# Patient Record
Sex: FEMALE | Race: WHITE | NOT HISPANIC OR LATINO | Employment: FULL TIME | URBAN - METROPOLITAN AREA
[De-identification: names, ages, dates, MRNs, and addresses within clinical notes are randomized per-mention and may not be internally consistent; named-entity substitution may affect disease eponyms.]

---

## 2018-01-18 NOTE — MISCELLANEOUS
Message  Return to work or school:   Tee Chaudhary is under my professional care  She was seen in my office on 08/04/2016   She is able to return to work on  08/06/2016            Signatures   Electronically signed by : Andrae Kumari; Aug  4 2016  3:09PM EST                       (Author)    Electronically signed by : Andrae Kumari;  Aug  4 2016 10:05PM EST                       (Author)

## 2020-05-28 DIAGNOSIS — J45.41 MODERATE PERSISTENT ASTHMA WITH ACUTE EXACERBATION: Primary | ICD-10-CM

## 2020-05-29 RX ORDER — ALBUTEROL SULFATE 90 UG/1
AEROSOL, METERED RESPIRATORY (INHALATION)
Qty: 18 G | Refills: 0 | Status: SHIPPED | OUTPATIENT
Start: 2020-05-29 | End: 2022-06-08 | Stop reason: SDUPTHER

## 2020-08-05 DIAGNOSIS — E11.65 UNCONTROLLED TYPE 2 DIABETES MELLITUS WITH HYPERGLYCEMIA (HCC): Primary | ICD-10-CM

## 2020-08-05 DIAGNOSIS — I10 ESSENTIAL HYPERTENSION: ICD-10-CM

## 2020-08-05 RX ORDER — METFORMIN HYDROCHLORIDE 750 MG/1
1500 TABLET, EXTENDED RELEASE ORAL
Qty: 60 TABLET | Refills: 0 | Status: SHIPPED | OUTPATIENT
Start: 2020-08-05 | End: 2022-04-26

## 2020-08-05 RX ORDER — LISINOPRIL AND HYDROCHLOROTHIAZIDE 20; 12.5 MG/1; MG/1
2 TABLET ORAL DAILY
COMMUNITY
Start: 2020-05-23 | End: 2020-08-05 | Stop reason: SDUPTHER

## 2020-08-05 RX ORDER — LISINOPRIL AND HYDROCHLOROTHIAZIDE 20; 12.5 MG/1; MG/1
2 TABLET ORAL DAILY
Qty: 180 TABLET | Refills: 0 | Status: SHIPPED | OUTPATIENT
Start: 2020-08-05 | End: 2022-03-16 | Stop reason: ALTCHOICE

## 2021-12-16 ENCOUNTER — NURSE TRIAGE (OUTPATIENT)
Dept: OTHER | Facility: OTHER | Age: 56
End: 2021-12-16

## 2021-12-16 DIAGNOSIS — Z20.828 SARS-ASSOCIATED CORONAVIRUS EXPOSURE: Primary | ICD-10-CM

## 2021-12-17 PROCEDURE — 87636 SARSCOV2 & INF A&B AMP PRB: CPT | Performed by: FAMILY MEDICINE

## 2022-03-16 ENCOUNTER — OFFICE VISIT (OUTPATIENT)
Dept: FAMILY MEDICINE CLINIC | Facility: CLINIC | Age: 57
End: 2022-03-16
Payer: COMMERCIAL

## 2022-03-16 VITALS
OXYGEN SATURATION: 96 % | TEMPERATURE: 98.8 F | DIASTOLIC BLOOD PRESSURE: 98 MMHG | BODY MASS INDEX: 27.29 KG/M2 | SYSTOLIC BLOOD PRESSURE: 159 MMHG | WEIGHT: 139 LBS | HEIGHT: 60 IN | RESPIRATION RATE: 16 BRPM | HEART RATE: 96 BPM

## 2022-03-16 DIAGNOSIS — Z00.01 ENCOUNTER FOR ROUTINE ADULT HEALTH EXAMINATION WITH ABNORMAL FINDINGS: Primary | ICD-10-CM

## 2022-03-16 DIAGNOSIS — M54.40 LOW BACK PAIN WITH SCIATICA, SCIATICA LATERALITY UNSPECIFIED, UNSPECIFIED BACK PAIN LATERALITY, UNSPECIFIED CHRONICITY: ICD-10-CM

## 2022-03-16 DIAGNOSIS — I10 PRIMARY HYPERTENSION: ICD-10-CM

## 2022-03-16 DIAGNOSIS — R73.9 HYPERGLYCEMIA: ICD-10-CM

## 2022-03-16 PROCEDURE — 3008F BODY MASS INDEX DOCD: CPT | Performed by: NURSE PRACTITIONER

## 2022-03-16 PROCEDURE — 4010F ACE/ARB THERAPY RXD/TAKEN: CPT | Performed by: NURSE PRACTITIONER

## 2022-03-16 PROCEDURE — 99386 PREV VISIT NEW AGE 40-64: CPT | Performed by: NURSE PRACTITIONER

## 2022-03-16 PROCEDURE — 3725F SCREEN DEPRESSION PERFORMED: CPT | Performed by: NURSE PRACTITIONER

## 2022-03-16 RX ORDER — GABAPENTIN 100 MG/1
200 CAPSULE ORAL 3 TIMES DAILY
Qty: 90 CAPSULE | Refills: 1 | Status: SHIPPED | OUTPATIENT
Start: 2022-03-16 | End: 2022-06-05 | Stop reason: SDUPTHER

## 2022-03-16 RX ORDER — GABAPENTIN 100 MG/1
200 CAPSULE ORAL 3 TIMES DAILY
COMMUNITY
Start: 2022-03-03 | End: 2022-03-16 | Stop reason: SDUPTHER

## 2022-03-16 RX ORDER — NAPROXEN 500 MG/1
500 TABLET ORAL 2 TIMES DAILY WITH MEALS
COMMUNITY
Start: 2022-01-11 | End: 2022-04-26

## 2022-03-16 RX ORDER — ALBUTEROL SULFATE 2.5 MG/3ML
SOLUTION RESPIRATORY (INHALATION)
COMMUNITY
Start: 2022-02-21

## 2022-03-16 RX ORDER — LISINOPRIL 20 MG/1
20 TABLET ORAL DAILY
COMMUNITY
Start: 2022-03-03 | End: 2022-03-16 | Stop reason: SDUPTHER

## 2022-03-16 RX ORDER — LISINOPRIL 40 MG/1
40 TABLET ORAL DAILY
Qty: 90 TABLET | Refills: 1 | Status: SHIPPED | OUTPATIENT
Start: 2022-03-16 | End: 2022-05-19 | Stop reason: DRUGHIGH

## 2022-03-17 ENCOUNTER — TELEPHONE (OUTPATIENT)
Dept: PHYSICAL THERAPY | Facility: OTHER | Age: 57
End: 2022-03-17

## 2022-03-17 NOTE — TELEPHONE ENCOUNTER
Message left for patient regarding referral entered for [facility]  Nurse requested patient CB if needed and leave Full Name &  on VM  One of the nurses will return the call to discuss the program further      Referral deferred for f/u

## 2022-03-18 ENCOUNTER — NURSE TRIAGE (OUTPATIENT)
Dept: PHYSICAL THERAPY | Facility: OTHER | Age: 57
End: 2022-03-18

## 2022-03-18 DIAGNOSIS — M54.50 ACUTE EXACERBATION OF CHRONIC LOW BACK PAIN: Primary | ICD-10-CM

## 2022-03-18 DIAGNOSIS — G89.29 ACUTE EXACERBATION OF CHRONIC LOW BACK PAIN: Primary | ICD-10-CM

## 2022-03-18 NOTE — TELEPHONE ENCOUNTER
Additional Information   Negative: Is this related to a work injury?  Negative: Is this related to an MVA?  Negative: Are you currently recieving homecare services?  Negative: Has the patient had unexplained weight loss?  Negative: Does the patient have a fever?  Negative: Is the patient experiencing blood in sputum?  Negative: Has the patient experienced major trauma? (fall from height, high speed collision, direct blow to spine) and is also experiencing nausea, light-headedness, or loss of consciousness?  Negative: Is the patient experiencing urine retention?  Negative: Is the patient experiencing acute drop foot or paralysis?  Affirmative: Is this a chronic condition? Background - Initial Assessment  Clinical complaint: bilateral and midline low back pain- Also c/o Left foot tingling  Denies any Leg involvement  Date of onset: Pain started 6 months ago- Denies injury  Pain has worsened over the past month  NKI  Frequency of pain: constant  Quality of pain: "sharp ache"    Protocols used: [facility]PREHENSIVE SPINE PROGRAM PROTOCOL    Patient seen at [facility] on 3/16/22-SEE NOTES  Patient also filled out On-line form for [facility]  Referral entered to program as well  Sciatica dx attached  Nurse returned call and reviewed the program with her  Triaged for above complaints and NO RF s/s present  Patient stated she has a hx of back pain, "but not like this"  Pain is not resolving and worsening noted about 1 mth ago  Foot numbness at times  Referral entered for the [place] site and contact info given to her as well  Nurse also offered a call from the [facility] counselor d/t SBT score  Patient declined  Patient was pleasant and appropriate during this encounter  Patient appreciative of  CB and triage  Nurse wished her well and referral closed

## 2022-03-23 LAB
ALBUMIN SERPL-MCNC: 3.8 G/DL (ref 3.6–5.1)
ALBUMIN/GLOB SERPL: 1.3 (CALC) (ref 1–2.5)
ALP SERPL-CCNC: 48 U/L (ref 37–153)
ALT SERPL-CCNC: 13 U/L (ref 6–29)
APPEARANCE UR: CLEAR
AST SERPL-CCNC: 21 U/L (ref 10–35)
BACTERIA UR QL AUTO: NORMAL /HPF
BASOPHILS # BLD AUTO: 137 CELLS/UL (ref 0–200)
BASOPHILS NFR BLD AUTO: 1.4 %
BILIRUB SERPL-MCNC: 0.3 MG/DL (ref 0.2–1.2)
BILIRUB UR QL STRIP: NEGATIVE
BUN SERPL-MCNC: 8 MG/DL (ref 7–25)
BUN/CREAT SERPL: ABNORMAL (CALC) (ref 6–22)
CALCIUM SERPL-MCNC: 9.5 MG/DL (ref 8.6–10.4)
CHLORIDE SERPL-SCNC: 101 MMOL/L (ref 98–110)
CHOLEST SERPL-MCNC: 188 MG/DL
CHOLEST/HDLC SERPL: 3.7 (CALC)
CO2 SERPL-SCNC: 26 MMOL/L (ref 20–32)
COLOR UR: YELLOW
CREAT SERPL-MCNC: 0.51 MG/DL (ref 0.5–1.05)
EOSINOPHIL # BLD AUTO: 1029 CELLS/UL (ref 15–500)
EOSINOPHIL NFR BLD AUTO: 10.5 %
ERYTHROCYTE [DISTWIDTH] IN BLOOD BY AUTOMATED COUNT: 13.4 % (ref 11–15)
GLOBULIN SER CALC-MCNC: 3 G/DL (CALC) (ref 1.9–3.7)
GLUCOSE SERPL-MCNC: 108 MG/DL (ref 65–99)
GLUCOSE UR QL STRIP: NEGATIVE
HBA1C MFR BLD: 6.3 % OF TOTAL HGB
HCT VFR BLD AUTO: 37.9 % (ref 35–45)
HDLC SERPL-MCNC: 51 MG/DL
HGB BLD-MCNC: 12.6 G/DL (ref 11.7–15.5)
HGB UR QL STRIP: NEGATIVE
HYALINE CASTS #/AREA URNS LPF: NORMAL /LPF
KETONES UR QL STRIP: NEGATIVE
LDLC SERPL CALC-MCNC: 109 MG/DL (CALC)
LEUKOCYTE ESTERASE UR QL STRIP: NEGATIVE
LYMPHOCYTES # BLD AUTO: 4753 CELLS/UL (ref 850–3900)
LYMPHOCYTES NFR BLD AUTO: 48.5 %
MCH RBC QN AUTO: 29.1 PG (ref 27–33)
MCHC RBC AUTO-ENTMCNC: 33.2 G/DL (ref 32–36)
MCV RBC AUTO: 87.5 FL (ref 80–100)
MONOCYTES # BLD AUTO: 706 CELLS/UL (ref 200–950)
MONOCYTES NFR BLD AUTO: 7.2 %
NEUTROPHILS # BLD AUTO: 3175 CELLS/UL (ref 1500–7800)
NEUTROPHILS NFR BLD AUTO: 32.4 %
NITRITE UR QL STRIP: NEGATIVE
NONHDLC SERPL-MCNC: 137 MG/DL (CALC)
PH UR STRIP: 7 [PH] (ref 5–8)
PLATELET # BLD AUTO: 427 THOUSAND/UL (ref 140–400)
PMV BLD REES-ECKER: 10 FL (ref 7.5–12.5)
POTASSIUM SERPL-SCNC: 4.6 MMOL/L (ref 3.5–5.3)
PROT SERPL-MCNC: 6.8 G/DL (ref 6.1–8.1)
PROT UR QL STRIP: NEGATIVE
RBC # BLD AUTO: 4.33 MILLION/UL (ref 3.8–5.1)
RBC #/AREA URNS HPF: NORMAL /HPF
SL AMB EGFR AFRICAN AMERICAN: 124 ML/MIN/1.73M2
SL AMB EGFR NON AFRICAN AMERICAN: 107 ML/MIN/1.73M2
SODIUM SERPL-SCNC: 139 MMOL/L (ref 135–146)
SP GR UR STRIP: 1.01 (ref 1–1.03)
SQUAMOUS #/AREA URNS HPF: NORMAL /HPF
TRIGL SERPL-MCNC: 160 MG/DL
TSH SERPL-ACNC: 1.83 MIU/L (ref 0.4–4.5)
WBC # BLD AUTO: 9.8 THOUSAND/UL (ref 3.8–10.8)
WBC #/AREA URNS HPF: NORMAL /HPF

## 2022-03-23 PROCEDURE — 3044F HG A1C LEVEL LT 7.0%: CPT | Performed by: NURSE PRACTITIONER

## 2022-04-13 ENCOUNTER — EVALUATION (OUTPATIENT)
Dept: PHYSICAL THERAPY | Facility: MEDICAL CENTER | Age: 57
End: 2022-04-13
Payer: COMMERCIAL

## 2022-04-13 VITALS — TEMPERATURE: 98.2 F | HEART RATE: 67 BPM | SYSTOLIC BLOOD PRESSURE: 113 MMHG | DIASTOLIC BLOOD PRESSURE: 99 MMHG

## 2022-04-13 DIAGNOSIS — M54.50 ACUTE EXACERBATION OF CHRONIC LOW BACK PAIN: ICD-10-CM

## 2022-04-13 DIAGNOSIS — G89.29 ACUTE EXACERBATION OF CHRONIC LOW BACK PAIN: ICD-10-CM

## 2022-04-13 PROCEDURE — 97110 THERAPEUTIC EXERCISES: CPT | Performed by: PHYSICAL THERAPIST

## 2022-04-13 PROCEDURE — 97161 PT EVAL LOW COMPLEX 20 MIN: CPT | Performed by: PHYSICAL THERAPIST

## 2022-04-13 NOTE — PROGRESS NOTES
PT Evaluation     Today's date: 2022  Patient name: Yessi Mcneil  : 1965  MRN: [id number]  Referring provider: Adriane James PT  Dx:   Encounter Diagnosis     ICD-10-CM    1  Acute exacerbation of chronic low back pain  M54 50 Ambulatory referral to PT spine    G89 29                   Assessment  Assessment details: Yessi Mcneil is a 62 y o  female who presents with Acute exacerbation of chronic low back pain  Patient presents alert and oriented with the above impairments  Rosalba Bear will benefit from PT to addres deficits in order to maximize and return to prior level of function  No further referral appears necessary at this time based upon examination results  Prognosis is good given HEP compliance  Please contact me if you have any questions or recommendations  Impairments: abnormal or restricted ROM, abnormal movement, activity intolerance, impaired physical strength, lacks appropriate home exercise program, pain with function and weight-bearing intolerance  Understanding of Dx/Px/POC: good   Prognosis: good    Goals  Short Term Goals:   1  Pain decreased 2 ratings in 4 weeks  2  ROM increased 10* in 4 weeks  3  Strength increased 1/2 grade in 4 weeks    Long Term Goals:   1  ADLS/IADLS in related activities improved to maximal level in 8 weeks  2  Work performance improved to maximal level in 8 weeks  3  Recreational activities are improved to maximal level in 8 weeks  4   [place] with HEP in 8 weeks      Plan  Plan details: 1-2x/week based on work scheduled and transportation  Patient would benefit from: skilled physical therapy  Planned modality interventions: cryotherapy  Planned therapy interventions: abdominal trunk stabilization, patient education, neuromuscular re-education, manual therapy, home exercise program, functional ROM exercises, flexibility, therapeutic exercise, stretching and strengthening  Frequency: 2x week  Duration in weeks: 8  Treatment plan discussed with: patient        Subjective Evaluation    History of Present Illness  Mechanism of injury: Pt reports she has had back pain since  which has progressively worsened  Last July pain significantly increased and she began to experience tingling in L foot in November  At her visit w/ PCP last month she discussed back issue and was referred into comprehensive spine program   She presents today w/ reports of constant pain across her low back w/ constant tingling in her left foot  She also has some intermittent pain in left lateral lower leg  Pain is exacerbated w/ prolonged standing  She is employed as  and job duties to require standing for 8 hours/day  She also c/o sleep disturbance  She is also very limited w/ household chores     Pain  At best pain ratin  At worst pain rating: 10    Patient Goals  Patient goals for therapy: decreased pain, increased motion, increased strength, independence with ADLs/IADLs and return to work          Objective     Active Range of Motion     Lumbar   Flexion: 65 degrees   Extension: 15 degrees     Strength/Myotome Testing     Left Hip   Planes of Motion   Flexion: 3+  Extension: 3+  Abduction: 3+    Right Hip   Planes of Motion   Flexion: 3+  Extension: 3+  Abduction: 4    Left Knee   Extension: 4-    Right Knee   Extension: 5    Left Ankle/Foot   Dorsiflexion: 3+    Right Ankle/Foot   Dorsiflexion: 5      Flowsheet Rows      Most Recent Value   PT/OT G-Codes    Current Score 47   Projected Score 60   FOTO information reviewed Yes   Assessment Type Evaluation   G code set Other PT/OT Primary   Other PT Primary Current Status () CK   Other PT Primary Goal Status () CJ             Precautions: none      Manuals                                                                 Neuro Re-Ed             sowmya nv            Ab brace  nv                                                                             Ther Ex             ppt 5"x20 Piriformis stretch nv                                                                                          Ther Activity                                       Gait Training                                       Modalities

## 2022-04-20 ENCOUNTER — OFFICE VISIT (OUTPATIENT)
Dept: PHYSICAL THERAPY | Facility: MEDICAL CENTER | Age: 57
End: 2022-04-20
Payer: COMMERCIAL

## 2022-04-20 DIAGNOSIS — M54.50 ACUTE EXACERBATION OF CHRONIC LOW BACK PAIN: Primary | ICD-10-CM

## 2022-04-20 DIAGNOSIS — G89.29 ACUTE EXACERBATION OF CHRONIC LOW BACK PAIN: Primary | ICD-10-CM

## 2022-04-20 PROCEDURE — 97140 MANUAL THERAPY 1/> REGIONS: CPT | Performed by: PHYSICAL THERAPIST

## 2022-04-20 PROCEDURE — 97112 NEUROMUSCULAR REEDUCATION: CPT | Performed by: PHYSICAL THERAPIST

## 2022-04-20 NOTE — PROGRESS NOTES
Daily Note     Today's date: 2022  Patient name: Nicolasa Meedllin  : 1965  MRN: [id number]  Referring provider: Shaheed Cortez, PT  Dx:   Encounter Diagnosis     ICD-10-CM    1  Acute exacerbation of chronic low back pain  M54 50     G89 29                   Subjective: Pt c/o left sided upper lumbar soreness today  Objective: See treatment diary below      Assessment: Tolerated treatment well  Patient demonstrated fatigue post treatment, exhibited good technique with therapeutic exercises and would benefit from continued PT  Reports relief w/ manuals  Plan: Continue per plan of care        Precautions: none      Manuals             STM/TPR L lumbar paraspinals  10 min                                                  Neuro Re-Ed            clamshells nv 5"x10           Ab brace / march nv x20                                                                            Ther Ex            ppt 5"x20 5"x20           Piriformis stretch nv 30"x3                                                                                         Ther Activity                                       Gait Training                                       Modalities

## 2022-04-22 ENCOUNTER — OFFICE VISIT (OUTPATIENT)
Dept: URGENT CARE | Facility: MEDICAL CENTER | Age: 57
End: 2022-04-22
Payer: COMMERCIAL

## 2022-04-22 VITALS
HEIGHT: 60 IN | WEIGHT: 150 LBS | BODY MASS INDEX: 29.45 KG/M2 | SYSTOLIC BLOOD PRESSURE: 160 MMHG | HEART RATE: 64 BPM | TEMPERATURE: 97.6 F | DIASTOLIC BLOOD PRESSURE: 90 MMHG | OXYGEN SATURATION: 96 % | RESPIRATION RATE: 18 BRPM

## 2022-04-22 DIAGNOSIS — R39.9 UTI SYMPTOMS: Primary | ICD-10-CM

## 2022-04-22 PROCEDURE — 87086 URINE CULTURE/COLONY COUNT: CPT | Performed by: PHYSICIAN ASSISTANT

## 2022-04-22 PROCEDURE — 99204 OFFICE O/P NEW MOD 45 MIN: CPT | Performed by: PHYSICIAN ASSISTANT

## 2022-04-22 RX ORDER — NITROFURANTOIN 25; 75 MG/1; MG/1
100 CAPSULE ORAL 2 TIMES DAILY
Qty: 14 CAPSULE | Refills: 0 | Status: SHIPPED | OUTPATIENT
Start: 2022-04-22 | End: 2022-04-30 | Stop reason: HOSPADM

## 2022-04-22 NOTE — PROGRESS NOTES
[facility]        NAME: John Gamez is a 62 y o  female  : 1965    MRN: [id number]  DATE: 2022  TIME: 1:13 PM    Assessment and Plan   UTI symptoms [R39 9]  1  UTI symptoms  Urine culture         Patient Instructions     Dysuria   Macrobid as directed  Follow up with PCP in 3-5 days  Proceed to  ER if symptoms worsen  Chief Complaint     Chief Complaint   Patient presents with    Possible UTI     Pt  with abdominal pain, frequency, and urgensy for the past couple days          History of Present Illness       59-year-old female who presents complaining of frequency, urgency, dysuria x4 days  Denies fevers, chills, abdominal pain, flank pain      Review of Systems   Review of Systems   Constitutional: Negative  HENT: Negative  Eyes: Negative  Respiratory: Negative  Negative for cough, chest tightness, shortness of breath, wheezing and stridor  Cardiovascular: Negative  Negative for chest pain, palpitations and leg swelling  Gastrointestinal: Negative  Genitourinary: Positive for dysuria, frequency and urgency  Negative for decreased urine volume, difficulty urinating, dyspareunia, enuresis, flank pain, genital sores, hematuria, menstrual problem, pelvic pain, vaginal bleeding, vaginal discharge and vaginal pain           Current Medications       Current Outpatient Medications:     albuterol (2 5 mg/3 mL) 0 083 % nebulizer solution, USE 1 VIAL IN NEBULIZER EVERY 2 HOURS AS NEEDED FOR WHEEZING, Disp: , Rfl:     albuterol (PROVENTIL HFA,VENTOLIN HFA) 90 mcg/act inhaler, INHALE 2 PUFFS BY MOUTH EVERY 4 TO 6 HOURS AS NEEDED FOR WHEEZING AND FOR SHORTNESS OF BREATH, Disp: 18 g, Rfl: 0    gabapentin (NEURONTIN) 100 mg capsule, Take 2 capsules (200 mg total) by mouth 3 (three) times a day, Disp: 90 capsule, Rfl: 1    lisinopril (ZESTRIL) 40 mg tablet, Take 1 tablet (40 mg total) by mouth daily, Disp: 90 tablet, Rfl: 1    metoprolol tartrate (LOPRESSOR) 25 mg tablet, Take 1 tablet (25 mg total) by mouth 2 (two) times a day, Disp: 60 tablet, Rfl: 2    metFORMIN (GLUCOPHAGE-XR) 750 mg 24 hr tablet, Take 2 tablets (1,500 mg total) by mouth daily with breakfast (Patient not taking: Reported on 3/16/2022 ), Disp: 60 tablet, Rfl: 0    naproxen (NAPROSYN) 500 mg tablet, Take 500 mg by mouth 2 (two) times a day with meals (Patient not taking: Reported on 4/22/2022 ), Disp: , Rfl:     Current Allergies     Allergies as of 04/22/2022    (No Known Allergies)            The following portions of the patient's history were reviewed and updated as appropriate: allergies, current medications, past family history, past medical history, past social history, past surgical history and problem list      Past Medical History:   Diagnosis Date    History of mammogram 2018    Hypertension        Past Surgical History:   Procedure Laterality Date    APPENDECTOMY      CARPAL TUNNEL RELEASE      COLONOSCOPY  2017    repeat in 2022   [address]      spine fusion        Family History   Problem Relation Age of Onset    Hypertension Mother     Heart disease Mother     Hypertension Father     Heart disease Father     Diabetes Maternal Grandmother     Breast cancer Paternal Aunt     Pancreatic cancer Paternal Uncle          Medications have been verified  Objective   /90   Pulse 64   Temp 97 6 °F (36 4 °C)   Resp 18   Ht 5' (1 524 m)   Wt 68 kg (150 lb)   SpO2 96%   BMI 29 29 kg/m²        Physical Exam     Physical Exam  Constitutional:       Appearance: She is well-developed  HENT:      Head: Normocephalic and atraumatic  Right Ear: External ear normal       Left Ear: External ear normal       Nose: Nose normal       Mouth/Throat:      Pharynx: No oropharyngeal exudate  Cardiovascular:      Rate and Rhythm: Normal rate and regular rhythm  Heart sounds: Normal heart sounds     Pulmonary:      Effort: Pulmonary effort is normal  No respiratory distress  Breath sounds: Normal breath sounds  No wheezing or rales  Chest:      Chest wall: No tenderness  Abdominal:      General: Bowel sounds are normal  There is no distension  Palpations: Abdomen is soft  There is no mass  Tenderness: There is no abdominal tenderness  There is no right CVA tenderness, left CVA tenderness, guarding or rebound  Musculoskeletal:      Cervical back: Normal range of motion and neck supple  Lymphadenopathy:      Cervical: No cervical adenopathy

## 2022-04-24 LAB
BACTERIA UR CULT: ABNORMAL
BACTERIA UR CULT: ABNORMAL

## 2022-04-26 ENCOUNTER — APPOINTMENT (EMERGENCY)
Dept: CT IMAGING | Facility: HOSPITAL | Age: 57
End: 2022-04-26
Payer: COMMERCIAL

## 2022-04-26 ENCOUNTER — HOSPITAL ENCOUNTER (EMERGENCY)
Facility: HOSPITAL | Age: 57
Discharge: HOME | End: 2022-04-26
Attending: EMERGENCY MEDICINE | Admitting: EMERGENCY MEDICINE
Payer: COMMERCIAL

## 2022-04-26 VITALS
RESPIRATION RATE: 18 BRPM | HEART RATE: 64 BPM | DIASTOLIC BLOOD PRESSURE: 106 MMHG | TEMPERATURE: 98.2 F | SYSTOLIC BLOOD PRESSURE: 208 MMHG | OXYGEN SATURATION: 96 %

## 2022-04-26 DIAGNOSIS — K42.9 UMBILICAL HERNIA: Primary | ICD-10-CM

## 2022-04-26 DIAGNOSIS — R10.9 ABDOMINAL PAIN: ICD-10-CM

## 2022-04-26 DIAGNOSIS — I10 CHRONIC HYPERTENSION: ICD-10-CM

## 2022-04-26 PROBLEM — Z87.891 HISTORY OF TOBACCO ABUSE: Status: ACTIVE | Noted: 2022-04-26

## 2022-04-26 PROBLEM — M79.7 FIBROMYALGIA: Status: ACTIVE | Noted: 2022-04-26

## 2022-04-26 LAB
ALBUMIN SERPL BCP-MCNC: 3.5 G/DL (ref 3.5–5)
ALP SERPL-CCNC: 68 U/L (ref 46–116)
ALT SERPL W P-5'-P-CCNC: 23 U/L (ref 12–78)
ANION GAP SERPL CALCULATED.3IONS-SCNC: 7 MMOL/L (ref 4–13)
AST SERPL W P-5'-P-CCNC: 24 U/L (ref 5–45)
BASOPHILS # BLD AUTO: 0.13 THOUSANDS/ΜL (ref 0–0.1)
BASOPHILS NFR BLD AUTO: 1 % (ref 0–1)
BILIRUB SERPL-MCNC: 0.31 MG/DL (ref 0.2–1)
BILIRUB UR QL STRIP: NEGATIVE
BUN SERPL-MCNC: 7 MG/DL (ref 5–25)
CALCIUM SERPL-MCNC: 9.2 MG/DL (ref 8.3–10.1)
CHLORIDE SERPL-SCNC: 101 MMOL/L (ref 100–108)
CLARITY UR: CLEAR
CO2 SERPL-SCNC: 31 MMOL/L (ref 21–32)
COLOR UR: YELLOW
CREAT SERPL-MCNC: 0.63 MG/DL (ref 0.6–1.3)
EOSINOPHIL # BLD AUTO: 1.9 THOUSAND/ΜL (ref 0–0.61)
EOSINOPHIL NFR BLD AUTO: 21 % (ref 0–6)
ERYTHROCYTE [DISTWIDTH] IN BLOOD BY AUTOMATED COUNT: 14.6 % (ref 11.6–15.1)
GFR SERPL CREATININE-BSD FRML MDRD: 99 ML/MIN/1.73SQ M
GLUCOSE SERPL-MCNC: 135 MG/DL (ref 65–140)
GLUCOSE UR STRIP-MCNC: NEGATIVE MG/DL
HCT VFR BLD AUTO: 40.6 % (ref 34.8–46.1)
HGB BLD-MCNC: 13 G/DL (ref 11.5–15.4)
HGB UR QL STRIP.AUTO: NEGATIVE
IMM GRANULOCYTES # BLD AUTO: 0.03 THOUSAND/UL (ref 0–0.2)
IMM GRANULOCYTES NFR BLD AUTO: 0 % (ref 0–2)
KETONES UR STRIP-MCNC: NEGATIVE MG/DL
LEUKOCYTE ESTERASE UR QL STRIP: NEGATIVE
LIPASE SERPL-CCNC: 95 U/L (ref 73–393)
LYMPHOCYTES # BLD AUTO: 3.7 THOUSANDS/ΜL (ref 0.6–4.47)
LYMPHOCYTES NFR BLD AUTO: 41 % (ref 14–44)
MCH RBC QN AUTO: 28.8 PG (ref 26.8–34.3)
MCHC RBC AUTO-ENTMCNC: 32 G/DL (ref 31.4–37.4)
MCV RBC AUTO: 90 FL (ref 82–98)
MONOCYTES # BLD AUTO: 0.7 THOUSAND/ΜL (ref 0.17–1.22)
MONOCYTES NFR BLD AUTO: 8 % (ref 4–12)
NEUTROPHILS # BLD AUTO: 2.68 THOUSANDS/ΜL (ref 1.85–7.62)
NEUTS SEG NFR BLD AUTO: 29 % (ref 43–75)
NITRITE UR QL STRIP: NEGATIVE
NRBC BLD AUTO-RTO: 0 /100 WBCS
PH UR STRIP.AUTO: 7 [PH]
PLATELET # BLD AUTO: 396 THOUSANDS/UL (ref 149–390)
PMV BLD AUTO: 9.5 FL (ref 8.9–12.7)
POTASSIUM SERPL-SCNC: 3.4 MMOL/L (ref 3.5–5.3)
PROT SERPL-MCNC: 7.3 G/DL (ref 6.4–8.2)
PROT UR STRIP-MCNC: NEGATIVE MG/DL
RBC # BLD AUTO: 4.51 MILLION/UL (ref 3.81–5.12)
SODIUM SERPL-SCNC: 139 MMOL/L (ref 136–145)
SP GR UR STRIP.AUTO: 1.01 (ref 1–1.03)
UROBILINOGEN UR QL STRIP.AUTO: 0.2 E.U./DL
WBC # BLD AUTO: 9.14 THOUSAND/UL (ref 4.31–10.16)

## 2022-04-26 PROCEDURE — 96375 TX/PRO/DX INJ NEW DRUG ADDON: CPT

## 2022-04-26 PROCEDURE — 80053 COMPREHEN METABOLIC PANEL: CPT | Performed by: EMERGENCY MEDICINE

## 2022-04-26 PROCEDURE — 96361 HYDRATE IV INFUSION ADD-ON: CPT

## 2022-04-26 PROCEDURE — 96374 THER/PROPH/DIAG INJ IV PUSH: CPT

## 2022-04-26 PROCEDURE — 85025 COMPLETE CBC W/AUTO DIFF WBC: CPT | Performed by: EMERGENCY MEDICINE

## 2022-04-26 PROCEDURE — 36415 COLL VENOUS BLD VENIPUNCTURE: CPT | Performed by: EMERGENCY MEDICINE

## 2022-04-26 PROCEDURE — 99284 EMERGENCY DEPT VISIT MOD MDM: CPT

## 2022-04-26 PROCEDURE — 83690 ASSAY OF LIPASE: CPT | Performed by: EMERGENCY MEDICINE

## 2022-04-26 PROCEDURE — G1004 CDSM NDSC: HCPCS

## 2022-04-26 PROCEDURE — 99244 OFF/OP CNSLTJ NEW/EST MOD 40: CPT | Performed by: SURGERY

## 2022-04-26 PROCEDURE — 81003 URINALYSIS AUTO W/O SCOPE: CPT | Performed by: EMERGENCY MEDICINE

## 2022-04-26 PROCEDURE — 74177 CT ABD & PELVIS W/CONTRAST: CPT

## 2022-04-26 PROCEDURE — 99285 EMERGENCY DEPT VISIT HI MDM: CPT | Performed by: EMERGENCY MEDICINE

## 2022-04-26 RX ORDER — AMLODIPINE BESYLATE 5 MG/1
5 TABLET ORAL ONCE
Status: COMPLETED | OUTPATIENT
Start: 2022-04-26 | End: 2022-04-26

## 2022-04-26 RX ORDER — ONDANSETRON 2 MG/ML
4 INJECTION INTRAMUSCULAR; INTRAVENOUS ONCE
Status: COMPLETED | OUTPATIENT
Start: 2022-04-26 | End: 2022-04-26

## 2022-04-26 RX ORDER — HYDROCODONE BITARTRATE AND ACETAMINOPHEN 5; 325 MG/1; MG/1
1 TABLET ORAL EVERY 6 HOURS PRN
Qty: 15 TABLET | Refills: 0 | Status: SHIPPED | OUTPATIENT
Start: 2022-04-26 | End: 2022-05-06

## 2022-04-26 RX ORDER — AMLODIPINE BESYLATE 5 MG/1
5 TABLET ORAL DAILY
Qty: 30 TABLET | Refills: 0 | Status: SHIPPED | OUTPATIENT
Start: 2022-04-26 | End: 2022-05-04 | Stop reason: SDUPTHER

## 2022-04-26 RX ORDER — HYDROMORPHONE HCL/PF 1 MG/ML
0.5 SYRINGE (ML) INJECTION ONCE
Status: COMPLETED | OUTPATIENT
Start: 2022-04-26 | End: 2022-04-26

## 2022-04-26 RX ADMIN — HYDROMORPHONE HYDROCHLORIDE 0.5 MG: 1 INJECTION, SOLUTION INTRAMUSCULAR; INTRAVENOUS; SUBCUTANEOUS at 10:33

## 2022-04-26 RX ADMIN — ONDANSETRON 4 MG: 2 INJECTION INTRAMUSCULAR; INTRAVENOUS at 10:32

## 2022-04-26 RX ADMIN — SODIUM CHLORIDE 1000 ML: 0.9 INJECTION, SOLUTION INTRAVENOUS at 10:32

## 2022-04-26 RX ADMIN — AMLODIPINE BESYLATE 5 MG: 5 TABLET ORAL at 14:37

## 2022-04-26 RX ADMIN — IOHEXOL 100 ML: 350 INJECTION, SOLUTION INTRAVENOUS at 12:03

## 2022-04-26 NOTE — CONSULTS
Consultation - General Surgery   Jesi Whaley 62 y o  female MRN: [id number]  Unit/Bed#: ED 24 Encounter: [id number]    Assessment/Plan     Assessment:  61 yo F with PMH of HTN and appendectomy who presents with reducible umbilical hernia  /110s, otherwise vitals normal on room air    WBC 9 14  Lipase 95  UA clear    4/26 CT abdomen pelvis w IV contrast: Modest sized fat containing umbilical hernia  Plan:  Hernia reduced at bedside, plan to follow up as an outpatient to discuss elective umbilical hernia repair  Unlikely that patient's back pain and upper quadrant abdominal pain is related to her hernia, further work up per ED  Medical follow up for HTN    History of Present Illness     HPI:  Jesi Whaley is a 62 y o  female who presents with an umbilical hernia  Patient reports that the hernia has been present for over a year  Over the last 2 weeks she has started to notice more discomfort at the site of the hernia  She presented today due to the discomfort becoming unbearable  She denies any symptoms of obstruction including nausea, vomiting, constipation  Bowel function has been normal in her last bowel movement was earlier today  She denies incarceration or overlying skin changes  She currently reports abdominal pain in the bilateral upper quadrants as well as her back  Inpatient consult to Acute Care Surgery  Consult performed by: Marcello Cardona MD  Consult ordered by: Kee Lockhart DO         Review of Systems   Constitutional: Negative  HENT: Negative  Eyes: Negative  Respiratory: Negative  Cardiovascular: Negative  Gastrointestinal: Positive for abdominal pain (Bilateral upper quadrants)  Negative for constipation, diarrhea, nausea and vomiting  Endocrine: Negative  Genitourinary: Negative  Musculoskeletal: Negative  Skin: Negative  Neurological: Negative  Psychiatric/Behavioral: Negative             Historical Information   Past Medical History:   Diagnosis Date    History of mammogram 2018    Hypertension      Past Surgical History:   Procedure Laterality Date    APPENDECTOMY      CARPAL TUNNEL RELEASE      COLONOSCOPY  2017    repeat in 2022    FOOT SURGERY      NECK SURGERY      spine fusion      Social History   Social History     Substance and Sexual Activity   Alcohol Use Yes    Comment: occasional      Social History     Substance and Sexual Activity   Drug Use Not Currently     E-Cigarette/Vaping     E-Cigarette/Vaping Substances     Social History     Tobacco Use   Smoking Status Current Every Day Smoker    Packs/day: 0 50    Years: 25 00    Pack years: 12 50    Types: Cigarettes   Smokeless Tobacco Never Used   Tobacco Comment    per pt, 5 a day - As per [place]      Family History:   Family History   Problem Relation Age of Onset    Hypertension Mother     Heart disease Mother     Hypertension Father     Heart disease Father     Diabetes Maternal Grandmother     Breast cancer Paternal Aunt     Pancreatic cancer Paternal Uncle        Meds/Allergies   current meds:   No current facility-administered medications for this encounter  and PTA meds:   Prior to Admission Medications   Prescriptions Last Dose Informant Patient Reported? Taking?    albuterol (2 5 mg/3 mL) 0 083 % nebulizer solution   Yes Yes   Sig: USE 1 VIAL IN NEBULIZER EVERY 2 HOURS AS NEEDED FOR WHEEZING   albuterol (PROVENTIL HFA,VENTOLIN HFA) 90 mcg/act inhaler   No Yes   Sig: INHALE 2 PUFFS BY MOUTH EVERY 4 TO 6 HOURS AS NEEDED FOR WHEEZING AND FOR SHORTNESS OF BREATH   gabapentin (NEURONTIN) 100 mg capsule 4/25/2022 at Unknown time  No Yes   Sig: Take 2 capsules (200 mg total) by mouth 3 (three) times a day   lisinopril (ZESTRIL) 40 mg tablet 4/26/2022 at Unknown time  No Yes   Sig: Take 1 tablet (40 mg total) by mouth daily   metoprolol tartrate (LOPRESSOR) 25 mg tablet 4/26/2022 at Unknown time  No Yes   Sig: Take 1 tablet (25 mg total) by mouth 2 (two) times a day nitrofurantoin (MACROBID) 100 mg capsule 4/26/2022 at Unknown time  No Yes   Sig: Take 1 capsule (100 mg total) by mouth 2 (two) times a day for 7 days      Facility-Administered Medications: None     No Known Allergies    Objective   First Vitals:   Blood Pressure: (!) 207/100 (04/26/22 0942)  Pulse: 67 (04/26/22 0942)  Temperature: 98 2 °F (36 8 °C) (04/26/22 0942)  Temp Source: Oral (04/26/22 0942)  Respirations: 20 (04/26/22 0942)  SpO2: 98 % (04/26/22 0942)    Current Vitals:   Blood Pressure: (!) 219/108 (04/26/22 1437)  Pulse: 62 (04/26/22 1317)  Temperature: 98 2 °F (36 8 °C) (04/26/22 0942)  Temp Source: Oral (04/26/22 0942)  Respirations: 16 (04/26/22 1317)  SpO2: 95 % (04/26/22 1317)      Intake/Output Summary (Last 24 hours) at 4/26/2022 1509  Last data filed at 4/26/2022 1132  Gross per 24 hour   Intake 1000 ml   Output --   Net 1000 ml       Invasive Devices  Report    Peripheral Intravenous Line            Peripheral IV 04/26/22 Right Antecubital <1 day                Physical Exam  Constitutional:       Appearance: Normal appearance  HENT:      Head: Normocephalic and atraumatic  Right Ear: External ear normal       Left Ear: External ear normal       Nose: Nose normal       Mouth/Throat:      Mouth: Mucous membranes are moist       Pharynx: Oropharynx is clear  Eyes:      Extraocular Movements: Extraocular movements intact  Conjunctiva/sclera: Conjunctivae normal       Pupils: Pupils are equal, round, and reactive to light  Cardiovascular:      Rate and Rhythm: Normal rate and regular rhythm  Pulses: Normal pulses  Pulmonary:      Effort: Pulmonary effort is normal    Abdominal:      General: Abdomen is flat  Palpations: Abdomen is soft  Tenderness: There is abdominal tenderness (Minimally tender in bilateral upper quadrants)  There is right CVA tenderness and left CVA tenderness  There is no guarding or rebound        Hernia: A hernia (Umbilical hernia, defext approximately 1cm, reducible, no skin changes) is present  Musculoskeletal:         General: Normal range of motion  Cervical back: Normal range of motion  Skin:     General: Skin is warm and dry  Neurological:      General: No focal deficit present  Mental Status: She is alert and oriented to person, place, and time  Psychiatric:         Mood and Affect: Mood normal          Behavior: Behavior normal             Lab Results: I have personally reviewed pertinent lab results  Imaging: I have personally reviewed pertinent reports  EKG, Pathology, and Other Studies: I have personally reviewed pertinent reports

## 2022-04-26 NOTE — Clinical Note
Alicia Valdez was seen and treated in our emergency department on 4/26/2022  Diagnosis:     Trisha Cole  may return to work on return date  She may return on this date: 04/28/2022         If you have any questions or concerns, please don't hesitate to call        Baldemar Sanders,     ______________________________           _______________          _______________  Hospital Representative                              Date                                Time

## 2022-04-26 NOTE — PROGRESS NOTES
BMI Counseling: There is no height or weight on file to calculate BMI  The BMI is above normal  Nutrition recommendations include decreasing portion sizes, encouraging healthy choices of fruits and vegetables, decreasing fast food intake, consuming healthier snacks, limiting drinks that contain sugar, moderation in carbohydrate intake, increasing intake of lean protein, reducing intake of saturated and trans fat and reducing intake of cholesterol  Exercise recommendations include exercising 3-5 times per week  No pharmacotherapy was ordered  Patient referred to PCP  Rationale for BMI follow-up plan is due to patient being overweight or obese  Assessment/Plan:         Problem List Items Addressed This Visit        Cardiovascular and Mediastinum    Primary hypertension     Patient is stable with current anti-hypertensive medicine and continue to follow a low sodium diet and take current medication  All questions about this condition were answered today  Other    History of tobacco abuse     Patient encouraged to quit using tobacco for that increases their risk for COPD, lung cancer,stroke, oral cancer and heart disease  If patient does not want to quit they should let me know  when they are interested in quitting  There are numerous options to use to quit and we can discuss them  Fibromyalgia     Patient is stable  and will continue present plan of care and reassess at next routine visit  All questions about this problem from patient were answered today  Other Visit Diagnoses     Encounter for screening mammogram for breast cancer    -  Primary            Subjective:      Patient ID: Alicia Valdez is a 62 y o  female  This 63-year-old female here today for checkup for multiple medical problems  Patient with hypertension and also was recently seen in the ER with bili co hernia that was exposed    She had a lot of pain and had a reduced while she was in the emergency room and is scheduled to see Dr Leena Velez for repair of her umbilical hernia  Her blood pressure was high in the ER and which she was started on some Norvasc which she has not taken at this point her blood pressure still on the high side today she is going to really start that medication that we are going to see her back in the office to affect assess the efficacy of  The following portions of the patient's history were reviewed and updated as appropriate:   Past Medical History:  She has a past medical history of Fibromyalgia (4/26/2022), History of mammogram (2018), History of tobacco abuse (4/26/2022), and Hypertension  ,  _______________________________________________________________________  Medical Problems:  does not have any pertinent problems on file ,  _______________________________________________________________________  Past Surgical History:   has a past surgical history that includes Appendectomy; Carpal tunnel release; Foot surgery; Neck surgery; and Colonoscopy (2017)  ,  _______________________________________________________________________  Family History:  family history includes Breast cancer in her paternal aunt; Diabetes in her maternal grandmother; Heart disease in her father and mother; Hypertension in her father and mother; Pancreatic cancer in her paternal uncle ,  _______________________________________________________________________  Social History:   reports that she has been smoking cigarettes  She has a 12 50 pack-year smoking history  She has never used smokeless tobacco  She reports current alcohol use  She reports previous drug use ,  _______________________________________________________________________  Allergies:  has No Known Allergies     _______________________________________________________________________  Current Outpatient Medications   Medication Sig Dispense Refill    albuterol (2 5 mg/3 mL) 0 083 % nebulizer solution USE 1 VIAL IN NEBULIZER EVERY 2 HOURS AS NEEDED FOR WHEEZING      albuterol (PROVENTIL HFA,VENTOLIN HFA) 90 mcg/act inhaler INHALE 2 PUFFS BY MOUTH EVERY 4 TO 6 HOURS AS NEEDED FOR WHEEZING AND FOR SHORTNESS OF BREATH 18 g 0    amLODIPine (NORVASC) 5 mg tablet Take 1 tablet (5 mg total) by mouth daily 30 tablet 0    gabapentin (NEURONTIN) 100 mg capsule Take 2 capsules (200 mg total) by mouth 3 (three) times a day 90 capsule 1    HYDROcodone-acetaminophen (Norco) 5-325 mg per tablet Take 1 tablet by mouth every 6 (six) hours as needed for pain for up to 10 days Max Daily Amount: 4 tablets 15 tablet 0    lisinopril (ZESTRIL) 40 mg tablet Take 1 tablet (40 mg total) by mouth daily 90 tablet 1    metoprolol tartrate (LOPRESSOR) 25 mg tablet Take 1 tablet (25 mg total) by mouth 2 (two) times a day 60 tablet 2    nitrofurantoin (MACROBID) 100 mg capsule Take 1 capsule (100 mg total) by mouth 2 (two) times a day for 7 days 14 capsule 0     No current facility-administered medications for this visit      _______________________________________________________________________  Review of Systems   Constitutional: Negative for activity change, appetite change, fatigue and fever  HENT: Negative for congestion, ear pain, postnasal drip, rhinorrhea, sinus pressure, sinus pain, sneezing and sore throat  Eyes: Negative for pain and redness  Respiratory: Negative for apnea, cough, chest tightness, shortness of breath and wheezing  Cardiovascular: Negative for chest pain, palpitations and leg swelling  Gastrointestinal: Negative for abdominal pain, constipation, diarrhea, nausea and vomiting  Endocrine: Negative for cold intolerance and heat intolerance  Genitourinary: Negative for difficulty urinating, dysuria, frequency, hematuria and urgency  Musculoskeletal: Positive for gait problem  Negative for arthralgias, back pain and myalgias  Skin: Negative for rash  Neurological: Positive for weakness   Negative for dizziness, speech difficulty, numbness and headaches  Hematological: Does not bruise/bleed easily  Psychiatric/Behavioral: Negative for agitation, confusion and hallucinations  Objective:  Vitals:    04/27/22 1007 04/27/22 1040   BP: (!) 182/110 154/96   BP Location: Left arm    Patient Position: Sitting    Cuff Size: Standard    Pulse: 75    Temp: (!) 97 4 °F (36 3 °C)    TempSrc: Skin    SpO2: 98%    Weight: 67 1 kg (148 lb)    Height: 5' (1 524 m)      Body mass index is 28 9 kg/m²  Physical Exam  Vitals and nursing note reviewed  Constitutional:       Appearance: She is well-developed  HENT:      Head: Normocephalic and atraumatic  Nose: Nose normal       Mouth/Throat:      Mouth: Mucous membranes are moist    Eyes:      General: No scleral icterus  Conjunctiva/sclera: Conjunctivae normal       Pupils: Pupils are equal, round, and reactive to light  Neck:      Thyroid: No thyromegaly  Cardiovascular:      Rate and Rhythm: Normal rate and regular rhythm  Pulmonary:      Effort: Pulmonary effort is normal       Breath sounds: Normal breath sounds  No wheezing  Abdominal:      General: Bowel sounds are normal  There is no distension  Palpations: Abdomen is soft  Tenderness: There is no abdominal tenderness  There is no guarding or rebound  Hernia: A hernia is present  Musculoskeletal:         General: No tenderness or deformity  Normal range of motion  Cervical back: Normal range of motion and neck supple  Skin:     General: Skin is warm and dry  Findings: No erythema or rash  Neurological:      Mental Status: She is alert and oriented to person, place, and time  Sensory: No sensory deficit  Gait: Gait abnormal    Psychiatric:         Behavior: Behavior normal          Thought Content:  Thought content normal          Judgment: Judgment normal

## 2022-04-27 ENCOUNTER — OFFICE VISIT (OUTPATIENT)
Dept: PHYSICAL THERAPY | Facility: MEDICAL CENTER | Age: 57
End: 2022-04-27
Payer: COMMERCIAL

## 2022-04-27 ENCOUNTER — OFFICE VISIT (OUTPATIENT)
Dept: FAMILY MEDICINE CLINIC | Facility: CLINIC | Age: 57
End: 2022-04-27
Payer: COMMERCIAL

## 2022-04-27 VITALS
SYSTOLIC BLOOD PRESSURE: 154 MMHG | BODY MASS INDEX: 29.06 KG/M2 | DIASTOLIC BLOOD PRESSURE: 96 MMHG | OXYGEN SATURATION: 98 % | HEART RATE: 75 BPM | HEIGHT: 60 IN | TEMPERATURE: 97.4 F | WEIGHT: 148 LBS

## 2022-04-27 DIAGNOSIS — M79.7 FIBROMYALGIA: ICD-10-CM

## 2022-04-27 DIAGNOSIS — F11.20 CONTINUOUS OPIOID DEPENDENCE (HCC): ICD-10-CM

## 2022-04-27 DIAGNOSIS — J45.20 MILD INTERMITTENT ASTHMA WITHOUT COMPLICATION: ICD-10-CM

## 2022-04-27 DIAGNOSIS — Z87.891 HISTORY OF TOBACCO ABUSE: ICD-10-CM

## 2022-04-27 DIAGNOSIS — G89.29 ACUTE EXACERBATION OF CHRONIC LOW BACK PAIN: Primary | ICD-10-CM

## 2022-04-27 DIAGNOSIS — I10 PRIMARY HYPERTENSION: ICD-10-CM

## 2022-04-27 DIAGNOSIS — M54.50 ACUTE EXACERBATION OF CHRONIC LOW BACK PAIN: Primary | ICD-10-CM

## 2022-04-27 DIAGNOSIS — Z12.31 ENCOUNTER FOR SCREENING MAMMOGRAM FOR BREAST CANCER: Primary | ICD-10-CM

## 2022-04-27 PROCEDURE — 3008F BODY MASS INDEX DOCD: CPT | Performed by: FAMILY MEDICINE

## 2022-04-27 PROCEDURE — 97140 MANUAL THERAPY 1/> REGIONS: CPT | Performed by: PHYSICAL THERAPIST

## 2022-04-27 PROCEDURE — 99214 OFFICE O/P EST MOD 30 MIN: CPT | Performed by: FAMILY MEDICINE

## 2022-04-27 PROCEDURE — 97110 THERAPEUTIC EXERCISES: CPT | Performed by: PHYSICAL THERAPIST

## 2022-04-27 PROCEDURE — 97112 NEUROMUSCULAR REEDUCATION: CPT | Performed by: PHYSICAL THERAPIST

## 2022-04-27 NOTE — PROGRESS NOTES
Daily Note     Today's date: 2022  Patient name: Andres Childs  : 1965  MRN: [id number]  Referring provider: Mick Beltran PT  Dx:   Encounter Diagnosis     ICD-10-CM    1  Acute exacerbation of chronic low back pain  M54 50     G89 29                   Subjective:  Pt reports issues w/ hernia recently  No new complaints in regards to back pain  Objective: See treatment diary below      Assessment: Tolerated treatment well  Patient demonstrated fatigue post treatment, exhibited good technique with therapeutic exercises and would benefit from continued PT    Decreased tightness noted today; most restricted in piriformis  Plan: Continue per plan of care        Precautions: none      Manuals            STM/TPR L lumbar paraspinals  10 min 10 min                                                 Neuro Re-Ed           clamshells nv 5"x10 5"x15          Ab brace  nv x20 x20                                                                           Ther Ex           ppt 5"x20 5"x20 5"x20          Piriformis stretch nv 30"x3 30"x3                                                                                        Ther Activity                                       Gait Training                                       Modalities

## 2022-04-27 NOTE — ED PROVIDER NOTES
History  Chief Complaint   Patient presents with    Abdominal Pain     pt c/o a sharp, generalized abdominal pain for 1 week, pt has hernia that she states "popped out,' + nausea, denies vomiting and diarrhea     59-year-old female presents the emergency department for evaluation of 1 week history of generalized abdominal pain  Patient states the pain is mostly periumbilical and is localized to which she believes is a hernia  She states that she was supposed to have hernia repair last year but her insurance would not cover the procedure  Patient has had nausea with vomiting  No fevers or chills  She is having normal bowel movements  She denies dysuria or hematuria  Patient presents with significantly elevated blood pressure  She states that she is chronically hypertensive and has been working with her PCP to get her blood pressure under control  History provided by:  Patient and medical records   used: No    Abdominal Pain  Pain location:  Periumbilical  Pain quality: cramping and sharp    Pain radiates to:  Does not radiate  Pain severity:  Severe  Onset quality:  Gradual  Duration:  1 week  Timing:  Intermittent  Progression:  Waxing and waning  Chronicity:  New  Context: not previous surgeries    Relieved by:  Nothing  Worsened by: Movement and palpation (Sitting)  Ineffective treatments:  OTC medications  Associated symptoms: nausea    Associated symptoms: no anorexia, no belching, no chest pain, no constipation, no cough, no diarrhea, no fever, no hematuria and no vomiting    Risk factors: no recent hospitalization        Prior to Admission Medications   Prescriptions Last Dose Informant Patient Reported? Taking?    albuterol (2 5 mg/3 mL) 0 083 % nebulizer solution   Yes Yes   Sig: USE 1 VIAL IN NEBULIZER EVERY 2 HOURS AS NEEDED FOR WHEEZING   albuterol (PROVENTIL HFA,VENTOLIN HFA) 90 mcg/act inhaler   No Yes   Sig: INHALE 2 PUFFS BY MOUTH EVERY 4 TO 6 HOURS AS NEEDED FOR WHEEZING AND FOR SHORTNESS OF BREATH   gabapentin (NEURONTIN) 100 mg capsule 4/25/2022 at Unknown time  No Yes   Sig: Take 2 capsules (200 mg total) by mouth 3 (three) times a day   lisinopril (ZESTRIL) 40 mg tablet 4/26/2022 at Unknown time  No Yes   Sig: Take 1 tablet (40 mg total) by mouth daily   metoprolol tartrate (LOPRESSOR) 25 mg tablet 4/26/2022 at Unknown time  No Yes   Sig: Take 1 tablet (25 mg total) by mouth 2 (two) times a day   nitrofurantoin (MACROBID) 100 mg capsule 4/26/2022 at Unknown time  No Yes   Sig: Take 1 capsule (100 mg total) by mouth 2 (two) times a day for 7 days      Facility-Administered Medications: None       Past Medical History:   Diagnosis Date    Fibromyalgia 4/26/2022    History of mammogram 2018    History of tobacco abuse 4/26/2022    Hypertension        Past Surgical History:   Procedure Laterality Date    APPENDECTOMY      CARPAL TUNNEL RELEASE      COLONOSCOPY  2017    repeat in 2022   [facility]      spine fusion        Family History   Problem Relation Age of Onset    Hypertension Mother     Heart disease Mother     Hypertension Father     Heart disease Father     Diabetes Maternal Grandmother     Breast cancer Paternal Aunt     Pancreatic cancer Paternal Uncle      I have reviewed and agree with the history as documented      E-Cigarette/Vaping    E-Cigarette Use Never User      E-Cigarette/Vaping Substances    Nicotine No     THC No     CBD No     Flavoring No     Other No     Unknown No      Social History     Tobacco Use    Smoking status: Current Every Day Smoker     Packs/day: 0 50     Years: 25 00     Pack years: 12 50     Types: Cigarettes    Smokeless tobacco: Never Used    Tobacco comment: per pt, 5 a day - As per [employer] Vaping Use: Never used   Substance Use Topics    Alcohol use: Yes     Comment: occasional     Drug use: Not Currently       Review of Systems   Constitutional: Positive for appetite change  Negative for fever  Respiratory: Negative for cough  Cardiovascular: Negative for chest pain  Gastrointestinal: Positive for abdominal pain and nausea  Negative for anorexia, constipation, diarrhea and vomiting  Genitourinary: Negative for hematuria  Musculoskeletal: Negative for back pain  Skin: Negative for rash  All other systems reviewed and are negative  Physical Exam  Physical Exam  Vitals and nursing note reviewed  Constitutional:       General: She is in acute distress  Appearance: She is well-developed  She is not ill-appearing  HENT:      Head: Normocephalic  Nose: Nose normal       Mouth/Throat:      Pharynx: No oropharyngeal exudate  Eyes:      General: No scleral icterus  Conjunctiva/sclera: Conjunctivae normal       Pupils: Pupils are equal, round, and reactive to light  Cardiovascular:      Rate and Rhythm: Normal rate and regular rhythm  Heart sounds: Normal heart sounds  Pulmonary:      Effort: Pulmonary effort is normal  No respiratory distress  Breath sounds: Normal breath sounds  Abdominal:      General: Bowel sounds are normal  There is no distension  Palpations: Abdomen is soft  Tenderness: There is abdominal tenderness in the periumbilical area  There is no right CVA tenderness, left CVA tenderness, guarding or rebound  Hernia: A hernia is present  Hernia is present in the umbilical area  Musculoskeletal:         General: No tenderness or deformity  Normal range of motion  Cervical back: Normal range of motion and neck supple  Lymphadenopathy:      Cervical: No cervical adenopathy  Skin:     General: Skin is warm and dry  Findings: No rash  Neurological:      Mental Status: She is alert and oriented to person, place, and time  Cranial Nerves: No cranial nerve deficit  Sensory: No sensory deficit  Motor: No abnormal muscle tone        Coordination: Coordination normal  Gait: Gait normal       Deep Tendon Reflexes: Reflexes are normal and symmetric  Psychiatric:         Behavior: Behavior normal          Thought Content:  Thought content normal          Judgment: Judgment normal          Vital Signs  ED Triage Vitals   Temperature Pulse Respirations Blood Pressure SpO2   04/26/22 0942 04/26/22 0942 04/26/22 0942 04/26/22 0942 04/26/22 0942   98 2 °F (36 8 °C) 67 20 (!) 207/100 98 %      Temp Source Heart Rate Source Patient Position - Orthostatic VS BP Location FiO2 (%)   04/26/22 0942 04/26/22 0942 04/26/22 0942 04/26/22 0942 --   Oral Monitor Sitting Left arm       Pain Score       04/26/22 1033       7           Vitals:    04/26/22 1010 04/26/22 1317 04/26/22 1437 04/26/22 1515   BP: (!) 208/111 (!) 184/98 (!) 219/108 (!) 208/106   Pulse: 71 62  64   Patient Position - Orthostatic VS:             Visual Acuity      ED Medications  Medications   sodium chloride 0 9 % bolus 1,000 mL (0 mL Intravenous Stopped 4/26/22 1132)   ondansetron (ZOFRAN) injection 4 mg (4 mg Intravenous Given 4/26/22 1032)   HYDROmorphone (DILAUDID) injection 0 5 mg (0 5 mg Intravenous Given 4/26/22 1033)   iohexol (OMNIPAQUE) 350 MG/ML injection (SINGLE-DOSE) 100 mL (100 mL Intravenous Given 4/26/22 1203)   amLODIPine (NORVASC) tablet 5 mg (5 mg Oral Given 4/26/22 1437)       Diagnostic Studies  Results Reviewed     Procedure Component Value Units Date/Time    Comprehensive metabolic panel [[id number]]  (Abnormal) Collected: 04/26/22 1031    Lab Status: Final result Specimen: Blood from Arm, Right Updated: 04/26/22 1115     Sodium 139 mmol/L      Potassium 3 4 mmol/L      Chloride 101 mmol/L      CO2 31 mmol/L      ANION GAP 7 mmol/L      BUN 7 mg/dL      Creatinine 0 63 mg/dL      Glucose 135 mg/dL      Calcium 9 2 mg/dL      AST 24 U/L      ALT 23 U/L      Alkaline Phosphatase 68 U/L      Total Protein 7 3 g/dL      Albumin 3 5 g/dL      Total Bilirubin 0 31 mg/dL      eGFR 99 ml/min/1 73sq m Narrative:      National Kidney Disease Foundation guidelines for Chronic Kidney Disease (CKD):     Stage 1 with normal or high GFR (GFR > 90 mL/min/1 73 square meters)    Stage 2 Mild CKD (GFR = 60-89 mL/min/1 73 square meters)    Stage 3A Moderate CKD (GFR = 45-59 mL/min/1 73 square meters)    Stage 3B Moderate CKD (GFR = 30-44 mL/min/1 73 square meters)    Stage 4 Severe CKD (GFR = 15-29 mL/min/1 73 square meters)    Stage 5 End Stage CKD (GFR <15 mL/min/1 73 square meters)  Note: GFR calculation is accurate only with a steady state creatinine    Lipase [[id number]]  (Normal) Collected: 04/26/22 1031    Lab Status: Final result Specimen: Blood from Arm, Right Updated: 04/26/22 1115     Lipase 95 u/L     CBC and differential [[id number]]  (Abnormal) Collected: 04/26/22 1031    Lab Status: Final result Specimen: Blood from Arm, Right Updated: 04/26/22 1054     WBC 9 14 Thousand/uL      RBC 4 51 Million/uL      Hemoglobin 13 0 g/dL      Hematocrit 40 6 %      MCV 90 fL      MCH 28 8 pg      MCHC 32 0 g/dL      RDW 14 6 %      MPV 9 5 fL      Platelets 771 Thousands/uL      nRBC 0 /100 WBCs      Neutrophils Relative 29 %      Immat GRANS % 0 %      Lymphocytes Relative 41 %      Monocytes Relative 8 %      Eosinophils Relative 21 %      Basophils Relative 1 %      Neutrophils Absolute 2 68 Thousands/µL      Immature Grans Absolute 0 03 Thousand/uL      Lymphocytes Absolute 3 70 Thousands/µL      Monocytes Absolute 0 70 Thousand/µL      Eosinophils Absolute 1 90 Thousand/µL      Basophils Absolute 0 13 Thousands/µL     UA w Reflex to Microscopic w Reflex to Culture [[id number]] Collected: 04/26/22 1035    Lab Status: Final result Specimen: Urine, Clean Catch Updated: 04/26/22 1046     Color, UA Yellow     Clarity, UA Clear     Specific Gravity, UA 1 010     pH, UA 7 0     Leukocytes, UA Negative     Nitrite, UA Negative     Protein, UA Negative mg/dl      Glucose, UA Negative mg/dl      Ketones, UA Negative mg/dl Urobilinogen, UA 0 2 E U /dl      Bilirubin, UA Negative     Blood, UA Negative                 CT abdomen pelvis with contrast   Final Result by Malcolm Roblero DO (04/26 1315)      No acute intra-abdominal abnormality  Modest sized fat containing umbilical hernia  Small foci of air seen in the urinary bladder  Correlate for recent catheterization  Sigmoid diverticulosis  Workstation performed: [id number]                    Procedures  Procedures         ED Course  ED Course as of 04/27/22 1702   Tue Apr 26, 2022   1515 Patient with chronically elevated blood pressure  She states that her blood pressure medication was just increased to lisinopril 40 mg daily approximately 3 weeks ago  Patient does have a follow-up appointment tomorrow with her PCP  Will add 5 mg of amlodipine  Patient does continue to take metoprolol b i d  And will be due for an evening dose  Will also prescribe hydrocodone/Tylenol for pain to take as needed  Patient feels comfortable discharge plan and will follow up with General surgery as an outpatient  Patient recently treated for UTI  She does have antibiotics and will continue taking these through the 29th  She states that her UTI symptoms have largely resolved  SBIRT 20yo+      Most Recent Value   SBIRT (24 yo +)    In order to provide better care to our patients, we are screening all of our patients for alcohol and drug use  Would it be okay to ask you these screening questions?  No Filed at: 04/26/2022 0959                    MDM  Number of Diagnoses or Management Options  Abdominal pain: new and requires workup  Chronic hypertension: new and requires workup  Umbilical hernia: new and requires workup     Amount and/or Complexity of Data Reviewed  Clinical lab tests: ordered and reviewed  Tests in the radiology section of CPT®: ordered and reviewed  Decide to obtain previous medical records or to obtain history from someone other than the patient: yes  Discuss the patient with other providers: yes  Independent visualization of images, tracings, or specimens: yes    Risk of Complications, Morbidity, and/or Mortality  General comments: 70-year-old female presents with 1 week history of abdominal pain which she localizes to the an umbilical hernia  The hernia was reduced by surgery  Patient will follow-up with surgery as an outpatient for elective repair  Patient does not have CVA tenderness on my exam   Urinalysis unremarkable and she is currently taking antibiotics  She is nontoxic in appearance  She has had significant elevation of her blood pressure while in the department  Will add Norvasc and have patient follow-up with her PCP as scheduled tomorrow  Patient Progress  Patient progress: stable      Disposition  Final diagnoses:   Umbilical hernia   Abdominal pain   Chronic hypertension     Time reflects when diagnosis was documented in both MDM as applicable and the Disposition within this note     Time User Action Codes Description Comment    4/26/2022  3:07 PM Bolivar Acosta Add [I85 3] Umbilical hernia     2/13/1372  3:19 PM Rosalba Allan Add [R10 9] Abdominal pain     4/26/2022  3:20 PM Rosalba Allan Add [I10] Chronic hypertension       ED Disposition     ED Disposition Condition Date/Time Comment    Discharge Stable Tue Apr 26, 2022  3:19 PM Frances Aguilar discharge to home/self care              Follow-up Information     Follow up With Specialties Details Why Contact Prema Louise MD General Surgery Schedule an appointment as soon as possible for a visit  For recheck of current symptoms [address]  [address] [facility]      Chalino Garrett MD Family Medicine On 4/27/2022 For recheck of current symptoms, for recheck of blood pressure [address] [facility] [address]  [phone]            Discharge Medication List as of 4/26/2022  3:24 PM      START taking these medications    Details   amLODIPine (NORVASC) 5 mg tablet Take 1 tablet (5 mg total) by mouth daily, Starting Tue 4/26/2022, Normal      HYDROcodone-acetaminophen (Norco) 5-325 mg per tablet Take 1 tablet by mouth every 6 (six) hours as needed for pain for up to 10 days Max Daily Amount: 4 tablets, Starting Tue 4/26/2022, Until Fri 5/6/2022 at 2359, Normal         CONTINUE these medications which have NOT CHANGED    Details   albuterol (2 5 mg/3 mL) 0 083 % nebulizer solution USE 1 VIAL IN NEBULIZER EVERY 2 HOURS AS NEEDED FOR WHEEZING, Historical Med      albuterol (PROVENTIL HFA,VENTOLIN HFA) 90 mcg/act inhaler INHALE 2 PUFFS BY MOUTH EVERY 4 TO 6 HOURS AS NEEDED FOR WHEEZING AND FOR SHORTNESS OF BREATH, Normal      gabapentin (NEURONTIN) 100 mg capsule Take 2 capsules (200 mg total) by mouth 3 (three) times a day, Starting Wed 3/16/2022, Normal      lisinopril (ZESTRIL) 40 mg tablet Take 1 tablet (40 mg total) by mouth daily, Starting Wed 3/16/2022, Normal      metoprolol tartrate (LOPRESSOR) 25 mg tablet Take 1 tablet (25 mg total) by mouth 2 (two) times a day, Starting Wed 3/16/2022, Normal      nitrofurantoin (MACROBID) 100 mg capsule Take 1 capsule (100 mg total) by mouth 2 (two) times a day for 7 days, Starting Fri 4/22/2022, Until Fri 4/29/2022, Normal             No discharge procedures on file      PDMP Review     None          ED Provider  Electronically Signed by           Baldemar Sanders DO  04/27/22 3703

## 2022-04-29 ENCOUNTER — HOSPITAL ENCOUNTER (OUTPATIENT)
Facility: HOSPITAL | Age: 57
Setting detail: OBSERVATION
Discharge: HOME | End: 2022-04-30
Attending: EMERGENCY MEDICINE | Admitting: INTERNAL MEDICINE
Payer: COMMERCIAL

## 2022-04-29 ENCOUNTER — APPOINTMENT (EMERGENCY)
Dept: CT IMAGING | Facility: HOSPITAL | Age: 57
End: 2022-04-29
Payer: COMMERCIAL

## 2022-04-29 DIAGNOSIS — R11.0 NAUSEA: Primary | ICD-10-CM

## 2022-04-29 DIAGNOSIS — K42.9 UMBILICAL HERNIA: ICD-10-CM

## 2022-04-29 DIAGNOSIS — R10.9 ABDOMINAL PAIN: ICD-10-CM

## 2022-04-29 PROBLEM — E11.9 DIABETES MELLITUS TYPE 2, CONTROLLED (HCC): Status: ACTIVE | Noted: 2022-04-29

## 2022-04-29 LAB
ALBUMIN SERPL BCP-MCNC: 3.6 G/DL (ref 3.5–5)
ALP SERPL-CCNC: 74 U/L (ref 46–116)
ALT SERPL W P-5'-P-CCNC: 18 U/L (ref 12–78)
ANION GAP SERPL CALCULATED.3IONS-SCNC: 6 MMOL/L (ref 4–13)
AST SERPL W P-5'-P-CCNC: 15 U/L (ref 5–45)
BASOPHILS # BLD AUTO: 0.12 THOUSANDS/ΜL (ref 0–0.1)
BASOPHILS NFR BLD AUTO: 1 % (ref 0–1)
BILIRUB SERPL-MCNC: 0.4 MG/DL (ref 0.2–1)
BUN SERPL-MCNC: 6 MG/DL (ref 5–25)
CALCIUM SERPL-MCNC: 9.5 MG/DL (ref 8.3–10.1)
CARDIAC TROPONIN I PNL SERPL HS: 4 NG/L
CHLORIDE SERPL-SCNC: 101 MMOL/L (ref 100–108)
CO2 SERPL-SCNC: 31 MMOL/L (ref 21–32)
CREAT SERPL-MCNC: 0.64 MG/DL (ref 0.6–1.3)
EOSINOPHIL # BLD AUTO: 1.42 THOUSAND/ΜL (ref 0–0.61)
EOSINOPHIL NFR BLD AUTO: 16 % (ref 0–6)
ERYTHROCYTE [DISTWIDTH] IN BLOOD BY AUTOMATED COUNT: 14.4 % (ref 11.6–15.1)
GFR SERPL CREATININE-BSD FRML MDRD: 99 ML/MIN/1.73SQ M
GLUCOSE SERPL-MCNC: 147 MG/DL (ref 65–140)
HCT VFR BLD AUTO: 42.2 % (ref 34.8–46.1)
HGB BLD-MCNC: 13.6 G/DL (ref 11.5–15.4)
IMM GRANULOCYTES # BLD AUTO: 0.02 THOUSAND/UL (ref 0–0.2)
IMM GRANULOCYTES NFR BLD AUTO: 0 % (ref 0–2)
LACTATE SERPL-SCNC: 0.8 MMOL/L (ref 0.5–2)
LIPASE SERPL-CCNC: 87 U/L (ref 73–393)
LYMPHOCYTES # BLD AUTO: 3.38 THOUSANDS/ΜL (ref 0.6–4.47)
LYMPHOCYTES NFR BLD AUTO: 39 % (ref 14–44)
MCH RBC QN AUTO: 29.1 PG (ref 26.8–34.3)
MCHC RBC AUTO-ENTMCNC: 32.2 G/DL (ref 31.4–37.4)
MCV RBC AUTO: 90 FL (ref 82–98)
MONOCYTES # BLD AUTO: 0.7 THOUSAND/ΜL (ref 0.17–1.22)
MONOCYTES NFR BLD AUTO: 8 % (ref 4–12)
NEUTROPHILS # BLD AUTO: 3.17 THOUSANDS/ΜL (ref 1.85–7.62)
NEUTS SEG NFR BLD AUTO: 36 % (ref 43–75)
NRBC BLD AUTO-RTO: 0 /100 WBCS
PLATELET # BLD AUTO: 382 THOUSANDS/UL (ref 149–390)
PMV BLD AUTO: 9.2 FL (ref 8.9–12.7)
POTASSIUM SERPL-SCNC: 3.6 MMOL/L (ref 3.5–5.3)
PROT SERPL-MCNC: 8.2 G/DL (ref 6.4–8.2)
RBC # BLD AUTO: 4.68 MILLION/UL (ref 3.81–5.12)
SODIUM SERPL-SCNC: 138 MMOL/L (ref 136–145)
WBC # BLD AUTO: 8.81 THOUSAND/UL (ref 4.31–10.16)

## 2022-04-29 PROCEDURE — 80053 COMPREHEN METABOLIC PANEL: CPT

## 2022-04-29 PROCEDURE — 99285 EMERGENCY DEPT VISIT HI MDM: CPT | Performed by: EMERGENCY MEDICINE

## 2022-04-29 PROCEDURE — 83690 ASSAY OF LIPASE: CPT

## 2022-04-29 PROCEDURE — 96376 TX/PRO/DX INJ SAME DRUG ADON: CPT

## 2022-04-29 PROCEDURE — 36415 COLL VENOUS BLD VENIPUNCTURE: CPT

## 2022-04-29 PROCEDURE — 85025 COMPLETE CBC W/AUTO DIFF WBC: CPT

## 2022-04-29 PROCEDURE — 99285 EMERGENCY DEPT VISIT HI MDM: CPT

## 2022-04-29 PROCEDURE — 83605 ASSAY OF LACTIC ACID: CPT

## 2022-04-29 PROCEDURE — 84484 ASSAY OF TROPONIN QUANT: CPT

## 2022-04-29 PROCEDURE — 96361 HYDRATE IV INFUSION ADD-ON: CPT

## 2022-04-29 PROCEDURE — 96375 TX/PRO/DX INJ NEW DRUG ADDON: CPT

## 2022-04-29 PROCEDURE — 96374 THER/PROPH/DIAG INJ IV PUSH: CPT

## 2022-04-29 PROCEDURE — 99220 PR INITIAL OBSERVATION CARE/DAY 70 MINUTES: CPT | Performed by: INTERNAL MEDICINE

## 2022-04-29 PROCEDURE — 74177 CT ABD & PELVIS W/CONTRAST: CPT

## 2022-04-29 PROCEDURE — 93005 ELECTROCARDIOGRAM TRACING: CPT

## 2022-04-29 PROCEDURE — G1004 CDSM NDSC: HCPCS

## 2022-04-29 RX ORDER — METOCLOPRAMIDE HYDROCHLORIDE 5 MG/ML
10 INJECTION INTRAMUSCULAR; INTRAVENOUS EVERY 6 HOURS PRN
Status: DISCONTINUED | OUTPATIENT
Start: 2022-04-29 | End: 2022-04-30 | Stop reason: HOSPADM

## 2022-04-29 RX ORDER — HYDROMORPHONE HCL/PF 1 MG/ML
0.5 SYRINGE (ML) INJECTION ONCE
Status: COMPLETED | OUTPATIENT
Start: 2022-04-29 | End: 2022-04-29

## 2022-04-29 RX ORDER — OXYCODONE HYDROCHLORIDE 5 MG/1
5 TABLET ORAL EVERY 6 HOURS PRN
Status: DISCONTINUED | OUTPATIENT
Start: 2022-04-29 | End: 2022-04-30 | Stop reason: HOSPADM

## 2022-04-29 RX ORDER — ACETAMINOPHEN 325 MG/1
650 TABLET ORAL EVERY 6 HOURS PRN
Status: DISCONTINUED | OUTPATIENT
Start: 2022-04-29 | End: 2022-04-30 | Stop reason: HOSPADM

## 2022-04-29 RX ORDER — DICYCLOMINE HCL 20 MG
20 TABLET ORAL
Status: DISCONTINUED | OUTPATIENT
Start: 2022-04-29 | End: 2022-04-30 | Stop reason: HOSPADM

## 2022-04-29 RX ORDER — ALBUTEROL SULFATE 90 UG/1
2 AEROSOL, METERED RESPIRATORY (INHALATION) EVERY 4 HOURS PRN
Status: DISCONTINUED | OUTPATIENT
Start: 2022-04-29 | End: 2022-04-30 | Stop reason: HOSPADM

## 2022-04-29 RX ORDER — LISINOPRIL 20 MG/1
40 TABLET ORAL DAILY
Status: DISCONTINUED | OUTPATIENT
Start: 2022-04-30 | End: 2022-04-30 | Stop reason: HOSPADM

## 2022-04-29 RX ORDER — METOCLOPRAMIDE HYDROCHLORIDE 5 MG/ML
10 INJECTION INTRAMUSCULAR; INTRAVENOUS ONCE
Status: COMPLETED | OUTPATIENT
Start: 2022-04-29 | End: 2022-04-29

## 2022-04-29 RX ORDER — ONDANSETRON 2 MG/ML
4 INJECTION INTRAMUSCULAR; INTRAVENOUS ONCE
Status: COMPLETED | OUTPATIENT
Start: 2022-04-29 | End: 2022-04-29

## 2022-04-29 RX ORDER — NITROFURANTOIN 25; 75 MG/1; MG/1
100 CAPSULE ORAL 2 TIMES DAILY
Status: COMPLETED | OUTPATIENT
Start: 2022-04-29 | End: 2022-04-29

## 2022-04-29 RX ORDER — GABAPENTIN 100 MG/1
200 CAPSULE ORAL 3 TIMES DAILY
Status: DISCONTINUED | OUTPATIENT
Start: 2022-04-29 | End: 2022-04-30 | Stop reason: HOSPADM

## 2022-04-29 RX ORDER — OXYCODONE HYDROCHLORIDE 5 MG/1
2.5 TABLET ORAL EVERY 6 HOURS PRN
Status: DISCONTINUED | OUTPATIENT
Start: 2022-04-29 | End: 2022-04-30 | Stop reason: HOSPADM

## 2022-04-29 RX ORDER — HYDRALAZINE HYDROCHLORIDE 20 MG/ML
10 INJECTION INTRAMUSCULAR; INTRAVENOUS EVERY 6 HOURS PRN
Status: DISCONTINUED | OUTPATIENT
Start: 2022-04-29 | End: 2022-04-30 | Stop reason: HOSPADM

## 2022-04-29 RX ORDER — AMLODIPINE BESYLATE 5 MG/1
5 TABLET ORAL DAILY
Status: DISCONTINUED | OUTPATIENT
Start: 2022-04-30 | End: 2022-04-30 | Stop reason: HOSPADM

## 2022-04-29 RX ADMIN — IOHEXOL 100 ML: 350 INJECTION, SOLUTION INTRAVENOUS at 09:20

## 2022-04-29 RX ADMIN — METOCLOPRAMIDE HYDROCHLORIDE 10 MG: 5 INJECTION INTRAMUSCULAR; INTRAVENOUS at 14:14

## 2022-04-29 RX ADMIN — NITROFURANTOIN (MONOHYDRATE/MACROCRYSTALS) 100 MG: 75; 25 CAPSULE ORAL at 18:38

## 2022-04-29 RX ADMIN — METOPROLOL TARTRATE 25 MG: 25 TABLET, FILM COATED ORAL at 18:38

## 2022-04-29 RX ADMIN — DICYCLOMINE HYDROCHLORIDE 20 MG: 20 TABLET ORAL at 21:30

## 2022-04-29 RX ADMIN — HYDROMORPHONE HYDROCHLORIDE 0.5 MG: 1 INJECTION, SOLUTION INTRAMUSCULAR; INTRAVENOUS; SUBCUTANEOUS at 09:06

## 2022-04-29 RX ADMIN — DICYCLOMINE HYDROCHLORIDE 20 MG: 20 TABLET ORAL at 18:38

## 2022-04-29 RX ADMIN — ONDANSETRON 4 MG: 2 INJECTION INTRAMUSCULAR; INTRAVENOUS at 09:04

## 2022-04-29 RX ADMIN — HYDROMORPHONE HYDROCHLORIDE 0.5 MG: 1 INJECTION, SOLUTION INTRAMUSCULAR; INTRAVENOUS; SUBCUTANEOUS at 11:18

## 2022-04-29 RX ADMIN — SODIUM CHLORIDE 1000 ML: 0.9 INJECTION, SOLUTION INTRAVENOUS at 08:57

## 2022-04-29 RX ADMIN — GABAPENTIN 200 MG: 100 CAPSULE ORAL at 21:30

## 2022-04-29 RX ADMIN — ONDANSETRON 4 MG: 2 INJECTION INTRAMUSCULAR; INTRAVENOUS at 12:29

## 2022-04-29 NOTE — H&P
Zoe  H&P- Abdoul Argue 1965, 62 y o  female MRN: [id number]  Unit/Bed#: W -57 Encounter: [id number]  Primary Care Provider: Fer Etienne MD   Date and time admitted to hospital: 4/29/2022  5:80 AM    * Umbilical hernia  Assessment & Plan  POA:  Reducible umbilical hernia associated with periumbilical abdominal pain and nausea  Labs unremarkable,  Patient was seen by General surgery in the ED, hernia was reduced and abdominal binder applied  Patient now reports significant improvement in abdominal pain and nausea  Has follow-up appointment with General surgery on 05/09 to discuss further surgical management    Plan:  Admit patient for observation overnight  P r n  Pain regimen ordered  P r n  Reglan ordered for nausea  Ordered Bentyl 20mg q6h for abdominal pain  Patient to continue applying abdominal binder  Follow-up with General surgery on an outpatient basis after discharge    Nausea  Assessment & Plan  POA: Intractable nausea in the setting of umbilical hernia  Patient initially given 2 doses of Zofran with minimal improvement  Symptoms improved significantly after a dose of Reglan  Suspect there may also be a component of gastroparesis given history of DM and improvement with reglan    Plan: Will order PRN Reglan for patient  Primary hypertension  Assessment & Plan  Blood Pressure: 161/91    Patient with blood pressure of 213/103 POA  Likely secondary to pain  Home regimen includes lisinopril, amlodipine and Lopressor    Plan: Will continue home regimen  P r n  Hydralazine added for SBP> 180  Continue monitoring vitals    Mild intermittent asthma without complication  Assessment & Plan  Patient on albuterol inhaler p r n , also has albuterol nebulizer as needed for shortness of breath and wheezing  Was noted to have left-sided wheezing on auscultation today, however, not in respiratory distress at the time of my evaluation    Plan:  Albuterol p r n  Ordered  Will monitor vitals      Diabetes mellitus type 2, controlled ([facility] [address] )  Assessment & Plan  Lab Results   Component Value Date    HGBA1C 6 3 (H) 03/23/2022       No results for input(s): POCGLU in the last 72 hours  Blood Sugar Average: Last 72 hrs:  Patient with poorly controlled DM in the past, states that she was on multiple medications  Lost 100lbs after her mother's death 2 years ago and has been off medication since    Plan:  Offered patient sliding scale insulin while here under observation, patient declined  Diabetic diet ordered    History of tobacco abuse  Assessment & Plan  Current everyday smoker  Discussed with patient importance of smoking cessation given history of DM and HTN  Offered patient nicotine patch, patient refused  Will continue encouraging smoking cessation    VTE Pharmacologic Prophylaxis: VTE Score: 2 Low Risk (Score 0-2) - Encourage Ambulation  Code Status: Level 1 - Full Code   Discussion with family: Updated  () at bedside  Anticipated Length of Stay: Patient will be admitted on an observation basis with an anticipated length of stay of less than 2 midnights secondary to Intractable nausea  Chief Complaint:     History of Present Illness:  Navin Ulloa is a 62 y o  female with a PMH of umbilical hernia, hypertension, asthma, Type 2 DM who presents with a 1 day history of diffuse abdominal pain that progressively worsened to 10/10 this AM prompting the patient to come to the ED  Patient reports a 1 year history of umbilical hernia, which she reports worsened after she lifted boxes at work 2 days prior to presentation  Pain is associated with nausea, however patient denies episodes of vomiting  She states that she had 1 bowel movement yesterday which was associated with abdominal pain, however she denies hematochezia or melena  She was seen at the ED with similar presentation, however at the time, she also endorses episodes of vomiting   Patient was seen by general surgery at the time and an appointment was scheduled for May 9th for further discussion of surgical repair  She was also seen at urgent care 1 week ago due to abdominal pain, increased frequency in urination, urgency and dysuria  At the time was diagnosed with UTI and sent home to complete 1 week of Macrobid  She denies urinary symptoms at this time  She denies fevers, chills, abdominal distension, blood in stool, dysuria or hematuria  Endorses increased frequency in urination especially at night  At the time of my evaluation, patient states that pain is now 5/5 and she is no longer experiencing nausea  She states that she had crackers and ginger ale in the ED which helped improve her nausea  Labs performed in the ED have been unremarkable except for elevated glucose, imaging shows a stable fat-containing umbilical hernia and colonic diverticulosis  Patient will be admitted to the [facility] service for observation and further management  Review of Systems:  Review of Systems   Constitutional: Negative for appetite change, fatigue, fever and unexpected weight change  HENT: Negative for congestion, postnasal drip, rhinorrhea, sinus pressure, sore throat and trouble swallowing  Eyes: Negative for visual disturbance  Respiratory: Negative for cough, chest tightness, shortness of breath and wheezing  Cardiovascular: Negative for chest pain, palpitations and leg swelling  Gastrointestinal: Positive for abdominal pain, constipation and nausea  Negative for abdominal distention, blood in stool, diarrhea and vomiting  Endocrine: Positive for polyuria (mostly at night)  Negative for polydipsia  Genitourinary: Positive for frequency  Negative for difficulty urinating, dysuria and hematuria  Musculoskeletal: Positive for back pain (lumbar region)  Negative for arthralgias and myalgias  Skin: Negative for color change, pallor and rash     Neurological: Negative for dizziness, facial asymmetry, weakness, light-headedness, numbness and headaches  Psychiatric/Behavioral: Negative for agitation, behavioral problems, confusion and dysphoric mood  All other systems reviewed and are negative  Past Medical and Surgical History:   Past Medical History:   Diagnosis Date    Fibromyalgia 4/26/2022    History of mammogram 2018    History of tobacco abuse 4/26/2022    Hypertension        Past Surgical History:   Procedure Laterality Date    APPENDECTOMY      CARPAL TUNNEL RELEASE      COLONOSCOPY  2017    repeat in 2022    FOOT SURGERY      NECK SURGERY      spine fusion        Meds/Allergies:  Prior to Admission medications    Medication Sig Start Date End Date Taking?  Authorizing Provider   albuterol (2 5 mg/3 mL) 0 083 % nebulizer solution USE 1 VIAL IN NEBULIZER EVERY 2 HOURS AS NEEDED FOR WHEEZING 2/21/22   Historical Provider, MD   albuterol (PROVENTIL HFA,VENTOLIN HFA) 90 mcg/act inhaler INHALE 2 PUFFS BY MOUTH EVERY 4 TO 6 HOURS AS NEEDED FOR WHEEZING AND FOR SHORTNESS OF BREATH 5/29/20   Yfn Niño MD   amLODIPine (NORVASC) 5 mg tablet Take 1 tablet (5 mg total) by mouth daily 4/26/22   Rosalba Allan DO   gabapentin (NEURONTIN) 100 mg capsule Take 2 capsules (200 mg total) by mouth 3 (three) times a day 3/16/22   ARSENIO Palma   HYDROcodone-acetaminophen (Norco) 5-325 mg per tablet Take 1 tablet by mouth every 6 (six) hours as needed for pain for up to 10 days Max Daily Amount: 4 tablets 4/26/22 5/6/22  Rosalba Allan DO   lisinopril (ZESTRIL) 40 mg tablet Take 1 tablet (40 mg total) by mouth daily 3/16/22   ARESNIO Palma   metoprolol tartrate (LOPRESSOR) 25 mg tablet Take 1 tablet (25 mg total) by mouth 2 (two) times a day 3/16/22   ARSENIO Palma   nitrofurantoin (MACROBID) 100 mg capsule Take 1 capsule (100 mg total) by mouth 2 (two) times a day for 7 days 4/22/22 4/29/22  Farhan Polanco PA-C     I have reviewed home medications with patient personally  Allergies: No Known Allergies    Social History:  Marital Status: /Civil Union   Occupation: [employer]   Patient Pre-hospital Living Situation: Home  Patient Pre-hospital Level of Mobility: walks  Patient Pre-hospital Diet Restrictions: None  Substance Use History:   Social History     Substance and Sexual Activity   Alcohol Use Yes    Comment: occasional      Social History     Tobacco Use   Smoking Status Current Every Day Smoker    Packs/day: 0 50    Years: 25 00    Pack years: 12 50    Types: Cigarettes   Smokeless Tobacco Never Used   Tobacco Comment    per pt, 5 a day - As per [employer]      Social History     Substance and Sexual Activity   Drug Use Not Currently       Family History:  Family History   Problem Relation Age of Onset    Hypertension Mother     Heart disease Mother     Hypertension Father     Heart disease Father     Diabetes Maternal Grandmother     Breast cancer Paternal Aunt     Pancreatic cancer Paternal Uncle        Physical Exam:     Vitals:   Blood Pressure: 161/91 (04/29/22 1651)  Pulse: 67 (04/29/22 1651)  Temperature: 98 1 °F (36 7 °C) (04/29/22 1651)  Temp Source: Oral (04/29/22 0826)  Respirations: 18 (04/29/22 1651)  Height: 5' (152 4 cm) (04/29/22 1648)  Weight - Scale: 68 kg (150 lb) (04/29/22 1648)  SpO2: 94 % (04/29/22 1651)    Physical Exam  Vitals and nursing note reviewed  Constitutional:       General: She is not in acute distress  Appearance: Normal appearance  She is not ill-appearing, toxic-appearing or diaphoretic  HENT:      Head: Normocephalic and atraumatic  Nose: Nose normal       Mouth/Throat:      Mouth: Mucous membranes are moist       Pharynx: Oropharynx is clear  Eyes:      General: No scleral icterus  Right eye: No discharge  Left eye: No discharge  Extraocular Movements: Extraocular movements intact        Conjunctiva/sclera: Conjunctivae normal    Cardiovascular:      Rate and Rhythm: Normal rate and regular rhythm  Pulses: Normal pulses  Heart sounds: Normal heart sounds  No murmur heard  Pulmonary:      Effort: Pulmonary effort is normal  No respiratory distress  Breath sounds: Wheezing (left sided) present  No rhonchi or rales  Abdominal:      General: Abdomen is flat  Bowel sounds are normal  There is no distension  Palpations: Abdomen is soft  Tenderness: There is abdominal tenderness (periumbilical)  There is no guarding or rebound  Hernia: A hernia (reducible) is present  Musculoskeletal:      Cervical back: Normal range of motion and neck supple  Right lower leg: No edema  Left lower leg: No edema  Skin:     General: Skin is warm and dry  Coloration: Skin is not jaundiced or pale  Neurological:      Mental Status: She is alert and oriented to person, place, and time  Mental status is at baseline  Psychiatric:         Mood and Affect: Mood normal          Behavior: Behavior normal          Thought Content:  Thought content normal          Judgment: Judgment normal         Additional Data:     Lab Results:  Results from last 7 days   Lab Units 04/29/22  0856   WBC Thousand/uL 8 81   HEMOGLOBIN g/dL 13 6   HEMATOCRIT % 42 2   PLATELETS Thousands/uL 382   NEUTROS PCT % 36*   LYMPHS PCT % 39   MONOS PCT % 8   EOS PCT % 16*     Results from last 7 days   Lab Units 04/29/22  0856   SODIUM mmol/L 138   POTASSIUM mmol/L 3 6   CHLORIDE mmol/L 101   CO2 mmol/L 31   BUN mg/dL 6   CREATININE mg/dL 0 64   ANION GAP mmol/L 6   CALCIUM mg/dL 9 5   ALBUMIN g/dL 3 6   TOTAL BILIRUBIN mg/dL 0 40   ALK PHOS U/L 74   ALT U/L 18   AST U/L 15   GLUCOSE RANDOM mg/dL 147*                 Results from last 7 days   Lab Units 04/29/22  0856   LACTIC ACID mmol/L 0 8       Imaging: Personally reviewed the following imaging: abdominal/pelvic CT and point of care ultrasound  CT abdomen pelvis with contrast   Final Result by iDane Dickinson MD (04/29 0930)      No acute intra-abdominal abnormality  Colonic diverticulosis  Stable fat-containing umbilical hernia noted  Workstation performed: [id number]             EKG and Other Studies Reviewed on Admission:   · EKG: NSR  HR 61     ** Please Note: This note has been constructed using a voice recognition system   **

## 2022-04-29 NOTE — ASSESSMENT & PLAN NOTE
POA: Intractable nausea in the setting of umbilical hernia  Patient initially given 2 doses of Zofran with minimal improvement  Symptoms improved significantly after a dose of Reglan  Suspect there may also be a component of gastroparesis given history of DM and improvement with reglan    Plan: Will order PRN Reglan for patient

## 2022-04-29 NOTE — ASSESSMENT & PLAN NOTE
Blood Pressure: 161/91    Patient with blood pressure of 213/103 POA  Likely secondary to pain  Home regimen includes lisinopril, amlodipine and Lopressor    Plan: Will continue home regimen  P r n   Hydralazine added for SBP> 180  Continue monitoring vitals

## 2022-04-29 NOTE — ASSESSMENT & PLAN NOTE
Patient on albuterol inhaler p r n , also has albuterol nebulizer as needed for shortness of breath and wheezing  Was noted to have left-sided wheezing on auscultation today, however, not in respiratory distress at the time of my evaluation    Plan:  Albuterol p r n   Ordered  Will monitor vitals

## 2022-04-29 NOTE — ED PROVIDER NOTES
History  Chief Complaint   Patient presents with    Hernia     pt was here on Tuesday because her hernia popped out and ER popped it back in and discharged  she believes her hernia popped out again while lifting something at work, having same symptoms (abdominal pain and nausea), she states it feels exactly how it felt Tuesday     Camila Ayoub is a 77-year-old female with PMH of colitis and recent visit to the emergency department for umbilical hernia, that presents to the emergency department with severe abdominal pain, nausea, vomiting that started yesterday afternoon and worsened overnight  Patient reports that while she was in the emergency department 3 days ago umbilical hernia was reduced by the surgical team and she made an appointment outpatient with them for me 9th  She says that yesterday, while working as a , 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 Taking?    HYDROcodone-acetaminophen (Norco) 5-325 mg per tablet   No No   Sig: Take 1 tablet by mouth every 6 (six) hours as needed for pain for up to 10 days Max Daily Amount: 4 tablets   albuterol (2 5 mg/3 mL) 0 083 % nebulizer solution   Yes No   Sig: USE 1 VIAL IN NEBULIZER EVERY 2 HOURS AS NEEDED FOR WHEEZING   albuterol (PROVENTIL HFA,VENTOLIN HFA) 90 mcg/act inhaler   No No   Sig: INHALE 2 PUFFS BY MOUTH EVERY 4 TO 6 HOURS AS NEEDED FOR WHEEZING AND FOR SHORTNESS OF BREATH   amLODIPine (NORVASC) 5 mg tablet   No No   Sig: Take 1 tablet (5 mg total) by mouth daily   gabapentin (NEURONTIN) 100 mg capsule   No No   Sig: Take 2 capsules (200 mg total) by mouth 3 (three) times a day   lisinopril (ZESTRIL) 40 mg tablet   No No   Sig: Take 1 tablet (40 mg total) by mouth daily   metoprolol tartrate (LOPRESSOR) 25 mg tablet   No No   Sig: Take 1 tablet (25 mg total) by mouth 2 (two) times a day   nitrofurantoin (MACROBID) 100 mg capsule   No No   Sig: Take 1 capsule (100 mg total) by mouth 2 (two) times a day for 7 days      Facility-Administered Medications: None       Past Medical History:   Diagnosis Date    Fibromyalgia 4/26/2022    History of mammogram 2018    History of tobacco abuse 4/26/2022    Hypertension        Past Surgical History:   Procedure Laterality Date    APPENDECTOMY      CARPAL TUNNEL RELEASE      COLONOSCOPY  2017    repeat in 2022   [facility]      spine fusion        Family History   Problem Relation Age of Onset    Hypertension Mother     Heart disease Mother     Hypertension Father     Heart disease Father     Diabetes Maternal Grandmother     Breast cancer Paternal Aunt     Pancreatic cancer Paternal Uncle      I have reviewed and agree with the history as documented  E-Cigarette/Vaping    E-Cigarette Use Never User      E-Cigarette/Vaping Substances    Nicotine No     THC No     CBD No     Flavoring No     Other No     Unknown No      Social History     Tobacco Use    Smoking status: Current Every Day Smoker     Packs/day: 0 50     Years: 25 00     Pack years: 12 50     Types: Cigarettes    Smokeless tobacco: Never Used    Tobacco comment: per pt, 5 a day - As per [employer] Vaping Use: Never used   Substance Use Topics    Alcohol use: Yes     Comment: occasional     Drug use: Not Currently        Review of Systems   Constitutional: Negative for chills and fever  HENT: Negative for congestion, ear pain and sore throat  Eyes: Negative for pain and visual disturbance  Respiratory: Negative for cough and shortness of breath  Cardiovascular: Negative for chest pain and palpitations  Gastrointestinal: Positive for abdominal pain and nausea  Negative for blood in stool, constipation, diarrhea and vomiting  Genitourinary: Negative for dysuria and hematuria  Musculoskeletal: Negative for arthralgias and back pain  Skin: Negative for color change and rash  Neurological: Negative for dizziness, seizures, syncope, light-headedness and headaches  All other systems reviewed and are negative  Physical Exam  ED Triage Vitals   Temperature Pulse Respirations Blood Pressure SpO2   04/29/22 0826 04/29/22 0826 04/29/22 0826 04/29/22 0826 04/29/22 0826   97 9 °F (36 6 °C) 74 20 (!) 213/103 97 %      Temp Source Heart Rate Source Patient Position - Orthostatic VS BP Location FiO2 (%)   04/29/22 0826 04/29/22 0826 04/29/22 0826 04/29/22 0826 --   Oral Monitor Sitting Left arm       Pain Score       04/29/22 0906       10 - Worst Possible Pain             Orthostatic Vital Signs  Vitals:    04/29/22 0826 04/29/22 0926 04/29/22 1130 04/29/22 1415   BP: (!) 213/103 (!) 190/106 (!) 177/86 (!) 189/92   Pulse: 74 55 58 60   Patient Position - Orthostatic VS: Sitting          Physical Exam  Vitals and nursing note reviewed  Constitutional:       General: She is in acute distress (Moderate due to pain)  Appearance: Normal appearance  She is well-developed  She is not ill-appearing or toxic-appearing  HENT:      Head: Normocephalic and atraumatic  Right Ear: External ear normal       Left Ear: External ear normal       Mouth/Throat:      Pharynx: Oropharynx is clear  No oropharyngeal exudate or posterior oropharyngeal erythema  Eyes:      General:         Right eye: No discharge  Left eye: No discharge  Extraocular Movements: Extraocular movements intact        Conjunctiva/sclera: Conjunctivae normal  Cardiovascular:      Rate and Rhythm: Normal rate and regular rhythm  Pulses: Normal pulses  Heart sounds: Normal heart sounds  No murmur heard  Pulmonary:      Effort: Pulmonary effort is normal  No respiratory distress  Breath sounds: Normal breath sounds  No wheezing, rhonchi or rales  Abdominal:      General: Abdomen is flat  Palpations: Abdomen is soft  Tenderness: There is abdominal tenderness ( generalized)  There is no guarding or rebound  Hernia: A hernia ( umbilical) is present  Musculoskeletal:         General: No swelling or deformity  Normal range of motion  Cervical back: Neck supple  No tenderness  Skin:     General: Skin is warm and dry  Capillary Refill: Capillary refill takes less than 2 seconds  Neurological:      Mental Status: She is alert and oriented to person, place, and time           ED Medications  Medications   sodium chloride 0 9 % bolus 1,000 mL (1,000 mL Intravenous New Bag 4/29/22 0857)   ondansetron (ZOFRAN) injection 4 mg (4 mg Intravenous Given 4/29/22 0904)   HYDROmorphone (DILAUDID) injection 0 5 mg (0 5 mg Intravenous Given 4/29/22 0906)   iohexol (OMNIPAQUE) 350 MG/ML injection (SINGLE-DOSE) 100 mL (100 mL Intravenous Given 4/29/22 0920)   HYDROmorphone (DILAUDID) injection 0 5 mg (0 5 mg Intravenous Given 4/29/22 1118)   ondansetron (ZOFRAN) injection 4 mg (4 mg Intravenous Given 4/29/22 1229)   metoclopramide (REGLAN) injection 10 mg (10 mg Intravenous Given 4/29/22 1414)       Diagnostic Studies  Results Reviewed     Procedure Component Value Units Date/Time    HS Troponin 0hr (reflex protocol) [[id number]]  (Normal) Collected: 04/29/22 1230    Lab Status: Final result Specimen: Blood from Arm, Right Updated: 04/29/22 1320     hs TnI 0hr 4 ng/L     Comprehensive metabolic panel [[id number]]  (Abnormal) Collected: 04/29/22 0856    Lab Status: Final result Specimen: Blood from Arm, Right Updated: 04/29/22 1040     Sodium 138 mmol/L      Potassium 3 6 mmol/L      Chloride 101 mmol/L      CO2 31 mmol/L      ANION GAP 6 mmol/L      BUN 6 mg/dL      Creatinine 0 64 mg/dL      Glucose 147 mg/dL      Calcium 9 5 mg/dL      AST 15 U/L      ALT 18 U/L      Alkaline Phosphatase 74 U/L      Total Protein 8 2 g/dL      Albumin 3 6 g/dL      Total Bilirubin 0 40 mg/dL      eGFR 99 ml/min/1 73sq m     Narrative:      [place] guidelines for Chronic Kidney Disease (CKD):     Stage 1 with normal or high GFR (GFR > 90 mL/min/1 73 square meters)    Stage 2 Mild CKD (GFR = 60-89 mL/min/1 73 square meters)    Stage 3A Moderate CKD (GFR = 45-59 mL/min/1 73 square meters)    Stage 3B Moderate CKD (GFR = 30-44 mL/min/1 73 square meters)    Stage 4 Severe CKD (GFR = 15-29 mL/min/1 73 square meters)    Stage 5 End Stage CKD (GFR <15 mL/min/1 73 square meters)  Note: GFR calculation is accurate only with a steady state creatinine    Lipase [[id number]]  (Normal) Collected: 04/29/22 0856    Lab Status: Final result Specimen: Blood from Arm, Right Updated: 04/29/22 1002     Lipase 87 u/L     Lactic acid [[id number]]  (Normal) Collected: 04/29/22 0856    Lab Status: Final result Specimen: Blood from Arm, Right Updated: 04/29/22 0952     LACTIC ACID 0 8 mmol/L     Narrative:      Result may be elevated if tourniquet was used during collection      CBC and differential [[id number]]  (Abnormal) Collected: 04/29/22 0856    Lab Status: Final result Specimen: Blood from Arm, Right Updated: 04/29/22 0903     WBC 8 81 Thousand/uL      RBC 4 68 Million/uL      Hemoglobin 13 6 g/dL      Hematocrit 42 2 %      MCV 90 fL      MCH 29 1 pg      MCHC 32 2 g/dL      RDW 14 4 %      MPV 9 2 fL      Platelets 603 Thousands/uL      nRBC 0 /100 WBCs      Neutrophils Relative 36 %      Immat GRANS % 0 %      Lymphocytes Relative 39 %      Monocytes Relative 8 %      Eosinophils Relative 16 %      Basophils Relative 1 %      Neutrophils Absolute 3 17 Thousands/µL      Immature Grans Absolute 0 02 Thousand/uL      Lymphocytes Absolute 3 38 Thousands/µL      Monocytes Absolute 0 70 Thousand/µL      Eosinophils Absolute 1 42 Thousand/µL      Basophils Absolute 0 12 Thousands/µL                  CT abdomen pelvis with contrast   Final Result by Shayla Antunez MD (04/29 0930)      No acute intra-abdominal abnormality  Colonic diverticulosis  Stable fat-containing umbilical hernia noted  Workstation performed: [id number]               Procedures  ECG 12 Lead Documentation Only    Date/Time: 4/29/2022 12:37 PM  Performed by: Lucinda Giron DO  Authorized by: Lucinda Giron DO     Indications / Diagnosis:  Abdominal pain  ECG reviewed by me, the ED Provider: yes    Patient location:  ED  Previous ECG:     Previous ECG:  Compared to current    Similarity:  No change    Comparison to cardiac monitor: Yes    Interpretation:     Interpretation: normal    Rate:     ECG rate:  61    ECG rate assessment: normal    Rhythm:     Rhythm: sinus rhythm    Ectopy:     Ectopy: none    QRS:     QRS axis:  Normal    QRS intervals:  Normal  Conduction:     Conduction: normal    ST segments:     ST segments:  Normal  T waves:     T waves: normal    Comments:      Normal sinus rhythm  No evidence of ischemia or dysrhythmia            ED Course                                       MDM  Number of Diagnoses or Management Options  Abdominal pain  Nausea  Umbilical hernia  Diagnosis management comments: 57F presents with abdominal pain, nausea after reduction umbilical hernia 3 days ago  She thinks her hernia has popped through again  On physical exam the patient has generalized abdominal tenderness and a soft abdomen  Workup in the emergency department includes CBC, CMP, lipase, lactate all of which are within normal limits  ECG shows normal sinus rhythm and no evidence of ischemia    Due to the nature of the patient's pain her a repeat CT scan of the abdomen is performed which reveals a stable fat containing umbilical hernia  We are unable to reduce the hernia in the emergency department and surgery is consulted who came and reduced the hernia in the ED  After reduction surgery applied an abdominal binder to help prevent future herniation point was to faint structure to wear the binder as often as possible  Upon re-evaluation the patient still has significant nausea but marked improvement of pain  The patient's nausea is not improved at all with 2 doses of Zofran and several hours in the emergency department  She is administered 1 dose of Reglan and admitted for observation under the care [facility] for intractable nausea  Disposition  Final diagnoses:   Umbilical hernia   Nausea   Abdominal pain     Time reflects when diagnosis was documented in both MDM as applicable and the Disposition within this note     Time User Action Codes Description Comment    4/29/2022 11:00 AM Jodeane Lass Add [Y80 6] Umbilical hernia     2/20/8794  2:32 PM Jodeane Lass Add [R11 0] Nausea     4/29/2022  2:33 PM Jodeane Lass Modify [G68 4] Umbilical hernia     1/21/7330  2:33 PM Jodeane Lass Modify [R11 0] Nausea     4/29/2022  2:33 PM Jodeane Lass Add [R10 9] Abdominal pain       ED Disposition     ED Disposition Condition Date/Time Comment    Admit Stable Fri Apr 29, 2022  2:32 PM Case was discussed with Dr Emeli Echevarria and the patient's admission status was agreed to be Admission Status: observation status to the service of Dr Laisha Bustos   Follow-up Information    None         Patient's Medications   Discharge Prescriptions    No medications on file     No discharge procedures on file  PDMP Review     None           ED Provider  Attending physically available and evaluated Kaela Ballewis MARTINEZ managed the patient along with the ED Attending      Electronically Signed by         Lucinda Giron DO  04/29/22 5128

## 2022-04-29 NOTE — ASSESSMENT & PLAN NOTE
POA:  Reducible umbilical hernia associated with periumbilical abdominal pain and nausea  Labs unremarkable,  Patient was seen by General surgery in the ED, hernia was reduced and abdominal binder applied  Patient now reports significant improvement in abdominal pain and nausea  Has follow-up appointment with General surgery on 05/09 to discuss further surgical management    Plan:  Admit patient for observation overnight  P r n  Pain regimen ordered  P r n   Reglan ordered for nausea  Patient to continue applying abdominal binder  Follow-up with General surgery on an outpatient basis after discharge

## 2022-04-29 NOTE — PLAN OF CARE
Problem: GASTROINTESTINAL - ADULT  Goal: Minimal or absence of nausea and/or vomiting  Description: INTERVENTIONS:  - Administer IV fluids if ordered to ensure adequate hydration  - Maintain NPO status until nausea and vomiting are resolved  - Nasogastric tube if ordered  - Administer ordered antiemetic medications as needed  - Provide nonpharmacologic comfort measures as appropriate  - Advance diet as tolerated, if ordered  - Consider nutrition services referral to assist patient with adequate nutrition and appropriate food choices  Outcome: Progressing  Goal: Maintains adequate nutritional intake  Description: INTERVENTIONS:  - Monitor percentage of each meal consumed  - Identify factors contributing to decreased intake, treat as appropriate  - Assist with meals as needed  - Monitor I&O, weight, and lab values if indicated  - Obtain nutrition services referral as needed  Outcome: Progressing     Problem: PAIN - ADULT  Goal: Verbalizes/displays adequate comfort level or baseline comfort level  Description: Interventions:  - Encourage patient to monitor pain and request assistance  - Assess pain using appropriate pain scale  - Administer analgesics based on type and severity of pain and evaluate response  - Implement non-pharmacological measures as appropriate and evaluate response  - Consider cultural and social influences on pain and pain management  - Notify physician/advanced practitioner if interventions unsuccessful or patient reports new pain  Outcome: Progressing     Problem: SAFETY ADULT  Goal: Patient will remain free of falls  Description: INTERVENTIONS:  - Keep Call bell within reach  - Keep bed low and locked with side rails adjusted as appropriate  - Keep care items and personal belongings within reach  - Initiate and maintain comfort rounds        Outcome: Progressing     Problem: DISCHARGE PLANNING  Goal: Discharge to home or other facility with appropriate resources  Description: INTERVENTIONS:  - Identify barriers to discharge w/patient and caregiver  - Arrange for needed discharge resources and transportation as appropriate  - Identify discharge learning needs (meds, wound care, etc )  - Arrange for interpretive services to assist at discharge as needed  - Refer to Case Management Department for coordinating discharge planning if the patient needs post-hospital services based on physician/advanced practitioner order or complex needs related to functional status, cognitive ability, or social support system  Outcome: Progressing     Problem: Knowledge Deficit  Goal: Patient/family/caregiver demonstrates understanding of disease process, treatment plan, medications, and discharge instructions  Description: Complete learning assessment and assess knowledge base    Interventions:  - Provide teaching at level of understanding  - Provide teaching via preferred learning methods  Outcome: Progressing     Problem: Potential for Falls  Goal: Patient will remain free of falls  Description: INTERVENTIONS:  - Keep Call bell within reach  - Keep bed low and locked with side rails adjusted as appropriate  - Keep care items and personal belongings within reach  - Initiate and maintain comfort rounds        Outcome: Progressing

## 2022-04-29 NOTE — ASSESSMENT & PLAN NOTE
Lab Results   Component Value Date    HGBA1C 6 3 (H) 03/23/2022       No results for input(s): POCGLU in the last 72 hours      Blood Sugar Average: Last 72 hrs:  Patient with poorly controlled DM in the past, states that she was on multiple medications  Lost 100lbs after her mother's death 2 years ago and has been off medication since    Plan:  Offered patient sliding scale insulin while here under observation, patient declined  Diabetic diet ordered

## 2022-04-29 NOTE — ED NOTES
Patient transported to [facility], [address] [place] [address]  04/29/22 7722

## 2022-04-29 NOTE — LETTER
[address] [address]  Dept: [phone]    April 30, 2022     Patient: Patrizia Hdz   YOB: 1965   Date of Visit: 4/29/2022       To Whom it May Concern:    Patrizia Hdz is under my professional care  She was seen in the hospital from 4/29/2022   to 04/30/22  She may return to work on 5/2/22 with the following limitations : No lifting of objects greater than 5lbs until seen by general surgery on 5/9/22       If you have any questions or concerns, please don't hesitate to call           Sincerely,          Caio Mondragon MD

## 2022-04-29 NOTE — ED ATTENDING ATTESTATION
4/29/2022  ITee MD, saw and evaluated the patient  I have discussed the patient with the resident/non-physician practitioner and agree with the resident's/non-physician practitioner's findings, Plan of Care, and MDM as documented in the resident's/non-physician practitioner's note, except where noted  All available labs and Radiology studies were reviewed  I was present for key portions of any procedure(s) performed by the resident/non-physician practitioner and I was immediately available to provide assistance  At this point I agree with the current assessment done in the Emergency Department  I have conducted an independent evaluation of this patient a history and physical is as follows:    25-year-old presenting with abdominal pain  Nausea  History of umbilical hernia  No fevers or chills  No chest pain or shortness of breath  Abdomen soft  Tender throughout      Agree with workup, abdominal labs, EKG trop, CT abdomen pelvis      ED Course         Critical Care Time  Procedures

## 2022-04-29 NOTE — ASSESSMENT & PLAN NOTE
POA:  Reducible umbilical hernia associated with periumbilical abdominal pain and nausea  Labs unremarkable,  Patient was seen by General surgery in the ED, hernia was reduced and abdominal binder applied  Patient now reports significant improvement in abdominal pain and nausea  Has follow-up appointment with General surgery on 05/09 to discuss further surgical management    Plan:  Admit patient for observation overnight  P r n  Pain regimen ordered  P r n   Reglan ordered for nausea  Ordered Bentyl 20mg q6h for abdominal pain  Patient to continue applying abdominal binder  Follow-up with General surgery on an outpatient basis after discharge

## 2022-04-29 NOTE — ASSESSMENT & PLAN NOTE
Current everyday smoker  Discussed with patient importance of smoking cessation given history of DM and HTN    Offered patient nicotine patch, patient refused  Will continue encouraging smoking cessation

## 2022-04-29 NOTE — PLAN OF CARE
Problem: GASTROINTESTINAL - ADULT  Goal: Minimal or absence of nausea and/or vomiting  Description: INTERVENTIONS:  - Administer IV fluids if ordered to ensure adequate hydration  - Maintain NPO status until nausea and vomiting are resolved  - Nasogastric tube if ordered  - Administer ordered antiemetic medications as needed  - Provide nonpharmacologic comfort measures as appropriate  - Advance diet as tolerated, if ordered  - Consider nutrition services referral to assist patient with adequate nutrition and appropriate food choices  Outcome: Progressing  Goal: Maintains adequate nutritional intake  Description: INTERVENTIONS:  - Monitor percentage of each meal consumed  - Identify factors contributing to decreased intake, treat as appropriate  - Assist with meals as needed  - Monitor I&O, weight, and lab values if indicated  - Obtain nutrition services referral as needed  Outcome: Progressing     Problem: PAIN - ADULT  Goal: Verbalizes/displays adequate comfort level or baseline comfort level  Description: Interventions:  - Encourage patient to monitor pain and request assistance  - Assess pain using appropriate pain scale  - Administer analgesics based on type and severity of pain and evaluate response  - Implement non-pharmacological measures as appropriate and evaluate response  - Consider cultural and social influences on pain and pain management  - Notify physician/advanced practitioner if interventions unsuccessful or patient reports new pain  Outcome: Progressing     Problem: SAFETY ADULT  Goal: Patient will remain free of falls  Description: INTERVENTIONS:  - Keep Call bell within reach  - Keep bed low and locked with side rails adjusted as appropriate  - Keep care items and personal belongings within reach  - Initiate and maintain comfort rounds        Outcome: Progressing     Problem: DISCHARGE PLANNING  Goal: Discharge to home or other facility with appropriate resources  Description: INTERVENTIONS:  - Identify barriers to discharge w/patient and caregiver  - Arrange for needed discharge resources and transportation as appropriate  - Identify discharge learning needs (meds, wound care, etc )  - Arrange for interpretive services to assist at discharge as needed  - Refer to Case Management Department for coordinating discharge planning if the patient needs post-hospital services based on physician/advanced practitioner order or complex needs related to functional status, cognitive ability, or social support system  Outcome: Progressing     Problem: Knowledge Deficit  Goal: Patient/family/caregiver demonstrates understanding of disease process, treatment plan, medications, and discharge instructions  Description: Complete learning assessment and assess knowledge base    Interventions:  - Provide teaching at level of understanding  - Provide teaching via preferred learning methods  Outcome: Progressing

## 2022-04-30 VITALS
BODY MASS INDEX: 29.45 KG/M2 | DIASTOLIC BLOOD PRESSURE: 94 MMHG | SYSTOLIC BLOOD PRESSURE: 144 MMHG | RESPIRATION RATE: 17 BRPM | WEIGHT: 150 LBS | TEMPERATURE: 97.3 F | HEIGHT: 60 IN | HEART RATE: 75 BPM | OXYGEN SATURATION: 92 %

## 2022-04-30 PROBLEM — R11.0 NAUSEA: Status: RESOLVED | Noted: 2022-04-29 | Resolved: 2022-04-30

## 2022-04-30 LAB
ANION GAP SERPL CALCULATED.3IONS-SCNC: 9 MMOL/L (ref 4–13)
ATRIAL RATE: 61 BPM
BASOPHILS # BLD AUTO: 0.1 THOUSANDS/ΜL (ref 0–0.1)
BASOPHILS NFR BLD AUTO: 1 % (ref 0–1)
BUN SERPL-MCNC: 6 MG/DL (ref 5–25)
CALCIUM SERPL-MCNC: 9.3 MG/DL (ref 8.3–10.1)
CHLORIDE SERPL-SCNC: 102 MMOL/L (ref 100–108)
CO2 SERPL-SCNC: 25 MMOL/L (ref 21–32)
CREAT SERPL-MCNC: 0.55 MG/DL (ref 0.6–1.3)
EOSINOPHIL # BLD AUTO: 0.88 THOUSAND/ΜL (ref 0–0.61)
EOSINOPHIL NFR BLD AUTO: 11 % (ref 0–6)
ERYTHROCYTE [DISTWIDTH] IN BLOOD BY AUTOMATED COUNT: 14.3 % (ref 11.6–15.1)
GFR SERPL CREATININE-BSD FRML MDRD: 104 ML/MIN/1.73SQ M
GLUCOSE SERPL-MCNC: 132 MG/DL (ref 65–140)
HCT VFR BLD AUTO: 40.2 % (ref 34.8–46.1)
HGB BLD-MCNC: 12.9 G/DL (ref 11.5–15.4)
IMM GRANULOCYTES # BLD AUTO: 0.01 THOUSAND/UL (ref 0–0.2)
IMM GRANULOCYTES NFR BLD AUTO: 0 % (ref 0–2)
LYMPHOCYTES # BLD AUTO: 3.56 THOUSANDS/ΜL (ref 0.6–4.47)
LYMPHOCYTES NFR BLD AUTO: 43 % (ref 14–44)
MCH RBC QN AUTO: 28.9 PG (ref 26.8–34.3)
MCHC RBC AUTO-ENTMCNC: 32.1 G/DL (ref 31.4–37.4)
MCV RBC AUTO: 90 FL (ref 82–98)
MONOCYTES # BLD AUTO: 0.7 THOUSAND/ΜL (ref 0.17–1.22)
MONOCYTES NFR BLD AUTO: 9 % (ref 4–12)
NEUTROPHILS # BLD AUTO: 2.97 THOUSANDS/ΜL (ref 1.85–7.62)
NEUTS SEG NFR BLD AUTO: 36 % (ref 43–75)
NRBC BLD AUTO-RTO: 0 /100 WBCS
P AXIS: 54 DEGREES
PLATELET # BLD AUTO: 331 THOUSANDS/UL (ref 149–390)
PMV BLD AUTO: 9.2 FL (ref 8.9–12.7)
POTASSIUM SERPL-SCNC: 3.5 MMOL/L (ref 3.5–5.3)
PR INTERVAL: 150 MS
QRS AXIS: 50 DEGREES
QRSD INTERVAL: 80 MS
QT INTERVAL: 430 MS
QTC INTERVAL: 432 MS
RBC # BLD AUTO: 4.47 MILLION/UL (ref 3.81–5.12)
SODIUM SERPL-SCNC: 136 MMOL/L (ref 136–145)
T WAVE AXIS: 62 DEGREES
VENTRICULAR RATE: 61 BPM
WBC # BLD AUTO: 8.22 THOUSAND/UL (ref 4.31–10.16)

## 2022-04-30 PROCEDURE — 85025 COMPLETE CBC W/AUTO DIFF WBC: CPT

## 2022-04-30 PROCEDURE — 99217 PR OBSERVATION CARE DISCHARGE MANAGEMENT: CPT | Performed by: INTERNAL MEDICINE

## 2022-04-30 PROCEDURE — 93010 ELECTROCARDIOGRAM REPORT: CPT | Performed by: INTERNAL MEDICINE

## 2022-04-30 PROCEDURE — 80048 BASIC METABOLIC PNL TOTAL CA: CPT

## 2022-04-30 RX ORDER — DICYCLOMINE HCL 20 MG
20 TABLET ORAL
Qty: 12 TABLET | Refills: 0 | Status: SHIPPED | OUTPATIENT
Start: 2022-04-30 | End: 2022-05-19 | Stop reason: CLARIF

## 2022-04-30 RX ADMIN — DICYCLOMINE HYDROCHLORIDE 20 MG: 20 TABLET ORAL at 11:47

## 2022-04-30 RX ADMIN — AMLODIPINE BESYLATE 5 MG: 5 TABLET ORAL at 08:08

## 2022-04-30 RX ADMIN — GABAPENTIN 200 MG: 100 CAPSULE ORAL at 08:08

## 2022-04-30 RX ADMIN — DICYCLOMINE HYDROCHLORIDE 20 MG: 20 TABLET ORAL at 05:36

## 2022-04-30 RX ADMIN — METOPROLOL TARTRATE 25 MG: 25 TABLET, FILM COATED ORAL at 08:08

## 2022-04-30 RX ADMIN — ACETAMINOPHEN 650 MG: 325 TABLET ORAL at 04:28

## 2022-04-30 RX ADMIN — LISINOPRIL 40 MG: 20 TABLET ORAL at 08:08

## 2022-04-30 NOTE — ASSESSMENT & PLAN NOTE
Blood Pressure: 144/94    Patient with blood pressure of 213/103 POA  Likely secondary to pain  Home regimen includes lisinopril, amlodipine and Lopressor    Plan:   Will continue home regimen  Follow-up with your primary care provider within a week of discharge for further management

## 2022-04-30 NOTE — ASSESSMENT & PLAN NOTE
POA:  Reducible umbilical hernia associated with periumbilical abdominal pain and nausea  Labs unremarkable,  Patient was seen by General surgery in the ED, hernia was reduced and abdominal binder applied  Patient now reports significant improvement in abdominal pain and nausea  Has follow-up appointment with General surgery on 05/09 to discuss further surgical management    Plan:  Ordered Bentyl 20mg q6h for abdominal pain at discharge  Patient to continue applying abdominal binder  Follow-up with General surgery on an outpatient basis after discharge

## 2022-04-30 NOTE — ASSESSMENT & PLAN NOTE
Patient on albuterol inhaler p r n , also has albuterol nebulizer as needed for shortness of breath and wheezing  Was noted to have left-sided wheezing on auscultation today, however, not in respiratory distress at the time of my evaluation    Plan:  Continue home inhaler and nebs as needed

## 2022-04-30 NOTE — DISCHARGE INSTRUCTIONS
Umbilical Hernia   WHAT YOU NEED TO KNOW:   An umbilical hernia is a bulge through the abdominal wall near your umbilicus (belly button)  The hernia may contain tissue from the abdomen, part of an organ (such as the intestine), or fluid  DISCHARGE INSTRUCTIONS:   Return to the emergency department if:   · Your hernia gets bigger, feels firm, or turns blue or purple  · You have severe abdominal pain with nausea or vomiting  · You stop having bowel movements and passing gas  · You have blood in your bowel movement  Contact your healthcare provider if:   · You have a fever  · You have nausea or are vomiting  · You are constipated  · You have questions or concerns about your condition or care  Self-care:   · Drink more liquids  Liquids may prevent constipation and straining during a bowel movement  This can prevent your hernia from getting bigger  Ask how much liquid you should drink each day and which liquids are best for you  · Eat high-fiber foods  Fiber may prevent constipation and straining during a bowel movement  This can prevent your hernia from getting bigger  Foods that contain fiber include fruits, vegetables, legumes, and whole grains  · Avoid heavy lifting  Heavy lifting can put pressure on your hernia and make it bigger  Ask your healthcare provider how much is safe to lift  · Do not place anything over your umbilical hernia  Do not place tape or a coin over the hernia  This treatment does not help treat a hernia  Follow up with your healthcare provider as directed: You may need to see a surgeon to plan for hernia repair  Write down your questions so you remember to ask them during your visits  © [employer] 2022 Information is for End User's use only and may not be sold, redistributed or otherwise used for commercial purposes   All illustrations and images included in CareNotes® are the copyrighted property of A D [employer] M [employer]  or [employer]  The above information is an  only  It is not intended as medical advice for individual conditions or treatments  Talk to your doctor, nurse or pharmacist before following any medical regimen to see if it is safe and effective for you

## 2022-04-30 NOTE — DISCHARGE INSTR - AVS FIRST PAGE
Dear Pink Fabry,     It was our pleasure to care for you here at [facility], [address]  It is our hope that we were always able to exceed the expected standards for your care during your stay  You were hospitalized due to umbilical hernia  You were cared for on the [place] 4th floor by Albina Garcia MD under the service of Britton Beltre MD with the [facility] who covers for your primary care physician (PCP), Castro Kinney MD, while you were hospitalized  If you have any questions or concerns related to this hospitalization, you may contact us at [phone]  For follow up as well as any medication refills, we recommend that you follow up with your primary care physician  A registered nurse will reach out to you by phone within a few days after your discharge to answer any additional questions that you may have after going home  However, at this time we provide for you here, the most important instructions / recommendations at discharge:     Notable Medication Adjustments -   Please take Dicyclomine (Bentyl) 20mg, 4 times daily before meals and at bedtime for 3 days  Please continue taking the rest of your medications as prescribed  Testing Required after Discharge -   None  Important follow up information -   Please follow up with your PCP within a week of discharge  Please follow up with General surgery on an outpatient basis to discuss hernia repair options  Other Instructions -   Please contact your primary care provider, call 911 or go to the nearest emergency department for worsening symptoms  Please wear the abdominal binder provided to you at discharge as often as possible    Practice Social distancing  Please practie adequate hand washing techinque  Please review this entire after visit summary as additional general instructions including medication list, appointments, activity, diet, any pertinent wound care, and other additional recommendations from your care team that may be provided for you        Sincerely,     Jg Pollock MD

## 2022-04-30 NOTE — ASSESSMENT & PLAN NOTE
POA: Intractable nausea in the setting of umbilical hernia  Patient initially given 2 doses of Zofran with minimal improvement     Symptoms improved significantly after a dose of Reglan  Suspect there may also be a component of gastroparesis given history of DM and improvement with reglan  Now resolved

## 2022-04-30 NOTE — ASSESSMENT & PLAN NOTE
Lab Results   Component Value Date    HGBA1C 6 3 (H) 03/23/2022       No results for input(s): POCGLU in the last 72 hours      Blood Sugar Average: Last 72 hrs:  Patient with poorly controlled DM in the past, states that she was on multiple medications  Lost 100lbs after her mother's death 2 years ago and has been off medication since    Plan:  Follow-up with primary care provider within a week of discharge

## 2022-04-30 NOTE — DISCHARGE SUMMARY
[facility]  Discharge- Navin Ulloa 1965, 62 y o  female MRN: [id number]  Unit/Bed#: W -01 Encounter: [id number]  Primary Care Provider: Navin Zhang MD   Date and time admitted to hospital: 4/29/2022  5:10 AM    * Umbilical hernia  Assessment & Plan  POA:  Reducible umbilical hernia associated with periumbilical abdominal pain and nausea  Labs unremarkable,  Patient was seen by General surgery in the ED, hernia was reduced and abdominal binder applied  Patient now reports significant improvement in abdominal pain and nausea  Has follow-up appointment with General surgery on 05/09 to discuss further surgical management    Plan:  Ordered Bentyl 20mg q6h for abdominal pain at discharge  Patient to continue applying abdominal binder  Follow-up with General surgery on an outpatient basis after discharge    Nausea-resolved as of 4/30/2022  Assessment & Plan  POA: Intractable nausea in the setting of umbilical hernia  Patient initially given 2 doses of Zofran with minimal improvement  Symptoms improved significantly after a dose of Reglan  Suspect there may also be a component of gastroparesis given history of DM and improvement with reglan  Now resolved       Primary hypertension  Assessment & Plan  Blood Pressure: 144/94    Patient with blood pressure of 213/103 POA  Likely secondary to pain  Home regimen includes lisinopril, amlodipine and Lopressor    Plan:   Will continue home regimen  Follow-up with your primary care provider within a week of discharge for further management    Mild intermittent asthma without complication  Assessment & Plan  Patient on albuterol inhaler p r n , also has albuterol nebulizer as needed for shortness of breath and wheezing  Was noted to have left-sided wheezing on auscultation today, however, not in respiratory distress at the time of my evaluation    Plan:  Continue home inhaler and nebs as needed      Diabetes mellitus type 2, controlled [facility])  Assessment & Plan  Lab Results   Component Value Date    HGBA1C 6 3 (H) 03/23/2022       No results for input(s): POCGLU in the last 72 hours  Blood Sugar Average: Last 72 hrs:  Patient with poorly controlled DM in the past, states that she was on multiple medications  Lost 100lbs after her mother's death 2 years ago and has been off medication since    Plan:  Follow-up with primary care provider within a week of discharge    History of tobacco abuse  Assessment & Plan  Current everyday smoker  Discussed with patient importance of smoking cessation given history of DM and HTN  Offered patient nicotine patch, patient refused  Will continue encouraging smoking cessation      Medical Problems             Resolved Problems  Date Reviewed: 4/27/2022          Resolved    Nausea 4/30/2022     Resolved by  Ralph Castillo MD              Discharging Resident: Ralph Castillo MD  Discharging Attending: Loc Katz MD  PCP: Suman Holland MD  Admission Date:   Admission Orders (From admission, onward)     Ordered        04/29/22 1433  Place in Observation  Once                      Discharge Date: 04/30/22    Consultations During Hospital Stay:  · None    Procedures Performed:   · None    Significant Findings / Test Results:   CT abdomen pelvis with contrast    Result Date: 4/29/2022  Impression: No acute intra-abdominal abnormality  Colonic diverticulosis  Stable fat-containing umbilical hernia noted   Workstation performed: [id number]     Results from last 7 days   Lab Units 04/30/22  0536 04/29/22  0856 04/26/22  1031   SODIUM mmol/L 136 138 139   POTASSIUM mmol/L 3 5 3 6 3 4*   CHLORIDE mmol/L 102 101 101   CO2 mmol/L 25 31 31   ANION GAP mmol/L 9 6 7   BUN mg/dL 6 6 7   CREATININE mg/dL 0 55* 0 64 0 63   CALCIUM mg/dL 9 3 9 5 9 2   ALK PHOS U/L  --  74 68   ALT U/L  --  18 23   AST U/L  --  15 24     Recent Labs     04/29/22  0856 04/30/22  0536   WBC 8 81 8 22   HGB 13 6 12 9   HCT 42 2 40 2   MCV 90 90   [facility] 29 1 28 9   [facility] 32 2 32 1    331   RBC 4 68 4 47     ·     Incidental Findings:   · None    Test Results Pending at Discharge (will require follow up): · None     Outpatient Tests Requested:  · None    Complications:  None    Reason for Admission: umbilical hernia    Hospital Course:   Janel Pearson is a 62 y o  female patient who originally presented to the hospital on 4/29/2022 due to abdominal pain associated with nausea secondary to umbilical hernia protrusion  Patient was seen by General surgery in the ED with reduction of hernia and application of abdominal binder  Pain regiment and Bentyl was ordered for patient's abdominal pain, Reglan was ordered for nausea as she noticed no improvement on Zofran  Given continued abdominal pain and nausea, patient was admitted to the [facility] service for further management  Given significant improvement in abdominal pain and resolution of nausea, patient was discharged to home on 04/30/2022  She was advised to follow-up with General surgery, has a follow-up appointment scheduled for 5/09/22 to discuss further hernia repair  She was also advised to follow-up with her primary care provider within a week of discharge  Please see above list of diagnoses and related plan for additional information  Condition at Discharge: good    Discharge Day Visit / Exam:   Subjective:  Patient found sitting in chair, not in acute distress on my evaluation  States that abdominal pain has significantly improved and is currently a 1-2/10  Denies nausea  States that she had breakfast this morning  She had no other complaints at this time        Vitals: Blood Pressure: 144/94 (04/30/22 0743)  Pulse: 75 (04/30/22 0743)  Temperature: (!) 97 3 °F (36 3 °C) (04/30/22 0743)  Temp Source: Oral (04/29/22 0826)  Respirations: 17 (04/30/22 0743)  Height: 5' (152 4 cm) (04/29/22 1648)  Weight - Scale: 68 kg (150 lb) (04/29/22 1648)  SpO2: 92 % (04/30/22 0743)  Exam:   Physical Exam  Vitals and nursing note reviewed  Constitutional:       General: She is not in acute distress  Appearance: Normal appearance  She is not ill-appearing, toxic-appearing or diaphoretic  HENT:      Head: Normocephalic and atraumatic  Nose: Nose normal       Mouth/Throat:      Mouth: Mucous membranes are moist       Pharynx: Oropharynx is clear  Eyes:      General: No scleral icterus  Right eye: No discharge  Left eye: No discharge  Extraocular Movements: Extraocular movements intact  Conjunctiva/sclera: Conjunctivae normal    Cardiovascular:      Rate and Rhythm: Normal rate and regular rhythm  Pulses: Normal pulses  Heart sounds: Normal heart sounds  No murmur heard  Pulmonary:      Effort: Pulmonary effort is normal  No respiratory distress  Breath sounds: Normal breath sounds  No wheezing, rhonchi or rales  Abdominal:      General: Abdomen is flat  Bowel sounds are normal  There is no distension  Palpations: Abdomen is soft  Tenderness: There is abdominal tenderness (Mild)  There is no guarding or rebound  Hernia: A hernia (Umbilical, reducible ) is present  Musculoskeletal:      Cervical back: Normal range of motion and neck supple  Right lower leg: No edema  Left lower leg: No edema  Skin:     General: Skin is warm and dry  Coloration: Skin is not jaundiced or pale  Neurological:      Mental Status: She is alert and oriented to person, place, and time  Mental status is at baseline  Psychiatric:         Mood and Affect: Mood normal          Behavior: Behavior normal          Thought Content: Thought content normal          Judgment: Judgment normal           Discussion with Family: Updated  () at bedside  Discharge instructions/Information to patient and family:   See after visit summary for information provided to patient and family        Provisions for Follow-Up Care:  See after visit summary for information related to follow-up care and any pertinent home health orders  Disposition:   Home    Planned Readmission:  None    Discharge Medications:  See after visit summary for reconciled discharge medications provided to patient and/or family        **Please Note: This note may have been constructed using a voice recognition system**

## 2022-05-02 ENCOUNTER — TRANSITIONAL CARE MANAGEMENT (OUTPATIENT)
Dept: FAMILY MEDICINE CLINIC | Facility: CLINIC | Age: 57
End: 2022-05-02

## 2022-05-04 ENCOUNTER — OFFICE VISIT (OUTPATIENT)
Dept: FAMILY MEDICINE CLINIC | Facility: CLINIC | Age: 57
End: 2022-05-04
Payer: COMMERCIAL

## 2022-05-04 ENCOUNTER — OFFICE VISIT (OUTPATIENT)
Dept: PHYSICAL THERAPY | Facility: MEDICAL CENTER | Age: 57
End: 2022-05-04
Payer: COMMERCIAL

## 2022-05-04 VITALS
OXYGEN SATURATION: 98 % | HEIGHT: 60 IN | BODY MASS INDEX: 28.07 KG/M2 | HEART RATE: 69 BPM | SYSTOLIC BLOOD PRESSURE: 150 MMHG | DIASTOLIC BLOOD PRESSURE: 90 MMHG | WEIGHT: 143 LBS | TEMPERATURE: 97.5 F | RESPIRATION RATE: 16 BRPM

## 2022-05-04 DIAGNOSIS — I10 PRIMARY HYPERTENSION: Primary | ICD-10-CM

## 2022-05-04 DIAGNOSIS — M54.50 ACUTE EXACERBATION OF CHRONIC LOW BACK PAIN: Primary | ICD-10-CM

## 2022-05-04 DIAGNOSIS — G89.29 ACUTE EXACERBATION OF CHRONIC LOW BACK PAIN: Primary | ICD-10-CM

## 2022-05-04 DIAGNOSIS — I10 CHRONIC HYPERTENSION: ICD-10-CM

## 2022-05-04 PROCEDURE — 97140 MANUAL THERAPY 1/> REGIONS: CPT | Performed by: PHYSICAL THERAPIST

## 2022-05-04 PROCEDURE — 1111F DSCHRG MED/CURRENT MED MERGE: CPT | Performed by: NURSE PRACTITIONER

## 2022-05-04 PROCEDURE — 99213 OFFICE O/P EST LOW 20 MIN: CPT | Performed by: NURSE PRACTITIONER

## 2022-05-04 PROCEDURE — 97112 NEUROMUSCULAR REEDUCATION: CPT | Performed by: PHYSICAL THERAPIST

## 2022-05-04 PROCEDURE — 97110 THERAPEUTIC EXERCISES: CPT | Performed by: PHYSICAL THERAPIST

## 2022-05-04 RX ORDER — AMLODIPINE BESYLATE 5 MG/1
5 TABLET ORAL 2 TIMES DAILY
Qty: 60 TABLET | Refills: 5 | Status: SHIPPED | OUTPATIENT
Start: 2022-05-04 | End: 2022-05-19 | Stop reason: SDUPTHER

## 2022-05-04 NOTE — PROGRESS NOTES
Daily Note and Discharge    Today's date: 2022  Patient name: Abdoul Gallegos  : 1965  MRN: [id number]  Referring provider: Rogelio Tariq PT  Dx:   Encounter Diagnosis     ICD-10-CM    1  Acute exacerbation of chronic low back pain  M54 50     G89 29                   Subjective:  Pt c/o soreness today across low back  Objective: See treatment diary below      Assessment: Tolerated treatment well  Patient demonstrated fatigue post treatment, exhibited good technique with therapeutic exercises and would benefit from continued PT    Added SLR; challenged on L side  Intermittent fatigue reported t/o treatment  Plan: Continue per plan of care      PT D/C FROM PT; RECENTLY UNDERWENT HERNIA SURGERY     Precautions: none      Manuals   5/         STM/TPR L lumbar paraspinals  10 min 10 min 10 min B                                                Neuro Re-Ed  5/4         clamshells nv 5"x10 5"x15 5"x15         Ab brace w/ march nv x20 x20 x20         Ab brace w/ SLR    x10                                                             Ther Ex 4         ppt 5"x20 5"x20 5"x20 5"x20         Piriformis stretch nv 30"x3 30"x3 30"x3                                                                                       Ther Activity                                       Gait Training                                       Modalities

## 2022-05-04 NOTE — PROGRESS NOTES
Assessment/Plan:  Hypertension  Patient being seen today for elevated blood pressure above normal   Patient is on 3 different blood pressure medications on the amlodipine was recently started during hospital visit it has not brought her to within normal limits did advise will increase the amlodipine to 5:00 a m  5:00 p m  Patient is advised to watch for any lower extremity swelling I would like to see her back in the office in 2 weeks for follow-up blood pressure check  She is also to contact me with any other adverse side effects that she may be experience for discussion  Otherwise I will see her back in the office in 2 weeks  Dosing all possible side effects of the prescribed medications or medications that had been prescribed in the past were reviewed and all questions were answered  Patient verbalized agreement and understanding of the plan of care as outlined during the office visit today return to office as indicated or sooner if a problem arises  Problem List Items Addressed This Visit        Cardiovascular and Mediastinum    Primary hypertension - Primary    Relevant Medications    amLODIPine (NORVASC) 5 mg tablet      Other Visit Diagnoses     Chronic hypertension        Relevant Medications    amLODIPine (NORVASC) 5 mg tablet            Subjective:      Patient ID: Regla Busby is a 62 y o  female  Patient being seen today for follow-up blood pressure  Was seen in the emergency department blood pressure was very elevated  A she feels it remains elevated even with the starting of a new blood pressure medication amlodipine 5 mg  She denies any nausea vomiting diarrhea chest pains or shortness of breath        The following portions of the patient's history were reviewed and updated as appropriate: allergies, current medications, past family history, past medical history, past social history, past surgical history and problem list     Review of Systems   Constitutional: Negative for appetite change and fever  HENT: Negative for sinus pressure and sore throat  Eyes: Negative for pain  Respiratory: Negative for shortness of breath  Cardiovascular: Negative for chest pain  Gastrointestinal: Positive for abdominal pain  Genitourinary: Negative for dysuria  Musculoskeletal: Negative for arthralgias and myalgias  Skin: Negative for color change  Neurological: Negative for light-headedness  Psychiatric/Behavioral: Negative for behavioral problems  Objective:      /90 (BP Location: Left arm, Patient Position: Sitting, Cuff Size: Standard)   Pulse 69   Temp 97 5 °F (36 4 °C) (Temporal)   Resp 16   Ht 5' (1 524 m)   Wt 64 9 kg (143 lb)   SpO2 98%   BMI 27 93 kg/m²          Physical Exam  Cardiovascular:      Rate and Rhythm: Normal rate and regular rhythm  Heart sounds: Normal heart sounds  Pulmonary:      Breath sounds: Normal breath sounds  Abdominal:      Tenderness: There is abdominal tenderness (Known hernia of the umbilicus under the care of a general surgeon  )  Neurological:      Mental Status: She is alert

## 2022-05-09 ENCOUNTER — OFFICE VISIT (OUTPATIENT)
Dept: SURGERY | Facility: CLINIC | Age: 57
End: 2022-05-09
Payer: COMMERCIAL

## 2022-05-09 ENCOUNTER — PREP FOR PROCEDURE (OUTPATIENT)
Dept: SURGERY | Facility: CLINIC | Age: 57
End: 2022-05-09

## 2022-05-09 DIAGNOSIS — K42.0 INCARCERATED UMBILICAL HERNIA: Primary | ICD-10-CM

## 2022-05-09 PROCEDURE — 1111F DSCHRG MED/CURRENT MED MERGE: CPT | Performed by: SURGERY

## 2022-05-09 PROCEDURE — 99213 OFFICE O/P EST LOW 20 MIN: CPT | Performed by: SURGERY

## 2022-05-09 NOTE — PROGRESS NOTES
Assessment/Plan:  Patient presents with an incarcerated umbilical hernia  This was noted on imaging  She is unable the work secondary to discomfort  Operative repair is recommended  Risks and benefits described in detail  She meets and nausea without vomiting  There are no diagnoses linked to this encounter  Subjective:      Patient ID: Adelaide Page is a 62 y o  female  Presents for evaluation of umbilical hernia  Was in ED 4/26/22 and 4/29/22 for abdominal pain  CT scan 4/29/2022  Sharp constant pain  Sitting causes pain  Nauseated today  The following portions of the patient's history were reviewed and updated as appropriate:     She  has a past medical history of Fibromyalgia (4/26/2022), History of mammogram (2018), History of tobacco abuse (4/26/2022), Hypertension, and Nausea (4/29/2022)  She  has a past surgical history that includes Appendectomy; Carpal tunnel release; Foot surgery; Neck surgery; and Colonoscopy (2017)  Her family history includes Breast cancer in her paternal aunt; Diabetes in her maternal grandmother; Heart disease in her father and mother; Hypertension in her father and mother; Pancreatic cancer in her paternal uncle  She  reports that she has been smoking cigarettes  She has a 12 50 pack-year smoking history  She has never used smokeless tobacco  She reports current alcohol use  She reports previous drug use    Current Outpatient Medications   Medication Sig Dispense Refill    albuterol (2 5 mg/3 mL) 0 083 % nebulizer solution USE 1 VIAL IN NEBULIZER EVERY 2 HOURS AS NEEDED FOR WHEEZING      albuterol (PROVENTIL HFA,VENTOLIN HFA) 90 mcg/act inhaler INHALE 2 PUFFS BY MOUTH EVERY 4 TO 6 HOURS AS NEEDED FOR WHEEZING AND FOR SHORTNESS OF BREATH 18 g 0    amLODIPine (NORVASC) 5 mg tablet Take 1 tablet (5 mg total) by mouth 2 (two) times a day 60 tablet 5    dicyclomine (BENTYL) 20 mg tablet Take 1 tablet (20 mg total) by mouth 4 (four) times a day (before meals and at bedtime) for 3 days 12 tablet 0    gabapentin (NEURONTIN) 100 mg capsule Take 2 capsules (200 mg total) by mouth 3 (three) times a day 90 capsule 1    lisinopril (ZESTRIL) 40 mg tablet Take 1 tablet (40 mg total) by mouth daily 90 tablet 1     No current facility-administered medications for this visit  She has No Known Allergies       Review of Systems   Constitutional: Negative  Negative for activity change  HENT: Negative  Eyes: Negative  Respiratory: Negative  Cardiovascular: Negative  Gastrointestinal: Positive for abdominal pain and nausea  Endocrine: Negative  Genitourinary: Negative  Musculoskeletal: Negative  Skin: Negative  Allergic/Immunologic: Negative  Neurological: Negative  Psychiatric/Behavioral: Negative for agitation, behavioral problems and confusion  The patient is not nervous/anxious  Objective: There were no vitals taken for this visit  Physical Exam  Constitutional:       Appearance: Normal appearance  She is well-developed  HENT:      Head: Normocephalic and atraumatic  Nose: Nose normal    Eyes:      Extraocular Movements: Extraocular movements intact  Conjunctiva/sclera: Conjunctivae normal    Cardiovascular:      Rate and Rhythm: Normal rate and regular rhythm  Heart sounds: Normal heart sounds  Pulmonary:      Effort: Pulmonary effort is normal       Breath sounds: Normal breath sounds  Abdominal:      General: Abdomen is flat  Hernia: A hernia (Car serrated supraumbilical hernia present  This is not reducible  Tender to palpation ) is present  Musculoskeletal:         General: Normal range of motion  Cervical back: Normal range of motion  Skin:     General: Skin is warm and dry  Neurological:      Mental Status: She is alert and oriented to person, place, and time     Psychiatric:         Mood and Affect: Mood normal

## 2022-05-09 NOTE — H&P (VIEW-ONLY)
Assessment/Plan:  Patient presents with an incarcerated umbilical hernia  This was noted on imaging  She is unable the work secondary to discomfort  Operative repair is recommended  Risks and benefits described in detail  She meets and nausea without vomiting  There are no diagnoses linked to this encounter  Subjective:      Patient ID: Aureliano Brito is a 62 y o  female  Presents for evaluation of umbilical hernia  Was in ED 4/26/22 and 4/29/22 for abdominal pain  CT scan 4/29/2022  Sharp constant pain  Sitting causes pain  Nauseated today  The following portions of the patient's history were reviewed and updated as appropriate:     She  has a past medical history of Fibromyalgia (4/26/2022), History of mammogram (2018), History of tobacco abuse (4/26/2022), Hypertension, and Nausea (4/29/2022)  She  has a past surgical history that includes Appendectomy; Carpal tunnel release; Foot surgery; Neck surgery; and Colonoscopy (2017)  Her family history includes Breast cancer in her paternal aunt; Diabetes in her maternal grandmother; Heart disease in her father and mother; Hypertension in her father and mother; Pancreatic cancer in her paternal uncle  She  reports that she has been smoking cigarettes  She has a 12 50 pack-year smoking history  She has never used smokeless tobacco  She reports current alcohol use  She reports previous drug use    Current Outpatient Medications   Medication Sig Dispense Refill    albuterol (2 5 mg/3 mL) 0 083 % nebulizer solution USE 1 VIAL IN NEBULIZER EVERY 2 HOURS AS NEEDED FOR WHEEZING      albuterol (PROVENTIL HFA,VENTOLIN HFA) 90 mcg/act inhaler INHALE 2 PUFFS BY MOUTH EVERY 4 TO 6 HOURS AS NEEDED FOR WHEEZING AND FOR SHORTNESS OF BREATH 18 g 0    amLODIPine (NORVASC) 5 mg tablet Take 1 tablet (5 mg total) by mouth 2 (two) times a day 60 tablet 5    dicyclomine (BENTYL) 20 mg tablet Take 1 tablet (20 mg total) by mouth 4 (four) times a day (before meals and at bedtime) for 3 days 12 tablet 0    gabapentin (NEURONTIN) 100 mg capsule Take 2 capsules (200 mg total) by mouth 3 (three) times a day 90 capsule 1    lisinopril (ZESTRIL) 40 mg tablet Take 1 tablet (40 mg total) by mouth daily 90 tablet 1     No current facility-administered medications for this visit  She has No Known Allergies       Review of Systems   Constitutional: Negative  Negative for activity change  HENT: Negative  Eyes: Negative  Respiratory: Negative  Cardiovascular: Negative  Gastrointestinal: Positive for abdominal pain and nausea  Endocrine: Negative  Genitourinary: Negative  Musculoskeletal: Negative  Skin: Negative  Allergic/Immunologic: Negative  Neurological: Negative  Psychiatric/Behavioral: Negative for agitation, behavioral problems and confusion  The patient is not nervous/anxious  Objective: There were no vitals taken for this visit  Physical Exam  Constitutional:       Appearance: Normal appearance  She is well-developed  HENT:      Head: Normocephalic and atraumatic  Nose: Nose normal    Eyes:      Extraocular Movements: Extraocular movements intact  Conjunctiva/sclera: Conjunctivae normal    Cardiovascular:      Rate and Rhythm: Normal rate and regular rhythm  Heart sounds: Normal heart sounds  Pulmonary:      Effort: Pulmonary effort is normal       Breath sounds: Normal breath sounds  Abdominal:      General: Abdomen is flat  Hernia: A hernia (Car serrated supraumbilical hernia present  This is not reducible  Tender to palpation ) is present  Musculoskeletal:         General: Normal range of motion  Cervical back: Normal range of motion  Skin:     General: Skin is warm and dry  Neurological:      Mental Status: She is alert and oriented to person, place, and time     Psychiatric:         Mood and Affect: Mood normal

## 2022-05-11 ENCOUNTER — HOSPITAL ENCOUNTER (OUTPATIENT)
Facility: HOSPITAL | Age: 57
Setting detail: OUTPATIENT SURGERY
Discharge: HOME | End: 2022-05-11
Attending: SURGERY | Admitting: SURGERY
Payer: COMMERCIAL

## 2022-05-11 ENCOUNTER — ANESTHESIA (OUTPATIENT)
Dept: PERIOP | Facility: HOSPITAL | Age: 57
End: 2022-05-11
Payer: COMMERCIAL

## 2022-05-11 ENCOUNTER — ANESTHESIA EVENT (OUTPATIENT)
Dept: PERIOP | Facility: HOSPITAL | Age: 57
End: 2022-05-11
Payer: COMMERCIAL

## 2022-05-11 ENCOUNTER — APPOINTMENT (OUTPATIENT)
Dept: PHYSICAL THERAPY | Facility: MEDICAL CENTER | Age: 57
End: 2022-05-11
Payer: COMMERCIAL

## 2022-05-11 VITALS
OXYGEN SATURATION: 94 % | DIASTOLIC BLOOD PRESSURE: 64 MMHG | RESPIRATION RATE: 16 BRPM | WEIGHT: 155 LBS | HEART RATE: 70 BPM | SYSTOLIC BLOOD PRESSURE: 140 MMHG | TEMPERATURE: 97.7 F | HEIGHT: 60 IN | BODY MASS INDEX: 30.43 KG/M2

## 2022-05-11 DIAGNOSIS — K42.0 INCARCERATED UMBILICAL HERNIA: Primary | ICD-10-CM

## 2022-05-11 LAB — GLUCOSE SERPL-MCNC: 145 MG/DL (ref 65–140)

## 2022-05-11 PROCEDURE — 49587 PR REPAIR UMBILICAL HERN,5+Y/O,STRANG: CPT | Performed by: SURGERY

## 2022-05-11 PROCEDURE — C1781 MESH (IMPLANTABLE): HCPCS | Performed by: SURGERY

## 2022-05-11 PROCEDURE — 82948 REAGENT STRIP/BLOOD GLUCOSE: CPT

## 2022-05-11 DEVICE — VENTRALEX ST HERNIA PATCH
Type: IMPLANTABLE DEVICE | Site: ABDOMEN | Status: FUNCTIONAL
Brand: VENTRALEX ST HERNIA PATCH

## 2022-05-11 RX ORDER — BUPIVACAINE HYDROCHLORIDE AND EPINEPHRINE 2.5; 5 MG/ML; UG/ML
INJECTION, SOLUTION EPIDURAL; INFILTRATION; INTRACAUDAL; PERINEURAL AS NEEDED
Status: DISCONTINUED | OUTPATIENT
Start: 2022-05-11 | End: 2022-05-11 | Stop reason: HOSPADM

## 2022-05-11 RX ORDER — FENTANYL CITRATE 50 UG/ML
INJECTION, SOLUTION INTRAMUSCULAR; INTRAVENOUS AS NEEDED
Status: DISCONTINUED | OUTPATIENT
Start: 2022-05-11 | End: 2022-05-11

## 2022-05-11 RX ORDER — LIDOCAINE HYDROCHLORIDE 10 MG/ML
INJECTION, SOLUTION EPIDURAL; INFILTRATION; INTRACAUDAL; PERINEURAL AS NEEDED
Status: DISCONTINUED | OUTPATIENT
Start: 2022-05-11 | End: 2022-05-11

## 2022-05-11 RX ORDER — MIDAZOLAM HYDROCHLORIDE 2 MG/2ML
INJECTION, SOLUTION INTRAMUSCULAR; INTRAVENOUS AS NEEDED
Status: DISCONTINUED | OUTPATIENT
Start: 2022-05-11 | End: 2022-05-11

## 2022-05-11 RX ORDER — DEXAMETHASONE SODIUM PHOSPHATE 10 MG/ML
INJECTION, SOLUTION INTRAMUSCULAR; INTRAVENOUS AS NEEDED
Status: DISCONTINUED | OUTPATIENT
Start: 2022-05-11 | End: 2022-05-11

## 2022-05-11 RX ORDER — ONDANSETRON 2 MG/ML
INJECTION INTRAMUSCULAR; INTRAVENOUS AS NEEDED
Status: DISCONTINUED | OUTPATIENT
Start: 2022-05-11 | End: 2022-05-11

## 2022-05-11 RX ORDER — MAGNESIUM HYDROXIDE 1200 MG/15ML
LIQUID ORAL AS NEEDED
Status: DISCONTINUED | OUTPATIENT
Start: 2022-05-11 | End: 2022-05-11 | Stop reason: HOSPADM

## 2022-05-11 RX ORDER — HYDROMORPHONE HCL/PF 1 MG/ML
0.5 SYRINGE (ML) INJECTION
Status: DISCONTINUED | OUTPATIENT
Start: 2022-05-11 | End: 2022-05-11 | Stop reason: HOSPADM

## 2022-05-11 RX ORDER — FENTANYL CITRATE/PF 50 MCG/ML
25 SYRINGE (ML) INJECTION
Status: DISCONTINUED | OUTPATIENT
Start: 2022-05-11 | End: 2022-05-11 | Stop reason: HOSPADM

## 2022-05-11 RX ORDER — HYDROCODONE BITARTRATE AND ACETAMINOPHEN 5; 325 MG/1; MG/1
1 TABLET ORAL EVERY 6 HOURS PRN
Status: DISCONTINUED | OUTPATIENT
Start: 2022-05-11 | End: 2022-05-11 | Stop reason: HOSPADM

## 2022-05-11 RX ORDER — ONDANSETRON 2 MG/ML
4 INJECTION INTRAMUSCULAR; INTRAVENOUS EVERY 4 HOURS PRN
Status: DISCONTINUED | OUTPATIENT
Start: 2022-05-11 | End: 2022-05-11 | Stop reason: HOSPADM

## 2022-05-11 RX ORDER — HYDROCODONE BITARTRATE AND ACETAMINOPHEN 5; 325 MG/1; MG/1
1 TABLET ORAL EVERY 6 HOURS PRN
Qty: 6 TABLET | Refills: 0 | Status: SHIPPED | OUTPATIENT
Start: 2022-05-11 | End: 2022-05-15

## 2022-05-11 RX ORDER — KETOROLAC TROMETHAMINE 30 MG/ML
INJECTION, SOLUTION INTRAMUSCULAR; INTRAVENOUS AS NEEDED
Status: DISCONTINUED | OUTPATIENT
Start: 2022-05-11 | End: 2022-05-11

## 2022-05-11 RX ORDER — PROMETHAZINE HYDROCHLORIDE 25 MG/ML
12.5 INJECTION, SOLUTION INTRAMUSCULAR; INTRAVENOUS ONCE AS NEEDED
Status: DISCONTINUED | OUTPATIENT
Start: 2022-05-11 | End: 2022-05-11 | Stop reason: HOSPADM

## 2022-05-11 RX ORDER — CEFAZOLIN SODIUM 1 G/3ML
INJECTION, POWDER, FOR SOLUTION INTRAMUSCULAR; INTRAVENOUS AS NEEDED
Status: DISCONTINUED | OUTPATIENT
Start: 2022-05-11 | End: 2022-05-11

## 2022-05-11 RX ORDER — SODIUM CHLORIDE, SODIUM LACTATE, POTASSIUM CHLORIDE, CALCIUM CHLORIDE 600; 310; 30; 20 MG/100ML; MG/100ML; MG/100ML; MG/100ML
125 INJECTION, SOLUTION INTRAVENOUS CONTINUOUS
Status: DISCONTINUED | OUTPATIENT
Start: 2022-05-11 | End: 2022-05-11 | Stop reason: HOSPADM

## 2022-05-11 RX ORDER — ACETAMINOPHEN 325 MG/1
650 TABLET ORAL EVERY 4 HOURS PRN
Status: DISCONTINUED | OUTPATIENT
Start: 2022-05-11 | End: 2022-05-11 | Stop reason: HOSPADM

## 2022-05-11 RX ORDER — SODIUM CHLORIDE, SODIUM LACTATE, POTASSIUM CHLORIDE, CALCIUM CHLORIDE 600; 310; 30; 20 MG/100ML; MG/100ML; MG/100ML; MG/100ML
INJECTION, SOLUTION INTRAVENOUS CONTINUOUS PRN
Status: DISCONTINUED | OUTPATIENT
Start: 2022-05-11 | End: 2022-05-11

## 2022-05-11 RX ORDER — PROPOFOL 10 MG/ML
INJECTION, EMULSION INTRAVENOUS AS NEEDED
Status: DISCONTINUED | OUTPATIENT
Start: 2022-05-11 | End: 2022-05-11

## 2022-05-11 RX ORDER — ONDANSETRON 2 MG/ML
4 INJECTION INTRAMUSCULAR; INTRAVENOUS ONCE AS NEEDED
Status: DISCONTINUED | OUTPATIENT
Start: 2022-05-11 | End: 2022-05-11 | Stop reason: HOSPADM

## 2022-05-11 RX ADMIN — FENTANYL CITRATE 25 MCG: 50 INJECTION, SOLUTION INTRAMUSCULAR; INTRAVENOUS at 11:28

## 2022-05-11 RX ADMIN — MIDAZOLAM 2 MG: 1 INJECTION INTRAMUSCULAR; INTRAVENOUS at 11:11

## 2022-05-11 RX ADMIN — PROPOFOL 150 MG: 10 INJECTION, EMULSION INTRAVENOUS at 11:19

## 2022-05-11 RX ADMIN — ONDANSETRON 4 MG: 2 INJECTION INTRAMUSCULAR; INTRAVENOUS at 11:43

## 2022-05-11 RX ADMIN — SODIUM CHLORIDE, SODIUM LACTATE, POTASSIUM CHLORIDE, AND CALCIUM CHLORIDE 125 ML/HR: .6; .31; .03; .02 INJECTION, SOLUTION INTRAVENOUS at 09:25

## 2022-05-11 RX ADMIN — KETOROLAC TROMETHAMINE 15 MG: 30 INJECTION, SOLUTION INTRAMUSCULAR at 11:30

## 2022-05-11 RX ADMIN — DEXAMETHASONE SODIUM PHOSPHATE 4 MG: 10 INJECTION, SOLUTION INTRAMUSCULAR; INTRAVENOUS at 11:19

## 2022-05-11 RX ADMIN — FENTANYL CITRATE 25 MCG: 50 INJECTION, SOLUTION INTRAMUSCULAR; INTRAVENOUS at 11:40

## 2022-05-11 RX ADMIN — CEFAZOLIN SODIUM 2000 MG: 1 INJECTION, POWDER, FOR SOLUTION INTRAMUSCULAR; INTRAVENOUS at 11:16

## 2022-05-11 RX ADMIN — LIDOCAINE HYDROCHLORIDE 50 MG: 10 INJECTION, SOLUTION EPIDURAL; INFILTRATION; INTRACAUDAL at 11:19

## 2022-05-11 RX ADMIN — SODIUM CHLORIDE, SODIUM LACTATE, POTASSIUM CHLORIDE, AND CALCIUM CHLORIDE: .6; .31; .03; .02 INJECTION, SOLUTION INTRAVENOUS at 11:11

## 2022-05-11 NOTE — ANESTHESIA POSTPROCEDURE EVALUATION
Post-Op Assessment Note    CV Status:  Stable  Pain Score: 0    Pain management: adequate     Mental Status:  Alert and awake   Hydration Status:  Euvolemic   PONV Controlled:  Controlled   Airway Patency:  Patent      Post Op Vitals Reviewed: Yes      Staff: CRNA         No complications documented      BP   122/73   Temp   98 3   Pulse  60   Resp   14   SpO2   99% ra

## 2022-05-11 NOTE — ANESTHESIA PREPROCEDURE EVALUATION
Procedure:  REPAIR HERNIA UMBILICAL (N/A Abdomen)    Relevant Problems   CARDIO   (+) Primary hypertension      ENDO   (+) Diabetes mellitus type 2, controlled (HCC)      MUSCULOSKELETAL   (+) Fibromyalgia      NEURO/PSYCH   (+) Anxiety   (+) Continuous opioid dependence (HCC)   (+) Fibromyalgia      PULMONARY   (+) Mild intermittent asthma without complication        EKG 4 52 88  Normal sinus rhythm  Septal infarct , age undetermined  Abnormal ECG  When compared with ECG of 23-APR-2011 09:15,  QRS axis Shifted right  HR 61      Physical Exam    Airway    Mallampati score: III  TM Distance: >3 FB  Neck ROM: full     Dental       Cardiovascular  Cardiovascular exam normal    Pulmonary  Pulmonary exam normal     Other Findings        Anesthesia Plan  ASA Score- 3     Anesthesia Type- general with ASA Monitors  Additional Monitors:   Airway Plan:           Plan Factors-Exercise tolerance (METS): >4 METS  Chart reviewed  EKG reviewed  Existing labs reviewed  Patient summary reviewed  Patient is a current smoker  Patient instructed to abstain from smoking on day of procedure  Patient did not smoke on day of surgery  Induction- intravenous  Postoperative Plan- Plan for postoperative opioid use  Planned trial extubation    Informed Consent- Anesthetic plan and risks discussed with patient and spouse  I personally reviewed this patient with the CRNA  Discussed and agreed on the Anesthesia Plan with the CRNA  Tyrone Artemio Guevara is a 62 y o  with pertinent history of smoking (currently 1/2 ppd), mild asthma worst with allergies  presenting today for repair umbilical hernia  NPO sinceappropriate  Took albuterol last night for allergies with sinus congestion  (as noted in chart)  Discussed risk and benefits of general which include and are not limited to: MI, stroke, sore throat, PONV, post- op pain    No new symptoms of  CP, SOB, F, chills, N/V, dizziness, numbness or tingling, cough, and other symptoms except as noted  AQA  Consented  History of anesthesia: = no personal issues noted

## 2022-05-11 NOTE — INTERVAL H&P NOTE
H&P reviewed  After examining the patient I find no changes in the patients condition since the H&P had been written      Vitals:    05/11/22 0913   BP: 159/93   Pulse: 64   Resp: 18   Temp: 97 8 °F (36 6 °C)   SpO2: 99%

## 2022-05-11 NOTE — DISCHARGE INSTR - AVS FIRST PAGE
Please call the office when you leave to schedule an appointment for 2 weeks  Please call [phone]                      [address], [address], [facility] [place] [address]  [place] [address] between [facility] and [facility]  Activity:    May lift 10 lb as many times as desired the 1st week,       20 lb in 2 weeks,       30 lb in 3 weeks  Walking is encouraged  Normal daily activities including climbing steps are okay  Do not engage in strenuous activity ( sit-ups or crutches) or contact sports for 4-6 weeks post-operatively    Return to Work:   Okay to return to work when you feel well if you desire  Diet:   You may return to your normal healthy diet  Wound Care: Your wound is closed with dissolvable stitches and glue  It is okay to shower  Wash incision gently with soap and water and pat dry  Do not soak incisions in bath water or swim for two weeks  Do not apply any creams or ointments  Pain Medication:   Please take as directed if needed  May use Advil or Motrin in addition  Recall, the pain medicine and anesthesia is associated with constipation  No driving while taking narcotic pain medications  Other: It is normal to developed a healing ridge / firm incision after surgery  This is your body making scar tissue  It is a good sign  Constipation is very common after general anesthesia  Please use milk of magnesia as needed in order to help prevent constipation  It is normal to get bruising after surgery  If you have questions after discharge please call the office      If you have increased pain, fever >101 5, increased drainage, redness or a bad smell at your surgery site, please call us immediately or come directly to the Emergency Room

## 2022-05-11 NOTE — OP NOTE
OPERATIVE REPORT  PATIENT NAME: Tonita Severs    :  1965  MRN: [id number]  Pt Location: [facility] OR ROOM 07    SURGERY DATE: 2022    Surgeon(s) and Role:     Antoine Carpenter MD - Primary    Preop Diagnosis:  Incarcerated umbilical hernia [W00 4]    Post-Op Diagnosis Codes:     * Incarcerated umbilical hernia [G12 0]    Procedure(s) (LRB):  REPAIR HERNIA UMBILICAL (N/A)  with mesh    Specimen(s):  * No specimens in log *    Estimated Blood Loss:   Minimal    Drains:  * No LDAs found *    Anesthesia Type:   General    Operative Indications:  Incarcerated umbilical hernia [M16 8]    Independent, nonsmoker, ASA 2, wound class 1, BMI 31, weight 155, height 60    (+) Primary hypertension       ENDO   (+) Diabetes mellitus type 2, controlled (HCC)       MUSCULOSKELETAL   (+) Fibromyalgia       NEURO/PSYCH   (+) Anxiety   (+) Continuous opioid dependence (HCC)   (+) Fibromyalgia       PULMONARY   (+) Mild intermittent asthma without complication                     Complications:   None    Procedure and Technique:  Patient was identified visually and via armband  Placed in supine position  After anesthesia the abdomen was prepped and draped in a sterile fashion  0 25% Marcaine with epinephrine was utilized the procedure  Incision was made and carried down through skin subcutaneous tissue  The bili go hernia defect was identified  Hernia sac was dissected free and retracted  Incarcerated omentum was retracted  Polypropylene mesh was then inserted  Fascia was closed with interrupted figure-of-eight 0 Vicryl suture followed by 4 Monocryl suture and Dermabond  Patient tolerated this procedure well  Sponge instrument count correct x2       I was present for the entire procedure    Patient Disposition:  PACU       SIGNATURE: Deepak Prince MD  DATE: May 11, 2022  TIME: 10:46 AM

## 2022-05-19 ENCOUNTER — OFFICE VISIT (OUTPATIENT)
Dept: FAMILY MEDICINE CLINIC | Facility: CLINIC | Age: 57
End: 2022-05-19
Payer: COMMERCIAL

## 2022-05-19 VITALS
BODY MASS INDEX: 27.48 KG/M2 | SYSTOLIC BLOOD PRESSURE: 156 MMHG | RESPIRATION RATE: 16 BRPM | TEMPERATURE: 97.6 F | WEIGHT: 140 LBS | HEIGHT: 60 IN | OXYGEN SATURATION: 99 % | DIASTOLIC BLOOD PRESSURE: 84 MMHG | HEART RATE: 74 BPM

## 2022-05-19 DIAGNOSIS — I10 CHRONIC HYPERTENSION: Primary | ICD-10-CM

## 2022-05-19 PROCEDURE — 3008F BODY MASS INDEX DOCD: CPT | Performed by: NURSE PRACTITIONER

## 2022-05-19 PROCEDURE — 1111F DSCHRG MED/CURRENT MED MERGE: CPT | Performed by: NURSE PRACTITIONER

## 2022-05-19 PROCEDURE — 99213 OFFICE O/P EST LOW 20 MIN: CPT | Performed by: NURSE PRACTITIONER

## 2022-05-19 PROCEDURE — 4010F ACE/ARB THERAPY RXD/TAKEN: CPT | Performed by: NURSE PRACTITIONER

## 2022-05-19 RX ORDER — AMLODIPINE BESYLATE 5 MG/1
5 TABLET ORAL DAILY
Qty: 60 TABLET | Refills: 5 | Status: SHIPPED | OUTPATIENT
Start: 2022-05-19 | End: 2022-07-13 | Stop reason: CLARIF

## 2022-05-19 RX ORDER — TELMISARTAN 80 MG/1
80 TABLET ORAL DAILY
Qty: 30 TABLET | Refills: 0 | Status: SHIPPED | OUTPATIENT
Start: 2022-05-19 | End: 2022-06-20 | Stop reason: SDUPTHER

## 2022-05-19 NOTE — PROGRESS NOTES
Assessment/Plan:    Essential hypertension  Patient's blood pressure remains in the [facility] to [facility] region  Did advise will stop the lisinopril  40 mg and start telmisartan 80 mg to be taken daily in the a m  Kareen Serum She is also advised to only take a single dose of amlodipine 5 mg at night  She is also advised to check her blood pressure at home any readings that fall below 100/70 she is to contact me in the office otherwise would like to see her back in the office in 2 weeks for follow-up blood pressure check  Dosing all possible side effects of the prescribed medications or medications that had been prescribed in the past were reviewed and all questions were answered  Patient verbalized agreement and understanding of the plan of care as outlined during the office visit today return to office as indicated or sooner if a problem arises  Problem List Items Addressed This Visit    None     Visit Diagnoses     Chronic hypertension    -  Primary    Relevant Medications    telmisartan (MICARDIS) 80 MG tablet    amLODIPine (NORVASC) 5 mg tablet            Subjective:      Patient ID: Alicia Valdez is a 62 y o  female  Patient is here for follow-up blood pressure check  Patient recently had some umbilical surgery and is in pain related to this  The patient does state that she is checking her blood pressure at home and it is extremely elevated there as well  Patient had been modified to increase her amlodipine and states that she is having some foot discomfort as related to that  She denies any other symptoms related to her hypertension  The following portions of the patient's history were reviewed and updated as appropriate: allergies, current medications, past family history, past medical history, past social history, past surgical history and problem list     Review of Systems   Constitutional: Negative for appetite change and fever  HENT: Negative for sinus pressure and sore throat      Eyes: Negative for pain  Respiratory: Negative for shortness of breath  Cardiovascular: Negative for chest pain  Gastrointestinal: Negative for abdominal pain  Genitourinary: Negative for dysuria  Musculoskeletal: Negative for arthralgias and myalgias (Bilateral feet know neuropathy)  Skin: Negative for color change  Neurological: Negative for light-headedness  Psychiatric/Behavioral: Negative for behavioral problems  Objective:      /84 (BP Location: Left arm, Patient Position: Sitting, Cuff Size: Standard)   Pulse 74   Temp 97 6 °F (36 4 °C) (Temporal)   Resp 16   Ht 5' (1 524 m)   Wt 63 5 kg (140 lb)   SpO2 99%   BMI 27 34 kg/m²          Physical Exam  Cardiovascular:      Rate and Rhythm: Normal rate and regular rhythm  Pulses: Normal pulses  Heart sounds: Normal heart sounds  Pulmonary:      Breath sounds: Normal breath sounds  Neurological:      Mental Status: She is alert  Mental status is at baseline     Psychiatric:         Mood and Affect: Mood normal          Behavior: Behavior normal

## 2022-05-24 ENCOUNTER — OFFICE VISIT (OUTPATIENT)
Dept: SURGERY | Facility: CLINIC | Age: 57
End: 2022-05-24
Payer: COMMERCIAL

## 2022-05-24 DIAGNOSIS — Z48.89 POSTOPERATIVE VISIT: Primary | ICD-10-CM

## 2022-05-24 PROCEDURE — 99910: CPT | Performed by: SURGERY

## 2022-05-24 PROCEDURE — 99024 POSTOP FOLLOW-UP VISIT: CPT | Performed by: SURGERY

## 2022-05-24 NOTE — PROGRESS NOTES
Assessment/Plan:  Patient is status post umbilical hernia repair  She feels well  She has no complaints  Incision is healing nicely  All questions answered  There are no diagnoses linked to this encounter  Pathology: None sent      Postoperative restrictions reviewed  All questions answered  ______________________________________________________  HPI: Patient presents post operatively  Umbilical hernia repair 8/58/2593  ROS:  General ROS: negative for - chills, fatigue, fever or night sweats, weight loss  Respiratory ROS: no cough, shortness of breath, or wheezing  Cardiovascular ROS: no chest pain or dyspnea on exertion  Genito-Urinary ROS: no dysuria, trouble voiding, or hematuria  Musculoskeletal ROS: negative for - gait disturbance, joint pain or muscle pain  Neurological ROS: no TIA or stroke symptoms  GI ROS: see HPI  Skin ROS: no new rashes or lesions   Lymphatic ROS: no new adenopathy noted by pt  GYN ROS: see HPI, no new GYN history or bleeding noted  Psy ROS: no new mental or behavioral disturbances         Patient Active Problem List   Diagnosis    History of tobacco abuse    Primary hypertension    Fibromyalgia    [facility]    Mild intermittent asthma without complication    Diabetes mellitus type 2, controlled ([facility]    Anxiety    [facility]    Incarcerated umbilical hernia       Allergies:  Patient has no known allergies        Current Outpatient Medications:     albuterol (2 5 mg/3 mL) 0 083 % nebulizer solution, USE 1 VIAL IN NEBULIZER EVERY 2 HOURS AS NEEDED FOR WHEEZING, Disp: , Rfl:     albuterol (PROVENTIL HFA,VENTOLIN HFA) 90 mcg/act inhaler, INHALE 2 PUFFS BY MOUTH EVERY 4 TO 6 HOURS AS NEEDED FOR WHEEZING AND FOR SHORTNESS OF BREATH, Disp: 18 g, Rfl: 0    amLODIPine (NORVASC) 5 mg tablet, Take 1 tablet (5 mg total) by mouth in the morning , Disp: 60 tablet, Rfl: 5    gabapentin (NEURONTIN) 100 mg capsule, Take 2 capsules (200 mg total) by mouth 3 (three) times a day, Disp: 90 capsule, Rfl: 1    telmisartan (MICARDIS) 80 MG tablet, Take 1 tablet (80 mg total) by mouth in the morning , Disp: 30 tablet, Rfl: 0    Past Medical History:   Diagnosis Date    Fibromyalgia 4/26/2022    History of mammogram 2018    History of tobacco abuse 4/26/2022    Hypertension     Nausea 4/29/2022       Past Surgical History:   Procedure Laterality Date    APPENDECTOMY      CARPAL TUNNEL RELEASE      COLONOSCOPY  2017    repeat in 2022   1 [facility]      spine fusion     IL REPAIR UMBILICAL [id number],8+S/B,[id number] N/A 5/11/2022    Procedure: REPAIR HERNIA UMBILICAL;  Surgeon: Nils Maurer MD;  Location: [facility];  Service: General       Family History   Problem Relation Age of Onset    Hypertension Mother     Heart disease Mother     Hypertension Father     Heart disease Father     Diabetes Maternal Grandmother     Breast cancer Paternal Aunt     Pancreatic cancer Paternal Uncle         reports that she has been smoking cigarettes  She has a 12 50 pack-year smoking history  She has never used smokeless tobacco  She reports current alcohol use  She reports previous drug use  PHYSICAL EXAM    There were no vitals taken for this visit      General: normal, cooperative, no distress  Abdominal: soft, nondistended or nontender  Incision: clean, dry, and intact and healing well      Nils Maurer MD    Date: 5/24/2022 Time: 10:21 AM

## 2022-05-25 ENCOUNTER — APPOINTMENT (OUTPATIENT)
Dept: PHYSICAL THERAPY | Facility: MEDICAL CENTER | Age: 57
End: 2022-05-25
Payer: COMMERCIAL

## 2022-06-05 DIAGNOSIS — R73.9 HYPERGLYCEMIA: ICD-10-CM

## 2022-06-05 DIAGNOSIS — M54.40 LOW BACK PAIN WITH SCIATICA, SCIATICA LATERALITY UNSPECIFIED, UNSPECIFIED BACK PAIN LATERALITY, UNSPECIFIED CHRONICITY: ICD-10-CM

## 2022-06-06 RX ORDER — GABAPENTIN 100 MG/1
200 CAPSULE ORAL 3 TIMES DAILY
Qty: 90 CAPSULE | Refills: 0 | Status: SHIPPED | OUTPATIENT
Start: 2022-06-06 | End: 2022-06-20 | Stop reason: SDUPTHER

## 2022-06-08 ENCOUNTER — OFFICE VISIT (OUTPATIENT)
Dept: FAMILY MEDICINE CLINIC | Facility: CLINIC | Age: 57
End: 2022-06-08
Payer: COMMERCIAL

## 2022-06-08 ENCOUNTER — APPOINTMENT (OUTPATIENT)
Dept: RADIOLOGY | Facility: MEDICAL CENTER | Age: 57
End: 2022-06-08
Payer: COMMERCIAL

## 2022-06-08 VITALS
RESPIRATION RATE: 18 BRPM | DIASTOLIC BLOOD PRESSURE: 100 MMHG | HEIGHT: 60 IN | WEIGHT: 140 LBS | SYSTOLIC BLOOD PRESSURE: 140 MMHG | HEART RATE: 79 BPM | BODY MASS INDEX: 27.48 KG/M2 | OXYGEN SATURATION: 98 % | TEMPERATURE: 98.2 F

## 2022-06-08 DIAGNOSIS — G89.29 CHRONIC PAIN OF LEFT KNEE: ICD-10-CM

## 2022-06-08 DIAGNOSIS — J45.41 MODERATE PERSISTENT ASTHMA WITH ACUTE EXACERBATION: ICD-10-CM

## 2022-06-08 DIAGNOSIS — Z72.0 TOBACCO ABUSE: ICD-10-CM

## 2022-06-08 DIAGNOSIS — I10 PRIMARY HYPERTENSION: Primary | ICD-10-CM

## 2022-06-08 DIAGNOSIS — M25.562 CHRONIC PAIN OF LEFT KNEE: ICD-10-CM

## 2022-06-08 PROCEDURE — 99214 OFFICE O/P EST MOD 30 MIN: CPT | Performed by: NURSE PRACTITIONER

## 2022-06-08 PROCEDURE — 73562 X-RAY EXAM OF KNEE 3: CPT

## 2022-06-08 RX ORDER — ALBUTEROL SULFATE 90 UG/1
2 AEROSOL, METERED RESPIRATORY (INHALATION) EVERY 6 HOURS PRN
Qty: 18 G | Refills: 3 | Status: SHIPPED | OUTPATIENT
Start: 2022-06-08

## 2022-06-08 RX ORDER — ATENOLOL 50 MG/1
50 TABLET ORAL 2 TIMES DAILY
Qty: 60 TABLET | Refills: 5 | Status: SHIPPED | OUTPATIENT
Start: 2022-06-08

## 2022-06-08 NOTE — PROGRESS NOTES
Assessment/Plan:  Hypertension  Patient's blood pressure remains out of control 140/100 today patient states that she is not going to take the amlodipine I did advised that we will need to have her on at least to agents patient states that she is taking metoprolol did advise will not be able to increase that level will change her to atenolol 50 mg 2 times daily and continue the telmisartan 80 mg daily would like patient back in the office in 2 weeks for follow-up blood pressure check or contact the office with blood pressure readings and have a one-month follow-up patient states she will come back in 1 month  Did advise her to hold the atenolol for any heart rates less than 55  Asthma  Patient's asthma well controlled even though she continues to be a smoker the risks of this have been discussed with her thoroughly is also been discussed on concerning her blood pressure did refill the albuterol to be used only as needed  Tobacco abuse  Patient continues to be used tobacco did at again  her concerning this that it will make her blood pressure more difficult to control increases her risk for cardiovascular disease for lung disease for renal disease as she states that she is not ready to stop smoking it  Did advise her it could definitely shorten her life with all her comorbidities she states she takes responsibility for the the risk and she understands it     Chronic left knee pain  Patient has a long history of chronic left knee pain not accident related most likely degenerative last x-ray was the 2016 did advise will order another x-ray possibly refer her to Ortho  Will contact her with the time the date for the orthopedic referral   Dosing all possible side effects of the prescribed medications or medications that had been prescribed in the past were reviewed and all questions were answered    Patient verbalized agreement and understanding of the plan of care as outlined during the office visit today return to office as indicated or sooner if a problem arises  Problem List Items Addressed This Visit        Cardiovascular and Mediastinum    Primary hypertension - Primary    Relevant Medications    atenolol (TENORMIN) 50 mg tablet      Other Visit Diagnoses     Moderate persistent asthma with acute exacerbation        Relevant Medications    albuterol (PROVENTIL HFA,VENTOLIN HFA) 90 mcg/act inhaler    Chronic pain of left knee        Relevant Orders    XR knee 3 vw left non injury    Tobacco abuse                Subjective:      Patient ID: Patrizia Hdz is a 62 y o  female  Patient is here for routine follow-up and to check her blood pressure     Recent blood pressure medication changed patient states she is not going to take the amlodipine  She states she feels it makes her legs hurt  Patient also states that she take med put Toprol which is not on her medication list she has any patient to this practice will discuss  She states she did start telmisartan 80 mg and is tolerating it quite well  To review most recent labs  To also discuss current state of health and any new problems that they may be experiencing  The following portions of the patient's history were reviewed and updated as appropriate: allergies, current medications, past family history, past medical history, past social history, past surgical history and problem list     Review of Systems   Constitutional: Negative for appetite change and fever  HENT: Negative for sinus pressure and sore throat  Eyes: Negative for pain  Respiratory: Negative for shortness of breath  Cardiovascular: Negative for chest pain  Gastrointestinal: Negative for abdominal pain  Genitourinary: Negative for dysuria  Musculoskeletal: Positive for arthralgias, gait problem and myalgias  Skin: Negative for color change  Neurological: Negative for light-headedness  Psychiatric/Behavioral: Negative for behavioral problems  Objective:      /100 (BP Location: Left arm, Patient Position: Sitting, Cuff Size: Standard)   Pulse 79   Temp 98 2 °F (36 8 °C) (Temporal)   Resp 18   Ht 5' (1 524 m)   Wt 63 5 kg (140 lb)   SpO2 98%   BMI 27 34 kg/m²          Physical Exam  Vitals and nursing note reviewed  Constitutional:       General: She is not in acute distress  Appearance: She is well-developed  She is not diaphoretic  HENT:      Head: Normocephalic and atraumatic  Mouth/Throat:      Pharynx: Oropharynx is clear  Eyes:      Pupils: Pupils are equal, round, and reactive to light  Cardiovascular:      Rate and Rhythm: Normal rate and regular rhythm  Heart sounds: Normal heart sounds  Pulmonary:      Effort: Pulmonary effort is normal       Breath sounds: Normal breath sounds  Abdominal:      Palpations: Abdomen is soft  Musculoskeletal:         General: Tenderness (Left knee chronic worse going up and down stairs) present  Normal range of motion  Cervical back: Normal range of motion  Skin:     General: Skin is dry  Capillary Refill: Capillary refill takes less than 2 seconds  Neurological:      General: No focal deficit present  Mental Status: She is alert and oriented to person, place, and time  Psychiatric:         Behavior: Behavior normal          Thought Content:  Thought content normal

## 2022-06-16 DIAGNOSIS — M25.562 CHRONIC PAIN OF LEFT KNEE: Primary | ICD-10-CM

## 2022-06-16 DIAGNOSIS — G89.29 CHRONIC PAIN OF LEFT KNEE: Primary | ICD-10-CM

## 2022-06-16 DIAGNOSIS — M17.12 PRIMARY OSTEOARTHRITIS OF LEFT KNEE: ICD-10-CM

## 2022-06-20 DIAGNOSIS — I10 CHRONIC HYPERTENSION: ICD-10-CM

## 2022-06-20 DIAGNOSIS — R73.9 HYPERGLYCEMIA: ICD-10-CM

## 2022-06-20 DIAGNOSIS — M54.40 LOW BACK PAIN WITH SCIATICA, SCIATICA LATERALITY UNSPECIFIED, UNSPECIFIED BACK PAIN LATERALITY, UNSPECIFIED CHRONICITY: ICD-10-CM

## 2022-06-20 PROCEDURE — 4010F ACE/ARB THERAPY RXD/TAKEN: CPT | Performed by: EMERGENCY MEDICINE

## 2022-06-20 RX ORDER — TELMISARTAN 80 MG/1
80 TABLET ORAL DAILY
Qty: 30 TABLET | Refills: 0 | Status: SHIPPED | OUTPATIENT
Start: 2022-06-20 | End: 2022-07-13 | Stop reason: SDUPTHER

## 2022-06-20 RX ORDER — GABAPENTIN 100 MG/1
200 CAPSULE ORAL 3 TIMES DAILY
Qty: 90 CAPSULE | Refills: 0 | Status: SHIPPED | OUTPATIENT
Start: 2022-06-20 | End: 2022-07-07 | Stop reason: SDUPTHER

## 2022-06-25 ENCOUNTER — OFFICE VISIT (OUTPATIENT)
Dept: OBGYN CLINIC | Facility: MEDICAL CENTER | Age: 57
End: 2022-06-25
Payer: COMMERCIAL

## 2022-06-25 VITALS
HEIGHT: 60 IN | DIASTOLIC BLOOD PRESSURE: 91 MMHG | WEIGHT: 144 LBS | BODY MASS INDEX: 28.27 KG/M2 | SYSTOLIC BLOOD PRESSURE: 176 MMHG | HEART RATE: 61 BPM

## 2022-06-25 DIAGNOSIS — G89.29 CHRONIC PAIN OF LEFT KNEE: ICD-10-CM

## 2022-06-25 DIAGNOSIS — M25.562 CHRONIC PAIN OF LEFT KNEE: ICD-10-CM

## 2022-06-25 DIAGNOSIS — M17.12 PRIMARY OSTEOARTHRITIS OF LEFT KNEE: ICD-10-CM

## 2022-06-25 PROCEDURE — 3008F BODY MASS INDEX DOCD: CPT | Performed by: EMERGENCY MEDICINE

## 2022-06-25 PROCEDURE — 20610 DRAIN/INJ JOINT/BURSA W/O US: CPT | Performed by: EMERGENCY MEDICINE

## 2022-06-25 PROCEDURE — 99204 OFFICE O/P NEW MOD 45 MIN: CPT | Performed by: EMERGENCY MEDICINE

## 2022-06-25 RX ORDER — LIDOCAINE HYDROCHLORIDE 10 MG/ML
4 INJECTION, SOLUTION INFILTRATION; PERINEURAL
Status: COMPLETED | OUTPATIENT
Start: 2022-06-25 | End: 2022-06-25

## 2022-06-25 RX ORDER — TRIAMCINOLONE ACETONIDE 40 MG/ML
40 INJECTION, SUSPENSION INTRA-ARTICULAR; INTRAMUSCULAR
Status: COMPLETED | OUTPATIENT
Start: 2022-06-25 | End: 2022-06-25

## 2022-06-25 RX ADMIN — LIDOCAINE HYDROCHLORIDE 4 ML: 10 INJECTION, SOLUTION INFILTRATION; PERINEURAL at 10:12

## 2022-06-25 RX ADMIN — TRIAMCINOLONE ACETONIDE 40 MG: 40 INJECTION, SUSPENSION INTRA-ARTICULAR; INTRAMUSCULAR at 10:12

## 2022-06-25 NOTE — PROGRESS NOTES
Assessment/Plan:    Diagnoses and all orders for this visit:    Chronic pain of left knee  -     Ambulatory Referral to Orthopedic Surgery  -     Large joint arthrocentesis: L knee    Primary osteoarthritis of left knee  -     Ambulatory Referral to Orthopedic Surgery  -     Large joint arthrocentesis: L knee    Worsening chronic condition  Reviewed Xrays  Steroid injection left knee provided today    Patient with chronic neuropathy of the feet with a history of back issues I have recommended she follow-up with her PCP and or specialist she may benefit from further imaging of the lumbar spine as well as EMG    Return if symptoms worsen or fail to improve  Chief Complaint:     Chief Complaint   Patient presents with    Left Knee - Pain    Right Knee - Pain       Subjective:   Patient ID: Tere Guevara is a 62 y o  female  Patient presents for chronic bilateral knee pain and OA history of steroid injections in the past a usually help for proximally 2 years patient requesting a steroid injection today for the left knee  She was previously ordered viscosupplementation years ago however due to insurance reasons she was not able to obtain these      Review of Systems    The following portions of the patient's chart were reviewed and updated as appropriate:    Allergy:  No Known Allergies      Past Medical History:   Diagnosis Date    Fibromyalgia 4/26/2022    History of mammogram 2018    History of tobacco abuse 4/26/2022    Hypertension     Nausea 4/29/2022       Past Surgical History:   Procedure Laterality Date    APPENDECTOMY      CARPAL TUNNEL RELEASE      COLONOSCOPY  2017    repeat in 2022    FOOT SURGERY      NECK SURGERY      spine fusion     LA REPAIR UMBILICAL SQCG,0+Z/Q,BPQYD N/A 5/11/2022    Procedure: REPAIR HERNIA UMBILICAL;  Surgeon: Artem Begum MD;  Location: [facility];  Service: General       Social History     Socioeconomic History    Marital status: /Civil Union Spouse name: Not on file    Number of children: Not on file    Years of education: Not on file    Highest education level: Not on file   Occupational History    Occupation:     Tobacco Use    Smoking status: Current Every Day Smoker     Packs/day: 0 50     Years: 25 00     Pack years: 12 50     Types: Cigarettes    Smokeless tobacco: Never Used    Tobacco comment: per pt, 5 a day - As per [employer] Vaping Use: Never used   Substance and Sexual Activity    Alcohol use: Yes     Comment: occasional     Drug use: Not Currently    Sexual activity: Not Currently   Other Topics Concern    Not on file   Social History Narrative    · Most recent tobacco use screenin2019      · Caffeine intake:   Occasional      · Seat belts used routinely:   Yes      · Smoke alarm in home: Yes      · Has the Patient had a mammogram to screen for breast cancer within 24 months:   Yes      · Please enter the date of the Patient's previous mammogram :  march of last year       As per [facility]      Social Determinants of Health     Financial Resource Strain: Not on file   Food Insecurity: Not on file   Transportation Needs: Not on file   Physical Activity: Not on file   Stress: Not on file   Social Connections: Not on file   Intimate Partner Violence: Not on file   Housing Stability: Not on file       Family History   Problem Relation Age of Onset    Hypertension Mother     Heart disease Mother     Hypertension Father     Heart disease Father     Diabetes Maternal Grandmother     Breast cancer Paternal Aunt     Pancreatic cancer Paternal Uncle        Medications:    Current Outpatient Medications:     albuterol (2 5 mg/3 mL) 0 083 % nebulizer solution, USE 1 VIAL IN NEBULIZER EVERY 2 HOURS AS NEEDED FOR WHEEZING, Disp: , Rfl:     albuterol (PROVENTIL HFA,VENTOLIN HFA) 90 mcg/act inhaler, Inhale 2 puffs every 6 (six) hours as needed for wheezing, Disp: 18 g, Rfl: 3    atenolol (TENORMIN) 50 mg tablet, Take 1 tablet (50 mg total) by mouth 2 (two) times a day, Disp: 60 tablet, Rfl: 5    gabapentin (NEURONTIN) 100 mg capsule, Take 2 capsules (200 mg total) by mouth 3 (three) times a day, Disp: 90 capsule, Rfl: 0    telmisartan (MICARDIS) 80 MG tablet, Take 1 tablet (80 mg total) by mouth daily, Disp: 30 tablet, Rfl: 0    amLODIPine (NORVASC) 5 mg tablet, Take 1 tablet (5 mg total) by mouth in the morning  (Patient not taking: No sig reported), Disp: 60 tablet, Rfl: 5    Patient Active Problem List   Diagnosis    History of tobacco abuse    Primary hypertension    Fibromyalgia    [facility]    Mild intermittent asthma without complication    Diabetes mellitus type 2, controlled ([facility] )    Anxiety    Sjogren's syndrome ([facility] )    Incarcerated umbilical hernia    Postoperative visit       Objective:  BP (!) 176/91   Pulse 61   Ht 5' (1 524 m)   Wt 65 3 kg (144 lb)   BMI 28 12 kg/m²     Left Knee Exam     Other   Erythema: absent            Physical Exam      Neurologic Exam    Large joint arthrocentesis: L knee  Universal Protocol:  Consent: Verbal consent obtained  Risks and benefits: risks, benefits and alternatives were discussed  Consent given by: patient  Time out: Immediately prior to procedure a "time out" was called to verify the correct patient, procedure, equipment, support staff and site/side marked as required    Timeout called at: 6/25/2022 10:09 AM   Patient understanding: patient states understanding of the procedure being performed  Test results: test results available and properly labeled  Site marked: the operative site was marked  Patient identity confirmed: verbally with patient    Supporting Documentation  Indications: pain   Procedure Details  Location: knee - L knee  Preparation: Patient was prepped and draped in the usual sterile fashion  Needle size: 22 G  Ultrasound guidance: no  Approach: anterolateral  Medications administered: 4 mL lidocaine 1 %; 40 mg triamcinolone acetonide 40 mg/mL    Patient tolerance: patient tolerated the procedure well with no immediate complications  Dressing:  Sterile dressing applied          I have personally reviewed pertinent films in PACS and my interpretation is Xrays knee reveal diffuse tricompartmental degenerative changes, no acute findings such as fracture or dislocation or effusion

## 2022-06-25 NOTE — PATIENT INSTRUCTIONS
Steroid Joint Injection   AMBULATORY CARE:   What you need to know about steroid joint injection:  A steroid joint injection is a procedure to inject steroid medicine into a joint  Steroid medicine decreases pain and inflammation  The injection may also contain an anesthetic (numbing medicine) to decrease pain  It may be done to treat conditions such as arthritis, gout, or carpal tunnel syndrome  The injections may be given in your knee, ankle, shoulder, elbow, or wrist  Injections may also be given in your hip, toe, thumb, or finger  How to prepare for steroid joint injection:  Your healthcare provider will talk to you about how to prepare for this procedure  He will tell you what medicines to take or not take on the day of your procedure  You may need to stop taking blood thinners several days before your procedure  What will happen during steroid joint injection:  You may be given local anesthesia to numb the area where the injection will be given  With local anesthesia, you may still feel pressure during the procedure, but you should not feel any pain  Your healthcare provider may use ultrasound or fluoroscopy (a type of x-ray) to guide the needle to the right area  He will then inject the steroid into your joint  A bandage will be placed on the injection site  What will happen after steroid joint injection:  You may have redness, warmth, or sweating in your face and chest right after the steroid injection  Steroids can affect blood sugar levels  If you have diabetes, you should check your blood sugars closely in the first 24 hours after your procedure  Risks of steroid joint injection:  You may get an infection in your joint  The injection may also cause more pain during the first 24 to 36 hours  You may need more than one injection to feel pain relief  The skin near the injection site may be damaged and become discolored or indented  This can happen if the steroid is placed too close to your skin   A tendon near the injection site may rupture or a nerve can be damaged  Contact your healthcare provider if:   You have fever or chills  You have redness or swelling in the injection site  You have more pain than usual in your joint for more than 72 hours  You have questions or concerns about your condition or care  Medicines:   Pain medicine  may be given  Ask how to take this medicine safely  Take your medicine as directed  Contact your healthcare provider if you think your medicine is not helping or if you have side effects  Tell him or her if you are allergic to any medicine  Keep a list of the medicines, vitamins, and herbs you take  Include the amounts, and when and why you take them  Bring the list or the pill bottles to follow-up visits  Carry your medicine list with you in case of an emergency  Self-care:   Leave the bandage on for 8 to 12 hours  Care for your wound as directed  Rest the area  as directed  You may need to decrease weight on certain joints, such as the knee, for a period of time  Ask when you can return to your daily activities  Elevate  your limb where the steroid injection was given  Elevate the limb above the level of your heart as often as you can  This will help decrease swelling and pain  Prop your limb on pillows or blankets to keep it elevated comfortably  Apply ice  on your joint for 15 to 20 minutes every hour or as directed  Use an ice pack, or put crushed ice in a plastic bag  Cover it with a towel  Ice helps prevent tissue damage and decreases swelling and pain  Follow up with your doctor as directed:  Write down your questions so you remember to ask them during your visits  © Copyright [employer] 2022 Information is for End User's use only and may not be sold, redistributed or otherwise used for commercial purposes   All illustrations and images included in CareNotes® are the copyrighted property of A D A M [employer] Inc  or [employer]  The above information is an  only  It is not intended as medical advice for individual conditions or treatments  Talk to your doctor, nurse or pharmacist before following any medical regimen to see if it is safe and effective for you

## 2022-07-07 DIAGNOSIS — M54.40 LOW BACK PAIN WITH SCIATICA, SCIATICA LATERALITY UNSPECIFIED, UNSPECIFIED BACK PAIN LATERALITY, UNSPECIFIED CHRONICITY: ICD-10-CM

## 2022-07-07 DIAGNOSIS — R73.9 HYPERGLYCEMIA: ICD-10-CM

## 2022-07-07 RX ORDER — GABAPENTIN 100 MG/1
200 CAPSULE ORAL 3 TIMES DAILY
Qty: 90 CAPSULE | Refills: 0 | Status: SHIPPED | OUTPATIENT
Start: 2022-07-07 | End: 2022-07-13 | Stop reason: SDUPTHER

## 2022-07-13 ENCOUNTER — OFFICE VISIT (OUTPATIENT)
Dept: FAMILY MEDICINE CLINIC | Facility: CLINIC | Age: 57
End: 2022-07-13
Payer: COMMERCIAL

## 2022-07-13 VITALS
DIASTOLIC BLOOD PRESSURE: 88 MMHG | TEMPERATURE: 98.5 F | BODY MASS INDEX: 28.47 KG/M2 | HEART RATE: 60 BPM | SYSTOLIC BLOOD PRESSURE: 128 MMHG | WEIGHT: 145 LBS | HEIGHT: 60 IN | OXYGEN SATURATION: 99 % | RESPIRATION RATE: 16 BRPM

## 2022-07-13 DIAGNOSIS — M54.40 LOW BACK PAIN WITH SCIATICA, SCIATICA LATERALITY UNSPECIFIED, UNSPECIFIED BACK PAIN LATERALITY, UNSPECIFIED CHRONICITY: ICD-10-CM

## 2022-07-13 DIAGNOSIS — Z72.0 TOBACCO ABUSE: ICD-10-CM

## 2022-07-13 DIAGNOSIS — I10 CHRONIC HYPERTENSION: Primary | ICD-10-CM

## 2022-07-13 DIAGNOSIS — G62.9 NEUROPATHY: ICD-10-CM

## 2022-07-13 DIAGNOSIS — Z13.220 SCREENING FOR HYPERLIPIDEMIA: ICD-10-CM

## 2022-07-13 DIAGNOSIS — E11.9 TYPE II DIABETES MELLITUS, WELL CONTROLLED (HCC): ICD-10-CM

## 2022-07-13 PROCEDURE — 3079F DIAST BP 80-89 MM HG: CPT | Performed by: NURSE PRACTITIONER

## 2022-07-13 PROCEDURE — 4010F ACE/ARB THERAPY RXD/TAKEN: CPT | Performed by: NURSE PRACTITIONER

## 2022-07-13 PROCEDURE — 3074F SYST BP LT 130 MM HG: CPT | Performed by: NURSE PRACTITIONER

## 2022-07-13 PROCEDURE — 99214 OFFICE O/P EST MOD 30 MIN: CPT | Performed by: NURSE PRACTITIONER

## 2022-07-13 RX ORDER — TELMISARTAN 80 MG/1
80 TABLET ORAL DAILY
Qty: 90 TABLET | Refills: 1 | Status: SHIPPED | OUTPATIENT
Start: 2022-07-13

## 2022-07-13 RX ORDER — GABAPENTIN 300 MG/1
300 CAPSULE ORAL 3 TIMES DAILY PRN
Qty: 180 CAPSULE | Refills: 1 | Status: SHIPPED | OUTPATIENT
Start: 2022-07-13 | End: 2022-10-27 | Stop reason: SDUPTHER

## 2022-07-13 NOTE — PROGRESS NOTES
Assessment/Plan:  Hypertension  Stable  Patient does take the myocardia S 80 mg daily well tolerated is controlling her blood pressure very well will refill for 6 months  Neuropathy  Patient's neuro neuropathy is getting worse did advise I would increase the Neurontin to 300 mg t i d  As needed  Would also like to get her appointment with podiatry to follow up as she does have diabetes  Diabetes  A1c appears to be stable has significantly decreased on primarily with diet management with still a good appointment with Podiatry as patient is a chronic smoker and neuropathy is a complication compounded by smoking  Tobacco abuse  On tobacco counseling was given patient states she does not want stop smoking at this time all the risks versus benefits were discussed with risk ultimately being early death and complications such as diabetes to be worse and hypertension to be worse patient verbalized that she except all risks  Routine labs were placed she is advised complete those at her earliest convenience prior to her next 6 month follow-up on medication will be filled on as needed basis  All questions answered she is very happy be part of this practice  Dosing all possible   side effects of the prescribed medications or medications that had been prescribed in the past were reviewed and all questions were answered  Patient verbalized agreement and understanding of the plan of care as outlined during the office visit today return to office as indicated or sooner if a problem arises           Problem List Items Addressed This Visit    None     Visit Diagnoses     Chronic hypertension    -  Primary    Relevant Medications    telmisartan (MICARDIS) 80 MG tablet    Other Relevant Orders    CBC and differential    Comprehensive metabolic panel    Lipid panel    TSH, 3rd generation    UA w Reflex to Microscopic w Reflex to Culture    Low back pain with sciatica, sciatica laterality unspecified, unspecified back pain laterality, unspecified chronicity        Relevant Medications    gabapentin (NEURONTIN) 300 mg capsule    Type II diabetes mellitus, well controlled (HCC)        Relevant Medications    gabapentin (NEURONTIN) 300 mg capsule    Other Relevant Orders    Ambulatory Referral to Podiatry    CBC and differential    Comprehensive metabolic panel    UA w Reflex to Microscopic w Reflex to Culture    Hemoglobin A1C    Neuropathy        Relevant Medications    gabapentin (NEURONTIN) 300 mg capsule    Other Relevant Orders    Hemoglobin A1C    Screening for hyperlipidemia        Relevant Orders    Lipid panel    Tobacco abuse                Subjective:      Patient ID: Silvano Mckeon is a 62 y o  female  Patient is here for routine follow-up for blood pressure check  To review most recent labs  To also discuss current state of health and any new problems that they may be experiencing  Patient states that medications taken as prescribed and very well tolerated no new complaints at this time  The following portions of the patient's history were reviewed and updated as appropriate: allergies, current medications, past family history, past medical history, past social history, past surgical history and problem list     Review of Systems   Constitutional: Negative for appetite change and fever  HENT: Negative for sinus pressure and sore throat  Eyes: Negative for pain  Respiratory: Negative for shortness of breath  Cardiovascular: Negative for chest pain  Gastrointestinal: Negative for abdominal pain  Genitourinary: Negative for dysuria  Musculoskeletal: Positive for arthralgias, gait problem and myalgias  Skin: Negative for color change  Neurological: Negative for light-headedness  Psychiatric/Behavioral: Negative for behavioral problems           Objective:      /88 (BP Location: Left arm, Patient Position: Sitting, Cuff Size: Standard)   Pulse 60   Temp 98 5 °F (36 9 °C) (Temporal)   Resp 16   Ht 5' (1 524 m)   Wt 65 8 kg (145 lb)   SpO2 99%   BMI 28 32 kg/m²          Physical Exam  Vitals and nursing note reviewed  Constitutional:       General: She is not in acute distress  Appearance: She is well-developed  She is not diaphoretic  HENT:      Head: Normocephalic and atraumatic  Right Ear: External ear normal       Left Ear: External ear normal    Eyes:      Pupils: Pupils are equal, round, and reactive to light  Cardiovascular:      Rate and Rhythm: Normal rate and regular rhythm  Heart sounds: Normal heart sounds  Pulmonary:      Effort: Pulmonary effort is normal       Breath sounds: Normal breath sounds  Abdominal:      Palpations: Abdomen is soft  Musculoskeletal:         General: Normal range of motion  Cervical back: Normal range of motion and neck supple  Skin:     General: Skin is dry  Neurological:      Mental Status: She is alert and oriented to person, place, and time  Psychiatric:         Behavior: Behavior normal          Thought Content:  Thought content normal

## 2022-08-11 ENCOUNTER — TELEPHONE (OUTPATIENT)
Dept: OBGYN CLINIC | Facility: HOSPITAL | Age: 57
End: 2022-08-11

## 2022-08-11 NOTE — TELEPHONE ENCOUNTER
I received a Care Request from patient to schedule for:  Where Does it Hurt? Back feet  Are you considering joint replacement? No   Are you seeking a second opinion? No  If yes, who is your doctor? I lvm to cb to schedule

## 2022-08-31 ENCOUNTER — OFFICE VISIT (OUTPATIENT)
Dept: OBGYN CLINIC | Facility: MEDICAL CENTER | Age: 57
End: 2022-08-31
Payer: COMMERCIAL

## 2022-08-31 ENCOUNTER — APPOINTMENT (OUTPATIENT)
Dept: RADIOLOGY | Facility: MEDICAL CENTER | Age: 57
End: 2022-08-31
Payer: COMMERCIAL

## 2022-08-31 VITALS
HEART RATE: 70 BPM | DIASTOLIC BLOOD PRESSURE: 99 MMHG | BODY MASS INDEX: 30.04 KG/M2 | SYSTOLIC BLOOD PRESSURE: 187 MMHG | HEIGHT: 60 IN | WEIGHT: 153 LBS

## 2022-08-31 DIAGNOSIS — G89.29 CHRONIC BILATERAL LOW BACK PAIN WITH BILATERAL SCIATICA: ICD-10-CM

## 2022-08-31 DIAGNOSIS — M54.42 CHRONIC BILATERAL LOW BACK PAIN WITH BILATERAL SCIATICA: ICD-10-CM

## 2022-08-31 DIAGNOSIS — R20.2 PARESTHESIA OF BOTH FEET: ICD-10-CM

## 2022-08-31 DIAGNOSIS — G89.29 CHRONIC BILATERAL LOW BACK PAIN WITHOUT SCIATICA: Primary | ICD-10-CM

## 2022-08-31 DIAGNOSIS — M54.41 CHRONIC BILATERAL LOW BACK PAIN WITH BILATERAL SCIATICA: ICD-10-CM

## 2022-08-31 DIAGNOSIS — M54.50 CHRONIC BILATERAL LOW BACK PAIN WITHOUT SCIATICA: Primary | ICD-10-CM

## 2022-08-31 PROCEDURE — 99214 OFFICE O/P EST MOD 30 MIN: CPT | Performed by: PHYSICAL MEDICINE & REHABILITATION

## 2022-08-31 PROCEDURE — 3077F SYST BP >= 140 MM HG: CPT | Performed by: PHYSICAL MEDICINE & REHABILITATION

## 2022-08-31 PROCEDURE — 72100 X-RAY EXAM L-S SPINE 2/3 VWS: CPT

## 2022-08-31 PROCEDURE — 3080F DIAST BP >= 90 MM HG: CPT | Performed by: PHYSICAL MEDICINE & REHABILITATION

## 2022-08-31 NOTE — PATIENT INSTRUCTIONS
You will be starting physical therapy  It is important to do home exercises as given by your physical therapist as you go through PT to speed up your recovery  Physical therapy addresses the problems that are causing your pain, instead of covering them up, as some other treatment options do  Room temperature/warm soaks with epsom salt can help with muscle tightness/cramping  Epsom salt releases magnesium which can be helpful  Over-the-counter salonpas patches can be applied to the area of discomfort to help with pain  There are two types of patches; one contains lidocaine 4% and the other contains menthol (and sometimes camphor and methyl salicylate)  You can try one or the other and see which one works best for you  You can obtain diclofenac cream/gel/ointment over the counter  Many brands make this medication and they include Voltaren, Aspercreme and Aleve  You can use this up to 3 times per day  It is best to wash the area with water and then pat dry  The cream absorbs better after this  Be careful not to let any pets or children get in contact with the medication as it can be harmful to them      If you develop a skin reaction, stop using the cream

## 2022-08-31 NOTE — PROGRESS NOTES
1  Chronic bilateral low back pain without sciatica  XR spine lumbar 2 or 3 views injury    Ambulatory referral to Physical Therapy   2  Paresthesia of both feet       Orders Placed This Encounter   Procedures    XR spine lumbar 2 or 3 views injury    Ambulatory referral to Physical Therapy        Impression:  Lumbar pain that is multifactorial and predominantly due to low-grade spondylolisthesis and sacroiliac joint dysfunction  There are no concerning neurological deficits  Patient also reports paresthesias in a stocking distribution in both of her feet  It is possible that this is due to a peripheral neuropathy and less likely due to a lumbosacral radiculopathy  Her diabetes is well controlled  She was seen by an outside podiatrist     We discussed different treatment options and decided to proceed with OTC topical diclofenac and OTC menthol or lidocaine patches  We will have the patient restart physical therapy  Can try more modalities as the patient has done well with this in the past     I will see her back in four weeks to reassess  If she continued to be symptomatic, could consider nerve conduction study/EMG versus lumbar spine MRI  Return in about 4 weeks (around 9/28/2022)  Patient is in agreement with the above plan  Imaging Studies (I personally reviewed images in PACS and report if it was available):  Lumbar spine x-rays that are most recent to this encounter were reviewed  These images show grade 1 anterolisthesis of L4 on L5  Disc spaces are preserved  No acute osseous abnormalities  HPI:  Allison Chavez is a 62 y o  female  who presents for evaluation of   Chief Complaint   Patient presents with    Back Pain     Had a accident in 2006  Also has pain in both her feet  Mostly lower back and middle back area  Onset/Mechanism:  Chronic pain for many years that worsened over the past six months  Location:  Low back and midback  Radiation:  Both of her feet are burning  Toes are numb  Quality: Stabbing and burning  Provocative: As above  Severity:  10 out of 10 at its worst and currently an 8/10  Associated Symptoms: As above  Treatment so far: No recent treatment  No red flag symptoms including fever/chills, unintentional weight change, bowel/bladder incontinence, scissoring gait, personal/family history of cancer, pain worse at night, intravenous drug abuse or trauma  Following history reviewed and updated:  Past Medical History:   Diagnosis Date    Fibromyalgia 4/26/2022    History of mammogram 2018    History of tobacco abuse 4/26/2022    Hypertension     Nausea 4/29/2022     Past Surgical History:   Procedure Laterality Date    APPENDECTOMY      CARPAL TUNNEL RELEASE      COLONOSCOPY  2017    repeat in 2022   [address] NECK SURGERY      spine fusion     CA REPAIR UMBILICAL COWX,6+Z/U,UQUVF N/A 5/11/2022    Procedure: REPAIR HERNIA UMBILICAL;  Surgeon: Aidee Damico MD;  Location: [facility];  Service: General     Social History   Social History     Substance and Sexual Activity   Alcohol Use Yes    Comment: occasional      Social History     Substance and Sexual Activity   Drug Use Not Currently     Social History     Tobacco Use   Smoking Status Current Every Day Smoker    Packs/day: 0 50    Years: 25 00    Pack years: 12 50    Types: Cigarettes   Smokeless Tobacco Never Used   Tobacco Comment    per pt, 5 a day - As per Ramiro Mcgowan      Family History   Problem Relation Age of Onset    Hypertension Mother     Heart disease Mother     Hypertension Father     Heart disease Father     Diabetes Maternal Grandmother     Breast cancer Paternal Aunt     Pancreatic cancer Paternal Uncle      No Known Allergies     Constitutional:  BP (!) 187/99   Pulse 70   Ht 5' (1 524 m)   Wt 69 4 kg (153 lb)   BMI 29 88 kg/m²    General: NAD  Eyes: Anicteric sclerae  Neck: Supple  Lungs: Unlabored breathing    Cardiovascular: No lower extremity edema   Skin: Intact without erythema  Neurologic: Sensation intact to light touch  Psychiatric: Mood and affect are appropriate  Orthopedic Examination   Inspection: No obvious deformities, lesions or rashes   ROM: Within normal limits   Palpation:  Tender to palpation along the bilateral SI joints and piriformis musculature  There are no step offs   Neuro: Bilateral extremity strength is normal and symmetric  No muscle atrophy or abnormal tone  Bilateral extremity muscle stretch reflexes are physiologic and symmetric   No myelopathic signs  Sensation to light touch is intact throughout    Gait is normal     Procedures

## 2022-09-14 ENCOUNTER — EVALUATION (OUTPATIENT)
Dept: PHYSICAL THERAPY | Facility: MEDICAL CENTER | Age: 57
End: 2022-09-14
Payer: COMMERCIAL

## 2022-09-14 DIAGNOSIS — G89.29 CHRONIC BILATERAL LOW BACK PAIN WITHOUT SCIATICA: ICD-10-CM

## 2022-09-14 DIAGNOSIS — M54.50 CHRONIC BILATERAL LOW BACK PAIN WITHOUT SCIATICA: ICD-10-CM

## 2022-09-14 PROCEDURE — 97162 PT EVAL MOD COMPLEX 30 MIN: CPT | Performed by: PHYSICAL THERAPIST

## 2022-09-14 PROCEDURE — 97112 NEUROMUSCULAR REEDUCATION: CPT | Performed by: PHYSICAL THERAPIST

## 2022-09-14 NOTE — PROGRESS NOTES
PT Evaluation     Today's date: 2022  Patient name: Mauricio Carter  : 1965  MRN: [id number]  Referring provider: Yovanny Mejía DO  Dx:   Encounter Diagnosis     ICD-10-CM    1  Chronic bilateral low back pain without sciatica  M54 50 Ambulatory referral to Physical Therapy    G89 29                   Assessment  Assessment details: Mauricio Carter is a 62 y o  female who presents with Chronic bilateral low back pain without sciatica  Patient presents alert and oriented with the above impairments  Sp Blind will benefit from PT to addres deficits in order to maximize and return to prior level of function  No further referral appears necessary at this time based upon examination results  Prognosis is good given HEP compliance  Please contact me if you have any questions or recommendations  Pt only able to attend PT 1x/week due to work schedule  She Plans on following up w/ ortho after only 1 PT visit stating she wants to discuss medication options  Impairments: abnormal or restricted ROM, abnormal movement, activity intolerance, impaired physical strength, lacks appropriate home exercise program, pain with function and weight-bearing intolerance  Understanding of Dx/Px/POC: fair   Prognosis: fair    Goals  Short Term Goals:   1  Pain decreased 2 ratings in 4 weeks  2  ROM increased 10* in 4 weeks  3  Strength increased 1/2 grade in 4 weeks    Long Term Goals:   1  ADLS/IADLS in related activities improved to maximal level in 8 weeks  2  Work performance improved to maximal level in 8 weeks  3  Walking is improved to maximal level in 8 weeks  4   [place] with HEP in 8 weeks      Plan  Patient would benefit from: skilled physical therapy  Planned modality interventions: cryotherapy  Planned therapy interventions: therapeutic exercise, stretching, strengthening, patient education, neuromuscular re-education, manual therapy, functional ROM exercises, abdominal trunk stabilization, flexibility and home exercise program  Frequency: 1x week  Duration in weeks: 8  Treatment plan discussed with: patient        Subjective Evaluation    History of Present Illness  Mechanism of injury: Pt reports chronic back pain that has been present for over 10 years  Pain has progressively worsened; initially she reported radicular symptoms in LLE, however, she now reports bilateral symptoms  She recently saw ortho and was referred to PT  She was advised to use OTC topical diclofenac and OTC lidocaine patches which she states do not provide any relief  She presents today w/ reports of constant pain across low back  She also reports constant pain In bilateral LEs w/ stocking parasthesia that is also constant  Back pain and foot pain is most severe w/ standing  She is employed as  and does stand for several hours on some days  She is also significantly limited w/ household chores such as deeper cleaning  She also c/o sleep disturbance  She does ambulate w/ SPC when outside the house, but does not use AD inside the home    Pain  At best pain ratin  At worst pain rating: 10    Patient Goals  Patient goals for therapy: decreased pain, increased motion, increased strength, independence with ADLs/IADLs and return to work          Objective     Palpation     Additional Palpation Details  Tenderness over QL and glute med bilaterally    Active Range of Motion     Lumbar   Flexion: 55 degrees     Strength/Myotome Testing     Left Hip   Planes of Motion   Flexion: 4-  Extension: 4  Abduction: 4+    Right Hip   Planes of Motion   Flexion: 4-  Extension: 4  Abduction: 4+    Left Knee   Extension: 5    Right Knee   Extension: 5      Flowsheet Rows    Flowsheet Row Most Recent Value   PT/OT G-Codes    Current Score 42   Projected Score 54   FOTO information reviewed Yes   Assessment Type Evaluation   G code set Other PT/OT Primary   Other PT Primary Current Status () CK   Other PT Primary Goal Status () CJ Precautions: DM, fibromyalgia      Manuals 9/14            STM B QL, glute med nv                                                   Neuro Re-Ed 9/14            ppt 5"x10            clamshells 5"x20            Ab brace w/ march nv            Ab brace w/ SLR nv                                                   Ther Ex 9/14            Piriformis stretch nv            Hamstring stretch nv                                                                                          Ther Activity                                       Gait Training                                       Modalities

## 2022-09-21 ENCOUNTER — OFFICE VISIT (OUTPATIENT)
Dept: PHYSICAL THERAPY | Facility: MEDICAL CENTER | Age: 57
End: 2022-09-21
Payer: COMMERCIAL

## 2022-09-21 DIAGNOSIS — G89.29 CHRONIC BILATERAL LOW BACK PAIN WITHOUT SCIATICA: Primary | ICD-10-CM

## 2022-09-21 DIAGNOSIS — M54.50 CHRONIC BILATERAL LOW BACK PAIN WITHOUT SCIATICA: Primary | ICD-10-CM

## 2022-09-21 PROCEDURE — 97110 THERAPEUTIC EXERCISES: CPT

## 2022-09-21 PROCEDURE — 97140 MANUAL THERAPY 1/> REGIONS: CPT

## 2022-09-21 PROCEDURE — 97112 NEUROMUSCULAR REEDUCATION: CPT

## 2022-09-21 NOTE — PROGRESS NOTES
Daily Note     Today's date: 2022  Patient name: Hernando Culver  : 1965  MRN: [id number]  Referring provider: Kolton Pollard DO  Dx:   Encounter Diagnosis     ICD-10-CM    1  Chronic bilateral low back pain without sciatica  M54 50     G89 29        Start Time: 1154  Stop Time: 1232  Total time in clinic (min): 38 minutes    Subjective: Pt rates pain as a 9/10 today  She states pain does improve with HEP  Objective: See treatment diary below      Assessment: Tolerated treatment well  Tenderness noted over left QL this session  Pt notes improvement in symptoms post manual therapy this session  Patient demonstrated fatigue post treatment and would benefit from continued PT      Plan: Continue per plan of care  Progress treatment as tolerated         Precautions: DM, fibromyalgia      Manuals 2022             STM B QL, glute med nv CC                                                  Neuro Re-Ed             ppt 5"x10 5" 10x           clamshells 5"x20 5" 20x           Ab brace w/ march nv 2" 10x BL           Ab brace w/ SLR nv 2" 10x BL                                                  Ther Ex             Piriformis stretch nv 30" 3x BL           Hamstring stretch nv 30" 3x BL                                                                                         Ther Activity                                       Gait Training                                       Modalities

## 2022-09-28 ENCOUNTER — OFFICE VISIT (OUTPATIENT)
Dept: PHYSICAL THERAPY | Facility: MEDICAL CENTER | Age: 57
End: 2022-09-28
Payer: COMMERCIAL

## 2022-09-28 DIAGNOSIS — G89.29 CHRONIC BILATERAL LOW BACK PAIN WITHOUT SCIATICA: Primary | ICD-10-CM

## 2022-09-28 DIAGNOSIS — M54.50 CHRONIC BILATERAL LOW BACK PAIN WITHOUT SCIATICA: Primary | ICD-10-CM

## 2022-09-28 PROCEDURE — 97110 THERAPEUTIC EXERCISES: CPT | Performed by: PHYSICAL THERAPIST

## 2022-09-28 PROCEDURE — 97112 NEUROMUSCULAR REEDUCATION: CPT | Performed by: PHYSICAL THERAPIST

## 2022-09-28 PROCEDURE — 97140 MANUAL THERAPY 1/> REGIONS: CPT | Performed by: PHYSICAL THERAPIST

## 2022-09-28 NOTE — PROGRESS NOTES
Daily Note     Today's date: 2022  Patient name: Azalia Peter  : 1965  MRN: [id number]  Referring provider: Kp Holbrook DO  Dx:   Encounter Diagnosis     ICD-10-CM    1  Chronic bilateral low back pain without sciatica  M54 50     G89 29                   Subjective:  Pt reports improvement since initiating PT  HEP does help to decrease pain  Has not used SPC for 1 5 weeks  Objective: See treatment diary below      Assessment: Tolerated treatment well    Patient demonstrated fatigue post treatment and would benefit from continued PT   Palpable tightness present over B glute med and R QL today  Increased reps as charted w/ good tolerance  Plan: Continue per plan of care  Progress treatment as tolerated         Precautions: DM, fibromyalgia      Manuals 2022          STM B QL, glute med nv CC 15 min                                                 Neuro Re-Ed           ppt 5"x10 5" 10x 5"x20          clamshells 5"x20 5" 20x 5"X20          Ab brace w/ march nv 2" 10x BL 2x10          Ab brace w/ SLR nv 2" 10x BL 2x10                                                 Ther Ex           Piriformis stretch nv 30" 3x BL 30"X3          Hamstring stretch nv 30" 3x BL 30"X3                                                                                        Ther Activity                                       Gait Training                                       Modalities

## 2022-09-29 DIAGNOSIS — J45.41 MODERATE PERSISTENT ASTHMA WITH ACUTE EXACERBATION: ICD-10-CM

## 2022-09-29 RX ORDER — ALBUTEROL SULFATE 90 UG/1
2 AEROSOL, METERED RESPIRATORY (INHALATION) EVERY 6 HOURS PRN
Qty: 18 G | Refills: 0 | Status: SHIPPED | OUTPATIENT
Start: 2022-09-29 | End: 2022-10-16 | Stop reason: SDUPTHER

## 2022-09-29 RX ORDER — ALBUTEROL SULFATE 90 UG/1
2 AEROSOL, METERED RESPIRATORY (INHALATION) EVERY 6 HOURS PRN
Qty: 18 G | Refills: 0 | Status: SHIPPED | OUTPATIENT
Start: 2022-09-29 | End: 2022-09-29 | Stop reason: SDUPTHER

## 2022-09-29 NOTE — TELEPHONE ENCOUNTER
From: Ayse Kapoor  To: Office of Beth Perez MD  Sent: 9/29/2022 8:36 AM EDT  Subject: Medication Renewal Request    Refills have been requested for the following medications:    Other - Breo    Preferred pharmacy: [facility] [address] [place] [address]      Medication renewals requested in this message routed separately:     albuterol (PROVENTIL HFA,VENTOLIN HFA) 90 mcg/act inhaler [Analia Goff]   Patient Comment: Using my inhaler more

## 2022-10-05 ENCOUNTER — OFFICE VISIT (OUTPATIENT)
Dept: PHYSICAL THERAPY | Facility: MEDICAL CENTER | Age: 57
End: 2022-10-05
Payer: COMMERCIAL

## 2022-10-05 DIAGNOSIS — M54.50 CHRONIC BILATERAL LOW BACK PAIN WITHOUT SCIATICA: Primary | ICD-10-CM

## 2022-10-05 DIAGNOSIS — G89.29 CHRONIC BILATERAL LOW BACK PAIN WITHOUT SCIATICA: Primary | ICD-10-CM

## 2022-10-05 PROCEDURE — 97112 NEUROMUSCULAR REEDUCATION: CPT | Performed by: PHYSICAL THERAPIST

## 2022-10-05 PROCEDURE — 97140 MANUAL THERAPY 1/> REGIONS: CPT | Performed by: PHYSICAL THERAPIST

## 2022-10-05 PROCEDURE — 97110 THERAPEUTIC EXERCISES: CPT | Performed by: PHYSICAL THERAPIST

## 2022-10-05 NOTE — PROGRESS NOTES
Daily Note     Today's date: 10/5/2022  Patient name: Vick Madison  : 1965  MRN: [id number]  Referring provider: Lore Adan DO  Dx:   Encounter Diagnosis     ICD-10-CM    1  Chronic bilateral low back pain without sciatica  M54 50     G89 29                   Subjective:  Pt reports continued improvement  Continues to be able to ambulate w/ AD  Some R post thigh soreness yesterday and today      Objective: See treatment diary below      Assessment: Tolerated treatment well    Patient demonstrated fatigue post treatment and would benefit from continued PT   No issues reported w/ ex program   Mild restrictions noted over R glute med and piriformis  Plan: Continue per plan of care  Progress treatment as tolerated         Precautions: DM, fibromyalgia      Manuals 9/14 10/5/2022   9/28 10/5         STM B QL, glute med nv CC 15 min 15 min                                                Neuro Re-Ed 9/14  9/28 10/5         ppt 5"x10 5" 10x 5"x20 5"x20         clamshells 5"x20 5" 20x 5"X20 5"x20         Ab brace w/ march nv 2" 10x BL 2x10 2x10         Ab brace w/ SLR nv 2" 10x BL 2x10 x20                                                Ther Ex 9/14  9/28 10/5         Piriformis stretch nv 30" 3x BL 30"X3 30"X3         Hamstring stretch nv 30" 3x BL 30"X3 30"x3                                                                                       Ther Activity                                       Gait Training                                       Modalities

## 2022-10-12 ENCOUNTER — OFFICE VISIT (OUTPATIENT)
Dept: PHYSICAL THERAPY | Facility: MEDICAL CENTER | Age: 57
End: 2022-10-12
Payer: COMMERCIAL

## 2022-10-12 ENCOUNTER — OFFICE VISIT (OUTPATIENT)
Dept: OBGYN CLINIC | Facility: MEDICAL CENTER | Age: 57
End: 2022-10-12
Payer: COMMERCIAL

## 2022-10-12 VITALS
HEART RATE: 66 BPM | DIASTOLIC BLOOD PRESSURE: 94 MMHG | WEIGHT: 158 LBS | HEIGHT: 60 IN | SYSTOLIC BLOOD PRESSURE: 186 MMHG | BODY MASS INDEX: 31.02 KG/M2

## 2022-10-12 DIAGNOSIS — M54.50 CHRONIC BILATERAL LOW BACK PAIN WITHOUT SCIATICA: Primary | ICD-10-CM

## 2022-10-12 DIAGNOSIS — G89.29 CHRONIC BILATERAL LOW BACK PAIN WITHOUT SCIATICA: Primary | ICD-10-CM

## 2022-10-12 DIAGNOSIS — G89.29 CHRONIC BILATERAL LOW BACK PAIN WITH RIGHT-SIDED SCIATICA: ICD-10-CM

## 2022-10-12 DIAGNOSIS — R20.2 PARESTHESIA OF BOTH FEET: ICD-10-CM

## 2022-10-12 DIAGNOSIS — M54.41 CHRONIC BILATERAL LOW BACK PAIN WITH RIGHT-SIDED SCIATICA: ICD-10-CM

## 2022-10-12 DIAGNOSIS — M54.17 RIGHT LUMBOSACRAL RADICULOPATHY: Primary | ICD-10-CM

## 2022-10-12 PROCEDURE — 97140 MANUAL THERAPY 1/> REGIONS: CPT | Performed by: PHYSICAL THERAPIST

## 2022-10-12 PROCEDURE — 99214 OFFICE O/P EST MOD 30 MIN: CPT | Performed by: PHYSICAL MEDICINE & REHABILITATION

## 2022-10-12 PROCEDURE — 97110 THERAPEUTIC EXERCISES: CPT | Performed by: PHYSICAL THERAPIST

## 2022-10-12 PROCEDURE — 97112 NEUROMUSCULAR REEDUCATION: CPT | Performed by: PHYSICAL THERAPIST

## 2022-10-12 RX ORDER — METHOCARBAMOL 750 MG/1
750 TABLET, FILM COATED ORAL
Qty: 20 TABLET | Refills: 0 | Status: SHIPPED | OUTPATIENT
Start: 2022-10-12

## 2022-10-12 RX ORDER — LORAZEPAM 1 MG/1
1 TABLET ORAL
Qty: 1 TABLET | Refills: 0 | Status: SHIPPED | OUTPATIENT
Start: 2022-10-12

## 2022-10-12 NOTE — PROGRESS NOTES
Daily Note     Today's date: 10/12/2022  Patient name: Veronique Valenzuela  : 1965  MRN: [id number]  Referring provider: Conchis Orona DO  Dx:   Encounter Diagnosis     ICD-10-CM    1  Chronic bilateral low back pain without sciatica  M54 50     G89 29                   Subjective:  Pt reports increased pain this week in R hip radiating into her foot  Pain is most severe w/ standing and sitting/driving  She has been working at different register at work  She had been progressing well w/ decreased reports of pain and improved tolerance to standing and job duties; until this week  Objective: See treatment diary below      Assessment: Tolerated treatment well    Patient demonstrated fatigue post treatment and would benefit from continued PT   Able to tolerate treatment as charted  Mild tightness over glute med  Long sit test is negative      Plan: Continue per plan of care  Progress treatment as tolerated         Precautions: DM, fibromyalgia      Manuals 9/14 10/12/2022   9/28 10/5 10/12        STM B QL, glute med nv CC 15 min 15 min 15 min R side                                               Neuro Re-Ed 9/14  9/28 10/5 10/12        ppt 5"x10 5" 10x 5"x20 5"x20 5"X20        clamshells 5"x20 5" 20x 5"X20 5"x20 5"x20        Ab brace w/ march nv 2" 10x BL 2x10 2x10 2x10        Ab brace w/ R nv 2" 10x BL 2x10 x20 x20                                               Ther Ex 9/14  9/28 10/5 10/12        Piriformis stretch nv 30" 3x BL 30"X3 30"X3 30"x3        Hamstring stretch nv 30" 3x BL 30"X3 30"x3 30"x3                                                                                      Ther Activity                                       Gait Training                                       Modalities

## 2022-10-12 NOTE — PROGRESS NOTES
1  Right lumbosacral radiculopathy     2  Chronic bilateral low back pain with right-sided sciatica  MRI lumbar spine wo contrast    methocarbamol (ROBAXIN) 750 mg tablet    LORazepam (ATIVAN) 1 mg tablet   3  Paresthesia of both feet       Orders Placed This Encounter   Procedures   • MRI lumbar spine wo contrast        Impression:  Lumbar pain that is multifactorial and predominantly due to grade 1 spondylolisthesis, sacroiliac joint dysfunction and possibly right lumbosacral radiculopathy  Her diabetes is well controlled  She is following with an outside podiatrist      Her treatment has included physical therapy, home exercise program, over-the-counter pain medications  The patient continues to be symptomatic  Her this juncture, we will obtain an MRI of her lumbar spine  I prescribed her lorazepam for claustrophobia  I have also prescribed her methocarbamol that she will use at night if needed  I will see her back for MRI review  Patient is in agreement with the above plan  Imaging Studies (I personally reviewed images in PACS and report if it was available):  Lumbar spine x-rays that are most recent to this encounter were reviewed  These images show grade 1 anterolisthesis of L4 on L5  Disc spaces are preserved  No acute osseous abnormalities  No follow-ups on file  Patient is in agreement with the above plan  HPI:  Joel Gtz is a 62 y o  female  who presents in follow up  Here for   Chief Complaint   Patient presents with   • Back Pain     Follow up- not good  this week  Since last visit: See above      Following history reviewed and updated:  Past Medical History:   Diagnosis Date   • Fibromyalgia 4/26/2022   • History of mammogram 2018   • History of tobacco abuse 4/26/2022   • Hypertension    • Nausea 4/29/2022     Past Surgical History:   Procedure Laterality Date   • APPENDECTOMY     • CARPAL TUNNEL RELEASE     • COLONOSCOPY  2017    repeat in 2022   • FOOT SURGERY     • NECK SURGERY      spine fusion    • [place] REPAIR UMBILICAL IPRG,5+E/J,XYHSI N/A 5/11/2022    Procedure: REPAIR HERNIA UMBILICAL;  Surgeon: Xavier Gonzalez MD;  Location: [facility];  Service: General     Social History   Social History     Substance and Sexual Activity   Alcohol Use Yes    Comment: occasional      Social History     Substance and Sexual Activity   Drug Use Not Currently     Social History     Tobacco Use   Smoking Status Current Every Day Smoker   • Packs/day: 0 50   • Years: 25 00   • Pack years: 12 50   • Types: Cigarettes   Smokeless Tobacco Never Used   Tobacco Comment    per pt, 5 a day - As per [place]      Family History   Problem Relation Age of Onset   • Hypertension Mother    • Heart disease Mother    • Hypertension Father    • Heart disease Father    • Diabetes Maternal Grandmother    • Breast cancer Paternal Aunt    • Pancreatic cancer Paternal Uncle      No Known Allergies     Constitutional:  BP (!) 186/94   Pulse 66   Ht 5' (1 524 m)   Wt 71 7 kg (158 lb)   BMI 30 86 kg/m²    General: NAD  Eyes: Clear sclerae  ENT: No inflammation, lesion, or mass of lips  No tracheal deviation  Musculoskeletal: As mentioned below  Integumentary: No visible rashes or skin lesions  Pulmonary/Chest: Effort normal  No respiratory distress  Neuro: CN's grossly intact, DE LA ROSA  Psych: Normal affect and judgement  Vascular: WWP  Back Exam     Tenderness   The patient is experiencing tenderness in the lumbar and sacroiliac  Muscle Strength   The patient has normal back strength      Tests   Straight leg raise right: positive  Straight leg raise left: negative    Reflexes   Patellar: normal  Achilles: normal    Other   Sensation: normal  Gait: normal   Erythema: no back redness  Scars: absent             Procedures

## 2022-10-16 DIAGNOSIS — J45.41 MODERATE PERSISTENT ASTHMA WITH ACUTE EXACERBATION: ICD-10-CM

## 2022-10-17 RX ORDER — ALBUTEROL SULFATE 90 UG/1
2 AEROSOL, METERED RESPIRATORY (INHALATION) EVERY 6 HOURS PRN
Qty: 18 G | Refills: 0 | Status: SHIPPED | OUTPATIENT
Start: 2022-10-17 | End: 2022-10-27 | Stop reason: SDUPTHER

## 2022-10-19 ENCOUNTER — EVALUATION (OUTPATIENT)
Dept: PHYSICAL THERAPY | Facility: MEDICAL CENTER | Age: 57
End: 2022-10-19
Payer: COMMERCIAL

## 2022-10-19 DIAGNOSIS — G89.29 CHRONIC BILATERAL LOW BACK PAIN WITHOUT SCIATICA: Primary | ICD-10-CM

## 2022-10-19 DIAGNOSIS — M54.50 CHRONIC BILATERAL LOW BACK PAIN WITHOUT SCIATICA: Primary | ICD-10-CM

## 2022-10-19 PROCEDURE — 97140 MANUAL THERAPY 1/> REGIONS: CPT | Performed by: PHYSICAL THERAPIST

## 2022-10-19 PROCEDURE — 97110 THERAPEUTIC EXERCISES: CPT | Performed by: PHYSICAL THERAPIST

## 2022-10-19 PROCEDURE — 97112 NEUROMUSCULAR REEDUCATION: CPT | Performed by: PHYSICAL THERAPIST

## 2022-10-19 NOTE — PROGRESS NOTES
PT Re-Evaluation     Today's date: 10/19/2022  Patient name: Carlos Whaley  : 1965  MRN: [id number]  Referring provider: Raj Garcia DO  Dx:   Encounter Diagnosis     ICD-10-CM    1  Chronic bilateral low back pain without sciatica  M54 50     G89 29                   Assessment  Assessment details: Carlos Whaley has attended 6 visits since initiating PT and has demonstrated overall improvement  Mobility and strength has improved with decreased reports of pain  Carlos Whaley has demonstrated an improvement in function as well  Currently, she has made steady progress towards her goals, but continue to remain limited with standing and driving  She had been improving until exacerbation last week for unknown reason  She is scheduled for MRI Nov   She does feel like PT is beneficial and has provided some relief of symptoms  At this time, continued physical therapy is recommended in order to address their remaining impairments in effort to further improve function  Impairments: abnormal or restricted ROM, abnormal movement, activity intolerance, impaired physical strength, lacks appropriate home exercise program, pain with function and weight-bearing intolerance  Understanding of Dx/Px/POC: fair   Prognosis: fair    Goals  Short Term Goals:   1  Pain decreased 2 ratings in 4 weeks PARTIALLY MET  2   ROM increased 10* in 4 weeks PARTIALLY MET  3  Strength increased 1/2 grade in 4 weeks PARTIALLY MET    Long Term Goals:   1  ADLS/IADLS in related activities improved to maximal level in 8 weeks PARTIALLY MET  2  Work performance improved to maximal level in 8 weeks PARTIALLY MET  3  Walking is improved to maximal level in 8 weeks  PARTIALLY MET  4  [place] with HEP in 8 weeks   PARTIALLY MET    Plan  Patient would benefit from: skilled physical therapy  Planned modality interventions: cryotherapy  Planned therapy interventions: therapeutic exercise, stretching, strengthening, patient education, neuromuscular re-education, manual therapy, functional ROM exercises, abdominal trunk stabilization, flexibility and home exercise program  Frequency: 1x week  Duration in weeks: 8  Treatment plan discussed with: patient        Subjective Evaluation    History of Present Illness  Mechanism of injury: Pt reports exacerbation of pain that has persisted since last week  Pain is constant in R lumbar region and does radiate into RLE  Bilateral stocking parasthesia also persistent  Pain does remain most severe w/ standing and driving  Completes household chores as able; about 30 minutes at a time before requires rest   She c/o sleep disturbance  She continues job duties as   She is no longer requiring AD for ambulation  She did see ortho last week and MRI was ordered which is scheduled for   She was also prescribed Methocarbomol which she feels has provided some relief      Pain  At best pain ratin  At worst pain rating: 10    Patient Goals  Patient goals for therapy: decreased pain, increased motion, increased strength, independence with ADLs/IADLs and return to work          Objective     Palpation     Additional Palpation Details  Tenderness over QL and glute med bilaterally, R moreso than L    Active Range of Motion     Lumbar   Flexion: 60 degrees   Extension: 18 degrees     Strength/Myotome Testing     Left Hip   Planes of Motion   Flexion: 4+  Extension: 5  Abduction: 5    Right Hip   Planes of Motion   Flexion: 4-  Extension: 4  Abduction: 5    Left Knee   Extension: 5    Right Knee   Extension: 5             Precautions: DM, fibromyalgia      Manuals 10/19            STM B QL, glute med nv                                                   Neuro Re-Ed 10/19            ppt 5"x20            clamshells 5"x20            Ab brace / 0            Ab brace /  x20                                                   Ther Ex 10/19            Piriformis stretch 30"x3 Hamstring stretch 30"x3                                                                                          Ther Activity                                       Gait Training                                       Modalities

## 2022-10-26 ENCOUNTER — OFFICE VISIT (OUTPATIENT)
Dept: PHYSICAL THERAPY | Facility: MEDICAL CENTER | Age: 57
End: 2022-10-26
Payer: COMMERCIAL

## 2022-10-26 DIAGNOSIS — M54.50 CHRONIC BILATERAL LOW BACK PAIN WITHOUT SCIATICA: Primary | ICD-10-CM

## 2022-10-26 DIAGNOSIS — G89.29 CHRONIC BILATERAL LOW BACK PAIN WITHOUT SCIATICA: Primary | ICD-10-CM

## 2022-10-26 PROCEDURE — 97112 NEUROMUSCULAR REEDUCATION: CPT | Performed by: PHYSICAL THERAPIST

## 2022-10-26 PROCEDURE — 97110 THERAPEUTIC EXERCISES: CPT | Performed by: PHYSICAL THERAPIST

## 2022-10-26 PROCEDURE — 97140 MANUAL THERAPY 1/> REGIONS: CPT | Performed by: PHYSICAL THERAPIST

## 2022-10-26 NOTE — PROGRESS NOTES
Daily Note     Today's date: 10/26/2022  Patient name: Ellen Beckman  : 1965  MRN: [id number]  Referring provider: Davin Hale DO  Dx:   Encounter Diagnosis     ICD-10-CM    1  Chronic bilateral low back pain without sciatica  M54 50     G89 29                   Subjective: Pt reports increased soreness and pain from working on self check out all week  Feet more painful than back from not being able to stand on padding  Objective: See treatment diary below      Assessment: Tolerated treatment well  Patient demonstrated fatigue post treatment, exhibited good technique with therapeutic exercises and would benefit from continued PT      Plan: Continue per plan of care        Precautions: DM, fibromyalgia      Manuals 10/19 10/26           STM B QL, glute med 10 min 15 min                                                  Neuro Re-Ed 10/19 10/26           ppt 5"x20 5"x20           clamshells 5"x20 5"x20           Ab brace w/ march x20 x20           Ab brace w/  x20 x20                                                  Ther Ex 10/19 10/26           Piriformis stretch 30"x3 30"x3           Hamstring stretch 30"x3 30"x3                                                                                         Ther Activity                                       Gait Training                                       Modalities

## 2022-10-27 DIAGNOSIS — J45.41 MODERATE PERSISTENT ASTHMA WITH ACUTE EXACERBATION: ICD-10-CM

## 2022-10-27 DIAGNOSIS — E11.9 TYPE II DIABETES MELLITUS, WELL CONTROLLED (HCC): ICD-10-CM

## 2022-10-27 DIAGNOSIS — G62.9 NEUROPATHY: ICD-10-CM

## 2022-10-27 DIAGNOSIS — M54.40 LOW BACK PAIN WITH SCIATICA, SCIATICA LATERALITY UNSPECIFIED, UNSPECIFIED BACK PAIN LATERALITY, UNSPECIFIED CHRONICITY: ICD-10-CM

## 2022-10-28 RX ORDER — ALBUTEROL SULFATE 90 UG/1
2 AEROSOL, METERED RESPIRATORY (INHALATION) EVERY 6 HOURS PRN
Qty: 18 G | Refills: 0 | Status: SHIPPED | OUTPATIENT
Start: 2022-10-28

## 2022-10-28 RX ORDER — GABAPENTIN 300 MG/1
300 CAPSULE ORAL 3 TIMES DAILY PRN
Qty: 180 CAPSULE | Refills: 0 | Status: SHIPPED | OUTPATIENT
Start: 2022-10-28

## 2022-11-09 ENCOUNTER — HOSPITAL ENCOUNTER (OUTPATIENT)
Dept: MRI IMAGING | Facility: HOSPITAL | Age: 57
Discharge: HOME | End: 2022-11-09
Attending: PHYSICAL MEDICINE & REHABILITATION

## 2022-11-09 DIAGNOSIS — M54.41 CHRONIC BILATERAL LOW BACK PAIN WITH RIGHT-SIDED SCIATICA: ICD-10-CM

## 2022-11-09 DIAGNOSIS — G89.29 CHRONIC BILATERAL LOW BACK PAIN WITH RIGHT-SIDED SCIATICA: ICD-10-CM

## 2022-11-16 ENCOUNTER — OFFICE VISIT (OUTPATIENT)
Dept: OBGYN CLINIC | Facility: MEDICAL CENTER | Age: 57
End: 2022-11-16

## 2022-11-16 ENCOUNTER — APPOINTMENT (OUTPATIENT)
Dept: RADIOLOGY | Facility: MEDICAL CENTER | Age: 57
End: 2022-11-16

## 2022-11-16 ENCOUNTER — OFFICE VISIT (OUTPATIENT)
Dept: FAMILY MEDICINE CLINIC | Facility: CLINIC | Age: 57
End: 2022-11-16

## 2022-11-16 VITALS
WEIGHT: 158 LBS | SYSTOLIC BLOOD PRESSURE: 184 MMHG | DIASTOLIC BLOOD PRESSURE: 96 MMHG | BODY MASS INDEX: 31.02 KG/M2 | HEIGHT: 60 IN | HEART RATE: 73 BPM

## 2022-11-16 VITALS
TEMPERATURE: 98.4 F | DIASTOLIC BLOOD PRESSURE: 90 MMHG | HEART RATE: 80 BPM | OXYGEN SATURATION: 96 % | RESPIRATION RATE: 18 BRPM | SYSTOLIC BLOOD PRESSURE: 154 MMHG | HEIGHT: 60 IN | BODY MASS INDEX: 31.61 KG/M2 | WEIGHT: 161 LBS

## 2022-11-16 DIAGNOSIS — R06.02 SOB (SHORTNESS OF BREATH): ICD-10-CM

## 2022-11-16 DIAGNOSIS — F40.240 CLAUSTROPHOBIA: ICD-10-CM

## 2022-11-16 DIAGNOSIS — M54.41 CHRONIC BILATERAL LOW BACK PAIN WITH BILATERAL SCIATICA: ICD-10-CM

## 2022-11-16 DIAGNOSIS — J40 BRONCHITIS: ICD-10-CM

## 2022-11-16 DIAGNOSIS — J06.9 URI WITH COUGH AND CONGESTION: ICD-10-CM

## 2022-11-16 DIAGNOSIS — M54.17 RIGHT LUMBOSACRAL RADICULOPATHY: Primary | ICD-10-CM

## 2022-11-16 DIAGNOSIS — R06.02 SOB (SHORTNESS OF BREATH): Primary | ICD-10-CM

## 2022-11-16 DIAGNOSIS — G89.29 CHRONIC BILATERAL LOW BACK PAIN WITH BILATERAL SCIATICA: ICD-10-CM

## 2022-11-16 DIAGNOSIS — M54.42 CHRONIC BILATERAL LOW BACK PAIN WITH BILATERAL SCIATICA: ICD-10-CM

## 2022-11-16 RX ORDER — METHYLPREDNISOLONE 4 MG/1
TABLET ORAL
Qty: 21 EACH | Refills: 0 | Status: SHIPPED | OUTPATIENT
Start: 2022-11-16

## 2022-11-16 RX ORDER — BENZONATATE 100 MG/1
100 CAPSULE ORAL 3 TIMES DAILY PRN
Qty: 60 CAPSULE | Refills: 0 | Status: SHIPPED | OUTPATIENT
Start: 2022-11-16

## 2022-11-16 RX ORDER — LORAZEPAM 1 MG/1
1 TABLET ORAL
Qty: 1 TABLET | Refills: 0 | Status: SHIPPED | OUTPATIENT
Start: 2022-11-16

## 2022-11-16 RX ORDER — PREGABALIN 75 MG/1
75 CAPSULE ORAL 2 TIMES DAILY
Qty: 90 CAPSULE | Refills: 0 | Status: SHIPPED | OUTPATIENT
Start: 2022-11-16

## 2022-11-16 RX ORDER — AZITHROMYCIN 250 MG/1
TABLET, FILM COATED ORAL
Qty: 6 TABLET | Refills: 0 | Status: SHIPPED | OUTPATIENT
Start: 2022-11-16 | End: 2022-11-21

## 2022-11-16 NOTE — PROGRESS NOTES
1  Right lumbosacral radiculopathy  MRI lumbar spine wo contrast    pregabalin (LYRICA) 75 mg capsule      2  Chronic bilateral low back pain with bilateral sciatica  MRI lumbar spine wo contrast    pregabalin (LYRICA) 75 mg capsule      3  Claustrophobia  LORazepam (ATIVAN) 1 mg tablet        Orders Placed This Encounter   Procedures   • MRI lumbar spine wo contrast        Impression:  Patient is here in follow up of lumbar pain that is multifactorial and predominantly due to grade 1 spondylolisthesis, sacroiliac joint dysfunction and possibly right lumbosacral radiculopathy  Her diabetes is well controlled   She is following with an outside podiatrist      Her treatment has included physical therapy, home exercise program, over-the-counter pain medications  The patient continues to be symptomatic  At this juncture, we will obtain an MRI of her lumbar spine  I prescribed her lorazepam for claustrophobia  She will taper off of the gabapentin and discontinue it  I prescribed her pregabalin 75 mg BID  Return to clinic after MRI       Patient is in agreement with the above plan      Imaging Studies (I personally reviewed images in PACS and report if it was available):  Lumbar spine x-rays that are most recent to this encounter were reviewed   These images show grade 1 anterolisthesis of L4 on L5   Disc spaces are preserved   No acute osseous abnormalities  Lumbar spine MRI:      No follow-ups on file  Patient is in agreement with the above plan  HPI:  Pamela Gómez is a 62 y o  female  who presents in follow up  Here for   Chief Complaint   Patient presents with   • Lower Back - Pain     Right sided       Since last visit: See above      Following history reviewed and updated:  Past Medical History:   Diagnosis Date   • Fibromyalgia 4/26/2022   • History of mammogram 2018   • History of tobacco abuse 4/26/2022   • Hypertension    • Nausea 4/29/2022     Past Surgical History:   Procedure Laterality Date   • APPENDECTOMY     • CARPAL TUNNEL RELEASE     • COLONOSCOPY  2017    repeat in 2022   • FOOT SURGERY     • NECK SURGERY      spine fusion    • [place] REPAIR UMBILICAL AZXL,1+N/M,AXODC N/A 5/11/2022    Procedure: REPAIR HERNIA UMBILICAL;  Surgeon: Pam Moya MD;  Location: [facility];  Service: General     Social History   Social History     Substance and Sexual Activity   Alcohol Use Yes    Comment: occasional      Social History     Substance and Sexual Activity   Drug Use Not Currently     Social History     Tobacco Use   Smoking Status Every Day   • Packs/day: 0 50   • Years: 25 00   • Pack years: 12 50   • Types: Cigarettes   Smokeless Tobacco Never   Tobacco Comments    per pt, 5 a day - As per [place]      Family History   Problem Relation Age of Onset   • Hypertension Mother    • Heart disease Mother    • Hypertension Father    • Heart disease Father    • Diabetes Maternal Grandmother    • Breast cancer Paternal Aunt    • Pancreatic cancer Paternal Uncle      No Known Allergies     Constitutional:  BP (!) 184/96   Pulse 73   Ht 5' (1 524 m)   Wt 71 7 kg (158 lb)   BMI 30 86 kg/m²    General: NAD  Eyes: Clear sclerae  ENT: No inflammation, lesion, or mass of lips  No tracheal deviation  Musculoskeletal: As mentioned below  Integumentary: No visible rashes or skin lesions  Pulmonary/Chest: Effort normal  No respiratory distress  Neuro: CN's grossly intact, [place] ROSA  Psych: Normal affect and judgement  Vascular: WWP      Ortho Exam     Procedures

## 2022-11-16 NOTE — PROGRESS NOTES
Name: Lorena Figueroa      : 1965      MRN: [id number]  Encounter Provider: ARSENIO Torres  Encounter Date: 2022   Encounter department: [address] [place] [address]    Assessment & Plan     1  SOB (shortness of breath)  -     XR chest pa & lateral; Future; Expected date: 2022    2  Bronchitis  -     XR chest pa & lateral; Future; Expected date: 2022    3  URI with cough and congestion  -     Covid/Flu- Office Collect         Preliminary did chest x-ray demonstrates that there is no acute process of will confirm when the stat order is red  Otherwise I will send antibiotic steroid and something for the cough to the pharmacy a note for work was given if fails to improve significantly worsen she is to contact me back in the office any acute shortness of breath she is to seek emergency care  Patient verbalized understanding of this  Dosing all possible side effects of the prescribed medications or medications that had been prescribed in the past were reviewed and all questions were answered  Patient verbalized agreement and understanding of the plan of care as outlined during the office visit today return to office as indicated or sooner if a problem arises  Subjective        Patient is here with a complaint of upper respiratory tract discomfort has had a cough runny nose and some nasal congestion  Denies any need nausea vomiting diarrhea chest pains or shortness of breath  All over-the-counter medication attempted arm were unsuccessful  Review of Systems   Constitutional: Negative for appetite change and fever  HENT: Negative for sinus pressure and sore throat  Eyes: Negative for pain  Respiratory: Negative for shortness of breath  Cardiovascular: Negative for chest pain  Gastrointestinal: Negative for abdominal pain  Genitourinary: Negative for dysuria  Musculoskeletal: Negative for arthralgias and myalgias  Skin: Negative for color change  Neurological: Negative for light-headedness  Psychiatric/Behavioral: Negative for behavioral problems  Current Outpatient Medications on File Prior to Visit   Medication Sig   • albuterol (2 5 mg/3 mL) 0 083 % nebulizer solution USE 1 VIAL IN NEBULIZER EVERY 2 HOURS AS NEEDED FOR WHEEZING   • albuterol (PROVENTIL HFA,VENTOLIN HFA) 90 mcg/act inhaler Inhale 2 puffs every 6 (six) hours as needed for wheezing   • atenolol (TENORMIN) 50 mg tablet Take 1 tablet (50 mg total) by mouth 2 (two) times a day   • gabapentin (NEURONTIN) 300 mg capsule Take 1 capsule (300 mg total) by mouth 3 (three) times a day as needed (leg pain)   • telmisartan (MICARDIS) 80 MG tablet Take 1 tablet (80 mg total) by mouth daily   • LORazepam (ATIVAN) 1 mg tablet Take 1 tablet (1 mg total) by mouth once in imaging for anxiety for up to 1 dose (Patient not taking: Reported on 11/16/2022)   • methocarbamol (ROBAXIN) 750 mg tablet Take 1 tablet (750 mg total) by mouth daily at bedtime as needed for muscle spasms (Patient not taking: Reported on 11/16/2022)   • pregabalin (LYRICA) 75 mg capsule Take 1 capsule (75 mg total) by mouth 2 (two) times a day (Patient not taking: Reported on 11/16/2022)   • [DISCONTINUED] LORazepam (ATIVAN) 1 mg tablet Take 1 tablet (1 mg total) by mouth once in imaging for anxiety for up to 1 dose       Objective     /90 (BP Location: Left arm, Patient Position: Sitting, Cuff Size: Standard)   Pulse 80   Temp 98 4 °F (36 9 °C) (Temporal)   Resp 18   Ht 5' (1 524 m)   Wt 73 kg (161 lb)   SpO2 96%   BMI 31 44 kg/m²     Physical Exam  Vitals and nursing note reviewed  Constitutional:       General: She is not in acute distress  Appearance: She is well-developed and well-nourished  She is not diaphoretic  HENT:      Head: Normocephalic and atraumatic        Right Ear: External ear normal       Left Ear: External ear normal       Mouth/Throat:      Mouth: Oropharynx is clear and moist    Eyes: Extraocular Movements: EOM normal       Pupils: Pupils are equal, round, and reactive to light  Cardiovascular:      Rate and Rhythm: Normal rate and regular rhythm  Heart sounds: Normal heart sounds  Pulmonary:      Effort: Pulmonary effort is normal       Breath sounds: Wheezing present  Abdominal:      Palpations: Abdomen is soft  Musculoskeletal:         General: Normal range of motion  Cervical back: Normal range of motion and neck supple  Skin:     General: Skin is dry  Neurological:      Mental Status: She is alert and oriented to person, place, and time  Psychiatric:         Mood and Affect: Mood and affect normal          Behavior: Behavior normal          Thought Content:  Thought content normal        ARSENIO Evans

## 2022-11-16 NOTE — LETTER
November 16, 2022     Patient: Errol Collado  YOB: 1965  Date of Visit: 11/16/2022      To Whom it May Concern:    Errol Collado is under my professional care  Rebecca Khalil was seen in my office on 11/16/2022  Rebecca Khalil can return to work on 11/19/22  If you have any questions or concerns, please don't hesitate to call           Sincerely,          ARSENIO Ayala        CC:   No Recipients

## 2022-11-17 ENCOUNTER — TELEPHONE (OUTPATIENT)
Dept: OBGYN CLINIC | Facility: HOSPITAL | Age: 57
End: 2022-11-17

## 2022-11-17 LAB
FLUAV RNA RESP QL NAA+PROBE: NEGATIVE
FLUBV RNA RESP QL NAA+PROBE: NEGATIVE
SARS-COV-2 RNA RESP QL NAA+PROBE: NEGATIVE

## 2022-11-17 NOTE — TELEPHONE ENCOUNTER
Caller: [address]     Doctor: Gloria Hilario    Reason for call: Calling about Lyrica, She wants to verify as patient is getting a smiler medication   Which is Gabapentin 300 mg  from another         Call back#: [phone]- They open at 5

## 2022-11-18 NOTE — TELEPHONE ENCOUNTER
Spoke with Eliane and they will dispense the lyrica today  Left msg for patient to call back  Patient will be instructed above information for clarification

## 2022-11-25 DIAGNOSIS — J45.41 MODERATE PERSISTENT ASTHMA WITH ACUTE EXACERBATION: ICD-10-CM

## 2022-11-25 RX ORDER — ALBUTEROL SULFATE 90 UG/1
2 AEROSOL, METERED RESPIRATORY (INHALATION) EVERY 6 HOURS PRN
Qty: 18 G | Refills: 0 | Status: SHIPPED | OUTPATIENT
Start: 2022-11-25

## 2022-12-05 DIAGNOSIS — I10 PRIMARY HYPERTENSION: ICD-10-CM

## 2022-12-05 RX ORDER — ATENOLOL 50 MG/1
50 TABLET ORAL 2 TIMES DAILY
Qty: 60 TABLET | Refills: 0 | Status: SHIPPED | OUTPATIENT
Start: 2022-12-05

## 2022-12-07 ENCOUNTER — OFFICE VISIT (OUTPATIENT)
Dept: FAMILY MEDICINE CLINIC | Facility: CLINIC | Age: 57
End: 2022-12-07

## 2022-12-07 VITALS
RESPIRATION RATE: 20 BRPM | HEART RATE: 70 BPM | OXYGEN SATURATION: 96 % | DIASTOLIC BLOOD PRESSURE: 84 MMHG | WEIGHT: 164 LBS | BODY MASS INDEX: 32.2 KG/M2 | TEMPERATURE: 97.7 F | SYSTOLIC BLOOD PRESSURE: 140 MMHG | HEIGHT: 60 IN

## 2022-12-07 DIAGNOSIS — J06.9 URI WITH COUGH AND CONGESTION: ICD-10-CM

## 2022-12-07 DIAGNOSIS — J40 BRONCHITIS: Primary | ICD-10-CM

## 2022-12-07 RX ORDER — AZITHROMYCIN 250 MG/1
TABLET, FILM COATED ORAL
Qty: 6 TABLET | Refills: 0 | Status: SHIPPED | OUTPATIENT
Start: 2022-12-07 | End: 2022-12-12

## 2022-12-07 RX ORDER — PREDNISONE 10 MG/1
10 TABLET ORAL 2 TIMES DAILY WITH MEALS
Qty: 10 TABLET | Refills: 0 | Status: SHIPPED | OUTPATIENT
Start: 2022-12-07 | End: 2022-12-12

## 2022-12-07 RX ORDER — BENZONATATE 100 MG/1
100 CAPSULE ORAL 3 TIMES DAILY PRN
Qty: 60 CAPSULE | Refills: 0 | Status: SHIPPED | OUTPATIENT
Start: 2022-12-07

## 2022-12-07 RX ORDER — GUAIFENESIN AND CODEINE PHOSPHATE 100; 10 MG/5ML; MG/5ML
5 SOLUTION ORAL
Qty: 118 ML | Refills: 0 | Status: SHIPPED | OUTPATIENT
Start: 2022-12-07

## 2022-12-14 ENCOUNTER — HOSPITAL ENCOUNTER (OUTPATIENT)
Dept: RADIOLOGY | Facility: HOSPITAL | Age: 57
Discharge: HOME | End: 2022-12-14
Attending: PHYSICAL MEDICINE & REHABILITATION

## 2022-12-14 DIAGNOSIS — M54.17 RIGHT LUMBOSACRAL RADICULOPATHY: ICD-10-CM

## 2022-12-14 DIAGNOSIS — G89.29 CHRONIC BILATERAL LOW BACK PAIN WITH BILATERAL SCIATICA: ICD-10-CM

## 2022-12-14 DIAGNOSIS — M54.41 CHRONIC BILATERAL LOW BACK PAIN WITH BILATERAL SCIATICA: ICD-10-CM

## 2022-12-14 DIAGNOSIS — M54.42 CHRONIC BILATERAL LOW BACK PAIN WITH BILATERAL SCIATICA: ICD-10-CM

## 2022-12-16 DIAGNOSIS — M54.40 LOW BACK PAIN WITH SCIATICA, SCIATICA LATERALITY UNSPECIFIED, UNSPECIFIED BACK PAIN LATERALITY, UNSPECIFIED CHRONICITY: ICD-10-CM

## 2022-12-16 DIAGNOSIS — G62.9 NEUROPATHY: ICD-10-CM

## 2022-12-16 DIAGNOSIS — J45.41 MODERATE PERSISTENT ASTHMA WITH ACUTE EXACERBATION: ICD-10-CM

## 2022-12-16 DIAGNOSIS — E11.9 TYPE II DIABETES MELLITUS, WELL CONTROLLED (HCC): ICD-10-CM

## 2022-12-16 RX ORDER — ALBUTEROL SULFATE 90 UG/1
2 AEROSOL, METERED RESPIRATORY (INHALATION) EVERY 6 HOURS PRN
Qty: 18 G | Refills: 0 | Status: SHIPPED | OUTPATIENT
Start: 2022-12-16

## 2022-12-16 RX ORDER — GABAPENTIN 300 MG/1
300 CAPSULE ORAL 3 TIMES DAILY PRN
Qty: 180 CAPSULE | Refills: 0 | Status: SHIPPED | OUTPATIENT
Start: 2022-12-16

## 2022-12-30 DIAGNOSIS — I10 PRIMARY HYPERTENSION: ICD-10-CM

## 2022-12-30 RX ORDER — ATENOLOL 50 MG/1
50 TABLET ORAL 2 TIMES DAILY
Qty: 60 TABLET | Refills: 0 | Status: SHIPPED | OUTPATIENT
Start: 2022-12-30

## 2023-01-02 ENCOUNTER — APPOINTMENT (EMERGENCY)
Dept: RADIOLOGY | Facility: HOSPITAL | Age: 58
End: 2023-01-02

## 2023-01-02 ENCOUNTER — HOSPITAL ENCOUNTER (EMERGENCY)
Facility: HOSPITAL | Age: 58
Discharge: HOME | End: 2023-01-03
Attending: EMERGENCY MEDICINE

## 2023-01-02 DIAGNOSIS — J45.21 MILD INTERMITTENT ASTHMA WITH EXACERBATION: Primary | ICD-10-CM

## 2023-01-02 LAB
ALBUMIN SERPL BCP-MCNC: 4 G/DL (ref 3.5–5)
ALP SERPL-CCNC: 68 U/L (ref 34–104)
ALT SERPL W P-5'-P-CCNC: 11 U/L (ref 7–52)
ANION GAP SERPL CALCULATED.3IONS-SCNC: 7 MMOL/L (ref 4–13)
AST SERPL W P-5'-P-CCNC: 14 U/L (ref 13–39)
BASOPHILS # BLD AUTO: 0.12 THOUSANDS/ÂΜL (ref 0–0.1)
BASOPHILS NFR BLD AUTO: 1 % (ref 0–1)
BILIRUB SERPL-MCNC: 0.36 MG/DL (ref 0.2–1)
BUN SERPL-MCNC: 17 MG/DL (ref 5–25)
CALCIUM SERPL-MCNC: 9.3 MG/DL (ref 8.4–10.2)
CARDIAC TROPONIN I PNL SERPL HS: 2 NG/L
CHLORIDE SERPL-SCNC: 102 MMOL/L (ref 96–108)
CO2 SERPL-SCNC: 26 MMOL/L (ref 21–32)
CREAT SERPL-MCNC: 0.78 MG/DL (ref 0.6–1.3)
EOSINOPHIL # BLD AUTO: 1.06 THOUSAND/ÂΜL (ref 0–0.61)
EOSINOPHIL NFR BLD AUTO: 11 % (ref 0–6)
ERYTHROCYTE [DISTWIDTH] IN BLOOD BY AUTOMATED COUNT: 15.1 % (ref 11.6–15.1)
GFR SERPL CREATININE-BSD FRML MDRD: 84 ML/MIN/1.73SQ M
GLUCOSE SERPL-MCNC: 182 MG/DL (ref 65–140)
HCT VFR BLD AUTO: 40 % (ref 34.8–46.1)
HGB BLD-MCNC: 12.9 G/DL (ref 11.5–15.4)
IMM GRANULOCYTES # BLD AUTO: 0.03 THOUSAND/UL (ref 0–0.2)
IMM GRANULOCYTES NFR BLD AUTO: 0 % (ref 0–2)
LYMPHOCYTES # BLD AUTO: 3.78 THOUSANDS/ÂΜL (ref 0.6–4.47)
LYMPHOCYTES NFR BLD AUTO: 41 % (ref 14–44)
MCH RBC QN AUTO: 29.1 PG (ref 26.8–34.3)
MCHC RBC AUTO-ENTMCNC: 32.3 G/DL (ref 31.4–37.4)
MCV RBC AUTO: 90 FL (ref 82–98)
MONOCYTES # BLD AUTO: 0.79 THOUSAND/ÂΜL (ref 0.17–1.22)
MONOCYTES NFR BLD AUTO: 9 % (ref 4–12)
NEUTROPHILS # BLD AUTO: 3.54 THOUSANDS/ÂΜL (ref 1.85–7.62)
NEUTS SEG NFR BLD AUTO: 38 % (ref 43–75)
NRBC BLD AUTO-RTO: 0 /100 WBCS
PLATELET # BLD AUTO: 340 THOUSANDS/UL (ref 149–390)
PMV BLD AUTO: 9.1 FL (ref 8.9–12.7)
POTASSIUM SERPL-SCNC: 3.6 MMOL/L (ref 3.5–5.3)
PROT SERPL-MCNC: 7.3 G/DL (ref 6.4–8.4)
RBC # BLD AUTO: 4.44 MILLION/UL (ref 3.81–5.12)
SODIUM SERPL-SCNC: 135 MMOL/L (ref 135–147)
WBC # BLD AUTO: 9.32 THOUSAND/UL (ref 4.31–10.16)

## 2023-01-02 RX ORDER — IPRATROPIUM BROMIDE AND ALBUTEROL SULFATE 2.5; .5 MG/3ML; MG/3ML
3 SOLUTION RESPIRATORY (INHALATION) ONCE
Status: COMPLETED | OUTPATIENT
Start: 2023-01-02 | End: 2023-01-02

## 2023-01-02 RX ORDER — PREDNISONE 20 MG/1
40 TABLET ORAL ONCE
Status: COMPLETED | OUTPATIENT
Start: 2023-01-02 | End: 2023-01-02

## 2023-01-02 RX ADMIN — PREDNISONE 40 MG: 20 TABLET ORAL at 23:26

## 2023-01-02 RX ADMIN — IPRATROPIUM BROMIDE AND ALBUTEROL SULFATE 3 ML: 2.5; .5 SOLUTION RESPIRATORY (INHALATION) at 23:25

## 2023-01-03 VITALS
TEMPERATURE: 98.3 F | RESPIRATION RATE: 20 BRPM | HEART RATE: 66 BPM | OXYGEN SATURATION: 93 % | DIASTOLIC BLOOD PRESSURE: 71 MMHG | SYSTOLIC BLOOD PRESSURE: 160 MMHG

## 2023-01-03 LAB
ATRIAL RATE: 69 BPM
P AXIS: 52 DEGREES
PR INTERVAL: 186 MS
QRS AXIS: 18 DEGREES
QRSD INTERVAL: 82 MS
QT INTERVAL: 438 MS
QTC INTERVAL: 469 MS
T WAVE AXIS: 36 DEGREES
VENTRICULAR RATE: 69 BPM

## 2023-01-03 RX ORDER — BUDESONIDE 90 UG/1
1 AEROSOL, POWDER RESPIRATORY (INHALATION) 2 TIMES DAILY
Qty: 1 EACH | Refills: 0 | Status: SHIPPED | OUTPATIENT
Start: 2023-01-03

## 2023-01-03 RX ORDER — PREDNISONE 20 MG/1
40 TABLET ORAL DAILY
Qty: 8 TABLET | Refills: 0 | Status: SHIPPED | OUTPATIENT
Start: 2023-01-03 | End: 2023-01-07

## 2023-01-03 NOTE — ED PROVIDER NOTES
History  Chief Complaint   Patient presents with   • Asthma     Pt reports worsening wheezing/SOB over the past 3 weeks with no relief with albuterol and neb treatments  Reports new b/l swelling in feet  • Wheezing     HPI   Patient is a 26-year-old female with history of mild intermittent asthma who presents with worsening wheezing and shortness of breath that is not responding to home albuterol treatments  She reports that she has been having issues with shortness of breath since approximately mid December, when she was diagnosed with viral bronchitis at urgent care  She has been using her albuterol inhaler multiple times per day, approximately every 2 hours with minimal response  Patient notes that she was previously on inhaled steroid inhaler but was unable to get it covered due to lapse in insurance and thus has not had that medication at home recently  Reports no new fevers, chills, nausea, vomiting, diarrhea, chest pain  Prior to Admission Medications   Prescriptions Last Dose Informant Patient Reported? Taking?    LORazepam (ATIVAN) 1 mg tablet   No No   Sig: Take 1 tablet (1 mg total) by mouth once in imaging for anxiety for up to 1 dose   Patient not taking: Reported on 11/16/2022   albuterol (2 5 mg/3 mL) 0 083 % nebulizer solution   Yes No   Sig: USE 1 VIAL IN NEBULIZER EVERY 2 HOURS AS NEEDED FOR WHEEZING   albuterol (PROVENTIL HFA,VENTOLIN HFA) 90 mcg/act inhaler   No No   Sig: Inhale 2 puffs every 6 (six) hours as needed for wheezing   atenolol (TENORMIN) 50 mg tablet   No No   Sig: Take 1 tablet (50 mg total) by mouth 2 (two) times a day   benzonatate (TESSALON PERLES) 100 mg capsule   No No   Sig: Take 1 capsule (100 mg total) by mouth 3 (three) times a day as needed for cough   gabapentin (NEURONTIN) 300 mg capsule   No No   Sig: Take 1 capsule (300 mg total) by mouth 3 (three) times a day as needed (leg pain)   guaifenesin-codeine (GUAIFENESIN AC) 100-10 MG/5ML liquid   No No   Sig: Take 5 mL by mouth daily at bedtime as needed for cough   methocarbamol (ROBAXIN) 750 mg tablet   No No   Sig: Take 1 tablet (750 mg total) by mouth daily at bedtime as needed for muscle spasms   Patient not taking: Reported on 11/16/2022   pregabalin (LYRICA) 75 mg capsule   No No   Sig: Take 1 capsule (75 mg total) by mouth 2 (two) times a day   Patient not taking: Reported on 11/16/2022   telmisartan (MICARDIS) 80 MG tablet   No No   Sig: Take 1 tablet (80 mg total) by mouth daily      Facility-Administered Medications: None       Past Medical History:   Diagnosis Date   • Fibromyalgia 4/26/2022   • History of mammogram 2018   • History of tobacco abuse 4/26/2022   • Hypertension    • Nausea 4/29/2022       Past Surgical History:   Procedure Laterality Date   • APPENDECTOMY     • CARPAL TUNNEL RELEASE     • COLONOSCOPY  2017    repeat in 2022   • FOOT SURGERY     • NECK SURGERY      spine fusion    • MN RPR UMBILICAL HRNA 5 YRS/> REDUCIBLE N/A 5/11/2022    Procedure: REPAIR HERNIA UMBILICAL;  Surgeon: Viraj Ordonez MD;  Location: [facility];  Service: General       Family History   Problem Relation Age of Onset   • Hypertension Mother    • Heart disease Mother    • Hypertension Father    • Heart disease Father    • Diabetes Maternal Grandmother    • Breast cancer Paternal Aunt    • Pancreatic cancer Paternal Uncle      I have reviewed and agree with the history as documented      E-Cigarette/Vaping   • E-Cigarette Use Never User      E-Cigarette/Vaping Substances   • Nicotine No    • THC No    • CBD No    • Flavoring No    • Other No    • Unknown No      Social History     Tobacco Use   • Smoking status: Every Day     Packs/day: 0 50     Years: 25 00     Pack years: 12 50     Types: Cigarettes   • Smokeless tobacco: Never   • Tobacco comments:     per pt, 5 a day - As per [employer] Use   • Vaping Use: Never used   Substance Use Topics   • Alcohol use: Yes     Comment: occasional    • Drug use: Not Currently        Review of Systems   Constitutional: Negative for chills and fever  HENT: Negative for ear pain and sore throat  Eyes: Negative for pain and visual disturbance  Respiratory: Positive for cough, shortness of breath and wheezing  Cardiovascular: Negative for chest pain and palpitations  Gastrointestinal: Negative for abdominal pain and vomiting  Genitourinary: Negative for dysuria and hematuria  Musculoskeletal: Negative for arthralgias and back pain  Skin: Negative for color change and rash  Neurological: Negative for seizures and syncope  All other systems reviewed and are negative  Physical Exam  ED Triage Vitals   Temperature Pulse Respirations Blood Pressure SpO2   01/02/23 2129 01/02/23 2129 01/02/23 2129 01/02/23 2129 01/02/23 2129   98 3 °F (36 8 °C) 67 18 166/80 92 %      Temp Source Heart Rate Source Patient Position - Orthostatic VS BP Location FiO2 (%)   01/02/23 2129 01/02/23 2129 -- 01/02/23 2330 --   Oral Monitor  Right arm       Pain Score       --                    Orthostatic Vital Signs  Vitals:    01/02/23 2239 01/02/23 2330 01/03/23 0000 01/03/23 0030   BP:  148/70 165/70 160/71   Pulse: 84 67 64 66       Physical Exam  Vitals and nursing note reviewed  Constitutional:       General: She is not in acute distress  Appearance: She is well-developed  HENT:      Head: Normocephalic and atraumatic  Eyes:      Conjunctiva/sclera: Conjunctivae normal    Cardiovascular:      Rate and Rhythm: Normal rate and regular rhythm  Heart sounds: No murmur heard  Pulmonary:      Effort: Pulmonary effort is normal  No respiratory distress  Breath sounds: Wheezing present  Comments: Diffuse wheezing in all lung fields  Abdominal:      Palpations: Abdomen is soft  Tenderness: There is no abdominal tenderness  Musculoskeletal:         General: No swelling  Cervical back: Neck supple  Skin:     General: Skin is warm and dry  Capillary Refill: Capillary refill takes less than 2 seconds  Neurological:      Mental Status: She is alert  Psychiatric:         Mood and Affect: Mood normal          ED Medications  Medications   ipratropium-albuterol (DUO-NEB) 0 5-2 5 mg/3 mL inhalation solution 3 mL (3 mL Nebulization Given 1/2/23 2325)   predniSONE tablet 40 mg (40 mg Oral Given 1/2/23 2326)       Diagnostic Studies  Results Reviewed     Procedure Component Value Units Date/Time    Trauma tubes on hold [[id number]] Collected: 01/02/23 2136    Lab Status: Final result Specimen: Blood from Arm, Right Updated: 01/02/23 2301    Narrative: The following orders were created for panel order Trauma tubes on hold  Procedure                               Abnormality         Status                     ---------                               -----------         ------                     Waynette Kayser top on WWQU[[id number]]                                 Final result                 Please view results for these tests on the individual orders      HS Troponin 0hr (reflex protocol) [[id number]]  (Normal) Collected: 01/02/23 2136    Lab Status: Final result Specimen: Blood from Arm, Right Updated: 01/02/23 2225     hs TnI 0hr 2 ng/L     Comprehensive metabolic panel [[id number]]  (Abnormal) Collected: 01/02/23 2136    Lab Status: Final result Specimen: Blood from Arm, Right Updated: 01/02/23 2219     Sodium 135 mmol/L      Potassium 3 6 mmol/L      Chloride 102 mmol/L      CO2 26 mmol/L      ANION GAP 7 mmol/L      BUN 17 mg/dL      Creatinine 0 78 mg/dL      Glucose 182 mg/dL      Calcium 9 3 mg/dL      AST 14 U/L      ALT 11 U/L      Alkaline Phosphatase 68 U/L      Total Protein 7 3 g/dL      Albumin 4 0 g/dL      Total Bilirubin 0 36 mg/dL      eGFR 84 ml/min/1 73sq m     Narrative:      [place] guidelines for Chronic Kidney Disease (CKD):   •  Stage 1 with normal or high GFR (GFR > 90 mL/min/1 73 square meters)  •  Stage 2 Mild CKD (GFR = 60-89 mL/min/1 73 square meters)  •  Stage 3A Moderate CKD (GFR = 45-59 mL/min/1 73 square meters)  •  Stage 3B Moderate CKD (GFR = 30-44 mL/min/1 73 square meters)  •  Stage 4 Severe CKD (GFR = 15-29 mL/min/1 73 square meters)  •  Stage 5 End Stage CKD (GFR <15 mL/min/1 73 square meters)  Note: GFR calculation is accurate only with a steady state creatinine    CBC and differential [[id number]]  (Abnormal) Collected: 01/02/23 2136    Lab Status: Final result Specimen: Blood from Arm, Right Updated: 01/02/23 2156     WBC 9 32 Thousand/uL      RBC 4 44 Million/uL      Hemoglobin 12 9 g/dL      Hematocrit 40 0 %      MCV 90 fL      MCH 29 1 pg      MCHC 32 3 g/dL      RDW 15 1 %      MPV 9 1 fL      Platelets 849 Thousands/uL      nRBC 0 /100 WBCs      Neutrophils Relative 38 %      Immat GRANS % 0 %      Lymphocytes Relative 41 %      Monocytes Relative 9 %      Eosinophils Relative 11 %      Basophils Relative 1 %      Neutrophils Absolute 3 54 Thousands/µL      Immature Grans Absolute 0 03 Thousand/uL      Lymphocytes Absolute 3 78 Thousands/µL      Monocytes Absolute 0 79 Thousand/µL      Eosinophils Absolute 1 06 Thousand/µL      Basophils Absolute 0 12 Thousands/µL                  XR chest 1 view portable   ED Interpretation by Cash Salinas MD (01/02 2310)   Normal             Procedures  Procedures      ED Course                                       Medical Decision Making  59-year-old female patient with history of mild intermittent asthma presenting in acute exacerbation  On initial evaluation, patient was not in acute respiratory distress but did have diffuse wheezing noted on physical exam in all lung fields  Based on patient history, it appears her asthma is being inadequately treated and she is using her albuterol inhaler more frequently than every 4 hours at home  This is likely secondary to her recent bout of viral bronchitis exacerbating her asthma    Patient was treated with a DuoNeb in the emergency department along with a dose of oral prednisone with significant improvement in symptoms  At that time, patient was stable for discharge home with follow-up scheduled with her PCP in 2 days  She was discharged with an additional 4 days of p o  prednisone and a steroid inhaler to carry her through to her next visit outpatient  She was given return precautions including worsening shortness of breath, wheezing that is not responsive to home medications  Mild intermittent asthma with exacerbation: acute illness or injury     Details: Acute exacerbation  Amount and/or Complexity of Data Reviewed  External Data Reviewed: notes  Radiology: ordered and independent interpretation performed  Details: Chest x-ray unremarkable      Risk  Prescription drug management  Disposition  Final diagnoses:   Mild intermittent asthma with exacerbation     Time reflects when diagnosis was documented in both MDM as applicable and the Disposition within this note     Time User Action Codes Description Comment    1/3/2023 12:38 AM Anna Carson [J45 [id number]] Mild intermittent asthma with exacerbation       ED Disposition     ED Disposition   Discharge    Condition   Stable    Date/Time   Tue Kash 3, 2023 12:38 AM    Comment   Veronique Valenzuela discharge to home/self care                 Follow-up Information     Follow up With Specialties Details Why Contact Info    Olaf Joaquin MD [facility]   [id number] [place] [address] Male [address] [facility] [address]  [phone]            Discharge Medication List as of 1/3/2023 12:40 AM      START taking these medications    Details   budesonide (Pulmicort Flexhaler) 90 MCG/ACT inhaler Inhale 1 puff 2 (two) times a day Rinse mouth after use , Starting Tue 1/3/2023, Normal      predniSONE 20 mg tablet Take 2 tablets (40 mg total) by mouth daily for 4 days, Starting Tue 1/3/2023, Until Sat 1/7/2023, Normal         CONTINUE these medications which have NOT CHANGED    Details   albuterol (2 5 mg/3 mL) 0 083 % nebulizer solution USE 1 VIAL IN NEBULIZER EVERY 2 HOURS AS NEEDED FOR WHEEZING, Historical Med      albuterol (PROVENTIL HFA,VENTOLIN HFA) 90 mcg/act inhaler Inhale 2 puffs every 6 (six) hours as needed for wheezing, Starting Fri 12/16/2022, Normal      atenolol (TENORMIN) 50 mg tablet Take 1 tablet (50 mg total) by mouth 2 (two) times a day, Starting Fri 12/30/2022, Normal      benzonatate (TESSALON PERLES) 100 mg capsule Take 1 capsule (100 mg total) by mouth 3 (three) times a day as needed for cough, Starting Wed 12/7/2022, Normal      gabapentin (NEURONTIN) 300 mg capsule Take 1 capsule (300 mg total) by mouth 3 (three) times a day as needed (leg pain), Starting Fri 12/16/2022, Normal      guaifenesin-codeine (GUAIFENESIN AC) 100-10 MG/5ML liquid Take 5 mL by mouth daily at bedtime as needed for cough, Starting Wed 12/7/2022, Normal      LORazepam (ATIVAN) 1 mg tablet Take 1 tablet (1 mg total) by mouth once in imaging for anxiety for up to 1 dose, Starting Wed 11/16/2022, Normal      methocarbamol (ROBAXIN) 750 mg tablet Take 1 tablet (750 mg total) by mouth daily at bedtime as needed for muscle spasms, Starting Wed 10/12/2022, Normal      pregabalin (LYRICA) 75 mg capsule Take 1 capsule (75 mg total) by mouth 2 (two) times a day, Starting Wed 11/16/2022, Normal      telmisartan (MICARDIS) 80 MG tablet Take 1 tablet (80 mg total) by mouth daily, Starting Wed 7/13/2022, Normal           No discharge procedures on file  PDMP Review     None           ED Provider  Attending physically available and evaluated Christopher Jones I managed the patient along with the ED Attending      Electronically Signed by  DO Adrianne Ashraf DO  01/03/23 0138

## 2023-01-03 NOTE — ED ATTENDING ATTESTATION
1/2/2023  IMina MD, saw and evaluated the patient  I have discussed the patient with the resident/non-physician practitioner and agree with the resident's/non-physician practitioner's findings, Plan of Care, and MDM as documented in the resident's/non-physician practitioner's note, except where noted  All available labs and Radiology studies were reviewed  I was present for key portions of any procedure(s) performed by the resident/non-physician practitioner and I was immediately available to provide assistance  At this point I agree with the current assessment done in the Emergency Department  I have conducted an independent evaluation of this patient a history and physical is as follows:    63-year-old female with a history of mild intermittent asthma presented for evaluation of worsening symptoms over the last few weeks  She had wheezing and some dyspnea  Previously diagnosed with bronchitis  Has been using rescue inhaler at home without much improvement  She has been previously controlled on a daily steroid inhaler but has not been able to refill it secondary to insurance issues  Inspiratory and expiratory wheezing on arrival     She had symptomatic improvement after neb treatment  Started on oral steroid  Given thyroid inhaler as well to continue use to help prevent exacerbations      ED Course         Critical Care Time  Procedures

## 2023-01-04 ENCOUNTER — OFFICE VISIT (OUTPATIENT)
Dept: FAMILY MEDICINE CLINIC | Facility: CLINIC | Age: 58
End: 2023-01-04

## 2023-01-04 ENCOUNTER — OFFICE VISIT (OUTPATIENT)
Dept: OBGYN CLINIC | Facility: MEDICAL CENTER | Age: 58
End: 2023-01-04

## 2023-01-04 VITALS
SYSTOLIC BLOOD PRESSURE: 154 MMHG | DIASTOLIC BLOOD PRESSURE: 86 MMHG | RESPIRATION RATE: 20 BRPM | HEIGHT: 60 IN | HEART RATE: 60 BPM | TEMPERATURE: 98.2 F | BODY MASS INDEX: 33.57 KG/M2 | WEIGHT: 171 LBS | OXYGEN SATURATION: 97 %

## 2023-01-04 VITALS
DIASTOLIC BLOOD PRESSURE: 90 MMHG | HEIGHT: 60 IN | WEIGHT: 172 LBS | HEART RATE: 63 BPM | SYSTOLIC BLOOD PRESSURE: 172 MMHG | BODY MASS INDEX: 33.77 KG/M2

## 2023-01-04 DIAGNOSIS — G89.29 CHRONIC BILATERAL LOW BACK PAIN WITH BILATERAL SCIATICA: ICD-10-CM

## 2023-01-04 DIAGNOSIS — J82.83 EOSINOPHILIC ASTHMA: Primary | ICD-10-CM

## 2023-01-04 DIAGNOSIS — E11.9 TYPE II DIABETES MELLITUS, WELL CONTROLLED (HCC): ICD-10-CM

## 2023-01-04 DIAGNOSIS — J45.21 MILD INTERMITTENT ASTHMA WITH ACUTE EXACERBATION: ICD-10-CM

## 2023-01-04 DIAGNOSIS — M54.16 LEFT LUMBAR RADICULOPATHY: Primary | ICD-10-CM

## 2023-01-04 DIAGNOSIS — J45.41 MODERATE PERSISTENT ASTHMA WITH ACUTE EXACERBATION: ICD-10-CM

## 2023-01-04 DIAGNOSIS — M54.42 CHRONIC BILATERAL LOW BACK PAIN WITH BILATERAL SCIATICA: ICD-10-CM

## 2023-01-04 DIAGNOSIS — M54.41 CHRONIC BILATERAL LOW BACK PAIN WITH BILATERAL SCIATICA: ICD-10-CM

## 2023-01-04 RX ORDER — ALBUTEROL SULFATE 90 UG/1
2 AEROSOL, METERED RESPIRATORY (INHALATION) EVERY 6 HOURS PRN
Qty: 18 G | Refills: 0 | Status: SHIPPED | OUTPATIENT
Start: 2023-01-04

## 2023-01-04 RX ORDER — AZITHROMYCIN 250 MG/1
TABLET, FILM COATED ORAL
Qty: 6 TABLET | Refills: 0 | Status: SHIPPED | OUTPATIENT
Start: 2023-01-04 | End: 2023-01-09

## 2023-01-04 RX ORDER — PREGABALIN 75 MG/1
75 CAPSULE ORAL 2 TIMES DAILY
Qty: 90 CAPSULE | Refills: 0 | Status: SHIPPED | OUTPATIENT
Start: 2023-01-04

## 2023-01-04 NOTE — PROGRESS NOTES
1  Left lumbar radiculopathy  Ambulatory referral to Pain Management    pregabalin (LYRICA) 75 mg capsule      2  Chronic bilateral low back pain with bilateral sciatica  Ambulatory referral to Pain Management    pregabalin (LYRICA) 75 mg capsule        Orders Placed This Encounter   Procedures   • Ambulatory referral to Pain Management     Impression:  Patient is here in follow up of lumbar pain that is multifactorial and predominantly due to left lumbar radiculopathy and facet arthropathy  Her treatment has included physical therapy, home exercise program, over-the-counter pain medications   The patient continues to be symptomatic   We reviewed the MRI of her lumbar spine  She is currently using pregabalin 75 mg BID  We will her see pain management in consultation  Patient is in agreement with the above plan  Imaging Studies (I personally reviewed images in PACS and report if it was available):  Lumbar spine x-rays that are most recent to this encounter were reviewed   These images show grade 1 anterolisthesis of L4 on L5   Disc spaces are preserved   No acute osseous abnormalities      Lumbar spine MRI:  VERTEBRAL BODIES:  There are 5 lumbar type vertebral bodies  Grade 1 anterior spondylolisthesis of L4 upon L5  No spondylolysis  No compression fracture  No scoliosis  Normal marrow signal is identified within the visualized bony structures  No   discrete marrow lesion      SACRUM:  Normal signal within the sacrum  No evidence of insufficiency or stress fracture      DISTAL CORD AND CONUS:  Normal size and signal within the distal cord and conus      PARASPINAL SOFT TISSUES:  Paraspinal soft tissues are unremarkable      LOWER THORACIC DISC SPACES:  Normal disc height and signal   No disc herniation, canal stenosis or foraminal narrowing      LUMBAR DISC SPACES:     L1-L2:  Normal      L2-L3:  Normal      L3-L4:  Disc desiccation without loss of disc height    Minor annular bulging with a tiny central disc protrusion  No canal stenosis or foraminal narrowing  Mild facet degenerative change      L4-L5:  Moderate facet hypertrophic degenerative change with grade 1 anterior spondylolisthesis of L4 upon L5  Disc desiccation and loss of disc height with moderate diffuse annular bulging and a small left paramedian disc protrusion  Mild canal   stenosis with slight distortion of the left anterior lateral aspect of the thecal sac  Mild right greater than left foraminal narrowing      L5-S1:  Normal disc height and signal   No disc herniation, canal stenosis or foraminal narrowing      OTHER FINDINGS: None     IMPRESSION:    Lumbar degenerative disc disease at L3-4, L4-5 and L5-S1  Facet hypertrophic change and ligamentous laxity at L4-5 results in grade 1 anterior spondylolisthesis  Mild canal stenosis with slight distortion of the left anterior lateral aspect of the   thecal sac at this level  Mild right greater than left foraminal narrowing  No follow-ups on file  Patient is in agreement with the above plan  HPI:  Jasvir Valdes is a 62 y o  female  who presents in follow up  Here for   Chief Complaint   Patient presents with   • Back Pain     Mri review-       Since last visit: See above      Following history reviewed and updated:  Past Medical History:   Diagnosis Date   • Arthritis    • Asthma    • Diabetes mellitus ([facility] [address] )    • Diverticulitis of colon    • Fibromyalgia 04/26/2022   • Headache(784 0)    • History of mammogram 2018   • History of tobacco abuse 04/26/2022   • Hypertension    • Nausea 04/29/2022   • Obesity    • Urinary tract infection      Past Surgical History:   Procedure Laterality Date   • APPENDECTOMY     • CARPAL TUNNEL RELEASE     • COLONOSCOPY  2017    repeat in 2022   • FOOT SURGERY     • NECK SURGERY      spine fusion    • ND RPR UMBILICAL HRNA 5 YRS/> REDUCIBLE N/A 5/11/2022    Procedure: REPAIR HERNIA UMBILICAL;  Surgeon: Kuldeep Quick MD;  Location: [facility]; Service: General     Social History   Social History     Substance and Sexual Activity   Alcohol Use Yes    Comment: occasional      Social History     Substance and Sexual Activity   Drug Use Not Currently     Social History     Tobacco Use   Smoking Status Every Day   • Packs/day: 0 50   • Years: 25 00   • Pack years: 12 50   • Types: Cigarettes   Smokeless Tobacco Never   Tobacco Comments    per pt, 5 a day - As per [place]      Family History   Problem Relation Age of Onset   • Hypertension Mother    • Heart disease Mother    • Hypertension Father    • Heart disease Father    • Diabetes Maternal Grandmother    • Breast cancer Paternal Aunt    • Pancreatic cancer Paternal Uncle      No Known Allergies     Constitutional:  BP (!) 172/90   Pulse 63   Ht 5' (1 524 m)   Wt 78 kg (172 lb)   BMI 33 59 kg/m²    General: NAD  Eyes: Clear sclerae  ENT: No inflammation, lesion, or mass of lips  No tracheal deviation  Musculoskeletal: As mentioned below  Integumentary: No visible rashes or skin lesions  Pulmonary/Chest: Effort normal  No respiratory distress  Neuro: CN's grossly intact, [place] ROSA  Psych: Normal affect and judgement  Vascular: WWP  Back Exam     Tenderness   The patient is experiencing tenderness in the lumbar and sacroiliac      Tests   Straight leg raise right: negative  Straight leg raise left: negative    Reflexes   Patellar: normal    Other   Erythema: no back redness  Scars: absent             Procedures

## 2023-01-04 NOTE — PROGRESS NOTES
Name: Hernando Culver      : 1965      MRN: [id number]  Encounter Provider: ARSENIO Beltran  Encounter Date: 2023   Encounter department: [address] [place] [address]    Assessment & Plan     1  Eosinophilic asthma  -     Ambulatory Referral to Pulmonology; Future    2  Mild intermittent asthma with acute exacerbation  -     azithromycin (Zithromax) 250 mg tablet; Take 2 tablets (500 mg total) by mouth daily for 1 day, THEN 1 tablet (250 mg total) daily for 4 days  3  Moderate persistent asthma with acute exacerbation  -     albuterol (PROVENTIL HFA,VENTOLIN HFA) 90 mcg/act inhaler; Inhale 2 puffs every 6 (six) hours as needed for wheezing    Physical assessment unremarkable VS are WNL  Pt is doing much better since the ED  I advised she would benefit from a short course of azithromycin which was sent to the pharmacy  Also advised that her eosinophils are very elevated which could be making her asthma worse and will send her to Pulmonology  Will contact her with the time for the appt  All other questions were answered  Pt is to complete her labs and will f/u back in the office in 2 weeks          Subjective     HPI  Review of Systems    Past Medical History:   Diagnosis Date   • Arthritis    • Asthma    • Diabetes mellitus ([address] )    • Diverticulitis of colon    • Fibromyalgia 2022   • Headache(784 0)    • History of mammogram    • History of tobacco abuse 2022   • Hypertension    • Nausea 2022   • Obesity    • Urinary tract infection      Past Surgical History:   Procedure Laterality Date   • APPENDECTOMY     • CARPAL TUNNEL RELEASE     • COLONOSCOPY  2017    repeat in    • FOOT SURGERY     • NECK SURGERY      spine fusion    • [place] RPR UMBILICAL HRNA 5 YRS/> REDUCIBLE N/A 2022    Procedure: REPAIR HERNIA UMBILICAL;  Surgeon: Mendez Macedo MD;  Location[facility];  Service: General     Family History   Problem Relation Age of Onset   • Hypertension Mother • Heart disease Mother    • Hypertension Father    • Heart disease Father    • Diabetes Maternal Grandmother    • Breast cancer Paternal Aunt    • Pancreatic cancer Paternal Uncle      Social History     Socioeconomic History   • Marital status: /Civil Union     Spouse name: None   • Number of children: None   • Years of education: None   • Highest education level: None   Occupational History   • Occupation:     Tobacco Use   • Smoking status: Every Day     Packs/day: 0 50     Years: 25 00     Pack years: 12 50     Types: Cigarettes   • Smokeless tobacco: Never   • Tobacco comments:     per pt, 5 a day - As per [place] [employer] Use   • Vaping Use: Never used   Substance and Sexual Activity   • Alcohol use: Yes     Comment: occasional    • Drug use: Not Currently   • Sexual activity: Not Currently     Partners: Male     Birth control/protection: None   Other Topics Concern   • None   Social History Narrative    · Most recent tobacco use screenin2019      · Caffeine intake:   Occasional      · Seat belts used routinely:   Yes      · Smoke alarm in home: Yes      · Has the Patient had a mammogram to screen for breast cancer within 24 months:   Yes      · Please enter the date of the Patient's previous mammogram :  march of last year       As per [place]      Social Determinants of Health     Financial Resource Strain: Not on file   Food Insecurity: Not on file   Transportation Needs: Not on file   Physical Activity: Not on file   Stress: Not on file   Social Connections: Not on file   Intimate Partner Violence: Not on file   Housing Stability: Not on file     Current Outpatient Medications on File Prior to Visit   Medication Sig   • albuterol (2 5 mg/3 mL) 0 083 % nebulizer solution USE 1 VIAL IN NEBULIZER EVERY 2 HOURS AS NEEDED FOR WHEEZING   • atenolol (TENORMIN) 50 mg tablet Take 1 tablet (50 mg total) by mouth 2 (two) times a day   • benzonatate (TESSALON PERLES) 100 mg capsule Take 1 capsule (100 mg total) by mouth 3 (three) times a day as needed for cough   • budesonide (Pulmicort Flexhaler) 90 MCG/ACT inhaler Inhale 1 puff 2 (two) times a day Rinse mouth after use     • gabapentin (NEURONTIN) 300 mg capsule Take 1 capsule (300 mg total) by mouth 3 (three) times a day as needed (leg pain)   • predniSONE 20 mg tablet Take 2 tablets (40 mg total) by mouth daily for 4 days   • telmisartan (MICARDIS) 80 MG tablet Take 1 tablet (80 mg total) by mouth daily   • [DISCONTINUED] albuterol (PROVENTIL HFA,VENTOLIN HFA) 90 mcg/act inhaler Inhale 2 puffs every 6 (six) hours as needed for wheezing   • guaifenesin-codeine (GUAIFENESIN AC) 100-10 MG/5ML liquid Take 5 mL by mouth daily at bedtime as needed for cough (Patient not taking: Reported on 1/4/2023)   • LORazepam (ATIVAN) 1 mg tablet Take 1 tablet (1 mg total) by mouth once in imaging for anxiety for up to 1 dose (Patient not taking: Reported on 11/16/2022)   • methocarbamol (ROBAXIN) 750 mg tablet Take 1 tablet (750 mg total) by mouth daily at bedtime as needed for muscle spasms (Patient not taking: Reported on 11/16/2022)   • pregabalin (LYRICA) 75 mg capsule Take 1 capsule (75 mg total) by mouth 2 (two) times a day (Patient not taking: Reported on 11/16/2022)     No Known Allergies  Immunization History   Administered Date(s) Administered   • COVID-19 MODERNA VACC 0 5 ML IM 02/05/2021, 02/06/2021, 03/05/2021   • INFLUENZA 09/08/2016, 01/25/2020, 09/15/2020, 11/10/2020   • Influenza, injectable, quadrivalent, preservative free 0 5 mL 01/25/2020   • Pneumococcal Conjugate 13-Valent 05/06/2019   • Pneumococcal Polysaccharide PPV23 01/01/2017, 01/06/2017, 01/25/2020   • Tdap 04/30/2014, 01/01/2017, 01/23/2020, 02/23/2020   • Zoster Vaccine Recombinant 01/16/2020, 01/25/2020       Objective     /86 (BP Location: Left arm, Patient Position: Sitting, Cuff Size: Standard)   Pulse 60   Temp 98 2 °F (36 8 °C) (Temporal)   Resp 20   Ht 5' (1 524 m)   Wt 77 6 kg (171 lb)   SpO2 97%   BMI 33 40 kg/m²     Physical Exam  Claudia Counter, CRNP

## 2023-01-09 NOTE — PROGRESS NOTES
Portions of the record may have been created with voice recognition software  Occasional wrong word or "sound a like" substitutions may have occurred due to the inherent limitations of voice recognition software  Read the chart carefully and recognize, using context, where substitutions have occurred  Assessment  1  Chronic bilateral low back pain with bilateral sciatica    2  Lumbar radiculitis    3  Anterolisthesis of lumbar spine    4  Lumbar facet arthropathy        Plan  Orders Placed This Encounter   Procedures   • XR spine lumbar complete w bending minimum 6 views     Standing Status:   Future     Number of Occurrences:   1     Standing Expiration Date:   1/12/2027     Scheduling Instructions:      Bring along any outside films relating to this procedure  Order Specific Question:   Is the patient pregnant? Answer:   Unknown     No orders of the defined types were placed in this encounter  54-year-old female presenting with more than a year history of bilateral low back pain as well as bilateral feet pain and pins and needle sensation with associated radiating pain in the lateral aspect of the left thigh and lower leg  Physical exam findings unremarkable with no motor or sensory deficits but positive facet loading bilaterally  Lumbar MRI showing grade 1 anterolisthesis L4-L5 as well as mild bilateral neuroforaminal narrowing and discogenic disease  Degenerative facet arthrosis noted throughout the lumbar spine  Etiology of patient's presentation could be secondary to lumbar radiculitis as well as elements of lumbar facet arthropathy      -At this time given she has not improved with conservative management including physical therapy, neuropathic agents such as gabapentin and Lyrica, we will proceed with lumbar epidural steroid injection, L5-S1  I explained the risk and benefits and indications of the procedure and patient wishes to proceed      - I also explained that her pain is multifactorial and we can address the lumbar arthritis with lumbar medial branch blocks in the future if needed  My impressions and treatment recommendations were discussed in detail with the patient who verbalized understanding and had no further questions  Discharge instructions were provided  I personally saw and examined the patient and I agree with the above discussed plan of care  History of Present Illness    Micky Cobb is a 62 y o  female  referred to [facility] by Kaylynn Robles DO      Patient presents with more than a year history of bilateral low back and feet pain and pins and needle sensation  She also admits to radiating pain in the left lateral thigh and lower leg but no radiating pain or symptoms in the right lower extremity besides the right foot  She states over the past month it has been severe, 10 out of 10 and constant and describes it as burning, numbness, pins-and-needles  She has subjective weakness of lower extremity  The pain is mostly localized to bilateral lumbar sacral paraspinal region and circumferentially in the bilateral feet  She states its not changed with any movement including standing, bending, sitting, walking  In terms of management, she has physical therapy in October which has not helped  She has seen Dr Buffy Roach, sports medicine and has been managed with gabapentin and recently Lyrica which has not provided adequate relief  She has not had any prior spine injections or surgeries  In terms of medications, she reports Lidoderm and Voltaren gel provide some relief Tylenol provides some relief  Terms of imaging, patient has a lumbar MRI from 12- which shows degenerative disc disease at L3-S1 as well as moderate facet arthropathy at L4-L5 with grade 1 anterior spondylolisthesis with mild bilateral neuroforaminal narrowing at L4-L5  No severe central or neuroforaminal stenosis in the lumbar spine    Lumbar x-ray from 8- showing mild degenerative changes without any acute abnormality  Patient denies any bowel bladder incontinence or saddle anesthesia  Denies any recent fevers chills or weight changes  Lab Results   Component Value Date    CREATININE 0 62 01/11/2023     Lab Results   Component Value Date    ALT 11 01/11/2023         Imaging:       MRI LUMBAR SPINE WITHOUT CONTRAST   12-     INDICATION: M54 17: Radiculopathy, lumbosacral region  M54 42: Lumbago with sciatica, left side  M54 41: Lumbago with sciatica, right side  G89 29: Other chronic pain      COMPARISON:  None      TECHNIQUE:  Multiplanar, multisequence imaging of the lumbar spine was performed             IMAGE QUALITY:  Diagnostic     FINDINGS:     VERTEBRAL BODIES:  There are 5 lumbar type vertebral bodies  Grade 1 anterior spondylolisthesis of L4 upon L5  No spondylolysis  No compression fracture  No scoliosis  Normal marrow signal is identified within the visualized bony structures  No   discrete marrow lesion      SACRUM:  Normal signal within the sacrum  No evidence of insufficiency or stress fracture      DISTAL CORD AND CONUS:  Normal size and signal within the distal cord and conus      PARASPINAL SOFT TISSUES:  Paraspinal soft tissues are unremarkable      LOWER THORACIC DISC SPACES:  Normal disc height and signal   No disc herniation, canal stenosis or foraminal narrowing      LUMBAR DISC SPACES:     L1-L2:  Normal      L2-L3:  Normal      L3-L4:  Disc desiccation without loss of disc height  Minor annular bulging with a tiny central disc protrusion  No canal stenosis or foraminal narrowing  Mild facet degenerative change      L4-L5:  Moderate facet hypertrophic degenerative change with grade 1 anterior spondylolisthesis of L4 upon L5  Disc desiccation and loss of disc height with moderate diffuse annular bulging and a small left paramedian disc protrusion    Mild canal   stenosis with slight distortion of the left anterior lateral aspect of the thecal sac  Mild right greater than left foraminal narrowing      L5-S1:  Normal disc height and signal   No disc herniation, canal stenosis or foraminal narrowing      OTHER FINDINGS: None     IMPRESSION:     Lumbar degenerative disc disease at L3-4, L4-5 and L5-S1  Facet hypertrophic change and ligamentous laxity at L4-5 results in grade 1 anterior spondylolisthesis  Mild canal stenosis with slight distortion of the left anterior lateral aspect of the   thecal sac at this level  Mild right greater than left foraminal narrowing    LUMBAR SPINE   8-     INDICATION:   M54 42: Lumbago with sciatica, left side  M54 41: Lumbago with sciatica, right side  G89 29: Other chronic pain      COMPARISON:  11/13/2012     VIEWS:  XR SPINE LUMBAR 2 OR 3 VIEWS INJURY        FINDINGS:     There are 5 non rib bearing lumbar vertebral bodies       Grade 1 anterior listhesis L4 and L5 has progressed slightly since the prior study due to degenerative facet arthrosis  Very slight scoliotic deformity convex left centered at the L3-4 level  Alignment otherwise near-anatomic      No acute fracture or subluxation noted       Mild discogenic disease present at L4-5 and L5-S1 with disc space narrowing and mild spondylitic ridging  Degenerative facet arthrosis present at the lower 3 lumbar levels bilaterally      The pedicles appear intact      Surgical clips in the right pelvis noted      IMPRESSION:     Mild degenerative change without acute abnormality        No MRI cervical spine results found in the past 2 years   MRI lumbar spine wo contrast    Result Date: 12/16/2022  Impression     Lumbar degenerative disc disease at L3-4, L4-5 and L5-S1  Facet hypertrophic change and ligamentous laxity at L4-5 results in grade 1 anterior spondylolisthesis  Mild canal stenosis with slight distortion of the left anterior lateral aspect of the thecal sac at this level    Mild right greater than left foraminal narrowing  Workstation performed: [id number]          No MRI thoracic spine results found in the past 2 years   No X-ray cervical spine results found in the past 2 years   XR spine lumbar 2 or 3 views injury    Result Date: 9/3/2022  Impression     Mild degenerative change without acute abnormality  Workstation performed: [id number]          No X-ray hip/pelvis results found in the past 2 years   XR knee 3 vw left non injury    Result Date: 6/11/2022  Impression     Tricompartmental degenerative change, new from 2012  Workstation performed: [id number]                I have personally reviewed and/or updated the patient's past medical history, past surgical history, family history, social history, current medications, allergies, and vital signs today  Review of Systems   Constitutional: Positive for appetite change  Respiratory: Positive for cough, shortness of breath and wheezing  Musculoskeletal: Positive for back pain, gait problem and joint swelling  Left leg, both feet   Neurological: Positive for numbness and headaches         Patient Active Problem List   Diagnosis   • History of tobacco abuse   • Primary hypertension   • Fibromyalgia   • Continuous opioid dependence ([address] )   • Mild intermittent asthma without complication   • Diabetes mellitus type 2, controlled ([address] )   • Anxiety   • Sjogren's syndrome (HCC)   • Incarcerated umbilical hernia   • Postoperative visit   • Chronic bilateral low back pain with bilateral sciatica   • Paresthesia of both feet   • Right lumbosacral radiculopathy       Past Medical History:   Diagnosis Date   • Arthritis    • Asthma    • Diabetes mellitus ([address] )    • Diverticulitis of colon    • Fibromyalgia 04/26/2022   • Headache(784 0)    • History of mammogram 2018   • History of tobacco abuse 04/26/2022   • Hypertension    • Nausea 04/29/2022   • Obesity    • Urinary tract infection        Past Surgical History:   Procedure Laterality Date   • APPENDECTOMY     • CARPAL TUNNEL RELEASE     • COLONOSCOPY  2017    repeat in 2022   • FOOT SURGERY     • NECK SURGERY      spine fusion    • [place] RPR UMBILICAL HRNA 5 YRS/> REDUCIBLE N/A 5/11/2022    Procedure: REPAIR HERNIA UMBILICAL;  Surgeon: Mattie Duane, MD;  Location: [facility];  Service: General       Family History   Problem Relation Age of Onset   • Hypertension Mother    • Heart disease Mother    • Hypertension Father    • Heart disease Father    • Diabetes Maternal Grandmother    • Breast cancer Paternal Aunt    • Pancreatic cancer Paternal Uncle        Social History     Occupational History   • Occupation:     Tobacco Use   • Smoking status: Every Day     Packs/day: 0 50     Years: 25 00     Pack years: 12 50     Types: Cigarettes   • Smokeless tobacco: Never   • Tobacco comments:     per pt, 5 a day - As per [employer] Use   • Vaping Use: Never used   Substance and Sexual Activity   • Alcohol use: Yes     Comment: occasional    • Drug use: Never   • Sexual activity: Not Currently     Partners: Male     Birth control/protection: None       Current Outpatient Medications on File Prior to Visit   Medication Sig   • albuterol (2 5 mg/3 mL) 0 083 % nebulizer solution USE 1 VIAL IN NEBULIZER EVERY 2 HOURS AS NEEDED FOR WHEEZING   • albuterol (PROVENTIL HFA,VENTOLIN HFA) 90 mcg/act inhaler Inhale 2 puffs every 6 (six) hours as needed for wheezing   • atenolol (TENORMIN) 50 mg tablet Take 1 tablet (50 mg total) by mouth 2 (two) times a day   • benzonatate (TESSALON PERLES) 100 mg capsule Take 1 capsule (100 mg total) by mouth 3 (three) times a day as needed for cough   • budesonide (Pulmicort Flexhaler) 90 MCG/ACT inhaler Inhale 1 puff 2 (two) times a day Rinse mouth after use     • pregabalin (LYRICA) 75 mg capsule Take 1 capsule (75 mg total) by mouth 2 (two) times a day   • telmisartan (MICARDIS) 80 MG tablet Take 1 tablet (80 mg total) by mouth daily   • gabapentin (NEURONTIN) 300 mg capsule Take 1 capsule (300 mg total) by mouth 3 (three) times a day as needed (leg pain)   • guaifenesin-codeine (GUAIFENESIN AC) 100-10 MG/5ML liquid Take 5 mL by mouth daily at bedtime as needed for cough   • LORazepam (ATIVAN) 1 mg tablet Take 1 tablet (1 mg total) by mouth once in imaging for anxiety for up to 1 dose   • methocarbamol (ROBAXIN) 750 mg tablet Take 1 tablet (750 mg total) by mouth daily at bedtime as needed for muscle spasms     No current facility-administered medications on file prior to visit  No Known Allergies    Physical Exam    /89   Pulse 67   Ht 5' (1 524 m) Comment: verbal  Wt 79 8 kg (176 lb)   BMI 34 37 kg/m²     Constitutional: normal, well developed, well nourished, alert, in no distress and non-toxic and no overt pain behavior  Eyes: anicteric  HEENT: grossly intact  Neck: supple, symmetric, trachea midline and no masses   Pulmonary:even and unlabored  Cardiovascular:No edema or pitting edema present  Skin:Normal without rashes or lesions and well hydrated  Psychiatric:Mood and affect appropriate  Neurologic:Cranial Nerves II-XII grossly intact  Musculoskeletal:normal      Palpation:    Moderate tenderness over the left paravertebral musculature  Moderate tenderness over the right paravertebral musculature    No tenderness with palpation of the left SI joint  No tenderness with palpation of the right SI joint    No tenderness with palpation of the left greater trochanter  No tenderness with palpation of the right greater trochanter    Sensory:    Intact to light touch grossly in the left lower extremity   Intact to light touch grossly in the right lower extremity    Muscle Strength      Hip flexion Knee flexion Knee extension  L4 Foot plantarflexion  S1 Foot   Dorsiflexion  L5 EHL-  Dorsiflexion  L5  EHL- Plantar Flexion-S1 Heel walking- L5 Toe walking   S1   L 5/5 5/5 5/5 5/5 5/5 5/5 5/5     R 5/5 5/5 5/5 5/5 5/5 5/5 5/5       DTRs: 2+ patellar, 2+ Achilles and symmetric       Special Tests:     Facet loading Straight leg raise HUMBERTO FAIR   L  positive  negative  negative    R  positive  negative  negative

## 2023-01-11 LAB
ALBUMIN SERPL-MCNC: 3.7 G/DL (ref 3.6–5.1)
ALBUMIN/GLOB SERPL: 1.3 (CALC) (ref 1–2.5)
ALP SERPL-CCNC: 64 U/L (ref 37–153)
ALT SERPL-CCNC: 11 U/L (ref 6–29)
APPEARANCE UR: CLEAR
AST SERPL-CCNC: 13 U/L (ref 10–35)
BACTERIA UR QL AUTO: NORMAL /HPF
BASOPHILS # BLD AUTO: 141 CELLS/UL (ref 0–200)
BASOPHILS NFR BLD AUTO: 1.6 %
BILIRUB SERPL-MCNC: 0.4 MG/DL (ref 0.2–1.2)
BILIRUB UR QL STRIP: NEGATIVE
BUN SERPL-MCNC: 16 MG/DL (ref 7–25)
BUN/CREAT SERPL: ABNORMAL (CALC) (ref 6–22)
CALCIUM SERPL-MCNC: 9.3 MG/DL (ref 8.6–10.4)
CHLORIDE SERPL-SCNC: 100 MMOL/L (ref 98–110)
CHOLEST SERPL-MCNC: 203 MG/DL
CHOLEST/HDLC SERPL: 3 (CALC)
CO2 SERPL-SCNC: 34 MMOL/L (ref 20–32)
COLOR UR: YELLOW
CREAT SERPL-MCNC: 0.62 MG/DL (ref 0.5–1.03)
EOSINOPHIL # BLD AUTO: 1144 CELLS/UL (ref 15–500)
EOSINOPHIL NFR BLD AUTO: 13 %
ERYTHROCYTE [DISTWIDTH] IN BLOOD BY AUTOMATED COUNT: 13.4 % (ref 11–15)
GFR/BSA.PRED SERPLBLD CYS-BASED-ARV: 104 ML/MIN/1.73M2
GLOBULIN SER CALC-MCNC: 2.8 G/DL (CALC) (ref 1.9–3.7)
GLUCOSE SERPL-MCNC: 104 MG/DL (ref 65–99)
GLUCOSE UR QL STRIP: NEGATIVE
HBA1C MFR BLD: 6.9 % OF TOTAL HGB
HCT VFR BLD AUTO: 38.8 % (ref 35–45)
HDLC SERPL-MCNC: 67 MG/DL
HGB BLD-MCNC: 13.2 G/DL (ref 11.7–15.5)
HGB UR QL STRIP: NEGATIVE
HYALINE CASTS #/AREA URNS LPF: NORMAL /LPF
KETONES UR QL STRIP: NEGATIVE
LDLC SERPL CALC-MCNC: 113 MG/DL (CALC)
LEUKOCYTE ESTERASE UR QL STRIP: NEGATIVE
LYMPHOCYTES # BLD AUTO: 3203 CELLS/UL (ref 850–3900)
LYMPHOCYTES NFR BLD AUTO: 36.4 %
MCH RBC QN AUTO: 28.7 PG (ref 27–33)
MCHC RBC AUTO-ENTMCNC: 34 G/DL (ref 32–36)
MCV RBC AUTO: 84.3 FL (ref 80–100)
MONOCYTES # BLD AUTO: 854 CELLS/UL (ref 200–950)
MONOCYTES NFR BLD AUTO: 9.7 %
NEUTROPHILS # BLD AUTO: 3458 CELLS/UL (ref 1500–7800)
NEUTROPHILS NFR BLD AUTO: 39.3 %
NITRITE UR QL STRIP: NEGATIVE
NONHDLC SERPL-MCNC: 136 MG/DL (CALC)
PH UR STRIP: 8 [PH] (ref 5–8)
PLATELET # BLD AUTO: 378 THOUSAND/UL (ref 140–400)
PMV BLD REES-ECKER: 9.7 FL (ref 7.5–12.5)
POTASSIUM SERPL-SCNC: 4.5 MMOL/L (ref 3.5–5.3)
PROT SERPL-MCNC: 6.5 G/DL (ref 6.1–8.1)
PROT UR QL STRIP: NEGATIVE
RBC # BLD AUTO: 4.6 MILLION/UL (ref 3.8–5.1)
RBC #/AREA URNS HPF: NORMAL /HPF
SODIUM SERPL-SCNC: 139 MMOL/L (ref 135–146)
SP GR UR STRIP: 1.01 (ref 1–1.03)
SQUAMOUS #/AREA URNS HPF: NORMAL /HPF
TRIGL SERPL-MCNC: 115 MG/DL
TSH SERPL-ACNC: 1.62 MIU/L (ref 0.4–4.5)
WBC # BLD AUTO: 8.8 THOUSAND/UL (ref 3.8–10.8)
WBC #/AREA URNS HPF: NORMAL /HPF

## 2023-01-12 ENCOUNTER — APPOINTMENT (OUTPATIENT)
Dept: RADIOLOGY | Facility: MEDICAL CENTER | Age: 58
End: 2023-01-12

## 2023-01-12 ENCOUNTER — CONSULT (OUTPATIENT)
Dept: PAIN MEDICINE | Facility: CLINIC | Age: 58
End: 2023-01-12

## 2023-01-12 ENCOUNTER — TELEPHONE (OUTPATIENT)
Dept: OBGYN CLINIC | Facility: MEDICAL CENTER | Age: 58
End: 2023-01-12

## 2023-01-12 VITALS
HEART RATE: 67 BPM | HEIGHT: 60 IN | BODY MASS INDEX: 34.55 KG/M2 | WEIGHT: 176 LBS | DIASTOLIC BLOOD PRESSURE: 89 MMHG | SYSTOLIC BLOOD PRESSURE: 167 MMHG

## 2023-01-12 DIAGNOSIS — G89.29 CHRONIC BILATERAL LOW BACK PAIN WITH BILATERAL SCIATICA: Primary | ICD-10-CM

## 2023-01-12 DIAGNOSIS — M43.16 ANTEROLISTHESIS OF LUMBAR SPINE: ICD-10-CM

## 2023-01-12 DIAGNOSIS — M47.816 LUMBAR FACET ARTHROPATHY: ICD-10-CM

## 2023-01-12 DIAGNOSIS — M54.16 LUMBAR RADICULITIS: ICD-10-CM

## 2023-01-12 DIAGNOSIS — M54.42 CHRONIC BILATERAL LOW BACK PAIN WITH BILATERAL SCIATICA: Primary | ICD-10-CM

## 2023-01-12 DIAGNOSIS — M54.41 CHRONIC BILATERAL LOW BACK PAIN WITH BILATERAL SCIATICA: Primary | ICD-10-CM

## 2023-01-12 NOTE — TELEPHONE ENCOUNTER
Scheduled patient for [facility] and f/u appt  Patient denies prescription blood thinners  Patient aware of the following:  No vaccines 14 days before or after procedure  Nothing to eat or drink one hour before  Wear loose/comfortable clothing  Please reschedule if sick or taking antibiotics  Patient to arrange transportation

## 2023-01-16 ENCOUNTER — TELEPHONE (OUTPATIENT)
Dept: RADIOLOGY | Facility: CLINIC | Age: 58
End: 2023-01-16

## 2023-01-18 ENCOUNTER — OFFICE VISIT (OUTPATIENT)
Dept: FAMILY MEDICINE CLINIC | Facility: CLINIC | Age: 58
End: 2023-01-18

## 2023-01-18 ENCOUNTER — HOSPITAL ENCOUNTER (OUTPATIENT)
Dept: RADIOLOGY | Facility: MEDICAL CENTER | Age: 58
Discharge: HOME | End: 2023-01-18

## 2023-01-18 VITALS — WEIGHT: 176 LBS | BODY MASS INDEX: 34.55 KG/M2 | HEIGHT: 60 IN

## 2023-01-18 VITALS
RESPIRATION RATE: 18 BRPM | BODY MASS INDEX: 35.14 KG/M2 | DIASTOLIC BLOOD PRESSURE: 72 MMHG | OXYGEN SATURATION: 98 % | HEART RATE: 58 BPM | SYSTOLIC BLOOD PRESSURE: 120 MMHG | TEMPERATURE: 98.1 F | WEIGHT: 179 LBS | HEIGHT: 60 IN

## 2023-01-18 DIAGNOSIS — J45.41 MODERATE PERSISTENT ASTHMA WITH ACUTE EXACERBATION: ICD-10-CM

## 2023-01-18 DIAGNOSIS — I10 PRIMARY HYPERTENSION: ICD-10-CM

## 2023-01-18 DIAGNOSIS — Z12.4 SCREENING FOR CERVICAL CANCER: ICD-10-CM

## 2023-01-18 DIAGNOSIS — Z12.31 ENCOUNTER FOR SCREENING MAMMOGRAM FOR BREAST CANCER: ICD-10-CM

## 2023-01-18 DIAGNOSIS — Z12.31 ENCOUNTER FOR SCREENING MAMMOGRAM FOR MALIGNANT NEOPLASM OF BREAST: ICD-10-CM

## 2023-01-18 DIAGNOSIS — E11.9 TYPE II DIABETES MELLITUS, WELL CONTROLLED (HCC): ICD-10-CM

## 2023-01-18 DIAGNOSIS — E78.2 MIXED HYPERLIPIDEMIA: Primary | ICD-10-CM

## 2023-01-18 RX ORDER — ATENOLOL 50 MG/1
50 TABLET ORAL DAILY
Qty: 90 TABLET | Refills: 1 | Status: SHIPPED | OUTPATIENT
Start: 2023-01-18

## 2023-01-18 RX ORDER — ALBUTEROL SULFATE 90 UG/1
2 AEROSOL, METERED RESPIRATORY (INHALATION) EVERY 6 HOURS PRN
Qty: 18 G | Refills: 5 | Status: SHIPPED | OUTPATIENT
Start: 2023-01-18

## 2023-01-18 NOTE — PROGRESS NOTES
Name: Peter Diaz      : 1965      MRN: [id number]  Encounter Provider: ARSENIO Emmanuel  Encounter Date: 2023   Encounter department: [address] [place] [address] Place     1  Mixed hyperlipidemia  -     CBC and differential; Future  -     Comprehensive metabolic panel; Future  -     Lipid panel; Future  -     TSH, 3rd generation; Future  -     UA w Reflex to Microscopic w Reflex to Culture    2  Primary hypertension  -     atenolol (TENORMIN) 50 mg tablet; Take 1 tablet (50 mg total) by mouth daily  -     UA w Reflex to Microscopic w Reflex to Culture  -     CBC and differential; Future  -     Comprehensive metabolic panel; Future  -     Lipid panel; Future  -     TSH, 3rd generation; Future  -     UA w Reflex to Microscopic w Reflex to Culture    3  Moderate persistent asthma with acute exacerbation  -     albuterol (PROVENTIL HFA,VENTOLIN HFA) 90 mcg/act inhaler; Inhale 2 puffs every 6 (six) hours as needed for wheezing  -     CBC and differential; Future  -     Comprehensive metabolic panel; Future  -     Lipid panel; Future  -     TSH, 3rd generation; Future  -     UA w Reflex to Microscopic w Reflex to Culture    4  Screening for cervical cancer  -     Ambulatory Referral to Obstetrics / Gynecology; Future    5  Type II diabetes mellitus, well controlled ([facility] )  -     UA w Reflex to Microscopic w Reflex to Culture  -     Hemoglobin A1C; Future; Expected date: 2023  -     Microalbumin / creatinine urine ratio           Patient for routine health physical   Physical assessment was unremarkable  Labs reviewed all found to be within normal limits  Patient was informed that she does have high eosinophils she does have a follow-up appoint with pulmonology I highly recommend that she talk to the pulmonologist concerning this  Are medications available for eosinophilic asthma  Was vital signs reviewed found to be within normal limits    Patient is doing quite well on all of her medications all were refilled at current doses with no changes  Patient was given routine lab she is to complete those 1-2 weeks prior to her next office visit in 6 months  All questions answered she is very happy with the outcome of today's visit  Dosing all possible side effects of the prescribed medications or medications that had been prescribed in the past were reviewed and all questions were answered  Patient verbalized agreement and understanding of the plan of care as outlined during the office visit today return to office as indicated or sooner if a problem arises  Subjective       Patient is here for routine follow-up  To review most recent labs  To also discuss current state of health and any new problems that they may be experiencing  Patient states that medications taken as prescribed and very well tolerated no new complaints at this time  Review of Systems   Constitutional: Negative for appetite change and fever  HENT: Negative for sinus pressure and sore throat  Eyes: Negative for pain  Respiratory: Negative for shortness of breath  Cardiovascular: Negative for chest pain  Gastrointestinal: Negative for abdominal pain  Genitourinary: Negative for dysuria  Musculoskeletal: Negative for arthralgias and myalgias  Skin: Negative for color change  Neurological: Negative for light-headedness  Psychiatric/Behavioral: Negative for behavioral problems         Past Medical History:   Diagnosis Date   • Arthritis    • Asthma    • Diabetes mellitus ([address] )    • Diverticulitis of colon    • Fibromyalgia 04/26/2022   • Headache(784 0)    • History of mammogram 2018   • History of tobacco abuse 04/26/2022   • Hypertension    • Nausea 04/29/2022   • Obesity    • Urinary tract infection      Past Surgical History:   Procedure Laterality Date   • APPENDECTOMY     • BREAST BIOPSY Left     unsure of year   • CARPAL TUNNEL RELEASE     • COLONOSCOPY  2017    repeat in 2022   • FOOT SURGERY     • NECK SURGERY      spine fusion    • [place] RPR UMBILICAL HRNA 5 YRS/> REDUCIBLE N/A 2022    Procedure: REPAIR HERNIA UMBILICAL;  Surgeon: Franky Cisneros MD;  Location:  MAIN OR;  Service: General     Family History   Problem Relation Age of Onset   • Hypertension Mother    • Heart disease Mother    • Hypertension Father    • Heart disease Father    • No Known Problems Sister    • No Known Problems Sister    • No Known Problems Sister    • Diabetes Maternal Grandmother    • No Known Problems Maternal Grandfather    • No Known Problems Paternal Grandmother    • No Known Problems Paternal Grandfather    • Breast cancer Paternal Aunt    • Pancreatic cancer Paternal Uncle      Social History     Socioeconomic History   • Marital status: /Civil Union     Spouse name: None   • Number of children: None   • Years of education: None   • Highest education level: None   Occupational History   • Occupation:     Tobacco Use   • Smoking status: Every Day     Packs/day: 0 50     Years: 25 00     Pack years: 12 50     Types: Cigarettes   • Smokeless tobacco: Never   • Tobacco comments:     per pt, 5 a day - As per [employer] Use   • Vaping Use: Never used   Substance and Sexual Activity   • Alcohol use: Yes     Comment: occasional    • Drug use: Never   • Sexual activity: Not Currently     Partners: Male     Birth control/protection: None   Other Topics Concern   • None   Social History Narrative    · Most recent tobacco use screenin2019      · Caffeine intake:   Occasional      · Seat belts used routinely:   Yes      · Smoke alarm in home: Yes      · Has the Patient had a mammogram to screen for breast cancer within 24 months:   Yes      · Please enter the date of the Patient's previous mammogram :  march of last year       As per [place]      Social Determinants of Health     Financial Resource Strain: Not on file   Food Insecurity: Not on file   Transportation Needs: Not on file   Physical Activity: Not on file   Stress: Not on file   Social Connections: Not on file   Intimate Partner Violence: Not on file   Housing Stability: Not on file     Current Outpatient Medications on File Prior to Visit   Medication Sig   • albuterol (2 5 mg/3 mL) 0 083 % nebulizer solution USE 1 VIAL IN NEBULIZER EVERY 2 HOURS AS NEEDED FOR WHEEZING   • benzonatate (TESSALON PERLES) 100 mg capsule Take 1 capsule (100 mg total) by mouth 3 (three) times a day as needed for cough   • budesonide (Pulmicort Flexhaler) 90 MCG/ACT inhaler Inhale 1 puff 2 (two) times a day Rinse mouth after use     • pregabalin (LYRICA) 75 mg capsule Take 1 capsule (75 mg total) by mouth 2 (two) times a day   • telmisartan (MICARDIS) 80 MG tablet Take 1 tablet (80 mg total) by mouth daily   • [DISCONTINUED] albuterol (PROVENTIL HFA,VENTOLIN HFA) 90 mcg/act inhaler Inhale 2 puffs every 6 (six) hours as needed for wheezing   • [DISCONTINUED] atenolol (TENORMIN) 50 mg tablet Take 1 tablet (50 mg total) by mouth 2 (two) times a day   • [DISCONTINUED] gabapentin (NEURONTIN) 300 mg capsule Take 1 capsule (300 mg total) by mouth 3 (three) times a day as needed (leg pain)   • [DISCONTINUED] guaifenesin-codeine (GUAIFENESIN AC) 100-10 MG/5ML liquid Take 5 mL by mouth daily at bedtime as needed for cough   • [DISCONTINUED] LORazepam (ATIVAN) 1 mg tablet Take 1 tablet (1 mg total) by mouth once in imaging for anxiety for up to 1 dose   • [DISCONTINUED] methocarbamol (ROBAXIN) 750 mg tablet Take 1 tablet (750 mg total) by mouth daily at bedtime as needed for muscle spasms     No Known Allergies  Immunization History   Administered Date(s) Administered   • COVID-19 MODERNA VACC 0 5 ML IM 02/05/2021, 02/06/2021, 03/05/2021   • INFLUENZA 09/08/2016, 01/25/2020, 09/15/2020, 11/10/2020   • Influenza, injectable, quadrivalent, preservative free 0 5 mL 01/25/2020   • Pneumococcal Conjugate 13-Valent 05/06/2019   • Pneumococcal Polysaccharide PPV23 01/01/2017, 01/06/2017, 01/25/2020   • Tdap 04/30/2014, 01/01/2017, 01/23/2020, 02/23/2020   • Zoster Vaccine Recombinant 01/16/2020, 01/25/2020       Objective     /72 (BP Location: Left arm, Patient Position: Sitting, Cuff Size: Large)   Pulse 58   Temp 98 1 °F (36 7 °C) (Temporal)   Resp 18   Ht 5' (1 524 m)   Wt 81 2 kg (179 lb)   SpO2 98%   BMI 34 96 kg/m²     Physical Exam  Vitals and nursing note reviewed  Constitutional:       General: She is not in acute distress  Appearance: She is well-developed  She is not diaphoretic  HENT:      Head: Normocephalic and atraumatic  Right Ear: External ear normal       Left Ear: External ear normal    Eyes:      Pupils: Pupils are equal, round, and reactive to light  Cardiovascular:      Rate and Rhythm: Normal rate and regular rhythm  Heart sounds: Normal heart sounds  Pulmonary:      Effort: Pulmonary effort is normal       Breath sounds: Normal breath sounds  Abdominal:      Palpations: Abdomen is soft  Musculoskeletal:         General: Normal range of motion  Cervical back: Normal range of motion and neck supple  Skin:     General: Skin is dry  Neurological:      Mental Status: She is alert and oriented to person, place, and time  Psychiatric:         Behavior: Behavior normal          Thought Content:  Thought content normal        ARSENIO Batres

## 2023-02-02 ENCOUNTER — OFFICE (OUTPATIENT)
Dept: URBAN - NONMETROPOLITAN AREA CLINIC 1 | Facility: CLINIC | Age: 58
End: 2023-02-02

## 2023-02-02 VITALS
SYSTOLIC BLOOD PRESSURE: 137 MMHG | HEART RATE: 65 BPM | HEIGHT: 65 IN | WEIGHT: 162 LBS | DIASTOLIC BLOOD PRESSURE: 86 MMHG

## 2023-02-02 DIAGNOSIS — Z86.010 PERSONAL HISTORY OF COLONIC POLYPS: ICD-10-CM

## 2023-02-08 ENCOUNTER — APPOINTMENT (OUTPATIENT)
Dept: LAB | Facility: CLINIC | Age: 58
End: 2023-02-08

## 2023-02-08 ENCOUNTER — CONSULT (OUTPATIENT)
Dept: PULMONOLOGY | Facility: CLINIC | Age: 58
End: 2023-02-08

## 2023-02-08 VITALS
DIASTOLIC BLOOD PRESSURE: 90 MMHG | HEIGHT: 60 IN | BODY MASS INDEX: 34.36 KG/M2 | SYSTOLIC BLOOD PRESSURE: 140 MMHG | OXYGEN SATURATION: 99 % | HEART RATE: 54 BPM | RESPIRATION RATE: 18 BRPM | TEMPERATURE: 98.2 F | WEIGHT: 175 LBS

## 2023-02-08 DIAGNOSIS — Z71.6 TOBACCO ABUSE COUNSELING: Primary | ICD-10-CM

## 2023-02-08 DIAGNOSIS — Z72.0 TOBACCO ABUSE: ICD-10-CM

## 2023-02-08 DIAGNOSIS — J82.83 EOSINOPHILIC ASTHMA: ICD-10-CM

## 2023-02-08 PROBLEM — J45.40 MODERATE PERSISTENT ASTHMA WITHOUT COMPLICATION: Status: ACTIVE | Noted: 2022-04-27

## 2023-02-08 LAB
BASOPHILS # BLD AUTO: 0.12 THOUSANDS/ÂΜL (ref 0–0.1)
BASOPHILS NFR BLD AUTO: 2 % (ref 0–1)
BILIRUB UR QL STRIP: NEGATIVE
CLARITY UR: CLEAR
COLOR UR: NORMAL
CREAT UR-MCNC: 51.1 MG/DL
EOSINOPHIL # BLD AUTO: 0.86 THOUSAND/ÂΜL (ref 0–0.61)
EOSINOPHIL NFR BLD AUTO: 12 % (ref 0–6)
ERYTHROCYTE [DISTWIDTH] IN BLOOD BY AUTOMATED COUNT: 14.9 % (ref 11.6–15.1)
GLUCOSE UR STRIP-MCNC: NEGATIVE MG/DL
HCT VFR BLD AUTO: 41.3 % (ref 34.8–46.1)
HGB BLD-MCNC: 13.3 G/DL (ref 11.5–15.4)
HGB UR QL STRIP.AUTO: NEGATIVE
IMM GRANULOCYTES # BLD AUTO: 0.03 THOUSAND/UL (ref 0–0.2)
IMM GRANULOCYTES NFR BLD AUTO: 0 % (ref 0–2)
KETONES UR STRIP-MCNC: NEGATIVE MG/DL
LEUKOCYTE ESTERASE UR QL STRIP: NEGATIVE
LYMPHOCYTES # BLD AUTO: 2.89 THOUSANDS/ÂΜL (ref 0.6–4.47)
LYMPHOCYTES NFR BLD AUTO: 39 % (ref 14–44)
MCH RBC QN AUTO: 29.2 PG (ref 26.8–34.3)
MCHC RBC AUTO-ENTMCNC: 32.2 G/DL (ref 31.4–37.4)
MCV RBC AUTO: 91 FL (ref 82–98)
MICROALBUMIN UR-MCNC: 7.5 MG/L (ref 0–20)
MICROALBUMIN/CREAT 24H UR: 15 MG/G CREATININE (ref 0–30)
MONOCYTES # BLD AUTO: 0.72 THOUSAND/ÂΜL (ref 0.17–1.22)
MONOCYTES NFR BLD AUTO: 10 % (ref 4–12)
NEUTROPHILS # BLD AUTO: 2.75 THOUSANDS/ÂΜL (ref 1.85–7.62)
NEUTS SEG NFR BLD AUTO: 37 % (ref 43–75)
NITRITE UR QL STRIP: NEGATIVE
NRBC BLD AUTO-RTO: 0 /100 WBCS
PH UR STRIP.AUTO: 7.5 [PH]
PLATELET # BLD AUTO: 318 THOUSANDS/UL (ref 149–390)
PMV BLD AUTO: 9.8 FL (ref 8.9–12.7)
PROT UR STRIP-MCNC: NEGATIVE MG/DL
RBC # BLD AUTO: 4.55 MILLION/UL (ref 3.81–5.12)
SP GR UR STRIP.AUTO: 1.01 (ref 1–1.03)
UROBILINOGEN UR STRIP-ACNC: <2 MG/DL
WBC # BLD AUTO: 7.37 THOUSAND/UL (ref 4.31–10.16)

## 2023-02-08 RX ORDER — FLUTICASONE FUROATE AND VILANTEROL 200; 25 UG/1; UG/1
1 POWDER RESPIRATORY (INHALATION) DAILY
Qty: 180 BLISTER | Refills: 0 | Status: SHIPPED | OUTPATIENT
Start: 2023-02-08 | End: 2023-05-09

## 2023-02-08 RX ORDER — VARENICLINE TARTRATE 25 MG
KIT ORAL
Qty: 53 EACH | Refills: 0 | Status: SHIPPED | OUTPATIENT
Start: 2023-02-08 | End: 2023-03-01

## 2023-02-08 NOTE — PROGRESS NOTES
Pulmonary Consultation   Malena Chandra 62 y o  female MRN: [id number]  2/8/2023    Referring Physician or Provider:  ARSENIO Bruno  [address],  [address]       Chief Complaint:  Poorly controlled asthma    HPI:    12-year-old female with past medical history of hypertension, type 2 diabetes, asthma, tobacco abuse & fibromyalgia presents for evaluation of poorly controlled asthma over the last several months  Patient states that since December she has had a significant requirement for her rescue inhaler  She was evaluated in the emergency department and was prescribed Pulmicort (90 mcg) twice a day that she used for 1 month  Has since discontinued this  She thinks that all symptoms started approximately 5 years ago where she started to require a rescue inhaler on most days  Prior to December, she has never been on a controller medication for asthma  She has had conflicting information from separate physicians on whether or not she has Sjogren syndrome  Exercise Tolerance: Good    No gross exercise intolerance      Past Medical Hx  Past Medical History:   Diagnosis Date   • Arthritis    • Asthma    • Diabetes mellitus ([facility] [address] )    • Diverticulitis of colon    • Fibromyalgia 04/26/2022   • Headache(784 0)    • History of mammogram 2018   • History of tobacco abuse 04/26/2022   • Hypertension    • Nausea 04/29/2022   • Obesity    • Urinary tract infection            Past Surgical Hx  Past Surgical History:   Procedure Laterality Date   • APPENDECTOMY     • BREAST BIOPSY Left     unsure of year   • CARPAL TUNNEL RELEASE     • COLONOSCOPY  2017    repeat in 2022   • FOOT SURGERY     • HERNIA REPAIR  05/2022   • NECK SURGERY      spine fusion    • DC RPR UMBILICAL HRNA 5 YRS/> REDUCIBLE N/A 05/11/2022    Procedure: REPAIR HERNIA UMBILICAL;  Surgeon: Kwabena Martinez MD;  Location: [facility];  Service: General     Family Hx  Family History   Problem Relation Age of Onset   • Hypertension Mother    • Heart disease Mother    • Hypertension Father    • Heart disease Father    • Asthma Sister    • No Known Problems Sister    • No Known Problems Sister    • Diabetes Maternal Grandmother    • No Known Problems Maternal Grandfather    • No Known Problems Paternal Grandmother    • No Known Problems Paternal Grandfather    • Breast cancer Paternal Aunt    • Pancreatic cancer Paternal Uncle          Occupational History:   Pt works @ [employer]  No known previous inhalation injuries    Social History:   Patient currently smokes half a pack per day  He has smoked for approximately 40 years    Pertinent Meds  Albuterol as needed, more days than not      ROS:  Constitutional: - Fatigue, - chills, - fever, - weight change  HEENT: - rhinorrhea, - sneezing, - sore throat  Respiratory: - cough, - sputum production, + Sequent shortness of breath, - wheezing  Cardiovascular: - chest pain,  -palpitations, - leg swelling  Gastrointestinal: - abdominal pain, - constipation, - diarrhea, - nausea, - vomiting  Endocrine: - cold intolerance, - heat intolerance  Genitourinary: - dysuria  Musculoskeletal: - arthralgias  Skin:- rash, - wound  Allergic/Immunologic: - allergies  Neurological: - dizziness, - numbness      Vitals: Blood pressure 140/90, pulse (!) 54, temperature 98 2 °F (36 8 °C), temperature source Probe, resp  rate 18, height 5' (1 524 m), weight 79 4 kg (175 lb), SpO2 99 %  , Body mass index is 34 18 kg/m²      Physical Exam  GEN  NAD  HEENT  ncat, non icteric, MM moist  NECK  supple, no JVD, no LAD  CV  +s1s2, no mrg, RRR  PULM + mild diffuse end expiratory wheeze, no rhonchi, no rales  ABD  soft, ntnd, + BS  EXT  no edema, no cyanosis, no clubbing  NEURO  Aox3, no focal weakness    Imaging and other studies     I have personally viewed and interpreted the following studies:  Chest x-ray 1/2/2023 shows mildly increased left hemidiaphragm otherwise no gross intraparenchymal or pleural abnormalities    Pulmonary function testing:   None    Assessment:  1  Moderate persistent asthma with mild exacerbation  2  Hypereosinophilia  3  Tobacco abuse  4  Tobacco abuse counseling    Plan:  • Patient has discontinued Pulmicort independently  • Start Breo 200 daily  • Continue albuterol as needed  • Check CBC with differential  • Check [place] allergy panel  • Check full pulmonary function test  • Check CT chest for hypereosinophilia frequent exacerbations of asthma  • Patient counseled for 11 minutes on the importance of tobacco cessation  Patient will start taking Chantix starting today  • Pt will call with worsening sx's    Return visit in 6 weeks  On next visit we will evaluate compliance with Breo, albuterol requirement, CBC results, [facility] Allergy Panel, PFT results, CT Chest results, success with tobacco cessation      [facility]

## 2023-02-10 LAB

## 2023-02-14 ENCOUNTER — APPOINTMENT (OUTPATIENT)
Dept: RADIOLOGY | Facility: HOSPITAL | Age: 58
End: 2023-02-14

## 2023-02-14 ENCOUNTER — HOSPITAL ENCOUNTER (OUTPATIENT)
Facility: HOSPITAL | Age: 58
Setting detail: OUTPATIENT SURGERY
Discharge: HOME | End: 2023-02-14
Attending: STUDENT IN AN ORGANIZED HEALTH CARE EDUCATION/TRAINING PROGRAM | Admitting: STUDENT IN AN ORGANIZED HEALTH CARE EDUCATION/TRAINING PROGRAM

## 2023-02-14 VITALS
TEMPERATURE: 98.1 F | DIASTOLIC BLOOD PRESSURE: 77 MMHG | HEART RATE: 59 BPM | SYSTOLIC BLOOD PRESSURE: 169 MMHG | RESPIRATION RATE: 18 BRPM | OXYGEN SATURATION: 96 %

## 2023-02-14 RX ORDER — METHYLPREDNISOLONE ACETATE 80 MG/ML
INJECTION, SUSPENSION INTRA-ARTICULAR; INTRALESIONAL; INTRAMUSCULAR; SOFT TISSUE AS NEEDED
Status: DISCONTINUED | OUTPATIENT
Start: 2023-02-14 | End: 2023-02-14 | Stop reason: HOSPADM

## 2023-02-14 RX ORDER — SODIUM CHLORIDE 9 MG/ML
INJECTION INTRAVENOUS AS NEEDED
Status: DISCONTINUED | OUTPATIENT
Start: 2023-02-14 | End: 2023-02-14 | Stop reason: HOSPADM

## 2023-02-14 RX ORDER — LIDOCAINE HYDROCHLORIDE 10 MG/ML
INJECTION, SOLUTION EPIDURAL; INFILTRATION; INTRACAUDAL; PERINEURAL AS NEEDED
Status: DISCONTINUED | OUTPATIENT
Start: 2023-02-14 | End: 2023-02-14 | Stop reason: HOSPADM

## 2023-02-14 NOTE — DISCHARGE INSTRUCTIONS
Epidural Steroid Injection   WHAT YOU NEED TO KNOW:   An epidural steroid injection (NIRMAL) is a procedure to inject steroid medicine into the epidural space  The epidural space is between your spinal cord and vertebrae  Steroids reduce inflammation and fluid buildup in your spine that may be causing pain  You may be given pain medicine along with the steroids  ACTIVITY  Do not drive or operate machinery today  No strenuous activity today - bending, lifting, etc   You may resume normal activites starting tomorrow - start slowly and as tolerated  You may shower today, but no tub baths or hot tubs  You may have numbness for several hours from the local anesthetic  Please use caution and common sense, especially with weight-bearing activities  CARE OF THE INJECTION SITE  If you have soreness or pain, apply ice to the area today (20 minutes on/20 minutes off)  Starting tomorrow, you may use warm, moist heat or ice if needed  You may have an increase or change in your discomfort for 36-48 hours after your treatment  Apply ice and continue with any pain medication you have been prescribed  Notify the [facility] if you have any of the following: redness, drainage, swelling, headache, stiff neck or fever above 100°F     SPECIAL INSTRUCTIONS  Our office will contact you in approximately 7 days for a progress report  The operating room will also call tomorrow with a follow up as to your experience at the [facility]  MEDICATIONS  Continue to take all routine medications  Our office may have instructed you to hold some medications  As no general anesthesia was used in today's procedure, you should not experience any side effects related to anesthesia  If you are diabetic, the steroids used in today's injection may temporarily increase your blood sugar levels after the first few days after your injection   Please keep a close eye on your sugars and alert the doctor who manages your diabetes if your sugars are significantly high from your baseline or you are symptomatic  If you have a problem specifically related to your procedure, please call our office at [phone]  Problems not related to your procedure should be directed to your primary care physician

## 2023-02-15 ENCOUNTER — HOSPITAL ENCOUNTER (OUTPATIENT)
Dept: RADIOLOGY | Facility: MEDICAL CENTER | Age: 58
Discharge: HOME | End: 2023-02-15

## 2023-02-15 DIAGNOSIS — J82.83 EOSINOPHILIC ASTHMA: ICD-10-CM

## 2023-02-15 NOTE — H&P
History of Present Illness: The patient is a 62 y o  female who presents with complaints of bilateral low back and feet pain presenting for LESI  Past Medical History:   Diagnosis Date   • Arthritis    • Asthma    • Diabetes mellitus ([address] )    • Diverticulitis of colon    • Fibromyalgia 04/26/2022   • Headache(784 0)    • History of mammogram 2018   • History of tobacco abuse 04/26/2022   • Hypertension    • Nausea 04/29/2022   • Obesity    • Urinary tract infection        Past Surgical History:   Procedure Laterality Date   • APPENDECTOMY     • BREAST BIOPSY Left     unsure of year   • CARPAL TUNNEL RELEASE     • COLONOSCOPY  2017    repeat in 2022   • EPIDURAL BLOCK INJECTION N/A 2/14/2023    Procedure: L5-S1 EPIDURALSTEROID INJECTION (79020); Surgeon: Familia Garvin DO;  Location:  MAIN OR;  Service: Pain Management    • FOOT SURGERY     • HERNIA REPAIR  05/2022   • NECK SURGERY      spine fusion    • [place] RPR UMBILICAL HRNA 5 YRS/> REDUCIBLE N/A 05/11/2022    Procedure: REPAIR HERNIA UMBILICAL;  Surgeon: Rachelle Bautista MD;  Location: [facility];  Service: General       No current facility-administered medications for this encounter      Current Outpatient Medications:   •  albuterol (2 5 mg/3 mL) 0 083 % nebulizer solution, USE 1 VIAL IN NEBULIZER EVERY 2 HOURS AS NEEDED FOR WHEEZING, Disp: , Rfl:   •  albuterol (PROVENTIL HFA,VENTOLIN HFA) 90 mcg/act inhaler, Inhale 2 puffs every 6 (six) hours as needed for wheezing, Disp: 18 g, Rfl: 5  •  atenolol (TENORMIN) 50 mg tablet, Take 1 tablet (50 mg total) by mouth daily, Disp: 90 tablet, Rfl: 1  •  benzonatate (TESSALON PERLES) 100 mg capsule, Take 1 capsule (100 mg total) by mouth 3 (three) times a day as needed for cough, Disp: 60 capsule, Rfl: 0  •  fluticasone-vilanterol (Breo Ellipta) 200-25 mcg/actuation inhaler, Inhale 1 puff daily Rinse mouth after use , Disp: 180 blister, Rfl: 0  •  pregabalin (LYRICA) 75 mg capsule, Take 1 capsule (75 mg total) by mouth 2 (two) times a day, Disp: 90 capsule, Rfl: 0  •  telmisartan (MICARDIS) 80 MG tablet, Take 1 tablet (80 mg total) by mouth daily, Disp: 90 tablet, Rfl: 0  •  varenicline 0 5 MG X 11 & 1 MG X 42 tablet therapy pack, Take 0 5 mg by mouth daily for 3 days, THEN 0 5 mg 2 (two) times a day for 4 days, THEN 1 mg 2 (two) times a day for 14 days  , Disp: 53 each, Rfl: 0    No Known Allergies    Physical Exam:   Vitals:    02/14/23 1306   BP: 169/77   Pulse: 59   Resp: 18   Temp:    SpO2: 96%     General: Awake, Alert, Oriented x 3, Mood and affect appropriate  Respiratory: Respirations even and unlabored  Cardiovascular: Peripheral pulses intact; no edema  Musculoskeletal Exam: bilateral low back paraspinal tenderness and feet pain  ASA Score: 3    Patient/Chart Verification  Patient ID Verified: Verbal, Armband  ID Band Applied: Yes  Consents Confirmed: Procedural  H&P( within 30 days) Verified: Yes  Interval H&P(within 24 hr) Complete (required for Outpatients and Surgery Admit only): Yes  Beta Blocker given : N/A    Assessment: No diagnosis found      Plan:   GILL

## 2023-02-15 NOTE — OP NOTE
OPERATIVE REPORT  PATIENT NAME: Kemi Delatorre    :  1965  MRN: [id number]  Pt Location: [facility] OR ROOM 01    SURGERY DATE: 2023    Surgeon(s) and Role:     * Melo Webber, DO - Primary    Preop Diagnosis:  Lumbar radiculopathy [M54 16]    Post-Op Diagnosis Codes:     * Lumbar radiculopathy [M54 16]    Procedure(s):  L5-S1 EPIDURALSTEROID INJECTION (29426)    Specimen(s):  * No specimens in log *    Estimated Blood Loss:   Minimal    Drains:  * No LDAs found *    Anesthesia Type:   Local    Operative Indications:  Lumbar radiculopathy [M54 16]      Operative Findings:      Complications:   None    Procedure and Technique:  DATE: 2023  PROCEDURE: Lumbar Epidural Steroid Injection under Fluoroscopy at L5-S1  PHYSICIAN: Dimas Song DO  PRE-OPERATIVE DIAGNOSIS: Lumbar radiculopathy, radiculitis,   POST-OPERATIVE DIAGNOSIS: Same  ANESTHESIA: local  COMPLICATIONS: none    Indications  Kemi Delatorre is a 62 y o  female with a history of low back and leg pain who has failed conservative therapy and presents today seeking a more definitive interventional treatment  Informed Consent  The risks, benefits and alternatives of the procedure were discussed with the patient  The patient was given opportunity to ask questions regarding the procedure, its indications and the associated risks  The risks of the procedure discussed include but are not limited to infection, bleeding, headache, worsened pain, allergic reactions, nerve injuries, paralysis, susceptibility to COVID from steroids, and side effects  The patient showed understanding and wished to proceed  Informed consent was signed  Preparation  After obtaining informed consent, the patient's chart was reviewed, and the patient's identification was confirmed  The patient was brought to the Operating Room and placed in the prone position on the operating room table  Routine monitors were applied  A surgical timeout was performed per hospital policy   No skin lesions or signs of cellulitis were noted  Strict sterile technique was used  Skin was prepped with chloraprep solution and draped in sterile manner  Using an entry point in the space identified via fluoroscopy, the overlying skin was anesthetized with 1% lidocaine using a 25 gauge needle  Using a paramedian approach, a 20  gauge touhy needle was inserted through the anesthetized skin, and was advanced under fluoroscopic guidance  The needle was redirected until the ligamentum flavum was engaged at the L5-S1 interspace and the epidural space was entered by loss of resistance to air and saline  There were no noted parasthesias, and aspiration was negative for heme and CSF  Position of the needle in the epidural space was confirmed by fluoroscopy using AP, contralateral  views  2 cc  Omnipaque 300 contrast dye was then injected, and an appropriate epidurogram was observed with no vascular uptake  3 cc preservative free normal saline and 80 mg depo, was injected intermittently after negative heme and csf aspirations  There was no pain on injection  A Band-Aid dressing was applied  The patient tolerated the procedure well  Vital signs remained stable throughout  The post-operative exam did not reveal any new neurological deficits  Patient was sent to the recovery room in a stable condition  The patient was informed when to follow up post procedure  The patient was instructed to call with fever, chills, increased pain, redness or swelling at the injection site, or numbness or weakness in the leg       I was present for the entire procedure    Patient Disposition:  PACU         SIGNATURE: Jaren Alberto DO  DATE: February 14, 2023  TIME: 8:52 PM

## 2023-02-20 DIAGNOSIS — M54.42 CHRONIC BILATERAL LOW BACK PAIN WITH BILATERAL SCIATICA: ICD-10-CM

## 2023-02-20 DIAGNOSIS — M54.41 CHRONIC BILATERAL LOW BACK PAIN WITH BILATERAL SCIATICA: ICD-10-CM

## 2023-02-20 DIAGNOSIS — G89.29 CHRONIC BILATERAL LOW BACK PAIN WITH BILATERAL SCIATICA: ICD-10-CM

## 2023-02-20 DIAGNOSIS — M54.16 LEFT LUMBAR RADICULOPATHY: ICD-10-CM

## 2023-02-21 ENCOUNTER — TELEPHONE (OUTPATIENT)
Dept: RADIOLOGY | Facility: MEDICAL CENTER | Age: 58
End: 2023-02-21

## 2023-02-21 RX ORDER — PREGABALIN 75 MG/1
75 CAPSULE ORAL 2 TIMES DAILY
Qty: 90 CAPSULE | Refills: 0 | Status: SHIPPED | OUTPATIENT
Start: 2023-02-21

## 2023-02-21 NOTE — TELEPHONE ENCOUNTER
1st attempt  Unable to Lm to cb with % improvement and pain level  Call cannot be completed at this time

## 2023-02-23 NOTE — TELEPHONE ENCOUNTER
Caller: Steven Miller (PT)    Doctor: Dr Vanessa Jasso     Reason for call: Pt returned the f/u call with  0 % improvement and pain level 10 /10        Call back#: [phone]

## 2023-02-24 NOTE — TELEPHONE ENCOUNTER
Attempted to reach pt, LMOM with CB# and OH  Lauren Essex, DO  [facility] 4 minutes ago (3:32 PM)     Pt can move up follow up appt if not improving by next week

## 2023-02-27 DIAGNOSIS — M54.42 CHRONIC BILATERAL LOW BACK PAIN WITH LEFT-SIDED SCIATICA: Primary | ICD-10-CM

## 2023-02-27 DIAGNOSIS — G89.29 CHRONIC BILATERAL LOW BACK PAIN WITH LEFT-SIDED SCIATICA: Primary | ICD-10-CM

## 2023-02-27 RX ORDER — NAPROXEN 500 MG/1
500 TABLET ORAL 2 TIMES DAILY WITH MEALS
Qty: 60 TABLET | Refills: 0 | Status: SHIPPED | OUTPATIENT
Start: 2023-02-27

## 2023-02-27 NOTE — TELEPHONE ENCOUNTER
RN attempted to reach pt  Detailed message [employer] as per ROYER left advising pt of same and to call [phone] if she would like to schedule an ov on Thursday

## 2023-02-27 NOTE — TELEPHONE ENCOUNTER
Caller: Jeremias Powell   Doctor/office: Dr Bubba Magaña  #: [phone]    % of improvement: 0%  Pain Scale (1-10): 10/10

## 2023-03-23 DIAGNOSIS — J06.9 URI WITH COUGH AND CONGESTION: ICD-10-CM

## 2023-03-23 DIAGNOSIS — J40 BRONCHITIS: ICD-10-CM

## 2023-03-23 RX ORDER — BENZONATATE 100 MG/1
100 CAPSULE ORAL 3 TIMES DAILY PRN
Qty: 60 CAPSULE | Refills: 0 | Status: SHIPPED | OUTPATIENT
Start: 2023-03-23

## 2023-04-03 DIAGNOSIS — M54.16 LEFT LUMBAR RADICULOPATHY: ICD-10-CM

## 2023-04-03 DIAGNOSIS — G89.29 CHRONIC BILATERAL LOW BACK PAIN WITH BILATERAL SCIATICA: ICD-10-CM

## 2023-04-03 DIAGNOSIS — M54.41 CHRONIC BILATERAL LOW BACK PAIN WITH BILATERAL SCIATICA: ICD-10-CM

## 2023-04-03 DIAGNOSIS — M54.42 CHRONIC BILATERAL LOW BACK PAIN WITH BILATERAL SCIATICA: ICD-10-CM

## 2023-04-03 RX ORDER — PREGABALIN 75 MG/1
75 CAPSULE ORAL 2 TIMES DAILY
Qty: 90 CAPSULE | Refills: 0 | Status: ON HOLD | OUTPATIENT
Start: 2023-04-03

## 2023-04-05 ENCOUNTER — OFFICE VISIT (OUTPATIENT)
Dept: OBGYN CLINIC | Facility: MEDICAL CENTER | Age: 58
End: 2023-04-05

## 2023-04-05 VITALS
HEIGHT: 60 IN | SYSTOLIC BLOOD PRESSURE: 142 MMHG | WEIGHT: 193 LBS | BODY MASS INDEX: 37.89 KG/M2 | DIASTOLIC BLOOD PRESSURE: 90 MMHG

## 2023-04-05 DIAGNOSIS — Z12.31 ENCOUNTER FOR SCREENING MAMMOGRAM FOR BREAST CANCER: ICD-10-CM

## 2023-04-05 DIAGNOSIS — Z12.4 SCREENING FOR CERVICAL CANCER: ICD-10-CM

## 2023-04-05 DIAGNOSIS — Z12.11 SCREEN FOR COLON CANCER: ICD-10-CM

## 2023-04-05 DIAGNOSIS — Z01.419 ENCOUNTER FOR ANNUAL ROUTINE GYNECOLOGICAL EXAMINATION: Primary | ICD-10-CM

## 2023-04-05 NOTE — PROGRESS NOTES
Patient presents for a routine annual visit  Menarche- Y/O  Last Pap Smear- 04/15/2021 Negative/HPV negative     Mammogram-2023 BI-RADS 2 Benign  Colonoscopy- Per pt had about 8-10 years ago years ago  Does not want another colonoscopy, Would like to discuss cologard  Referral placed       Non smoker  Social drinker  Currently sexually active  No family history of uterine, ovarian, cervical or breast cancer    No concerns/questions for today's visit

## 2023-04-06 DIAGNOSIS — I10 PRIMARY HYPERTENSION: ICD-10-CM

## 2023-04-06 RX ORDER — ATENOLOL 50 MG/1
50 TABLET ORAL DAILY
Qty: 90 TABLET | Refills: 0 | Status: ON HOLD | OUTPATIENT
Start: 2023-04-06

## 2023-04-07 ENCOUNTER — TELEPHONE (OUTPATIENT)
Dept: FAMILY MEDICINE CLINIC | Facility: CLINIC | Age: 58
End: 2023-04-07

## 2023-04-07 DIAGNOSIS — J82.83 EOSINOPHILIC ASTHMA: Primary | ICD-10-CM

## 2023-04-07 PROBLEM — R65.10 SIRS (SYSTEMIC INFLAMMATORY RESPONSE SYNDROME) (HCC): Status: ACTIVE | Noted: 2023-04-07

## 2023-04-07 PROBLEM — I25.10 CAD (CORONARY ARTERY DISEASE): Status: ACTIVE | Noted: 2023-04-07

## 2023-04-07 PROBLEM — J45.41 MODERATE PERSISTENT ASTHMA WITH ACUTE EXACERBATION: Status: ACTIVE | Noted: 2022-04-27

## 2023-04-07 PROBLEM — J96.01 ACUTE RESPIRATORY FAILURE WITH HYPOXIA (HCC): Status: ACTIVE | Noted: 2023-04-07

## 2023-04-08 PROBLEM — R65.10 SIRS (SYSTEMIC INFLAMMATORY RESPONSE SYNDROME) (HCC): Status: RESOLVED | Noted: 2023-04-07 | Resolved: 2023-04-08

## 2023-04-08 RX ORDER — ALBUTEROL SULFATE 2.5 MG/3ML
2.5 SOLUTION RESPIRATORY (INHALATION) EVERY 4 HOURS PRN
Qty: 300 ML | Refills: 1 | Status: SHIPPED | OUTPATIENT
Start: 2023-04-08

## 2023-05-08 DIAGNOSIS — E11.9 CONTROLLED TYPE 2 DIABETES MELLITUS WITHOUT COMPLICATION, WITHOUT LONG-TERM CURRENT USE OF INSULIN (HCC): ICD-10-CM

## 2023-05-08 DIAGNOSIS — I25.10 CORONARY ARTERY DISEASE INVOLVING NATIVE HEART WITHOUT ANGINA PECTORIS, UNSPECIFIED VESSEL OR LESION TYPE: ICD-10-CM

## 2023-05-08 DIAGNOSIS — F41.9 ANXIETY: ICD-10-CM

## 2023-05-08 DIAGNOSIS — Z72.0 TOBACCO ABUSE: ICD-10-CM

## 2023-05-08 RX ORDER — BUPROPION HYDROCHLORIDE 150 MG/1
150 TABLET, EXTENDED RELEASE ORAL 2 TIMES DAILY
Qty: 60 TABLET | Refills: 0 | Status: SHIPPED | OUTPATIENT
Start: 2023-05-08 | End: 2023-06-07

## 2023-05-08 RX ORDER — ASPIRIN 81 MG/1
81 TABLET ORAL DAILY
Qty: 30 TABLET | Refills: 0 | Status: SHIPPED | OUTPATIENT
Start: 2023-05-08 | End: 2023-06-07

## 2023-05-08 RX ORDER — ATORVASTATIN CALCIUM 40 MG/1
40 TABLET, FILM COATED ORAL
Qty: 30 TABLET | Refills: 0 | Status: SHIPPED | OUTPATIENT
Start: 2023-05-08 | End: 2023-06-07

## 2023-05-20 LAB
LEFT EYE DIABETIC RETINOPATHY: NORMAL
RIGHT EYE DIABETIC RETINOPATHY: NORMAL

## 2023-05-25 DIAGNOSIS — M54.42 CHRONIC BILATERAL LOW BACK PAIN WITH BILATERAL SCIATICA: ICD-10-CM

## 2023-05-25 DIAGNOSIS — M54.16 LEFT LUMBAR RADICULOPATHY: ICD-10-CM

## 2023-05-25 DIAGNOSIS — I10 PRIMARY HYPERTENSION: ICD-10-CM

## 2023-05-25 DIAGNOSIS — G89.29 CHRONIC BILATERAL LOW BACK PAIN WITH BILATERAL SCIATICA: ICD-10-CM

## 2023-05-25 DIAGNOSIS — M54.41 CHRONIC BILATERAL LOW BACK PAIN WITH BILATERAL SCIATICA: ICD-10-CM

## 2023-05-25 RX ORDER — PREGABALIN 75 MG/1
75 CAPSULE ORAL 2 TIMES DAILY
Qty: 60 CAPSULE | Refills: 0 | Status: SHIPPED | OUTPATIENT
Start: 2023-05-25

## 2023-05-25 RX ORDER — ATENOLOL 50 MG/1
50 TABLET ORAL DAILY
Qty: 90 TABLET | Refills: 0 | Status: SHIPPED | OUTPATIENT
Start: 2023-05-25

## 2023-06-07 DIAGNOSIS — F41.9 ANXIETY: ICD-10-CM

## 2023-06-07 DIAGNOSIS — Z72.0 TOBACCO ABUSE: ICD-10-CM

## 2023-06-07 DIAGNOSIS — I25.10 CORONARY ARTERY DISEASE INVOLVING NATIVE HEART WITHOUT ANGINA PECTORIS, UNSPECIFIED VESSEL OR LESION TYPE: ICD-10-CM

## 2023-06-07 RX ORDER — ATORVASTATIN CALCIUM 40 MG/1
40 TABLET, FILM COATED ORAL
Qty: 30 TABLET | Refills: 0 | Status: SHIPPED | OUTPATIENT
Start: 2023-06-07 | End: 2023-07-07

## 2023-06-07 RX ORDER — BUPROPION HYDROCHLORIDE 150 MG/1
150 TABLET, EXTENDED RELEASE ORAL 2 TIMES DAILY
Qty: 60 TABLET | Refills: 0 | Status: SHIPPED | OUTPATIENT
Start: 2023-06-07 | End: 2023-07-07

## 2023-06-12 ENCOUNTER — TELEPHONE (OUTPATIENT)
Dept: PULMONOLOGY | Facility: CLINIC | Age: 58
End: 2023-06-12

## 2023-06-14 ENCOUNTER — CLINICAL SUPPORT (OUTPATIENT)
Dept: PULMONOLOGY | Facility: CLINIC | Age: 58
End: 2023-06-14
Payer: COMMERCIAL

## 2023-06-14 DIAGNOSIS — J45.901 EXACERBATION OF ASTHMA, UNSPECIFIED ASTHMA SEVERITY, UNSPECIFIED WHETHER PERSISTENT: Primary | ICD-10-CM

## 2023-06-14 PROCEDURE — G0239 OTH RESP PROC, GROUP: HCPCS

## 2023-06-14 NOTE — PROGRESS NOTES
[facility] Cardiopulmonary Rehab  [place]    Emotional well-being and depression is addressed in the pulmonary rehab evaluation  To assess the severity of depression and anxiety, patients are given the PHQ-9 Depression Questionnaire and the BRUCE-7 Anxiety Questionnaire  This is to inform you that your patient Dino Up scored a 15 which is interpreted as 15-19 = Moderately Severe Depression and a 13 which is interpreted as 10-14 = Moderate anxiety  The patient was provided with contact information for [facility] and has been encouraged to enroll in [employer]  She states how she has great support at home! A repeat PHQ -9 and BRUCE-7 will be administered in 30 days to assess improvement

## 2023-06-14 NOTE — PROGRESS NOTES
"PULMONARY REHAB ASSESSMENT    Today's date: 2023  Patient name: Stoney Sousa     : 1965       MRN: [id number]  PCP: Ayo Gamboa MD  Referring Physician:  Robert Burton DO  Pulmonologist: Dr Mena Davis     Dx: Exacerbation of asthma, unspecified asthma severity J45 901      Date of onset: 2023  Cultural needs: none    Weight:    Wt Readings from Last 1 Encounters:   23 86 2 kg (190 lb)      Height:   Ht Readings from Last 1 Encounters:   23 4' 11\" (1 499 m)     Medical History:   Past Medical History:   Diagnosis Date   • Arthritis    • Asthma    • Diabetes mellitus ([address] )    • Diverticulitis of colon    • Fibromyalgia 2022   • Headache(784 0)    • History of mammogram    • History of tobacco abuse 2022   • Hypertension    • Nausea 2022   • Obesity    • Urinary tract infection          Physical Limitations: feet pain    Oxygen needs: none    Rating of Perceived Dyspnea at rest:  0/10    History of Toxic Exposure:  None    Risk Factors   Cholesterol: Yes  Smoking: Recent user, quit in last 6 months, doing well abstaining  HTN: Yes  DM: Type 2   average -145  Obesity: Yes   Inactivity: Yes  Stress:  perceived  stress: 6/10   Stressors: work, customer service, ADLs   Goals for Stress Management:  Taking time to relax, practice breathing techniques, arts and crafts, read     Family History:  Family History   Problem Relation Age of Onset   • Hypertension Mother    • Heart disease Mother    • Hypertension Father    • Heart disease Father    • Asthma Sister    • No Known Problems Sister    • No Known Problems Sister    • Diabetes Maternal Grandmother    • No Known Problems Maternal Grandfather    • No Known Problems Paternal Grandmother    • No Known Problems Paternal Grandfather    • Breast cancer Paternal Aunt    • Pancreatic cancer Paternal Uncle    • Colon cancer Neg Hx    • Ovarian cancer Neg Hx    • Uterine cancer Neg Hx    • Cervical cancer Neg Hx  " Allergies: Patient has no known allergies  ETOH:   Social History     Substance and Sexual Activity   Alcohol Use Not Currently    Comment: occasional          Current Medications:   Current Outpatient Medications   Medication Sig Dispense Refill   • albuterol (2 5 mg/3 mL) 0 083 % nebulizer solution Take 3 mL (2 5 mg total) by nebulization every 4 (four) hours as needed for wheezing or shortness of breath 300 mL 1   • aspirin (ECOTRIN LOW STRENGTH) 81 mg EC tablet Take 1 tablet (81 mg total) by mouth daily 30 tablet 0   • atenolol (TENORMIN) 50 mg tablet Take 1 tablet (50 mg total) by mouth daily 90 tablet 0   • atorvastatin (LIPITOR) 40 mg tablet Take 1 tablet (40 mg total) by mouth daily with dinner 30 tablet 0   • benzonatate (TESSALON PERLES) 100 mg capsule Take 1 capsule (100 mg total) by mouth 3 (three) times a day as needed for cough 60 capsule 0   • budesonide-formoterol (Symbicort) 160-4 5 mcg/act inhaler Inhale 2 puffs 2 (two) times a day Rinse mouth after use  (Patient not taking: Reported on 4/19/2023) 10 2 g 0   • buPROPion (Zyban) 150 MG 12 hr tablet Take 1 tablet (150 mg total) by mouth 2 (two) times a day 60 tablet 0   • fluticasone-vilanterol (Breo Ellipta) 200-25 mcg/actuation inhaler Inhale 1 puff daily Rinse mouth after use  (Patient not taking: Reported on 4/19/2023) 180 blister 0   • metFORMIN (GLUCOPHAGE) 1000 MG tablet Take 1 tablet (1,000 mg total) by mouth 2 (two) times a day with meals 60 tablet 0   • naproxen (NAPROSYN) 500 mg tablet Take 0 5 tablets (250 mg total) by mouth 2 (two) times a day with meals 60 tablet 0   • nicotine (NICODERM CQ) 21 mg/24 hr TD 24 hr patch Place 1 patch on the skin over 24 hours daily Do not start before April 10, 2023   (Patient not taking: Reported on 4/19/2023) 28 patch 0   • pregabalin (LYRICA) 75 mg capsule Take 1 capsule (75 mg total) by mouth 2 (two) times a day 60 capsule 0   • telmisartan (MICARDIS) 80 MG tablet Take 1 tablet (80 mg total) by mouth daily 90 tablet 0     No current facility-administered medications for this visit  Functional Status Prior to Diagnosis for Treatment   Occupation: full time job [employer]   Recreation: spent time with family   ADL’s: No limitations  [place]: No limitations  Exercise: none  Other: none    Current Functional Status  Occupation: full time job [employer]    Recreation: spend time with family   ADL’s:Capable of performing light ADLs only able to perform self-care  [place]: Capable of performing light ADLs only  Exercise: none  Other: none    Patient Specific Goals:  Being able to walk more, get a bike, weight loss 40-45 lbs, dietary modification, cooking more meals at home, go back to [employer] (weight loss company)    Short Term Program Goals: dietary modifications increased strength improved energy/stamina with ADLs exercise 120-150 mins/wk wt loss 1-2 ppw    Long Term Goals: increased maximal walking duration  Improved Quality of Life - [facility] score reduced  Abstain from smoking  weight loss goal of 40-45 lbs    Oxygen Goals: Maintain SpO2>90% titrating supplemental oxygen as needed     Ability to reach goals/rehabilitation potential:  Excellent    Projected return to function: 12 weeks  Objective tests: 6 MWT      Nutritional   Reviewed details of Rate your Plate  Discussed key elements of heart healthy eating  Reviewed patient goals for dietary modifications and their clinical implications  Reviewed most recent lipid profile       Goals for dietary modification: choose lean cuts of meat  poultry without the skin  low fat ground meat and poultry  eliminate processed meats  reduce portions of meat to 3 oz  increase fish intake  more meatless meals  low fat dairy   reduced fat cheese  increase whole grains  increase fruits and vegetables  eliminate butter  low sodium  improved snack choices  reduce sweets/frozen desserts  heathier choices while dining out      Emotional/Social  Patient reports feelings of anxiety  Reports sufficient emotional support    SOCIAL SUPPORT NETWORK    Marital status:       Domestic Violence Screening: No    Comments: Hx  T2D, HTN, CAD, acute respiratory failure with hypoxia, exacerbation of asthma with acute exacerbation 4/7/2023

## 2023-06-14 NOTE — PROGRESS NOTES
Pulmonary Rehabilitation Plan of Care   Initial Care Plan      Today's date: 2023   # of Exercise Sessions Completed: 1- initial eval   Patient name: Stoney Sousa      : 1965  Age: 62 y o  MRN: [id number]  Referring Physician: Robert Burton DO  Pulmonologist: Dr Mena Davis  Provider: [facility]  Clinician: Maricruz Mcdowell MS     Dx:   Encounter Diagnosis   Name Primary? • Exacerbation of asthma, unspecified asthma severity, unspecified whether persistent      Date of onset: 2023      SUMMARY OF PROGRESS:  Today is Stoney Sousa initial evaluation to begin Pulmonary Rehab for the diagnosis of Exacerbation of asthma, unspecified asthma severity J45 [id number]  Patient does not have a PFT on fileSince their diagnosis, the patient has been experiencing increased dyspnea, increased sitting time, decreased physical activity and increased fatigue  They report dyspnea and fatigue when completing ADLs   The patient currently does not follow a formal exercise program at home  Pt reports the following physical limitations: knee and feet pain occasionally  Depression screening using the PHQ-9 interprets the patient's score of 15 15-19 = Moderately Severe Depression  Anxiety screening using the BRUCE-7 interprets the patients score of 13  10-14 = Moderate anxiety  When addressed, the patient admits to having depression/anxiety  Patient reports excellent social/emotional support  Information to begin using [facility] was provided as well as contact information for counseling through [employer]  PHQ-9 score will be reassessed in 30 days  The patient is a recent user, quit 2023, and is doing well abstaining  Patient admits to 100% medication compliance  At rest, the patient rated dyspnea 0/10 with SpO2 93% on room air  They completed an initial 6MWT, walking 690 ft, 2 0 METs, at 1 3 mph on room air  The patient’s rating of perceived dyspnea during the 6MWT was 4/10 with SpO2 94%    Patient took 0 rest breaks  Telemetry revealed NSR  Resting  /82 with appropriate hemodynamic response to exercise reaching 134/82  Patient will exercise on room air  Education on smoking cessation, oxygen use, breathing techniques, pulmonary anatomy, exercise for the pulmonary patient, healthy eating, stress, and relaxation will be provided  Patient goals include: Being able to walk more, get a bike, weight loss 40-45 lbs, dietary modification, cooking more meals at home, go back to [employer] (weight loss company)  Star Valentin will attend 24 exercise sessions, 2x/wk for 12-18 weeks beginning  at [address] [facility]   Will progress patient as tolerated over the next 30 days        Medication compliance: Yes   Comments: Pt reports to be compliant with medications  Fall Risk: Low   Comments: Ambulates with a steady gait with no assist device    Smoking: Recent user, quit in last 6 months, doing well abstaining    RPD at Rest: 0/10  RPD with Exercise:  0/10    Assessment of progression of lung disease and functional status:  CAT: 2540  Shortness of breath questionnaire: 23/120      EXERCISE ASSESSMENT and PLAN    Current Exercise Program in Rehab:       Frequency: 2 days/week   Supplement with home exercise 2+ days/wk as tolerated        Minutes: 30-35         METS: 1 5-2 5              SpO2: >90%              RPD: 4-6                      HR: RHR +30   RPE: 4-5         Modalities: UBE, NuStep, Recumbent bike and Room walking      Exercise Progression 30 Day Goals :    Frequency: 2 days/week    Supplement with home exercise 2+ days/wk as tolerated       Minutes: 35-40         METS: 1 8-3 0              SpO2: >90%              RPD: 3-5                      HR: RHR +30   RPE: 4-5        Modalities: Treadmill, UBE, NuStep and Recumbent bike     Strength trainin-3 days / week  12-15 repetitions  1-2 sets per modality   Will be added following 2-3 weeks of monitored exercise sessions   Modalities: Chest Press, Lateral Raise, Arm Curl, Upright Rows and Front Raises    Oxygen Needs: on room air at rest and on room air with exercise  Oxygen Goal: Maintain SpO2>90% during exercise    Home Exercise: none  Education: pursed lipped breathing, diaphragmatic breathing, relaxation breathing, home exercise, benefits of exercise for pulmonary disease, RPE scale, RPD scale and O2 saturation monitoring    Goals: reduced score on  USCD Shortness of Breath Questionnaire, Improved 6MW distance by 10%, improved exercise tolerance (max METs tolerated in pulmonary rehab), SpO2 >90% during exercise, attend pulmonary rehab regularly, decrease sitting time at home and start a walking program  Progressing:  Reviewed Pt goals and determined plan of care, Patient will practice breathing techniques at home in the next 30 days, Will continue to educate and progress as tolerated  Plan: learn to conserve energy with ADLs , practice breathing techniques 3x/day and reduce time sitting at home    Readiness to change: Preparation:  (Getting ready to change)       NUTRITION ASSESSMENT AND PLAN    Weight control:    Starting weight: 201 2 lbs   Current weight:       Diabetes: TD2- measures 1-2x/wk, 140 avg    Goals:Improved Rate Your Plate score  >32 and Wt  loss 1-2 ppw,  goal of 40 lbs  Education: wt  loss   healthy choices while dining out  education class: Heart Healthy Eating  education class:  Label Reading  Progressing:Reviewed Pt goals and determined plan of care, Patient will think about starting [employer]- weight loss program  in the next 30 days, Will continue to educate and progress as tolerated    Plan: switch to low fat cheeses, replace butter with soft spreads made with olive oil, canola or yogurt, replace refined grain bread with whole grain bread, replace unhealthy snacks with fruits & vegs, reduce portion sizes, reduce red meat 1x/wk, switch to skim or 1% milk, eat fewer desserts and sweets, avoid processed foods, remove salt shaker from table, use salt substitute like Mrs  Dash, increase utilization of fresh or dried herbs, eat more home cooked meals and eat out less often, will try new grains like brown rice, quinoa, farro, will replace sugar sweetened cereals with whole grain or oatmeal, drink more water, learn how to read food labels and keep added daily sugar <25g/day  Readiness to change: Preparation:  (Getting ready to change)       PSYCHOSOCIAL ASSESSMENT AND PLAN    Emotional:  Depression assessment:  PHQ-9 = 15 15-19 = Moderately Severe Depression            Anxiety measure:  BRUCE-7 = 13 10-14 = Moderate anxiety  Self-reported stress level: 6   Social support: Excellent    Goals:  Reduce perceived stress to 1-3/10, PHQ-9 - reduced severity by one level, [facility] Score < 3, Pain in [place] Score < 3, Change in Health in [place] Score < 3 , increased energy and take time to relax  Education: signs/sxs of depression, benefits of a positive support system and stress management techniques    Progressing:Reviewed Pt goals and determined plan of care, Will continue to educate and progress as tolerated    Plan: Class: Stress and Your Health, PHQ-9 >5 will refer to MD, Enjoy a hobby, Read a Richie Eth family and Repeat PHQ-9 every 30 days if score >5  Readiness to change: Action:  (Changing behavior)      OTHER CORE COMPONENTS     Tobacco:   Social History     Tobacco Use   Smoking Status Every Day   • Packs/day: 0 50   • Types: Cigarettes   • Start date: 0   • Passive exposure: Past   Smokeless Tobacco Never   Tobacco Comments    per pt, 5 a day - As per [place]        Tobacco Use Intervention: Referral to tobacco expert:   N/A: Pt has a remote history of smoking    Blood pressure:    Restin/82   Exercise: 134/82    Goals: consistent BP < 130/80, reduced dietary sodium <2300mg and moderate intensity exercise >150 mins/wk  Education:  pathophysiology of pulmonary disease  Progressing:Reviewed Pt goals and determined plan of care, Patient will abstain from smoking in the next 30 days, Will continue to educate and progress as tolerated    Plan: Avoid Processed foods, engage in regular exercise, eliminate salt shaker at the table, use salt substitutes and check labels for sodium content  Readiness to change: Action:  (Changing behavior)

## 2023-06-19 ENCOUNTER — CLINICAL SUPPORT (OUTPATIENT)
Dept: PULMONOLOGY | Facility: CLINIC | Age: 58
End: 2023-06-19
Payer: COMMERCIAL

## 2023-06-19 DIAGNOSIS — J45.901 EXACERBATION OF ASTHMA, UNSPECIFIED ASTHMA SEVERITY, UNSPECIFIED WHETHER PERSISTENT: Primary | ICD-10-CM

## 2023-06-19 PROCEDURE — G0239 OTH RESP PROC, GROUP: HCPCS

## 2023-06-20 ENCOUNTER — TELEPHONE (OUTPATIENT)
Dept: OBGYN CLINIC | Facility: CLINIC | Age: 58
End: 2023-06-20

## 2023-06-21 ENCOUNTER — CLINICAL SUPPORT (OUTPATIENT)
Dept: PULMONOLOGY | Facility: CLINIC | Age: 58
End: 2023-06-21
Payer: COMMERCIAL

## 2023-06-21 DIAGNOSIS — J45.901 EXACERBATION OF ASTHMA, UNSPECIFIED ASTHMA SEVERITY, UNSPECIFIED WHETHER PERSISTENT: Primary | ICD-10-CM

## 2023-06-21 PROCEDURE — G0239 OTH RESP PROC, GROUP: HCPCS

## 2023-06-22 ENCOUNTER — TELEPHONE (OUTPATIENT)
Dept: OBGYN CLINIC | Facility: MEDICAL CENTER | Age: 58
End: 2023-06-22

## 2023-06-22 NOTE — TELEPHONE ENCOUNTER
Called and Left message  Cancelling upcoming appointment with Dr Rupali Jaimes [facility] taken over your care  Please call [phone] to schedule an appointment with him  Thank you

## 2023-06-26 ENCOUNTER — CLINICAL SUPPORT (OUTPATIENT)
Dept: PULMONOLOGY | Facility: CLINIC | Age: 58
End: 2023-06-26
Payer: COMMERCIAL

## 2023-06-26 DIAGNOSIS — J45.901 EXACERBATION OF ASTHMA, UNSPECIFIED ASTHMA SEVERITY, UNSPECIFIED WHETHER PERSISTENT: Primary | ICD-10-CM

## 2023-06-26 PROCEDURE — G0239 OTH RESP PROC, GROUP: HCPCS

## 2023-06-28 ENCOUNTER — CLINICAL SUPPORT (OUTPATIENT)
Dept: PULMONOLOGY | Facility: CLINIC | Age: 58
End: 2023-06-28
Payer: COMMERCIAL

## 2023-06-28 ENCOUNTER — OFFICE VISIT (OUTPATIENT)
Dept: PULMONOLOGY | Facility: CLINIC | Age: 58
End: 2023-06-28
Payer: COMMERCIAL

## 2023-06-28 VITALS
OXYGEN SATURATION: 98 % | SYSTOLIC BLOOD PRESSURE: 130 MMHG | HEART RATE: 62 BPM | DIASTOLIC BLOOD PRESSURE: 74 MMHG | WEIGHT: 201 LBS | HEIGHT: 60 IN | TEMPERATURE: 98.6 F | BODY MASS INDEX: 39.46 KG/M2

## 2023-06-28 DIAGNOSIS — J45.41 MODERATE PERSISTENT ASTHMA WITH ACUTE EXACERBATION: Primary | ICD-10-CM

## 2023-06-28 DIAGNOSIS — F17.211 CIGARETTE NICOTINE DEPENDENCE IN REMISSION: ICD-10-CM

## 2023-06-28 DIAGNOSIS — E66.9 CLASS 2 OBESITY WITH BODY MASS INDEX (BMI) OF 39.0 TO 39.9 IN ADULT, UNSPECIFIED OBESITY TYPE, UNSPECIFIED WHETHER SERIOUS COMORBIDITY PRESENT: ICD-10-CM

## 2023-06-28 DIAGNOSIS — J45.901 EXACERBATION OF ASTHMA, UNSPECIFIED ASTHMA SEVERITY, UNSPECIFIED WHETHER PERSISTENT: Primary | ICD-10-CM

## 2023-06-28 DIAGNOSIS — R93.89 ABNORMAL CT OF THE CHEST: ICD-10-CM

## 2023-06-28 PROBLEM — E66.812 CLASS 2 OBESITY WITH BODY MASS INDEX (BMI) OF 39.0 TO 39.9 IN ADULT: Status: ACTIVE | Noted: 2023-06-28

## 2023-06-28 PROBLEM — J96.01 ACUTE RESPIRATORY FAILURE WITH HYPOXIA (HCC): Status: RESOLVED | Noted: 2023-04-07 | Resolved: 2023-06-28

## 2023-06-28 PROBLEM — J82.83 EOSINOPHILIC ASTHMA: Status: RESOLVED | Noted: 2023-02-08 | Resolved: 2023-06-28

## 2023-06-28 PROCEDURE — 99406 BEHAV CHNG SMOKING 3-10 MIN: CPT | Performed by: NURSE PRACTITIONER

## 2023-06-28 PROCEDURE — G0239 OTH RESP PROC, GROUP: HCPCS

## 2023-06-28 PROCEDURE — 99214 OFFICE O/P EST MOD 30 MIN: CPT | Performed by: NURSE PRACTITIONER

## 2023-06-28 RX ORDER — ALBUTEROL SULFATE 90 UG/1
2 AEROSOL, METERED RESPIRATORY (INHALATION) EVERY 6 HOURS PRN
Qty: 18 G | Refills: 5
Start: 2023-06-28

## 2023-06-28 RX ORDER — BUPROPION HYDROCHLORIDE 150 MG/1
150 TABLET, EXTENDED RELEASE ORAL 2 TIMES DAILY
COMMUNITY
Start: 2023-06-08 | End: 2023-06-28

## 2023-06-28 RX ORDER — BUPROPION HYDROCHLORIDE 150 MG/1
150 TABLET, EXTENDED RELEASE ORAL 2 TIMES DAILY
Qty: 60 TABLET | Refills: 0
Start: 2023-06-28 | End: 2023-07-05 | Stop reason: SDUPTHER

## 2023-06-28 NOTE — ASSESSMENT & PLAN NOTE
Kash Valdez has recovered from her asthma exacerbation and is back to baseline  She will continue with Breo 200, 1 puff once daily  She is aware of proper use and technique and no evidence of thrush  She will continue with albuterol MDI/nebulized as needed  She will continue with pulmonary rehab  She is agreeable to completing PFTs as ordered and these were scheduled today

## 2023-06-28 NOTE — ASSESSMENT & PLAN NOTE
There was mild groundglass opacity in the right lower lung field on CT scan during her hospital stay  She had negative procalcitonin and sputum culture  She will have a repeat CT of the chest routinely to evaluate for resolution

## 2023-06-28 NOTE — ASSESSMENT & PLAN NOTE
Peña Avalos is doing well on Wellbutrin and will continue with complete cessation  She is aware of the risk of continued smoking  We discussed tobacco cessation for 3 minutes today

## 2023-06-28 NOTE — PROGRESS NOTES
Pulmonary Follow-Up Note   Pawel Briones 62 y o  female MRN: [id number]  6/28/2023      Assessment/Plan:    Problem List Items Addressed This Visit        Respiratory    Moderate persistent asthma with acute exacerbation - Primary     Sage Pitt has recovered from her asthma exacerbation and is back to baseline  She will continue with Breo 200, 1 puff once daily  She is aware of proper use and technique and no evidence of thrush  She will continue with albuterol MDI/nebulized as needed  She will continue with pulmonary rehab  She is agreeable to completing PFTs as ordered and these were scheduled today  Relevant Medications    albuterol (Ventolin HFA) 90 mcg/act inhaler       Other    Cigarette nicotine dependence in remission     Sage Pitt is doing well on Wellbutrin and will continue with complete cessation  She is aware of the risk of continued smoking  We discussed tobacco cessation for 3 minutes today  Relevant Medications    buPROPion (WELLBUTRIN SR) 150 mg 12 hr tablet    Abnormal CT of the chest     There was mild groundglass opacity in the right lower lung field on CT scan during her hospital stay  She had negative procalcitonin and sputum culture  She will have a repeat CT of the chest routinely to evaluate for resolution  Relevant Orders    CT chest wo contrast    Class 2 obesity with body mass index (BMI) of 39 0 to 39 9 in adult     Lifestyle modifications and weight loss as able  Education provided at this visit:   Need for Vaccination: Up-to-date on flu and pneumonia vaccines   Pulmonary Rehab: As above   Smoking Cessation: As above   Lung Cancer Screening: Imaging as above   Inhaler Use: Reiterated proper use and technique    Return in about 3 months (around 9/28/2023), or if symptoms worsen or fail to improve  All of Korin's questions were answered prior to leaving the office today  She will follow-up with Dr Thad Resendez in three months or sooner should the need arise   She is "aware to call our office with any further questions or concerns  History of Present Illness   Reason for Visit: Hospital follow-up  Chief Complaint: \"I feel better  \"  HPI: Jonel Goodson is a 62 y o  female who presents to the office today for follow-up of asthma  Clayton Malik first saw Dr Tulio Crawford in our office in the beginning of this year for her asthma  She was escalated to a Breo inhaler and PFTs were ordered  She was hospitalized in April due to asthma exacerbation  She was also off Breo at that time due to cost   She had not gotten the PFTs either  She was treated for asthma exacerbation and has improved and is now back to baseline  She is now on Breo, as she can afford it since her co-pay was met  She has occasional shortness of breath and in addition to the [place] once daily, she is using albuterol MDI 1-2 times daily and albuterol nebulized about once a week  She did a walk test in the hospital and did not require supplemental oxygen  Aside from the above, she has no other complaints and is feeling well  She started pulmonary rehab  She has been without tobacco for 2 months since she left the hospital and is on Wellbutrin for this  Review of Systems   Constitutional: Positive for appetite change  Negative for fever  HENT: Negative for ear pain, postnasal drip, rhinorrhea, sneezing, sore throat and trouble swallowing  Respiratory: Positive for cough and shortness of breath  Cardiovascular: Negative for chest pain  Musculoskeletal: Positive for myalgias  Neurological: Positive for headaches  All other systems reviewed and are negative  A full 12-point review of systems was completed and is negative except for those outlined in the HPI      Historical Information   Past Medical History:   Diagnosis Date   • Arthritis    • Asthma    • Diabetes mellitus ([facility] [address] )    • Diverticulitis of colon    • Fibromyalgia 04/26/2022   • Headache(784 0)    • History of mammogram 2018   • History of tobacco abuse " 2022   • Hypertension    • Nausea 2022   • Obesity    • Urinary tract infection      Past Surgical History:   Procedure Laterality Date   • APPENDECTOMY     • BREAST BIOPSY Left     unsure of year   • CARPAL TUNNEL RELEASE     • COLONOSCOPY  2017    repeat in    • EPIDURAL BLOCK INJECTION N/A 2023    Procedure: L5-S1 EPIDURALSTEROID INJECTION (35938);   Surgeon: Corona Tam DO;  Location:  MAIN OR;  Service: Pain Management    • FOOT SURGERY     • HERNIA REPAIR  2022   • NECK SURGERY      spine fusion    • TN RPR UMBILICAL HRNA 5 YRS/> REDUCIBLE N/A 2022    Procedure: REPAIR HERNIA UMBILICAL;  Surgeon: Betty Mckenzie MD;  Location:  MAIN OR;  Service: General     Family History   Problem Relation Age of Onset   • Hypertension Mother    • Heart disease Mother    • Hypertension Father    • Heart disease Father    • Asthma Sister    • No Known Problems Sister    • No Known Problems Sister    • Diabetes Maternal Grandmother    • No Known Problems Maternal Grandfather    • No Known Problems Paternal Grandmother    • No Known Problems Paternal Grandfather    • Breast cancer Paternal Aunt    • Pancreatic cancer Paternal Uncle    • Colon cancer Neg Hx    • Ovarian cancer Neg Hx    • Uterine cancer Neg Hx    • Cervical cancer Neg Hx      Social History   Social History     Substance and Sexual Activity   Alcohol Use Not Currently    Comment: occasional      Social History     Substance and Sexual Activity   Drug Use Never     Social History     Tobacco Use   Smoking Status Former   • Packs/day: 0 50   • Types: Cigarettes   • Start date: 0   • Quit date: 2023   • Years since quittin 2   • Passive exposure: Past   Smokeless Tobacco Never   Tobacco Comments    per pt, 5 a day - As per [place]      E-Cigarette/Vaping   • E-Cigarette Use Never User      E-Cigarette/Vaping Substances   • Nicotine No    • THC No    • CBD No    • Flavoring No    • Other No    • Unknown No Meds/Allergies     Current Outpatient Medications:   •  albuterol (2 5 mg/3 mL) 0 083 % nebulizer solution, Take 3 mL (2 5 mg total) by nebulization every 4 (four) hours as needed for wheezing or shortness of breath, Disp: 300 mL, Rfl: 1  •  albuterol (Ventolin HFA) 90 mcg/act inhaler, Inhale 2 puffs every 6 (six) hours as needed for wheezing, Disp: 18 g, Rfl: 5  •  aspirin (ECOTRIN LOW STRENGTH) 81 mg EC tablet, Take 1 tablet (81 mg total) by mouth daily, Disp: 30 tablet, Rfl: 0  •  atenolol (TENORMIN) 50 mg tablet, Take 1 tablet (50 mg total) by mouth daily, Disp: 90 tablet, Rfl: 0  •  atorvastatin (LIPITOR) 40 mg tablet, Take 1 tablet (40 mg total) by mouth daily with dinner, Disp: 30 tablet, Rfl: 0  •  buPROPion (WELLBUTRIN SR) 150 mg 12 hr tablet, Take 1 tablet (150 mg total) by mouth 2 (two) times a day, Disp: 60 tablet, Rfl: 0  •  fluticasone-vilanterol (Breo Ellipta) 200-25 mcg/actuation inhaler, Inhale 1 puff daily Rinse mouth after use , Disp: 180 blister, Rfl: 0  •  metFORMIN (GLUCOPHAGE) 1000 MG tablet, Take 1 tablet (1,000 mg total) by mouth 2 (two) times a day with meals, Disp: 60 tablet, Rfl: 0  •  naproxen (NAPROSYN) 500 mg tablet, Take 0 5 tablets (250 mg total) by mouth 2 (two) times a day with meals, Disp: 60 tablet, Rfl: 0  •  pregabalin (LYRICA) 75 mg capsule, Take 1 capsule (75 mg total) by mouth 2 (two) times a day, Disp: 60 capsule, Rfl: 0  •  telmisartan (MICARDIS) 80 MG tablet, Take 1 tablet (80 mg total) by mouth daily, Disp: 90 tablet, Rfl: 0  No Known Allergies    Vitals: Blood pressure 130/74, pulse 62, temperature 98 6 °F (37 °C), height 5' (1 524 m), weight 91 2 kg (201 lb), SpO2 98 %  Body mass index is 39 26 kg/m²  Oxygen Therapy  SpO2: 98 %  Oxygen Therapy: None (Room air)    Physical Exam:  Physical Exam  Vitals reviewed  Constitutional:       General: She is not in acute distress  Appearance: She is well-developed  She is morbidly obese   She is not toxic-appearing or "diaphoretic  HENT:      Head: Normocephalic and atraumatic  Eyes:      General: No scleral icterus  Neck:      Trachea: No tracheal deviation  Cardiovascular:      Rate and Rhythm: Normal rate and regular rhythm  Heart sounds: S1 normal and S2 normal  No murmur heard  No friction rub  No gallop  Pulmonary:      Effort: Pulmonary effort is normal  No tachypnea, accessory muscle usage or respiratory distress  Breath sounds: Normal breath sounds  No stridor  No decreased breath sounds, wheezing, rhonchi or rales  Chest:      Chest wall: No tenderness  Abdominal:      General: Bowel sounds are normal  There is no distension  Palpations: Abdomen is soft  Tenderness: There is no abdominal tenderness  Musculoskeletal:      Cervical back: Neck supple  Right lower leg: No edema  Left lower leg: No edema  Skin:     General: Skin is warm and dry  Findings: No rash  Neurological:      Mental Status: She is alert and oriented to person, place, and time  GCS: GCS eye subscore is 4  GCS verbal subscore is 5  GCS motor subscore is 6  Psychiatric:         Speech: Speech normal          Behavior: Behavior normal  Behavior is cooperative  Rast panel negative with IgE 104    Imaging and other studies: I have personally reviewed pertinent reports  Echocardiogram from April 10, 2023 shows EF 65% with normal diastolic function  Normal RV size and function  CTA chest done April 7, 2023 showed no PE  There was a small groundglass opacity in the right lower lobe with diffuse bronchial wall thickening  Pulmonary Results (PFTs, PSG): PFTs ordered, but not done  ARSENIO Guidry  [facility]        Portions of the record may have been created with voice recognition software  Occasional wrong word or \"sound a like\" substitutions may have occurred due to the inherent limitations of voice recognition software    Read " the chart carefully and recognize, using context, where substitutions have occurred or contact the dictating provider      Answers for HPI/ROS submitted by the patient on 6/21/2023  Do you have a hoarse voice?: Yes  Chronicity: recurrent  When did you first notice your symptoms?: more than 1 month ago  How often do your symptoms occur?: every several days  Since you first noticed this problem, how has it changed?: unchanged  Do you have shortness of breath that occurs with effort or exertion?: Yes  Do you have ear congestion?: No  Do you have heartburn?: No  Do you have fatigue?: Yes  Do you have nasal congestion?: No  Do you have shortness of breath when lying flat?: No  Do you have shortness of breath when you wake up?: No  Do you have sweats?: No  Have you experienced weight loss?: No  Which of the following makes your symptoms worse?: nothing, any activity, climbing stairs, strenuous activity  Which of the following makes your symptoms better?: rest

## 2023-06-30 NOTE — PROGRESS NOTES
Portions of the record may have been created with voice recognition software. Occasional wrong word or "sound a like" substitutions may have occurred due to the inherent limitations of voice recognition software. Read the chart carefully and recognize, using context, where substitutions have occurred. Assessment:  1. Chronic bilateral low back pain with bilateral sciatica    2. Myofascial pain syndrome    3. Chronic pain syndrome    4. Lumbar facet arthropathy    5. Lumbar spondylosis        Plan:  Orders Placed This Encounter   Procedures   • Ambulatory referral to Physical Therapy     Standing Status:   Future     Standing Expiration Date:   7/5/2024     Referral Priority:   Routine     Referral Type:   Physical Therapy     Referral Reason:   Specialty Services Required     Requested Specialty:   Physical Therapy     Number of Visits Requested:   1     Expiration Date:   7/5/2024     No orders of the defined types were placed in this encounter. 70-year-old female presenting with more than a year history of bilateral low back pain as well as bilateral feet pain and pins and needle sensation. underwent LESI without improvement. Lumbar MRI showing grade 1 anterolisthesis L4-L5 as well as mild bilateral neuroforaminal narrowing and discogenic disease. Degenerative facet arthrosis noted throughout the lumbar spine.    - At this time, we discussed treatment options including neuropathic agents and interventions. Given minimal improvement with previous epidural, we will hold off at this time. I would like her to start PT and consider aqua therapy for her pain presentation. If she does not notice gradual improvement, she will follow up in 3 months or earlier if needed. My impressions and treatment recommendations were discussed in detail with the patient who verbalized understanding and had no further questions. Discharge instructions were provided.  I personally saw and examined the patient and I agree with the above discussed plan of care. History of Present Illness:  Rajendra Lowe is a 62 y.o. female pmhx of asthma, obesity  who presents for a follow up office visit in regards to Follow-up, Back Pain, Leg Pain, Foot Pain, and Hip Pain (Lower lumbar pain that radiates down both hips, both legs down to both feet. Pain is constant with throbbing, cramping, shooting and numbness ). Patient presenting for follow-up visit in setting of chronic low back and bilateral leg pain that starts in the bilatera lumbar sacral paraspinal region and posterior gluteal and thigh region and radiates anteriorly to the anterior thigh and dorsum of bilateral feet. The pain is constant throbbing cramping with pins-and-needles and numbness. Patient previously underwent epidural injection which provided minimal relief. Recently was hospitalized for asthma exacerbation. Has stopped smoking for the past 3 months. She stopped taking Lyrica by herself and weaned off herself as it was not providing relief. Other Symptoms:  The patient does not have new numbness. The patient does not have new weakness. The patient does not have bladder dysfunction. The patient does not have bowel dysfunction. The patient does not have chills and fever, night sweats or unintentional weight loss. Current Pain Medications:      Pain Medications Trialed:   Lyrica, gabapentin  Interventional Techniques Employed:    Imaging:  No MRI cervical spine results found in the past 2 years   MRI lumbar spine wo contrast    Result Date: 12/16/2022  Impression     Lumbar degenerative disc disease at L3-4, L4-5 and L5-S1. Facet hypertrophic change and ligamentous laxity at L4-5 results in grade 1 anterior spondylolisthesis. Mild canal stenosis with slight distortion of the left anterior lateral aspect of the thecal sac at this level. Mild right greater than left foraminal narrowing.          Workstation performed: [id number]          No MRI thoracic spine results found in the past 2 years   No X-ray cervical spine results found in the past 2 years   XR spine lumbar 2 or 3 views injury    Result Date: 9/3/2022  Impression     Mild degenerative change without acute abnormality. Workstation performed: [id number]          No X-ray hip/pelvis results found in the past 2 years   XR knee 3 vw left non injury    Result Date: 6/11/2022  Impression     Tricompartmental degenerative change, new from 2012. Workstation performed: [id number]            Lab Results   Component Value Date    CREATININE 0.56 (L) 04/10/2023     Lab Results   Component Value Date    ALT 11 01/11/2023       I have personally reviewed and/or updated the patient's past medical history, past surgical history, family history, social history, current medications, allergies, and vital signs today. Review of Systems   Musculoskeletal: Positive for back pain, gait problem and joint swelling. Both legs down to both feet   Neurological: Positive for numbness.        Patient Active Problem List   Diagnosis   • Cigarette nicotine dependence in remission   • Primary hypertension   • Fibromyalgia   • Continuous opioid dependence ([address])   • Moderate persistent asthma with acute exacerbation   • Diabetes mellitus type 2, controlled ([address])   • Anxiety   • Sjogren's syndrome (HCC)   • Incarcerated umbilical hernia   • Postoperative visit   • Chronic bilateral low back pain with bilateral sciatica   • Paresthesia of both feet   • Right lumbosacral radiculopathy   • CAD (coronary artery disease)   • Abnormal CT of the chest   • Class 2 obesity with body mass index (BMI) of 39.0 to 39.9 in adult       Past Medical History:   Diagnosis Date   • Arthritis    • Asthma    • Diabetes mellitus ([address])    • Diverticulitis of colon    • Fibromyalgia 04/26/2022   • Headache(784.0)    • History of mammogram 2018   • History of tobacco abuse 04/26/2022   • Hypertension    • Nausea 04/29/2022   • Obesity    • Urinary tract infection        Past Surgical History:   Procedure Laterality Date   • APPENDECTOMY     • BREAST BIOPSY Left     unsure of year   • CARPAL TUNNEL RELEASE     • COLONOSCOPY  2017    repeat in    • EPIDURAL BLOCK INJECTION N/A 2023    Procedure: L5-S1 EPIDURALSTEROID INJECTION (16653);   Surgeon: Estrellita Brizuela DO;  Location:  MAIN OR;  Service: Pain Management    • FOOT SURGERY     • HERNIA REPAIR  2022   • NECK SURGERY      spine fusion    • [place] RPR UMBILICAL HRNA 5 YRS/> REDUCIBLE N/A 2022    Procedure: REPAIR HERNIA UMBILICAL;  Surgeon: Liz Rojas MD;  Location:  MAIN OR;  Service: General       Family History   Problem Relation Age of Onset   • Hypertension Mother    • Heart disease Mother    • Hypertension Father    • Heart disease Father    • Asthma Sister    • No Known Problems Sister    • No Known Problems Sister    • Diabetes Maternal Grandmother    • No Known Problems Maternal Grandfather    • No Known Problems Paternal Grandmother    • No Known Problems Paternal Grandfather    • Breast cancer Paternal Aunt    • Pancreatic cancer Paternal Uncle    • Colon cancer Neg Hx    • Ovarian cancer Neg Hx    • Uterine cancer Neg Hx    • Cervical cancer Neg Hx        Social History     Occupational History   • Occupation:     Tobacco Use   • Smoking status: Former     Packs/day: 0.50     Types: Cigarettes     Start date: 0     Quit date: 2023     Years since quittin.2     Passive exposure: Past   • Smokeless tobacco: Never   • Tobacco comments:     per pt, 5 a day - As per [employer] Use   • Vaping Use: Never used   Substance and Sexual Activity   • Alcohol use: Not Currently     Comment: occasional    • Drug use: Never   • Sexual activity: Not Currently     Partners: Male     Birth control/protection: None       Current Outpatient Medications on File Prior to Visit   Medication Sig   • albuterol (2.5 mg/3 mL) 0.083 % nebulizer solution Take 3 mL (2.5 mg total) by nebulization every 4 (four) hours as needed for wheezing or shortness of breath   • albuterol (Ventolin HFA) 90 mcg/act inhaler Inhale 2 puffs every 6 (six) hours as needed for wheezing   • atenolol (TENORMIN) 50 mg tablet Take 1 tablet (50 mg total) by mouth daily   • buPROPion (WELLBUTRIN SR) 150 mg 12 hr tablet Take 1 tablet (150 mg total) by mouth 2 (two) times a day   • naproxen (NAPROSYN) 500 mg tablet Take 0.5 tablets (250 mg total) by mouth 2 (two) times a day with meals   • telmisartan (MICARDIS) 80 MG tablet Take 1 tablet (80 mg total) by mouth daily   • [DISCONTINUED] aspirin (ECOTRIN LOW STRENGTH) 81 mg EC tablet Take 1 tablet (81 mg total) by mouth daily   • fluticasone-vilanterol (Breo Ellipta) 200-25 mcg/actuation inhaler Inhale 1 puff daily Rinse mouth after use. • pregabalin (LYRICA) 75 mg capsule Take 1 capsule (75 mg total) by mouth 2 (two) times a day (Patient not taking: Reported on 7/5/2023)   • [DISCONTINUED] atorvastatin (LIPITOR) 40 mg tablet Take 1 tablet (40 mg total) by mouth daily with dinner   • [DISCONTINUED] metFORMIN (GLUCOPHAGE) 1000 MG tablet Take 1 tablet (1,000 mg total) by mouth 2 (two) times a day with meals     No current facility-administered medications on file prior to visit. No Known Allergies    Physical Exam:    /89   Pulse 65   Ht 5' (1.524 m) Comment: verbal  Wt 93 kg (205 lb 1.6 oz)   BMI 40.06 kg/m²     Constitutional:normal, well developed, well nourished, alert, in no distress and non-toxic and no overt pain behavior.   Eyes:anicteric  HEENT:grossly intact  Neck:supple, symmetric, trachea midline and no masses   Pulmonary:even and unlabored  Cardiovascular:No edema or pitting edema present  Skin:Normal without rashes or lesions and well hydrated  Psychiatric:Mood and affect appropriate  Neurologic:Cranial Nerves II-XII grossly intact  Musculoskeletal:normal      Palpation:    Mild tenderness over the left paravertebral musculature    Mild tenderness over the right paravertebral musculature    No tenderness with palpation of the left SI joint    No tenderness with palpation of the right SI joint      No tenderness with palpation of the left greater trochanter    No tenderness with palpation of the right greater trochanter    Sensory:    Intact to light touch grossly in the left lower extremity    Intact to light touch grossly in the right lower extremity    Muscle Strength      Hip flexion Knee flexion Knee extension  L4 Foot plantarflexion  S1 Foot   Dorsiflexion  L5 EHL-  Dorsiflexion  L5  EHL- Plantar Flexion-S1 Heel walking- L5 Toe walking   S1   L 5/5 5/5 5/5 5/5 5/5 5/5 5/5     R 5/5 5/5 5/5 5/5 5/5 5/5 5/5       Special Tests:     Facet loading Straight leg raise HUMBERTO FAIR   L  positive  negative  negative    R  positive  negative  negative

## 2023-07-03 ENCOUNTER — CLINICAL SUPPORT (OUTPATIENT)
Dept: PULMONOLOGY | Facility: CLINIC | Age: 58
End: 2023-07-03
Payer: COMMERCIAL

## 2023-07-03 DIAGNOSIS — J45.901 EXACERBATION OF ASTHMA, UNSPECIFIED ASTHMA SEVERITY, UNSPECIFIED WHETHER PERSISTENT: Primary | ICD-10-CM

## 2023-07-03 PROCEDURE — G0239 OTH RESP PROC, GROUP: HCPCS

## 2023-07-05 ENCOUNTER — CLINICAL SUPPORT (OUTPATIENT)
Dept: PULMONOLOGY | Facility: CLINIC | Age: 58
End: 2023-07-05
Payer: COMMERCIAL

## 2023-07-05 ENCOUNTER — OFFICE VISIT (OUTPATIENT)
Dept: PAIN MEDICINE | Facility: CLINIC | Age: 58
End: 2023-07-05
Payer: COMMERCIAL

## 2023-07-05 VITALS
HEIGHT: 60 IN | BODY MASS INDEX: 40.27 KG/M2 | SYSTOLIC BLOOD PRESSURE: 155 MMHG | WEIGHT: 205.1 LBS | HEART RATE: 65 BPM | DIASTOLIC BLOOD PRESSURE: 89 MMHG

## 2023-07-05 DIAGNOSIS — G89.29 CHRONIC BILATERAL LOW BACK PAIN WITH BILATERAL SCIATICA: Primary | ICD-10-CM

## 2023-07-05 DIAGNOSIS — G89.4 CHRONIC PAIN SYNDROME: ICD-10-CM

## 2023-07-05 DIAGNOSIS — M47.816 LUMBAR SPONDYLOSIS: ICD-10-CM

## 2023-07-05 DIAGNOSIS — E11.9 CONTROLLED TYPE 2 DIABETES MELLITUS WITHOUT COMPLICATION, WITHOUT LONG-TERM CURRENT USE OF INSULIN (HCC): ICD-10-CM

## 2023-07-05 DIAGNOSIS — M54.41 CHRONIC BILATERAL LOW BACK PAIN WITH BILATERAL SCIATICA: Primary | ICD-10-CM

## 2023-07-05 DIAGNOSIS — F17.211 CIGARETTE NICOTINE DEPENDENCE IN REMISSION: ICD-10-CM

## 2023-07-05 DIAGNOSIS — J45.901 EXACERBATION OF ASTHMA, UNSPECIFIED ASTHMA SEVERITY, UNSPECIFIED WHETHER PERSISTENT: Primary | ICD-10-CM

## 2023-07-05 DIAGNOSIS — M79.18 MYOFASCIAL PAIN SYNDROME: ICD-10-CM

## 2023-07-05 DIAGNOSIS — M54.42 CHRONIC BILATERAL LOW BACK PAIN WITH BILATERAL SCIATICA: Primary | ICD-10-CM

## 2023-07-05 DIAGNOSIS — I25.10 CORONARY ARTERY DISEASE INVOLVING NATIVE HEART WITHOUT ANGINA PECTORIS, UNSPECIFIED VESSEL OR LESION TYPE: ICD-10-CM

## 2023-07-05 DIAGNOSIS — M47.816 LUMBAR FACET ARTHROPATHY: ICD-10-CM

## 2023-07-05 PROCEDURE — 3079F DIAST BP 80-89 MM HG: CPT | Performed by: STUDENT IN AN ORGANIZED HEALTH CARE EDUCATION/TRAINING PROGRAM

## 2023-07-05 PROCEDURE — G0239 OTH RESP PROC, GROUP: HCPCS

## 2023-07-05 PROCEDURE — 99213 OFFICE O/P EST LOW 20 MIN: CPT | Performed by: STUDENT IN AN ORGANIZED HEALTH CARE EDUCATION/TRAINING PROGRAM

## 2023-07-05 PROCEDURE — 3077F SYST BP >= 140 MM HG: CPT | Performed by: STUDENT IN AN ORGANIZED HEALTH CARE EDUCATION/TRAINING PROGRAM

## 2023-07-05 RX ORDER — BUPROPION HYDROCHLORIDE 150 MG/1
150 TABLET, EXTENDED RELEASE ORAL 2 TIMES DAILY
Qty: 60 TABLET | Refills: 2 | Status: SHIPPED | OUTPATIENT
Start: 2023-07-05

## 2023-07-05 RX ORDER — ATORVASTATIN CALCIUM 40 MG/1
40 TABLET, FILM COATED ORAL
Qty: 30 TABLET | Refills: 0 | Status: SHIPPED | OUTPATIENT
Start: 2023-07-05 | End: 2023-08-04

## 2023-07-05 NOTE — TELEPHONE ENCOUNTER
RX refill request for buPROPion [facility] SR) 150 mg 12 hr tablet has been submitted.

## 2023-07-09 DIAGNOSIS — I10 CHRONIC HYPERTENSION: ICD-10-CM

## 2023-07-09 RX ORDER — TELMISARTAN 80 MG/1
TABLET ORAL
Qty: 90 TABLET | Refills: 0 | Status: SHIPPED | OUTPATIENT
Start: 2023-07-09

## 2023-07-12 ENCOUNTER — APPOINTMENT (OUTPATIENT)
Dept: PULMONOLOGY | Facility: CLINIC | Age: 58
End: 2023-07-12
Payer: COMMERCIAL

## 2023-07-13 ENCOUNTER — HOSPITAL ENCOUNTER (OUTPATIENT)
Dept: PULMONOLOGY | Facility: HOSPITAL | Age: 58
End: 2023-07-13
Attending: INTERNAL MEDICINE
Payer: COMMERCIAL

## 2023-07-13 ENCOUNTER — APPOINTMENT (OUTPATIENT)
Dept: CT IMAGING | Facility: HOSPITAL | Age: 58
End: 2023-07-13
Payer: COMMERCIAL

## 2023-07-13 DIAGNOSIS — J82.83 EOSINOPHILIC ASTHMA: ICD-10-CM

## 2023-07-13 PROCEDURE — 94729 DIFFUSING CAPACITY: CPT

## 2023-07-13 PROCEDURE — 94060 EVALUATION OF WHEEZING: CPT | Performed by: INTERNAL MEDICINE

## 2023-07-13 PROCEDURE — 94726 PLETHYSMOGRAPHY LUNG VOLUMES: CPT

## 2023-07-13 PROCEDURE — 94060 EVALUATION OF WHEEZING: CPT

## 2023-07-13 PROCEDURE — 94727 GAS DIL/WSHOT DETER LNG VOL: CPT | Performed by: INTERNAL MEDICINE

## 2023-07-13 PROCEDURE — 94760 N-INVAS EAR/PLS OXIMETRY 1: CPT

## 2023-07-13 PROCEDURE — 94729 DIFFUSING CAPACITY: CPT | Performed by: INTERNAL MEDICINE

## 2023-07-13 PROCEDURE — 94618 PULMONARY STRESS TESTING: CPT | Performed by: INTERNAL MEDICINE

## 2023-07-13 PROCEDURE — 94761 N-INVAS EAR/PLS OXIMETRY MLT: CPT

## 2023-07-13 RX ORDER — ALBUTEROL SULFATE 2.5 MG/3ML
2.5 SOLUTION RESPIRATORY (INHALATION) ONCE
Status: COMPLETED | OUTPATIENT
Start: 2023-07-13 | End: 2023-07-13

## 2023-07-13 RX ADMIN — ALBUTEROL SULFATE 2.5 MG: 2.5 SOLUTION RESPIRATORY (INHALATION) at 10:09

## 2023-07-19 ENCOUNTER — APPOINTMENT (OUTPATIENT)
Dept: PULMONOLOGY | Facility: CLINIC | Age: 58
End: 2023-07-19
Payer: COMMERCIAL

## 2023-07-24 ENCOUNTER — APPOINTMENT (OUTPATIENT)
Dept: PULMONOLOGY | Facility: CLINIC | Age: 58
End: 2023-07-24
Payer: COMMERCIAL

## 2023-07-25 DIAGNOSIS — J82.83 EOSINOPHILIC ASTHMA: ICD-10-CM

## 2023-07-25 RX ORDER — FLUTICASONE FUROATE AND VILANTEROL 200; 25 UG/1; UG/1
1 POWDER RESPIRATORY (INHALATION) DAILY
Qty: 180 BLISTER | Refills: 0 | Status: SHIPPED | OUTPATIENT
Start: 2023-07-25 | End: 2023-07-26 | Stop reason: SDUPTHER

## 2023-07-26 ENCOUNTER — APPOINTMENT (OUTPATIENT)
Dept: PULMONOLOGY | Facility: CLINIC | Age: 58
End: 2023-07-26
Payer: COMMERCIAL

## 2023-07-26 ENCOUNTER — OFFICE VISIT (OUTPATIENT)
Dept: FAMILY MEDICINE CLINIC | Facility: CLINIC | Age: 58
End: 2023-07-26
Payer: COMMERCIAL

## 2023-07-26 ENCOUNTER — TELEPHONE (OUTPATIENT)
Dept: ADMINISTRATIVE | Facility: OTHER | Age: 58
End: 2023-07-26

## 2023-07-26 VITALS
TEMPERATURE: 98.1 F | DIASTOLIC BLOOD PRESSURE: 86 MMHG | OXYGEN SATURATION: 99 % | RESPIRATION RATE: 14 BRPM | HEIGHT: 60 IN | WEIGHT: 210 LBS | SYSTOLIC BLOOD PRESSURE: 134 MMHG | BODY MASS INDEX: 41.23 KG/M2 | HEART RATE: 60 BPM

## 2023-07-26 DIAGNOSIS — E78.2 MIXED HYPERLIPIDEMIA: ICD-10-CM

## 2023-07-26 DIAGNOSIS — I10 PRIMARY HYPERTENSION: Primary | ICD-10-CM

## 2023-07-26 DIAGNOSIS — J44.1 CHRONIC OBSTRUCTIVE PULMONARY DISEASE WITH ACUTE EXACERBATION (HCC): ICD-10-CM

## 2023-07-26 DIAGNOSIS — E11.9 CONTROLLED TYPE 2 DIABETES MELLITUS WITHOUT COMPLICATION, WITHOUT LONG-TERM CURRENT USE OF INSULIN (HCC): ICD-10-CM

## 2023-07-26 DIAGNOSIS — E78.5 HYPERLIPIDEMIA ASSOCIATED WITH TYPE 2 DIABETES MELLITUS (HCC): ICD-10-CM

## 2023-07-26 DIAGNOSIS — M35.00 SJOGREN'S SYNDROME, WITH UNSPECIFIED ORGAN INVOLVEMENT (HCC): ICD-10-CM

## 2023-07-26 DIAGNOSIS — E11.69 HYPERLIPIDEMIA ASSOCIATED WITH TYPE 2 DIABETES MELLITUS (HCC): ICD-10-CM

## 2023-07-26 DIAGNOSIS — I10 CHRONIC HYPERTENSION: ICD-10-CM

## 2023-07-26 DIAGNOSIS — J82.83 EOSINOPHILIC ASTHMA: ICD-10-CM

## 2023-07-26 DIAGNOSIS — M79.7 FIBROMYALGIA: ICD-10-CM

## 2023-07-26 PROCEDURE — 99214 OFFICE O/P EST MOD 30 MIN: CPT | Performed by: NURSE PRACTITIONER

## 2023-07-26 RX ORDER — TELMISARTAN 80 MG/1
80 TABLET ORAL DAILY
Qty: 90 TABLET | Refills: 3 | Status: SHIPPED | OUTPATIENT
Start: 2023-07-26

## 2023-07-26 RX ORDER — ATENOLOL 50 MG/1
50 TABLET ORAL DAILY
Qty: 90 TABLET | Refills: 3 | Status: SHIPPED | OUTPATIENT
Start: 2023-07-26 | End: 2023-08-04 | Stop reason: SDUPTHER

## 2023-07-26 RX ORDER — ROSUVASTATIN CALCIUM 10 MG/1
10 TABLET, COATED ORAL DAILY
Qty: 90 TABLET | Refills: 3 | Status: SHIPPED | OUTPATIENT
Start: 2023-07-26

## 2023-07-26 RX ORDER — FLUTICASONE FUROATE AND VILANTEROL 200; 25 UG/1; UG/1
1 POWDER RESPIRATORY (INHALATION) DAILY
Qty: 180 BLISTER | Refills: 2 | Status: SHIPPED | OUTPATIENT
Start: 2023-07-26 | End: 2023-08-04 | Stop reason: SDUPTHER

## 2023-07-26 RX ORDER — GABAPENTIN 300 MG/1
300 CAPSULE ORAL 3 TIMES DAILY
Qty: 190 CAPSULE | Refills: 5 | Status: SHIPPED | OUTPATIENT
Start: 2023-07-26

## 2023-07-26 RX ORDER — BUPROPION HYDROCHLORIDE 300 MG/1
300 TABLET ORAL EVERY MORNING
Qty: 90 TABLET | Refills: 3 | Status: SHIPPED | OUTPATIENT
Start: 2023-07-26 | End: 2024-07-20

## 2023-07-26 NOTE — TELEPHONE ENCOUNTER
----- Message from [address] sent at 7/26/2023 10:21 AM EDT -----  Regarding: DM eye exam  07/26/23 10:21 AM    Hello, our patient attached above has had Diabetic Eye Exam completed/performed. Please assist in updating the patient chart by making an External outreach to [address] [place] [address] facility located in [address]. The date of service is 2022.     Thank you,  Cristy Anderson [facility]

## 2023-07-26 NOTE — PROGRESS NOTES
Name: Jose Martell      : 1965      MRN: [id number]  Encounter Provider: ARSENIO Gaston  Encounter Date: 2023   Encounter department: [address] [place] [address]    Assessment & Plan     1. Primary hypertension  -     CBC and differential; Future  -     Comprehensive metabolic panel; Future  -     Lipid panel; Future  -     TSH, 3rd generation; Future  -     UA w Reflex to Microscopic w Reflex to Culture  -     atenolol (TENORMIN) 50 mg tablet; Take 1 tablet (50 mg total) by mouth daily    2. Mixed hyperlipidemia  -     CBC and differential; Future  -     Comprehensive metabolic panel; Future  -     Lipid panel; Future  -     TSH, 3rd generation; Future  -     UA w Reflex to Microscopic w Reflex to Culture  -     rosuvastatin (CRESTOR) 10 MG tablet; Take 1 tablet (10 mg total) by mouth daily    Patient does take the Crestor 10 mg daily well-tolerated no labs to review at this time I did order labs to be reviewed at next office visit in 4 weeks. 3. Controlled type 2 diabetes mellitus without complication, without long-term current use of insulin (HCC)  -     Hemoglobin A1C; Future; Expected date: 2023  -     Albumin / creatinine urine ratio  -     metFORMIN (GLUCOPHAGE) 1000 MG tablet; Take 1 tablet (1,000 mg total) by mouth 2 (two) times a day with meals    4. Chronic hypertension  -     CBC and differential; Future  -     Comprehensive metabolic panel; Future  -     Lipid panel; Future  -     TSH, 3rd generation; Future  -     UA w Reflex to Microscopic w Reflex to Culture  -     telmisartan (MICARDIS) 80 MG tablet; Take 1 tablet (80 mg total) by mouth daily  Well-controlled patient's vital signs were reviewed blood pressure 134/86 patient does take telmisartan daily as prescribed I well-tolerated refilled at current dose with no changes. Labs reviewed found to be with acceptable limits last visit current visit no labs for review.     5. Eosinophilic asthma  -     CBC and differential; Future  -     fluticasone-vilanterol (Breo Ellipta) 200-25 mcg/actuation inhaler; Inhale 1 puff daily Rinse mouth after use. Stable no asthma flares up Breo was definitely refilled today for her taken as directed well-tolerated. 6. Fibromyalgia  -     buPROPion (WELLBUTRIN XL) 300 mg 24 hr tablet; Take 1 tablet (300 mg total) by mouth every morning  -     gabapentin (Neurontin) 300 mg capsule; Take 1 capsule (300 mg total) by mouth 3 (three) times a day  Stable well-controlled on the Neurontin. Patient does take a 300 mg 3 times daily as needed refilled today at current dose. 7. Chronic obstructive pulmonary disease with acute exacerbation ([address])    8. Hyperlipidemia associated with type 2 diabetes mellitus ([address])    9. Sjogren's syndrome, with unspecified organ involvement ([address])  COPD hyperlipidemia and the Sjogren's are all stable reviewed no additional information. All other information and discussions primarily revolving around  smoking cessation patient has continued with smoking cessation very successfully. Did advise I would like for her to stay on the Wellbutrin 300 mg to be taken daily she is to come back in the office in 4 weeks for follow-up and we will review her labs at that time. Dosing all possible side effects of the prescribed medications or medications that had been prescribed in the past were reviewed and all questions were answered. Patient verbalized agreement and understanding of the plan of care as outlined during the office visit today return to office as indicated or sooner if a problem arises. Subjective       Patient is here for routine follow-up. To review most recent labs. To also discuss current state of health and any new problems that they may be experiencing. Patient states that medications taken as prescribed and very well tolerated no new complaints at this time. Review of Systems   Constitutional: Negative for appetite change and fever. HENT: Negative for sinus pressure and sore throat. Eyes: Negative for pain. Respiratory: Negative for shortness of breath. Cardiovascular: Negative for chest pain. Gastrointestinal: Negative for abdominal pain. Genitourinary: Negative for dysuria. Musculoskeletal: Negative for arthralgias and myalgias. Skin: Negative for color change. Neurological: Negative for light-headedness. Psychiatric/Behavioral: Negative for behavioral problems. Past Medical History:   Diagnosis Date   • Arthritis    • Asthma    • Continuous opioid dependence ([address]) 4/27/2022   • Diabetes mellitus ([address])    • Diverticulitis of colon    • Fibromyalgia 04/26/2022   • Headache(784.0)    • History of mammogram 2018   • History of tobacco abuse 04/26/2022   • Hypertension    • Nausea 04/29/2022   • Obesity    • Sjogren's syndrome ([address]) 10/19/2012   • Urinary tract infection      Past Surgical History:   Procedure Laterality Date   • APPENDECTOMY     • BREAST BIOPSY Left     unsure of year   • CARPAL TUNNEL RELEASE     • COLONOSCOPY  2017    repeat in 2022   • EPIDURAL BLOCK INJECTION N/A 2/14/2023    Procedure: L5-S1 EPIDURALSTEROID INJECTION (91937);   Surgeon: Ángela Martinez DO;  Location:  MAIN OR;  Service: Pain Management    • FOOT SURGERY     • HERNIA REPAIR  05/2022   • NECK SURGERY      spine fusion    • [place] RPR UMBILICAL HRNA 5 YRS/> REDUCIBLE N/A 05/11/2022    Procedure: REPAIR HERNIA UMBILICAL;  Surgeon: Dotty Eller MD;  Location:  MAIN OR;  Service: General     Family History   Problem Relation Age of Onset   • Hypertension Mother    • Heart disease Mother    • Hypertension Father    • Heart disease Father    • Asthma Sister    • No Known Problems Sister    • No Known Problems Sister    • Diabetes Maternal Grandmother    • No Known Problems Maternal Grandfather    • No Known Problems Paternal Grandmother    • No Known Problems Paternal Grandfather    • Breast cancer Paternal Aunt    • Pancreatic cancer Paternal Uncle    • Colon cancer Neg Hx    • Ovarian cancer Neg Hx    • Uterine cancer Neg Hx    • Cervical cancer Neg Hx      Social History     Socioeconomic History   • Marital status: /Civil Union     Spouse name: None   • Number of children: None   • Years of education: None   • Highest education level: None   Occupational History   • Occupation:     Tobacco Use   • Smoking status: Former     Packs/day: 0.50     Types: Cigarettes     Start date: 0     Quit date: 2023     Years since quittin.3     Passive exposure: Past   • Smokeless tobacco: Never   • Tobacco comments:     per pt, 5 a day - As per [employer] Use   • Vaping Use: Never used   Substance and Sexual Activity   • Alcohol use: Not Currently     Comment: occasional    • Drug use: Never   • Sexual activity: Not Currently     Partners: Male     Birth control/protection: None   Other Topics Concern   • None   Social History Narrative    · Most recent tobacco use screenin2019      · Caffeine intake:   Occasional      · Seat belts used routinely:   Yes      · Smoke alarm in home: Yes      · Has the Patient had a mammogram to screen for breast cancer within 24 months:   Yes      · Please enter the date of the Patient's previous mammogram.:  march of last year.      As per Marga Valladares      Social Determinants of Health     Financial Resource Strain: Not on file   Food Insecurity: Not on file   Transportation Needs: Not on file   Physical Activity: Not on file   Stress: Not on file   Social Connections: Not on file   Intimate Partner Violence: Not on file   Housing Stability: Not on file     Current Outpatient Medications on File Prior to Visit   Medication Sig   • albuterol (2.5 mg/3 mL) 0.083 % nebulizer solution Take 3 mL (2.5 mg total) by nebulization every 4 (four) hours as needed for wheezing or shortness of breath   • albuterol (Ventolin HFA) 90 mcg/act inhaler Inhale 2 puffs every 6 (six) hours as needed for wheezing   • aspirin (ECOTRIN LOW STRENGTH) 81 mg EC tablet Take 1 tablet (81 mg total) by mouth daily   • [DISCONTINUED] atenolol (TENORMIN) 50 mg tablet Take 1 tablet (50 mg total) by mouth daily   • [DISCONTINUED] buPROPion (WELLBUTRIN SR) 150 mg 12 hr tablet Take 1 tablet (150 mg total) by mouth 2 (two) times a day   • [DISCONTINUED] fluticasone-vilanterol (Breo Ellipta) 200-25 mcg/actuation inhaler Inhale 1 puff daily Rinse mouth after use.    • [DISCONTINUED] metFORMIN (GLUCOPHAGE) 1000 MG tablet Take 1 tablet (1,000 mg total) by mouth 2 (two) times a day with meals   • [DISCONTINUED] telmisartan (MICARDIS) 80 MG tablet Take 1 tablet by mouth once daily   • naproxen (NAPROSYN) 500 mg tablet Take 0.5 tablets (250 mg total) by mouth 2 (two) times a day with meals (Patient not taking: Reported on 7/26/2023)   • [DISCONTINUED] atorvastatin (LIPITOR) 40 mg tablet Take 1 tablet (40 mg total) by mouth daily with dinner (Patient not taking: Reported on 7/26/2023)   • [DISCONTINUED] pregabalin (LYRICA) 75 mg capsule Take 1 capsule (75 mg total) by mouth 2 (two) times a day (Patient not taking: Reported on 7/5/2023)     No Known Allergies  Immunization History   Administered Date(s) Administered   • COVID-19 MODERNA VACC 0.5 ML IM 02/05/2021, 02/06/2021, 03/05/2021   • INFLUENZA 09/08/2016, 01/25/2020, 09/15/2020, 11/10/2020, 02/27/2023   • Influenza, injectable, quadrivalent, preservative free 0.5 mL 01/25/2020   • Pneumococcal Conjugate 13-Valent 05/06/2019   • Pneumococcal Conjugate Vaccine 20-valent (Pcv20), Polysace 02/27/2023   • Pneumococcal Polysaccharide PPV23 01/01/2017, 01/06/2017, 01/25/2020   • Tdap 04/30/2014, 01/01/2017, 01/23/2020, 02/23/2020   • Zoster Vaccine Recombinant 01/16/2020, 01/25/2020       Objective     /86 (BP Location: Left arm, Patient Position: Sitting, Cuff Size: Large)   Pulse 60   Temp 98.1 °F (36.7 °C) (Temporal)   Resp 14   Ht 5' (1.524 m)   Wt 95.3 kg (210 lb) SpO2 99%   BMI 41.01 kg/m²     Physical Exam  Vitals and nursing note reviewed. Constitutional:       General: She is not in acute distress. Appearance: She is well-developed. She is not diaphoretic. HENT:      Head: Normocephalic and atraumatic. Right Ear: External ear normal.      Left Ear: External ear normal.      Mouth/Throat:      Pharynx: Oropharynx is clear. Eyes:      Pupils: Pupils are equal, round, and reactive to light. Cardiovascular:      Rate and Rhythm: Normal rate and regular rhythm. Pulses: no weak pulses     Heart sounds: Normal heart sounds. Pulmonary:      Effort: Pulmonary effort is normal.      Breath sounds: Normal breath sounds. Abdominal:      Palpations: Abdomen is soft. Musculoskeletal:         General: Normal range of motion. Cervical back: Normal range of motion and neck supple. Feet:    Feet:      Right foot:      Skin integrity: No ulcer, skin breakdown, erythema, warmth, callus or dry skin. Left foot:      Skin integrity: No ulcer, skin breakdown, erythema, warmth, callus or dry skin. Skin:     General: Skin is dry. Neurological:      Mental Status: She is alert and oriented to person, place, and time. Psychiatric:         Behavior: Behavior normal.         Thought Content: Thought content normal.     Patient's shoes and socks removed. Right Foot/Ankle   Right Foot Inspection  Skin Exam: skin normal. Skin not intact, no dry skin, no warmth, no callus, no erythema, no maceration, no abnormal color, no pre-ulcer, no ulcer and no callus. Sensory   Monofilament testing: intact    Left Foot/Ankle  Left Foot Inspection  Skin Exam: skin normal. Skin not intact, no dry skin, no warmth, no erythema, no maceration, normal color, no pre-ulcer, no ulcer and no callus.      Sensory   Monofilament testing: intact    Assign Risk Category  No deformity present  Loss of protective sensation  No weak pulses  Risk: 0 [facility] [place], CRNP

## 2023-07-26 NOTE — LETTER
Diabetic Eye Exam Form    Date Requested: 23  Patient: Christopher Metcalf  Patient : 1965   Referring Provider: Kash Peres MD      DIABETIC Eye Exam Date _______________________________      Type of Exam MUST be documented for Diabetic Eye Exams. Please CHECK ONE. Retinal Exam       Dilated Retinal Exam       OCT       Optomap-Iris Exam      Fundus Photography       Left Eye - Please check Retinopathy or No Retinopathy        Exam did show retinopathy    Exam did not show retinopathy       Right Eye - Please check Retinopathy or No Retinopathy       Exam did show retinopathy    Exam did not show retinopathy       Comments __________________________________________________________    Practice Providing Exam ______________________________________________    Exam Performed By (print name) _______________________________________      Provider Signature ___________________________________________________      These reports are needed for  compliance. Please fax this completed form and a copy of the Diabetic Eye Exam report to our office located at [address] [place] [address] as soon as possible via Fax [phone] [facility] city: Phone [phone]  We thank you for your assistance in treating our mutual patient.

## 2023-07-27 NOTE — TELEPHONE ENCOUNTER
Upon review of the In Basket request and the patient's chart, initial outreach has been made via fax to facility. Please see Contacts section for details.      Thank you  Ary Vazquez

## 2023-07-28 NOTE — TELEPHONE ENCOUNTER
Upon review of the In Basket request we were able to locate, review, and update the patient chart as requested for Diabetic Eye Exam.    Any additional questions or concerns should be emailed to the Practice Liaisons via the appropriate education email address, please do not reply via In Basket.     Thank you  Prabhjot Casas

## 2023-07-31 ENCOUNTER — APPOINTMENT (OUTPATIENT)
Dept: PULMONOLOGY | Facility: CLINIC | Age: 58
End: 2023-07-31
Payer: COMMERCIAL

## 2023-08-04 DIAGNOSIS — J82.83 EOSINOPHILIC ASTHMA: ICD-10-CM

## 2023-08-04 DIAGNOSIS — J45.41 MODERATE PERSISTENT ASTHMA WITH ACUTE EXACERBATION: ICD-10-CM

## 2023-08-04 DIAGNOSIS — I10 PRIMARY HYPERTENSION: ICD-10-CM

## 2023-08-04 DIAGNOSIS — I25.10 CORONARY ARTERY DISEASE INVOLVING NATIVE HEART WITHOUT ANGINA PECTORIS, UNSPECIFIED VESSEL OR LESION TYPE: ICD-10-CM

## 2023-08-04 RX ORDER — ALBUTEROL SULFATE 90 UG/1
2 AEROSOL, METERED RESPIRATORY (INHALATION) EVERY 6 HOURS PRN
Qty: 18 G | Refills: 0 | Status: ON HOLD | OUTPATIENT
Start: 2023-08-04

## 2023-08-04 RX ORDER — FLUTICASONE FUROATE AND VILANTEROL 200; 25 UG/1; UG/1
1 POWDER RESPIRATORY (INHALATION) DAILY
Qty: 180 BLISTER | Refills: 0 | Status: ON HOLD | OUTPATIENT
Start: 2023-08-04 | End: 2023-11-02

## 2023-08-04 RX ORDER — ATENOLOL 50 MG/1
50 TABLET ORAL DAILY
Qty: 90 TABLET | Refills: 0 | Status: ON HOLD | OUTPATIENT
Start: 2023-08-04

## 2023-08-04 RX ORDER — ASPIRIN 81 MG/1
81 TABLET, COATED ORAL DAILY
Qty: 30 TABLET | Refills: 0 | Status: ON HOLD | OUTPATIENT
Start: 2023-08-04

## 2023-08-08 ENCOUNTER — RA CDI HCC (OUTPATIENT)
Dept: OTHER | Facility: HOSPITAL | Age: 58
End: 2023-08-08

## 2023-08-14 ENCOUNTER — APPOINTMENT (EMERGENCY)
Dept: CT IMAGING | Facility: HOSPITAL | Age: 58
End: 2023-08-14
Payer: COMMERCIAL

## 2023-08-14 ENCOUNTER — HOSPITAL ENCOUNTER (EMERGENCY)
Facility: HOSPITAL | Age: 58
End: 2023-08-14
Attending: EMERGENCY MEDICINE | Admitting: EMERGENCY MEDICINE
Payer: COMMERCIAL

## 2023-08-14 ENCOUNTER — HOSPITAL ENCOUNTER (INPATIENT)
Facility: HOSPITAL | Age: 58
LOS: 11 days | DRG: 064 | End: 2023-08-25
Attending: INTERNAL MEDICINE | Admitting: PSYCHIATRY & NEUROLOGY
Payer: COMMERCIAL

## 2023-08-14 ENCOUNTER — APPOINTMENT (INPATIENT)
Dept: RADIOLOGY | Facility: HOSPITAL | Age: 58
DRG: 064 | End: 2023-08-14
Payer: COMMERCIAL

## 2023-08-14 VITALS
HEART RATE: 72 BPM | RESPIRATION RATE: 20 BRPM | SYSTOLIC BLOOD PRESSURE: 123 MMHG | DIASTOLIC BLOOD PRESSURE: 64 MMHG | OXYGEN SATURATION: 96 %

## 2023-08-14 DIAGNOSIS — R29.90 STROKE-LIKE SYMPTOMS: Primary | ICD-10-CM

## 2023-08-14 DIAGNOSIS — I61.9 ICH (INTRACEREBRAL HEMORRHAGE) (HCC): Primary | ICD-10-CM

## 2023-08-14 DIAGNOSIS — I61.9 HEMORRHAGIC STROKE (HCC): ICD-10-CM

## 2023-08-14 DIAGNOSIS — I61.9 INTRACEREBRAL HEMORRHAGE (HCC): ICD-10-CM

## 2023-08-14 LAB
ANION GAP SERPL CALCULATED.3IONS-SCNC: 8 MMOL/L
APTT PPP: 27 SECONDS (ref 23–37)
BASOPHILS # BLD AUTO: 0.13 THOUSANDS/ÂΜL (ref 0–0.1)
BASOPHILS NFR BLD AUTO: 1 % (ref 0–1)
BUN SERPL-MCNC: 17 MG/DL (ref 5–25)
CALCIUM SERPL-MCNC: 9.2 MG/DL (ref 8.4–10.2)
CARDIAC TROPONIN I PNL SERPL HS: 3 NG/L
CHLORIDE SERPL-SCNC: 100 MMOL/L (ref 96–108)
CO2 SERPL-SCNC: 28 MMOL/L (ref 21–32)
CREAT SERPL-MCNC: 0.71 MG/DL (ref 0.6–1.3)
EOSINOPHIL # BLD AUTO: 0.78 THOUSAND/ÂΜL (ref 0–0.61)
EOSINOPHIL NFR BLD AUTO: 7 % (ref 0–6)
ERYTHROCYTE [DISTWIDTH] IN BLOOD BY AUTOMATED COUNT: 15.3 % (ref 11.6–15.1)
ETHANOL SERPL-MCNC: <3 MG/DL (ref 0–3)
GFR SERPL CREATININE-BSD FRML MDRD: 94 ML/MIN/1.73SQ M
GLUCOSE SERPL-MCNC: 133 MG/DL (ref 65–140)
GLUCOSE SERPL-MCNC: 143 MG/DL (ref 65–140)
HCT VFR BLD AUTO: 34.5 % (ref 34.8–46.1)
HGB BLD-MCNC: 10.9 G/DL (ref 11.5–15.4)
IMM GRANULOCYTES # BLD AUTO: 0.04 THOUSAND/UL (ref 0–0.2)
IMM GRANULOCYTES NFR BLD AUTO: 0 % (ref 0–2)
INR PPP: 0.94 (ref 0.84–1.19)
LYMPHOCYTES # BLD AUTO: 3.84 THOUSANDS/ÂΜL (ref 0.6–4.47)
LYMPHOCYTES NFR BLD AUTO: 34 % (ref 14–44)
MAGNESIUM SERPL-MCNC: 1.5 MG/DL (ref 1.9–2.7)
MCH RBC QN AUTO: 27.5 PG (ref 26.8–34.3)
MCHC RBC AUTO-ENTMCNC: 31.6 G/DL (ref 31.4–37.4)
MCV RBC AUTO: 87 FL (ref 82–98)
MONOCYTES # BLD AUTO: 1 THOUSAND/ÂΜL (ref 0.17–1.22)
MONOCYTES NFR BLD AUTO: 9 % (ref 4–12)
NEUTROPHILS # BLD AUTO: 5.47 THOUSANDS/ÂΜL (ref 1.85–7.62)
NEUTS SEG NFR BLD AUTO: 49 % (ref 43–75)
NRBC BLD AUTO-RTO: 0 /100 WBCS
PHOSPHATE SERPL-MCNC: 4.3 MG/DL (ref 2.7–4.5)
PLATELET # BLD AUTO: 353 THOUSANDS/UL (ref 149–390)
PMV BLD AUTO: 9.6 FL (ref 8.9–12.7)
POTASSIUM SERPL-SCNC: 3.8 MMOL/L (ref 3.5–5.3)
PROTHROMBIN TIME: 13.2 SECONDS (ref 11.6–14.5)
RBC # BLD AUTO: 3.97 MILLION/UL (ref 3.81–5.12)
SODIUM SERPL-SCNC: 136 MMOL/L (ref 135–147)
WBC # BLD AUTO: 11.26 THOUSAND/UL (ref 4.31–10.16)

## 2023-08-14 PROCEDURE — 93005 ELECTROCARDIOGRAM TRACING: CPT

## 2023-08-14 PROCEDURE — 85025 COMPLETE CBC W/AUTO DIFF WBC: CPT

## 2023-08-14 PROCEDURE — 99285 EMERGENCY DEPT VISIT HI MDM: CPT

## 2023-08-14 PROCEDURE — 99291 CRITICAL CARE FIRST HOUR: CPT | Performed by: PSYCHIATRY & NEUROLOGY

## 2023-08-14 PROCEDURE — 84484 ASSAY OF TROPONIN QUANT: CPT

## 2023-08-14 PROCEDURE — 80076 HEPATIC FUNCTION PANEL: CPT

## 2023-08-14 PROCEDURE — G1004 CDSM NDSC: HCPCS

## 2023-08-14 PROCEDURE — 84439 ASSAY OF FREE THYROXINE: CPT | Performed by: PSYCHIATRY & NEUROLOGY

## 2023-08-14 PROCEDURE — 86225 DNA ANTIBODY NATIVE: CPT

## 2023-08-14 PROCEDURE — 84443 ASSAY THYROID STIM HORMONE: CPT | Performed by: PSYCHIATRY & NEUROLOGY

## 2023-08-14 PROCEDURE — 82948 REAGENT STRIP/BLOOD GLUCOSE: CPT

## 2023-08-14 PROCEDURE — 80048 BASIC METABOLIC PNL TOTAL CA: CPT

## 2023-08-14 PROCEDURE — 80307 DRUG TEST PRSMV CHEM ANLYZR: CPT | Performed by: PSYCHIATRY & NEUROLOGY

## 2023-08-14 PROCEDURE — 96366 THER/PROPH/DIAG IV INF ADDON: CPT

## 2023-08-14 PROCEDURE — 83735 ASSAY OF MAGNESIUM: CPT

## 2023-08-14 PROCEDURE — 85730 THROMBOPLASTIN TIME PARTIAL: CPT

## 2023-08-14 PROCEDURE — 70496 CT ANGIOGRAPHY HEAD: CPT

## 2023-08-14 PROCEDURE — 96376 TX/PRO/DX INJ SAME DRUG ADON: CPT

## 2023-08-14 PROCEDURE — 84100 ASSAY OF PHOSPHORUS: CPT

## 2023-08-14 PROCEDURE — 85610 PROTHROMBIN TIME: CPT

## 2023-08-14 PROCEDURE — 36415 COLL VENOUS BLD VENIPUNCTURE: CPT

## 2023-08-14 PROCEDURE — 96365 THER/PROPH/DIAG IV INF INIT: CPT

## 2023-08-14 PROCEDURE — 70498 CT ANGIOGRAPHY NECK: CPT

## 2023-08-14 PROCEDURE — 82077 ASSAY SPEC XCP UR&BREATH IA: CPT | Performed by: PSYCHIATRY & NEUROLOGY

## 2023-08-14 PROCEDURE — 70450 CT HEAD/BRAIN W/O DYE: CPT

## 2023-08-14 PROCEDURE — 86038 ANTINUCLEAR ANTIBODIES: CPT

## 2023-08-14 PROCEDURE — 96375 TX/PRO/DX INJ NEW DRUG ADDON: CPT

## 2023-08-14 RX ORDER — MIDAZOLAM HYDROCHLORIDE 2 MG/2ML
INJECTION, SOLUTION INTRAMUSCULAR; INTRAVENOUS
Status: COMPLETED
Start: 2023-08-14 | End: 2023-08-14

## 2023-08-14 RX ORDER — FENTANYL CITRATE 50 UG/ML
50 INJECTION, SOLUTION INTRAMUSCULAR; INTRAVENOUS ONCE
Status: COMPLETED | OUTPATIENT
Start: 2023-08-14 | End: 2023-08-14

## 2023-08-14 RX ORDER — MIDAZOLAM HYDROCHLORIDE 2 MG/2ML
2 INJECTION, SOLUTION INTRAMUSCULAR; INTRAVENOUS ONCE
Status: COMPLETED | OUTPATIENT
Start: 2023-08-14 | End: 2023-08-14

## 2023-08-14 RX ORDER — FLUTICASONE FUROATE AND VILANTEROL 100; 25 UG/1; UG/1
1 POWDER RESPIRATORY (INHALATION) DAILY
Status: DISCONTINUED | OUTPATIENT
Start: 2023-08-15 | End: 2023-08-14 | Stop reason: DRUGHIGH

## 2023-08-14 RX ORDER — CHLORHEXIDINE GLUCONATE ORAL RINSE 1.2 MG/ML
15 SOLUTION DENTAL EVERY 12 HOURS SCHEDULED
Status: DISCONTINUED | OUTPATIENT
Start: 2023-08-14 | End: 2023-08-25 | Stop reason: HOSPADM

## 2023-08-14 RX ORDER — HYDROMORPHONE HCL IN WATER/PF 6 MG/30 ML
0.2 PATIENT CONTROLLED ANALGESIA SYRINGE INTRAVENOUS ONCE
Status: COMPLETED | OUTPATIENT
Start: 2023-08-14 | End: 2023-08-14

## 2023-08-14 RX ORDER — HYDRALAZINE HYDROCHLORIDE 20 MG/ML
5 INJECTION INTRAMUSCULAR; INTRAVENOUS EVERY 6 HOURS PRN
Status: DISCONTINUED | OUTPATIENT
Start: 2023-08-14 | End: 2023-08-25 | Stop reason: HOSPADM

## 2023-08-14 RX ORDER — ATORVASTATIN CALCIUM 20 MG/1
20 TABLET, FILM COATED ORAL
Status: DISCONTINUED | OUTPATIENT
Start: 2023-08-15 | End: 2023-08-25 | Stop reason: HOSPADM

## 2023-08-14 RX ORDER — FENTANYL CITRATE 50 UG/ML
INJECTION, SOLUTION INTRAMUSCULAR; INTRAVENOUS
Status: COMPLETED
Start: 2023-08-14 | End: 2023-08-14

## 2023-08-14 RX ORDER — INSULIN LISPRO 100 [IU]/ML
1-6 INJECTION, SOLUTION INTRAVENOUS; SUBCUTANEOUS EVERY 6 HOURS SCHEDULED
Status: DISCONTINUED | OUTPATIENT
Start: 2023-08-14 | End: 2023-08-15

## 2023-08-14 RX ORDER — LABETALOL HYDROCHLORIDE 5 MG/ML
10 INJECTION, SOLUTION INTRAVENOUS EVERY 6 HOURS PRN
Status: DISCONTINUED | OUTPATIENT
Start: 2023-08-14 | End: 2023-08-15

## 2023-08-14 RX ORDER — MAGNESIUM SULFATE HEPTAHYDRATE 40 MG/ML
2 INJECTION, SOLUTION INTRAVENOUS ONCE
Status: COMPLETED | OUTPATIENT
Start: 2023-08-14 | End: 2023-08-14

## 2023-08-14 RX ORDER — ALBUTEROL SULFATE 2.5 MG/3ML
2.5 SOLUTION RESPIRATORY (INHALATION) EVERY 6 HOURS PRN
Status: DISCONTINUED | OUTPATIENT
Start: 2023-08-14 | End: 2023-08-16

## 2023-08-14 RX ORDER — LABETALOL HYDROCHLORIDE 5 MG/ML
20 INJECTION, SOLUTION INTRAVENOUS ONCE
Status: COMPLETED | OUTPATIENT
Start: 2023-08-14 | End: 2023-08-14

## 2023-08-14 RX ORDER — FLUTICASONE FUROATE AND VILANTEROL 200; 25 UG/1; UG/1
1 POWDER RESPIRATORY (INHALATION) DAILY
Status: DISCONTINUED | OUTPATIENT
Start: 2023-08-15 | End: 2023-08-25 | Stop reason: HOSPADM

## 2023-08-14 RX ADMIN — FENTANYL CITRATE 50 MCG: 50 INJECTION INTRAMUSCULAR; INTRAVENOUS at 16:50

## 2023-08-14 RX ADMIN — DESMOPRESSIN ACETATE 38 MCG: 4 INJECTION, SOLUTION INTRAVENOUS; SUBCUTANEOUS at 17:26

## 2023-08-14 RX ADMIN — NICARDIPINE HYDROCHLORIDE 2.5 MG/HR: 2.5 INJECTION, SOLUTION INTRAVENOUS at 20:39

## 2023-08-14 RX ADMIN — Medication 20 MG: at 17:15

## 2023-08-14 RX ADMIN — MIDAZOLAM 2 MG: 1 INJECTION INTRAMUSCULAR; INTRAVENOUS at 16:58

## 2023-08-14 RX ADMIN — FENTANYL CITRATE 50 MCG: 50 INJECTION INTRAMUSCULAR; INTRAVENOUS at 18:01

## 2023-08-14 RX ADMIN — CHLORHEXIDINE GLUCONATE 15 ML: 1.2 SOLUTION ORAL at 20:32

## 2023-08-14 RX ADMIN — MAGNESIUM SULFATE HEPTAHYDRATE 2 G: 40 INJECTION, SOLUTION INTRAVENOUS at 22:19

## 2023-08-14 RX ADMIN — HYDROMORPHONE HYDROCHLORIDE 0.2 MG: 0.2 INJECTION, SOLUTION INTRAMUSCULAR; INTRAVENOUS; SUBCUTANEOUS at 22:13

## 2023-08-14 RX ADMIN — NICARDIPINE HYDROCHLORIDE 5 MG/HR: 2.5 INJECTION, SOLUTION INTRAVENOUS at 17:38

## 2023-08-14 RX ADMIN — NICARDIPINE HYDROCHLORIDE 2.5 MG/HR: 2.5 INJECTION, SOLUTION INTRAVENOUS at 20:25

## 2023-08-14 RX ADMIN — IOHEXOL 85 ML: 350 INJECTION, SOLUTION INTRAVENOUS at 16:30

## 2023-08-14 RX ADMIN — FENTANYL CITRATE 50 MCG: 50 INJECTION, SOLUTION INTRAMUSCULAR; INTRAVENOUS at 18:01

## 2023-08-14 RX ADMIN — MIDAZOLAM HYDROCHLORIDE 2 MG: 2 INJECTION, SOLUTION INTRAMUSCULAR; INTRAVENOUS at 16:58

## 2023-08-14 NOTE — H&P
[address] [place] [address]  H&P  Name: Delores Mccloud 62 y.o. female I MRN: [id number]  Unit/Bed#: ICU 09 I Date of Admission: (Not on file)   Date of Service: 8/14/2023 I Hospital Day: 0      Neuro/Psych:  Diagnoses: Intracerebral hemorrhage   Plan:   • SBP >140  • Neurology and neurosurgery consult  • Neuro checks ***  • Sedation: ***  • Analgesia: Multimodal. ***    CV:    Hypertension  - Atenolol 50mg (home med)   - Telmisartan 80mg QD   - maintain SBP >140    Hyperlipidemia  - Rosuvastatin 10mg QD     Diagnoses: ***  MAP Trend: ***  Plan: Continuous cardiopulmonary monitoring. Maintain MAP >65.  • ***  • ***    Pulm:  Asthma    - Breo ellipta inhaler (fluticasone-vilanterol) 200-25 mcg/actuation inhaler 1 puff daily   - Albuterol inhaler as needed   Diagnoses: ***  SpO2 Trend: ***  Imaging: ***  Blood gases: ***  Supplemental O2: ***. Wean as tolerated. Vent: ***. Daily SAT/SBT. Wean FiO2 and PEEP as tolerated. Plan: Continuous pulse oximetry. Maintain O2 sat >90%. • ***  • ***    GI:  Diagnoses: ***  Last BM: ***  Plan: Bowel regimen: ***. GI prophylaxis: ***  • ***  • ***    :  Diagnoses: ***  Creatinine trend: ***  Baseline creatinine: ***  I/Os: ***  Plan: Monitor I/Os. • ***  • ***    F/E/N:  Diagnoses: ***  Plan:   • F: ***. Fluid resuscitation prn. • E: Monitor and replete electrolytes for Mg >2, Phos >3, K >4.  • N: ***    Heme/Onc:  Diagnoses: ***  Plan: VTE prophylaxis: ***  • ***  • ***    Endo:  Type 2 Diabetes Mellitus  - Metformin 1000mg 2x a day with meals    - pending A1c  Diagnoses: ***  Glucose trend: ***  Insulin: ***  Plan: Monitor blood glucose. • ***  • ***    ID:  Diagnoses: ***  Temperature: ***  Labs: ***  Culture data: ***  Abx: ***  Plan: Monitor fever curve and WBC. • ***  • ***    MSK/Skin:  Fibromyalgia   - on Gabapentin 300mg 3x/day   Diagnoses: ***  Plan: PT/OT when appropriate. Encourage OOB and ambulation when appropriate.  Local wound care prn.  • ***    Tox:  Nicotine dependence   - on Bupropion 300mg QD   Diagnoses: ***  Plan:  • ***    LDA:  • Lines - ***  • Drains - ***  • Airways - ***    ICU LOS: Patient does not have an ICU stay during this admission.

## 2023-08-14 NOTE — QUICK NOTE
Stroke alert note:    27-year-old patient brought in as a stroke alert, with initial imaging revealing left thalamocapsular 4.5cm bleed secondary to uncontrolled hypertension. CT/CTA of the head and neck revealed left thalamocapsular bleed with no significant shift or mass effect. No evidence of any LVO or flow restricting stenosis on CTA of the head and neck. Initial blood pressure 208/97, and blood sugar noted to be 133. NIHSS 14.    Patient given IV labetalol, started on Cardene drip, and DDAVP, discussed with ED physician will likely consider transfer to neurosurgical care to [facility].

## 2023-08-14 NOTE — H&P
[address] [facility]  H&P: Critical Care  Name: Star Hylton 62 y.o. female I MRN: [id number]  Unit/Bed#: ICU 09 I Date of Admission: (Not on file)   Date of Service: 8/14/2023 I Hospital Day: 0       History of Present Illness     HPI: Star Hylton is a 62 y.o. with a PMHx of HTN, HLD, asthma, DMII, and fibromyalgia who presents with acute parenchymal hematoma in left posterior striatocapsular region extending into left thalamus with mild vasogenic edema likely secondary to uncontrolled HTN as seen on stat CT/CTA H/N imaging. Patient presented as a stroke alert today to [facility] with acute onset of RUE and RLE weakness and sensory loss, right-sided facial deficit, and dysarthria. No evidence of LVO or flow restricting stenosis on CTA H/N. Initial BP per neurology note was 208/97 and initial NIHSS 14.     Upon arrival to [facility], ICH score 0. NIHSS score 10. Patient noted to have full ROM, sensation, and motor function of LUE and LLE. RUE and RLE deficits: no effort against gravity, withdrawal to painful stimuli, full loss of sensation. Right sided mouth droop and right tongue deviation noted. The patient complained of a mild, intermittent left-sided headache. Denies chest pain, SOB, abdominal pain, numbness or tingling in hands or feet, nausea, vomiting, diarrhea, changes in vision or hearing. History obtained from chart review and the patient.     Assessment/Plan    Problem List:   -  Left posterior striatocapsular region extending into left thalamus with mild vasogenic edema with right-sided deficits  - HTN  - DMII  - Fibromyalgia  - Asthma  - HLD    Neuro:   · Diagnosis: Left-sided intracerebral hemorrhage  · CTH wo: left posterior striatocapsular region extending into left thalamus with mild vasogenic edema  · CTA H/N: negative   · Patient on ASA 81mg PO daily at home; give one dose of DDAVP upon arrival to [facility]  · Plan:   · Nicardipine drip as needed to maintain SBP goal: 140-160mmHg  · Maintain a-line for continuous BP monitoring  · Q1 neuro checks  · Repeat 6hr CTH wo (@2230)  · Pain control  · Neurosurgery consulted - recs appreciated  · Neurology consulted - recs appreciated  · Holding home antihypertensive mediations  · Holding all AC/AP meds  · Holding pharm DVT ppx       CV:   · Diagnosis: HTN  · Home meds: atenolol, telmisartan  · Plan:   · Holding home medications in the setting of tight BP control 2/2 acute ICH  · Monitor BP via a-line  · Current SBP goal: 140-160    · Diagnosis: HLD  · Home meds: Rosuvastatin 10mg PO once daily  · Plan:  · Continue when PO intake appropriate    Pulm:  · Diagnosis: Asthma  · Home mds: albuterol, Breo Ellipta  · Most recent PFT testing 7/13/2023  · Plan:  · Continue pulse oximetry  · Avoid hypoxia  · Continue PRN albuterol for wheezing  · Continue Breo-Ellipta  · Maintain SpO2 >92%      GI:   No active issues   - Monitor for daily BMs  - Bowel reg: PRN dulcolax    :   No active issues   - Strict I&Os  - Purewick   - Daily BMP    F/E/N:    F: //  E: Monitor and replete to maintain K >4.0, Phos >3.0, Mag <2.0  N: NPO.  Pending Speech swallow eval.    Heme/Onc:   · Diagnosis: Anemia  · Likely in the setting of acute ICH  · Plan:  · Monitor vital signs  · Transfuse for Hgb <7.0 or with signs of active bleeding  · CBC in AM  · Follow up [address] imaging    DVT ppx: holding in the setting of acute ICH    Endo:   · Diagnosis: DMII  · Home meds: metformin  · Plan:  · SSI  · Monitor BG q6  · BG goal 140-180  · Monitor and treat hypoglycemia  · Hold home metformin      ID:   No active issues   - Monitor fever curve/WBC trend  - Monitor vital signs  - Monitor for s/s infection      MSK/Skin:   · Diagnosis: RUE and RLE weakness  · Plan:  · PT/OT when appropriate  · PMR for dispo planning  · Case management for dispo planning  · Turn q2 while in bed  · Monitor for skin breakdown    · Diagnosis: Fibromyalgia  · Home meds: gabapentin 300mg 3x/day  · Plan:  · Consider restarting once PO intake appropriate    LDAs: a-line, PIVs    Disposition: Critical care    Review of Systems   Constitutional: Negative. HENT: Negative. Eyes: Negative. Respiratory: Negative. Cardiovascular: Negative. Gastrointestinal: Negative. Endocrine: Negative. Genitourinary: Negative. Musculoskeletal: Negative. Skin: Negative. Neurological: Positive for weakness (RUE, RLE) and headaches (mild, intermittent left-sided). Psychiatric/Behavioral: Positive for agitation. Anxious      Historical Information   Past Medical History:  No date: Arthritis  No date: Asthma  4/27/2022: Continuous opioid dependence ([address])  No date: Diabetes mellitus ([address])  No date: Diverticulitis of colon  04/26/2022: Fibromyalgia  No date: Headache(784.0)  2018: History of mammogram  04/26/2022: History of tobacco abuse  No date: Hypertension  04/29/2022: Nausea  No date: Obesity  10/19/2012: Sjogren's syndrome ([address])  No date: Urinary tract infection Past Surgical History:  No date: APPENDECTOMY  No date: BREAST BIOPSY; Left      Comment:  unsure of year  No date: CARPAL TUNNEL RELEASE  2017: COLONOSCOPY      Comment:  repeat in 2022 2/14/2023: EPIDURAL BLOCK INJECTION; N/A      Comment:  Procedure: L5-S1 EPIDURALSTEROID INJECTION (19375);                  Surgeon: Reymundo Damon DO;  Location:  MAIN OR;                 Service: Pain Management   No date: FOOT SURGERY  05/2022: HERNIA REPAIR  No date: NECK SURGERY      Comment:  spine fusion   97/32/4554: MA RPR UMBILICAL HRNA 5 YRS/> REDUCIBLE; N/A      Comment:  Procedure: REPAIR HERNIA UMBILICAL;  Surgeon: Wagner Bright MD;  Location:  MAIN OR;  Service:                General   Current Outpatient Medications   Medication Instructions   • albuterol (Ventolin HFA) 90 mcg/act inhaler 2 puffs, Inhalation, Every 6 hours PRN   • albuterol 2.5 mg, Nebulization, Every 4 hours PRN   • Aspirin Low Dose 81 mg, Oral, Daily   • atenolol (TENORMIN) 50 mg, Oral, Daily   • buPROPion (WELLBUTRIN XL) 300 mg, Oral, Every morning   • fluticasone-vilanterol (Breo Ellipta) 200-25 mcg/actuation inhaler 1 puff, Inhalation, Daily, Rinse mouth after use.    • gabapentin (NEURONTIN) 300 mg, Oral, 3 times daily   • metFORMIN (GLUCOPHAGE) 1,000 mg, Oral, 2 times daily with meals   • naproxen (NAPROSYN) 250 mg, Oral, 2 times daily with meals   • rosuvastatin (CRESTOR) 10 mg, Oral, Daily   • telmisartan (MICARDIS) 80 mg, Oral, Daily    No Known Allergies   Social History     Tobacco Use   • Smoking status: Former     Packs/day: 0.50     Types: Cigarettes     Start date: 0     Quit date: 2023     Years since quittin.3     Passive exposure: Past   • Smokeless tobacco: Never   • Tobacco comments:     per pt, 5 a day - As per [employer] Use   • Vaping Use: Never used   Substance Use Topics   • Alcohol use: Not Currently     Comment: occasional    • Drug use: Never    Family History   Problem Relation Age of Onset   • Hypertension Mother    • Heart disease Mother    • Hypertension Father    • Heart disease Father    • Asthma Sister    • No Known Problems Sister    • No Known Problems Sister    • Diabetes Maternal Grandmother    • No Known Problems Maternal Grandfather    • No Known Problems Paternal Grandmother    • No Known Problems Paternal Grandfather    • Breast cancer Paternal Aunt    • Pancreatic cancer Paternal Uncle    • Colon cancer Neg Hx    • Ovarian cancer Neg Hx    • Uterine cancer Neg Hx    • Cervical cancer Neg Hx           Objective                            Vitals I/O      Most Recent Min/Max in 24hrs   Temp   No data recorded   Pulse   Pulse  Min: 63  Max: 71   Resp   Resp  Min: 20  Max: 20   BP   BP  Min: 146/75  Max: 224/104   O2 Sat   SpO2  Min: 94 %  Max: 97 %    No intake or output data in the 24 hours ending 23 4268      No diet orders on file     Invasive Monitoring Physical exam   Arterial Line  Nancy /64  Arterial Line BP  Min: 116/110  Max: 148/80   MAP 94 mmHg  Arterial Line MAP (mmHg)  Min: 94 mmHg  Max: 114 mmHg    Physical Exam  Eyes:      General: No scleral icterus. Extraocular Movements: Extraocular movements intact. Pupils: Pupils are equal, round, and reactive to light. Skin:     General: Skin is warm and dry. HENT:      Head: Normocephalic and atraumatic. Right Ear: Hearing normal.      Left Ear: Hearing normal.      Mouth/Throat:      Mouth: Mucous membranes are moist.   Neck:      Vascular: No JVD. No midline tenderness Cardiovascular:      Rate and Rhythm: Normal rate and regular rhythm. Pulses: Normal pulses. Heart sounds: No murmur heard. No friction rub. No gallop. Musculoskeletal:      Right lower leg: No edema. Left lower leg: No edema. Comments: RUE and RLE deficits (see neuro)   Abdominal:      General: Bowel sounds are normal. There is no distension. Palpations: Abdomen is soft. Tenderness: There is no abdominal tenderness. Constitutional:       General: She is not in acute distress. Comments: Over-weight appearing   Pulmonary:      Effort: Pulmonary effort is normal.      Breath sounds: Normal breath sounds. No wheezing, rhonchi or rales. Psychiatric:         Speech: Speech is not no receptive aphasia or no expressive aphasia. Neurological:      Mental Status: She is alert and oriented to person, place and time. She is agitated. She is CAM ICU negative. Cranial Nerves: Dysarthria and facial asymmetry (right-side facial droop) present. Sensory: Sensory deficit (RUE, RLE; patient denies full loss of sensation) present. Motor: Motor deficit (RUE, RLE). Corneal reflex present, cough reflex and gag reflex intact. Genitourinary/Anorectal:     Comments: [employer] device   Vitals reviewed.             Diagnostic Studies      [id number]   Per my assessment, patient appears to be in NSR without significant ST segment changes. QTc 457. HR 70. Imaging:  CT stroke alert brain  Per official report by Julieth George on 8/14/2023 1640:  "IMPRESSION:     2.1 x 1.6 x 3.2 cm (approximately 5.4 mL) acute parenchymal hematoma in left posterior striatocapsular region extending into left thalamus with mild surrounding vasogenic edema     No acute territorial infarct.     Mild-to-moderate chronic microangiopathy."       8/14/2023 CTA stroke alert (head/neck):  Per official report by Julieth George MD on 8/14/2023 at 1[de-identified]  "IMPRESSION:     Negative CTA head and neck for active contrast extravasation, large vessel occlusion, dissection, aneurysm, arteriovenous vascular malformation (AVM), or high-grade stenosis.     Additional chronic/incidental findings as detailed above."        I have personally reviewed pertinent reports. and I have personally reviewed pertinent films in PACS     Medications:  Scheduled PRN        Continuous    No current facility-administered medications for this encounter. Labs:    CBC    Recent Labs     08/14/23  1714   WBC 11.26*   HGB 10.9*   HCT 34.5*        BMP    Recent Labs     08/14/23  1714   SODIUM 136   K 3.8      CO2 28   AGAP 8   BUN 17   CREATININE 0.71   CALCIUM 9.2       Coags    Recent Labs     08/14/23  1714   INR 0.94   PTT 27        Additional Electrolytes  No recent results       Blood Gas    No recent results  No recent results LFTs  No recent results    Infectious  No recent results  Glucose  Recent Labs     08/14/23  1714   GLUC 143*             Critical Care Time Delivered: Upon my evaluation, this patient had a high probability of imminent or life-threatening deterioration due to IPH, ashtma, DMII, HTN, HLD, right-sided deficits, which required my direct attention, intervention, and personal management.   I have personally provided 30 minutes of critical care time, exclusive of procedures, teaching, family meetings, and any prior time recorded by providers other than myself.      ARSENIO Lima   Critical Care AP Michele

## 2023-08-14 NOTE — EMTALA/ACUTE CARE TRANSFER
[address] [address]  Dept: [phone]      EMTALA TRANSFER CONSENT    NAME Elizabeth Cintron                                         1965                              MRN [id number]    I have been informed of my rights regarding examination, treatment, and transfer   by Dr. Walter Franco DO    Benefits:      Risks:        Transfer Request   I acknowledge that my medical condition has been evaluated and explained to me by the emergency department physician or other qualified medical person and/or my attending physician who has recommended and offered to me further medical examination and treatment. I understand the Hospital's obligation with respect to the treatment and stabilization of my emergency medical condition. I nevertheless request to be transferred. I release the Hospital, the doctor, and any other persons caring for me from all responsibility or liability for any injury or ill effects that may result from my transfer and agree to accept all responsibility for the consequences of my choice to transfer, rather than receive stabilizing treatment at the Hospital. I understand that because the transfer is my request, my insurance may not provide reimbursement for the services. The Hospital will assist and direct me and my family in how to make arrangements for transfer, but the hospital is not liable for any fees charged by the transport service. In spite of this understanding, I refuse to consent to further medical examination and treatment which has been offered to me, and request transfer to  . I authorize the performance of emergency medical procedures and treatments upon me in both transit and upon arrival at the receiving facility. Additionally, I authorize the release of any and all medical records to the receiving facility and request they be transported with me, if possible.     I authorize the performance of emergency medical procedures and treatments upon me in both transit and upon arrival at the receiving facility. Additionally, I authorize the release of any and all medical records to the receiving facility and request they be transported with me, if possible. I understand that the safest mode of transportation during a medical emergency is an ambulance and that the Hospital advocates the use of this mode of transport. Risks of traveling to the receiving facility by car, including absence of medical control, life sustaining equipment, such as oxygen, and medical personnel has been explained to me and I fully understand them. (ZAK CORRECT BOX BELOW)  [  ]  I consent to the stated transfer and to be transported by ambulance/helicopter. [  ]  I consent to the stated transfer, but refuse transportation by ambulance and accept full responsibility for my transportation by car. I understand the risks of non-ambulance transfers and I exonerate the Hospital and its staff from any deterioration in my condition that results from this refusal.    X___________________________________________    DATE  23  TIME________  Signature of patient or legally responsible individual signing on patient behalf           RELATIONSHIP TO PATIENT_________________________          Provider Certification    NAME Suhail Marino                                         1965                              MRN [id number]    A medical screening exam was performed on the above named patient. Based on the examination:    Condition Necessitating Transfer The primary encounter diagnosis was Stroke-like symptoms. Diagnoses of Hemorrhagic stroke ([address]) and Intracerebral hemorrhage ([address]) were also pertinent to this visit.     Patient Condition:      Reason for Transfer:      Transfer Requirements: Facility     · Space available and qualified personnel available for treatment as acknowledged by    · Agreed to accept transfer and to provide appropriate medical treatment as acknowledged by          · Appropriate medical records of the examination and treatment of the patient are provided at the time of transfer   [address] _______  · Transfer will be performed by qualified personnel from    and appropriate transfer equipment as required, including the use of necessary and appropriate life support measures. Provider Certification: I have examined the patient and explained the following risks and benefits of being transferred/refusing transfer to the patient/family:         Based on these reasonable risks and benefits to the patient and/or the unborn child(morenita), and based upon the information available at the time of the patient’s examination, I certify that the medical benefits reasonably to be expected from the provision of appropriate medical treatments at another medical facility outweigh the increasing risks, if any, to the individual’s medical condition, and in the case of labor to the unborn child, from effecting the transfer.     X____________________________________________ DATE 08/14/23        TIME_______      ORIGINAL - SEND TO MEDICAL RECORDS   COPY - SEND WITH PATIENT DURING TRANSFER

## 2023-08-15 ENCOUNTER — APPOINTMENT (INPATIENT)
Dept: NON INVASIVE DIAGNOSTICS | Facility: HOSPITAL | Age: 58
DRG: 064 | End: 2023-08-15
Payer: COMMERCIAL

## 2023-08-15 ENCOUNTER — APPOINTMENT (OUTPATIENT)
Dept: RADIOLOGY | Facility: HOSPITAL | Age: 58
DRG: 064 | End: 2023-08-15
Payer: COMMERCIAL

## 2023-08-15 PROBLEM — I61.9 ICH (INTRACEREBRAL HEMORRHAGE) (HCC): Status: ACTIVE | Noted: 2023-08-15

## 2023-08-15 PROBLEM — M79.605 LEFT LEG PAIN: Status: ACTIVE | Noted: 2023-08-15

## 2023-08-15 LAB
2HR DELTA HS TROPONIN: 1 NG/L
4HR DELTA HS TROPONIN: -1 NG/L
ALBUMIN SERPL BCP-MCNC: 4 G/DL (ref 3.5–5)
ALP SERPL-CCNC: 70 U/L (ref 34–104)
ALT SERPL W P-5'-P-CCNC: 15 U/L (ref 7–52)
AMPHETAMINES SERPL QL SCN: NEGATIVE
ANA SER QL IA: NEGATIVE
ANION GAP SERPL CALCULATED.3IONS-SCNC: 7 MMOL/L
AORTIC ROOT: 3.9 CM
APICAL FOUR CHAMBER EJECTION FRACTION: 61 %
APTT PPP: 28 SECONDS (ref 23–37)
ASCENDING AORTA: 3.7 CM
AST SERPL W P-5'-P-CCNC: 17 U/L (ref 13–39)
ATRIAL RATE: 55 BPM
ATRIAL RATE: 70 BPM
BARBITURATES UR QL: NEGATIVE
BASOPHILS # BLD AUTO: 0.05 THOUSANDS/ÂΜL (ref 0–0.1)
BASOPHILS NFR BLD AUTO: 1 % (ref 0–1)
BENZODIAZ UR QL: POSITIVE
BILIRUB DIRECT SERPL-MCNC: 0.09 MG/DL (ref 0–0.2)
BILIRUB SERPL-MCNC: 0.32 MG/DL (ref 0.2–1)
BUN SERPL-MCNC: 13 MG/DL (ref 5–25)
CA-I BLD-SCNC: 1.09 MMOL/L (ref 1.12–1.32)
CALCIUM SERPL-MCNC: 8.5 MG/DL (ref 8.3–10.1)
CARDIAC TROPONIN I PNL SERPL HS: 5 NG/L
CARDIAC TROPONIN I PNL SERPL HS: 6 NG/L
CARDIAC TROPONIN I PNL SERPL HS: 7 NG/L
CHLORIDE SERPL-SCNC: 105 MMOL/L (ref 96–108)
CHOLEST SERPL-MCNC: 91 MG/DL
CO2 SERPL-SCNC: 27 MMOL/L (ref 21–32)
COCAINE UR QL: NEGATIVE
CREAT SERPL-MCNC: 0.58 MG/DL (ref 0.6–1.3)
E WAVE DECELERATION TIME: 250 MS
EOSINOPHIL # BLD AUTO: 0.15 THOUSAND/ÂΜL (ref 0–0.61)
EOSINOPHIL NFR BLD AUTO: 1 % (ref 0–6)
ERYTHROCYTE [DISTWIDTH] IN BLOOD BY AUTOMATED COUNT: 15.2 % (ref 11.6–15.1)
EST. AVERAGE GLUCOSE BLD GHB EST-MCNC: 146 MG/DL
FERRITIN SERPL-MCNC: 22 NG/ML (ref 11–307)
FOLATE SERPL-MCNC: 11.6 NG/ML
FRACTIONAL SHORTENING: 31 % (ref 28–44)
GFR SERPL CREATININE-BSD FRML MDRD: 101 ML/MIN/1.73SQ M
GLUCOSE SERPL-MCNC: 141 MG/DL (ref 65–140)
GLUCOSE SERPL-MCNC: 149 MG/DL (ref 65–140)
GLUCOSE SERPL-MCNC: 165 MG/DL (ref 65–140)
GLUCOSE SERPL-MCNC: 175 MG/DL (ref 65–140)
GLUCOSE SERPL-MCNC: 184 MG/DL (ref 65–140)
GLUCOSE SERPL-MCNC: 237 MG/DL (ref 65–140)
HBA1C MFR BLD: 6.7 %
HCT VFR BLD AUTO: 30.9 % (ref 34.8–46.1)
HDLC SERPL-MCNC: 63 MG/DL
HGB BLD-MCNC: 9.7 G/DL (ref 11.5–15.4)
IMM GRANULOCYTES # BLD AUTO: 0.02 THOUSAND/UL (ref 0–0.2)
IMM GRANULOCYTES NFR BLD AUTO: 0 % (ref 0–2)
INR PPP: 1.06 (ref 0.84–1.19)
INTERVENTRICULAR SEPTUM IN DIASTOLE (PARASTERNAL SHORT AXIS VIEW): 0.8 CM
INTERVENTRICULAR SEPTUM: 0.8 CM (ref 0.6–1.1)
IRON SATN MFR SERPL: 8 % (ref 15–50)
IRON SERPL-MCNC: 30 UG/DL (ref 50–170)
LAAS-AP2: 18.3 CM2
LAAS-AP4: 20.7 CM2
LDLC SERPL CALC-MCNC: 14 MG/DL (ref 0–100)
LEFT ATRIUM SIZE: 3.1 CM
LEFT ATRIUM VOLUME (MOD BIPLANE): 56 ML
LEFT INTERNAL DIMENSION IN SYSTOLE: 3.6 CM (ref 2.1–4)
LEFT VENTRICULAR INTERNAL DIMENSION IN DIASTOLE: 5.2 CM (ref 3.5–6)
LEFT VENTRICULAR POSTERIOR WALL IN END DIASTOLE: 0.8 CM
LEFT VENTRICULAR STROKE VOLUME: 77 ML
LVSV (TEICH): 77 ML
LYMPHOCYTES # BLD AUTO: 2.38 THOUSANDS/ÂΜL (ref 0.6–4.47)
LYMPHOCYTES NFR BLD AUTO: 22 % (ref 14–44)
MAGNESIUM SERPL-MCNC: 3 MG/DL (ref 1.6–2.6)
MCH RBC QN AUTO: 26.6 PG (ref 26.8–34.3)
MCHC RBC AUTO-ENTMCNC: 31.4 G/DL (ref 31.4–37.4)
MCV RBC AUTO: 85 FL (ref 82–98)
METHADONE UR QL: NEGATIVE
MONOCYTES # BLD AUTO: 0.83 THOUSAND/ÂΜL (ref 0.17–1.22)
MONOCYTES NFR BLD AUTO: 8 % (ref 4–12)
MV E'TISSUE VEL-SEP: 9 CM/S
MV PEAK A VEL: 0.84 M/S
MV PEAK E VEL: 106 CM/S
MV STENOSIS PRESSURE HALF TIME: 73 MS
MV VALVE AREA P 1/2 METHOD: 3.01 CM2
NEUTROPHILS # BLD AUTO: 7.55 THOUSANDS/ÂΜL (ref 1.85–7.62)
NEUTS SEG NFR BLD AUTO: 68 % (ref 43–75)
NRBC BLD AUTO-RTO: 0 /100 WBCS
OPIATES UR QL SCN: NEGATIVE
OXYCODONE+OXYMORPHONE UR QL SCN: NEGATIVE
P AXIS: 53 DEGREES
P AXIS: 58 DEGREES
PCP UR QL: NEGATIVE
PHOSPHATE SERPL-MCNC: 3 MG/DL (ref 2.7–4.5)
PLATELET # BLD AUTO: 302 THOUSANDS/UL (ref 149–390)
PMV BLD AUTO: 9.4 FL (ref 8.9–12.7)
POTASSIUM SERPL-SCNC: 3.4 MMOL/L (ref 3.5–5.3)
PR INTERVAL: 198 MS
PR INTERVAL: 213 MS
PROT SERPL-MCNC: 7.3 G/DL (ref 6.4–8.4)
PROTHROMBIN TIME: 14 SECONDS (ref 11.6–14.5)
QRS AXIS: 10 DEGREES
QRS AXIS: 8 DEGREES
QRSD INTERVAL: 100 MS
QRSD INTERVAL: 90 MS
QT INTERVAL: 424 MS
QT INTERVAL: 467 MS
QTC INTERVAL: 447 MS
QTC INTERVAL: 457 MS
RA PRESSURE ESTIMATED: 3 MMHG
RBC # BLD AUTO: 3.64 MILLION/UL (ref 3.81–5.12)
RIGHT ATRIUM AREA SYSTOLE A4C: 18.9 CM2
RIGHT VENTRICLE ID DIMENSION: 2.8 CM
SL CV LEFT ATRIUM LENGTH A2C: 5.4 CM
SL CV LV EF: 65
SL CV PED ECHO LEFT VENTRICLE DIASTOLIC VOLUME (MOD BIPLANE) 2D: 130 ML
SL CV PED ECHO LEFT VENTRICLE SYSTOLIC VOLUME (MOD BIPLANE) 2D: 53 ML
SODIUM SERPL-SCNC: 139 MMOL/L (ref 135–147)
T WAVE AXIS: 54 DEGREES
T WAVE AXIS: 61 DEGREES
T4 FREE SERPL-MCNC: 0.81 NG/DL (ref 0.61–1.12)
THC UR QL: NEGATIVE
TIBC SERPL-MCNC: 371 UG/DL (ref 250–450)
TRICUSPID ANNULAR PLANE SYSTOLIC EXCURSION: 2.3 CM
TRIGL SERPL-MCNC: 72 MG/DL
TSH SERPL DL<=0.05 MIU/L-ACNC: 2.04 UIU/ML (ref 0.45–4.5)
VENTRICULAR RATE: 55 BPM
VENTRICULAR RATE: 70 BPM
VIT B12 SERPL-MCNC: 221 PG/ML (ref 180–914)
WBC # BLD AUTO: 10.98 THOUSAND/UL (ref 4.31–10.16)

## 2023-08-15 PROCEDURE — 97163 PT EVAL HIGH COMPLEX 45 MIN: CPT

## 2023-08-15 PROCEDURE — 85025 COMPLETE CBC W/AUTO DIFF WBC: CPT

## 2023-08-15 PROCEDURE — 99291 CRITICAL CARE FIRST HOUR: CPT | Performed by: INTERNAL MEDICINE

## 2023-08-15 PROCEDURE — 93010 ELECTROCARDIOGRAM REPORT: CPT | Performed by: INTERNAL MEDICINE

## 2023-08-15 PROCEDURE — 83735 ASSAY OF MAGNESIUM: CPT

## 2023-08-15 PROCEDURE — 82948 REAGENT STRIP/BLOOD GLUCOSE: CPT

## 2023-08-15 PROCEDURE — 85610 PROTHROMBIN TIME: CPT

## 2023-08-15 PROCEDURE — 82330 ASSAY OF CALCIUM: CPT

## 2023-08-15 PROCEDURE — 80061 LIPID PANEL: CPT

## 2023-08-15 PROCEDURE — 93306 TTE W/DOPPLER COMPLETE: CPT | Performed by: INTERNAL MEDICINE

## 2023-08-15 PROCEDURE — 94664 DEMO&/EVAL PT USE INHALER: CPT

## 2023-08-15 PROCEDURE — 82728 ASSAY OF FERRITIN: CPT | Performed by: STUDENT IN AN ORGANIZED HEALTH CARE EDUCATION/TRAINING PROGRAM

## 2023-08-15 PROCEDURE — 83036 HEMOGLOBIN GLYCOSYLATED A1C: CPT

## 2023-08-15 PROCEDURE — 99255 IP/OBS CONSLTJ NEW/EST HI 80: CPT

## 2023-08-15 PROCEDURE — 82746 ASSAY OF FOLIC ACID SERUM: CPT | Performed by: STUDENT IN AN ORGANIZED HEALTH CARE EDUCATION/TRAINING PROGRAM

## 2023-08-15 PROCEDURE — 93005 ELECTROCARDIOGRAM TRACING: CPT

## 2023-08-15 PROCEDURE — 84484 ASSAY OF TROPONIN QUANT: CPT

## 2023-08-15 PROCEDURE — 99255 IP/OBS CONSLTJ NEW/EST HI 80: CPT | Performed by: PHYSICIAN ASSISTANT

## 2023-08-15 PROCEDURE — 83540 ASSAY OF IRON: CPT | Performed by: STUDENT IN AN ORGANIZED HEALTH CARE EDUCATION/TRAINING PROGRAM

## 2023-08-15 PROCEDURE — 82607 VITAMIN B-12: CPT | Performed by: STUDENT IN AN ORGANIZED HEALTH CARE EDUCATION/TRAINING PROGRAM

## 2023-08-15 PROCEDURE — 3044F HG A1C LEVEL LT 7.0%: CPT | Performed by: NURSE PRACTITIONER

## 2023-08-15 PROCEDURE — 97167 OT EVAL HIGH COMPLEX 60 MIN: CPT

## 2023-08-15 PROCEDURE — 83550 IRON BINDING TEST: CPT | Performed by: STUDENT IN AN ORGANIZED HEALTH CARE EDUCATION/TRAINING PROGRAM

## 2023-08-15 PROCEDURE — 93306 TTE W/DOPPLER COMPLETE: CPT

## 2023-08-15 PROCEDURE — C9113 INJ PANTOPRAZOLE SODIUM, VIA: HCPCS | Performed by: STUDENT IN AN ORGANIZED HEALTH CARE EDUCATION/TRAINING PROGRAM

## 2023-08-15 PROCEDURE — 80048 BASIC METABOLIC PNL TOTAL CA: CPT

## 2023-08-15 PROCEDURE — 84100 ASSAY OF PHOSPHORUS: CPT

## 2023-08-15 PROCEDURE — 92610 EVALUATE SWALLOWING FUNCTION: CPT

## 2023-08-15 PROCEDURE — 85730 THROMBOPLASTIN TIME PARTIAL: CPT

## 2023-08-15 PROCEDURE — 99255 IP/OBS CONSLTJ NEW/EST HI 80: CPT | Performed by: PSYCHIATRY & NEUROLOGY

## 2023-08-15 PROCEDURE — 94760 N-INVAS EAR/PLS OXIMETRY 1: CPT

## 2023-08-15 RX ORDER — BUPROPION HYDROCHLORIDE 150 MG/1
150 TABLET ORAL EVERY MORNING
Status: DISCONTINUED | OUTPATIENT
Start: 2023-08-15 | End: 2023-08-25 | Stop reason: HOSPADM

## 2023-08-15 RX ORDER — MAGNESIUM SULFATE HEPTAHYDRATE 40 MG/ML
2 INJECTION, SOLUTION INTRAVENOUS ONCE
Status: COMPLETED | OUTPATIENT
Start: 2023-08-15 | End: 2023-08-15

## 2023-08-15 RX ORDER — HYDROMORPHONE HCL/PF 1 MG/ML
0.5 SYRINGE (ML) INJECTION EVERY 4 HOURS PRN
Status: DISCONTINUED | OUTPATIENT
Start: 2023-08-15 | End: 2023-08-15

## 2023-08-15 RX ORDER — INSULIN LISPRO 100 [IU]/ML
1-6 INJECTION, SOLUTION INTRAVENOUS; SUBCUTANEOUS
Status: DISCONTINUED | OUTPATIENT
Start: 2023-08-15 | End: 2023-08-15

## 2023-08-15 RX ORDER — GABAPENTIN 100 MG/1
100 CAPSULE ORAL 3 TIMES DAILY
Status: DISCONTINUED | OUTPATIENT
Start: 2023-08-15 | End: 2023-08-16

## 2023-08-15 RX ORDER — AMLODIPINE BESYLATE 5 MG/1
5 TABLET ORAL DAILY
Status: DISCONTINUED | OUTPATIENT
Start: 2023-08-15 | End: 2023-08-16

## 2023-08-15 RX ORDER — ONDANSETRON 2 MG/ML
4 INJECTION INTRAMUSCULAR; INTRAVENOUS ONCE
Status: COMPLETED | OUTPATIENT
Start: 2023-08-15 | End: 2023-08-15

## 2023-08-15 RX ORDER — INSULIN LISPRO 100 [IU]/ML
1-6 INJECTION, SOLUTION INTRAVENOUS; SUBCUTANEOUS
Status: DISCONTINUED | OUTPATIENT
Start: 2023-08-15 | End: 2023-08-25 | Stop reason: HOSPADM

## 2023-08-15 RX ORDER — DEXMEDETOMIDINE HYDROCHLORIDE 4 UG/ML
.1-.7 INJECTION, SOLUTION INTRAVENOUS
Status: DISCONTINUED | OUTPATIENT
Start: 2023-08-15 | End: 2023-08-16

## 2023-08-15 RX ORDER — POTASSIUM CHLORIDE 14.9 MG/ML
20 INJECTION INTRAVENOUS ONCE
Status: COMPLETED | OUTPATIENT
Start: 2023-08-15 | End: 2023-08-15

## 2023-08-15 RX ORDER — GABAPENTIN 300 MG/1
300 CAPSULE ORAL 3 TIMES DAILY
Status: DISCONTINUED | OUTPATIENT
Start: 2023-08-15 | End: 2023-08-15

## 2023-08-15 RX ORDER — PANTOPRAZOLE SODIUM 40 MG/10ML
40 INJECTION, POWDER, LYOPHILIZED, FOR SOLUTION INTRAVENOUS
Status: DISCONTINUED | OUTPATIENT
Start: 2023-08-15 | End: 2023-08-21

## 2023-08-15 RX ORDER — DOCUSATE SODIUM 100 MG/1
100 CAPSULE, LIQUID FILLED ORAL 2 TIMES DAILY
Status: DISCONTINUED | OUTPATIENT
Start: 2023-08-15 | End: 2023-08-23

## 2023-08-15 RX ORDER — FENTANYL CITRATE 50 UG/ML
25 INJECTION, SOLUTION INTRAMUSCULAR; INTRAVENOUS ONCE
Status: COMPLETED | OUTPATIENT
Start: 2023-08-15 | End: 2023-08-15

## 2023-08-15 RX ORDER — ONDANSETRON 2 MG/ML
4 INJECTION INTRAMUSCULAR; INTRAVENOUS EVERY 4 HOURS PRN
Status: DISCONTINUED | OUTPATIENT
Start: 2023-08-15 | End: 2023-08-25 | Stop reason: HOSPADM

## 2023-08-15 RX ORDER — PROCHLORPERAZINE MALEATE 5 MG/1
5 TABLET ORAL EVERY 6 HOURS PRN
Status: DISCONTINUED | OUTPATIENT
Start: 2023-08-15 | End: 2023-08-16

## 2023-08-15 RX ORDER — LORAZEPAM 2 MG/ML
0.5 INJECTION INTRAMUSCULAR ONCE AS NEEDED
Status: COMPLETED | OUTPATIENT
Start: 2023-08-15 | End: 2023-08-15

## 2023-08-15 RX ORDER — HYDROMORPHONE HCL IN WATER/PF 6 MG/30 ML
0.2 PATIENT CONTROLLED ANALGESIA SYRINGE INTRAVENOUS EVERY 4 HOURS PRN
Status: DISCONTINUED | OUTPATIENT
Start: 2023-08-15 | End: 2023-08-18

## 2023-08-15 RX ORDER — FENTANYL CITRATE 50 UG/ML
INJECTION, SOLUTION INTRAMUSCULAR; INTRAVENOUS
Status: COMPLETED
Start: 2023-08-15 | End: 2023-08-15

## 2023-08-15 RX ORDER — LOSARTAN POTASSIUM 50 MG/1
100 TABLET ORAL DAILY
Status: DISCONTINUED | OUTPATIENT
Start: 2023-08-15 | End: 2023-08-25 | Stop reason: HOSPADM

## 2023-08-15 RX ORDER — BISACODYL 5 MG/1
5 TABLET, DELAYED RELEASE ORAL DAILY PRN
Status: DISCONTINUED | OUTPATIENT
Start: 2023-08-15 | End: 2023-08-25 | Stop reason: HOSPADM

## 2023-08-15 RX ORDER — CALCIUM GLUCONATE 20 MG/ML
1 INJECTION, SOLUTION INTRAVENOUS ONCE
Status: COMPLETED | OUTPATIENT
Start: 2023-08-15 | End: 2023-08-15

## 2023-08-15 RX ORDER — FLUTICASONE FUROATE AND VILANTEROL 200; 25 UG/1; UG/1
1 POWDER RESPIRATORY (INHALATION) DAILY
Status: DISCONTINUED | OUTPATIENT
Start: 2023-08-15 | End: 2023-08-15

## 2023-08-15 RX ADMIN — INSULIN LISPRO 1 UNITS: 100 INJECTION, SOLUTION INTRAVENOUS; SUBCUTANEOUS at 12:39

## 2023-08-15 RX ADMIN — FLUTICASONE FUROATE AND VILANTEROL TRIFENATATE 1 PUFF: 200; 25 POWDER RESPIRATORY (INHALATION) at 12:38

## 2023-08-15 RX ADMIN — CHLORHEXIDINE GLUCONATE 15 ML: 1.2 SOLUTION ORAL at 09:53

## 2023-08-15 RX ADMIN — ATORVASTATIN CALCIUM 20 MG: 20 TABLET, FILM COATED ORAL at 16:08

## 2023-08-15 RX ADMIN — INSULIN LISPRO 1 UNITS: 100 INJECTION, SOLUTION INTRAVENOUS; SUBCUTANEOUS at 18:05

## 2023-08-15 RX ADMIN — HYDRALAZINE HYDROCHLORIDE 5 MG: 20 INJECTION, SOLUTION INTRAMUSCULAR; INTRAVENOUS at 08:29

## 2023-08-15 RX ADMIN — FENTANYL CITRATE 25 MCG: 50 INJECTION INTRAMUSCULAR; INTRAVENOUS at 23:15

## 2023-08-15 RX ADMIN — POTASSIUM CHLORIDE 20 MEQ: 14.9 INJECTION, SOLUTION INTRAVENOUS at 12:21

## 2023-08-15 RX ADMIN — DOCUSATE SODIUM 100 MG: 100 CAPSULE ORAL at 10:22

## 2023-08-15 RX ADMIN — Medication 10 MG: at 03:09

## 2023-08-15 RX ADMIN — LORAZEPAM 0.5 MG: 2 INJECTION INTRAMUSCULAR; INTRAVENOUS at 21:33

## 2023-08-15 RX ADMIN — FENTANYL CITRATE 25 MCG: 50 INJECTION, SOLUTION INTRAMUSCULAR; INTRAVENOUS at 23:15

## 2023-08-15 RX ADMIN — DEXMEDETOMIDINE HYDROCHLORIDE 0.4 MCG/KG/HR: 400 INJECTION INTRAVENOUS at 22:54

## 2023-08-15 RX ADMIN — NICARDIPINE HYDROCHLORIDE 2.5 MG/HR: 2.5 INJECTION, SOLUTION INTRAVENOUS at 09:22

## 2023-08-15 RX ADMIN — GABAPENTIN 100 MG: 100 CAPSULE ORAL at 20:15

## 2023-08-15 RX ADMIN — HYDROMORPHONE HYDROCHLORIDE 0.5 MG: 1 INJECTION, SOLUTION INTRAMUSCULAR; INTRAVENOUS; SUBCUTANEOUS at 08:18

## 2023-08-15 RX ADMIN — AMLODIPINE BESYLATE 5 MG: 5 TABLET ORAL at 10:22

## 2023-08-15 RX ADMIN — ONDANSETRON 4 MG: 2 INJECTION INTRAMUSCULAR; INTRAVENOUS at 09:17

## 2023-08-15 RX ADMIN — HYDROMORPHONE HYDROCHLORIDE 0.2 MG: 0.2 INJECTION, SOLUTION INTRAMUSCULAR; INTRAVENOUS; SUBCUTANEOUS at 22:01

## 2023-08-15 RX ADMIN — ONDANSETRON 4 MG: 2 INJECTION INTRAMUSCULAR; INTRAVENOUS at 21:46

## 2023-08-15 RX ADMIN — CALCIUM GLUCONATE 1 G: 20 INJECTION, SOLUTION INTRAVENOUS at 10:21

## 2023-08-15 RX ADMIN — GABAPENTIN 100 MG: 100 CAPSULE ORAL at 16:08

## 2023-08-15 RX ADMIN — INSULIN LISPRO 3 UNITS: 100 INJECTION, SOLUTION INTRAVENOUS; SUBCUTANEOUS at 00:09

## 2023-08-15 RX ADMIN — PANTOPRAZOLE SODIUM 40 MG: 40 INJECTION, POWDER, FOR SOLUTION INTRAVENOUS at 12:38

## 2023-08-15 RX ADMIN — MAGNESIUM SULFATE HEPTAHYDRATE 2 G: 40 INJECTION, SOLUTION INTRAVENOUS at 02:22

## 2023-08-15 RX ADMIN — Medication 10 MG: at 08:12

## 2023-08-15 RX ADMIN — PROCHLORPERAZINE MALEATE 5 MG: 5 TABLET ORAL at 11:33

## 2023-08-15 RX ADMIN — LOSARTAN POTASSIUM 100 MG: 50 TABLET, FILM COATED ORAL at 10:22

## 2023-08-15 RX ADMIN — NICARDIPINE HYDROCHLORIDE 5 MG/HR: 2.5 INJECTION, SOLUTION INTRAVENOUS at 21:36

## 2023-08-15 RX ADMIN — POTASSIUM CHLORIDE 20 MEQ: 14.9 INJECTION, SOLUTION INTRAVENOUS at 09:52

## 2023-08-15 RX ADMIN — ONDANSETRON 4 MG: 2 INJECTION INTRAMUSCULAR; INTRAVENOUS at 17:23

## 2023-08-15 RX ADMIN — CHLORHEXIDINE GLUCONATE 15 ML: 1.2 SOLUTION ORAL at 20:15

## 2023-08-15 RX ADMIN — SODIUM CHLORIDE 1000 ML: 0.9 INJECTION, SOLUTION INTRAVENOUS at 03:00

## 2023-08-15 RX ADMIN — NICARDIPINE HYDROCHLORIDE 2.5 MG/HR: 2.5 INJECTION, SOLUTION INTRAVENOUS at 13:30

## 2023-08-15 NOTE — ASSESSMENT & PLAN NOTE
62 YOF with history of HTN, DM2, Sjogren's, fibromyalgia initially presented to [facility] as stroke alert on 8/14/23 with initial presenting symptoms acute onset of right sided weakness, facial asymmetry, and dysarthria while at work. Initial imaging with CTH/CTA HN revealed left thalamocapsular bleed with no significant shift or mass effect. . NIHSS 14. Not TNK candidate given acute ICH. Pt given IV labetalol, started on Cardene drip, administered DDAVP for ASA reversal and transferred to [facility] for NSg care. - Initial /97  - AC/AP prior to admission: ASA 81 QD, s/p reversal with DDAVP   - Neuro History: No prior stroke history  - Vascular risk factors: former smoker, elevated cholesterol, diabetes and weight     NIHSS: At time of stroke alert at [facility] NIHSS 14,  at arrival to [facility] NIHSS 10  Modified Valier Score: 0 No baseline symptoms/disability  ICH Score: 0   FUNC Score: 10     WORK UP:  - CTH: Acute parenchymal hematoma (aprox 5.4 mL) in L posterior striatocapsular region extending into L thalamus with mild surrounding vasogenic edema  - CTA H/N: No LVO, dissection, AVM, aneurysm, or high grade stenosis  - CTH 6HR: Stable hemorrhage and surrounding vasogenic edema in L corona radiata, posterior limb of internal capsule, and thalamus  - CTH 8/17:  Evolving acute L corona radiata intraparenchymal hemorrhage, stable in size. Minimally increased regional vasogenic edema. No shift.  - TTE: EF 65%, LA/RA normal size  - Labs: HbgA1c 6.7, LDL 14,B12 221 KIRSTY-     IMPRESSION: Left thalamocapsular ICH likely 2/2 HTN. Of note, patient fell and hit her head on 8/18 and rapid response was called.  STAT was stable and neurological exam is also stable.     PLAN:  - Frequent neuro checks per protocol  - BP Goals: SBP < 140  - Clear to start DVT PPx (8/17)  - Repeat CT today if sable can restart home ASA  - PT/OT/ST/PMR  - CM Consulted for disposition needs  - Stroke education and counseling  - NSg signed off given no need for surgical interventions   - Rest of Care per Primary

## 2023-08-15 NOTE — CONSULTS
PHYSICAL MEDICINE AND REHABILITATION CONSULT NOTE  Paola Roblero 62 y.o. female MRN: [id number]  Unit/Bed#: ICU 09 Encounter: [id number]    Requested by:   Marcia Higgins   Reason for Consultation:  Rehabilitation medicine evaluation and assistance with appropriate disposition:  [facility]  Primary insurance listed on facesheet:  August     Assessment and Recommendations:  60-year-old female with a history of hypertension, hyperlipidemia, diabetes, obesity, eosinophilic asthma, COPD, fibromyalgia, Sjogren's, lumbar spondylosis, grade 1 anterior spondylolisthesis, mild lumbar neuroforaminal narrowing, left epidural steroid injection in February 2023 who developed sudden right-sided weakness while at work who was brought to the hospital where CT showed acute left thalamic intracerebral hemorrhage with surrounding edema. CTA was negative for significant acute findings. Patient was placed on Cardene drip. Follow-up CT imaging stable. Patient complaining of severe left posterior lateral leg pain as well. Neurosurgery consulted and recommended nonsurgical intervention. For radiating leg pain they recommended possible management for sciatica with Tylenol, gabapentin, and consideration for steroids. Neuro consulted and recommended MRI brain with and without contrast and holding antithrombotics. Prior Level of Function and Social history:    Works as a  for [employer]. Lives with  who works in 1 story house with flight of stairs to enter.     Independent with ADLs  Independent with ambulation    Current Level of Function:    Pending assessment by PT/OT    Acute left thalamic intracerebral hemorrhage possibly secondary to hypertension with residual encephalopathy/impaired cognition, right hemiparesis, and significant in ADLs and mobility:  -Await MRI brain with and without contrast  -Neurochecks with low threshold to repeat CTH  -Blood pressure parameters per neurosurgery and critical care  - Await PT, OT initial evaluation to better assess functional status and deposition appropriateness   - Continue SLP for dysphagia - D3, thins currently but would recommend aspiration precautions and hold for lethargy - monitor for s/s aspiration   - Recommend SLP to evaluate and treat deficits in cognition  - Limit sedating meds when possible, frequent reorientation, reassurance, optimize sleep-wake cycle, monitor for infection or electrolyte abnormality, ensure adequate hydration, nutrition   - While patient at current functional level would also focus on prevention or improvement of complications (malnutrition, dehydration, PNA, atelectasis, VTE, constipation/obstruction, urinary retention, UTI, ulcerations, contractures).  - Turn patient Q2H, skin checks minimum Qshift  - ROM of major joints 2-3 times per day  - OOB to chair or reclined chair or adjust bed to seated position 2-3 times per day when cleared by CCM   - VTE prophylaxis per primary team/neurosx     Radiating left lower extremity pain -possible acute on chronic radiculitis  -Seen by neurosurgery recommending nonsurgical intervention  -Tylenol, Lyrica or gabapentin, consider low-dose as needed opiates or short steroid course if cleared by neurosurgery/neurology  -PT, OT, fall precautions  -OP follow-up spine and pain    Bladder dysfunction - currently on purewick  - Recommend bladder scan Q4H x3 and PVRs PRN to ensure no overflow incontinence; monitor for retention, infection, incontinence; proactive toileting program     Disposition recommendation:     [facility] - patient is expected to be good candidate for transfer to [facility] in coming days pending:    - Improvement in acute encephalopathy, MRI brain, medical clearance, and PT/OT evals which are expected to demonstrate adequate appropriateness for [facility]   - Facility acceptance, insurance authorization, and bed availability      Bowel function -   - Bowel regimen per primary team   - PRN bowel regimen     Encounter for skin care -   - Frequent turning, Q2H, EHOB cushion when OOB in chair  - Consider/provide hydragard BID for buttocks, sacrum, and heels  - Ensure optimal bowel/bladder hygiene   - Monitor closely       VTE prophylaxis   - As outlined by primary team      # Other medical issues:     Per primary service      ==================================================================    Chief Complaint:  R sided weakness     History of Present Illness:   75-year-old female with a history of hypertension, hyperlipidemia, diabetes, obesity, eosinophilic asthma, COPD, fibromyalgia, Sjogren's, lumbar spondylosis, grade 1 anterior spondylolisthesis, mild lumbar neuroforaminal narrowing, left epidural steroid injection in February 2023 who developed sudden right-sided weakness while at work who was brought to the hospital where CT showed acute left thalamic intracerebral hemorrhage with surrounding edema. CTA was negative for significant acute findings. Patient was placed on Cardene drip. Follow-up CT imaging stable. Patient complaining of severe left posterior lateral leg pain as well. Neurosurgery consulted and recommended nonsurgical intervention. For radiating leg pain they recommended possible management for sciatica with Tylenol, gabapentin, and consideration for steroids. Neuro consulted and recommended MRI brain with and without contrast and holding antithrombotics. On eval, patient somewhat lethargic oriented to self, place, 2003, not fully to situation. She reports some pain radiating down L leg. She reports impaired strength and sensation on R. She denies other pain, lightheadedness, shortness of breath, calf pain, abdominal pain, or other new complaints. Review of Systems:     Complete review of systems obtained. Please see HPI for details with other significant symptoms or history listed here: Otherwise, 14 point review of systems completed and was otherwise unremarkable.     No Known Allergies    Current Facility-Administered Medications:   •  albuterol inhalation solution 2.5 mg, 2.5 mg, Nebulization, Q6H PRN, ARSENIO Jensen  •  amLODIPine (NORVASC) tablet 5 mg, 5 mg, Oral, Daily, Xander Hutchins DO, 5 mg at 08/15/23 1022  •  atorvastatin (LIPITOR) tablet 20 mg, 20 mg, Oral, Daily With Dinner, ARSENIO West  •  bisacodyl (DULCOLAX) EC tablet 5 mg, 5 mg, Oral, Daily PRN, Xander Hutchins DO  •  buPROPion (WELLBUTRIN XL) 24 hr tablet 150 mg, 150 mg, Oral, QAM, Xander Hutchins DO  •  chlorhexidine (PERIDEX) 0.12 % oral rinse 15 mL, 15 mL, Mouth/Throat, Q12H [facility], ARSENIO Jensen, 15 mL at 08/15/23 3798  •  docusate sodium (COLACE) capsule 100 mg, 100 mg, Oral, BID, Xander Hutchins DO, 100 mg at 08/15/23 1022  •  fluticasone-vilanterol 200-25 mcg/actuation 1 puff, 1 puff, Inhalation, Daily, ARSENIO Jensen  •  gabapentin (NEURONTIN) capsule 100 mg, 100 mg, Oral, TID, Xander Hutchins DO  •  hydrALAZINE (APRESOLINE) injection 5 mg, 5 mg, Intravenous, Q6H PRN, ARSENIO Jensen, 5 mg at 08/15/23 6948  •  HYDROmorphone (DILAUDID) injection 0.5 mg, 0.5 mg, Intravenous, Q4H PRN, ARSENIO Jensen, 0.5 mg at 08/15/23 0818  •  insulin lispro (HumaLOG) 100 units/mL subcutaneous injection 1-6 Units, 1-6 Units, Subcutaneous, TID With Meals **AND** Fingerstick Glucose (POCT), , , TID AC, Xander Hutchins DO  •  LORazepam (ATIVAN) injection 0.5 mg, 0.5 mg, Intravenous, Once PRN, Xander Hutchins DO  •  losartan (COZAAR) tablet 100 mg, 100 mg, Oral, Daily, Xander Hutchins DO, 100 mg at 08/15/23 1022  •  niCARdipine (CARDENE) 25 mg (STANDARD CONCENTRATION) in sodium chloride 0.9% 250 mL, 1-15 mg/hr, Intravenous, Titrated, Xander Hutchins DO, Last Rate: 50 mL/hr at 08/15/23 0940, 5 mg/hr at 08/15/23 0940  •  [COMPLETED] potassium chloride 20 mEq IVPB (premix), 20 mEq, Intravenous, Once, Last Rate: 50 mL/hr at 08/15/23 0952, 20 mEq at 08/15/23 0952 **FOLLOWED BY** potassium chloride 20 mEq IVPB (premix), 20 mEq, Intravenous, Once, Misha Lovett MD  •  prochlorperazine (COMPAZINE) tablet 5 mg, 5 mg, Oral, Q6H PRN, Artis Traylor MD, 5 mg at 08/15/23 1133    Past Medical History:   Diagnosis Date   • Arthritis    • Asthma    • Continuous opioid dependence ([address]) 4/27/2022   • Diabetes mellitus ([address])    • Diverticulitis of colon    • Fibromyalgia 04/26/2022   • Headache(784.0)    • History of mammogram 2018   • History of tobacco abuse 04/26/2022   • Hypertension    • Nausea 04/29/2022   • Obesity    • Sjogren's syndrome ([address]) 10/19/2012   • Urinary tract infection        Past Surgical History:   Procedure Laterality Date   • APPENDECTOMY     • BREAST BIOPSY Left     unsure of year   • CARPAL TUNNEL RELEASE     • COLONOSCOPY  2017    repeat in 2022   • EPIDURAL BLOCK INJECTION N/A 2/14/2023    Procedure: L5-S1 EPIDURALSTEROID INJECTION (55783);   Surgeon: Rocco Bashir DO;  Location:  MAIN OR;  Service: Pain Management    • FOOT SURGERY     • HERNIA REPAIR  05/2022   • NECK SURGERY      spine fusion    • LA RPR UMBILICAL HRNA 5 YRS/> REDUCIBLE N/A 05/11/2022    Procedure: REPAIR HERNIA UMBILICAL;  Surgeon: Merrick Lara MD;  Location:  MAIN OR;  Service: General      Family History   Problem Relation Age of Onset   • Hypertension Mother    • Heart disease Mother    • Hypertension Father    • Heart disease Father    • Asthma Sister    • No Known Problems Sister    • No Known Problems Sister    • Diabetes Maternal Grandmother    • No Known Problems Maternal Grandfather    • No Known Problems Paternal Grandmother    • No Known Problems Paternal Grandfather    • Breast cancer Paternal Aunt    • Pancreatic cancer Paternal Uncle    • Colon cancer Neg Hx    • Ovarian cancer Neg Hx    • Uterine cancer Neg Hx    • Cervical cancer Neg Hx        Social History available currently in EMR:  (See additional SH as outlined above)   Social History     Socioeconomic History   • Marital status: /Civil Union     Spouse name: Not on file   • Number of children: Not on file   • Years of education: Not on file   • Highest education level: Not on file   Occupational History   • Occupation:     Tobacco Use   • Smoking status: Former     Packs/day: 0.50     Types: Cigarettes     Start date: 0     Quit date: 2023     Years since quittin.3     Passive exposure: Past   • Smokeless tobacco: Never   • Tobacco comments:     per pt, 5 a day - As per [employer] Use   • Vaping Use: Never used   Substance and Sexual Activity   • Alcohol use: Not Currently     Comment: occasional    • Drug use: Never   • Sexual activity: Not Currently     Partners: Male     Birth control/protection: None   Other Topics Concern   • Not on file   Social History Narrative    · Most recent tobacco use screenin2019      · Caffeine intake:   Occasional      · Seat belts used routinely:   Yes      · Smoke alarm in home: Yes      · Has the Patient had a mammogram to screen for breast cancer within 24 months:   Yes      · Please enter the date of the Patient's previous mammogram.:  march of last year.      As per Adi      Social Determinants of Health     Financial Resource Strain: Not on file   Food Insecurity: Not on file   Transportation Needs: Not on file   Physical Activity: Not on file   Stress: Not on file   Social Connections: Not on file   Intimate Partner Violence: Not on file   Housing Stability: Not on file       Physical Examination:   Temp:  [97.7 °F (36.5 °C)-97.8 °F (36.6 °C)] 97.7 °F (36.5 °C)  HR:  [54-76] 62  Resp:  [9-34] 18  BP: (110-224)/() 131/65  Arterial Line BP: (116-218)/() 146/72  General: Awake, alert in NAD  HENT: NCAT, MMM  Neck: Supple, no lymphadenopathy  Respiratory: Unlabored breathing, breath sounds equal, Lungs CTA, no wheezes, rales, or rhonchi  Cardiovascular: Regular rate and rhythm, no murmurs, rubs, or gallops  Gastrointestinal: Soft, non-tender, non-distended, normoactive bowel sounds  Genitourinary: No resendiz  SkiN/MSK/Extremities:  Distal extremities appropriately warm, normal cap refill distally, no cyanosis or calf edema, no calf tenderness to palpation  Neurologic/Psych:   MENTAL STATUS: sleepy/lethargic, oriented to self, 2003, not fully to situation; can follow some simple commands  Affect: Somewhat confused   CN II-XII: R facial weakness  Strength/MMT:  RUE 0/5 RLE 1 to trace/5; L side near full     Data:  Lab Results   Component Value Date    HGB 9.7 (L) 08/15/2023    HCT 30.9 (L) 08/15/2023    WBC 10.98 (H) 08/15/2023    K 3.4 (L) 08/15/2023    K 4.5 01/11/2023     08/15/2023     01/11/2023    CREATININE 0.58 (L) 08/15/2023    BUN 13 08/15/2023    BUN 16 01/11/2023       CT head wo contrast    Result Date: 8/15/2023  Impression: Stable hemorrhage and surrounding vasogenic edema in the left corona radiata, posterior limb of the internal capsule and thalamus. Workstation performed: [id number]     CTA stroke alert (head/neck)    Result Date: 8/14/2023  Impression: Negative CTA head and neck for active contrast extravasation, large vessel occlusion, dissection, aneurysm, arteriovenous vascular malformation (AVM), or high-grade stenosis. Additional chronic/incidental findings as detailed above. Findings were directly discussed with Tamiko Zhang 8/14/2023 at 4:37 PM. Workstation performed: [id number]     CT stroke alert brain    Result Date: 8/14/2023  Impression: 2.1 x 1.6 x 3.2 cm (approximately 5.4 mL) acute parenchymal hematoma in left posterior striatocapsular region extending into left thalamus with mild surrounding vasogenic edema No acute territorial infarct. Mild-to-moderate chronic microangiopathy. Findings were directly discussed with Tamiko Zhang 8/14/2023 at 4:37 PM. Workstation performed: [id number]       Pertinent labs and imaging reviewed. Patient seen on date of service listed.     80 minutes or greater spent for this encounter which included a combination of face-to-face time with patient and non-face-to-face time which in part specifically includes management of ICH. Face-to-face time included extended discussion with patient regarding current condition, medical history, mood, medical/rehabilitation management, and disposition. Non face-to-face time included coordination of care with patient's co-managing AP and/or physician(s) thru communication and/or review of their recent documentation as well as reviewing vitals, bowel/bladder function, recent labs, diagnostic imaging, and notes from therapy, CM, and nursing. Thank you for allowing the PM&R service to participate in the care of this patient. We will continue to follow Saadia Her progress with you. Please do not hesitate to call with questions or concerns.     Zakc Montague MD, [address] [facility]  Physical Medicine and Rehabilitation  Brain Injury Medicine

## 2023-08-15 NOTE — PLAN OF CARE
Problem: MOBILITY - ADULT  Goal: Maintain or return to baseline ADL function  Description: INTERVENTIONS:  -  Assess patient's ability to carry out ADLs; assess patient's baseline for ADL function and identify physical deficits which impact ability to perform ADLs (bathing, care of mouth/teeth, toileting, grooming, dressing, etc.)  - Assess/evaluate cause of self-care deficits   - Assess range of motion  - Assess patient's mobility; develop plan if impaired  - Assess patient's need for assistive devices and provide as appropriate  - Encourage maximum independence but intervene and supervise when necessary  - Involve family in performance of ADLs  - Assess for home care needs following discharge   - Consider OT consult to assist with ADL evaluation and planning for discharge  - Provide patient education as appropriate  8/14/2023 2334 by Pop Bacon RN  Outcome: Progressing  8/14/2023 2334 by Pop Bacon RN  Outcome: Progressing  Goal: Maintains/Returns to pre admission functional level  Description: INTERVENTIONS:  - Perform BMAT or MOVE assessment daily.   - Set and communicate daily mobility goal to care team and patient/family/caregiver. - Collaborate with rehabilitation services on mobility goals if consulted  - Perform Range of Motion  times a day. - Reposition patient every  hours.   - Dangle patient  times a day  - Stand patient  times a day  - Ambulate patient  times a day  - Out of bed to chair  times a day   - Out of bed for meals  times a day  - Out of bed for toileting  - Record patient progress and toleration of activity level   8/14/2023 2334 by Pop Bacon RN  Outcome: Progressing  8/14/2023 2334 by Pop Bacon RN  Outcome: Progressing     Problem: PAIN - ADULT  Goal: Verbalizes/displays adequate comfort level or baseline comfort level  Description: Interventions:  - Encourage patient to monitor pain and request assistance  - Assess pain using appropriate pain scale  - Administer analgesics based on type and severity of pain and evaluate response  - Implement non-pharmacological measures as appropriate and evaluate response  - Consider cultural and social influences on pain and pain management  - Notify physician/advanced practitioner if interventions unsuccessful or patient reports new pain  Outcome: Progressing     Problem: INFECTION - ADULT  Goal: Absence or prevention of progression during hospitalization  Description: INTERVENTIONS:  - Assess and monitor for signs and symptoms of infection  - Monitor lab/diagnostic results  - Monitor all insertion sites, i.e. indwelling lines, tubes, and drains  - Monitor endotracheal if appropriate and nasal secretions for changes in amount and color  - Dorsey appropriate cooling/warming therapies per order  - Administer medications as ordered  - Instruct and encourage patient and family to use good hand hygiene technique  - Identify and instruct in appropriate isolation precautions for identified infection/condition  Outcome: Progressing  Goal: Absence of fever/infection during neutropenic period  Description: INTERVENTIONS:  - Monitor WBC    Outcome: Progressing     Problem: SAFETY ADULT  Goal: Maintain or return to baseline ADL function  Description: INTERVENTIONS:  -  Assess patient's ability to carry out ADLs; assess patient's baseline for ADL function and identify physical deficits which impact ability to perform ADLs (bathing, care of mouth/teeth, toileting, grooming, dressing, etc.)  - Assess/evaluate cause of self-care deficits   - Assess range of motion  - Assess patient's mobility; develop plan if impaired  - Assess patient's need for assistive devices and provide as appropriate  - Encourage maximum independence but intervene and supervise when necessary  - Involve family in performance of ADLs  - Assess for home care needs following discharge   - Consider OT consult to assist with ADL evaluation and planning for discharge  - Provide patient education as appropriate  8/14/2023 2334 by Estevan Matamoros RN  Outcome: Progressing  8/14/2023 2334 by Estevan Matamoros RN  Outcome: Progressing  Goal: Maintains/Returns to pre admission functional level  Description: INTERVENTIONS:  - Perform BMAT or MOVE assessment daily.   - Set and communicate daily mobility goal to care team and patient/family/caregiver. - Collaborate with rehabilitation services on mobility goals if consulted  - Perform Range of Motion  times a day. - Reposition patient every  hours.   - Dangle patient  times a day  - Stand patient  times a day  - Ambulate patient  times a day  - Out of bed to chair  times a day   - Out of bed for meals  times a day  - Out of bed for toileting  - Record patient progress and toleration of activity level   8/14/2023 2334 by Estevan Matamoros RN  Outcome: Progressing  8/14/2023 2334 by Estevan Matamoros RN  Outcome: Progressing  Goal: Patient will remain free of falls  Description: INTERVENTIONS:  - Educate patient/family on patient safety including physical limitations  - Instruct patient to call for assistance with activity   - Consult OT/PT to assist with strengthening/mobility   - Keep Call bell within reach  - Keep bed low and locked with side rails adjusted as appropriate  - Keep care items and personal belongings within reach  - Initiate and maintain comfort rounds  - Make Fall Risk Sign visible to staff  - Offer Toileting every 2 Hours, in advance of need  - Initiate/Maintain bed alarm  - Obtain necessary fall risk management equipment: bed alarm  - Apply yellow socks and bracelet for high fall risk patients  - Consider moving patient to room near nurses station  Outcome: Progressing     Problem: DISCHARGE PLANNING  Goal: Discharge to home or other facility with appropriate resources  Description: INTERVENTIONS:  - Identify barriers to discharge w/patient and caregiver  - Arrange for needed discharge resources and transportation as appropriate  - Identify discharge learning needs (meds, wound care, etc.)  - Arrange for interpretive services to assist at discharge as needed  - Refer to Case Management Department for coordinating discharge planning if the patient needs post-hospital services based on physician/advanced practitioner order or complex needs related to functional status, cognitive ability, or social support system  Outcome: Progressing     Problem: Knowledge Deficit  Goal: Patient/family/caregiver demonstrates understanding of disease process, treatment plan, medications, and discharge instructions  Description: Complete learning assessment and assess knowledge base. Interventions:  - Provide teaching at level of understanding  - Provide teaching via preferred learning methods  Outcome: Progressing     Problem: Nutrition/Hydration-ADULT  Goal: Nutrient/Hydration intake appropriate for improving, restoring or maintaining nutritional needs  Description: Monitor and assess patient's nutrition/hydration status for malnutrition. Collaborate with interdisciplinary team and initiate plan and interventions as ordered. Monitor patient's weight and dietary intake as ordered or per policy. Utilize nutrition screening tool and intervene as necessary. Determine patient's food preferences and provide high-protein, high-caloric foods as appropriate.      INTERVENTIONS:  - Monitor oral intake, urinary output, labs, and treatment plans  - Assess nutrition and hydration status and recommend course of action  - Evaluate amount of meals eaten  - Assist patient with eating if necessary   - Allow adequate time for meals  - Recommend/ encourage appropriate diets, oral nutritional supplements, and vitamin/mineral supplements  - Order, calculate, and assess calorie counts as needed  - Recommend, monitor, and adjust tube feedings and TPN/PPN based on assessed needs  - Assess need for intravenous fluids  - Provide specific nutrition/hydration education as appropriate  - Include patient/family/caregiver in decisions related to nutrition  Outcome: Progressing

## 2023-08-15 NOTE — CONSULTS
NEUROLOGY RESIDENCY CONSULT NOTE     Name: Fariha Perkins   Age & Sex: 62 y.o. female   MRN: [id number]  Unit/Bed#: ICU 09   Encounter: [id number]  Length of Stay: 1    [id number] [address]     ICH (intracerebral hemorrhage) ([address])  Assessment & Plan  62 female presenting intitally to [address] [place] [address] as Stroke Alert for initial presenting neuro deficits consisting of acute onset weakness of the RUE/RLE with sensory loss, facial asymmetry, and dysarthria that occurred while at work. Found to have Left sided thalamic ICH.  - BP at time of presentation with   - Initial NIHSS: 10 on arrival to [address] [place] [address]  - ICH Score 0  - Home AC/AP: ASA - reversed with DDVAP    WORK UP  [facility] 8/14/23: Acute [place] hematoma in the left posterior external capsule region extending to the left thalamus and mild surrounding vasogenic edema. No evidence of acute territorial infarct. Mild to moderate chronic microangiopathy  CTA HN 8/14/23: Negative CTA head and neck for acute contrast extravasation. No LVO, dissection, aneurysm, AVM or high-grade stenosis. [facility] 8/15/23: Stable hemorrhage and surrounding vasogenic edema in the left corona radiata, posterior limb of the internal capsule and thalamus. IMPRESSION: 61 YO F presenting as stroke alert found to have spontaneous acute left intracerebral hemorrhage in the setting of hypertension with initial SBP <200 which likely the etiology of ICH however work up pending     PLAN:  Neurochecks per protocol  Neurosurgery consulted, input appreciated  BP goals: SBP < 140  AC/AP: Home ASA held in the setting of acute bleed  Imaging: CTH at 24 Hours & MRI w/wo Contrast  DVT prophylaxis: Pharmacological agents currently held pending 24 CTH, SCDs  PT/OT/ST  Rest of care per primary    Recommendations for outpatient neurological follow up have yet to be determined.    Pending for discharge: Stroke workup  SUBJECTIVE     Reason for Consult / Principal Problem: ICH  Hx and PE limited by: Aphasia    HPI: 43-year-old female with history of hypertension, hyperlipidemia, type 2 diabetes, asthma, fibromyalgia who initially presented to [address] [place] [address] as a stroke alert for the evaluation of acute onset right upper extremity and right lower extremity weakness, sensory loss, facial asymmetry, dysarthria. NIH stroke scale at time of presentation was noted to be 14. Initial imaging revealed acute parenchymal hematoma in the left posterior external capsule region extending into the left thalamus with mild vasogenic. Patient was transferred. To [facility] for further management. Consults    Historical Information   Past Medical History:   Diagnosis Date   • Arthritis    • Asthma    • Continuous opioid dependence ([address]) 4/27/2022   • Diabetes mellitus ([address])    • Diverticulitis of colon    • Fibromyalgia 04/26/2022   • Headache(784.0)    • History of mammogram 2018   • History of tobacco abuse 04/26/2022   • Hypertension    • Nausea 04/29/2022   • Obesity    • Sjogren's syndrome ([address]) 10/19/2012   • Urinary tract infection      Past Surgical History:   Procedure Laterality Date   • APPENDECTOMY     • BREAST BIOPSY Left     unsure of year   • CARPAL TUNNEL RELEASE     • COLONOSCOPY  2017    repeat in 2022   • EPIDURAL BLOCK INJECTION N/A 2/14/2023    Procedure: L5-S1 EPIDURALSTEROID INJECTION (88023);   Surgeon: Cl Deleon DO;  Location:  MAIN OR;  Service: Pain Management    • FOOT SURGERY     • HERNIA REPAIR  05/2022   • NECK SURGERY      spine fusion    • MI RPR UMBILICAL HRNA 5 YRS/> REDUCIBLE N/A 05/11/2022    Procedure: REPAIR HERNIA UMBILICAL;  Surgeon: Katheryn Haywood MD;  Location:  MAIN OR;  Service: General     Social History   Social History     Substance and Sexual Activity   Alcohol Use Not Currently    Comment: occasional      Social History     Substance and Sexual Activity   Drug Use Never     E-Cigarette/Vaping   • E-Cigarette Use Never User      E-Cigarette/Vaping Substances   • Nicotine No    • THC No    • CBD No    • Flavoring No    • Other No    • Unknown No      Social History     Tobacco Use   Smoking Status Former   • Packs/day: 0.50   • Types: Cigarettes   • Start date: 0   • Quit date: 2023   • Years since quittin.3   • Passive exposure: Past   Smokeless Tobacco Never   Tobacco Comments    per pt, 5 a day - As per Rita Ferris      Family History:   Family History   Problem Relation Age of Onset   • Hypertension Mother    • Heart disease Mother    • Hypertension Father    • Heart disease Father    • Asthma Sister    • No Known Problems Sister    • No Known Problems Sister    • Diabetes Maternal Grandmother    • No Known Problems Maternal Grandfather    • No Known Problems Paternal Grandmother    • No Known Problems Paternal Grandfather    • Breast cancer Paternal Aunt    • Pancreatic cancer Paternal Uncle    • Colon cancer Neg Hx    • Ovarian cancer Neg Hx    • Uterine cancer Neg Hx    • Cervical cancer Neg Hx      Meds/Allergies   current meds:   Current Facility-Administered Medications   Medication Dose Route Frequency   • albuterol inhalation solution 2.5 mg  2.5 mg Nebulization Q6H PRN   • amLODIPine (NORVASC) tablet 5 mg  5 mg Oral Daily   • atorvastatin (LIPITOR) tablet 20 mg  20 mg Oral Daily With Dinner   • bisacodyl (DULCOLAX) EC tablet 5 mg  5 mg Oral Daily PRN   • buPROPion (WELLBUTRIN XL) 24 hr tablet 150 mg  150 mg Oral QAM   • chlorhexidine (PERIDEX) 0.12 % oral rinse 15 mL  15 mL Mouth/Throat Q12H [place]   • docusate sodium (COLACE) capsule 100 mg  100 mg Oral BID   • fluticasone-vilanterol 200-25 mcg/actuation 1 puff  1 puff Inhalation Daily   • gabapentin (NEURONTIN) capsule 100 mg  100 mg Oral TID   • hydrALAZINE (APRESOLINE) injection 5 mg  5 mg Intravenous Q6H PRN   • HYDROmorphone (DILAUDID) injection 0.5 mg  0.5 mg Intravenous Q4H PRN   • insulin lispro (HumaLOG) 100 units/mL subcutaneous injection 1-6 Units  1-6 Units Subcutaneous TID With Meals   • LORazepam (ATIVAN) injection 0.5 mg  0.5 mg Intravenous Once PRN   • losartan (COZAAR) tablet 100 mg  100 mg Oral Daily   • niCARdipine (CARDENE) 25 mg (STANDARD CONCENTRATION) in sodium chloride 0.9% 250 mL  1-15 mg/hr Intravenous Titrated   • pantoprazole (PROTONIX) injection 40 mg  40 mg Intravenous Q24H [address]   • potassium chloride 20 mEq IVPB (premix)  20 mEq Intravenous Once   • prochlorperazine (COMPAZINE) tablet 5 mg  5 mg Oral Q6H PRN    and PTA meds:   Prior to Admission Medications   Prescriptions Last Dose Informant Patient Reported? Taking?    Aspirin Low Dose 81 MG EC tablet   No No   Sig: Take 1 tablet by mouth once daily   albuterol (2.5 mg/3 mL) 0.083 % nebulizer solution  Self No No   Sig: Take 3 mL (2.5 mg total) by nebulization every 4 (four) hours as needed for wheezing or shortness of breath   albuterol (Ventolin HFA) 90 mcg/act inhaler   No No   Sig: Inhale 2 puffs every 6 (six) hours as needed for wheezing   atenolol (TENORMIN) 50 mg tablet   No No   Sig: Take 1 tablet (50 mg total) by mouth daily   buPROPion (WELLBUTRIN XL) 300 mg 24 hr tablet   No No   Sig: Take 1 tablet (300 mg total) by mouth every morning   fluticasone-vilanterol (Breo Ellipta) 200-25 mcg/actuation inhaler   No No   Sig: Inhale 1 puff daily Rinse mouth after use.   gabapentin (Neurontin) 300 mg capsule   No No   Sig: Take 1 capsule (300 mg total) by mouth 3 (three) times a day   metFORMIN (GLUCOPHAGE) 1000 MG tablet   No No   Sig: Take 1 tablet (1,000 mg total) by mouth 2 (two) times a day with meals   naproxen (NAPROSYN) 500 mg tablet  Self No No   Sig: Take 0.5 tablets (250 mg total) by mouth 2 (two) times a day with meals   Patient not taking: Reported on 7/26/2023   rosuvastatin (CRESTOR) 10 MG tablet   No No   Sig: Take 1 tablet (10 mg total) by mouth daily   telmisartan (MICARDIS) 80 MG tablet   No No   Sig: Take 1 tablet (80 mg total) by mouth daily      Facility-Administered Medications: None     No Known Allergies  Review of Systems    OBJECTIVE     Patient ID: Lavonne Carranza is a 62 y.o. female. Vitals:   Vitals:    08/15/23 1000 08/15/23 1022 08/15/23 1103 08/15/23 1200   BP:  131/65     Pulse: 62   62   Resp: 18  18 18   Temp:    97.7 °F (36.5 °C)   TempSrc:    Oral   SpO2: 97%   98%   Weight:       Height:          Body mass index is 34.86 kg/m². Intake/Output Summary (Last 24 hours) at 8/15/2023 1331  Last data filed at 8/15/2023 1235  Gross per 24 hour   Intake 1367.5 ml   Output 1800 ml   Net -432.5 ml       Temperature:   Temp (24hrs), Av.7 °F (36.5 °C), Min:97.7 °F (36.5 °C), Max:97.8 °F (36.6 °C)    Temperature: 97.7 °F (36.5 °C)    Invasive Devices: Invasive Devices     Peripheral Intravenous Line  Duration           Peripheral IV Dorsal (posterior); Right Hand -- days    Peripheral IV 23 Left;Proximal;Ventral (anterior) Forearm <1 day    Peripheral IV 23 Right Antecubital <1 day          Arterial Line  Duration           Arterial Line 23 Left Femoral <1 day          Drain  Duration           External Urinary Catheter <1 day                Physical Exam  Constitutional:       Appearance: She is obese. She is ill-appearing. HENT:      Head: Normocephalic and atraumatic. Cardiovascular:      Rate and Rhythm: Normal rate. Pulmonary:      Effort: No respiratory distress. Musculoskeletal:      Right lower leg: No edema. Left lower leg: No edema. Neurological:      Mental Status: She is alert. Neurologic Exam   Mental status: Alert, oriented, thought content appropriate, alertness: alert, orientation: to person, situation, not place or time.  Language - aphasic, difficulty with repetition, speech nonfluent slurred, does follow three-step commands  Cranial nerves: PERRLA, Visual field intact, EOMI, L Ptosis, Right facial droop, Tongue deviation to R, Decreased sensation in V2/V3 right distrubution  Sensory: Decreased sensation to the right face, arm, and leg with light touch  Motor: Decreased tone noted in all 4 extremities, flaccid paralysis of the right upper extremity and right lower extremity with 0/5 strength, left side 5/5  Coordination: finger to nose normal bilaterally      LABORATORY DATA     Labs: I have personally reviewed pertinent reports. Results from last 7 days   Lab Units 08/15/23  0434 08/14/23  1714   WBC Thousand/uL 10.98* 11.26*   HEMOGLOBIN g/dL 9.7* 10.9*   HEMATOCRIT % 30.9* 34.5*   PLATELETS Thousands/uL 302 353   NEUTROS PCT % 68 49   MONOS PCT % 8 9   EOS PCT % 1 7*      Results from last 7 days   Lab Units 08/15/23  0434 08/14/23  1714   POTASSIUM mmol/L 3.4* 3.8   CHLORIDE mmol/L 105 100   CO2 mmol/L 27 28   BUN mg/dL 13 17   CREATININE mg/dL 0.58* 0.71   CALCIUM mg/dL 8.5 9.2   ALK PHOS U/L  --  70   ALT U/L  --  15   AST U/L  --  17     Results from last 7 days   Lab Units 08/15/23  0434 08/14/23  1714   MAGNESIUM mg/dL 3.0* 1.5*     Results from last 7 days   Lab Units 08/15/23  0434 08/14/23  1714   PHOSPHORUS mg/dL 3.0 4.3      Results from last 7 days   Lab Units 08/15/23  0434 08/14/23  1714   INR  1.06 0.94   PTT seconds 28 27               IMAGING & DIAGNOSTIC TESTING     Radiology Results: I have personally reviewed pertinent reports. CT head wo contrast   Final Result by Martín Schaefer MD (08/15 0360)      Stable hemorrhage and surrounding vasogenic edema in the left corona radiata, posterior limb of the internal capsule and thalamus. Workstation performed: [id number]         MRI brain w wo contrast    (Results Pending)       Other Diagnostic Testing: I have personally reviewed pertinent reports.       ACTIVE MEDICATIONS     Current Facility-Administered Medications   Medication Dose Route Frequency   • albuterol inhalation solution 2.5 mg  2.5 mg Nebulization Q6H PRN   • amLODIPine (NORVASC) tablet 5 mg  5 mg Oral Daily   • atorvastatin (LIPITOR) tablet 20 mg  20 mg Oral Daily With Dinner   • bisacodyl (DULCOLAX) EC tablet 5 mg 5 mg Oral Daily PRN   • buPROPion (WELLBUTRIN XL) 24 hr tablet 150 mg  150 mg Oral QAM   • chlorhexidine (PERIDEX) 0.12 % oral rinse 15 mL  15 mL Mouth/Throat Q12H [facility]   • docusate sodium (COLACE) capsule 100 mg  100 mg Oral BID   • fluticasone-vilanterol 200-25 mcg/actuation 1 puff  1 puff Inhalation Daily   • gabapentin (NEURONTIN) capsule 100 mg  100 mg Oral TID   • hydrALAZINE (APRESOLINE) injection 5 mg  5 mg Intravenous Q6H PRN   • HYDROmorphone (DILAUDID) injection 0.5 mg  0.5 mg Intravenous Q4H PRN   • insulin lispro (HumaLOG) 100 units/mL subcutaneous injection 1-6 Units  1-6 Units Subcutaneous TID With Meals   • LORazepam (ATIVAN) injection 0.5 mg  0.5 mg Intravenous Once PRN   • losartan (COZAAR) tablet 100 mg  100 mg Oral Daily   • niCARdipine (CARDENE) 25 mg (STANDARD CONCENTRATION) in sodium chloride 0.9% 250 mL  1-15 mg/hr Intravenous Titrated   • pantoprazole (PROTONIX) injection 40 mg  40 mg Intravenous Q24H [place]   • potassium chloride 20 mEq IVPB (premix)  20 mEq Intravenous Once   • prochlorperazine (COMPAZINE) tablet 5 mg  5 mg Oral Q6H PRN       Prior to Admission medications    Medication Sig Start Date End Date Taking?  Authorizing Provider   albuterol (2.5 mg/3 mL) 0.083 % nebulizer solution Take 3 mL (2.5 mg total) by nebulization every 4 (four) hours as needed for wheezing or shortness of breath 4/8/23   Misa [place] MD   albuterol (Ventolin HFA) 90 mcg/act inhaler Inhale 2 puffs every 6 (six) hours as needed for wheezing 8/4/23   ARSENIO Hale   Aspirin Low Dose 81 MG EC tablet Take 1 tablet by mouth once daily 8/4/23   Misa [place] MD   atenolol (TENORMIN) 50 mg tablet Take 1 tablet (50 mg total) by mouth daily 8/4/23   ARSENIO Lantigua   buPROPion (WELLBUTRIN XL) 300 mg 24 hr tablet Take 1 tablet (300 mg total) by mouth every morning 7/26/23 7/20/24  ARSENIO Lantigua   fluticasone-vilanterol (Breo Ellipta) 200-25 mcg/actuation inhaler Inhale 1 puff daily Rinse mouth after use.  8/4/23 11/2/23  ARSENIO Rush   gabapentin (Neurontin) 300 mg capsule Take 1 capsule (300 mg total) by mouth 3 (three) times a day 7/26/23   ARSENIO Rush   metFORMIN (GLUCOPHAGE) 1000 MG tablet Take 1 tablet (1,000 mg total) by mouth 2 (two) times a day with meals 7/26/23 8/25/23  ARSENIO Rush   naproxen (NAPROSYN) 500 mg tablet Take 0.5 tablets (250 mg total) by mouth 2 (two) times a day with meals  Patient not taking: Reported on 7/26/2023 4/3/23   Melo Powell DO   rosuvastatin (CRESTOR) 10 MG tablet Take 1 tablet (10 mg total) by mouth daily 7/26/23   ARSENIO Rush   telmisartan (MICARDIS) 80 MG tablet Take 1 tablet (80 mg total) by mouth daily 7/26/23   ARSENIO Rush         CODE STATUS & ADVANCED DIRECTIVES     Code Status: Level 1 - Full Code  Advance Directive and Living Will:      Power of :    POLST:        VTE Pharmacologic Prophylaxis: Pharmacologic VTE Prophylaxis contraindicated due to [address] [facility]  VTE Mechanical Prophylaxis: sequential compression device    ==  Fleet Vero, DO [facility], PGY-2

## 2023-08-15 NOTE — UTILIZATION REVIEW
Initial Clinical Review    Admission: Date/Time/Statement:   Admission Orders (From admission, onward)     Ordered        08/14/23 2003  Inpatient Admission  Once                      Orders Placed This Encounter   Procedures   • Inpatient Admission     Standing Status:   Standing     Number of Occurrences:   1     Order Specific Question:   Level of Care     Answer:   Critical Care [15]     Order Specific Question:   Estimated length of stay     Answer:   More than 2 Midnights     Order Specific Question:   Certification     Answer:   I certify that inpatient services are medically necessary for this patient for a duration of greater than two midnights. See H&P and MD Progress Notes for additional information about the patient's course of treatment. Initial Presentation: 62 y.o. female transferred from [facility] ED to [facility] with PMHx of Sjogren's syndome,HTN, HLD, asthma, DMII, and fibromyalgia who presents with acute parenchymal hematoma in left posterior striatocapsular region extending into left thalamus with mild vasogenic edema likely secondary to uncontrolled HTN as seen on stat CT/CTA H/N imaging. Patient presented as a stroke alert today to [facility] with acute onset of RUE and RLE weakness and sensory loss, right-sided facial deficit, and dysarthria. No evidence of LVO or flow restricting stenosis on CTA H/N. Initial BP per neurology note was 208/97 and initial NIHSS 14.    [facility] GCS E4V5M6, NIHSS 13, dysarthria, facial droop, tongue derivation to the right, extraocular motor intact, follows command by lifting left upper and lower ext above gravity no drift, no movement right upper, right lower 1/5, no sensation of being touched on the right side  S/P DDAVP in the ED for aspirin use  Neuro checks q1h repeat CT head 6 hours from initial CT head  -160  f/u UDS, alcohol , KIRSTY, dsDNA  MRI with and without contrast when able  Neurosurg consult  replace Mg  speech  PT/OT    Date: 8/15/23    Day 2: Acute L basal ganglia bleed ICH score 0  R hemiplegia, dysarthria, htn ,dm2 sjogren  Continue asa s/p DDAVP, nicardipine gtt as needed maintain sbp 140-160. Nancy for BP monitoring, neuro checks q1h  Had 1 dose IV Dilaudid for pain > try to avoid  Neurosurgery, neurology consulted  Hold home atenolol, telmisartabn  ESTEPHANIA   Neurolosurgery  Consult:  Continue frequent neurological checks  CT imaging results reviewed with patient. Ongoing medical management per primary team and neurology  -160mmHg  Stroke pathway: ECHO/MRI/Therapies  No surgical intervention indicated.     Neurology Consult  63 YO F presenting as stroke alert found to have spontaneous acute left intracerebral hemorrhage in the setting of hypertension with initial SBP <200 which likely the etiology of ICH however work up pending  Neurochecks per protocol  Neurosurgery consulted no surgical intervention  BP goals: SBP < 140  AC/AP: Home ASA held in the setting of acute bleed  Imaging: CTH at 24 Hours & MRI w/wo Contrast  DVT prophylaxis: Pharmacological agents currently held pending 24 CTH, SCDs  PT/OT/ST       ED Triage Vitals   Temperature Pulse Respirations Blood Pressure SpO2   08/14/23 1925 08/14/23 1925 08/14/23 1925 08/15/23 0829 08/14/23 1925   97.7 °F (36.5 °C) 66 20 (!) 173/79 98 %      Temp Source Heart Rate Source Patient Position - Orthostatic VS BP Location FiO2 (%)   08/14/23 1925 08/14/23 1925 -- -- --   Oral Monitor         Pain Score       08/14/23 1925       No Pain          Wt Readings from Last 1 Encounters:   08/15/23 98 kg (216 lb)     Additional Vital Signs:   08/15/23 0900 -- 58 14 -- 152/70 102 mmHg 99 % --   08/15/23 0838 -- 56 -- 173/79 Abnormal  -- -- 98 % None (Room air)   08/15/23 0835 -- 54 Abnormal  14 -- 154/70 102 mmHg 98 % --   08/15/23 0834 -- 56 16 -- 160/74 106 mmHg 97 % --   08/15/23 0829 -- 56 16 173/79 Abnormal  162/72 106 mmHg 97 % --   08/15/23 0826 -- 62 11 Abnormal  -- 176/78 118 mmHg 97 % Pertinent Labs/Diagnostic Test Results:   CT head wo contrast   Final Result by Lorena Le MD (08/15 6884)      Stable hemorrhage and surrounding vasogenic edema in the left corona radiata, posterior limb of the internal capsule and thalamus.                   Workstation performed: [id number]         MRI brain w wo contrast    (Results Pending)         Results from last 7 days   Lab Units 08/15/23  0434 08/14/23  1714   WBC Thousand/uL 10.98* 11.26*   HEMOGLOBIN g/dL 9.7* 10.9*   HEMATOCRIT % 30.9* 34.5*   PLATELETS Thousands/uL 302 353   NEUTROS ABS Thousands/µL 7.55 5.47         Results from last 7 days   Lab Units 08/15/23  0434 08/14/23  1714   SODIUM mmol/L 139 136   POTASSIUM mmol/L 3.4* 3.8   CHLORIDE mmol/L 105 100   CO2 mmol/L 27 28   ANION GAP mmol/L 7 8   BUN mg/dL 13 17   CREATININE mg/dL 0.58* 0.71   EGFR ml/min/1.73sq m 101 94   CALCIUM mg/dL 8.5 9.2   CALCIUM, IONIZED mmol/L 1.09*  --    MAGNESIUM mg/dL 3.0* 1.5*   PHOSPHORUS mg/dL 3.0 4.3     Results from last 7 days   Lab Units 08/14/23  1714   AST U/L 17   ALT U/L 15   ALK PHOS U/L 70   TOTAL PROTEIN g/dL 7.3   ALBUMIN g/dL 4.0   TOTAL BILIRUBIN mg/dL 0.32   BILIRUBIN DIRECT mg/dL 0.09     Results from last 7 days   Lab Units 08/15/23  0009 08/14/23  2037 08/14/23  1638   POC GLUCOSE mg/dl 237* 184* 133     Results from last 7 days   Lab Units 08/15/23  0434 08/14/23  1714   GLUCOSE RANDOM mg/dL 149* 143*         Results from last 7 days   Lab Units 08/15/23  0434   HEMOGLOBIN A1C % 6.7*   EAG mg/dl 146     Results from last 7 days   Lab Units 08/15/23  0305 08/15/23  0105 08/14/23  2315 08/14/23  1714   HS TNI 0HR ng/L  --   --  6 3   HS TNI 2HR ng/L  --  7  --   --    HSTNI D2 ng/L  --  1  --   --    HS TNI 4HR ng/L 5  --   --   --    HSTNI D4 ng/L -1  --   --   --      Results from last 7 days   Lab Units 08/15/23  0434 08/14/23  1714   PROTIME seconds 14.0 13.2   INR  1.06 0.94   PTT seconds 28 27     Results from last 7 days Lab Units 08/14/23 2057   TSH 3RD GENERATON uIU/mL 2.039     Results from last 7 days   Lab Units 08/14/23 2101   AMPH/METH  Negative   BARBITURATE UR  Negative   BENZODIAZEPINE UR  Positive*   COCAINE UR  Negative   METHADONE URINE  Negative   OPIATE UR  Negative   PCP UR  Negative   THC UR  Negative     Results from last 7 days   Lab Units 08/14/23 2057   ETHANOL LVL mg/dL <3       Past Medical History:   Diagnosis Date   • Arthritis    • Asthma    • Continuous opioid dependence ([address]) 4/27/2022   • Diabetes mellitus ([address])    • Diverticulitis of colon    • Fibromyalgia 04/26/2022   • Headache(784.0)    • History of mammogram 2018   • History of tobacco abuse 04/26/2022   • Hypertension    • Nausea 04/29/2022   • Obesity    • Sjogren's syndrome ([address]) 10/19/2012   • Urinary tract infection      Present on Admission:  **None**      Admitting Diagnosis: Stroke-like symptoms [R29.90]  Age/Sex: 62 y.o. female  Admission Orders:  Neurocritical Care  Neuro checks q1hr  MRI brain  Cbc mg  Phos bmp  Bri screen  dsDNA antibody by IFA   Speech/ swallow  Daily weight I/o  IS  Scheduled Medications:  amLODIPine, 5 mg, Oral, Daily  atorvastatin, 20 mg, Oral, Daily With Dinner  buPROPion, 150 mg, Oral, QAM  calcium gluconate, 1 g, Intravenous, Once  chlorhexidine, 15 mL, Mouth/Throat, Q12H [place]  docusate sodium, 100 mg, Oral, BID  fluticasone-vilanterol, 1 puff, Inhalation, Daily  gabapentin, 100 mg, Oral, TID  insulin lispro, 1-6 Units, Subcutaneous, TID With Meals  losartan, 100 mg, Oral, Daily  potassium chloride, 20 mEq, Intravenous, Once   Followed by  potassium chloride, 20 mEq, Intravenous, Once      Continuous IV Infusions:  niCARdipine, 1-15 mg/hr, Intravenous, Titrated      PRN Meds:  albuterol, 2.5 mg, Nebulization, Q6H PRN  bisacodyl, 5 mg, Oral, Daily PRN  hydrALAZINE, 5 mg, Intravenous, Q6H PRN  HYDROmorphone, 0.5 mg, Intravenous, Q4H PRN  LORazepam, 0.5 mg, Intravenous, Once PRN        IP CONSULT TO CASE MANAGEMENT  IP CONSULT TO NEUROLOGY  IP CONSULT TO PHYSICAL MEDICINE REHAB  IP CONSULT TO NEUROSURGERY    Network Utilization Review Department  ATTENTION: Please call with any questions or concerns to [phone] and carefully listen to the prompts so that you are directed to the right person. All voicemails are confidential.  Tai Vick all requests for admission clinical reviews, approved or denied determinations and any other requests to dedicated fax number below belonging to the campus where the patient is receiving treatment.  List of dedicated fax numbers for the Facilities:  [place] DENIALS (Administrative/Medical Necessity) [phone] [address] [facility] (Maternity/NICU/Pediatrics) [phone]   [address] [place] [address] [phone]   [facility] [address] [facility] [phone]   [address] [place] [address] [phone]   [address] [facility] [address] [facility] [address] [place] [address] [address] [phone]

## 2023-08-15 NOTE — OCCUPATIONAL THERAPY NOTE
Occupational Therapy Evaluation     Patient Name: Janis Vigil  [id number]'[id number] Date: 8/15/2023  Problem List  Active Problems:    ICH (intracerebral hemorrhage) ([address])    Left leg pain    Past Medical History  Past Medical History:   Diagnosis Date    Arthritis     Asthma     Continuous opioid dependence ([address]) 4/27/2022    Diabetes mellitus ([address])     Diverticulitis of colon     Fibromyalgia 04/26/2022    Headache(784.0)     History of mammogram 2018    History of tobacco abuse 04/26/2022    Hypertension     Nausea 04/29/2022    Obesity     Sjogren's syndrome ([address]) 10/19/2012    Urinary tract infection      Past Surgical History  Past Surgical History:   Procedure Laterality Date    APPENDECTOMY      BREAST BIOPSY Left     unsure of year    CARPAL TUNNEL RELEASE      COLONOSCOPY  2017    repeat in 2022    EPIDURAL BLOCK INJECTION N/A 2/14/2023    Procedure: L5-S1 EPIDURALSTEROID INJECTION (68124); Surgeon: Alondra Grewal DO;  Location:  MAIN OR;  Service: Pain Management     FOOT SURGERY      HERNIA REPAIR  05/2022    NECK SURGERY      spine fusion     SD RPR UMBILICAL HRNA 5 YRS/> REDUCIBLE N/A 05/11/2022    Procedure: REPAIR HERNIA UMBILICAL;  Surgeon: Stoney Sorto MD;  Location:  MAIN OR;  Service: General         08/15/23 1105   OT Last Visit   OT Visit Date 08/15/23   Note Type   Note type Evaluation   Pain Assessment   Pain Assessment Tool 0-10   Pain Score No Pain   Restrictions/Precautions   Weight Bearing Precautions Per Order No   Other Precautions Chair Alarm; Bed Alarm;Multiple lines; Fall Risk   Home Living   Type of [address] [place] [address]  One level  (modular home with basement)   Bathroom Shower/Tub Tub/shower unit   [employer] Standard   Prior Function   Level of [place] Independent with ADLs; Independent with functional mobility; Independent with IADLS   Lives With Spouse   Receives Help From Family   IADLs Independent with driving; Independent with meal prep; Independent with medication management   Falls in the last 6 months 0   Vocational Full time employment   Lifestyle   Autonomy I adls and mobiltiy-  i iadls - shares homemaking with spouse   Reciprocal Relationships supportive spouse, family and friends   Service to Others works FT as  at [employer] active pta   General   Family/Caregiver Present Yes   Additional General Comments spouse present   Subjective   Subjective c/o nausea   ADL   Eating Assistance Unable to assess   Grooming Assistance 4  Minimal Assistance   UB Bathing Assistance 2  Maximal Assistance   LB Bathing Assistance 2  Maximal Assistance   [address] 2  Maximal Assistance   LB [address] 2  Maximal [address]  2  Maximal Assistance   Bed Mobility   Supine to Sit 3  Moderate assistance   Additional items Assist x 2   Transfers   Sit to Stand 2  Maximal assistance   Additional items Assist x 2   Stand to Sit 2  Maximal assistance   Additional items Assist x 2   Stand pivot 2  Maximal assistance   Additional items Assist x 2   Functional Mobility   Additional Comments unable to assess   Balance   Static Sitting Fair   Dynamic Sitting Fair -   Static Standing Poor   Dynamic Standing Poor -   Ambulatory Zero   Activity Tolerance   Activity Tolerance Patient limited by fatigue;Treatment limited secondary to medical complications (Comment)   RUE Assessment   RUE Assessment X  (flaccid)   LUE Assessment   LUE Assessment WFL   Hand Function   Gross Motor Coordination Impaired  (RUE)   Fine Motor Coordination Impaired  (RUE)   Cognition   Arousal/Participation Arousable; Cooperative   Attention Attends with cues to redirect   Orientation Level Oriented to person;Oriented to place;Oriented to time;Oriented to situation   Memory Decreased recall of precautions;Decreased recall of recent events   Following Commands Follows one step commands with increased time or repetition   Assessment   Limitation Decreased ADL status; Decreased UE ROM; Decreased UE strength;Decreased Safe judgement during ADL;Decreased endurance;Decreased fine motor control;Decreased self-care trans;Decreased high-level ADLs; Non-func R UE   Prognosis Good   Assessment Pt is a 62 y.o. female who was admitted to [address] [place] [address] on 8/14/2023 with acute weakness R side while at work - CT showed acute left thalamic intracerebral hemorrhage with surrounding edema. CTA was negative for significant acute findings  . Patient  has a past medical history of Arthritis, Asthma, Continuous opioid dependence ([address]), Diabetes mellitus ([address]), Diverticulitis of colon, Fibromyalgia, Headache(784.0), History of mammogram, History of tobacco abuse, Hypertension, Nausea, Obesity, Sjogren's syndrome ([address]), and Urinary tract infection. At baseline pt was completing adls and mobility independently - I iadls - shares homemaking with spouse. Pt lives with spouse in modular home with 5 DAMIAN- has basement but does not need to go down. Currently pt requires max assist for overall ADLS and max a x 2 for functional mobility/transfers. Pt currently presents with impairments in the following categories -steps to enter environment, limited home support, difficulty performing ADLS, difficulty performing IADLS , flat affect, health management  and environment activity tolerance, endurance, standing balance/tolerance, sitting balance/tolerance, UE strength, UE ROM, FMC and communication.  These impairments, as well as pt's fatigue, abnormal tone, (R) hemiplegia, decreased caregiver support, risk for falls and home environment  limit pt's ability to safely engage in all baseline areas of occupation, includingeating, grooming, bathing, dressing, toileting, functional mobility/transfers, community mobility, laundry , driving, house maintenance, medication management, meal prep, cleaning, work/volunteer work , social participation  and leisure activities  From OT standpoint, recommend inpt rehab upon D/C. OT will continue to follow to address the below stated goals. Goals   Patient Goals none stated at this time   LTG Time Frame 10-14   Long Term Goal #1 refer to established goals below   Plan   Treatment Interventions ADL retraining;Functional transfer training;UE strengthening/ROM; Endurance training;Cognitive reorientation;Patient/family training;Equipment evaluation/education; Neuromuscular reeducation; Fine motor coordination activities; Compensatory technique education; Activityengagement   Goal Expiration Date 08/29/23   OT Frequency 3-5x/wk   Recommendation   OT Discharge Recommendation Post acute rehabilitation services   AM-PAC Daily Activity Inpatient   Lower Body Dressing 2   Bathing 2   Toileting 2   Upper Body Dressing 2   Grooming 3   Eating 3   Daily Activity Raw Score 14   Daily Activity Standardized Score (Calc for Raw Score >=11) 33.39   AM-PAC Applied Cognition Inpatient   Following a Speech/Presentation 3   Understanding Ordinary Conversation 4   Taking Medications 3   Remembering Where Things Are Placed or Put Away 3   Remembering List of 4-5 Errands 3   Taking Care of Complicated Tasks 3   Applied Cognition Raw Score 19   Applied Cognition Standardized Score 39.77   Barthel Index   Feeding 0   Bathing 0   Grooming Score 0   Dressing Score 0   Bladder Score 0   Bowels Score 10   Toilet Use Score 5   Transfers (Bed/Chair) Score 5   Mobility (Level Surface) Score 0   Stairs Score 0   Barthel Index Score 20   Modified Kamari Scale   Modified Blair Scale 4   End of Consult   Education Provided Yes;Family or social support of family present for education by provider   Patient Position at End of Consult Bedside chair; All needs within reach;Bed/Chair alarm activated   Nurse Communication Nurse aware of consult       The patient's raw score on the AM-PAC Daily Activity Inpatient Short Form is 14.  A raw score of less than 19 suggests the patient may benefit from discharge to post-acute rehabilitation services. Please refer to the recommendation of the Occupational Therapist for safe discharge planning.       OCCUPATIONAL THERAPY GOALS:    *Mod a adls after setup with use of AE PRN  *Mod a toileting and clothing management   *Mod a functional transfers to/from all surfaces with fair dynamic balance and safety for participation in dynamic adls and iadl tasks   *Prevent loss of ROM RUE and increase active movement via facilitory techniques for use as gross assist with adls  *Assess DME needs   *Increase activity tolerance to 35-40 minutes for participation in adls and enjoyable activities  *Pt to participate in ongoing functional cognitive and  assessment with fair+ attention/participation to assist with safe d/c recommendations        [facility]

## 2023-08-15 NOTE — PHYSICAL THERAPY NOTE
Physical Therapy Evaluation     Patient's Name: Zane Pedersen    Admitting Diagnosis  Stroke-like symptoms [R29.90]    Problem List  Patient Active Problem List   Diagnosis    Cigarette nicotine dependence in remission    Primary hypertension    Fibromyalgia    Continuous opioid dependence ([address])    Moderate persistent asthma with acute exacerbation    Diabetes mellitus type 2, controlled ([address])    Anxiety    Sjogren's syndrome ([address])    Incarcerated umbilical hernia    Postoperative visit    Chronic bilateral low back pain with bilateral sciatica    Paresthesia of both feet    Right lumbosacral radiculopathy    CAD (coronary artery disease)    Abnormal CT of the chest    Class 2 obesity with body mass index (BMI) of 39.0 to 39.9 in adult    Chronic obstructive pulmonary disease with acute exacerbation (HCC)    Hyperlipidemia associated with type 2 diabetes mellitus ([address])    ICH (intracerebral hemorrhage) ([address])    Left leg pain       Past Medical History  Past Medical History:   Diagnosis Date    Arthritis     Asthma     Continuous opioid dependence ([address]) 4/27/2022    Diabetes mellitus ([address])     Diverticulitis of colon     Fibromyalgia 04/26/2022    Headache(784.0)     History of mammogram 2018    History of tobacco abuse 04/26/2022    Hypertension     Nausea 04/29/2022    Obesity     Sjogren's syndrome ([address]) 10/19/2012    Urinary tract infection        Past Surgical History  Past Surgical History:   Procedure Laterality Date    APPENDECTOMY      BREAST BIOPSY Left     unsure of year    CARPAL TUNNEL RELEASE      COLONOSCOPY  2017    repeat in 2022    EPIDURAL BLOCK INJECTION N/A 2/14/2023    Procedure: L5-S1 EPIDURALSTEROID INJECTION (78658);   Surgeon: Stefania Toro DO;  Location:  MAIN OR;  Service: Pain Management     FOOT SURGERY      HERNIA REPAIR  05/2022    NECK SURGERY      spine fusion     MI RPR UMBILICAL HRNA 5 YRS/> REDUCIBLE N/A 05/11/2022    Procedure: REPAIR HERNIA UMBILICAL;  Surgeon: Shannan Toro MD;  Location: [facility];  Service: General          08/15/23 1106   PT Last Visit   PT Visit Date 08/15/23   Note Type   Note type Evaluation   Pain Assessment   Pain Assessment Tool 0-10   Pain Score No Pain   Hospital Pain Intervention(s) Repositioned; Ambulation/increased activity   Restrictions/Precautions   Weight Bearing Precautions Per Order No   Other Precautions Chair Alarm; Bed Alarm;Multiple lines; Fall Risk   Home Living   Type of [address] [place] [address] [address] One level;Stairs to enter with rails  ([address], modular home)   Bathroom Shower/Tub Tub/shower unit   Bathroom Toilet Standard   Prior Function   Level of [place] Independent with functional mobility   Lives With [address] [employer] in the last 6 months 0   Vocational Full time employment  ([employer] )   Comments Pt reports spouse works at night, she does reports having a local sister that could A   General   Family/Caregiver Present No   Cognition   Orientation Level Oriented X4   Subjective   Subjective Pt willing and agreeable to PT session   RLE Assessment   RLE Assessment   (grossly 0/5 with movement)   LLE Assessment   LLE Assessment   (grossly at least 3+/5 with movement)   Coordination   Movements are Fluid and Coordinated 0   Bed Mobility   Supine to Sit 3  Moderate assistance   Additional items Assist x 2   Sit to Supine Unable to assess   Additional Comments Pt left resting in chair, call bell in reach, chair alarm active   Transfers   Sit to Stand 2  Maximal assistance   Additional items Assist x 2   Stand to Sit 2  Maximal assistance   Additional items Assist x 2   Stand pivot 2  Maximal assistance   Additional items Assist x 2   Ambulation/Elevation   Gait pattern Not appropriate   Balance   Static Sitting Poor +   Static Standing Zero   Endurance Deficit   Endurance Deficit Yes   Activity Tolerance   Activity Tolerance Patient limited by fatigue;Patient limited by pain;Treatment limited secondary to medical complications (Comment)   Medical Staff Made Aware OT   Nurse Made Aware yes   Assessment   Prognosis Guarded   Problem List Decreased strength;Decreased range of motion; Impaired balance;Decreased endurance;Decreased mobility; Decreased coordination;Decreased cognition; Impaired judgement;Decreased safety awareness;Pain; Impaired tone; Impaired sensation   Assessment Pt is 62 y.o. female seen for PT evaluation s/p [facility] on 8/14/2023 w/ ICH. PT consulted to assess pt's functional mobility and d/c needs. Order placed for PT eval and tx, w/ up w/ A order. Comorbidities affecting pt's physical performance at time of assessment include:  has a past medical history of Arthritis, Asthma, Continuous opioid dependence ([address]), Diabetes mellitus ([address]), Diverticulitis of colon, Fibromyalgia, Headache(784.0), History of mammogram, History of tobacco abuse, Hypertension, Nausea, Obesity, Sjogren's syndrome ([address]), and Urinary tract infection. PTA, pt was ambulates community distances and elevations, has 5 [address] and works full time. Personal factors affecting pt at time of IE include: inability to ambulate household distances, inability to perform current job functions, unable to perform physical activity, inability to perform IADLs and inability to perform ADLs. Please find objective findings from PT assessment regarding body systems outlined above with impairments and limitations including weakness, decreased ROM, impaired balance, decreased endurance, impaired coordination, gait deviations, pain, decreased activity tolerance, decreased functional mobility tolerance, altered sensation, decreased safety awareness, impaired judgement, fall risk and decreased cognition. Pt required LE management and A to upright trunk during bed mobility. Poor sitting balance with frequent R lateral lean. Poor ability to achieve upright with decreased R strength.  The following objective measures performed on IE also reveal limitations: The patient's AM-PAC Basic Mobility Inpatient Short Form Raw Score is 6. A standardized score less than 42.9 suggests the patient may benefit from discharge to post-acute rehabilitation services. Please also refer to the recommendation of the Physical Therapist for safe discharge planning. Pt's clinical presentation is currently unstable/unpredictable seen in pt's presentation of critical care monitoring. Pt to benefit from continued PT tx to address deficits as defined above and maximize level of functional independent mobility and consistency. From PT/mobility standpoint, recommendation at time of d/c would be post acute rehabilitation services pending progress in order to facilitate return to PLOF. Barriers to Discharge Decreased caregiver support; Inaccessible home environment   Goals   Patient Goals None stated   STG Expiration Date 08/27/23   Short Term Goal #1 1. Complete bed mobility with Ramo x1 to decrease caregiver burden. 2. TOlerate sitting EOB x 25 min with fair balance to complete ADLs. 3. Improve R sided strength to 3/5 to improve transfers. 4. Complete transfers with MoDAx1 to decrease caregiver burden. 5. Complete 150' of W/C mobility to improve independence with household mobility   Plan   Treatment/Interventions OT; Spoke to case management;Spoke to nursing;Gait training;Bed mobility; Patient/family training; Endurance training;LE strengthening/ROM; Functional transfer training   PT Frequency 3-5x/wk   Recommendation   PT Discharge Recommendation Post acute rehabilitation services   Equipment Recommended   (TBD)   AM-PAC Basic Mobility Inpatient   Turning in Flat Bed Without Bedrails 1   Lying on Back to Sitting on Edge of Flat Bed Without Bedrails 1   Moving Bed to Chair 1   Standing Up From Chair Using Arms 1   Walk in Room 1   Climb 3-5 Stairs With Railing 1   Basic Mobility Inpatient Raw Score 6   Turning Head Towards Sound 3   Follow Simple Instructions 3   Low Function Basic Mobility Raw Score  12   Low Function Basic Mobility Standardized Score  18.33   Highest Level Of Mobility   -[facility] Goal 2: Bed activities/Dependent transfer   -HL Achieved 4: Move to chair/commode         Linda Shafer, PT

## 2023-08-15 NOTE — ASSESSMENT & PLAN NOTE
Patient complaining of severe posterior lateral left leg pain  • H/o Left NIRMAL 2/14/23    Imaging:   • MRI lumbar spine without 12/14/22: Lumbar degenerative disc disease L3/4, L4/5, and L5/S1. Facet hypertrophic change and ligamentous laxity at L5/S1 with a grade 1 anterior spondylolisthesis. Mild canal stenosis and slight distortion of left anterior lateral aspect of the thecal sac. Mild right greater than left foraminal stenosis. Plan:   • Continue monitor neurological exam.  • Full strength in sensation throughout left leg. • Pain management per primary team.  • Agree with multimodal pain regimen. Consider scheduled Tylenol 975 mg every 8 hours. Gabapentin 100 mg 3 times daily has been ordered. As needed opioids. Consideration for steroids. • Ongoing outpatient follow-up with pain management.

## 2023-08-15 NOTE — PROGRESS NOTES
[address] [facility]  Progress Note  Name: Rony Garsia  MRN: [id number]  Unit/Bed#: ICU 09 I Date of Admission: 8/14/2023   Date of Service: 8/15/2023 I Hospital Day: 1      Renetta Salas is a 62 y.o. with a PMHx of HTN, HLD, asthma, DMII, and fibromyalgia who presents with acute parenchymal hematoma in left posterior striatocapsular region extending into left thalamus with mild vasogenic edema likely secondary to uncontrolled HTN as seen on stat CT/CTA H/N imaging. Patient presented as a stroke alert yesterday to [facility] with acute onset of RUE and RLE weakness and sensory loss, right-sided facial deficit, and dysarthria. No evidence of LVO or flow restricting stenosis on CTA H/N. Initial BP per neurology note was 208/97 and initial NIHSS 14. In the [facility] ED, she was given IV labetalol, started on cardene drip and DDAVP. Transferred to [facility] ICU for neurosurgical care.      Upon arrival to [facility], ICH score 0. NIHSS score 10. Patient noted to have full ROM, sensation, and motor function of LUE and LLE. RUE and RLE deficits: no effort against gravity, withdrawal to painful stimuli, full loss of sensation. Right sided mouth droop and right tongue deviation noted. The patient complained of a mild, intermittent left-sided headache. Denies chest pain, SOB, abdominal pain, numbness or tingling in hands or feet, nausea, vomiting, diarrhea, changes in vision or hearing. Overnight pt had some some involuntary leg movement possibly due to anxiety. She was screaming in pain. Pt was given some dilaudid but we want to try to avoid it.    S  O  Vitals afebrile HR 60 RR 12-20s, BP 140s-150s/60s, MAP 90s-100s, O2  on RA    IsOs: Is 1.2 L in, 1.2 L out    Labs  CBC  - WBC trending down 10 today, Hbg trending down 9 today, plts stable at 302  - BMP stable     Drug screen negative except for benzos which she got in the hospital yday     Physical Exam  HENT:      Nose: Nose normal. Mouth/Throat:      Mouth: Mucous membranes are moist.   Eyes:      Pupils: Pupils are equal, round, and reactive to light. Cardiovascular:      Rate and Rhythm: Normal rate and regular rhythm. Pulses: Normal pulses. Heart sounds: No murmur heard. Pulmonary:      Effort: Pulmonary effort is normal.   Abdominal:      General: Abdomen is flat. Bowel sounds are normal.      Palpations: Abdomen is soft. Musculoskeletal:         General: No swelling. Cervical back: No rigidity. Skin:     General: Skin is warm. Neurological:      Mental Status: She is alert and oriented to person, place, and time. Cranial Nerves: Dysarthria and facial asymmetry present. Sensory: Sensory deficit (No sensation in R face, arm, leg ) present. Motor: Weakness (R side completely flaccid) present. Coordination: Coordination is intact. Deep Tendon Reflexes:      Reflex Scores:       Tricep reflexes are 3+ on the right side and 2+ on the left side. Bicep reflexes are 3+ on the right side and 2+ on the left side. Brachioradialis reflexes are 3+ on the right side and 2+ on the left side. Patellar reflexes are 3+ on the right side and 2+ on the left side. Achilles reflexes are 3+ on the right side and 2+ on the left side. Comments: No gaze preference       Imaging  CT brain - 8/14: 2.1 x 1.6 x 3.2 cm (approximately 5.4 mL) acute parenchymal hematoma in left posterior striatocapsular region extending into left thalamus with mild surrounding vasogenic edema   CTA head and neck:  CTA head and neck for negative for active contrast extravasation, stenosis   Repeat CT head after 6 hrs:Stable hemorrhage and surrounding vasogenic edema in the left corona radiata, posterior limb of the internal capsule and thalamus.       AP  Acute L basal ganglia bleed ICH Score 0  R hemiplegia  Dysarthria  HTN  DM2  Sjogren     Neuro/Psych:  L basal ganglia bleed   Pt on ASA 81mg daily, given one dose of DDAVP upon arrival at [facility] [place] -- (increases factor VIII to stop bleeding)  - nicardipine drop as needed to maintain -160  - Nancy for BP monitoring  - neurochecks q1 hr  - had 1 dose of dilaudid for pain - will try to avoid   - neurosurgery and neurology consulted   - holding home antihypertensives (Atenolol 50mg, Telmisartan 80mg QD)   - MRI w and wo contrast (non urgent)      CV:  HTN  -  Atenolol 50mg, Telmisartan 80mg QD at home  - holding home meds for tight BP control secondary to acute ICH  - monitor BP via nancy  - SBP goal 140-160  HLD   - home med rosuvastatin 10mg PO once daily  - continue when PO intake appropriate   - Lipid panel from today within normal limits       Pulm:  • Diagnosis: Asthma  • Home mds: albuterol, Breo Ellipta  • Most recent PFT testing 7/13/2023  ? Plan:  - Continue pulse oximetry  - Avoid hypoxia  - Continue PRN albuterol for wheezing  - Continue Breo-Ellipta  - Maintain SpO2 >92%     GI:  No active issues   - Monitor for daily BMs  - Bowel reg: PRN dulcolax       :  No active issues. No BMs  - Strict I&Os  - Purewick   - Daily BMP    F/E/N:  F: none  E: replete K and Ca. Monitor and replete to maintain K >4.0, Phos >3.0, Mag >2.0  N: NPO. Pending Speech swallow eval.    Heme/Onc:  • Diagnosis: Anemia  • Likely in the setting of acute ICH  ? Plan:  - Monitor vital signs  - Transfuse for Hgb <7.0 or with signs of active bleeding  -      DVT ppx: holding in the setting of acute ICH    Endo:  • Diagnosis: DMII  • Home meds: metformin  ? Plan:  - SSI - pt got 3 U in past 24 hrs  - Monitor BG  - BG goal 140-180  - Monitor and treat hypoglycemia  - Hold home metformin  ID:  No active issues   - Monitor fever curve/WBC trend  - Monitor vital signs  - Monitor for s/s infection    MSK/Skin:  • Diagnosis: RUE and RLE weakness  ?  Plan:   - PT/OT when appropriate  - PMR for dispo planning  - Case management for dispo planning  - Turn q2 while in bed  - Monitor for skin breakdown     • Diagnosis: Fibromyalgia  • Home meds: gabapentin 300mg 3x/day  ? Plan:  - Consider restarting once PO intake appropriate    LDAs: a-line, PIVs       ICU LOS: 11h    ICU Core Measures     A: Assess, Prevent, and Manage Pain · Has pain been assessed? Yes  · Need for changes to pain regimen? No   B: Both SAT/SAT  · N/A   C: Choice of Sedation · RASS Goal: -1 Drowsy or 0 Alert and Calm  · Need for changes to sedation or analgesia regimen? No   D: Delirium · CAM-ICU: Negative   E: Early Mobility  · Plan for early mobility? No   F: Family Engagement · Plan for family engagement today? Yes       Review of Invasive Devices:        Nancy Plan: Keep arterial line for hemodynamic monitoring          Subjective   HPI/24hr events: see above    Review of Systems   Constitutional: Negative for chills and fever. HENT: Negative for ear pain, facial swelling and sore throat. Eyes: Negative for pain and visual disturbance. Respiratory: Negative for cough and shortness of breath. Cardiovascular: Negative for chest pain and palpitations. Gastrointestinal: Negative for abdominal pain and vomiting. Genitourinary: Negative for dysuria and hematuria. Musculoskeletal: Negative for arthralgias and back pain. Skin: Negative for color change and rash. Neurological: Positive for facial asymmetry and weakness. Negative for seizures and syncope. All other systems reviewed and are negative.     Objective                            Vitals I/O      Most Recent Min/Max in 24hrs   Temp 97.7 °F (36.5 °C) Temp  Min: 97.7 °F (36.5 °C)  Max: 97.8 °F (36.6 °C)   Pulse 58 Pulse  Min: 58  Max: 76   Resp 12 Resp  Min: 12  Max: 23   BP   BP  Min: 110/57  Max: 224/104   O2 Sat 99 % SpO2  Min: 94 %  Max: 99 %      Intake/Output Summary (Last 24 hours) at 8/15/2023 0659  Last data filed at 8/15/2023 0600  Gross per 24 hour   Intake 1167.5 ml   Output 1200 ml   Net -32.5 ml         Diet NPO     Invasive Monitoring Physical exam Arterial Line  [place] /70  Arterial Line BP  Min: 116/110  Max: 216/100    mmHg  Arterial Line MAP (mmHg)  Min: 90 mmHg  Max: 148 mmHg    See above       Diagnostic Studies      EKG: NSR  Imaging:  I have personally reviewed pertinent reports. Medications:  Scheduled PRN   atorvastatin, 20 mg, Daily With Dinner  chlorhexidine, 15 mL, Q12H [address]  fluticasone-vilanterol, 1 puff, Daily  insulin lispro, 1-6 Units, Q6H [place]      albuterol, 2.5 mg, Q6H PRN  hydrALAZINE, 5 mg, Q6H PRN  HYDROmorphone, 0.5 mg, Q4H PRN  labetalol, 10 mg, Q6H PRN       Continuous    niCARdipine, 1-15 mg/hr, Last Rate: Stopped (08/15/23 0156)         Labs:    CBC    Recent Labs     08/14/23  1714 08/15/23  0434   WBC 11.26* 10.98*   HGB 10.9* 9.7*   HCT 34.5* 30.9*    302     BMP    Recent Labs     08/14/23  1714 08/15/23  0434   SODIUM 136 139   K 3.8 3.4*    105   CO2 28 27   AGAP 8 7   BUN 17 13   CREATININE 0.71 0.58*   CALCIUM 9.2 8.5       Coags    Recent Labs     08/14/23  1714 08/15/23  0434   INR 0.94 1.06   PTT 27 28        Additional Electrolytes  Recent Labs     08/14/23  1714 08/15/23  0434   MG 1.5* 3.0*   PHOS 4.3 3.0   CAIONIZED  --  1.09*          Blood Gas    No recent results  No recent results LFTs  Recent Labs     08/14/23 1714   ALT 15   AST 17   ALKPHOS 70   ALB 4.0   TBILI 0.32       Infectious  No recent results  Glucose  Recent Labs     08/14/23  1714 08/15/23  0434   GLUC 143* 149*               Critical Care Time Delivered: Upon my evaluation, this patient had a high probability of imminent or life-threatening deterioration due to [address], which required my direct attention, intervention, and personal management. I have personally provided 30 minutes of critical care time, exclusive of procedures, teaching, family meetings, and any prior time recorded by providers other than myself.      Elvin Sepulveda

## 2023-08-15 NOTE — RESPIRATORY THERAPY NOTE
RT Protocol Note  Gurdeep Wright 62 y.o. female MRN: [id number]  Unit/Bed#: ICU 09 Encounter: [id number]    Assessment    Active Problems: There are no active Hospital Problems. Home Pulmonary Medications:  Albuterol PRN       Past Medical History:   Diagnosis Date    Arthritis     Asthma     Continuous opioid dependence ([address] ) 2022    Diabetes mellitus ([address] [facility])     Diverticulitis of colon     Fibromyalgia 2022    Headache(784.0)     History of mammogram 2018    History of tobacco abuse 2022    Hypertension     Nausea 2022    Obesity     Sjogren's syndrome ([address] ) 10/19/2012    Urinary tract infection      Social History     Socioeconomic History    Marital status: /Civil Union     Spouse name: Not on file    Number of children: Not on file    Years of education: Not on file    Highest education level: Not on file   Occupational History    Occupation:     Tobacco Use    Smoking status: Former     Packs/day: 0.50     Types: Cigarettes     Start date:      Quit date: 2023     Years since quittin.3     Passive exposure: Past    Smokeless tobacco: Never    Tobacco comments:     per pt, 5 a day - As per [employer] Use    Vaping Use: Never used   Substance and Sexual Activity    Alcohol use: Not Currently     Comment: occasional     Drug use: Never    Sexual activity: Not Currently     Partners: Male     Birth control/protection: None   Other Topics Concern    Not on file   Social History Narrative    · Most recent tobacco use screenin2019      · Caffeine intake:   Occasional      · Seat belts used routinely:   Yes      · Smoke alarm in home: Yes      · Has the Patient had a mammogram to screen for breast cancer within 24 months:   Yes      · Please enter the date of the Patient's previous mammogram.:  march of last year.      As per Adi      Social Determinants of Health     Financial Resource Strain: Not on file   Food Insecurity: Not on file Transportation Needs: Not on file   Physical Activity: Not on file   Stress: Not on file   Social Connections: Not on file   Intimate Partner Violence: Not on file   Housing Stability: Not on file       Subjective         Objective    Physical Exam:   Assessment Type: (P) Assess only  Bilateral Breath Sounds: (P) Clear  Cough: (P) None    Vitals:  Pulse (P) 68, temperature 97.7 °F (36.5 °C), temperature source Oral, resp. rate 20, weight 98.3 kg (216 lb 11.4 oz), SpO2 (P) 94 %. Imaging and other studies: I have personally reviewed pertinent reports. Plan    Respiratory Plan: (P) Home Bronchodilator Patient pathway        Resp Comments: (P) Pt. evaluated for respiratory protocol. BS=clear and well aerated. Pt. in no distress on RMA. SpO2=94%. Pt. uses albuterol at home on a PRN basis. Will continue albuterol txs as ordered per protocol. No further respiratory intervention indicated at this time.

## 2023-08-15 NOTE — PLAN OF CARE
Problem: MOBILITY - ADULT  Goal: Maintain or return to baseline ADL function  Description: INTERVENTIONS:  -  Assess patient's ability to carry out ADLs; assess patient's baseline for ADL function and identify physical deficits which impact ability to perform ADLs (bathing, care of mouth/teeth, toileting, grooming, dressing, etc.)  - Assess/evaluate cause of self-care deficits   - Assess range of motion  - Assess patient's mobility; develop plan if impaired  - Assess patient's need for assistive devices and provide as appropriate  - Encourage maximum independence but intervene and supervise when necessary  - Involve family in performance of ADLs  - Assess for home care needs following discharge   - Consider OT consult to assist with ADL evaluation and planning for discharge  - Provide patient education as appropriate  8/15/2023 1657 by Jacquelin Valdez RN  Outcome: Progressing  8/15/2023 1656 by Jacquelin Valdez RN  Outcome: Progressing  Goal: Maintains/Returns to pre admission functional level  Description: INTERVENTIONS:  - Perform BMAT or MOVE assessment daily.   - Set and communicate daily mobility goal to care team and patient/family/caregiver. - Collaborate with rehabilitation services on mobility goals if consulted  - Perform Range of Motion 3 to 4 times a day. - Reposition patient every two hours.   - Out of bed to chair once times a day   - Out of bed for meals 2-3 times a day  - Out of bed for toileting  - Record patient progress and toleration of activity level   8/15/2023 1657 by Jacquelin Valdez RN  Outcome: Progressing  8/15/2023 1656 by Jacquelin Valdez RN  Outcome: Progressing     Problem: PAIN - ADULT  Goal: Verbalizes/displays adequate comfort level or baseline comfort level  Description: Interventions:  - Encourage patient to monitor pain and request assistance  - Assess pain using appropriate pain scale  - Administer analgesics based on type and severity of pain and evaluate response  - Implement non-pharmacological measures as appropriate and evaluate response  - Consider cultural and social influences on pain and pain management  - Notify physician/advanced practitioner if interventions unsuccessful or patient reports new pain  8/15/2023 1657 by Carla Maldonado RN  Outcome: Progressing  8/15/2023 1656 by Carla Maldonado RN  Outcome: Progressing     Problem: INFECTION - ADULT  Goal: Absence or prevention of progression during hospitalization  Description: INTERVENTIONS:  - Assess and monitor for signs and symptoms of infection  - Monitor lab/diagnostic results  - Monitor all insertion sites, i.e. indwelling lines, tubes, and drains  - Monitor endotracheal if appropriate and nasal secretions for changes in amount and color  - [place] appropriate cooling/warming therapies per order  - Administer medications as ordered  - Instruct and encourage patient and family to use good hand hygiene technique  - Identify and instruct in appropriate isolation precautions for identified infection/condition  8/15/2023 1657 by Carla Maldonado RN  Outcome: Progressing  8/15/2023 1656 by Carla Maldonado RN  Outcome: Progressing  Goal: Absence of fever/infection during neutropenic period  Description: INTERVENTIONS:  - Monitor WBC    8/15/2023 1657 by Carla Maldonado RN  Outcome: Progressing  8/15/2023 1656 by Carla Maldonado RN  Outcome: Progressing     Problem: SAFETY ADULT  Goal: Maintain or return to baseline ADL function  Description: INTERVENTIONS:  -  Assess patient's ability to carry out ADLs; assess patient's baseline for ADL function and identify physical deficits which impact ability to perform ADLs (bathing, care of mouth/teeth, toileting, grooming, dressing, etc.)  - Assess/evaluate cause of self-care deficits   - Assess range of motion  - Assess patient's mobility; develop plan if impaired  - Assess patient's need for assistive devices and provide as appropriate  - Encourage maximum independence but intervene and supervise when necessary  - Involve family in performance of ADLs  - Assess for home care needs following discharge   - Consider OT consult to assist with ADL evaluation and planning for discharge  - Provide patient education as appropriate  8/15/2023 1657 by Davidson Fernandez RN  Outcome: Progressing  8/15/2023 1656 by Davidson Fernandez RN  Outcome: Progressing  Goal: Maintains/Returns to pre admission functional level  Description: INTERVENTIONS:  - Perform BMAT or MOVE assessment daily.   - Set and communicate daily mobility goal to care team and patient/family/caregiver. - Collaborate with rehabilitation services on mobility goals if consulted  - Perform Range of Motion 3-4 times a day. - Reposition patient every two hours.   - Out of bed to chair one times a day   - Out of bed for meals 2-3 times a day  - Record patient progress and toleration of activity level   8/15/2023 1657 by Davidson Fernandez RN  Outcome: Progressing  8/15/2023 1656 by Davidson Fernandez RN  Outcome: Progressing  Goal: Patient will remain free of falls  Description: INTERVENTIONS:  - Educate patient/family on patient safety including physical limitations  - Instruct patient to call for assistance with activity   - Consult OT/PT to assist with strengthening/mobility   - Keep Call bell within reach  - Keep bed low and locked with side rails adjusted as appropriate  - Keep care items and personal belongings within reach  - Initiate and maintain comfort rounds  - Make Fall Risk Sign visible to staff  - Offer Toileting every two Hours, in advance of need  - Initiate/Maintain bed/chair alarm  - Obtain necessary fall risk management equipment: yellow socks/bracelet   - Apply yellow socks and bracelet for high fall risk patients  - Consider moving patient to room near nurses station  8/15/2023 1657 by Davidson Fernandez RN  Outcome: Progressing  8/15/2023 1656 by Davidson Fernandez RN  Outcome: Progressing     Problem: DISCHARGE PLANNING  Goal: Discharge to home or other facility with appropriate resources  Description: INTERVENTIONS:  - Identify barriers to discharge w/patient and caregiver  - Arrange for needed discharge resources and transportation as appropriate  - Identify discharge learning needs (meds, wound care, etc.)  - Arrange for interpretive services to assist at discharge as needed  - Refer to Case Management Department for coordinating discharge planning if the patient needs post-hospital services based on physician/advanced practitioner order or complex needs related to functional status, cognitive ability, or social support system  8/15/2023 1657 by Yodit Peres RN  Outcome: Progressing  8/15/2023 1656 by Yodit Peres RN  Outcome: Progressing     Problem: Knowledge Deficit  Goal: Patient/family/caregiver demonstrates understanding of disease process, treatment plan, medications, and discharge instructions  Description: Complete learning assessment and assess knowledge base. Interventions:  - Provide teaching at level of understanding  - Provide teaching via preferred learning methods  8/15/2023 1657 by Yodit Peres RN  Outcome: Progressing  8/15/2023 1656 by Yodit Peres RN  Outcome: Progressing     Problem: Nutrition/Hydration-ADULT  Goal: Nutrient/Hydration intake appropriate for improving, restoring or maintaining nutritional needs  Description: Monitor and assess patient's nutrition/hydration status for malnutrition. Collaborate with interdisciplinary team and initiate plan and interventions as ordered. Monitor patient's weight and dietary intake as ordered or per policy. Utilize nutrition screening tool and intervene as necessary. Determine patient's food preferences and provide high-protein, high-caloric foods as appropriate.      INTERVENTIONS:  - Monitor oral intake, urinary output, labs, and treatment plans  - Assess nutrition and hydration status and recommend course of action  - Evaluate amount of meals eaten  - Assist patient with eating if necessary   - Allow adequate time for meals  - Recommend/ encourage appropriate diets, oral nutritional supplements, and vitamin/mineral supplements  - Order, calculate, and assess calorie counts as needed  - Recommend, monitor, and adjust tube feedings and TPN/PPN based on assessed needs  - Assess need for intravenous fluids  - Provide specific nutrition/hydration education as appropriate  - Include patient/family/caregiver in decisions related to nutrition  8/15/2023 1657 by Keara Francis RN  Outcome: Progressing  8/15/2023 1656 by Keara Francis RN  Outcome: Progressing     Problem: Prexisting or High Potential for Compromised Skin Integrity  Goal: Skin integrity is maintained or improved  Description: INTERVENTIONS:  - Identify patients at risk for skin breakdown  - Assess and monitor skin integrity  - Assess and monitor nutrition and hydration status  - Monitor labs   - Assess for incontinence   - Turn and reposition patient  - Assist with mobility/ambulation  - Relieve pressure over bony prominences  - Avoid friction and shearing  - Provide appropriate hygiene as needed including keeping skin clean and dry  - Evaluate need for skin moisturizer/barrier cream  - Collaborate with interdisciplinary team   - Patient/family teaching  - Consider wound care consult   8/15/2023 1657 by Keara Francis RN  Outcome: Progressing  8/15/2023 1656 by Keara Francis RN  Outcome: Progressing

## 2023-08-15 NOTE — PLAN OF CARE
Problem: PHYSICAL THERAPY ADULT  Goal: Performs mobility at highest level of function for planned discharge setting. See evaluation for individualized goals. Description: Treatment/Interventions: OT, Spoke to case management, Spoke to nursing, Gait training, Bed mobility, Patient/family training, Endurance training, LE strengthening/ROM, Functional transfer training  Equipment Recommended:  (TBD)       See flowsheet documentation for full assessment, interventions and recommendations. Note: Prognosis: Guarded  Problem List: Decreased strength, Decreased range of motion, Impaired balance, Decreased endurance, Decreased mobility, Decreased coordination, Decreased cognition, Impaired judgement, Decreased safety awareness, Pain, Impaired tone, Impaired sensation  Assessment: Pt is 62 y.o. female seen for PT evaluation s/p admit to [address] [place] [address] on 8/14/2023 w/ ICH. PT consulted to assess pt's functional mobility and d/c needs. Order placed for PT eval and tx, w/ up w/ A order. Comorbidities affecting pt's physical performance at time of assessment include:  has a past medical history of Arthritis, Asthma, Continuous opioid dependence ([address]), Diabetes mellitus ([address]), Diverticulitis of colon, Fibromyalgia, Headache(784.0), History of mammogram, History of tobacco abuse, Hypertension, Nausea, Obesity, Sjogren's syndrome ([address]), and Urinary tract infection. PTA, pt was ambulates community distances and elevations, has 5 DAMIAN and works full time. Personal factors affecting pt at time of IE include: inability to ambulate household distances, inability to perform current job functions, unable to perform physical activity, inability to perform IADLs and inability to perform ADLs.  Please find objective findings from PT assessment regarding body systems outlined above with impairments and limitations including weakness, decreased ROM, impaired balance, decreased endurance, impaired coordination, gait deviations, pain, decreased activity tolerance, decreased functional mobility tolerance, altered sensation, decreased safety awareness, impaired judgement, fall risk and decreased cognition. Pt required LE management and A to upright trunk during bed mobility. Poor sitting balance with frequent R lateral lean. Poor ability to achieve upright with decreased R strength. The following objective measures performed on IE also reveal limitations: The patient's AM-PAC Basic Mobility Inpatient Short Form Raw Score is 6. A standardized score less than 42.9 suggests the patient may benefit from discharge to post-acute rehabilitation services. Please also refer to the recommendation of the Physical Therapist for safe discharge planning. Pt's clinical presentation is currently unstable/unpredictable seen in pt's presentation of critical care monitoring. Pt to benefit from continued PT tx to address deficits as defined above and maximize level of functional independent mobility and consistency. From PT/mobility standpoint, recommendation at time of d/c would be post acute rehabilitation services pending progress in order to facilitate return to PLOF. Barriers to Discharge: Decreased caregiver support, Inaccessible home environment     PT Discharge Recommendation: Post acute rehabilitation services    See flowsheet documentation for full assessment.

## 2023-08-15 NOTE — SPEECH THERAPY NOTE
Speech Language/Pathology  Speech-Language Pathology Bedside Swallow Evaluation      Patient Name: Janis Vigil    DPUAJ'Q Date: 8/15/2023     Problem List  Active Problems:    ICH (intracerebral hemorrhage) [facility])      Past Medical History  Past Medical History:   Diagnosis Date   • Arthritis    • Asthma    • Continuous opioid dependence ([address]) 4/27/2022   • Diabetes mellitus ([address])    • Diverticulitis of colon    • Fibromyalgia 04/26/2022   • Headache(784.0)    • History of mammogram 2018   • History of tobacco abuse 04/26/2022   • Hypertension    • Nausea 04/29/2022   • Obesity    • Sjogren's syndrome ([address]) 10/19/2012   • Urinary tract infection        Past Surgical History  Past Surgical History:   Procedure Laterality Date   • APPENDECTOMY     • BREAST BIOPSY Left     unsure of year   • CARPAL TUNNEL RELEASE     • COLONOSCOPY  2017    repeat in 2022   • EPIDURAL BLOCK INJECTION N/A 2/14/2023    Procedure: L5-S1 EPIDURALSTEROID INJECTION (45921); Surgeon: Alondra Grewal DO;  Location:  MAIN OR;  Service: Pain Management    • FOOT SURGERY     • HERNIA REPAIR  05/2022   • NECK SURGERY      spine fusion    • UT RPR UMBILICAL HRNA 5 YRS/> REDUCIBLE N/A 05/11/2022    Procedure: REPAIR HERNIA UMBILICAL;  Surgeon: Stoney Sorto MD;  Location:  MAIN OR;  Service: General       Summary   Pt presented with s/s suggestive of mild oral and suspected WFL pharyngeal swallow. Symptoms or concerns included decreased bolus propulsion, decreased mastication and oral residue with solids on the R. The pt is mildly lethargic this morning and needs some cues to attend and re direct.       Risk/s for Aspiration: CVA, JEANETTE    Recommended Diet: soft/level 3 diet and thin liquids   Recommended Form of Meds: as desires   Aspiration precautions and swallowing strategies: upright posture, only feed when fully alert, slow rate of feeding, small bites/sips and alternating bites and sips  Other Recommendations: Continue frequent oral care, will follow        Current Medical Status  Pt is a 62 y.o. female who presented to [address] [place] [address] with acute R sided facial droop, arm, leg weakness. Transferred to \[facility]\"" for neurosurgical consult. Current Precautions:  Fall  Aspiration    Allergies:  No known food allergies    Past medical history:  Please see H&P for details    Special Studies:  CT head 8/14/23-Stable hemorrhage and surrounding vasogenic edema in the left corona radiata, posterior limb of the internal capsule and thalamus.       Social/Education/Vocational Hx:  Pt lives with family    Swallow Information   Current Risks for Dysphagia & Aspiration: CVA  Current Symptoms/Concerns: fails screen  Current Diet: NPO   Baseline Diet: regular diet and thin liquids      Baseline Assessment   Behavior/Cognition: lethargic  Speech/Language Status: able to participate in basic conversation and able to follow commands  Patient Positioning: upright in bed  Pain Status/Interventions/Response to Interventions:   No report of or nonverbal indications of pain. Swallow Mechanism Exam  Facial: right facial droop  Labial: decreased ROM right side  Lingual: right sided tongue deviation  Velum: unable to visualize  Mandible: adequate ROM  Dentition: adequate  Vocal quality:weak   Volitional Cough: unable to initiate volitional cough   Respiratory Status: on NC    Consistencies Assessed and Performance   Consistencies Administered: ice chips, thin liquids, puree, soft solids and hard solids  Materials administered included toast, cookies, water, applesauce. Stopped following the ice as the pt was nauseous and was afraid to vomit. Zofran was offered. Oral Stage: mild, decreased bolus propulsion, decreased mastication and oral residue with toast, on the R side lingual surface and Buccal cavity   Mastication was reduced and prolonged with the materials administered today.   Bolus formation and transfer were mildly reduced w/ residue noted  Pharyngeal Stage: WFL, suspected, minimal and multiple swallows  Swallow Mechanics:  Swallowing initiation appeared prompt. Laryngeal rise was palpated and judged to be within functional limits. No coughing, throat clearing, change in vocal quality or respiratory status noted today. Esophageal Concerns: occasional heartburn at home    Strategies and Efficacy: , mult swallows, sips to clear    Summary and Recommendations (see above)    Results Reviewed with: patient, RN and MD     Treatment Recommended: yes     Frequency of treatment: as able     Patient Stated Goal:-    Dysphagia LTG  -Patient will demonstrate safe and effective oral intake (without overt s/s significant oral/pharyngeal dysphagia including s/s penetration or aspiration) for the highest appropriate diet level.      Short Term Goals:  -Pt will tolerate Dysphagia 3/advanced (dental soft) diet and  thin liquid with no significant s/s oral or pharyngeal dysphagia across 1-3 diagnostic session/s.    -Patient will tolerate trials of upgraded food and/or liquid texture with no significant s/s of oral or pharyngeal dysphagia including aspiration across 1-3 diagnostic sessions     -Patient will comply with a Video/Modified Barium Swallow study for more complete assessment of swallowing anatomy/physiology/aspiration risk and to assess efficacy of treatment techniques so as to best guide treatment plan    Speech Therapy Prognosis   Prognosis: adequate    Prognosis Considerations: age, medical status, prior medical history and cognitive status

## 2023-08-15 NOTE — ASSESSMENT & PLAN NOTE
Left thalamic ICH  • Patient presented with sudden onset right weakness and facial weakness. • Reported NIHSS 10 on arrival    Intracerebral Hemorrhage (ICH) Score:    Vivienne Coma Sore 13-15 equals +0   Age greater than or equal to 80 No   ICH Volume greater than or equal to 30 ml No   Intraventricular Hemorrhage No   Infratentorial Origin of Hemorrhage No   Total: 0     Imaging:   • CT stroke alert 8/14/23: 2.1 x 1.6 x 3.2 cm acute parenchymal hematoma in the left posterior striatocapsular region extending into the left thalamus with mild surrounding vasogenic edema. No acute territorial infarct. Mild to moderate chronic microangiopathy  • CTA stroke alert 8/14/23: Negative CTA head and neck for active contrast extravasation. No large vessel occlusion, dissection, aneurysm, arteriovenous malformation or high-grade stenosis. • CT head without 8/15/23: Stable hemorrhage and surrounding vasogenic edema in the left corona radiata posterior limb of the internal capsule and thalamus. Plan:   • Continue frequent neurological checks  • CT imaging results reviewed with patient. • Ongoing medical management per primary team and neurology  o -160mmHg  • Stroke pathway: ECHO/MRI/Therapies  • No surgical intervention indicated. Neurosurgery will sign off. No neurosurgical follow-up required. Anticipate patient to have ongoing stroke follow-up with neurology. Call with questions or concerns.

## 2023-08-15 NOTE — ED PROVIDER NOTES
History  Chief Complaint   Patient presents with   • STROKE Alert     HPI     60-year-old female patient presenting with acute right-sided facial droop and arm, leg weakness. She states that she was at work when approximately 30 minutes prior to arrival she began to have acute onset of right-sided weakness. She had never had symptoms like this in the past and immediately had difficulty with ambulation. She was given glucose tabs given history of type 2 diabetes and concern for hypoglycemia, but on EMS arrival was not hypoglycemic and symptoms did persist.  Prehospital stroke alert was called. Prior to Admission Medications   Prescriptions Last Dose Informant Patient Reported? Taking?    Aspirin Low Dose 81 MG EC tablet   No No   Sig: Take 1 tablet by mouth once daily   albuterol (2.5 mg/3 mL) 0.083 % nebulizer solution  Self No No   Sig: Take 3 mL (2.5 mg total) by nebulization every 4 (four) hours as needed for wheezing or shortness of breath   albuterol (Ventolin HFA) 90 mcg/act inhaler   No No   Sig: Inhale 2 puffs every 6 (six) hours as needed for wheezing   atenolol (TENORMIN) 50 mg tablet   No No   Sig: Take 1 tablet (50 mg total) by mouth daily   buPROPion (WELLBUTRIN XL) 300 mg 24 hr tablet   No No   Sig: Take 1 tablet (300 mg total) by mouth every morning   fluticasone-vilanterol (Breo Ellipta) 200-25 mcg/actuation inhaler   No No   Sig: Inhale 1 puff daily Rinse mouth after use.   gabapentin (Neurontin) 300 mg capsule   No No   Sig: Take 1 capsule (300 mg total) by mouth 3 (three) times a day   metFORMIN (GLUCOPHAGE) 1000 MG tablet   No No   Sig: Take 1 tablet (1,000 mg total) by mouth 2 (two) times a day with meals   naproxen (NAPROSYN) 500 mg tablet  Self No No   Sig: Take 0.5 tablets (250 mg total) by mouth 2 (two) times a day with meals   Patient not taking: Reported on 7/26/2023   rosuvastatin (CRESTOR) 10 MG tablet   No No   Sig: Take 1 tablet (10 mg total) by mouth daily   telmisartan (MICARDIS) 80 MG tablet   No No   Sig: Take 1 tablet (80 mg total) by mouth daily      Facility-Administered Medications: None       Past Medical History:   Diagnosis Date   • Arthritis    • Asthma    • Continuous opioid dependence ([address]) 4/27/2022   • Diabetes mellitus ([address])    • Diverticulitis of colon    • Fibromyalgia 04/26/2022   • Headache(784.0)    • History of mammogram 2018   • History of tobacco abuse 04/26/2022   • Hypertension    • Nausea 04/29/2022   • Obesity    • Sjogren's syndrome ([address]) 10/19/2012   • Urinary tract infection        Past Surgical History:   Procedure Laterality Date   • APPENDECTOMY     • BREAST BIOPSY Left     unsure of year   • CARPAL TUNNEL RELEASE     • COLONOSCOPY  2017    repeat in 2022   • EPIDURAL BLOCK INJECTION N/A 2/14/2023    Procedure: L5-S1 EPIDURALSTEROID INJECTION (99503); Surgeon: Mariah Sinclair DO;  Location:  MAIN OR;  Service: Pain Management    • FOOT SURGERY     • HERNIA REPAIR  05/2022   • NECK SURGERY      spine fusion    • SD RPR UMBILICAL HRNA 5 YRS/> REDUCIBLE N/A 05/11/2022    Procedure: REPAIR HERNIA UMBILICAL;  Surgeon: Sam Jones MD;  Location:  MAIN OR;  Service: General       Family History   Problem Relation Age of Onset   • Hypertension Mother    • Heart disease Mother    • Hypertension Father    • Heart disease Father    • Asthma Sister    • No Known Problems Sister    • No Known Problems Sister    • Diabetes Maternal Grandmother    • No Known Problems Maternal Grandfather    • No Known Problems Paternal Grandmother    • No Known Problems Paternal Grandfather    • Breast cancer Paternal Aunt    • Pancreatic cancer Paternal Uncle    • Colon cancer Neg Hx    • Ovarian cancer Neg Hx    • Uterine cancer Neg Hx    • Cervical cancer Neg Hx      I have reviewed and agree with the history as documented.     E-Cigarette/Vaping   • E-Cigarette Use Never User      E-Cigarette/Vaping Substances   • Nicotine No    • THC No    • CBD No    • Flavoring No    • Other No    • Unknown No      Social History     Tobacco Use   • Smoking status: Former     Packs/day: 0.50     Types: Cigarettes     Start date: 0     Quit date: 2023     Years since quittin.3     Passive exposure: Past   • Smokeless tobacco: Never   • Tobacco comments:     per pt, 5 a day - As per [place] [facility] Use   • Vaping Use: Never used   Substance Use Topics   • Alcohol use: Not Currently     Comment: occasional    • Drug use: Never        Review of Systems   Constitutional: Negative for chills and fever. HENT: Negative for ear pain and sore throat. Eyes: Negative for pain and visual disturbance. Respiratory: Negative for cough and shortness of breath. Cardiovascular: Negative for chest pain and palpitations. Gastrointestinal: Negative for abdominal pain and vomiting. Genitourinary: Negative for dysuria and hematuria. Musculoskeletal: Negative for arthralgias and back pain. Skin: Negative for color change and rash. Neurological: Positive for tremors, facial asymmetry, speech difficulty, weakness and headaches. Negative for seizures and syncope. All other systems reviewed and are negative. Physical Exam  ED Triage Vitals   Temp Pulse Respirations Blood Pressure SpO2   -- 23 1630 23 1645 23 1630 23 1630    70 20 (!) 208/97 97 %      Temp src Heart Rate Source Patient Position - Orthostatic VS BP Location FiO2 (%)   -- 23 1630 -- -- --    Monitor         Pain Score       --                    Orthostatic Vital Signs  Vitals:    23 1755 23 1801 23 1815 23 1830   BP: 128/62 116/58 110/57 123/64   Pulse: 69 71 67 72       Physical Exam  Vitals and nursing note reviewed. Constitutional:       General: She is in acute distress. Appearance: She is well-developed. HENT:      Head: Normocephalic and atraumatic.    Eyes:      Conjunctiva/sclera: Conjunctivae normal.   Cardiovascular:      Rate and Rhythm: Normal rate and regular rhythm. Heart sounds: No murmur heard. Pulmonary:      Effort: Pulmonary effort is normal. No respiratory distress. Breath sounds: Normal breath sounds. Abdominal:      Palpations: Abdomen is soft. Tenderness: There is no abdominal tenderness. Musculoskeletal:         General: No swelling. Cervical back: Neck supple. Skin:     General: Skin is warm and dry. Capillary Refill: Capillary refill takes less than 2 seconds. Neurological:      Mental Status: She is alert. GCS: GCS eye subscore is 4. GCS verbal subscore is 5. GCS motor subscore is 6. Cranial Nerves: Facial asymmetry present. Sensory: Sensation is intact. Motor: Weakness present. Comments: Right upper and lower extremity with flaccid weakness. Right-sided facial droop.    Psychiatric:         Mood and Affect: Mood normal.         ED Medications  Medications   iohexol (OMNIPAQUE) 350 MG/ML injection (SINGLE-DOSE) 85 mL (85 mL Intravenous Given 8/14/23 1630)   labetalol (NORMODYNE) injection 20 mg (20 mg Intravenous Given 8/14/23 1715)   desmopressin (DDAVP) 38 mcg in sodium chloride 0.9 % 50 mL IVPB (0 mcg Intravenous Stopped 8/14/23 1756)   fentanyl citrate (PF) 100 MCG/2ML 50 mcg (50 mcg Intravenous Given 8/14/23 1650)   midazolam (VERSED) injection 2 mg (2 mg Intravenous Given 8/14/23 1658)   fentanyl citrate (PF) 100 MCG/2ML 50 mcg (50 mcg Intravenous Given 8/14/23 1801)       Diagnostic Studies  Results Reviewed     Procedure Component Value Units Date/Time    HS Troponin 0hr (reflex protocol) [[id number]]  (Normal) Collected: 08/14/23 1714    Lab Status: Final result Specimen: Blood from Arm, Right Updated: 08/14/23 1749     hs TnI 0hr 3 ng/L     Basic metabolic panel [[id number]]  (Abnormal) Collected: 08/14/23 1714    Lab Status: Final result Specimen: Blood from Arm, Right Updated: 08/14/23 1741     Sodium 136 mmol/L      Potassium 3.8 mmol/L      Chloride 100 mmol/L CO2 28 mmol/L      ANION GAP 8 mmol/L      BUN 17 mg/dL      Creatinine 0.71 mg/dL      Glucose 143 mg/dL      Calcium 9.2 mg/dL      eGFR 94 ml/min/1.73sq m     Narrative:      [facility] guidelines for Chronic Kidney Disease (CKD):   •  Stage 1 with normal or high GFR (GFR > 90 mL/min/1.73 square meters)  •  Stage 2 Mild CKD (GFR = 60-89 mL/min/1.73 square meters)  •  Stage 3A Moderate CKD (GFR = 45-59 mL/min/1.73 square meters)  •  Stage 3B Moderate CKD (GFR = 30-44 mL/min/1.73 square meters)  •  Stage 4 Severe CKD (GFR = 15-29 mL/min/1.73 square meters)  •  Stage 5 End Stage CKD (GFR <15 mL/min/1.73 square meters)  Note: GFR calculation is accurate only with a steady state creatinine    Protime-INR [[id number]]  (Normal) Collected: 08/14/23 1714    Lab Status: Final result Specimen: Blood from Arm, Right Updated: 08/14/23 1740     Protime 13.2 seconds      INR 0.94    APTT [[id number]]  (Normal) Collected: 08/14/23 1714    Lab Status: Final result Specimen: Blood from Arm, Right Updated: 08/14/23 1740     PTT 27 seconds     CBC and differential [[id number]]  (Abnormal) Collected: 08/14/23 1714    Lab Status: Final result Specimen: Blood from Arm, Right Updated: 08/14/23 1725     WBC 11.26 Thousand/uL      RBC 3.97 Million/uL      Hemoglobin 10.9 g/dL      Hematocrit 34.5 %      MCV 87 fL      MCH 27.5 pg      MCHC 31.6 g/dL      RDW 15.3 %      MPV 9.6 fL      Platelets 724 Thousands/uL      nRBC 0 /100 WBCs      Neutrophils Relative 49 %      Immat GRANS % 0 %      Lymphocytes Relative 34 %      Monocytes Relative 9 %      Eosinophils Relative 7 %      Basophils Relative 1 %      Neutrophils Absolute 5.47 Thousands/µL      Immature Grans Absolute 0.04 Thousand/uL      Lymphocytes Absolute 3.84 Thousands/µL      Monocytes Absolute 1.00 Thousand/µL      Eosinophils Absolute 0.78 Thousand/µL      Basophils Absolute 0.13 Thousands/µL     Fingerstick Glucose (POCT) [[id number]]  (Normal) Collected: 08/14/23 1638    Lab Status: Final result Updated: 08/14/23 1639     POC Glucose 133 mg/dl                  CTA stroke alert (head/neck)   Final Result by Debbie Sr MD (08/14 1650)      Negative CTA head and neck for active contrast extravasation, large vessel occlusion, dissection, aneurysm, arteriovenous vascular malformation (AVM), or high-grade stenosis. Additional chronic/incidental findings as detailed above. Findings were directly discussed with Melony Amezquita 8/14/2023 at 4:37 PM.                        Workstation performed: [id number]         CT stroke alert brain   Final Result by Debbie Sr MD (08/14 1640)      2.1 x 1.6 x 3.2 cm (approximately 5.4 mL) acute parenchymal hematoma in left posterior striatocapsular region extending into left thalamus with mild surrounding vasogenic edema      No acute territorial infarct. Mild-to-moderate chronic microangiopathy. Findings were directly discussed with Melony Amezquita 8/14/2023 at 4:37 PM.      Workstation performed: [id number]               Procedures  Arterial Line    Date/Time: 8/14/2023 6:01 PM    Performed by: Scout Moore DO  Authorized by: Scout Moore DO    Patient location:  ED  Other Assisting Provider: Yes (comment) Cl Bryan DO)    Consent:     Consent obtained:  Emergent situation  Universal protocol:     Required blood products, implants, devices, and special equipment available: yes      Site/side marked: yes      Patient identity confirmed:  Arm band  Indications:     Indications: hemodynamic monitoring and continuous blood pressure monitoring    Pre-procedure details:     Skin preparation:  Chlorhexidine    Preparation: Patient was prepped and draped in sterile fashion    Sedation:     Sedation type: Anxiolysis  Anesthesia (see MAR for exact dosages):      Anesthesia method:  Local infiltration    Local anesthetic:  Lidocaine 1% w/o epi  Procedure details:     Location / Tip of Catheter:  Femoral    Laterality:  Left    Needle gauge:  20 G    Placement technique:  Ultrasound guided    Ultrasound image availability:  Not saved    Number of attempts:  2    Successful placement: yes      Transducer: waveform confirmed    Post-procedure details:     Post-procedure:  Sutured and sterile dressing applied    Patient tolerance of procedure: Tolerated well, no immediate complications  ECG 12 Lead Documentation Only    Date/Time: 8/14/2023 11:53 PM    Performed by: Demetris Borrego DO  Authorized by: Demetris Borrego DO    Indications / Diagnosis:  Stroke  ECG reviewed by me, the ED Provider: yes    Patient location:  ED  Previous ECG:     Previous ECG:  Compared to current    Similarity:  No change  Interpretation:     Interpretation: normal    Rate:     ECG rate:  70    ECG rate assessment: normal    Rhythm:     Rhythm: sinus rhythm    Ectopy:     Ectopy: none    QRS:     QRS axis:  Normal  Conduction:     Conduction: normal    ST segments:     ST segments:  Normal  T waves:     T waves: normal            ED Course                                       Medical Decision Making  27-year-old female patient presenting with acute strokelike symptoms. On initial evaluation, patient appeared anxious and was with flaccid right-sided weakness in the upper and lower extremity along with right-sided facial weakness. Primary concern is for acute ischemic versus hemorrhagic stroke but also possible TIA, hypoglycemia given history of diabetes. Blood glucose checked on arrival was within normal limits and patient was taken immediately to CT scan where an acute left-sided hemorrhagic stroke was discovered. Aggressive measures were taken with IV Cardene infusion and bolus of labetalol for blood pressure control as well as DDAVP given due to the patient taking daily aspirin.   Arterial line was inserted for more accurate blood pressure monitoring in the neuro critical care team at [facility] was contacted. They determined the patient should be transferred for further close monitoring in the neuro ICU. Ultimately, patient was accepted by Dr. Elysia Sanchez with [facility] and transferred in stable condition for further management of acute stroke. Hemorrhagic stroke [facility]): acute illness or injury  Intracerebral hemorrhage ([address]): acute illness or injury  Stroke-like symptoms: acute illness or injury  Amount and/or Complexity of Data Reviewed  Labs: ordered. Radiology: ordered. Risk  Prescription drug management. Disposition  Final diagnoses:   Stroke-like symptoms   Hemorrhagic stroke [facility])   Intracerebral hemorrhage ([address])     Time reflects when diagnosis was documented in both MDM as applicable and the Disposition within this note     Time User Action Codes Description Comment    8/14/2023  4:26 PM Kareen Jackson Add [R29.90] Stroke-like symptoms     8/14/2023  5:55 PM Kareen Jackson Add [I61.9] Hemorrhagic stroke ([address])     8/14/2023  6:49 PM Anil Grover Add [I61.9] Intracerebral hemorrhage [facility])       ED Disposition     ED Disposition   Transfer to Another Facility-In Network    Condition   --    Date/Time   Mon Aug 14, 2023  5:54 PM    Comment   Anaid Mcintosh should be transferred out to \[facility]\"".            Follow-up Information    None         Discharge Medication List as of 8/14/2023  7:23 PM      CONTINUE these medications which have NOT CHANGED    Details   albuterol (2.5 mg/3 mL) 0.083 % nebulizer solution Take 3 mL (2.5 mg total) by nebulization every 4 (four) hours as needed for wheezing or shortness of breath, Starting Sat 4/8/2023, Normal      albuterol (Ventolin HFA) 90 mcg/act inhaler Inhale 2 puffs every 6 (six) hours as needed for wheezing, Starting Fri 8/4/2023, Normal      Aspirin Low Dose 81 MG EC tablet Take 1 tablet by mouth once daily, Starting Fri 8/4/2023, Normal      atenolol (TENORMIN) 50 mg tablet Take 1 tablet (50 mg total) by mouth daily, Starting Fri 8/4/2023, Normal      buPROPion (WELLBUTRIN XL) 300 mg 24 hr tablet Take 1 tablet (300 mg total) by mouth every morning, Starting Wed 7/26/2023, Until Sat 7/20/2024, Normal      fluticasone-vilanterol (Breo Ellipta) 200-25 mcg/actuation inhaler Inhale 1 puff daily Rinse mouth after use., Starting Fri 8/4/2023, Until Thu 11/2/2023, Normal      gabapentin (Neurontin) 300 mg capsule Take 1 capsule (300 mg total) by mouth 3 (three) times a day, Starting Wed 7/26/2023, Normal      metFORMIN (GLUCOPHAGE) 1000 MG tablet Take 1 tablet (1,000 mg total) by mouth 2 (two) times a day with meals, Starting Wed 7/26/2023, Until Fri 8/25/2023, Normal      naproxen (NAPROSYN) 500 mg tablet Take 0.5 tablets (250 mg total) by mouth 2 (two) times a day with meals, Starting Mon 4/3/2023, Normal      rosuvastatin (CRESTOR) 10 MG tablet Take 1 tablet (10 mg total) by mouth daily, Starting Wed 7/26/2023, Normal      telmisartan (MICARDIS) 80 MG tablet Take 1 tablet (80 mg total) by mouth daily, Starting Wed 7/26/2023, Normal           No discharge procedures on file. PDMP Review       Value Time User    PDMP Reviewed  Yes 4/7/2023  8:39 PM Simone Phillips PA-C           ED Provider  Attending physically available and evaluated Kassy Benitez. I managed the patient along with the ED Attending.     Electronically Signed by         Dimitri Villeda, DO  08/14/23 200 [address], DO  08/15/23 0005

## 2023-08-15 NOTE — PLAN OF CARE
Problem: OCCUPATIONAL THERAPY ADULT  Goal: Performs self-care activities at highest level of function for planned discharge setting. See evaluation for individualized goals. Description: Treatment Interventions: ADL retraining, Functional transfer training, UE strengthening/ROM, Endurance training, Cognitive reorientation, Patient/family training, Equipment evaluation/education, Neuromuscular reeducation, Fine motor coordination activities, Compensatory technique education, Activityengagement          See flowsheet documentation for full assessment, interventions and recommendations. Note: Limitation: Decreased ADL status, Decreased UE ROM, Decreased UE strength, Decreased Safe judgement during ADL, Decreased endurance, Decreased fine motor control, Decreased self-care trans, Decreased high-level ADLs, Non-func R UE  Prognosis: Good  Assessment: Pt is a 62 y.o. female who was admitted to [address] [place] [address] on 8/14/2023 with acute weakness R side while at work - CT showed acute left thalamic intracerebral hemorrhage with surrounding edema. CTA was negative for significant acute findings  . Patient  has a past medical history of Arthritis, Asthma, Continuous opioid dependence ([address]), Diabetes mellitus ([address]), Diverticulitis of colon, Fibromyalgia, Headache(784.0), History of mammogram, History of tobacco abuse, Hypertension, Nausea, Obesity, Sjogren's syndrome ([address]), and Urinary tract infection. At baseline pt was completing adls and mobility independently - I iadls - shares homemaking with spouse. Pt lives with spouse in modular home with 5 [address]- has basement but does not need to go down. Currently pt requires max assist for overall ADLS and max a x 2 for functional mobility/transfers.  Pt currently presents with impairments in the following categories -steps to enter environment, limited home support, difficulty performing ADLS, difficulty performing IADLS , flat affect, health management  and environment activity tolerance, endurance, standing balance/tolerance, sitting balance/tolerance, UE strength, UE ROM, FMC and communication. These impairments, as well as pt's fatigue, abnormal tone, (R) hemiplegia, decreased caregiver support, risk for falls and home environment  limit pt's ability to safely engage in all baseline areas of occupation, includingeating, grooming, bathing, dressing, toileting, functional mobility/transfers, community mobility, laundry , driving, house maintenance, medication management, meal prep, cleaning, work/volunteer work , social participation  and leisure activities  From OT standpoint, recommend inpt rehab upon D/C. OT will continue to follow to address the below stated goals.      OT Discharge Recommendation: Post acute rehabilitation services

## 2023-08-16 ENCOUNTER — TELEPHONE (OUTPATIENT)
Dept: RADIOLOGY | Facility: HOSPITAL | Age: 58
End: 2023-08-16

## 2023-08-16 ENCOUNTER — APPOINTMENT (OUTPATIENT)
Dept: RADIOLOGY | Facility: HOSPITAL | Age: 58
DRG: 064 | End: 2023-08-16
Payer: COMMERCIAL

## 2023-08-16 ENCOUNTER — APPOINTMENT (INPATIENT)
Dept: NON INVASIVE DIAGNOSTICS | Facility: HOSPITAL | Age: 58
DRG: 064 | End: 2023-08-16
Payer: COMMERCIAL

## 2023-08-16 PROBLEM — D64.9 ANEMIA: Status: ACTIVE | Noted: 2023-08-16

## 2023-08-16 LAB
ANION GAP SERPL CALCULATED.3IONS-SCNC: 5 MMOL/L
ATRIAL RATE: 50 BPM
ATRIAL RATE: 63 BPM
BUN SERPL-MCNC: 9 MG/DL (ref 5–25)
CA-I BLD-SCNC: 1.09 MMOL/L (ref 1.12–1.32)
CA-I BLD-SCNC: 1.14 MMOL/L (ref 1.12–1.32)
CALCIUM SERPL-MCNC: 8.7 MG/DL (ref 8.3–10.1)
CHLORIDE SERPL-SCNC: 102 MMOL/L (ref 96–108)
CO2 SERPL-SCNC: 25 MMOL/L (ref 21–32)
CREAT SERPL-MCNC: 0.53 MG/DL (ref 0.6–1.3)
ERYTHROCYTE [DISTWIDTH] IN BLOOD BY AUTOMATED COUNT: 15.4 % (ref 11.6–15.1)
GFR SERPL CREATININE-BSD FRML MDRD: 104 ML/MIN/1.73SQ M
GLUCOSE SERPL-MCNC: 111 MG/DL (ref 65–140)
GLUCOSE SERPL-MCNC: 134 MG/DL (ref 65–140)
GLUCOSE SERPL-MCNC: 157 MG/DL (ref 65–140)
HCT VFR BLD AUTO: 32.5 % (ref 34.8–46.1)
HGB BLD-MCNC: 10.4 G/DL (ref 11.5–15.4)
MAGNESIUM SERPL-MCNC: 2.1 MG/DL (ref 1.6–2.6)
MCH RBC QN AUTO: 26.8 PG (ref 26.8–34.3)
MCHC RBC AUTO-ENTMCNC: 32 G/DL (ref 31.4–37.4)
MCV RBC AUTO: 84 FL (ref 82–98)
OSMOLALITY UR/SERPL-RTO: 278 MMOL/KG (ref 282–298)
OSMOLALITY UR: 656 MMOL/KG
P AXIS: 48 DEGREES
P AXIS: 58 DEGREES
PHOSPHATE SERPL-MCNC: 2.9 MG/DL (ref 2.7–4.5)
PLATELET # BLD AUTO: 326 THOUSANDS/UL (ref 149–390)
PMV BLD AUTO: 9.3 FL (ref 8.9–12.7)
POTASSIUM SERPL-SCNC: 3.5 MMOL/L (ref 3.5–5.3)
PR INTERVAL: 196 MS
PR INTERVAL: 204 MS
QRS AXIS: 38 DEGREES
QRS AXIS: 38 DEGREES
QRSD INTERVAL: 88 MS
QRSD INTERVAL: 96 MS
QT INTERVAL: 417 MS
QT INTERVAL: 492 MS
QTC INTERVAL: 427 MS
QTC INTERVAL: 449 MS
RBC # BLD AUTO: 3.88 MILLION/UL (ref 3.81–5.12)
SODIUM 24H UR-SCNC: 146 MOL/L
SODIUM SERPL-SCNC: 132 MMOL/L (ref 135–147)
T WAVE AXIS: 20 DEGREES
T WAVE AXIS: 47 DEGREES
URATE SERPL-MCNC: 3.3 MG/DL (ref 2–7.5)
VENTRICULAR RATE: 50 BPM
VENTRICULAR RATE: 63 BPM
WBC # BLD AUTO: 8.94 THOUSAND/UL (ref 4.31–10.16)

## 2023-08-16 PROCEDURE — 93970 EXTREMITY STUDY: CPT | Performed by: SURGERY

## 2023-08-16 PROCEDURE — 82948 REAGENT STRIP/BLOOD GLUCOSE: CPT

## 2023-08-16 PROCEDURE — 93970 EXTREMITY STUDY: CPT

## 2023-08-16 PROCEDURE — 85027 COMPLETE CBC AUTOMATED: CPT | Performed by: STUDENT IN AN ORGANIZED HEALTH CARE EDUCATION/TRAINING PROGRAM

## 2023-08-16 PROCEDURE — 93005 ELECTROCARDIOGRAM TRACING: CPT

## 2023-08-16 PROCEDURE — 94664 DEMO&/EVAL PT USE INHALER: CPT

## 2023-08-16 PROCEDURE — 99232 SBSQ HOSP IP/OBS MODERATE 35: CPT | Performed by: PSYCHIATRY & NEUROLOGY

## 2023-08-16 PROCEDURE — 94760 N-INVAS EAR/PLS OXIMETRY 1: CPT

## 2023-08-16 PROCEDURE — 83930 ASSAY OF BLOOD OSMOLALITY: CPT | Performed by: PSYCHIATRY & NEUROLOGY

## 2023-08-16 PROCEDURE — 84100 ASSAY OF PHOSPHORUS: CPT | Performed by: STUDENT IN AN ORGANIZED HEALTH CARE EDUCATION/TRAINING PROGRAM

## 2023-08-16 PROCEDURE — 84300 ASSAY OF URINE SODIUM: CPT | Performed by: PSYCHIATRY & NEUROLOGY

## 2023-08-16 PROCEDURE — 84550 ASSAY OF BLOOD/URIC ACID: CPT | Performed by: PSYCHIATRY & NEUROLOGY

## 2023-08-16 PROCEDURE — 83735 ASSAY OF MAGNESIUM: CPT | Performed by: STUDENT IN AN ORGANIZED HEALTH CARE EDUCATION/TRAINING PROGRAM

## 2023-08-16 PROCEDURE — 83935 ASSAY OF URINE OSMOLALITY: CPT | Performed by: PSYCHIATRY & NEUROLOGY

## 2023-08-16 PROCEDURE — 80048 BASIC METABOLIC PNL TOTAL CA: CPT | Performed by: STUDENT IN AN ORGANIZED HEALTH CARE EDUCATION/TRAINING PROGRAM

## 2023-08-16 PROCEDURE — NC001 PR NO CHARGE: Performed by: INTERNAL MEDICINE

## 2023-08-16 PROCEDURE — 93010 ELECTROCARDIOGRAM REPORT: CPT | Performed by: INTERNAL MEDICINE

## 2023-08-16 PROCEDURE — C9113 INJ PANTOPRAZOLE SODIUM, VIA: HCPCS | Performed by: STUDENT IN AN ORGANIZED HEALTH CARE EDUCATION/TRAINING PROGRAM

## 2023-08-16 PROCEDURE — 82330 ASSAY OF CALCIUM: CPT

## 2023-08-16 PROCEDURE — 99291 CRITICAL CARE FIRST HOUR: CPT | Performed by: INTERNAL MEDICINE

## 2023-08-16 PROCEDURE — 92526 ORAL FUNCTION THERAPY: CPT

## 2023-08-16 PROCEDURE — 82330 ASSAY OF CALCIUM: CPT | Performed by: STUDENT IN AN ORGANIZED HEALTH CARE EDUCATION/TRAINING PROGRAM

## 2023-08-16 RX ORDER — GLYCOPYRROLATE 0.2 MG/ML
0.1 INJECTION INTRAMUSCULAR; INTRAVENOUS EVERY 4 HOURS PRN
Status: CANCELLED | OUTPATIENT
Start: 2023-08-16

## 2023-08-16 RX ORDER — AMLODIPINE BESYLATE 10 MG/1
10 TABLET ORAL DAILY
Status: DISCONTINUED | OUTPATIENT
Start: 2023-08-17 | End: 2023-08-25 | Stop reason: HOSPADM

## 2023-08-16 RX ORDER — GABAPENTIN 300 MG/1
300 CAPSULE ORAL 3 TIMES DAILY
Status: DISCONTINUED | OUTPATIENT
Start: 2023-08-16 | End: 2023-08-25 | Stop reason: HOSPADM

## 2023-08-16 RX ORDER — POTASSIUM CHLORIDE 14.9 MG/ML
20 INJECTION INTRAVENOUS ONCE
Status: DISCONTINUED | OUTPATIENT
Start: 2023-08-16 | End: 2023-08-16

## 2023-08-16 RX ORDER — CALCIUM GLUCONATE 20 MG/ML
1 INJECTION, SOLUTION INTRAVENOUS ONCE
Status: COMPLETED | OUTPATIENT
Start: 2023-08-16 | End: 2023-08-16

## 2023-08-16 RX ORDER — PROCHLORPERAZINE MALEATE 5 MG/1
5 TABLET ORAL EVERY 6 HOURS PRN
Status: DISCONTINUED | OUTPATIENT
Start: 2023-08-16 | End: 2023-08-25 | Stop reason: HOSPADM

## 2023-08-16 RX ORDER — AMLODIPINE BESYLATE 5 MG/1
5 TABLET ORAL ONCE
Status: COMPLETED | OUTPATIENT
Start: 2023-08-16 | End: 2023-08-16

## 2023-08-16 RX ORDER — HYDRALAZINE HYDROCHLORIDE 10 MG/1
10 TABLET, FILM COATED ORAL EVERY 8 HOURS SCHEDULED
Status: DISCONTINUED | OUTPATIENT
Start: 2023-08-16 | End: 2023-08-16

## 2023-08-16 RX ORDER — HYDRALAZINE HYDROCHLORIDE 25 MG/1
25 TABLET, FILM COATED ORAL EVERY 8 HOURS SCHEDULED
Status: DISCONTINUED | OUTPATIENT
Start: 2023-08-16 | End: 2023-08-25 | Stop reason: HOSPADM

## 2023-08-16 RX ORDER — HYDROMORPHONE HCL/PF 1 MG/ML
0.3 SYRINGE (ML) INJECTION EVERY 2 HOUR PRN
Status: CANCELLED | OUTPATIENT
Start: 2023-08-16

## 2023-08-16 RX ORDER — METHOCARBAMOL 500 MG/1
500 TABLET, FILM COATED ORAL EVERY 6 HOURS PRN
Status: DISCONTINUED | OUTPATIENT
Start: 2023-08-16 | End: 2023-08-25 | Stop reason: HOSPADM

## 2023-08-16 RX ORDER — FERROUS SULFATE 325(65) MG
325 TABLET ORAL
Status: DISCONTINUED | OUTPATIENT
Start: 2023-08-17 | End: 2023-08-25 | Stop reason: HOSPADM

## 2023-08-16 RX ORDER — LORAZEPAM 2 MG/ML
1 INJECTION INTRAMUSCULAR
Status: CANCELLED | OUTPATIENT
Start: 2023-08-16

## 2023-08-16 RX ORDER — HALOPERIDOL 5 MG/ML
0.5 INJECTION INTRAMUSCULAR EVERY 2 HOUR PRN
Status: CANCELLED | OUTPATIENT
Start: 2023-08-16

## 2023-08-16 RX ORDER — POTASSIUM CHLORIDE 20MEQ/15ML
40 LIQUID (ML) ORAL ONCE
Status: COMPLETED | OUTPATIENT
Start: 2023-08-16 | End: 2023-08-16

## 2023-08-16 RX ADMIN — PROCHLORPERAZINE MALEATE 5 MG: 5 TABLET ORAL at 09:36

## 2023-08-16 RX ADMIN — HYDRALAZINE HYDROCHLORIDE 25 MG: 25 TABLET, FILM COATED ORAL at 21:41

## 2023-08-16 RX ADMIN — METHOCARBAMOL 500 MG: 500 TABLET ORAL at 22:00

## 2023-08-16 RX ADMIN — POTASSIUM CHLORIDE 40 MEQ: 1.5 SOLUTION ORAL at 07:01

## 2023-08-16 RX ADMIN — ONDANSETRON 4 MG: 2 INJECTION INTRAMUSCULAR; INTRAVENOUS at 08:12

## 2023-08-16 RX ADMIN — GABAPENTIN 300 MG: 300 CAPSULE ORAL at 16:22

## 2023-08-16 RX ADMIN — AMLODIPINE BESYLATE 5 MG: 5 TABLET ORAL at 09:38

## 2023-08-16 RX ADMIN — HYDRALAZINE HYDROCHLORIDE 5 MG: 20 INJECTION, SOLUTION INTRAMUSCULAR; INTRAVENOUS at 07:07

## 2023-08-16 RX ADMIN — CHLORHEXIDINE GLUCONATE 15 ML: 1.2 SOLUTION ORAL at 08:05

## 2023-08-16 RX ADMIN — CHLORHEXIDINE GLUCONATE 15 ML: 1.2 SOLUTION ORAL at 21:41

## 2023-08-16 RX ADMIN — BUPROPION HYDROCHLORIDE 150 MG: 150 TABLET, FILM COATED, EXTENDED RELEASE ORAL at 08:17

## 2023-08-16 RX ADMIN — DEXMEDETOMIDINE HYDROCHLORIDE 0.4 MCG/KG/HR: 400 INJECTION INTRAVENOUS at 03:52

## 2023-08-16 RX ADMIN — DOCUSATE SODIUM 100 MG: 100 CAPSULE ORAL at 17:22

## 2023-08-16 RX ADMIN — FLUTICASONE FUROATE AND VILANTEROL TRIFENATATE 1 PUFF: 200; 25 POWDER RESPIRATORY (INHALATION) at 10:04

## 2023-08-16 RX ADMIN — ONDANSETRON 4 MG: 2 INJECTION INTRAMUSCULAR; INTRAVENOUS at 12:18

## 2023-08-16 RX ADMIN — CYANOCOBALAMIN TAB 500 MCG 1000 MCG: 500 TAB at 12:19

## 2023-08-16 RX ADMIN — AMLODIPINE BESYLATE 5 MG: 5 TABLET ORAL at 08:04

## 2023-08-16 RX ADMIN — ATORVASTATIN CALCIUM 20 MG: 20 TABLET, FILM COATED ORAL at 16:22

## 2023-08-16 RX ADMIN — LOSARTAN POTASSIUM 100 MG: 50 TABLET, FILM COATED ORAL at 08:04

## 2023-08-16 RX ADMIN — HYDRALAZINE HYDROCHLORIDE 25 MG: 25 TABLET, FILM COATED ORAL at 13:49

## 2023-08-16 RX ADMIN — GABAPENTIN 300 MG: 300 CAPSULE ORAL at 21:41

## 2023-08-16 RX ADMIN — PANTOPRAZOLE SODIUM 40 MG: 40 INJECTION, POWDER, FOR SOLUTION INTRAVENOUS at 08:04

## 2023-08-16 RX ADMIN — HYDROMORPHONE HYDROCHLORIDE 0.2 MG: 0.2 INJECTION, SOLUTION INTRAMUSCULAR; INTRAVENOUS; SUBCUTANEOUS at 08:11

## 2023-08-16 RX ADMIN — GABAPENTIN 100 MG: 100 CAPSULE ORAL at 08:04

## 2023-08-16 RX ADMIN — CALCIUM GLUCONATE 1 G: 20 INJECTION, SOLUTION INTRAVENOUS at 09:38

## 2023-08-16 RX ADMIN — DOCUSATE SODIUM 100 MG: 100 CAPSULE ORAL at 08:04

## 2023-08-16 RX ADMIN — HYDROMORPHONE HYDROCHLORIDE 0.2 MG: 0.2 INJECTION, SOLUTION INTRAMUSCULAR; INTRAVENOUS; SUBCUTANEOUS at 14:49

## 2023-08-16 RX ADMIN — HYDROMORPHONE HYDROCHLORIDE 0.2 MG: 0.2 INJECTION, SOLUTION INTRAMUSCULAR; INTRAVENOUS; SUBCUTANEOUS at 04:00

## 2023-08-16 RX ADMIN — PROCHLORPERAZINE MALEATE 5 MG: 5 TABLET ORAL at 14:50

## 2023-08-16 RX ADMIN — INSULIN LISPRO 1 UNITS: 100 INJECTION, SOLUTION INTRAVENOUS; SUBCUTANEOUS at 08:20

## 2023-08-16 NOTE — PLAN OF CARE
Problem: MOBILITY - ADULT  Goal: Maintain or return to baseline ADL function  Description: INTERVENTIONS:  -  Assess patient's ability to carry out ADLs; assess patient's baseline for ADL function and identify physical deficits which impact ability to perform ADLs (bathing, care of mouth/teeth, toileting, grooming, dressing, etc.)  - Assess/evaluate cause of self-care deficits   - Assess range of motion  - Assess patient's mobility; develop plan if impaired  - Assess patient's need for assistive devices and provide as appropriate  - Encourage maximum independence but intervene and supervise when necessary  - Involve family in performance of ADLs  - Assess for home care needs following discharge   - Consider OT consult to assist with ADL evaluation and planning for discharge  - Provide patient education as appropriate  Outcome: Progressing  Goal: Maintains/Returns to pre admission functional level  Description: INTERVENTIONS:  - Perform BMAT or MOVE assessment daily.   - Set and communicate daily mobility goal to care team and patient/family/caregiver. - Collaborate with rehabilitation services on mobility goals if consulted  - Perform Range of Motion  times a day. - Reposition patient every  hours.   - Dangle patient  times a day  - Stand patient  times a day  - Ambulate patient  times a day  - Out of bed to chair  times a day   - Out of bed for meals  times a day  - Out of bed for toileting  - Record patient progress and toleration of activity level   Outcome: Progressing     Problem: PAIN - ADULT  Goal: Verbalizes/displays adequate comfort level or baseline comfort level  Description: Interventions:  - Encourage patient to monitor pain and request assistance  - Assess pain using appropriate pain scale  - Administer analgesics based on type and severity of pain and evaluate response  - Implement non-pharmacological measures as appropriate and evaluate response  - Consider cultural and social influences on pain and pain management  - Notify physician/advanced practitioner if interventions unsuccessful or patient reports new pain  Outcome: Progressing     Problem: INFECTION - ADULT  Goal: Absence or prevention of progression during hospitalization  Description: INTERVENTIONS:  - Assess and monitor for signs and symptoms of infection  - Monitor lab/diagnostic results  - Monitor all insertion sites, i.e. indwelling lines, tubes, and drains  - Monitor endotracheal if appropriate and nasal secretions for changes in amount and color  - [place] appropriate cooling/warming therapies per order  - Administer medications as ordered  - Instruct and encourage patient and family to use good hand hygiene technique  - Identify and instruct in appropriate isolation precautions for identified infection/condition  Outcome: Progressing  Goal: Absence of fever/infection during neutropenic period  Description: INTERVENTIONS:  - Monitor WBC    Outcome: Progressing     Problem: SAFETY ADULT  Goal: Maintain or return to baseline ADL function  Description: INTERVENTIONS:  -  Assess patient's ability to carry out ADLs; assess patient's baseline for ADL function and identify physical deficits which impact ability to perform ADLs (bathing, care of mouth/teeth, toileting, grooming, dressing, etc.)  - Assess/evaluate cause of self-care deficits   - Assess range of motion  - Assess patient's mobility; develop plan if impaired  - Assess patient's need for assistive devices and provide as appropriate  - Encourage maximum independence but intervene and supervise when necessary  - Involve family in performance of ADLs  - Assess for home care needs following discharge   - Consider OT consult to assist with ADL evaluation and planning for discharge  - Provide patient education as appropriate  Outcome: Progressing  Goal: Maintains/Returns to pre admission functional level  Description: INTERVENTIONS:  - Perform BMAT or MOVE assessment daily.   - Set and communicate daily mobility goal to care team and patient/family/caregiver. - Collaborate with rehabilitation services on mobility goals if consulted  - Perform Range of Motion  times a day. - Reposition patient every  hours.   - Dangle patient  times a day  - Stand patient  times a day  - Ambulate patient  times a day  - Out of bed to chair  times a day   - Out of bed for meals  times a day  - Out of bed for toileting  - Record patient progress and toleration of activity level   Outcome: Progressing  Goal: Patient will remain free of falls  Description: INTERVENTIONS:  - Educate patient/family on patient safety including physical limitations  - Instruct patient to call for assistance with activity   - Consult OT/PT to assist with strengthening/mobility   - Keep Call bell within reach  - Keep bed low and locked with side rails adjusted as appropriate  - Keep care items and personal belongings within reach  - Initiate and maintain comfort rounds  - Make Fall Risk Sign visible to staff  - Offer Toileting every 2 Hours, in advance of need  - Initiate/Maintain bed alarm  - Obtain necessary fall risk management equipment: bed alarm  - Apply yellow socks and bracelet for high fall risk patients  - Consider moving patient to room near nurses station  Outcome: Progressing     Problem: DISCHARGE PLANNING  Goal: Discharge to home or other facility with appropriate resources  Description: INTERVENTIONS:  - Identify barriers to discharge w/patient and caregiver  - Arrange for needed discharge resources and transportation as appropriate  - Identify discharge learning needs (meds, wound care, etc.)  - Arrange for interpretive services to assist at discharge as needed  - Refer to Case Management Department for coordinating discharge planning if the patient needs post-hospital services based on physician/advanced practitioner order or complex needs related to functional status, cognitive ability, or social support system  Outcome: Progressing     Problem: Knowledge Deficit  Goal: Patient/family/caregiver demonstrates understanding of disease process, treatment plan, medications, and discharge instructions  Description: Complete learning assessment and assess knowledge base. Interventions:  - Provide teaching at level of understanding  - Provide teaching via preferred learning methods  Outcome: Progressing     Problem: Nutrition/Hydration-ADULT  Goal: Nutrient/Hydration intake appropriate for improving, restoring or maintaining nutritional needs  Description: Monitor and assess patient's nutrition/hydration status for malnutrition. Collaborate with interdisciplinary team and initiate plan and interventions as ordered. Monitor patient's weight and dietary intake as ordered or per policy. Utilize nutrition screening tool and intervene as necessary. Determine patient's food preferences and provide high-protein, high-caloric foods as appropriate.      INTERVENTIONS:  - Monitor oral intake, urinary output, labs, and treatment plans  - Assess nutrition and hydration status and recommend course of action  - Evaluate amount of meals eaten  - Assist patient with eating if necessary   - Allow adequate time for meals  - Recommend/ encourage appropriate diets, oral nutritional supplements, and vitamin/mineral supplements  - Order, calculate, and assess calorie counts as needed  - Recommend, monitor, and adjust tube feedings and TPN/PPN based on assessed needs  - Assess need for intravenous fluids  - Provide specific nutrition/hydration education as appropriate  - Include patient/family/caregiver in decisions related to nutrition  Outcome: Progressing     Problem: Prexisting or High Potential for Compromised Skin Integrity  Goal: Skin integrity is maintained or improved  Description: INTERVENTIONS:  - Identify patients at risk for skin breakdown  - Assess and monitor skin integrity  - Assess and monitor nutrition and hydration status  - Monitor labs   - Assess for incontinence   - Turn and reposition patient  - Assist with mobility/ambulation  - Relieve pressure over bony prominences  - Avoid friction and shearing  - Provide appropriate hygiene as needed including keeping skin clean and dry  - Evaluate need for skin moisturizer/barrier cream  - Collaborate with interdisciplinary team   - Patient/family teaching  - Consider wound care consult   Outcome: Progressing

## 2023-08-16 NOTE — PROGRESS NOTES
Patient seen and evaluated by Critical Care today and deemed to be appropriate for transfer to Stepdown Level 2. Spoke to Dr. Ameena Chow from [facility] to accept patient transfer. Follow-ups needed-  -Will need neurology follow up and clearance  -Plan for [facility] tomorrow, as per neurology  -Hold ASA and DVT ppx for now  -Diet restarted  -SBP goal < 160 mmHg, preferably < 140 mmHg  -PT/OT: Post-acute rehab, CM following, PMR following    Critical care can be contacted via [email or web address] with any questions or concerns.     Mary Pascual MD

## 2023-08-16 NOTE — ASSESSMENT & PLAN NOTE
Lab Results   Component Value Date    HGBA1C 6.7 (H) 08/15/2023       Recent Labs     08/15/23  0545 08/15/23  1225 08/15/23  1752 08/16/23  1211   POCGLU 141* 175* 165* 134       Blood Sugar Average: Last 72 hrs:  (P) [id number]     -On SSI  -Holding home metformin  -On SSI

## 2023-08-16 NOTE — ASSESSMENT & PLAN NOTE
62 YOF with history of HTN, DM2, Sjogren's, fibromyalgia initially presented to [facility] as stroke alert on 8/14/23 with initial presenting symptoms acute onset of right sided weakness, facial asymmetry, and dysarthria while at work. Initial imaging with CTH/CTA HN revealed left thalamocapsular bleed with no significant shift or mass effect. . NIHSS 14. Not TNK candidate given acute ICH. Pt given IV labetalol, started on Cardene drip, administered DDAVP for ASA reversal and transferred to [facility] for NSg care.      -Was seen by NSGY, no surgical intervention at this time  -Neurology following  -Frequent neuro checks  -SBP goal < 160 mmHg, preferably < 140 mmHg  -Will need repeat imaging CTH possibly tomorrow  -PT/OT following, recommended post acute rehab with PMR following  -TTE performed  -Holding DVt prophylaxis and ASA for now  -Diet restarted  -Stat CTH for drop in GCS score of 2 points or more in > 1 hour

## 2023-08-16 NOTE — PROGRESS NOTES
NEUROLOGY RESIDENCY PROGRESS NOTE   Name: Karen Yuan   Age & Sex: 62 y.o. female   MRN: [id number]  Unit/Bed#: ICU 09   Encounter: [id number]  Recommendations for outpatient neurological follow up have yet to be determined. Pending for discharge: [address] [facility] [employer]  [address] [address]   ICH (intracerebral hemorrhage) ([address])  Assessment & Plan  62 YOF with history of HTN, DM2, Sjogren's, fibromyalgia initially presented to [facility] as stroke alert on 8/14/23 with initial presenting symptoms acute onset of right sided weakness, facial asymmetry, and dysarthria while at work. Initial imaging with CTH/CTA HN revealed left thalamocapsular bleed with no significant shift or mass effect. . NIHSS 14. Not TNK candidate given acute ICH. Pt given IV labetalol, started on Cardene drip, administered DDAVP for ASA reversal and transferred to [facility] for NSg care.    - Initial /97  - AC/AP prior to admission: ASA 81 QD, s/p reversal with DDAVP   - Neuro History: No prior stroke history  - Vascular risk factors: former smoker, elevated cholesterol, diabetes and weight    NIHSS: At time of stroke alert at [facility] NIHSS 14,  at arrival to [facility] NIHSS 10  Modified [place] Score: 0 No baseline symptoms/disability  ICH Score: 0   FUNC Score: 10    WORK UP:  - CTH: Acute parenchymal hematoma (aprox 5.4 mL) in L posterior striatocapsular region extending into left thalamus with mild surrounding vasogenic edema  - CTA H/N: No LVO, dissection, AVM, aneurysm, or high grade stenosis  - CTH 6HR: Stable hemorrhage and surrounding vasogenic edema in L corona radiata, posterior limb of internal capsule, and thalamus   - TTE: EF 65%, LA/RA normal size  - Labs: HbgA1c 6.7, LDL 14,B12 221 KIRSTY-    IMPRESSION: Left thalamocapsular ICH likely 2/2 HTN   PLAN:  - Frequent neuro checks per protocol  - BP Goals: SBP < 140  - AC/AP: Held 2/2 to acute ICH  - Patient cannot tolerate MRI, will obtain CTH in AM  - Repeat [address] tomorrow can consider started DVT PPx if [address] stable  - PT/OT/ST/PMR  - CM Consulted for disposition needs  - Stroke education and counseling  - DVT PPx: Pharmacological - held given acute intracerebral bleed, SCD's  - NSg signed off given no need for surgical interventions   - Rest of Care per Primary  SUBJECTIVE   Patient was seen and examined at bedside. Overnight 2 attempts MRI imaging were attempted. First attempt patient given 0.5 Ativan and Dilaudid did not tolerate second attempt MRI patient sedated with Precedex became agitated and MRI therefore aborted. Patient complaining of chest pain this morning EKG obtained without any evidence of acute ischemia. Patient reports same as a prior. Reports left frontal headache. Plan for MRI with anesthesia today. Review of Systems   Constitutional: Negative for fatigue and fever. HENT: Positive for ear discharge. Neurological: Positive for facial asymmetry, weakness and numbness. Negative for dizziness, tremors, seizures, syncope, speech difficulty, light-headedness and headaches. OBJECTIVE   Patient ID: Chhaya Perkins is a 62 y.o. female.   Vitals:    23 0800 23 0815 23 0830 23 0844   BP:       Pulse: 66 58 (!) 50 (!) 53   Resp: (!) 25 18 (!) 26 20   Temp: 97.7 °F (36.5 °C)      TempSrc: Oral      SpO2: 100% 100% 100% 100%   Weight:       Height:          Temperature:   Temp (24hrs), Av.8 °F (36.6 °C), Min:97.6 °F (36.4 °C), Max:98.3 °F (36.8 °C)    Temperature: 97.7 °F (36.5 °C)    Physical Exam:  General: NAD, Obese  Head: NC/AT  Cardiac: Regular rate, regular rhythm   Respiratory:  Neurologic Exam:  Mental status: alertness: alert, orientation: time, date, person, place, affect: normal  Cranial nerves: II: visual acuity normal II: visual field normal, II: pupils equal, round, reactive to light and accommodation, III,VII: ptosis absent, III,IV,VI: extraocular muscles extra-ocular motions intact, V: facial light touch sensation reduced on the right, VII: R Facial Droop, XI: sternocleidomastoid strength huseyin XII: tongue strength deviated right  Sensory: decreased sensation on R  Motor: reduced tone, flaccid right sided hemiparesis   Reflexes: decreased tone on the right, RUE/RLE hyperreflexia, babinski up going on R, equivocal on L    LABORATORY DATA   Labs: I have personally reviewed pertinent reports. Results from last 7 days   Lab Units 08/16/23  0523 08/15/23  0434 08/14/23  1714   WBC Thousand/uL 8.94 10.98* 11.26*   HEMOGLOBIN g/dL 10.4* 9.7* 10.9*   HEMATOCRIT % 32.5* 30.9* 34.5*   PLATELETS Thousands/uL 326 302 353   NEUTROS PCT %  --  68 49   MONOS PCT %  --  8 9   EOS PCT %  --  1 7*      Results from last 7 days   Lab Units 08/16/23  0523 08/15/23  0434 08/14/23  1714   SODIUM mmol/L 132* 139 136   POTASSIUM mmol/L 3.5 3.4* 3.8   CHLORIDE mmol/L 102 105 100   CO2 mmol/L 25 27 28   BUN mg/dL 9 13 17   CREATININE mg/dL 0.53* 0.58* 0.71   CALCIUM mg/dL 8.7 8.5 9.2   ALK PHOS U/L  --   --  70   ALT U/L  --   --  15   AST U/L  --   --  17     Results from last 7 days   Lab Units 08/16/23  0523 08/15/23  0434 08/14/23  1714   MAGNESIUM mg/dL 2.1 3.0* 1.5*     Results from last 7 days   Lab Units 08/16/23  0523 08/15/23  0434 08/14/23  1714   PHOSPHORUS mg/dL 2.9 3.0 4.3      Results from last 7 days   Lab Units 08/15/23  0434 08/14/23  1714   INR  1.06 0.94   PTT seconds 28 27     IMAGING & DIAGNOSTIC TESTING     Radiology Results: I have personally reviewed pertinent reports. CT head wo contrast   Final Result by Shay Garces MD (08/15 9096)      Stable hemorrhage and surrounding vasogenic edema in the left corona radiata, posterior limb of the internal capsule and thalamus.                   Workstation performed: [id number]         MRI follow up    (Results Pending)   MRI brain w wo contrast    (Results Pending)   VAS lower limb venous duplex study, unilateral/limited    (Results Pending)       Other Diagnostic Testing: I have personally reviewed pertinent reports. ACTIVE MEDICATIONS     Current Facility-Administered Medications   Medication Dose Route Frequency   • amLODIPine (NORVASC) tablet 5 mg  5 mg Oral Daily   • atorvastatin (LIPITOR) tablet 20 mg  20 mg Oral Daily With Dinner   • bisacodyl (DULCOLAX) EC tablet 5 mg  5 mg Oral Daily PRN   • buPROPion (WELLBUTRIN XL) 24 hr tablet 150 mg  150 mg Oral QAM   • chlorhexidine (PERIDEX) 0.12 % oral rinse 15 mL  15 mL Mouth/Throat Q12H [address]   • dexmedeTOMIDine (Precedex) 400 mcg in sodium chloride 0.9% 100 mL  0.1-0.7 mcg/kg/hr Intravenous Titrated   • docusate sodium (COLACE) capsule 100 mg  100 mg Oral BID   • fluticasone-vilanterol 200-25 mcg/actuation 1 puff  1 puff Inhalation Daily   • gabapentin (NEURONTIN) capsule 100 mg  100 mg Oral TID   • hydrALAZINE (APRESOLINE) injection 5 mg  5 mg Intravenous Q6H PRN   • HYDROmorphone HCl (DILAUDID) injection 0.2 mg  0.2 mg Intravenous Q4H PRN   • insulin lispro (HumaLOG) 100 units/mL subcutaneous injection 1-6 Units  1-6 Units Subcutaneous TID With Meals   • losartan (COZAAR) tablet 100 mg  100 mg Oral Daily   • niCARdipine (CARDENE) 25 mg (STANDARD CONCENTRATION) in sodium chloride 0.9% 250 mL  1-15 mg/hr Intravenous Titrated   • ondansetron (ZOFRAN) injection 4 mg  4 mg Intravenous Q4H PRN   • pantoprazole (PROTONIX) injection 40 mg  40 mg Intravenous Q24H [place]   • prochlorperazine (COMPAZINE) tablet 5 mg  5 mg Oral Q6H PRN     Prior to Admission medications    Medication Sig Start Date End Date Taking?  Authorizing Provider   albuterol (2.5 mg/3 mL) 0.083 % nebulizer solution Take 3 mL (2.5 mg total) by nebulization every 4 (four) hours as needed for wheezing or shortness of breath 4/8/23   Christy Tavarez MD   albuterol (Ventolin HFA) 90 mcg/act inhaler Inhale 2 puffs every 6 (six) hours as needed for wheezing 8/4/23   ARSENIO Pollock   Aspirin Low Dose 81 MG EC tablet Take 1 tablet by mouth once daily 8/4/23   Christy Tavarez MD   atenolol (TENORMIN) 50 mg tablet Take 1 tablet (50 mg total) by mouth daily 8/4/23   ARSENIO Clark   buPROPion (WELLBUTRIN XL) 300 mg 24 hr tablet Take 1 tablet (300 mg total) by mouth every morning 7/26/23 7/20/24  ARSENIO Clark   fluticasone-vilanterol (Breo Ellipta) 200-25 mcg/actuation inhaler Inhale 1 puff daily Rinse mouth after use.  8/4/23 11/2/23  ARSENIO Clark   gabapentin (Neurontin) 300 mg capsule Take 1 capsule (300 mg total) by mouth 3 (three) times a day 7/26/23   ARSENIO Clark   metFORMIN (GLUCOPHAGE) 1000 MG tablet Take 1 tablet (1,000 mg total) by mouth 2 (two) times a day with meals 7/26/23 8/25/23  ARSENIO Clark   naproxen (NAPROSYN) 500 mg tablet Take 0.5 tablets (250 mg total) by mouth 2 (two) times a day with meals  Patient not taking: Reported on 7/26/2023 4/3/23   Melo Freirelim,    rosuvastatin (CRESTOR) 10 MG tablet Take 1 tablet (10 mg total) by mouth daily 7/26/23   ARSENIO Clark   telmisartan (MICARDIS) 80 MG tablet Take 1 tablet (80 mg total) by mouth daily 7/26/23   ARSENIO Clark   VTE Pharmacologic Prophylaxis: Pharmacologic VTE Prophylaxis contraindicated due to ACUTE ICH  VTE Mechanical Prophylaxis: sequential compression device  ==  Sharyle Erichsen, DO [facility], PGY-2

## 2023-08-16 NOTE — QUICK NOTE
First attempt at MRI Pt given 0.5 ativan and dilaudid however did not tolerate   Second attempt at MRI pt sedated with precedex- lethargic at bedside, became agitated at MRI- given dilaudid on precedex gtt and continued agitation,. MRI aborted.

## 2023-08-16 NOTE — PROGRESS NOTES
[id number]  Transfer Progress Note  Name: Pat Rothman  MRN: [id number]  Unit/Bed#: ICU 09 I Date of Admission: 8/14/2023   Date of Service: 8/16/2023 I Hospital Day: 2    Assessment/Plan   * ICH (intracerebral hemorrhage) ([address])  Assessment & Plan  62 YOF with history of HTN, DM2, Sjogren's, fibromyalgia initially presented [facility] as stroke alert on 8/14/23 with initial presenting symptoms acute onset of right sided weakness, facial asymmetry, and dysarthria while at work. Initial imaging with CTH/CTA HN revealed left thalamocapsular bleed with no significant shift or mass effect. . NIHSS 14. Not TNK candidate given acute ICH. Pt given IV labetalol, started on Cardene drip, administered DDAVP for ASA reversal and transferred to [facility] for NSg care. -Was seen by NSGY, no surgical intervention at this time  -Neurology following  -Frequent neuro checks  -SBP goal < 160 mmHg, preferably < 140 mmHg  -Will need repeat imaging CTH possibly tomorrow  -PT/OT following, recommended post acute rehab with PMR following  -TTE performed  -Holding DVt prophylaxis and ASA for now  -Diet restarted  -Stat CTH for drop in GCS score of 2 points or more in > 1 hour      Primary hypertension  Assessment & Plan  History of hypertension.  -Was previously on telmisartan and atenolol but hypertension was suboptimally controlled  -Started on amlodipine and losartan  -Can consider use of oral hydralazine if needed if SBPs persistently > 160 mmHg    Anemia  Assessment & Plan  Anemia with baseline hemoglobin of 10-11. MCVs around 80s. Iron panel showing ferritin at 22 and TIBC of 370s which maybe indicative of iron deficiency anemia.  Also her B12 levels are low-normal.   -Ordered ferrous sulfate supplementation  -On vitamin B12 supplementation 1000 mg daily      CAD (coronary artery disease)  Assessment & Plan  History of CAD based on prior CTA imaging.  -On statin  -Holding ASA for now due to ICH  -Holding beta blockade due to episodes of bradycardia    Diabetes mellitus type 2, controlled [facility])  Assessment & Plan  Lab Results   Component Value Date    HGBA1C 6.7 (H) 08/15/2023       Recent Labs     08/15/23  0545 08/15/23  1225 08/15/23  1752 08/16/23  1211   POCGLU 141* 175* 165* 134       Blood Sugar Average: Last 72 hrs:  (P) 560.0615628289389143     -On SSI  -Holding home metformin  -On SSI    Sjogren's syndrome ([address])  Assessment & Plan  History of sjogren syndrome.  -May benefit from outpatient rheumatology follow up from this regard    Hyperlipidemia associated with type 2 diabetes mellitus ([address])  Assessment & Plan  On statin    Anxiety  Assessment & Plan  History of anxiety  -Was on wellbutrin 300 mg daily, was reduced to 150 mg due to concern for medicaitons potential for lowering seizure threshold in this patient with ICH           Review of Invasive Devices:        Nancy Plan: Discontinue arterial line    Prophylaxis:  VTE Contraindicated secondary to: On hold until cleared by neurology   Stress Ulcer  covered bypantoprazole (PROTONIX) injection 40 mg [[id number]]       Subjective   HPI/Clinical course: As per HPI by Zeyad Valdes, "Chanetta Councilman is a 62 y.o. with a PMHx of HTN, HLD, asthma, DMII, and fibromyalgia who presents with acute parenchymal hematoma in left posterior striatocapsular region extending into left thalamus with mild vasogenic edema likely secondary to uncontrolled HTN as seen on stat CT/CTA H/N imaging. Patient presented as a stroke alert today to [facility] with acute onset of RUE and RLE weakness and sensory loss, right-sided facial deficit, and dysarthria. No evidence of LVO or flow restricting stenosis on CTA H/N. Initial BP per neurology note was 208/97 and initial NIHSS 14.      Upon arrival to [facility], ICH score 0. NIHSS score 10. Patient noted to have full ROM, sensation, and motor function of LUE and LLE.  RUE and RLE deficits: no effort against gravity, withdrawal to painful stimuli, full loss of sensation. Right sided mouth droop and right tongue deviation noted. The patient complained of a mild, intermittent left-sided headache. Denies chest pain, SOB, abdominal pain, numbness or tingling in hands or feet, nausea, vomiting, diarrhea, changes in vision or hearing."    Patient was seen and evaluated by neurosurgery and recommended for neurosurgical intervention at this time. She was placed on systolic blood pressure monitoring goals of less than 160 mmHg. Anticoagulation and antiplatelet prior placed on hold. She was medically managed and plan for repeat imaging including CT head tomorrow morning. PT and OT were consulted and made recommendations for postacute rehab with PMR following. Patient was provided with stroke education and counseling. DVT prophylaxis and aspirin are currently on hold and will need neurology clearance before they are restarted. Due to suboptimally controlled hypertension at time of presentation, patient was started on new medications for blood pressure control. Arterial line was discontinued. Patient is stable to be transferred out of neuro ICU onto medicine floors. Objective                            Vitals I/O      Most Recent Min/Max in 24hrs   Temp 98.5 °F (36.9 °C) Temp  Min: 97.6 °F (36.4 °C)  Max: 98.5 °F (36.9 °C)   Pulse 56 Pulse  Min: 48  Max: 72   Resp 17 Resp  Min: 11  Max: 31   /76 BP  Min: 144/78  Max: 170/76   O2 Sat 100 % SpO2  Min: 96 %  Max: 100 %      Intake/Output Summary (Last 24 hours) at 8/16/2023 1240  Last data filed at 8/16/2023 0600  Gross per 24 hour   Intake 910.98 ml   Output 1100 ml   Net -189.02 ml         Diet Dysphagia/Modified Consistency; Dysphagia 3-Dental Soft;  Thin Liquid; Consistent Carbohydrate Diet Level 2 (5 carb servings/75 grams CHO/meal)     Invasive Monitoring    Neuro Invasive Monitoring   Most Recent  Min/Max in 24hrs    GCS 15  Vivienne Coma Scale Score  Min: 13  Max: 15    ICP    No data recorded    CPP (cuff)    No data recorded    CPP(deacon)    No data recorded   CVP   No data recorded         Diagnostic Studies      EKG: Reviewed  Imaging:  I have personally reviewed pertinent reports. Medications:  Scheduled PRN   [START ON 8/17/2023] amLODIPine, 10 mg, Daily  atorvastatin, 20 mg, Daily With Dinner  buPROPion, 150 mg, QAM  chlorhexidine, 15 mL, Q12H [facility]  vitamin B-12, 1,000 mcg, Daily  docusate sodium, 100 mg, BID  [START ON 8/17/2023] ferrous sulfate, 325 mg, Daily With Breakfast  fluticasone-vilanterol, 1 puff, Daily  gabapentin, 300 mg, TID  insulin lispro, 1-6 Units, TID With Meals  losartan, 100 mg, Daily  pantoprazole, 40 mg, Q24H [place]      bisacodyl, 5 mg, Daily PRN  hydrALAZINE, 5 mg, Q6H PRN  HYDROmorphone, 0.2 mg, Q4H PRN  ondansetron, 4 mg, Q4H PRN  prochlorperazine, 5 mg, Q6H PRN       Continuous          Labs:    CBC    Recent Labs     08/15/23  0434 08/16/23  0523   WBC 10.98* 8.94   HGB 9.7* 10.4*   HCT 30.9* 32.5*    326     BMP    Recent Labs     08/15/23  0434 08/16/23  0523   SODIUM 139 132*   K 3.4* 3.5    102   CO2 27 25   AGAP 7 5   BUN 13 9   CREATININE 0.58* 0.53*   CALCIUM 8.5 8.7       Coags    Recent Labs     08/14/23  1714 08/15/23  0434   INR 0.94 1.06   PTT 27 28        Additional Electrolytes  Recent Labs     08/15/23  0434 08/16/23  0523 08/16/23  0830   MG 3.0* 2.1  --    PHOS 3.0 2.9  --    CAIONIZED 1.09* 1.14 1.09*          Blood Gas    No recent results  No recent results LFTs  Recent Labs     08/14/23 1714   ALT 15   AST 17   ALKPHOS 70   ALB 4.0   TBILI 0.32       Infectious  No recent results  Glucose  Recent Labs     08/14/23  1714 08/15/23  0434 08/16/23  0523   GLUC 143* 149* 157*               Critical Care Time Delivered: Upon my evaluation, this patient had a high probability of imminent or life-threatening deterioration due to [address], which required my direct attention, intervention, and personal management.   I have personally provided 43 minutes of critical care time, exclusive of procedures, teaching, family meetings, and any prior time recorded by providers other than myself.      Eliceo Bellamy MD

## 2023-08-16 NOTE — SPEECH THERAPY NOTE
Speech Language/Pathology  Speech-Language Pathology Progress Note      Patient Name: Stefania CABRERA Date: 8/16/2023      Subjective:  Pt was alert and lethargic. She was sitting upright in bed. Her  is present. She continues to have nausea on and off  "I didn't eat, I could eat some toast"    Objective:  Pt was seen today for dysphagia therapy. Current diet is dysphagia 3 dental soft with thin liquids. Pt was on room air. Focus of today's session was determine potential for diet texture advancement and improve use of strategies to minimize risk of aspiration. Textures offered today included hard solid and thin liquid via cup. Swallow function:   Bolus retrieval, control and mastication was/were WFL and slow. Breakdown and AP transfer was/were slow and mild post lingual residue was noted. Vickie Arnold Pharyngeal swallow initiation was present and timely. Hyolaryngeal excursion was reduced. The pt needed a 2* swallow and lingual sweep to clear though residue was less than yesterday. SLP provided min cueing/feedback throughout session. Pt was responsive to cueing and benefited from frequent reminders throughout session. Assessment:  Swallow function is improving with current diet. Less overall oral residue on the R, continues w/ lethargy and at end of session was again nauseous. Diet texture and Liquid consistency remains appropriate at this time. Plan:  Continue dysphagia 3 dental soft and thin liquids. Continue ST follow up. Subsequent sessions to focus on determining potential for diet texture advancement and improving use of strategies to minimize risk of aspiration.

## 2023-08-16 NOTE — ASSESSMENT & PLAN NOTE
>>ASSESSMENT AND PLAN FOR DIABETES MELLITUS TYPE 2, CONTROLLED (HCC) WRITTEN ON 8/16/2023 12:31 PM BY ENRIQUETA HANKINS MD    Lab Results   Component Value Date    HGBA1C 6.7 (H) 08/15/2023       Recent Labs     08/15/23  0545 08/15/23  1225 08/15/23  1752 08/16/23  1211   POCGLU 141* 175* 165* 134       Blood Sugar Average: Last 72 hrs:  (P) [id number]     -On SSI  -Holding home metformin  -On SSI

## 2023-08-16 NOTE — ASSESSMENT & PLAN NOTE
History of CAD based on prior CTA imaging.  -On statin  -Holding ASA for now due to [address]  -Holding beta blockade due to episodes of bradycardia

## 2023-08-16 NOTE — ASSESSMENT & PLAN NOTE
History of hypertension.  -Was previously on telmisartan and atenolol but hypertension was suboptimally controlled  -Started on amlodipine and losartan  -Can consider use of oral hydralazine if needed if SBPs persistently > 160 mmHg

## 2023-08-16 NOTE — TELEPHONE ENCOUNTER
patient was brought down for a second time overnight with more medication and she was still unwilling to complete MRI, pt was crying and would not let us move over to MRI table, team to let us know if they wish to proceed with anesthesia.

## 2023-08-16 NOTE — RESPIRATORY THERAPY NOTE
Resp care   08/16/23 0831   Respiratory Assessment   Assessment Type Assess only   General Appearance Awake   Respiratory Pattern Normal   Chest Assessment Chest expansion symmetrical   Bilateral Breath Sounds Diminished   Cough None   Resp Comments pt found on ra spo2 is 98%, bs are diminished, spo2 is 100%, pt hasnt been taking any prn tx, will d/c resp protocol.   Oxygen Therapy/Pulse Ox   O2 Device None (Room air)   SpO2 100 %   SpO2 Activity At Rest   $ Pulse Oximetry Spot Check Charge Completed

## 2023-08-16 NOTE — ASSESSMENT & PLAN NOTE
History of anxiety  -Was on wellbutrin 300 mg daily, was reduced to 150 mg due to concern for medicaitons potential for lowering seizure threshold in this patient with ICH

## 2023-08-16 NOTE — PLAN OF CARE
Problem: MOBILITY - ADULT  Goal: Maintain or return to baseline ADL function  Description: INTERVENTIONS:  -  Assess patient's ability to carry out ADLs; assess patient's baseline for ADL function and identify physical deficits which impact ability to perform ADLs (bathing, care of mouth/teeth, toileting, grooming, dressing, etc.)  - Assess/evaluate cause of self-care deficits   - Assess range of motion  - Assess patient's mobility; develop plan if impaired  - Assess patient's need for assistive devices and provide as appropriate  - Encourage maximum independence but intervene and supervise when necessary  - Involve family in performance of ADLs  - Assess for home care needs following discharge   - Consider OT consult to assist with ADL evaluation and planning for discharge  - Provide patient education as appropriate  Outcome: Progressing  Goal: Maintains/Returns to pre admission functional level  Description: INTERVENTIONS:  - Perform BMAT or MOVE assessment daily.   - Set and communicate daily mobility goal to care team and patient/family/caregiver. - Collaborate with rehabilitation services on mobility goals if consulted  - Perform Range of [place] times a day. - Reposition patient every  hours.   - Dangle patient  times a day  - Stand patient times a day  - Ambulate patient  times a day  - Out of bed to chair  times a day   - Out of bed for meals  times a day  - Out of bed for toileting  - Record patient progress and toleration of activity level   Outcome: Progressing     Problem: PAIN - ADULT  Goal: Verbalizes/displays adequate comfort level or baseline comfort level  Description: Interventions:  - Encourage patient to monitor pain and request assistance  - Assess pain using appropriate pain scale  - Administer analgesics based on type and severity of pain and evaluate response  - Implement non-pharmacological measures as appropriate and evaluate response  - Consider cultural and social influences on pain and pain management  - Notify physician/advanced practitioner if interventions unsuccessful or patient reports new pain  Outcome: Progressing     Problem: INFECTION - ADULT  Goal: Absence or prevention of progression during hospitalization  Description: INTERVENTIONS:  - Assess and monitor for signs and symptoms of infection  - Monitor lab/diagnostic results  - Monitor all insertion sites, i.e. indwelling lines, tubes, and drains  - Monitor endotracheal if appropriate and nasal secretions for changes in amount and color  - [place] appropriate cooling/warming therapies per order  - Administer medications as ordered  - Instruct and encourage patient and family to use good hand hygiene technique  - Identify and instruct in appropriate isolation precautions for identified infection/condition  Outcome: Progressing  Goal: Absence of fever/infection during neutropenic period  Description: INTERVENTIONS:  - Monitor WBC    Outcome: Progressing     Problem: SAFETY ADULT  Goal: Maintain or return to baseline ADL function  Description: INTERVENTIONS:  -  Assess patient's ability to carry out ADLs; assess patient's baseline for ADL function and identify physical deficits which impact ability to perform ADLs (bathing, care of mouth/teeth, toileting, grooming, dressing, etc.)  - Assess/evaluate cause of self-care deficits   - Assess range of motion  - Assess patient's mobility; develop plan if impaired  - Assess patient's need for assistive devices and provide as appropriate  - Encourage maximum independence but intervene and supervise when necessary  - Involve family in performance of ADLs  - Assess for home care needs following discharge   - Consider OT consult to assist with ADL evaluation and planning for discharge  - Provide patient education as appropriate  Outcome: Progressing  Goal: Maintains/Returns to pre admission functional level  Description: INTERVENTIONS:  - Perform BMAT or MOVE assessment daily.   - Set and communicate daily mobility goal to care team and patient/family/caregiver.   - Collaborate with rehabilitation services on mobility goals if consulted  - Perform Range of Motion  times a day.  - Reposition patient every  hours.  - Dangle patient  times a day  - Stand patient  times a day  - Ambulate patient  times a day  - Out of bed to chair  times a day   - Out of bed for meals  times a day  - Out of bed for toileting  - Record patient progress and toleration of activity level   Outcome: Progressing  Goal: Patient will remain free of falls  Description: INTERVENTIONS:  - Educate patient/family on patient safety including physical limitations  - Instruct patient to call for assistance with activity   - Consult OT/PT to assist with strengthening/mobility   - Keep Call bell within reach  - Keep bed low and locked with side rails adjusted as appropriate  - Keep care items and personal belongings within reach  - Initiate and maintain comfort rounds  - Make Fall Risk Sign visible to staff  - Offer Toileting every  Hours, in advance of need  - Initiate/Maintain alarm  - Obtain necessary fall risk management equipment:   - Apply yellow socks and bracelet for high fall risk patients  - Consider moving patient to room near nurses station  Outcome: Progressing     Problem: DISCHARGE PLANNING  Goal: Discharge to home or other facility with appropriate resources  Description: INTERVENTIONS:  - Identify barriers to discharge w/patient and caregiver  - Arrange for needed discharge resources and transportation as appropriate  - Identify discharge learning needs (meds, wound care, etc.)  - Arrange for interpretive services to assist at discharge as needed  - Refer to Case Management Department for coordinating discharge planning if the patient needs post-hospital services based on physician/advanced practitioner order or complex needs related to functional status, cognitive ability, or social support system  Outcome: Progressing    Problem: Knowledge Deficit  Goal: Patient/family/caregiver demonstrates understanding of disease process, treatment plan, medications, and discharge instructions  Description: Complete learning assessment and assess knowledge base. Interventions:  - Provide teaching at level of understanding  - Provide teaching via preferred learning methods  Outcome: Progressing     Problem: Nutrition/Hydration-ADULT  Goal: Nutrient/Hydration intake appropriate for improving, restoring or maintaining nutritional needs  Description: Monitor and assess patient's nutrition/hydration status for malnutrition. Collaborate with interdisciplinary team and initiate plan and interventions as ordered. Monitor patient's weight and dietary intake as ordered or per policy. Utilize nutrition screening tool and intervene as necessary. Determine patient's food preferences and provide high-protein, high-caloric foods as appropriate.      INTERVENTIONS:  - Monitor oral intake, urinary output, labs, and treatment plans  - Assess nutrition and hydration status and recommend course of action  - Evaluate amount of meals eaten  - Assist patient with eating if necessary   - Allow adequate time for meals  - Recommend/ encourage appropriate diets, oral nutritional supplements, and vitamin/mineral supplements  - Order, calculate, and assess calorie counts as needed  - Recommend, monitor, and adjust tube feedings and TPN/PPN based on assessed needs  - Assess need for intravenous fluids  - Provide specific nutrition/hydration education as appropriate  - Include patient/family/caregiver in decisions related to nutrition  Outcome: Progressing     Problem: Prexisting or High Potential for Compromised Skin Integrity  Goal: Skin integrity is maintained or improved  Description: INTERVENTIONS:  - Identify patients at risk for skin breakdown  - Assess and monitor skin integrity  - Assess and monitor nutrition and hydration status  - Monitor labs   - Assess for incontinence   - Turn and reposition patient  - Assist with mobility/ambulation  - Relieve pressure over bony prominences  - Avoid friction and shearing  - Provide appropriate hygiene as needed including keeping skin clean and dry  - Evaluate need for skin moisturizer/barrier cream  - Collaborate with interdisciplinary team   - Patient/family teaching  - Consider wound care consult   Outcome: Progressing     Problem: Neurological Deficit  Goal: Neurological status is stable or improving  Description: Interventions:  - Monitor and assess patient's level of consciousness, motor function, sensory function, and level of assistance needed for ADLs. - Monitor and report changes from baseline. Collaborate with interdisciplinary team to initiate plan and implement interventions as ordered. - Provide and maintain a safe environment. - Consider seizure precautions. - Consider fall precautions. - Consider aspiration precautions. - Consider bleeding precautions. Outcome: Progressing     Problem: Activity Intolerance/Impaired Mobility  Goal: Mobility/activity is maintained at optimum level for patient  Description: Interventions:  - Assess and monitor patient  barriers to mobility and need for assistive/adaptive devices. - Assess patient's emotional response to limitations. - Collaborate with interdisciplinary team and initiate plans and interventions as ordered. - Encourage independent activity per ability.  - Maintain proper body alignment. - Perform active/passive rom as tolerated/ordered. - Plan activities to conserve energy.  - Turn patient as appropriate  Outcome: Progressing     Problem: Communication Impairment  Goal: Ability to express needs and understand communication  Description: Assess patient's communication skills and ability to understand information.   Patient will demonstrate use of effective communication techniques, alternative methods of communication and understanding even if not able to speak.     - Encourage communication and provide alternate methods of communication as needed. - Collaborate with case management/ for discharge needs. - Include patient/family/caregiver in decisions related to communication. Outcome: Progressing     Problem: Potential for Aspiration  Goal: Non-ventilated patient's risk of aspiration is minimized  Description: Assess and monitor vital signs, respiratory status, and labs (WBC). Monitor for signs of aspiration (tachypnea, cough, rales, wheezing, cyanosis, fever). - Assess and monitor patient's ability to swallow. - Place patient up in chair to eat if possible. - HOB up at 90 degrees to eat if unable to get patient up into chair.  - Supervise patient during oral intake. - Instruct patient/ family to take small bites. - Instruct patient/ family to take small single sips when taking liquids. - Follow patient-specific strategies generated by speech pathologist.  Outcome: Progressing  Goal: Ventilated patient's risk of aspiration is minimized  Description: Assess and monitor vital signs, respiratory status, airway cuff pressure, and labs (WBC). Monitor for signs of aspiration (tachypnea, cough, rales, wheezing, cyanosis, fever). - Elevate head of bed 30 degrees if patient has tube feeding.  - Monitor tube feeding. Outcome: Progressing     Problem: Nutrition  Goal: Nutrition/Hydration status is improving  Description: Monitor and assess patient's nutrition/hydration status for malnutrition (ex- brittle hair, bruises, dry skin, pale skin and conjunctiva, muscle wasting, smooth red tongue, and disorientation). Collaborate with interdisciplinary team and initiate plan and interventions as ordered. Monitor patient's weight and dietary intake as ordered or per policy. Utilize nutrition screening tool and intervene per policy. Determine patient's food preferences and provide high-protein, high-caloric foods as appropriate.      - Assist patient with eating.  - Allow adequate time for meals.  - Encourage patient to take dietary supplement as ordered. - Collaborate with clinical nutritionist.  - Include patient/family/caregiver in decisions related to nutrition.   Outcome: Progressing

## 2023-08-16 NOTE — ASSESSMENT & PLAN NOTE
Anemia with baseline hemoglobin of 10-11. MCVs around 80s. Iron panel showing ferritin at 22 and TIBC of 370s which maybe indicative of iron deficiency anemia.  Also her B12 levels are low-normal.   -Ordered ferrous sulfate supplementation  -On vitamin B12 supplementation 1000 mg daily

## 2023-08-16 NOTE — PLAN OF CARE
Problem: MOBILITY - ADULT  Goal: Maintain or return to baseline ADL function  Description: INTERVENTIONS:  -  Assess patient's ability to carry out ADLs; assess patient's baseline for ADL function and identify physical deficits which impact ability to perform ADLs (bathing, care of mouth/teeth, toileting, grooming, dressing, etc.)  - Assess/evaluate cause of self-care deficits   - Assess range of motion  - Assess patient's mobility; develop plan if impaired  - Assess patient's need for assistive devices and provide as appropriate  - Encourage maximum independence but intervene and supervise when necessary  - Involve family in performance of ADLs  - Assess for home care needs following discharge   - Consider OT consult to assist with ADL evaluation and planning for discharge  - Provide patient education as appropriate  Outcome: Progressing  Goal: Maintains/Returns to pre admission functional level  Description: INTERVENTIONS:  - Perform BMAT or MOVE assessment daily.   - Set and communicate daily mobility goal to care team and patient/family/caregiver. - Collaborate with rehabilitation services on mobility goals if consulted  - Perform Range of Motion 3-4 times a day. - Reposition patient every 2 hours.   - Out of bed to chair 1 times a day   - Out of bed for meals 1-3 times a day  - Record patient progress and toleration of activity level   Outcome: Progressing     Problem: PAIN - ADULT  Goal: Verbalizes/displays adequate comfort level or baseline comfort level  Description: Interventions:  - Encourage patient to monitor pain and request assistance  - Assess pain using appropriate pain scale  - Administer analgesics based on type and severity of pain and evaluate response  - Implement non-pharmacological measures as appropriate and evaluate response  - Consider cultural and social influences on pain and pain management  - Notify physician/advanced practitioner if interventions unsuccessful or patient reports new pain  Outcome: Progressing     Problem: INFECTION - ADULT  Goal: Absence or prevention of progression during hospitalization  Description: INTERVENTIONS:  - Assess and monitor for signs and symptoms of infection  - Monitor lab/diagnostic results  - Monitor all insertion sites, i.e. indwelling lines, tubes, and drains  - Monitor endotracheal if appropriate and nasal secretions for changes in amount and color  - [place] appropriate cooling/warming therapies per order  - Administer medications as ordered  - Instruct and encourage patient and family to use good hand hygiene technique  - Identify and instruct in appropriate isolation precautions for identified infection/condition  Outcome: Progressing  Goal: Absence of fever/infection during neutropenic period  Description: INTERVENTIONS:  - Monitor WBC    Outcome: Progressing     Problem: SAFETY ADULT  Goal: Maintain or return to baseline ADL function  Description: INTERVENTIONS:  -  Assess patient's ability to carry out ADLs; assess patient's baseline for ADL function and identify physical deficits which impact ability to perform ADLs (bathing, care of mouth/teeth, toileting, grooming, dressing, etc.)  - Assess/evaluate cause of self-care deficits   - Assess range of motion  - Assess patient's mobility; develop plan if impaired  - Assess patient's need for assistive devices and provide as appropriate  - Encourage maximum independence but intervene and supervise when necessary  - Involve family in performance of ADLs  - Assess for home care needs following discharge   - Consider OT consult to assist with ADL evaluation and planning for discharge  - Provide patient education as appropriate  Outcome: Progressing  Goal: Maintains/Returns to pre admission functional level  Description: INTERVENTIONS:  - Perform BMAT or MOVE assessment daily.   - Set and communicate daily mobility goal to care team and patient/family/caregiver.    - Collaborate with rehabilitation services on mobility goals if consulted  - Perform Range of Motion 2-3 times a day. - Reposition patient every 2 hours. - Out of bed to chair 1 times a day   - Out of bed for meals 1-3 times a day  - Record patient progress and toleration of activity level   Outcome: Progressing  Goal: Patient will remain free of falls  Description: INTERVENTIONS:  - Educate patient/family on patient safety including physical limitations  - Instruct patient to call for assistance with activity   - Consult OT/PT to assist with strengthening/mobility   - Keep Call bell within reach  - Keep bed low and locked with side rails adjusted as appropriate  - Keep care items and personal belongings within reach  - Initiate and maintain comfort rounds  - Make Fall Risk Sign visible to staff  - Offer Toileting every 2 Hours, in advance of need  - Initiate/Maintain bed/chair alarm  - Apply yellow socks and bracelet for high fall risk patients  - Consider moving patient to room near nurses station  Outcome: Progressing     Problem: DISCHARGE PLANNING  Goal: Discharge to home or other facility with appropriate resources  Description: INTERVENTIONS:  - Identify barriers to discharge w/patient and caregiver  - Arrange for needed discharge resources and transportation as appropriate  - Identify discharge learning needs (meds, wound care, etc.)  - Arrange for interpretive services to assist at discharge as needed  - Refer to Case Management Department for coordinating discharge planning if the patient needs post-hospital services based on physician/advanced practitioner order or complex needs related to functional status, cognitive ability, or social support system  Outcome: Progressing     Problem: Knowledge Deficit  Goal: Patient/family/caregiver demonstrates understanding of disease process, treatment plan, medications, and discharge instructions  Description: Complete learning assessment and assess knowledge base.   Interventions:  - Provide teaching at level of understanding  - Provide teaching via preferred learning methods  Outcome: Progressing     Problem: Nutrition/Hydration-ADULT  Goal: Nutrient/Hydration intake appropriate for improving, restoring or maintaining nutritional needs  Description: Monitor and assess patient's nutrition/hydration status for malnutrition. Collaborate with interdisciplinary team and initiate plan and interventions as ordered. Monitor patient's weight and dietary intake as ordered or per policy. Utilize nutrition screening tool and intervene as necessary. Determine patient's food preferences and provide high-protein, high-caloric foods as appropriate.      INTERVENTIONS:  - Monitor oral intake, urinary output, labs, and treatment plans  - Assess nutrition and hydration status and recommend course of action  - Evaluate amount of meals eaten  - Assist patient with eating if necessary   - Allow adequate time for meals  - Recommend/ encourage appropriate diets, oral nutritional supplements, and vitamin/mineral supplements  - Order, calculate, and assess calorie counts as needed  - Recommend, monitor, and adjust tube feedings and TPN/PPN based on assessed needs  - Assess need for intravenous fluids  - Provide specific nutrition/hydration education as appropriate  - Include patient/family/caregiver in decisions related to nutrition  Outcome: Progressing     Problem: Prexisting or High Potential for Compromised Skin Integrity  Goal: Skin integrity is maintained or improved  Description: INTERVENTIONS:  - Identify patients at risk for skin breakdown  - Assess and monitor skin integrity  - Assess and monitor nutrition and hydration status  - Monitor labs   - Assess for incontinence   - Turn and reposition patient  - Assist with mobility/ambulation  - Relieve pressure over bony prominences  - Avoid friction and shearing  - Provide appropriate hygiene as needed including keeping skin clean and dry  - Evaluate need for skin moisturizer/barrier cream  - Collaborate with interdisciplinary team   - Patient/family teaching  - Consider wound care consult   Outcome: Progressing

## 2023-08-16 NOTE — PROGRESS NOTES
[address] [facility]  Progress Note  Name: Kash Bauer  MRN: [id number]  Unit/Bed#: ICU 09 I Date of Admission: 8/14/2023   Date of Service: 8/16/2023 I Hospital Day: 2    This is our . .. 61yo with hx of HTN,Sjogren syndrome admitted to neuroICU 8/14/23 with acute left basal ganglia ICH    Overnight events - tried to get patient to MRI twice  1st attempt pt was given 0.5 ativan and dilaudid, pt  was on table for 10 mins so they were able to get some imaging. 2nd attempt pt sedated with precedex and was very lethargic but become very agitated at the MRI. Given dilaudid but still agitated. Cardene stopped overnight  This AM BP was up to 170s/80s so we were thinking of increasing amlodipine to 10 mg QD     Today  She was having chest tightness and R groin pain. EKG was ordered - it was not concerning, some mickey, regular rhythm, p waves present  Venous US ordered  Dilaudid given    Vitals  Afebrile  HR in the 40s bc of the precedex  RR 8-12  Nancy BP 150s/70s -   O2 99 on RA          PE  Physical Exam  HENT:      Nose: Nose normal.      Mouth/Throat:      Mouth: Mucous membranes are moist.   Eyes:      Pupils: Pupils are equal, round, and reactive to light. Cardiovascular:      Rate and Rhythm: Normal rate and regular rhythm. Pulses: Normal pulses. Heart sounds: No murmur heard. Pulmonary:      Effort: Pulmonary effort is normal.   Abdominal:      General: Abdomen is flat. Bowel sounds are normal.      Palpations: Abdomen is soft. Musculoskeletal:         General: No swelling. Cervical back: No rigidity. Skin:     General: Skin is warm. Neurological:      Mental Status: She is alert and oriented to person, place, and time. Cranial Nerves: Dysarthria and facial asymmetry present. Sensory: Sensory deficit (No sensation in R face, arm, leg ) present. Motor: Weakness (R side completely flaccid) present. Coordination: Coordination is intact. Deep Tendon Reflexes:      Reflex Scores:       Tricep reflexes are 3+ on the right side and 2+ on the left side. Bicep reflexes are 3+ on the right side and 2+ on the left side. Brachioradialis reflexes are 3+ on the right side and 2+ on the left side. Patellar reflexes are 2+ on the right side and 2+ on the left side. Comments: No gaze preference          Labs  BMP -- hyponatremia 132, K mild low 3.5   CBC WBC improved, (downtrending, 9 today), Hbg stable at 10, plts stables 326  Iron Sat low 8%, TIBC normal, ferritin normal    Imaging  MRI -- some results - pending read    AP  1. Left thalamic ICH- ICH score-0  2. Encephalopathy  3. Hypertensive emergency  4. DM  5.  Sjogren syndrome    Neuro  Diagnoses: L basal ganglia ICH  - oral BP meds cozaar 100mg daily and amlodipine 5mg daily --> change to 10mg daily  - SBP goal 120 - 160  - neuro checks q2 day q4 at night  - CT head tomorrow AM        CV  SBPs have been within goal. Meds cozaar and amlodipine  Statin atorvastatin 20mg QD  Hydralazine if BP does not improve      Pulm:  • Diagnosis: Asthma  • Albuterol inhalation q6hrs, breo elipta   - Maintain SpO2 >92%      GI:  Vomiting:  - 2 episodes of vomiting yesterday around 4/5pm.   - Zofran yday ,9am and 5pm, 9pm  - compazine 11am      BM none  On colace 100mg BID   Pantoprazole for prophylaxis   Restart diet today since no MRI     colonscopy 2017 - normal, mild diverticulosis, repeat colo in 10yrs    :  Cr stable 0.5  • eGFR 90-100s   IsOs Is 1.29 L O2 1.7L   Urinary retention protocol bc she was retaining yday - put out 700ccs when they straight cathed her    F/E/N:    Plan:   • F: fluids as needed  • E: Potassium, Monitor and replete electrolytes for Mg >2, Phos >3, K >4.  • N: Restart diet    Heme/Onc:  Iron deficiency anemia + some B12 deficiency  - Iron Sat low 8%, TIBC normal, ferritin low normal --> iron def  - B12 low normal  - Plan: Iron and B12 supplementation   CBC WBC improved, (downtrending, 9 today), Hbg stable at 10, plts stables 326  •  holding prophylaxis, if scans look stable can restart prophylaxis    Endo:  • Diagnosis: DMII  • Home meds: metformin  • Glucose 150s-170s, 2U of insulin  ? Plan:  -   - Monitor BG  - BG goal 140-180  - Monitor and treat hypoglycemia  - Hold home metformin  ID:  No active issues   - Monitor fever curve/WBC trend  - Monitor vital signs  - Monitor for s/s infection     MSK/Skin:  • Diagnosis: RUE and RLE weakness  ? Plan:   - PT/OT seen - did some repositioning and increased activity   - PMR -- limit sedating when possible, focus on prevention, ROM of major joints 2-3x per day, OOB to chair 2-3x per day, recommend dispo to ARC   - Case management -pending  - Turn q2 while in bed  - Monitor for skin breakdown     • Diagnosis: Fibromyalgia  • gabapentin 300mg 3x/day  -      LDAs: a-line, PIVs, ext cath    ICU LOS: 1d 10h  ICU Core Measures     A: Assess, Prevent, and Manage Pain · Has pain been assessed? Yes  · Need for changes to pain regimen? No   B: Both SAT/SAT  · N/A   C: Choice of Sedation · RASS Goal: 0 Alert and Calm or +2 Agitated  · Need for changes to sedation or analgesia regimen? No   D: Delirium · CAM-ICU: Negative   E: Early Mobility  · Plan for early mobility? Yes   F: Family Engagement · Plan for family engagement today?  Yes       Review of Invasive Devices:        Nancy Plan: Keep arterial line for hemodynamic monitoring    Prophylaxis:  VTE Contraindicated secondary to: ICH   Stress Ulcer  covered bypantoprazole (PROTONIX) injection 40 mg [[id number]]       Subjective         Review of Systems  Objective                            Vitals I/O      Most Recent Min/Max in 24hrs   Temp 97.6 °F (36.4 °C) Temp  Min: 97.6 °F (36.4 °C)  Max: 98.3 °F (36.8 °C)   Pulse (!) 48 Pulse  Min: 48  Max: 72   Resp (!) 8 Resp  Min: 8  Max: 34   /78 BP  Min: 131/65  Max: 173/79   O2 Sat 99 % SpO2  Min: 96 %  Max: 100 %      Intake/Output Summary (Last 24 hours) at 8/16/2023 0547  Last data filed at 8/16/2023 0400  Gross per 24 hour   Intake 1290.41 ml   Output 1700 ml   Net -409.59 ml         Diet Neto/CHO Controlled; Consistent Carbohydrate Diet Level 1 (4 carb servings/60 grams CHO/meal); Dysphagia 3-Dental Soft; Thin Liquid     Invasive Monitoring Physical exam   Arterial Line  Nancy /74  Arterial Line BP  Min: 124/62  Max: 218/108    mmHg  Arterial Line MAP (mmHg)  Min: 84 mmHg  Max: 152 mmHg    See above     Diagnostic Studies      EKG: mickey  Imaging:  I have personally reviewed pertinent reports.    and I have personally reviewed pertinent films in PACS     Medications:  Scheduled PRN   amLODIPine, 5 mg, Daily  atorvastatin, 20 mg, Daily With Dinner  buPROPion, 150 mg, QAM  chlorhexidine, 15 mL, Q12H [place]  docusate sodium, 100 mg, BID  fluticasone-vilanterol, 1 puff, Daily  gabapentin, 100 mg, TID  insulin lispro, 1-6 Units, TID With Meals  losartan, 100 mg, Daily  pantoprazole, 40 mg, Q24H [place]      albuterol, 2.5 mg, Q6H PRN  bisacodyl, 5 mg, Daily PRN  hydrALAZINE, 5 mg, Q6H PRN  HYDROmorphone, 0.2 mg, Q4H PRN  ondansetron, 4 mg, Q4H PRN  prochlorperazine, 5 mg, Q6H PRN       Continuous    dexmedetomidine, 0.1-0.7 mcg/kg/hr, Last Rate: Stopped (08/16/23 0429)  niCARdipine, 1-15 mg/hr, Last Rate: Stopped (08/16/23 0050)         Labs:    CBC    Recent Labs     08/14/23  1714 08/15/23  0434   WBC 11.26* 10.98*   HGB 10.9* 9.7*   HCT 34.5* 30.9*    302     BMP    Recent Labs     08/14/23  1714 08/15/23  0434   SODIUM 136 139   K 3.8 3.4*    105   CO2 28 27   AGAP 8 7   BUN 17 13   CREATININE 0.71 0.58*   CALCIUM 9.2 8.5       Coags    Recent Labs     08/14/23  1714 08/15/23  0434   INR 0.94 1.06   PTT 27 28        Additional Electrolytes  Recent Labs     08/14/23  1714 08/15/23  0434   MG 1.5* 3.0*   PHOS 4.3 3.0   CAIONIZED  --  1.09*          Blood Gas    No recent results  No recent results LFTs  Recent Labs     08/14/23 1714   ALT 15   AST 17   ALKPHOS 70   ALB 4.0   TBILI 0.32       Infectious  No recent results  Glucose  Recent Labs     08/14/23  1714 08/15/23  0434   GLUC 143* 149*               Critical Care Time Delivered: Upon my evaluation, this patient had a high probability of imminent or life-threatening deterioration due to [address], which required my direct attention, intervention, and personal management. I have personally provided 30 minutes of critical care time, exclusive of procedures, teaching, family meetings, and any prior time recorded by providers other than myself.      Komal Jain

## 2023-08-16 NOTE — CASE MANAGEMENT
Case Management Discharge Planning Note    Patient name Birgit Vallejo  Location ICU 09/ICU 09 MRN [id number]  : 1965 Date 2023       Current Admission Date: 2023  Current Admission Diagnosis:ICH (intracerebral hemorrhage) [facility])   Patient Active Problem List    Diagnosis Date Noted   • Anemia 2023   • ICH (intracerebral hemorrhage) ([address]) 08/15/2023   • Left leg pain 08/15/2023   • Chronic obstructive pulmonary disease with acute exacerbation ([address]) 2023   • Hyperlipidemia associated with type 2 diabetes mellitus ([address]) 2023   • Abnormal CT of the chest 2023   • Class 2 obesity with body mass index (BMI) of 39.0 to 39.9 in adult 2023   • CAD (coronary artery disease) 2023   • Right lumbosacral radiculopathy 10/12/2022   • Chronic bilateral low back pain with bilateral sciatica 2022   • Paresthesia of both feet 2022   • Postoperative visit 2022   • Incarcerated umbilical hernia    • Diabetes mellitus type 2, controlled ([address]) 2022   • Continuous opioid dependence ([address]) 2022   • Moderate persistent asthma with acute exacerbation 2022   • Cigarette nicotine dependence in remission 2022   • Primary hypertension 2022   • Fibromyalgia 2022   • Sjogren's syndrome ([address]) 10/19/2012   • Anxiety 2012      LOS (days): 2  Geometric Mean LOS (GMLOS) (days): 4.50  Days to GMLOS:2.6     OBJECTIVE:  Risk of Unplanned Readmission Score: 18.27         Current admission status: Inpatient   Preferred Pharmacy:   [facility] [place] [address] MERT HERNÁNDEZ [address] [place] [address] ROAD  [address] [address]  Phone: [phone] Fax: 079 [place], [place] - [address] [place] [address],[address] [place] [address]  Phone: [phone] Fax: [phone]    Primary Care Provider: Saeed Heredia MD    Primary Insurance: BLUE CROSS  Secondary Insurance:     DISCHARGE DETAILS:    Discharge planning discussed with[de-identified] Félix Sung of Choice: Yes  Comments - Freedom of Choice: Family states [facility] is first choice for STR  CM contacted family/caregiver?: Yes   Contacts  Patient Contacts: Manny Herman  Relationship to Patient[de-identified] Family  Contact Method: Phone  Reason/Outcome: Continuity of Care, Emergency Contact, Discharge Planning, Referral       Other Referral/Resources/Interventions Provided:  Interventions: Short Term Rehab  Referral Comments: CM completed blanket referral for STR, Per family, [facility] is first choice.     Treatment Team Recommendation: Short Term Rehab  Discharge Destination Plan[de-identified] Short Term Rehab

## 2023-08-17 ENCOUNTER — APPOINTMENT (INPATIENT)
Dept: RADIOLOGY | Facility: HOSPITAL | Age: 58
DRG: 064 | End: 2023-08-17
Payer: COMMERCIAL

## 2023-08-17 ENCOUNTER — APPOINTMENT (OUTPATIENT)
Dept: RADIOLOGY | Facility: HOSPITAL | Age: 58
DRG: 064 | End: 2023-08-17
Attending: INTERNAL MEDICINE
Payer: COMMERCIAL

## 2023-08-17 LAB
ATRIAL RATE: 68 BPM
ATRIAL RATE: 68 BPM
ATRIAL RATE: 69 BPM
CORTIS AM PEAK SERPL-MCNC: 18 UG/DL (ref 6.7–22.6)
GLUCOSE SERPL-MCNC: 133 MG/DL (ref 65–140)
GLUCOSE SERPL-MCNC: 141 MG/DL (ref 65–140)
GLUCOSE SERPL-MCNC: 150 MG/DL (ref 65–140)
GLUCOSE SERPL-MCNC: 160 MG/DL (ref 65–140)
P AXIS: 51 DEGREES
P AXIS: 58 DEGREES
P AXIS: 58 DEGREES
PR INTERVAL: 178 MS
PR INTERVAL: 178 MS
PR INTERVAL: 180 MS
QRS AXIS: 17 DEGREES
QRS AXIS: 17 DEGREES
QRS AXIS: 24 DEGREES
QRSD INTERVAL: 90 MS
QRSD INTERVAL: 94 MS
QRSD INTERVAL: 94 MS
QT INTERVAL: 416 MS
QT INTERVAL: 428 MS
QT INTERVAL: 428 MS
QTC INTERVAL: 445 MS
QTC INTERVAL: 455 MS
QTC INTERVAL: 455 MS
T WAVE AXIS: 41 DEGREES
T WAVE AXIS: 54 DEGREES
T WAVE AXIS: 54 DEGREES
VENTRICULAR RATE: 68 BPM
VENTRICULAR RATE: 68 BPM
VENTRICULAR RATE: 69 BPM

## 2023-08-17 PROCEDURE — 99232 SBSQ HOSP IP/OBS MODERATE 35: CPT | Performed by: NURSE PRACTITIONER

## 2023-08-17 PROCEDURE — 97530 THERAPEUTIC ACTIVITIES: CPT

## 2023-08-17 PROCEDURE — 97535 SELF CARE MNGMENT TRAINING: CPT

## 2023-08-17 PROCEDURE — 82948 REAGENT STRIP/BLOOD GLUCOSE: CPT

## 2023-08-17 PROCEDURE — C9113 INJ PANTOPRAZOLE SODIUM, VIA: HCPCS | Performed by: INTERNAL MEDICINE

## 2023-08-17 PROCEDURE — 82533 TOTAL CORTISOL: CPT | Performed by: INTERNAL MEDICINE

## 2023-08-17 PROCEDURE — 92526 ORAL FUNCTION THERAPY: CPT

## 2023-08-17 PROCEDURE — 73522 X-RAY EXAM HIPS BI 3-4 VIEWS: CPT

## 2023-08-17 PROCEDURE — 97112 NEUROMUSCULAR REEDUCATION: CPT

## 2023-08-17 PROCEDURE — 93005 ELECTROCARDIOGRAM TRACING: CPT

## 2023-08-17 PROCEDURE — 93010 ELECTROCARDIOGRAM REPORT: CPT | Performed by: INTERNAL MEDICINE

## 2023-08-17 PROCEDURE — G1004 CDSM NDSC: HCPCS

## 2023-08-17 PROCEDURE — 70450 CT HEAD/BRAIN W/O DYE: CPT

## 2023-08-17 PROCEDURE — 99232 SBSQ HOSP IP/OBS MODERATE 35: CPT | Performed by: PSYCHIATRY & NEUROLOGY

## 2023-08-17 RX ADMIN — ATORVASTATIN CALCIUM 20 MG: 20 TABLET, FILM COATED ORAL at 16:00

## 2023-08-17 RX ADMIN — GABAPENTIN 300 MG: 300 CAPSULE ORAL at 09:27

## 2023-08-17 RX ADMIN — PANTOPRAZOLE SODIUM 40 MG: 40 INJECTION, POWDER, FOR SOLUTION INTRAVENOUS at 09:27

## 2023-08-17 RX ADMIN — BUPROPION HYDROCHLORIDE 150 MG: 150 TABLET, FILM COATED, EXTENDED RELEASE ORAL at 09:27

## 2023-08-17 RX ADMIN — INSULIN LISPRO 1 UNITS: 100 INJECTION, SOLUTION INTRAVENOUS; SUBCUTANEOUS at 16:02

## 2023-08-17 RX ADMIN — HYDRALAZINE HYDROCHLORIDE 25 MG: 25 TABLET, FILM COATED ORAL at 21:01

## 2023-08-17 RX ADMIN — FERROUS SULFATE TAB 325 MG (65 MG ELEMENTAL FE) 325 MG: 325 (65 FE) TAB at 09:26

## 2023-08-17 RX ADMIN — GABAPENTIN 300 MG: 300 CAPSULE ORAL at 21:01

## 2023-08-17 RX ADMIN — LOSARTAN POTASSIUM 100 MG: 50 TABLET, FILM COATED ORAL at 09:26

## 2023-08-17 RX ADMIN — HYDROMORPHONE HYDROCHLORIDE 0.2 MG: 0.2 INJECTION, SOLUTION INTRAMUSCULAR; INTRAVENOUS; SUBCUTANEOUS at 17:21

## 2023-08-17 RX ADMIN — CYANOCOBALAMIN TAB 500 MCG 1000 MCG: 500 TAB at 09:27

## 2023-08-17 RX ADMIN — DOCUSATE SODIUM 100 MG: 100 CAPSULE ORAL at 16:00

## 2023-08-17 RX ADMIN — AMLODIPINE BESYLATE 10 MG: 10 TABLET ORAL at 09:27

## 2023-08-17 RX ADMIN — CHLORHEXIDINE GLUCONATE 15 ML: 1.2 SOLUTION ORAL at 09:27

## 2023-08-17 RX ADMIN — HYDRALAZINE HYDROCHLORIDE 25 MG: 25 TABLET, FILM COATED ORAL at 16:00

## 2023-08-17 RX ADMIN — CHLORHEXIDINE GLUCONATE 15 ML: 1.2 SOLUTION ORAL at 21:04

## 2023-08-17 RX ADMIN — HYDRALAZINE HYDROCHLORIDE 25 MG: 25 TABLET, FILM COATED ORAL at 05:23

## 2023-08-17 RX ADMIN — FLUTICASONE FUROATE AND VILANTEROL TRIFENATATE 1 PUFF: 200; 25 POWDER RESPIRATORY (INHALATION) at 09:30

## 2023-08-17 RX ADMIN — GABAPENTIN 300 MG: 300 CAPSULE ORAL at 16:00

## 2023-08-17 RX ADMIN — DOCUSATE SODIUM 100 MG: 100 CAPSULE ORAL at 09:27

## 2023-08-17 NOTE — PLAN OF CARE
Problem: PHYSICAL THERAPY ADULT  Goal: Performs mobility at highest level of function for planned discharge setting. See evaluation for individualized goals. Description: Treatment/Interventions: OT, Spoke to case management, Spoke to nursing, Gait training, Bed mobility, Patient/family training, Endurance training, LE strengthening/ROM, Functional transfer training  Equipment Recommended:  (TBD)       See flowsheet documentation for full assessment, interventions and recommendations. Outcome: Progressing  Note: Prognosis: Guarded  Problem List: Decreased strength, Decreased endurance, Decreased mobility, Impaired balance, Decreased cognition, Impaired judgement, Decreased safety awareness, Impaired sensation, Impaired tone, Obesity  Assessment: Pt agreeable to participate in PT session. Pt performed functional mobility and therex as outlined above. Pt with R lean in sitting requiring VC/TC for midline orientation with good carry over. R lean reoccurs with fatigue. Reaching with LUE L and anterior to promote WS away from R side and rotation L to strengthen abdominal muscles as pt demonstrates R rotation. RUE protected throughout and pt would benefit from sling for future transfers. Educated pt on mental imagery to promote motor return and promote neuroplasticity. Pt left seated in chair with chair alarm, call bell, phone, and all personal needs within reach. Pt will continue to benefit from skilled acute care PT to further address their functional mobility limitations. D/C recommendations remain rehab. Barriers to Discharge: Inaccessible home environment, Decreased caregiver support     PT Discharge Recommendation: Post acute rehabilitation services    See flowsheet documentation for full assessment.

## 2023-08-17 NOTE — ASSESSMENT & PLAN NOTE
>>ASSESSMENT AND PLAN FOR DIABETES MELLITUS TYPE 2, CONTROLLED (HCC) WRITTEN ON 8/17/2023  4:12 PM BY ARSENIO VERDIN    Lab Results   Component Value Date    HGBA1C 6.7 (H) 08/15/2023       Recent Labs     08/16/23  1719 08/17/23  0608 08/17/23  1117 08/17/23  1556   POCGLU 111 133 141* 160*       Blood Sugar Average: Last 72 hrs:  (P) 158.1     On SSI  Holding home metformin  Hypoglycemic protocol

## 2023-08-17 NOTE — ASSESSMENT & PLAN NOTE
Lab Results   Component Value Date    HGBA1C 6.7 (H) 08/15/2023       Recent Labs     08/16/23  1719 08/17/23  0608 08/17/23  1117 08/17/23  1556   POCGLU 111 133 141* 160*       Blood Sugar Average: Last 72 hrs:  (P) 158.1     · On SSI  · Holding home metformin  · Hypoglycemic protocol

## 2023-08-17 NOTE — ASSESSMENT & PLAN NOTE
History of anxiety  -Was on wellbutrin 300 mg daily, was reduced to 150 mg due to concern for medicaitons potential for lowering seizure threshold in this patient with ICH Patient called back to inquire about his coumadin and his procedure for 07/29. He spoke to the coumadin clinic and was transferred back to cardiology and told he needed to speak to Dr. Grissom's office. He can be reached at [phone].

## 2023-08-17 NOTE — SPEECH THERAPY NOTE
Speech Language/Pathology  Speech-Language Pathology Progress Note      Patient Name: Wade HILLMAN Date: 8/17/2023      Subjective:  Pt was awake and alert. She was sitting up in chair at bedside. Patient stated "It's hard to eat that stuff like that (level 3) ". Objective:  Pt was seen today for dysphagia therapy. Current diet is dysphagia 3 dental soft with thin liquids. Pt was on room air. Focus of today's session was determine potential for diet texture advancement. Textures offered today included hard solid. Swallow function:   Bolus retrieval, mastication and breakdown was/were slow and efficient. Noted R sided lingual and mild R sided buccal cavity residue. The pt was able to use a lingual sweep to clear. Mild anterior loss occurred intermittently with the solids as well. Amelia Inch Pharyngeal swallow initiation was present and timely. Hyolaryngeal excursion was adequate. No s/s aspiration occurred during session today. SLP provided min cueing/feedback throughout session. Pt was responsive to cueing. Assessment:  Swallow function is improving with current diet. Diet texture advancement is recommended at this time. Plan:  Change diet texture to regular and thin liquids. Continue ST follow up. Subsequent sessions to focus on maximizing PO intake safety and improving use of strategies to minimize risk of aspiration.   Pt will need set up, checking of R side for retention  Pt is able to use a lingual sweep to clear the material.

## 2023-08-17 NOTE — ASSESSMENT & PLAN NOTE
History of hypertension.  -Was previously on telmisartan and atenolol but hypertension was suboptimally controlled  -Started on amlodipine 10mg daily;   Losartan 100mg daily; hydralazine 25mg q8h

## 2023-08-17 NOTE — PLAN OF CARE
Problem: OCCUPATIONAL THERAPY ADULT  Goal: Performs self-care activities at highest level of function for planned discharge setting. See evaluation for individualized goals. Description: Treatment Interventions: ADL retraining, Functional transfer training, UE strengthening/ROM, Endurance training, Cognitive reorientation, Patient/family training, Equipment evaluation/education, Neuromuscular reeducation, Fine motor coordination activities, Compensatory technique education, Activityengagement          See flowsheet documentation for full assessment, interventions and recommendations. Outcome: Progressing  Note: Limitation: Decreased ADL status, Decreased UE ROM, Decreased UE strength, Decreased Safe judgement during ADL, Decreased endurance, Decreased fine motor control, Decreased self-care trans, Decreased high-level ADLs, Non-func R UE  Prognosis: Good  Assessment: Pt seen for Occupational Therapy session with focus on activity tolerance, bed mob, functional transfers/mob, sitting balance and tolerance and standing tolerance and balance for pt engagement in UB/LB self-care tasks and AAROM/PROM to pt R UE for decreased swelling and increased function. Pt cleared by RN/Maricel for pt participated in OT session. Pt presented supine/HOB raised pt awake/alert upon greeting pt. Pt agreeable to participate in therapy following pt identifiers confirmed. Pt did not report a therapy goal this session however was pleasant and cooperative with all therapy requests. Pt required assist for bed mob, functional transfers/stand pivot transfers to bedside chair  2* decreased overall strength, coordination, balance and pt R UE hemiparalysis. She was able to tolerate sitting at edge of pt for engagement in grooming tasks/combing her tangled hair. Pt will require post acute rehab service to continue to address these above noted pt deficit which currently impair pt ADL and functional mob.  Pt set up to bedside chair post session, chair alarm activated and all needs within pt reach     OT Discharge Recommendation: Post acute rehabilitation services

## 2023-08-17 NOTE — OCCUPATIONAL THERAPY NOTE
Occupational Therapy Treatment Note       08/17/23 1430   OT Last Visit   OT Visit Date 08/17/23   Note Type   Note Type Treatment   Pain Assessment   Pain Assessment Tool 0-10   Pain Score No Pain   Restrictions/Precautions   Weight Bearing Precautions Per Order No   Braces or Orthoses   (recommend sling for future transfers due to LUE flaccidity)   Lifestyle   Autonomy I adls and mobiltiy-  i iadls - shares homemaking with spouse   Reciprocal Relationships supportive spouse, family and friends   Service to Others works FT as  at [employer] active pta   ADL   Where Assessed Edge of bed   Grooming Assistance 3  Moderate Assistance   Grooming Deficit Brushing hair   UB Bathing Assistance 3  Moderate Assistance   LB Bathing Assistance 2  Maximal Assistance   UB Dressing Assistance 2  Maximal Assistance   LB Dressing Assistance 2  Maximal Assistance   Bed Mobility   Supine to Sit 3  Moderate assistance   Additional items Assist x 2   Transfers   Sit to Stand 2  Maximal assistance   Additional items Assist x 2   Stand to Sit 2  Maximal assistance   Additional items Assist x 2   Stand pivot 2  Maximal assistance   Additional items Assist x 2   Subjective   Subjective pt stated "I would like to" sit in bedside chair   Cognition   Overall Cognitive Status [facility]   Arousal/Participation Cooperative   Attention Attends with cues to redirect   Orientation Level Oriented to person;Oriented to time;Oriented to situation  (general place)   Memory Decreased recall of precautions;Decreased recall of recent events   Following Commands Follows one step commands with increased time or repetition   Activity Tolerance   Activity Tolerance Patient tolerated treatment well   Assessment   Assessment Pt seen for Occupational Therapy session with focus on activity tolerance, bed mob, functional transfers/mob, sitting balance and tolerance and standing tolerance and balance for pt engagement in UB/LB self-care tasks and AAROM/PROM to pt R UE for decreased swelling and increased function. Pt cleared by RN/Maricel for pt participated in OT session. Pt presented supine/HOB raised pt awake/alert upon greeting pt. Pt agreeable to participate in therapy following pt identifiers confirmed. Pt did not report a therapy goal this session however was pleasant and cooperative with all therapy requests. Pt required assist for bed mob, functional transfers/stand pivot transfers to bedside chair  2* decreased overall strength, coordination, balance and pt R UE hemiparalysis. She was able to tolerate sitting at edge of pt for engagement in grooming tasks/combing her tangled hair. Pt will require post acute rehab service to continue to address these above noted pt deficit which currently impair pt ADL and functional mob. Pt set up to bedside chair post session, chair alarm activated and all needs within pt reach   Plan   Treatment Interventions ADL retraining;Functional transfer training; Endurance training;Patient/family training; Activityengagement   Goal Expiration Date 08/29/23   OT Treatment Day 1   OT Frequency 3-5x/wk   Recommendation   OT Discharge Recommendation Post acute rehabilitation services   AM-PAC Daily Activity Inpatient   Lower Body Dressing 2   Bathing 2   Toileting 2   Upper Body Dressing 2   Grooming 3   Eating 3   Daily Activity Raw Score 14   Daily Activity Standardized Score (Calc for Raw Score >=11) 33.39   AM-PAC Applied Cognition Inpatient   Following a Speech/Presentation 3   Understanding Ordinary Conversation 4   Taking Medications 3   Remembering Where Things Are Placed or Put Away 3   Remembering List of 4-5 Errands 3   Taking Care of Complicated Tasks 3   Applied Cognition Raw Score 19   Applied Cognition Standardized Score 39.77   Barthel Index   Grooming Score 0   Dressing Score 0   Toilet Use Score 5   Transfers (Bed/Chair) Score 5   Mobility (Level Surface) Score 0       Shanel CUNHA/BEKAH

## 2023-08-17 NOTE — PHYSICAL THERAPY NOTE
PHYSICAL THERAPY NOTE          Patient Name: Rama Witt  IVDSH'O Date: 8/17/2023 08/17/23 1433   PT Last Visit   PT Visit Date 08/17/23   Note Type   Note Type Treatment   Pain Assessment   Pain Assessment Tool 0-10   Pain Score No Pain   Restrictions/Precautions   Weight Bearing Precautions Per Order No   Braces or Orthoses   (recommend sling for future transfers due to LUE flaccidity)   Other Precautions Chair Alarm; Bed Alarm; Restraints;Multiple lines;Telemetry; Fall Risk  (L hemiparesis, expressive aphasic/dysarthric)   General   Chart Reviewed Yes   Response to Previous Treatment Patient with no complaints from previous session. Family/Caregiver Present No   Cognition   Overall Cognitive Status WFL   Arousal/Participation Cooperative   Attention Attends with cues to redirect   Orientation Level Oriented to person;Oriented to time;Oriented to situation  (general place)   Following Commands Follows one step commands with increased time or repetition   Comments very pleasant to work with   Subjective   Subjective agreeable   Bed Mobility   Supine to Sit 3  Moderate assistance   Additional items Assist x 2; Increased time required;Verbal cues;LE management;HOB elevated   Sit to Supine Unable to assess   Additional Comments remained seated in chair with alarm upon conclusion   Transfers   Sit to Stand 2  Maximal assistance   Additional items Assist x 2; Increased time required;Verbal cues   Stand to Sit 2  Maximal assistance   Additional items Assist x 2; Increased time required;Verbal cues   Stand pivot 2  Maximal assistance   Additional items Assist x 2; Increased time required;Verbal cues  (recommend drop arm)   Additional Comments L HHA and 2nd person anterior with R UE supported and R knee block; x2 STS throughout   Ambulation/Elevation   Gait pattern Not appropriate   Balance   Static Sitting Poor +   Dynamic Sitting Poor Static Standing Poor -   Endurance Deficit   Endurance Deficit Yes   Endurance Deficit Description R hemiparesis, fatigue   Activity Tolerance   Activity Tolerance Patient limited by fatigue   Medical Staff Made Aware co-tx with Rickey Houston due to medical complexity and decreased activity tolerance   Nurse Made Aware yes-cleared to mobilize-made aware to utilize smooth  to return BTB   Exercises   Neuro re-ed static sitting EOB (minAx1) progressing to reaching with LUE towards L to grasp object and place on bed behind to L (L trunk rotation) x3 reps and reverse x3 reps with modAx1. Same reaching activity except more anterior x3 reps and reverse x3 reps. Assessment   Prognosis Guarded   Problem List Decreased strength;Decreased endurance;Decreased mobility; Impaired balance;Decreased cognition; Impaired judgement;Decreased safety awareness; Impaired sensation; Impaired tone;Obesity   Assessment Pt agreeable to participate in PT session. Pt performed functional mobility and therex as outlined above. Pt with R lean in sitting requiring VC/TC for midline orientation with good carry over. R lean reoccurs with fatigue. Reaching with LUE L and anterior to promote WS away from R side and rotation L to strengthen abdominal muscles as pt demonstrates R rotation. RUE protected throughout and pt would benefit from sling for future transfers. Educated pt on mental imagery to promote motor return and promote neuroplasticity. Pt left seated in chair with chair alarm, call bell, phone, and all personal needs within reach. Pt will continue to benefit from skilled acute care PT to further address their functional mobility limitations. D/C recommendations remain rehab. Barriers to Discharge Inaccessible home environment;Decreased caregiver support   Goals   Patient Goals to improve   STG Expiration Date 08/27/23   PT Treatment Day 1   Plan   Treatment/Interventions Functional transfer training;LE strengthening/ROM; Therapeutic exercise; Endurance training;Cognitive reorientation;Patient/family training;Equipment eval/education; Bed mobility;Spoke to nursing;Spoke to case management;OT   Progress Progressing toward goals   PT Frequency 3-5x/wk   Recommendation   PT Discharge Recommendation Post acute rehabilitation services   Equipment Recommended   (TBD)   AM-PAC Basic Mobility Inpatient   Turning in Flat Bed Without Bedrails 2   Lying on Back to Sitting on Edge of Flat Bed Without Bedrails 1   Moving Bed to Chair 1   Standing Up From Chair Using Arms 1   Walk in Room 1   Climb 3-5 Stairs With Railing 1   Basic Mobility Inpatient Raw Score 7   Turning Head Towards Sound 3   Follow Simple Instructions 3   Low Function Basic Mobility Raw Score  13   Low Function Basic Mobility Standardized Score  20.14   Highest Level Of Mobility   JH-HLM Goal 2: Bed activities/Dependent transfer   JH-HLM Achieved 4: Move to chair/commode   Amaury Carpio, PT, DPT

## 2023-08-17 NOTE — ASSESSMENT & PLAN NOTE
62 YOF with history of HTN, DM2, Sjogren's, fibromyalgia initially presented to [facility] as stroke alert on 8/14/23 with initial presenting symptoms acute onset of right sided weakness, facial asymmetry, and dysarthria while at work. Initial imaging with CTH/CTA HN revealed left thalamocapsular bleed with no significant shift or mass effect. . NIHSS 14. Not TNK candidate given acute ICH. Pt given IV labetalol, started on Cardene drip, administered DDAVP for ASA reversal and transferred to [facility] for NSg care.    -Was seen by NSGY, no surgical intervention at this time  -Neurology following  -Frequent neuro checks  -SBP goal < 160 mmHg, preferably < 140 mmHg  -CT head was done yesterday for change in exam and worsening and waxing slurred speech which was reviewed by neuro, the images which does show stable left sided hemorrhage without any midline shift.   -Recommend repeat CT head in 48hrs   -PT/OT following, recommended post acute rehab with PMR following  -TTE performed  -Holding DVt prophylaxis and ASA for now  -Diet restarted- cleared for regular with thin liquids   -Stat CTH for drop in GCS score of 2 points or more in > 1 hour

## 2023-08-17 NOTE — ASSESSMENT & PLAN NOTE
History of CAD based on prior CTA imaging.  -On statin  -Holding ASA for now due to [address]  -Holding beta blockade due to episodes of bradycardia

## 2023-08-17 NOTE — PROGRESS NOTES
NEUROLOGY RESIDENCY PROGRESS NOTE   Name: Agusto Rain   Age & Sex: 62 y.o. female   MRN: [id number]  Unit/Bed#: [facility] 713-01   Encounter: [id number]  Recommendations for outpatient neurological follow up have yet to be determined. Pending for discharge: [address] [facility]  [address]   ICH (intracerebral hemorrhage) ([address])  62 YOF with history of HTN, DM2, Sjogren's, fibromyalgia initially presented to [address] [place] [address] as stroke alert on 8/14/23 with initial presenting symptoms acute onset of right sided weakness, facial asymmetry, and dysarthria while at work. Initial imaging with CTH/CTA HN revealed left thalamocapsular bleed with no significant shift or mass effect. . NIHSS 14. Not TNK candidate given acute ICH. Pt given IV labetalol, started on Cardene drip, administered DDAVP for ASA reversal and transferred to [facility] for NSg care. - Initial /97  - AC/AP prior to admission: ASA 81 QD, s/p reversal with DDAVP   - Neuro History: No prior stroke history  - Vascular risk factors: former smoker, elevated cholesterol, diabetes and weight    NIHSS: At time of stroke alert at [address] [place] [address] NIHSS 14,  at arrival to [facility] NIHSS 10  Modified Kamari Score: 0 No baseline symptoms/disability  ICH Score: 0   FUNC Score: 10    WORK UP:  - CTH: Acute parenchymal hematoma (aprox 5.4 mL) in L posterior striatocapsular region extending into left thalamus with mild surrounding vasogenic edema  - CTA H/N: No LVO, dissection, AVM, aneurysm, or high grade stenosis  - CTH 6HR: Stable hemorrhage and surrounding vasogenic edema in L corona radiata, posterior limb of internal capsule, and thalamus  - CTH 8/17:  Evolving acute L corona radiata intraparenchymal hemorrhage, stable in size. Minimally increased regional vasogenic edema.  No shift.  - TTE: EF 65%, LA/RA normal size  - Labs: HbgA1c 6.7, LDL 14,B12 221 KIRSTY-    IMPRESSION: Left thalamocapsular ICH likely 2/2 HTN     PLAN:  - Frequent neuro checks per protocol  - BP Goals: SBP < 140  - CTH The amount of tablets that can be taken for acute type issues is 3 [facility] Script directions need to be changed to TID Norma Ogden - pharmacist at [facility]    overnight can hold off on scheduled CTH today given stability  - Clear to start DVT PPx today ()  - Recommend repeat CTH in 2 days and if stable can restart home ASA  - PT/OT/ST/PMR  - CM Consulted for disposition needs  - Stroke education and counseling  - NSg signed off given no need for surgical interventions   - Rest of Care per Primary    SUBJECTIVE   Patient transferred out of the ICU yesterday moved to the medical floor. Blood pressures in the last 24 hours suboptimal with recorded max . Aspirin & DVT prophylaxis still held. Overnight had episode of aphasia at approximately 2 AM reported by nursing staff. Neurology resident notified evaluated patient at bedside which mild aphasia was appreciated, patient was reporting that she had difficulties getting her words out. Stat CT head was obtained which revealed evolving acute left corona radiata intraparenchymal hemorrhage, stable in size. Did reveal minimally increased regional visual genic edema no evidence of midline shift. Patient seen and evaluated this morning at bedside. Patient reports overall feels okay. When questioned about the episode of difficulty with her speech overnight states that she has been having waxing and waning episodes of aphasia which she correlates with feeling tired occurring more frequently early in the morning and late at night. Denies any presence of headache, blurry vision, double vision, weakness in the arms or legs, or any new symptoms at this time. Does report groin pain. OBJECTIVE   Patient ID: Jericho Noguera is a 62 y.o. female.   Vitals:    23 0523 23 0600 23 0630 23 0703   BP: 168/77  143/65    BP Location:       Pulse:   60    Resp:    18   Temp:    98.2 °F (36.8 °C)   TempSrc:       SpO2:       Weight:  95.5 kg (210 lb 8.6 oz)     Height:          Temperature:   Temp (24hrs), Av.5 °F (36.9 °C), Min:97.7 °F (36.5 °C), Max:99.3 °F (37.4 °C)    Temperature: 98.2 °F (36.8 °C)    Physical Exam:  General: Does not appear to be in any acute distress, obese  Head: Normocephalic atraumatic  Cardiac: Regular rhythm, regular rate  Respiratory: Breathing unlabored, not in respiratory distress    Neurological exam:  Mental status: Alert and oriented to person place month year but not day. Follows simple three-step commands. Speech no aphasia, non dysarthric. Follows three-step commands. CN: II: visual acuity normal II: visual field normal, II: pupils equal, round, reactive to light and accommodation, III,VII: ptosis on the left, III,IV,VI: extraocular muscles extra-ocular motions intact, V: facial light touch sensation reduced on the right, VII: R Facial Droop, XI: sternocleidomastoid strength huseyin XII: tongue strength deviated right  Motor: Reduced tone in the right upper and right lower extremity. Normal tone of the left. Left Upper and Left Lower extremirty 5/5 strength. 0/5 strength in RUE/RLE  Sensory: Decreased sensation of the right face, right arm, and right leg. Reflexes: DTRs 2+ throughout, Babinski Up going on R, Equivocal on L  Coordination: No dysmetria with finger to nose and heel to shin on the left. Gait: Deferred     LABORATORY DATA   Labs: I have personally reviewed pertinent reports.     Results from last 7 days   Lab Units 08/16/23  0523 08/15/23  0434 08/14/23  1714   WBC Thousand/uL 8.94 10.98* 11.26*   HEMOGLOBIN g/dL 10.4* 9.7* 10.9*   HEMATOCRIT % 32.5* 30.9* 34.5*   PLATELETS Thousands/uL 326 302 353   NEUTROS PCT %  --  68 49   MONOS PCT %  --  8 9   EOS PCT %  --  1 7*      Results from last 7 days   Lab Units 08/16/23  0523 08/15/23  0434 08/14/23  1714   SODIUM mmol/L 132* 139 136   POTASSIUM mmol/L 3.5 3.4* 3.8   CHLORIDE mmol/L 102 105 100   CO2 mmol/L 25 27 28   BUN mg/dL 9 13 17   CREATININE mg/dL 0.53* 0.58* 0.71   CALCIUM mg/dL 8.7 8.5 9.2   ALK PHOS U/L  --   --  70   ALT U/L  --   --  15   AST U/L  --   --  17     Results from last 7 days   Lab Units 08/16/23  0523 08/15/23  0434 08/14/23  1714   MAGNESIUM mg/dL 2.1 3.0* 1.5*     Results from last 7 days   Lab Units 08/16/23  0523 08/15/23  0434 08/14/23  1714   PHOSPHORUS mg/dL 2.9 3.0 4.3      Results from last 7 days   Lab Units 08/15/23  0434 08/14/23  1714   INR  1.06 0.94   PTT seconds 28 27     IMAGING & DIAGNOSTIC TESTING     Radiology Results: I have personally reviewed pertinent reports. CT head wo contrast   Final Result by Saúl Starr DO (08/17 0208)      Evolving acute left corona radiata intraparenchymal hemorrhage, stable in size. Minimally increased regional vasogenic edema as detailed above. No midline shift. This study was marked for "Immediate" notification in EPIC. Workstation performed: [id number]         VAS lower limb venous duplex study, complete bilateral   Final Result by Bria Arnold MD (08/16 4699)      MRI follow up   Final Result by Suzie Stinson MD (08/16 1629)      1. Stable left thalamocapsular intraparenchymal hemorrhage with surrounding edema. 2. No acute infarction or mass effect. Workstation performed: [id number]         CT head wo contrast   Final Result by Jcarlos Blevins MD (08/15 7806)      Stable hemorrhage and surrounding vasogenic edema in the left corona radiata, posterior limb of the internal capsule and thalamus. Workstation performed: [id number]         CT head wo contrast    (Results Pending)   XR hips bilateral 3-4 vw w pelvis if performed    (Results Pending)       Other Diagnostic Testing: I have personally reviewed pertinent reports.     ACTIVE MEDICATIONS     Current Facility-Administered Medications   Medication Dose Route Frequency   • amLODIPine (NORVASC) tablet 10 mg  10 mg Oral Daily   • atorvastatin (LIPITOR) tablet 20 mg  20 mg Oral Daily With Dinner   • bisacodyl (DULCOLAX) EC tablet 5 mg  5 mg Oral Daily PRN   • buPROPion (WELLBUTRIN XL) 24 hr tablet 150 mg  150 mg Oral QAM   • chlorhexidine (PERIDEX) 0.12 % oral rinse 15 mL  15 mL Mouth/Throat Q12H [address]   • cyanocobalamin (VITAMIN B-12) tablet 1,000 mcg  1,000 mcg Oral Daily   • docusate sodium (COLACE) capsule 100 mg  100 mg Oral BID   • ferrous sulfate tablet 325 mg  325 mg Oral Daily With Breakfast   • fluticasone-vilanterol 200-25 mcg/actuation 1 puff  1 puff Inhalation Daily   • gabapentin (NEURONTIN) capsule 300 mg  300 mg Oral TID   • hydrALAZINE (APRESOLINE) injection 5 mg  5 mg Intravenous Q6H PRN   • hydrALAZINE (APRESOLINE) tablet 25 mg  25 mg Oral Q8H [address]   • HYDROmorphone HCl (DILAUDID) injection 0.2 mg  0.2 mg Intravenous Q4H PRN   • insulin lispro (HumaLOG) 100 units/mL subcutaneous injection 1-6 Units  1-6 Units Subcutaneous TID With Meals   • losartan (COZAAR) tablet 100 mg  100 mg Oral Daily   • methocarbamol (ROBAXIN) tablet 500 mg  500 mg Oral Q6H PRN   • ondansetron (ZOFRAN) injection 4 mg  4 mg Intravenous Q4H PRN   • pantoprazole (PROTONIX) injection 40 mg  40 mg Intravenous Q24H [place]   • prochlorperazine (COMPAZINE) tablet 5 mg  5 mg Oral Q6H PRN     Prior to Admission medications    Medication Sig Start Date End Date Taking? Authorizing Provider   albuterol (2.5 mg/3 mL) 0.083 % nebulizer solution Take 3 mL (2.5 mg total) by nebulization every 4 (four) hours as needed for wheezing or shortness of breath 4/8/23   Nargis Espinoza MD   albuterol (Ventolin HFA) 90 mcg/act inhaler Inhale 2 puffs every 6 (six) hours as needed for wheezing 8/4/23   ARSENIO Patricia   Aspirin Low Dose 81 MG EC tablet Take 1 tablet by mouth once daily 8/4/23   Nargis Espinoza MD   atenolol (TENORMIN) 50 mg tablet Take 1 tablet (50 mg total) by mouth daily 8/4/23   ARSENIO Jade   buPROPion (WELLBUTRIN XL) 300 mg 24 hr tablet Take 1 tablet (300 mg total) by mouth every morning 7/26/23 7/20/24  ARSENIO Jade   fluticasone-vilanterol (Breo Ellipta) 200-25 mcg/actuation inhaler Inhale 1 puff daily Rinse mouth after use.  8/4/23 11/2/23  ARSENIO Lantigua   gabapentin (Neurontin) 300 mg capsule Take 1 capsule (300 mg total) by mouth 3 (three) times a day 7/26/23   ARSENIO Lantigua   metFORMIN (GLUCOPHAGE) 1000 MG tablet Take 1 tablet (1,000 mg total) by mouth 2 (two) times a day with meals 7/26/23 8/25/23  ARSENIO Lantigua   naproxen (NAPROSYN) 500 mg tablet Take 0.5 tablets (250 mg total) by mouth 2 (two) times a day with meals  Patient not taking: Reported on 7/26/2023 4/3/23   Harsh R Chevak NunezDO   rosuvastatin (CRESTOR) 10 MG tablet Take 1 tablet (10 mg total) by mouth daily 7/26/23   ARSENIO Lantigua   telmisartan (MICARDIS) 80 MG tablet Take 1 tablet (80 mg total) by mouth daily 7/26/23   ARSENIO Lantigua   VTE Pharmacologic Prophylaxis: Pharmacologic VTE Prophylaxis contraindicated due to ACUTE ICH  VTE Mechanical Prophylaxis: sequential compression device  ==  DO Radha Tobar [facility] Res[facility], PGY-2

## 2023-08-17 NOTE — RESTORATIVE TECHNICIAN NOTE
Restorative Technician Note      Patient Name: Stefania Santillan     Note Type: Mobility  Patient Position Upon Consult: Supine  Activity Performed: Repositioned  Education Provided: Yes  Patient Position at End of Consult: Supine;  All needs within reach; Bed/Chair alarm activated    Cindi GEE, Restorative Technician, [employer]

## 2023-08-17 NOTE — PLAN OF CARE
Problem: MOBILITY - ADULT  Goal: Maintain or return to baseline ADL function  Description: INTERVENTIONS:  -  Assess patient's ability to carry out ADLs; assess patient's baseline for ADL function and identify physical deficits which impact ability to perform ADLs (bathing, care of mouth/teeth, toileting, grooming, dressing, etc.)  - Assess/evaluate cause of self-care deficits   - Assess range of motion  - Assess patient's mobility; develop plan if impaired  - Assess patient's need for assistive devices and provide as appropriate  - Encourage maximum independence but intervene and supervise when necessary  - Involve family in performance of ADLs  - Assess for home care needs following discharge   - Consider OT consult to assist with ADL evaluation and planning for discharge  - Provide patient education as appropriate  Outcome: Progressing  Goal: Maintains/Returns to pre admission functional level  Description: INTERVENTIONS:  - Perform BMAT or MOVE assessment daily.   - Set and communicate daily mobility goal to care team and patient/family/caregiver. - Collaborate with rehabilitation services on mobility goals if consulted  - Perform Range of Motion 3 times a day. - Reposition patient every 2 hours.     - Out of bed for toileting  - Record patient progress and toleration of activity level   Outcome: Progressing     Problem: PAIN - ADULT  Goal: Verbalizes/displays adequate comfort level or baseline comfort level  Description: Interventions:  - Encourage patient to monitor pain and request assistance  - Assess pain using appropriate pain scale  - Administer analgesics based on type and severity of pain and evaluate response  - Implement non-pharmacological measures as appropriate and evaluate response  - Consider cultural and social influences on pain and pain management  - Notify physician/advanced practitioner if interventions unsuccessful or patient reports new pain  Outcome: Progressing     Problem: INFECTION - ADULT  Goal: Absence or prevention of progression during hospitalization  Description: INTERVENTIONS:  - Assess and monitor for signs and symptoms of infection  - Monitor lab/diagnostic results  - Monitor all insertion sites, i.e. indwelling lines, tubes, and drains  - Monitor endotracheal if appropriate and nasal secretions for changes in amount and color  - [place] appropriate cooling/warming therapies per order  - Administer medications as ordered  - Instruct and encourage patient and family to use good hand hygiene technique  - Identify and instruct in appropriate isolation precautions for identified infection/condition  Outcome: Progressing  Goal: Absence of fever/infection during neutropenic period  Description: INTERVENTIONS:  - Monitor WBC    Outcome: Progressing     Problem: SAFETY ADULT  Goal: Maintain or return to baseline ADL function  Description: INTERVENTIONS:  -  Assess patient's ability to carry out ADLs; assess patient's baseline for ADL function and identify physical deficits which impact ability to perform ADLs (bathing, care of mouth/teeth, toileting, grooming, dressing, etc.)  - Assess/evaluate cause of self-care deficits   - Assess range of motion  - Assess patient's mobility; develop plan if impaired  - Assess patient's need for assistive devices and provide as appropriate  - Encourage maximum independence but intervene and supervise when necessary  - Involve family in performance of ADLs  - Assess for home care needs following discharge   - Consider OT consult to assist with ADL evaluation and planning for discharge  - Provide patient education as appropriate  Outcome: Progressing  Goal: Maintains/Returns to pre admission functional level  Description: INTERVENTIONS:  - Perform BMAT or MOVE assessment daily.   - Set and communicate daily mobility goal to care team and patient/family/caregiver.    - Collaborate with rehabilitation services on mobility goals if consulted  - Perform Range of Motion 3 times a day. - Reposition patient every 2 hours. - Record patient progress and toleration of activity level   Outcome: Progressing  Goal: Patient will remain free of falls  Description: INTERVENTIONS:  - Educate patient/family on patient safety including physical limitations  - Instruct patient to call for assistance with activity   - Consult OT/PT to assist with strengthening/mobility   - Keep Call bell within reach  - Keep bed low and locked with side rails adjusted as appropriate  - Keep care items and personal belongings within reach  - Initiate and maintain comfort rounds  - Make Fall Risk Sign visible to staff  - Offer Toileting every 2 Hours, in advance of need  - Initiate/Maintain bed alarm    - Apply yellow socks and bracelet for high fall risk patients  - Consider moving patient to room near nurses station  Outcome: Progressing     Problem: DISCHARGE PLANNING  Goal: Discharge to home or other facility with appropriate resources  Description: INTERVENTIONS:  - Identify barriers to discharge w/patient and caregiver  - Arrange for needed discharge resources and transportation as appropriate  - Identify discharge learning needs (meds, wound care, etc.)  - Arrange for interpretive services to assist at discharge as needed  - Refer to Case Management Department for coordinating discharge planning if the patient needs post-hospital services based on physician/advanced practitioner order or complex needs related to functional status, cognitive ability, or social support system  Outcome: Progressing     Problem: Knowledge Deficit  Goal: Patient/family/caregiver demonstrates understanding of disease process, treatment plan, medications, and discharge instructions  Description: Complete learning assessment and assess knowledge base.   Interventions:  - Provide teaching at level of understanding  - Provide teaching via preferred learning methods  Outcome: Progressing     Problem: Nutrition/Hydration-ADULT  Goal: Nutrient/Hydration intake appropriate for improving, restoring or maintaining nutritional needs  Description: Monitor and assess patient's nutrition/hydration status for malnutrition. Collaborate with interdisciplinary team and initiate plan and interventions as ordered. Monitor patient's weight and dietary intake as ordered or per policy. Utilize nutrition screening tool and intervene as necessary. Determine patient's food preferences and provide high-protein, high-caloric foods as appropriate.      INTERVENTIONS:  - Monitor oral intake, urinary output, labs, and treatment plans  - Assess nutrition and hydration status and recommend course of action  - Evaluate amount of meals eaten  - Assist patient with eating if necessary   - Allow adequate time for meals  - Recommend/ encourage appropriate diets, oral nutritional supplements, and vitamin/mineral supplements  - Order, calculate, and assess calorie counts as needed  - Recommend, monitor, and adjust tube feedings and TPN/PPN based on assessed needs  - Assess need for intravenous fluids  - Provide specific nutrition/hydration education as appropriate  - Include patient/family/caregiver in decisions related to nutrition  Outcome: Progressing     Problem: Prexisting or High Potential for Compromised Skin Integrity  Goal: Skin integrity is maintained or improved  Description: INTERVENTIONS:  - Identify patients at risk for skin breakdown  - Assess and monitor skin integrity  - Assess and monitor nutrition and hydration status  - Monitor labs   - Assess for incontinence   - Turn and reposition patient  - Assist with mobility/ambulation  - Relieve pressure over bony prominences  - Avoid friction and shearing  - Provide appropriate hygiene as needed including keeping skin clean and dry  - Evaluate need for skin moisturizer/barrier cream  - Collaborate with interdisciplinary team   - Patient/family teaching  - Consider wound care consult Outcome: Progressing     Problem: Neurological Deficit  Goal: Neurological status is stable or improving  Description: Interventions:  - Monitor and assess patient's level of consciousness, motor function, sensory function, and level of assistance needed for ADLs. - Monitor and report changes from baseline. Collaborate with interdisciplinary team to initiate plan and implement interventions as ordered. - Provide and maintain a safe environment. - Consider seizure precautions. - Consider fall precautions. - Consider aspiration precautions. - Consider bleeding precautions. Outcome: Progressing     Problem: Activity Intolerance/Impaired Mobility  Goal: Mobility/activity is maintained at optimum level for patient  Description: Interventions:  - Assess and monitor patient  barriers to mobility and need for assistive/adaptive devices. - Assess patient's emotional response to limitations. - Collaborate with interdisciplinary team and initiate plans and interventions as ordered. - Encourage independent activity per ability.  - Maintain proper body alignment. - Perform active/passive rom as tolerated/ordered. - Plan activities to conserve energy.  - Turn patient as appropriate  Outcome: Progressing     Problem: Communication Impairment  Goal: Ability to express needs and understand communication  Description: Assess patient's communication skills and ability to understand information. Patient will demonstrate use of effective communication techniques, alternative methods of communication and understanding even if not able to speak. - Encourage communication and provide alternate methods of communication as needed. - Collaborate with case management/ for discharge needs. - Include patient/family/caregiver in decisions related to communication.   Outcome: Progressing     Problem: Potential for Aspiration  Goal: Non-ventilated patient's risk of aspiration is minimized  Description: Assess and monitor vital signs, respiratory status, and labs (WBC). Monitor for signs of aspiration (tachypnea, cough, rales, wheezing, cyanosis, fever). - Assess and monitor patient's ability to swallow. - Place patient up in chair to eat if possible. - HOB up at 90 degrees to eat if unable to get patient up into chair.  - Supervise patient during oral intake. - Instruct patient/ family to take small bites. - Instruct patient/ family to take small single sips when taking liquids. - Follow patient-specific strategies generated by speech pathologist.  Outcome: Progressing  Goal: Ventilated patient's risk of aspiration is minimized  Description: Assess and monitor vital signs, respiratory status, airway cuff pressure, and labs (WBC). Monitor for signs of aspiration (tachypnea, cough, rales, wheezing, cyanosis, fever). - Elevate head of bed 30 degrees if patient has tube feeding.  - Monitor tube feeding. Outcome: Progressing     Problem: Nutrition  Goal: Nutrition/Hydration status is improving  Description: Monitor and assess patient's nutrition/hydration status for malnutrition (ex- brittle hair, bruises, dry skin, pale skin and conjunctiva, muscle wasting, smooth red tongue, and disorientation). Collaborate with interdisciplinary team and initiate plan and interventions as ordered. Monitor patient's weight and dietary intake as ordered or per policy. Utilize nutrition screening tool and intervene per policy. Determine patient's food preferences and provide high-protein, high-caloric foods as appropriate. - Assist patient with eating.  - Allow adequate time for meals.  - Encourage patient to take dietary supplement as ordered. - Collaborate with clinical nutritionist.  - Include patient/family/caregiver in decisions related to nutrition.   Outcome: Progressing

## 2023-08-18 ENCOUNTER — DOCUMENTATION (OUTPATIENT)
Dept: NEUROLOGY | Facility: CLINIC | Age: 58
End: 2023-08-18

## 2023-08-18 ENCOUNTER — APPOINTMENT (INPATIENT)
Dept: RADIOLOGY | Facility: HOSPITAL | Age: 58
DRG: 064 | End: 2023-08-18
Payer: COMMERCIAL

## 2023-08-18 ENCOUNTER — APPOINTMENT (OUTPATIENT)
Dept: RADIOLOGY | Facility: HOSPITAL | Age: 58
DRG: 064 | End: 2023-08-18
Payer: COMMERCIAL

## 2023-08-18 LAB
ALBUMIN SERPL BCP-MCNC: 3.2 G/DL (ref 3.5–5)
ALP SERPL-CCNC: 80 U/L (ref 46–116)
ALT SERPL W P-5'-P-CCNC: 20 U/L (ref 12–78)
ANION GAP SERPL CALCULATED.3IONS-SCNC: 7 MMOL/L
AST SERPL W P-5'-P-CCNC: 14 U/L (ref 5–45)
BILIRUB SERPL-MCNC: 0.36 MG/DL (ref 0.2–1)
BUN SERPL-MCNC: 16 MG/DL (ref 5–25)
CALCIUM ALBUM COR SERPL-MCNC: 10.2 MG/DL (ref 8.3–10.1)
CALCIUM SERPL-MCNC: 9.6 MG/DL (ref 8.3–10.1)
CHLORIDE SERPL-SCNC: 105 MMOL/L (ref 96–108)
CO2 SERPL-SCNC: 25 MMOL/L (ref 21–32)
CREAT SERPL-MCNC: 0.92 MG/DL (ref 0.6–1.3)
DSDNA AB SER QL CLIF: NEGATIVE
ERYTHROCYTE [DISTWIDTH] IN BLOOD BY AUTOMATED COUNT: 15.7 % (ref 11.6–15.1)
GFR SERPL CREATININE-BSD FRML MDRD: 68 ML/MIN/1.73SQ M
GLUCOSE SERPL-MCNC: 143 MG/DL (ref 65–140)
GLUCOSE SERPL-MCNC: 154 MG/DL (ref 65–140)
GLUCOSE SERPL-MCNC: 161 MG/DL (ref 65–140)
GLUCOSE SERPL-MCNC: 165 MG/DL (ref 65–140)
GLUCOSE SERPL-MCNC: 184 MG/DL (ref 65–140)
HCT VFR BLD AUTO: 37.2 % (ref 34.8–46.1)
HGB BLD-MCNC: 11.9 G/DL (ref 11.5–15.4)
MCH RBC QN AUTO: 26.5 PG (ref 26.8–34.3)
MCHC RBC AUTO-ENTMCNC: 32 G/DL (ref 31.4–37.4)
MCV RBC AUTO: 83 FL (ref 82–98)
PLATELET # BLD AUTO: 355 THOUSANDS/UL (ref 149–390)
PMV BLD AUTO: 9 FL (ref 8.9–12.7)
POTASSIUM SERPL-SCNC: 4.3 MMOL/L (ref 3.5–5.3)
PROT SERPL-MCNC: 7.6 G/DL (ref 6.4–8.4)
RBC # BLD AUTO: 4.49 MILLION/UL (ref 3.81–5.12)
SODIUM SERPL-SCNC: 137 MMOL/L (ref 135–147)
WBC # BLD AUTO: 12.51 THOUSAND/UL (ref 4.31–10.16)

## 2023-08-18 PROCEDURE — 97112 NEUROMUSCULAR REEDUCATION: CPT

## 2023-08-18 PROCEDURE — 93005 ELECTROCARDIOGRAM TRACING: CPT

## 2023-08-18 PROCEDURE — 73030 X-RAY EXAM OF SHOULDER: CPT

## 2023-08-18 PROCEDURE — 82948 REAGENT STRIP/BLOOD GLUCOSE: CPT

## 2023-08-18 PROCEDURE — C9113 INJ PANTOPRAZOLE SODIUM, VIA: HCPCS | Performed by: INTERNAL MEDICINE

## 2023-08-18 PROCEDURE — 70450 CT HEAD/BRAIN W/O DYE: CPT

## 2023-08-18 PROCEDURE — NC001 PR NO CHARGE: Performed by: PHYSICIAN ASSISTANT

## 2023-08-18 PROCEDURE — 85027 COMPLETE CBC AUTOMATED: CPT | Performed by: NURSE PRACTITIONER

## 2023-08-18 PROCEDURE — 99232 SBSQ HOSP IP/OBS MODERATE 35: CPT | Performed by: NURSE PRACTITIONER

## 2023-08-18 PROCEDURE — 97535 SELF CARE MNGMENT TRAINING: CPT

## 2023-08-18 PROCEDURE — 97110 THERAPEUTIC EXERCISES: CPT

## 2023-08-18 PROCEDURE — 80053 COMPREHEN METABOLIC PANEL: CPT | Performed by: NURSE PRACTITIONER

## 2023-08-18 PROCEDURE — NC001 PR NO CHARGE: Performed by: EMERGENCY MEDICINE

## 2023-08-18 PROCEDURE — 72125 CT NECK SPINE W/O DYE: CPT

## 2023-08-18 RX ORDER — OXYCODONE HYDROCHLORIDE 5 MG/1
5 TABLET ORAL EVERY 4 HOURS PRN
Status: DISCONTINUED | OUTPATIENT
Start: 2023-08-18 | End: 2023-08-23

## 2023-08-18 RX ORDER — HYDROMORPHONE HCL IN WATER/PF 6 MG/30 ML
0.2 PATIENT CONTROLLED ANALGESIA SYRINGE INTRAVENOUS EVERY 4 HOURS PRN
Status: DISCONTINUED | OUTPATIENT
Start: 2023-08-18 | End: 2023-08-22

## 2023-08-18 RX ORDER — OXYCODONE HYDROCHLORIDE 10 MG/1
10 TABLET ORAL EVERY 4 HOURS PRN
Status: DISCONTINUED | OUTPATIENT
Start: 2023-08-18 | End: 2023-08-23

## 2023-08-18 RX ORDER — HEPARIN SODIUM 5000 [USP'U]/ML
5000 INJECTION, SOLUTION INTRAVENOUS; SUBCUTANEOUS EVERY 8 HOURS SCHEDULED
Status: DISCONTINUED | OUTPATIENT
Start: 2023-08-18 | End: 2023-08-25 | Stop reason: HOSPADM

## 2023-08-18 RX ADMIN — CHLORHEXIDINE GLUCONATE 15 ML: 1.2 SOLUTION ORAL at 08:04

## 2023-08-18 RX ADMIN — BUPROPION HYDROCHLORIDE 150 MG: 150 TABLET, FILM COATED, EXTENDED RELEASE ORAL at 08:05

## 2023-08-18 RX ADMIN — GABAPENTIN 300 MG: 300 CAPSULE ORAL at 08:04

## 2023-08-18 RX ADMIN — INSULIN LISPRO 1 UNITS: 100 INJECTION, SOLUTION INTRAVENOUS; SUBCUTANEOUS at 08:06

## 2023-08-18 RX ADMIN — PANTOPRAZOLE SODIUM 40 MG: 40 INJECTION, POWDER, FOR SOLUTION INTRAVENOUS at 08:04

## 2023-08-18 RX ADMIN — HYDRALAZINE HYDROCHLORIDE 25 MG: 25 TABLET, FILM COATED ORAL at 05:49

## 2023-08-18 RX ADMIN — AMLODIPINE BESYLATE 10 MG: 10 TABLET ORAL at 08:05

## 2023-08-18 RX ADMIN — HYDROMORPHONE HYDROCHLORIDE 0.2 MG: 0.2 INJECTION, SOLUTION INTRAMUSCULAR; INTRAVENOUS; SUBCUTANEOUS at 00:06

## 2023-08-18 RX ADMIN — INSULIN LISPRO 1 UNITS: 100 INJECTION, SOLUTION INTRAVENOUS; SUBCUTANEOUS at 11:20

## 2023-08-18 RX ADMIN — HYDROMORPHONE HYDROCHLORIDE 0.2 MG: 0.2 INJECTION, SOLUTION INTRAMUSCULAR; INTRAVENOUS; SUBCUTANEOUS at 15:12

## 2023-08-18 RX ADMIN — GABAPENTIN 300 MG: 300 CAPSULE ORAL at 15:12

## 2023-08-18 RX ADMIN — HYDRALAZINE HYDROCHLORIDE 25 MG: 25 TABLET, FILM COATED ORAL at 22:34

## 2023-08-18 RX ADMIN — FLUTICASONE FUROATE AND VILANTEROL TRIFENATATE 1 PUFF: 200; 25 POWDER RESPIRATORY (INHALATION) at 08:06

## 2023-08-18 RX ADMIN — HYDROMORPHONE HYDROCHLORIDE 0.2 MG: 0.2 INJECTION, SOLUTION INTRAMUSCULAR; INTRAVENOUS; SUBCUTANEOUS at 08:57

## 2023-08-18 RX ADMIN — DOCUSATE SODIUM 100 MG: 100 CAPSULE ORAL at 16:45

## 2023-08-18 RX ADMIN — LOSARTAN POTASSIUM 100 MG: 50 TABLET, FILM COATED ORAL at 08:05

## 2023-08-18 RX ADMIN — ATORVASTATIN CALCIUM 20 MG: 20 TABLET, FILM COATED ORAL at 16:45

## 2023-08-18 RX ADMIN — CYANOCOBALAMIN TAB 500 MCG 1000 MCG: 500 TAB at 08:05

## 2023-08-18 RX ADMIN — GABAPENTIN 300 MG: 300 CAPSULE ORAL at 22:34

## 2023-08-18 RX ADMIN — HYDRALAZINE HYDROCHLORIDE 25 MG: 25 TABLET, FILM COATED ORAL at 14:55

## 2023-08-18 RX ADMIN — FERROUS SULFATE TAB 325 MG (65 MG ELEMENTAL FE) 325 MG: 325 (65 FE) TAB at 08:05

## 2023-08-18 RX ADMIN — OXYCODONE HYDROCHLORIDE 10 MG: 10 TABLET ORAL at 16:44

## 2023-08-18 RX ADMIN — HEPARIN SODIUM 5000 UNITS: 5000 INJECTION INTRAVENOUS; SUBCUTANEOUS at 22:34

## 2023-08-18 RX ADMIN — DOCUSATE SODIUM 100 MG: 100 CAPSULE ORAL at 08:05

## 2023-08-18 RX ADMIN — HEPARIN SODIUM 5000 UNITS: 5000 INJECTION INTRAVENOUS; SUBCUTANEOUS at 14:55

## 2023-08-18 NOTE — PROGRESS NOTES
[facility]  Progress Note  Name: Rodney Carias  MRN: [id number]  Unit/Bed#: [facility] 627-95 I Date of Admission: 8/14/2023   Date of Service: 8/18/2023 I Hospital Day: 4    Assessment/Plan   * ICH (intracerebral hemorrhage) [facility])  Assessment & Plan  62 YOF with history of HTN, DM2, Sjogren's, fibromyalgia initially [facility] as stroke alert on 8/14/23 with initial presenting symptoms acute onset of right sided weakness, facial asymmetry, and dysarthria while at work. Initial imaging with CTH/CTA HN revealed left thalamocapsular bleed with no significant shift or mass effect. . NIHSS 14. Not TNK candidate given acute ICH. Pt given IV labetalol, started on Cardene drip, administered DDAVP for ASA reversal and transferred to [facility] for NSg care. -Was seen by NSGY, no surgical intervention at this time  -Neurology following  -neuro checks q4h  -SBP goal < 160 mmHg, preferably < 140 mmHg  -CT head was done yesterday for change in exam and worsening and waxing slurred speech which was reviewed by neuro, the images which does show stable left sided hemorrhage without any midline shift. -PT/OT following, recommended post acute rehab with PMR following  -TTE performed  -Holding DVt prophylaxis and ASA for now  -Diet restarted- cleared for regular with thin liquids   -Stat CTH for drop in GCS score of 2 points or more in > 1 hour    Anemia  Assessment & Plan  Anemia with baseline hemoglobin of 10-11. MCVs around 80s. Iron panel showing ferritin at 22 and TIBC of 370s which maybe indicative of iron deficiency anemia.  Also her B12 levels are low-normal.   -Ordered ferrous sulfate supplementation  -On vitamin B12 supplementation 1000 mg daily    Hyperlipidemia associated with type 2 diabetes mellitus ([address])  Assessment & Plan  · C/w statin     CAD (coronary artery disease)  Assessment & Plan  History of CAD based on prior CTA imaging.  -On statin  -Holding ASA for now due to ICH  -Holding beta blockade due to episodes of bradycardia    Sjogren's syndrome (HCC)  Assessment & Plan  History of sjogren syndrome.  -May benefit from outpatient rheumatology follow up from this regard    Anxiety  Assessment & Plan  History of anxiety  -Was on wellbutrin 300 mg daily, was reduced to 150 mg due to concern for medicaitons potential for lowering seizure threshold in this patient with ICH    Diabetes mellitus type 2, controlled [facility])  Assessment & Plan  Lab Results   Component Value Date    HGBA1C 6.7 (H) 08/15/2023       Recent Labs     08/17/23  1556 08/17/23  2037 08/18/23  0624 08/18/23  1043   POCGLU 160* 150* 154* 165*       Blood Sugar Average: Last 72 hrs:  (P) 155.5     · On SSI  · Holding home metformin  · Hypoglycemic protocol     Primary hypertension  Assessment & Plan  History of hypertension.  -Was previously on telmisartan and atenolol but hypertension was suboptimally controlled  -Started on amlodipine 10mg daily; Losartan 100mg daily; hydralazine 25mg q8h              VTE Pharmacologic Prophylaxis:   Moderate Risk (Score 3-4) - Pharmacological DVT Prophylaxis Contraindicated. Sequential Compression Devices Ordered. Patient Centered Rounds: I performed bedside rounds with nursing staff today. Discussions with Specialists or Other Care Team Provider: d/w RN d/w neurology d/w CM     Education and Discussions with Family / Patient: Attempted to update  () via phone. Left voicemail. Total Time Spent on Date of Encounter in care of patient: 35 minutes This time was spent on one or more of the following: performing physical exam; counseling and coordination of care; obtaining or reviewing history; documenting in the medical record; reviewing/ordering tests, medications or procedures; communicating with other healthcare professionals and discussing with patient's family/caregivers.     Current Length of Stay: 4 day(s)  Current Patient Status: Inpatient Certification Statement: The patient will continue to require additional inpatient hospital stay due to monitor neuro exams. Discharge Plan: Anticipate discharge in 24-48 hrs to rehab facility. Code Status: Level 1 - Full Code    Subjective:   Pt denies any complaints, no overnight events per patient and RN     Objective:     Vitals:   Temp (24hrs), Av.4 °F (36.9 °C), Min:98.1 °F (36.7 °C), Max:98.7 °F (37.1 °C)    Temp:  [98.1 °F (36.7 °C)-98.7 °F (37.1 °C)] 98.5 °F (36.9 °C)  HR:  [73-83] 83  Resp:  [16-27] 20  BP: (122-153)/(60-82) 131/69  SpO2:  [96 %] 96 %  Body mass index is 33.98 kg/m². Input and Output Summary (last 24 hours): Intake/Output Summary (Last 24 hours) at 2023 1451  Last data filed at 2023 0818  Gross per 24 hour   Intake --   Output 1800 ml   Net -1800 ml       Physical Exam:   Physical Exam  Vitals and nursing note reviewed. Constitutional:       General: She is not in acute distress. Appearance: She is obese. She is not ill-appearing. HENT:      Head: Normocephalic and atraumatic. Eyes:      General: No scleral icterus. Conjunctiva/sclera: Conjunctivae normal.      Pupils: Pupils are equal, round, and reactive to light. Cardiovascular:      Rate and Rhythm: Normal rate and regular rhythm. Heart sounds: Normal heart sounds. No murmur heard. Pulmonary:      Effort: Pulmonary effort is normal. No respiratory distress. Breath sounds: Normal breath sounds. No wheezing or rales. Abdominal:      General: Bowel sounds are normal. There is no distension. Palpations: Abdomen is soft. Tenderness: There is no abdominal tenderness. Musculoskeletal:         General: No swelling or tenderness. Skin:     General: Skin is warm and dry. Neurological:      Mental Status: She is alert. Cranial Nerves: Cranial nerve deficit present. Motor: Weakness present.       Comments: Right sided facial droop, tongue deviates to the right, right arm strength 0/5 right leg strength 1/5   Psychiatric:         Mood and Affect: Mood normal.        Additional Data:     Labs:  Results from last 7 days   Lab Units 08/18/23  0549 08/16/23  0523 08/15/23  0434   WBC Thousand/uL 12.51*   < > 10.98*   HEMOGLOBIN g/dL 11.9   < > 9.7*   HEMATOCRIT % 37.2   < > 30.9*   PLATELETS Thousands/uL 355   < > 302   NEUTROS PCT %  --   --  68   LYMPHS PCT %  --   --  22   MONOS PCT %  --   --  8   EOS PCT %  --   --  1    < > = values in this interval not displayed.      Results from last 7 days   Lab Units 08/18/23  0549   SODIUM mmol/L 137   POTASSIUM mmol/L 4.3   CHLORIDE mmol/L 105   CO2 mmol/L 25   BUN mg/dL 16   CREATININE mg/dL 0.92   ANION GAP mmol/L 7   CALCIUM mg/dL 9.6   ALBUMIN g/dL 3.2*   TOTAL BILIRUBIN mg/dL 0.36   ALK PHOS U/L 80   ALT U/L 20   AST U/L 14   GLUCOSE RANDOM mg/dL 161*     Results from last 7 days   Lab Units 08/15/23  0434   INR  1.06     Results from last 7 days   Lab Units 08/18/23  1043 08/18/23  0624 08/17/23  2037 08/17/23  1556 08/17/23  1117 08/17/23  0608 08/16/23  1719 08/16/23  1211 08/15/23  1752 08/15/23  1225 08/15/23  0545 08/15/23  0009   POC GLUCOSE mg/dl 165* 154* 150* 160* 141* 133 111 134 165* 175* 141* 237*     Results from last 7 days   Lab Units 08/15/23  0434   HEMOGLOBIN A1C % 6.7*           Lines/Drains:  Invasive Devices     Peripheral Intravenous Line  Duration           Peripheral IV 08/14/23 Left;Proximal;Ventral (anterior) Forearm 3 days    Peripheral IV 08/14/23 Right Antecubital 3 days          Drain  Duration           External Urinary Catheter 3 days                      Imaging: Reviewed radiology reports from this admission including: CT head    Recent Cultures (last 7 days):         Last 24 Hours Medication List:   Current Facility-Administered Medications   Medication Dose Route Frequency Provider Last Rate   • amLODIPine  10 mg Oral Daily Ethelda Self Starsinic, DO     • atorvastatin  20 mg Oral Daily With Dinner [facility] Jeanmarie Jimenez, DO     • bisacodyl  5 mg Oral Daily PRN [facility] Jeanmarie Jimenez, DO     • buPROPion  150 mg Oral QAM [facility] Jeanmarie Jimenez, DO     • chlorhexidine  15 mL Mouth/Throat Q12H [address] [facility] Jeanmarie Celayainic, DO     • vitamin B-12  1,000 mcg Oral Daily [facility] Jeanmarie Jimenez, DO     • docusate sodium  100 mg Oral BID [facility] Jeanmarie Jimenez, DO     • ferrous sulfate  325 mg Oral Daily With Breakfast [facility] Jeanmarie Jimenez, DO     • fluticasone-vilanterol  1 puff Inhalation Daily [facility] Jeanmarie Jimenez, DO     • gabapentin  300 mg Oral TID [facility] Jeanmarie Jimenez, DO     • heparin (porcine)  5,000 Units Subcutaneous [facility] [place] Pod Dicdanica, DO     • hydrALAZINE  5 mg Intravenous Q6H PRN [facility] Jeanmarie Jimenez, DO     • hydrALAZINE  25 mg Oral [facility] Tony, DO     • HYDROmorphone  0.2 mg Intravenous Q4H PRN [facility] Jeanmarie Jimenez, DO     • insulin lispro  1-6 Units Subcutaneous TID With Meals Bo Pagoda, DO     • losartan  100 mg Oral Daily [facility] Jeanmarie Jimenez, DO     • methocarbamol  500 mg Oral Q6H PRN [facility] Jeanmarie Jimenez, DO     • ondansetron  4 mg Intravenous Q4H PRN [facility] Jeanmarie Jimenez, DO     • pantoprazole  40 mg Intravenous Q24H [address] [facility] Tony, DO     • prochlorperazine  5 mg Oral Q6H PRN [facility] Jeanmarie Jimenez, DO          Today, Patient Was Seen By: ARSENIO Hankins    **Please Note: This note may have been constructed using a voice recognition system. **

## 2023-08-18 NOTE — UTILIZATION REVIEW
Continued Stay Review    Date:     FOR  8/16                          Current Patient Class:   Inpatient  Current Level of Care:   Med surg    HPI:58 y.o. female initially admitted on   8/14  With   Stroke alert  Acute onset  RUE and  RLE weakness  Transfer from [address]    Assessment/Plan:   8/16     Critically ill. Did not stay for MRI. Given  Ativan and dilaudid,  Able to get some imaging  On 1st attempt,  2nd attempt  Became very agitated, given dilaudid,  No improvement. BP suboptimal.  Needs  PT/OT. Cardene  Drip d/c  Overnight. Heart  Rate   In the  40's  D/T  Precedex. Continue neuro checks   Q 2  Hrs  During the day,  Q 4 hrs  At night. Monitor  BP. Continue  Antiemetics,  2 episodes of vomiting. Hold on  Antiplatelets. ICH likely  Hypertensive in etiology. Need frequent neuro checks. NIHSS 14.      8/17     Continue  Frequent neuro checks. Need stroke teaching. Needs  PT/OT. Monitor  BP. Can start  DVT  pPx  Today. 8/18  Continue current meds/treatment plan. Needs  PT/OT. Continue neuro checks.       Vital Signs:   36.9 °C) 83 16 153/81 109 -- -- -- -- Lying    08/17/23 22:10:21 98.1 °F (36.7 °C) -- 16 122/60 86 -- -- -- -- --   08/17/23 2101 -- -- -- 151/65 -- -- -- -- -- --   08/17/23 18:55:02 98.7 °F (37.1 °C) 73 27 Abnormal  128/82 101 -- -- 96 % None (Room air) Sitting   08/17/23 0927 -- -- -- 150/73 -- -- -- -- -- --   08/17/23 07:03:13 98.2 °F (36.8 °C) -- 18 -- -- -- -- -- -- --   08/17/23 0630 -- 60 -- 143/65 93 -- -- -- -- --   08/17/23 0523 -- -- -- 168/77 -- -- -- -- -- --   08/17/23 0316 -- -- 24 Abnormal  -- -- -- -- -- -- --   08/17/23 0024 -- -- -- 159/70 100 -- -- -- -- --   08/16/23 21:42:29 98.3 °F (36.8 °C) 74 44 Abnormal  165/79 113 -- -- 98 % None (Room air) Lying   08/16/23 2141 -- -- -- 177/81 Abnormal  -- -- -- -- -- --   08/16/23 18:45:43 99.2 °F (37.3 °C) 70 13 160/79 101 -- -- 97 % None (Room air) Lying   08/16/23 1800 99.3 °F (37.4 °C) 66 17 148/70 103 -- -- 100 % None (Room air) --   08/16/23 1600 98.4 °F (36.9 °C) 56 13 143/75 104 -- -- 99 % -- Lying   08/16/23 1400 98.4 °F (36.9 °C) 70 18 163/81 116 -- -- 100 % None (Room air) Lying   08/16/23 1349 -- -- -- 155/86 -- -- -- -- -- --   08/16/23 1300 -- 64 19 167/80 107 -- -- 100 % -- Lying   08/16/23 1200 98.5 °F (36.9 °C) 56 17 170/76 105 -- -- 100 % -- Lying   08/16/23 1130 -- 62 22 152/71 106 -- -- 100 % -- Lying   08/16/23 1115 -- 52 Abnormal  31 Abnormal  -- -- 162/74 106 mmHg 100 % -- --   08/16/23 1100 -- 60 17 -- -- 180/82 118 mmHg 100 % -- --   08/16/23 1000 -- 62 20 -- -- 184/86 124 mmHg 100 % -- --   08/16/23 0945 -- 50 Abnormal  18 165/74 116 172/78 112 mmHg 100 % -- --   08/16/23 0900 -- 48 Abnormal  22 -- -- 168/76 110 mmHg 100 % None (Room air) --   08/16/23 0844 -- 53 Abnormal  20 -- -- -- -- 100 % None (Room air) --         Pertinent Labs/Diagnostic Results:   Ct  Head  ( 8/18)  No new acute intracranial abnormality    X ray hips/pelvis  ( 8/17)      No acute osseous abnormality. No significant degenerative changes of the right or left hip. Ct head  ( 8/17)     Evolving acute left corona radiata intraparenchymal hemorrhage, stable in size. Minimally increased regional vasogenic edema as detailed above. No midline shift.        Results from last 7 days   Lab Units 08/18/23  0549 08/16/23  0523 08/15/23  0434 08/14/23  1714   WBC Thousand/uL 12.51* 8.94 10.98* 11.26*   HEMOGLOBIN g/dL 11.9 10.4* 9.7* 10.9*   HEMATOCRIT % 37.2 32.5* 30.9* 34.5*   PLATELETS Thousands/uL 355 326 302 353   NEUTROS ABS Thousands/µL  --   --  7.55 5.47         Results from last 7 days   Lab Units 08/18/23  0549 08/16/23  0830 08/16/23  0523 08/15/23  0434 08/14/23  1714   SODIUM mmol/L 137  --  132* 139 136   POTASSIUM mmol/L 4.3  --  3.5 3.4* 3.8   CHLORIDE mmol/L 105  --  102 105 100   CO2 mmol/L 25  --  25 27 28   ANION GAP mmol/L 7  --  5 7 8   BUN mg/dL 16  --  9 13 17 CREATININE mg/dL 0.92  --  0.53* 0.58* 0.71   EGFR ml/min/1.73sq m 68  --  104 101 94   CALCIUM mg/dL 9.6  --  8.7 8.5 9.2   CALCIUM, IONIZED mmol/L  --  1.09* 1.14 1.09*  --    MAGNESIUM mg/dL  --   --  2.1 3.0* 1.5*   PHOSPHORUS mg/dL  --   --  2.9 3.0 4.3     Results from last 7 days   Lab Units 08/18/23  0549 08/14/23  1714   AST U/L 14 17   ALT U/L 20 15   ALK PHOS U/L 80 70   TOTAL PROTEIN g/dL 7.6 7.3   ALBUMIN g/dL 3.2* 4.0   TOTAL BILIRUBIN mg/dL 0.36 0.32   BILIRUBIN DIRECT mg/dL  --  0.09     Results from last 7 days   Lab Units 08/18/23  1043 08/18/23  0624 08/17/23  2037 08/17/23  1556 08/17/23  1117 08/17/23  0608 08/16/23  1719 08/16/23  1211 08/15/23  1752 08/15/23  1225 08/15/23  0545 08/15/23  0009   POC GLUCOSE mg/dl 165* 154* 150* 160* 141* 133 111 134 165* 175* 141* 237*     Results from last 7 days   Lab Units 08/18/23  0549 08/16/23  0523 08/15/23  0434 08/14/23  1714   GLUCOSE RANDOM mg/dL 161* 157* 149* 143*     Results from last 7 days   Lab Units 08/16/23  1121   OSMOLALITY, SERUM mmol/*           Results from last 7 days   Lab Units 08/15/23  0434   FERRITIN ng/mL 22   IRON SATURATION % 8*   IRON ug/dL 30*   TIBC ug/dL 371                             Results from last 7 days   Lab Units 08/16/23  1831 08/16/23  1121   OSMOLALITY, SERUM mmol/KG  --  278*   OSMO UR mmol/  --      Results from last 7 days   Lab Units 08/16/23  1831   SODIUM UR  146                       Medications:   Scheduled Medications:  amLODIPine, 10 mg, Oral, Daily  atorvastatin, 20 mg, Oral, Daily With Dinner  buPROPion, 150 mg, Oral, QAM  chlorhexidine, 15 mL, Mouth/Throat, Q12H [address]  vitamin B-12, 1,000 mcg, Oral, Daily  docusate sodium, 100 mg, Oral, BID  ferrous sulfate, 325 mg, Oral, Daily With Breakfast  fluticasone-vilanterol, 1 puff, Inhalation, Daily  gabapentin, 300 mg, Oral, TID  hydrALAZINE, 25 mg, Oral, Q8H [place]  insulin lispro, 1-6 Units, Subcutaneous, TID With Meals  losartan, 100 mg, Oral, Daily  pantoprazole, 40 mg, Intravenous, Q24H [address]      Continuous IV Infusions:  Precedex   - D/C  8/16  Cardene  Drip - d/c  8/16     PRN Meds:  bisacodyl, 5 mg, Oral, Daily PRN  hydrALAZINE, 5 mg, Intravenous, Q6H PRN   ( x3  8/16)     HYDROmorphone, 0.2 mg, Intravenous, Q4H PRN  ( x5  8/16)     methocarbamol, 500 mg, Oral, Q6H PRN  ondansetron, 4 mg, Intravenous, Q4H PRN  ( x4  8/16)    prochlorperazine, 5 mg, Oral, Q6H PRN  ( x4  8/16)           Discharge Plan:    D    Network Utilization Review Department  ATTENTION: Please call with any questions or concerns to [phone] and carefully listen to the prompts so that you are directed to the right person. All voicemails are confidential.  [facility] all requests for admission clinical reviews, approved or denied determinations and any other requests to dedicated fax number below belonging to the campus where the patient is receiving treatment.  List of dedicated fax numbers for the Facilities:  [place] DENIALS (Administrative/Medical Necessity) [phone] [address] (Maternity/NICU/Pediatrics) [phone]   [address] [place] [address] Drive [phone]   [facility] [address] [facility] [phone] [address] [facility] [address] [facility] [address] [address] [facility] [phone]   [address] [id number] [place] [address] [phone]

## 2023-08-18 NOTE — PLAN OF CARE
Problem: MOBILITY - ADULT  Goal: Maintain or return to baseline ADL function  Description: INTERVENTIONS:  -  Assess patient's ability to carry out ADLs; assess patient's baseline for ADL function and identify physical deficits which impact ability to perform ADLs (bathing, care of mouth/teeth, toileting, grooming, dressing, etc.)  - Assess/evaluate cause of self-care deficits   - Assess range of motion  - Assess patient's mobility; develop plan if impaired  - Assess patient's need for assistive devices and provide as appropriate  - Encourage maximum independence but intervene and supervise when necessary  - Involve family in performance of ADLs  - Assess for home care needs following discharge   - Consider OT consult to assist with ADL evaluation and planning for discharge  - Provide patient education as appropriate  8/18/2023 1258 by Tory Aguilera RN  Outcome: Progressing  8/18/2023 1258 by Tory Aguilera RN  Outcome: Progressing  Goal: Maintains/Returns to pre admission functional level  Description: INTERVENTIONS:  - Perform BMAT or MOVE assessment daily.   - Set and communicate daily mobility goal to care team and patient/family/caregiver.    - Collaborate with rehabilitation services on mobility goals if consulted  - Out of bed for toileting  - Record patient progress and toleration of activity level   8/18/2023 1258 by Tory Aguilera RN  Outcome: Progressing  8/18/2023 1258 by Tory Aguilera RN  Outcome: Progressing     Problem: MOBILITY - ADULT  Goal: Maintain or return to baseline ADL function  Description: INTERVENTIONS:  -  Assess patient's ability to carry out ADLs; assess patient's baseline for ADL function and identify physical deficits which impact ability to perform ADLs (bathing, care of mouth/teeth, toileting, grooming, dressing, etc.)  - Assess/evaluate cause of self-care deficits   - Assess range of motion  - Assess patient's mobility; develop plan if impaired  - Assess patient's need for assistive devices and provide as appropriate  - Encourage maximum independence but intervene and supervise when necessary  - Involve family in performance of ADLs  - Assess for home care needs following discharge   - Consider OT consult to assist with ADL evaluation and planning for discharge  - Provide patient education as appropriate  8/18/2023 1259 by Sara Acharya RN  Outcome: Progressing  8/18/2023 1258 by Sara Acharya RN  Outcome: Progressing  8/18/2023 1258 by Sara Acharya RN  Outcome: Progressing  Goal: Maintains/Returns to pre admission functional level  Description: INTERVENTIONS:  - Perform BMAT or MOVE assessment daily.   - Set and communicate daily mobility goal to care team and patient/family/caregiver. - Collaborate with rehabilitation services on mobility goals if consulted  - Perform Range of Motion 3 times a day. - Reposition patient every 2 hours.   - Dangle patient 3 times a day  - Record patient progress and toleration of activity level   8/18/2023 1259 by Sara Acharya RN  Outcome: Progressing  8/18/2023 1258 by Sara Acharya RN  Outcome: Progressing  8/18/2023 1258 by Sara Acharya RN  Outcome: Progressing     Problem: PAIN - ADULT  Goal: Verbalizes/displays adequate comfort level or baseline comfort level  Description: Interventions:  - Encourage patient to monitor pain and request assistance  - Assess pain using appropriate pain scale  - Administer analgesics based on type and severity of pain and evaluate response  - Implement non-pharmacological measures as appropriate and evaluate response  - Consider cultural and social influences on pain and pain management  - Notify physician/advanced practitioner if interventions unsuccessful or patient reports new pain  8/18/2023 1259 by Sara Acharya RN  Outcome: Progressing  8/18/2023 1258 by Sara Acharya RN  Outcome: Progressing  8/18/2023 1258 by Sara Acharya RN  Outcome: Progressing     Problem: INFECTION - ADULT  Goal: Absence or prevention of progression during hospitalization  Description: INTERVENTIONS:  - Assess and monitor for signs and symptoms of infection  - Monitor lab/diagnostic results  - Monitor all insertion sites, i.e. indwelling lines, tubes, and drains  - Monitor endotracheal if appropriate and nasal secretions for changes in amount and color  - [place] appropriate cooling/warming therapies per order  - Administer medications as ordered  - Instruct and encourage patient and family to use good hand hygiene technique  - Identify and instruct in appropriate isolation precautions for identified infection/condition  8/18/2023 1259 by Jonathan Peres RN  Outcome: Progressing  8/18/2023 1258 by Jonathan Peres RN  Outcome: Progressing  8/18/2023 1258 by Jonathan Peres RN  Outcome: Progressing  Goal: Absence of fever/infection during neutropenic period  Description: INTERVENTIONS:  - Monitor WBC    8/18/2023 1259 by Jonathan Peres RN  Outcome: Progressing  8/18/2023 1258 by Jonathan Peres RN  Outcome: Progressing  8/18/2023 1258 by Jonathan Peres RN  Outcome: Progressing     Problem: SAFETY ADULT  Goal: Maintain or return to baseline ADL function  Description: INTERVENTIONS:  -  Assess patient's ability to carry out ADLs; assess patient's baseline for ADL function and identify physical deficits which impact ability to perform ADLs (bathing, care of mouth/teeth, toileting, grooming, dressing, etc.)  - Assess/evaluate cause of self-care deficits   - Assess range of motion  - Assess patient's mobility; develop plan if impaired  - Assess patient's need for assistive devices and provide as appropriate  - Encourage maximum independence but intervene and supervise when necessary  - Involve family in performance of ADLs  - Assess for home care needs following discharge   - Consider OT consult to assist with ADL evaluation and planning for discharge  - Provide patient education as appropriate  8/18/2023 1259 by Danielle Redman RN  Outcome: Progressing  8/18/2023 1258 by Danielle Redman RN  Outcome: Progressing  8/18/2023 1258 by Danielle Redman RN  Outcome: Progressing  Goal: Maintains/Returns to pre admission functional level  Description: INTERVENTIONS:  - Perform BMAT or MOVE assessment daily.   - Set and communicate daily mobility goal to care team and patient/family/caregiver. - Collaborate with rehabilitation services on mobility goals if consulted  - Perform Range of Motion 3 times a day. - Reposition patient every 2 hours.   - Dangle patient 3 times a day  - Record patient progress and toleration of activity level   8/18/2023 1259 by Danielle Redman RN  Outcome: Progressing  8/18/2023 1258 by Danielle Redman RN  Outcome: Progressing  8/18/2023 1258 by Danielle Redman RN  Outcome: Progressing  Goal: Patient will remain free of falls  Description: INTERVENTIONS:  - Educate patient/family on patient safety including physical limitations  - Instruct patient to call for assistance with activity   - Consult OT/PT to assist with strengthening/mobility   - Keep Call bell within reach  - Keep bed low and locked with side rails adjusted as appropriate  - Keep care items and personal belongings within reach  - Initiate and maintain comfort rounds  - Make Fall Risk Sign visible to staff    Problem: DISCHARGE PLANNING  Goal: Discharge to home or other facility with appropriate resources  Description: INTERVENTIONS:  - Identify barriers to discharge w/patient and caregiver  - Arrange for needed discharge resources and transportation as appropriate  - Identify discharge learning needs (meds, wound care, etc.)  - Arrange for interpretive services to assist at discharge as needed  - Refer to Case Management Department for coordinating discharge planning if the patient needs post-hospital services based on physician/advanced practitioner order or complex needs related to functional status, cognitive ability, or social support system  8/18/2023 1259 by Zaida Simmons RN  Outcome: Progressing  8/18/2023 1258 by Zaida Simmons RN  Outcome: Progressing     Problem: Knowledge Deficit  Goal: Patient/family/caregiver demonstrates understanding of disease process, treatment plan, medications, and discharge instructions  Description: Complete learning assessment and assess knowledge base. Interventions:  - Provide teaching at level of understanding  - Provide teaching via preferred learning methods  8/18/2023 1259 by Zaida Simmons RN  Outcome: Progressing  8/18/2023 1258 by Zaida Simmons RN  Outcome: Progressing     Problem: Nutrition/Hydration-ADULT  Goal: Nutrient/Hydration intake appropriate for improving, restoring or maintaining nutritional needs  Description: Monitor and assess patient's nutrition/hydration status for malnutrition. Collaborate with interdisciplinary team and initiate plan and interventions as ordered. Monitor patient's weight and dietary intake as ordered or per policy. Utilize nutrition screening tool and intervene as necessary. Determine patient's food preferences and provide high-protein, high-caloric foods as appropriate.      INTERVENTIONS:  - Monitor oral intake, urinary output, labs, and treatment plans  - Assess nutrition and hydration status and recommend course of action  - Evaluate amount of meals eaten  - Assist patient with eating if necessary   - Allow adequate time for meals  - Recommend/ encourage appropriate diets, oral nutritional supplements, and vitamin/mineral supplements  - Order, calculate, and assess calorie counts as needed  - Recommend, monitor, and adjust tube feedings and TPN/PPN based on assessed needs  - Assess need for intravenous fluids  - Provide specific nutrition/hydration education as appropriate  - Include patient/family/caregiver in decisions related to nutrition  8/18/2023 1259 by Zaida Simmons RN  Outcome: Progressing  8/18/2023 1258 by Zaida Simmons RN  Outcome: Progressing     Problem: Prexisting or High Potential for Compromised Skin Integrity  Goal: Skin integrity is maintained or improved  Description: INTERVENTIONS:  - Identify patients at risk for skin breakdown  - Assess and monitor skin integrity  - Assess and monitor nutrition and hydration status  - Monitor labs   - Assess for incontinence   - Turn and reposition patient  - Assist with mobility/ambulation  - Relieve pressure over bony prominences  - Avoid friction and shearing  - Provide appropriate hygiene as needed including keeping skin clean and dry  - Evaluate need for skin moisturizer/barrier cream  - Collaborate with interdisciplinary team   - Patient/family teaching  - Consider wound care consult   8/18/2023 1259 by Hue Leach RN  Outcome: Progressing  8/18/2023 1258 by Hue Leach RN  Outcome: Progressing     Problem: Neurological Deficit  Goal: Neurological status is stable or improving  Description: Interventions:  - Monitor and assess patient's level of consciousness, motor function, sensory function, and level of assistance needed for ADLs. - Monitor and report changes from baseline. Collaborate with interdisciplinary team to initiate plan and implement interventions as ordered. - Provide and maintain a safe environment. - Consider seizure precautions. - Consider fall precautions. - Consider aspiration precautions. - Consider bleeding precautions. 8/18/2023 1259 by Hue Leach RN  Outcome: Progressing  8/18/2023 1258 by Hue Leach RN  Outcome: Progressing     Problem: Activity Intolerance/Impaired Mobility  Goal: Mobility/activity is maintained at optimum level for patient  Description: Interventions:  - Assess and monitor patient  barriers to mobility and need for assistive/adaptive devices. - Assess patient's emotional response to limitations. - Collaborate with interdisciplinary team and initiate plans and interventions as ordered.   - Encourage independent activity per ability.  - Maintain proper body alignment. - Perform active/passive rom as tolerated/ordered. - Plan activities to conserve energy.  - Turn patient as appropriate  8/18/2023 1259 by Dorie Thomson RN  Outcome: Progressing  8/18/2023 1258 by Dorie Thomson RN  Outcome: Progressing     Problem: Communication Impairment  Goal: Ability to express needs and understand communication  Description: Assess patient's communication skills and ability to understand information. Patient will demonstrate use of effective communication techniques, alternative methods of communication and understanding even if not able to speak. - Encourage communication and provide alternate methods of communication as needed. - Collaborate with case management/ for discharge needs. - Include patient/family/caregiver in decisions related to communication. 8/18/2023 1259 by Dorie Thomson RN  Outcome: Progressing  8/18/2023 1258 by Dorie Thomson RN  Outcome: Progressing     Problem: Potential for Aspiration  Goal: Non-ventilated patient's risk of aspiration is minimized  Description: Assess and monitor vital signs, respiratory status, and labs (WBC). Monitor for signs of aspiration (tachypnea, cough, rales, wheezing, cyanosis, fever). - Assess and monitor patient's ability to swallow. - Place patient up in chair to eat if possible. - HOB up at 90 degrees to eat if unable to get patient up into chair.  - Supervise patient during oral intake. - Instruct patient/ family to take small bites. - Instruct patient/ family to take small single sips when taking liquids.   - Follow patient-specific strategies generated by speech pathologist.  8/18/2023 1259 by Dorie Thomson RN  Outcome: Progressing  8/18/2023 1258 by Dorie Thomson RN  Outcome: Progressing     Problem: Nutrition  Goal: Nutrition/Hydration status is improving  Description: Monitor and assess patient's nutrition/hydration status for malnutrition (ex- brittle hair, bruises, dry skin, pale skin and conjunctiva, muscle wasting, smooth red tongue, and disorientation). Collaborate with interdisciplinary team and initiate plan and interventions as ordered. Monitor patient's weight and dietary intake as ordered or per policy. Utilize nutrition screening tool and intervene per policy. Determine patient's food preferences and provide high-protein, high-caloric foods as appropriate. - Assist patient with eating.  - Allow adequate time for meals.  - Encourage patient to take dietary supplement as ordered. - Collaborate with clinical nutritionist.  - Include patient/family/caregiver in decisions related to nutrition.   8/18/2023 1259 by Tory Aguilera RN  Outcome: Progressing  8/18/2023 1258 by Tory Aguilera RN  Outcome: Progressing   - Apply yellow socks and bracelet for high fall risk patients  - Consider moving patient to room near nurses station  8/18/2023 1259 by Tory Aguilera RN  Outcome: Progressing  8/18/2023 1258 by Tory Aguilera RN  Outcome: Progressing

## 2023-08-18 NOTE — RESTORATIVE TECHNICIAN NOTE
Restorative Technician Note      Patient Name: Stephen Braun     Note Type: Mobility  Patient Position Upon Consult: Supine  Activity Performed: Transferred; Dangled; Stood  Assistive Device: Other (Comment) (Assist x2 sit EOB/get OOB to the chair. Assisted PT Corbin Bingham.)  Education Provided: Yes  Patient Position at End of Consult: Bedside chair;  All needs within reach; Bed/Chair alarm activated    Zi GEE, Restorative Technician, [employer]

## 2023-08-18 NOTE — ASSESSMENT & PLAN NOTE
62 YOF with history of HTN, DM2, Sjogren's, fibromyalgia initially presented to [facility] as stroke alert on 8/14/23 with initial presenting symptoms acute onset of right sided weakness, facial asymmetry, and dysarthria while at work. Initial imaging with CTH/CTA HN revealed left thalamocapsular bleed with no significant shift or mass effect. . NIHSS 14. Not TNK candidate given acute ICH. Pt given IV labetalol, started on Cardene drip, administered DDAVP for ASA reversal and transferred to [facility] for NSg care. -Was seen by NSGY, no surgical intervention at this time  -Neurology following  -neuro checks q4h  -SBP goal < 160 mmHg, preferably < 140 mmHg  -CT head was done yesterday for change in exam and worsening and waxing slurred speech which was reviewed by neuro, the images which does show stable left sided hemorrhage without any midline shift.    -PT/OT following, recommended post acute rehab with PMR following  -TTE performed  -Holding DVt prophylaxis and ASA for now  -Diet restarted- cleared for regular with thin liquids   -Stat CTH for drop in GCS score of 2 points or more in > 1 hour

## 2023-08-18 NOTE — RAPID RESPONSE
Rapid Response Note  Fariha Perkins 62 y.o. female MRN: [id number]  Unit/Bed#: [facility] 711-01 Encounter: [id number]    Rapid Response Notification(s):   Response called date/time:  8/18/2023 7:17 PM  Response team arrival date/time:  8/18/2023 7:18 PM  Response end date/time:  8/18/2023 7:30 PM  [facility]  Rapid response location:  [facility] unit (Room 717)  Primary reason for rapid response call: Fall    Rapid Response Intervention(s):   Airway:  None  Breathing:  None  Fluids administered:  None  Medications administered:  None  Comments:  C-Collar; CT head, Cspine, Right shoulder xray       Assessment:   · Unwitnessed fall    Plan:   · Patient at baseline neuro exam (according to AM note and neurology resident in attendance)  · CT head, C spine, shoulder xray  · Remain on MS floor for now     Rapid Response Outcome:   Transfer:  Remain on floor  Primary service notified of transfer: Yes    Code Status: Level 1 (Full Code)      Family notified of transfer: no  Family member contacted: N/A      Background/Situation:   Fariha Perkins is a 62 y.o. female who had a mechanical fall out of chair trying to reach for her phone. She is admitted and has been admitted for 4 days with an ICH, she has right facial droop, right hemiparesis. She was found on her right side, awake, alert, oriented complaining of mild headache. No neuro changes. Review of Systems   Neurological: Positive for headaches. All other systems reviewed and are negative. Objective:   Vitals:    08/18/23 1125 08/18/23 1455 08/18/23 1558 08/18/23 1919   BP: 131/69 129/80  142/80   BP Location:  Left arm     Pulse: 83 83  85   Resp: 20 18     Temp:   98.2 °F (36.8 °C)    TempSrc:       SpO2:  96%     Weight:       Height:         Physical Exam  Vitals and nursing note reviewed. Constitutional:       General: She is not in acute distress. HENT:      Head: Normocephalic and atraumatic. Mouth/Throat:      Mouth: Mucous membranes are dry. Cardiovascular:      Rate and Rhythm: Normal rate and regular rhythm. Heart sounds: Normal heart sounds. Pulmonary:      Effort: No respiratory distress. Breath sounds: Normal breath sounds. No wheezing. Abdominal:      General: Abdomen is flat. Bowel sounds are normal.      Palpations: Abdomen is soft. Genitourinary:     Comments: Defered  Musculoskeletal:         General: Normal range of motion. Cervical back: Neck supple. Comments: No C/T/L spine tenderness   Skin:     General: Skin is warm and dry. Capillary Refill: Capillary refill takes less than 2 seconds. Neurological:      Mental Status: She is alert. Comments: Awake, alert and oriented to person/place/time; right facil droop, RUE 1/5; LLE 1/5. No new focal deficits          Portions of the record may have been created with voice recognition software. Occasional wrong word or "sound a like" substitutions may have occurred due to the inherent limitations of voice recognition software. Read the chart carefully and recognize, using context, where substitutions have occurred.     Debra Harding PA-C

## 2023-08-18 NOTE — ASSESSMENT & PLAN NOTE
Lab Results   Component Value Date    HGBA1C 6.7 (H) 08/15/2023       Recent Labs     08/17/23  1556 08/17/23 2037 08/18/23  0624 08/18/23  1043   POCGLU 160* 150* 154* 165*       Blood Sugar Average: Last 72 hrs:  (P) 155.5     · On SSI  · Holding home metformin  · Hypoglycemic protocol

## 2023-08-18 NOTE — CASE MANAGEMENT
Case Management Assessment & Discharge Planning Note    Patient name Fariha Perkins  Location [address] [place] [address] Road [address]/[facility] 595-63 MRN [id number]  : 1965 Date 2023       Current Admission Date: 2023  Current Admission Diagnosis:ICH (intracerebral hemorrhage) [facility])   Patient Active Problem List    Diagnosis Date Noted   • Anemia 2023   • ICH (intracerebral hemorrhage) ([address]) 08/15/2023   • Left leg pain 08/15/2023   • Chronic obstructive pulmonary disease with acute exacerbation ([address]) 2023   • Hyperlipidemia associated with type 2 diabetes mellitus ([address]) 2023   • Abnormal CT of the chest 2023   • Class 2 obesity with body mass index (BMI) of 39.0 to 39.9 in adult 2023   • CAD (coronary artery disease) 2023   • Right lumbosacral radiculopathy 10/12/2022   • Chronic bilateral low back pain with bilateral sciatica 2022   • Paresthesia of both feet 2022   • Postoperative visit 2022   • Incarcerated umbilical hernia    • Diabetes mellitus type 2, controlled ([address]) 2022   • Continuous opioid dependence ([address]) 2022   • Moderate persistent asthma with acute exacerbation 2022   • Cigarette nicotine dependence in remission 2022   • Primary hypertension 2022   • Fibromyalgia 2022   • Sjogren's syndrome ([address]) 10/19/2012   • Anxiety 2012      LOS (days): 4  Geometric Mean LOS (GMLOS) (days): 4.50  Days to GMLOS:0.6     OBJECTIVE:    Risk of Unplanned Readmission Score: 17.98         Current admission status: Inpatient       Preferred Pharmacy:   [address] [place] [address] ROAD  [address]  Phone: [phone] Fax: [address] [place], [place] - [address] [place] [address] [address]  Phone: [phone] Fax: [phone]    Primary Care Provider: Basil Carmona MD    Primary Insurance: Karen sandoval CROSS  Secondary Insurance:     ASSESSMENT:  Active Health Care Proxies    There are no active Health Care Proxies on file. Readmission Root Cause  30 Day Readmission: No    Patient Information  Admitted from[de-identified] Home  Mental Status: Alert  During Assessment patient was accompanied by: Spouse  Assessment information provided by[de-identified] Spouse  Primary Caregiver: Self  Support Systems: Spouse/significant other  [place] of Residence: [address] do you live [facility]?: [facility] entry access options.  Select all that apply.: Stairs  Number of steps to enter home.: 4  Type of Current Residence: [place]  In the last 12 months, was there a time when you were not able to pay the mortgage or rent on time?: No  In the last 12 months, how many places have you lived?: 1  In the last 12 months, was there a time when you did not have a steady place to sleep or slept in a shelter (including now)?: No  Homeless/housing insecurity resource given?: No  Living Arrangements: Lives w/ Spouse/significant other  Is patient a ?: No    Activities of Daily Living Prior to Admission  Functional Status: Independent  Completes ADLs independently?: Yes  Ambulates independently?: Yes  Does patient use assisted devices?: No  Does patient currently own DME?: Yes  What DME does the patient currently own?: Deanna Liao  Does patient have a history of Outpatient Therapy (PT/OT)?: Yes  Does the patient have a history of Short-Term Rehab?: No  Does patient have a history of HHC?: No  Does patient currently have [facility]?: No         Patient Information Continued  Income Source: Employed  Does patient have prescription coverage?: Yes  Within the past 12 months, you worried that your food would run out before you got the money to buy more.: Never true  Within the past 12 months, the food you bought just didn't last and you didn't have money to get more.: Never true  Food insecurity resource given?: No  Does patient receive dialysis treatments?: No  Does patient have a history of substance abuse?: No  Does patient have a history of Mental Health Diagnosis?: No         Means of Transportation  Means of Transport to Appts[de-identified] Drives Self  In the past 12 months, has lack of transportation kept you from medical appointments or from getting medications?: No  In the past 12 months, has lack of transportation kept you from meetings, work, or from getting things needed for daily living?: No  Was application for public transport provided?: N/A        DISCHARGE DETAILS:                           Contacts  Patient Contacts: Left VM for patient's  to discuss d/c planning                        Treatment Team Recommendation: Short Term Rehab  Discharge Destination Plan[de-identified] Short Term Rehab                                         Additional Comments: Narritive assessment note carried over into note from Sue RYAN's assessment from 8/15 at 1340

## 2023-08-18 NOTE — ARC ADMISSION
Referral received for patient consideration of ARC placement for rehab. Will review with ARC physician and will update CM when determination is known.

## 2023-08-18 NOTE — PLAN OF CARE
Problem: PHYSICAL THERAPY ADULT  Goal: Performs mobility at highest level of function for planned discharge setting. See evaluation for individualized goals. Description: Treatment/Interventions: OT, Spoke to case management, Spoke to nursing, Gait training, Bed mobility, Patient/family training, Endurance training, LE strengthening/ROM, Functional transfer training  Equipment Recommended:  (TBD)       See flowsheet documentation for full assessment, interventions and recommendations. Outcome: Progressing  Note: Prognosis: Guarded  Problem List: Decreased strength, Decreased endurance, Impaired balance, Decreased mobility, Decreased coordination, Decreased cognition, Impaired sensation, Impaired vision, Obesity  Assessment: Pt agreeable to participate in PT session. Pt performed functional mobility and therex as outlined above. Continues to demonstrate dense R hemiparesis with minimal muscle activation. R lean sitting EOB requiring VC And TC for midline orientation. Able to hold midline for brief periods with S and against mild anterior-posterior perturbations. Instructed on mental imagery to promote neuroplasticity and provided emotional support to pt regarding stroke recovery. Pt left seated in chair with chair alarm, call bell, phone, and all personal needs within reach. Pt will continue to benefit from skilled acute care PT to further address their functional mobility limitations. D/C recommendations remain rehab. Barriers to Discharge: Inaccessible home environment, Decreased caregiver support     PT Discharge Recommendation: Post acute rehabilitation services    See flowsheet documentation for full assessment.

## 2023-08-18 NOTE — PROGRESS NOTES
Met with patient at bedside in room 713. Patient's sister, Sindi Michaels, was also present. Provided stroke education booklet. Reviewed signs and symptoms of stroke. Follow up instructions are TBD per chart review. Expressed the importance of following up with PCP after discharge for diabetes management/hospital follow up. Patient is agreeable to outreach phone calls after discharge. Call bell placed within reach. Both were appreciative for the visit.

## 2023-08-18 NOTE — QUICK NOTE
Rapid response called for patient fall. On arrival, airway is intact and patient is not in any acute respiratory distress. Patient does not require any acute airway management at this time.

## 2023-08-18 NOTE — CODE DOCUMENTATION
Patient assisted back to bed with hard back board and 6 person lift, stretcher at bedside for transport to CT scan.  Patient awake and alert

## 2023-08-18 NOTE — PHYSICAL THERAPY NOTE
PHYSICAL THERAPY NOTE          Patient Name: Renetta GARCIA Date: 8/18/2023 08/18/23 1335   PT Last Visit   PT Visit Date 08/18/23   Note Type   Note Type Treatment   Pain Assessment   Pain Assessment Tool 0-10   Pain Score No Pain   Restrictions/Precautions   Weight Bearing Precautions Per Order No   Braces or Orthoses   (would benefit from sling for transfers due to RUE hemiparesis and multipodus boot)   Other Precautions Chair Alarm; Bed Alarm;Multiple lines;Telemetry; Fall Risk  (R hemiparesis, dysarthric, L gaze preference.)   General   Chart Reviewed Yes   Response to Previous Treatment Patient with no complaints from previous session. Family/Caregiver Present No   Cognition   Overall Cognitive Status WFL   Arousal/Participation Cooperative   Attention Attends with cues to redirect   Orientation Level Oriented X4   Following Commands Follows one step commands without difficulty   Comments very pleasant to work with   Subjective   Subjective "reporting slightly more sensation in RLE however no change in RUE (remains numb)"; "this sucks"-pt agreeable to pastoral care- notified   Bed Mobility   Supine to Sit Unable to assess   Sit to Supine Unable to assess   Additional Comments seated EOB with OT upon entry and left in chair with alarm upon conclusion   Transfers   Sit to Stand 2  Maximal assistance   Additional items Assist x 2; Increased time required;Verbal cues   Stand to Sit 2  Maximal assistance   Additional items Assist x 2; Increased time required;Verbal cues   Stand pivot 2  Maximal assistance   Additional items Assist x 2; Increased time required;Verbal cues   Additional Comments L HHA, RUE supported, R>L knee block   Balance   Static Sitting Poor +   Dynamic Sitting Poor   Static Standing Poor -   Dynamic Standing Poor -   Ambulatory Zero   Endurance Deficit   Endurance Deficit Yes   Endurance Deficit Description R hemiparesis, fatigue   Activity Tolerance   Activity Tolerance Patient limited by fatigue   Medical Staff Made Aware co-tx with Bryan Youssef due to medical complexity and decreased activity tolerance   Nurse Made Aware yes-cleared to mobilize   Exercises   Knee AROM Long Arc Quad Sitting;10 reps;PROM; Left  (c faciliation at distal quads-tapping)   Ankle Pumps Sitting;PROM; Right;AROM; Left;10 reps  (x2 sets with quick stretch to dorsiflexors)   Neuro re-ed anterior-posterior perturbations while sitting EOB x10 reps-hand placement shoulders   Balance training  static sitting EOB with LUE vs no UE support and minAx1 x13 min. Assessment   Prognosis Guarded   Problem List Decreased strength;Decreased endurance; Impaired balance;Decreased mobility; Decreased coordination;Decreased cognition; Impaired sensation; Impaired vision;Obesity   Assessment Pt agreeable to participate in PT session. Pt performed functional mobility and therex as outlined above. Continues to demonstrate dense R hemiparesis with minimal muscle activation. R lean sitting EOB requiring VC And TC for midline orientation. Able to hold midline for brief periods with S and against mild anterior-posterior perturbations. Instructed on mental imagery to promote neuroplasticity and provided emotional support to pt regarding stroke recovery. Pt left seated in chair with chair alarm, call bell, phone, and all personal needs within reach. Pt will continue to benefit from skilled acute care PT to further address their functional mobility limitations. D/C recommendations remain rehab. Barriers to Discharge Inaccessible home environment;Decreased caregiver support   Goals   Patient Goals to improve   STG Expiration Date 08/27/23   PT Treatment Day 2   Plan   Treatment/Interventions Functional transfer training;LE strengthening/ROM; Therapeutic exercise; Endurance training;Cognitive reorientation;Equipment eval/education;Patient/family training; Bed mobility;Spoke to nursing;Spoke to case management;OT   Progress Progressing toward goals   PT Frequency 3-5x/wk   Recommendation   PT Discharge Recommendation Post acute rehabilitation services   AM-PAC Basic Mobility Inpatient   Turning in Flat Bed Without Bedrails 2   Lying on Back to Sitting on Edge of Flat Bed Without Bedrails 1   Moving Bed to Chair 1   Standing Up From Chair Using Arms 1   Walk in Room 1   Climb 3-5 Stairs With Railing 1   Basic Mobility Inpatient Raw Score 7   Turning Head Towards Sound 3   Follow Simple Instructions 3   Low Function Basic Mobility Raw Score  13   Low Function Basic Mobility Standardized Score  20.14   Highest Level Of Mobility   JH-HLM Goal 2: Bed activities/Dependent transfer   JH-HLM Achieved 4: Move to chair/commode   Zev Clemente, PT, DPT

## 2023-08-18 NOTE — PLAN OF CARE
Problem: OCCUPATIONAL THERAPY ADULT  Goal: Performs self-care activities at highest level of function for planned discharge setting. See evaluation for individualized goals. Description: Treatment Interventions: ADL retraining, Functional transfer training, UE strengthening/ROM, Endurance training, Cognitive reorientation, Patient/family training, Equipment evaluation/education, Neuromuscular reeducation, Fine motor coordination activities, Compensatory technique education, Activityengagement          See flowsheet documentation for full assessment, interventions and recommendations. Note: Limitation: Decreased ADL status, Decreased UE ROM, Decreased UE strength, Decreased Safe judgement during ADL, Decreased endurance, Decreased fine motor control, Decreased self-care trans, Decreased high-level ADLs, Non-func R UE  Prognosis: Good  Assessment: Pt seen for Occupational Therapy session with focus on activity tolerance, bed mob, functional transfers/stand pivot pt engagement in UB/LB self-care tasks. Pt cleared by RN/Cindy for pt participated in OT session. Pt presented supine/HOB raised pt awake/alert and agreeable to participate in therapy following pt identifiers confirmed. Pt was unable to report her therapy goal 2* pt cognitive impairments and language barrier. She was + for mitt restraints. Pt required assist for bed mob, functional transfers/stand pivot transfers 2* decreased strength, coordination and balance. Pt was able to tolerate sitting out of bed well to bedside chair for feeding. Pt will require post acute rehab service to continue to address these above noted pt deficit which currently impair pt ADL and functional mob.  Pt set up to bedside chair post session, chair alarm activated and all needs within pt reach     OT Discharge Recommendation: Post acute rehabilitation services

## 2023-08-18 NOTE — PLAN OF CARE
Problem: MOBILITY - ADULT  Goal: Maintain or return to baseline ADL function  Description: INTERVENTIONS:  -  Assess patient's ability to carry out ADLs; assess patient's baseline for ADL function and identify physical deficits which impact ability to perform ADLs (bathing, care of mouth/teeth, toileting, grooming, dressing, etc.)  - Assess/evaluate cause of self-care deficits   - Assess range of motion  - Assess patient's mobility; develop plan if impaired  - Assess patient's need for assistive devices and provide as appropriate  - Encourage maximum independence but intervene and supervise when necessary  - Involve family in performance of ADLs  - Assess for home care needs following discharge   - Consider OT consult to assist with ADL evaluation and planning for discharge  - Provide patient education as appropriate  Outcome: Progressing  Goal: Maintains/Returns to pre admission functional level  Description: INTERVENTIONS:  - Perform BMAT or MOVE assessment daily.   - Set and communicate daily mobility goal to care team and patient/family/caregiver.    - Collaborate with rehabilitation services on mobility goals if consulted  - Out of bed for toileting  - Record patient progress and toleration of activity level   Outcome: Progressing     Problem: PAIN - ADULT  Goal: Verbalizes/displays adequate comfort level or baseline comfort level  Description: Interventions:  - Encourage patient to monitor pain and request assistance  - Assess pain using appropriate pain scale  - Administer analgesics based on type and severity of pain and evaluate response  - Implement non-pharmacological measures as appropriate and evaluate response  - Consider cultural and social influences on pain and pain management  - Notify physician/advanced practitioner if interventions unsuccessful or patient reports new pain  Outcome: Progressing     Problem: SAFETY ADULT  Goal: Maintain or return to baseline ADL function  Description: INTERVENTIONS:  -  Assess patient's ability to carry out ADLs; assess patient's baseline for ADL function and identify physical deficits which impact ability to perform ADLs (bathing, care of mouth/teeth, toileting, grooming, dressing, etc.)  - Assess/evaluate cause of self-care deficits   - Assess range of motion  - Assess patient's mobility; develop plan if impaired  - Assess patient's need for assistive devices and provide as appropriate  - Encourage maximum independence but intervene and supervise when necessary  - Involve family in performance of ADLs  - Assess for home care needs following discharge   - Consider OT consult to assist with ADL evaluation and planning for discharge  - Provide patient education as appropriate  Outcome: Progressing  Goal: Maintains/Returns to pre admission functional level  Description: INTERVENTIONS:  - Perform BMAT or MOVE assessment daily.   - Set and communicate daily mobility goal to care team and patient/family/caregiver.    - Collaborate with rehabilitation services on mobility goals if consulted  - Out of bed for toileting  - Record patient progress and toleration of activity level   Outcome: Progressing  Goal: Patient will remain free of falls  Description: INTERVENTIONS:  - Educate patient/family on patient safety including physical limitations  - Instruct patient to call for assistance with activity   - Consult OT/PT to assist with strengthening/mobility   - Keep Call bell within reach  - Keep bed low and locked with side rails adjusted as appropriate  - Keep care items and personal belongings within reach  - Initiate and maintain comfort rounds  - Make Fall Risk Sign visible to staff  - Apply yellow socks and bracelet for high fall risk patients  - Consider moving patient to room near nurses station  Outcome: Progressing     Problem: Neurological Deficit  Goal: Neurological status is stable or improving  Description: Interventions:  - Monitor and assess patient's level of consciousness, motor function, sensory function, and level of assistance needed for ADLs. - Monitor and report changes from baseline. Collaborate with interdisciplinary team to initiate plan and implement interventions as ordered. - Provide and maintain a safe environment. - Consider seizure precautions. - Consider fall precautions. - Consider aspiration precautions. - Consider bleeding precautions.   Outcome: Progressing

## 2023-08-18 NOTE — ASSESSMENT & PLAN NOTE
>>ASSESSMENT AND PLAN FOR DIABETES MELLITUS TYPE 2, CONTROLLED (HCC) WRITTEN ON 8/18/2023  2:49 PM BY ARSENIO VERDIN    Lab Results   Component Value Date    HGBA1C 6.7 (H) 08/15/2023       Recent Labs     08/17/23  1556 08/17/23 2037 08/18/23  0624 08/18/23  1043   POCGLU 160* 150* 154* 165*       Blood Sugar Average: Last 72 hrs:  (P) 155.5     On SSI  Holding home metformin  Hypoglycemic protocol

## 2023-08-18 NOTE — ASSESSMENT & PLAN NOTE
History of CAD based on prior CTA imaging.  -On statin  -Holding ASA for now due to [address]  -Holding beta blockade due to episodes of bradycardia

## 2023-08-18 NOTE — OCCUPATIONAL THERAPY NOTE
Occupational Therapy Treatment Note     08/18/23 1334   OT Last Visit   OT Visit Date 08/18/23   Note Type   Note Type Treatment   Pain Assessment   Pain Assessment Tool 0-10   Pain Location/Orientation Location: Hip   Restrictions/Precautions   Weight Bearing Precautions Per Order No   Braces or Orthoses   (recommend sling for future transfers due to LUE flaccidity)   Other Precautions Chair Alarm; Fall Risk;Telemetry;Multiple lines   Lifestyle   Autonomy I adls and mobiltiy-  i iadls - shares homemaking with spouse   Reciprocal Relationships supportive spouse, family and friends   Service to Others works FT as [employer] at [employer] active pta   ADL   Where Assessed Edge of bed   UB Dressing Assistance 2  [address]; Thread LUE;Pull around back   Bed Mobility   Supine to Sit 3  Moderate assistance   Additional items Assist x 2   Transfers   Sit to Stand 2  Maximal assistance   Additional items Assist x 2   Stand to Sit 2  Maximal assistance   Additional items Assist x 2   Stand pivot 2  Maximal assistance   Additional items Assist x 2   Therapeutic Exercise - ROM   UE-ROM Yes   ROM- Right Upper Extremities   R Shoulder AROM   R Elbow AROM   RUE ROM Comment pt instructed on using R UE to assist L UR   ROM - Left Upper Extremities    L Shoulder PROM   L Elbow PROM   L Weight/Reps/Sets 10 x 3   LUE ROM Comment self- ROM   Subjective   Subjective pt stated "yeah, I want to sit in the chair"   Cognition   Overall Cognitive Status [facility]   Arousal/Participation Cooperative   Attention Attends with cues to redirect   Orientation Level Oriented X4   Memory Decreased recall of precautions;Decreased recall of recent events   Following Commands Follows one step commands with increased time or repetition   Activity Tolerance   Activity Tolerance Patient tolerated treatment well   Assessment   Assessment Pt seen for Occupational Therapy session with focus on activity tolerance, bed mob, functional transfers/stand pivot pt engagement in UB/LB self-care tasks. Pt cleared by RN/iCndy for pt participated in OT session. Pt presented supine/HOB raised pt awake/alert and agreeable to participate in therapy following pt identifiers confirmed. Pt was unable to report her therapy goal 2* pt cognitive impairments and language barrier. She was + for mitt restraints. Pt required assist for bed mob, functional transfers/stand pivot transfers 2* decreased strength, coordination and balance. Pt was able to tolerate sitting out of bed well to bedside chair for feeding. Pt will require post acute rehab service to continue to address these above noted pt deficit which currently impair pt ADL and functional mob.  Pt set up to bedside chair post session, chair alarm activated and all needs within pt reach   Plan   Treatment Interventions ADL retraining;Patient/family training;Functional transfer training   Goal Expiration Date 08/29/23   OT Treatment Day 2   OT Frequency 3-5x/wk   Recommendation   OT Discharge Recommendation Post acute rehabilitation services   AM-PAC Daily Activity Inpatient   Lower Body Dressing 2   Bathing 2   Toileting 2   Upper Body Dressing 2   Grooming 3   Eating 3   Daily Activity Raw Score 14   Daily Activity Standardized Score (Calc for Raw Score >=11) 33.39   AM-PAC Applied Cognition Inpatient   Following a Speech/Presentation 3   Understanding Ordinary Conversation 4   Taking Medications 3   Remembering Where Things Are Placed or Put Away 3   Remembering List of 4-5 Errands 3   Taking Care of Complicated Tasks 3   Applied Cognition Raw Score 19   Applied Cognition Standardized Score 39.77   Barthel Index   Grooming Score 0   Dressing Score 0   Toilet Use Score 5   Transfers (Bed/Chair) Score 5   Mobility (Level Surface) Score 0       [place]  CUNHA/L

## 2023-08-19 LAB
ATRIAL RATE: 64 BPM
ATRIAL RATE: 68 BPM
ATRIAL RATE: 68 BPM
ATRIAL RATE: 69 BPM
ATRIAL RATE: 70 BPM
ATRIAL RATE: 71 BPM
ATRIAL RATE: 72 BPM
ATRIAL RATE: 73 BPM
GLUCOSE SERPL-MCNC: 166 MG/DL (ref 65–140)
GLUCOSE SERPL-MCNC: 168 MG/DL (ref 65–140)
GLUCOSE SERPL-MCNC: 180 MG/DL (ref 65–140)
GLUCOSE SERPL-MCNC: 201 MG/DL (ref 65–140)
P AXIS: 50 DEGREES
P AXIS: 51 DEGREES
P AXIS: 53 DEGREES
P AXIS: 53 DEGREES
P AXIS: 54 DEGREES
P AXIS: 55 DEGREES
PR INTERVAL: 180 MS
PR INTERVAL: 182 MS
PR INTERVAL: 182 MS
PR INTERVAL: 184 MS
PR INTERVAL: 184 MS
PR INTERVAL: 186 MS
QRS AXIS: 10 DEGREES
QRS AXIS: 13 DEGREES
QRS AXIS: 13 DEGREES
QRS AXIS: 14 DEGREES
QRS AXIS: 15 DEGREES
QRS AXIS: 17 DEGREES
QRSD INTERVAL: 80 MS
QRSD INTERVAL: 82 MS
QRSD INTERVAL: 82 MS
QRSD INTERVAL: 90 MS
QRSD INTERVAL: 94 MS
QRSD INTERVAL: 96 MS
QT INTERVAL: 404 MS
QT INTERVAL: 406 MS
QT INTERVAL: 408 MS
QT INTERVAL: 410 MS
QT INTERVAL: 412 MS
QT INTERVAL: 416 MS
QTC INTERVAL: 425 MS
QTC INTERVAL: 435 MS
QTC INTERVAL: 437 MS
QTC INTERVAL: 442 MS
QTC INTERVAL: 442 MS
QTC INTERVAL: 444 MS
QTC INTERVAL: 445 MS
QTC INTERVAL: 445 MS
T WAVE AXIS: 34 DEGREES
T WAVE AXIS: 36 DEGREES
T WAVE AXIS: 37 DEGREES
T WAVE AXIS: 38 DEGREES
T WAVE AXIS: 38 DEGREES
T WAVE AXIS: 44 DEGREES
T WAVE AXIS: 44 DEGREES
T WAVE AXIS: 46 DEGREES
VENTRICULAR RATE: 64 BPM
VENTRICULAR RATE: 68 BPM
VENTRICULAR RATE: 68 BPM
VENTRICULAR RATE: 69 BPM
VENTRICULAR RATE: 70 BPM
VENTRICULAR RATE: 71 BPM
VENTRICULAR RATE: 72 BPM
VENTRICULAR RATE: 73 BPM

## 2023-08-19 PROCEDURE — 82948 REAGENT STRIP/BLOOD GLUCOSE: CPT

## 2023-08-19 PROCEDURE — 93010 ELECTROCARDIOGRAM REPORT: CPT | Performed by: INTERNAL MEDICINE

## 2023-08-19 PROCEDURE — 99232 SBSQ HOSP IP/OBS MODERATE 35: CPT | Performed by: PSYCHIATRY & NEUROLOGY

## 2023-08-19 PROCEDURE — 93005 ELECTROCARDIOGRAM TRACING: CPT

## 2023-08-19 PROCEDURE — 99232 SBSQ HOSP IP/OBS MODERATE 35: CPT | Performed by: INTERNAL MEDICINE

## 2023-08-19 PROCEDURE — C9113 INJ PANTOPRAZOLE SODIUM, VIA: HCPCS | Performed by: INTERNAL MEDICINE

## 2023-08-19 RX ADMIN — DOCUSATE SODIUM 100 MG: 100 CAPSULE ORAL at 08:21

## 2023-08-19 RX ADMIN — HYDRALAZINE HYDROCHLORIDE 25 MG: 25 TABLET, FILM COATED ORAL at 05:20

## 2023-08-19 RX ADMIN — HEPARIN SODIUM 5000 UNITS: 5000 INJECTION INTRAVENOUS; SUBCUTANEOUS at 21:20

## 2023-08-19 RX ADMIN — CHLORHEXIDINE GLUCONATE 15 ML: 1.2 SOLUTION ORAL at 08:20

## 2023-08-19 RX ADMIN — LOSARTAN POTASSIUM 100 MG: 50 TABLET, FILM COATED ORAL at 08:20

## 2023-08-19 RX ADMIN — PANTOPRAZOLE SODIUM 40 MG: 40 INJECTION, POWDER, FOR SOLUTION INTRAVENOUS at 08:20

## 2023-08-19 RX ADMIN — FERROUS SULFATE TAB 325 MG (65 MG ELEMENTAL FE) 325 MG: 325 (65 FE) TAB at 08:21

## 2023-08-19 RX ADMIN — AMLODIPINE BESYLATE 10 MG: 10 TABLET ORAL at 08:21

## 2023-08-19 RX ADMIN — GABAPENTIN 300 MG: 300 CAPSULE ORAL at 08:21

## 2023-08-19 RX ADMIN — HEPARIN SODIUM 5000 UNITS: 5000 INJECTION INTRAVENOUS; SUBCUTANEOUS at 05:20

## 2023-08-19 RX ADMIN — OXYCODONE HYDROCHLORIDE 5 MG: 5 TABLET ORAL at 16:14

## 2023-08-19 RX ADMIN — BUPROPION HYDROCHLORIDE 150 MG: 150 TABLET, FILM COATED, EXTENDED RELEASE ORAL at 08:21

## 2023-08-19 RX ADMIN — OXYCODONE HYDROCHLORIDE 5 MG: 5 TABLET ORAL at 10:23

## 2023-08-19 RX ADMIN — CHLORHEXIDINE GLUCONATE 15 ML: 1.2 SOLUTION ORAL at 21:20

## 2023-08-19 RX ADMIN — OXYCODONE HYDROCHLORIDE 10 MG: 10 TABLET ORAL at 21:20

## 2023-08-19 RX ADMIN — ATORVASTATIN CALCIUM 20 MG: 20 TABLET, FILM COATED ORAL at 16:14

## 2023-08-19 RX ADMIN — FLUTICASONE FUROATE AND VILANTEROL TRIFENATATE 1 PUFF: 200; 25 POWDER RESPIRATORY (INHALATION) at 12:47

## 2023-08-19 RX ADMIN — GABAPENTIN 300 MG: 300 CAPSULE ORAL at 21:20

## 2023-08-19 RX ADMIN — HYDRALAZINE HYDROCHLORIDE 25 MG: 25 TABLET, FILM COATED ORAL at 21:27

## 2023-08-19 RX ADMIN — HEPARIN SODIUM 5000 UNITS: 5000 INJECTION INTRAVENOUS; SUBCUTANEOUS at 13:49

## 2023-08-19 RX ADMIN — GABAPENTIN 300 MG: 300 CAPSULE ORAL at 16:14

## 2023-08-19 RX ADMIN — CYANOCOBALAMIN TAB 500 MCG 1000 MCG: 500 TAB at 08:21

## 2023-08-19 RX ADMIN — DOCUSATE SODIUM 100 MG: 100 CAPSULE ORAL at 16:14

## 2023-08-19 RX ADMIN — HYDRALAZINE HYDROCHLORIDE 25 MG: 25 TABLET, FILM COATED ORAL at 13:48

## 2023-08-19 RX ADMIN — INSULIN LISPRO 1 UNITS: 100 INJECTION, SOLUTION INTRAVENOUS; SUBCUTANEOUS at 12:47

## 2023-08-19 RX ADMIN — INSULIN LISPRO 2 UNITS: 100 INJECTION, SOLUTION INTRAVENOUS; SUBCUTANEOUS at 17:12

## 2023-08-19 NOTE — PROGRESS NOTES
NEUROLOGY RESIDENCY PROGRESS NOTE     Name: Candis Flores   Age & Sex: 62 y.o. female   MRN: [id number]  Unit/Bed#: [facility] 711-01   Encounter: [id number]    Recommendations for outpatient neurological follow up have yet to be determined. Pending for discharge: Clinical Improvement    ASSESSMENT & PLAN     * ICH (intracerebral hemorrhage) ([address])  Assessment & Plan  62 YOF with history of HTN, DM2, Sjogren's, fibromyalgia initially presented to [facility] as stroke alert on 8/14/23 with initial presenting symptoms acute onset of right sided weakness, facial asymmetry, and dysarthria while at work. Initial imaging with CTH/CTA HN revealed left thalamocapsular bleed with no significant shift or mass effect. . NIHSS 14. Not TNK candidate given acute ICH. Pt given IV labetalol, started on Cardene drip, administered DDAVP for ASA reversal and transferred to [facility]g care. - Initial /97  - AC/AP prior to admission: ASA 81 QD, s/p reversal with DDAVP   - Neuro History: No prior stroke history  - Vascular risk factors: former smoker, elevated cholesterol, diabetes and weight     NIHSS: At time of stroke alert at [facility] NIHSS 14,  at arrival to B NIHSS 10  Modified Kamari Score: 0 No baseline symptoms/disability  ICH Score: 0   FUNC Score: 10     WORK UP:  - CTH: Acute parenchymal hematoma (aprox 5.4 mL) in L posterior striatocapsular region extending into L thalamus with mild surrounding vasogenic edema  - CTA H/N: No LVO, dissection, AVM, aneurysm, or high grade stenosis  - CTH 6HR: Stable hemorrhage and surrounding vasogenic edema in L corona radiata, posterior limb of internal capsule, and thalamus  - CTH 8/17:  Evolving acute L corona radiata intraparenchymal hemorrhage, stable in size. Minimally increased regional vasogenic edema. No shift.  - TTE: EF 65%, LA/RA normal size  - Labs: HbgA1c 6.7, LDL 14,B12 221 KIRSTY-     IMPRESSION: Left thalamocapsular ICH likely 2/2 HTN.  Of note, patient fell and hit her head on  and rapid response was called. STAT was stable and neurological exam is also stable.     PLAN:  - Frequent neuro checks per protocol  - BP Goals: SBP < 140  - Clear to start DVT PPx ()  - Recommend repeat CTH on  and if stable can restart home ASA  - PT/OT/ST/PMR  - CM Consulted for disposition needs  - Stroke education and counseling  - NSg signed off given no need for surgical interventions   - Rest of Care per Primary      SUBJECTIVE     Patient is a 19-year-old female who presented to the ED as a result of an intracerebral hemorrhage. Today, patient states that she feels well today. She denies any other weakness other than what she already had. She denies headaches or vision changes. Patient was seen and examined. N    In the evening of 23, patient did have a fall where she hit her head and a rapid response was initiated. CTH was stable after the fall in regards to her [address] [place] [address]. Review of Systems   Constitutional: Negative for fatigue and fever. Neurological: Positive for facial asymmetry, weakness and numbness. Negative for dizziness, tremors, seizures, syncope, speech difficulty, light-headedness and headaches. OBJECTIVE     Patient ID: Honorio Rodriguez is a 62 y.o. female. Vitals:    23 0514 23 0726 23 1348 23 1348   BP:  129/74 134/77 134/77   BP Location:       Pulse: 70 77  81   Resp:       Temp:  99.2 °F (37.3 °C)     TempSrc:       SpO2: 97% 97%  95%   Weight:       Height:          Temperature:   Temp (24hrs), Av.5 °F (36.9 °C), Min:98 °F (36.7 °C), Max:99.2 °F (37.3 °C)    Temperature: 99.2 °F (37.3 °C)      Physical Exam  Constitutional:       Appearance: She is obese. She is ill-appearing. HENT:      Head: Normocephalic and atraumatic. Eyes:      Extraocular Movements: EOM normal.      Pupils: Pupils are equal, round, and reactive to light. Cardiovascular:      Rate and Rhythm: Normal rate.    Pulmonary:      Effort: Pulmonary effort is normal. No respiratory distress. Musculoskeletal:      Right lower leg: No edema. Left lower leg: No edema. Skin:     General: Skin is warm. Neurological:      Mental Status: She is alert and oriented to person, place, and time. Coordination: Finger-Nose-Finger Test (Unable to test on R, intact on L) normal.      Deep Tendon Reflexes:      Reflex Scores:       Tricep reflexes are 2+ on the right side and 2+ on the left side. Bicep reflexes are 2+ on the right side and 2+ on the left side. Brachioradialis reflexes are 2+ on the right side and 2+ on the left side. Patellar reflexes are 2+ on the right side and 2+ on the left side. Achilles reflexes are 2+ on the right side and 2+ on the left side. Psychiatric:         Mood and Affect: Mood normal.         Speech: Speech normal.         Behavior: Behavior normal.          Neurologic Exam     Mental Status   Oriented to person, place, and time. Attention: normal. Concentration: normal.   Speech: speech is normal   Level of consciousness: alert  Knowledge: good. Cranial Nerves     CN II   Visual fields full to confrontation. CN III, IV, VI   Pupils are equal, round, and reactive to light. Extraocular motions are normal.   Nystagmus: none   Upgaze: normal  Downgaze: normal  Conjugate gaze: present    CN V   Facial sensation intact. CN VII   Right facial weakness: Mild R facial droop. CN VIII   CN VIII normal.     CN IX, X   CN IX normal.   CN X normal.     CN XI   Right sternocleidomastoid strength: weak  Right trapezius strength: weak    CN XII   CN XII normal.     Motor Exam   Muscle bulk: decreased  Overall muscle tone: normal  Right arm tone: normal  Left arm tone: normal  Right arm pronator drift: Unable to test.  Left arm pronator drift: absent  Right leg tone: normal  Left leg tone: normal  R-sided Extremities: 0/5  L-sided Extremities: 5/5     Sensory Exam   Right arm light touch: No sensation.   Left arm light touch: normal  Right leg light touch: No sensation. Left leg light touch: normal    Gait, Coordination, and Reflexes     Coordination   Finger to nose coordination: normal (Unable to test on R, intact on L)    Reflexes   Right brachioradialis: 2+  Left brachioradialis: 2+  Right biceps: 2+  Left biceps: 2+  Right triceps: 2+  Left triceps: 2+  Right patellar: 2+  Left patellar: 2+  Right achilles: 2+  Left achilles: 2+      LABORATORY DATA     Labs: I have personally reviewed pertinent reports. Results from last 7 days   Lab Units 08/18/23  0549 08/16/23  0523 08/15/23  0434 08/14/23  1714   WBC Thousand/uL 12.51* 8.94 10.98* 11.26*   HEMOGLOBIN g/dL 11.9 10.4* 9.7* 10.9*   HEMATOCRIT % 37.2 32.5* 30.9* 34.5*   PLATELETS Thousands/uL 355 326 302 353   NEUTROS PCT %  --   --  68 49   MONOS PCT %  --   --  8 9   EOS PCT %  --   --  1 7*      Results from last 7 days   Lab Units 08/18/23  0549 08/16/23  0523 08/15/23  0434 08/14/23  1714   SODIUM mmol/L 137 132* 139 136   POTASSIUM mmol/L 4.3 3.5 3.4* 3.8   CHLORIDE mmol/L 105 102 105 100   CO2 mmol/L 25 25 27 28   BUN mg/dL 16 9 13 17   CREATININE mg/dL 0.92 0.53* 0.58* 0.71   CALCIUM mg/dL 9.6 8.7 8.5 9.2   ALK PHOS U/L 80  --   --  70   ALT U/L 20  --   --  15   AST U/L 14  --   --  17     Results from last 7 days   Lab Units 08/16/23  0523 08/15/23  0434 08/14/23  1714   MAGNESIUM mg/dL 2.1 3.0* 1.5*     Results from last 7 days   Lab Units 08/16/23  0523 08/15/23  0434 08/14/23  1714   PHOSPHORUS mg/dL 2.9 3.0 4.3      Results from last 7 days   Lab Units 08/15/23  0434 08/14/23  1714   INR  1.06 0.94   PTT seconds 28 27               IMAGING & DIAGNOSTIC TESTING     Radiology Results: I have personally reviewed pertinent reports. and I have personally reviewed pertinent films in PACS    XR shoulder 2+ vw right   Final Result by Kristopher Nicholson MD (08/19 6936)      No acute osseous abnormality.       Workstation performed: [id number]         CT head wo contrast Final Result by Spring Campo MD (08/18 2107)      Stable appearance of acute intraparenchymal hematoma centered at the left thalamocapsular junction with superior extension into the left corona radiata and inferior extension into the left cerebral peduncle with surrounding vasogenic edema                  Workstation performed: [id number]         CT spine cervical wo contrast   Final Result by Spring Campo MD (08/18 2249)      No cervical spine fracture or traumatic malalignment. Workstation performed: [id number]         CT head wo contrast   Final Result by Zorita Seip, MD (69/75 2929)      Evolving acute intraparenchymal hematoma in left thalamocapsular junction with superior extension into left corona radiata and inferior extension towards left cerebral peduncle with moderate surrounding vasogenic edema, similar to prior exam. Similar    slight mass effect on third ventricle. No new acute intracranial abnormality. Workstation performed: [id number]         XR hips bilateral 3-4 vw w pelvis if performed   Final Result by Blake Boo MD (08/17 1314)      No acute osseous abnormality. No significant degenerative changes of the right or left hip. Workstation performed: [id number]         CT head wo contrast   Final Result by Laura Panchal DO (08/17 0208)      Evolving acute left corona radiata intraparenchymal hemorrhage, stable in size. Minimally increased regional vasogenic edema as detailed above. No midline shift. This study was marked for "Immediate" notification in EPIC. Workstation performed: [id number]         VAS lower limb venous duplex study, complete bilateral   Final Result by Kirk Davenport MD (08/16 2249)      MRI follow up   Final Result by Dejah Mckinnon MD (08/16 1629)      1. Stable left thalamocapsular intraparenchymal hemorrhage with surrounding edema. 2. No acute infarction or mass effect. Workstation performed: [id number]         CT head wo contrast   Final Result by Marissa Meléndez MD (08/15 2150)      Stable hemorrhage and surrounding vasogenic edema in the left corona radiata, posterior limb of the internal capsule and thalamus. Workstation performed: [id number]             Other Diagnostic Testing: I have personally reviewed pertinent reports.       ACTIVE MEDICATIONS     Current Facility-Administered Medications   Medication Dose Route Frequency   • amLODIPine (NORVASC) tablet 10 mg  10 mg Oral Daily   • atorvastatin (LIPITOR) tablet 20 mg  20 mg Oral Daily With Dinner   • bisacodyl (DULCOLAX) EC tablet 5 mg  5 mg Oral Daily PRN   • buPROPion (WELLBUTRIN XL) 24 hr tablet 150 mg  150 mg Oral QAM   • chlorhexidine (PERIDEX) 0.12 % oral rinse 15 mL  15 mL Mouth/Throat Q12H [place]   • cyanocobalamin (VITAMIN B-12) tablet 1,000 mcg  1,000 mcg Oral Daily   • docusate sodium (COLACE) capsule 100 mg  100 mg Oral BID   • ferrous sulfate tablet 325 mg  325 mg Oral Daily With Breakfast   • fluticasone-vilanterol 200-25 mcg/actuation 1 puff  1 puff Inhalation Daily   • gabapentin (NEURONTIN) capsule 300 mg  300 mg Oral TID   • heparin (porcine) subcutaneous injection 5,000 Units  5,000 Units Subcutaneous Q8H [address]   • hydrALAZINE (APRESOLINE) injection 5 mg  5 mg Intravenous Q6H PRN   • hydrALAZINE (APRESOLINE) tablet 25 mg  25 mg Oral Q8H [address]   • HYDROmorphone HCl (DILAUDID) injection 0.2 mg  0.2 mg Intravenous Q4H PRN   • insulin lispro (HumaLOG) 100 units/mL subcutaneous injection 1-6 Units  1-6 Units Subcutaneous TID With Meals   • losartan (COZAAR) tablet 100 mg  100 mg Oral Daily   • methocarbamol (ROBAXIN) tablet 500 mg  500 mg Oral Q6H PRN   • ondansetron (ZOFRAN) injection 4 mg  4 mg Intravenous Q4H PRN   • oxyCODONE (ROXICODONE) immediate release tablet 10 mg  10 mg Oral Q4H PRN   • oxyCODONE (ROXICODONE) IR tablet 5 mg  5 mg Oral Q4H PRN   • pantoprazole (PROTONIX) injection 40 mg 40 mg Intravenous Q24H [facility]   • prochlorperazine (COMPAZINE) tablet 5 mg  5 mg Oral Q6H PRN       Prior to Admission medications    Medication Sig Start Date End Date Taking? Authorizing Provider   albuterol (2.5 mg/3 mL) 0.083 % nebulizer solution Take 3 mL (2.5 mg total) by nebulization every 4 (four) hours as needed for wheezing or shortness of breath 4/8/23   Alda Erwin MD   albuterol (Ventolin HFA) 90 mcg/act inhaler Inhale 2 puffs every 6 (six) hours as needed for wheezing 8/4/23   ARSENIO Ybarra   Aspirin Low Dose 81 MG EC tablet Take 1 tablet by mouth once daily 8/4/23   Alda Erwin MD   atenolol (TENORMIN) 50 mg tablet Take 1 tablet (50 mg total) by mouth daily 8/4/23   ARSENIO Abraham   buPROPion (WELLBUTRIN XL) 300 mg 24 hr tablet Take 1 tablet (300 mg total) by mouth every morning 7/26/23 7/20/24  ARSENIO Abraham   fluticasone-vilanterol (Breo Ellipta) 200-25 mcg/actuation inhaler Inhale 1 puff daily Rinse mouth after use.  8/4/23 11/2/23  ARSENIO Abraham   gabapentin (Neurontin) 300 mg capsule Take 1 capsule (300 mg total) by mouth 3 (three) times a day 7/26/23   ARSENIO Abraham   metFORMIN (GLUCOPHAGE) 1000 MG tablet Take 1 tablet (1,000 mg total) by mouth 2 (two) times a day with meals 7/26/23 8/25/23  ARSENIO Abraham   naproxen (NAPROSYN) 500 mg tablet Take 0.5 tablets (250 mg total) by mouth 2 (two) times a day with meals  Patient not taking: Reported on 7/26/2023 4/3/23   Melo Iglesias DO   rosuvastatin (CRESTOR) 10 MG tablet Take 1 tablet (10 mg total) by mouth daily 7/26/23   ARSENIO Abraham   telmisartan (MICARDIS) 80 MG tablet Take 1 tablet (80 mg total) by mouth daily 7/26/23   ARSENIO Abraham         VTE Pharmacologic Prophylaxis: Heparin  VTE Mechanical Prophylaxis: sequential compression device and foot pump applied    ==  DO [facility] Res[facility], PGY-2

## 2023-08-19 NOTE — PLAN OF CARE
Problem: PAIN - ADULT  Goal: Verbalizes/displays adequate comfort level or baseline comfort level  Description: Interventions:  - Encourage patient to monitor pain and request assistance  - Assess pain using appropriate pain scale  - Administer analgesics based on type and severity of pain and evaluate response  - Implement non-pharmacological measures as appropriate and evaluate response  - Consider cultural and social influences on pain and pain management  - Notify physician/advanced practitioner if interventions unsuccessful or patient reports new pain  Outcome: Progressing     Problem: SAFETY ADULT  Goal: Maintain or return to baseline ADL function  Description: INTERVENTIONS:  -  Assess patient's ability to carry out ADLs; assess patient's baseline for ADL function and identify physical deficits which impact ability to perform ADLs (bathing, care of mouth/teeth, toileting, grooming, dressing, etc.)  - Assess/evaluate cause of self-care deficits   - Assess range of motion  - Assess patient's mobility; develop plan if impaired  - Assess patient's need for assistive devices and provide as appropriate  - Encourage maximum independence but intervene and supervise when necessary  - Involve family in performance of ADLs  - Assess for home care needs following discharge   - Consider OT consult to assist with ADL evaluation and planning for discharge  - Provide patient education as appropriate  Outcome: Progressing

## 2023-08-19 NOTE — ED ATTENDING ATTESTATION
8/14/2023  Reese Bryan, DO, saw and evaluated the patient. I have discussed the patient with the resident/non-physician practitioner and agree with the resident's/non-physician practitioner's findings, Plan of Care, and MDM as documented in the resident's/non-physician practitioner's note, except where noted. All available labs and Radiology studies were reviewed. I was present for key portions of any procedure(s) performed by the resident/non-physician practitioner and I was immediately available to provide assistance. At this point I agree with the current assessment done in the Emergency Department.   I have conducted an independent evaluation of this patient a history and physical is as follows:    ED Course         Critical Care Time  Procedures

## 2023-08-20 ENCOUNTER — APPOINTMENT (INPATIENT)
Dept: RADIOLOGY | Facility: HOSPITAL | Age: 58
DRG: 064 | End: 2023-08-20
Payer: COMMERCIAL

## 2023-08-20 LAB
ATRIAL RATE: 72 BPM
ATRIAL RATE: 74 BPM
ATRIAL RATE: 76 BPM
ATRIAL RATE: 77 BPM
GLUCOSE SERPL-MCNC: 153 MG/DL (ref 65–140)
GLUCOSE SERPL-MCNC: 162 MG/DL (ref 65–140)
GLUCOSE SERPL-MCNC: 259 MG/DL (ref 65–140)
P AXIS: 48 DEGREES
P AXIS: 49 DEGREES
P AXIS: 51 DEGREES
P AXIS: 52 DEGREES
PR INTERVAL: 176 MS
PR INTERVAL: 180 MS
PR INTERVAL: 180 MS
PR INTERVAL: 188 MS
QRS AXIS: 18 DEGREES
QRS AXIS: 25 DEGREES
QRS AXIS: 29 DEGREES
QRS AXIS: 30 DEGREES
QRSD INTERVAL: 84 MS
QRSD INTERVAL: 84 MS
QRSD INTERVAL: 86 MS
QRSD INTERVAL: 88 MS
QT INTERVAL: 392 MS
QT INTERVAL: 394 MS
QT INTERVAL: 396 MS
QT INTERVAL: 396 MS
QTC INTERVAL: 433 MS
QTC INTERVAL: 437 MS
QTC INTERVAL: 441 MS
QTC INTERVAL: 448 MS
T WAVE AXIS: 26 DEGREES
T WAVE AXIS: 29 DEGREES
T WAVE AXIS: 33 DEGREES
T WAVE AXIS: 39 DEGREES
VENTRICULAR RATE: 72 BPM
VENTRICULAR RATE: 74 BPM
VENTRICULAR RATE: 76 BPM
VENTRICULAR RATE: 77 BPM

## 2023-08-20 PROCEDURE — 99232 SBSQ HOSP IP/OBS MODERATE 35: CPT | Performed by: INTERNAL MEDICINE

## 2023-08-20 PROCEDURE — 93005 ELECTROCARDIOGRAM TRACING: CPT

## 2023-08-20 PROCEDURE — 82948 REAGENT STRIP/BLOOD GLUCOSE: CPT

## 2023-08-20 PROCEDURE — 93010 ELECTROCARDIOGRAM REPORT: CPT | Performed by: INTERNAL MEDICINE

## 2023-08-20 PROCEDURE — 99232 SBSQ HOSP IP/OBS MODERATE 35: CPT | Performed by: PSYCHIATRY & NEUROLOGY

## 2023-08-20 PROCEDURE — C9113 INJ PANTOPRAZOLE SODIUM, VIA: HCPCS | Performed by: INTERNAL MEDICINE

## 2023-08-20 PROCEDURE — G1004 CDSM NDSC: HCPCS

## 2023-08-20 PROCEDURE — 70450 CT HEAD/BRAIN W/O DYE: CPT

## 2023-08-20 RX ADMIN — AMLODIPINE BESYLATE 10 MG: 10 TABLET ORAL at 08:43

## 2023-08-20 RX ADMIN — OXYCODONE HYDROCHLORIDE 10 MG: 10 TABLET ORAL at 11:17

## 2023-08-20 RX ADMIN — HYDRALAZINE HYDROCHLORIDE 25 MG: 25 TABLET, FILM COATED ORAL at 14:00

## 2023-08-20 RX ADMIN — INSULIN LISPRO 3 UNITS: 100 INJECTION, SOLUTION INTRAVENOUS; SUBCUTANEOUS at 11:21

## 2023-08-20 RX ADMIN — OXYCODONE HYDROCHLORIDE 5 MG: 5 TABLET ORAL at 20:25

## 2023-08-20 RX ADMIN — BUPROPION HYDROCHLORIDE 150 MG: 150 TABLET, FILM COATED, EXTENDED RELEASE ORAL at 08:43

## 2023-08-20 RX ADMIN — LOSARTAN POTASSIUM 100 MG: 50 TABLET, FILM COATED ORAL at 08:43

## 2023-08-20 RX ADMIN — CYANOCOBALAMIN TAB 500 MCG 1000 MCG: 500 TAB at 08:42

## 2023-08-20 RX ADMIN — INSULIN LISPRO 1 UNITS: 100 INJECTION, SOLUTION INTRAVENOUS; SUBCUTANEOUS at 08:49

## 2023-08-20 RX ADMIN — HEPARIN SODIUM 5000 UNITS: 5000 INJECTION INTRAVENOUS; SUBCUTANEOUS at 06:58

## 2023-08-20 RX ADMIN — HYDRALAZINE HYDROCHLORIDE 25 MG: 25 TABLET, FILM COATED ORAL at 07:01

## 2023-08-20 RX ADMIN — CHLORHEXIDINE GLUCONATE 15 ML: 1.2 SOLUTION ORAL at 08:43

## 2023-08-20 RX ADMIN — HYDRALAZINE HYDROCHLORIDE 25 MG: 25 TABLET, FILM COATED ORAL at 22:29

## 2023-08-20 RX ADMIN — METHOCARBAMOL 500 MG: 500 TABLET ORAL at 17:48

## 2023-08-20 RX ADMIN — FLUTICASONE FUROATE AND VILANTEROL TRIFENATATE 1 PUFF: 200; 25 POWDER RESPIRATORY (INHALATION) at 08:48

## 2023-08-20 RX ADMIN — FERROUS SULFATE TAB 325 MG (65 MG ELEMENTAL FE) 325 MG: 325 (65 FE) TAB at 08:48

## 2023-08-20 RX ADMIN — DOCUSATE SODIUM 100 MG: 100 CAPSULE ORAL at 17:36

## 2023-08-20 RX ADMIN — GABAPENTIN 300 MG: 300 CAPSULE ORAL at 08:43

## 2023-08-20 RX ADMIN — PANTOPRAZOLE SODIUM 40 MG: 40 INJECTION, POWDER, FOR SOLUTION INTRAVENOUS at 08:43

## 2023-08-20 RX ADMIN — BISACODYL 5 MG: 5 TABLET, COATED ORAL at 17:48

## 2023-08-20 RX ADMIN — GABAPENTIN 300 MG: 300 CAPSULE ORAL at 17:36

## 2023-08-20 RX ADMIN — ATORVASTATIN CALCIUM 20 MG: 20 TABLET, FILM COATED ORAL at 17:36

## 2023-08-20 RX ADMIN — DOCUSATE SODIUM 100 MG: 100 CAPSULE ORAL at 08:43

## 2023-08-20 RX ADMIN — OXYCODONE HYDROCHLORIDE 5 MG: 5 TABLET ORAL at 07:00

## 2023-08-20 RX ADMIN — GABAPENTIN 300 MG: 300 CAPSULE ORAL at 20:25

## 2023-08-20 RX ADMIN — INSULIN LISPRO 1 UNITS: 100 INJECTION, SOLUTION INTRAVENOUS; SUBCUTANEOUS at 17:36

## 2023-08-20 RX ADMIN — OXYCODONE HYDROCHLORIDE 5 MG: 5 TABLET ORAL at 02:34

## 2023-08-20 RX ADMIN — HEPARIN SODIUM 5000 UNITS: 5000 INJECTION INTRAVENOUS; SUBCUTANEOUS at 22:29

## 2023-08-20 RX ADMIN — HEPARIN SODIUM 5000 UNITS: 5000 INJECTION INTRAVENOUS; SUBCUTANEOUS at 14:00

## 2023-08-20 NOTE — ASSESSMENT & PLAN NOTE
History of CAD based on prior CTA imaging.  -On statin  -Holding ASA for now due to [address] - neuro to r/v CT head done today to determine if ASA can be restarted  -Holding beta blockade due to episodes of bradycardia

## 2023-08-20 NOTE — ASSESSMENT & PLAN NOTE
62 YOF with history of HTN, DM2, Sjogren's, fibromyalgia initially presented to [facility] as stroke alert on 8/14/23 with initial presenting symptoms acute onset of right sided weakness, facial asymmetry, and dysarthria while at work. Initial imaging with CTH/CTA HN revealed left thalamocapsular bleed with no significant shift or mass effect. . NIHSS 14. Not TNK candidate given acute ICH. Pt given IV labetalol, started on Cardene drip, administered DDAVP for ASA reversal and transferred to [facility] for NSg care. -Was seen by NSGY, no surgical intervention at this time  -Neurology following  -neuro checks q4h  -SBP goal < 160 mmHg, preferably < 140 mmHg  -CT head was done yesterday for change in exam and worsening and waxing slurred speech which was reviewed by neuro, the images which does show stable left sided hemorrhage without any midline shift.    -PT/OT following, recommended post acute rehab with PMR following  -Holding ASA for now  -Diet restarted- cleared for regular with thin liquids   -Stat CTH for drop in GCS score of 2 points or more in > 1 hour

## 2023-08-20 NOTE — PROGRESS NOTES
NEUROLOGY RESIDENCY PROGRESS NOTE     Name: Paola Roblero   Age & Sex: 62 y.o. female   MRN: [id number]  Unit/Bed#: [facility] 711-01   Encounter: [id number]    Recommendations for outpatient neurological follow up have yet to be determined. Pending for discharge: Clinical Improvement    ASSESSMENT & PLAN     * ICH (intracerebral hemorrhage) ([address])  Assessment & Plan  62 YOF with history of HTN, DM2, Sjogren's, fibromyalgia initially [facility] as stroke alert on 8/14/23 with initial presenting symptoms acute onset of right sided weakness, facial asymmetry, and dysarthria while at work. Initial imaging with CTH/CTA HN revealed left thalamocapsular bleed with no significant shift or mass effect. . NIHSS 14. Not TNK candidate given acute ICH. Pt given IV labetalol, started on Cardene drip, administered DDAVP for ASA reversal and transferred to [facility] for NSg care. - Initial /97  - AC/AP prior to admission: ASA 81 QD, s/p reversal with DDAVP   - Neuro History: No prior stroke history  - Vascular risk factors: former smoker, elevated cholesterol, diabetes and weight     NIHSS: At time of stroke alert at [facility] NIHSS 14,  at arrival to B NIHSS 10  Modified Bristol Score: 0 No baseline symptoms/disability  ICH Score: 0   FUNC Score: 10     WORK UP:  - CTH: Acute parenchymal hematoma (aprox 5.4 mL) in L posterior striatocapsular region extending into L thalamus with mild surrounding vasogenic edema  - CTA H/N: No LVO, dissection, AVM, aneurysm, or high grade stenosis  - CTH 6HR: Stable hemorrhage and surrounding vasogenic edema in L corona radiata, posterior limb of internal capsule, and thalamus  - CTH 8/17:  Evolving acute L corona radiata intraparenchymal hemorrhage, stable in size. Minimally increased regional vasogenic edema. No shift.  - TTE: EF 65%, LA/RA normal size  - Labs: HbgA1c 6.7, LDL 14,B12 221 KIRSTY-     IMPRESSION: Left thalamocapsular ICH likely 2/2 HTN.  Of note, patient fell and hit her head on  and rapid response was called. STAT was stable and neurological exam is also stable.     PLAN:  - Frequent neuro checks per protocol  - BP Goals: SBP < 140  - Clear to start DVT PPx ()  - Repeat CT today if sable can restart home ASA  - PT/OT/ST/PMR  - CM Consulted for disposition needs  - Stroke education and counseling  - NSg signed off given no need for surgical interventions   - Rest of Care per Primary      SUBJECTIVE     Patient is a 40-year-old female who presented to the ED as a result of an intracerebral hemorrhage. Today, patient states that she feels well today. She denies any other weakness other than what she already had. She denies headaches or vision changes. Patient was seen and examined. In the evening of 23, patient did have a fall where she hit her head and a rapid response was initiated. CTH was stable after the fall in regards to her [address] [place] [address]. Review of Systems   Constitutional: Negative for fatigue and fever. Neurological: Positive for facial asymmetry, weakness and numbness. Negative for dizziness, tremors, seizures, syncope, speech difficulty, light-headedness and headaches. OBJECTIVE     Patient ID: Keara Thurston is a 62 y.o. female. Vitals:    23 0534 23 0700 23 0840 23 0841   BP:  130/79 130/81 130/81   BP Location:       Pulse:  75 73 72   Resp:    14   Temp:   99.2 °F (37.3 °C) 99.2 °F (37.3 °C)   TempSrc:       SpO2:  92% 96% 94%   Weight: 95.3 kg (210 lb 1.6 oz)      Height:          Temperature:   Temp (24hrs), Av.6 °F (37 °C), Min:98 °F (36.7 °C), Max:99.2 °F (37.3 °C)    Temperature: 99.2 °F (37.3 °C)      Physical Exam  Constitutional:       Appearance: She is obese. She is ill-appearing. HENT:      Head: Normocephalic and atraumatic. Eyes:      Extraocular Movements: EOM normal.      Pupils: Pupils are equal, round, and reactive to light. Cardiovascular:      Rate and Rhythm: Normal rate.    Pulmonary:      Effort: Pulmonary effort is normal. No respiratory distress. Musculoskeletal:      Right lower leg: No edema. Left lower leg: No edema. Skin:     General: Skin is warm. Neurological:      Mental Status: She is alert and oriented to person, place, and time. Coordination: Finger-Nose-Finger Test (Unable to test on R, intact on L) normal.      Deep Tendon Reflexes:      Reflex Scores:       Tricep reflexes are 2+ on the right side and 2+ on the left side. Bicep reflexes are 2+ on the right side and 2+ on the left side. Brachioradialis reflexes are 2+ on the right side and 2+ on the left side. Patellar reflexes are 2+ on the right side and 2+ on the left side. Achilles reflexes are 2+ on the right side and 2+ on the left side. Psychiatric:         Mood and Affect: Mood normal.         Speech: Speech normal.         Behavior: Behavior normal.          Neurologic Exam     Mental Status   Oriented to person, place, and time. Attention: normal. Concentration: normal.   Speech: speech is normal   Level of consciousness: alert  Knowledge: good. Cranial Nerves     CN II   Visual fields full to confrontation. CN III, IV, VI   Pupils are equal, round, and reactive to light. Extraocular motions are normal.   Nystagmus: none   Upgaze: normal  Downgaze: normal  Conjugate gaze: present    CN V   Facial sensation intact. CN VII   Right facial weakness: Mild R facial droop.     CN VIII   CN VIII normal.     CN IX, X   CN IX normal.   CN X normal.     CN XI   Right sternocleidomastoid strength: weak  Right trapezius strength: weak    CN XII   CN XII normal.     Motor Exam   Muscle bulk: decreased  Overall muscle tone: normal  Right arm tone: normal  Left arm tone: normal  Right arm pronator drift: Unable to test.  Left arm pronator drift: absent  Right leg tone: normal  Left leg tone: normal  R-sided Extremities: 0/5  L-sided Extremities: 5/5     Sensory Exam   Right arm light touch: No sensation. Left arm light touch: normal  Right leg light touch: No sensation. Left leg light touch: normal    Gait, Coordination, and Reflexes     Coordination   Finger to nose coordination: normal (Unable to test on R, intact on L)    Reflexes   Right brachioradialis: 2+  Left brachioradialis: 2+  Right biceps: 2+  Left biceps: 2+  Right triceps: 2+  Left triceps: 2+  Right patellar: 2+  Left patellar: 2+  Right achilles: 2+  Left achilles: 2+      LABORATORY DATA     Labs: I have personally reviewed pertinent reports. Results from last 7 days   Lab Units 08/18/23  0549 08/16/23  0523 08/15/23  0434 08/14/23  1714   WBC Thousand/uL 12.51* 8.94 10.98* 11.26*   HEMOGLOBIN g/dL 11.9 10.4* 9.7* 10.9*   HEMATOCRIT % 37.2 32.5* 30.9* 34.5*   PLATELETS Thousands/uL 355 326 302 353   NEUTROS PCT %  --   --  68 49   MONOS PCT %  --   --  8 9   EOS PCT %  --   --  1 7*      Results from last 7 days   Lab Units 08/18/23  0549 08/16/23  0523 08/15/23  0434 08/14/23  1714   SODIUM mmol/L 137 132* 139 136   POTASSIUM mmol/L 4.3 3.5 3.4* 3.8   CHLORIDE mmol/L 105 102 105 100   CO2 mmol/L 25 25 27 28   BUN mg/dL 16 9 13 17   CREATININE mg/dL 0.92 0.53* 0.58* 0.71   CALCIUM mg/dL 9.6 8.7 8.5 9.2   ALK PHOS U/L 80  --   --  70   ALT U/L 20  --   --  15   AST U/L 14  --   --  17     Results from last 7 days   Lab Units 08/16/23 0523 08/15/23  0434 08/14/23  1714   MAGNESIUM mg/dL 2.1 3.0* 1.5*     Results from last 7 days   Lab Units 08/16/23  0523 08/15/23  0434 08/14/23  1714   PHOSPHORUS mg/dL 2.9 3.0 4.3      Results from last 7 days   Lab Units 08/15/23  0434 08/14/23  1714   INR  1.06 0.94   PTT seconds 28 27               IMAGING & DIAGNOSTIC TESTING     Radiology Results: I have personally reviewed pertinent reports.    and I have personally reviewed pertinent films in PACS    CT head wo contrast   Final Result by Ulices Braswell MD (08/20 1317)      Similar acute parenchymal hematoma in left thalamocapsular junction with superior extension to left corona radiata and inferior extension to left cerebral peduncle with moderate surrounding vasogenic edema and slight mass effect on third ventricle. No    new acute intracranial abnormality. Small soft tissue contusion in right periorbital and right anterior cheek regions, similar to prior exam.                     Workstation performed: [id number]         XR shoulder 2+ vw right   Final Result by Boyd Zhang MD (08/19 1346)      No acute osseous abnormality. Workstation performed: [id number]         CT head wo contrast   Final Result by Keith Zimmer MD (08/18 2107)      Stable appearance of acute intraparenchymal hematoma centered at the left thalamocapsular junction with superior extension into the left corona radiata and inferior extension into the left cerebral peduncle with surrounding vasogenic edema                  Workstation performed: [id number]         CT spine cervical wo contrast   Final Result by Keith Zimmer MD (08/18 2249)      No cervical spine fracture or traumatic malalignment. Workstation performed: [id number]         CT head wo contrast   Final Result by Robyn Whyte MD (47/21 0264)      Evolving acute intraparenchymal hematoma in left thalamocapsular junction with superior extension into left corona radiata and inferior extension towards left cerebral peduncle with moderate surrounding vasogenic edema, similar to prior exam. Similar    slight mass effect on third ventricle. No new acute intracranial abnormality. Workstation performed: [id number]         XR hips bilateral 3-4 vw w pelvis if performed   Final Result by Bryson Concepcion MD (08/17 1314)      No acute osseous abnormality. No significant degenerative changes of the right or left hip.             Workstation performed: [id number]         CT head wo contrast   Final Result by Billie Purdy DO (08/17 0208)      Evolving acute left corona radiata intraparenchymal hemorrhage, stable in size. Minimally increased regional vasogenic edema as detailed above. No midline shift. This study was marked for "Immediate" notification in EPIC. Workstation performed: [id number]         VAS lower limb venous duplex study, complete bilateral   Final Result by Lj Oleary MD (08/16 2249)      MRI follow up   Final Result by Michael Pace MD (08/16 1629)      1. Stable left thalamocapsular intraparenchymal hemorrhage with surrounding edema. 2. No acute infarction or mass effect. Workstation performed: [id number]         CT head wo contrast   Final Result by Jez Wise MD (08/15 2883)      Stable hemorrhage and surrounding vasogenic edema in the left corona radiata, posterior limb of the internal capsule and thalamus. Workstation performed: [id number]             Other Diagnostic Testing: I have personally reviewed pertinent reports.       ACTIVE MEDICATIONS     Current Facility-Administered Medications   Medication Dose Route Frequency   • amLODIPine (NORVASC) tablet 10 mg  10 mg Oral Daily   • atorvastatin (LIPITOR) tablet 20 mg  20 mg Oral Daily With Dinner   • bisacodyl (DULCOLAX) EC tablet 5 mg  5 mg Oral Daily PRN   • buPROPion (WELLBUTRIN XL) 24 hr tablet 150 mg  150 mg Oral QAM   • chlorhexidine (PERIDEX) 0.12 % oral rinse 15 mL  15 mL Mouth/Throat Q12H [place]   • cyanocobalamin (VITAMIN B-12) tablet 1,000 mcg  1,000 mcg Oral Daily   • docusate sodium (COLACE) capsule 100 mg  100 mg Oral BID   • ferrous sulfate tablet 325 mg  325 mg Oral Daily With Breakfast   • fluticasone-vilanterol 200-25 mcg/actuation 1 puff  1 puff Inhalation Daily   • gabapentin (NEURONTIN) capsule 300 mg  300 mg Oral TID   • heparin (porcine) subcutaneous injection 5,000 Units  5,000 Units Subcutaneous Q8H [address]   • hydrALAZINE (APRESOLINE) injection 5 mg  5 mg Intravenous Q6H PRN   • hydrALAZINE (APRESOLINE) tablet 25 mg 25 mg Oral Q8H [address]   • HYDROmorphone HCl (DILAUDID) injection 0.2 mg  0.2 mg Intravenous Q4H PRN   • insulin lispro (HumaLOG) 100 units/mL subcutaneous injection 1-6 Units  1-6 Units Subcutaneous TID With Meals   • losartan (COZAAR) tablet 100 mg  100 mg Oral Daily   • methocarbamol (ROBAXIN) tablet 500 mg  500 mg Oral Q6H PRN   • ondansetron (ZOFRAN) injection 4 mg  4 mg Intravenous Q4H PRN   • oxyCODONE (ROXICODONE) immediate release tablet 10 mg  10 mg Oral Q4H PRN   • oxyCODONE (ROXICODONE) IR tablet 5 mg  5 mg Oral Q4H PRN   • pantoprazole (PROTONIX) injection 40 mg  40 mg Intravenous Q24H [place]   • prochlorperazine (COMPAZINE) tablet 5 mg  5 mg Oral Q6H PRN       Prior to Admission medications    Medication Sig Start Date End Date Taking? Authorizing Provider   albuterol (2.5 mg/3 mL) 0.083 % nebulizer solution Take 3 mL (2.5 mg total) by nebulization every 4 (four) hours as needed for wheezing or shortness of breath 4/8/23   Eliane Talley MD   albuterol (Ventolin HFA) 90 mcg/act inhaler Inhale 2 puffs every 6 (six) hours as needed for wheezing 8/4/23   ARSENIO Brar   Aspirin Low Dose 81 MG EC tablet Take 1 tablet by mouth once daily 8/4/23   Eliane Talley MD   atenolol (TENORMIN) 50 mg tablet Take 1 tablet (50 mg total) by mouth daily 8/4/23   ARSENIO Stephens   buPROPion (WELLBUTRIN XL) 300 mg 24 hr tablet Take 1 tablet (300 mg total) by mouth every morning 7/26/23 7/20/24  ARSENIO Stephens   fluticasone-vilanterol (Breo Ellipta) 200-25 mcg/actuation inhaler Inhale 1 puff daily Rinse mouth after use.  8/4/23 11/2/23  ARSENIO Stephens   gabapentin (Neurontin) 300 mg capsule Take 1 capsule (300 mg total) by mouth 3 (three) times a day 7/26/23   ARSENIO Stephens   metFORMIN (GLUCOPHAGE) 1000 MG tablet Take 1 tablet (1,000 mg total) by mouth 2 (two) times a day with meals 7/26/23 8/25/23  ARSENIO Stephens   naproxen (NAPROSYN) 500 mg tablet Take 0.5 tablets (250 mg total) by mouth 2 (two) times a day with meals  Patient not taking: Reported on 7/26/2023 4/3/23   Melo Tang DO   rosuvastatin (CRESTOR) 10 MG tablet Take 1 tablet (10 mg total) by mouth daily 7/26/23   ARSENIO Duarte   telmisartan (MICARDIS) 80 MG tablet Take 1 tablet (80 mg total) by mouth daily 7/26/23   ARSENIO Duarte         VTE Pharmacologic Prophylaxis: Heparin  VTE Mechanical Prophylaxis: sequential compression device and foot pump applied    ==  DO Julio River [facility], PGY-2

## 2023-08-20 NOTE — ASSESSMENT & PLAN NOTE
62 YOF with history of HTN, DM2, Sjogren's, fibromyalgia initially presented to [facility] as stroke alert on 8/14/23 with initial presenting symptoms acute onset of right sided weakness, facial asymmetry, and dysarthria while at work. Initial imaging with CTH/CTA HN revealed left thalamocapsular bleed with no significant shift or mass effect. . NIHSS 14. Not TNK candidate given acute ICH. Pt given IV labetalol, started on Cardene drip, administered DDAVP for ASA reversal and transferred to [facility] for NSg care. -Was seen by NSGY, no surgical intervention at this time  -Neurology following  -neuro checks q4h  -SBP goal < 160 mmHg, preferably < 140 mmHg  -CT head was done yesterday for change in exam and worsening and waxing slurred speech which was reviewed by neuro, the images which does show stable left sided hemorrhage without any midline shift.    -PT/OT following, recommended post acute rehab with PMR following  -Holding ASA for now  -Diet restarted- cleared for regular with thin liquids   -Stat CTH for drop in GCS score of 2 points or more in > 1 hour  CTH done today - stable

## 2023-08-20 NOTE — ASSESSMENT & PLAN NOTE
Lab Results   Component Value Date    HGBA1C 6.7 (H) 08/15/2023       Recent Labs     08/19/23  2054 08/20/23  0614 08/20/23  1114 08/20/23  1606   POCGLU 180* 153* 259* 162*       Blood Sugar Average: Last 72 hrs:  (P) 121.3384380734564732     · On SSI  · Holding home metformin  · Hypoglycemic protocol

## 2023-08-20 NOTE — PROGRESS NOTES
[facility]  Progress Note  Name: Aria Castaneda  MRN: [id number]  Unit/Bed#: [facility] 711-01 I Date of Admission: 8/14/2023   Date of Service: 8/19/2023 I Hospital Day: 5    Assessment/Plan   * ICH (intracerebral hemorrhage) ([address])  Assessment & Plan  62 YOF with history of HTN, DM2, Sjogren's, fibromyalgia initially presented to [facility] as stroke alert on 8/14/23 with initial presenting symptoms acute onset of right sided weakness, facial asymmetry, and dysarthria while at work. Initial imaging with CTH/CTA HN revealed left thalamocapsular bleed with no significant shift or mass effect. . NIHSS 14. Not TNK candidate given acute ICH. Pt given IV labetalol, started on Cardene drip, administered DDAVP for ASA reversal and transferred to hospitals for NSg care. -Was seen by NSGY, no surgical intervention at this time  -Neurology following  -neuro checks q4h  -SBP goal < 160 mmHg, preferably < 140 mmHg  -CT head was done yesterday for change in exam and worsening and waxing slurred speech which was reviewed by neuro, the images which does show stable left sided hemorrhage without any midline shift. -PT/OT following, recommended post acute rehab with PMR following  -Holding ASA for now  -Diet restarted- cleared for regular with thin liquids   -Stat CTH for drop in GCS score of 2 points or more in > 1 hour    Primary hypertension  Assessment & Plan  History of hypertension.  -Was previously on telmisartan and atenolol but hypertension was suboptimally controlled  -Started on amlodipine 10mg daily; Losartan 100mg daily; hydralazine 25mg q8h  BP acceptable     Anemia  Assessment & Plan  Anemia with baseline hemoglobin of 10-11. MCVs around 80s. Iron panel showing ferritin at 22 and TIBC of 370s which maybe indicative of iron deficiency anemia.  Also her B12 levels are low-normal.   -Ordered ferrous sulfate supplementation  -On vitamin B12 supplementation 1000 mg daily    Hyperlipidemia associated with type 2 diabetes mellitus ([address])  Assessment & Plan  · C/w statin     CAD (coronary artery disease)  Assessment & Plan  History of CAD based on prior CTA imaging.  -On statin  -Holding ASA for now due to [address]  -Holding beta blockade due to episodes of bradycardia    Sjogren's syndrome ([address])  Assessment & Plan  History of sjogren syndrome.  -May benefit from outpatient rheumatology follow up from this regard    [address]  History of anxiety  -Was on wellbutrin 300 mg daily, was reduced to 150 mg due to concern for medicaitons potential for lowering seizure threshold in this patient with ICH    Diabetes mellitus type 2, controlled [facility])  Assessment & Plan  Lab Results   Component Value Date    HGBA1C 6.7 (H) 08/15/2023       Recent Labs     08/19/23  0812 08/19/23  1108 08/19/23  1604 08/19/23  2054   POCGLU 168* 166* 201* 180*       Blood Sugar Average: Last 72 hrs:  (P) [id number]     · On SSI  · Holding home metformin  · Hypoglycemic protocol         VTE Pharmacologic Prophylaxis: VTE Score: 4 Moderate Risk (Score 3-4) - Pharmacological DVT Prophylaxis Ordered: heparin. Patient Centered Rounds: Discussed with RN     Education and Discussions with Family / Patient: Updated  (sister) via phone. Total Time Spent on Date of Encounter in care of patient: 35 minutes This time was spent on one or more of the following: performing physical exam; counseling and coordination of care; obtaining or reviewing history; documenting in the medical record; reviewing/ordering tests, medications or procedures; communicating with other healthcare professionals and discussing with patient's family/caregivers.     Current Length of Stay: 5 day(s)  Current Patient Status: Inpatient   Certification Statement: The patient will continue to require additional inpatient hospital stay due to As above    Code Status: Level 1 - Full Code    Subjective:   Mild right shoulder discomfort following fall overnight    Objective:     Vitals:   Temp (24hrs), Av.4 °F (36.9 °C), Min:98 °F (36.7 °C), Max:99.2 °F (37.3 °C)    Temp:  [98 °F (36.7 °C)-99.2 °F (37.3 °C)] 98 °F (36.7 °C)  HR:  [70-85] 82  Resp:  [15-16] 15  BP: (128-143)/(65-97) 128/80  SpO2:  [94 %-97 %] 96 %  Body mass index is 33.98 kg/m². Physical Exam:   Physical Exam  Vitals reviewed. HENT:      Nose: Nose normal.      Mouth/Throat:      Mouth: Mucous membranes are moist.   Eyes:      Extraocular Movements: Extraocular movements intact. Cardiovascular:      Rate and Rhythm: Normal rate. Pulmonary:      Effort: Pulmonary effort is normal. No respiratory distress. Breath sounds: Normal breath sounds. No wheezing. Abdominal:      General: Bowel sounds are normal. There is no distension. Tenderness: There is no abdominal tenderness. Musculoskeletal:         General: No swelling. Cervical back: Neck supple. Skin:     General: Skin is warm and dry. Neurological:      Mental Status: She is alert. Comments: Right hemiparesis          Additional Data:     Labs:  Results from last 7 days   Lab Units 23  0549 23  0523 08/15/23  0434   WBC Thousand/uL 12.51*   < > 10.98*   HEMOGLOBIN g/dL 11.9   < > 9.7*   HEMATOCRIT % 37.2   < > 30.9*   PLATELETS Thousands/uL 355   < > 302   NEUTROS PCT %  --   --  68   LYMPHS PCT %  --   --  22   MONOS PCT %  --   --  8   EOS PCT %  --   --  1    < > = values in this interval not displayed.      Results from last 7 days   Lab Units 23  0549   SODIUM mmol/L 137   POTASSIUM mmol/L 4.3   CHLORIDE mmol/L 105   CO2 mmol/L 25   BUN mg/dL 16   CREATININE mg/dL 0.92   ANION GAP mmol/L 7   CALCIUM mg/dL 9.6   ALBUMIN g/dL 3.2*   TOTAL BILIRUBIN mg/dL 0.36   ALK PHOS U/L 80   ALT U/L 20   AST U/L 14   GLUCOSE RANDOM mg/dL 161*     Results from last 7 days   Lab Units 08/15/23  0434   INR  1.06     Results from last 7 days   Lab Units 23  3274 23  3582 08/19/23  1108 08/19/23  5751 08/18/23  1917 08/18/23  1624 08/18/23  1043 08/18/23  0624 08/17/23  2037 08/17/23  1556 08/17/23  1117 08/17/23  0608   POC GLUCOSE mg/dl 180* 201* 166* 168* 184* 143* 165* 154* 150* 160* 141* 133     Results from last 7 days   Lab Units 08/15/23  0434   HEMOGLOBIN A1C % 6.7*           Lines/Drains:  Invasive Devices     Peripheral Intravenous Line  Duration           Peripheral IV 08/18/23 Right;Ventral (anterior) Wrist 1 day          Drain  Duration           External Urinary Catheter 5 days                Last 24 Hours Medication List:   Current Facility-Administered Medications   Medication Dose Route Frequency Provider Last Rate   • amLODIPine  10 mg Oral Daily Dionna Jimenez DO     • atorvastatin  20 mg Oral Daily With [address] [place] [address], DO     • bisacodyl  5 mg Oral Daily PRN Dionna Jimenez DO     • buPROPion  150 mg Oral QAM Dionna iJmenez DO     • chlorhexidine  15 mL Mouth/Throat Q12H [address] Dionna Jimenez DO     • vitamin B-12  1,000 mcg Oral Daily Dionna Jimenez DO     • docusate sodium  100 mg Oral BID Dionna Jimenez DO     • ferrous sulfate  325 mg Oral Daily With Breakfast Dionna Jimenez DO     • fluticasone-vilanterol  1 puff Inhalation Daily Dionna Jimenez DO     • gabapentin  300 mg Oral TID Dionna Jimenez DO     • heparin (porcine)  5,000 Units Subcutaneous [facility] Elvira Collins, DO     • hydrALAZINE  5 mg Intravenous Q6H PRN Dionna Jimenez DO     • hydrALAZINE  25 mg Oral [facility] Dionna Jimenez DO     • HYDROmorphone  0.2 mg Intravenous Q4H PRN ARSENIO Solorzano     • insulin lispro  1-6 Units Subcutaneous TID With Meals Dionna Jimenez DO     • losartan  100 mg Oral Daily Dionna Jimenez, DO     • methocarbamol  500 mg Oral Q6H PRN Dionna Jimenez DO     • ondansetron  4 mg Intravenous Q4H PRN Willye Harpin [facility],  • oxyCODONE  10 mg Oral Q4H PRN Charlotte Heading, CRNP     • oxyCODONE  5 mg Oral Q4H PRN Charlotte Juarez, MARCONP     • pantoprazole  40 mg Intravenous Q24H [address] [facility] Ramses Jimenez, DO     • prochlorperazine  5 mg Oral Q6H PRN [facility] Ramses Jimenez,           Today, Patient Was Seen By: Doreen Hurtado MD    **Please Note: This note may have been constructed using a voice recognition system. **

## 2023-08-20 NOTE — ASSESSMENT & PLAN NOTE
History of hypertension.  -Was previously on telmisartan and atenolol but hypertension was suboptimally controlled  -Started on amlodipine 10mg daily;   Losartan 100mg daily; hydralazine 25mg q8h  BP acceptable

## 2023-08-20 NOTE — ASSESSMENT & PLAN NOTE
Lab Results   Component Value Date    HGBA1C 6.7 (H) 08/15/2023       Recent Labs     08/19/23  0812 08/19/23  1108 08/19/23  1604 08/19/23 2054   POCGLU 168* 166* 201* 180*       Blood Sugar Average: Last 72 hrs:  (P) [id number]     · On SSI  · Holding home metformin  · Hypoglycemic protocol

## 2023-08-20 NOTE — PLAN OF CARE
Problem: MOBILITY - ADULT  Goal: Maintain or return to baseline ADL function  Description: INTERVENTIONS:  -  Assess patient's ability to carry out ADLs; assess patient's baseline for ADL function and identify physical deficits which impact ability to perform ADLs (bathing, care of mouth/teeth, toileting, grooming, dressing, etc.)  - Assess/evaluate cause of self-care deficits   - Assess range of motion  - Assess patient's mobility; develop plan if impaired  - Assess patient's need for assistive devices and provide as appropriate  - Encourage maximum independence but intervene and supervise when necessary  - Involve family in performance of ADLs  - Assess for home care needs following discharge   - Consider OT consult to assist with ADL evaluation and planning for discharge  - Provide patient education as appropriate  Outcome: Progressing  Goal: Maintains/Returns to pre admission functional level  Description: INTERVENTIONS:  - Perform BMAT or MOVE assessment daily.   - Set and communicate daily mobility goal to care team and patient/family/caregiver. - Collaborate with rehabilitation services on mobility goals if consulted  - Perform Range of Motion 3 times a day. - Reposition patient every 2 hours.   - Dangle patient 3 times a day  - Record patient progress and toleration of activity level   Outcome: Progressing     Problem: PAIN - ADULT  Goal: Verbalizes/displays adequate comfort level or baseline comfort level  Description: Interventions:  - Encourage patient to monitor pain and request assistance  - Assess pain using appropriate pain scale  - Administer analgesics based on type and severity of pain and evaluate response  - Implement non-pharmacological measures as appropriate and evaluate response  - Consider cultural and social influences on pain and pain management  - Notify physician/advanced practitioner if interventions unsuccessful or patient reports new pain  Outcome: Progressing     Problem: INFECTION - ADULT  Goal: Absence or prevention of progression during hospitalization  Description: INTERVENTIONS:  - Assess and monitor for signs and symptoms of infection  - Monitor lab/diagnostic results  - Monitor all insertion sites, i.e. indwelling lines, tubes, and drains  - Monitor endotracheal if appropriate and nasal secretions for changes in amount and color  - [place] appropriate cooling/warming therapies per order  - Administer medications as ordered  - Instruct and encourage patient and family to use good hand hygiene technique  - Identify and instruct in appropriate isolation precautions for identified infection/condition  Outcome: Progressing  Goal: Absence of fever/infection during neutropenic period  Description: INTERVENTIONS:  - Monitor WBC    Outcome: Progressing     Problem: SAFETY ADULT  Goal: Maintain or return to baseline ADL function  Description: INTERVENTIONS:  -  Assess patient's ability to carry out ADLs; assess patient's baseline for ADL function and identify physical deficits which impact ability to perform ADLs (bathing, care of mouth/teeth, toileting, grooming, dressing, etc.)  - Assess/evaluate cause of self-care deficits   - Assess range of motion  - Assess patient's mobility; develop plan if impaired  - Assess patient's need for assistive devices and provide as appropriate  - Encourage maximum independence but intervene and supervise when necessary  - Involve family in performance of ADLs  - Assess for home care needs following discharge   - Consider OT consult to assist with ADL evaluation and planning for discharge  - Provide patient education as appropriate  Outcome: Progressing  Goal: Maintains/Returns to pre admission functional level  Description: INTERVENTIONS:  - Perform BMAT or MOVE assessment daily.   - Set and communicate daily mobility goal to care team and patient/family/caregiver.    - Collaborate with rehabilitation services on mobility goals if consulted  - Perform Range of Motion 3 times a day. - Reposition patient every 2 hours. - Dangle patient 3 times a day  - Record patient progress and toleration of activity level   Outcome: Progressing  Goal: Patient will remain free of falls  Description: INTERVENTIONS:  - Educate patient/family on patient safety including physical limitations  - Instruct patient to call for assistance with activity   - Consult OT/PT to assist with strengthening/mobility   - Keep Call bell within reach  - Keep bed low and locked with side rails adjusted as appropriate  - Keep care items and personal belongings within reach  - Initiate and maintain comfort rounds  - Make Fall Risk Sign visible to staff  - Apply yellow socks and bracelet for high fall risk patients  - Consider moving patient to room near nurses station  Outcome: Progressing     Problem: DISCHARGE PLANNING  Goal: Discharge to home or other facility with appropriate resources  Description: INTERVENTIONS:  - Identify barriers to discharge w/patient and caregiver  - Arrange for needed discharge resources and transportation as appropriate  - Identify discharge learning needs (meds, wound care, etc.)  - Arrange for interpretive services to assist at discharge as needed  - Refer to Case Management Department for coordinating discharge planning if the patient needs post-hospital services based on physician/advanced practitioner order or complex needs related to functional status, cognitive ability, or social support system  Outcome: Progressing     Problem: Knowledge Deficit  Goal: Patient/family/caregiver demonstrates understanding of disease process, treatment plan, medications, and discharge instructions  Description: Complete learning assessment and assess knowledge base.   Interventions:  - Provide teaching at level of understanding  - Provide teaching via preferred learning methods  Outcome: Progressing     Problem: Nutrition/Hydration-ADULT  Goal: Nutrient/Hydration intake appropriate for improving, restoring or maintaining nutritional needs  Description: Monitor and assess patient's nutrition/hydration status for malnutrition. Collaborate with interdisciplinary team and initiate plan and interventions as ordered. Monitor patient's weight and dietary intake as ordered or per policy. Utilize nutrition screening tool and intervene as necessary. Determine patient's food preferences and provide high-protein, high-caloric foods as appropriate.      INTERVENTIONS:  - Monitor oral intake, urinary output, labs, and treatment plans  - Assess nutrition and hydration status and recommend course of action  - Evaluate amount of meals eaten  - Assist patient with eating if necessary   - Allow adequate time for meals  - Recommend/ encourage appropriate diets, oral nutritional supplements, and vitamin/mineral supplements  - Order, calculate, and assess calorie counts as needed  - Recommend, monitor, and adjust tube feedings and TPN/PPN based on assessed needs  - Assess need for intravenous fluids  - Provide specific nutrition/hydration education as appropriate  - Include patient/family/caregiver in decisions related to nutrition  Outcome: Progressing     Problem: Prexisting or High Potential for Compromised Skin Integrity  Goal: Skin integrity is maintained or improved  Description: INTERVENTIONS:  - Identify patients at risk for skin breakdown  - Assess and monitor skin integrity  - Assess and monitor nutrition and hydration status  - Monitor labs   - Assess for incontinence   - Turn and reposition patient  - Assist with mobility/ambulation  - Relieve pressure over bony prominences  - Avoid friction and shearing  - Provide appropriate hygiene as needed including keeping skin clean and dry  - Evaluate need for skin moisturizer/barrier cream  - Collaborate with interdisciplinary team   - Patient/family teaching  - Consider wound care consult   Outcome: Progressing     Problem: Neurological Deficit  Goal: Neurological status is stable or improving  Description: Interventions:  - Monitor and assess patient's level of consciousness, motor function, sensory function, and level of assistance needed for ADLs. - Monitor and report changes from baseline. Collaborate with interdisciplinary team to initiate plan and implement interventions as ordered. - Provide and maintain a safe environment. - Consider seizure precautions. - Consider fall precautions. - Consider aspiration precautions. - Consider bleeding precautions. Outcome: Progressing     Problem: Activity Intolerance/Impaired Mobility  Goal: Mobility/activity is maintained at optimum level for patient  Description: Interventions:  - Assess and monitor patient  barriers to mobility and need for assistive/adaptive devices. - Assess patient's emotional response to limitations. - Collaborate with interdisciplinary team and initiate plans and interventions as ordered. - Encourage independent activity per ability.  - Maintain proper body alignment. - Perform active/passive rom as tolerated/ordered. - Plan activities to conserve energy.  - Turn patient as appropriate  Outcome: Progressing     Problem: Communication Impairment  Goal: Ability to express needs and understand communication  Description: Assess patient's communication skills and ability to understand information. Patient will demonstrate use of effective communication techniques, alternative methods of communication and understanding even if not able to speak. - Encourage communication and provide alternate methods of communication as needed. - Collaborate with case management/ for discharge needs. - Include patient/family/caregiver in decisions related to communication.   Outcome: Progressing     Problem: Potential for Aspiration  Goal: Non-ventilated patient's risk of aspiration is minimized  Description: Assess and monitor vital signs, respiratory status, and labs (WBC). Monitor for signs of aspiration (tachypnea, cough, rales, wheezing, cyanosis, fever). - Assess and monitor patient's ability to swallow. - Place patient up in chair to eat if possible. - HOB up at 90 degrees to eat if unable to get patient up into chair.  - Supervise patient during oral intake. - Instruct patient/ family to take small bites. - Instruct patient/ family to take small single sips when taking liquids. - Follow patient-specific strategies generated by speech pathologist.  Outcome: Progressing     Problem: Nutrition  Goal: Nutrition/Hydration status is improving  Description: Monitor and assess patient's nutrition/hydration status for malnutrition (ex- brittle hair, bruises, dry skin, pale skin and conjunctiva, muscle wasting, smooth red tongue, and disorientation). Collaborate with interdisciplinary team and initiate plan and interventions as ordered. Monitor patient's weight and dietary intake as ordered or per policy. Utilize nutrition screening tool and intervene per policy. Determine patient's food preferences and provide high-protein, high-caloric foods as appropriate. - Assist patient with eating.  - Allow adequate time for meals.  - Encourage patient to take dietary supplement as ordered. - Collaborate with clinical nutritionist.  - Include patient/family/caregiver in decisions related to nutrition.   Outcome: Progressing

## 2023-08-20 NOTE — ASSESSMENT & PLAN NOTE
>>ASSESSMENT AND PLAN FOR DIABETES MELLITUS TYPE 2, CONTROLLED (HCC) WRITTEN ON 8/20/2023  6:37 PM BY QUETA MOREAU MD    Lab Results   Component Value Date    HGBA1C 6.7 (H) 08/15/2023       Recent Labs     08/19/23 2054 08/20/23  0614 08/20/23  1114 08/20/23  1606   POCGLU 180* 153* 259* 162*       Blood Sugar Average: Last 72 hrs:  (P) [id number]     On SSI  Holding home metformin  Hypoglycemic protocol

## 2023-08-20 NOTE — ASSESSMENT & PLAN NOTE
>>ASSESSMENT AND PLAN FOR DIABETES MELLITUS TYPE 2, CONTROLLED (HCC) WRITTEN ON 8/19/2023 11:39 PM BY QUETA MOREAU MD    Lab Results   Component Value Date    HGBA1C 6.7 (H) 08/15/2023       Recent Labs     08/19/23  0812 08/19/23  1108 08/19/23  1604 08/19/23 2054   POCGLU 168* 166* 201* 180*       Blood Sugar Average: Last 72 hrs:  (P) 156.8158157646849845     On SSI  Holding home metformin  Hypoglycemic protocol

## 2023-08-20 NOTE — PROGRESS NOTES
[facility]  Progress Note  Name: Paulette Rubio  MRN: [id number]  Unit/Bed#: [facility] 711-01 I Date of Admission: 8/14/2023   Date of Service: 8/20/2023 I Hospital Day: 6    Assessment/Plan   * ICH (intracerebral hemorrhage) [facility])  Assessment & Plan  62 YOF with history of HTN, DM2, Sjogren's, fibromyalgia initially presented [facility] as stroke alert on 8/14/23 with initial presenting symptoms acute onset of right sided weakness, facial asymmetry, and dysarthria while at work. Initial imaging with CTH/CTA HN revealed left thalamocapsular bleed with no significant shift or mass effect. . NIHSS 14. Not TNK candidate given acute ICH. Pt given IV labetalol, started on Cardene drip, administered DDAVP for ASA reversal and transferred to [facility] for NS[facility] care. -Was seen by NSGY, no surgical intervention at this time  -Neurology following  -neuro checks q4h  -SBP goal < 160 mmHg, preferably < 140 mmHg  -CT head was done yesterday for change in exam and worsening and waxing slurred speech which was reviewed by neuro, the images which does show stable left sided hemorrhage without any midline shift. -PT/OT following, recommended post acute rehab with PMR following  -Holding ASA for now  -Diet restarted- cleared for regular with thin liquids   -Stat CTH for drop in GCS score of 2 points or more in > 1 hour  CTH done today - stable    Primary hypertension  Assessment & Plan  History of hypertension.  -Was previously on telmisartan and atenolol but hypertension was suboptimally controlled  -Started on amlodipine 10mg daily; Losartan 100mg daily; hydralazine 25mg q8h  BP acceptable     Anemia  Assessment & Plan  Anemia with baseline hemoglobin of 10-11. MCVs around 80s. Iron panel showing ferritin at 22 and TIBC of 370s which maybe indicative of iron deficiency anemia.  Also her B12 levels are low-normal.   -Ordered ferrous sulfate supplementation  -On vitamin B12 supplementation 1000 mg daily    Hyperlipidemia associated with type 2 diabetes mellitus ([address])  Assessment & Plan  · C/w statin     CAD (coronary artery disease)  Assessment & Plan  History of CAD based on prior CTA imaging.  -On statin  -Holding ASA for now due to [address] - neuro to r/v CT head done today to determine if ASA can be restarted  -Holding beta blockade due to episodes of bradycardia    Sjogren's syndrome ([address])  Assessment & Plan  History of sjogren syndrome.  -May benefit from outpatient rheumatology follow up from this regard    Anxiety  Assessment & Plan  History of anxiety  -Was on wellbutrin 300 mg daily, was reduced to 150 mg due to concern for medicaitons potential for lowering seizure threshold in this patient with ICH    Diabetes mellitus type 2, controlled [facility])  Assessment & Plan  Lab Results   Component Value Date    HGBA1C 6.7 (H) 08/15/2023       Recent Labs     08/19/23  2054 08/20/23  0614 08/20/23  1114 08/20/23  1606   POCGLU 180* 153* 259* 162*       Blood Sugar Average: Last 72 hrs:  (P) [id number]     · On SSI  · Holding home metformin  · Hypoglycemic protocol     VTE Pharmacologic Prophylaxis: VTE Score: 4 Moderate Risk (Score 3-4) - Pharmacological DVT Prophylaxis Ordered: heparin. Patient Centered Rounds: Discussed with RN    Education and Discussions with Family / Patient: Updated  () at bedside. Total Time Spent on Date of Encounter in care of patient: 35 minutes This time was spent on one or more of the following: performing physical exam; counseling and coordination of care; obtaining or reviewing history; documenting in the medical record; reviewing/ordering tests, medications or procedures; communicating with other healthcare professionals and discussing with patient's family/caregivers.     Current Length of Stay: 6 day(s)  Current Patient Status: Inpatient   Certification Statement: The patient will continue to require additional inpatient hospital stay due to awaiting neurology clearance    Code Status: Level 1 - Full Code    Subjective:   No new events    Objective:     Vitals:   Temp (24hrs), Av.7 °F (37.1 °C), Min:98 °F (36.7 °C), Max:99.2 °F (37.3 °C)    Temp:  [98 °F (36.7 °C)-99.2 °F (37.3 °C)] 99 °F (37.2 °C)  HR:  [72-82] 80  Resp:  [14-15] 14  BP: (119-130)/(79-81) 119/79  SpO2:  [92 %-96 %] 95 %  Body mass index is 33.91 kg/m². Physical Exam:   Physical Exam  Vitals reviewed. HENT:      Head: Normocephalic. Nose: Nose normal.      Mouth/Throat:      Mouth: Mucous membranes are moist.   Eyes:      Extraocular Movements: Extraocular movements intact. Cardiovascular:      Rate and Rhythm: Normal rate and regular rhythm. Pulmonary:      Effort: Pulmonary effort is normal. No respiratory distress. Breath sounds: Normal breath sounds. No wheezing. Abdominal:      General: Bowel sounds are normal. There is no distension. Palpations: Abdomen is soft. Musculoskeletal:      Cervical back: Neck supple. Skin:     General: Skin is warm and dry. Neurological:      Mental Status: She is alert and oriented to person, place, and time. Comments: Right hemiparesis   Psychiatric:         Mood and Affect: Mood normal.         Behavior: Behavior normal.          Additional Data:     Labs:  Results from last 7 days   Lab Units 23  0549 23  0523 08/15/23  0434   WBC Thousand/uL 12.51*   < > 10.98*   HEMOGLOBIN g/dL 11.9   < > 9.7*   HEMATOCRIT % 37.2   < > 30.9*   PLATELETS Thousands/uL 355   < > 302   NEUTROS PCT %  --   --  68   LYMPHS PCT %  --   --  22   MONOS PCT %  --   --  8   EOS PCT %  --   --  1    < > = values in this interval not displayed.      Results from last 7 days   Lab Units 23  0549   SODIUM mmol/L 137   POTASSIUM mmol/L 4.3   CHLORIDE mmol/L 105   CO2 mmol/L 25   BUN mg/dL 16   CREATININE mg/dL 0.92   ANION GAP mmol/L 7   CALCIUM mg/dL 9.6   ALBUMIN g/dL 3.2*   TOTAL BILIRUBIN mg/dL 0.36   ALK PHOS U/L 80 ALT U/L 20   AST U/L 14   GLUCOSE RANDOM mg/dL 161*     Results from last 7 days   Lab Units 08/15/23  0434   INR  1.06     Results from last 7 days   Lab Units 08/20/23  1606 08/20/23  1114 08/20/23  0614 08/19/23  2054 08/19/23  1604 08/19/23  1108 08/19/23  0812 08/18/23  1917 08/18/23  1624 08/18/23  1043 08/18/23  0624 08/17/23  2037   POC GLUCOSE mg/dl 162* 259* 153* 180* 201* 166* 168* 184* 143* 165* 154* 150*     Results from last 7 days   Lab Units 08/15/23  0434   HEMOGLOBIN A1C % 6.7*           Lines/Drains:  Invasive Devices     Peripheral Intravenous Line  Duration           Peripheral IV 08/18/23 Right;Ventral (anterior) Wrist 1 day          Drain  Duration           External Urinary Catheter 5 days                Last 24 Hours Medication List:   Current Facility-Administered Medications   Medication Dose Route Frequency Provider Last Rate   • amLODIPine  10 mg Oral Daily [facility] Tony, DO     • atorvastatin  20 mg Oral Daily With [address] [place] [address], DO     • bisacodyl  5 mg Oral Daily PRN [facility] Tony, DO     • buPROPion  150 mg Oral QAM [facility] Tony, DO     • chlorhexidine  15 mL Mouth/Throat Q12H [facility] Tony, DO     • vitamin B-12  1,000 mcg Oral Daily [facility] Tony, DO     • docusate sodium  100 mg Oral BID [facility] Tony, DO     • ferrous sulfate  325 mg Oral Daily With Breakfast [facility] Tony, DO     • fluticasone-vilanterol  1 puff Inhalation Daily [facility] Tony, DO     • gabapentin  300 mg Oral TID [facility] Tony, DO     • heparin (porcine)  5,000 Units Subcutaneous [facility] Shaniqua Collins, DO     • hydrALAZINE  5 mg Intravenous Q6H PRN [facility] Tony, DO     • hydrALAZINE  25 mg Oral [facility] Tony, DO     • HYDROmorphone  0.2 mg Intravenous Q4H PRN ASRENIO Celaya     • insulin lispro  1-6 Units Subcutaneous TID With Meals Jennet Homans, DO     • losartan  100 mg Oral Daily [facility], DO     • methocarbamol  500 mg Oral Q6H PRN [facility], DO     • ondansetron  4 mg Intravenous Q4H PRN [facility], DO     • oxyCODONE  10 mg Oral Q4H PRN Tima Littlerock, CRNP     • oxyCODONE  5 mg Oral Q4H PRN Tima Littlerock, CRNP     • pantoprazole  40 mg Intravenous Q24H [facility] Larwence [facility], DO     • prochlorperazine  5 mg Oral Q6H PRN Larwence [facility], DO          Today, Patient Was Seen By: Josefina Portillo MD    **Please Note: This note may have been constructed using a voice recognition system. **

## 2023-08-20 NOTE — ASSESSMENT & PLAN NOTE
History of CAD based on prior CTA imaging.  -On statin  -Holding ASA for now due to [address]  -Holding beta blockade due to episodes of bradycardia

## 2023-08-21 LAB
ANION GAP SERPL CALCULATED.3IONS-SCNC: 4 MMOL/L
ATRIAL RATE: 73 BPM
BUN SERPL-MCNC: 22 MG/DL (ref 5–25)
CALCIUM SERPL-MCNC: 9.1 MG/DL (ref 8.3–10.1)
CHLORIDE SERPL-SCNC: 102 MMOL/L (ref 96–108)
CO2 SERPL-SCNC: 29 MMOL/L (ref 21–32)
CREAT SERPL-MCNC: 0.83 MG/DL (ref 0.6–1.3)
ERYTHROCYTE [DISTWIDTH] IN BLOOD BY AUTOMATED COUNT: 15.5 % (ref 11.6–15.1)
GFR SERPL CREATININE-BSD FRML MDRD: 77 ML/MIN/1.73SQ M
GLUCOSE SERPL-MCNC: 112 MG/DL (ref 65–140)
GLUCOSE SERPL-MCNC: 145 MG/DL (ref 65–140)
GLUCOSE SERPL-MCNC: 166 MG/DL (ref 65–140)
GLUCOSE SERPL-MCNC: 179 MG/DL (ref 65–140)
GLUCOSE SERPL-MCNC: 213 MG/DL (ref 65–140)
HCT VFR BLD AUTO: 38.6 % (ref 34.8–46.1)
HGB BLD-MCNC: 12.2 G/DL (ref 11.5–15.4)
MCH RBC QN AUTO: 26.9 PG (ref 26.8–34.3)
MCHC RBC AUTO-ENTMCNC: 31.6 G/DL (ref 31.4–37.4)
MCV RBC AUTO: 85 FL (ref 82–98)
P AXIS: 45 DEGREES
PLATELET # BLD AUTO: 382 THOUSANDS/UL (ref 149–390)
PMV BLD AUTO: 9.3 FL (ref 8.9–12.7)
POTASSIUM SERPL-SCNC: 4.2 MMOL/L (ref 3.5–5.3)
PR INTERVAL: 176 MS
QRS AXIS: 5 DEGREES
QRSD INTERVAL: 88 MS
QT INTERVAL: 390 MS
QTC INTERVAL: 429 MS
RBC # BLD AUTO: 4.54 MILLION/UL (ref 3.81–5.12)
SODIUM SERPL-SCNC: 135 MMOL/L (ref 135–147)
T WAVE AXIS: 36 DEGREES
VENTRICULAR RATE: 73 BPM
WBC # BLD AUTO: 11.15 THOUSAND/UL (ref 4.31–10.16)

## 2023-08-21 PROCEDURE — 82948 REAGENT STRIP/BLOOD GLUCOSE: CPT

## 2023-08-21 PROCEDURE — 92526 ORAL FUNCTION THERAPY: CPT

## 2023-08-21 PROCEDURE — 97535 SELF CARE MNGMENT TRAINING: CPT

## 2023-08-21 PROCEDURE — 85027 COMPLETE CBC AUTOMATED: CPT | Performed by: INTERNAL MEDICINE

## 2023-08-21 PROCEDURE — 97530 THERAPEUTIC ACTIVITIES: CPT

## 2023-08-21 PROCEDURE — C9113 INJ PANTOPRAZOLE SODIUM, VIA: HCPCS | Performed by: INTERNAL MEDICINE

## 2023-08-21 PROCEDURE — 99232 SBSQ HOSP IP/OBS MODERATE 35: CPT | Performed by: INTERNAL MEDICINE

## 2023-08-21 PROCEDURE — 93010 ELECTROCARDIOGRAM REPORT: CPT | Performed by: INTERNAL MEDICINE

## 2023-08-21 PROCEDURE — 93005 ELECTROCARDIOGRAM TRACING: CPT

## 2023-08-21 PROCEDURE — 80048 BASIC METABOLIC PNL TOTAL CA: CPT | Performed by: INTERNAL MEDICINE

## 2023-08-21 PROCEDURE — 97112 NEUROMUSCULAR REEDUCATION: CPT

## 2023-08-21 RX ORDER — ASPIRIN 81 MG/1
81 TABLET, CHEWABLE ORAL DAILY
Status: DISCONTINUED | OUTPATIENT
Start: 2023-08-21 | End: 2023-08-25 | Stop reason: HOSPADM

## 2023-08-21 RX ORDER — PANTOPRAZOLE SODIUM 40 MG/1
40 TABLET, DELAYED RELEASE ORAL
Status: DISCONTINUED | OUTPATIENT
Start: 2023-08-22 | End: 2023-08-25 | Stop reason: HOSPADM

## 2023-08-21 RX ADMIN — CHLORHEXIDINE GLUCONATE 15 ML: 1.2 SOLUTION ORAL at 21:50

## 2023-08-21 RX ADMIN — GABAPENTIN 300 MG: 300 CAPSULE ORAL at 21:50

## 2023-08-21 RX ADMIN — CHLORHEXIDINE GLUCONATE 15 ML: 1.2 SOLUTION ORAL at 08:40

## 2023-08-21 RX ADMIN — INSULIN LISPRO 1 UNITS: 100 INJECTION, SOLUTION INTRAVENOUS; SUBCUTANEOUS at 08:41

## 2023-08-21 RX ADMIN — HEPARIN SODIUM 5000 UNITS: 5000 INJECTION INTRAVENOUS; SUBCUTANEOUS at 06:46

## 2023-08-21 RX ADMIN — ASPIRIN 81 MG CHEWABLE TABLET 81 MG: 81 TABLET CHEWABLE at 17:03

## 2023-08-21 RX ADMIN — HYDROMORPHONE HYDROCHLORIDE 0.2 MG: 0.2 INJECTION, SOLUTION INTRAMUSCULAR; INTRAVENOUS; SUBCUTANEOUS at 21:51

## 2023-08-21 RX ADMIN — HEPARIN SODIUM 5000 UNITS: 5000 INJECTION INTRAVENOUS; SUBCUTANEOUS at 13:08

## 2023-08-21 RX ADMIN — LOSARTAN POTASSIUM 100 MG: 50 TABLET, FILM COATED ORAL at 08:40

## 2023-08-21 RX ADMIN — INSULIN LISPRO 1 UNITS: 100 INJECTION, SOLUTION INTRAVENOUS; SUBCUTANEOUS at 11:28

## 2023-08-21 RX ADMIN — BUPROPION HYDROCHLORIDE 150 MG: 150 TABLET, FILM COATED, EXTENDED RELEASE ORAL at 08:40

## 2023-08-21 RX ADMIN — FERROUS SULFATE TAB 325 MG (65 MG ELEMENTAL FE) 325 MG: 325 (65 FE) TAB at 08:40

## 2023-08-21 RX ADMIN — GABAPENTIN 300 MG: 300 CAPSULE ORAL at 16:07

## 2023-08-21 RX ADMIN — OXYCODONE HYDROCHLORIDE 10 MG: 10 TABLET ORAL at 08:41

## 2023-08-21 RX ADMIN — OXYCODONE HYDROCHLORIDE 5 MG: 5 TABLET ORAL at 17:03

## 2023-08-21 RX ADMIN — OXYCODONE HYDROCHLORIDE 5 MG: 5 TABLET ORAL at 13:08

## 2023-08-21 RX ADMIN — HYDRALAZINE HYDROCHLORIDE 25 MG: 25 TABLET, FILM COATED ORAL at 06:49

## 2023-08-21 RX ADMIN — HYDRALAZINE HYDROCHLORIDE 25 MG: 25 TABLET, FILM COATED ORAL at 21:50

## 2023-08-21 RX ADMIN — METHOCARBAMOL 500 MG: 500 TABLET ORAL at 16:07

## 2023-08-21 RX ADMIN — FLUTICASONE FUROATE AND VILANTEROL TRIFENATATE 1 PUFF: 200; 25 POWDER RESPIRATORY (INHALATION) at 08:41

## 2023-08-21 RX ADMIN — PANTOPRAZOLE SODIUM 40 MG: 40 INJECTION, POWDER, FOR SOLUTION INTRAVENOUS at 08:40

## 2023-08-21 RX ADMIN — METHOCARBAMOL 500 MG: 500 TABLET ORAL at 21:51

## 2023-08-21 RX ADMIN — AMLODIPINE BESYLATE 10 MG: 10 TABLET ORAL at 08:40

## 2023-08-21 RX ADMIN — ATORVASTATIN CALCIUM 20 MG: 20 TABLET, FILM COATED ORAL at 16:07

## 2023-08-21 RX ADMIN — DOCUSATE SODIUM 100 MG: 100 CAPSULE ORAL at 08:40

## 2023-08-21 RX ADMIN — HEPARIN SODIUM 5000 UNITS: 5000 INJECTION INTRAVENOUS; SUBCUTANEOUS at 21:50

## 2023-08-21 RX ADMIN — CYANOCOBALAMIN TAB 500 MCG 1000 MCG: 500 TAB at 08:40

## 2023-08-21 NOTE — SPEECH THERAPY NOTE
Speech Language/Pathology  Speech-Language Pathology Progress Note      Patient Name: Maria Esther Boyle    PRJSG'Q Date: 8/21/2023      Subjective:  Pt was awake and alert. She was sitting up in chair at bedside. Patient stated "No one is opening anything for me. I can't open my sodas and I can't cut things up ". Objective:  Pt was seen today for dysphagia therapy. Current diet is regular with thin liquids. Pt was on room air. Oral care had already been completed. Focus of today's session was to maximize PO intake safety. Textures offered today included hard solid and thin liquid via straw. Swallow function:   Bolus retrieval and control was/were slow. Mastication and AP transfer was/were [facility] and the pt is able to clear the bolus w/ an alternated sip of thin an mult swallows. Greg Velez Pharyngeal swallow initiation was timely. Hyolaryngeal excursion was adequate. No s/s aspiration occurred during session today. Assessment:  Swallow function is stable with current diet. Diet texture and Liquid consistency remains appropriate at this time. Plan:  Continue regular and thin liquids. Pt will need set up of all meals to appropriately take in po- needs material cut up, cans and sandwiches opened. No additional ST f/u needed at this time. Please re consult with any new changes or concerns.

## 2023-08-21 NOTE — PHYSICAL THERAPY NOTE
PHYSICAL THERAPY NOTE          Patient Name: Zane Pedersen  PLDXZ'W Date: 8/21/2023 08/21/23 1016   PT Last Visit   PT Visit Date 08/21/23   Note Type   Note Type Treatment   Pain Assessment   Pain Assessment Tool 0-10   Pain Score 5   Pain Location/Orientation Orientation: Bilateral;Location: Leg   Hospital Pain Intervention(s) Repositioned; Ambulation/increased activity   Restrictions/Precautions   Weight Bearing Precautions Per Order No   Other Precautions Chair Alarm; Bed Alarm; Fall Risk;Pain;Multiple lines   General   Chart Reviewed Yes   Response to Previous Treatment Patient with no complaints from previous session. Family/Caregiver Present No   Cognition   Overall Cognitive Status WFL   Arousal/Participation Cooperative   Attention Attends with cues to redirect   Orientation Level Oriented X4   Memory Decreased recall of precautions;Decreased recall of recent events   Following Commands Follows one step commands without difficulty   Subjective   Subjective pt pleasant and cooperative throughout therapy session. pt received supine in bed   Bed Mobility   Supine to Sit 2  Maximal assistance   Additional items Assist x 2; Increased time required;Verbal cues;LE management;HOB elevated   Sit to Supine Unable to assess   Transfers   Sit to Stand 2  Maximal assistance   Additional items Assist x 2; Increased time required;Verbal cues   Stand to Sit 2  Maximal assistance   Additional items Assist x 2; Increased time required;Verbal cues   Stand pivot 2  Maximal assistance   Additional items Assist x 2; Increased time required;Verbal cues   Additional items Assist x 2; Increased time required;Verbal cues  (scooting to R, max A x2)   Additional Comments L HHA.  RUE supported   Ambulation/Elevation   Gait pattern Not appropriate   Balance   Static Sitting Poor +   Dynamic Sitting Poor   Static Standing Poor -   Dynamic Standing Poor - Ambulatory Zero   Endurance Deficit   Endurance Deficit Yes   Endurance Deficit Description R hemiparesis, generalized weakness, decreased exercise tolerance   Activity Tolerance   Activity Tolerance Patient limited by fatigue   Medical Staff Made Aware eli Newell-moises due to medical complexity and multiple comorbidities   Nurse Made Aware RN cleared and updated   Exercises   Ankle Pumps Sitting;10 reps;AROM; Left  (+ 10 PROM RLE)   Balance training  static/dynamic sitting balance at EOB, weight shifting, reaching outside of MARRY   Assessment   Prognosis Fair   Problem List Decreased strength;Decreased endurance; Impaired balance;Decreased mobility; Decreased coordination;Decreased cognition; Impaired sensation; Impaired vision;Obesity   Assessment Patient was received supine in bed . Patient was agreeable to therapy services today. PT session focused on balance and functional mobility today in order to improve functional mobility and independence. Functional mobility was performed as described above. Pt continues to require max A for all functional mobility. Upon sitting at EOB, pt was able to maintain static sitting with min - max A. Pt requires increased assistance with dynamic sitting balance at EOB. Pt was then stand pivoted to bedside recliner and required increased assistance to fully scoot over as well. Pt reports discomfort in her legs throughout therapy session. Pt was educated about safety in standing and sitting in the hospital. Pt reports understanding. Patient will benefit from continued PT services while in hospital in order to address remaining limitations. The patients AM-PAC Basic Mobility Inpatient Short From Raw Score is 7 . A Raw score of  7  suggests that the patient may benefit from discharge to post acute rehab. PT recommendation at this time is for post acute rehab. Please also refer to the recommendation of the Physical Therapist for safe discharge planning.    Barriers to Discharge Inaccessible home environment;Decreased caregiver support   Goals   Patient Goals to get better   STG Expiration Date 08/27/23   PT Treatment Day 3   Plan   Treatment/Interventions ADL retraining;LE strengthening/ROM; Functional transfer training;Elevations; Endurance training; Therapeutic exercise;Gait training;Bed mobility   Progress Progressing toward goals   PT Frequency 3-5x/wk   Recommendation   PT Discharge Recommendation Post acute rehabilitation services   AM-PAC Basic Mobility Inpatient   Turning in Flat Bed Without Bedrails 2   Lying on Back to Sitting on Edge of Flat Bed Without Bedrails 1   Moving Bed to Chair 1   Standing Up From Chair Using Arms 1   Walk in Room 1   Climb 3-5 Stairs With Railing 1   Basic Mobility Inpatient Raw Score 7   Highest Level Of Mobility   JH-HLM Goal 2: Bed activities/Dependent transfer   JH-HLM Achieved 4: Move to chair/commode   Education   Education Provided Mobility training   Patient Reinforcement needed;Demonstrates acceptance/verbal understanding   End of Consult   Patient Position at End of Consult Bedside chair;Bed/Chair alarm activated; All needs within reach       Smitha Wang PT, DPT

## 2023-08-21 NOTE — ARC ADMISSION
Reviewed patient case with [facility] physician and patient has been approved for [facility] pending medical stability, insurance authorization and bed availability. CM has been updated in [facility].

## 2023-08-21 NOTE — UTILIZATION REVIEW
Continued Stay Review for 8/19/23     Date: 8/21/23                          Current Patient Class: IP  Current Level of Care: MS    HPI:58 y.o. female initially admitted on 8/14/23 - DX:  ICH (intracerebral hemorrhage)    Assessment/Plan:   8/19/23: Today, patient states that she feels well today. Mild right shoulder discomfort following fall overnight. patient fell and hit her head on 8/18 and rapid response was called. STAT was stable and neurological exam is also stable.   Plan: cont neuro checks; cont iv protonix; PT/OT following, recommended post acute rehab; monitor labs; pain control (see below)    Vital Signs:   08/19/23 21:25:41 98 °F (36.7 °C) 82 15 128/80 96 96 % None (Room air) Lying   08/19/23 21:25:18 98 °F (36.7 °C) 79 -- 128/80 96 95 % -- --   08/19/23 21:24:35 -- 80 -- 128/80 96 96 % -- --   08/19/23 2000 -- -- -- -- -- -- None (Room air) --   08/19/23 15:40:56 98.6 °F (37 °C) 85 15 132/79 97 94 % -- --   08/19/23 13:48:21 -- 81 -- 134/77 96 95 % -- --   08/19/23 1348 -- -- -- 134/77 -- -- -- --   08/19/23 07:26:47 99.2 °F (37.3 °C) 77 -- 129/74 92 97 % -- --   08/19/23 05:14:42 -- 70 -- -- -- 97 % -- --   08/19/23 05:14:41 -- -- -- 135/65 88 -- -- --   08/19/23 01:22:47 98 °F (36.7 °C) 79 16 143/97 112 96 % None (Room air) Lying         Pertinent Labs/Diagnostic Results:       Results from last 7 days   Lab Units 08/21/23  1404 08/18/23  0549 08/16/23  0523 08/15/23  0434 08/14/23  1714   WBC Thousand/uL 11.15* 12.51* 8.94 10.98* 11.26*   HEMOGLOBIN g/dL 12.2 11.9 10.4* 9.7* 10.9*   HEMATOCRIT % 38.6 37.2 32.5* 30.9* 34.5*   PLATELETS Thousands/uL 382 355 326 302 353   NEUTROS ABS Thousands/µL  --   --   --  7.55 5.47         Results from last 7 days   Lab Units 08/21/23  1404 08/18/23  0549 08/16/23  0830 08/16/23  0523 08/15/23  0434 08/14/23  1714   SODIUM mmol/L 135 137  --  132* 139 136   POTASSIUM mmol/L 4.2 4.3  --  3.5 3.4* 3.8   CHLORIDE mmol/L 102 105  --  102 105 100   CO2 mmol/L 29 25 --  25 27 28   ANION GAP mmol/L 4 7  --  5 7 8   BUN mg/dL 22 16  --  9 13 17   CREATININE mg/dL 0.83 0.92  --  0.53* 0.58* 0.71   EGFR ml/min/1.73sq m 77 68  --  104 101 94   CALCIUM mg/dL 9.1 9.6  --  8.7 8.5 9.2   CALCIUM, IONIZED mmol/L  --   --  1.09* 1.14 1.09*  --    MAGNESIUM mg/dL  --   --   --  2.1 3.0* 1.5*   PHOSPHORUS mg/dL  --   --   --  2.9 3.0 4.3     Results from last 7 days   Lab Units 08/18/23  0549 08/14/23  1714   AST U/L 14 17   ALT U/L 20 15   ALK PHOS U/L 80 70   TOTAL PROTEIN g/dL 7.6 7.3   ALBUMIN g/dL 3.2* 4.0   TOTAL BILIRUBIN mg/dL 0.36 0.32   BILIRUBIN DIRECT mg/dL  --  0.09     Results from last 7 days   Lab Units 08/21/23  1108 08/21/23  0713 08/20/23  1606 08/20/23  1114 08/20/23  0614 08/19/23  2054 08/19/23  1604 08/19/23  1108 08/19/23  0812 08/18/23  1917 08/18/23  1624 08/18/23  1043   POC GLUCOSE mg/dl 166* 179* 162* 259* 153* 180* 201* 166* 168* 184* 143* 165*     Results from last 7 days   Lab Units 08/21/23  1404 08/18/23  0549 08/16/23  0523 08/15/23  0434 08/14/23  1714   GLUCOSE RANDOM mg/dL 145* 161* 157* 149* 143*     Results from last 7 days   Lab Units 08/16/23  1121   OSMOLALITY, SERUM mmol/*     Results from last 7 days   Lab Units 08/15/23  0434   HEMOGLOBIN A1C % 6.7*   EAG mg/dl 146       Results from last 7 days   Lab Units 08/15/23  0305 08/15/23  0105 08/14/23  2315 08/14/23  1714   HS TNI 0HR ng/L  --   --  6 3   HS TNI 2HR ng/L  --  7  --   --    HSTNI D2 ng/L  --  1  --   --    HS TNI 4HR ng/L 5  --   --   --    HSTNI D4 ng/L -1  --   --   --        Results from last 7 days   Lab Units 08/15/23  0434 08/14/23  1714   PROTIME seconds 14.0 13.2   INR  1.06 0.94   PTT seconds 28 27     Results from last 7 days   Lab Units 08/14/23  2057   TSH 3RD GENERATON uIU/mL 2.039       Results from last 7 days   Lab Units 08/15/23  0434   FERRITIN ng/mL 22   IRON SATURATION % 8*   IRON ug/dL 30*   TIBC ug/dL 371       Results from last 7 days   Lab Units 08/16/23  1831 08/16/23  1121   OSMOLALITY, SERUM mmol/KG  --  278*   OSMO UR mmol/  --      Results from last 7 days   Lab Units 08/16/23  1831   SODIUM UR  146       Results from last 7 days   Lab Units 08/14/23  2101   AMPH/METH  Negative   BARBITURATE UR  Negative   BENZODIAZEPINE UR  Positive*   COCAINE UR  Negative   METHADONE URINE  Negative   OPIATE UR  Negative   PCP UR  Negative   THC UR  Negative     Results from last 7 days   Lab Units 08/14/23  2057   ETHANOL LVL mg/dL <3       Medications:   Scheduled Medications:  amLODIPine, 10 mg, Oral, Daily  atorvastatin, 20 mg, Oral, Daily With Dinner  buPROPion, 150 mg, Oral, QAM  chlorhexidine, 15 mL, Mouth/Throat, Q12H [place]  vitamin B-12, 1,000 mcg, Oral, Daily  docusate sodium, 100 mg, Oral, BID  ferrous sulfate, 325 mg, Oral, Daily With Breakfast  fluticasone-vilanterol, 1 puff, Inhalation, Daily  gabapentin, 300 mg, Oral, TID  heparin (porcine), 5,000 Units, Subcutaneous, Q8H [place]  hydrALAZINE, 25 mg, Oral, Q8H [place]  insulin lispro, 1-6 Units, Subcutaneous, TID With Meals  losartan, 100 mg, Oral, Daily  [START ON 8/22/2023] pantoprazole, 40 mg, Oral, Early Morning   pantoprazole (PROTONIX) injection 40 mg Freq: Every 24 hours scheduled Route: IV    Continuous IV Infusions: none     PRN Meds:  bisacodyl, 5 mg, Oral, Daily PRN  hydrALAZINE, 5 mg, Intravenous, Q6H PRN  HYDROmorphone, 0.2 mg, Intravenous, Q4H PRN  methocarbamol, 500 mg, Oral, Q6H PRN  ondansetron, 4 mg, Intravenous, Q4H PRN  oxyCODONE, 10 mg, Oral, Q4H PRN  (8/19 recd x1)  oxyCODONE, 5 mg, Oral, Q4H PRN  (8/19 recd x2)  prochlorperazine, 5 mg, Oral, Q6H PRN        Discharge Plan: D    Network Utilization Review Department  ATTENTION: Please call with any questions or concerns to [phone] and carefully listen to the prompts so that you are directed to the right person.  All voicemails are confidential.  Michael Beckman all requests for admission clinical reviews, approved or denied determinations and any other requests to dedicated fax number below belonging to the campus where the patient is receiving treatment.  List of dedicated fax numbers for the Facilities:  [place] DENIALS (Administrative/Medical Necessity) [phone] [address] [facility] (Maternity/NICU/Pediatrics) [phone]   [address] [place] [address] Drive [phone]   [facility] [address] [facility] [phone]   [facility] [address] [place] [address] [phone]   [id number] [place] [address] [address] [facility] [address] [phone]

## 2023-08-21 NOTE — QUICK NOTE
CTH from 8/20 Reviewed:Similar acute parenchymal hematoma in left thalamocapsular junction with superior extension to left corona radiata and inferior extension to left cerebral peduncle with moderate surrounding vasogenic edema and slight mass effect on third ventricle. No new acute intracranial abnormality. Small soft tissue contusion in right periorbital and right anterior cheek regions, similar to prior exam.    Stable. Clear from neurology standpoint to start ASA.

## 2023-08-21 NOTE — PLAN OF CARE
Problem: OCCUPATIONAL THERAPY ADULT  Goal: Performs self-care activities at highest level of function for planned discharge setting. See evaluation for individualized goals. Description: Treatment Interventions: ADL retraining, Functional transfer training, UE strengthening/ROM, Endurance training, Cognitive reorientation, Patient/family training, Equipment evaluation/education, Neuromuscular reeducation, Fine motor coordination activities, Compensatory technique education, Activityengagement          See flowsheet documentation for full assessment, interventions and recommendations. Note: Limitation: Decreased ADL status, Decreased UE ROM, Decreased UE strength, Decreased Safe judgement during ADL, Decreased endurance, Decreased fine motor control, Decreased self-care trans, Decreased high-level ADLs, Non-func R UE  Prognosis: Good  Assessment: Patient participated in Skilled OT session this date with interventions consisting of self care tasks, functional sit to stand transfers with standing tolerance . Patient agreeable to OT treatment session, upon arrival patient was found supine in bed. . Patient requiring verbal cues for safety, verbal cues for correct technique, frequent rest periods and ocassional safety reminders. Patient continues to be functioning below baseline level, occupational performance remains limited secondary to factors listed above and increased risk for falls and injury. The patient's raw score on the -PAC Daily Activity Inpatient Short Form is 8. A raw score of less than 19 suggests the patient may benefit from discharge to post-acute rehabilitation services. Please refer to the recommendation of the Occupational Therapist for safe discharge planning. From OT standpoint, recommendation at time of d/c would be Short Term Rehab.    Patient to benefit from continued Occupational Therapy treatment while in the hospital to address deficits as defined above and maximize level of functional independence with ADLs and functional mobility.      OT Discharge Recommendation: Post acute rehabilitation services

## 2023-08-21 NOTE — RESTORATIVE TECHNICIAN NOTE
Restorative Technician Note      Patient Name: Gurdeep Wright     Note Type: Mobility (Attempted to see pt for assistance back to bed, pt states she wants to stay in the chair.)  Patient Position at End of Consult: Bedside chair;  All needs within reach; Bed/Chair alarm activated    Jessica GEE, Restorative Technician, [employer]

## 2023-08-21 NOTE — OCCUPATIONAL THERAPY NOTE
Occupational Therapy Progress Note     Patient Name: Paola Roblero  OMLQK'R Date: 8/21/2023  Problem List  Principal Problem:    ICH (intracerebral hemorrhage) ([address])  Active Problems:    Primary hypertension    Diabetes mellitus type 2, controlled ([address])    Anxiety    Sjogren's syndrome ([address])    CAD (coronary artery disease)    Hyperlipidemia associated with type 2 diabetes mellitus ([address])    Anemia        08/21/23 0939   OT Last Visit   OT Visit Date 08/21/23   Note Type   Note Type Treatment   Pain Assessment   Pain Assessment Tool 0-10   Pain Score 5   Pain Location/Orientation Orientation: Bilateral;Location: Leg   Hospital Pain Intervention(s) Repositioned; Ambulation/increased activity; Elevated; Emotional support; Rest   Restrictions/Precautions   Weight Bearing Precautions Per Order No   Other Precautions Cognitive; Chair Alarm; Bed Alarm;Telemetry; Fall Risk;Pain   Lifestyle   Autonomy I adls and mobiltiy-  i iadls - shares homemaking with spouse   Reciprocal Relationships supportive spouse, family and friends   Service to Others works FT as  at [employer] active pta   ADL   Where Assessed Edge of bed   Grooming Assistance 3  Moderate Assistance   Grooming Deficit Wash/dry face; Wash/dry hands  (S to wash face post set-up, mod a to wash hands with hand over hand assist to encourage functional movement pattern   UB Bathing Assistance 2  Maximal Assistance   UB Bathing Deficit Right arm;Left arm; Abdomen   LB Bathing Assistance 2  Maximal Assistance   LB Bathing Deficit Left lower leg including foot;Right lower leg including foot; Left upper leg;Right upper leg;Buttocks; Perineal area   UB Dressing Assistance 2  Maximal Assistance   UB Dressing Deficit Thread RUE; Thread LUE;Pull around back  (hospital gown -cues to encourage left assisting RUE threading sleeve)   LB Dressing Assistance 1  Total Assistance   LB Dressing Deficit Don/doff R sock; Don/doff L sock   Bed Mobility   Supine to Sit 2 Maximal assistance   Additional items Assist x 2   Sit to Supine Unable to assess   Transfers   Sit to Stand 2  Maximal assistance   Additional items Assist x 2   Stand to Sit 2  Maximal assistance   Additional items Assist x 2   Sit pivot 2  Maximal assistance   Additional items Assist x 2  (bed >drop arm chair with 2 rest breaks)   Additional Comments B/l HHA right UE supported 2* To hemplegia, pt sit to stand approx~50% with buttock clearance. ROM- Right Upper Extremities   R Shoulder PROM; External rotation; Internal rotation   R Elbow PROM;Elbow flexion;Elbow extension   R Wrist PROM; Wrist flexion;Wrist extension   R Hand PROM; Thumb; Index finger; Long finger;Ring finger;Little finger   R Position Seated   R Weight/Reps/Sets 3x   RUE ROM Comment +Flaccid/hypotonic, +neglect +right hand dominant   Cognition   Arousal/Participation Alert; Responsive; Cooperative   Attention Attends with cues to redirect   Orientation Level Oriented X4   Memory Decreased recall of precautions;Decreased recall of recent events   Following Commands Follows one step commands without difficulty   Comments pt pleasant and cooperative, impaired attention, slow processing, impaired insight into deficits   Activity Tolerance   Activity Tolerance Patient limited by fatigue   Assessment   Assessment Patient participated in Skilled OT session this date with interventions consisting of self care tasks, functional sit to stand transfers with standing tolerance . Patient agreeable to OT treatment session, upon arrival patient was found supine in bed. . Patient requiring verbal cues for safety, verbal cues for correct technique, frequent rest periods and ocassional safety reminders. Patient continues to be functioning below baseline level, occupational performance remains limited secondary to factors listed above and increased risk for falls and injury. The patient's raw score on the AM-PAC Daily Activity Inpatient Short Form is 8.  A raw score of less than 19 suggests the patient may benefit from discharge to post-acute rehabilitation services. Please refer to the recommendation of the Occupational Therapist for safe discharge planning. From OT standpoint, recommendation at time of d/c would be Short Term Rehab. Patient to benefit from continued Occupational Therapy treatment while in the hospital to address deficits as defined above and maximize level of functional independence with ADLs and functional mobility. Plan   Treatment Interventions ADL retraining;Functional transfer training;UE strengthening/ROM; Endurance training;Cognitive reorientation;Patient/family training;Equipment evaluation/education; Compensatory technique education; Energy conservation; Activityengagement   Goal Expiration Date 08/29/23   OT Treatment Day 3   OT Frequency 3-5x/wk   Recommendation   OT Discharge Recommendation Post acute rehabilitation services   AM-PAC Daily Activity Inpatient   Lower Body Dressing 1   Bathing 2   Toileting 1   Upper Body Dressing 2   Grooming 3   Eating 3   Daily Activity Raw Score 12   Daily Activity Standardized Score (Calc for Raw Score >=11) 30.6   AM-PAC Applied Cognition Inpatient   Following a Speech/Presentation 2   Understanding Ordinary Conversation 4   Taking Medications 2   Remembering Where Things Are Placed or Put Away 3   Remembering List of 4-5 Errands 3   Taking Care of Complicated Tasks 2   Applied Cognition Raw Score 16   Applied Cognition Standardized Score 35.03   End of Consult   Education Provided Yes;Family or social support of family present for education by provider  (spouse present with treatment session -pt/spouse eduated on right UE protection strategies and safe positioning in chair/bed)   Patient Position at End of Consult Bedside chair;Bed/Chair alarm activated; All needs within reach, right UE positioned in chair with pillow for scapular protection, midline sitting, visual attention   Nurse Communication Nurse aware of consult   Jeison Knapp MOT, OTR/L

## 2023-08-21 NOTE — CASE MANAGEMENT
Case Management Discharge Planning Note    Patient name Agusto Rain  [facility] [id number]  : 1965 Date 2023       Current Admission Date: 2023  Current Admission Diagnosis:ICH (intracerebral hemorrhage) [facility])   Patient Active Problem List    Diagnosis Date Noted   • Anemia 2023   • ICH (intracerebral hemorrhage) ([address]) 08/15/2023   • Left leg pain 08/15/2023   • Chronic obstructive pulmonary disease with acute exacerbation ([address]) 2023   • Hyperlipidemia associated with type 2 diabetes mellitus ([address]) 2023   • Abnormal CT of the chest 2023   • Class 2 obesity with body mass index (BMI) of 39.0 to 39.9 in adult 2023   • CAD (coronary artery disease) 2023   • Right lumbosacral radiculopathy 10/12/2022   • Chronic bilateral low back pain with bilateral sciatica 2022   • Paresthesia of both feet 2022   • Postoperative visit 2022   • Incarcerated umbilical hernia    • Diabetes mellitus type 2, controlled ([address]) 2022   • Continuous opioid dependence ([address]) 2022   • Moderate persistent asthma with acute exacerbation 2022   • Cigarette nicotine dependence in remission 2022   • Primary hypertension 2022   • Fibromyalgia 2022   • Sjogren's syndrome ([address]) 10/19/2012   • Anxiety 2012      LOS (days): 7  Geometric Mean LOS (GMLOS) (days): 4.50  Days to GMLOS:-2.3     OBJECTIVE:  Risk of Unplanned Readmission Score: 17.31         Current admission status: Inpatient   Preferred Pharmacy:   [facility] RADHA HERNÁNDEZ [address] [place] [address] ROAD  [address] [address]  Phone: [phone] Fax: [address] [place] [address]  Phone: [phone] Fax: [phone]    Primary Care Provider: Beba Meneses MD    Primary Insurance: BLUE CROSS  Secondary Insurance: DISCHARGE DETAILS:      Contacts  Patient Contacts: Pebbles Julianne  Relationship to Patient[de-identified] Family (spouse)  Contact Method: Phone  Phone Number: [phone]    Requested [phone] Cabrera         Is the patient interested in [facility] at discharge?: No    DME Referral Provided  Referral made for DME?: No    Other Referral/Resources/Interventions Provided:  Interventions: Acute Rehab  Referral Comments: Pt accepted at [facility]/BE ending medial stability, insurance auth and availability    Would you like to participate in our [address] service program?  : No - Declined    Treatment Team Recommendation: Acute Rehab  Discharge Destination Plan[de-identified] Acute Rehab

## 2023-08-21 NOTE — PLAN OF CARE
Problem: PHYSICAL THERAPY ADULT  Goal: Performs mobility at highest level of function for planned discharge setting. See evaluation for individualized goals. Description: Treatment/Interventions: OT, Spoke to case management, Spoke to nursing, Gait training, Bed mobility, Patient/family training, Endurance training, LE strengthening/ROM, Functional transfer training  Equipment Recommended:  (TBD)       See flowsheet documentation for full assessment, interventions and recommendations. Outcome: Progressing  Note: Prognosis: Fair  Problem List: Decreased strength, Decreased endurance, Impaired balance, Decreased mobility, Decreased coordination, Decreased cognition, Impaired sensation, Impaired vision, Obesity  Assessment: Patient was received supine in bed . Patient was agreeable to therapy services today. PT session focused on balance and functional mobility today in order to improve functional mobility and independence. Functional mobility was performed as described above. Pt continues to require max A for all functional mobility. Upon sitting at EOB, pt was able to maintain static sitting with min - max A. Pt requires increased assistance with dynamic sitting balance at EOB. Pt was then stand pivoted to bedside recliner and required increased assistance to fully scoot over as well. Pt reports discomfort in her legs throughout therapy session. Pt was educated about safety in standing and sitting in the hospital. Pt reports understanding. Patient will benefit from continued PT services while in hospital in order to address remaining limitations. The patients AM-PAC Basic Mobility Inpatient Short From Raw Score is 7 . A Raw score of  7  suggests that the patient may benefit from discharge to post acute rehab. PT recommendation at this time is for post acute rehab. Please also refer to the recommendation of the Physical Therapist for safe discharge planning.   Barriers to Discharge: Inaccessible home environment, Decreased caregiver support     PT Discharge Recommendation: Post acute rehabilitation services    See flowsheet documentation for full assessment.

## 2023-08-22 LAB
ANION GAP SERPL CALCULATED.3IONS-SCNC: 8 MMOL/L
ATRIAL RATE: 0 BPM
ATRIAL RATE: 70 BPM
BUN SERPL-MCNC: 22 MG/DL (ref 5–25)
CALCIUM SERPL-MCNC: 9.5 MG/DL (ref 8.3–10.1)
CHLORIDE SERPL-SCNC: 105 MMOL/L (ref 96–108)
CO2 SERPL-SCNC: 25 MMOL/L (ref 21–32)
CREAT SERPL-MCNC: 0.7 MG/DL (ref 0.6–1.3)
GFR SERPL CREATININE-BSD FRML MDRD: 95 ML/MIN/1.73SQ M
GLUCOSE SERPL-MCNC: 142 MG/DL (ref 65–140)
GLUCOSE SERPL-MCNC: 161 MG/DL (ref 65–140)
GLUCOSE SERPL-MCNC: 171 MG/DL (ref 65–140)
GLUCOSE SERPL-MCNC: 176 MG/DL (ref 65–140)
P AXIS: 52 DEGREES
POTASSIUM SERPL-SCNC: 4.2 MMOL/L (ref 3.5–5.3)
PR INTERVAL: 186 MS
QRS AXIS: 0 DEGREES
QRS AXIS: 24 DEGREES
QRSD INTERVAL: 0 MS
QRSD INTERVAL: 88 MS
QT INTERVAL: 0 MS
QT INTERVAL: 420 MS
QTC INTERVAL: 0 MS
QTC INTERVAL: 453 MS
SODIUM SERPL-SCNC: 138 MMOL/L (ref 135–147)
T WAVE AXIS: 0 DEGREES
T WAVE AXIS: 32 DEGREES
VENTRICULAR RATE: 0 BPM
VENTRICULAR RATE: 70 BPM

## 2023-08-22 PROCEDURE — 99232 SBSQ HOSP IP/OBS MODERATE 35: CPT | Performed by: PHYSICIAN ASSISTANT

## 2023-08-22 PROCEDURE — 97110 THERAPEUTIC EXERCISES: CPT

## 2023-08-22 PROCEDURE — 93005 ELECTROCARDIOGRAM TRACING: CPT

## 2023-08-22 PROCEDURE — 93010 ELECTROCARDIOGRAM REPORT: CPT | Performed by: INTERNAL MEDICINE

## 2023-08-22 PROCEDURE — 82948 REAGENT STRIP/BLOOD GLUCOSE: CPT

## 2023-08-22 PROCEDURE — 80048 BASIC METABOLIC PNL TOTAL CA: CPT | Performed by: INTERNAL MEDICINE

## 2023-08-22 PROCEDURE — 97535 SELF CARE MNGMENT TRAINING: CPT

## 2023-08-22 PROCEDURE — 97112 NEUROMUSCULAR REEDUCATION: CPT

## 2023-08-22 RX ADMIN — HYDRALAZINE HYDROCHLORIDE 25 MG: 25 TABLET, FILM COATED ORAL at 05:36

## 2023-08-22 RX ADMIN — GABAPENTIN 300 MG: 300 CAPSULE ORAL at 08:36

## 2023-08-22 RX ADMIN — INSULIN LISPRO 1 UNITS: 100 INJECTION, SOLUTION INTRAVENOUS; SUBCUTANEOUS at 11:53

## 2023-08-22 RX ADMIN — ATORVASTATIN CALCIUM 20 MG: 20 TABLET, FILM COATED ORAL at 18:44

## 2023-08-22 RX ADMIN — HEPARIN SODIUM 5000 UNITS: 5000 INJECTION INTRAVENOUS; SUBCUTANEOUS at 14:49

## 2023-08-22 RX ADMIN — HYDRALAZINE HYDROCHLORIDE 25 MG: 25 TABLET, FILM COATED ORAL at 14:49

## 2023-08-22 RX ADMIN — FERROUS SULFATE TAB 325 MG (65 MG ELEMENTAL FE) 325 MG: 325 (65 FE) TAB at 08:32

## 2023-08-22 RX ADMIN — HEPARIN SODIUM 5000 UNITS: 5000 INJECTION INTRAVENOUS; SUBCUTANEOUS at 21:41

## 2023-08-22 RX ADMIN — METHOCARBAMOL 500 MG: 500 TABLET ORAL at 05:35

## 2023-08-22 RX ADMIN — INSULIN LISPRO 1 UNITS: 100 INJECTION, SOLUTION INTRAVENOUS; SUBCUTANEOUS at 08:36

## 2023-08-22 RX ADMIN — GABAPENTIN 300 MG: 300 CAPSULE ORAL at 21:41

## 2023-08-22 RX ADMIN — OXYCODONE HYDROCHLORIDE 5 MG: 5 TABLET ORAL at 05:35

## 2023-08-22 RX ADMIN — HYDRALAZINE HYDROCHLORIDE 25 MG: 25 TABLET, FILM COATED ORAL at 21:41

## 2023-08-22 RX ADMIN — BUPROPION HYDROCHLORIDE 150 MG: 150 TABLET, FILM COATED, EXTENDED RELEASE ORAL at 08:32

## 2023-08-22 RX ADMIN — CHLORHEXIDINE GLUCONATE 15 ML: 1.2 SOLUTION ORAL at 08:33

## 2023-08-22 RX ADMIN — PANTOPRAZOLE SODIUM 40 MG: 40 TABLET, DELAYED RELEASE ORAL at 05:35

## 2023-08-22 RX ADMIN — GABAPENTIN 300 MG: 300 CAPSULE ORAL at 18:45

## 2023-08-22 RX ADMIN — DOCUSATE SODIUM 100 MG: 100 CAPSULE ORAL at 18:45

## 2023-08-22 RX ADMIN — HEPARIN SODIUM 5000 UNITS: 5000 INJECTION INTRAVENOUS; SUBCUTANEOUS at 05:36

## 2023-08-22 RX ADMIN — LOSARTAN POTASSIUM 100 MG: 50 TABLET, FILM COATED ORAL at 08:32

## 2023-08-22 RX ADMIN — AMLODIPINE BESYLATE 10 MG: 10 TABLET ORAL at 08:32

## 2023-08-22 RX ADMIN — CYANOCOBALAMIN TAB 500 MCG 1000 MCG: 500 TAB at 08:32

## 2023-08-22 RX ADMIN — OXYCODONE HYDROCHLORIDE 5 MG: 5 TABLET ORAL at 18:47

## 2023-08-22 RX ADMIN — DOCUSATE SODIUM 100 MG: 100 CAPSULE ORAL at 08:32

## 2023-08-22 RX ADMIN — CHLORHEXIDINE GLUCONATE 15 ML: 1.2 SOLUTION ORAL at 21:41

## 2023-08-22 RX ADMIN — OXYCODONE HYDROCHLORIDE 10 MG: 10 TABLET ORAL at 09:57

## 2023-08-22 RX ADMIN — ASPIRIN 81 MG CHEWABLE TABLET 81 MG: 81 TABLET CHEWABLE at 08:32

## 2023-08-22 NOTE — ASSESSMENT & PLAN NOTE
· History of CAD based on prior CTA imaging.   · On statin  · ASA 81 mg daily restarted on 8/21 after clearance by neurology  · BB held due to episodes of bradycardia

## 2023-08-22 NOTE — ASSESSMENT & PLAN NOTE
· History of hypertension. · Was previously on telmisartan and atenolol but hypertension was suboptimally controlled  · Started on Amlodipine 10mg daily; Losartan 100mg daily;  Hydralazine 25mg q8h  · BP acceptable

## 2023-08-22 NOTE — PLAN OF CARE
Problem: MOBILITY - ADULT  Goal: Maintain or return to baseline ADL function  Description: INTERVENTIONS:  -  Assess patient's ability to carry out ADLs; assess patient's baseline for ADL function and identify physical deficits which impact ability to perform ADLs (bathing, care of mouth/teeth, toileting, grooming, dressing, etc.)  - Assess/evaluate cause of self-care deficits   - Assess range of motion  - Assess patient's mobility; develop plan if impaired  - Assess patient's need for assistive devices and provide as appropriate  - Encourage maximum independence but intervene and supervise when necessary  - Involve family in performance of ADLs  - Assess for home care needs following discharge   - Consider OT consult to assist with ADL evaluation and planning for discharge  - Provide patient education as appropriate  Outcome: Progressing     Problem: PAIN - ADULT  Goal: Verbalizes/displays adequate comfort level or baseline comfort level  Description: Interventions:  - Encourage patient to monitor pain and request assistance  - Assess pain using appropriate pain scale  - Administer analgesics based on type and severity of pain and evaluate response  - Implement non-pharmacological measures as appropriate and evaluate response  - Consider cultural and social influences on pain and pain management  - Notify physician/advanced practitioner if interventions unsuccessful or patient reports new pain  Outcome: Progressing     Problem: INFECTION - ADULT  Goal: Absence or prevention of progression during hospitalization  Description: INTERVENTIONS:  - Assess and monitor for signs and symptoms of infection  - Monitor lab/diagnostic results  - Monitor all insertion sites, i.e. indwelling lines, tubes, and drains  - Monitor endotracheal if appropriate and nasal secretions for changes in amount and color  - [place] appropriate cooling/warming therapies per order  - Administer medications as ordered  - Instruct and encourage patient and family to use good hand hygiene technique  - Identify and instruct in appropriate isolation precautions for identified infection/condition  Outcome: Progressing     Problem: SAFETY ADULT  Goal: Maintain or return to baseline ADL function  Description: INTERVENTIONS:  -  Assess patient's ability to carry out ADLs; assess patient's baseline for ADL function and identify physical deficits which impact ability to perform ADLs (bathing, care of mouth/teeth, toileting, grooming, dressing, etc.)  - Assess/evaluate cause of self-care deficits   - Assess range of motion  - Assess patient's mobility; develop plan if impaired  - Assess patient's need for assistive devices and provide as appropriate  - Encourage maximum independence but intervene and supervise when necessary  - Involve family in performance of ADLs  - Assess for home care needs following discharge   - Consider OT consult to assist with ADL evaluation and planning for discharge  - Provide patient education as appropriate  Outcome: Progressing

## 2023-08-22 NOTE — ASSESSMENT & PLAN NOTE
>>ASSESSMENT AND PLAN FOR DIABETES MELLITUS TYPE 2, CONTROLLED (HCC) WRITTEN ON 8/22/2023  3:18 PM BY RAN KEYS PA-C    Lab Results   Component Value Date    HGBA1C 6.7 (H) 08/15/2023       Recent Labs     08/21/23  1554 08/21/23  2115 08/22/23  0626 08/22/23  1106   POCGLU 112 213* 161* 171*       Blood Sugar Average: Last 72 hrs:  (P) 176.8750241471870085     A1c and glucose controlled   On SSI  Holding home metformin  Hypoglycemic protocol   Monitor blood sugars and adjust insulin accordingly

## 2023-08-22 NOTE — ASSESSMENT & PLAN NOTE
· History of hypertension. · Was previously on telmisartan and atenolol but hypertension was suboptimally controlled  · BP now acceptable   · Continue Amlodipine 10mg daily; Losartan 100mg daily;  Hydralazine 25mg q8h

## 2023-08-22 NOTE — ASSESSMENT & PLAN NOTE
Lab Results   Component Value Date    HGBA1C 6.7 (H) 08/15/2023       Recent Labs     08/21/23  1554 08/21/23  2115 08/22/23  0626 08/22/23  1106   POCGLU 112 213* 161* 171*       Blood Sugar Average: Last 72 hrs:  (P) [id number]     · A1c and glucose controlled   · On SSI  · Holding home metformin  · Hypoglycemic protocol   · Monitor blood sugars and adjust insulin accordingly

## 2023-08-22 NOTE — ASSESSMENT & PLAN NOTE
· Anemia with baseline hemoglobin of 10-11. MCVs around 80s. Iron panel showing ferritin at 22 and TIBC of 370s which maybe indicative of iron deficiency anemia.  Also her B12 levels are low-normal.   · Ordered ferrous sulfate supplementation  · On vitamin B12 supplementation 1000 mg daily

## 2023-08-22 NOTE — ASSESSMENT & PLAN NOTE
· 62 YOF with history of HTN, DM2, Sjogren's, fibromyalgia initially presented to [facility] on 8/14/23 as a stroke alert on 8/14/23 with initial presenting symptoms acute onset of right sided weakness, facial asymmetry, and dysarthria while at work. Initial imaging with CTH/CTA HN revealed left thalamocapsular bleed with no significant shift or mass effect. . NIHSS 14. Not TNK candidate given acute ICH. Pt given IV labetalol, started on Cardene drip, administered DDAVP for ASA reversal and transferred to hospitals on 8/14/23 for neurosurgery care. · Was seen by neurosurgery - no surgical intervention at this time  · Neurology following - likely due to hypertension  · Goal SBP < 140  · CT head was done on 8/17/23 for change in exam and worsening and waxing slurred speech which was reviewed by neuro, the images which does show stable left sided hemorrhage without any midline shift.    · Had a fall and hit her head on 8/18 - CT head stable  · Repeat CT head on 8/20 - stable - cleared by neurology to restart ASA 81 mg daily  · PT/OT  · Accepted by [facility] pending

## 2023-08-22 NOTE — QUICK NOTE
Pt here for ICH likely 2/2 to HTN. Pt restarted on home ASA 81 mg daily yesterday. Will not need further imaging while in patient. Can follow with neurology as outpatient in 6 weeks with stroke.

## 2023-08-22 NOTE — ASSESSMENT & PLAN NOTE
Lab Results   Component Value Date    HGBA1C 6.7 (H) 08/15/2023       Recent Labs     08/21/23  0713 08/21/23  1108 08/21/23  1554 08/21/23  2115   POCGLU 179* 166* 112 213*       Blood Sugar Average: Last 72 hrs:  (P) [id number]     · On SSI  · Holding home metformin  · Hypoglycemic protocol   · Monitor blood sugars and adjust insulin accordingly

## 2023-08-22 NOTE — PROGRESS NOTES
PHYSICAL MEDICINE AND REHABILITATION   PREADMISSION ASSESSMENT     Projected [facility] Diagnoses:  Impairment of mobility, safety and Activities of Daily Living (ADLs) due to Stroke:  01.2  Right Body Involvement (Left Brain)    Etiologic: Acute Left Thalamic Intracerebral Hemorrhage with edema    Date of Onset: 8/14/23   Date of surgery: N/A    PATIENT INFORMATION  Name: Keara Thurston Phone #: [phone] (home) [phone] (work)  Address: [address] [place] [address]  YOB: 1965 Age: 62 y.o. SS#   Marital Status: /Civil Union  Ethnicity:   Employment Status: currently employed  Extended Emergency Contact Information  Primary Emergency Contact: Lucius Garcia  Address: [address] [place] [address] of [address] [address] Phone: [phone]  Mobile Phone: [phone]  Relation: Spouse  Secondary Emergency Contact: [facility]  Mobile Phone: [phone]  Relation: Sister  Advance Directive: Level 1 - Full Code (No advanced directives on file)    INSURANCE/COVERAGE:     Primary Payor: BLUE CROSS / Plan: Leyda Gamez / Product Type: Blue Fee for Service /   Secondary Payer:  Self-pay   Payer Contact: [place] Payer Contact:   Contact Phone: [phone] Contact Phone:     Authorization #: [id number]  Coverage Dates: 8/25 - 8/31  LCD: 8/31 with next review to  [phone] [id number]    Medical Record #: [id number]    REFERRAL SOURCE:   Referring provider: José Bradley DO  Referring facility: [address] [place] [address]  Room: [place] [address]/[place] [address]  PCP: Shaun House MD PCP phone number: [phone]    MEDICAL INFORMATION  HPI: As per PM&R completed by Dr. Kristy Sacks - "51-year-old female with a history of hypertension, hyperlipidemia, diabetes, obesity, eosinophilic asthma, COPD, fibromyalgia, Sjogren's, lumbar spondylosis, grade 1 anterior spondylolisthesis, mild lumbar neuroforaminal narrowing, left epidural steroid injection in February 2023 who developed sudden right-sided weakness while at work who was brought to the hospital where CT showed acute left thalamic intracerebral hemorrhage with surrounding edema. CTA was negative for significant acute findings. Patient was placed on Cardene drip. Follow-up CT imaging stable. Patient complaining of severe left posterior lateral leg pain as well. Neurosurgery consulted and recommended nonsurgical intervention. For radiating leg pain they recommended possible management for sciatica with Tylenol, gabapentin, and consideration for steroids. Neuro consulted and recommended MRI brain with and without contrast and holding antithrombotics."  MRI completed with impression:  Stable left thalamocapsular intraparenchymal hemorrhage with surrounding edema. No acute infarction or mass effect. CT head was done on 8/17/23 for change in exam and worsening and waxing slurred speech which was reviewed by neuro, the images which does show stable left sided hemorrhage without any midline shift. Patient then had a rapid response due to a fall and hit her head on 8/18 - repeat CT head was completed and stable. Patient has a history of hypertension and was previously on telmisartan and atenolol with her hypertension suboptimally controlled. Patient now taking amlodipine, losartan and hydralazine and BP is now controlled with SBP < 140. She has been deemed hemodynamically stable at this time. PT/OT therapies were consulted and continue to follow patient at this time. PM&R was consulted and are recommending inpatient acute rehab when medically stable. All involved medical disciplines feel/agree patient is medically ready for discharge at this time. Inpatient acute rehabilitation physician was consulted.  Upon review of patient’s case and correspondence with PT/OT therapies and PM&R services, [facility] physician feels she will benefit and is a good candidate and appropriate for inpatient acute rehab at this time. She has demonstrated the willingness, desire and tolerance to participate in the required 3 hours or more of therapies per day. COVID VACCINATION:  MODERNA X 3  COVID TESTING:  Completed 8/25/23 - NEGATIVE    Past Medical History:   Past Surgical History: Allergies:     Past Medical History:   Diagnosis Date   • Arthritis    • Asthma    • Continuous opioid dependence ([address]) 4/27/2022   • Diabetes mellitus ([address])    • Diverticulitis of colon    • Fibromyalgia 04/26/2022   • Headache(784.0)    • History of mammogram 2018   • History of tobacco abuse 04/26/2022   • Hypertension    • Nausea 04/29/2022   • Obesity    • Sjogren's syndrome ([address]) 10/19/2012   • Urinary tract infection     Past Surgical History:   Procedure Laterality Date   • APPENDECTOMY     • BREAST BIOPSY Left     unsure of year   • CARPAL TUNNEL RELEASE     • COLONOSCOPY  2017    repeat in 2022   • EPIDURAL BLOCK INJECTION N/A 2/14/2023    Procedure: L5-S1 EPIDURALSTEROID INJECTION (71025); Surgeon: Sanjay Flores DO;  Location:  MAIN OR;  Service: Pain Management    • FOOT SURGERY     • HERNIA REPAIR  05/2022   • NECK SURGERY      spine fusion    • NV RPR UMBILICAL HRNA 5 YRS/> REDUCIBLE N/A 05/11/2022    Procedure: REPAIR HERNIA UMBILICAL;  Surgeon: Nicki Samaniego MD;  Location:  MAIN OR;  Service: General     No Known Allergies      Medical/functional conditions requiring inpatient rehabilitation: Impaired balance, ambulation and mobility and ADL self-care    Risk for medical/clinical complications: Risk for Falls, uncontrolled pain, DVT/PE, skin breakdown, infection, hypo/hypertension, hypo/hyperglycemia.     Comorbidities:  Arthritis, Asthma, Continuous opioid dependence, DM, Diverticulitis of colon, Fibromyalgia, Hx of tobacco abuse, Hypertension, Sjogren's syndrome    CURRENT VITAL SIGNS:   Temp:  [98.1 °F (36.7 °C)-98.5 °F (36.9 °C)] 98.5 °F (36.9 °C)  HR:  [69-81] 76  Resp:  [16-18] 16  BP: (127-137)/(78-82) 127/78   Intake/Output Summary (Last 24 hours) at 8/25/2023 1149  Last data filed at 8/25/2023 0900  Gross per 24 hour   Intake 740 ml   Output 1375 ml   Net -635 ml        LABORATORY RESULTS:      Lab Results   Component Value Date    HGB 12.0 08/24/2023    HCT 37.6 08/24/2023    WBC 10.09 08/24/2023     Lab Results   Component Value Date    BUN 19 08/24/2023    BUN 16 01/11/2023    K 3.9 08/24/2023    K 4.5 01/11/2023     08/24/2023     01/11/2023    CREATININE 0.56 (L) 08/24/2023     Lab Results   Component Value Date    PROTIME 14.0 08/15/2023    INR 1.06 08/15/2023        DIAGNOSTIC STUDIES:  XR shoulder 2+ vw right    Result Date: 8/19/2023  Impression: No acute osseous abnormality. Workstation performed: [id number]     CT spine cervical wo contrast    Result Date: 8/18/2023  Impression: No cervical spine fracture or traumatic malalignment. Workstation performed: [id number]     CT head wo contrast    Result Date: 8/18/2023  Impression: Stable appearance of acute intraparenchymal hematoma centered at the left thalamocapsular junction with superior extension into the left corona radiata and inferior extension into the left cerebral peduncle with surrounding vasogenic edema Workstation performed: [id number]     CT head wo contrast    Result Date: 8/18/2023  Impression: Evolving acute intraparenchymal hematoma in left thalamocapsular junction with superior extension into left corona radiata and inferior extension towards left cerebral peduncle with moderate surrounding vasogenic edema, similar to prior exam. Similar slight mass effect on third ventricle. No new acute intracranial abnormality. Workstation performed: [id number]     XR hips bilateral 3-4 vw w pelvis if performed    Result Date: 8/17/2023  Impression: No acute osseous abnormality. No significant degenerative changes of the right or left hip.  Workstation performed: [id number]     CT head wo contrast    Result Date: 8/17/2023  Impression: Evolving acute left corona radiata intraparenchymal hemorrhage, stable in size. Minimally increased regional vasogenic edema as detailed above. No midline shift. This study was marked for "Immediate" notification in EPIC. Workstation performed: [id number]     MRI follow up    Result Date: 2023  Impression: 1. Stable left thalamocapsular intraparenchymal hemorrhage with surrounding edema. 2. No acute infarction or mass effect. Workstation performed: [id number]     CT head wo contrast    Result Date: 8/15/2023  Impression: Stable hemorrhage and surrounding vasogenic edema in the left corona radiata, posterior limb of the internal capsule and thalamus. Workstation performed: [id number]     CTA stroke alert (head/neck)    Result Date: 2023  Impression: Negative CTA head and neck for active contrast extravasation, large vessel occlusion, dissection, aneurysm, arteriovenous vascular malformation (AVM), or high-grade stenosis. Additional chronic/incidental findings as detailed above. Findings were directly discussed with Elizabeth Molina 2023 at 4:37 PM. Workstation performed: [id number]     CT stroke alert brain    Result Date: 2023  Impression: 2.1 x 1.6 x 3.2 cm (approximately 5.4 mL) acute parenchymal hematoma in left posterior striatocapsular region extending into left thalamus with mild surrounding vasogenic edema No acute territorial infarct. Mild-to-moderate chronic microangiopathy.  Findings were directly discussed with Elizabeth Molina 2023 at 4:37 PM. Workstation performed: [id number]       PRECAUTIONS/SPECIAL NEEDS:  Tobacco:   Social History     Tobacco Use   Smoking Status Former   • Packs/day: 0.50   • Types: Cigarettes   • Start date: 0   • Quit date: 2023   • Years since quittin.3   • Passive exposure: Past   Smokeless Tobacco Never   Tobacco Comments    per pt, 5 a day - As per Martha Madison Alcohol:    Social History     Substance and Sexual Activity   Alcohol Use Not Currently Comment: occasional     Splints/Braces: RUE sling, Anticoagulation:  aspirin 81 mg orally every day and heparin, Blood Sugar Management: as per MD recommendations, Safety Concerns, Pain Management, Dietary Restrictions: CCD2 and Language Preference: English    MEDICATIONS:     Current Facility-Administered Medications:   •  amLODIPine (NORVASC) tablet 10 mg, 10 mg, Oral, Daily, Ethelda Self Starsinic, DO, 10 mg at 08/25/23 1648  •  aspirin chewable tablet 81 mg, 81 mg, Oral, Daily, Estrella Thrasher MD, 81 mg at 08/25/23 5175  •  atorvastatin (LIPITOR) tablet 20 mg, 20 mg, Oral, Daily With Dinner, Ethelda Self Starsinic, DO, 20 mg at 08/24/23 1657  •  bisacodyl (DULCOLAX) EC tablet 5 mg, 5 mg, Oral, Daily PRN, Ethelda Self Starsinic, DO, 5 mg at 08/20/23 1748  •  buPROPion (WELLBUTRIN XL) 24 hr tablet 150 mg, 150 mg, Oral, QAM, Ethelda Self Starsinic, DO, 150 mg at 08/25/23 0756  •  chlorhexidine (PERIDEX) 0.12 % oral rinse 15 mL, 15 mL, Mouth/Throat, Q12H [facility], [facility] Self Starsinic, DO, 15 mL at 08/25/23 0754  •  cyanocobalamin (VITAMIN B-12) tablet 1,000 mcg, 1,000 mcg, Oral, Daily, Ethelda Self Starsinic, DO, 1,000 mcg at 08/25/23 5110  •  ferrous sulfate tablet 325 mg, 325 mg, Oral, Daily With Breakfast, Ethelda Self Starsinic, DO, 325 mg at 08/25/23 5047  •  fluticasone-vilanterol 200-25 mcg/actuation 1 puff, 1 puff, Inhalation, Daily, Ethelda Self Starsinic, DO, 1 puff at 08/24/23 0810  •  gabapentin (NEURONTIN) capsule 300 mg, 300 mg, Oral, TID, Ethelda Self Starsinic, DO, 300 mg at 08/25/23 5140  •  heparin (porcine) subcutaneous injection 5,000 Units, 5,000 Units, Subcutaneous, Q8H [facility], [facility] Dennis, DO, 5,000 Units at 08/25/23 0547  •  hydrALAZINE (APRESOLINE) injection 5 mg, 5 mg, Intravenous, Q6H PRN, Ethelda Self Starsinic, DO, 5 mg at 08/16/23 6169  •  hydrALAZINE (APRESOLINE) tablet 25 mg, 25 mg, Oral, Q8H [facility], Lalitha Kim Starsinic, DO, 25 mg at 08/25/23 0571  •  insulin lispro (HumaLOG) 100 units/mL subcutaneous injection 1-6 Units, 1-6 Units, Subcutaneous, TID With Meals, 2 Units at 08/25/23 1132 **AND** Fingerstick Glucose (POCT), , , TID AC, Alda Delude Starsinic, DO  •  losartan (COZAAR) tablet 100 mg, 100 mg, Oral, Daily, Alda Delude Starsinic, DO, 100 mg at 08/25/23 5277  •  methocarbamol (ROBAXIN) tablet 500 mg, 500 mg, Oral, Q6H PRN, Alda Delude Starsinic, DO, 500 mg at 08/22/23 0535  •  [facility]ron [facility]) injection 4 mg, 4 mg, Intravenous, Q4H PRN, Alda Delude Starsinic, DO, 4 mg at 08/16/23 1218  •  oxyCODONE (ROXICODONE) IR tablet 5 mg, 5 mg, Oral, Q4H PRN, Harnishkumar Fernando, DO, 5 mg at 08/25/23 0547  •  oxyCODONE (ROXICODONE) split tablet 2.5 mg, 2.5 mg, Oral, Q4H PRN, Harnishkumar Fernando, DO  •  pantoprazole (PROTONIX) EC tablet 40 mg, 40 mg, Oral, Early Morning, Fani Goldman MD, 40 mg at 08/25/23 0547  •  polyethylene glycol (MIRALAX) packet 17 g, 17 g, Oral, Daily, Eliazarnishkaditi Fernando, DO, 17 g at 08/25/23 1133  •  prochlorperazine (COMPAZINE) tablet 5 mg, 5 mg, Oral, Q6H PRN, Alda Delude Starsinic, DO, 5 mg at 08/16/23 1450  •  senna-docusate sodium (SENOKOT S) 8.6-50 mg per tablet 1 tablet, 1 tablet, Oral, HS, Harnishkumar Fernando, DO, 1 tablet at 08/24/23 2121    SKIN INTEGRITY:   no rashes, no erythema, no peripheral edema    PRIOR LEVEL OF FUNCTION:  She lives in [facility] mobile home  Fariha Perkins is  and lives with their spouse. Self Care: Independent, Indoor Mobility: Independent, Stairs (in/outdoor): Independent and Cognition: Independent    FALLS IN THE LAST 6 MONTHS: 0    HOME ENVIRONMENT:  The living area: can live on one level  There are 5 steps to enter the home. The patient will have 24 hour supervision/physical assistance available upon discharge.     PREVIOUS DME:  Equipment in home (previous DME): None    FUNCTIONAL STATUS:  Physical Therapy Occupational Therapy Speech Therapy    As per PT 8/24/23    Subjective   Subjective The patient states that she is trying to manage everything, but she is eager to work to get better. Bed Mobility   Supine to Sit 2  Maximal assistance   Additional items Assist x 2; Increased time required;Verbal cues;LE management   Transfers   Sit to Stand 2  Maximal assistance   Additional items Assist x 2; Increased time required;Verbal cues; Other  (Left knee blocked and hips facilitated into extension.)   Stand to Sit 2  Maximal assistance   Additional items Assist x 2; Increased time required;Verbal cues   Stand pivot 2  Maximal assistance   Additional items Assist x 2; Increased time required;Verbal cues  (Left knee blocked and hips facilitated into extension.)   Balance   Static Sitting Poor +   Dynamic Sitting Poor   Static Standing Poor -   Dynamic Standing Poor -   Activity Tolerance   Activity Tolerance Patient tolerated treatment well;Patient limited by fatigue   Nurse Made Aware Yes. Exercises   TKR Sitting;Right;PROM;20 reps   Assessment   Prognosis Fair   Problem List Decreased strength;Decreased endurance; Impaired balance;Decreased mobility; Decreased coordination;Decreased cognition; Impaired judgement;Decreased safety awareness; Impaired tone;Obesity   Assessment The patient continues to require significant assistance for all mobility. Her seated balance did slightly improve, but this also is far below her baseline. The patient was unable to assist with passive exercise on her RLE, but educated her in visualizing and trying to move it even though there is none at this time. She has significant difficulty with stance, and she needs considerable assistance in order to do so. This was performed three separate times during the session. Educated her in the importance of attempting to utilize her right hemibody, and to continue visualizing doing so.  She had several questions which were either addressed to her satisfaction or deferred to the medical team. She will certainly benefit from continued therapy in order to maximize her functional mobility. As per OT 8/24/23    ADL   Where Assessed Chair   Eating Assistance 5  Supervision/Setup   Grooming Assistance 4  Minimal Assistance   Grooming Deficit Wash/dry hands; Wash/dry face  (S/set-up to wash face, mod a to wash hands with right.)   UB Bathing Assistance 3  Moderate Assistance   UB Bathing Deficit Right arm; Chest;Abdomen; Increased time to complete   LB Bathing Assistance 2  Maximal Assistance   LB Bathing Deficit Left lower leg including foot;Right lower leg including foot; Buttocks; Perineal area   UB Dressing Assistance 3  Moderate Assistance   UB Dressing Deficit Thread RUE;Pull around back  (able to assist right UE into sleeve using left UE.)   LB Dressing Assistance 1  Total Assistance   LB Dressing Deficit Don/doff R sock; Don/doff L sock   Toileting Assistance  1  Total Assistance   Toileting Deficit Perineal hygiene   Bed Mobility   Supine to Sit 2  Maximal assistance   Additional items Assist x 2;LE management;Verbal cues; Increased time required;HOB elevated   Sit to Supine Unable to assess   Additional Comments pt left in chair with all needs met and alarm engaged   Transfers   Sit to Stand 2  Maximal assistance   Additional items Assist x 2   Stand to Sit 2  Maximal assistance   Additional items Assist x 2   Stand pivot 2  Maximal assistance   Additional items Assist x 2; Increased time required;Verbal cues  (bed to drop arm chair)   Additional Comments +right UE supported in sling with anterior approach, left HHA with right UE donned in sling/sheet for protection strategy, B/L knees blocked   Therapeutic Exercise - ROM   UE-ROM Yes   ROM- Right Upper Extremities   R Elbow PROM;Elbow flexion;Elbow extension   R Wrist Wrist flexion;Wrist extension   R Weight/Reps/Sets 5x   RUE ROM Comment improved carryover of HEP for RUE continued to review for re-inforcement   Cognition   Overall Cognitive Status [facility]   Arousal/Participation Alert; Responsive; Cooperative   Attention Within functional limits   Orientation Level Oriented X4   Memory Within functional limits  (improved)   Following Commands Follows multistep commands with increased time or repetition   Comments pt able to recall 4/5 exerciese with RUE, increased attention this session. Activity Tolerance   Activity Tolerance Patient limited by fatigue   Medical Staff Made Aware PTA [facility]   Assessment   Assessment Patient participated in Skilled OT session this date with interventions consisting of self care tasks, functional transfers, right UE HEP  . Patient agreeable to OT treatment session, upon arrival patient was found supine in bed. In comparison to previous session, patient with improvements in cognition/attention . Patient requiring verbal cues for safety and frequent rest periods. Patient continues to be functioning below baseline level, occupational performance remains limited secondary to factors listed above and increased risk for falls and injury. The patient's raw score on the AM-PAC Daily Activity Inpatient Short Form is 12. A raw score of less than 19 suggests the patient may benefit from discharge to post-acute rehabilitation services. Please refer to the recommendation of the Occupational Therapist for safe discharge planning. From OT standpoint, recommendation at time of d/c would be Short Term Rehab. Patient to benefit from continued Occupational Therapy treatment while in the hospital to address deficits as defined above and maximize level of functional independence with ADLs and functional mobility. As per SLP 8/21/23: Today's Date: 8/21/2023        Subjective:  Pt was awake and alert. She was sitting up in chair at bedside. Patient stated "No one is opening anything for me. I can't open my sodas and I can't cut things up ".     Objective:  Pt was seen today for dysphagia therapy. Current diet is regular with thin liquids. Pt was on room air. Oral care had already been completed.  Focus of today's session was to maximize PO intake safety. Textures offered today included hard solid and thin liquid via straw. Swallow function:   Bolus retrieval and control was/were slow. Mastication and AP transfer was/were [facility] and the pt is able to clear the bolus w/ an alternated sip of thin an mult swallows. Sujey Forman Pharyngeal swallow initiation was timely. Hyolaryngeal excursion was adequate. No s/s aspiration occurred during session today.        Assessment:  Swallow function is stable with current diet. Diet texture and Liquid consistency remains appropriate at this time. Plan:  Continue regular and thin liquids. Pt will need set up of all meals to appropriately take in po- needs material cut up, cans and sandwiches opened. No additional ST f/u needed at this time. Please re consult with any new changes or concerns. CARE SCORES:  Self Care:  Eatin: Supervision or touching  assistance  Oral hygiene: 05: Setup or clean-up assistance  Toilet hygiene: 01: Dependent  Shower/bathing self: 02: Substantial/maximal assistance  Upper body dressin: Partial/moderate assistance  Lower body dressin: Dependent  Putting on/taking off footwear: 01: Dependent  Transfers:  Roll left and right: 02: Substantial/maximal assistance  X 2  Sit to lyin: Substantial/maximal assistance  X 2  Lying to sitting on side of bed: 02: Substantial/maximal assistance  X 2  Sit to stand: 02: Substantial/maximal assistance  X 2  Chair/bed to chair transfer: 02: Substantial/maximal assistance  X 2  Toilet transfer: 02:  Substantial/maximal assistance  X 2  Mobility:  Walk 10 ft: 88: Not attempted due to medical conditions or safety concerns  Walk 50 ft with two turns: 88: Not attempted due to medical conditions or safety concerns  Walk 150ft: 88: Not attempted due to medical conditions or safety concerns    CURRENT GAP IN FUNCTION  Prior to Admission: Functional Status: Patient was independent with mobility/ambulation, transfers, ADL's, IADL's. Expected functional outcomes: It is expected that with skilled acute rehabilitation services the patient will progress to Minimal Assistance for self care and Minimal Assistance for mobility     Estimated length of stay: 10 to 14 days    Anticipated Post-Discharge Disposition/Treatment  Disposition: Return to previous home/apartment. Outpatient Services: Physical Therapy (PT) and Occupational Therapy (OT)    BARRIERS TO DISCHARGE  Weakness, Balance Difficulty, Fatigue, Home Accessibility, Caregiver Accessibility, Financial Resources, Equipment Needs and Resource Availability    INTERVENTIONS FOR DISCHARGE  Adaptive equipment, Patient/Family/Caregiver Education, Support Group, Financial Assistance, Arrange DME needs, Medication Changes as per MD recommendations, Therapy exercises, Center of balance support  and Energy conservation education     REQUIRED THERAPY:  Patient will require PT and OT 90 minutes each per day, five days per week to achieve rehab goals.      REQUIRED FUNCTIONAL AND MEDICAL MANAGEMENT FOR INPATIENT REHABILITATION:  Pain Management: Overall pain is well controlled, Deep Vein Thrombosis (DVT) Prophylaxis:  heparin and SCD's while in bed, Diabetes Management: continue sliding scale insulin, patient to do finger sticks as ordered, SLIM to continue to manage diabetes, and Nursing education and bowel/bladder management, internal medicine to manage/monitor medical conditions, PM&R to maximize function and provide medical oversight, PT/OT intervention, patient/family education/training, and any consults as needed. ,      RECOMMENDED LEVEL OF CARE:  As per PM&R completed by Dr. Hyacinth Genao - "59-year-old female with a history of hypertension, hyperlipidemia, diabetes, obesity, eosinophilic asthma, COPD, fibromyalgia, Sjogren's, lumbar spondylosis, grade 1 anterior spondylolisthesis, mild lumbar neuroforaminal narrowing, left epidural steroid injection in February 2023 who developed sudden right-sided weakness while at work who was brought to the hospital where CT showed acute left thalamic intracerebral hemorrhage with surrounding edema. CTA was negative for significant acute findings. Patient was placed on Cardene drip. Follow-up CT imaging stable. Patient complaining of severe left posterior lateral leg pain as well. Neurosurgery consulted and recommended nonsurgical intervention. For radiating leg pain they recommended possible management for sciatica with Tylenol, gabapentin, and consideration for steroids. Neuro consulted and recommended MRI brain with and without contrast and holding antithrombotics."  MRI completed with impression:  Stable left thalamocapsular intraparenchymal hemorrhage with surrounding edema. No acute infarction or mass effect. CT head was done on 8/17/23 for change in exam and worsening and waxing slurred speech which was reviewed by neuro, the images which does show stable left sided hemorrhage without any midline shift. Patient then had a rapid response due to a fall and hit her head on 8/18 - repeat CT head was completed and stable. Patient has a history of hypertension and was previously on telmisartan and atenolol with her hypertension suboptimally controlled. Patient now taking amlodipine, losartan and hydralazine and BP is now controlled with SBP < 140. Prior to admission / hospital stay patient was independent with all ADLs, functional mobility and IADLs. Currently with PT therapies: Patient is requiring Max A x 2 for bed mobility and transfers. Patient is unable to ambulate at this time due to Right sided weakness. Currently with OT therapies: Patient is requiring S/Set up for eating, Min A grooming, Mod A UB ADL's, Max A LB bathing and Total A for LB dressing and Toileting.   Nursing is being recommended for medication distribution and management, bowel and bladder management and educational purposes, internal medicine to continue to monitor and manage medical conditions, PM&R to maximize  function and provide medical oversight, and inpatient rehab to maximize self-care, mobility, strength and endurance upon discharge to home.

## 2023-08-22 NOTE — PROGRESS NOTES
[facility] [facility]  Progress Note  Name: Pat Rothman  MRN: [id number]  [facility] Date of Admission: 8/14/2023   Date of Service: 8/21/2023 I Hospital Day: 7    Assessment/Plan   * ICH (intracerebral hemorrhage) ([address])  Assessment & Plan  · 62 YOF with history of HTN, DM2, Sjogren's, fibromyalgia initially presented to [facility] on 8/14/23 as a stroke alert on 8/14/23 with initial presenting symptoms acute onset of right sided weakness, facial asymmetry, and dysarthria while at work. Initial imaging with CTH/CTA HN revealed left thalamocapsular bleed with no significant shift or mass effect. . NIHSS 14. Not TNK candidate given acute ICH. Pt given IV labetalol, started on Cardene drip, administered DDAVP for ASA reversal and [facility] on 8/14[facility]23 for neurosurgery care. · Was seen by neurosurgery - no surgical intervention at this time  · Neurology following - likely due to hypertension  · Goal SBP < 140  · CT head was done on 8/17/23 for change in exam and worsening and waxing slurred speech which was reviewed by neuro, the images which does show stable left sided hemorrhage without any midline shift. · Had a fall and hit her head on 8/18 - CT head stable  · Repeat CT head on 8/20 - stable - cleared by neurology to restart ASA 81 mg daily  · PT/OT  · Accepted by ABBY Tiwari pending      Primary hypertension  Assessment & Plan  · History of hypertension. · Was previously on telmisartan and atenolol but hypertension was suboptimally controlled  · Started on Amlodipine 10mg daily; Losartan 100mg daily;  Hydralazine 25mg q8h  · BP acceptable     Diabetes mellitus type 2, controlled [facility])  Assessment & Plan  Lab Results   Component Value Date    HGBA1C 6.7 (H) 08/15/2023       Recent Labs     08/21/23  0713 08/21/23  1108 08/21/23  1554 08/21/23  2115   POCGLU 179* 166* 112 213*       Blood Sugar Average: Last 72 hrs:  (P) [id number]     · On SSI  · Holding home metformin  · Hypoglycemic protocol   · Monitor blood sugars and adjust insulin accordingly    Anxiety  Assessment & Plan  · History of anxiety  · Was on wellbutrin 300 mg daily, was reduced to 150 mg due to concern for medicaitons potential for lowering seizure threshold in this patient with ICH    CAD (coronary artery disease)  Assessment & Plan  · History of CAD based on prior CTA imaging. · On statin  · ASA 81 mg daily restarted on 8/21 after clearance by neurology  · BB held due to episodes of bradycardia      Sjogren's syndrome (HCC)  Assessment & Plan  · History of Sjogren syndrome. · Outpatient rheumatology follow-up      Hyperlipidemia associated with type 2 diabetes mellitus ([address])  Assessment & Plan  · Ct statin     Anemia  Assessment & Plan  Anemia with baseline hemoglobin of 10-11. MCVs around 80s. Iron panel showing ferritin at 22 and TIBC of 370s which maybe indicative of iron deficiency anemia. Also her B12 levels are low-normal.   -Ordered ferrous sulfate supplementation  -On vitamin B12 supplementation 1000 mg daily    VTE Pharmacologic Prophylaxis: VTE Score: 4 Moderate Risk (Score 3-4) - Pharmacological DVT Prophylaxis Ordered: heparin. Patient Centered Rounds: Discussed with RN  Discussions with Specialists or Other Care Team Provider: Discussed with neurology    Education and Discussions with Family / Patient: Updated  ( and sister) at bedside. Total Time Spent on Date of Encounter in care of patient: 35 minutes This time was spent on one or more of the following: performing physical exam; counseling and coordination of care; obtaining or reviewing history; documenting in the medical record; reviewing/ordering tests, medications or procedures; communicating with other healthcare professionals and discussing with patient's family/caregivers.     Current Length of Stay: 7 day(s)  Current Patient Status: Inpatient   Certification Statement: The patient will continue to require additional inpatient hospital stay due to awaiting rehab    Code Status: Level 1 - Full Code    Subjective:   No new events. Objective:     Vitals:   Temp (24hrs), Av °F (36.7 °C), Min:97.3 °F (36.3 °C), Max:98.6 °F (37 °C)    Temp:  [97.3 °F (36.3 °C)-98.6 °F (37 °C)] 98.6 °F (37 °C)  HR:  [71-77] 77  Resp:  [14-16] 14  BP: (100-127)/(66-75) 127/72  SpO2:  [93 %-97 %] 97 %  Body mass index is 33.91 kg/m². Physical Exam:   Physical Exam  Vitals reviewed. HENT:      Head: Normocephalic. Nose: Nose normal.      Mouth/Throat:      Mouth: Mucous membranes are moist.   Eyes:      Extraocular Movements: Extraocular movements intact. Cardiovascular:      Rate and Rhythm: Normal rate and regular rhythm. Pulmonary:      Effort: Pulmonary effort is normal.      Breath sounds: Normal breath sounds. Abdominal:      General: Bowel sounds are normal. There is no distension. Palpations: Abdomen is soft. Musculoskeletal:         General: No swelling. Cervical back: Neck supple. Skin:     Comments: Right cheek contusion   Neurological:      Mental Status: She is alert and oriented to person, place, and time. Comments: Right hemiparesis   Psychiatric:         Mood and Affect: Mood normal.         Behavior: Behavior normal.          Additional Data:     Labs:  Results from last 7 days   Lab Units 23  1404 23  0523 08/15/23  0434   WBC Thousand/uL 11.15*   < > 10.98*   HEMOGLOBIN g/dL 12.2   < > 9.7*   HEMATOCRIT % 38.6   < > 30.9*   PLATELETS Thousands/uL 382   < > 302   NEUTROS PCT %  --   --  68   LYMPHS PCT %  --   --  22   MONOS PCT %  --   --  8   EOS PCT %  --   --  1    < > = values in this interval not displayed.      Results from last 7 days   Lab Units 23  1404 23  0549   SODIUM mmol/L 135 137   POTASSIUM mmol/L 4.2 4.3   CHLORIDE mmol/L 102 105   CO2 mmol/L 29 25   BUN mg/dL 22 16   CREATININE mg/dL 0.83 0.92   ANION GAP mmol/L 4 7 CALCIUM mg/dL 9.1 9.6   ALBUMIN g/dL  --  3.2*   TOTAL BILIRUBIN mg/dL  --  0.36   ALK PHOS U/L  --  80   ALT U/L  --  20   AST U/L  --  14   GLUCOSE RANDOM mg/dL 145* 161*     Results from last 7 days   Lab Units 08/15/23  0434   INR  1.06     Results from last 7 days   Lab Units 08/21/23  2115 08/21/23  1554 08/21/23  1108 08/21/23  0713 08/20/23  1606 08/20/23  1114 08/20/23  0614 08/19/23  2054 08/19/23  1604 08/19/23  1108 08/19/23  0812 08/18/23  1917   POC GLUCOSE mg/dl 213* 112 166* 179* 162* 259* 153* 180* 201* 166* 168* 184*     Results from last 7 days   Lab Units 08/15/23  0434   HEMOGLOBIN A1C % 6.7*           Lines/Drains:  Invasive Devices     Peripheral Intravenous Line  Duration           Peripheral IV 08/18/23 Right;Ventral (anterior) Wrist 3 days          Drain  Duration           External Urinary Catheter 7 days                Last 24 Hours Medication List:   Current Facility-Administered Medications   Medication Dose Route Frequency Provider Last Rate   • amLODIPine  10 mg Oral Daily Wylene Roe Starsinic, DO     • aspirin  81 mg Oral Daily Nicky Hernandez MD     • atorvastatin  20 mg Oral Daily With [address] [place] [address], DO     • bisacodyl  5 mg Oral Daily PRN Wylene Roe Starsinic, DO     • buPROPion  150 mg Oral QAM Wylene Roe Starsinic, DO     • chlorhexidine  15 mL Mouth/Throat Q12H [facility] Wylene Roe Starsinic, DO     • vitamin B-12  1,000 mcg Oral Daily Wylene Roe Starsinic, DO     • docusate sodium  100 mg Oral BID Wylene Roe Starsinic, DO     • ferrous sulfate  325 mg Oral Daily With Breakfast Wylene Roe Starsinic, DO     • fluticasone-vilanterol  1 puff Inhalation Daily Wylene Roe Starsinic, DO     • gabapentin  300 mg Oral TID Wylene Roe Starsinic, DO     • heparin (porcine)  5,000 Units Subcutaneous [facility] Talat Collins, DO     • hydrALAZINE  5 mg Intravenous Q6H PRN Wylene Roe Starsinic, DO     • hydrALAZINE  25 mg Oral [facility] John Moser Starsinic, DO     • HYDROmorphone  0.2 mg Intravenous Q4H PRN MARCO MacarioNP     • insulin lispro  1-6 Units Subcutaneous TID With Meals Cole Begin Starsinic, DO     • losartan  100 mg Oral Daily Cole Begin Starsinic, DO     • methocarbamol  500 mg Oral Q6H PRN Cole Begin Starsinic, DO     • ondansetron  4 mg Intravenous Q4H PRN Cole Begin Starsinic, DO     • oxyCODONE  10 mg Oral Q4H PRN MARCO MacarioNP     • oxyCODONE  5 mg Oral Q4H PRN ARSENIO Macario     • [START ON 8/22/2023] pantoprazole  40 mg Oral Early Morning Steven Ashley MD     • prochlorperazine  5 mg Oral Q6H PRN Cole Begin Starsinic, DO          Today, Patient Was Seen By: Steven Ashley MD    **Please Note: This note may have been constructed using a voice recognition system. **

## 2023-08-22 NOTE — RESTORATIVE TECHNICIAN NOTE
Restorative Technician Note      Patient Name: Bri Fletcher     Note Type: Mobility  Patient Position Upon Consult: Bedside chair  Activity Performed: Dangled; Stood; Range of motion  Assistive Device: Other (Comment) (Assist x2 to stand at the handrail. Assisted PT Gary Marquis.)  Range of Motion: All extremities  Education Provided: Yes  Patient Position at End of Consult: Bedside chair;  All needs within reach; Bed/Chair alarm activated    Mary GEE, Restorative Technician, [employer]

## 2023-08-22 NOTE — CASE MANAGEMENT
Case Management Discharge Planning Note    Patient name Biju Llanos  [facility] [id number]  : 1965 Date 2023       Current Admission Date: 2023  Current Admission Diagnosis:ICH (intracerebral hemorrhage) [facility])   Patient Active Problem List    Diagnosis Date Noted   • Anemia 2023   • ICH (intracerebral hemorrhage) ([address]) 08/15/2023   • Left leg pain 08/15/2023   • Chronic obstructive pulmonary disease with acute exacerbation ([address]) 2023   • Hyperlipidemia associated with type 2 diabetes mellitus ([address]) 2023   • Abnormal CT of the chest 2023   • Class 2 obesity with body mass index (BMI) of 39.0 to 39.9 in adult 2023   • CAD (coronary artery disease) 2023   • Right lumbosacral radiculopathy 10/12/2022   • Chronic bilateral low back pain with bilateral sciatica 2022   • Paresthesia of both feet 2022   • Postoperative visit 2022   • Incarcerated umbilical hernia    • Diabetes mellitus type 2, controlled ([address]) 2022   • Continuous opioid dependence ([address]) 2022   • Moderate persistent asthma with acute exacerbation 2022   • Cigarette nicotine dependence in remission 2022   • Primary hypertension 2022   • Fibromyalgia 2022   • Sjogren's syndrome ([address]) 10/19/2012   • Anxiety 2012      LOS (days): 8  Geometric Mean LOS (GMLOS) (days): 4.50  Days to GMLOS:-3.2     OBJECTIVE:  Risk of Unplanned Readmission Score: 17.7         Current admission status: Inpatient   Preferred Pharmacy:   [facility] RADHA HERNÁNDEZ [address] [place] [address] ROAD  [address] [address]  Phone: [phone] Fax: [address] [place], [place] - [address] [place] [address]  Phone: [phone] Fax: [phone]    Primary Care Provider: Alda Erwin MD    Primary Insurance: BLUE CROSS  Secondary Insurance: DISCHARGE DETAILS:    This CM received message from [facility]. Pt needs to be off IV pain medication for auth. They had questions in regards to pt pain and other testing. Message sent to provider, pending updated answers. Auth will be submitted to  DC support team when off IV pain meds. Pt is cleared for DC. Auth request sent to  DC support team.  Pending.

## 2023-08-22 NOTE — PROGRESS NOTES
[facility]  Progress Note  Name: Rebecca Dela Cruz  MRN: [id number]  [facility] Date of Admission: 8/14/2023   Date of Service: 8/22/2023 I Hospital Day: 8    Assessment/Plan   * ICH (intracerebral hemorrhage) ([address]  Assessment & Plan  · 62 YOF with history of HTN, DM2, Sjogren's, fibromyalgia initially presented to [address] [place] [address] on 8/14/23 as a stroke alert on 8/14/23 with initial presenting symptoms acute onset of right sided weakness, facial asymmetry, and dysarthria while at work. Initial imaging with CTH/CTA HN revealed left thalamocapsular bleed with no significant shift or mass effect. . NIHSS 14. Not TNK candidate given acute ICH. · Pt given IV labetalol, started on Cardene drip, administered DDAVP for ASA reversal and transferred to [facility] on 8/14/23 for neurosurgery care. · CT head was done on 8/17/23 for change in exam and worsening and waxing slurred speech which was reviewed by neuro, the images which does show stable left sided hemorrhage without any midline shift. · Had a fall and hit her head on 8/18 - CT head stable  · Repeat CT head on 8/20 - stable - cleared by neurology to restart ASA 81 mg daily  · Was seen by neurosurgery - no surgical intervention at this time  · Neurology following - likely due to hypertension  · Goal SBP < 140  · PT/OT  · Accepted by [facility] - auth pending. Medically stable for discharge otherwise       Sjogren's syndrome ([facility])  Assessment & Plan  · History of Sjogren syndrome. · Outpatient rheumatology follow-up      Primary hypertension  Assessment & Plan  · History of hypertension. · Was previously on telmisartan and atenolol but hypertension was suboptimally controlled  · BP now acceptable   · Continue Amlodipine 10mg daily; Losartan 100mg daily;  Hydralazine 25mg q8h    Hyperlipidemia associated with type 2 diabetes mellitus (HCC)  Assessment & Plan  · Continue statin     Anemia  Assessment & Plan  · Anemia with baseline hemoglobin of 10-11. MCVs around 80s. Iron panel showing ferritin at 22 and TIBC of 370s which maybe indicative of iron deficiency anemia. Also her B12 levels are low-normal.   · Ordered ferrous sulfate supplementation  · On vitamin B12 supplementation 1000 mg daily    Diabetes mellitus type 2, controlled [facility])  Assessment & Plan  Lab Results   Component Value Date    HGBA1C 6.7 (H) 08/15/2023       Recent Labs     08/21/23  1554 08/21/23  2115 08/22/23  0626 08/22/23  1106   POCGLU 112 213* 161* 171*       Blood Sugar Average: Last 72 hrs:  (P) [id number]     · A1c and glucose controlled   · On SSI  · Holding home metformin  · Hypoglycemic protocol   · Monitor blood sugars and adjust insulin accordingly    CAD (coronary artery disease)  Assessment & Plan  · History of CAD based on prior CTA imaging. · On statin  · ASA 81 mg daily restarted on 8/21 after clearance by neurology  · BB held due to episodes of bradycardia      Anxiety  Assessment & Plan  · History of anxiety  · Was on wellbutrin 300 mg daily, was reduced to 150 mg due to concern for medicaitons potential for lowering seizure threshold in this patient with ICH             VTE Pharmacologic Prophylaxis: VTE Score: 4 Moderate Risk (Score 3-4) - Pharmacological DVT Prophylaxis Ordered: heparin. Patient Centered Rounds: I performed bedside rounds with nursing staff today. Discussions with Specialists or Other Care Team Provider: Discussed with RN, NADER    Education and Discussions with Family / Patient: Patient declined call to .      Total Time Spent on Date of Encounter in care of patient: 35 minutes This time was spent on one or more of the following: performing physical exam; counseling and coordination of care; obtaining or reviewing history; documenting in the medical record; reviewing/ordering tests, medications or procedures; communicating with other healthcare professionals and discussing with patient's family/caregivers. Current Length of Stay: 8 day(s)  Current Patient Status: Inpatient   Certification Statement: The patient will continue to require additional inpatient hospital stay due to pending [facility] placement   Discharge Plan: Anticipate discharge tomorrow to rehab facility. Code Status: Level 1 - Full Code    Subjective:   Patient reports no new symptoms. Awaiting auth for [facility]    Objective:     Vitals:   Temp (24hrs), Av.5 °F (36.9 °C), Min:98 °F (36.7 °C), Max:98.8 °F (37.1 °C)    Temp:  [98 °F (36.7 °C)-98.8 °F (37.1 °C)] 98 °F (36.7 °C)  HR:  [75-81] 79  Resp:  [14-20] 14  BP: (121-128)/(69-74) 121/69  SpO2:  [95 %-97 %] 97 %  Body mass index is 33.91 kg/m². Input and Output Summary (last 24 hours): Intake/Output Summary (Last 24 hours) at 2023 1519  Last data filed at 2023 1700  Gross per 24 hour   Intake 420 ml   Output --   Net 420 ml       Physical Exam:   Physical Exam  Constitutional:       General: She is not in acute distress. Appearance: Normal appearance. She is normal weight. She is not ill-appearing or diaphoretic. HENT:      Head: Normocephalic and atraumatic. Mouth/Throat:      Mouth: Mucous membranes are moist.   Eyes:      General: No scleral icterus. Pupils: Pupils are equal, round, and reactive to light. Cardiovascular:      Rate and Rhythm: Normal rate and regular rhythm. Pulses: Normal pulses. Heart sounds: Normal heart sounds, S1 normal and S2 normal. No murmur heard. No systolic murmur is present. No diastolic murmur is present. No gallop. No S3 or S4 sounds. Pulmonary:      Effort: Pulmonary effort is normal. No accessory muscle usage or respiratory distress. Breath sounds: Normal breath sounds. No stridor. No decreased breath sounds, wheezing, rhonchi or rales. Chest:      Chest wall: No tenderness. Abdominal:      General: Bowel sounds are normal. There is no distension.       Palpations: Abdomen is soft.      Tenderness: There is no abdominal tenderness. There is no guarding. Musculoskeletal:      Right lower leg: No edema. Left lower leg: No edema. Skin:     General: Skin is warm and dry. Coloration: Skin is not jaundiced. Neurological:      Mental Status: She is alert. Cranial Nerves: Facial asymmetry present. Motor: Weakness present. No tremor or seizure activity. Psychiatric:         Behavior: Behavior is cooperative. Additional Data:     Labs:  Results from last 7 days   Lab Units 08/21/23  1404   WBC Thousand/uL 11.15*   HEMOGLOBIN g/dL 12.2   HEMATOCRIT % 38.6   PLATELETS Thousands/uL 382     Results from last 7 days   Lab Units 08/22/23  0545 08/21/23  1404 08/18/23  0549   SODIUM mmol/L 138   < > 137   POTASSIUM mmol/L 4.2   < > 4.3   CHLORIDE mmol/L 105   < > 105   CO2 mmol/L 25   < > 25   BUN mg/dL 22   < > 16   CREATININE mg/dL 0.70   < > 0.92   ANION GAP mmol/L 8   < > 7   CALCIUM mg/dL 9.5   < > 9.6   ALBUMIN g/dL  --   --  3.2*   TOTAL BILIRUBIN mg/dL  --   --  0.36   ALK PHOS U/L  --   --  80   ALT U/L  --   --  20   AST U/L  --   --  14   GLUCOSE RANDOM mg/dL 176*   < > 161*    < > = values in this interval not displayed. Results from last 7 days   Lab Units 08/22/23  1106 08/22/23  0626 08/21/23  2115 08/21/23  1554 08/21/23  1108 08/21/23  0713 08/20/23  1606 08/20/23  1114 08/20/23  0614 08/19/23  2054 08/19/23  1604 08/19/23  1108   POC GLUCOSE mg/dl 171* 161* 213* 112 166* 179* 162* 259* 153* 180* 201* 166*               Lines/Drains:  Invasive Devices     Peripheral Intravenous Line  Duration           Peripheral IV 08/18/23 Right;Ventral (anterior) Wrist 3 days          Drain  Duration           External Urinary Catheter 7 days                      Imaging: No pertinent imaging reviewed.     Recent Cultures (last 7 days):         Last 24 Hours Medication List:   Current Facility-Administered Medications   Medication Dose Route Frequency Provider Last Rate   • amLODIPine  10 mg Oral Daily Geo Jimenez, DO     • aspirin  81 mg Oral Daily Minda Maldonado MD     • atorvastatin  20 mg Oral Daily With [address] [place] [address], DO     • bisacodyl  5 mg Oral Daily PRN Geo Jimenez, DO     • buPROPion  150 mg Oral QAM Geo Jimenez, DO     • chlorhexidine  15 mL Mouth/Throat Q12H [facility] Geo Jimenez, DO     • vitamin B-12  1,000 mcg Oral Daily Geo Jimenez, DO     • docusate sodium  100 mg Oral BID Geo Jimenez, DO     • ferrous sulfate  325 mg Oral Daily With Breakfast Geo Jimenez, DO     • fluticasone-vilanterol  1 puff Inhalation Daily Geo Jimenez, DO     • gabapentin  300 mg Oral TID Geo Jimenez, DO     • heparin (porcine)  5,000 Units Subcutaneous [facility] Karo Collins, DO     • hydrALAZINE  5 mg Intravenous Q6H PRN Geo Jimenez, DO     • hydrALAZINE  25 mg Oral Q8H [address], DO     • insulin lispro  1-6 Units Subcutaneous TID With Meals Geo Jimenez, DO     • losartan  100 mg Oral Daily Geo Jimenez, DO     • methocarbamol  500 mg Oral Q6H PRN Geo Jimenez, DO     • ondansetron  4 mg Intravenous Q4H PRN Geo Jimenez, DO     • oxyCODONE  10 mg Oral Q4H PRN ARSENIO Telles     • oxyCODONE  5 mg Oral Q4H PRN ARSENIO Telles     • pantoprazole  40 mg Oral Early Morning Minda Maldonado MD     • prochlorperazine  5 mg Oral Q6H PRN Geo Jimenez, DO          Today, Patient Was Seen By: Rhoda Arriaga PA-C    **Please Note: This note may have been constructed using a voice recognition system. **

## 2023-08-22 NOTE — PLAN OF CARE
Problem: OCCUPATIONAL THERAPY ADULT  Goal: Performs self-care activities at highest level of function for planned discharge setting. See evaluation for individualized goals. Description: Treatment Interventions: ADL retraining, Functional transfer training, UE strengthening/ROM, Endurance training, Cognitive reorientation, Patient/family training, Equipment evaluation/education, Neuromuscular reeducation, Fine motor coordination activities, Compensatory technique education, Activityengagement          See flowsheet documentation for full assessment, interventions and recommendations. Note: Limitation: Decreased ADL status, Decreased UE ROM, Decreased UE strength, Decreased Safe judgement during ADL, Decreased endurance, Decreased fine motor control, Decreased self-care trans, Decreased high-level ADLs, Non-func R UE  Prognosis: Good  Assessment: Patient participated in Skilled OT session this date with interventions consisting of self care tasks, HEP education/demonstration including performance of left UE SROM . Patient agreeable to OT treatment session, upon arrival patient was found seated at edge of bed. In comparison to previous session, patient with improvements in cognition . Patient requiring verbal cues for correct technique, frequent rest periods and ocassional safety reminders. Patient continues to be functioning below baseline level, occupational performance remains limited secondary to factors listed above and increased risk for falls and injury. From OT standpoint, recommendation at time of d/c would be Short Term Rehab. The patient's raw score on the AM-PAC Daily Activity Inpatient Short Form is 12. A raw score of less than 19 suggests the patient may benefit from discharge to post-acute rehabilitation services. Please refer to the recommendation of the Occupational Therapist for safe discharge planning.     Patient to benefit from continued Occupational Therapy treatment while in the hospital to address deficits as defined above and maximize level of functional independence with ADLs and functional mobility.      OT Discharge Recommendation: Post acute rehabilitation services

## 2023-08-22 NOTE — ASSESSMENT & PLAN NOTE
>>ASSESSMENT AND PLAN FOR DIABETES MELLITUS TYPE 2, CONTROLLED (HCC) WRITTEN ON 8/21/2023  9:37 PM BY QUETA MOREAU MD    Lab Results   Component Value Date    HGBA1C 6.7 (H) 08/15/2023       Recent Labs     08/21/23  0713 08/21/23  1108 08/21/23  1554 08/21/23  2115   POCGLU 179* 166* 112 213*       Blood Sugar Average: Last 72 hrs:  (P) [id number]     On SSI  Holding home metformin  Hypoglycemic protocol   Monitor blood sugars and adjust insulin accordingly

## 2023-08-22 NOTE — ASSESSMENT & PLAN NOTE
· History of anxiety  · Was on wellbutrin 300 mg daily, was reduced to 150 mg due to concern for medicaitons potential for lowering seizure threshold in this patient with ICH

## 2023-08-22 NOTE — PLAN OF CARE
Problem: PHYSICAL THERAPY ADULT  Goal: Performs mobility at highest level of function for planned discharge setting. See evaluation for individualized goals. Description: Treatment/Interventions: OT, Spoke to case management, Spoke to nursing, Gait training, Bed mobility, Patient/family training, Endurance training, LE strengthening/ROM, Functional transfer training  Equipment Recommended:  (TBD)       See flowsheet documentation for full assessment, interventions and recommendations. Outcome: Progressing  Note: Prognosis: Fair  Problem List: Decreased strength, Decreased endurance, Impaired balance, Decreased mobility, Decreased coordination, Decreased cognition, Impaired judgement, Decreased safety awareness, Impaired tone, Obesity  Assessment: Pt agreeable to participate in PT session. Pt performed functional mobility and therex as outlined above. Improved STS ability at anterior HR with pt pulling up with LUE. R knee block required and facilitation at glute for R hip extension. VC for L WS with STS as pt with R lean towards hemiparetic side. Poor activity tolerance and easily fatiguable. Pt left seated in chair with chair alarm, call bell, phone, and all personal needs within reach. Pt will continue to benefit from skilled acute care PT to further address their functional mobility limitations. D/C recommendations remain rehab. Barriers to Discharge: Decreased caregiver support, Inaccessible home environment     PT Discharge Recommendation: Post acute rehabilitation services    See flowsheet documentation for full assessment.

## 2023-08-22 NOTE — PHYSICAL THERAPY NOTE
PHYSICAL THERAPY NOTE          Patient Name: Fariha CARL Date: 8/22/2023 08/22/23 1027   PT Last Visit   PT Visit Date 08/22/23   Note Type   Note Type Treatment   Pain Assessment   Pain Assessment Tool 0-10   Pain Score No Pain   Restrictions/Precautions   Weight Bearing Precautions Per Order No   Braces or Orthoses Sling  (sheet utilized for transfers due to R GHJ subluxation)   Other Precautions Cognitive; Chair Alarm; Bed Alarm;Multiple lines;Telemetry; Fall Risk  (R hemiparesis)   General   Chart Reviewed Yes   Response to Previous Treatment Patient with no complaints from previous session. Family/Caregiver Present No   Cognition   Overall Cognitive Status Unable to assess   Arousal/Participation Cooperative   Attention Attends with cues to redirect   Orientation Level Oriented X4   Following Commands Follows one step commands without difficulty   Comments very pleasant   Subjective   Subjective expressing whole body fatigue during session   Bed Mobility   Supine to Sit Unable to assess   Transfers   Sit to Stand 3  Moderate assistance   Additional items Assist x 2; Increased time required;Verbal cues   Stand to Sit 3  Moderate assistance   Additional items Assist x 2; Increased time required;Verbal cues   Additional Comments LUE support on anterior HR and therapist on either side. +R knee block and hip extension. ; 4x20 sec trials   Balance   Static Sitting Poor +   Dynamic Sitting Poor   Static Standing Poor -   Dynamic Standing Poor -   Ambulatory Zero   Endurance Deficit   Endurance Deficit Yes   Endurance Deficit Description R hemiparesis, fatigue   Activity Tolerance   Activity Tolerance Patient limited by fatigue   Medical Staff Made Aware denis sullivan tech   Nurse Made Aware yes-cleared   Exercises   Knee AROM Long Arc Quad Sitting;10 reps;PROM; Right  (with distal quad tapping/rubbing)   Ankle Pumps Sitting;10 reps;Bilateral;PROM  (with quick stretch RLE)   Neuro re-ed anterior WS to reach for GB and readjust in chair x4 reps with modAx1   Assessment   Prognosis Fair   Problem List Decreased strength;Decreased endurance; Impaired balance;Decreased mobility; Decreased coordination;Decreased cognition; Impaired judgement;Decreased safety awareness; Impaired tone;Obesity   Assessment Pt agreeable to participate in PT session. Pt performed functional mobility and therex as outlined above. Improved STS ability at anterior HR with pt pulling up with LUE. R knee block required and facilitation at glute for R hip extension. VC for L WS with STS as pt with R lean towards hemiparetic side. Poor activity tolerance and easily fatiguable. Pt left seated in chair with chair alarm, call bell, phone, and all personal needs within reach. Pt will continue to benefit from skilled acute care PT to further address their functional mobility limitations. D/C recommendations remain rehab. Barriers to Discharge Decreased caregiver support; Inaccessible home environment   Goals   Patient Goals to improve   STG Expiration Date 08/27/23   PT Treatment Day 4   Plan   Treatment/Interventions Functional transfer training;LE strengthening/ROM; Therapeutic exercise; Endurance training;Cognitive reorientation;Patient/family training;Equipment eval/education; Bed mobility;Spoke to nursing;Spoke to case management;OT   Progress Progressing toward goals   PT Frequency 3-5x/wk   Recommendation   PT Discharge Recommendation Post acute rehabilitation services   AM-PAC Basic Mobility Inpatient   Turning in Flat Bed Without Bedrails 2   Lying on Back to Sitting on Edge of Flat Bed Without Bedrails 1   Moving Bed to Chair 1   Standing Up From Chair Using Arms 1   Walk in Room 1   Climb 3-5 Stairs With Railing 1   Basic Mobility Inpatient Raw Score 7   Turning Head Towards Sound 3   Follow Simple Instructions 3   Low Function Basic Mobility Raw Score  13   Low Function Basic Mobility Standardized Score  20.14   Highest Level Of Mobility   -M Goal 2: Bed activities/Dependent transfer   -HLM Achieved 5: Stand (1 or more minutes)     Ivonne Rawls, PT, DPT

## 2023-08-22 NOTE — ASSESSMENT & PLAN NOTE
· 62 YOF with history of HTN, DM2, Sjogren's, fibromyalgia initially presented to [address] [place] [address] on 8/14/23 as a stroke alert on 8/14/23 with initial presenting symptoms acute onset of right sided weakness, facial asymmetry, and dysarthria while at work. Initial imaging with CTH/CTA HN revealed left thalamocapsular bleed with no significant shift or mass effect. . NIHSS 14. Not TNK candidate given acute ICH. · Pt given IV labetalol, started on Cardene drip, administered DDAVP for ASA reversal and transferred to [facility] on 8/14/23 for neurosurgery care. · CT head was done on 8/17/23 for change in exam and worsening and waxing slurred speech which was reviewed by neuro, the images which does show stable left sided hemorrhage without any midline shift. · Had a fall and hit her head on 8/18 - CT head stable  · Repeat CT head on 8/20 - stable - cleared by neurology to restart ASA 81 mg daily  · Was seen by neurosurgery - no surgical intervention at this time  · Neurology following - likely due to hypertension  · Goal SBP < 140  · PT/OT  · Accepted by [facility] - auth pending.  Medically stable for discharge otherwise

## 2023-08-22 NOTE — RESTORATIVE TECHNICIAN NOTE
Restorative Technician Note      Patient Name: Fany Delatorre     Note Type: Mobility  Patient Position Upon Consult: Supine  Activity Performed: Transferred; Dangled; Stood  Assistive Device: Other (Comment) (Assist x2 OOB to the chair stand-pivot)  Education Provided: Yes  Patient Position at End of Consult: Bedside chair;  All needs within reach; Bed/Chair alarm activated    Rylee GEE, Restorative Technician, [employer]

## 2023-08-22 NOTE — CASE MANAGEMENT
[address] received request for authorization from Care Manager.   Authorization request for: [address] Name: Wayne Pearce NPI: [id number]  Facility MD: Freddie Kaye NPI: [id number]  Authorization initiated by contacting insurance: Christiane Hogan of [facility] Via: Phone ([phone])  Pending Reference #: [id number]  Clinicals submitted via: Fax ([phone])

## 2023-08-22 NOTE — OCCUPATIONAL THERAPY NOTE
Occupational Therapy Progress Note     Patient Name: Agusto Rain  DNDMK'V Date: 8/22/2023  Problem List  Principal Problem:    ICH (intracerebral hemorrhage) ([address])  Active Problems:    Primary hypertension    Diabetes mellitus type 2, controlled ([address])    Anxiety    Sjogren's syndrome ([address])    CAD (coronary artery disease)    Hyperlipidemia associated with type 2 diabetes mellitus ([address])    Anemia     08/22/23 1101   OT Last Visit   OT Visit Date 08/22/23   Note Type   Note Type Treatment   Pain Assessment   Pain Assessment Tool FLACC   Pain Score 6   Hospital Pain Intervention(s) Repositioned; Ambulation/increased activity; Emotional support; Rest   Pain Rating: FLACC (Rest) - Face 1   Pain Rating: FLACC (Rest) - Legs 1   Pain Rating: FLACC (Rest) - Activity 1   Pain Rating: FLACC (Rest) - Cry 0   Pain Rating: FLACC (Rest) - Consolability 1   Score: FLACC (Rest) 4   Pain Rating: FLACC (Activity) - Face 1   Pain Rating: FLACC (Activity) - Legs 1   Pain Rating: FLACC (Activity) - Activity 1   Pain Rating: FLACC (Activity) - Cry 0   Pain Rating: FLACC (Activity) - Consolability 1   Score: FLACC (Activity) 4   Restrictions/Precautions   Weight Bearing Precautions Per Order No   Other Precautions Cognitive; Chair Alarm; Bed Alarm; Fall Risk;Pain;Telemetry  (left Hemiplegia, left Glenohumeral subluxation, left UE neglect)   Lifestyle   Autonomy I adls and mobiltiy-  i iadls - shares homemaking with spouse   Reciprocal Relationships supportive spouse, family and friends   Service to Others works FT as  at [employer] active pta   ADL   Eating Assistance 5  Supervision/Setup   Eating Deficit Setup  (pt required set-up 2* to flaccid and dominant RUE, able to to grasp utensils, scoop and bring to mouth with left non dominant hand)   ROM- Right Upper Extremities   R Shoulder PROM  (scapular elevation/depression)   R Elbow PROM;Elbow flexion;Elbow extension   R Wrist PROM; Wrist flexion;Wrist extension   R Position Seated   R Weight/Reps/Sets 5x   RUE ROM Comment Educated pt on definition and protection stategies for left glenohumeral subluxation and demonstration on Self ROM HEP, along with positioning education with left UE in sitting/supine with issued hand outs and placed on white board for pt, family, and staff reference. ROM - Left Upper Extremities    L Shoulder AROM  (scapular elevation/depression)   L Elbow AROM;Elbow flexion;Elbow extension   L Wrist Wrist flexion;Wrist extension;AROM   L Hand Thumb; Index finger; Long finger;Ring finger;Little finger;AROM   L Position Seated   L Weight/Reps/Sets 5x   LUE ROM Comment self ROM left assisting right with ROM   Cognition   Overall Cognitive Status (Continue to assess for safe discharge planning.)   Arousal/Participation Cooperative   Attention Attends with cues to redirect   Orientation Level Oriented X4   Memory Decreased recall of precautions   Following Commands Follows multistep commands with increased time or repetition   Comments pt with demonstrates good carryover of HEP SROM education this session, Continue to assess functioanl cogntion for safe discharge planning. Activity Tolerance   Medical Staff Made Aware RN cleared pt for therapy   Assessment   Assessment Patient participated in Skilled OT session this date with interventions consisting of self care tasks, HEP education/demonstration including performance of left UE SROM . Patient agreeable to OT treatment session, upon arrival patient was found seated at edge of bed. In comparison to previous session, patient with improvements in cognition . Patient requiring verbal cues for correct technique, frequent rest periods and ocassional safety reminders. Patient continues to be functioning below baseline level, occupational performance remains limited secondary to factors listed above and increased risk for falls and injury.    From OT standpoint, recommendation at time of d/c would be Short Term Rehab. The patient's raw score on the AM-PAC Daily Activity Inpatient Short Form is 12. A raw score of less than 19 suggests the patient may benefit from discharge to post-acute rehabilitation services. Please refer to the recommendation of the Occupational Therapist for safe discharge planning. Patient to benefit from continued Occupational Therapy treatment while in the hospital to address deficits as defined above and maximize level of functional independence with ADLs and functional mobility. Plan   Treatment Interventions ADL retraining;Functional transfer training;UE strengthening/ROM; Endurance training;Cognitive reorientation;Patient/family training;Equipment evaluation/education; Compensatory technique education; Energy conservation; Activityengagement   Goal Expiration Date 08/29/23   OT Frequency 3-5x/wk   Recommendation   OT Discharge Recommendation Post acute rehabilitation services   [facility] Daily Activity Inpatient   Lower Body Dressing 1   Bathing 2   Toileting 1   Upper Body Dressing 2   Grooming 3   Eating 3   Daily Activity Raw Score 12   Daily Activity Standardized Score (Calc for Raw Score >=11) 30.6   AM-[facility] Applied Cognition Inpatient   Following a Speech/Presentation 3   Understanding Ordinary Conversation 4   Taking Medications 3   Remembering Where Things Are Placed or Put Away 4   Remembering List of 4-5 Errands 3   Taking Care of Complicated Tasks 3   Applied Cognition Raw Score 20   Applied Cognition Standardized Score 41.76   Barthel Index   Feeding 5   Bathing 0   Grooming Score 0   Dressing Score 5   Bladder Score 10   Bowels Score 10   Toilet Use Score 5   Transfers (Bed/Chair) Score 5   Mobility (Level Surface) Score 0   Stairs Score 0   Barthel Index Score 40   Modified [place] Scale   Modified [place] Scale 4   End of Consult   Education Provided (RUE self ROM HEP, glenohumeral subluxation definition and left shoulder protection strategies.)   Patient Position at End of Consult All needs within reach;Bed/Chair alarm activated; Bedside chair   Nurse Communication Nurse aware of consult   Jeison Knapp MOT, OTR/L

## 2023-08-23 LAB
ATRIAL RATE: 77 BPM
ATRIAL RATE: 77 BPM
GLUCOSE SERPL-MCNC: 153 MG/DL (ref 65–140)
GLUCOSE SERPL-MCNC: 155 MG/DL (ref 65–140)
GLUCOSE SERPL-MCNC: 168 MG/DL (ref 65–140)
GLUCOSE SERPL-MCNC: 181 MG/DL (ref 65–140)
P AXIS: 48 DEGREES
PR INTERVAL: 192 MS
PR INTERVAL: 196 MS
QRS AXIS: 169 DEGREES
QRS AXIS: 20 DEGREES
QRSD INTERVAL: 88 MS
QRSD INTERVAL: 90 MS
QT INTERVAL: 398 MS
QT INTERVAL: 422 MS
QTC INTERVAL: 450 MS
QTC INTERVAL: 477 MS
T WAVE AXIS: 158 DEGREES
T WAVE AXIS: 35 DEGREES
VENTRICULAR RATE: 77 BPM
VENTRICULAR RATE: 77 BPM

## 2023-08-23 PROCEDURE — 93005 ELECTROCARDIOGRAM TRACING: CPT

## 2023-08-23 PROCEDURE — 97110 THERAPEUTIC EXERCISES: CPT

## 2023-08-23 PROCEDURE — 93010 ELECTROCARDIOGRAM REPORT: CPT | Performed by: INTERNAL MEDICINE

## 2023-08-23 PROCEDURE — 99232 SBSQ HOSP IP/OBS MODERATE 35: CPT | Performed by: STUDENT IN AN ORGANIZED HEALTH CARE EDUCATION/TRAINING PROGRAM

## 2023-08-23 PROCEDURE — 82948 REAGENT STRIP/BLOOD GLUCOSE: CPT

## 2023-08-23 RX ORDER — AMOXICILLIN 250 MG
1 CAPSULE ORAL
Status: DISCONTINUED | OUTPATIENT
Start: 2023-08-23 | End: 2023-08-25 | Stop reason: HOSPADM

## 2023-08-23 RX ORDER — OXYCODONE HYDROCHLORIDE 5 MG/1
5 TABLET ORAL EVERY 4 HOURS PRN
Status: DISCONTINUED | OUTPATIENT
Start: 2023-08-23 | End: 2023-08-25 | Stop reason: HOSPADM

## 2023-08-23 RX ADMIN — CHLORHEXIDINE GLUCONATE 15 ML: 1.2 SOLUTION ORAL at 22:25

## 2023-08-23 RX ADMIN — OXYCODONE HYDROCHLORIDE 5 MG: 5 TABLET ORAL at 02:07

## 2023-08-23 RX ADMIN — CYANOCOBALAMIN TAB 500 MCG 1000 MCG: 500 TAB at 08:01

## 2023-08-23 RX ADMIN — ASPIRIN 81 MG CHEWABLE TABLET 81 MG: 81 TABLET CHEWABLE at 08:01

## 2023-08-23 RX ADMIN — HEPARIN SODIUM 5000 UNITS: 5000 INJECTION INTRAVENOUS; SUBCUTANEOUS at 15:00

## 2023-08-23 RX ADMIN — OXYCODONE HYDROCHLORIDE 5 MG: 5 TABLET ORAL at 09:30

## 2023-08-23 RX ADMIN — HEPARIN SODIUM 5000 UNITS: 5000 INJECTION INTRAVENOUS; SUBCUTANEOUS at 22:25

## 2023-08-23 RX ADMIN — GABAPENTIN 300 MG: 300 CAPSULE ORAL at 15:00

## 2023-08-23 RX ADMIN — PANTOPRAZOLE SODIUM 40 MG: 40 TABLET, DELAYED RELEASE ORAL at 05:36

## 2023-08-23 RX ADMIN — OXYCODONE HYDROCHLORIDE 5 MG: 5 TABLET ORAL at 20:01

## 2023-08-23 RX ADMIN — INSULIN LISPRO 1 UNITS: 100 INJECTION, SOLUTION INTRAVENOUS; SUBCUTANEOUS at 17:54

## 2023-08-23 RX ADMIN — GABAPENTIN 300 MG: 300 CAPSULE ORAL at 22:25

## 2023-08-23 RX ADMIN — INSULIN LISPRO 1 UNITS: 100 INJECTION, SOLUTION INTRAVENOUS; SUBCUTANEOUS at 12:14

## 2023-08-23 RX ADMIN — HYDRALAZINE HYDROCHLORIDE 25 MG: 25 TABLET, FILM COATED ORAL at 22:25

## 2023-08-23 RX ADMIN — OXYCODONE HYDROCHLORIDE 5 MG: 5 TABLET ORAL at 15:00

## 2023-08-23 RX ADMIN — CHLORHEXIDINE GLUCONATE 15 ML: 1.2 SOLUTION ORAL at 08:01

## 2023-08-23 RX ADMIN — INSULIN LISPRO 1 UNITS: 100 INJECTION, SOLUTION INTRAVENOUS; SUBCUTANEOUS at 07:38

## 2023-08-23 RX ADMIN — LOSARTAN POTASSIUM 100 MG: 50 TABLET, FILM COATED ORAL at 08:03

## 2023-08-23 RX ADMIN — AMLODIPINE BESYLATE 10 MG: 10 TABLET ORAL at 08:01

## 2023-08-23 RX ADMIN — SENNOSIDES AND DOCUSATE SODIUM 1 TABLET: 50; 8.6 TABLET ORAL at 22:25

## 2023-08-23 RX ADMIN — HEPARIN SODIUM 5000 UNITS: 5000 INJECTION INTRAVENOUS; SUBCUTANEOUS at 05:36

## 2023-08-23 RX ADMIN — FLUTICASONE FUROATE AND VILANTEROL TRIFENATATE 1 PUFF: 200; 25 POWDER RESPIRATORY (INHALATION) at 09:30

## 2023-08-23 RX ADMIN — GABAPENTIN 300 MG: 300 CAPSULE ORAL at 08:01

## 2023-08-23 RX ADMIN — HYDRALAZINE HYDROCHLORIDE 25 MG: 25 TABLET, FILM COATED ORAL at 05:36

## 2023-08-23 RX ADMIN — BUPROPION HYDROCHLORIDE 150 MG: 150 TABLET, FILM COATED, EXTENDED RELEASE ORAL at 08:01

## 2023-08-23 RX ADMIN — HYDRALAZINE HYDROCHLORIDE 25 MG: 25 TABLET, FILM COATED ORAL at 15:00

## 2023-08-23 RX ADMIN — ATORVASTATIN CALCIUM 20 MG: 20 TABLET, FILM COATED ORAL at 17:54

## 2023-08-23 RX ADMIN — DOCUSATE SODIUM 100 MG: 100 CAPSULE ORAL at 08:01

## 2023-08-23 RX ADMIN — FERROUS SULFATE TAB 325 MG (65 MG ELEMENTAL FE) 325 MG: 325 (65 FE) TAB at 07:38

## 2023-08-23 NOTE — RESTORATIVE TECHNICIAN NOTE
Restorative Technician Note      Patient Name: Bri Fletcher     Note Type: Mobility  Patient Position Upon Consult: Supine  Activity Performed: Dangled; Stood; Transferred  Assistive Device: Other (Comment) (Assist x2 OOB to the chair)  Education Provided: Yes  Patient Position at End of Consult: Bedside chair;  All needs within reach; Bed/Chair alarm activated    Mary GEE, Restorative Technician, [employer]

## 2023-08-23 NOTE — PLAN OF CARE
Problem: MOBILITY - ADULT  Goal: Maintain or return to baseline ADL function  Description: INTERVENTIONS:  -  Assess patient's ability to carry out ADLs; assess patient's baseline for ADL function and identify physical deficits which impact ability to perform ADLs (bathing, care of mouth/teeth, toileting, grooming, dressing, etc.)  - Assess/evaluate cause of self-care deficits   - Assess range of motion  - Assess patient's mobility; develop plan if impaired  - Assess patient's need for assistive devices and provide as appropriate  - Encourage maximum independence but intervene and supervise when necessary  - Involve family in performance of ADLs  - Assess for home care needs following discharge   - Consider OT consult to assist with ADL evaluation and planning for discharge  - Provide patient education as appropriate  Outcome: Progressing  Goal: Maintains/Returns to pre admission functional level  Description: INTERVENTIONS:  - Perform BMAT or MOVE assessment daily.   - Set and communicate daily mobility goal to care team and patient/family/caregiver. - Collaborate with rehabilitation services on mobility goals if consulted  - Perform Range of Motion 3 times a day. - Reposition patient every 2 hours.   - Dangle patient 3 times a day  - Record patient progress and toleration of activity level   Outcome: Progressing

## 2023-08-23 NOTE — PROGRESS NOTES
23 1500   Clinical Encounter Type   Visited With Family; Health care provider   Routine Visit Introduction   Referral From Patient   Lutheran Encounters   Lutheran Needs Prayer      Pastoral Care Progress Note    2023  Patient: Agusto Rain : 1965  Admission Date & Time: 2023  MRN: [id number] CSN: [id number]         spent some time, upon request, encouraging Yoko Kline through this challenging time. Helped her to understand that the therapies she is asked to do may seem difficult or impossible but are very necessary. We talked about small goals and steps, her support system, and coping skills. I also gave her a Bible and a devotional book. Chaplains remain available.

## 2023-08-23 NOTE — UTILIZATION REVIEW
Continued Stay Review    Date:  8-23-23                          Current Patient Class:  Inpatient   Current Level of Care:  [facility]    HPI:58 y.o. female initially admitted on  8-14-23     Intracranial hemorrhage    Assessment/Plan:     Patient with persistent  facial asymmetry and weakness. She reports sharp intermittent right pain which is alleviated with dilaudid. Continue PT/OT for functional disabilities. Medically stable for discharge. Accepted in rehab pending authorization.         Vital Signs:       Patient Vitals for the past 24 hrs:   BP Temp Pulse Resp SpO2   08/23/23 0739 129/74 98.2 °F (36.8 °C) 76 -- 97 %   08/23/23 0536 129/74 -- 72 -- 98 %   08/23/23 0536 129/74 -- -- -- --   08/22/23 2142 137/74 98.3 °F (36.8 °C) 73 -- 96 %   08/22/23 2141 137/74 -- -- -- --   08/22/23 1600 -- -- -- -- 96 %   08/22/23 1509 121/69 98 °F (36.7 °C) 79 14 97 %   08/22/23 1446 122/69 -- 80 -- 96 %       Pertinent Labs/Diagnostic Results:       Results from last 7 days   Lab Units 08/21/23  1404 08/18/23  0549   WBC Thousand/uL 11.15* 12.51*   HEMOGLOBIN g/dL 12.2 11.9   HEMATOCRIT % 38.6 37.2   PLATELETS Thousands/uL 382 355         Results from last 7 days   Lab Units 08/22/23  0545 08/21/23  1404 08/18/23  0549   SODIUM mmol/L 138 135 137   POTASSIUM mmol/L 4.2 4.2 4.3   CHLORIDE mmol/L 105 102 105   CO2 mmol/L 25 29 25   ANION GAP mmol/L 8 4 7   BUN mg/dL 22 22 16   CREATININE mg/dL 0.70 0.83 0.92   EGFR ml/min/1.73sq m 95 77 68   CALCIUM mg/dL 9.5 9.1 9.6     Results from last 7 days   Lab Units 08/18/23  0549   AST U/L 14   ALT U/L 20   ALK PHOS U/L 80   TOTAL PROTEIN g/dL 7.6   ALBUMIN g/dL 3.2*   TOTAL BILIRUBIN mg/dL 0.36     Results from last 7 days   Lab Units 08/23/23  1104 08/23/23  0629 08/22/23  1606 08/22/23  1106 08/22/23  0626 08/21/23  2115 08/21/23  1554 08/21/23  1108 08/21/23  0713 08/20/23  1606 08/20/23  1114 08/20/23  0614   POC GLUCOSE mg/dl 181* 155* 142* 171* 161* 213* 112 166* 179* 162* 259* 153*     Results from last 7 days   Lab Units 08/22/23  0545 08/21/23  1404 08/18/23  0549   GLUCOSE RANDOM mg/dL 176* 145* 161*       Results from last 7 days   Lab Units 08/16/23  1831   OSMO UR mmol/     Results from last 7 days   Lab Units 08/16/23  1831   SODIUM UR  146       Scheduled Medications:    amLODIPine, 10 mg, Oral, Daily  aspirin, 81 mg, Oral, Daily  atorvastatin, 20 mg, Oral, Daily With Dinner  buPROPion, 150 mg, Oral, QAM  chlorhexidine, 15 mL, Mouth/Throat, Q12H [address]  vitamin B-12, 1,000 mcg, Oral, Daily  ferrous sulfate, 325 mg, Oral, Daily With Breakfast  fluticasone-vilanterol, 1 puff, Inhalation, Daily  gabapentin, 300 mg, Oral, TID  heparin (porcine), 5,000 Units, Subcutaneous, Q8H [place]  hydrALAZINE, 25 mg, Oral, Q8H [place]  insulin lispro, 1-6 Units, Subcutaneous, TID With Meals  losartan, 100 mg, Oral, Daily  pantoprazole, 40 mg, Oral, Early Morning  senna-docusate sodium, 1 tablet, Oral, HS      Continuous IV Infusions:     PRN Meds:  bisacodyl, 5 mg, Oral, Daily PRN  hydrALAZINE, 5 mg, Intravenous, Q6H PRN  methocarbamol, 500 mg, Oral, Q6H PRN  ondansetron, 4 mg, Intravenous, Q4H PRN  oxyCODONE, 5 mg, Oral, Q4H PRN  oxyCODONE, 2.5 mg, Oral, Q4H PRN  prochlorperazine, 5 mg, Oral, Q6H PRN        Discharge Plan: to be determined     Network Utilization Review Department  ATTENTION: Please call with any questions or concerns to [phone] and carefully listen to the prompts so that you are directed to the right person. All voicemails are confidential.  Angelika Carcamo all requests for admission clinical reviews, approved or denied determinations and any other requests to dedicated fax number below belonging to the campus where the patient is receiving treatment.  List of dedicated fax numbers for the Facilities:  [place] DENIALS (Administrative/Medical Necessity) [phone]   [address] [place] [address] (Maternity/NICU/Pediatrics) [phone] [address] Bettina Encarnacion [phone]   [facility] [phone]   [address] [phone]   [address] [facility] [address] [phone]   [address] [address] [facility] [place] [phone]

## 2023-08-23 NOTE — PLAN OF CARE
Problem: MOBILITY - ADULT  Goal: Maintain or return to baseline ADL function  Description: INTERVENTIONS:  -  Assess patient's ability to carry out ADLs; assess patient's baseline for ADL function and identify physical deficits which impact ability to perform ADLs (bathing, care of mouth/teeth, toileting, grooming, dressing, etc.)  - Assess/evaluate cause of self-care deficits   - Assess range of motion  - Assess patient's mobility; develop plan if impaired  - Assess patient's need for assistive devices and provide as appropriate  - Encourage maximum independence but intervene and supervise when necessary  - Involve family in performance of ADLs  - Assess for home care needs following discharge   - Consider OT consult to assist with ADL evaluation and planning for discharge  - Provide patient education as appropriate  Outcome: Progressing  Goal: Maintains/Returns to pre admission functional level  Description: INTERVENTIONS:  - Perform BMAT or MOVE assessment daily.   - Set and communicate daily mobility goal to care team and patient/family/caregiver. - Collaborate with rehabilitation services on mobility goals if consulted  - Perform Range of Motion 3 times a day. - Reposition patient every 2 hours.   - Dangle patient 3 times a day  - Record patient progress and toleration of activity level   Outcome: Progressing     Problem: PAIN - ADULT  Goal: Verbalizes/displays adequate comfort level or baseline comfort level  Description: Interventions:  - Encourage patient to monitor pain and request assistance  - Assess pain using appropriate pain scale  - Administer analgesics based on type and severity of pain and evaluate response  - Implement non-pharmacological measures as appropriate and evaluate response  - Consider cultural and social influences on pain and pain management  - Notify physician/advanced practitioner if interventions unsuccessful or patient reports new pain  Outcome: Progressing     Problem: INFECTION - ADULT  Goal: Absence or prevention of progression during hospitalization  Description: INTERVENTIONS:  - Assess and monitor for signs and symptoms of infection  - Monitor lab/diagnostic results  - Monitor all insertion sites, i.e. indwelling lines, tubes, and drains  - Monitor endotracheal if appropriate and nasal secretions for changes in amount and color  - [place] appropriate cooling/warming therapies per order  - Administer medications as ordered  - Instruct and encourage patient and family to use good hand hygiene technique  - Identify and instruct in appropriate isolation precautions for identified infection/condition  Outcome: Progressing  Goal: Absence of fever/infection during neutropenic period  Description: INTERVENTIONS:  - Monitor WBC    Outcome: Progressing     Problem: SAFETY ADULT  Goal: Maintain or return to baseline ADL function  Description: INTERVENTIONS:  -  Assess patient's ability to carry out ADLs; assess patient's baseline for ADL function and identify physical deficits which impact ability to perform ADLs (bathing, care of mouth/teeth, toileting, grooming, dressing, etc.)  - Assess/evaluate cause of self-care deficits   - Assess range of motion  - Assess patient's mobility; develop plan if impaired  - Assess patient's need for assistive devices and provide as appropriate  - Encourage maximum independence but intervene and supervise when necessary  - Involve family in performance of ADLs  - Assess for home care needs following discharge   - Consider OT consult to assist with ADL evaluation and planning for discharge  - Provide patient education as appropriate  Outcome: Progressing  Goal: Maintains/Returns to pre admission functional level  Description: INTERVENTIONS:  - Perform BMAT or MOVE assessment daily.   - Set and communicate daily mobility goal to care team and patient/family/caregiver.    - Collaborate with rehabilitation services on mobility goals if consulted  - Perform Range of Motion 3 times a day. - Reposition patient every 2 hours. - Dangle patient 3 times a day  - Record patient progress and toleration of activity level   Outcome: Progressing  Goal: Patient will remain free of falls  Description: INTERVENTIONS:  - Educate patient/family on patient safety including physical limitations  - Instruct patient to call for assistance with activity   - Consult OT/PT to assist with strengthening/mobility   - Keep Call bell within reach  - Keep bed low and locked with side rails adjusted as appropriate  - Keep care items and personal belongings within reach  - Initiate and maintain comfort rounds  - Make Fall Risk Sign visible to staff  - Offer Toileting every 2 Hours, in advance of need  - Apply yellow socks and bracelet for high fall risk patients  - Consider moving patient to room near nurses station  Outcome: Progressing     Problem: DISCHARGE PLANNING  Goal: Discharge to home or other facility with appropriate resources  Description: INTERVENTIONS:  - Identify barriers to discharge w/patient and caregiver  - Arrange for needed discharge resources and transportation as appropriate  - Identify discharge learning needs (meds, wound care, etc.)  - Arrange for interpretive services to assist at discharge as needed  - Refer to Case Management Department for coordinating discharge planning if the patient needs post-hospital services based on physician/advanced practitioner order or complex needs related to functional status, cognitive ability, or social support system  Outcome: Progressing     Problem: Knowledge Deficit  Goal: Patient/family/caregiver demonstrates understanding of disease process, treatment plan, medications, and discharge instructions  Description: Complete learning assessment and assess knowledge base.   Interventions:  - Provide teaching at level of understanding  - Provide teaching via preferred learning methods  Outcome: Progressing     Problem: Nutrition/Hydration-ADULT  Goal: Nutrient/Hydration intake appropriate for improving, restoring or maintaining nutritional needs  Description: Monitor and assess patient's nutrition/hydration status for malnutrition. Collaborate with interdisciplinary team and initiate plan and interventions as ordered. Monitor patient's weight and dietary intake as ordered or per policy. Utilize nutrition screening tool and intervene as necessary. Determine patient's food preferences and provide high-protein, high-caloric foods as appropriate.      INTERVENTIONS:  - Monitor oral intake, urinary output, labs, and treatment plans  - Assess nutrition and hydration status and recommend course of action  - Evaluate amount of meals eaten  - Assist patient with eating if necessary   - Allow adequate time for meals  - Recommend/ encourage appropriate diets, oral nutritional supplements, and vitamin/mineral supplements  - Order, calculate, and assess calorie counts as needed  - Recommend, monitor, and adjust tube feedings and TPN/PPN based on assessed needs  - Assess need for intravenous fluids  - Provide specific nutrition/hydration education as appropriate  - Include patient/family/caregiver in decisions related to nutrition  Outcome: Progressing     Problem: Prexisting or High Potential for Compromised Skin Integrity  Goal: Skin integrity is maintained or improved  Description: INTERVENTIONS:  - Identify patients at risk for skin breakdown  - Assess and monitor skin integrity  - Assess and monitor nutrition and hydration status  - Monitor labs   - Assess for incontinence   - Turn and reposition patient  - Assist with mobility/ambulation  - Relieve pressure over bony prominences  - Avoid friction and shearing  - Provide appropriate hygiene as needed including keeping skin clean and dry  - Evaluate need for skin moisturizer/barrier cream  - Collaborate with interdisciplinary team   - Patient/family teaching  - Consider wound care consult Outcome: Progressing     Problem: Neurological Deficit  Goal: Neurological status is stable or improving  Description: Interventions:  - Monitor and assess patient's level of consciousness, motor function, sensory function, and level of assistance needed for ADLs. - Monitor and report changes from baseline. Collaborate with interdisciplinary team to initiate plan and implement interventions as ordered. - Provide and maintain a safe environment. - Consider seizure precautions. - Consider fall precautions. - Consider aspiration precautions. - Consider bleeding precautions. Outcome: Progressing     Problem: Activity Intolerance/Impaired Mobility  Goal: Mobility/activity is maintained at optimum level for patient  Description: Interventions:  - Assess and monitor patient  barriers to mobility and need for assistive/adaptive devices. - Assess patient's emotional response to limitations. - Collaborate with interdisciplinary team and initiate plans and interventions as ordered. - Encourage independent activity per ability.  - Maintain proper body alignment. - Perform active/passive rom as tolerated/ordered. - Plan activities to conserve energy.  - Turn patient as appropriate  Outcome: Progressing     Problem: Communication Impairment  Goal: Ability to express needs and understand communication  Description: Assess patient's communication skills and ability to understand information. Patient will demonstrate use of effective communication techniques, alternative methods of communication and understanding even if not able to speak. - Encourage communication and provide alternate methods of communication as needed. - Collaborate with case management/ for discharge needs. - Include patient/family/caregiver in decisions related to communication.   Outcome: Progressing     Problem: Potential for Aspiration  Goal: Non-ventilated patient's risk of aspiration is minimized  Description: Assess and monitor vital signs, respiratory status, and labs (WBC). Monitor for signs of aspiration (tachypnea, cough, rales, wheezing, cyanosis, fever). - Assess and monitor patient's ability to swallow. - Place patient up in chair to eat if possible. - HOB up at 90 degrees to eat if unable to get patient up into chair.  - Supervise patient during oral intake. - Instruct patient/ family to take small bites. - Instruct patient/ family to take small single sips when taking liquids.   - Follow patient-specific strategies generated by speech pathologist.  Outcome: Progressing

## 2023-08-23 NOTE — ASSESSMENT & PLAN NOTE
Lab Results   Component Value Date    HGBA1C 6.7 (H) 08/15/2023       Recent Labs     08/22/23  1106 08/22/23  1606 08/23/23  0629 08/23/23  1104   POCGLU 171* 142* 155* 181*       Blood Sugar Average: Last 72 hrs:  (P) [id number]     · A1c and glucose controlled   · On SSI  · Holding home metformin  · Hypoglycemic protocol   · Monitor blood sugars and adjust insulin accordingly

## 2023-08-23 NOTE — CASE MANAGEMENT
Received VM from [facility] requesting updated clinicals including hours of therapy needed per week, goal LOF, estimated LOS, frequency of IDT meetings and face to face MD rounds. Clinicals can be faxed to : [phone] once available. P: [phone] [id number]. CM notified.

## 2023-08-23 NOTE — ASSESSMENT & PLAN NOTE
· Anemia with baseline hemoglobin of 10-11. MCVs around 80s. Iron panel showing ferritin at 22 and TIBC of 370s which maybe indicative of iron deficiency anemia.  Also her B12 levels are low-normal.   · Ordered ferrous sulfate supplementation  · On vitamin B12 supplementation 1000 mg daily  · Hemoglobin 12.2, resolved

## 2023-08-23 NOTE — PROGRESS NOTES
[address] [facility]  Progress Note  Name: Mike Laurent  MRN: [id number]  Unit/Bed#: [facility]PHP 711-01 I Date of Admission: 8/14/2023   Date of Service: 8/23/2023 I Hospital Day: 9    Assessment/Plan   * ICH (intracerebral hemorrhage) ([address])  Assessment & Plan  · 62 YOF with history of HTN, DM2, Sjogren's, fibromyalgia initially presented to [facility] on 8/14/23 as a stroke alert on 8/14/23 with initial presenting symptoms acute onset of right sided weakness, facial asymmetry, and dysarthria while at work. Initial imaging with CTH/CTA HN revealed left thalamocapsular bleed with no significant shift or mass effect. . NIHSS 14. Not TNK candidate given acute ICH. · Pt given IV labetalol, started on Cardene drip, administered DDAVP for ASA reversal and transferred to [facility] on 8/14/23 for neurosurgery care. · CT head was done on 8/17/23 for change in exam and worsening and waxing slurred speech which was reviewed by neuro, the images which does show stable left sided hemorrhage without any midline shift. · Had a fall and hit her head on 8/18 - CT head stable  · Repeat CT head on 8/20 - stable - cleared by neurology to restart ASA 81 mg daily  · Was seen by neurosurgery - no surgical intervention at this time  · Neurology following - likely due to hypertension  · Goal SBP < 140  · PT/OT  · Accepted by [facility] - auth pending. Medically stable for discharge otherwise       Primary hypertension  Assessment & Plan  · History of hypertension. · Was previously on telmisartan and atenolol but hypertension was suboptimally controlled  · BP now acceptable   · Continue Amlodipine 10mg daily; Losartan 100mg daily; Hydralazine 25mg q8h    Anemia  Assessment & Plan  · Anemia with baseline hemoglobin of 10-11. MCVs around 80s. Iron panel showing ferritin at 22 and TIBC of 370s which maybe indicative of iron deficiency anemia.  Also her B12 levels are low-normal.   · Ordered ferrous sulfate supplementation  · On vitamin B12 supplementation 1000 mg daily  · Hemoglobin 12.2, resolved    Diabetes mellitus type 2, controlled [facility])  Assessment & Plan  Lab Results   Component Value Date    HGBA1C 6.7 (H) 08/15/2023       Recent Labs     08/22/23  1106 08/22/23  1606 08/23/23  0629 08/23/23  1104   POCGLU 171* 142* 155* 181*       Blood Sugar Average: Last 72 hrs:  (P) [id number]     · A1c and glucose controlled   · On SSI  · Holding home metformin  · Hypoglycemic protocol   · Monitor blood sugars and adjust insulin accordingly    Hyperlipidemia associated with type 2 diabetes mellitus (HCC)  Assessment & Plan  · Continue statin     CAD (coronary artery disease)  Assessment & Plan  · History of CAD based on prior CTA imaging. · On statin  · ASA 81 mg daily restarted on 8/21 after clearance by neurology  · BB held due to episodes of bradycardia      Sjogren's syndrome (HCC)  Assessment & Plan  · History of Sjogren syndrome. · Outpatient rheumatology follow-up      Anxiety  Assessment & Plan  · History of anxiety  · Was on wellbutrin 300 mg daily, was reduced to 150 mg due to concern for medicaitons potential for lowering seizure threshold in this patient with ICH           VTE Pharmacologic Prophylaxis: VTE Score: 4 Moderate Risk (Score 3-4) - Pharmacological DVT Prophylaxis Ordered: heparin. Patient Centered Rounds: I performed bedside rounds with nursing staff today. Discussions with Specialists or Other Care Team Provider: None    Education and Discussions with Family / Patient: Patient declined call to .      Total Time Spent on Date of Encounter in care of patient: 45 minutes This time was spent on one or more of the following: performing physical exam; counseling and coordination of care; obtaining or reviewing history; documenting in the medical record; reviewing/ordering tests, medications or procedures; communicating with other healthcare professionals and discussing with patient's family/caregivers. Current Length of Stay: 9 day(s)  Current Patient Status: Inpatient   Certification Statement: The patient will continue to require additional inpatient hospital stay due to pending [facility] authorization for placement  Discharge Plan: Anticipate discharge tomorrow to rehab facility. Code Status: Level 1 - Full Code    Subjective:   Patient assessed at bedside. Complains of right thigh intermittent sharp pain with no aggravating factor. Pain is alleviated with use of Dilaudid. Prior venous ultrasound duplex negative for DVT and hip x-ray unremarkable for any acute pathology. Tolerating diet well. No urinary symptoms. Reports occasional abdominal pain due to constipation. Objective:     Vitals:   Temp (24hrs), Av.2 °F (36.8 °C), Min:98 °F (36.7 °C), Max:98.3 °F (36.8 °C)    Temp:  [98 °F (36.7 °C)-98.3 °F (36.8 °C)] 98.2 °F (36.8 °C)  HR:  [72-80] 76  Resp:  [14] 14  BP: (121-137)/(69-74) 129/74  SpO2:  [96 %-98 %] 97 %  Body mass index is 33.91 kg/m². Input and Output Summary (last 24 hours): Intake/Output Summary (Last 24 hours) at 2023 1432  Last data filed at 2023 1100  Gross per 24 hour   Intake 658 ml   Output 550 ml   Net 108 ml       Physical Exam:   Physical Exam  Vitals reviewed. Constitutional:       General: She is not in acute distress. Appearance: Normal appearance. She is not ill-appearing. HENT:      Head: Normocephalic and atraumatic. Eyes:      General: No scleral icterus. Conjunctiva/sclera: Conjunctivae normal.   Cardiovascular:      Rate and Rhythm: Normal rate and regular rhythm. Heart sounds: Normal heart sounds. No murmur heard. Pulmonary:      Effort: Pulmonary effort is normal. No respiratory distress. Breath sounds: Normal breath sounds. Abdominal:      General: Bowel sounds are normal.      Palpations: Abdomen is soft. Tenderness: There is abdominal tenderness (mild diffuse).    Musculoskeletal: Right lower leg: No edema. Left lower leg: No edema. Skin:     General: Skin is warm and dry. Neurological:      Mental Status: She is alert and oriented to person, place, and time. Sensory: Sensory deficit (none on right) present. Motor: Weakness (right extremities flacid) present. Comments: Right facial droop, right extremities 0/5, left extremities 5/5. Psychiatric:         Mood and Affect: Mood normal.         Behavior: Behavior normal.          Additional Data:     Labs:  Results from last 7 days   Lab Units 08/21/23  1404   WBC Thousand/uL 11.15*   HEMOGLOBIN g/dL 12.2   HEMATOCRIT % 38.6   PLATELETS Thousands/uL 382     Results from last 7 days   Lab Units 08/22/23  0545 08/21/23  1404 08/18/23  0549   SODIUM mmol/L 138   < > 137   POTASSIUM mmol/L 4.2   < > 4.3   CHLORIDE mmol/L 105   < > 105   CO2 mmol/L 25   < > 25   BUN mg/dL 22   < > 16   CREATININE mg/dL 0.70   < > 0.92   ANION GAP mmol/L 8   < > 7   CALCIUM mg/dL 9.5   < > 9.6   ALBUMIN g/dL  --   --  3.2*   TOTAL BILIRUBIN mg/dL  --   --  0.36   ALK PHOS U/L  --   --  80   ALT U/L  --   --  20   AST U/L  --   --  14   GLUCOSE RANDOM mg/dL 176*   < > 161*    < > = values in this interval not displayed.          Results from last 7 days   Lab Units 08/23/23  1104 08/23/23  0629 08/22/23  1606 08/22/23  1106 08/22/23  0626 08/21/23  2115 08/21/23  1554 08/21/23  1108 08/21/23  0713 08/20/23  1606 08/20/23  1114 08/20/23  0614   POC GLUCOSE mg/dl 181* 155* 142* 171* 161* 213* 112 166* 179* 162* 259* 153*               Lines/Drains:  Invasive Devices     Peripheral Intravenous Line  Duration           Peripheral IV 08/18/23 Right;Ventral (anterior) Wrist 4 days          Drain  Duration           External Urinary Catheter 8 days                      Imaging: Reviewed radiology reports from this admission including: ultrasound(s) and hip xr    Recent Cultures (last 7 days):         Last 24 Hours Medication List:   Current Facility-Administered Medications   Medication Dose Route Frequency Provider Last Rate   • amLODIPine  10 mg Oral Daily Tyson Jimenez, DO     • aspirin  81 mg Oral Daily Hillary Portillo MD     • atorvastatin  20 mg Oral Daily With [address] [place] [address], DO     • bisacodyl  5 mg Oral Daily PRN Tyson Jimenez, DO     • buPROPion  150 mg Oral QAM Tyson Jimenez, DO     • chlorhexidine  15 mL Mouth/Throat Q12H [address], DO     • vitamin B-12  1,000 mcg Oral Daily Tyson Jimenez, DO     • ferrous sulfate  325 mg Oral Daily With Breakfast Tyson Jimenez, DO     • fluticasone-vilanterol  1 puff Inhalation Daily Tyson Jimenez, DO     • gabapentin  300 mg Oral TID Tyson Jimenez, DO     • heparin (porcine)  5,000 Units Subcutaneous [facility] Jassi Collins, DO     • hydrALAZINE  5 mg Intravenous Q6H PRN Tyson Jimenez, DO     • hydrALAZINE  25 mg Oral Q8H [address], DO     • insulin lispro  1-6 Units Subcutaneous TID With Meals Tyson Jimenez, DO     • losartan  100 mg Oral Daily Tyson Jimenez, DO     • methocarbamol  500 mg Oral Q6H PRN Tyson Jimenez DO     • ondansetron  4 mg Intravenous Q4H PRN Tyson Jimenez, DO     • oxyCODONE  5 mg Oral Q4H PRN Dinora Armendariz, DO     • oxyCODONE  2.5 mg Oral Q4H PRN Dinora Armendariz DO     • pantoprazole  40 mg Oral Early Morning Hillary Portillo MD     • prochlorperazine  5 mg Oral Q6H PRN Tyson Jimenez DO     • senna-docusate sodium  1 tablet Oral HS Dinora Armendariz DO          Today, Patient Was Seen By: Dinora Armendariz DO    **Please Note: This note may have been constructed using a voice recognition system. **

## 2023-08-23 NOTE — OCCUPATIONAL THERAPY NOTE
Occupational Therapy Progress Note     Patient Name: Javad Resendez  JPHPC'V Date: 8/23/2023  Problem List  Principal Problem:    ICH (intracerebral hemorrhage) ([address])  Active Problems:    Primary hypertension    Diabetes mellitus type 2, controlled ([address])    Anxiety    Sjogren's syndrome ([address])    CAD (coronary artery disease)    Hyperlipidemia associated with type 2 diabetes mellitus ([address])    Anemia        08/23/23 1310   OT Last Visit   OT Visit Date 08/23/23   Note Type   Note Type Treatment   Pain Assessment   Pain Assessment Tool 0-10   Pain Score 8   Pain Location/Orientation Location: Leg;Orientation: Left   Hospital Pain Intervention(s) Repositioned; Emotional support; Rest   Restrictions/Precautions   Weight Bearing Precautions Per Order No   Braces or Orthoses Sling  (with RIGHT UE for hemiplegia/protection strategies)   Other Precautions Fall Risk;Telemetry;Cognitive; Chair Alarm; Bed Alarm   Lifestyle   Autonomy I adls and mobiltiy-  i iadls - shares homemaking with spouse   Reciprocal Relationships supportive spouse, family and friends   Service to Others works FT as  at [employer] active pta   ROM- Right Upper Extremities   R Shoulder PROM  (scapular elevation /depression)   R Elbow PROM;Elbow flexion;Elbow extension   R Wrist Wrist flexion;Wrist extension;PROM   R Hand PROM; Little finger;Ring finger; Long finger; Index finger   R Position Seated   R Weight/Reps/Sets 5-10x   RUE ROM Comment Reviewed HEP - pt with fair carryover from last session, will continue to re-inforce with treatment sessions. right UE HEP written on white board for reference/reminders last session.    Cognition   Overall Cognitive Status Impaired   Arousal/Participation Cooperative   Attention Attends with cues to redirect   Orientation Level Oriented X4   Memory Decreased recall of precautions   Following Commands Follows multistep commands with increased time or repetition   Comments pt with present with flat affect, impaired recall of HEP for left UE. Will continue to assess, pastoral care arrived for supportive listending at end of session. Activity Tolerance   Activity Tolerance Patient limited by fatigue   Medical Staff Made Aware RN cleared pt for therapy   Assessment   Assessment Patient participated in Skilled OT session this date with interventions consisting of right UE HEP . Patient agreeable to OT treatment session, upon arrival patient was found seated OOB to Chair . Patient requiring verbal cues for safety, verbal cues for correct technique and frequent rest periods. Patient continues to be functioning below baseline level, occupational performance remains limited secondary to factors listed above and increased risk for falls and injury. From OT standpoint, recommendation at time of d/c would be Short Term Rehab. The patient's raw score on the AM-PAC Daily Activity Inpatient Short Form is 11. A raw score of less than 19 suggests the patient may benefit from discharge to post-acute rehabilitation services. Please refer to the recommendation of the Occupational Therapist for safe discharge planning. Patient to benefit from continued Occupational Therapy treatment while in the hospital to address deficits as defined above and maximize level of functional independence with ADLs and functional mobility. Plan   Treatment Interventions ADL retraining;Functional transfer training;UE strengthening/ROM; Endurance training;Cognitive reorientation;Patient/family training;Equipment evaluation/education; Compensatory technique education; Energy conservation; Activityengagement   Goal Expiration Date 08/29/23   OT Treatment Day 4   OT Frequency 3-5x/wk   Recommendation   OT Discharge Recommendation Post acute rehabilitation services   [facility] Daily Activity Inpatient   Lower Body Dressing 1   Bathing 2   Toileting 1   Upper Body Dressing 2   Grooming 2   Eating 3   Daily Activity Raw Score 11   Daily Activity Standardized Score (Calc for Raw Score >=11) 29.04   AM-PAC Applied Cognition Inpatient   Following a Speech/Presentation 3   Understanding Ordinary Conversation 4   Taking Medications 3   Remembering Where Things Are Placed or Put Away 4   Remembering List of 4-5 Errands 3   Taking Care of Complicated Tasks 3   Applied Cognition Raw Score 20   Applied Cognition Standardized Score 41.76   Barthel Index   Feeding 5   Bathing 0   Grooming Score 0   Dressing Score 5   Bladder Score 5   Bowels Score 10   Toilet Use Score 5   Transfers (Bed/Chair) Score 5   Mobility (Level Surface) Score 0   Stairs Score 0   Barthel Index Score 35   Modified Ouray Scale   Modified Ouray Scale 4   End of Consult   Patient Position at End of Consult Bedside chair; All needs within reach;Bed/Chair alarm activated   Nurse Communication Nurse aware of consult   Micaela Hills MOT, OTR/L

## 2023-08-23 NOTE — ASSESSMENT & PLAN NOTE
· 62 YOF with history of HTN, DM2, Sjogren's, fibromyalgia initially presented to [address] [place] [address] on 8/14/23 as a stroke alert on 8/14/23 with initial presenting symptoms acute onset of right sided weakness, facial asymmetry, and dysarthria while at work. Initial imaging with CTH/CTA HN revealed left thalamocapsular bleed with no significant shift or mass effect. . NIHSS 14. Not TNK candidate given acute ICH. · Pt given IV labetalol, started on Cardene drip, administered DDAVP for ASA reversal and transferred to [facility] on 8/14/23 for neurosurgery care. · CT head was done on 8/17/23 for change in exam and worsening and waxing slurred speech which was reviewed by neuro, the images which does show stable left sided hemorrhage without any midline shift. · Had a fall and hit her head on 8/18 - CT head stable  · Repeat CT head on 8/20 - stable - cleared by neurology to restart ASA 81 mg daily  · Was seen by neurosurgery - no surgical intervention at this time  · Neurology following - likely due to hypertension  · Goal SBP < 140  · PT/OT  · Accepted by [facility] - auth pending.  Medically stable for discharge otherwise

## 2023-08-23 NOTE — PHYSICAL THERAPY NOTE
PHYSICAL THERAPY CANCEL NOTE          Patient Name: Adair Vickers  UTNRB'B Date: 8/23/2023 08/23/23 1402   PT Last Visit   PT Visit Date 08/23/23   Note Type   Note Type Cancelled Session   Cancel Reasons Refusal     Pt refusing out of chair mobility at this time. Would like to perform UE therex-OT present to assist. Will continue to follow as able. Thank you.     Viola Juan, PT, DPT

## 2023-08-23 NOTE — ASSESSMENT & PLAN NOTE
>>ASSESSMENT AND PLAN FOR DIABETES MELLITUS TYPE 2, CONTROLLED (HCC) WRITTEN ON 8/23/2023  2:30 PM BY CAMILA ESPINOSA,     Lab Results   Component Value Date    HGBA1C 6.7 (H) 08/15/2023       Recent Labs     08/22/23  1106 08/22/23  1606 08/23/23  0629 08/23/23  1104   POCGLU 171* 142* 155* 181*       Blood Sugar Average: Last 72 hrs:  (P) 171.7150698234332357     A1c and glucose controlled   On SSI  Holding home metformin  Hypoglycemic protocol   Monitor blood sugars and adjust insulin accordingly

## 2023-08-23 NOTE — ASSESSMENT & PLAN NOTE
Please enter lab orders for the patient's upcoming physical appointment. Physical scheduled:    Your appointments     Date & Time Appointment Department [facility])    Apr 03, 2020  7:30 AM CDT Physical - Established with Carlene Kim MD [facility] · History of CAD based on prior CTA imaging.   · On statin  · ASA 81 mg daily restarted on 8/21 after clearance by neurology  · BB held due to episodes of bradycardia

## 2023-08-24 LAB
ANION GAP SERPL CALCULATED.3IONS-SCNC: 9 MMOL/L
ATRIAL RATE: 68 BPM
BASOPHILS # BLD AUTO: 0.07 THOUSANDS/ÂΜL (ref 0–0.1)
BASOPHILS NFR BLD AUTO: 1 % (ref 0–1)
BUN SERPL-MCNC: 19 MG/DL (ref 5–25)
CALCIUM SERPL-MCNC: 9.3 MG/DL (ref 8.4–10.2)
CHLORIDE SERPL-SCNC: 100 MMOL/L (ref 96–108)
CO2 SERPL-SCNC: 26 MMOL/L (ref 21–32)
CREAT SERPL-MCNC: 0.56 MG/DL (ref 0.6–1.3)
EOSINOPHIL # BLD AUTO: 0.59 THOUSAND/ÂΜL (ref 0–0.61)
EOSINOPHIL NFR BLD AUTO: 6 % (ref 0–6)
ERYTHROCYTE [DISTWIDTH] IN BLOOD BY AUTOMATED COUNT: 15.6 % (ref 11.6–15.1)
GFR SERPL CREATININE-BSD FRML MDRD: 103 ML/MIN/1.73SQ M
GLUCOSE SERPL-MCNC: 142 MG/DL (ref 65–140)
GLUCOSE SERPL-MCNC: 144 MG/DL (ref 65–140)
GLUCOSE SERPL-MCNC: 160 MG/DL (ref 65–140)
GLUCOSE SERPL-MCNC: 163 MG/DL (ref 65–140)
GLUCOSE SERPL-MCNC: 173 MG/DL (ref 65–140)
HCT VFR BLD AUTO: 37.6 % (ref 34.8–46.1)
HGB BLD-MCNC: 12 G/DL (ref 11.5–15.4)
IMM GRANULOCYTES # BLD AUTO: 0.02 THOUSAND/UL (ref 0–0.2)
IMM GRANULOCYTES NFR BLD AUTO: 0 % (ref 0–2)
LYMPHOCYTES # BLD AUTO: 2.41 THOUSANDS/ÂΜL (ref 0.6–4.47)
LYMPHOCYTES NFR BLD AUTO: 24 % (ref 14–44)
MCH RBC QN AUTO: 27.1 PG (ref 26.8–34.3)
MCHC RBC AUTO-ENTMCNC: 31.9 G/DL (ref 31.4–37.4)
MCV RBC AUTO: 85 FL (ref 82–98)
MONOCYTES # BLD AUTO: 0.93 THOUSAND/ÂΜL (ref 0.17–1.22)
MONOCYTES NFR BLD AUTO: 9 % (ref 4–12)
NEUTROPHILS # BLD AUTO: 6.07 THOUSANDS/ÂΜL (ref 1.85–7.62)
NEUTS SEG NFR BLD AUTO: 60 % (ref 43–75)
NRBC BLD AUTO-RTO: 0 /100 WBCS
P AXIS: 54 DEGREES
PLATELET # BLD AUTO: 360 THOUSANDS/UL (ref 149–390)
PMV BLD AUTO: 9.5 FL (ref 8.9–12.7)
POTASSIUM SERPL-SCNC: 3.9 MMOL/L (ref 3.5–5.3)
PR INTERVAL: 186 MS
QRS AXIS: 13 DEGREES
QRSD INTERVAL: 96 MS
QT INTERVAL: 416 MS
QTC INTERVAL: 442 MS
RBC # BLD AUTO: 4.43 MILLION/UL (ref 3.81–5.12)
SODIUM SERPL-SCNC: 135 MMOL/L (ref 135–147)
T WAVE AXIS: 44 DEGREES
VENTRICULAR RATE: 68 BPM
WBC # BLD AUTO: 10.09 THOUSAND/UL (ref 4.31–10.16)

## 2023-08-24 PROCEDURE — 80048 BASIC METABOLIC PNL TOTAL CA: CPT | Performed by: STUDENT IN AN ORGANIZED HEALTH CARE EDUCATION/TRAINING PROGRAM

## 2023-08-24 PROCEDURE — 82948 REAGENT STRIP/BLOOD GLUCOSE: CPT

## 2023-08-24 PROCEDURE — 93010 ELECTROCARDIOGRAM REPORT: CPT | Performed by: INTERNAL MEDICINE

## 2023-08-24 PROCEDURE — 97110 THERAPEUTIC EXERCISES: CPT

## 2023-08-24 PROCEDURE — 85025 COMPLETE CBC W/AUTO DIFF WBC: CPT | Performed by: STUDENT IN AN ORGANIZED HEALTH CARE EDUCATION/TRAINING PROGRAM

## 2023-08-24 PROCEDURE — 99232 SBSQ HOSP IP/OBS MODERATE 35: CPT | Performed by: STUDENT IN AN ORGANIZED HEALTH CARE EDUCATION/TRAINING PROGRAM

## 2023-08-24 PROCEDURE — 97535 SELF CARE MNGMENT TRAINING: CPT

## 2023-08-24 PROCEDURE — 97112 NEUROMUSCULAR REEDUCATION: CPT

## 2023-08-24 PROCEDURE — 97530 THERAPEUTIC ACTIVITIES: CPT

## 2023-08-24 RX ORDER — LACTULOSE 20 G/30ML
20 SOLUTION ORAL ONCE
Status: COMPLETED | OUTPATIENT
Start: 2023-08-24 | End: 2023-08-24

## 2023-08-24 RX ADMIN — LACTULOSE 20 G: 20 SOLUTION ORAL at 13:06

## 2023-08-24 RX ADMIN — OXYCODONE HYDROCHLORIDE 5 MG: 5 TABLET ORAL at 21:22

## 2023-08-24 RX ADMIN — ASPIRIN 81 MG CHEWABLE TABLET 81 MG: 81 TABLET CHEWABLE at 08:09

## 2023-08-24 RX ADMIN — HEPARIN SODIUM 5000 UNITS: 5000 INJECTION INTRAVENOUS; SUBCUTANEOUS at 06:12

## 2023-08-24 RX ADMIN — INSULIN LISPRO 1 UNITS: 100 INJECTION, SOLUTION INTRAVENOUS; SUBCUTANEOUS at 08:11

## 2023-08-24 RX ADMIN — OXYCODONE HYDROCHLORIDE 5 MG: 5 TABLET ORAL at 08:10

## 2023-08-24 RX ADMIN — CHLORHEXIDINE GLUCONATE 15 ML: 1.2 SOLUTION ORAL at 08:09

## 2023-08-24 RX ADMIN — PANTOPRAZOLE SODIUM 40 MG: 40 TABLET, DELAYED RELEASE ORAL at 06:12

## 2023-08-24 RX ADMIN — SENNOSIDES AND DOCUSATE SODIUM 1 TABLET: 50; 8.6 TABLET ORAL at 21:21

## 2023-08-24 RX ADMIN — OXYCODONE HYDROCHLORIDE 5 MG: 5 TABLET ORAL at 13:06

## 2023-08-24 RX ADMIN — ATORVASTATIN CALCIUM 20 MG: 20 TABLET, FILM COATED ORAL at 16:57

## 2023-08-24 RX ADMIN — AMLODIPINE BESYLATE 10 MG: 10 TABLET ORAL at 08:09

## 2023-08-24 RX ADMIN — INSULIN LISPRO 1 UNITS: 100 INJECTION, SOLUTION INTRAVENOUS; SUBCUTANEOUS at 16:58

## 2023-08-24 RX ADMIN — HEPARIN SODIUM 5000 UNITS: 5000 INJECTION INTRAVENOUS; SUBCUTANEOUS at 13:06

## 2023-08-24 RX ADMIN — BUPROPION HYDROCHLORIDE 150 MG: 150 TABLET, FILM COATED, EXTENDED RELEASE ORAL at 08:10

## 2023-08-24 RX ADMIN — OXYCODONE HYDROCHLORIDE 5 MG: 5 TABLET ORAL at 04:00

## 2023-08-24 RX ADMIN — HYDRALAZINE HYDROCHLORIDE 25 MG: 25 TABLET, FILM COATED ORAL at 21:21

## 2023-08-24 RX ADMIN — GABAPENTIN 300 MG: 300 CAPSULE ORAL at 16:57

## 2023-08-24 RX ADMIN — CYANOCOBALAMIN TAB 500 MCG 1000 MCG: 500 TAB at 08:09

## 2023-08-24 RX ADMIN — OXYCODONE HYDROCHLORIDE 5 MG: 5 TABLET ORAL at 16:58

## 2023-08-24 RX ADMIN — HEPARIN SODIUM 5000 UNITS: 5000 INJECTION INTRAVENOUS; SUBCUTANEOUS at 21:21

## 2023-08-24 RX ADMIN — FERROUS SULFATE TAB 325 MG (65 MG ELEMENTAL FE) 325 MG: 325 (65 FE) TAB at 08:09

## 2023-08-24 RX ADMIN — CHLORHEXIDINE GLUCONATE 15 ML: 1.2 SOLUTION ORAL at 21:21

## 2023-08-24 RX ADMIN — HYDRALAZINE HYDROCHLORIDE 25 MG: 25 TABLET, FILM COATED ORAL at 13:06

## 2023-08-24 RX ADMIN — FLUTICASONE FUROATE AND VILANTEROL TRIFENATATE 1 PUFF: 200; 25 POWDER RESPIRATORY (INHALATION) at 08:10

## 2023-08-24 RX ADMIN — GABAPENTIN 300 MG: 300 CAPSULE ORAL at 21:21

## 2023-08-24 RX ADMIN — HYDRALAZINE HYDROCHLORIDE 25 MG: 25 TABLET, FILM COATED ORAL at 06:12

## 2023-08-24 NOTE — PHYSICAL THERAPY NOTE
Physical Therapy Progress Note     08/24/23 0940   PT Last Visit   PT Visit Date 08/24/23   Note Type   Note Type Treatment for insurance authorization   Pain Assessment   Pain Assessment Tool 0-10   Pain Score 8   Pain Location/Orientation Location: Pelvis   Hospital Pain Intervention(s) Repositioned; Emotional support   Restrictions/Precautions   Other Precautions Pain; Fall Risk;Bed Alarm; Chair Alarm  (Chair alarm active post session.)   Subjective   Subjective The patient states that she is trying to manage everything, but she is eager to work to get better. Bed Mobility   Supine to Sit 2  Maximal assistance   Additional items Assist x 2; Increased time required;Verbal cues;LE management   Transfers   Sit to Stand 2  Maximal assistance   Additional items Assist x 2; Increased time required;Verbal cues; Other  (Left knee blocked and hips facilitated into extension.)   Stand to Sit 2  Maximal assistance   Additional items Assist x 2; Increased time required;Verbal cues   Stand pivot 2  Maximal assistance   Additional items Assist x 2; Increased time required;Verbal cues  (Left knee blocked and hips facilitated into extension.)   Balance   Static Sitting Poor +   Dynamic Sitting Poor   Static Standing Poor -   Dynamic Standing Poor -   Activity Tolerance   Activity Tolerance Patient tolerated treatment well;Patient limited by fatigue   Nurse Made Aware Yes. Exercises   TKR Sitting;Right;PROM;20 reps   Assessment   Prognosis Fair   Problem List Decreased strength;Decreased endurance; Impaired balance;Decreased mobility; Decreased coordination;Decreased cognition; Impaired judgement;Decreased safety awareness; Impaired tone;Obesity   Assessment The patient continues to require significant assistance for all mobility. Her seated balance did slightly improve, but this also is far below her baseline.  The patient was unable to assist with passive exercise on her RLE, but educated her in visualizing and trying to move it even though there is none at this time. She has significant difficulty with stance, and she needs considerable assistance in order to do so. This was performed three separate times during the session. Educated her in the importance of attempting to utilize her right hemibody, and to continue visualizing doing so. She had several questions which were either addressed to her satisfaction or deferred to the medical team. She will certainly benefit from continued therapy in order to maximize her functional mobility. Barriers to Discharge Inaccessible home environment;Decreased caregiver support   Goals   Patient Goals To get better. STG Expiration Date 08/27/23   PT Treatment Day 5   Plan   Treatment/Interventions Functional transfer training;LE strengthening/ROM; Therapeutic exercise; Endurance training;Patient/family training;Bed mobility   Progress Progressing toward goals   PT Frequency 3-5x/wk   Recommendation   PT Discharge Recommendation Post acute rehabilitation services   AM-PAC Basic Mobility Inpatient   Turning in Flat Bed Without Bedrails 2   Lying on Back to Sitting on Edge of Flat Bed Without Bedrails 1   Moving Bed to Chair 1   Standing Up From Chair Using Arms 1   Walk in Room 1   Climb 3-5 Stairs With Railing 1   Basic Mobility Inpatient Raw Score 7   Turning Head Towards Sound 4   Follow Simple Instructions 4   Low Function Basic Mobility Raw Score  15   Low Function Basic Mobility Standardized Score  23.9   Highest Level Of Mobility   JH-HLM Goal 2: Bed activities/Dependent transfer   JH-HLM Achieved 5: Stand (1 or more minutes)         An AM-PAC Basic Mobility raw score less than 16 suggests the patient may benefit from discharge to post-acute rehab services.     Naila Garcia, PTA

## 2023-08-24 NOTE — PROGRESS NOTES
[facility]  Progress Note  Name: Rebecca Dela Cruz  MRN: [id number]  [facility] I Date of Admission: 8/14/2023   Date of Service: 8/24/2023 I Hospital Day: 10    Assessment/Plan   * ICH (intracerebral hemorrhage) ([address])  Assessment & Plan  · 62 YOF with history of HTN, DM2, Sjogren's, fibromyalgia initially presented to [facility] on 8/14/23 as a stroke alert on 8/14/23 with initial presenting symptoms acute onset of right sided weakness, facial asymmetry, and dysarthria while at work. Initial imaging with CTH/CTA HN revealed left thalamocapsular bleed with no significant shift or mass effect. . NIHSS 14. Not TNK candidate given acute ICH. · Pt given IV labetalol, started on Cardene drip, administered DDAVP for ASA reversal and transferred to [facility] on 8/14/23 for neurosurgery care. · CT head was done on 8/17/23 for change in exam and worsening and waxing slurred speech which was reviewed by neuro, the images which does show stable left sided hemorrhage without any midline shift. · Had a fall and hit her head on 8/18 - CT head stable  · Repeat CT head on 8/20 - stable - cleared by neurology to restart ASA 81 mg daily  · Was seen by neurosurgery - no surgical intervention at this time  · Neurology following - likely due to hypertension  · Goal SBP < 140  · PT/OT  · Accepted by [facility] - auth pending. Medically stable for discharge otherwise       Primary hypertension  Assessment & Plan  · History of hypertension. · Was previously on telmisartan and atenolol but hypertension was suboptimally controlled  · BP now acceptable   · Continue Amlodipine 10mg daily; Losartan 100mg daily; Hydralazine 25mg q8h    Anemia  Assessment & Plan  · Anemia with baseline hemoglobin of 10-11. MCVs around 80s. Iron panel showing ferritin at 22 and TIBC of 370s which maybe indicative of iron deficiency anemia.  Also her B12 levels are low-normal.   · Ordered ferrous sulfate supplementation  · On vitamin B12 supplementation 1000 mg daily  · Hemoglobin 12.2, resolved    Diabetes mellitus type 2, controlled [facility])  Assessment & Plan  Lab Results   Component Value Date    HGBA1C 6.7 (H) 08/15/2023       Recent Labs     08/23/23  2058 08/24/23  0632 08/24/23  1112 08/24/23  1621   POCGLU 153* 163* 144* 173*       Blood Sugar Average: Last 72 hrs:  (P) [id number]     · A1c and glucose controlled   · On SSI  · Holding home metformin  · Hypoglycemic protocol   · Monitor blood sugars and adjust insulin accordingly    Hyperlipidemia associated with type 2 diabetes mellitus (HCC)  Assessment & Plan  · Continue statin     CAD (coronary artery disease)  Assessment & Plan  · History of CAD based on prior CTA imaging. · On statin  · ASA 81 mg daily restarted on 8/21 after clearance by neurology  · BB held due to episodes of bradycardia      Sjogren's syndrome (HCC)  Assessment & Plan  · History of Sjogren syndrome. · Outpatient rheumatology follow-up      Anxiety  Assessment & Plan  · History of anxiety  · Was on wellbutrin 300 mg daily, was reduced to 150 mg due to concern for medicaitons potential for lowering seizure threshold in this patient with ICH             VTE Pharmacologic Prophylaxis: VTE Score: 4 Moderate Risk (Score 3-4) - Pharmacological DVT Prophylaxis Ordered: heparin. Patient Centered Rounds: I performed bedside rounds with nursing staff today. Discussions with Specialists or Other Care Team Provider: None    Education and Discussions with Family / Patient: Patient declined call to .      Total Time Spent on Date of Encounter in care of patient: 45 minutes This time was spent on one or more of the following: performing physical exam; counseling and coordination of care; obtaining or reviewing history; documenting in the medical record; reviewing/ordering tests, medications or procedures; communicating with other healthcare professionals and discussing with patient's family/caregivers. Current Length of Stay: 10 day(s)  Current Patient Status: Inpatient   Certification Statement: The patient will continue to require additional inpatient hospital stay due to Pending rehab placement and authorization  Discharge Plan: Anticipate discharge tomorrow to rehab facility. Code Status: Level 1 - Full Code    Subjective:   Patient assessed at bedside. No acute complaints. Reports no bowel movement recently. Objective:     Vitals:   Temp (24hrs), Av.1 °F (36.7 °C), Min:97.9 °F (36.6 °C), Max:98.3 °F (36.8 °C)    Temp:  [97.9 °F (36.6 °C)-98.3 °F (36.8 °C)] 98.3 °F (36.8 °C)  HR:  [71-78] 77  Resp:  [18] 18  BP: (109-137)/(66-82) 132/79  SpO2:  [95 %-97 %] 95 %  Body mass index is 33.91 kg/m². Input and Output Summary (last 24 hours): Intake/Output Summary (Last 24 hours) at 2023 1659  Last data filed at 2023 1533  Gross per 24 hour   Intake 1040 ml   Output 1850 ml   Net -810 ml       Physical Exam:   Physical Exam  Vitals reviewed. Constitutional:       General: She is not in acute distress. Appearance: Normal appearance. She is not ill-appearing. HENT:      Head: Normocephalic and atraumatic. Eyes:      General: No scleral icterus. Conjunctiva/sclera: Conjunctivae normal.   Cardiovascular:      Rate and Rhythm: Normal rate and regular rhythm. Heart sounds: Normal heart sounds. No murmur heard. Pulmonary:      Effort: Pulmonary effort is normal. No respiratory distress. Abdominal:      General: Bowel sounds are normal.      Palpations: Abdomen is soft. Tenderness: There is no abdominal tenderness. Musculoskeletal:      Right lower leg: No edema. Left lower leg: No edema. Skin:     General: Skin is warm and dry. Neurological:      Mental Status: She is alert and oriented to person, place, and time. Mental status is at baseline.       Comments: Right extremities flaccid   Psychiatric:         Mood and Affect: Mood normal.         Behavior: Behavior normal.          Additional Data:     Labs:  Results from last 7 days   Lab Units 08/24/23  0515   WBC Thousand/uL 10.09   HEMOGLOBIN g/dL 12.0   HEMATOCRIT % 37.6   PLATELETS Thousands/uL 360   NEUTROS PCT % 60   LYMPHS PCT % 24   MONOS PCT % 9   EOS PCT % 6     Results from last 7 days   Lab Units 08/24/23  0515 08/21/23  1404 08/18/23  0549   SODIUM mmol/L 135   < > 137   POTASSIUM mmol/L 3.9   < > 4.3   CHLORIDE mmol/L 100   < > 105   CO2 mmol/L 26   < > 25   BUN mg/dL 19   < > 16   CREATININE mg/dL 0.56*   < > 0.92   ANION GAP mmol/L 9   < > 7   CALCIUM mg/dL 9.3   < > 9.6   ALBUMIN g/dL  --   --  3.2*   TOTAL BILIRUBIN mg/dL  --   --  0.36   ALK PHOS U/L  --   --  80   ALT U/L  --   --  20   AST U/L  --   --  14   GLUCOSE RANDOM mg/dL 160*   < > 161*    < > = values in this interval not displayed. Results from last 7 days   Lab Units 08/24/23  1621 08/24/23  1112 08/24/23  0334 08/23/23  2058 08/23/23  1543 08/23/23  1104 08/23/23  0629 08/22/23  1606 08/22/23  1106 08/22/23  0626 08/21/23  2115 08/21/23  1554   POC GLUCOSE mg/dl 173* 144* 163* 153* 168* 181* 155* 142* 171* 161* 213* 112               Lines/Drains:  Invasive Devices     Peripheral Intravenous Line  Duration           Peripheral IV 08/23/23 Distal;Left;Ventral (anterior) Forearm 1 day          Drain  Duration           External Urinary Catheter 9 days                      Imaging: No pertinent imaging reviewed.     Recent Cultures (last 7 days):         Last 24 Hours Medication List:   Current Facility-Administered Medications   Medication Dose Route Frequency Provider Last Rate   • amLODIPine  10 mg Oral Daily Angele Barthel Starsinic, DO     • aspirin  81 mg Oral Daily Que Thomas MD     • atorvastatin  20 mg Oral Daily With [address] [place] [address], DO     • bisacodyl  5 mg Oral Daily PRN Angele Barthel Starsinic, DO     • buPROPion  150 mg Oral QAM Angele Barthel Starsinic,  • chlorhexidine  15 mL Mouth/Throat Q12H [address] Lorrine Ramiro Starsinic, DO     • vitamin B-12  1,000 mcg Oral Daily Lorrine Ramiro Jimenez, DO     • ferrous sulfate  325 mg Oral Daily With Breakfast Lorrine Ramiro Celayainic, DO     • fluticasone-vilanterol  1 puff Inhalation Daily Lorrine Ramiro Celayainic, DO     • gabapentin  300 mg Oral TID Lorrine Ramiro Celayainic, DO     • heparin (porcine)  5,000 Units Subcutaneous [facility] Barb Colunga Dennis, DO     • hydrALAZINE  5 mg Intravenous Q6H PRN Lorrine Ramiro Celayainic, DO     • hydrALAZINE  25 mg Oral Q8H [address], DO     • insulin lispro  1-6 Units Subcutaneous TID With Meals Lorrine Ramiro Jimenez, DO     • losartan  100 mg Oral Daily Lorrine Ramiro Jimenez, DO     • methocarbamol  500 mg Oral Q6H PRN Lorrine Ramiro Jimenez, DO     • ondansetron  4 mg Intravenous Q4H PRN Lorkiki Jimenez, DO     • oxyCODONE  5 mg Oral Q4H PRN Angel Phalen, DO     • oxyCODONE  2.5 mg Oral Q4H PRN Angel Phalen, DO     • pantoprazole  40 mg Oral Early Morning Zi Demarco MD     • prochlorperazine  5 mg Oral Q6H PRN Lorrine Ramiro Celayainic, DO     • senna-docusate sodium  1 tablet Oral HS Angel Phalen, DO          Today, Patient Was Seen By: Angel Phalen, DO    **Please Note: This note may have been constructed using a voice recognition system. **

## 2023-08-24 NOTE — ASSESSMENT & PLAN NOTE
· 62 YOF with history of HTN, DM2, Sjogren's, fibromyalgia initially presented to [facility] on 8/14/23 as a stroke alert on 8/14/23 with initial presenting symptoms acute onset of right sided weakness, facial asymmetry, and dysarthria while at work. Initial imaging with CTH/CTA HN revealed left thalamocapsular bleed with no significant shift or mass effect. . NIHSS 14. Not TNK candidate given acute ICH. · Pt given IV labetalol, started on Cardene drip, administered DDAVP for ASA reversal and transferred to hospitals on 8/14/23 for neurosurgery care. · CT head was done on 8/17/23 for change in exam and worsening and waxing slurred speech which was reviewed by neuro, the images which does show stable left sided hemorrhage without any midline shift. · Had a fall and hit her head on 8/18 - CT head stable  · Repeat CT head on 8/20 - stable - cleared by neurology to restart ASA 81 mg daily  · Was seen by neurosurgery - no surgical intervention at this time  · Neurology following - likely due to hypertension  · Goal SBP < 140  · PT/OT  · Accepted by [facility] - auth pending.  Medically stable for discharge otherwise

## 2023-08-24 NOTE — CASE MANAGEMENT
Case Management Discharge Planning Note    Patient name Chhaya Perkins  Location [facility] 763-19 MRN [id number]  : 1965 Date 2023       Current Admission Date: 2023  Current Admission Diagnosis:ICH (intracerebral hemorrhage) [facility])   Patient Active Problem List    Diagnosis Date Noted   • Anemia 2023   • ICH (intracerebral hemorrhage) ([address]) 08/15/2023   • Left leg pain 08/15/2023   • Chronic obstructive pulmonary disease with acute exacerbation ([address]) 2023   • Hyperlipidemia associated with type 2 diabetes mellitus ([address]) 2023   • Abnormal CT of the chest 2023   • Class 2 obesity with body mass index (BMI) of 39.0 to 39.9 in adult 2023   • CAD (coronary artery disease) 2023   • Right lumbosacral radiculopathy 10/12/2022   • Chronic bilateral low back pain with bilateral sciatica 2022   • Paresthesia of both feet 2022   • Postoperative visit 2022   • Incarcerated umbilical hernia    • Diabetes mellitus type 2, controlled ([address]) 2022   • Continuous opioid dependence ([address]) 2022   • Moderate persistent asthma with acute exacerbation 2022   • Cigarette nicotine dependence in remission 2022   • Primary hypertension 2022   • Fibromyalgia 2022   • Sjogren's syndrome ([address]) 10/19/2012   • Anxiety 2012      LOS (days): 10  Geometric Mean LOS (GMLOS) (days): 4.50  Days to GMLOS:-5.1     OBJECTIVE:  Risk of Unplanned Readmission Score: 18.03         Current admission status: Inpatient   Preferred Pharmacy:   [facility] RADHA HERNÁNDEZ [address] [place] [address] ROAD  [address] [place] [address]  Phone: [phone] Fax: [address] [place] [address] - [address] [place] [address]  Phone: [phone] Fax: [phone]    Primary Care Provider: Nargis Espinoza MD    Primary Insurance: BLUE CROSS  Secondary Insurance: DISCHARGE DETAILS:  Updated MD notes, PT/OT notes requested for auth. Documentation is now uploaded to [facility].  DC support updated documents are available. Statement Selected

## 2023-08-24 NOTE — CASE MANAGEMENT
Updated clinicals faxed to [facility] of [address]. CM notified.

## 2023-08-24 NOTE — PLAN OF CARE
Problem: OCCUPATIONAL THERAPY ADULT  Goal: Performs self-care activities at highest level of function for planned discharge setting. See evaluation for individualized goals. Description: Treatment Interventions: ADL retraining, Functional transfer training, UE strengthening/ROM, Endurance training, Cognitive reorientation, Patient/family training, Equipment evaluation/education, Neuromuscular reeducation, Fine motor coordination activities, Compensatory technique education, Activityengagement          See flowsheet documentation for full assessment, interventions and recommendations. Outcome: Progressing  Note: Limitation: Decreased ADL status, Decreased UE ROM, Decreased UE strength, Decreased Safe judgement during ADL, Decreased endurance, Decreased fine motor control, Decreased self-care trans, Decreased high-level ADLs, Non-func R UE  Prognosis: Good  Assessment: Patient participated in Skilled OT session this date with interventions consisting of self care tasks, functional transfers, right UE HEP  . Patient agreeable to OT treatment session, upon arrival patient was found supine in bed. In comparison to previous session, patient with improvements in cognition/attention . Patient requiring verbal cues for safety and frequent rest periods. Patient continues to be functioning below baseline level, occupational performance remains limited secondary to factors listed above and increased risk for falls and injury. The patient's raw score on the AM-PAC Daily Activity Inpatient Short Form is 12. A raw score of less than 19 suggests the patient may benefit from discharge to post-acute rehabilitation services. Please refer to the recommendation of the Occupational Therapist for safe discharge planning. From OT standpoint, recommendation at time of d/c would be Short Term Rehab.    Patient to benefit from continued Occupational Therapy treatment while in the hospital to address deficits as defined above and maximize level of functional independence with ADLs and functional mobility.      OT Discharge Recommendation: Post acute rehabilitation services

## 2023-08-24 NOTE — ASSESSMENT & PLAN NOTE
Lab Results   Component Value Date    HGBA1C 6.7 (H) 08/15/2023       Recent Labs     08/23/23  2058 08/24/23  0632 08/24/23  1112 08/24/23  1621   POCGLU 153* 163* 144* 173*       Blood Sugar Average: Last 72 hrs:  (P) 162.5884560527379092     · A1c and glucose controlled   · On SSI  · Holding home metformin  · Hypoglycemic protocol   · Monitor blood sugars and adjust insulin accordingly

## 2023-08-24 NOTE — OCCUPATIONAL THERAPY NOTE
Occupational Therapy Progress Note     Patient Name: Birgit Vallejo  NPGGA'S Date: 8/24/2023  Problem List  Principal Problem:    ICH (intracerebral hemorrhage) ([address])  Active Problems:    Primary hypertension    Diabetes mellitus type 2, controlled ([address])    Anxiety    Sjogren's syndrome ([address])    CAD (coronary artery disease)    Hyperlipidemia associated with type 2 diabetes mellitus ([address])    Anemia        08/24/23 0852   OT Last Visit   OT Visit Date 08/24/23   Note Type   Note Type Treatment for insurance authorization   Pain Assessment   Pain Assessment Tool 0-10   Pain Score 5   Pain Location/Orientation Orientation: Right;Location: Hip  (pt reports sciatica pain)   Hospital Pain Intervention(s) Repositioned; Ambulation/increased activity; Elevated; Emotional support; Rest   Restrictions/Precautions   Weight Bearing Precautions Per Order No   Braces or Orthoses Sling  (rigth UE hemiplegia)   Other Precautions Cognitive; Chair Alarm; Bed Alarm;Telemetry; Fall Risk;Pain  (Right UE Hemiplegia/hypotonic, right UE neglect)   Lifestyle   Autonomy I adls and mobiltiy-  i iadls - shares homemaking with spouse   Reciprocal Relationships supportive spouse, family and friends   Service to Others works [employer] as [employer] at [employer] active pta   ADL   Where Assessed Chair   Eating Assistance 5  Supervision/Setup   Grooming Assistance 4  Minimal Assistance   Grooming Deficit Wash/dry hands; Wash/dry face  (S/set-up to wash face, mod a to wash hands with right.)   UB Bathing Assistance 3  Moderate Assistance   UB Bathing Deficit Right arm; Chest;Abdomen; Increased time to complete   LB Bathing Assistance 2  Maximal Assistance   LB Bathing Deficit Left lower leg including foot;Right lower leg including foot; Buttocks; Perineal area   UB Dressing Assistance 3  Moderate Assistance   UB Dressing Deficit Thread RUE;Pull around back  (able to assist right UE into sleeve using left UE.)   LB Dressing Assistance 1  Total Assistance   LB Dressing Deficit Don/doff R sock; Don/doff L sock   Toileting Assistance  1  Total Assistance   Toileting Deficit Perineal hygiene   Bed Mobility   Supine to Sit 2  Maximal assistance   Additional items Assist x 2;LE management;Verbal cues; Increased time required;HOB elevated   Sit to Supine Unable to assess   Additional Comments pt left in chair with all needs met and alarm engaged   Transfers   Sit to Stand 2  Maximal assistance   Additional items Assist x 2   Stand to Sit 2  Maximal assistance   Additional items Assist x 2   Stand pivot 2  Maximal assistance   Additional items Assist x 2; Increased time required;Verbal cues  (bed to drop arm chair)   Additional Comments +right UE supported in sling with anterior approach, left HHA with right UE donned in sling/sheet for protection strategy, B/L knees blocked   Therapeutic Exercise - ROM   UE-ROM Yes   ROM- Right Upper Extremities   R Elbow PROM;Elbow flexion;Elbow extension   R Wrist Wrist flexion;Wrist extension   R Weight/Reps/Sets 5x   RUE ROM Comment improved carryover of HEP for RUE continued to review for re-inforcement   Cognition   Overall Cognitive Status [facility]   Arousal/Participation Alert; Responsive; Cooperative   Attention Within functional limits   Orientation Level Oriented X4   Memory Within functional limits  (improved)   Following Commands Follows multistep commands with increased time or repetition   Comments pt able to recall 4/5 exerciese with RUE, increased attention this session. Activity Tolerance   Activity Tolerance Patient limited by fatigue   Medical Staff Made Aware PTA [place]   Assessment   Assessment Patient participated in Skilled OT session this date with interventions consisting of self care tasks, functional transfers, right UE HEP  . Patient agreeable to OT treatment session, upon arrival patient was found supine in bed. In comparison to previous session, patient with improvements in cognition/attention .  Patient requiring verbal cues for safety and frequent rest periods. Patient continues to be functioning below baseline level, occupational performance remains limited secondary to factors listed above and increased risk for falls and injury. The patient's raw score on the AM-PAC Daily Activity Inpatient Short Form is 12. A raw score of less than 19 suggests the patient may benefit from discharge to post-acute rehabilitation services. Please refer to the recommendation of the Occupational Therapist for safe discharge planning. From OT standpoint, recommendation at time of d/c would be Short Term Rehab. Patient to benefit from continued Occupational Therapy treatment while in the hospital to address deficits as defined above and maximize level of functional independence with ADLs and functional mobility. Plan   Treatment Interventions ADL retraining;Functional transfer training;UE strengthening/ROM; Endurance training;Cognitive reorientation;Patient/family training;Equipment evaluation/education; Compensatory technique education; Energy conservation; Activityengagement;Continued evaluation   Goal Expiration Date 08/29/23   OT Treatment Day 5   OT Frequency 3-5x/wk   Recommendation   OT Discharge Recommendation Post acute rehabilitation services   AM-[facility] Daily Activity Inpatient   Lower Body Dressing 1   Bathing 2   Toileting 1   Upper Body Dressing 2   Grooming 3   Eating 3   Daily Activity Raw Score 12   Daily Activity Standardized Score (Calc for Raw Score >=11) 30.6   AM-PAC Applied Cognition Inpatient   Following a Speech/Presentation 3   Understanding Ordinary Conversation 4   Taking Medications 4   Remembering Where Things Are Placed or Put Away 4   Remembering List of 4-5 Errands 4   Taking Care of Complicated Tasks 4   Applied Cognition Raw Score 23   Applied Cognition Standardized Score 53.08   Barthel Index   Feeding 5   Bathing 0   Grooming Score 0   Dressing Score 5   Bladder Score 5   Bowels Score 10   Toilet Use Score 5   Transfers (Bed/Chair) Score 10   Mobility (Level Surface) Score 0   Stairs Score 0   Barthel Index Score 40   Modified Lincoln Scale   Modified Lincoln Scale 4   End of Consult   Education Provided Yes  (Reviewd HEP for RUE)   Patient Position at End of Consult Bedside chair;Bed/Chair alarm activated; All needs within reach   Nurse Communication Nurse aware of consult   Jah SOTO, OTR/L

## 2023-08-24 NOTE — PLAN OF CARE
Problem: PHYSICAL THERAPY ADULT  Goal: Performs mobility at highest level of function for planned discharge setting. See evaluation for individualized goals. Description: Treatment/Interventions: OT, Spoke to case management, Spoke to nursing, Gait training, Bed mobility, Patient/family training, Endurance training, LE strengthening/ROM, Functional transfer training  Equipment Recommended:  (TBD)       See flowsheet documentation for full assessment, interventions and recommendations. Outcome: Progressing  Note: Prognosis: Fair  Problem List: Decreased strength, Decreased endurance, Impaired balance, Decreased mobility, Decreased coordination, Decreased cognition, Impaired judgement, Decreased safety awareness, Impaired tone, Obesity  Assessment: The patient continues to require significant assistance for all mobility. Her seated balance did slightly improve, but this also is far below her baseline. The patient was unable to assist with passive exercise on her RLE, but educated her in visualizing and trying to move it even though there is none at this time. She has significant difficulty with stance, and she needs considerable assistance in order to do so. This was performed three separate times during the session. Educated her in the importance of attempting to utilize her right hemibody, and to continue visualizing doing so. She had several questions which were either addressed to her satisfaction or deferred to the medical team. She will certainly benefit from continued therapy in order to maximize her functional mobility. Barriers to Discharge: Inaccessible home environment, Decreased caregiver support     PT Discharge Recommendation: Post acute rehabilitation services    See flowsheet documentation for full assessment.

## 2023-08-24 NOTE — ASSESSMENT & PLAN NOTE
>>ASSESSMENT AND PLAN FOR DIABETES MELLITUS TYPE 2, CONTROLLED (HCC) WRITTEN ON 8/24/2023  4:59 PM BY CAMILA ESPINOSA,     Lab Results   Component Value Date    HGBA1C 6.7 (H) 08/15/2023       Recent Labs     08/23/23 2058 08/24/23  0632 08/24/23  1112 08/24/23  1621   POCGLU 153* 163* 144* 173*       Blood Sugar Average: Last 72 hrs:  (P) 162.6377205346897575     A1c and glucose controlled   On SSI  Holding home metformin  Hypoglycemic protocol   Monitor blood sugars and adjust insulin accordingly

## 2023-08-25 ENCOUNTER — HOSPITAL ENCOUNTER (INPATIENT)
Facility: HOSPITAL | Age: 58
LOS: 26 days | Discharge: NURSING, REHAB OR LONG-TERM CARE | DRG: 057 | End: 2023-09-20
Attending: STUDENT IN AN ORGANIZED HEALTH CARE EDUCATION/TRAINING PROGRAM | Admitting: STUDENT IN AN ORGANIZED HEALTH CARE EDUCATION/TRAINING PROGRAM
Payer: COMMERCIAL

## 2023-08-25 ENCOUNTER — APPOINTMENT (OUTPATIENT)
Dept: RADIOLOGY | Facility: HOSPITAL | Age: 58
DRG: 057 | End: 2023-08-25
Payer: COMMERCIAL

## 2023-08-25 VITALS
SYSTOLIC BLOOD PRESSURE: 126 MMHG | TEMPERATURE: 98.5 F | RESPIRATION RATE: 16 BRPM | HEIGHT: 66 IN | WEIGHT: 212 LBS | BODY MASS INDEX: 34.07 KG/M2 | OXYGEN SATURATION: 96 % | HEART RATE: 78 BPM | DIASTOLIC BLOOD PRESSURE: 76 MMHG

## 2023-08-25 DIAGNOSIS — F11.20 CONTINUOUS OPIOID DEPENDENCE (HCC): ICD-10-CM

## 2023-08-25 DIAGNOSIS — R11.2 NAUSEA AND VOMITING, UNSPECIFIED VOMITING TYPE: ICD-10-CM

## 2023-08-25 DIAGNOSIS — F41.8 DEPRESSION WITH ANXIETY: ICD-10-CM

## 2023-08-25 DIAGNOSIS — G47.00 INSOMNIA, UNSPECIFIED TYPE: ICD-10-CM

## 2023-08-25 DIAGNOSIS — Z76.89 ENCOUNTER FOR SKIN CARE: ICD-10-CM

## 2023-08-25 DIAGNOSIS — G89.29 CHRONIC BILATERAL LOW BACK PAIN WITH BILATERAL SCIATICA: ICD-10-CM

## 2023-08-25 DIAGNOSIS — E78.5 HYPERLIPIDEMIA ASSOCIATED WITH TYPE 2 DIABETES MELLITUS: ICD-10-CM

## 2023-08-25 DIAGNOSIS — M79.7 FIBROMYALGIA: ICD-10-CM

## 2023-08-25 DIAGNOSIS — M79.605 LEFT LEG PAIN: ICD-10-CM

## 2023-08-25 DIAGNOSIS — G89.29 CHRONIC PAIN OF RIGHT KNEE: ICD-10-CM

## 2023-08-25 DIAGNOSIS — I61.2 NONTRAUMATIC HEMORRHAGE OF LEFT CEREBRAL HEMISPHERE (HCC): ICD-10-CM

## 2023-08-25 DIAGNOSIS — E11.9 CONTROLLED TYPE 2 DIABETES MELLITUS WITHOUT COMPLICATION, WITHOUT LONG-TERM CURRENT USE OF INSULIN (HCC): ICD-10-CM

## 2023-08-25 DIAGNOSIS — D64.9 ANEMIA, UNSPECIFIED TYPE: Primary | ICD-10-CM

## 2023-08-25 DIAGNOSIS — M25.561 CHRONIC PAIN OF RIGHT KNEE: ICD-10-CM

## 2023-08-25 DIAGNOSIS — E11.69 HYPERLIPIDEMIA ASSOCIATED WITH TYPE 2 DIABETES MELLITUS: ICD-10-CM

## 2023-08-25 DIAGNOSIS — M35.00 SJOGREN'S SYNDROME, WITH UNSPECIFIED ORGAN INVOLVEMENT (HCC): ICD-10-CM

## 2023-08-25 DIAGNOSIS — Z79.899 ON DEEP VEIN THROMBOSIS (DVT) PROPHYLAXIS: ICD-10-CM

## 2023-08-25 DIAGNOSIS — I10 PRIMARY HYPERTENSION: ICD-10-CM

## 2023-08-25 DIAGNOSIS — K59.03 DRUG-INDUCED CONSTIPATION: ICD-10-CM

## 2023-08-25 DIAGNOSIS — M62.838 MUSCLE SPASM: ICD-10-CM

## 2023-08-25 DIAGNOSIS — M54.41 CHRONIC BILATERAL LOW BACK PAIN WITH BILATERAL SCIATICA: ICD-10-CM

## 2023-08-25 DIAGNOSIS — F41.9 ANXIETY: ICD-10-CM

## 2023-08-25 DIAGNOSIS — M54.42 CHRONIC BILATERAL LOW BACK PAIN WITH BILATERAL SCIATICA: ICD-10-CM

## 2023-08-25 DIAGNOSIS — K21.9 GASTROESOPHAGEAL REFLUX DISEASE, UNSPECIFIED WHETHER ESOPHAGITIS PRESENT: ICD-10-CM

## 2023-08-25 PROBLEM — K59.00 CONSTIPATION: Status: ACTIVE | Noted: 2023-08-25

## 2023-08-25 LAB
GLUCOSE SERPL-MCNC: 105 MG/DL (ref 65–140)
GLUCOSE SERPL-MCNC: 177 MG/DL (ref 65–140)
GLUCOSE SERPL-MCNC: 177 MG/DL (ref 65–140)
GLUCOSE SERPL-MCNC: 196 MG/DL (ref 65–140)
SARS-COV-2 RNA RESP QL NAA+PROBE: NEGATIVE

## 2023-08-25 PROCEDURE — 82948 REAGENT STRIP/BLOOD GLUCOSE: CPT

## 2023-08-25 PROCEDURE — NC001 PR NO CHARGE

## 2023-08-25 PROCEDURE — 74018 RADEX ABDOMEN 1 VIEW: CPT

## 2023-08-25 PROCEDURE — 99222 1ST HOSP IP/OBS MODERATE 55: CPT | Performed by: INTERNAL MEDICINE

## 2023-08-25 PROCEDURE — 99223 1ST HOSP IP/OBS HIGH 75: CPT | Performed by: STUDENT IN AN ORGANIZED HEALTH CARE EDUCATION/TRAINING PROGRAM

## 2023-08-25 PROCEDURE — 87635 SARS-COV-2 COVID-19 AMP PRB: CPT | Performed by: STUDENT IN AN ORGANIZED HEALTH CARE EDUCATION/TRAINING PROGRAM

## 2023-08-25 PROCEDURE — 99239 HOSP IP/OBS DSCHRG MGMT >30: CPT | Performed by: STUDENT IN AN ORGANIZED HEALTH CARE EDUCATION/TRAINING PROGRAM

## 2023-08-25 RX ORDER — GABAPENTIN 300 MG/1
300 CAPSULE ORAL 3 TIMES DAILY
Status: DISCONTINUED | OUTPATIENT
Start: 2023-08-25 | End: 2023-09-20 | Stop reason: HOSPADM

## 2023-08-25 RX ORDER — PANTOPRAZOLE SODIUM 40 MG/1
40 TABLET, DELAYED RELEASE ORAL
Status: DISCONTINUED | OUTPATIENT
Start: 2023-08-26 | End: 2023-09-20 | Stop reason: HOSPADM

## 2023-08-25 RX ORDER — AMOXICILLIN 250 MG
1 CAPSULE ORAL
Status: DISCONTINUED | OUTPATIENT
Start: 2023-08-25 | End: 2023-08-25

## 2023-08-25 RX ORDER — ATORVASTATIN CALCIUM 20 MG/1
20 TABLET, FILM COATED ORAL
Status: CANCELLED | OUTPATIENT
Start: 2023-08-25

## 2023-08-25 RX ORDER — HYDRALAZINE HYDROCHLORIDE 20 MG/ML
5 INJECTION INTRAMUSCULAR; INTRAVENOUS EVERY 6 HOURS PRN
Status: DISCONTINUED | OUTPATIENT
Start: 2023-08-25 | End: 2023-08-25

## 2023-08-25 RX ORDER — HEPARIN SODIUM 5000 [USP'U]/ML
5000 INJECTION, SOLUTION INTRAVENOUS; SUBCUTANEOUS EVERY 8 HOURS SCHEDULED
Status: DISCONTINUED | OUTPATIENT
Start: 2023-08-25 | End: 2023-09-20 | Stop reason: HOSPADM

## 2023-08-25 RX ORDER — BUPROPION HYDROCHLORIDE 150 MG/1
150 TABLET ORAL EVERY MORNING
Status: CANCELLED | OUTPATIENT
Start: 2023-08-26

## 2023-08-25 RX ORDER — POLYETHYLENE GLYCOL 3350 17 G/17G
17 POWDER, FOR SOLUTION ORAL DAILY
Status: CANCELLED | OUTPATIENT
Start: 2023-08-26

## 2023-08-25 RX ORDER — PROCHLORPERAZINE MALEATE 5 MG/1
5 TABLET ORAL EVERY 6 HOURS PRN
Status: CANCELLED | OUTPATIENT
Start: 2023-08-25

## 2023-08-25 RX ORDER — ASPIRIN 81 MG/1
81 TABLET, CHEWABLE ORAL DAILY
Status: CANCELLED | OUTPATIENT
Start: 2023-08-26

## 2023-08-25 RX ORDER — FLUTICASONE FUROATE AND VILANTEROL 200; 25 UG/1; UG/1
1 POWDER RESPIRATORY (INHALATION) DAILY
Status: CANCELLED | OUTPATIENT
Start: 2023-08-26

## 2023-08-25 RX ORDER — AMOXICILLIN 250 MG
1 CAPSULE ORAL
Status: CANCELLED | OUTPATIENT
Start: 2023-08-25

## 2023-08-25 RX ORDER — ONDANSETRON 2 MG/ML
4 INJECTION INTRAMUSCULAR; INTRAVENOUS EVERY 4 HOURS PRN
Status: DISCONTINUED | OUTPATIENT
Start: 2023-08-25 | End: 2023-08-25

## 2023-08-25 RX ORDER — HYDRALAZINE HYDROCHLORIDE 25 MG/1
25 TABLET, FILM COATED ORAL EVERY 8 HOURS SCHEDULED
Status: CANCELLED | OUTPATIENT
Start: 2023-08-25

## 2023-08-25 RX ORDER — FLUTICASONE FUROATE AND VILANTEROL 200; 25 UG/1; UG/1
1 POWDER RESPIRATORY (INHALATION) DAILY
Status: DISCONTINUED | OUTPATIENT
Start: 2023-08-26 | End: 2023-09-20 | Stop reason: HOSPADM

## 2023-08-25 RX ORDER — METHOCARBAMOL 500 MG/1
500 TABLET, FILM COATED ORAL EVERY 6 HOURS PRN
Status: DISCONTINUED | OUTPATIENT
Start: 2023-08-25 | End: 2023-08-28

## 2023-08-25 RX ORDER — FERROUS SULFATE 325(65) MG
325 TABLET ORAL
Status: CANCELLED | OUTPATIENT
Start: 2023-08-26

## 2023-08-25 RX ORDER — BISACODYL 5 MG/1
5 TABLET, DELAYED RELEASE ORAL DAILY PRN
Status: CANCELLED | OUTPATIENT
Start: 2023-08-25

## 2023-08-25 RX ORDER — HYDRALAZINE HYDROCHLORIDE 20 MG/ML
5 INJECTION INTRAMUSCULAR; INTRAVENOUS EVERY 6 HOURS PRN
Status: CANCELLED | OUTPATIENT
Start: 2023-08-25

## 2023-08-25 RX ORDER — ONDANSETRON 2 MG/ML
4 INJECTION INTRAMUSCULAR; INTRAVENOUS EVERY 4 HOURS PRN
Status: CANCELLED | OUTPATIENT
Start: 2023-08-25

## 2023-08-25 RX ORDER — GABAPENTIN 300 MG/1
300 CAPSULE ORAL 3 TIMES DAILY
Status: CANCELLED | OUTPATIENT
Start: 2023-08-25

## 2023-08-25 RX ORDER — INSULIN LISPRO 100 [IU]/ML
1-6 INJECTION, SOLUTION INTRAVENOUS; SUBCUTANEOUS
Status: CANCELLED | OUTPATIENT
Start: 2023-08-25

## 2023-08-25 RX ORDER — INSULIN LISPRO 100 [IU]/ML
1-6 INJECTION, SOLUTION INTRAVENOUS; SUBCUTANEOUS
Status: DISCONTINUED | OUTPATIENT
Start: 2023-08-25 | End: 2023-09-20 | Stop reason: HOSPADM

## 2023-08-25 RX ORDER — PANTOPRAZOLE SODIUM 40 MG/1
40 TABLET, DELAYED RELEASE ORAL
Status: CANCELLED | OUTPATIENT
Start: 2023-08-26

## 2023-08-25 RX ORDER — ALBUTEROL SULFATE 90 UG/1
2 AEROSOL, METERED RESPIRATORY (INHALATION) EVERY 4 HOURS PRN
Status: DISCONTINUED | OUTPATIENT
Start: 2023-08-25 | End: 2023-09-20 | Stop reason: HOSPADM

## 2023-08-25 RX ORDER — POLYETHYLENE GLYCOL 3350 17 G/17G
17 POWDER, FOR SOLUTION ORAL DAILY
Status: DISCONTINUED | OUTPATIENT
Start: 2023-08-26 | End: 2023-09-11

## 2023-08-25 RX ORDER — LACTULOSE 20 G/30ML
20 SOLUTION ORAL DAILY PRN
Status: DISCONTINUED | OUTPATIENT
Start: 2023-08-25 | End: 2023-09-20 | Stop reason: HOSPADM

## 2023-08-25 RX ORDER — LOSARTAN POTASSIUM 50 MG/1
100 TABLET ORAL DAILY
Status: DISCONTINUED | OUTPATIENT
Start: 2023-08-26 | End: 2023-09-20 | Stop reason: HOSPADM

## 2023-08-25 RX ORDER — ATORVASTATIN CALCIUM 20 MG/1
20 TABLET, FILM COATED ORAL
Status: DISCONTINUED | OUTPATIENT
Start: 2023-08-25 | End: 2023-09-20 | Stop reason: HOSPADM

## 2023-08-25 RX ORDER — PROCHLORPERAZINE MALEATE 5 MG/1
5 TABLET ORAL EVERY 6 HOURS PRN
Status: DISCONTINUED | OUTPATIENT
Start: 2023-08-25 | End: 2023-09-20 | Stop reason: HOSPADM

## 2023-08-25 RX ORDER — OXYCODONE HYDROCHLORIDE 5 MG/1
5 TABLET ORAL EVERY 4 HOURS PRN
Status: CANCELLED | OUTPATIENT
Start: 2023-08-25

## 2023-08-25 RX ORDER — ONDANSETRON 4 MG/1
4 TABLET, ORALLY DISINTEGRATING ORAL EVERY 6 HOURS PRN
Status: DISCONTINUED | OUTPATIENT
Start: 2023-08-25 | End: 2023-09-20 | Stop reason: HOSPADM

## 2023-08-25 RX ORDER — HEPARIN SODIUM 5000 [USP'U]/ML
5000 INJECTION, SOLUTION INTRAVENOUS; SUBCUTANEOUS EVERY 8 HOURS SCHEDULED
Status: CANCELLED | OUTPATIENT
Start: 2023-08-25

## 2023-08-25 RX ORDER — OXYCODONE HYDROCHLORIDE 5 MG/1
5 TABLET ORAL EVERY 4 HOURS PRN
Status: DISCONTINUED | OUTPATIENT
Start: 2023-08-25 | End: 2023-09-20 | Stop reason: HOSPADM

## 2023-08-25 RX ORDER — AMOXICILLIN 250 MG
2 CAPSULE ORAL
Status: DISCONTINUED | OUTPATIENT
Start: 2023-08-25 | End: 2023-09-05

## 2023-08-25 RX ORDER — BISACODYL 5 MG/1
5 TABLET, DELAYED RELEASE ORAL DAILY PRN
Status: DISCONTINUED | OUTPATIENT
Start: 2023-08-25 | End: 2023-08-28

## 2023-08-25 RX ORDER — INSULIN LISPRO 100 [IU]/ML
1-6 INJECTION, SOLUTION INTRAVENOUS; SUBCUTANEOUS
Status: DISCONTINUED | OUTPATIENT
Start: 2023-08-25 | End: 2023-08-25

## 2023-08-25 RX ORDER — AMLODIPINE BESYLATE 10 MG/1
10 TABLET ORAL DAILY
Status: DISCONTINUED | OUTPATIENT
Start: 2023-08-26 | End: 2023-09-08

## 2023-08-25 RX ORDER — AMLODIPINE BESYLATE 10 MG/1
10 TABLET ORAL DAILY
Status: CANCELLED | OUTPATIENT
Start: 2023-08-26

## 2023-08-25 RX ORDER — BUPROPION HYDROCHLORIDE 150 MG/1
150 TABLET ORAL EVERY MORNING
Status: DISCONTINUED | OUTPATIENT
Start: 2023-08-26 | End: 2023-09-20 | Stop reason: HOSPADM

## 2023-08-25 RX ORDER — FERROUS SULFATE 325(65) MG
325 TABLET ORAL
Status: DISCONTINUED | OUTPATIENT
Start: 2023-08-26 | End: 2023-09-20 | Stop reason: HOSPADM

## 2023-08-25 RX ORDER — HYDRALAZINE HYDROCHLORIDE 25 MG/1
25 TABLET, FILM COATED ORAL EVERY 8 HOURS SCHEDULED
Status: DISCONTINUED | OUTPATIENT
Start: 2023-08-25 | End: 2023-08-28

## 2023-08-25 RX ORDER — ASPIRIN 81 MG/1
81 TABLET, CHEWABLE ORAL DAILY
Status: DISCONTINUED | OUTPATIENT
Start: 2023-08-26 | End: 2023-09-20 | Stop reason: HOSPADM

## 2023-08-25 RX ORDER — POLYETHYLENE GLYCOL 3350 17 G/17G
17 POWDER, FOR SOLUTION ORAL DAILY
Status: DISCONTINUED | OUTPATIENT
Start: 2023-08-25 | End: 2023-08-25 | Stop reason: HOSPADM

## 2023-08-25 RX ORDER — METHOCARBAMOL 500 MG/1
500 TABLET, FILM COATED ORAL EVERY 6 HOURS PRN
Status: CANCELLED | OUTPATIENT
Start: 2023-08-25

## 2023-08-25 RX ORDER — LOSARTAN POTASSIUM 50 MG/1
100 TABLET ORAL DAILY
Status: CANCELLED | OUTPATIENT
Start: 2023-08-26

## 2023-08-25 RX ADMIN — CHLORHEXIDINE GLUCONATE 15 ML: 1.2 SOLUTION ORAL at 07:54

## 2023-08-25 RX ADMIN — HEPARIN SODIUM 5000 UNITS: 5000 INJECTION INTRAVENOUS; SUBCUTANEOUS at 13:15

## 2023-08-25 RX ADMIN — HYDRALAZINE HYDROCHLORIDE 25 MG: 25 TABLET, FILM COATED ORAL at 13:15

## 2023-08-25 RX ADMIN — OXYCODONE HYDROCHLORIDE 5 MG: 5 TABLET ORAL at 13:16

## 2023-08-25 RX ADMIN — INSULIN LISPRO 1 UNITS: 100 INJECTION, SOLUTION INTRAVENOUS; SUBCUTANEOUS at 07:15

## 2023-08-25 RX ADMIN — AMLODIPINE BESYLATE 10 MG: 10 TABLET ORAL at 07:55

## 2023-08-25 RX ADMIN — FERROUS SULFATE TAB 325 MG (65 MG ELEMENTAL FE) 325 MG: 325 (65 FE) TAB at 07:55

## 2023-08-25 RX ADMIN — HEPARIN SODIUM 5000 UNITS: 5000 INJECTION INTRAVENOUS; SUBCUTANEOUS at 21:09

## 2023-08-25 RX ADMIN — OXYCODONE HYDROCHLORIDE 5 MG: 5 TABLET ORAL at 05:47

## 2023-08-25 RX ADMIN — POLYETHYLENE GLYCOL 3350 17 G: 17 POWDER, FOR SOLUTION ORAL at 11:33

## 2023-08-25 RX ADMIN — INSULIN LISPRO 2 UNITS: 100 INJECTION, SOLUTION INTRAVENOUS; SUBCUTANEOUS at 11:32

## 2023-08-25 RX ADMIN — HYDRALAZINE HYDROCHLORIDE 25 MG: 25 TABLET, FILM COATED ORAL at 05:47

## 2023-08-25 RX ADMIN — GABAPENTIN 300 MG: 300 CAPSULE ORAL at 07:55

## 2023-08-25 RX ADMIN — BUPROPION HYDROCHLORIDE 150 MG: 150 TABLET, FILM COATED, EXTENDED RELEASE ORAL at 07:56

## 2023-08-25 RX ADMIN — ASPIRIN 81 MG CHEWABLE TABLET 81 MG: 81 TABLET CHEWABLE at 07:55

## 2023-08-25 RX ADMIN — GABAPENTIN 300 MG: 300 CAPSULE ORAL at 16:09

## 2023-08-25 RX ADMIN — LOSARTAN POTASSIUM 100 MG: 50 TABLET, FILM COATED ORAL at 07:55

## 2023-08-25 RX ADMIN — ATORVASTATIN CALCIUM 20 MG: 20 TABLET, FILM COATED ORAL at 16:09

## 2023-08-25 RX ADMIN — PANTOPRAZOLE SODIUM 40 MG: 40 TABLET, DELAYED RELEASE ORAL at 05:47

## 2023-08-25 RX ADMIN — CYANOCOBALAMIN TAB 500 MCG 1000 MCG: 500 TAB at 07:55

## 2023-08-25 RX ADMIN — HYDRALAZINE HYDROCHLORIDE 25 MG: 25 TABLET ORAL at 21:09

## 2023-08-25 RX ADMIN — GABAPENTIN 300 MG: 300 CAPSULE ORAL at 21:09

## 2023-08-25 RX ADMIN — HEPARIN SODIUM 5000 UNITS: 5000 INJECTION INTRAVENOUS; SUBCUTANEOUS at 05:47

## 2023-08-25 RX ADMIN — OXYCODONE HYDROCHLORIDE 5 MG: 5 TABLET ORAL at 23:26

## 2023-08-25 RX ADMIN — SENNOSIDES AND DOCUSATE SODIUM 2 TABLET: 50; 8.6 TABLET ORAL at 21:09

## 2023-08-25 RX ADMIN — INSULIN LISPRO 1 UNITS: 100 INJECTION, SOLUTION INTRAVENOUS; SUBCUTANEOUS at 21:12

## 2023-08-25 RX ADMIN — OXYCODONE HYDROCHLORIDE 5 MG: 5 TABLET ORAL at 01:45

## 2023-08-25 NOTE — DISCHARGE SUMMARY
[facility]  Discharge- Janis Vigil 1965, 62 y.o. female MRN: [id number]  Unit/Bed#: [facility] 711-01 Encounter: [id number]  Primary Care Provider: Eliane Talley MD   Date and time admitted to hospital: 8/14/2023  7:24 PM    * ICH (intracerebral hemorrhage) [facility])  Assessment & Plan  62 YOF with history of HTN, DM2, Sjogren's, fibromyalgia initially presented to [address] [place] [address] on 8/14/23 as a stroke alert on 8/14/23 with initial presenting symptoms acute onset of right sided weakness, facial asymmetry, and dysarthria while at work. Initial imaging with CTH/CTA HN revealed left thalamocapsular bleed with no significant shift or mass effect. . NIHSS 14. Not TNK candidate given acute ICH. Pt given IV labetalol, started on Cardene drip, administered DDAVP for ASA reversal and transferred [facility] on 8/14/23 for neurosurgery care. CT head was done on 8/17/23 for change in exam and worsening and waxing slurred speech which was reviewed by neuro, the images which does show stable left sided hemorrhage without any midline shift. ICH suspected secondary to hypertension. Exam with right-sided flaccid paralysis. · Had a fall and hit her head on 8/18 - CT head stable  · Repeat CT head on 8/20 - stable  · Cleared by neurology to restart ASA 81 mg daily  · Continue atorvastatin 20 mg daily  · Was seen by neurosurgery - no surgical intervention at this time  · Goal SBP < 140  · Discharged to [facility] for rehab  · Outpatient follow-up with neurology in 6 weeks    Primary hypertension  Assessment & Plan  · History of hypertension. · Was previously on telmisartan and atenolol but hypertension was suboptimally controlled  · BP now acceptable   · Continue Amlodipine 10mg daily; Losartan 100mg daily; Hydralazine 25mg q8h    Anemia  Assessment & Plan  · Anemia with baseline hemoglobin of 10-11. MCVs around 80s.  Iron panel showing ferritin at 22 and TIBC of 370s which maybe indicative of iron deficiency anemia. Also her B12 levels are low-normal.   · Ordered ferrous sulfate supplementation  · On vitamin B12 supplementation 1000 mg daily  · Hemoglobin 12.2, resolved    Diabetes mellitus type 2, controlled [facility])  Assessment & Plan  Lab Results   Component Value Date    HGBA1C 6.7 (H) 08/15/2023       Recent Labs     08/24/23  1112 08/24/23  1621 08/24/23  2053 08/25/23  0624   POCGLU 144* 173* 142* 177*       Blood Sugar Average: Last 72 hrs:  (P) 105.5801379166350664     · A1c and glucose controlled   · On SSI  · Holding home metformin  · Hypoglycemic protocol   · Monitor blood sugars and adjust insulin accordingly    Hyperlipidemia associated with type 2 diabetes mellitus (HCC)  Assessment & Plan  · Continue statin     CAD (coronary artery disease)  Assessment & Plan  · History of CAD based on prior CTA imaging. · On statin  · ASA 81 mg daily restarted on 8/21 after clearance by neurology  · BB held due to episodes of bradycardia      Sjogren's syndrome (HCC)  Assessment & Plan  · History of Sjogren syndrome. · Outpatient rheumatology follow-up      Anxiety  Assessment & Plan  · History of anxiety  · Was on wellbutrin 300 mg daily, was reduced to 150 mg due to concern for medicaitons potential for lowering seizure threshold in this patient with ICH      Medical Problems     Resolved Problems  Date Reviewed: 8/25/2023   None       Discharging Physician / Practitioner: Song Silva DO  PCP: Nargis Espinoza MD  Admission Date:   Admission Orders (From admission, onward)     Ordered        08/14/23 2003  Inpatient Admission  Once                      Discharge Date: 08/25/23    Consultations During Hospital Stay:  · Neurosurgery  · Neurology  · PMR    Procedures Performed:   · None    Significant Findings / Test Results:   · 8/14 CT stroke: acute parenchymal hematoma in the left posterior region extending into the left thalamus with vasogenic edema measuring 2.1 x 1.6 x 3.2 cm.   · 8/14 CTA: Active contrast extravasation  · 8/14 CT head: Stable hematoma and surrounding vasogenic edema  · 8/15 MRI follow-up: Stable left thalamocapsular IPH with surrounding edema, no acute infarction or mass effect  · 8/15 TTE: EF 65%  · 8/16 lower extremity duplex: No signs of DVT  · 8/17 CT head: Evolving acute left corona radiata IPH, stable in size with minimally increased regional vasogenic edema with no midline shift. · 8/17 XR hips bilateral: No acute osseous abnormality  · 8/18 CT head: Evolving acute IPH similar to prior exam  · 8/18 CT head: Stable  · 8/18 CT spine cervical: No cervical spine fracture or traumatic alignment  · 8/18 right shoulder x-ray: No acute osseous abnormality  · 8/20 CT head: Similar acute IPH with moderate surrounding vasogenic edema and slight mass effect on third ventricle. Appears stable. · Incidental Findings:   · None    Test Results Pending at Discharge (will require follow up): · None     Outpatient Tests Requested:  · None    Complications: None    Reason for Admission: Intraparenchymal hemorrhage secondary to hypertension    Hospital Course:   Delores Mccloud is a 62 y.o. female patient who originally presented to the hospital on 8/14/2023 due to acute onset right upper and right lower extremity weakness and sensory loss, right-sided facial deficit and dysarthria. Patient was noted to have acute intraparenchymal hemorrhage. Initial NIHSS score of 14. Not a TNK candidate. Patient received DDAVP for aspirin reversal.  Repeat CT head and MRI showed stable appearing IPH. Patient was not a candidate for neurosurgical intervention. Patient's aspirin was resumed after clearance from neurology. Patient's antihypertensives were adjusted during hospital stay to maintain blood pressure goal less than 341 systolic. Response called on 8/18 for fall with CT head, cervical spine CT and shoulder x-ray, showing no signs of acute fractures. Recommend follow-up with PCP after discharge. Patient is medically stable for discharge to [address] [place] [address] for rehab. Please see above list of diagnoses and related plan for additional information. Condition at Discharge: stable    Discharge Day Visit / Exam:   Subjective: Patient assessed at bedside. No acute complaints. Continues to report ongoing pain in the right thigh that is stable and worse with activity. Vitals: Blood Pressure: 127/78 (08/25/23 0734)  Pulse: 76 (08/25/23 0734)  Temperature: 98.5 °F (36.9 °C) (08/25/23 0734)  Temp Source: Oral (08/23/23 1445)  Respirations: 16 (08/25/23 0734)  Height: 5' 6" (167.6 cm) (08/15/23 0007)  Weight - Scale: 96.2 kg (212 lb) (08/25/23 0900)  SpO2: 97 % (08/25/23 0734)  Exam:   Physical Exam  Vitals reviewed. Constitutional:       General: She is not in acute distress. Appearance: Normal appearance. She is not ill-appearing. HENT:      Head: Normocephalic and atraumatic. Eyes:      General: No scleral icterus. Conjunctiva/sclera: Conjunctivae normal.   Cardiovascular:      Rate and Rhythm: Normal rate and regular rhythm. Heart sounds: Normal heart sounds. No murmur heard. Pulmonary:      Effort: Pulmonary effort is normal. No respiratory distress. Breath sounds: Normal breath sounds. No wheezing. Abdominal:      General: Bowel sounds are normal.      Palpations: Abdomen is soft. Tenderness: There is no abdominal tenderness. Musculoskeletal:      Right lower leg: No edema. Left lower leg: No edema. Skin:     General: Skin is warm and dry. Neurological:      Mental Status: She is alert and oriented to person, place, and time. Mental status is at baseline. Comments: Right extremities flacid   Psychiatric:         Mood and Affect: Mood normal.         Behavior: Behavior normal.          Discussion with Family: Updated  () at bedside.     Discharge instructions/Information to patient and family:   See after visit summary for information provided to patient and family. Provisions for Follow-Up Care:  See after visit summary for information related to follow-up care and any pertinent home health orders. Disposition:   Acute Rehab at [facility]    Planned Readmission: None     Discharge Statement:  I spent 45 minutes discharging the patient. This time was spent on the day of discharge. I had direct contact with the patient on the day of discharge. Greater than 50% of the total time was spent examining patient, answering all patient questions, arranging and discussing plan of care with patient as well as directly providing post-discharge instructions. Additional time then spent on discharge activities. Discharge Medications:  See after visit summary for reconciled discharge medications provided to patient and/or family.       **Please Note: This note may have been constructed using a voice recognition system**

## 2023-08-25 NOTE — ASSESSMENT & PLAN NOTE
· Home regimen: Telmisartan and atenolol  · Current regimen: Cozaar 100 mg daily, Norvasc and hydralazine discontinued  · Monitor blood pressures especially in therapy and make adjustments as needed  · Follow-up with PCP as outpatient

## 2023-08-25 NOTE — ASSESSMENT & PLAN NOTE
51-year-old female presents with right hemiplegia, dysarthria and facial asymmetry found to have an acute intraparenchymal hemorrhage in the left thalamic region with surrounding edema felt to be secondary to uncontrolled hypertension  · Had repeat CT of the head on 8/18 after a fall with head strike resulting in significant bruising of the face  · Cleared by neurology to restart aspirin 81 mg daily and atorvastatin 20 mg daily  · Evaluated by neurosurgery with no surgical intervention at this time  · Had a repeat head CT on 8/20 which has been stable  · Goal systolic blood pressure of less than 140  · Follow-up with neurology as an outpatient in 6 weeks (10/26)  · Physical and Occupational Therapy with goals for community discharge.   Patient lives with her  in a mobile home with 5 steps to enter and he is able to assist 24/7  · Hemorrhagic stroke education, nutrition, neuropsychology  · Secondary stroke prophylaxis with hypertension control

## 2023-08-25 NOTE — ASSESSMENT & PLAN NOTE
· Neuropsychology consultation poststroke with history of anxiety  · Continue Wellbutrin  mg in the morning.   It has been 300 mg as an outpatient however it was decreased in acute care due to worry of decreasing seizure threshold in the setting of ICH

## 2023-08-25 NOTE — CASE MANAGEMENT
Case Management Discharge Planning Note    Patient name Ashli Mcnamara  [facility] [id number]  : 1965 Date 2023       Current Admission Date: 2023  Current Admission Diagnosis:ICH (intracerebral hemorrhage) [facility])   Patient Active Problem List    Diagnosis Date Noted   • Anemia 2023   • ICH (intracerebral hemorrhage) ([address]) 08/15/2023   • Left leg pain 08/15/2023   • Chronic obstructive pulmonary disease with acute exacerbation ([address]) 2023   • Hyperlipidemia associated with type 2 diabetes mellitus ([address]) 2023   • Abnormal CT of the chest 2023   • Class 2 obesity with body mass index (BMI) of 39.0 to 39.9 in adult 2023   • CAD (coronary artery disease) 2023   • Right lumbosacral radiculopathy 10/12/2022   • Chronic bilateral low back pain with bilateral sciatica 2022   • Paresthesia of both feet 2022   • Postoperative visit 2022   • Incarcerated umbilical hernia    • Diabetes mellitus type 2, controlled ([address]) 2022   • Continuous opioid dependence ([address]) 2022   • Moderate persistent asthma with acute exacerbation 2022   • Cigarette nicotine dependence in remission 2022   • Primary hypertension 2022   • Fibromyalgia 2022   • Sjogren's syndrome ([address]) 10/19/2012   • Anxiety 2012      LOS (days): 11  Geometric Mean LOS (GMLOS) (days): 4.50  Days to GMLOS:-6.1     OBJECTIVE:  Risk of Unplanned Readmission Score: 18.48         Current admission status: Inpatient   Preferred Pharmacy:   [facility]HA HERNÁNDEZ [address] [place] [address] ROAD  [address] [address]  Phone: [phone] Fax: [address] [place] [address] - [address] [place] [address]  Phone: [phone] Fax: [phone]    Primary Care Provider: Nadege Gar MD    Primary Insurance: BLUE CROSS  Secondary Insurance: DISCHARGE DETAILS:                                                                                                               [address] Number: [id number]

## 2023-08-25 NOTE — ASSESSMENT & PLAN NOTE
· On admission had not had a bowel movement in at least 11 days  · Obtaining x-ray to evaluate stool burden  · Adding to bowel regimen including increasing senna and Colace, lactulose as needed and will be more aggressive pending results of x-ray  · Continue relatively aggressive regimen  · Resolved with bowel regimen

## 2023-08-25 NOTE — ASSESSMENT & PLAN NOTE
· Patient has a history of chronic low back pain with radicular symptoms in addition to fibromyalgia and myofascial pain syndrome. · She follows with Dr. Alexandra Velasquez from pain management has tried several medications as well as epidural steroid injections with little relief  · Imaging reveals lumbar degenerative disc disease at the L3-4, L4-5, L5-S1 level. Additionally facet hypertrophic changes and ligamentous laxity at the L4-5 level resulting in grade 1 anterior spondylolisthesis and mild canal narrowing with slight distortion of the left anterior lateral aspect of the thecal sac at this level with mild right greater than left neuroforaminal narrowing  · Multi modal pain with current medication regimen including oxycodone to be weaned as well as the Robaxin and gabapentin.   We will also consult neuropsychology  · Added Tylenol as well as Robaxin and as needed Valium

## 2023-08-25 NOTE — ASSESSMENT & PLAN NOTE
Lab Results   Component Value Date    HGBA1C 6.7 (H) 08/15/2023       Recent Labs     09/18/23  1047 09/18/23  1552 09/18/23  2135 09/19/23  0710   POCGLU 161* 140 171* 147*     · Currently on carb controlled level 2 diet, metformin 500 mg twice daily  · Sliding scale insulin algorithm 3 with Accu-Cheks 4 times daily

## 2023-08-25 NOTE — H&P
PHYSICAL MEDICINE AND REHABILITATION H&P/ADMISSION NOTE  Honorio Rodriguez 62 y.o. female MRN: [id number]  Unit/Bed#: [facility] 141-40 Encounter: [id number]     Rehab Diagnosis: Impairment of mobility, safety and Activities of Daily Living (ADLs) due to Stroke:  01.2  Right Body Involvement (Left Brain)    History of Present Illness:   Honorio Rodriguez is a 49-year-old female with history of hypertension, hyperlipidemia, diabetes type 2, obesity, eosinophilic asthma, COPD, fibromyalgia, Sjogren syndrome, lumbar spondylosis with grade 1 anterior spondylolisthesis who presents to the hospital on 8/14/2023 due to acute onset right upper and right lower extremity weakness with sensory loss. Additionally with right-sided facial droop and dysarthria. The patient was noted to have an acute intraparenchymal hemorrhage on CT in the left thalamic region with surrounding edema. A CTA was negative. Patient was initially placed on a Cardene drip. Course complicated by significant left-sided leg pain and was recommended on treatment for radicular symptoms including Tylenol, gabapentin and potentially steroids. Neurology was consulted and recommended MRI of the brain with and without contrast while holding antithrombotics. The MRI showed a stable left thalamic capsular intraparenchymal hemorrhage with surrounding edema without any other mass effect. A repeat CT was completed on 8/17/2023 after worsening symptoms including worsening speech slurring with stable findings. Additionally there was a rapid response after a fall with head strike on 8/18 with a repeat CT of the head without acute findings. Additionally antihypertensive regimen was altered as she was previously on telmisartan and atenolol and is currently on amlodipine, losartan and hydralazine with better control.  The patient was evaluated by the Rehabilitation team and deemed an appropriate candidate for comprehensive inpatient rehabilitation and admitted to the [facility] on 8/25/2023 3:19 PM        Plan:     Rehabilitation  • Functional deficits: impaired mobility, self care  • Begin PT/OT/SLP. Rehabilitation goals are to achieve a min assist level with mobility and self care. Prognosis is fair to good. ELOS is 21 days. Estimated discharge is home. DVT prophylaxis  • Heparin    Pain  • Gabapentin 300 mg 3 times daily  • Oxycodone 2.5-5 mg every 4 hours as needed moderate-severe pain  • Rifaximin 500 mg every 6 hours as needed    Bladder plan  • Incontinent    Bowel plan  • Patient has not had a bowel movement in at least 11 days since admission on 8/14. There is no documentation of a BM. Will obtain x-ray and start aggressive regimen. Code Status  • Level 1 full code      * ICH (intracerebral hemorrhage) ([address])  Assessment & Plan  51-year-old female presents with right hemiplegia, dysarthria and facial asymmetry found to have an acute intraparenchymal hemorrhage in the left thalamic region with surrounding edema felt to be secondary to uncontrolled hypertension  · Had repeat CT of the head on 8/18 after a fall with head strike resulting in significant bruising of the face  · Cleared by neurology to restart aspirin 81 mg daily and atorvastatin 20 mg daily  · Evaluated by neurosurgery with no surgical intervention at this time  · Had a repeat head CT on 8/20 which has been stable  · Goal systolic blood pressure of less than 140  · Follow-up with neurology as an outpatient  · Physical and Occupational Therapy with goals for community discharge.   Patient lives with her  in a mobile home with 5 steps to enter and he is able to assist 24/7  · Hemorrhagic stroke education, nutrition, neuropsychology  · Secondary stroke prophylaxis with hypertension control    Constipation  Assessment & Plan  · No bowel movement since prior to admission which was 11 days prior on 8/14  · Obtaining x-ray to evaluate stool burden  · Adding to bowel regimen including increasing senna and Colace, lactulose as needed and will be more aggressive pending results of x-ray    GERD (gastroesophageal reflux disease)  Assessment & Plan  Continue Protonix    Hyperlipidemia associated with type 2 diabetes mellitus (HCC)  Assessment & Plan  Continue atorvastatin 20 mg with dinner    Class 2 obesity with body mass index (BMI) of 39.0 to 39.9 in adult  Assessment & Plan  · BMI of 41.40 on admission  · Continue promoting weight loss and increased activity, diet and exercise  · Continue a consistent carbohydrate diet  · Nutrition consultation    CAD (coronary artery disease)  Assessment & Plan  · History of CAD currently on statin and aspirin that was restarted on 8/21 after clearance by neurology  · Beta-blocker held due to bradycardia  · Patient has not formally seen a cardiologist however this was diagnosed based on a CT PE study that was conducted in the ER showing mild CAD    Chronic bilateral low back pain with bilateral sciatica  Assessment & Plan  · Patient has a history of chronic low back pain with radicular symptoms in addition to fibromyalgia and myofascial pain syndrome. · She follows with Dr. Joni Rubin from pain management has tried several medications as well as epidural steroid injections with little relief  · Imaging reveals lumbar degenerative disc disease at the L3-4, L4-5, L5-S1 level. Additionally facet hypertrophic changes and ligamentous laxity at the L4-5 level resulting in grade 1 anterior spondylolisthesis and mild canal narrowing with slight distortion of the left anterior lateral aspect of the thecal sac at this level with mild right greater than left neuroforaminal narrowing  · Multi modal pain with current medication regimen including oxycodone to be weaned as well as the Robaxin and gabapentin.   We will also consult neuropsychology    Sjogren's syndrome [facility])  Assessment & Plan  · Patient states that she is been worked up for Sjogren's syndrome in the past and has had conflicting diagnosis he is stating that some physicians have diagnosed her with it and others stated she did not have it. · She has not been on any medication treatment for this and does not actively follow-up with a rheumatologist    Depression with anxiety  Assessment & Plan  · Neuropsychology consultation poststroke with history of anxiety  · Continue Wellbutrin  mg in the morning. It has been 300 mg as an outpatient however it was decreased in acute care due to worry of decreasing seizure threshold in the setting of ICH    Diabetes mellitus type 2, controlled [facility])  Assessment & Plan  Lab Results   Component Value Date    HGBA1C 6.7 (H) 08/15/2023       Recent Labs     08/24/23  1621 08/24/23  2053 08/25/23  0624 08/25/23  1129   POCGLU 173* 142* 177* 196*     · Currently on carb controlled level 2 diet  · Sliding scale insulin algorithm 3 with Accu-Cheks 4 times daily  · Was previously on metformin and should be able to restart and will discuss with internal medicine blood sugars are not fully controlled at this stage      Moderate persistent asthma without complication  Assessment & Plan  · Follows with Dr. Dana Granger from pulmonology  · Home regimen includes Breo and as needed albuterol  · On equivalent here and added albuterol inhaler on admission to [facility]    Primary hypertension  Assessment & Plan  · Home regimen: Telmisartan and atenolol  · Current regimen: Norvasc 10 mg daily Cozaar 100 mg daily, hydralazine 25 mg every 8 hours  · Monitor blood pressures especially in therapy and make adjustments as needed            Subjective/Interval Events:       Review of Systems   Constitutional: Negative for chills and fever. HENT: Negative for hearing loss and trouble swallowing. Eyes: Negative for photophobia and visual disturbance. Respiratory: Negative for cough and shortness of breath. Cardiovascular: Negative for chest pain and leg swelling. Gastrointestinal: Positive for constipation.  Negative for diarrhea, nausea and vomiting. Endocrine: Negative for cold intolerance and heat intolerance. Genitourinary: Negative for dysuria and hematuria. Musculoskeletal: Positive for back pain and gait problem. Skin: Negative for rash and wound. Allergic/Immunologic: Negative for environmental allergies and food allergies. Neurological: Positive for weakness and numbness. Negative for dizziness, light-headedness and headaches. Hematological: Negative for adenopathy. Does not bruise/bleed easily. Psychiatric/Behavioral: Negative for dysphoric mood and sleep disturbance. Function:  PRIOR LEVEL OF FUNCTION:  She lives in [facility]  [place] is  and lives with their spouse. Self Care: Independent, Indoor Mobility: Independent, Stairs (in/outdoor): Independent and Cognition: Independent     HOME ENVIRONMENT:  The living area: can live on one level  There are 5 steps to enter the home. The patient will have 24 hour supervision/physical assistance available upon discharge. Current level of function:  Physical Therapy: Transfers max assist x2, no ambulation  Occupational Therapy: Mod assist for upper body ADLs, max assist for lower body ADLs and total assist for toileting  Speech Therapy: Regular and thin    Physical Exam:  /87 (BP Location: Left arm)   Pulse 75   Temp 98.3 °F (36.8 °C) (Oral)   Resp 16   Ht 5' (1.524 m)   Wt 96.2 kg (212 lb)   SpO2 96%   BMI 41.40 kg/m²        Intake/Output Summary (Last 24 hours) at 8/25/2023 1618  Last data filed at 8/25/2023 1609  Gross per 24 hour   Intake --   Output 75 ml   Net -75 ml       Body mass index is 41.4 kg/m². Physical Exam  Vitals reviewed. Constitutional:       General: She is not in acute distress. Appearance: She is obese. HENT:      Head: Normocephalic.       Comments: Bruising, ecchymosis all around the right orbital region     Right Ear: External ear normal.      Left Ear: External ear normal.      Nose: Nose normal. No rhinorrhea. Mouth/Throat:      Mouth: Mucous membranes are moist.      Pharynx: Oropharynx is clear. Eyes:      General: No scleral icterus. Cardiovascular:      Rate and Rhythm: Normal rate. Pulses: Normal pulses. Pulmonary:      Effort: Pulmonary effort is normal. No respiratory distress. Breath sounds: Normal breath sounds. Musculoskeletal:      Cervical back: Normal range of motion and neck supple. Right lower leg: No edema. Left lower leg: No edema. Skin:     General: Skin is warm and dry. Neurological:      Mental Status: She is alert and oriented to person, place, and time. Comments: Dense 0/5 hemiplegia in the right hemibody with impaired sensation   Psychiatric:         Mood and Affect: Mood normal.         Behavior: Behavior normal.            Labs, medications, and imaging personally reviewed.   I personally reviewed the most recent CT of the head from 8/20/2023    Laboratory:    Lab Results   Component Value Date    SODIUM 135 08/24/2023    K 3.9 08/24/2023     08/24/2023    CO2 26 08/24/2023    BUN 19 08/24/2023    CREATININE 0.56 (L) 08/24/2023    GLUC 160 (H) 08/24/2023    CALCIUM 9.3 08/24/2023     Lab Results   Component Value Date    WBC 10.09 08/24/2023    HGB 12.0 08/24/2023    HCT 37.6 08/24/2023    MCV 85 08/24/2023     08/24/2023     Lab Results   Component Value Date    INR 1.06 08/15/2023    INR 0.94 08/14/2023    PROTIME 14.0 08/15/2023    PROTIME 13.2 08/14/2023         Current Facility-Administered Medications:   •  albuterol (PROVENTIL HFA,VENTOLIN HFA) inhaler 2 puff, 2 puff, Inhalation, Q4H PRN, Hieu Edouard DO  •  [START ON 8/26/2023] amLODIPine (NORVASC) tablet 10 mg, 10 mg, Oral, Daily, Hieu Edouard DO  •  [START ON 8/26/2023] aspirin chewable tablet 81 mg, 81 mg, Oral, Daily, Hieu Edouard DO  •  atorvastatin (LIPITOR) tablet 20 mg, 20 mg, Oral, Daily With Dinner, Hieu Edouard DO, 20 mg at 08/25/23 1609  •  bisacodyl (DULCOLAX) EC tablet 5 mg, 5 mg, Oral, Daily PRN, Clifford Main, DO  •  [START ON 8/26/2023] buPROPion (WELLBUTRIN XL) 24 hr tablet 150 mg, 150 mg, Oral, QAM, Clifford Main, DO  •  [START ON 8/26/2023] cyanocobalamin (VITAMIN B-12) tablet 1,000 mcg, 1,000 mcg, Oral, Daily, Clifford Main, DO  •  [START ON 8/26/2023] ferrous sulfate tablet 325 mg, 325 mg, Oral, Daily With Breakfast, Clifford Main, DO  •  [START ON 8/26/2023] fluticasone-vilanterol 200-25 mcg/actuation 1 puff, 1 puff, Inhalation, Daily, Clifford Main, DO  •  gabapentin (NEURONTIN) capsule 300 mg, 300 mg, Oral, TID, [facility], DO, 300 mg at 08/25/23 1609  •  heparin (porcine) subcutaneous injection 5,000 Units, 5,000 Units, Subcutaneous, Q8H [facility], [facility], DO  •  hydrALAZINE (APRESOLINE) tablet 25 mg, 25 mg, Oral, Q8H [facility], [facility], DO  •  insulin lispro (HumaLOG) 100 units/mL subcutaneous injection 1-6 Units, 1-6 Units, Subcutaneous, TID AC **AND** Fingerstick Glucose (POCT), , , 4x Daily AC and at bedtime, Clifford Main, DO  •  insulin lispro (HumaLOG) 100 units/mL subcutaneous injection 1-6 Units, 1-6 Units, Subcutaneous, HS, Clifford Main, DO  •  lactulose oral solution 20 g, 20 g, Oral, Daily PRN, Clifford Main, DO  •  [START ON 8/26/2023] losartan (COZAAR) tablet 100 mg, 100 mg, Oral, Daily, Clifford Main, DO  •  methocarbamol (ROBAXIN) tablet 500 mg, 500 mg, Oral, Q6H PRN, Clifford Main, DO  •  ondansetron (ZOFRAN-ODT) dispersible tablet 4 mg, 4 mg, Oral, Q6H PRN, Clifford Main, DO  •  oxyCODONE (ROXICODONE) IR tablet 5 mg, 5 mg, Oral, Q4H PRN, Clifford Main, DO  •  oxyCODONE (ROXICODONE) split tablet 2.5 mg, 2.5 mg, Oral, Q4H PRN, Clifford Main, DO  •  [START ON 8/26/2023] pantoprazole (PROTONIX) EC tablet 40 mg, 40 mg, Oral, Early Morning, Clifford Main, DO  •  [START ON 8/26/2023] polyethylene glycol (MIRALAX) packet 17 g, 17 g, Oral, Daily, Roly Main, DO  •  prochlorperazine (COMPAZINE) tablet 5 mg, 5 mg, Oral, Q6H PRN, Sadie Adames JANUARY Brice DO  •  senna-docusate sodium (SENOKOT S) 8.6-50 mg per tablet 2 tablet, 2 tablet, Oral, HS, Dyke Heimlich, DO  No Known Allergies   Patient Active Problem List    Diagnosis Date Noted   • ICH (intracerebral hemorrhage) ([address]) 08/15/2023   • GERD (gastroesophageal reflux disease) 08/25/2023   • Constipation 08/25/2023   • Anemia 08/16/2023   • Left leg pain 08/15/2023   • Chronic obstructive pulmonary disease with acute exacerbation ([address]) 07/26/2023   • Hyperlipidemia associated with type 2 diabetes mellitus ([address]) 07/26/2023   • Abnormal CT of the chest 06/28/2023   • Class 2 obesity with body mass index (BMI) of 39.0 to 39.9 in adult 06/28/2023   • CAD (coronary artery disease) 04/07/2023   • Right lumbosacral radiculopathy 10/12/2022   • Chronic bilateral low back pain with bilateral sciatica 08/31/2022   • Paresthesia of both feet 08/31/2022   • Postoperative visit 05/24/2022   • Incarcerated umbilical hernia 18/06/9356   • Diabetes mellitus type 2, controlled ([address]) 04/29/2022   • Continuous opioid dependence ([address]) 04/27/2022   • Moderate persistent asthma without complication 58/37/1524   • Cigarette nicotine dependence in remission 04/26/2022   • Primary hypertension 04/26/2022   • Fibromyalgia 04/26/2022   • Sjogren's syndrome ([address]) 10/19/2012   • Depression with anxiety 09/18/2012     Past Medical History:   Diagnosis Date   • Arthritis    • Asthma    • Continuous opioid dependence ([address]) 4/27/2022   • Diabetes mellitus ([address])    • Diverticulitis of colon    • Fibromyalgia 04/26/2022   • Headache(784.0)    • History of mammogram 2018   • History of tobacco abuse 04/26/2022   • Hypertension    • Nausea 04/29/2022   • Obesity    • Sjogren's syndrome ([address]) 10/19/2012   • Urinary tract infection      Past Surgical History:   Procedure Laterality Date   • APPENDECTOMY     • BREAST BIOPSY Left     unsure of year   • CARPAL TUNNEL RELEASE     • COLONOSCOPY  2017    repeat in 2022   • EPIDURAL BLOCK INJECTION N/A 2023    Procedure: L5-S1 EPIDURALSTEROID INJECTION (11171); Surgeon: Adelfo Alvarado DO;  Location:  MAIN OR;  Service: Pain Management    • FOOT SURGERY     • HERNIA REPAIR  2022   • NECK SURGERY      spine fusion    • NH RPR UMBILICAL HRNA 5 YRS/> REDUCIBLE N/A 2022    Procedure: REPAIR HERNIA UMBILICAL;  Surgeon: Nancy Mustafa MD;  Location: [facility];  Service: General     Social History     Socioeconomic History   • Marital status: /Civil Union     Spouse name: Not on file   • Number of children: Not on file   • Years of education: Not on file   • Highest education level: Not on file   Occupational History   • Occupation:     Tobacco Use   • Smoking status: Former     Packs/day: 0.50     Types: Cigarettes     Start date: 0     Quit date: 2023     Years since quittin.3     Passive exposure: Past   • Smokeless tobacco: Never   • Tobacco comments:     per pt, 5 a day - As per [employer] Use   • Vaping Use: Never used   Substance and Sexual Activity   • Alcohol use: Not Currently     Comment: occasional    • Drug use: Never   • Sexual activity: Not Currently     Partners: Male     Birth control/protection: None   Other Topics Concern   • Not on file   Social History Narrative    · Most recent tobacco use screenin2019      · Caffeine intake:   Occasional      · Seat belts used routinely:   Yes      · Smoke alarm in home: Yes      · Has the Patient had a mammogram to screen for breast cancer within 24 months:   Yes      · Please enter the date of the Patient's previous mammogram.:  march of last year.      [facility]      Social Determinants of Health     Financial Resource Strain: Not on file   Food Insecurity: No Food Insecurity (2023)    Hunger Vital Sign    • Worried About Running Out of Food in the Last Year: Never true    • Ran Out of Food in the Last Year: Never true   Transportation Needs: No Transportation Needs (2023) PRAPARE - Transportation    • Lack of Transportation (Medical): No    • Lack of Transportation (Non-Medical): No   Physical Activity: Not on file   Stress: Not on file   Social Connections: Not on file   Intimate Partner Violence: Not on file   Housing Stability: Low Risk  (2023)    Housing Stability Vital Sign    • Unable to Pay for Housing in the Last Year: No    • Number of Places Lived in the Last Year: 1    • Unstable Housing in the Last Year: No     Social History     Tobacco Use   Smoking Status Former   • Packs/day: 0.50   • Types: Cigarettes   • Start date: 0   • Quit date: 2023   • Years since quittin.3   • Passive exposure: Past   Smokeless Tobacco Never   Tobacco Comments    per pt, 5 a day - As per Adi      Social History     Substance and Sexual Activity   Alcohol Use Not Currently    Comment: occasional      Family History   Problem Relation Age of Onset   • Hypertension Mother    • Heart disease Mother    • Hypertension Father    • Heart disease Father    • Asthma Sister    • No Known Problems Sister    • No Known Problems Sister    • Diabetes Maternal Grandmother    • No Known Problems Maternal Grandfather    • No Known Problems Paternal Grandmother    • No Known Problems Paternal Grandfather    • Breast cancer Paternal Aunt    • Pancreatic cancer Paternal Uncle    • Colon cancer Neg Hx    • Ovarian cancer Neg Hx    • Uterine cancer Neg Hx    • Cervical cancer Neg Hx          Medical Necessity Criteria for [facility] Admission: Hypertension, Bowel/Bladder Management and Diabetes requiring close blood glucose monitoring. In addition, the preadmission screen, post-admission physical evaluation, overall plan of care and admissions order demonstrate a reasonable expectation that the following criteria were met at the time of admission to the [facility].   1. The patient requires active and ongoing therapeutic intervention of multiple therapy disciplines (physical therapy, occupational therapy, speech-language pathology, or prosthetics/orthotics), one of which is physical or occupational therapy. 2. Patient requires an intensive rehabilitation therapy program, as defined in Chapter 1, section 110.2.2 of the CMS Medicare Policy Manual. This intensive rehabilitation therapy program will consist of at least 3 hours of therapy per day at least 5 days per week or at least 15 hours of intensive rehabilitation therapy within a 7 consecutive day period, beginning with the date of admission to the [facility]. 3. The patient is reasonably expected to actively participate in, and benefit significantly from, the intensive rehabilitation therapy program as defined in Chapter 1, section 110.2.2 of the CMS Medicare Policy Manual at this time of admission to the [facility]. She can reasonably be expected to make measurable improvement (that will be of practical value to improve the patient’s functional capacity or adaptation to impairments) as a result of the rehabilitation treatment, as defined in section 110.3, and such improvement can be expected to be made within the prescribed period of time. As noted in the CMS Medicare Policy Manual, the patient need not be expected to achieve complete independence in the domain of self-care nor be expected to return to his or her prior level of functioning in order to meet this standard. 4. The patient must require physician supervision by a rehabilitation physician. As such, a rehabilitation physician will conduct face-to-face visits with the patient at least 3 days per week throughout the patient’s stay in the [facility] to assess the patient both medically and functionally, as well as to modify the course of treatment as needed to maximize the patient’s capacity to benefit from the rehabilitation process.   5. The patient requires an intensive and coordinated interdisciplinary approach to providing rehabilitation, as defined in Chapter 1, section 110.2.5 of the CMS Medicare Policy Manual. This will be achieved through periodic team conferences, conducted at least once in a 7-day period, and comprising of an interdisciplinary team of medical professionals consisting of: a rehabilitation physician, registered nurse,  and/or , and a licensed/certified therapist from each therapy discipline involved in treating the patient. Changes Since Pre-admission Assessment: None -This patient's participation in rehab continues to be reasonable, necessary and appropriate. CMS Required Post-Admission Physician Evaluation Elements  History and Physical, including medical history, functional history and active comorbidities as in above text. Post-Admission Physician Evaluation:  The patient has the potential to make improvement and is in need of physical, occupational, and/or therapy services. The patient may also need nutritional services. Given the patient's complex medical condition and risk of further medical complications, rehabilitative services cannot be safely provided at a lower level of care, such as a skilled nursing facility. I have reviewed the patient's functional and medical status at the time of the preadmission screening and they are the same as on the day of this admission. I acknowledge that I have personally performed a full physical examination on this patient within 24 hours of admission. The patient and/or family demonstrated understanding the rehabilitation program and the discharge process after we discussed them.      Agree in entirety: yes  Minor adaptions: none    Major changes: none    Kasandra England,   Physical Medicine and [place]    I have spent a total time of 85 minutes on 08/25/23 in caring for this patient including Instructions for management, Impressions, Counseling / Coordination of care, Documenting in the medical record, Reviewing / ordering tests, medicine, procedures  , Obtaining or reviewing history   and Communicating with other healthcare professionals .     Fawad Purdy, DO  Physical Medicine and [place]

## 2023-08-25 NOTE — CASE MANAGEMENT
[address] [facility] ([phone] [id number]) to give updated information from Acute Rehab.  Spoke to [place] who received information and pushed auth through for approval.   [facility] has received approved authorization from insurance:  [id number] [address] in by Rep: Ferriday P#  [phone]  Authorization received for: [address] [place]: [address] #: [id number]  Start of Care: 8/25  Next Review Date: 8/31  Continued Stay Care Coordinator: none assigned  P#: [phone] [id number]  Submit next review to: [phone]   Care Manager notified: Kathy Miramontes

## 2023-08-25 NOTE — CASE MANAGEMENT
CM met with patient briefly, made introductions. Patient lives with spouse in ranch home with 4-5 DAMIAN, Patient Independent PTA , including working and driving. Patient has a walker and a cane. Patient has prior outpt therapy experience at [facility] location, has no prior STR or [address] [place] [address] experience. Nursing in to see patient. Cm confirmed address and medical insurance. CM will follow for discharge assessment.

## 2023-08-25 NOTE — ASSESSMENT & PLAN NOTE
· BMI of 39.74 on admission  · Continue promoting weight loss and increased activity, diet and exercise  · Continue a consistent carbohydrate diet  · Nutrition consultation

## 2023-08-25 NOTE — ASSESSMENT & PLAN NOTE
· Patient states that she is been worked up for Sjogren's syndrome in the past and has had conflicting diagnosis he is stating that some physicians have diagnosed her with it and others stated she did not have it.   · She has not been on any medication treatment for this and does not actively follow-up with a rheumatologist

## 2023-08-25 NOTE — RESTORATIVE TECHNICIAN NOTE
Restorative Technician Note      Patient Name: Biju Llanos     Note Type: Mobility (Pt on hold for OOB per RN Nelli Em 2* being discharged.)    Kirsten Jade BS, Restorative Technician, [employer]

## 2023-08-25 NOTE — ASSESSMENT & PLAN NOTE
>>ASSESSMENT AND PLAN FOR DIABETES MELLITUS TYPE 2, CONTROLLED (HCC) WRITTEN ON 8/25/2023 11:34 AM BY CAMILA ESPINOSA,     Lab Results   Component Value Date    HGBA1C 6.7 (H) 08/15/2023       Recent Labs     08/24/23  1112 08/24/23  1621 08/24/23 2053 08/25/23  0624   POCGLU 144* 173* 142* 177*       Blood Sugar Average: Last 72 hrs:  (P) [id number]     A1c and glucose controlled   On SSI  Holding home metformin  Hypoglycemic protocol   Monitor blood sugars and adjust insulin accordingly

## 2023-08-25 NOTE — ASSESSMENT & PLAN NOTE
· Follows with Dr. Ag Jameson from pulmonology  · Home regimen includes Breo and as needed albuterol  · On equivalent here and added albuterol inhaler on admission to [facility]

## 2023-08-25 NOTE — ASSESSMENT & PLAN NOTE
62 YOF with history of HTN, DM2, Sjogren's, fibromyalgia initially presented to [address] [place] [address] on 8/14/23 as a stroke alert on 8/14/23 with initial presenting symptoms acute onset of right sided weakness, facial asymmetry, and dysarthria while at work. Initial imaging with CTH/CTA HN revealed left thalamocapsular bleed with no significant shift or mass effect. . NIHSS 14. Not TNK candidate given acute ICH. Pt given IV labetalol, started on Cardene drip, administered DDAVP for ASA reversal and transferred to [facility] on 8/14/23 for neurosurgery care. CT head was done on 8/17/23 for change in exam and worsening and waxing slurred speech which was reviewed by neuro, the images which does show stable left sided hemorrhage without any midline shift. ICH suspected secondary to hypertension. Exam with right-sided flaccid paralysis.     · Had a fall and hit her head on 8/18 - CT head stable  · Repeat CT head on 8/20 - stable  · Cleared by neurology to restart ASA 81 mg daily  · Continue atorvastatin 20 mg daily  · Was seen by neurosurgery - no surgical intervention at this time  · Goal SBP < 140  · Discharged to [facility] for rehab  · Outpatient follow-up with neurology in 6 weeks

## 2023-08-25 NOTE — ASSESSMENT & PLAN NOTE
Lab Results   Component Value Date    HGBA1C 6.7 (H) 08/15/2023       Recent Labs     08/24/23  1112 08/24/23  1621 08/24/23 2053 08/25/23  0624   POCGLU 144* 173* 142* 177*       Blood Sugar Average: Last 72 hrs:  (P) [id number]     · A1c and glucose controlled   · On SSI  · Holding home metformin  · Hypoglycemic protocol   · Monitor blood sugars and adjust insulin accordingly

## 2023-08-25 NOTE — CASE MANAGEMENT
[facility] ([phone] [id number]) to check status of authorization. Spoke to [place] who stated information needed is from Acute Rehab. Information needed is: hours of therapy needed per week, goal LOF, estimated LOS, frequency of IDT meetings and face to face MD rounds. [place] suggested the Acute Rehab calls vs faxes info over for better service. Notified CM to acquire info from Acute Rehab and forward to DCS.

## 2023-08-25 NOTE — CASE MANAGEMENT
Case Management Discharge Planning Note    Patient name Jericho Noguera  Location [facility] 845-20 MRN [id number]  : 1965 Date 2023       Current Admission Date: 2023  Current Admission Diagnosis:ICH (intracerebral hemorrhage) [facility])   Patient Active Problem List    Diagnosis Date Noted   • Anemia 2023   • ICH (intracerebral hemorrhage) ([address]) 08/15/2023   • Left leg pain 08/15/2023   • Chronic obstructive pulmonary disease with acute exacerbation ([address]) 2023   • Hyperlipidemia associated with type 2 diabetes mellitus ([address]) 2023   • Abnormal CT of the chest 2023   • Class 2 obesity with body mass index (BMI) of 39.0 to 39.9 in adult 2023   • CAD (coronary artery disease) 2023   • Right lumbosacral radiculopathy 10/12/2022   • Chronic bilateral low back pain with bilateral sciatica 2022   • Paresthesia of both feet 2022   • Postoperative visit 2022   • Incarcerated umbilical hernia    • Diabetes mellitus type 2, controlled ([address]) 2022   • Continuous opioid dependence ([address]) 2022   • Moderate persistent asthma with acute exacerbation 2022   • Cigarette nicotine dependence in remission 2022   • Primary hypertension 2022   • Fibromyalgia 2022   • Sjogren's syndrome ([address]) 10/19/2012   • Anxiety 2012      LOS (days): 11  Geometric Mean LOS (GMLOS) (days): 4.50  Days to GMLOS:-6.1     OBJECTIVE:  Risk of Unplanned Readmission Score: 18.48         Current admission status: Inpatient   Preferred Pharmacy:   [address] [place] [address] [address],  [address] [facility] [facility] [address]  [address] [address]  Phone: [phone] Fax: [facility] Evette [place], [address] [place] [id number] [address]  Phone: [phone] Fax: [phone]    Primary Care Provider: Lisette Couch MD    Primary Insurance: BLUE CROSS  Secondary Insurance: DISCHARGE DETAILS:  Angel Navas was obtained for [facility]. Pt will be going to room 967 with transport for 1:30. Provider and RN Updated.

## 2023-08-25 NOTE — PLAN OF CARE
Problem: INFECTION - ADULT  Goal: Absence or prevention of progression during hospitalization  Description: INTERVENTIONS:  - Assess and monitor for signs and symptoms of infection  - Monitor lab/diagnostic results  - Monitor all insertion sites, i.e. indwelling lines, tubes, and drains  - Monitor endotracheal if appropriate and nasal secretions for changes in amount and color  - [place] appropriate cooling/warming therapies per order  - Administer medications as ordered  - Instruct and encourage patient and family to use good hand hygiene technique  - Identify and instruct in appropriate isolation precautions for identified infection/condition  Outcome: Progressing  Goal: Absence of fever/infection during neutropenic period  Description: INTERVENTIONS:  - Monitor WBC    Outcome: Progressing

## 2023-08-25 NOTE — CONSULTS
Internal Medicine Consultation Note    Patient: Adair Vickers  Age/sex: 62 y.o. female  Medical Record #: [id number]      ASSESSMENT PLAN    ICH  · Seen by NS and no surgical intervention indicated. · Cleared to resume ASA. · Goal SBP < 140. · Continue atorvastatin. · Therapy per primary service. · Outpt follow-up with Neurology. HTN  · Home: Telmisartan and atenolol  · Here: amlodipine 10mg daily, Losartan 100mg daily, Hydralazine 25mg every 8 hours. · Monitor BP and adjust medications as needed. DM type 2  · HA1C 6.7  · Home: Metformin 1000mg 2x daily. · Here: SSI  · Continue DM diet. · Will observe blood sugar trend and resume metformin when necessary. Anemia  · Baseline hgb 10 - 11. · Ferritin 22 and TIBC 370  · B12 low normal.  · Continue iron and B12 supplementation. · Monitor CBC    Anxiety  · Continue Wellbutrin. · Pt is taking 150mg instead of 300mg due to concerns for increased risk of seizure    Subjective/ HPI: Patient seen and examined. She is a 63yo female, with HTN, CAD, hyperlipidemia, asthma, DM type 2, fibromyalgia and Sjogren's syndrome, who presented to [facility] 8/14/23 with acute Rt sided weakness and dysarthria. She was found to have an acute parenchymal hematoma in the Lt posterior striatocapsular region extending into the Lt thalmus with mild surrounding vasogenic edema. She was taking ASA at home and was given DDAVP upon arrival. CTA was negative. Repeat head CT at 24 hours was stable. She was was transferred to [facility] and was seen by NS but did not require surgical intervention. Pt was unable to tolerate MRI even with sedation. She had a fall 8/18/23 and did not sustain any injury. Head CT was stable and ASA was restarted 8/20/23. She was determined to be stable for transfer to the [facility] 8/25/23. IM consulted for medical management. ROS:   A 10 point ROS was performed; negative except as noted above.      Social History:    Substance Use History:   Social History Substance and Sexual Activity   Alcohol Use Not Currently    Comment: occasional      Social History     Tobacco Use   Smoking Status Former   • Packs/day: 0.50   • Types: Cigarettes   • Start date: 0   • Quit date: 2023   • Years since quittin.3   • Passive exposure: Past   Smokeless Tobacco Never   Tobacco Comments    per pt, 5 a day - As per Adi      Social History     Substance and Sexual Activity   Drug Use Never       Family History:    Family History   Problem Relation Age of Onset   • Hypertension Mother    • Heart disease Mother    • Hypertension Father    • Heart disease Father    • Asthma Sister    • No Known Problems Sister    • No Known Problems Sister    • Diabetes Maternal Grandmother    • No Known Problems Maternal Grandfather    • No Known Problems Paternal Grandmother    • No Known Problems Paternal Grandfather    • Breast cancer Paternal Aunt    • Pancreatic cancer Paternal Uncle    • Colon cancer Neg Hx    • Ovarian cancer Neg Hx    • Uterine cancer Neg Hx    • Cervical cancer Neg Hx          Review of Scheduled Meds:  Current Facility-Administered Medications   Medication Dose Route Frequency Provider Last Rate   • albuterol  2 puff Inhalation Q4H PRN Elizabeth Aures, DO     • [START ON 2023] amLODIPine  10 mg Oral Daily Elizabeth Aures, DO     • [START ON 2023] aspirin  81 mg Oral Daily Elizabeth Aures, DO     • atorvastatin  20 mg Oral Daily With Dinner Elizabeth Aures, DO     • bisacodyl  5 mg Oral Daily PRN Elizabeth Aures, DO     • [START ON 2023] buPROPion  150 mg Oral QAM Elizabeth Aures, DO     • [START ON 2023] vitamin B-12  1,000 mcg Oral Daily Elizabeth Aures, DO     • [START ON 2023] ferrous sulfate  325 mg Oral Daily With Breakfast Elizabeth Aures, DO     • [START ON 2023] fluticasone-vilanterol  1 puff Inhalation Daily Elizabeth Aures, DO     • gabapentin  300 mg Oral TID Elizabeth Aures, DO     • heparin (porcine)  5,000 Units Subcutaneous [facility] Bryan Lim JANUARY Brice, DO     • hydrALAZINE  25 mg Oral Q8H [address] Melda Ready, DO     • insulin lispro  1-6 Units Subcutaneous TID AC Melda Ready, DO     • insulin lispro  1-6 Units Subcutaneous HS Melda Ready, DO     • lactulose  20 g Oral Daily PRN Melda Ready, DO     • [START ON 8/26/2023] losartan  100 mg Oral Daily Melda Ready, DO     • methocarbamol  500 mg Oral Q6H PRN Melda Ready, DO     • ondansetron  4 mg Oral Q6H PRN Melda Ready, DO     • oxyCODONE  5 mg Oral Q4H PRN Melda Ready, DO     • oxyCODONE  2.5 mg Oral Q4H PRN Melda Ready, DO     • [START ON 8/26/2023] pantoprazole  40 mg Oral Early Morning Melda Ready, DO     • [START ON 8/26/2023] polyethylene glycol  17 g Oral Daily Melda Ready, DO     • prochlorperazine  5 mg Oral Q6H PRN Melda Ready, DO     • senna-docusate sodium  2 tablet Oral HS Melda Ready, DO         Labs:     Results from last 7 days   Lab Units 08/24/23  0515 08/21/23  1404   WBC Thousand/uL 10.09 11.15*   HEMOGLOBIN g/dL 12.0 12.2   HEMATOCRIT % 37.6 38.6   PLATELETS Thousands/uL 360 382     Results from last 7 days   Lab Units 08/24/23  0515 08/22/23  0545   SODIUM mmol/L 135 138   POTASSIUM mmol/L 3.9 4.2   CHLORIDE mmol/L 100 105   CO2 mmol/L 26 25   BUN mg/dL 19 22   CREATININE mg/dL 0.56* 0.70   CALCIUM mg/dL 9.3 9.5                Results from last 7 days   Lab Units 08/25/23  1544 08/25/23  1129 08/25/23  0624   POC GLUCOSE mg/dl 105 196* 177*       Lab Results   Component Value Date    BLOODCX No Growth After 5 Days. 04/07/2023    BLOODCX No Growth After 5 Days.  04/07/2023    URINECX 50,000-59,000 cfu/ml Lactococcus species (A) 04/22/2022    URINECX 10,000-19,000 cfu/ml 04/22/2022    URINECX >100,000 cfu/ml Mixed Contaminants X5 07/19/2016    SPUTUMCULTUR 3+ Growth of 04/08/2023       Input and Output Summary (last 24 hours):     No intake or output data in the 24 hours ending 08/25/23 1607    Imaging:     XR abdomen 1 vw portable    (Results Pending)       *Labs /Radiology studiesLabs reviewed  *Medications reviewed and reconciled as needed  *Please refer to order section for additional ordered labs studies  *Case discussed with primary attending during morning huddle case rounds    Vitals:   Temp (24hrs), Av.3 °F (36.8 °C), Min:98.1 °F (36.7 °C), Max:98.5 °F (36.9 °C)    Temp:  [98.1 °F (36.7 °C)-98.5 °F (36.9 °C)] 98.3 °F (36.8 °C)  HR:  [69-81] 75  Resp:  [16] 16  BP: (126-134)/(76-87) 134/87  SpO2:  [95 %-97 %] 96 %  Body mass index is 41.4 kg/m². Physical Exam:   HEENT:  Head: Normocephalic, no lesions, without obvious abnormality. CARDIAC:  regular rate and rhythm, S1, S2 normal, no murmur, click, rub or gallop  LUNGS:  normal air entry, lungs clear to auscultation  ABDOMEN:  soft, non-tender. Bowel sounds normal. No masses, no organomegaly  EXTREMITIES:  extremities normal, warm and well-perfused; no cyanosis, clubbing, or edema  NEURO:   AAOx3. + mild dysarthria, dense Rt hemiplegia  PSYCH:  Alert and oriented, appropriate affect. Invasive Devices     Drain  Duration           External Urinary Catheter 10 days                 VTE Pharmacologic Prophylaxis: Heparin  Code Status: Level 1 - Full Code  Current Length of Stay: 0 day(s)    Total floor / unit time spent today 1 hour with more than 50% spent counseling/coordinating care. Counseling includes discussion with patient re: progress  and discussion with patient of his/her current medical state/information. Coordination of patient's care was performed in conjunction with primary service. Time invested included review of patient's labs, vitals, and management of their comorbidities with continued monitoring. In addition, this patient was discussed with medical team including physician and advanced extenders.  The care of the patient was extensively discussed and appropriate treatment plan was formulated unique for this patient by supervising physician unless stated otherwise in their attestation statement. ** Please Note: voice to text software may have been used in the creation of this document.  Audio transcription errors may occur**

## 2023-08-25 NOTE — TREATMENT PLAN
Individualized Plan of [address] [place] [address]  Maria Esther Boyle 62 y.o. female MRN: [id number]  Unit/Bed#: -01 Encounter: [id number]     PATIENT INFORMATION  ADMISSION DATE: 8/25/2023  3:19 PM STARLA CATEGORY:Stroke:  01.2  Right Body Involvement (Left Brain)   ADMISSION DIAGNOSIS: ICH (intracerebral hemorrhage) ([address]) [I61.9]  EXPECTED LOS: 2 - 3 weeks     MEDICAL/FUNCTIONAL PROGNOSIS  Based on my assessment of the patient's medical conditions and current functional status, the prognosis for attaining medical and functional goals or the IRF stay is:  Fair    Medical Goals: Patient will be medically stable for discharge to [facility] upon completion of rehab program and Patient will be able to manage medical conditions and comorbid conditions with medications and follow up upon completion of rehab program    Cardiopulmonary function: Ensure cardiopulmonary stability and optimize cardiopulmonary function not only at rest but with activity as patient's activity level significantly increases in acute rehab compared with prior to transfer in preparation for safe discharge from [facility]. Must closely and frequently monitor blood pressure and HR to ensure adequate cardiac output during ADLs and ambulation as patient is at increased risk for orthostatic hypotension/syncope and potential injury if not monitored for and managed adequately. Blood pressure management:    Frequent monitoring of blood pressure with appropriate adjustments in blood pressure medication management to optimize blood pressure control and prevent/limit renal complications. Monitoring impact of blood pressure and side-effects of blood pressure medications at rest and with activity.   Hypoxia prevention: Ensure appropriate level of oxygenation at rest and with activity to avoid symptomatic hypoxia, maximize functional performance, and decrease risk of atelectasis/pneumonia through close and frequent monitoring, providing appropriate respiratory treatments (such as incentive spirometry), and when necessary provide/adjust respiratory medications. DM: Patient will improve/maintain blood sugar control to ensure optimal healing and decrease risks of complications associated with DM through medication monitoring, adjustments as indicated, and optimal dietary intake and education. Pain management:  Pain will improve with frequent evaluation of pain, careful adjustments in medications, frequent re-evaluation of patient's pain and medical/neurologic status to ensure optimal pain control, avoidance of potential serious and even life-threatening side-effects and drug interactions, as well as weaning pain medications as soon as possible to decrease risk of short and long-term use. Neurologic Disorder: Chronic low back pain and pain disorder causing impaired mobility, ADLs, and gait:  intensive skilled therapies with physical therapy and occupational therapy with close oversight and management by rehab specialized physician in acute rehabilitation setting to most expeditiously and effectively improve functional mobility, transfers, upper and lower body strengthening, conditioning, balance, and gait training with appropriate assistive device. Patient will have optimal supervision and management of patient's underlying neurologic disorder with specialized rehabilitation physician during this period of recovery to ensure most expeditious and optimal recovery with decreased risks of fall/injury and other complications including acute worsening of neuro disorder, decrease risk of VTE, PNA, and skin ulceration.   Stroke with hemiparesis:  Intensive skilled therapies with physical therapy and occupational therapy with close oversight and management by rehab specialized physician in acute rehabilitation setting to most expeditiously and effectively improve functional mobility, transfers, upper and lower body strengthening, conditioning, balance, and gait training with appropriate assistive device. Patient will have optimal blood pressure management and blood sugar control. Prevent/decrease risk of VTE, atelectasis, pneumonia, skin ulceration, fall, other injury (and when applicable shoulder subluxation, chronic shoulder pain, plantarflexion contracture)  Inpatient rehabilitation education/teaching: To be provided to patient and typically family/caregiver (if able to be identified) by all skilled therapists, rehab nursing, case management, and rehab specialized physician to ensure optimal recovery and decrease risks of complications in both acute rehabilitation setting as well as after discharge. Anxiety (and/or Depression): Patient's mood and it's impact on therapy participation and functional recovery will improve during course with supportive counseling, relaxation/breathing techniques and if necessary medication management. Requires frequent re-assessment and close management to ensure anxiety/depression management during acute rehab course with planning for appropriate outpatient management to ensure optimal mental health and functional recovery. Bladder dysfunction:  Appropriate bladder management with appropriate toileting program from rehab nursing and staff with oversight management by rehabilitation physicians which include appropriate monitoring and possible adjustments in medications to with goals to optimize bladder function and decrease risk of bladder retention, incontinence, and urinary tract infection. Bowel dysfunction: Appropriate bowel management with appropriate toileting program from rehab nursing and staff with oversight management by rehabilitation physicians which include adjustments in medications to optimize appropriate bowel function and prevent/decrease risk of constipation and bowel obstruction. Obesity:  Close monitoring of nutrition status, nutrition specialist with adjustments in diet.    on appropriate short and long term nutrition and activity. Obesity increases complexity of patient's overall condition and causes unique challenges during this part of patient's recovery process. Supervise and if necessary make adjustments in rehab nursing care and skilled therapy care to ensure appropriate toileting, bed mobility, other ADLs, and ambulation to decrease risk of falls/injuries, VTE, skin breakdown/ulceration and optimize functional recovery. ANTICIPATED DISCHARGE DISPOSITION AND SERVICES  COMMUNITY SETTING: Home - Assistance    ANTICIPATED FOLLOW-UP SERVICE:   Home Health Services: PT, OT and Nursing    DISCIPLINE SPECIFIC PLANS:  Required Disciplines & Services: Rehabillitation Nursing, Case Management, Dietay/Nutrition and Psychology    REQUIRED THERAPY:  Therapy Hours per Day Days per Week Total Days   Physical Therapy 1.5 5 21   Occupational Therapy 1.5 5 21   NOTE: Additional therapy time(s) or changes to allocation of therapies as appropriate to meet patient needs and to achieve functional goals. Patient will participate in above therapy regimen consisting of PT and OT due to the following medical procedure/condition:Stroke:  01.2  Right Body Involvement (Left Brain)    ANTICIPATED FUNCTIONAL OUTCOMES:  ADL:  Modified independent to min assist   Bladder/Bowel:  Modified independent   Transfers:  Modified independent with slide board transfers, min assist for stand pivot/sit pivot   Locomotion:  Short distance ambulation min assist with assistive device   Cognitive:   Independent to supervision     DISCHARGE PLANNING NEEDS  Equipment needs: Discharge needs to be reviewed with team      REHAB ANTICIPATED PARTICIPATION RESTRICTIONS:  Amubulate Short Distances Only, Assist with Bathing Shower, Assist with Bathing in Tub, Assist with Mobility, Inability to Drive, [place] with ADLS, Requires Assit with Homemaking and Requires Assit with Steps    Unknown Anne, DO  Physical Medicine and [place]

## 2023-08-25 NOTE — PLAN OF CARE
Keep your scheduled appointment with your provider.    Call your Doctor if you have any of the following:  Temperature above 100 degrees  Nausea, vomiting and/or diarrhea  Severe headache, dizziness, or blurred vision  Notable increase in swelling of hands or feet  Notable swelling of face and lips  Difficulty, pain or burning with urination  Foul smelling vaginal discharge  Decreased fetal movement    Come to the hospital if you have any of the following symptoms:  Your water breaks  More than 4-6 contractions in 1 hour for 2 or more hours  Vaginal bleeding like a period    After 28 weeks, you should feel 10 distinct fetal movements within a 2 hour period.    It is recommended that you drink 1/2 a gallon of water each day.  Tea, Soda and Juice are  in addition to this.     Problem: Prexisting or High Potential for Compromised Skin Integrity  Goal: Skin integrity is maintained or improved  Description: INTERVENTIONS:  - Identify patients at risk for skin breakdown  - Assess and monitor skin integrity  - Assess and monitor nutrition and hydration status  - Monitor labs   - Assess for incontinence   - Turn and reposition patient  - Assist with mobility/ambulation  - Relieve pressure over bony prominences  - Avoid friction and shearing  - Provide appropriate hygiene as needed including keeping skin clean and dry  - Evaluate need for skin moisturizer/barrier cream  - Collaborate with interdisciplinary team   - Patient/family teaching  - Consider wound care consult   Outcome: Progressing     Problem: Nutrition/Hydration-ADULT  Goal: Nutrient/Hydration intake appropriate for improving, restoring or maintaining nutritional needs  Description: Monitor and assess patient's nutrition/hydration status for malnutrition. Collaborate with interdisciplinary team and initiate plan and interventions as ordered. Monitor patient's weight and dietary intake as ordered or per policy. Utilize nutrition screening tool and intervene as necessary. Determine patient's food preferences and provide high-protein, high-caloric foods as appropriate.      INTERVENTIONS:  - Monitor oral intake, urinary output, labs, and treatment plans  - Assess nutrition and hydration status and recommend course of action  - Evaluate amount of meals eaten  - Assist patient with eating if necessary   - Allow adequate time for meals  - Recommend/ encourage appropriate diets, oral nutritional supplements, and vitamin/mineral supplements  - Order, calculate, and assess calorie counts as needed  - Recommend, monitor, and adjust tube feedings and TPN/PPN based on assessed needs  - Assess need for intravenous fluids  - Provide specific nutrition/hydration education as appropriate  - Include patient/family/caregiver in decisions related to nutrition  Outcome: Progressing

## 2023-08-25 NOTE — ASSESSMENT & PLAN NOTE
· History of CAD currently on statin and aspirin that was restarted on 8/21 after clearance by neurology  · Beta-blocker held due to bradycardia  · Patient has not formally seen a cardiologist however this was diagnosed based on a CT PE study that was conducted in the ER showing mild CAD

## 2023-08-26 LAB
GLUCOSE SERPL-MCNC: 139 MG/DL (ref 65–140)
GLUCOSE SERPL-MCNC: 153 MG/DL (ref 65–140)
GLUCOSE SERPL-MCNC: 174 MG/DL (ref 65–140)
GLUCOSE SERPL-MCNC: 243 MG/DL (ref 65–140)

## 2023-08-26 PROCEDURE — 97530 THERAPEUTIC ACTIVITIES: CPT

## 2023-08-26 PROCEDURE — 82948 REAGENT STRIP/BLOOD GLUCOSE: CPT

## 2023-08-26 PROCEDURE — 97163 PT EVAL HIGH COMPLEX 45 MIN: CPT

## 2023-08-26 PROCEDURE — 97542 WHEELCHAIR MNGMENT TRAINING: CPT

## 2023-08-26 PROCEDURE — 97110 THERAPEUTIC EXERCISES: CPT

## 2023-08-26 PROCEDURE — 99232 SBSQ HOSP IP/OBS MODERATE 35: CPT | Performed by: INTERNAL MEDICINE

## 2023-08-26 PROCEDURE — 97167 OT EVAL HIGH COMPLEX 60 MIN: CPT

## 2023-08-26 PROCEDURE — 97535 SELF CARE MNGMENT TRAINING: CPT

## 2023-08-26 RX ORDER — CALCIUM CARBONATE 500 MG/1
1000 TABLET, CHEWABLE ORAL 3 TIMES DAILY PRN
Status: DISCONTINUED | OUTPATIENT
Start: 2023-08-26 | End: 2023-09-20 | Stop reason: HOSPADM

## 2023-08-26 RX ORDER — LACTULOSE 20 G/30ML
20 SOLUTION ORAL ONCE
Status: COMPLETED | OUTPATIENT
Start: 2023-08-26 | End: 2023-08-26

## 2023-08-26 RX ORDER — BISACODYL 10 MG
10 SUPPOSITORY, RECTAL RECTAL ONCE
Status: COMPLETED | OUTPATIENT
Start: 2023-08-26 | End: 2023-08-26

## 2023-08-26 RX ADMIN — HYDRALAZINE HYDROCHLORIDE 25 MG: 25 TABLET ORAL at 14:26

## 2023-08-26 RX ADMIN — Medication 2.5 MG: at 12:21

## 2023-08-26 RX ADMIN — SENNOSIDES AND DOCUSATE SODIUM 2 TABLET: 50; 8.6 TABLET ORAL at 21:16

## 2023-08-26 RX ADMIN — LACTULOSE 20 G: 20 SOLUTION ORAL at 12:20

## 2023-08-26 RX ADMIN — HEPARIN SODIUM 5000 UNITS: 5000 INJECTION INTRAVENOUS; SUBCUTANEOUS at 21:16

## 2023-08-26 RX ADMIN — CYANOCOBALAMIN TAB 500 MCG 1000 MCG: 500 TAB at 08:05

## 2023-08-26 RX ADMIN — BISACODYL 10 MG: 10 SUPPOSITORY RECTAL at 17:15

## 2023-08-26 RX ADMIN — ASPIRIN 81 MG CHEWABLE TABLET 81 MG: 81 TABLET CHEWABLE at 08:05

## 2023-08-26 RX ADMIN — INSULIN LISPRO 1 UNITS: 100 INJECTION, SOLUTION INTRAVENOUS; SUBCUTANEOUS at 08:03

## 2023-08-26 RX ADMIN — PANTOPRAZOLE SODIUM 40 MG: 40 TABLET, DELAYED RELEASE ORAL at 06:01

## 2023-08-26 RX ADMIN — FERROUS SULFATE TAB 325 MG (65 MG ELEMENTAL FE) 325 MG: 325 (65 FE) TAB at 08:05

## 2023-08-26 RX ADMIN — LOSARTAN POTASSIUM 100 MG: 50 TABLET, FILM COATED ORAL at 10:23

## 2023-08-26 RX ADMIN — OXYCODONE HYDROCHLORIDE 5 MG: 5 TABLET ORAL at 06:07

## 2023-08-26 RX ADMIN — GABAPENTIN 300 MG: 300 CAPSULE ORAL at 16:32

## 2023-08-26 RX ADMIN — CALCIUM CARBONATE (ANTACID) CHEW TAB 500 MG 1000 MG: 500 CHEW TAB at 12:41

## 2023-08-26 RX ADMIN — HEPARIN SODIUM 5000 UNITS: 5000 INJECTION INTRAVENOUS; SUBCUTANEOUS at 14:25

## 2023-08-26 RX ADMIN — HYDRALAZINE HYDROCHLORIDE 25 MG: 25 TABLET ORAL at 06:01

## 2023-08-26 RX ADMIN — GABAPENTIN 300 MG: 300 CAPSULE ORAL at 08:05

## 2023-08-26 RX ADMIN — BUPROPION HYDROCHLORIDE 150 MG: 150 TABLET, FILM COATED, EXTENDED RELEASE ORAL at 08:06

## 2023-08-26 RX ADMIN — POLYETHYLENE GLYCOL 3350 17 G: 17 POWDER, FOR SOLUTION ORAL at 08:07

## 2023-08-26 RX ADMIN — INSULIN LISPRO 1 UNITS: 100 INJECTION, SOLUTION INTRAVENOUS; SUBCUTANEOUS at 16:32

## 2023-08-26 RX ADMIN — INSULIN LISPRO 3 UNITS: 100 INJECTION, SOLUTION INTRAVENOUS; SUBCUTANEOUS at 12:14

## 2023-08-26 RX ADMIN — OXYCODONE HYDROCHLORIDE 5 MG: 5 TABLET ORAL at 19:58

## 2023-08-26 RX ADMIN — HEPARIN SODIUM 5000 UNITS: 5000 INJECTION INTRAVENOUS; SUBCUTANEOUS at 06:01

## 2023-08-26 RX ADMIN — AMLODIPINE BESYLATE 10 MG: 10 TABLET ORAL at 10:22

## 2023-08-26 RX ADMIN — GABAPENTIN 300 MG: 300 CAPSULE ORAL at 20:00

## 2023-08-26 RX ADMIN — FLUTICASONE FUROATE AND VILANTEROL TRIFENATATE 1 PUFF: 200; 25 POWDER RESPIRATORY (INHALATION) at 08:06

## 2023-08-26 RX ADMIN — ATORVASTATIN CALCIUM 20 MG: 20 TABLET, FILM COATED ORAL at 16:32

## 2023-08-26 NOTE — PROGRESS NOTES
Internal Medicine Progress Note  Patient: Stefania Santillan  Age/sex: 62 y.o. female  Medical Record #: [id number]      ASSESSMENT/PLAN: (Interval History)  Stefania Santillan is seen and examined and management for following issues:    ICH  • Seen by NS and no surgical intervention indicated. • Cleared to resume ASA. • Goal SBP < 140. • Continue atorvastatin. • Therapy per primary service. • Outpt follow-up with Neurology. HTN  • Home: Telmisartan and atenolol  • Here: amlodipine 10mg daily, Losartan 100mg daily, Hydralazine 25mg every 8 hours. • Monitor BP and adjust medications as needed. DM type 2  • HA1C 6.7  • Home: Metformin 1000mg 2x daily. • Here: SSI  • Continue DM diet. • Restart metformin 500mg 2x daily. Anemia  • Baseline hgb 10 - 11. • Ferritin 22 and TIBC 370  • B12 low normal.  • Continue iron and B12 supplementation. • Monitor CBC     Anxiety  • Continue Wellbutrin. • Pt is taking 150mg instead of 300mg due to concerns for increased risk of seizure    Constipation  · Last BM was prior to admission  · KUB with mild fecal retention  · Bowel meds per PMR    The above assessment and plan was reviewed and updated as determined by my evaluation of the patient on 8/26/2023.     Labs:   Results from last 7 days   Lab Units 08/24/23  0515 08/21/23  1404   WBC Thousand/uL 10.09 11.15*   HEMOGLOBIN g/dL 12.0 12.2   HEMATOCRIT % 37.6 38.6   PLATELETS Thousands/uL 360 382     Results from last 7 days   Lab Units 08/24/23  0515 08/22/23  0545   SODIUM mmol/L 135 138   POTASSIUM mmol/L 3.9 4.2   CHLORIDE mmol/L 100 105   CO2 mmol/L 26 25   BUN mg/dL 19 22   CREATININE mg/dL 0.56* 0.70   CALCIUM mg/dL 9.3 9.5             Results from last 7 days   Lab Units 08/26/23  0610 08/25/23  2042 08/25/23  1544   POC GLUCOSE mg/dl 153* 177* 105       Review of Scheduled Meds:  Current Facility-Administered Medications   Medication Dose Route Frequency Provider Last Rate   • albuterol  2 puff Inhalation Q4H MICHELLEN Sabrina Bobo Yuniel, DO     • amLODIPine  10 mg Oral Daily Loagn Shock, DO     • aspirin  81 mg Oral Daily Logan Shock, DO     • atorvastatin  20 mg Oral Daily With Kane Gibbsius, DO     • bisacodyl  5 mg Oral Daily PRN Logan Shock, DO     • bisacodyl  10 mg Rectal Once Jessica Beebe MD     • buPROPion  150 mg Oral QAM Logan Shock, DO     • vitamin B-12  1,000 mcg Oral Daily Logan Shock, DO     • ferrous sulfate  325 mg Oral Daily With Breakfast Logan Shock, DO     • fluticasone-vilanterol  1 puff Inhalation Daily Logan Shock, DO     • gabapentin  300 mg Oral TID Logan Shock, DO     • heparin (porcine)  5,000 Units Subcutaneous Q8H [address] Logan Shock, DO     • hydrALAZINE  25 mg Oral Q8H [address] Logan Shock, DO     • insulin lispro  1-6 Units Subcutaneous TID AC Logan Shock, DO     • insulin lispro  1-6 Units Subcutaneous HS Logan Shock, DO     • lactulose  20 g Oral Daily PRN Logan Shock, DO     • lactulose  20 g Oral Once Jessica Beebe MD     • losartan  100 mg Oral Daily Logan Shock, DO     • methocarbamol  500 mg Oral Q6H PRN Logan Shock, DO     • ondansetron  4 mg Oral Q6H PRN Logan Shock, DO     • oxyCODONE  5 mg Oral Q4H PRN Logan Shock, DO     • oxyCODONE  2.5 mg Oral Q4H PRN Logan Shock, DO     • pantoprazole  40 mg Oral Early Morning Logan Shock, DO     • polyethylene glycol  17 g Oral Daily Logan Shock, DO     • prochlorperazine  5 mg Oral Q6H PRN Logan Shock, DO     • senna-docusate sodium  2 tablet Oral HS Logan Shock, DO         Subjective/ HPI: Patient seen and examined. Patients overnight issues or events were reviewed with nursing or staff during rounds or morning huddle session. New or overnight issues include the following:     Pt seen in her room. She reports fatigue from therapy today. She denies any other complaints. ROS:   A 10 point ROS was performed; negative except as noted above.        Imaging:     XR abdomen 1 view kub   Final Result by Jasmina Colón MD (08/26 1133)   Some formed stool in the rectosigmoid colon, but overall fecal retention is mild. Workstation performed: [id number]             *Labs /Radiology studies Reviewed  *Medications  reviewed and reconciled as needed  *Please refer to order section for additional ordered labs studies  *Case discussed with primary attending during morning huddle case rounds    Physical Examination:  Vitals:   Vitals:    08/25/23 2032 08/26/23 0542 08/26/23 0557 08/26/23 0808   BP: 121/69 119/71  104/66   BP Location: Right arm Right arm  Left arm   Pulse: 82 78     Resp: 19 18     Temp: 98.6 °F (37 °C) 98.2 °F (36.8 °C)     TempSrc: Oral Oral     SpO2: 95% 96%     Weight:   96 kg (211 lb 10.3 oz)    Height:         General Appearance: no distress, conversive  HEENT: PERRLA, conjuctiva normal; oropharynx clear; mucous membranes moist;   Neck:  Supple, no lymphadenopathy or thyromegaly  Lungs: CTA, normal respiratory effort, no retractions, expiratory effort normal  CV: regular rate and rhythm , PMI normal   ABD: soft non tender, no masses , no hepatic or splenomegaly  EXT: DP pulses intact, no lymphadenopathy, no edema  Skin: normal turgor, normal texture, no rash  Psych: affect normal, mood normal  Neuro: AAOx3. Rt hemiplegia. The above physical exam was reviewed and updated as determined by my evaluation of the patient on 8/26/2023. Invasive Devices     Drain  Duration           External Urinary Catheter 11 days                   VTE Pharmacologic Prophylaxis: Heparin  Code Status: Level 1 - Full Code  Current Length of Stay: 1 day(s)      Total time spent:  30 minutes with more than 50% spent counseling/coordinating care. Counseling includes discussion with patient re: progress  and discussion with patient of his/her current medical state/information. Coordination of patient's care was performed in conjunction with primary service.  Time invested included review of patient's labs, vitals, and management of their comorbidities with continued monitoring. In addition, this patient was discussed with medical team including physician and advanced extenders. The care of the patient was extensively discussed and appropriate treatment plan was formulated unique for this patient. Medical decision making for the day was made by supervising physician unless otherwise noted in their attestation statement. ** Please Note:  voice to text software may have been used in the creation of this document.  Although proof errors in transcription or interpretation are a potential of such software**

## 2023-08-26 NOTE — PROGRESS NOTES
PT ICP     08/26/23 1330   Rehab Team Goals   Transfer Team Goal Patient will require supervision with transfers with least restrictive device upon completion of rehab program   Locomotion Team Goal Patient will require supervision with locomotion with least restrictive device upon completion of rehab program  (ambulatory vs w/c level)   Rehab Team Interventions   PT Interventions Gait Training; Therapeutic Exercise;Neuromuscualr Reeducation;Transfer Training;Bed Mobility; Wheelchair Mobility; Patient/Family Education   PT Transfer Goal   Roll left and right Goal 05. Setup or clean-up assistance - [place] SETS UP or CLEANS UP, patient completes activity. [place] assists only prior to or following the activity. Sit to lying Goal 04. Supervision or touching assistance- [place] provides VERBAL CUES or supervision throughout activity. Lying to sitting on side of bed Goal 04. Supervision or touching assistance- [place] provides VERBAL CUES or supervision throughout activity. Sit to stand Goal 04. Supervision or touching assistance- [place] provides VERBAL CUES or supervision throughout activity. Chair/bed-to-chair transfer Goal 04. Supervision or touching assistance- [place] provides VERBAL CUES or supervision throughout activity. Car Transfer Goal 04. TOUCHING/ STEADYING assistance as patient completes activity. Assistive Device   (LRAD)   Environment Level Surface; Well Lit   Status Ongoing; Other  (6 weeks)   Locomotion Goal   Primary discharge mode of locomotion Both   Target Walk Distance 50 ft   Assist Device   (LRAD)   Environment Level Surface;Tile Floor   Walk 10 feet Goal 04. Supervision or touching assistance- [place] provides VERBAL CUES or supervision throughout activity. Walk 50 feet with 2 turns Goal 04. Supervision or touching assistance- [place] provides VERBAL CUES or supervision throughout activity. Walk 150 feet Goal 88.  Not attempted due to medical condition or safety concerns   Walking 10 feet on uneven surface 04. TOUCHING/ STEADYING assistance as patient completes activity. Walking Goal Status Ongoing; Other  (6 weeks)   Type of Wheelchair Used 1. Manual   Target Wheel Distance- Level 200 ft   Wheel 50 feet with 2 turns Goal 06. Independent - Patient completes the activity by him/herself with no assistance from a helper. Wheel 150 feet Goal 06. Independent - Patient completes the activity by him/herself with no assistance from a helper. Decrease Assist With Locking Brakes;Obstacles   Environment Level Surface; Well Lit   Wheelchair Goal Status Ongoing; Other  (6 weeks)   Stairs Goal   1 step or curb goal 03. Partial/moderate assistance - [place] does less than half the effort. [place] lifts or holds trunk or limbs and provides more than half the effort. 4 steps Goal 03. Partial/moderate assistance - [place] does less than half the effort. [place] lifts or holds trunk or limbs and provides more than half the effort. 12 steps Goal 10. Not attempted due to environmental limitations (e.g., lack of equipment, weather constraints)   Assist Level Minimum Assist   Number of Stairs 6   Technique Non-reciprocal   Hand Rail Left   Status Ongoing; Other  (6 weeks)   Object Retrieval Goal   Picking up object Goal 04. Supervision or touching assistance- [place] provides VERBAL CUES or supervision throughout activity.    Assistive Device  Reacher   Small Object Picked Up marker

## 2023-08-26 NOTE — PROGRESS NOTES
PT Initial Evaluation      08/26/23 6510   Patient Data   Rehab Impairment Impairment of mobility, safety and Activities of Daily Living (ADLs) due to Stroke:  01.2  Right Body Involvement (Left Brain)   Etiologic Diagnosis Acute Left Thalamic Intracerebral Hemorrhage with edema   Date of Onset 08/14/23   Support System   Name Luca Price   Relationship spouse   Phone Number [phone]   Home Setup   Type of Home Single Level   Method of Entry Stairs;Hand Rail Bilateral   Number of Stairs 5  (to 6)   Number of Stairs in Home   (has a basement but does not have to manage)   First Floor Bathroom Full;Tub; Shower;Combo   First Floor Setup Available Yes   Home Modification Comment pending progress   Available Equipment Roller Walker;Single Point Cane; Wheelchair  (w/c and RW from her family, used [facility])   Baseline Information   Vocation Work Full Time  ( at [employer])   Transportation    Prior Device(s) Used   (no AD)   Prior Level of Function   Self-Care 3. Independent - Patient completed the activities by him/herself, with or without an assistive device, with no assistance from a helper. Indoor-Mobility (Ambulation) 3. Independent - Patient completed the activities by him/herself, with or without an assistive device, with no assistance from a helper. Stairs 3. Independent - Patient completed the activities by him/herself, with or without an assistive device, with no assistance from a helper. Functional Cognition 3. Independent - Patient completed the activities by him/herself, with or without an assistive device, with no assistance from a helper. Prior Device Used Z.  None of the above  (no AD)   Falls in the Last Year   Number of falls in the past 12 months 1  (in the hospital)   Type of Injury Associated with Fall   (R arielle)   Patient Preference   Nickname (Patient Preference) Kirsten Fuentes   Pain Assessment   Pain Score No Pain   Transfer Bed/Chair/Wheelchair   Positioning Concerns Hemiplegia  (R sided)   Limitations Noted In Balance; Endurance;Problem Solving;UE Strength;LE Strength   Adaptive Equipment Transfer Board  (to the L)   Type of Assistance Needed Physical assistance   Physical Assistance Level Total assistance   Comment A x 2-3 SB transfers to the L, L LE blocking , max A x 2 with 3rd person to hold equipmenet in place as needed . Verbal cues for atn wt. shift and to keep L LE on the ground. Utilized 4" block w/c back to bed due to bed ht. discrepancy. Chair/Bed-to-Chair Transfer CARE Score 1   Roll Left and Right   Type of Assistance Needed Physical assistance   Physical Assistance Level 76% or more   Comment max A to the L min A to the R on flat surface no rails   Roll Left and Right CARE Score 2   Sit to Lying   Type of Assistance Needed Physical assistance   Physical Assistance Level Total assistance   Comment max A X 1 at mat, A x 2 in bed   Sit to Lying CARE Score 1   Lying to Sitting on Side of Bed   Type of Assistance Needed Physical assistance   Physical Assistance Level 76% or more   Comment assist for R LE and max A for trunk   Lying to Sitting on Side of Bed CARE Score 2   Sit to Stand   Type of Assistance Needed Physical assistance   Physical Assistance Level Total assistance   Comment max A X 2 outside of // bars using L UE to pull herself up. Max A X 2 to maintain standing for a chair swap out.    Sit to Stand CARE Score 1   Picking Up Object   Reason if not Attempted Safety concerns   Picking Up Object CARE Score 88   Car Transfer   Reason if not Attempted Safety concerns   Car Transfer CARE Score 88   Ambulation   Findings unsafe for now   Walk 10 Feet   Reason if not Attempted Safety concerns   Walk 10 Feet CARE Score 88   Walk 50 Feet with Two Turns   Reason if not Attempted Safety concerns   Walk 50 Feet with Two Turns CARE Score 88   Walk 150 Feet   Reason if not Attempted Safety concerns   Walk 150 Feet CARE Score 88   Walking 10 Feet on Uneven Surfaces   Reason if not Attempted Safety concerns   Walking 10 Feet on Uneven Surfaces CARE Score 88   Wheelchair mobility   Type of Wheelchair Used 1. Manual   Method Left upper extremity; Left lower extremity   Assistance Provided For Obstacles;Curbs;Remove Leg Rest;Replace Leg Rest;Remove armrests;Replace armrests   Distance Level Surface (feet) 150 ft   Wheel 50 Feet with Two Turns   Type of Assistance Needed Physical assistance   Physical Assistance Level 26%-50%   Wheel 50 Feet with Two Turns CARE Score 3   Wheel 150 Feet   Type of Assistance Needed Physical assistance   Physical Assistance Level 51%-75%   Wheel 150 Feet CARE Score 2   Curb or Single Stair   Reason if not Attempted Safety concerns   1 Step (Curb) CARE Score 88   4 Steps   Reason if not Attempted Safety concerns   4 Steps CARE Score 88   12 Steps   Reason if not Attempted Safety concerns   12 Steps CARE Score 88   Comprehension   QI: Comprehension 3. Usually Understands: Understands most conversations, but misses some part/intent of message. Requires cues at times to understand. Expression   QI: Expression 3. Exhibits some difficulty with expressing needs and ideas (e.g., some words or finishing thoughts) or speech is not clear   RLE Assessment   RLE Assessment X   Strength RLE   RLE Overall Strength 0/5   LLE Assessment   LLE Assessment WFL   Sensation   Light Touch Severe deficits in the RLE   Sharp/Dull Severe deficits in the RLE   Propioception Severe deficits in the RLE   Cognition   Arousal/Participation Alert; Cooperative   Attention Attends with cues to redirect   Memory Decreased short term memory   Following Commands Follows one step commands with increased time or repetition   Objective Measure   PT Findings        Therapeutic exercise/ activity:  BP in sitting 98/67, HR 95 bpm, SpO2 97%. Static sitting in bed : poor+ at mat F on firmer surface, standing balance was Poor - with max A X 2 to maintain standing.   GLuteal squeezes, bridging with R LE supported Discharge Information   Barriers to Return to [employer] Access;Skill Set Limitation;Transportation Issues;Strength; Endurance   Patient's Discharge Plan returnh ome with family support   Patient's Rehab Expectations "to get better"   Barriers to Discharge Home Limited Family Support;Decreased Strength;Decreased Endurance; Safety Considerations   Impressions Pt. Is a 63 y/o female who was admitted to due to acute onset of R UE and LE weakness with sensory loss with R sided facial drop and dysarthria. CT revealed acute intraparenchymal hemorrhage  in the left thalamic region with surrounding edema and MRI showed a stable left thalamic capsular intraparenchymal hemorrhage with surrounding edema without any other mass effect. Pt. Medical hx that can affect progress include Htn,hyperlipidemia,DM 2, Obesity, COPD ,fibromyalgia Sjogren syndrome , lumbar spondylosis with grade 1 spondylolisthesis. (See HPI from physiatrist for more details. Pt. Now admitted to [facility] and PT PT eval completed with patient presenting with R sided weakness (absence of voluntary movement on R LE), severe R LE sensory loss including proprioception, dec core control, dec righting reactions both sitting and standing, dec balance and dec activity tolerance. Due to these deficits, pt. Currently dependent A X 2-3 for functional transfers using SB and with sit to stands using bars . Pt. Also is max A x 1-2 with bed mobility and mod A with w/c propulsion. Pt. Was fully indep PTA and worked full time at [employer] [place]. Pt. Lives with her  who works night shift at the [employer]. Pt's barrier for d/c aside from R sided weakness includes: DAMIAN, and dec caregiver support . Pt. Is a fair rehab candidate and will benefit from skilled PT to maximized functional independence with dec burden of care at d/C. Goals are  min A with mobility with ELOS of 4  weeks.    PT Therapy Minutes   PT Time In 1330   PT Time Out 1500   PT Total Time (minutes) 90   PT Mode of treatment - Individual (minutes) 90   PT Mode of treatment - Concurrent (minutes) 0   PT Mode of treatment - Group (minutes) 0   PT Mode of treatment - Co-treat (minutes) 0   PT Mode of Treatment - Total time(minutes) 90 minutes   PT Cumulative Minutes 90   Cumulative Minutes   Cumulative therapy minutes 90

## 2023-08-26 NOTE — PLAN OF CARE
Problem: INFECTION - ADULT  Goal: Absence or prevention of progression during hospitalization  Description: INTERVENTIONS:  - Assess and monitor for signs and symptoms of infection  - Monitor lab/diagnostic results  - Monitor all insertion sites, i.e. indwelling lines, tubes, and drains  - Monitor endotracheal if appropriate and nasal secretions for changes in amount and color  - [place] appropriate cooling/warming therapies per order  - Administer medications as ordered  - Instruct and encourage patient and family to use good hand hygiene technique  - Identify and instruct in appropriate isolation precautions for identified infection/condition  Outcome: Progressing

## 2023-08-26 NOTE — PROGRESS NOTES
08/26/23 0840   Patient Data   Rehab Impairment Impairment of mobility, safety and Activities of Daily Living (ADLs) due to Stroke:  01.2  Right Body Involvement (Left Brain)   Etiologic Diagnosis Acute Left Thalamic Intracerebral Hemorrhage with edema   Date of Onset 08/14/23   Support System   Name Danitza Castillo    Able to provide 24 hour supervision No  (works midnight to 8 AM for [employer])   Able to provide physical help? Yes   Home Setup   Type of Home Single Level  (steps to basement to access laundry)   Method of Entry Stairs;Hand Rail Bilateral   Number of Stairs 5   Number of Stairs in Home   (0)   First Floor Bathroom Full;Tub; Shower;Combo;Curtain   First Floor Bathroom Accessibility   (suction GB, no shower chair and fixed shower head)   First Floor Setup Available Yes   Available Equipment Roller Walker; Wheelchair;Single [employer]  (WC/RW used to belong to her father in law, pt has Hx of using SPC a few years ago)   [address] Work Full Time  ( at [employer])   Transportation    Prior Device(s) Used   (none)   Prior IADL Participation   Money Management Identify Money;Estimate Costs;Estimate Change;Combine Bills;Manage Checkbook   Meal Preparation Full Participation   Laundry Full Participation  (in basement)   Home Cleaning Full Participation   Prior Level of Function   Self-Care 3. Independent - Patient completed the activities by him/herself, with or without an assistive device, with no assistance from a helper. Indoor-Mobility (Ambulation) 3. Independent - Patient completed the activities by him/herself, with or without an assistive device, with no assistance from a helper. Stairs 3. Independent - Patient completed the activities by him/herself, with or without an assistive device, with no assistance from a helper. Functional Cognition 3.  Independent - Patient completed the activities by him/herself, with or without an assistive device, with no assistance from a helper. Prior Assistance Needed for   (none)   Prior Device Used Z. None of the above   Falls in the Last Year   Number of falls in the past 12 months 1  (fall OOB to right side while reaching for something while in hospital)   Type of Injury Associated with Fall   (R black eye)   Psychosocial   Psychosocial (WDL) WDL   Patient Behaviors/Mood Anxious   Ability to Express Feelings Able to express   Ability to Express Needs Able to express   Ability to Express Thoughts Able to express   Ability to Understand Others Understands   Restrictions/Precautions   Precautions Fall Risk;Pain   Braces or Orthoses Sling  (RUE hemiplegia for Tx only)   Pain Assessment   Pain Assessment Tool 0-10   Pain Score 5   Pain Location/Orientation Orientation: Right;Location: Groin   Pain Onset/Description Onset: Ongoing;Frequency: Constant/Continuous; Descriptor: Sore; Other (Comment)  ("heavy")   Effect of Pain on Daily Activities none   Patient's Stated Pain Goal No pain   Hospital Pain Intervention(s) Repositioned   Oral Hygiene   Type of Assistance Needed Physical assistance   Physical Assistance Level Total assistance   Comment pt would be maxAx2 in stance for oral hygiene 2* impaired standing balance and R hemiplegia, pt seated in recliner with set up for safety to complete oral hygiene   Oral Hygiene CARE Score 1   Grooming   Able To Comb/Brush Hair;Wash/Dry Face;Brush/Clean Teeth   Limitation Noted In Coordination; Safety;Strength;Timeliness   Findings seated in recliner, Willian to brush right side of head 2* limited reach and RUE hemiplegia   Tub/Shower Transfer   Reason Not Assessed Balance; Endurance; Safety;Sponge Bath   Shower/Bathe Self   Type of Assistance Needed Physical assistance   Physical Assistance Level Total assistance   Comment pt seated in recliner to bathe UB/cynthia and upper legs, pt requires A to bathe under RUE and to bathe LUE 2* RUE hemiplegia.  Pt dependent for bathing BL LE 2* impaired sitting balance/decreased trunk control. Pt true maxAx1 with R knee blocked, gait belt and RUE cradled for protection with 2nd person (rehab aide) present for bathing buttocks. Shower/Bathe Self CARE Score 1   Bathing   Assessed Bath Style Sponge Bath   Anticipated D/C Bath Style Sponge Bath;Tub  (pending pt progress)   Able to Gather/Transport No   Able to [employer] Temperature No   Able to Wash/Rinse/Dry (body part) Right Arm;L Upper Leg;R Upper Leg;Chest;Abdomen;Perineal Area   Limitations Noted in Balance; Coordination; Endurance;ROM;Safety;Strength;Timeliness; Other  (R hemiplegia)   Positioning Seated;Standing   Dressing/Undressing Clothing   Remove UB Clothes Other  (hospital gown)   Don UB Clothes Pullover Shirt;Bra   Type of Assistance Needed Physical assistance   Physical Assistance Level 76% or more   Comment pt initially threading bra over LUE first, VC to thread over affected RUE first with pt requiring A, pt dependent for threading RUE and for clipping bra in back. Pt initially threading shirt overhead and requires cues to thread over RUE first then up overhead. pt requires A to thread over RUE and overhead and to manage over trunk especially on right side   Upper Body Dressing CARE Score 2   Remove LB [address] Pants;Socks; Other  (tab brief)   Type of Assistance Needed Physical assistance   Physical Assistance Level Total assistance   Comment dependent to thread tab brief and pants over BL LE, maxAx1 in stance with 2nd person to complete CM up over hips   Lower Body Dressing CARE Score 1   Limitations Noted In Balance; Coordination; Endurance; Safety;Strength; Sequencing;ROM; Timeliness; Other  (R hemiplegia)   Positioning Supported Sit;Standing   Putting On/Taking Off Footwear   Type of Assistance Needed Physical assistance   Physical Assistance Level Total assistance   Comment dependent to don/doff socks   Putting On/Taking Off Footwear CARE Score 1   Toileting Hygiene   Type of Assistance Needed Physical assistance   Physical Assistance Level Total assistance   Comment seated on platform drop arm commode to attempt to have BM however pt unable to go with inc time, pt has not had BM in at least a week, pt with step stool under BL feet due to pt 5' and too short for [facility] with feet dangling   Toileting Hygiene CARE Score 1   Toilet Transfer   Surface Assessed Drop Arm Commode   Transfer Technique Stand Pivot;Slide Board   Limitations Noted In Balance; Endurance; Safety; Sensation; Sequencing;UE Strength;LE Strength   Positioning Concerns Safety; Other  (R hemiplegia)   Type of Assistance Needed Physical assistance   Physical Assistance Level Total assistance   Comment Ax3 with gait belt to stand pivot bed to drop arm platform BSC, pt maxAx2 for slide board from drop arm BSC to drop arm recliner, recommend therapy transfers only at this time and bed pan otherwise with board updated   Toilet Transfer CARE Score 1   Toileting   Able to [address] down no, up no. Limitations Noted In Balance; Coordination; Safety; Sequencing;UE Strength;LE Strength   Transfer Bed/Chair/Wheelchair   Positioning Concerns Hemiplegia  (R sided)   Limitations Noted In Balance; Coordination; Endurance;Sensation; Sequencing;UE Strength;LE Strength   Adaptive Equipment Transfer Board   Type of Assistance Needed Physical assistance   Physical Assistance Level Total assistance   Comment maxAx2 SB transfer, use of 4" block 2* pt height with pt unable to reach ground w/o block to place feet on, max verbal cues and assist for weight shifting and maintaining balance with limited trunk control and impaired sensation to RUE/RLE   Chair/Bed-to-Chair Transfer CARE Score 1   Lying to Sitting on Side of Bed   Type of Assistance Needed Physical assistance   Physical Assistance Level Total assistance   Comment maxAx2 supine to sit, A for mgmt of RLE and for trunk support   Lying to Sitting on Side of Bed CARE Score 1   Sit to Stand   Type of Assistance Needed Physical assistance   Physical Assistance Level Total assistance   Comment maxAx2 in stance with therapist, cues for L hand placement and not to place hand on therapist neck   Sit to Stand CARE Score 1   Comprehension   QI: Comprehension 3. Usually Understands: Understands most conversations, but misses some part/intent of message. Requires cues at times to understand. Expression   QI: Expression 3. Exhibits some difficulty with expressing needs and ideas (e.g., some words or finishing thoughts) or speech is not clear   RUE Assessment   RUE Assessment X  (0/5 flaccid)   LUE Assessment   LUE Assessment WFL   Coordination   Movements are Fluid and Coordinated 0   Sensation   Light Touch Severe deficits in the RUE;Severe deficits in the RLE   Sharp/Dull Severe deficits in the RUE   Propioception Severe deficits in the RUE;Severe deficits in the RLE   Cognition   Overall Cognitive Status [facility]   Arousal/Participation Alert; Cooperative   Attention Attends with cues to redirect   Orientation Level Oriented X4   Memory Decreased short term memory   Following Commands Follows one step commands with increased time or repetition   Vision   Vision Comments pt wears glasses, pt notes the only change in vision since stroke seems to be "everything is more dull and not as sharp", pt may benefit from additional evaluation of vision during future OT sessions   Perception   Inattention/Neglect Appears intact   Objective Measure   OT Findings (S)  BP seated /70 LUE manual with BRENDA Sheffield made aware. Pt with intermittent c.o. dizziness/lightheadedness with positional changes though it dissipates quickly   Discharge Information   Barriers to Return to [employer] Access;Skill Set Limitation;Transportation Issues;Strength; Endurance   Patient's Discharge Plan return home with family support   Patient's Rehab Expectations "to go home and be stronger".    Barriers to Discharge Home Limited Family Support;Decreased Strength;Decreased Endurance; Safety Considerations   Impressions Pt is a 62 y.o. female seen for OT evaluation at [facility]LON s/p acute left thalamic intracerebral hemorrhage with surrounding edema and right sided weakness on 8/14/2023, pt was at work at [employer] when she developed sudden onset of right sided weakness and taken to ER. Pt stay further complicated by fall out of hospital bed to right side when reaching for something on 8/18 with positive head strike and rapid response called. Pt PMH includes: hypertension, hyperlipidemia, diabetes, obesity, eosinophilic asthma, COPD, fibromyalgia, Sjogren's, lumbar spondylosis, grade 1 anterior spondylolisthesis, mild lumbar neuroforaminal narrowing, left epidural steroid injection in February 2023. Pt reports living independent in 1 story home with her  PTA, pt reports having supportive sister and friends nearby though pt does not have children. Pt reports PLOF as independent for all ADL/IADL, working full time as [employer] , and . Pt is primary homemaker completing cooking/cleaning/laundry at baseline though reports  is able to assist now. Upon evaluation, pt current level of fxn: total/maxAx2-3 people for all fxnl transfers, total A for LB dressing, maxAx1 for UB dressing, modA for UB bathing, total Ax2 for LB bathing, and total A for grooming, total Ax2 for toileting due to the following deficits impacting occupational performance: R hemiplegia, RUE flaccid, impaired trunk control, impaired sensation to RUE/RLE, impaired sitting balance, impaired standing balance, impaired standing tolerance, deconditioning/decreased activity tolerance, and decreased safety awareness. Based on OT evaluation, pt demonstrates good rehab potential and would benefit from skilled OT services to reach pt goals of supervision w/ ELOS 6 weeks to return to least restrictive environment pending pt progress.    OT Therapy Minutes   OT Time In 0840   OT Time Out 1030   OT Total Time (minutes) 110   OT Mode of treatment - Individual (minutes) 110   OT Mode of treatment - Concurrent (minutes) 0   OT Mode of treatment - Group (minutes) 0   OT Mode of treatment - Co-treat (minutes) 0   OT Mode of Treatment - Total time(minutes) 110 minutes   OT Cumulative Minutes 110   Cumulative Minutes   Cumulative therapy minutes 110

## 2023-08-26 NOTE — PROGRESS NOTES
08/26/23 0840   Rehab Team Goals   ADL Team Goal Patient will require supervision with ADLs with least restrictive device upon completion of rehab program   Transfer Team Goal Patient will require supervision with transfers with least restrictive device upon completion of rehab program   Cognitive Team Goal Patient will be independent for basic and complex tasks upon completion of rehab program   Rehab Team Interventions   OT Interventions Self Care;Home Management; Therapeutic Exercise;Community Reintegration;Cognitive Reintegration;Cognitive Retraining;Energy Conservation;Spl[employer] Fabrication/Modification;Patient/Family Education   Eating Goal   Eating Goal 05. Setup or clean-up assistance - [place] SETS UP or CLEANS UP, patient completes activity. [place] assists only prior to or following the activity. Assist Device   (least restrictive AE)   Meal Complete All meals   Diet Level Regular   Status Ongoing; Other  (6 weeks)   Grooming Goal   Oral Hygiene Goal 05. Setup or clean-up assistance - [place] SETS UP or CLEANS UP, patient completes activity. [place] assists only prior to or following the activity. Task Complete Groom   Environment Seated at [employer]   Status Ongoing; Other  (6 weeks)   Intervention Balance Work;Neuromuscular Education; Therapeutic Exercise; Tolerance Work   Tub/Shower Transfer Goal   Method Tub Shower  (Willian pending pt progress)   Assist Device Tub Bench;Transfer Board;Hand Held [employer]   Status Ongoing; Other  (6 weeks)   Interventions ADL Training;Assistive Device; Neuromuscular Education   Bathing Goal   Shower/bathe self Goal 04. Supervision or touching assistance- [place] provides VERBAL CUES or supervision throughout activity. Environment Seated;Standing;Tub; Shower   Adaptive Equipment Long Handle Sponge;Transfer bench;Seat with back;Grab Bar;Hand Held Shower   Status Ongoing; Other  (6 weeks)   Intervention ADL Training;Assistive Device; Neuromuscular Education; Therapeutic Exercise Upper Body Dressing Goal   Upper body dressing Goal 05. Setup or clean-up assistance - [place] SETS UP or CLEANS UP, patient completes activity. [place] assists only prior to or following the activity. Task Upper Body;Arms in/out; Over Head   Environment Seated   Status Ongoing; Other  (6 weeks)   Intervention Balance Work;Neuromuscular Education; Therapeutic Exercise; Tolerance Work   Lower Body Dressing Goal   Lower body dressing Goal 04. Supervision or touching assistance- [place] provides VERBAL CUES or supervision throughout activity. Putting on/taking off footwear Goal 04. Supervision or touching assistance- [place] provides VERBAL CUES or supervision throughout activity. Task Lower Body   Adaptive Equipment Reacher; [place]oe [place];Sock Aide;Dressing Stick; Elastic Laces   Environment Seated;Standing   Safety Precautions Other  (R hemiplegia)   Status Ongoing; Other  (6 weeks)   Intervention Assistive Device;Balance Work;Neuromuscular Education; Therapeutic Exercise; Tolerance Work   Toileting Transfer Goal   Toilet transfer Goal 04. Supervision or touching assistance- [place] provides VERBAL CUES or supervision throughout activity. Assistive Device   (LRAD)   Safety Other  (R hemiplegia)   Status Ongoing; Other  (6 weeks)   Intervention Balance Work;ADL Training;Assistive Device   Toileting Goal   Toileting hygiene Goal 04. Supervision or touching assistance- [place] provides VERBAL CUES or supervision throughout activity. Task Pants Up;Pants Down;Hygiene   Safety Balance   Status Ongoing; Other  (6 weeks)   Intervention ADL Training;Balance Work;Assistive Device   PT Transfer Goal   Roll left and right Goal 05. Setup or clean-up assistance - [place] SETS UP or CLEANS UP, patient completes activity. [place] assists only prior to or following the activity. Sit to lying Goal 04. Supervision or touching assistance- [place] provides VERBAL CUES or supervision throughout activity. Lying to sitting on side of bed Goal 04. Supervision or touching assistance- [place] provides VERBAL CUES or supervision throughout activity. Sit to stand Goal 04. Supervision or touching assistance- [place] provides VERBAL CUES or supervision throughout activity. Chair/bed-to-chair transfer Goal 04. Supervision or touching assistance- [place] provides VERBAL CUES or supervision throughout activity. Assistive Device   (LRAD)   Environment Level Surface; Well Lit   Status Ongoing; Other  (6 weeks)   Community Reintegration Goal   Goal Willian   Status Ongoing; Other  (6 weeks)   Interventions Community Outing; Activity Tolerance;Leisure Activity   Meal Prep and Kitchen Mobility   Assist Level Minimum Assist  (full meal prep seated in WC pending pt progress)   Status Ongoing; Other  (6 weeks)   Medication Management   Assist Level Independent  (mod I with mediplanner which pt uses at baseline)   Status Ongoing; Other  (6 weeks)   Laundry   Assist Level Moderate Assist  (folding and putting away only, likely WC level pending pt progress, laundry in basement and spouse will need to complete)   Status Ongoing; Other  (6 weeks)   Finance Management   Assist Level Independent   Status Ongoing; Other  (6 weeks)

## 2023-08-27 LAB
GLUCOSE SERPL-MCNC: 147 MG/DL (ref 65–140)
GLUCOSE SERPL-MCNC: 167 MG/DL (ref 65–140)
GLUCOSE SERPL-MCNC: 169 MG/DL (ref 65–140)
GLUCOSE SERPL-MCNC: 266 MG/DL (ref 65–140)

## 2023-08-27 PROCEDURE — 97530 THERAPEUTIC ACTIVITIES: CPT

## 2023-08-27 PROCEDURE — 97535 SELF CARE MNGMENT TRAINING: CPT

## 2023-08-27 PROCEDURE — 99232 SBSQ HOSP IP/OBS MODERATE 35: CPT | Performed by: PHYSICAL MEDICINE & REHABILITATION

## 2023-08-27 PROCEDURE — 97112 NEUROMUSCULAR REEDUCATION: CPT

## 2023-08-27 PROCEDURE — 82948 REAGENT STRIP/BLOOD GLUCOSE: CPT

## 2023-08-27 PROCEDURE — 99232 SBSQ HOSP IP/OBS MODERATE 35: CPT | Performed by: INTERNAL MEDICINE

## 2023-08-27 PROCEDURE — 97110 THERAPEUTIC EXERCISES: CPT

## 2023-08-27 RX ADMIN — POLYETHYLENE GLYCOL 3350 17 G: 17 POWDER, FOR SOLUTION ORAL at 09:09

## 2023-08-27 RX ADMIN — HEPARIN SODIUM 5000 UNITS: 5000 INJECTION INTRAVENOUS; SUBCUTANEOUS at 05:05

## 2023-08-27 RX ADMIN — HYDRALAZINE HYDROCHLORIDE 25 MG: 25 TABLET ORAL at 13:55

## 2023-08-27 RX ADMIN — BUPROPION HYDROCHLORIDE 150 MG: 150 TABLET, FILM COATED, EXTENDED RELEASE ORAL at 09:10

## 2023-08-27 RX ADMIN — CYANOCOBALAMIN TAB 500 MCG 1000 MCG: 500 TAB at 09:09

## 2023-08-27 RX ADMIN — FERROUS SULFATE TAB 325 MG (65 MG ELEMENTAL FE) 325 MG: 325 (65 FE) TAB at 09:09

## 2023-08-27 RX ADMIN — GABAPENTIN 300 MG: 300 CAPSULE ORAL at 16:45

## 2023-08-27 RX ADMIN — PANTOPRAZOLE SODIUM 40 MG: 40 TABLET, DELAYED RELEASE ORAL at 05:05

## 2023-08-27 RX ADMIN — SENNOSIDES AND DOCUSATE SODIUM 2 TABLET: 50; 8.6 TABLET ORAL at 21:32

## 2023-08-27 RX ADMIN — OXYCODONE HYDROCHLORIDE 5 MG: 5 TABLET ORAL at 00:13

## 2023-08-27 RX ADMIN — OXYCODONE HYDROCHLORIDE 5 MG: 5 TABLET ORAL at 13:55

## 2023-08-27 RX ADMIN — OXYCODONE HYDROCHLORIDE 5 MG: 5 TABLET ORAL at 18:01

## 2023-08-27 RX ADMIN — HEPARIN SODIUM 5000 UNITS: 5000 INJECTION INTRAVENOUS; SUBCUTANEOUS at 21:32

## 2023-08-27 RX ADMIN — METHOCARBAMOL 500 MG: 500 TABLET ORAL at 00:15

## 2023-08-27 RX ADMIN — GABAPENTIN 300 MG: 300 CAPSULE ORAL at 09:09

## 2023-08-27 RX ADMIN — INSULIN LISPRO 1 UNITS: 100 INJECTION, SOLUTION INTRAVENOUS; SUBCUTANEOUS at 16:45

## 2023-08-27 RX ADMIN — HEPARIN SODIUM 5000 UNITS: 5000 INJECTION INTRAVENOUS; SUBCUTANEOUS at 13:56

## 2023-08-27 RX ADMIN — HYDRALAZINE HYDROCHLORIDE 25 MG: 25 TABLET ORAL at 21:32

## 2023-08-27 RX ADMIN — FLUTICASONE FUROATE AND VILANTEROL TRIFENATATE 1 PUFF: 200; 25 POWDER RESPIRATORY (INHALATION) at 09:10

## 2023-08-27 RX ADMIN — OXYCODONE HYDROCHLORIDE 5 MG: 5 TABLET ORAL at 04:15

## 2023-08-27 RX ADMIN — ASPIRIN 81 MG CHEWABLE TABLET 81 MG: 81 TABLET CHEWABLE at 09:09

## 2023-08-27 RX ADMIN — INSULIN LISPRO 1 UNITS: 100 INJECTION, SOLUTION INTRAVENOUS; SUBCUTANEOUS at 21:33

## 2023-08-27 RX ADMIN — GABAPENTIN 300 MG: 300 CAPSULE ORAL at 21:32

## 2023-08-27 RX ADMIN — ATORVASTATIN CALCIUM 20 MG: 20 TABLET, FILM COATED ORAL at 16:45

## 2023-08-27 RX ADMIN — INSULIN LISPRO 3 UNITS: 100 INJECTION, SOLUTION INTRAVENOUS; SUBCUTANEOUS at 12:18

## 2023-08-27 NOTE — PROGRESS NOTES
Internal Medicine Progress Note  Patient: Candis Flores  Age/sex: 62 y.o. female  Medical Record #: [id number]      ASSESSMENT/PLAN: (Interval History)  Candis Flores is seen and examined and management for following issues:    ICH  • Seen by NS and no surgical intervention indicated. • Cleared to resume ASA. • Goal SBP < 140. • Continue atorvastatin. • Therapy per primary service. • Outpt follow-up with Neurology. HTN  • Home: Telmisartan and atenolol  • Here: amlodipine 10mg daily, Losartan 100mg daily, Hydralazine 25mg every 8 hours. • Monitor BP and adjust medications as needed. DM type 2  • HA1C 6.7  • Home: Metformin 1000mg 2x daily. • Here: metformin 500mg 2x daily  • Continue DM diet, accuchecks and SSI     Anemia  • Baseline hgb 10 - 11. • Ferritin 22 and TIBC 370  • B12 low normal.  • Continue iron and B12 supplementation. • Monitor CBC     Anxiety  • Continue Wellbutrin. • Pt is taking 150mg instead of 300mg due to concerns for increased risk of seizure    Constipation  · Large BM 8/26/23. · Bowel meds per PMR    The above assessment and plan was reviewed and updated as determined by my evaluation of the patient on 8/27/2023.     Labs:   Results from last 7 days   Lab Units 08/24/23  0515 08/21/23  1404   WBC Thousand/uL 10.09 11.15*   HEMOGLOBIN g/dL 12.0 12.2   HEMATOCRIT % 37.6 38.6   PLATELETS Thousands/uL 360 382     Results from last 7 days   Lab Units 08/24/23  0515 08/22/23  0545   SODIUM mmol/L 135 138   POTASSIUM mmol/L 3.9 4.2   CHLORIDE mmol/L 100 105   CO2 mmol/L 26 25   BUN mg/dL 19 22   CREATININE mg/dL 0.56* 0.70   CALCIUM mg/dL 9.3 9.5             Results from last 7 days   Lab Units 08/27/23  0559 08/26/23  2051 08/26/23  1559   POC GLUCOSE mg/dl 147* 139 174*       Review of Scheduled Meds:  Current Facility-Administered Medications   Medication Dose Route Frequency Provider Last Rate   • albuterol  2 puff Inhalation Q4H PRN Norman Urbina DO     • amLODIPine  10 mg Oral Daily Mar Prima, DO     • aspirin  81 mg Oral Daily Mar Prima, DO     • atorvastatin  20 mg Oral Daily With Brayan Rho, DO     • bisacodyl  5 mg Oral Daily PRN Mar Prima, DO     • buPROPion  150 mg Oral QAM Mar Prima, DO     • calcium carbonate  1,000 mg Oral TID PRN Laureen Tracy PA-C     • vitamin B-12  1,000 mcg Oral Daily Mar Prima, DO     • ferrous sulfate  325 mg Oral Daily With Breakfast Mar Prima, DO     • fluticasone-vilanterol  1 puff Inhalation Daily Mar Prima, DO     • gabapentin  300 mg Oral TID Mar Prima, DO     • heparin (porcine)  5,000 Units Subcutaneous Q8H [address] Mar Prima, DO     • hydrALAZINE  25 mg Oral Q8H [address] Mar Prima, DO     • insulin lispro  1-6 Units Subcutaneous TID AC Mar Prima, DO     • insulin lispro  1-6 Units Subcutaneous HS Mar Prima, DO     • lactulose  20 g Oral Daily PRN Mar Prima, DO     • losartan  100 mg Oral Daily Mar Prima, DO     • methocarbamol  500 mg Oral Q6H PRN Mar Prima, DO     • ondansetron  4 mg Oral Q6H PRN Mar Prima, DO     • oxyCODONE  5 mg Oral Q4H PRN Mar Prima, DO     • oxyCODONE  2.5 mg Oral Q4H PRN Mar Prima, DO     • pantoprazole  40 mg Oral Early Morning Mar Prima, DO     • polyethylene glycol  17 g Oral Daily Mar Prima, DO     • prochlorperazine  5 mg Oral Q6H PRN Mar Prima, DO     • senna-docusate sodium  2 tablet Oral HS Mar Prima, DO         Subjective/ HPI: Patient seen and examined. Patients overnight issues or events were reviewed with nursing or staff during rounds or morning huddle session. New or overnight issues include the following:     Pt seen in her room with family present. She reports that therapy is going well. She denies any current complaints. ROS:   A 10 point ROS was performed; negative except as noted above.        Imaging:     XR abdomen 1 view kub   Final Result by Jocelyne Ovalle MD (08/26 4751)   Some formed stool in the rectosigmoid colon, but overall fecal retention is mild. Workstation performed: [id number]             *Labs /Radiology studies Reviewed  *Medications  reviewed and reconciled as needed  *Please refer to order section for additional ordered labs studies  *Case discussed with primary attending during morning huddle case rounds    Physical Examination:  Vitals:   Vitals:    08/26/23 2029 08/27/23 0506 08/27/23 0538 08/27/23 0907   BP: 106/68 102/63  108/61   BP Location: Right arm Left arm     Pulse: 85 72     Resp: 16 16     Temp: 98.6 °F (37 °C) 97.8 °F (36.6 °C)     TempSrc: Oral Oral     SpO2: 97% 97%     Weight:   96.2 kg (212 lb)    Height:         General Appearance: no distress, conversive  HEENT: PERRLA, conjuctiva normal; oropharynx clear; mucous membranes moist;   Neck:  Supple, no lymphadenopathy or thyromegaly  Lungs: CTA, normal respiratory effort, no retractions, expiratory effort normal  CV: regular rate and rhythm , PMI normal   ABD: soft non tender, no masses , no hepatic or splenomegaly  EXT: DP pulses intact, no lymphadenopathy, no edema  Skin: normal turgor, normal texture, no rash  Psych: affect normal, mood normal  Neuro: AAOx3. Rt sided weakness unchanged    The above physical exam was reviewed and updated as determined by my evaluation of the patient on 8/27/2023. Invasive Devices     Drain  Duration           External Urinary Catheter 12 days                   VTE Pharmacologic Prophylaxis: Heparin  Code Status: Level 1 - Full Code  Current Length of Stay: 2 day(s)      Total time spent:  30 minutes with more than 50% spent counseling/coordinating care. Counseling includes discussion with patient re: progress  and discussion with patient of his/her current medical state/information. Coordination of patient's care was performed in conjunction with primary service. Time invested included review of patient's labs, vitals, and management of their comorbidities with continued monitoring.  In addition, this patient was discussed with medical team including physician and advanced extenders. The care of the patient was extensively discussed and appropriate treatment plan was formulated unique for this patient. Medical decision making for the day was made by supervising physician unless otherwise noted in their attestation statement. ** Please Note:  voice to text software may have been used in the creation of this document.  Although proof errors in transcription or interpretation are a potential of such software**

## 2023-08-27 NOTE — PROGRESS NOTES
PM&R Coverage Progress Note:    Rehabilitation Diagnosis: Left-sided ICH    ASSESSMENT: Stable      PLAN:    Rehabilitation  • Continue current rehabilitation plan of care to maximize function. • No funcitonal barriers identified    Medical issues  • No acute concerns  • Multi-Podus boot ordered for the right foot and ankle: Use as tolerated while supine. Okay to remove if increases pain or spasms. • Emerging spasticity: Spasms occurring in right leg. Having some groin pain additionally. Patient may benefit from consideration for baclofen or Valium. Will relay to primary attending to consider. • Continue current medical plan of care. Appreciate IM consultants co-management. SUBJECTIVE: Patient seen face to face. No acute issues. Feeling very tired with therapies. progressing as expected in rehabilitation program.         ROS:  A ten point review of systems was completed on 08/27/23 and pertinent positives are listed in subjective section. All other systems reviewed were negative. OBJECTIVE:   /70 (BP Location: Left arm)   Pulse 90   Temp (!) 97.4 °F (36.3 °C) (Oral)   Resp 16   Ht 5' (1.524 m)   Wt 96.2 kg (212 lb)   SpO2 97%   BMI 41.40 kg/m²     Physical Exam  Vitals and nursing note reviewed. Constitutional:       General: She is not in acute distress. Appearance: She is well-developed. HENT:      Head: Normocephalic. Nose: Nose normal.   Eyes:      Conjunctiva/sclera: Conjunctivae normal.   Cardiovascular:      Rate and Rhythm: Normal rate. Pulmonary:      Effort: Pulmonary effort is normal.      Breath sounds: No wheezing. Abdominal:      General: There is no distension. Palpations: Abdomen is soft. Musculoskeletal:      Cervical back: Neck supple. Comments: Developing spasticity   Skin:     General: Skin is warm. Neurological:      Mental Status: She is alert and oriented to person, place, and time.       Motor: Weakness (dense right hemiparesis) present.    Psychiatric:         Mood and Affect: Mood normal.            Personally reviewed on 08/27/23:   Lab Results   Component Value Date    WBC 10.09 08/24/2023    HGB 12.0 08/24/2023    HCT 37.6 08/24/2023    MCV 85 08/24/2023     08/24/2023     Lab Results   Component Value Date    SODIUM 135 08/24/2023    K 3.9 08/24/2023     08/24/2023    CO2 26 08/24/2023    BUN 19 08/24/2023    CREATININE 0.56 (L) 08/24/2023    GLUC 160 (H) 08/24/2023    CALCIUM 9.3 08/24/2023     Lab Results   Component Value Date    INR 1.06 08/15/2023    INR 0.94 08/14/2023    PROTIME 14.0 08/15/2023    PROTIME 13.2 08/14/2023           Current Facility-Administered Medications:   •  albuterol (PROVENTIL HFA,VENTOLIN HFA) inhaler 2 puff, 2 puff, Inhalation, Q4H PRN, Marylen Climes, DO  •  amLODIPine (NORVASC) tablet 10 mg, 10 mg, Oral, Daily, Marylen Climes, DO, 10 mg at 08/26/23 1022  •  aspirin chewable tablet 81 mg, 81 mg, Oral, Daily, Marylen Climes, DO, 81 mg at 08/27/23 0909  •  atorvastatin (LIPITOR) tablet 20 mg, 20 mg, Oral, Daily With Dinner, Marylen Climes, DO, 20 mg at 08/26/23 1632  •  bisacodyl (DULCOLAX) EC tablet 5 mg, 5 mg, Oral, Daily PRN, Marylen Climes, DO  •  buPROPion (WELLBUTRIN XL) 24 hr tablet 150 mg, 150 mg, Oral, QAM, Marydasha Climes, DO, 150 mg at 08/27/23 8416  •  calcium carbonate (TUMS) chewable tablet 1,000 mg, 1,000 mg, Oral, TID PRN, Laureen Tracy PA-C, 1,000 mg at 08/26/23 1241  •  cyanocobalamin (VITAMIN B-12) tablet 1,000 mcg, 1,000 mcg, Oral, Daily, Marylen Climes, DO, 1,000 mcg at 08/27/23 0909  •  ferrous sulfate tablet 325 mg, 325 mg, Oral, Daily With Breakfast, Marylen Climes, DO, 325 mg at 08/27/23 0909  •  fluticasone-vilanterol 200-25 mcg/actuation 1 puff, 1 puff, Inhalation, Daily, Marylen Climes, DO, 1 puff at 08/27/23 0910  •  gabapentin (NEURONTIN) capsule 300 mg, 300 mg, Oral, TID, Marylen Climes, DO, 300 mg at 08/27/23 9366  •  heparin (porcine) subcutaneous injection 5,000 Units, 5,000 Units, Subcutaneous, Q8H [facility], Mandie Adler, DO, 5,000 Units at 08/27/23 1356  •  hydrALAZINE (APRESOLINE) tablet 25 mg, 25 mg, Oral, Q8H [facility], Mandie Adler, DO, 25 mg at 08/27/23 1355  •  insulin lispro (HumaLOG) 100 units/mL subcutaneous injection 1-6 Units, 1-6 Units, Subcutaneous, TID AC, 3 Units at 08/27/23 1218 **AND** Fingerstick Glucose (POCT), , , 4x Daily AC and at bedtime, Mandie Adler DO  •  insulin lispro (HumaLOG) 100 units/mL subcutaneous injection 1-6 Units, 1-6 Units, Subcutaneous, HS, Mandie Adler, DO, 1 Units at 08/25/23 2112  •  lactulose oral solution 20 g, 20 g, Oral, Daily PRN, Mandie Adler DO  •  losartan (COZAAR) tablet 100 mg, 100 mg, Oral, Daily, Mandie Adler, DO, 100 mg at 08/26/23 1023  •  methocarbamol (ROBAXIN) tablet 500 mg, 500 mg, Oral, Q6H PRN, Mandie Adler DO, 500 mg at 08/27/23 0015  •  ondansetron (ZOFRAN-ODT) dispersible tablet 4 mg, 4 mg, Oral, Q6H PRN, Mandie Adler, DO  •  oxyCODONE (ROXICODONE) IR tablet 5 mg, 5 mg, Oral, Q4H PRN, Mandie Adler DO, 5 mg at 08/27/23 1355  •  oxyCODONE (ROXICODONE) split tablet 2.5 mg, 2.5 mg, Oral, Q4H PRN, Mandie Adler DO, 2.5 mg at 08/26/23 1221  •  pantoprazole (PROTONIX) EC tablet 40 mg, 40 mg, Oral, Early Morning, Mandie Adler DO, 40 mg at 08/27/23 0505  •  polyethylene glycol (MIRALAX) packet 17 g, 17 g, Oral, Daily, Mandie Adler DO, 17 g at 08/27/23 0909  •  prochlorperazine (COMPAZINE) tablet 5 mg, 5 mg, Oral, Q6H PRN, Mandie Adler DO  •  senna-docusate sodium (SENOKOT S) 8.6-50 mg per tablet 2 tablet, 2 tablet, Oral, HS, Mandie Adler DO, 2 tablet at 08/26/23 2116    Past Medical History:   Diagnosis Date   • Arthritis    • Asthma    • Continuous opioid dependence ([address]) 4/27/2022   • Diabetes mellitus ([address])    • Diverticulitis of colon    • Fibromyalgia 04/26/2022   • Headache(784.0)    • History of mammogram 2018   • History of tobacco abuse 04/26/2022   • Hypertension    • Nausea 04/29/2022   • Obesity    • Sjogren's syndrome ([address]) 10/19/2012   • Urinary tract infection        Patient Active Problem List    Diagnosis Date Noted   • GERD (gastroesophageal reflux disease) 08/25/2023   • Constipation 08/25/2023   • Anemia 08/16/2023   • ICH (intracerebral hemorrhage) ([address]) 08/15/2023   • Left leg pain 08/15/2023   • Chronic obstructive pulmonary disease with acute exacerbation ([address]) 07/26/2023   • Hyperlipidemia associated with type 2 diabetes mellitus ([address]) 07/26/2023   • Abnormal CT of the chest 06/28/2023   • Class 2 obesity with body mass index (BMI) of 39.0 to 39.9 in adult 06/28/2023   • CAD (coronary artery disease) 04/07/2023   • Right lumbosacral radiculopathy 10/12/2022   • Chronic bilateral low back pain with bilateral sciatica 08/31/2022   • Paresthesia of both feet 08/31/2022   • Postoperative visit 05/24/2022   • Incarcerated umbilical hernia 79/46/8098   • Diabetes mellitus type 2, controlled ([address]) 04/29/2022   • Continuous opioid dependence ([address]) 04/27/2022   • Moderate persistent asthma without complication 40/91/7508   • Cigarette nicotine dependence in remission 04/26/2022   • Primary hypertension 04/26/2022   • Fibromyalgia 04/26/2022   • Sjogren's syndrome ([address]) 10/19/2012   • Depression with anxiety 09/18/2012          Elva Gallegos MD  PM&R      I have spent a total time of 35 minutes on 08/27/23 in caring for this patient including Patient and family education, Impressions and Documenting in the medical record. ** Please Note:  voice to text software may have been used in the creation of this document.  Although proof errors in transcription or interpretation are a potential of such software**

## 2023-08-27 NOTE — PROGRESS NOTES
[facility] ICP (Updated)     08/26/23 1330   Rehab Team Goals   Transfer Team Goal Patient will require assist with transfers with least restrictive device upon completion of rehab program   Locomotion Team Goal Patient will require assist with locomotion with least restrictive device upon completion of rehab program  (ambulatory vs w/c level)   Rehab Team Interventions   PT Interventions Gait Training; Therapeutic Exercise;Neuromuscualr Reeducation;Transfer Training;Bed Mobility; Wheelchair Mobility; Patient/Family Education   PT Transfer Goal   Roll left and right Goal 05. Setup or clean-up assistance - [place] SETS UP or CLEANS UP, patient completes activity. [place] assists only prior to or following the activity. Sit to lying Goal 04. Supervision or touching assistance- [place] provides VERBAL CUES or supervision throughout activity. Lying to sitting on side of bed Goal 04. Supervision or touching assistance- [place] provides VERBAL CUES or supervision throughout activity. Sit to stand Goal 03. Partial/moderate assistance - [place] does less than half the effort. [place] lifts or holds trunk or limbs and provides more than half the effort. Chair/bed-to-chair transfer Goal 03. Partial/moderate assistance - [place] does less than half the effort. [place] lifts or holds trunk or limbs and provides more than half the effort. Car Transfer Goal 03. Partial/moderate assistance - [place] does less than half the effort. [place] lifts or holds trunk or limbs and provides more than half the effort. Assistive Device   (LRAD)   Environment Level Surface; Well Lit   Status Ongoing; Other;Target goal - four weeks   Locomotion Goal   Primary discharge mode of locomotion Both   Target Walk Distance 50 ft   Assist Device   (LRAD)   Environment Level Surface;Tile Floor   Walk 10 feet Goal 03. Partial/moderate assistance - [place] does less than half the effort. [place] lifts or holds trunk or limbs and provides more than half the effort. Walk 50 feet with 2 turns Goal 03. Partial/moderate assistance - [place] does less than half the effort. [place] lifts or holds trunk or limbs and provides more than half the effort. Walk 150 feet Goal 88. Not attempted due to medical condition or safety concerns   Walking 10 feet on uneven surface 03. Partial/moderate assistance - [place] does less than half the effort. [place] lifts or holds trunk or limbs and provides more than half the effort. Walking Goal Status Ongoing 4 weeks      Type of Wheelchair Used 1. Manual   Target Wheel Distance- Level 200 ft   Wheel 50 feet with 2 turns Goal 06. Independent - Patient completes the activity by him/herself with no assistance from a helper. Wheel 150 feet Goal 06. Independent - Patient completes the activity by him/herself with no assistance from a helper. Decrease Assist With Locking Brakes;Obstacles   Environment Level Surface; Well Lit   Wheelchair Goal Status Ongoing; Other;Target goal - four weeks   Stairs Goal   1 step or curb goal 03. Partial/moderate assistance - [place] does less than half the effort. [place] lifts or holds trunk or limbs and provides more than half the effort. 4 steps Goal 03. Partial/moderate assistance - [place] does less than half the effort. [place] lifts or holds trunk or limbs and provides more than half the effort. 12 steps Goal 10. Not attempted due to environmental limitations (e.g., lack of equipment, weather constraints)   Assist Level Minimum Assist   Number of Stairs 6   Technique Non-reciprocal   Hand Rail Left   Status Ongoing; Other;Target goal - four weeks   Object Retrieval Goal   Picking up object Goal 03. Partial/moderate assistance - [place] does less than half the effort. [place] lifts or holds trunk or limbs and provides more than half the effort.    Assistive Device  Reacher   Small Object Picked Up marker

## 2023-08-27 NOTE — PROGRESS NOTES
08/27/23 1230   Pain Assessment   Pain Assessment Tool 0-10   Pain Score No Pain   Restrictions/Precautions   Precautions Fall Risk;Bed/chair alarms  (R side inattention)   Braces or Orthoses Sling  (RUE hemiplegia for tx only)   Cognition   Overall Cognitive Status WFL   Arousal/Participation Alert; Cooperative   Attention Attends with cues to redirect   Orientation Level Oriented X4   Memory Decreased short term memory   Following Commands Follows one step commands with increased time or repetition   Subjective   Subjective Pt ready for PT   Roll Left and Right   Type of Assistance Needed Physical assistance   Physical Assistance Level Total assistance   Roll Left and Right CARE Score 1   Sit to Lying   Type of Assistance Needed Physical assistance   Physical Assistance Level Total assistance   Sit to Lying CARE Score 1   Lying to Sitting on Side of Bed   Type of Assistance Needed Physical assistance   Physical Assistance Level Total assistance   Comment assist for RLE, 1 therapist anterior 1 therapist posterior poor trunk control   Lying to Sitting on Side of Bed CARE Score 1   Sit to Stand   Type of Assistance Needed Physical assistance   Physical Assistance Level Total assistance   Comment max assist of 2, at bed rail, at parallel bars on 4" step   Sit to Stand CARE Score 1   Bed-Chair Transfer   Type of Assistance Needed Physical assistance   Physical Assistance Level Total assistance   Comment mod-max assist of 2 with slide board bed to commode, commode to bed max assist of 2   Chair/Bed-to-Chair Transfer CARE Score 1   Wheel 50 Feet with Two Turns   Type of Assistance Needed Physical assistance   Physical Assistance Level 26%-50%   Wheel 50 Feet with Two Turns CARE Score 3   Wheel 150 Feet   Type of Assistance Needed Physical assistance   Physical Assistance Level 51%-75%   Wheel 150 Feet CARE Score 2   Wheelchair mobility   Type of Wheelchair Used 1.  Manual   Method Left upper extremity   Assistance Provided For Obstacles;Curbs;Replace Leg Rest;Remove Leg Rest;Remove armrests;Replace armrests   Distance Level Surface (feet) 150 ft  (x2)   Toilet Transfer   Type of Assistance Needed Physical assistance   Physical Assistance Level Total assistance   Comment Ax2 slide board   Toilet Transfer CARE Score 1   Therapeutic Interventions   Strengthening all exercises 5 sets of 5: supine glut sets (therapist stabilization RLE), LLE SLR, ankle df/pf, hip abduction/adduction   Neuromuscular Re-Education PROM of RLE in all planes supine however with patient thinking of contraction to focus on improvement of neuromuscular recruitment   Other 4" step used because mattress height too high for patient to reach floor. w/c height also too high for patient to reach floor. Equipment Use   Parallel Bars x3 transfers pulling up with LUE, max assist of 1 and stand by of another for total assistance, 30 s, 45 s x2; max verbal cues for upright posture and glute recruitment   Assessment   Treatment Assessment Cristel Parent seen for PT session, initally co treat with OT then individual PT session. Pt continues to require max assist of 2 for transfers with slide board however demonstrated improved progress with repeated sit to stand transfers, using bed rail and parallel bars to pull up from with LUE. Blocking of BLEs needed. Pt able to statically stand with max assist at this time but limited by fatigue and decreased endurance. Pt would benefit from continued skilled PT to maximize independence and decrease risk of falls. Session also focused on LE strengthening to prepare for transfers and body weight exercises. Problem List Decreased strength;Decreased range of motion;Decreased endurance; Impaired balance;Decreased mobility; Decreased coordination;Decreased cognition; Impaired judgement   Barriers to Discharge Inaccessible home environment;Decreased caregiver support   PT Barriers   Functional Limitation Car transfers; Ramp negotiation;Stair negotiation;Standing;Transfers; Walking   Plan   Treatment/Interventions Therapeutic exercise; Functional transfer training;LE strengthening/ROM;ADL retraining;Equipment eval/education;Patient/family training;Bed mobility   Progress Slow progress, decreased activity tolerance   PT Therapy Minutes   PT Time In 1230   PT Time Out 1400   PT Total Time (minutes) 90   PT Mode of treatment - Individual (minutes) 30   PT Mode of treatment - Co-treat (minutes) 60   Therapy Time missed   Time missed?  No

## 2023-08-27 NOTE — PROGRESS NOTES
08/27/23 1000   Pain Assessment   Pain Assessment Tool 0-10   Pain Score No Pain   Restrictions/Precautions   Precautions Fall Risk;Bed/chair alarms  (R side inattention)   Cognition   Overall Cognitive Status WFL   Arousal/Participation Alert; Cooperative   Subjective   Subjective pt in bed finishing LB dressing with PCA Emir Díaz. Family asked to bring in sweat pants vs jeans to facilitate ease of LB dressing. also asked  samina to take photos of home set up although PT was not able to give handout. Roll Left and Right   Type of Assistance Needed Physical assistance;Verbal cues; Adaptive equipment   Physical Assistance Level Total assistance   Comment 2 person assist without bedrail, max of 1 with bedrail - repeated rolling to facilitate donning/doffing of underwear which was change to  diaper + knee length sweatpants. Roll Left and Right CARE Score 1   Therapeutic Interventions   Neuromuscular Re-Education PNF D1 pattern for R UE and R LE with reps as tolerated   Other Comments   Comments pt and family educated on  therapy role to motor and functional recovery and proper positioning of R UE/LE when in bed  s/p CVA. Pt educated on motor imagery exercise  with R thumb extension or close/hand opening and R foot DR when in bed to promote motor recovery. Assessment   Treatment Assessment skilled PT for 30 mins worked on repeated bed rolling to facilitate LB dressing and R sidelying positioning at end of tx using pillows. Also initiated NMR via motor imagery and R UE/LE PNF exercise with pt requiring redirection to attend with task. PT able to appreciate slight activation of R thumb and index finger flexion/ extension which made pt's sister emotional. also noted R hip flexor mm trace this session so emphasized pt education on performing motor imagery exercise when in bed. Pt will greatly benefit from skilled PT intervention with inc focus on NMR/NPP training to promote motor and measurable functional gains. Family/Caregiver Present yes   PT Family training done with:  Kris Crowley and Sister Jonathan Pond   PT Barriers   Functional Limitation Car transfers; Ramp negotiation;Stair negotiation;Standing;Transfers; Walking   Plan   Treatment/Interventions Functional transfer training; Therapeutic exercise; Endurance training;Bed mobility;Spoke to nursing; Patient/family training   Progress Slow progress, decreased activity tolerance   Recommendation   PT Discharge Recommendation   (TBD pending progress)   PT Therapy Minutes   PT Time In 1000   PT Time Out 1030   PT Total Time (minutes) 30   PT Mode of treatment - Individual (minutes) 30   PT Mode of treatment - Concurrent (minutes) 0   PT Mode of treatment - Group (minutes) 0   PT Mode of treatment - Co-treat (minutes) 0   PT Mode of Treatment - Total time(minutes) 30 minutes   PT Cumulative Minutes 120

## 2023-08-27 NOTE — PROGRESS NOTES
08/27/23 1230   Pain Assessment   Pain Assessment Tool 0-10   Pain Score No Pain   Restrictions/Precautions   Precautions Bed/chair alarms; Fall Risk;Supervision on toilet/commode  (R-inattention)   Weight Bearing Restrictions No   ROM Restrictions No   Braces or Orthoses Sling  (RUE sling for xfers)   Lifestyle   Autonomy "I can't believe how hard everything is.". Putting On/Taking Off Footwear   Type of Assistance Needed Physical assistance   Physical Assistance Level Total assistance   Comment TA to don/doff shoes. Putting On/Taking Off Footwear CARE Score 1   Sit to Lying   Type of Assistance Needed Physical assistance   Physical Assistance Level Total assistance   Comment Ax1 trunk, Ax1 BLE. Sit to Lying CARE Score 1   Sit to Stand   Type of Assistance Needed Physical assistance   Physical Assistance Level Total assistance   Comment Max Ax2 pull to stand at bed rail. Sit to Stand CARE Score 1   Bed-Chair Transfer   Type of Assistance Needed Physical assistance;Verbal cues; Set-up / clean-up   Physical Assistance Level Total assistance   Comment Max Ax2 slideboard xfer w/c>EOB, Mod verbal and tactile cues to maximize proper hand placement and body mechanics. Chair/Bed-to-Chair Transfer CARE Score 1   Toileting Hygiene   Type of Assistance Needed Physical assistance   Physical Assistance Level Total assistance   Comment Max Ax1 and Mod A of 2nd person for pull to stand at bed rail, A for hygiene and clothing management. Pt reports her  is bringing in looser fitting pants. Toileting Hygiene CARE Score 1   Toilet Transfer   Type of Assistance Needed Physical assistance;Set-up / clean-up; Adaptive equipment;Verbal cues   Physical Assistance Level Total assistance   Comment Max Ax2 slideboard xfer EOB>drop-arm platform BSC to pt's L. Mod vc's for proper hand placement and body mechanics. Obtained dycem for BSC.    Toilet Transfer CARE Score 1   Functional Standing Tolerance   Time 30", 35", 40" Activity static standing in // bars, OTR A w/ posture facilitation and correcting R lateral lean. Pt req Max Ax2, pt req encouragement to maximize stand tolerance. Limited by fatigue and fear of falling at times. ROM- Right Upper Extremities   RUE ROM Comment Noted w/ slight return, <25% flexion, to thumb and index finger w/ max encouragement. Pt reports new since yesterday. Cognition   Overall Cognitive Status WFL  (basic)   Comments noted w/ poor health literacy. will benefit from formalized cognitive assessment during course of OT. Additional Activities   Additional Activities Comments will benefit from andi height w/c as pt's feet do not touch ground and inc difficulty w/ propelling. Assessment   Treatment Assessment Pt seen for skilled OT/PT co-treat session focusing on fxnl slideboard xfers, toileting, fxnl standing tolerance at bed rail and in parallel bars. Time spent rapport building, pt highly motivated to improve. Pt tearful 2x t/o session 2* difficulty adjusting to current deficits, provided w/ encouragement and emotional support to maximize participation. Pt limited by fatigue and req frequent rest breaks and cues to maximize deep breathing. At times limited by fear of falling/feelings of anxiety, req reassurance. Pt noted w/ some R thumb and index finger return, however fatigues quickly. May benefit from RHS 2* flexor tone. Cont OT POC: endurance work, core stability, RUE NMR, fxnl xfer training, R visual scanning/R-attention, ADL retraining, ongoing stroke edu, and repetitive safety training. Pt was left resting in bed to finish PT. Prognosis Fair   Problem List Decreased strength;Decreased endurance; Impaired balance;Decreased mobility; Decreased coordination;Decreased cognition; Impaired judgement;Decreased safety awareness; Impaired tone;Obesity   Plan   Treatment/Interventions ADL retraining;Functional transfer training; Therapeutic exercise; Endurance training;Patient/family training Progress Progressing toward goals   Recommendation   OT Discharge Recommendation   (pending progress)   OT Therapy Minutes   OT Time In 1230   OT Time Out 1330   OT Total Time (minutes) 60   OT Mode of treatment - Individual (minutes) 0   OT Mode of treatment - Concurrent (minutes) 0   OT Mode of treatment - Group (minutes) 0   OT Mode of treatment - Co-treat (minutes) 60   OT Mode of Treatment - Total time(minutes) 60 minutes   OT Cumulative Minutes 170   Therapy Time missed   Time missed?  No

## 2023-08-28 LAB
ANION GAP SERPL CALCULATED.3IONS-SCNC: 9 MMOL/L
BASOPHILS # BLD AUTO: 0.11 THOUSANDS/ÂΜL (ref 0–0.1)
BASOPHILS NFR BLD AUTO: 1 % (ref 0–1)
BUN SERPL-MCNC: 14 MG/DL (ref 5–25)
CALCIUM SERPL-MCNC: 9.3 MG/DL (ref 8.4–10.2)
CHLORIDE SERPL-SCNC: 102 MMOL/L (ref 96–108)
CO2 SERPL-SCNC: 25 MMOL/L (ref 21–32)
CREAT SERPL-MCNC: 0.6 MG/DL (ref 0.6–1.3)
EOSINOPHIL # BLD AUTO: 0.8 THOUSAND/ÂΜL (ref 0–0.61)
EOSINOPHIL NFR BLD AUTO: 9 % (ref 0–6)
ERYTHROCYTE [DISTWIDTH] IN BLOOD BY AUTOMATED COUNT: 15.9 % (ref 11.6–15.1)
GFR SERPL CREATININE-BSD FRML MDRD: 100 ML/MIN/1.73SQ M
GLUCOSE P FAST SERPL-MCNC: 146 MG/DL (ref 65–99)
GLUCOSE SERPL-MCNC: 117 MG/DL (ref 65–140)
GLUCOSE SERPL-MCNC: 146 MG/DL (ref 65–140)
GLUCOSE SERPL-MCNC: 157 MG/DL (ref 65–140)
GLUCOSE SERPL-MCNC: 164 MG/DL (ref 65–140)
GLUCOSE SERPL-MCNC: 175 MG/DL (ref 65–140)
HCT VFR BLD AUTO: 37.3 % (ref 34.8–46.1)
HGB BLD-MCNC: 11.5 G/DL (ref 11.5–15.4)
IMM GRANULOCYTES # BLD AUTO: 0.04 THOUSAND/UL (ref 0–0.2)
IMM GRANULOCYTES NFR BLD AUTO: 0 % (ref 0–2)
LYMPHOCYTES # BLD AUTO: 2.54 THOUSANDS/ÂΜL (ref 0.6–4.47)
LYMPHOCYTES NFR BLD AUTO: 27 % (ref 14–44)
MCH RBC QN AUTO: 27 PG (ref 26.8–34.3)
MCHC RBC AUTO-ENTMCNC: 30.8 G/DL (ref 31.4–37.4)
MCV RBC AUTO: 88 FL (ref 82–98)
MONOCYTES # BLD AUTO: 0.85 THOUSAND/ÂΜL (ref 0.17–1.22)
MONOCYTES NFR BLD AUTO: 9 % (ref 4–12)
NEUTROPHILS # BLD AUTO: 4.92 THOUSANDS/ÂΜL (ref 1.85–7.62)
NEUTS SEG NFR BLD AUTO: 54 % (ref 43–75)
NRBC BLD AUTO-RTO: 0 /100 WBCS
PLATELET # BLD AUTO: 379 THOUSANDS/UL (ref 149–390)
PMV BLD AUTO: 9.7 FL (ref 8.9–12.7)
POTASSIUM SERPL-SCNC: 3.8 MMOL/L (ref 3.5–5.3)
RBC # BLD AUTO: 4.26 MILLION/UL (ref 3.81–5.12)
SODIUM SERPL-SCNC: 136 MMOL/L (ref 135–147)
WBC # BLD AUTO: 9.26 THOUSAND/UL (ref 4.31–10.16)

## 2023-08-28 PROCEDURE — 85025 COMPLETE CBC W/AUTO DIFF WBC: CPT | Performed by: PHYSICIAN ASSISTANT

## 2023-08-28 PROCEDURE — 80048 BASIC METABOLIC PNL TOTAL CA: CPT | Performed by: PHYSICIAN ASSISTANT

## 2023-08-28 PROCEDURE — 99232 SBSQ HOSP IP/OBS MODERATE 35: CPT | Performed by: INTERNAL MEDICINE

## 2023-08-28 PROCEDURE — 97130 THER IVNTJ EA ADDL 15 MIN: CPT

## 2023-08-28 PROCEDURE — 92523 SPEECH SOUND LANG COMPREHEN: CPT

## 2023-08-28 PROCEDURE — 99232 SBSQ HOSP IP/OBS MODERATE 35: CPT | Performed by: STUDENT IN AN ORGANIZED HEALTH CARE EDUCATION/TRAINING PROGRAM

## 2023-08-28 PROCEDURE — 97129 THER IVNTJ 1ST 15 MIN: CPT

## 2023-08-28 PROCEDURE — 97112 NEUROMUSCULAR REEDUCATION: CPT

## 2023-08-28 PROCEDURE — 82948 REAGENT STRIP/BLOOD GLUCOSE: CPT

## 2023-08-28 RX ORDER — METHOCARBAMOL 500 MG/1
500 TABLET, FILM COATED ORAL 3 TIMES DAILY
Status: DISCONTINUED | OUTPATIENT
Start: 2023-08-28 | End: 2023-09-07

## 2023-08-28 RX ORDER — BISACODYL 5 MG/1
5 TABLET, DELAYED RELEASE ORAL DAILY PRN
Status: DISCONTINUED | OUTPATIENT
Start: 2023-08-28 | End: 2023-09-06

## 2023-08-28 RX ORDER — DIAZEPAM 2 MG/1
2 TABLET ORAL 2 TIMES DAILY PRN
Status: DISCONTINUED | OUTPATIENT
Start: 2023-08-28 | End: 2023-09-20 | Stop reason: HOSPADM

## 2023-08-28 RX ADMIN — METHOCARBAMOL 500 MG: 500 TABLET ORAL at 16:09

## 2023-08-28 RX ADMIN — OXYCODONE HYDROCHLORIDE 5 MG: 5 TABLET ORAL at 16:09

## 2023-08-28 RX ADMIN — LOSARTAN POTASSIUM 100 MG: 50 TABLET, FILM COATED ORAL at 08:52

## 2023-08-28 RX ADMIN — HEPARIN SODIUM 5000 UNITS: 5000 INJECTION INTRAVENOUS; SUBCUTANEOUS at 21:01

## 2023-08-28 RX ADMIN — INSULIN LISPRO 1 UNITS: 100 INJECTION, SOLUTION INTRAVENOUS; SUBCUTANEOUS at 16:13

## 2023-08-28 RX ADMIN — HEPARIN SODIUM 5000 UNITS: 5000 INJECTION INTRAVENOUS; SUBCUTANEOUS at 15:01

## 2023-08-28 RX ADMIN — ASPIRIN 81 MG CHEWABLE TABLET 81 MG: 81 TABLET CHEWABLE at 08:52

## 2023-08-28 RX ADMIN — FERROUS SULFATE TAB 325 MG (65 MG ELEMENTAL FE) 325 MG: 325 (65 FE) TAB at 08:55

## 2023-08-28 RX ADMIN — GABAPENTIN 300 MG: 300 CAPSULE ORAL at 21:01

## 2023-08-28 RX ADMIN — GABAPENTIN 300 MG: 300 CAPSULE ORAL at 16:09

## 2023-08-28 RX ADMIN — OXYCODONE HYDROCHLORIDE 5 MG: 5 TABLET ORAL at 04:05

## 2023-08-28 RX ADMIN — POLYETHYLENE GLYCOL 3350 17 G: 17 POWDER, FOR SOLUTION ORAL at 08:52

## 2023-08-28 RX ADMIN — CYANOCOBALAMIN TAB 500 MCG 1000 MCG: 500 TAB at 08:52

## 2023-08-28 RX ADMIN — PANTOPRAZOLE SODIUM 40 MG: 40 TABLET, DELAYED RELEASE ORAL at 05:20

## 2023-08-28 RX ADMIN — OXYCODONE HYDROCHLORIDE 5 MG: 5 TABLET ORAL at 21:01

## 2023-08-28 RX ADMIN — GABAPENTIN 300 MG: 300 CAPSULE ORAL at 08:52

## 2023-08-28 RX ADMIN — OXYCODONE HYDROCHLORIDE 5 MG: 5 TABLET ORAL at 08:58

## 2023-08-28 RX ADMIN — SENNOSIDES AND DOCUSATE SODIUM 2 TABLET: 50; 8.6 TABLET ORAL at 21:02

## 2023-08-28 RX ADMIN — HEPARIN SODIUM 5000 UNITS: 5000 INJECTION INTRAVENOUS; SUBCUTANEOUS at 05:20

## 2023-08-28 RX ADMIN — INSULIN LISPRO 1 UNITS: 100 INJECTION, SOLUTION INTRAVENOUS; SUBCUTANEOUS at 11:16

## 2023-08-28 RX ADMIN — AMLODIPINE BESYLATE 10 MG: 10 TABLET ORAL at 08:52

## 2023-08-28 RX ADMIN — ATORVASTATIN CALCIUM 20 MG: 20 TABLET, FILM COATED ORAL at 16:08

## 2023-08-28 RX ADMIN — INSULIN LISPRO 1 UNITS: 100 INJECTION, SOLUTION INTRAVENOUS; SUBCUTANEOUS at 08:24

## 2023-08-28 RX ADMIN — METHOCARBAMOL 500 MG: 500 TABLET ORAL at 21:02

## 2023-08-28 RX ADMIN — METFORMIN HYDROCHLORIDE 500 MG: 500 TABLET ORAL at 16:08

## 2023-08-28 RX ADMIN — HYDRALAZINE HYDROCHLORIDE 25 MG: 25 TABLET ORAL at 05:20

## 2023-08-28 RX ADMIN — FLUTICASONE FUROATE AND VILANTEROL TRIFENATATE 1 PUFF: 200; 25 POWDER RESPIRATORY (INHALATION) at 08:58

## 2023-08-28 RX ADMIN — BUPROPION HYDROCHLORIDE 150 MG: 150 TABLET, FILM COATED, EXTENDED RELEASE ORAL at 08:53

## 2023-08-28 NOTE — PROGRESS NOTES
SLP Cognitive Assessment       08/28/23 4497   Pain Assessment   Pain Assessment Tool 0-10   Pain Score No Pain   Restrictions/Precautions   Precautions Bed/chair alarms;Cognitive; Fall Risk;Pain;Supervision on toilet/commode;Visual deficit   Cognitive Linguisitic Assessments   Cognitive Linquistic Quick Test (CLQT) Refer to below for full details. Comprehension   Assist Devices Glasses   Auditory Basic;Complex   Visual Basic;Complex   Findings Pt completing formalized cognitive assessment. Refer to SLP Rehab assessment note for details. QI: Comprehension 3. Usually Understands: Understands most conversations, but misses some part/intent of message. Requires cues at times to understand. Comprehension (FIM) 4 - Understands basic info/conversation 75-90% of time   Expression   Verbal Basic;Complex   Non-Verbal Basic;Complex   Intelligibility Sentence   Findings Pt completing formalized cognitive assessment. Refer to SLP Rehab assessment note for details. QI: Expression 3. Exhibits some difficulty with expressing needs and ideas (e.g., some words or finishing thoughts) or speech is not clear   Expression (FIM) 5 - Needs help/cues only RARELY (< 10% of the time)   Social Interaction   Cooperation with staff   Participation Individual   Behaviors observed Appropriate   Findings Pt completing formalized cognitive assessment. Refer to SLP Rehab assessment note for details. Social Interaction (FIM) 5 - Interacts appropriately with others 90% of time   Problem Solving   Complex Manages finances;Manages discharge planning;Manages medications   Routine Manages call bell   Findings Pt completing formalized cognitive assessment. Refer to SLP Rehab assessment note for details. Problem solving (FIM) 3 - Solves basic problmes 50-74% of time   Memory   Initiates Tasks Yes   Short-Term Impaired   Long Term Intact   Findings Pt completing formalized cognitive assessment. Refer to SLP Rehab assessment note for details. Memory (FIM) 3 - Recognizes, recalls/performs 50-74%   Speech/Language/Cognition Assessmetn   Treatment Assessment Pt completed the CLQT+ on initial evaluation with a Composite Severity Rating score of 2.6 out of 4.0, correlating to overall mildly impaired  cognitive linguistic impairments at time of evaluation and in comparison to age matched peers ranging from 19-69 y/o. Pt scored at or above criterion cut score for 6 out of 10 tasks completed. Pt's Cognitive Domain Scores are as follows:      Cognitive Domain: Score:  Severity Rating:   Attention   167 mildly impaired    Memory 86 severely impaired        Executive Functions 22 mildly impaired       Language 26 mildly impaired       Visuospatial Skills  78 mildly impaired       Clock Drawing Screen    12 WNL    Overall Composite Severity Rating Score 2.6 out of 4.0 mildly impaired       In addition to completing formalized cognitive assessment, SLP engaged pt in review of orientation and LTM biographical recall. It was noted that pt was oriented x4 and overall recall given biographical information was accurate when compared to chart review. Pt reports being  and not having children, but does have 2 cats. Pt also reporting supportive sister. Pt was working FT and she reports spouse does as well, to where she did complete all I ADL's, including driving, finances and medication management. Of note, pt was on the phone w/ bank due to being "locked out" of her account, to where she needed a new password. SLP also asking pt about word finding and/or dysarthria, to where pt does report it "comes and goes" but could be result of fatigue as well. However, will monitor this as sessions progress. Pt was educated on results of assessment with review of overall results and performance on each cognitive domain, as well as specific focus on weaker areas, which will primarily be targeted during therapy sessions.  Pt was receptive to all information provided, along with recommendations for skilled SLP services during acute rehab stay to maximize overall cognitive linguistic skills. SLP Therapy Minutes   SLP Time In 6893   SLP Time Out 1400   SLP Total Time (minutes) 45   SLP Mode of treatment - Individual (minutes) 45   SLP Mode of treatment - Concurrent (minutes) 0   SLP Mode of treatment - Group (minutes) 0   SLP Mode of treatment - Co-treat (minutes) 0   SLP Mode of Treatment - Total time(minutes) 45 minutes   SLP Cumulative Minutes 45   Therapy Time missed   Time missed?  No

## 2023-08-28 NOTE — RESTORATIVE TECHNICIAN NOTE
Restorative Technician Note      Patient Name: Becki Kuhn     Restorative Tech Visit Date: 08/28/23  Note Type: Bracing, Initial consult  Patient Position Upon Consult: Supine  Brace Applied: Multipodous Boot  Additional Brace Ordered: No  Patient Position When Brace Applied: Supine  Education Provided: Yes  Patient Position at End of Consult: Supine; All needs within reach  Nurse Communication: Nurse aware of consult, application of brace    Please contact Mobility Coordinator on Tiger text "SLB-PT-Restorative Tech" role in regards to bracing instruction and/or adjustment.     George Mccall, Restorative Technician

## 2023-08-28 NOTE — PROGRESS NOTES
PM&R PROGRESS NOTE:  Becki Kuhn 62 y.o. female MRN: [id number]  Unit/Bed#: -37 Encounter: [id number]        Rehabilitation Diagnosis: Impairment of mobility, safety, Activities of Daily Living (ADLs), and cognitive/communication skills due to Stroke:  01.2  Right Body Involvement (Left Brain)    HPI: Becki Kuhn is a 60-year-old female with history of hypertension, hyperlipidemia, diabetes type 2, obesity, eosinophilic asthma, COPD, fibromyalgia, Sjogren syndrome, lumbar spondylosis with grade 1 anterior spondylolisthesis who presents to the hospital on 8/14/2023 due to acute onset right upper and right lower extremity weakness with sensory loss. Additionally with right-sided facial droop and dysarthria. The patient was noted to have an acute intraparenchymal hemorrhage on CT in the left thalamic region with surrounding edema. A CTA was negative. Patient was initially placed on a Cardene drip. Course complicated by significant left-sided leg pain and was recommended on treatment for radicular symptoms including Tylenol, gabapentin and potentially steroids. Neurology was consulted and recommended MRI of the brain with and without contrast while holding antithrombotics. The MRI showed a stable left thalamic capsular intraparenchymal hemorrhage with surrounding edema without any other mass effect. A repeat CT was completed on 8/17/2023 after worsening symptoms including worsening speech slurring with stable findings. Additionally there was a rapid response after a fall with head strike on 8/18 with a repeat CT of the head without acute findings. Additionally antihypertensive regimen was altered as she was previously on telmisartan and atenolol and is currently on amlodipine, losartan and hydralazine with better control.  The patient was evaluated by the Rehabilitation team and deemed an appropriate candidate for comprehensive inpatient rehabilitation and admitted to the [address] [place] [address] on 8/25/2023  3:19 PM    SUBJECTIVE: Patient seen face to face. No acute issues. Progressing as expected in rehabilitation. She has some spasms in the medial right leg in the abductors. This happens usually in the late afternoon or early evening around 4 PM or so. We did change her overall Robaxin regimen from 500 mg every 6 hours as needed to a 500 mg 3 times daily standing order. Additionally ordered Valium 2 mg twice daily as needed for more severe spasms. On examination no overt signs of developing spasticity. Still with very little movement mostly in the fingers on the right otherwise relatively flaccid. Labs reviewed today and all within normal range except for glucose. Her mood is good and she is sleeping and participating in therapy. She denies any fever, chills, nausea, vomiting, cough, shortness of breath, diarrhea or constipation. She is able to have a bowel movement this weekend however I am sure there is still a significant stool burden and will still need to be aggressive at this time. ASSESSMENT: Stable, progressing      PLAN:    Rehabilitation  • Functional deficits:  Self care and mobility  • Continue current rehabilitation plan of care to maximize function. • Functional update:   o PT: Max assist for transfers and total assist for hygiene  o OT: Total assist for oral hygiene, grooming and bathing, max assist for dressing upper body and total assist for lower  o SLP: Pending eval  • Estimated Discharge:  To be determined in team conference    DVT prophylaxis  • Heparin     Pain  • Gabapentin 300 mg 3 times daily  • Oxycodone 2.5-5 mg every 4 hours as needed moderate-severe pain  • Robaxin 500 mg TID standing for spasms with Valium 2mg BID PRN for severe spasms     Bladder plan  • Incontinent     Bowel plan  • Last bowel movement 8/26 after no BM previously, xrays showed stool burden without obstruction     Code Status  • Level 1 full code      * ICH (intracerebral hemorrhage) ([address])  Assessment & Plan  59-year-old female presents with right hemiplegia, dysarthria and facial asymmetry found to have an acute intraparenchymal hemorrhage in the left thalamic region with surrounding edema felt to be secondary to uncontrolled hypertension  · Had repeat CT of the head on 8/18 after a fall with head strike resulting in significant bruising of the face  · Cleared by neurology to restart aspirin 81 mg daily and atorvastatin 20 mg daily  · Evaluated by neurosurgery with no surgical intervention at this time  · Had a repeat head CT on 8/20 which has been stable  · Goal systolic blood pressure of less than 140  · Follow-up with neurology as an outpatient  · Physical and Occupational Therapy with goals for community discharge.   Patient lives with her  in a mobile home with 5 steps to enter and he is able to assist 24/7  · Hemorrhagic stroke education, nutrition, neuropsychology  · Secondary stroke prophylaxis with hypertension control    Constipation  Assessment & Plan  · No bowel movement since prior to admission which was 11 days prior on 8/14  · Obtaining x-ray to evaluate stool burden  · Adding to bowel regimen including increasing senna and Colace, lactulose as needed and will be more aggressive pending results of x-ray    GERD (gastroesophageal reflux disease)  Assessment & Plan  Continue Protonix    Hyperlipidemia associated with type 2 diabetes mellitus (HCC)  Assessment & Plan  Continue atorvastatin 20 mg with dinner    Class 2 obesity with body mass index (BMI) of 39.0 to 39.9 in adult  Assessment & Plan  · BMI of 41.40 on admission  · Continue promoting weight loss and increased activity, diet and exercise  · Continue a consistent carbohydrate diet  · Nutrition consultation    CAD (coronary artery disease)  Assessment & Plan  · History of CAD currently on statin and aspirin that was restarted on 8/21 after clearance by neurology  · Beta-blocker held due to bradycardia  · Patient has not formally seen a cardiologist however this was diagnosed based on a CT PE study that was conducted in the ER showing mild CAD    Chronic bilateral low back pain with bilateral sciatica  Assessment & Plan  · Patient has a history of chronic low back pain with radicular symptoms in addition to fibromyalgia and myofascial pain syndrome. · She follows with Dr. Salima Salmon from pain management has tried several medications as well as epidural steroid injections with little relief  · Imaging reveals lumbar degenerative disc disease at the L3-4, L4-5, L5-S1 level. Additionally facet hypertrophic changes and ligamentous laxity at the L4-5 level resulting in grade 1 anterior spondylolisthesis and mild canal narrowing with slight distortion of the left anterior lateral aspect of the thecal sac at this level with mild right greater than left neuroforaminal narrowing  · Multi modal pain with current medication regimen including oxycodone to be weaned as well as the Robaxin and gabapentin. We will also consult neuropsychology    Sjogren's syndrome [facility])  Assessment & Plan  · Patient states that she is been worked up for Sjogren's syndrome in the past and has had conflicting diagnosis he is stating that some physicians have diagnosed her with it and others stated she did not have it. · She has not been on any medication treatment for this and does not actively follow-up with a rheumatologist    Depression with anxiety  Assessment & Plan  · Neuropsychology consultation poststroke with history of anxiety  · Continue Wellbutrin  mg in the morning.   It has been 300 mg as an outpatient however it was decreased in acute care due to worry of decreasing seizure threshold in the setting of [address]    Diabetes mellitus type 2, controlled [facility])  Assessment & Plan  Lab Results   Component Value Date    HGBA1C 6.7 (H) 08/15/2023       Recent Labs     08/24/23  1621 08/24/23  2053 08/25/23  0624 08/25/23  1129   POCGLU 173* 142* 177* 196*     · Currently on carb controlled level 2 diet  · Sliding scale insulin algorithm 3 with Accu-Cheks 4 times daily  · Was previously on metformin and should be able to restart and will discuss with internal medicine blood sugars are not fully controlled at this stage      Moderate persistent asthma without complication  Assessment & Plan  · Follows with Dr. Day Snow from pulmonology  · Home regimen includes Breo and as needed albuterol  · On equivalent here and added albuterol inhaler on admission to [facility]    Primary hypertension  Assessment & Plan  · Home regimen: Telmisartan and atenolol  · Current regimen: Norvasc 10 mg daily Cozaar 100 mg daily, hydralazine 25 mg every 8 hours  · Monitor blood pressures especially in therapy and make adjustments as needed          Appreciate IM consultants medical co-management. Labs, medications, and imaging personally reviewed. ROS:  A ten point review of systems was completed on 08/28/23 and pertinent positives are listed in subjective section. All other systems reviewed were negative. OBJECTIVE:   /58 (BP Location: Left arm)   Pulse 71   Temp 98 °F (36.7 °C) (Oral)   Resp 18   Ht 5' (1.524 m)   Wt 96.2 kg (212 lb)   SpO2 96%   BMI 41.40 kg/m²     Physical Exam  Vitals reviewed. Constitutional:       General: She is not in acute distress. Appearance: She is obese. HENT:      Head: Normocephalic. Comments: Ecchymosis around the right orbital region in stages of healing     Right Ear: External ear normal.      Left Ear: External ear normal.      Nose: Nose normal. No rhinorrhea. Mouth/Throat:      Mouth: Mucous membranes are moist.      Pharynx: Oropharynx is clear. Eyes:      General: No scleral icterus. Cardiovascular:      Rate and Rhythm: Normal rate. Pulses: Normal pulses. Pulmonary:      Effort: No respiratory distress. Breath sounds: Normal breath sounds. Abdominal:      General: There is no distension.       Palpations: Abdomen is soft.      Comments: Hypoactive BS but present   Musculoskeletal:      Cervical back: Normal range of motion and neck supple. Right lower leg: No edema. Left lower leg: No edema. Skin:     General: Skin is warm and dry. Neurological:      Mental Status: She is alert and oriented to person, place, and time.       Comments: Dense 0/5 hemiplegia on the right hemibody, no overt spasticity, sensation coming back slightly especially in the right lower limb   Psychiatric:         Mood and Affect: Mood normal.         Behavior: Behavior normal.          Lab Results   Component Value Date    WBC 9.26 08/28/2023    HGB 11.5 08/28/2023    HCT 37.3 08/28/2023    MCV 88 08/28/2023     08/28/2023     Lab Results   Component Value Date    SODIUM 136 08/28/2023    K 3.8 08/28/2023     08/28/2023    CO2 25 08/28/2023    BUN 14 08/28/2023    CREATININE 0.60 08/28/2023    GLUC 146 (H) 08/28/2023    CALCIUM 9.3 08/28/2023     Lab Results   Component Value Date    INR 1.06 08/15/2023    INR 0.94 08/14/2023    PROTIME 14.0 08/15/2023    PROTIME 13.2 08/14/2023           Current Facility-Administered Medications:   •  albuterol (PROVENTIL HFA,VENTOLIN HFA) inhaler 2 puff, 2 puff, Inhalation, Q4H PRN, Trice Mckeon DO  •  amLODIPine (NORVASC) tablet 10 mg, 10 mg, Oral, Daily, Trice Mckeon DO, 10 mg at 08/28/23 4442  •  aspirin chewable tablet 81 mg, 81 mg, Oral, Daily, Trice Mckeon DO, 81 mg at 08/28/23 8269  •  atorvastatin (LIPITOR) tablet 20 mg, 20 mg, Oral, Daily With Dinner, Trice Mckeon DO, 20 mg at 08/27/23 1645  •  bisacodyl (DULCOLAX) EC tablet 5 mg, 5 mg, Oral, Daily PRN, Dagoberto Fernando DO  •  buPROPion (WELLBUTRIN XL) 24 hr tablet 150 mg, 150 mg, Oral, QAM, Trice Mckeon DO, 150 mg at 08/28/23 5712  •  calcium carbonate (TUMS) chewable tablet 1,000 mg, 1,000 mg, Oral, TID PRN, Laureen Tracy PA-C, 1,000 mg at 08/26/23 1241  •  cyanocobalamin (VITAMIN B-12) tablet 1,000 mcg, 1,000 mcg, Oral, Daily, Luis Zari, DO, 1,000 mcg at 08/28/23 9910  •  ferrous sulfate tablet 325 mg, 325 mg, Oral, Daily With Breakfast, Erhard Zari, DO, 325 mg at 08/28/23 3111  •  fluticasone-vilanterol 200-25 mcg/actuation 1 puff, 1 puff, Inhalation, Daily, Erhard Zari, DO, 1 puff at 08/28/23 7069  •  gabapentin (NEURONTIN) capsule 300 mg, 300 mg, Oral, TID, Erhard Zari, DO, 300 mg at 08/28/23 3837  •  heparin (porcine) subcutaneous injection 5,000 Units, 5,000 Units, Subcutaneous, Q8H [address], Erhard Zari, DO, 5,000 Units at 08/28/23 3486  •  hydrALAZINE (APRESOLINE) tablet 25 mg, 25 mg, Oral, Q8H [address], Erhard Zari, DO, 25 mg at 08/28/23 8946  •  insulin lispro (HumaLOG) 100 units/mL subcutaneous injection 1-6 Units, 1-6 Units, Subcutaneous, TID AC, 1 Units at 08/28/23 0824 **AND** Fingerstick Glucose (POCT), , , 4x Daily AC and at bedtime, Luis Zari, DO  •  insulin lispro (HumaLOG) 100 units/mL subcutaneous injection 1-6 Units, 1-6 Units, Subcutaneous, HS, Erhard Zari, DO, 1 Units at 08/27/23 2133  •  lactulose oral solution 20 g, 20 g, Oral, Daily PRN, Erhard Zari, DO  •  losartan (COZAAR) tablet 100 mg, 100 mg, Oral, Daily, Luis Zari, DO, 100 mg at 08/28/23 7023  •  methocarbamol (ROBAXIN) tablet 500 mg, 500 mg, Oral, Q6H PRN, Erhard Zari, DO, 500 mg at 08/27/23 0015  •  ondansetron (ZOFRAN-ODT) dispersible tablet 4 mg, 4 mg, Oral, Q6H PRN, Erhard Zari, DO  •  oxyCODONE (ROXICODONE) IR tablet 5 mg, 5 mg, Oral, Q4H PRN, Erhard Zari, DO, 5 mg at 08/28/23 1861  •  oxyCODONE (ROXICODONE) split tablet 2.5 mg, 2.5 mg, Oral, Q4H PRN, Luis Zari, DO, 2.5 mg at 08/26/23 1221  •  pantoprazole (PROTONIX) EC tablet 40 mg, 40 mg, Oral, Early Morning, Erhard Zari, DO, 40 mg at 08/28/23 5586  •  polyethylene glycol (MIRALAX) packet 17 g, 17 g, Oral, Daily, Luis Zari, DO, 17 g at 08/28/23 9553  •  prochlorperazine (COMPAZINE) tablet 5 mg, 5 mg, Oral, Q6H PRN, Luis Zari, DO  •  senna-docusate sodium (SENOKOT S) 8.6-50 mg per tablet 2 tablet, 2 tablet, Oral, HS, Kaitlin Price DO, 2 tablet at 08/27/23 2132    Past Medical History:   Diagnosis Date   • Arthritis    • Asthma    • Continuous opioid dependence ([address]) 4/27/2022   • Diabetes mellitus ([address])    • Diverticulitis of colon    • Fibromyalgia 04/26/2022   • Headache(784.0)    • History of mammogram 2018   • History of tobacco abuse 04/26/2022   • Hypertension    • Nausea 04/29/2022   • Obesity    • Sjogren's syndrome ([address]) 10/19/2012   • Urinary tract infection        Patient Active Problem List    Diagnosis Date Noted   • ICH (intracerebral hemorrhage) ([address]) 08/15/2023   • GERD (gastroesophageal reflux disease) 08/25/2023   • Constipation 08/25/2023   • Anemia 08/16/2023   • Left leg pain 08/15/2023   • Chronic obstructive pulmonary disease with acute exacerbation ([address]) 07/26/2023   • Hyperlipidemia associated with type 2 diabetes mellitus ([address]) 07/26/2023   • Abnormal CT of the chest 06/28/2023   • Class 2 obesity with body mass index (BMI) of 39.0 to 39.9 in adult 06/28/2023   • CAD (coronary artery disease) 04/07/2023   • Right lumbosacral radiculopathy 10/12/2022   • Chronic bilateral low back pain with bilateral sciatica 08/31/2022   • Paresthesia of both feet 08/31/2022   • Postoperative visit 05/24/2022   • Incarcerated umbilical hernia 18/59/1632   • Diabetes mellitus type 2, controlled ([address]) 04/29/2022   • Continuous opioid dependence ([address]) 04/27/2022   • Moderate persistent asthma without complication 85/19/4512   • Cigarette nicotine dependence in remission 04/26/2022   • Primary hypertension 04/26/2022   • Fibromyalgia 04/26/2022   • Sjogren's syndrome ([address]) 10/19/2012   • Depression with anxiety 09/18/2012          Maddie Conner DO  Physical Medicine and Jessika MARTINEZ have spent a total time of 35 minutes on 08/28/23 in caring for this patient including Counseling / Coordination of care, Documenting in the medical record, Reviewing / ordering tests, medicine, procedures   and Communicating with other healthcare professionals .

## 2023-08-28 NOTE — UTILIZATION REVIEW
NOTIFICATION OF ADMISSION DISCHARGE   This is a Notification of Discharge from [facility]. Please be advised that this patient has been discharge from our facility. Below you will find the admission and discharge date and time including the patient’s disposition. UTILIZATION REVIEW CONTACT:  Steffen West  Utilization   Network Utilization Review Department  Phone: [phone] x carefully listen to the prompts. All voicemails are confidential.  Email: [email or web address]     ADMISSION INFORMATION  PRESENTATION DATE: 8/14/2023  7:24 PM  OBERVATION ADMISSION DATE:  INPATIENT ADMISSION DATE: 8/14/23  7:24 PM   DISCHARGE DATE: 8/25/2023  3:01 PM   DISPOSITION:[facility]    IMPORTANT INFORMATION:  Send all requests for admission clinical reviews, approved or denied determinations and any other requests to dedicated fax number below belonging to the campus where the patient is receiving treatment.  List of dedicated fax numbers:  [place] DENIALS (Administrative/Medical Necessity) [phone] [address] [facility] (Maternity/NICU/Pediatrics) [phone]   [facility] [phone]   [facility] [phone] [facility] [phone] [address] [facility] [phone]   [facility] [phone]   [address] [place] [address] [address] [place] [address] [phone] [address] [facility] [phone] [address] [facility] [phone] [address] [facility] [address] [facility] [phone]

## 2023-08-28 NOTE — PROGRESS NOTES
08/28/23 1400   Pain Assessment   Pain Assessment Tool FLACC   Pain Rating: FLACC (Rest) - Face 1   Pain Rating: FLACC (Rest) - Legs 0   Pain Rating: FLACC (Rest) - Activity 0   Pain Rating: FLACC (Rest) - Cry 1   Pain Rating: FLACC (Rest) - Consolability 0   Score: FLACC (Rest) 2   Pain Rating: FLACC (Activity) - Face 1   Pain Rating: FLACC (Activity) - Legs 1   Pain Rating: FLACC (Activity) - Activity 1   Pain Rating: FLACC (Activity) - Cry 1   Pain Rating: FLACC (Activity) - Consolability 1   Score: FLACC (Activity) 5   Restrictions/Precautions   Precautions Bed/chair alarms;Cognitive; Fall Risk;Supervision on toilet/commode   Braces or Orthoses Sling  (with transfers)   Subjective   Subjective pt reports being tired, but agreeable to come down   Roll Left and Right   Type of Assistance Needed Physical assistance   Physical Assistance Level Total assistance   Comment max/total x2   Roll Left and Right CARE Score 1   Sit to Lying   Type of Assistance Needed Physical assistance   Physical Assistance Level 76% or more   Comment maxA x2   Sit to Lying CARE Score 2   Lying to Sitting on Side of Bed   Type of Assistance Needed Physical assistance   Physical Assistance Level Total assistance   Comment maxA x2   Lying to Sitting on Side of Bed CARE Score 1   Sit to Stand   Type of Assistance Needed Physical assistance   Physical Assistance Level Total assistance   Comment maxA x2 standing at L handrail on hallway with mirror   Sit to Stand CARE Score 1   Bed-Chair Transfer   Type of Assistance Needed Physical assistance   Physical Assistance Level Total assistance   Comment maxA x2   Chair/Bed-to-Chair Transfer CARE Score 1   Car Transfer   Reason if not Attempted Safety concerns   Car Transfer CARE Score 88   Walk 10 Feet   Reason if not Attempted Safety concerns   Walk 10 Feet CARE Score 88   Walk 50 Feet with Two Turns   Reason if not Attempted Safety concerns   Walk 50 Feet with Two Turns CARE Score 88   Walk 150 Feet   Reason if not Attempted Safety concerns   Walk 150 Feet CARE Score 88   Walking 10 Feet on Uneven Surfaces   Reason if not Attempted Safety concerns   Walking 10 Feet on Uneven Surfaces CARE Score 88   Ambulation   Findings not functional or safe at this time   Wheelchair mobility   Findings did not assess this session   Curb or Single Stair   Reason if not Attempted Safety concerns   1 Step (Curb) CARE Score 88   4 Steps   Reason if not Attempted Safety concerns   4 Steps CARE Score 88   12 Steps   Reason if not Attempted Safety concerns   12 Steps CARE Score 88   Picking Up Object   Reason if not Attempted Safety concerns   Picking Up Object CARE Score 88   Therapeutic Interventions   Strengthening supine modified bridging with green bolster 2x10. AA SAQ RLE x10 and B/L for visual feedback. RLE hip abd in hooklying until fatigued,  Hip/knee flex/ext off side of mat on RLE AA/PNF. Neuromuscular Re-Education sitting unsupported on mat table, 4" block under feet with bed elevated. working on trunk rotation and core control. reaching for cones across midline and fwd reaching. A with wt bearing through RUE on mat while reaching across midline with LUE. Other STS at rail in hallway, using mirror for visual feedback. increase trunk flex upon standing, unable to correct; c/o pain, unable to stand for more than 5-10 each time. performed x5   Assessment   Treatment Assessment pt cont to work on neuro re-ed with mat program, trunk suppport and standing at rail. pt can be self limiting due to chronic pain in LEs. pt wtih trace muscle contractions performing supine ther exs in quads. hip abd weaker than adductors at this time. pt well below baseline and will benefit from skilled therapy to work on standing tolerance, improving unsupported sitting and postural control to avoid leaning and maintaining midline control with unsupported sitting to carryover to functional tasks.    Problem List Decreased strength;Decreased endurance; Impaired balance;Decreased mobility; Decreased coordination;Decreased cognition; Impaired judgement;Decreased safety awareness; Impaired tone;Obesity   Barriers to Discharge Inaccessible home environment;Decreased caregiver support   PT Barriers   Functional Limitation Car transfers; Ramp negotiation;Stair negotiation;Standing;Transfers; Walking   Plan   Progress Progressing toward goals   Recommendation   PT Discharge Recommendation   (TBD)   PT Therapy Minutes   PT Time In 1400   PT Time Out 1520   PT Total Time (minutes) 80   PT Mode of treatment - Individual (minutes) 80   PT Mode of treatment - Concurrent (minutes) 0   PT Mode of treatment - Group (minutes) 0   PT Mode of treatment - Co-treat (minutes) 0   PT Mode of Treatment - Total time(minutes) 80 minutes   PT Cumulative Minutes 290   Therapy Time missed   Time missed?  No

## 2023-08-28 NOTE — PROGRESS NOTES
08/28/23 1230   Pain Assessment   Pain Assessment Tool 0-10   Pain Score 10 - Worst Possible Pain   Therapy Time missed   Time missed? Yes   Amount of time missed 90   Reason for time missed Extreme fatigue  (pain. reports "I dont need anymore OT. ")   Time(s) multiple attempts made 1230 1300 1400
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
## 2023-08-28 NOTE — CASE MANAGEMENT
CM met with patient again, explained rehab routine and CM role. Patient OOB in chair, having lunch. CM explained about the team meeting process, and discharge planning process. Patient inquired of a fax number for her employer:[employer] to fax and form to be completed by [facility] physician. CM gave patient the fax number. Patient reported her  works overnight shift, and has different days off each week. This week he is off on Tuesday and Wednesday. Patient stated her sister, who lives nearby and her father who is 66 and in good health, and a neighbor may be able to provide some assistance at home. Patient reported she has prescription coverage and prefers [facility] [address] for her prescriptions. CM confirmed patient's address and insurance coverage. Patient stated she has a walker and cane at home., has no prior experience with STR or HHC. She previously went to [facility] for outpat therapy. CM will continue to follow for discharge needs. CM received a voice mail message from \[facility]\"" form [facility], stating she can assist with discharge planning. Her phone number is [phone]  Ext [phone].

## 2023-08-28 NOTE — PLAN OF CARE
Problem: Prexisting or High Potential for Compromised Skin Integrity  Goal: Skin integrity is maintained or improved  Description: INTERVENTIONS:  - Identify patients at risk for skin breakdown  - Assess and monitor skin integrity  - Assess and monitor nutrition and hydration status  - Monitor labs   - Assess for incontinence   - Turn and reposition patient  - Assist with mobility/ambulation  - Relieve pressure over bony prominences  - Avoid friction and shearing  - Provide appropriate hygiene as needed including keeping skin clean and dry  - Evaluate need for skin moisturizer/barrier cream  - Collaborate with interdisciplinary team   - Patient/family teaching  - Consider wound care consult   Outcome: Progressing     Problem: PAIN - ADULT  Goal: Verbalizes/displays adequate comfort level or baseline comfort level  Description: Interventions:  - Encourage patient to monitor pain and request assistance  - Assess pain using appropriate pain scale  - Administer analgesics based on type and severity of pain and evaluate response  - Implement non-pharmacological measures as appropriate and evaluate response  - Consider cultural and social influences on pain and pain management  - Notify physician/advanced practitioner if interventions unsuccessful or patient reports new pain  Outcome: Progressing     Problem: INFECTION - ADULT  Goal: Absence or prevention of progression during hospitalization  Description: INTERVENTIONS:  - Assess and monitor for signs and symptoms of infection  - Monitor lab/diagnostic results  - Monitor all insertion sites, i.e. indwelling lines, tubes, and drains  - Monitor endotracheal if appropriate and nasal secretions for changes in amount and color  - [place] appropriate cooling/warming therapies per order  - Administer medications as ordered  - Instruct and encourage patient and family to use good hand hygiene technique  - Identify and instruct in appropriate isolation precautions for identified infection/condition  Outcome: Progressing  Goal: Absence of fever/infection during neutropenic period  Description: INTERVENTIONS:  - Monitor WBC    Outcome: Progressing     Problem: SAFETY ADULT  Goal: Patient will remain free of falls  Description: INTERVENTIONS:  - Educate patient/family on patient safety including physical limitations  - Instruct patient to call for assistance with activity   - Consult OT/PT to assist with strengthening/mobility   - Keep Call bell within reach  - Keep bed low and locked with side rails adjusted as appropriate  - Keep care items and personal belongings within reach  - Initiate and maintain comfort rounds  - Make Fall Risk Sign visible to staff  - Offer Toileting every 2 Hours, in advance of need  - Initiate/Maintain bed alarm  - Obtain necessary fall risk management equipment: bed alarm  - Apply yellow socks and bracelet for high fall risk patients  - Consider moving patient to room near nurses station  Outcome: Progressing  Goal: Maintain or return to baseline ADL function  Description: INTERVENTIONS:  -  Assess patient's ability to carry out ADLs; assess patient's baseline for ADL function and identify physical deficits which impact ability to perform ADLs (bathing, care of mouth/teeth, toileting, grooming, dressing, etc.)  - Assess/evaluate cause of self-care deficits   - Assess range of motion  - Assess patient's mobility; develop plan if impaired  - Assess patient's need for assistive devices and provide as appropriate  - Encourage maximum independence but intervene and supervise when necessary  - Involve family in performance of ADLs  - Assess for home care needs following discharge   - Consider OT consult to assist with ADL evaluation and planning for discharge  - Provide patient education as appropriate  Outcome: Progressing  Goal: Maintains/Returns to pre admission functional level  Description: INTERVENTIONS:  - Perform BMAT or MOVE assessment daily.   - Set 2 and communicate daily mobility goal to care team and patient/family/caregiver. - Collaborate with rehabilitation services on mobility goals if consulted  - Perform Range of Motion 3 times a day. - Reposition patient every 2 hours. - Dangle patient 3 times a day  - Stand patient 3 times a day  - Ambulate patient 3 times a day  - Out of bed to chair 3 times a day   - Out of bed for meals 3 times a day  - Out of bed for toileting  - Record patient progress and toleration of activity level   Outcome: Progressing     Problem: DISCHARGE PLANNING  Goal: Discharge to home or other facility with appropriate resources  Description: INTERVENTIONS:  - Identify barriers to discharge w/patient and caregiver  - Arrange for needed discharge resources and transportation as appropriate  - Identify discharge learning needs (meds, wound care, etc.)  - Arrange for interpretive services to assist at discharge as needed  - Refer to Case Management Department for coordinating discharge planning if the patient needs post-hospital services based on physician/advanced practitioner order or complex needs related to functional status, cognitive ability, or social support system  Outcome: Progressing     Problem: Nutrition/Hydration-ADULT  Goal: Nutrient/Hydration intake appropriate for improving, restoring or maintaining nutritional needs  Description: Monitor and assess patient's nutrition/hydration status for malnutrition. Collaborate with interdisciplinary team and initiate plan and interventions as ordered. Monitor patient's weight and dietary intake as ordered or per policy. Utilize nutrition screening tool and intervene as necessary. Determine patient's food preferences and provide high-protein, high-caloric foods as appropriate.      INTERVENTIONS:  - Monitor oral intake, urinary output, labs, and treatment plans  - Assess nutrition and hydration status and recommend course of action  - Evaluate amount of meals eaten  - Assist patient with eating if necessary   - Allow adequate time for meals  - Recommend/ encourage appropriate diets, oral nutritional supplements, and vitamin/mineral supplements  - Order, calculate, and assess calorie counts as needed  - Recommend, monitor, and adjust tube feedings and TPN/PPN based on assessed needs  - Assess need for intravenous fluids  - Provide specific nutrition/hydration education as appropriate  - Include patient/family/caregiver in decisions related to nutrition  Outcome: Progressing

## 2023-08-28 NOTE — PROGRESS NOTES
[facility] Occupational Therapy Daily Note  Patient Active Problem List   Diagnosis    Cigarette nicotine dependence in remission    Primary hypertension    Fibromyalgia    Continuous opioid dependence (HCC)    Moderate persistent asthma without complication    Diabetes mellitus type 2, controlled ([address])    Depression with anxiety    Sjogren's syndrome ([address])    Incarcerated umbilical hernia    Postoperative visit    Chronic bilateral low back pain with bilateral sciatica    Paresthesia of both feet    Right lumbosacral radiculopathy    CAD (coronary artery disease)    Abnormal CT of the chest    Class 2 obesity with body mass index (BMI) of 39.0 to 39.9 in adult    Chronic obstructive pulmonary disease with acute exacerbation (HCC)    Hyperlipidemia associated with type 2 diabetes mellitus (HCC)    ICH (intracerebral hemorrhage) (HCC)    Left leg pain    Anemia    GERD (gastroesophageal reflux disease)    Constipation       Past Medical History:   Diagnosis Date    Arthritis     Asthma     Continuous opioid dependence ([address]) 4/27/2022    Diabetes mellitus ([address])     Diverticulitis of colon     Fibromyalgia 04/26/2022    Headache(784.0)     History of mammogram 2018    History of tobacco abuse 04/26/2022    Hypertension     Nausea 04/29/2022    Obesity     Sjogren's syndrome ([address]) 10/19/2012    Urinary tract infection      Etiologic Diagnosis: Acute Left Thalamic Intracerebral Hemorrhage with edema  Restrictions/Precautions  Precautions: Bed/chair alarms, Fall Risk, Supervision on toilet/commode (R-inattention)  Weight Bearing Restrictions: No  ROM Restrictions: No  Braces or Orthoses: Sling (RUE sling for xfers)  ADL Team Goal: Patient will require supervision with ADLs with least restrictive device upon completion of rehab program  Occupational Therapy LTG's  Eating Oral care Bathing LB dress UB dress   Eating Goal: 05. Setup or clean-up assistance - [place] SETS UP or CLEANS UP, patient completes activity.  [place] assists only prior to or following the activity. Oral Hygiene Goal: 05. Setup or clean-up assistance - [place] SETS UP or CLEANS UP, patient completes activity. [place] assists only prior to or following the activity. Shower/bathe self Goal: 04. Supervision or touching assistance- [place] provides VERBAL CUES or supervision throughout activity. Lower body dressing Goal: 04. Supervision or touching assistance- [place] provides VERBAL CUES or supervision throughout activity. Upper body dressing Goal: 05. Setup or clean-up assistance - [place] SETS UP or CLEANS UP, patient completes activity. [place] assists only prior to or following the activity. Toileting Toilet txf Func txf IADL Med    Toileting hygiene Goal: 04. Supervision or touching assistance- [place] provides VERBAL CUES or supervision throughout activity. Toilet transfer Goal: 04. Supervision or touching assistance- [place] provides VERBAL CUES or supervision throughout activity. Chair/bed-to-chair transfer Goal: 04. Supervision or touching assistance- [place] provides VERBAL CUES or supervision throughout activity. Assist Level: Minimum Assist (full meal prep seated in WC pending pt progress) Assist Level: Independent (mod I with mediplanner which pt uses at baseline)   OT interventions: Treatment/Interventions: ADL retraining, Functional transfer training, Therapeutic exercise, Endurance training, Patient/family training  Discharge Plan:  OT Discharge Recommendation:  (pending progress)   DME:  TBD     08/28/23 1030   Pain Assessment   Pain Assessment Tool 0-10   Pain Score 5   Pain Location/Orientation Orientation: Lower; Location: Back   Hospital Pain Intervention(s) Repositioned   Lifestyle   Autonomy "I cant wait to show my sister, she is going to cry."   Neuromuscular Education   Functional Movement Patterns Today focus on L UE movements with ample time for processing.  able to complete thumb ab-adduction with more notable smoothness and response time compared to earlier. With increased time able to complete slight D2 DIP flexion less than 1/4 range. NMR session focusing on education and establishment of NMES. Dr. Ramírez Bonilla verbal okay for NMES. Set upright in recliner chair with pillow support and lumbar support. 4 lead NMES set up on L upper trap, middle rhomboid, ant and post delt. Upper trap and rhomboid 25 HZ 10:10. reciprocal delt activation at 23hz. Caution used with pts sensory deficits. Pt reporting that she felt she could feel the contractions. Focused on visual feedback with the use of her video camera for shoulder elevation. Then set up 2 lead [place] go unit for forearm extensors. Focus on mental practice and visualization. Then progressed to grasp and release with small ball, assist for activation of finger flexion. Completed 20 min session on NMES. Also paired with 5 min hand flexors. Following NMES pt is noted with increased initiation speed for D2 finger flexion along with more active range to 1/4 range involving MIP. Pt set up with [facility] exercises on her personal phone for visualization and guided imagery. Focus on establishment of how to complete. Will benefit from cont training on guided imagery. Assessment   Treatment Assessment Pt participated in skilled OT treatment session with treatment focus on UE NMR. Pt tolerated session well, with introduction to NMES, pt w/ good response. Cont to use NMES for L UE during activities. Fugyl Echevarria UE assessment completed scoring 4/66. Pt's right UE function currently Non Functional without neglect. No neglect of extremity, but no involvement in task performance.  Continue to focus neurological treatment plan:Functional Electrical Stimulation, Sensorimotor Training, Functional Orthoses/bracing, Mental Practice/Guided Imagery, Mirror Therapy, Body awareness, Midline awareness, Joint protection training and REO GO   OT Therapy Minutes   OT Time In 1030   OT Time Out 1145   OT Total Time (minutes) 75   OT Mode of treatment - Individual (minutes) 75   OT Mode of treatment - Concurrent (minutes) 0   OT Mode of treatment - Group (minutes) 0   OT Mode of treatment - Co-treat (minutes) 0   OT Mode of Treatment - Total time(minutes) 75 minutes   OT Cumulative Minutes 305       Fugl-Echevarria UE Assessment  Right     A: UPPER EXTREMITY    I: reflex activity 4/4   II:volitional movement within synergies 0/18   III: volitional movement mixing synergies 0/6   IV: volitional movement with little or no synergy 0/6   V: Normal Reflex activity 0/2   B: WRIST 0/10   C: HAND 0/14   D: Coordination and Speed   R:0 L:0 seconds 0/6   H: Sensation 0/12   J: PROM 24/24   J: Joint Pain 24/24   Score from motor sections A-D:     4/66

## 2023-08-28 NOTE — PROGRESS NOTES
[facility] Occupational Therapy Daily Note  Patient Active Problem List   Diagnosis    Cigarette nicotine dependence in remission    Primary hypertension    Fibromyalgia    Continuous opioid dependence (HCC)    Moderate persistent asthma without complication    Diabetes mellitus type 2, controlled ([address])    Depression with anxiety    Sjogren's syndrome ([address])    Incarcerated umbilical hernia    Postoperative visit    Chronic bilateral low back pain with bilateral sciatica    Paresthesia of both feet    Right lumbosacral radiculopathy    CAD (coronary artery disease)    Abnormal CT of the chest    Class 2 obesity with body mass index (BMI) of 39.0 to 39.9 in adult    Chronic obstructive pulmonary disease with acute exacerbation (HCC)    Hyperlipidemia associated with type 2 diabetes mellitus (HCC)    ICH (intracerebral hemorrhage) (HCC)    Left leg pain    Anemia    GERD (gastroesophageal reflux disease)    Constipation       Past Medical History:   Diagnosis Date    Arthritis     Asthma     Continuous opioid dependence ([address]) 4/27/2022    Diabetes mellitus ([address])     Diverticulitis of colon     Fibromyalgia 04/26/2022    Headache(784.0)     History of mammogram 2018    History of tobacco abuse 04/26/2022    Hypertension     Nausea 04/29/2022    Obesity     Sjogren's syndrome ([address]) 10/19/2012    Urinary tract infection      Etiologic Diagnosis: Acute Left Thalamic Intracerebral Hemorrhage with edema  Restrictions/Precautions  Precautions: Bed/chair alarms, Fall Risk, Supervision on toilet/commode (R-inattention)  Weight Bearing Restrictions: No  ROM Restrictions: No  Braces or Orthoses: Sling (RUE sling for xfers)  ADL Team Goal: Patient will require supervision with ADLs with least restrictive device upon completion of rehab program  Occupational Therapy LTG's  Eating Oral care Bathing LB dress UB dress   Eating Goal: 05. Setup or clean-up assistance - [place] SETS UP or CLEANS UP, patient completes activity.  [place] assists only prior to or following the activity. Oral Hygiene Goal: 05. Setup or clean-up assistance - [place] SETS UP or CLEANS UP, patient completes activity. [place] assists only prior to or following the activity. Shower/bathe self Goal: 04. Supervision or touching assistance- [place] provides VERBAL CUES or supervision throughout activity. Lower body dressing Goal: 04. Supervision or touching assistance- [place] provides VERBAL CUES or supervision throughout activity. Upper body dressing Goal: 05. Setup or clean-up assistance - [place] SETS UP or CLEANS UP, patient completes activity. [place] assists only prior to or following the activity. Toileting Toilet txf Func txf IADL Med    Toileting hygiene Goal: 04. Supervision or touching assistance- [place] provides VERBAL CUES or supervision throughout activity. Toilet transfer Goal: 04. Supervision or touching assistance- [place] provides VERBAL CUES or supervision throughout activity. Chair/bed-to-chair transfer Goal: 04. Supervision or touching assistance- [place] provides VERBAL CUES or supervision throughout activity. Assist Level: Minimum Assist (full meal prep seated in WC pending pt progress) Assist Level: Independent (mod I with mediplanner which pt uses at baseline)   OT interventions: Treatment/Interventions: ADL retraining, Functional transfer training, Therapeutic exercise, Endurance training, Patient/family training  Discharge Plan:  OT Discharge Recommendation:  (pending progress)   DME:  ABBEY   08/28/23 0730   Pain Assessment   Pain Assessment Tool 0-10   Pain Score No Pain   Lifestyle   Autonomy "I wish I could Just move it more."   Oral Hygiene   Type of Assistance Needed Physical assistance   Physical Assistance Level Total assistance   Comment to complete with R UE. Oral Hygiene CARE Score 1   Shower/Bathe Self   Type of Assistance Needed Physical assistance   Physical Assistance Level Total assistance   Comment Bed level ADL today.  FOcus on activation of core trunk control in long sitting for bathing. R UE weight bearing in long sit for forward reach to R LE. ADL session focusing on sensory reintegration by application of pressure and rubbing rough washcloth. pt does report increased discomfort in R foot with "odd sensations" along with reactive reflex. pt encouraged to complete self washing to R LE to ankle. pt is able to feel stimulus on upper trap only. Shower/Bathe Self CARE Score 1   Upper Body Dressing   Type of Assistance Needed Physical assistance   Physical Assistance Level Total assistance   Comment Long sit in bed. heavy support on R side for trunk control. Req assistance for threading R UE and over trunk. Upper Body Dressing CARE Score 1   Lower Body Dressing   Type of Assistance Needed Physical assistance   Physical Assistance Level Total assistance   Comment FOcus on R LE activation of lift leg to thread, noted with slight movement initiation. focused on trial of bridging. req use of rolling to R+L   Lower Body Dressing CARE Score 1   Putting On/Taking Off Footwear   Type of Assistance Needed Physical assistance   Physical Assistance Level Total assistance   Putting On/Taking Off Footwear CARE Score 1   Lying to Sitting on Side of Bed   Type of Assistance Needed Physical assistance   Physical Assistance Level Total assistance   Comment AX2 to rise to L side of bed. Lying to Sitting on Side of Bed CARE Score 1   Sit to Stand   Reason if not Attempted Safety concerns   Sit to Stand CARE Score 88   Bed-Chair Transfer   Type of Assistance Needed Physical assistance   Physical Assistance Level Total assistance   Comment AX2 slidebaord to L side from bed to recliner. Pt requires extensive assist for L hip advancement to EOB and lateral hip advancement to L, assist for R LE foot advancement, and heavy support boost from behind. Difficulty maintaining anterior weightshifting 2* dec core strength.    Chair/Bed-to-Chair Transfer CARE Score 1   Neuromuscular Education   Functional Movement Patterns able to illicit thumb abd/adduction and slight extension when prompted. unable to complete D2 movement today. Assessment   Treatment Assessment Pt participated in skilled OT session focusing on functional ADL retraining with focus on sensory reintegration,R side attention, activity modification, and functional transfers. See above for details on ADL function. Pt continues to demonstrate the need for 2 people for bed mobility, and requires the use of hospital bed for safe ADLs at this time. Still demonstrates difficulty managing upright sitting balance often fatiguing quickly. Today still reporting dense sensory deficits in R UE, able to feel stimulus in R upper/lateral  Trap. Postural Assessment Scale for Stroke (PASS) scoring 4/36. Pt's right UE function currently Non Functional without neglect. No neglect of extremity, but no involvement in task performance. . Continue to focus neurological treatment plan:Functional Electrical Stimulation, Sensorimotor Training, Functional Orthoses/bracing, Mental Practice/Guided Imagery, Mirror Therapy, Body awareness, Midline awareness, Joint protection training and REO GO   OT Therapy Minutes   OT Time In 0730   OT Time Out 0830   OT Total Time (minutes) 60   OT Mode of treatment - Individual (minutes) 60   OT Mode of treatment - Concurrent (minutes) 0   OT Mode of treatment - Group (minutes) 0   OT Mode of treatment - Co-treat (minutes) 0   OT Mode of Treatment - Total time(minutes) 60 minutes   OT Cumulative Minutes 230       Sitting balance    Postural Assessment Scale for Stroke (PASS)    SCORE: 4 1:Sitting without support- Score: 1    0 = cannot sit  1 = can sit with slight support (e.g. by 1 hand)  2 = can sit for more than 10 seconds without support  3 = can sit for 5 minutes without support      2: Standing with support.  Score: 0    0 = cannot stand, even with support  1 = can stand with strong support of 2 people  2 = can stand with moderate support of 1 person  3 = can stand with support on only 1 hand      3: Standing without support. Score: 0    0 = cannot stand without support  1 = can stand without support for 10 seconds or leans heavily on 1 leg  2 = can stand without support for 1 minute or stands slightly asymmetrically  3 = can stand without support for more than 1 minute and at the same time perform arm movements above the shoulder level      4 Standing on the non-paretic. Score: 0  0 = cannot stand on the leg  1 = can stand on the leg for a few seconds  2 = can stand on the leg for more than 5 seconds  3 = can stand on the leg for more than 10 seconds     5: Standing on the nonparetic / paretic leg. Score: 0  0 = cannot stand on the leg  1 = can stand on the leg for a few seconds  2 = can stand on the leg for more than 5 seconds  3 = can stand on the leg for more than 10 seconds    Changing a Posture Section-  Items 6 to 11 are to be performed with a 50-cm-high examination table, like a [place] plinth. Items 10 to 12 are to be performed without any support. Scoring of items 6 to 12 is as follows:  0 = cannot perform the activity  1 = can perform the activity with much help  2 = can perform the activity with little help  3 = can perform the activity without help    The items:  6. Supine to affected side lateral. Score: 2    7. Supine to non-affected side lateral. Score: 1    8. Supine to sitting up on the edge of the table. Score: 0    9. Sitting on the edge of the table to supine. Score: 0    10. Sitting to standing up. Score: 0    11. Standing up to sitting down. Score: 0    12. Standing, picking up a pencil from the floor.  Score: 0

## 2023-08-28 NOTE — PROGRESS NOTES
Internal Medicine Progress Note  Patient: Mxaine Feliz  Age/sex: 62 y.o. female  Medical Record #: [id number]      ASSESSMENT/PLAN: (Interval History)  Maxine Feliz is seen and examined and management for following issues:    ICH  • Seen by NS and no surgical intervention indicated. • Etio = HTN  • Was unable to tolerate MRI even with sedation  • Was cleared to resume ASA. • Goal SBP < 140. • Continue atorvastatin. • Outpt follow-up with Neurology.     HTN  • Home: Micardis 80mg qd/Atenolol 50mg qd  • Here: Norvasc 10 mg qd/Losartan 100mg qd/Hydralazine 25mg q8hrs. • Micardis 80 mg qd = Losartan 100 mg qd  • Norvasc/Hydralazine/Losartan held 8/27 for SBP<110  • Will stop the Hydralazine today 8/28/23     DM type 2  • HA1C 6.7  • Home: Metformin 1000mg BID  • Here: coverage only  • Continue DM diet, accuchecks and SSI  • Will add Metformin 500 mg BID today 8/28/23     Anemia  • Baseline hgb 10 - 11. • Ferritin 22 and TIBC 370  • B12 low normal.  • Continue iron and B12 supplementation. • Monitor CBC     Anxiety  • Continue Wellbutrin XL. • Pt is taking 150mg instead of 300mg due to concerns for increased risk of seizure     Constipation  • Bowel meds per PMR    Discharge date:  Team      The above assessment and plan was reviewed and updated as determined by my evaluation of the patient on 8/28/2023.     Labs:   Results from last 7 days   Lab Units 08/28/23  0556 08/24/23  0515   WBC Thousand/uL 9.26 10.09   HEMOGLOBIN g/dL 11.5 12.0   HEMATOCRIT % 37.3 37.6   PLATELETS Thousands/uL 379 360     Results from last 7 days   Lab Units 08/28/23  0556 08/24/23  0515   SODIUM mmol/L 136 135   POTASSIUM mmol/L 3.8 3.9   CHLORIDE mmol/L 102 100   CO2 mmol/L 25 26   BUN mg/dL 14 19   CREATININE mg/dL 0.60 0.56*   CALCIUM mg/dL 9.3 9.3             Results from last 7 days   Lab Units 08/28/23  0609 08/27/23  2034 08/27/23  1543   POC GLUCOSE mg/dl 157* 169* 167*       Review of Scheduled Meds:  Current Facility-Administered Medications   Medication Dose Route Frequency Provider Last Rate   • albuterol  2 puff Inhalation Q4H PRN Mar Prima, DO     • amLODIPine  10 mg Oral Daily Mar Prima, DO     • aspirin  81 mg Oral Daily Mar Prima, DO     • atorvastatin  20 mg Oral Daily With Brayan Rho, DO     • bisacodyl  5 mg Oral Daily PRN Tereasa Cellar, DO     • buPROPion  150 mg Oral QAM Mar Prima, DO     • calcium carbonate  1,000 mg Oral TID PRN Laureen Tracy PA-C     • vitamin B-12  1,000 mcg Oral Daily Mar Prima, DO     • ferrous sulfate  325 mg Oral Daily With Breakfast Mar Prima, DO     • fluticasone-vilanterol  1 puff Inhalation Daily Mar Prima, DO     • gabapentin  300 mg Oral TID Mar Prima, DO     • heparin (porcine)  5,000 Units Subcutaneous Q8H [facility] Mar Prima, DO     • hydrALAZINE  25 mg Oral Q8H [facility] Mar Prima, DO     • insulin lispro  1-6 Units Subcutaneous TID AC Mar Prima, DO     • insulin lispro  1-6 Units Subcutaneous HS Mar Prima, DO     • lactulose  20 g Oral Daily PRN Mar Prima, DO     • losartan  100 mg Oral Daily Mar Prima, DO     • methocarbamol  500 mg Oral Q6H PRN Mar Prima, DO     • ondansetron  4 mg Oral Q6H PRN Mar Prima, DO     • oxyCODONE  5 mg Oral Q4H PRN Mar Prima, DO     • oxyCODONE  2.5 mg Oral Q4H PRN Mar Prima, DO     • pantoprazole  40 mg Oral Early Morning Mar Prima, DO     • polyethylene glycol  17 g Oral Daily Mar Prima, DO     • prochlorperazine  5 mg Oral Q6H PRN Mar Prima, DO     • senna-docusate sodium  2 tablet Oral HS Mar Prima, DO         Subjective/ HPI: Patient seen and examined. Patients overnight issues or events were reviewed with nursing or staff during rounds or morning huddle session. New or overnight issues include the following:     No new or overnight issues. Offers no complaints    ROS:   A 10 point ROS was performed; negative except as noted above.        Imaging:     XR abdomen 1 view kub   Final Result by Gustavo Bauer MD (08/26 5673)   Some formed stool in the rectosigmoid colon, but overall fecal retention is mild. Workstation performed: [id number]             *Labs /Radiology studies reviewed  *Medications reviewed and reconciled as needed  *Please refer to order section for additional ordered labs studies  *Case discussed with primary attending during morning huddle case rounds    Physical Examination:  Vitals:   Vitals:    08/27/23 2036 08/28/23 0515 08/28/23 0600 08/28/23 0855   BP: 122/66 120/60  124/58   BP Location: Left arm Left arm  Left arm   Pulse: 80 82  71   Resp: 17 18     Temp: 98.8 °F (37.1 °C) 98 °F (36.7 °C)     TempSrc: Oral Oral     SpO2: 95% 96%     Weight:   96.2 kg (212 lb)    Height:           General Appearance: no distress, conversive  HEENT: PERRLA, conjuctiva normal; oropharynx clear; mucous membranes moist   Neck:  Supple, normal ROM  Lungs: CTA, normal respiratory effort, no retractions, expiratory effort normal  CV: regular rate and rhythm; no rubs/murmurs/gallops, PMI normal   ABD: soft; ND/NT; +BS  EXT: no edema  Skin: normal turgor, normal texture, no rashes  Psych: affect normal, mood normal  Neuro: AAO; speech is mildly dysarthric; can move right thumb/forefinger slightly otherwise no movement RUE/RLE      The above physical exam was reviewed and updated as determined by my evaluation of the patient on 8/28/2023. Invasive Devices     Drain  Duration           External Urinary Catheter 13 days                   VTE Pharmacologic Prophylaxis: Heparin  Code Status: Level 1 - Full Code  Current Length of Stay: 3 day(s)      Total time spent:  30 minutes with more than 50% spent counseling/coordinating care. Counseling includes discussion with patient re: progress  and discussion with patient of his/her current medical state/information. Coordination of patient's care was performed in conjunction with primary service.  Time invested included review of patient's labs, vitals, and management of their comorbidities with continued monitoring. In addition, this patient was discussed with medical team including physician and advanced extenders. The care of the patient was extensively discussed and appropriate treatment plan was formulated unique for this patient. Medical decision making for the day was made by supervising physician unless otherwise noted in their attestation statement. ** Please Note:  voice to text software may have been used in the creation of this document.  Although proof errors in transcription or interpretation are a potential of such software**

## 2023-08-28 NOTE — PROGRESS NOTES
SLP TAA       08/28/23 1315   Patient Data   Rehab Impairment Impairment of mobility, safety and Activities of Daily Living (ADLs) due to Stroke:  01.2  Right Body Involvement (Left Brain)   Etiologic Diagnosis Acute Left Thalamic Intracerebral Hemorrhage with edema   Date of Onset 08/14/23   Support System   Name Pt reports living w/ spouse, Lizabeth Mathew   Prior Level of Function   Functional Cognition 3. Independent - Patient completed the activities by him/herself, with or without an assistive device, with no assistance from a helper. Restrictions/Precautions   Precautions Bed/chair alarms;Cognitive; Fall Risk;Pain;Supervision on toilet/commode;Visual deficit   Pain Assessment   Pain Assessment Tool 0-10   Pain Score No Pain   Comprehension   Assist Devices Glasses   Auditory Basic;Complex   Visual Basic;Complex   Findings Pt completing formalized cognitive assessment. Refer to SLP Rehab assessment note for details. QI: Comprehension 3. Usually Understands: Understands most conversations, but misses some part/intent of message. Requires cues at times to understand. Comprehension (FIM) 4 - Understands basic info/conversation 75-90% of time   Expression   Verbal Basic;Complex   Non-Verbal Basic;Complex   Intelligibility Sentence   Findings Pt completing formalized cognitive assessment. Refer to SLP Rehab assessment note for details. QI: Expression 3. Exhibits some difficulty with expressing needs and ideas (e.g., some words or finishing thoughts) or speech is not clear   Expression (FIM) 5 - Needs help/cues only RARELY (< 10% of the time)   Social Interaction   Cooperation with staff   Participation Individual   Behaviors observed Appropriate   Findings Pt completing formalized cognitive assessment. Refer to SLP Rehab assessment note for details.    Social Interaction (FIM) 5 - Interacts appropriately with others 90% of time   Problem Solving   Complex Manages finances;Manages discharge planning;Manages medications   Routine Manages call bell   Findings Pt completing formalized cognitive assessment. Refer to SLP Rehab assessment note for details. Problem solving (FIM) 3 - Solves basic problmes 50-74% of time   Memory   Initiates Tasks Yes   Short-Term Impaired   Long Term Intact   Findings Pt completing formalized cognitive assessment. Refer to SLP Rehab assessment note for details. Memory (FIM) 3 - Recognizes, recalls/performs 50-74%   Discharge Information   Vocational Plan Return to work   Barriers to Return to [employer] Access;Skill Set Limitation;Transportation Issues;Strength; Endurance;Communication   Patient's Discharge Plan home w/ family support/supervision   Patient's Rehab Expectations "hope to not be here too long"   Barriers to Discharge Home Limited Family Support;Decreased Cognitive Function;Decreased Strength;Decreased Endurance; Safety Considerations;Pain   Impressions Pt is a 59-year-old female with history of hypertension, hyperlipidemia, diabetes type 2, obesity, eosinophilic asthma, COPD, fibromyalgia, Sjogren syndrome, lumbar spondylosis with grade 1 anterior spondylolisthesis who presents to the hospital on 8/14/2023 due to acute onset right upper and right lower extremity weakness with sensory loss. Additionally with right-sided facial droop and dysarthria. The patient was noted to have an acute intraparenchymal hemorrhage on CT in the left thalamic region with surrounding edema. A CTA was negative. Patient was initially placed on a Cardene drip. Course complicated by significant left-sided leg pain and was recommended on treatment for radicular symptoms including Tylenol, gabapentin and potentially steroids. Neurology was consulted and recommended MRI of the brain with and without contrast while holding antithrombotics. The MRI showed a stable left thalamic capsular intraparenchymal hemorrhage with surrounding edema without any other mass effect.   A repeat CT was completed on 8/17/2023 after worsening symptoms including worsening speech slurring with stable findings. Additionally there was a rapid response after a fall with head strike on 8/18 with a repeat CT of the head without acute findings. Additionally antihypertensive regimen was altered as she was previously on telmisartan and atenolol and is currently on amlodipine, losartan and hydralazine with better control. The patient was evaluated by the Rehabilitation team and deemed an appropriate candidate for comprehensive inpatient rehabilitation and admitted to the [facility] on 8/25/2023. Pt is a good rehab candidate to achieve supervision to mod I level given cognitive linguistic skills while on the acute rehab center with anticipated discharge home w/ family support. Current barriers which present include, decreased attention, slower processing time, decreased ST/working memory, decreased executive function skills (problem solving, reasoning, sequencing, organization of thoughts), judgement, fatigue and safety which impacts functional mobility at this time. ELOS ~ 4 weeks. At this time, pt will benefit from skilled SLP services targeting functional independence of cognitive linguistic skills in hopes to decrease caregiver burden over time.    SLP Therapy Minutes   SLP Time In 8239   SLP Time Out 1400   SLP Total Time (minutes) 45   SLP Mode of treatment - Individual (minutes) 45   SLP Mode of treatment - Concurrent (minutes) 0   SLP Mode of treatment - Group (minutes) 0   SLP Mode of treatment - Co-treat (minutes) 0   SLP Mode of Treatment - Total time(minutes) 45 minutes   SLP Cumulative Minutes 45   Cumulative Minutes   Cumulative therapy minutes 560

## 2023-08-29 LAB
GLUCOSE SERPL-MCNC: 127 MG/DL (ref 65–140)
GLUCOSE SERPL-MCNC: 137 MG/DL (ref 65–140)
GLUCOSE SERPL-MCNC: 137 MG/DL (ref 65–140)
GLUCOSE SERPL-MCNC: 220 MG/DL (ref 65–140)

## 2023-08-29 PROCEDURE — 97112 NEUROMUSCULAR REEDUCATION: CPT

## 2023-08-29 PROCEDURE — 97530 THERAPEUTIC ACTIVITIES: CPT

## 2023-08-29 PROCEDURE — 97130 THER IVNTJ EA ADDL 15 MIN: CPT

## 2023-08-29 PROCEDURE — 97110 THERAPEUTIC EXERCISES: CPT

## 2023-08-29 PROCEDURE — 97129 THER IVNTJ 1ST 15 MIN: CPT

## 2023-08-29 PROCEDURE — 99232 SBSQ HOSP IP/OBS MODERATE 35: CPT | Performed by: STUDENT IN AN ORGANIZED HEALTH CARE EDUCATION/TRAINING PROGRAM

## 2023-08-29 PROCEDURE — 99232 SBSQ HOSP IP/OBS MODERATE 35: CPT | Performed by: INTERNAL MEDICINE

## 2023-08-29 PROCEDURE — 82948 REAGENT STRIP/BLOOD GLUCOSE: CPT

## 2023-08-29 RX ADMIN — ATORVASTATIN CALCIUM 20 MG: 20 TABLET, FILM COATED ORAL at 16:28

## 2023-08-29 RX ADMIN — GABAPENTIN 300 MG: 300 CAPSULE ORAL at 08:13

## 2023-08-29 RX ADMIN — HEPARIN SODIUM 5000 UNITS: 5000 INJECTION INTRAVENOUS; SUBCUTANEOUS at 05:56

## 2023-08-29 RX ADMIN — LOSARTAN POTASSIUM 100 MG: 50 TABLET, FILM COATED ORAL at 08:13

## 2023-08-29 RX ADMIN — OXYCODONE HYDROCHLORIDE 5 MG: 5 TABLET ORAL at 21:34

## 2023-08-29 RX ADMIN — FERROUS SULFATE TAB 325 MG (65 MG ELEMENTAL FE) 325 MG: 325 (65 FE) TAB at 08:14

## 2023-08-29 RX ADMIN — METHOCARBAMOL 500 MG: 500 TABLET ORAL at 08:13

## 2023-08-29 RX ADMIN — PANTOPRAZOLE SODIUM 40 MG: 40 TABLET, DELAYED RELEASE ORAL at 05:56

## 2023-08-29 RX ADMIN — GABAPENTIN 300 MG: 300 CAPSULE ORAL at 16:28

## 2023-08-29 RX ADMIN — CYANOCOBALAMIN TAB 500 MCG 1000 MCG: 500 TAB at 08:13

## 2023-08-29 RX ADMIN — METHOCARBAMOL 500 MG: 500 TABLET ORAL at 21:33

## 2023-08-29 RX ADMIN — OXYCODONE HYDROCHLORIDE 5 MG: 5 TABLET ORAL at 06:03

## 2023-08-29 RX ADMIN — Medication 2.5 MG: at 10:05

## 2023-08-29 RX ADMIN — ASPIRIN 81 MG CHEWABLE TABLET 81 MG: 81 TABLET CHEWABLE at 08:13

## 2023-08-29 RX ADMIN — HEPARIN SODIUM 5000 UNITS: 5000 INJECTION INTRAVENOUS; SUBCUTANEOUS at 14:40

## 2023-08-29 RX ADMIN — FLUTICASONE FUROATE AND VILANTEROL TRIFENATATE 1 PUFF: 200; 25 POWDER RESPIRATORY (INHALATION) at 08:19

## 2023-08-29 RX ADMIN — GABAPENTIN 300 MG: 300 CAPSULE ORAL at 21:33

## 2023-08-29 RX ADMIN — INSULIN LISPRO 2 UNITS: 100 INJECTION, SOLUTION INTRAVENOUS; SUBCUTANEOUS at 12:00

## 2023-08-29 RX ADMIN — METFORMIN HYDROCHLORIDE 500 MG: 500 TABLET ORAL at 08:13

## 2023-08-29 RX ADMIN — HEPARIN SODIUM 5000 UNITS: 5000 INJECTION INTRAVENOUS; SUBCUTANEOUS at 21:33

## 2023-08-29 RX ADMIN — METFORMIN HYDROCHLORIDE 500 MG: 500 TABLET ORAL at 16:28

## 2023-08-29 RX ADMIN — METHOCARBAMOL 500 MG: 500 TABLET ORAL at 16:28

## 2023-08-29 RX ADMIN — AMLODIPINE BESYLATE 10 MG: 10 TABLET ORAL at 08:14

## 2023-08-29 RX ADMIN — POLYETHYLENE GLYCOL 3350 17 G: 17 POWDER, FOR SOLUTION ORAL at 08:13

## 2023-08-29 RX ADMIN — BUPROPION HYDROCHLORIDE 150 MG: 150 TABLET, FILM COATED, EXTENDED RELEASE ORAL at 08:18

## 2023-08-29 NOTE — PCC PHYSICAL THERAPY
Patient making progress with skilled PT intervention but remains limited by R hemiparesis accompanied with R knee pain, poor tolerance to activity, and self limiting behaviors. She also present with fear of new or hard activities and has declined standing, BWSS and ambulation trials despite education and encouragement. She has been able to make good progress in her transfers now needing only min/mod A with slide board transfers, Willian with w/c mobility, and mod A to stand. Patient ambulated at railing this date but requested to sit prior to reaching her maximum capacity. Despite progress, her family is unable to physically assist her. Her plan is to continue with acute rehab this week and transition to SNF next week for continued progress on her functional mobility (I) and safety. Pt making neuromuscular gains with return in RUE and RLE improving technique and ability to perform txs with less A. Pt still refuses amb at times due to R knee pain, but pt received an injection yesterday to help with pain. Reocmmendation for stability brace when walking. Pt to d/c to SNF due to high caregiver burden at this time with functional tasks and mobility.

## 2023-08-29 NOTE — PCC CARE MANAGEMENT
Pt continues to participate with therapy and is making gains. Family meeting occurred earlier this week and everyone is in agreement for pt to continue with inpat therapies at a subacute level. Pt selected [facility]ung. Continued stay due this Friday and cm anticipates transition early next week as long as insurance agrees.

## 2023-08-29 NOTE — PCC OCCUPATIONAL THERAPY
Occupational Therapy Weekly Team Note    Pt continues to present with impairments in activity tolerance, endurance, standing balance/tolerance, sitting balance/tolerance, safety , attention , (R) attention, and coping skills . Additional functional barriers include fatigue, pain, (R) hemiparesis, decreased caregiver support, risk for falls, and home environment. Pt is functioning at overall Mod A for UB ADLs and Max-TA for LB ADLs. Family meeting w/ pt's  and sister, plan for [facility] prior to d/c home to allow for home modifications to be made and pt to maximize fxnl indep and dec caregiver burden. OT D/C recommendation is for [facility] as pt will benefit from ongoing OT services.

## 2023-08-29 NOTE — PROGRESS NOTES
08/29/23 0930   Pain Assessment   Pain Assessment Tool 0-10   Pain Score 5   Pain Location/Orientation Orientation: Right;Location: Leg   Hospital Pain Intervention(s) Repositioned  (SLP notifed nsg at end of session)   Restrictions/Precautions   Precautions Bed/chair alarms;Cognitive; Fall Risk;Pain;Supervision on toilet/commode;Visual deficit   Comprehension   Comprehension (FIM) 5 - Understands basic directions and conversation   Expression   Expression (FIM) 5 - Needs help/cues only RARELY (< 10% of the time)   Social Interaction   Social Interaction (FIM) 5 - Interacts appropriately with others 90% of time   Problem Solving   Problem solving (FIM) 4 - Solves basic problems 75-89% of time   Memory   Memory (FIM) 3 - Recognizes, recalls/performs 50-74%   Speech/Language/Cognition Assessmetn   Treatment Assessment Pt was in recliner upon arrival into room for session, playing a game on cell phone. SLP asking pt about overnight events, to where pt did report difficulty sleeping but this is not new for pt. Pt furthermore stating that she's tried melatonin in the past but still doesn't help. SLP providing supportive education about ensuring rest at night as well as possibly short naps throughout the day pending overall fatigue levels due to stroke. Pt was receptive to this education. Additionally, SLP provided pt w/ the Stroke Education binder: What You Need to Know About Stroke: A Patient Resource. SLP providing generalized overview about the information which is in the booklet, to where between ST sessions but also w/ other therapies (OT and PT) topics will be covered in depth and towards pt's specific diagnosis. Of note, SLP did lead into initial review given current medications which was completed verbally. SLP did ask pt about use of pill box at home, to where pt reported using a 2x/day box (AM/PM).  As SLP presented the name of the medications, pt was to provide the reason for taking the medications as well as attempting to recall the frequency of the medications. Currently there is a total of 16 medications which are prescribed (both oral and non-oral). It was noted that pt was able to recognize 7/8 medication which were home medication, to where pt did have difficulty recognizing the name of her 2nd BP medication. Pt remained able to recall the reason and frequency for taking the home medications w/ 7/7 accuracy. As for the newer 8 medications, pt was able to identify the reason for taking 4 out of the 8 new medications. Pt was receptive though to the reason for taking these newer medications. Additionally noted was pt's frequency given 2 oral medications (gabepentin and robaxin). Pt had been on gabepentin at home and verbalized how she placed 2 medications in the AM/PM slots and then took the bottle with her to work to take at [employer] time. She verbalized that the same would occur w/ robaxin, if this was needed at discharge. However, instead of work, she would keep the bottle in an area to where it was increase recall to take the middle dosage. SLP did state that since this is an initial review, medications can change and will revisit the list as needed and once closer to discharge, will practice tangible medication management to ensure safety in completing this moving forward. Pt was agreeable w/ plan. At this time, pt will continue to benefit from skilled SLP services targeting functional independence given cognitive linguistic skills in attempts to decrease caregiver burden over time. SLP Therapy Minutes   SLP Time In 0930   SLP Time Out 1000   SLP Total Time (minutes) 30   SLP Mode of treatment - Individual (minutes) 30   SLP Mode of treatment - Concurrent (minutes) 0   SLP Mode of treatment - Group (minutes) 0   SLP Mode of treatment - Co-treat (minutes) 0   SLP Mode of Treatment - Total time(minutes) 30 minutes   SLP Cumulative Minutes 75   Therapy Time missed   Time missed?  No

## 2023-08-29 NOTE — PROGRESS NOTES
08/29/23 0830   Pain Assessment   Pain Assessment Tool 0-10   Pain Score No Pain   Restrictions/Precautions   Precautions Bed/chair alarms;Cognitive; Fall Risk;Supervision on toilet/commode   Weight Bearing Restrictions No   ROM Restrictions No   Braces or Orthoses Sling  (R UE with transfers, OOB, and standing)   Cognition   Overall Cognitive Status [facility]   Arousal/Participation Alert; Cooperative   Following Commands Follows one step commands with increased time or repetition   Subjective   Subjective Beatrice Angelucci says she is doing well this morning and is agreeable to participation in her PT session. Her  is present and is eager to observe the session. Lying to Sitting on Side of Bed   Type of Assistance Needed Physical assistance; Adaptive equipment;Verbal cues   Physical Assistance Level Total assistance   Comment HOB elevated 30 deg, LE pushing on armrest, maxAx2 for truncal balance and management of BL LE's, sling R UE, long sit rotation to R EOB   Lying to Sitting on Side of Bed CARE Score 1   Sit to Stand   Type of Assistance Needed Physical assistance; Adaptive equipment;Verbal cues   Physical Assistance Level Total assistance   Comment From WC, R UE sling, cues for L UE to push from armrest, maxAx2; one clinician for block of R knee and fwd WS, second clinician for assist with upright propulsion   Sit to Stand CARE Score 1   Bed-Chair Transfer   Type of Assistance Needed Physical assistance;Verbal cues; Adaptive equipment   Physical Assistance Level Total assistance   Comment MaxAx2, modAx1 of third clinician; slide board transfer completed from bed to L to WC session start, WC to recliner to R session end, 4" step to bring floor to feet, cues for L UE placement and participation, BL LE participation, fwd weight shift   Chair/Bed-to-Chair Transfer CARE Score 1   Transfer Bed/Chair/Wheelchair   Limitations Noted In Balance;Confidence; Coordination; Endurance;Problem Solving; Sequencing;Sensation;UE Strength;LE Strength   Walk 10 Feet   Reason if not Attempted Safety concerns   Walk 10 Feet CARE Score 88   Ambulation   Primary Mode of Locomotion Prior to Admission Walk   Does the patient walk? 1. No, and walking goal is clinically indicated. Therapeutic Interventions   Neuromuscular Re-Education Standing Frame: First round: 11 minutes, second round: 7 minutes. While standing: lateral weight shift 5s hold x5 ea side with tactile feedback from PT, glut sets 2x10 5s hold, lateral trunk cone retrieval from R side and placement to L to therapist 2x5, retrieval from L to R same setup 1x5   Assessment   Treatment Assessment Elbert Oh participated in her skilled PT session focused on progressing standing tolerance, trunk control, and control of lateral weight shfift using the standing frame. Required maxAx2-3 clinicians throughout session to maintain safety due to R-sided weakeness and lack of sensation. Tolerated 11 and 7-minute standing frame bouts with dynamic reaching exercises throughout. Appreciated some R digital movement with assistance/facilitation - mostly with index finger and thumb. Recommend continued work on standing tolerance, trunk control with standing, and challenging R LE neuromuscular recruitment to assess capabilities of step amplitude on that side with progression to BWSS gait training. Family/Caregiver Present Yes;    Problem List Decreased strength;Decreased endurance; Impaired balance;Decreased mobility; Decreased coordination;Decreased cognition; Impaired sensation; Impaired tone   Barriers to Discharge Inaccessible home environment   PT Barriers   Physical Impairment Decreased strength;Decreased endurance; Impaired balance;Decreased mobility; Decreased coordination;Decreased cognition; Impaired sensation; Impaired tone   Functional Limitation Car transfers; Ramp negotiation;Stair negotiation;Standing;Transfers; Wheelchair management; Walking   Plan   Treatment/Interventions Functional transfer training;LE strengthening/ROM; Therapeutic exercise; Endurance training;Bed mobility; Compensatory technique education;Continued evaluation; Family   Progress Slow progress, decreased activity tolerance  (R hemiparesis)   PT Therapy Minutes   PT Time In 0830   PT Time Out 0930   PT Total Time (minutes) 60   PT Mode of treatment - Individual (minutes) 60   PT Mode of treatment - Concurrent (minutes) 0   PT Mode of treatment - Group (minutes) 0   PT Mode of treatment - Co-treat (minutes) 0   PT Mode of Treatment - Total time(minutes) 60 minutes   PT Cumulative Minutes 350   Therapy Time missed   Time missed?  No

## 2023-08-29 NOTE — PROGRESS NOTES
PM&R PROGRESS NOTE:  Jessica Jones 62 y.o. female MRN: [id number]  Unit/Bed#: -34 Encounter: [id number]        Rehabilitation Diagnosis: Impairment of mobility, safety, Activities of Daily Living (ADLs), and cognitive/communication skills due to Stroke:  01.2  Right Body Involvement (Left Brain)    HPI: Jessica Jones is a 80-year-old female with history of hypertension, hyperlipidemia, diabetes type 2, obesity, eosinophilic asthma, COPD, fibromyalgia, Sjogren syndrome, lumbar spondylosis with grade 1 anterior spondylolisthesis who presents to the hospital on 8/14/2023 due to acute onset right upper and right lower extremity weakness with sensory loss. Additionally with right-sided facial droop and dysarthria. The patient was noted to have an acute intraparenchymal hemorrhage on CT in the left thalamic region with surrounding edema. A CTA was negative. Patient was initially placed on a Cardene drip. Course complicated by significant left-sided leg pain and was recommended on treatment for radicular symptoms including Tylenol, gabapentin and potentially steroids. Neurology was consulted and recommended MRI of the brain with and without contrast while holding antithrombotics. The MRI showed a stable left thalamic capsular intraparenchymal hemorrhage with surrounding edema without any other mass effect. A repeat CT was completed on 8/17/2023 after worsening symptoms including worsening speech slurring with stable findings. Additionally there was a rapid response after a fall with head strike on 8/18 with a repeat CT of the head without acute findings. Additionally antihypertensive regimen was altered as she was previously on telmisartan and atenolol and is currently on amlodipine, losartan and hydralazine with better control.  The patient was evaluated by the Rehabilitation team and deemed an appropriate candidate for comprehensive inpatient rehabilitation and admitted to the [facility] on 8/25/2023  3:19 PM    SUBJECTIVE: Patient seen and evaluated at bedside. Spasms mildly improved but still present usually in the early evening or late afternoon. Tolerating the Robaxin 500 mg 3 times a day. Also taking the oxycodone for pain as well. Did let her know about the diazepam as needed for additional spasms. Still constipated with last bowel movement 8/26 and recommend use of the as needed lactulose at this time. Some trace movements in the fingers with therapy not noted at this time. Continues with e-stim. Ordered right resting hand splint. Maintains on metformin 500 mg twice daily and is currently off hydralazine for blood pressure. She denies any fever, chills, nausea, vomiting, cough pressures of breath, diarrhea. Endorses constipation and is still with the abductor proximal spasms. ASSESSMENT: Stable, progressing      PLAN:    Rehabilitation  • Functional deficits:  Self care and mobility  • Continue current rehabilitation plan of care to maximize function. • Functional update:   o PT: Max assist for transfers and total assist for hygiene  o OT: Total assist for oral hygiene, grooming and bathing, max assist for dressing upper body and total assist for lower  o SLP: Rarely needing cues and most of her deficits are with problem solving and recall/memory  • Estimated Discharge:  To be determined in team conference    DVT prophylaxis  • Heparin     Pain  • Gabapentin 300 mg 3 times daily  • Oxycodone 2.5-5 mg every 4 hours as needed moderate-severe pain  • Robaxin 500 mg TID standing for spasms with Valium 2mg BID PRN for severe spasms     Bladder plan  • Incontinent     Bowel plan  • Last bowel movement 8/26     Code Status  • Level 1 full code      * ICH (intracerebral hemorrhage) ([address] )  Assessment & Plan  51-year-old female presents with right hemiplegia, dysarthria and facial asymmetry found to have an acute intraparenchymal hemorrhage in the left thalamic region with surrounding edema felt to be secondary to uncontrolled hypertension  · Had repeat CT of the head on 8/18 after a fall with head strike resulting in significant bruising of the face  · Cleared by neurology to restart aspirin 81 mg daily and atorvastatin 20 mg daily  · Evaluated by neurosurgery with no surgical intervention at this time  · Had a repeat head CT on 8/20 which has been stable  · Goal systolic blood pressure of less than 140  · Follow-up with neurology as an outpatient  · Physical and Occupational Therapy with goals for community discharge.   Patient lives with her  in a mobile home with 5 steps to enter and he is able to assist 24/7  · Hemorrhagic stroke education, nutrition, neuropsychology  · Secondary stroke prophylaxis with hypertension control    Constipation  Assessment & Plan  · No bowel movement since prior to admission which was 11 days prior on 8/14  · Obtaining x-ray to evaluate stool burden  · Adding to bowel regimen including increasing senna and Colace, lactulose as needed and will be more aggressive pending results of x-ray    GERD (gastroesophageal reflux disease)  Assessment & Plan  Continue Protonix    Hyperlipidemia associated with type 2 diabetes mellitus (HCC)  Assessment & Plan  Continue atorvastatin 20 mg with dinner    Class 2 obesity with body mass index (BMI) of 39.0 to 39.9 in adult  Assessment & Plan  · BMI of 41.40 on admission  · Continue promoting weight loss and increased activity, diet and exercise  · Continue a consistent carbohydrate diet  · Nutrition consultation    CAD (coronary artery disease)  Assessment & Plan  · History of CAD currently on statin and aspirin that was restarted on 8/21 after clearance by neurology  · Beta-blocker held due to bradycardia  · Patient has not formally seen a cardiologist however this was diagnosed based on a CT PE study that was conducted in the ER showing mild CAD    Chronic bilateral low back pain with bilateral sciatica  Assessment & Plan  · Patient has a history of chronic low back pain with radicular symptoms in addition to fibromyalgia and myofascial pain syndrome. · She follows with Dr. Rj Decker from pain management has tried several medications as well as epidural steroid injections with little relief  · Imaging reveals lumbar degenerative disc disease at the L3-4, L4-5, L5-S1 level. Additionally facet hypertrophic changes and ligamentous laxity at the L4-5 level resulting in grade 1 anterior spondylolisthesis and mild canal narrowing with slight distortion of the left anterior lateral aspect of the thecal sac at this level with mild right greater than left neuroforaminal narrowing  · Multi modal pain with current medication regimen including oxycodone to be weaned as well as the Robaxin and gabapentin. We will also consult neuropsychology    Sjogren's syndrome [facility])  Assessment & Plan  · Patient states that she is been worked up for Sjogren's syndrome in the past and has had conflicting diagnosis he is stating that some physicians have diagnosed her with it and others stated she did not have it. · She has not been on any medication treatment for this and does not actively follow-up with a rheumatologist    Depression with anxiety  Assessment & Plan  · Neuropsychology consultation poststroke with history of anxiety  · Continue Wellbutrin  mg in the morning.   It has been 300 mg as an outpatient however it was decreased in acute care due to worry of decreasing seizure threshold in the setting of [id number]    Diabetes mellitus type 2, controlled [facility])  Assessment & Plan  Lab Results   Component Value Date    HGBA1C 6.7 (H) 08/15/2023       Recent Labs     08/28/23  1558 08/28/23  2056 08/29/23  0616 08/29/23  1036   POCGLU 164* 117 137 220*     · Currently on carb controlled level 2 diet, metformin 500 mg twice daily  · Sliding scale insulin algorithm 3 with Accu-Cheks 4 times daily      Moderate persistent asthma without complication  Assessment & Plan  · Follows with Dr. Garfield Oliva from pulmonology  · Home regimen includes Breo and as needed albuterol  · On equivalent here and added albuterol inhaler on admission to [facility]    Primary hypertension  Assessment & Plan  · Home regimen: Telmisartan and atenolol  · Current regimen: Norvasc 10 mg daily Cozaar 100 mg daily, hydralazine discontinued  · Monitor blood pressures especially in therapy and make adjustments as needed        Appreciate IM consultants medical co-management. Labs, medications, and imaging personally reviewed. ROS:  A ten point review of systems was completed on 08/29/23 and pertinent positives are listed in subjective section. All other systems reviewed were negative. OBJECTIVE:   /78 (BP Location: Left arm)   Pulse 83   Temp 98.1 °F (36.7 °C) (Oral)   Resp 20   Ht 5' (1.524 m)   Wt 96.3 kg (212 lb 4.8 oz)   SpO2 96%   BMI 41.46 kg/m²     Physical Exam  Vitals reviewed. Constitutional:       General: She is not in acute distress. Appearance: She is obese. HENT:      Head: Normocephalic. Comments: Ecchymosis around the right orbital region in stages of healing     Right Ear: External ear normal.      Left Ear: External ear normal.      Nose: Nose normal. No rhinorrhea. Mouth/Throat:      Mouth: Mucous membranes are moist.      Pharynx: Oropharynx is clear. Eyes:      General: No scleral icterus. Cardiovascular:      Rate and Rhythm: Normal rate. Pulses: Normal pulses. Pulmonary:      Effort: No respiratory distress. Breath sounds: Normal breath sounds. Abdominal:      General: There is no distension. Palpations: Abdomen is soft. Comments: Hypoactive BS but present   Musculoskeletal:      Cervical back: Normal range of motion and neck supple. Right lower leg: No edema. Left lower leg: No edema. Skin:     General: Skin is warm and dry. Neurological:      Mental Status: She is alert and oriented to person, place, and time. Comments: Dense 0/5 hemiplegia on the right hemibody, no overt spasticity, sensation coming back slightly especially in the right lower limb   Psychiatric:         Mood and Affect: Mood normal.         Behavior: Behavior normal.          Lab Results   Component Value Date    WBC 9.26 08/28/2023    HGB 11.5 08/28/2023    HCT 37.3 08/28/2023    MCV 88 08/28/2023     08/28/2023     Lab Results   Component Value Date    SODIUM 136 08/28/2023    K 3.8 08/28/2023     08/28/2023    CO2 25 08/28/2023    BUN 14 08/28/2023    CREATININE 0.60 08/28/2023    GLUC 146 (H) 08/28/2023    CALCIUM 9.3 08/28/2023     Lab Results   Component Value Date    INR 1.06 08/15/2023    INR 0.94 08/14/2023    PROTIME 14.0 08/15/2023    PROTIME 13.2 08/14/2023           Current Facility-Administered Medications:   •  albuterol (PROVENTIL HFA,VENTOLIN HFA) inhaler 2 puff, 2 puff, Inhalation, Q4H PRN, Nisreen Melendez DO  •  amLODIPine (NORVASC) tablet 10 mg, 10 mg, Oral, Daily, Nisreen Melendez DO, 10 mg at 08/29/23 4654  •  aspirin chewable tablet 81 mg, 81 mg, Oral, Daily, Nisreen Melendez DO, 81 mg at 08/29/23 5399  •  atorvastatin (LIPITOR) tablet 20 mg, 20 mg, Oral, Daily With Dinner, Nisreen Melendez DO, 20 mg at 08/28/23 1608  •  bisacodyl (DULCOLAX) EC tablet 5 mg, 5 mg, Oral, Daily PRN, Dagoberto Fernando DO  •  buPROPion (WELLBUTRIN XL) 24 hr tablet 150 mg, 150 mg, Oral, QAM, Nisreen Melendez DO, 150 mg at 08/29/23 8811  •  calcium carbonate (TUMS) chewable tablet 1,000 mg, 1,000 mg, Oral, TID PRN, Laureen Tracy PA-C, 1,000 mg at 08/26/23 1241  •  cyanocobalamin (VITAMIN B-12) tablet 1,000 mcg, 1,000 mcg, Oral, Daily, Nisreen Melendez DO, 1,000 mcg at 08/29/23 8978  •  diazepam (VALIUM) tablet 2 mg, 2 mg, Oral, BID PRN, Nisreen Melendez DO  •  ferrous sulfate tablet 325 mg, 325 mg, Oral, Daily With Breakfast, Nisreen Melendez DO, 325 mg at 08/29/23 0814  •  fluticasone-vilanterol 200-25 mcg/actuation 1 puff, 1 puff, Inhalation, Daily, Mar Prima, DO, 1 puff at 08/29/23 5438  •  gabapentin (NEURONTIN) capsule 300 mg, 300 mg, Oral, TID, Mar Prima, DO, 300 mg at 08/29/23 9416  •  heparin (porcine) subcutaneous injection 5,000 Units, 5,000 Units, Subcutaneous, Q8H [address], Mar Prima, DO, 5,000 Units at 08/29/23 1440  •  insulin lispro (HumaLOG) 100 units/mL subcutaneous injection 1-6 Units, 1-6 Units, Subcutaneous, TID AC, 2 Units at 08/29/23 1200 **AND** Fingerstick Glucose (POCT), , , 4x Daily AC and at bedtime, Mar Prima, DO  •  insulin lispro (HumaLOG) 100 units/mL subcutaneous injection 1-6 Units, 1-6 Units, Subcutaneous, HS, Mar Prima, DO, 1 Units at 08/27/23 2133  •  lactulose oral solution 20 g, 20 g, Oral, Daily PRN, Mar Prima, DO  •  losartan (COZAAR) tablet 100 mg, 100 mg, Oral, Daily, Mar Prima, DO, 100 mg at 08/29/23 7706  •  metFORMIN (GLUCOPHAGE) tablet 500 mg, 500 mg, Oral, BID With Meals, Rafael Vela, ARSENIO, 500 mg at 08/29/23 9169  •  methocarbamol (ROBAXIN) tablet 500 mg, 500 mg, Oral, TID, Mar Prima, DO, 500 mg at 08/29/23 0813  •  ondansetron (ZOFRAN-ODT) dispersible tablet 4 mg, 4 mg, Oral, Q6H PRN, Mar Prima, DO  •  oxyCODONE (ROXICODONE) IR tablet 5 mg, 5 mg, Oral, Q4H PRN, Mar Prima, DO, 5 mg at 08/29/23 0425  •  oxyCODONE (ROXICODONE) split tablet 2.5 mg, 2.5 mg, Oral, Q4H PRN, Mar Prima, DO, 2.5 mg at 08/29/23 1005  •  pantoprazole (PROTONIX) EC tablet 40 mg, 40 mg, Oral, Early Morning, Mar Prima, DO, 40 mg at 08/29/23 2122  •  polyethylene glycol (MIRALAX) packet 17 g, 17 g, Oral, Daily, Mar Prima, DO, 17 g at 08/29/23 0813  •  prochlorperazine (COMPAZINE) tablet 5 mg, 5 mg, Oral, Q6H PRN, Mar Prima, DO  •  senna-docusate sodium (SENOKOT S) 8.6-50 mg per tablet 2 tablet, 2 tablet, Oral, HS, Mar Prima, DO, 2 tablet at 08/28/23 2102    Past Medical History:   Diagnosis Date   • Arthritis    • Asthma    • Continuous opioid dependence ([address]) 4/27/2022   • Diabetes mellitus ([address])    • Diverticulitis of colon    • Fibromyalgia 04/26/2022   • Headache(784.0)    • History of mammogram 2018   • History of tobacco abuse 04/26/2022   • Hypertension    • Nausea 04/29/2022   • Obesity    • Sjogren's syndrome ([address]) 10/19/2012   • Urinary tract infection        Patient Active Problem List    Diagnosis Date Noted   • ICH (intracerebral hemorrhage) ([address]) 08/15/2023   • GERD (gastroesophageal reflux disease) 08/25/2023   • Constipation 08/25/2023   • Anemia 08/16/2023   • Left leg pain 08/15/2023   • Chronic obstructive pulmonary disease with acute exacerbation ([address]) 07/26/2023   • Hyperlipidemia associated with type 2 diabetes mellitus ([address]) 07/26/2023   • Abnormal CT of the chest 06/28/2023   • Class 2 obesity with body mass index (BMI) of 39.0 to 39.9 in adult 06/28/2023   • CAD (coronary artery disease) 04/07/2023   • Right lumbosacral radiculopathy 10/12/2022   • Chronic bilateral low back pain with bilateral sciatica 08/31/2022   • Paresthesia of both feet 08/31/2022   • Postoperative visit 05/24/2022   • Incarcerated umbilical hernia 81/28/4542   • Diabetes mellitus type 2, controlled ([address]) 04/29/2022   • Continuous opioid dependence ([address]) 04/27/2022   • Moderate persistent asthma without complication 62/24/9672   • Cigarette nicotine dependence in remission 04/26/2022   • Primary hypertension 04/26/2022   • Fibromyalgia 04/26/2022   • Sjogren's syndrome ([address]) 10/19/2012   • Depression with anxiety 09/18/2012          Pavel Covarrubias DO  Physical Medicine and Jessika MARTINEZ have spent a total time of 35 minutes on 08/29/23 in caring for this patient including Counseling / Coordination of care, Documenting in the medical record, Reviewing / ordering tests, medicine, procedures   and Communicating with other healthcare professionals .

## 2023-08-29 NOTE — PROGRESS NOTES
[facility] Occupational Therapy Daily Note  Patient Active Problem List   Diagnosis   • Cigarette nicotine dependence in remission   • Primary hypertension   • Fibromyalgia   • Continuous opioid dependence ([address])   • Moderate persistent asthma without complication   • Diabetes mellitus type 2, controlled ([address])   • Depression with anxiety   • Sjogren's syndrome ([address])   • Incarcerated umbilical hernia   • Postoperative visit   • Chronic bilateral low back pain with bilateral sciatica   • Paresthesia of both feet   • Right lumbosacral radiculopathy   • CAD (coronary artery disease)   • Abnormal CT of the chest   • Class 2 obesity with body mass index (BMI) of 39.0 to 39.9 in adult   • Chronic obstructive pulmonary disease with acute exacerbation (HCC)   • Hyperlipidemia associated with type 2 diabetes mellitus (HCC)   • ICH (intracerebral hemorrhage) (HCC)   • Left leg pain   • Anemia   • GERD (gastroesophageal reflux disease)   • Constipation       Past Medical History:   Diagnosis Date   • Arthritis    • Asthma    • Continuous opioid dependence ([address]) 4/27/2022   • Diabetes mellitus ([address])    • Diverticulitis of colon    • Fibromyalgia 04/26/2022   • Headache(784.0)    • History of mammogram 2018   • History of tobacco abuse 04/26/2022   • Hypertension    • Nausea 04/29/2022   • Obesity    • Sjogren's syndrome ([address]) 10/19/2012   • Urinary tract infection      Etiologic Diagnosis: Acute Left Thalamic Intracerebral Hemorrhage with edema  Restrictions/Precautions  Precautions: Bed/chair alarms, Fall Risk, Supervision on toilet/commode (R-inattention)  Weight Bearing Restrictions: No  ROM Restrictions: No  Braces or Orthoses: Sling (RUE sling for xfers)  ADL Team Goal: Patient will require supervision with ADLs with least restrictive device upon completion of rehab program  Occupational Therapy LTG's  Eating Oral care Bathing LB dress UB dress   Eating Goal: 05.  Setup or clean-up assistance - [place] SETS UP or CLEANS UP, patient completes activity. [place] assists only prior to or following the activity. Oral Hygiene Goal: 05. Setup or clean-up assistance - [place] SETS UP or CLEANS UP, patient completes activity. [place] assists only prior to or following the activity. Shower/bathe self Goal: 04. Supervision or touching assistance- [place] provides VERBAL CUES or supervision throughout activity. Lower body dressing Goal: 04. Supervision or touching assistance- [place] provides VERBAL CUES or supervision throughout activity. Upper body dressing Goal: 05. Setup or clean-up assistance - [place] SETS UP or CLEANS UP, patient completes activity. [place] assists only prior to or following the activity. Toileting Toilet txf Func txf IADL Med    Toileting hygiene Goal: 04. Supervision or touching assistance- [place] provides VERBAL CUES or supervision throughout activity. Toilet transfer Goal: 04. Supervision or touching assistance- [place] provides VERBAL CUES or supervision throughout activity. Chair/bed-to-chair transfer Goal: 04. Supervision or touching assistance- [place] provides VERBAL CUES or supervision throughout activity. Assist Level: Minimum Assist (full meal prep seated in WC pending pt progress) Assist Level: Independent (mod I with mediplanner which pt uses at baseline)   OT interventions: Treatment/Interventions: (P) ADL retraining, LE strengthening/ROM, Functional transfer training, Therapeutic exercise, Endurance training, Cognitive reorientation, Patient/family training, Equipment eval/education, Bed mobility, Compensatory technique education  Discharge Plan:  OT Discharge Recommendation:  (pending progress)   DME:  ,  ,       08/29/23 1201   Pain Assessment   Pain Assessment Tool 0-10   Pain Score 3   Lifestyle   Autonomy "I am really so tired."   Bed-Chair Transfer   Type of Assistance Needed Physical assistance   Physical Assistance Level Total assistance   Comment AX2 recliner to deflated bed. box in place for proper footing. Chair/Bed-to-Chair Transfer CARE Score 1   Neuromuscular Education   Comments set up mirror box training program. reviewed HEP and completed 20 min session. set up with NMES for forearm extensors during mirror therapy. Splinting   Splinting Comments Orders received (verbal from Dr. Sally Salcido) from physician for Resting Hand Splint. Pre fabricated  Soft Pro volar forearm-based wrist-hand splint obtained. Size medium for right upper extremity. Indications for use: To reduce pain and swelling by providing local rest to joints of the hand , To ensure proper positioning to joints of the hand and wrist during sleep. and To help maintain length of soft tissues and prevent contracture due to neurological changes in tone. Pt was instructed patient how to don/doff the splint. Discussed wearing schedule, It should be worn during rest periods and at night when the patient’s hand and wrist joints are painful or inflamed. Recommend supporting arm with a pillow at night to decrease strain on shoulder and elbow. Pt provided with a copy of the handout for reference. Pt was able to doff splint independently, but unable to don independently. Pt educated on skin integrity, and frequent checks of skin and to doff if redness/pain occurs. Signs posted in room for increased carryover for staff, family and patient. Additional Activities   Additional Activities Comments Pt provided with necessary items to promote proper positioning/pressure relief as indicated by increased discomfort in sitting in recliner for multiple hours. Pt placed on pressure offloading cushion ROHO in seated options. Pt educated on purpose of use and importance of pressure relief frequently. Educated that she may need to call for assistance to reposition. Pt will require assistance to complete offloading. Team notified at this time. Assessment   Treatment Assessment Pt participated in skilled OT treatment session with treatment focus on R attention and UE NMR.  Pt tolerated session fair today with increased fatigue following therapy sessions. Introduced mirror therapy program, Please implement daily. Pt unable to complete last 30 min of scheduled OT session 2* fatigue and falling asleep. Pt's right UE function currently Non Functional without neglect. No neglect of extremity, but no involvement in task performance. Continue to focus neurological treatment plan:Functional Electrical Stimulation, Sensorimotor Training, Functional Orthoses/bracing, Mental Practice/Guided Imagery, Mirror Therapy, Body awareness, Midline awareness, Joint protection training and REO GO(when safe for transfer)   Prognosis Fair   Plan   Treatment/Interventions ADL retraining;LE strengthening/ROM; Functional transfer training; Therapeutic exercise; Endurance training;Cognitive reorientation;Patient/family training;Equipment eval/education; Bed mobility; Compensatory technique education   Progress Slow progress, decreased activity tolerance   OT Therapy Minutes   OT Time In 1200   OT Time Out 1300   OT Total Time (minutes) 60   OT Mode of treatment - Individual (minutes) 60   OT Mode of treatment - Concurrent (minutes) 0   OT Mode of treatment - Group (minutes) 0   OT Mode of treatment - Co-treat (minutes) 0   OT Mode of Treatment - Total time(minutes) 60 minutes   OT Cumulative Minutes 365

## 2023-08-29 NOTE — PLAN OF CARE
Problem: Prexisting or High Potential for Compromised Skin Integrity  Goal: Skin integrity is maintained or improved  Description: INTERVENTIONS:  - Identify patients at risk for skin breakdown  - Assess and monitor skin integrity  - Assess and monitor nutrition and hydration status  - Monitor labs   - Assess for incontinence   - Turn and reposition patient  - Assist with mobility/ambulation  - Relieve pressure over bony prominences  - Avoid friction and shearing  - Provide appropriate hygiene as needed including keeping skin clean and dry  - Evaluate need for skin moisturizer/barrier cream  - Collaborate with interdisciplinary team   - Patient/family teaching  - Consider wound care consult   Outcome: Progressing     Problem: PAIN - ADULT  Goal: Verbalizes/displays adequate comfort level or baseline comfort level  Description: Interventions:  - Encourage patient to monitor pain and request assistance  - Assess pain using appropriate pain scale  - Administer analgesics based on type and severity of pain and evaluate response  - Implement non-pharmacological measures as appropriate and evaluate response  - Consider cultural and social influences on pain and pain management  - Notify physician/advanced practitioner if interventions unsuccessful or patient reports new pain  Outcome: Progressing     Problem: INFECTION - ADULT  Goal: Absence or prevention of progression during hospitalization  Description: INTERVENTIONS:  - Assess and monitor for signs and symptoms of infection  - Monitor lab/diagnostic results  - Monitor all insertion sites, i.e. indwelling lines, tubes, and drains  - Monitor endotracheal if appropriate and nasal secretions for changes in amount and color  - Keeler appropriate cooling/warming therapies per order  - Administer medications as ordered  - Instruct and encourage patient and family to use good hand hygiene technique  - Identify and instruct in appropriate isolation precautions for identified infection/condition  Outcome: Progressing  Goal: Absence of fever/infection during neutropenic period  Description: INTERVENTIONS:  - Monitor WBC    Outcome: Progressing     Problem: SAFETY ADULT  Goal: Patient will remain free of falls  Description: INTERVENTIONS:  - Educate patient/family on patient safety including physical limitations  - Instruct patient to call for assistance with activity   - Consult OT/PT to assist with strengthening/mobility   - Keep Call bell within reach  - Keep bed low and locked with side rails adjusted as appropriate  - Keep care items and personal belongings within reach  - Initiate and maintain comfort rounds  - Make Fall Risk Sign visible to staff  - Offer Toileting every 2 Hours, in advance of need  - Initiate/Maintain bed/chair alarm  - Obtain necessary fall risk management equipment: non skid footwear  - Apply yellow socks and bracelet for high fall risk patients  - Consider moving patient to room near nurses station  Outcome: Progressing  Goal: Maintain or return to baseline ADL function  Description: INTERVENTIONS:  -  Assess patient's ability to carry out ADLs; assess patient's baseline for ADL function and identify physical deficits which impact ability to perform ADLs (bathing, care of mouth/teeth, toileting, grooming, dressing, etc.)  - Assess/evaluate cause of self-care deficits   - Assess range of motion  - Assess patient's mobility; develop plan if impaired  - Assess patient's need for assistive devices and provide as appropriate  - Encourage maximum independence but intervene and supervise when necessary  - Involve family in performance of ADLs  - Assess for home care needs following discharge   - Consider OT consult to assist with ADL evaluation and planning for discharge  - Provide patient education as appropriate  Outcome: Progressing  Goal: Maintains/Returns to pre admission functional level  Description: INTERVENTIONS:  - Perform BMAT or MOVE assessment daily.   - Set and communicate daily mobility goal to care team and patient/family/caregiver. - Collaborate with rehabilitation services on mobility goals if consulted  - Perform Range of Motion 3 times a day. - Reposition patient every 2 hours. - Dangle patient 3 times a day  - Stand patient 3 times a day  - Ambulate patient 3 times a day  - Out of bed to chair 3 times a day   - Out of bed for meals 3 times a day  - Out of bed for toileting  - Record patient progress and toleration of activity level   Outcome: Progressing     Problem: DISCHARGE PLANNING  Goal: Discharge to home or other facility with appropriate resources  Description: INTERVENTIONS:  - Identify barriers to discharge w/patient and caregiver  - Arrange for needed discharge resources and transportation as appropriate  - Identify discharge learning needs (meds, wound care, etc.)  - Arrange for interpretive services to assist at discharge as needed  - Refer to Case Management Department for coordinating discharge planning if the patient needs post-hospital services based on physician/advanced practitioner order or complex needs related to functional status, cognitive ability, or social support system  Outcome: Progressing     Problem: Nutrition/Hydration-ADULT  Goal: Nutrient/Hydration intake appropriate for improving, restoring or maintaining nutritional needs  Description: Monitor and assess patient's nutrition/hydration status for malnutrition. Collaborate with interdisciplinary team and initiate plan and interventions as ordered. Monitor patient's weight and dietary intake as ordered or per policy. Utilize nutrition screening tool and intervene as necessary. Determine patient's food preferences and provide high-protein, high-caloric foods as appropriate.      INTERVENTIONS:  - Monitor oral intake, urinary output, labs, and treatment plans  - Assess nutrition and hydration status and recommend course of action  - Evaluate amount of meals eaten  - Assist patient with eating if necessary   - Allow adequate time for meals  - Recommend/ encourage appropriate diets, oral nutritional supplements, and vitamin/mineral supplements  - Order, calculate, and assess calorie counts as needed  - Recommend, monitor, and adjust tube feedings and TPN/PPN based on assessed needs  - Assess need for intravenous fluids  - Provide specific nutrition/hydration education as appropriate  - Include patient/family/caregiver in decisions related to nutrition  Outcome: Progressing

## 2023-08-29 NOTE — PCC SPEECH THERAPY
Pt completed the CLQT+ on initial evaluation with a Composite Severity Rating score of 2.6 out of 4.0, correlating to overall mildly impaired  cognitive linguistic impairments at time of evaluation and in comparison to age matched peers ranging from 19-71 y/o. Current barriers which present include, decreased attention, slower processing time, decreased ST/working memory, decreased executive function skills (problem solving, reasoning, sequencing, organization of thoughts), judgement, fatigue and safety which impacts functional mobility at this time. Pt does report higher level of independence prior to admission and will target both functional and higher level cognitive tasks throughout pt's stay on the acute rehab center. Pt is functioning at [address] for comprehension, supervision for expression and social interactions, min-mod A for executive functions and mod A for memory. At this time, pt will benefit from skilled SLP services targeting functional independence of cognitive linguistic skills in hopes to decrease caregiver burden over time. Update week of 9/5/2023: Pt continues to be followed for cognitive linguistic and language therapy where she is making progress towards current goals. Pt remains with deficits which present as barriers in the following areas: attention, slower processing time, decreased ST/working memory, decreased executive function skills (problem solving, reasoning, sequencing, organization of thoughts), judgement, fatigue and safety which impacts functional mobility at this time. Pt is also noted to present with mild word finding deficits. Both functional and higher level cognitive linguistic skills have been targeted with both types of tasks targeted as well. Plan to continue to focus on both areas. Currently, pt is functioning at supervision for comprehension and min assist for expression, problem solving and memory.  At this time, pt is recommended for further skilled SLP services with focus on cognitive linguistic and language skills in order to maximize level of independence and to decrease caregiver burden on discharge. Update week of 9/12/2023: Pt continues to be followed for cognitive linguistic and language therapy as she is making steady progress towards goals at this time. Pt is improving in overall cognitive linguistic functioning and verbal expression, but does remain limited by deficits which present as barriers in the following areas: processing, ST/working memory, executive functions, higher level comprehension, higher level verbal expression/word finding and attention. Recent sessions have been targeting higher level cognitive linguistic skills, including those required to complete IADL tasks. Currently, pt is functioning at supervision to mod I for comprehension, mod I for social interaction and supervision for expression, problem solving and memory. Pt's family (spouse and sister) have been provided with updates regarding pt's level of functioning and ongoing barriers. At this time, pt will continue to benefit from ongoing skilled SLP services to continue to maximize overall functional independence of cognitive linguistic skills to decrease caregiver burden over time. Update week of 9/19/2023: Pt continues to be followed for higher level cognitive linguistic and language skills where she is making steady progress towards goals. Pt remains with deficits in word finding most notably in conversation, higher level comprehension both auditory and reading, executive functions and short term memory recall but has also progressed in these areas. Pt is currently functioning at supervision level for comprehension, expression, problem solving and memory. Pt is planned for discharge to subacute setting on 9/20/2023 with recommendations for continued skilled SLP services focusing on the above areas as pt was highly independent prior to admission.

## 2023-08-29 NOTE — PROGRESS NOTES
Internal Medicine Progress Note  Patient: Paola Roblero  Age/sex: 62 y.o. female  Medical Record #: [id number]      ASSESSMENT/PLAN: (Interval History)  Paola Roblero is seen and examined and management for following issues:    ICH  • Seen by NS and no surgical intervention indicated. • Etio = HTN  • Was unable to tolerate MRI even with sedation  • Was cleared to resume ASA. • Continue atorvastatin. • Outpt follow-up with Neurology.     HTN  • Goal with ICH is SBP<140  • Home: Micardis 80mg qd/Atenolol 50mg qd  • Here: Norvasc 10 mg qd/Losartan 100mg qd  • Micardis 80 mg qd = Losartan 100 mg qd  • Norvasc/Hydralazine/Losartan held 8/27 for SBP<110  • On 8/28, stopped the Hydralazine     DM type 2  • HA1C 6.7  • Home: Metformin 1000mg BID  • Here: Metformin 500 mg BID  • Continue QID Accuchecks/SSI and DM diet  • Added Metformin 500 mg BID starting at dinner on 8/28/23  • Will watch today     Anemia  • Baseline Hgb 10 - 11. • Iron level 30/Ferritin 22/TIBC 371  • B12 was low normal at 221 on 8/15/23  • Continue iron and B12 supplementation. • stable     Anxiety  • Continue Wellbutrin XL. • Pt is taking 150mg instead of 300mg due to concerns for increased risk of seizure     Constipation  • Bowel meds per PMR       Discharge date:  Team       The above assessment and plan was reviewed and updated as determined by my evaluation of the patient on 8/29/2023.     Labs:   Results from last 7 days   Lab Units 08/28/23  0556 08/24/23  0515   WBC Thousand/uL 9.26 10.09   HEMOGLOBIN g/dL 11.5 12.0   HEMATOCRIT % 37.3 37.6   PLATELETS Thousands/uL 379 360     Results from last 7 days   Lab Units 08/28/23  0556 08/24/23  0515   SODIUM mmol/L 136 135   POTASSIUM mmol/L 3.8 3.9   CHLORIDE mmol/L 102 100   CO2 mmol/L 25 26   BUN mg/dL 14 19   CREATININE mg/dL 0.60 0.56*   CALCIUM mg/dL 9.3 9.3             Results from last 7 days   Lab Units 08/29/23  0616 08/28/23 2056 08/28/23  1558   POC GLUCOSE mg/dl 137 117 164*       Review of Scheduled Meds:  Current Facility-Administered Medications   Medication Dose Route Frequency Provider Last Rate   • albuterol  2 puff Inhalation Q4H PRN Ann Arboreda Boning, DO     • amLODIPine  10 mg Oral Daily Berneda Boning, DO     • aspirin  81 mg Oral Daily Berneda Boning, DO     • atorvastatin  20 mg Oral Daily With Laymon Chauncey, DO     • bisacodyl  5 mg Oral Daily PRN Leonie Fossa, DO     • buPROPion  150 mg Oral QAM Berndarya Felizing, DO     • calcium carbonate  1,000 mg Oral TID PRN Laureen Tracy PA-C     • vitamin B-12  1,000 mcg Oral Daily Berneda Boning, DO     • diazepam  2 mg Oral BID PRN Ann Arboreda Boning, DO     • ferrous sulfate  325 mg Oral Daily With Breakfast Berndarya Boning, DO     • fluticasone-vilanterol  1 puff Inhalation Daily Berneda Traying, DO     • gabapentin  300 mg Oral TID Berndarya Felizing, DO     • heparin (porcine)  5,000 Units Subcutaneous Q8H [address] [facility] Traying, DO     • insulin lispro  1-6 Units Subcutaneous TID AC Berneda Boning, DO     • insulin lispro  1-6 Units Subcutaneous HS Berndarya Boning, DO     • lactulose  20 g Oral Daily PRN Ann Arboreda Boning, DO     • losartan  100 mg Oral Daily Berneda Boning, DO     • metFORMIN  500 mg Oral BID With Meals ARSENIO Waldrop     • methocarbamol  500 mg Oral TID Berneda Boning, DO     • ondansetron  4 mg Oral Q6H PRN Remieda Boning, DO     • oxyCODONE  5 mg Oral Q4H PRN Remieda Traying, DO     • oxyCODONE  2.5 mg Oral Q4H PRN Ann Arboreda Boning, DO     • pantoprazole  40 mg Oral Early Morning Berneda Boning, DO     • polyethylene glycol  17 g Oral Daily Berneda Boning, DO     • prochlorperazine  5 mg Oral Q6H PRN Ann Arboreda Boning, DO     • senna-docusate sodium  2 tablet Oral HS Berneda Boning, DO         Subjective/ HPI: Patient seen and examined. Patients overnight issues or events were reviewed with nursing or staff during rounds or morning huddle session. New or overnight issues include the following:     No new or overnight issues.   Offers no complaints    ROS:   A 10 point ROS was performed; negative except as noted above. *Labs /Radiology studies reviewed  *Medications reviewed and reconciled as needed  *Please refer to order section for additional ordered labs studies  *Case discussed with primary attending during morning huddle case rounds    Physical Examination:  Vitals:   Vitals:    08/28/23 1513 08/28/23 2010 08/29/23 0457 08/29/23 0811   BP: 128/86 106/68 133/74 122/80   BP Location: Left arm Left arm Left arm Left arm   Pulse: 65 76 80 88   Resp: 16 18 18    Temp: 97.8 °F (36.6 °C) 98.7 °F (37.1 °C) 97.8 °F (36.6 °C)    TempSrc: Oral Oral Oral    SpO2: 95% 95% 97%    Weight:   96.3 kg (212 lb 4.8 oz)    Height:           General Appearance: no distress, conversive  HEENT:  External ear normal.  Nose normal w/o drainage. Mucous membranes are moist. Oropharynx is clear. Conjunctiva clear w/o icterus or redness. Neck:  Supple, normal ROM  Lungs: BBS without crackles/wheeze/rhonchi; respirations unlabored with normal inspiratory/expiratory effort. No retractions noted. On RA  CV: regular rate and rhythm; no rubs/murmurs/gallops, PMI normal   ABD: Abdomen is soft. Bowel sounds all quadrants. Nontender with no distention. EXT: no edema  Skin: normal turgor, normal texture, no rashes  Psych: affect normal, mood normal  Neuro: AAO     The above physical exam was reviewed and updated as determined by my evaluation of the patient on 8/29/2023. Invasive Devices     Drain  Duration           External Urinary Catheter <1 day                   VTE Pharmacologic Prophylaxis: Heparin  Code Status: Level 1 - Full Code  Current Length of Stay: 4 day(s)      Total time spent:  30 minutes with more than 50% spent counseling/coordinating care. Counseling includes discussion with patient re: progress  and discussion with patient of his/her current medical state/information.  Coordination of patient's care was performed in conjunction with primary service. Time invested included review of patient's labs, vitals, and management of their comorbidities with continued monitoring. In addition, this patient was discussed with medical team including physician and advanced extenders. The care of the patient was extensively discussed and appropriate treatment plan was formulated unique for this patient. Medical decision making for the day was made by supervising physician unless otherwise noted in their attestation statement. ** Please Note:  voice to text software may have been used in the creation of this document.  Although proof errors in transcription or interpretation are a potential of such software**

## 2023-08-29 NOTE — PROGRESS NOTES
08/29/23 1100   Pain Assessment   Pain Assessment Tool 0-10   Pain Score 4   Pain Location/Orientation Orientation: Right;Location: Leg   Restrictions/Precautions   Precautions Bed/chair alarms;Cognitive; Fall Risk;Pain;Supervision on toilet/commode;Visual deficit   Braces or Orthoses Sling  (R UE with transfers, OOB, and standing)   Subjective   Subjective pt agreeable to perform skilled PT   Sit to Stand   Type of Assistance Needed Physical assistance   Physical Assistance Level Total assistance   Sit to Stand CARE Score 1   Bed-Chair Transfer   Type of Assistance Needed Physical assistance   Physical Assistance Level Total assistance   Comment Ax2 SB xfers   Chair/Bed-to-Chair Transfer CARE Score 1   Walk 10 Feet   Reason if not Attempted Safety concerns   Walk 10 Feet CARE Score 88   Walk 50 Feet with Two Turns   Reason if not Attempted Safety concerns   Walk 50 Feet with Two Turns CARE Score 88   Walk 150 Feet   Reason if not Attempted Safety concerns   Walk 150 Feet CARE Score 88   Walking 10 Feet on Uneven Surfaces   Reason if not Attempted Safety concerns   Walking 10 Feet on Uneven Surfaces CARE Score 88   Ambulation   Does the patient walk? 1. No, and walking goal is clinically indicated. Curb or Single Stair   Reason if not Attempted Safety concerns   1 Step (Curb) CARE Score 88   4 Steps   Reason if not Attempted Safety concerns   4 Steps CARE Score 88   12 Steps   Reason if not Attempted Safety concerns   12 Steps CARE Score 88   Picking Up Object   Reason if not Attempted Safety concerns   Picking Up Object CARE Score 88   Therapeutic Interventions   Strengthening seated LLE LAQ AP gluts 10-20 reps   Flexibility HS and calfs stretch   Balance dynamic standing balanace   Other RLE AROM LAQ AP and ankle stretch   Assessment   Treatment Assessment pt cont to need skilled PT to meet DC goals and perform TE strengthneing and but overall STS x5 blocking Left knee 30 60 sec standing for dyanmic balance . Pt cont using SB xfers for primary mobility and needs VC for hand and foot placement and sequence . Cont POC pt in recliner with alarm on . Problem List Decreased strength;Decreased endurance; Impaired balance;Decreased mobility; Decreased coordination;Decreased cognition; Impaired sensation; Impaired tone   Barriers to Discharge Inaccessible home environment   Plan   Progress Slow progress, decreased activity tolerance   Recommendation   PT Discharge Recommendation   (TBD)   Equipment Recommended   (TBD)   PT Therapy Minutes   PT Time In 1100   PT Time Out 1130   PT Total Time (minutes) 30   PT Mode of treatment - Individual (minutes) 30   PT Mode of treatment - Concurrent (minutes) 0   PT Mode of treatment - Group (minutes) 0   PT Mode of treatment - Co-treat (minutes) 0   PT Mode of Treatment - Total time(minutes) 30 minutes   PT Cumulative Minutes 380   Therapy Time missed   Time missed?  No

## 2023-08-30 LAB
GLUCOSE SERPL-MCNC: 127 MG/DL (ref 65–140)
GLUCOSE SERPL-MCNC: 140 MG/DL (ref 65–140)
GLUCOSE SERPL-MCNC: 146 MG/DL (ref 65–140)
GLUCOSE SERPL-MCNC: 186 MG/DL (ref 65–140)

## 2023-08-30 PROCEDURE — 97112 NEUROMUSCULAR REEDUCATION: CPT

## 2023-08-30 PROCEDURE — 97129 THER IVNTJ 1ST 15 MIN: CPT

## 2023-08-30 PROCEDURE — 82948 REAGENT STRIP/BLOOD GLUCOSE: CPT

## 2023-08-30 PROCEDURE — 99232 SBSQ HOSP IP/OBS MODERATE 35: CPT | Performed by: INTERNAL MEDICINE

## 2023-08-30 PROCEDURE — 97530 THERAPEUTIC ACTIVITIES: CPT

## 2023-08-30 PROCEDURE — 97535 SELF CARE MNGMENT TRAINING: CPT

## 2023-08-30 PROCEDURE — 97130 THER IVNTJ EA ADDL 15 MIN: CPT

## 2023-08-30 PROCEDURE — 99232 SBSQ HOSP IP/OBS MODERATE 35: CPT | Performed by: STUDENT IN AN ORGANIZED HEALTH CARE EDUCATION/TRAINING PROGRAM

## 2023-08-30 RX ORDER — ACETAMINOPHEN 325 MG/1
650 TABLET ORAL EVERY 6 HOURS PRN
Status: DISCONTINUED | OUTPATIENT
Start: 2023-08-30 | End: 2023-09-20 | Stop reason: HOSPADM

## 2023-08-30 RX ADMIN — SENNOSIDES AND DOCUSATE SODIUM 2 TABLET: 50; 8.6 TABLET ORAL at 21:28

## 2023-08-30 RX ADMIN — OXYCODONE HYDROCHLORIDE 5 MG: 5 TABLET ORAL at 10:19

## 2023-08-30 RX ADMIN — GABAPENTIN 300 MG: 300 CAPSULE ORAL at 17:06

## 2023-08-30 RX ADMIN — FERROUS SULFATE TAB 325 MG (65 MG ELEMENTAL FE) 325 MG: 325 (65 FE) TAB at 08:15

## 2023-08-30 RX ADMIN — PANTOPRAZOLE SODIUM 40 MG: 40 TABLET, DELAYED RELEASE ORAL at 05:27

## 2023-08-30 RX ADMIN — CYANOCOBALAMIN TAB 500 MCG 1000 MCG: 500 TAB at 08:15

## 2023-08-30 RX ADMIN — MICONAZOLE NITRATE 1 APPLICATION: 20 POWDER TOPICAL at 17:07

## 2023-08-30 RX ADMIN — METHOCARBAMOL 500 MG: 500 TABLET ORAL at 17:06

## 2023-08-30 RX ADMIN — FLUTICASONE FUROATE AND VILANTEROL TRIFENATATE 1 PUFF: 200; 25 POWDER RESPIRATORY (INHALATION) at 08:16

## 2023-08-30 RX ADMIN — ASPIRIN 81 MG CHEWABLE TABLET 81 MG: 81 TABLET CHEWABLE at 08:15

## 2023-08-30 RX ADMIN — ATORVASTATIN CALCIUM 20 MG: 20 TABLET, FILM COATED ORAL at 17:06

## 2023-08-30 RX ADMIN — METHOCARBAMOL 500 MG: 500 TABLET ORAL at 08:15

## 2023-08-30 RX ADMIN — GABAPENTIN 300 MG: 300 CAPSULE ORAL at 21:29

## 2023-08-30 RX ADMIN — AMLODIPINE BESYLATE 10 MG: 10 TABLET ORAL at 08:15

## 2023-08-30 RX ADMIN — BUPROPION HYDROCHLORIDE 150 MG: 150 TABLET, FILM COATED, EXTENDED RELEASE ORAL at 08:16

## 2023-08-30 RX ADMIN — METFORMIN HYDROCHLORIDE 500 MG: 500 TABLET ORAL at 08:15

## 2023-08-30 RX ADMIN — GABAPENTIN 300 MG: 300 CAPSULE ORAL at 08:15

## 2023-08-30 RX ADMIN — HEPARIN SODIUM 5000 UNITS: 5000 INJECTION INTRAVENOUS; SUBCUTANEOUS at 21:29

## 2023-08-30 RX ADMIN — METFORMIN HYDROCHLORIDE 500 MG: 500 TABLET ORAL at 17:06

## 2023-08-30 RX ADMIN — HEPARIN SODIUM 5000 UNITS: 5000 INJECTION INTRAVENOUS; SUBCUTANEOUS at 14:07

## 2023-08-30 RX ADMIN — HEPARIN SODIUM 5000 UNITS: 5000 INJECTION INTRAVENOUS; SUBCUTANEOUS at 05:27

## 2023-08-30 RX ADMIN — OXYCODONE HYDROCHLORIDE 5 MG: 5 TABLET ORAL at 14:44

## 2023-08-30 RX ADMIN — INSULIN LISPRO 1 UNITS: 100 INJECTION, SOLUTION INTRAVENOUS; SUBCUTANEOUS at 12:31

## 2023-08-30 RX ADMIN — OXYCODONE HYDROCHLORIDE 5 MG: 5 TABLET ORAL at 05:26

## 2023-08-30 RX ADMIN — OXYCODONE HYDROCHLORIDE 5 MG: 5 TABLET ORAL at 22:00

## 2023-08-30 RX ADMIN — METHOCARBAMOL 500 MG: 500 TABLET ORAL at 21:29

## 2023-08-30 RX ADMIN — LOSARTAN POTASSIUM 100 MG: 50 TABLET, FILM COATED ORAL at 08:15

## 2023-08-30 NOTE — PCC NURSING
Hypertension-blood pressure adequately controlled on current treatment per review of BP logs. Continue same treatment plan. Diabetes-continue metformin 500 mg twice daily monitor with this adjustment  Recent intracranial hemorrhage-optimize blood pressure has been cleared to resume and is receiving aspirin continue statin therapy  Seen by NS and no surgical intervention indicated. Etio = HTN  Was unable to tolerate MRI even with sedation  Goal with ICH is SBP<140    Pt is therapy transfers only. Incontinent of bowel and bladder. Right side flaccid . C/O foot pain, Oxy 5 mg Po PRN given at 2134      This week we will encourage independence with ADLs. We will monitor labs and vital signs. We  will educate pt and family about repositioning to prevent skin breakdown. We will assist with repositioning and performing routine skin checks. We will monitor for adequate pain control. We will as well monitor for constipation and medicate pt as ordered. She  is incontinent of  bowel and bladder and we are maintain purewick at HS as per order. We are maintaining adequate fluid and oral intake. Pt sleeps very well. . Fall and safety precautions are maintained per protocol. We will increase safety awareness and keep pt free from falls. 9/20: This week we will encourage independence with ADLs. We will monitor labs and vital signs. We  will educate pt and family about repositioning to prevent skin breakdown. We will assist with repositioning and performing routine skin checks. We will monitor for adequate pain control. We will as well monitor for constipation and medicate pt as ordered. She  is incontinent of  bowel and bladder and we are maintain purewick at HS as per order. We are maintaining adequate fluid and oral intake. Pt sleeps very well. . Fall and safety precautions are maintained per protocol. We will increase safety awareness and keep pt free from falls.     9/18/23- right knee intra-articular steroid injection for chronic pain d/t arthritis, Estimated Discharge: anticipated 9/19 to subacute rehab at [place], pending insurance authorization.

## 2023-08-30 NOTE — PROGRESS NOTES
08/30/23 0830   Pain Assessment   Pain Assessment Tool 0-10   Pain Score 5   Pain Location/Orientation Orientation: Right;Location: Groin   Pain Onset/Description Onset: Gradual;Onset: Ongoing; Descriptor: Sore   Patient's Stated Pain Goal No pain   Restrictions/Precautions   Precautions Bed/chair alarms;Combative; Fall Risk;Pain;Supervision on toilet/commode;Visual deficit   Weight Bearing Restrictions No   ROM Restrictions No   Braces or Orthoses Sling  (RUE transfers/standing)   Cognition   Overall Cognitive Status WFL   Arousal/Participation Alert; Cooperative   Attention Attends with cues to redirect   Orientation Level Oriented X4   Memory Decreased short term memory   Following Commands Follows one step commands with increased time or repetition   Lying to Sitting on Side of Bed   Type of Assistance Needed Physical assistance;Verbal cues; Adaptive equipment   Physical Assistance Level 51%-75%   Comment MODA to R side, A at trunk   Lying to Sitting on Side of Bed CARE Score 2   Sit to Stand   Type of Assistance Needed Physical assistance;Verbal cues; Adaptive equipment   Physical Assistance Level Total assistance   Comment MOD/MAXA x2 at L rail   Sit to Stand CARE Score 1   Bed-Chair Transfer   Type of Assistance Needed Physical assistance;Verbal cues; Adaptive equipment   Physical Assistance Level Total assistance   Comment MAXA x2 with SB, improved ability noted to L side vs R.    Chair/Bed-to-Chair Transfer CARE Score 1   Transfer Bed/Chair/Wheelchair   Adaptive Equipment Transfer Board   Car Transfer   Reason if not Attempted Safety concerns   Car Transfer CARE Score 88   Walk 10 Feet   Reason if not Attempted Safety concerns   Walk 10 Feet CARE Score 88   Walk 50 Feet with Two Turns   Reason if not Attempted Safety concerns   Walk 50 Feet with Two Turns CARE Score 88   Walk 150 Feet   Reason if not Attempted Safety concerns   Walk 150 Feet CARE Score 88   Walking 10 Feet on Uneven Surfaces   Reason if not Attempted Safety concerns   Walking 10 Feet on Uneven Surfaces CARE Score 88   Ambulation   Primary Mode of Locomotion Prior to Admission Walk   Does the patient walk? 1. No, and walking goal is clinically indicated. Wheel 50 Feet with Two Turns   Type of Assistance Needed Physical assistance;Verbal cues   Physical Assistance Level 51%-75%   Wheel 50 Feet with Two Turns CARE Score 2   Wheel 150 Feet   Type of Assistance Needed Physical assistance;Verbal cues   Physical Assistance Level 51%-75%   Wheel 150 Feet CARE Score 2   Wheelchair mobility   Does the patient use a wheelchair? 1. Yes   Type of Wheelchair Used 1. Manual   Method Left upper extremity   Assistance Provided For Obstacles; Locking Brakes; Remove Leg Rest;Replace Leg Rest;Remove armrests;Replace armrests   Curb or Single Stair   Reason if not Attempted Safety concerns   1 Step (Curb) CARE Score 88   4 Steps   Reason if not Attempted Safety concerns   4 Steps CARE Score 88   12 Steps   Reason if not Attempted Safety concerns   12 Steps CARE Score 88   Picking Up Object   Reason if not Attempted Safety concerns   Picking Up Object CARE Score 88   Toilet Transfer   Type of Assistance Needed Physical assistance;Verbal cues; Adaptive equipment   Physical Assistance Level Total assistance   Comment MAXA x2 wtih SB to [facility]. Pt able to ant weight shift on to [facility] for hygiene and perform partial stand for clothing management. R knee blocked   Toilet Transfer CARE Score 1   Toilet Transfer   Surface Assessed Bedside Commode   Limitations Noted In Balance;Problem Solving; Safety   Positioning Concerns Safety   Findings ant weight shifting to [facility] for hygiene and brief placement. once brief on partial stand for clothing. 4" block needed under her feet for support. Therapeutic Interventions   Neuromuscular Re-Education repeated STS at at [facility] with A x2, R knee blocked, mirror used for visual feedback. Max VC's to erect fully and squeeze glutes.  Pt only able to maintain standing 30 sec to 1 min 2* to R groin pain and fatigue. Trialed used of R AFO and SKC for limb stabilization but no changes in R knee seen. With standing pt able to perform weight shifting with MOD VC's and R knee blocked. Assessment   Treatment Assessment Pt participated in skilled PT session with increased focus on standing, functional transfers and act tolerance. Pt anxious at times, especially when transferring to [facility]. Pt limited by R side weakness and R groin pain with standing. Noted poor weight shifting to R side and poor ext at back. Pt requires increased VC's for all transfers with increased time for processing. Pt will cont to benefit from increased functional transfers, increased act tolerance, increased LE strengthening, mat program for core and LE strengthening and improved standing tolerance prior to use of BWSS. Problem List Decreased strength;Decreased endurance; Impaired balance;Decreased mobility; Decreased coordination;Decreased cognition; Impaired sensation; Impaired tone   Barriers to Discharge Inaccessible home environment   PT Barriers   Functional Limitation Car transfers; Ramp negotiation;Stair negotiation;Standing;Transfers; Wheelchair management; Walking   Plan   Treatment/Interventions Functional transfer training;LE strengthening/ROM; Therapeutic exercise; Endurance training;Cognitive reorientation;Patient/family training;Bed mobility;Gait training   Progress Slow progress, decreased activity tolerance   Recommendation   PT Discharge Recommendation   (TBD)   Equipment Recommended   (TBD)   PT Therapy Minutes   PT Time In 0830   PT Time Out 0930   PT Total Time (minutes) 60   PT Mode of treatment - Individual (minutes) 60   PT Mode of treatment - Concurrent (minutes) 0   PT Mode of treatment - Group (minutes) 0   PT Mode of treatment - Co-treat (minutes) 0   PT Mode of Treatment - Total time(minutes) 60 minutes   PT Cumulative Minutes 440   Therapy Time missed   Time missed?  No

## 2023-08-30 NOTE — PROGRESS NOTES
Internal Medicine Progress Note  Patient: Keara Thurston  Age/sex: 62 y.o. female  Medical Record #: [id number]      ASSESSMENT/PLAN: (Interval History)  Keara Thurston is seen and examined and management for following issues:    ICH  • Seen by NS and no surgical intervention indicated. • Etio = HTN  • Was unable to tolerate MRI even with sedation  • Was cleared to resume ASA. • Continue atorvastatin. • Outpt follow-up with Neurology.     HTN  • Goal with ICH is SBP<140  • Home: Micardis 80mg qd/Atenolol 50mg qd  • Here: Norvasc 10 mg qd/Losartan 100mg qd  • Micardis 80 mg qd = Losartan 100 mg qd  • Norvasc/Hydralazine/Losartan held 8/27 for SBP<110  • On 8/28, stopped the Hydralazine  • BPs stable and normotensive      DM type 2  • HA1C 6.7  • Home: Metformin 1000mg BID  • Here: Metformin 500 mg BID  • Continue QID Accuchecks/SSI and DM diet  • Added Metformin 500 mg BID starting at dinner on 8/28/23  • No changes today     Anemia  • Baseline Hgb 10 - 11. • Iron level 30/Ferritin 22/TIBC 371  • B12 was low normal at 221 on 8/15/23  • Continue iron and B12 supplementation. • stable     Anxiety  • Continue Wellbutrin XL. • Pt is taking 150mg instead of 300mg due to concerns for increased risk of seizure        Discharge date:  Team    The above assessment and plan was reviewed and updated as determined by my evaluation of the patient on 8/30/2023.     Labs:   Results from last 7 days   Lab Units 08/28/23  0556 08/24/23  0515   WBC Thousand/uL 9.26 10.09   HEMOGLOBIN g/dL 11.5 12.0   HEMATOCRIT % 37.3 37.6   PLATELETS Thousands/uL 379 360     Results from last 7 days   Lab Units 08/28/23  0556 08/24/23  0515   SODIUM mmol/L 136 135   POTASSIUM mmol/L 3.8 3.9   CHLORIDE mmol/L 102 100   CO2 mmol/L 25 26   BUN mg/dL 14 19   CREATININE mg/dL 0.60 0.56*   CALCIUM mg/dL 9.3 9.3             Results from last 7 days   Lab Units 08/30/23  0640 08/29/23  2103 08/29/23  1542   POC GLUCOSE mg/dl 146* 137 127       Review of Scheduled Meds:  Current Facility-Administered Medications   Medication Dose Route Frequency Provider Last Rate   • albuterol  2 puff Inhalation Q4H PRN Gab Macleod, DO     • amLODIPine  10 mg Oral Daily Gab Macleod, DO     • aspirin  81 mg Oral Daily Gab Macleod, DO     • atorvastatin  20 mg Oral Daily With Latanya Rim, DO     • bisacodyl  5 mg Oral Daily PRN Meme Garcia, DO     • buPROPion  150 mg Oral QAM Gab Macleod, DO     • calcium carbonate  1,000 mg Oral TID PRN Laureen Tracy PA-C     • vitamin B-12  1,000 mcg Oral Daily Gab Macleod, DO     • diazepam  2 mg Oral BID PRN Gab Macleod, DO     • ferrous sulfate  325 mg Oral Daily With Breakfast Gab Macleod, DO     • fluticasone-vilanterol  1 puff Inhalation Daily Gab Macleod, DO     • gabapentin  300 mg Oral TID Gab Macleod, DO     • heparin (porcine)  5,000 Units Subcutaneous Q8H [address] Gab Macleod, DO     • insulin lispro  1-6 Units Subcutaneous TID AC Gab Macleod, DO     • insulin lispro  1-6 Units Subcutaneous HS Gab Macleod, DO     • lactulose  20 g Oral Daily PRN Gab Macleod, DO     • losartan  100 mg Oral Daily Gab Macleod, DO     • metFORMIN  500 mg Oral BID With Meals ARSENIO Cardozo     • methocarbamol  500 mg Oral TID Gab Macleod, DO     • ondansetron  4 mg Oral Q6H PRN Gab Macleod, DO     • oxyCODONE  5 mg Oral Q4H PRN Gab Macleod, DO     • oxyCODONE  2.5 mg Oral Q4H PRN Gab Macleod, DO     • pantoprazole  40 mg Oral Early Morning Gab Macleod, DO     • polyethylene glycol  17 g Oral Daily Gab Macleod, DO     • prochlorperazine  5 mg Oral Q6H PRN Gab Macleod, DO     • senna-docusate sodium  2 tablet Oral HS Gab Macleod, DO         Subjective/ HPI: Patient seen and examined. Patients overnight issues or events were reviewed with nursing or staff during rounds or morning huddle session. New or overnight issues include the following:     No new or overnight issues.   Offers no complaints    ROS:   A 10 point ROS was performed; negative except as noted above. *Labs /Radiology studies reviewed  *Medications reviewed and reconciled as needed  *Please refer to order section for additional ordered labs studies  *Case discussed with primary attending during morning huddle case rounds    Physical Examination:  Vitals:   Vitals:    08/29/23 1320 08/29/23 2057 08/30/23 0526 08/30/23 0815   BP: 136/78 111/70 114/65 127/64   BP Location: Left arm Left arm Left arm    Pulse: 83 91 85    Resp: 20 18 18    Temp: 98.1 °F (36.7 °C) 98.2 °F (36.8 °C) 98.1 °F (36.7 °C)    TempSrc: Oral Oral Oral    SpO2: 96% 93% 95%    Weight:       Height:           General Appearance: no distress, conversive  HEENT: PERRLA, conjuctiva normal; oropharynx clear; mucous membranes moist   Neck:  Supple, normal ROM  Lungs: CTA, normal respiratory effort, no retractions, expiratory effort normal  CV: regular rate and rhythm; no rubs/murmurs/gallops, PMI normal   ABD: soft; ND/NT; +BS  EXT: no edema  Skin: normal turgor, normal texture, no rashes  Psych: affect normal, mood normal  Neuro: AAO      The above physical exam was reviewed and updated as determined by my evaluation of the patient on 8/30/2023. Invasive Devices     Drain  Duration           External Urinary Catheter 1 day                   VTE Pharmacologic Prophylaxis: Heparin  Code Status: Level 1 - Full Code  Current Length of Stay: 5 day(s)      Total time spent:  30 minutes with more than 50% spent counseling/coordinating care. Counseling includes discussion with patient re: progress  and discussion with patient of his/her current medical state/information. Coordination of patient's care was performed in conjunction with primary service. Time invested included review of patient's labs, vitals, and management of their comorbidities with continued monitoring.  In addition, this patient was discussed with medical team including physician and advanced extenders. The care of the patient was extensively discussed and appropriate treatment plan was formulated unique for this patient. Medical decision making for the day was made by supervising physician unless otherwise noted in their attestation statement. ** Please Note:  voice to text software may have been used in the creation of this document.  Although proof errors in transcription or interpretation are a potential of such software**

## 2023-08-30 NOTE — PROGRESS NOTES
PM&R PROGRESS NOTE:  Honorio Rodriguez 62 y.o. female MRN: [id number]  Unit/Bed#: -12 Encounter: [id number]        Rehabilitation Diagnosis: Impairment of mobility, safety, Activities of Daily Living (ADLs), and cognitive/communication skills due to Stroke:  01.2  Right Body Involvement (Left Brain)    HPI: Honorio Rodriguez is a 40-year-old female with history of hypertension, hyperlipidemia, diabetes type 2, obesity, eosinophilic asthma, COPD, fibromyalgia, Sjogren syndrome, lumbar spondylosis with grade 1 anterior spondylolisthesis who presents to the hospital on 8/14/2023 due to acute onset right upper and right lower extremity weakness with sensory loss. Additionally with right-sided facial droop and dysarthria. The patient was noted to have an acute intraparenchymal hemorrhage on CT in the left thalamic region with surrounding edema. A CTA was negative. Patient was initially placed on a Cardene drip. Course complicated by significant left-sided leg pain and was recommended on treatment for radicular symptoms including Tylenol, gabapentin and potentially steroids. Neurology was consulted and recommended MRI of the brain with and without contrast while holding antithrombotics. The MRI showed a stable left thalamic capsular intraparenchymal hemorrhage with surrounding edema without any other mass effect. A repeat CT was completed on 8/17/2023 after worsening symptoms including worsening speech slurring with stable findings. Additionally there was a rapid response after a fall with head strike on 8/18 with a repeat CT of the head without acute findings. Additionally antihypertensive regimen was altered as she was previously on telmisartan and atenolol and is currently on amlodipine, losartan and hydralazine with better control.  The patient was evaluated by the Rehabilitation team and deemed an appropriate candidate for comprehensive inpatient rehabilitation and admitted to the [facility] on 8/25/2023  3:19 PM    SUBJECTIVE: Patient seen and evaluated bedside. Added a by D dose of nystatin powder on the breast for some moisture related dermatitis. Additionally added acetaminophen 650 mg every 6 hours as needed. She continues to participate in therapy but very tired and fatigued afterwards. Limiting escalation of other pain medications due to the sedation. Patient denies fever, chills, nausea, emesis, cough, shortness of breath, diarrhea, or constipation. Sleep was fine, mood stable. Pain is controlled. ASSESSMENT: Stable, progressing      PLAN:    Rehabilitation  • Functional deficits:  Self care and mobility  • Continue current rehabilitation plan of care to maximize function. • Functional update:   Physical Therapy Occupational Therapy Speech Therapy   Weight Bearing Status: Full Weight Bearing  Transfers: Assist of 2, Total Assistance (Slide board transfer)  Bed Mobility: Assist of 2, Total Assistance  Amulation Distance (ft): 0 feet (Unsafe to currently assess)  Ambulation:  (Unsafe to currently assess)  Wheelchair Mobility Distance: 150 ft  Wheelchair Mobility: Moderate Assistance, Maximum Assistance (L UE/LE)  Number of Stairs:  (Unsafe to currently assess)  Assistive Device for Stairs:  (Unsafe to currently assess)  Stair Assistance:  (Unsafe to currently assess)  Ramp:  (Unsafe to currently assess)  Assistive Device for Ramp:  (Unsafe to currently assess)  Discharge Recommendations: Home with:  [facility] with[de-identified] 24 Hour Supervision, 24 Hour Assisteance, Family Support, First Floor Setup, Home Physical Therapy, Outpatient Physical Therapy (Pending incremental improvements in mobility)   Eating: Maximum Assistance  Grooming: Maximum Assistance  Bathing: Total Assistance, Assist of 2  Bathing: Total Assistance, Assist of 2  Upper Body Dressing: Total Assistance, Assist of 2  Lower Body Dressing: Total Assistance, Assist of 2  Toileting: Total Assistance, Assist of 2  Toilet Transfer:  Total Assistance, Assist of 2  Cognition: Within Defined Limits   Mode of Communication: Verbal  Speech/Language:  (mild word finding deficits)  Cognition: Exceptions to WNL  Cognition: Decreased Memory, Decreased Executive Functions, Decreased Attention, Decreased Comprehension  Orientation: Person, Place, Time, Situation  Discharge Recommendations:  (pending pt progress)       • Estimated Discharge: To be determined in team conference    DVT prophylaxis  • Heparin     Pain  • Gabapentin 300 mg 3 times daily  • Oxycodone 2.5-5 mg every 4 hours as needed moderate-severe pain  • Robaxin 500 mg TID standing for spasms with Valium 2mg BID PRN for severe spasms     Bladder plan  • Incontinent     Bowel plan  • Last bowel movement 8/30     Code Status  • Level 1 full code      * ICH (intracerebral hemorrhage) ([address])  Assessment & Plan  59-year-old female presents with right hemiplegia, dysarthria and facial asymmetry found to have an acute intraparenchymal hemorrhage in the left thalamic region with surrounding edema felt to be secondary to uncontrolled hypertension  · Had repeat CT of the head on 8/18 after a fall with head strike resulting in significant bruising of the face  · Cleared by neurology to restart aspirin 81 mg daily and atorvastatin 20 mg daily  · Evaluated by neurosurgery with no surgical intervention at this time  · Had a repeat head CT on 8/20 which has been stable  · Goal systolic blood pressure of less than 140  · Follow-up with neurology as an outpatient  · Physical and Occupational Therapy with goals for community discharge.   Patient lives with her  in a mobile home with 5 steps to enter and he is able to assist 24/7  · Hemorrhagic stroke education, nutrition, neuropsychology  · Secondary stroke prophylaxis with hypertension control    Constipation  Assessment & Plan  · No bowel movement since prior to admission which was 11 days prior on 8/14  · Obtaining x-ray to evaluate stool burden  · Adding to bowel regimen including increasing senna and Colace, lactulose as needed and will be more aggressive pending results of x-ray    GERD (gastroesophageal reflux disease)  Assessment & Plan  Continue Protonix    Hyperlipidemia associated with type 2 diabetes mellitus (HCC)  Assessment & Plan  Continue atorvastatin 20 mg with dinner    Class 2 obesity with body mass index (BMI) of 39.0 to 39.9 in adult  Assessment & Plan  · BMI of 41.40 on admission  · Continue promoting weight loss and increased activity, diet and exercise  · Continue a consistent carbohydrate diet  · Nutrition consultation    CAD (coronary artery disease)  Assessment & Plan  · History of CAD currently on statin and aspirin that was restarted on 8/21 after clearance by neurology  · Beta-blocker held due to bradycardia  · Patient has not formally seen a cardiologist however this was diagnosed based on a CT PE study that was conducted in the ER showing mild CAD    Chronic bilateral low back pain with bilateral sciatica  Assessment & Plan  · Patient has a history of chronic low back pain with radicular symptoms in addition to fibromyalgia and myofascial pain syndrome. · She follows with Dr. Moni Guardado from pain management has tried several medications as well as epidural steroid injections with little relief  · Imaging reveals lumbar degenerative disc disease at the L3-4, L4-5, L5-S1 level. Additionally facet hypertrophic changes and ligamentous laxity at the L4-5 level resulting in grade 1 anterior spondylolisthesis and mild canal narrowing with slight distortion of the left anterior lateral aspect of the thecal sac at this level with mild right greater than left neuroforaminal narrowing  · Multi modal pain with current medication regimen including oxycodone to be weaned as well as the Robaxin and gabapentin.   We will also consult neuropsychology  · Added Tylenol as well as Robaxin and as needed Valium    Sjogren's syndrome ([address])  Assessment & Plan  · Patient states that she is been worked up for Sjogren's syndrome in the past and has had conflicting diagnosis he is stating that some physicians have diagnosed her with it and others stated she did not have it. · She has not been on any medication treatment for this and does not actively follow-up with a rheumatologist    Depression with anxiety  Assessment & Plan  · Neuropsychology consultation poststroke with history of anxiety  · Continue Wellbutrin  mg in the morning. It has been 300 mg as an outpatient however it was decreased in acute care due to worry of decreasing seizure threshold in the setting of ICH    Diabetes mellitus type 2, controlled [facility])  Assessment & Plan  Lab Results   Component Value Date    HGBA1C 6.7 (H) 08/15/2023       Recent Labs     08/29/23  1542 08/29/23  2103 08/30/23  0640 08/30/23  1036   POCGLU 127 137 146* 186*     · Currently on carb controlled level 2 diet, metformin 500 mg twice daily  · Sliding scale insulin algorithm 3 with Accu-Cheks 4 times daily      Moderate persistent asthma without complication  Assessment & Plan  · Follows with Dr. Sharan Hyde from pulmonology  · Home regimen includes Breo and as needed albuterol  · On equivalent here and added albuterol inhaler on admission to [facility]    Primary hypertension  Assessment & Plan  · Home regimen: Telmisartan and atenolol  · Current regimen: Norvasc 10 mg daily Cozaar 100 mg daily, hydralazine discontinued  · Monitor blood pressures especially in therapy and make adjustments as needed        Appreciate IM consultants medical co-management. Labs, medications, and imaging personally reviewed. ROS:  A ten point review of systems was completed on 08/30/23 and pertinent positives are listed in subjective section. All other systems reviewed were negative.        OBJECTIVE:   /60 (BP Location: Left arm)   Pulse 82   Temp 98.1 °F (36.7 °C) (Oral)   Resp 18   Ht 5' (1.524 m)   Wt 96.3 kg (212 lb 4.8 oz)   SpO2 96%   BMI 41.46 kg/m²     Physical Exam  Vitals reviewed. Constitutional:       General: She is not in acute distress. Appearance: She is obese. HENT:      Head: Normocephalic. Comments: Continued evolution of ecchymosis around the right orbital region     Right Ear: External ear normal.      Left Ear: External ear normal.      Nose: Nose normal. No rhinorrhea. Mouth/Throat:      Mouth: Mucous membranes are moist.      Pharynx: Oropharynx is clear. Eyes:      General: No scleral icterus. Cardiovascular:      Rate and Rhythm: Normal rate. Pulses: Normal pulses. Pulmonary:      Effort: No respiratory distress. Breath sounds: Normal breath sounds. Abdominal:      General: Bowel sounds are normal. There is no distension. Palpations: Abdomen is soft. Musculoskeletal:      Cervical back: Normal range of motion and neck supple. Right lower leg: No edema. Left lower leg: No edema. Skin:     General: Skin is warm and dry. Neurological:      Mental Status: She is alert and oriented to person, place, and time. Comments: Dense right hemiplegia without any overt spasticity.    Psychiatric:         Mood and Affect: Mood normal.         Behavior: Behavior normal.          Lab Results   Component Value Date    WBC 9.26 08/28/2023    HGB 11.5 08/28/2023    HCT 37.3 08/28/2023    MCV 88 08/28/2023     08/28/2023     Lab Results   Component Value Date    SODIUM 136 08/28/2023    K 3.8 08/28/2023     08/28/2023    CO2 25 08/28/2023    BUN 14 08/28/2023    CREATININE 0.60 08/28/2023    GLUC 146 (H) 08/28/2023    CALCIUM 9.3 08/28/2023     Lab Results   Component Value Date    INR 1.06 08/15/2023    INR 0.94 08/14/2023    PROTIME 14.0 08/15/2023    PROTIME 13.2 08/14/2023           Current Facility-Administered Medications:   •  acetaminophen (TYLENOL) tablet 650 mg, 650 mg, Oral, Q6H PRN, Mandie Adler DO  •  albuterol (PROVENTIL HFA,VENTOLIN HFA) inhaler 2 puff, 2 puff, Inhalation, Q4H PRN, Tesha Greeley, DO  •  amLODIPine (NORVASC) tablet 10 mg, 10 mg, Oral, Daily, Tesha Greeley, DO, 10 mg at 08/30/23 9688  •  aspirin chewable tablet 81 mg, 81 mg, Oral, Daily, Tesha Greeley, DO, 81 mg at 08/30/23 4670  •  atorvastatin (LIPITOR) tablet 20 mg, 20 mg, Oral, Daily With Linell Stair, DO, 20 mg at 08/29/23 1628  •  bisacodyl (DULCOLAX) EC tablet 5 mg, 5 mg, Oral, Daily PRN, Pasha [place], DO  •  buPROPion (WELLBUTRIN XL) 24 hr tablet 150 mg, 150 mg, Oral, QAM, Tesha Harvest, DO, 150 mg at 08/30/23 0822  •  calcium carbonate (TUMS) chewable tablet 1,000 mg, 1,000 mg, Oral, TID PRN, Laureen Tracy PA-C, 1,000 mg at 08/26/23 1241  •  cyanocobalamin (VITAMIN B-12) tablet 1,000 mcg, 1,000 mcg, Oral, Daily, Tesha Harvest, DO, 1,000 mcg at 08/30/23 6909  •  diazepam (VALIUM) tablet 2 mg, 2 mg, Oral, BID PRN, Tesha Harvest, DO  •  ferrous sulfate tablet 325 mg, 325 mg, Oral, Daily With Breakfast, Tesha Harvest, DO, 325 mg at 08/30/23 0815  •  fluticasone-vilanterol 200-25 mcg/actuation 1 puff, 1 puff, Inhalation, Daily, Tesha Greeley, DO, 1 puff at 08/30/23 8230  •  gabapentin (NEURONTIN) capsule 300 mg, 300 mg, Oral, TID, Tesha Greeley, DO, 300 mg at 08/30/23 2805  •  heparin (porcine) subcutaneous injection 5,000 Units, 5,000 Units, Subcutaneous, Q8H [address], Tesha Greeley, DO, 5,000 Units at 08/30/23 1407  •  insulin lispro (HumaLOG) 100 units/mL subcutaneous injection 1-6 Units, 1-6 Units, Subcutaneous, TID AC, 1 Units at 08/30/23 1231 **AND** Fingerstick Glucose (POCT), , , 4x Daily AC and at bedtime, Tesha Bettencourt DO  •  insulin lispro (HumaLOG) 100 units/mL subcutaneous injection 1-6 Units, 1-6 Units, Subcutaneous, HS, Tesha Bettencourt DO, 1 Units at 08/27/23 2133  •  lactulose oral solution 20 g, 20 g, Oral, Daily PRN, Tesha Bettencourt DO  •  losartan (COZAAR) tablet 100 mg, 100 mg, Oral, Daily, Tesha Bettencourt DO, 100 mg at 08/30/23 0815  •  metFORMIN (GLUCOPHAGE) tablet 500 mg, 500 mg, Oral, BID With Meals, Tina Queenpedro, ARSENIO, 500 mg at 08/30/23 7503  •  methocarbamol (ROBAXIN) tablet 500 mg, 500 mg, Oral, TID, [facility], DO, 500 mg at 08/30/23 0815  •  miconazole (MICOTIN) 2 % powder 1 Application, 1 Application, Topical, BID, [facility], DO  •  ondansetron (ZOFRAN-ODT) dispersible tablet 4 mg, 4 mg, Oral, Q6H PRN, [facility], DO  •  oxyCODONE (ROXICODONE) IR tablet 5 mg, 5 mg, Oral, Q4H PRN, [facility], DO, 5 mg at 08/30/23 1019  •  oxyCODONE (ROXICODONE) split tablet 2.5 mg, 2.5 mg, Oral, Q4H PRN, [facility], DO, 2.5 mg at 08/29/23 1005  •  pantoprazole (PROTONIX) EC tablet 40 mg, 40 mg, Oral, Early Morning, [facility], DO, 40 mg at 08/30/23 4006  •  polyethylene glycol (MIRALAX) packet 17 g, 17 g, Oral, Daily, [facility], DO, 17 g at 08/29/23 0813  •  prochlorperazine (COMPAZINE) tablet 5 mg, 5 mg, Oral, Q6H PRN, [facility], DO  •  senna-docusate sodium (SENOKOT S) 8.6-50 mg per tablet 2 tablet, 2 tablet, Oral, HS, [facility], DO, 2 tablet at 08/28/23 2102    Past Medical History:   Diagnosis Date   • Arthritis    • Asthma    • Continuous opioid dependence ([address]) 4/27/2022   • Diabetes mellitus ([address])    • Diverticulitis of colon    • Fibromyalgia 04/26/2022   • Headache(784.0)    • History of mammogram 2018   • History of tobacco abuse 04/26/2022   • Hypertension    • Nausea 04/29/2022   • Obesity    • Sjogren's syndrome ([address]) 10/19/2012   • Urinary tract infection        Patient Active Problem List    Diagnosis Date Noted   • ICH (intracerebral hemorrhage) ([address]) 08/15/2023   • GERD (gastroesophageal reflux disease) 08/25/2023   • Constipation 08/25/2023   • Anemia 08/16/2023   • Left leg pain 08/15/2023   • Chronic obstructive pulmonary disease with acute exacerbation ([address]) 07/26/2023   • Hyperlipidemia associated with type 2 diabetes mellitus ([address]) 07/26/2023   • Abnormal CT of the chest 06/28/2023   • Class 2 obesity with body mass index (BMI) of 39.0 to 39.9 in adult 06/28/2023   • CAD (coronary artery disease) 04/07/2023   • Right lumbosacral radiculopathy 10/12/2022   • Chronic bilateral low back pain with bilateral sciatica 08/31/2022   • Paresthesia of both feet 08/31/2022   • Postoperative visit 05/24/2022   • Incarcerated umbilical hernia 53/27/3436   • Diabetes mellitus type 2, controlled ([address]) 04/29/2022   • Continuous opioid dependence ([address]) 04/27/2022   • Moderate persistent asthma without complication 40/31/8676   • Cigarette nicotine dependence in remission 04/26/2022   • Primary hypertension 04/26/2022   • Fibromyalgia 04/26/2022   • Sjogren's syndrome ([address]) 10/19/2012   • Depression with anxiety 09/18/2012          Yelena Grant,   Physical Medicine and [place]    I have spent a total time of 35 minutes on 08/30/23 in caring for this patient including Counseling / Coordination of care, Documenting in the medical record and Communicating with other healthcare professionals . Visual time spent during weekly team conference discussing with case management, nursing and therapy staff functional medical plan of care, barriers and early dispo planning.

## 2023-08-30 NOTE — PROGRESS NOTES
08/30/23 1330   Pain Assessment   Pain Assessment Tool 0-10   Pain Score 7   Pain Location/Orientation Orientation: Left; Location: Foot   Hospital Pain Intervention(s) Repositioned  (distractions given participation in session)   Restrictions/Precautions   Precautions Bed/chair alarms;Cognitive; Fall Risk;Supervision on toilet/commode;Pain   Comprehension   Comprehension (FIM) 5 - Understands basic directions and conversation   Expression   Expression (FIM) 5 - Needs help/cues only RARELY (< 10% of the time)   Social Interaction   Social Interaction (FIM) 5 - Interacts appropriately with others 90% of time   Problem Solving   Problem solving (FIM) 4 - Solves basic problems 75-89% of time   Memory   Memory (FIM) 4 - Recognizes/recalls/performs 75-89%   Speech/Language/Cognition Assessmetn   Treatment Assessment Pt was asleep in recliner upon arrival to where she was able to be awakened by voice. It was noted that pt did have fair recall given prior therapies held this AM (both PT and OT) but also recalled lunchtime meal w/o further probing. Pt was describing the increased pain which occurred given L foot, knowing that when she asked for a pain medication around lunchtime, she was not "due" for one yet. SLP did state that once session was complete, will touch base w/ nursing to see if it would be time for her pain medication. Of note, SLP did see a larger diagram of the brain, including a clue to location of where pt's stroke was located. This was completed by OT in prior sessions this AM. SLP did review the basics given the location of stroke, which pt was able to recall that her stroke occurred on the L side of her brain which affected that R side of the body.  Also pt was able to recall the education in regard to brain bleed stroke vs clot stroke which was reviewed this AM. It was observed where left off in the book, in which the page fell on Stroke and Aphasia in the educational booklet "What You Need to Know About Stroke." This appropriately lead to ongoing stroke education for session. SLP providing further education to pt about that location in the L hemisphere of the brain and how this relates to the word finding or word substitution which pt is experiencing since it in the vicinity of the "speech centers" of the brain. SLP further providing that not only expressing self could be difficult, but also comprehension abilities, to where pt was able to relate it as "brain fog." Another section which was reviewed at length was Complications After Stroke, targeting all the things which are completed on the rehab to prevent post stroke (ie: pneumonia, limb contractures, UTI's, bed sores, etc). Of note, this did lead to more open ended discussion given depression. While pt does report mood being "good" overall, she did report that she cried last evening w/ her . SLP educating pt on how due to stroke, loss of control given her abilities, this can occur more noticeably. SLP did educate pt on Neuropsych services for compensatory strategies which can be offered by that service. Pt currently declining at this time, but is open to it if she is noting worsening symptoms. Last topic which was covered at length was Risk Factors for Stroke. Pt was only able to ID 2 risk factors which she CAN control: diabetes and stress. Increased education provided on pt's HBP, high cholesterol levels (which pt was able to recall completing carotid testing and arteries are "carbajal"), smoking, phyical inactivity, obesity and sleep apnea (again, pt aware of needing to be tested but has not completed). SLP further educating on the risk factors which pt CANNOT control for increasing risk of stroke, including increasing age, gender, heredity, race and prior strokes. This lead to review of the Stroke phrase BE FAST. Pt was not aware of this acronym to which SLP wrote it out on the back of her stroke booklet for quick reference.  Throughout the session, pt was a highly active learner and very appreciative given the education at this time. Pt will continue to benefit form ongoing skilled SLP services targeting functional independence given cognitive linguistic skills to decrease caregiver burden as well as ongoing stroke education and review for carryover and prevention of further strokes. SLP Therapy Minutes   SLP Time In 9196   SLP Time Out 5734   SLP Total Time (minutes) 60   SLP Mode of treatment - Individual (minutes) 60   SLP Mode of treatment - Concurrent (minutes) 0   SLP Mode of treatment - Group (minutes) 0   SLP Mode of treatment - Co-treat (minutes) 0   SLP Mode of Treatment - Total time(minutes) 60 minutes   SLP Cumulative Minutes 135   Therapy Time missed   Time missed? No     Stroke Education Series    Pt participated in skilled Stroke Education Series in an individualized setting to address the topic of Depression and Anxiety post stroke in both verbal and written formats. Education within this session included the signs and symptoms of depression and anxiety and the impact of psychological health after experiencing a stroke. The goal of this education session was to provide patient with the tools to identify the presence of these signs and symptoms within his/her self and to have the resources to improve overall coping skills; therefore increasing his/her overall quality of life. This process of identifying, acknowledging and addressing any depression and anxiety feelings will also facilitate improvement in functional mobility and in the patient's rehab experience. Following education, pt's response to education is: verbalizes understanding. Start Time: 9090     End Time: 36    Stroke Education Series    Pt participated in skilled Stroke Education Series in an individual setting to address the topic of Purpose of Rehab post stroke in both verbal and written formats.  Education within this session included a review of the individual roles of the rehab team, functions of the acute rehab center, and neuro rehabilitation treatment strategies. The goal of this education session was to provide the patient with an understanding of the overall importance of the therapy process. This section reviews neuroplasticity principles as well that includes how patiens can incorporate specificity, intensity, repetition and salience into their home exercise program. This allows the patient to connect neuro rehabilitation treatment strategies to his or her individual therapy process. The patients are engaged in conversation to incorporate activities they like to do into therapy sessions. Following education, the patient's response to education is: verbalizes understanding. Additional topics to individualize this section of stroke education include: adapting to stroke , cardiovascular components of rehab, upper and lower extremity orthotics and/or bracing , involvement of therapy reducing risk of another stroke and how family can help in the rehab process .     Start Time: 1320    End Time: 1405

## 2023-08-30 NOTE — TEAM CONFERENCE
Acute RehabilitationTe Conference Note  Date: 8/30/2023   Time: 11:04 AM       Patient Name:  Jessica Jones       Medical Record Number: [id number]   YOB: 1965  Sex: Female          Room/Bed:  /[facility] 799-95  Payor Info:  Payor: DARA URIARTE / Plan: Patty Huber / Product Type: Blue Fee for Service /      Admitting Diagnosis: ICH (intracerebral hemorrhage) ([address]) [I61.9]   Admit Date/Time:  8/25/2023  3:19 PM  Admission Comments: No comment available     Primary Diagnosis:  ICH (intracerebral hemorrhage) ([address])  Principal Problem: ICH (intracerebral hemorrhage) ([address])    Patient Active Problem List    Diagnosis Date Noted   • GERD (gastroesophageal reflux disease) 08/25/2023   • Constipation 08/25/2023   • Anemia 08/16/2023   • ICH (intracerebral hemorrhage) ([address]) 08/15/2023   • Left leg pain 08/15/2023   • Chronic obstructive pulmonary disease with acute exacerbation ([address]) 07/26/2023   • Hyperlipidemia associated with type 2 diabetes mellitus ([address]) 07/26/2023   • Abnormal CT of the chest 06/28/2023   • Class 2 obesity with body mass index (BMI) of 39.0 to 39.9 in adult 06/28/2023   • CAD (coronary artery disease) 04/07/2023   • Right lumbosacral radiculopathy 10/12/2022   • Chronic bilateral low back pain with bilateral sciatica 08/31/2022   • Paresthesia of both feet 08/31/2022   • Postoperative visit 05/24/2022   • Incarcerated umbilical hernia 82/93/4110   • Diabetes mellitus type 2, controlled ([address]) 04/29/2022   • Continuous opioid dependence ([address]) 04/27/2022   • Moderate persistent asthma without complication 75/25/3231   • Cigarette nicotine dependence in remission 04/26/2022   • Primary hypertension 04/26/2022   • Fibromyalgia 04/26/2022   • Sjogren's syndrome ([address]) 10/19/2012   • Depression with anxiety 09/18/2012       Physical Therapy:    Weight Bearing Status: Full Weight Bearing  Transfers: Assist of 2, Total Assistance (Slide board transfer)  Bed Mobility: Assist of 2, Total Assistance  Amulation Distance (ft): 0 feet (Unsafe to currently assess)  Ambulation:  (Unsafe to currently assess)  Wheelchair Mobility Distance: 150 ft  Wheelchair Mobility: Moderate Assistance, Maximum Assistance (L UE/LE)  Number of Stairs:  (Unsafe to currently assess)  Assistive Device for Stairs:  (Unsafe to currently assess)  Stair Assistance:  (Unsafe to currently assess)  Ramp:  (Unsafe to currently assess)  Assistive Device for Ramp:  (Unsafe to currently assess)  Discharge Recommendations: Home with:  [address] [place] [address] with[de-identified] 24 Hour Supervision, 24 Hour Assisteance, Family Support, First Floor Setup, Home Physical Therapy, Outpatient Physical Therapy (Pending incremental improvements in mobility)    8/29/23  Elbert Oh continues to demonstrate significant deficits in R-sided muscle recruitment throughout her trunk, UE, and LE. She is progressively participating more in slide board transfers incorporating better forward weight shifts with scooting and use of her BL LE's and L UE as much as possible when cued to slide herself but still requires maximal assistance of at least two clinicians to complete the task. Her STS transfers are improving and she is beginning to work on her standing tolerance. Recommendation going forward to trial and assess BWSS ambulation during a 90 minute session block to facilitate neuromuscular recruitment of her R side considering her ambulatory goals and ELOS of 4 weeks per PT IE. Current barriers to home include DAMIAN,  availability to help with ADL's due to working night shift, and Korin's overall deficits in her mobility. Occupational Therapy:  Eating: Maximum Assistance  Grooming: Maximum Assistance  Bathing: Total Assistance, Assist of 2  Bathing: Total Assistance, Assist of 2  Upper Body Dressing: Total Assistance, Assist of 2  Lower Body Dressing: Total Assistance, Assist of 2  Toileting: Total Assistance, Assist of 2  Toilet Transfer:  Total Assistance, Assist of 2  Cognition: Within Defined Limits  DC Home with[de-identified] 24 Hour Assistance, Family Support, First Floor Setup       Occupational Therapy Weekly Team Note    Pt continues to make good progress with skilled occupational therapy intervention and is progressing toward long term goals for ADL, IADL's, and functional transfers/mobility. Pts long term goals for ADLs are supervision with wheelchair. Pt continues to present with impairments in activity tolerance, endurance, standing balance/tolerance, sitting balance/tolerance, UE strength, UE ROM, FMC, and GMC. Occupational performance remains limited by (R) hemiplegia and risk for falls. Family training/education will be required prior to D/C. Pt will continue to benefit from skilled acute rehab OT services to address above mentioned barriers and maximize functional independence in baseline areas of occupation to meet established treatment goals with overall decreased burden of care. Plan of care to continue to focus on ADL Retraining , LB Dressing, UB dressing, López Dressing Techniques,  LHAE education/training, Functional Transfers, Standing tolerance, Standing balance , UE NMR right, midline awareness, Fine motor coordination, Gross motor coordination, Fine motor strengthening , Gross motor strengthening , R attention, DME training/education, Family training/education, Energy conservation training/education, healthy coping education, and Leisure and social pursuits. Goals for the upcoming week are: continue to monitor basic vitals during ADLs and functional transfers, basic ADL participation , Out of bed tolerance, and establish caregiver abilities  Anticipate Re-team at this time. Corinne Angela                   Speech Therapy:  Mode of Communication: Verbal  Speech/Language:  (mild word finding deficits)  Cognition: Exceptions to WNL  Cognition: Decreased Memory, Decreased Executive Functions, Decreased Attention, Decreased Comprehension  Orientation: Person, Place, Time, Situation  Discharge Recommendations:  (pending pt progress)  Pt completed the CLQT+ on initial evaluation with a Composite Severity Rating score of 2.6 out of 4.0, correlating to overall mildly impaired  cognitive linguistic impairments at time of evaluation and in comparison to age matched peers ranging from 19-69 y/o. Current barriers which present include, decreased attention, slower processing time, decreased ST/working memory, decreased executive function skills (problem solving, reasoning, sequencing, organization of thoughts), judgement, fatigue and safety which impacts functional mobility at this time. Pt does report higher level of independence prior to admission and will target both functional and higher level cognitive tasks throughout pt's stay on the acute rehab center. Pt is functioning at [address] for comprehension, supervision for expression and social interactions, min-mod A for executive functions and mod A for memory. At this time, pt will benefit from skilled SLP services targeting functional independence of cognitive linguistic skills in hopes to decrease caregiver burden over time. Nursing Notes:  Appetite: Good  Diet Type: Diabetic                                                                     Pain Location/Orientation: Location: Groin  Pain Score: 6                       Hospital Pain Intervention(s): Medication (See MAR)             • Hypertension-blood pressure adequately controlled on current treatment per review of BP logs. Continue same treatment plan. • Diabetes-continue metformin 500 mg twice daily monitor with this adjustment  • Recent intracranial hemorrhage-optimize blood pressure has been cleared to resume and is receiving aspirin continue statin therapy  • Seen by NS and no surgical intervention indicated. • Etio = HTN  • Was unable to tolerate MRI even with sedation  • Goal with ICH is SBP<140    Pt is therapy transfers only.  Incontinent of bowel and bladder. Right side flaccid . C/O foot pain, Oxy 5 mg Po PRN given at 2134      Case Management:     Discharge Planning  Living Arrangements: Lives w/ Spouse/significant other  Support Systems: Spouse/significant other  Assistance Needed: unknown  Type of Current Residence: Private residence  Current Home Care Services: No  Pt admitted s/p stroke and has deficits in mobility. Pt resides with spouse who works nights. Estimated los two weeks with return home. Following to assist w/appropriate dc recommendations. Continued stay review to be done on Thursday. Is the patient actively participating in therapies? yes  List any modifications to the treatment plan:     Barriers Interventions   Right side muscle activitation Therapy exercises   Sitting  Balance, standing balance with rail Therapy strengthening exercises   stairs Therapy stair training   Mild cog deficits, memory Speech therapy    Overall health literacy Nursing education     Is the patient making expected progress toward goals?  yes  List any update or changes to goals:     Medical Goals: Patient will be medically stable for discharge to [facility] upon completion of rehab program and Patient will be able to manage medical conditions and comorbid conditions with medications and follow up upon completion of rehab program    Weekly Team Goals:   Rehab Team Goals  ADL Team Goal: Patient will require supervision with ADLs with least restrictive device upon completion of rehab program  Transfer Team Goal: Patient will require assist with transfers with least restrictive device upon completion of rehab program  Locomotion Team Goal: Patient will require assist with locomotion with least restrictive device upon completion of rehab program (ambulatory vs w/c level)  Cognitive Team Goal: Patient will be independent for basic  tasks and require supervision for complex tasks upon completion of rehab program    Discussion: pt presents with the above barriers and is functioning at a total a of two for all mobility, use of a slide board, and w/c. Pt lives with spouse who works at night. Pt is min to mod a overall for cog with barriers in new learning. Pt is insightful with some items and recalls pain med schedule. Pt is incontinent and is on a time void. Dc plan is tbd based on functional gain. Anticipated Discharge Date:  reteam [facility] Team Members Present: The following team members are supervising care for this patient and were present during this Weekly Team Conference.     Physician: Dr. Main Avalos DO  : CONNOR Silva  Registered Nurse: Michoacano Madison RN  Physical Therapist: Kady Wray DPT  Occupational Therapist: Aliya Evans MS, OTR/L  Speech Therapist: Eddie Gray, [address] [facility]

## 2023-08-30 NOTE — PROGRESS NOTES
[facility] Occupational Therapy Daily Note  Patient Active Problem List   Diagnosis    Cigarette nicotine dependence in remission    Primary hypertension    Fibromyalgia    Continuous opioid dependence (HCC)    Moderate persistent asthma without complication    Diabetes mellitus type 2, controlled ([address])    Depression with anxiety    Sjogren's syndrome ([address])    Incarcerated umbilical hernia    Postoperative visit    Chronic bilateral low back pain with bilateral sciatica    Paresthesia of both feet    Right lumbosacral radiculopathy    CAD (coronary artery disease)    Abnormal CT of the chest    Class 2 obesity with body mass index (BMI) of 39.0 to 39.9 in adult    Chronic obstructive pulmonary disease with acute exacerbation (HCC)    Hyperlipidemia associated with type 2 diabetes mellitus (HCC)    ICH (intracerebral hemorrhage) (HCC)    Left leg pain    Anemia    GERD (gastroesophageal reflux disease)    Constipation       Past Medical History:   Diagnosis Date    Arthritis     Asthma     Continuous opioid dependence ([address]) 4/27/2022    Diabetes mellitus ([address])     Diverticulitis of colon     Fibromyalgia 04/26/2022    Headache(784.0)     History of mammogram 2018    History of tobacco abuse 04/26/2022    Hypertension     Nausea 04/29/2022    Obesity     Sjogren's syndrome ([address]) 10/19/2012    Urinary tract infection      Etiologic Diagnosis: Acute Left Thalamic Intracerebral Hemorrhage with edema  Restrictions/Precautions  Precautions: Bed/chair alarms, Fall Risk, Supervision on toilet/commode (R-inattention)  Weight Bearing Restrictions: No  ROM Restrictions: No  Braces or Orthoses: Sling (RUE sling for xfers)  ADL Team Goal: Patient will require supervision with ADLs with least restrictive device upon completion of rehab program  Occupational Therapy LTG's  Eating Oral care Bathing LB dress UB dress   Eating Goal: 05. Setup or clean-up assistance - [place] SETS UP or CLEANS UP, patient completes activity.  [place] assists only prior to or following the activity. Oral Hygiene Goal: 05. Setup or clean-up assistance - [place] SETS UP or CLEANS UP, patient completes activity. [place] assists only prior to or following the activity. Shower/bathe self Goal: 04. Supervision or touching assistance- [place] provides VERBAL CUES or supervision throughout activity. Lower body dressing Goal: 04. Supervision or touching assistance- [place] provides VERBAL CUES or supervision throughout activity. Upper body dressing Goal: 05. Setup or clean-up assistance - [place] SETS UP or CLEANS UP, patient completes activity. [place] assists only prior to or following the activity. Toileting Toilet txf Func txf IADL Med    Toileting hygiene Goal: 04. Supervision or touching assistance- [place] provides VERBAL CUES or supervision throughout activity. Toilet transfer Goal: 04. Supervision or touching assistance- [place] provides VERBAL CUES or supervision throughout activity. Chair/bed-to-chair transfer Goal: 04. Supervision or touching assistance- [place] provides VERBAL CUES or supervision throughout activity. Assist Level: Minimum Assist (full meal prep seated in WC pending pt progress) Assist Level: Independent (mod I with mediplanner which pt uses at baseline)   OT interventions: Treatment/Interventions: ADL retraining, LE strengthening/ROM, Functional transfer training, Therapeutic exercise, Endurance training, Cognitive reorientation, Patient/family training, Equipment eval/education, Bed mobility, Compensatory technique education  Discharge Plan:  OT Discharge Recommendation: (P)  (Home vs SNF pending progress)   DME: Equipment Recommended: (P)  (TBD),  ,      08/30/23 0930   Pain Assessment   Pain Assessment Tool 0-10   Pain Score 5   Pain Location/Orientation Orientation: Lower; Location: Back   Lifestyle   Autonomy "My back really hurts."   Neuromuscular Education   Comments set up mirror box training program. reviewed HEP and completed 20 min session.  1x20 reps for all extension based ther-ex. set up with NMES for dorsal forearm extensors during mirror therapy. Assessment   Treatment Assessment Pt participated in skilled OT treatment session with treatment focus on R UE NMR with mirror box and NMES training, along with extensive course on stroke education. See below about stroke education. Pt's right UE function currently Non Functional without neglect. No neglect of extremity, but no involvement in task performance. Continue to focus neurological treatment plan:Functional Electrical Stimulation, Sensorimotor Training, Functional Orthoses/bracing, Mental Practice/Guided Imagery, Mirror Therapy, Body awareness, Midline awareness, Joint protection training and REO GO(when safe for transfer)   Prognosis Fair   Plan   Progress Slow progress, decreased activity tolerance   Recommendation   OT Discharge Recommendation   (Home vs SNF pending progress)   Equipment Recommended   (TBD)   OT Therapy Minutes   OT Time In 0930   OT Time Out 1030   OT Total Time (minutes) 60   OT Mode of treatment - Individual (minutes) 60   OT Mode of treatment - Concurrent (minutes) 0   OT Mode of treatment - Group (minutes) 0   OT Mode of treatment - Co-treat (minutes) 0   OT Mode of Treatment - Total time(minutes) 60 minutes   OT Cumulative Minutes 425     Stroke Education Series    Pt participated in skilled Stroke Education Series in an individual setting to address the topic of Stroke 101: Understanding the Basics of Stroke in both verbal and written formats. Education within this session reviewed the basic structural and functional components of the brain and included information on the causes of stroke, related signs/symptoms, risk factors, and the process of stroke rehabilitation. The goal of this education was to provide the patient with general understanding of how the brain functions and how a stroke can impact his/her functional mobility and independence.  In addition, the intention of this education is to provide the patient with the information to reduce the risk of a second stroke. Following education, pt's response to education is: needs reinforcement and further education. To individualize the education, the following topics were included based upon the patients' past and current medical history: diabetes mellitus, hypertension and smoking. Reports taking medication PTA for HTN, and D. Reports quitting smoking in the last 5 months. Gross review of imaging completed with patient to increase understanding and competence of anatomy for stoke understanding. Pt engages in review of Imaging along with education of anatomy and implications for motor control and areas of the brain. Utilized teach back method for optimal learning. provided with visual handout of anatomy and location/type of stroke. Additional topics that were covered include weakness, decreased sensation and decreased coordination.    Provided [employer] support group paper and flyer with education       Start Time: 1000    End Time: 1030

## 2023-08-30 NOTE — PROGRESS NOTES
[facility] Occupational Therapy Daily Note  Patient Active Problem List   Diagnosis    Cigarette nicotine dependence in remission    Primary hypertension    Fibromyalgia    Continuous opioid dependence (HCC)    Moderate persistent asthma without complication    Diabetes mellitus type 2, controlled ([address])    Depression with anxiety    Sjogren's syndrome ([address])    Incarcerated umbilical hernia    Postoperative visit    Chronic bilateral low back pain with bilateral sciatica    Paresthesia of both feet    Right lumbosacral radiculopathy    CAD (coronary artery disease)    Abnormal CT of the chest    Class 2 obesity with body mass index (BMI) of 39.0 to 39.9 in adult    Chronic obstructive pulmonary disease with acute exacerbation (HCC)    Hyperlipidemia associated with type 2 diabetes mellitus (HCC)    ICH (intracerebral hemorrhage) (HCC)    Left leg pain    Anemia    GERD (gastroesophageal reflux disease)    Constipation       Past Medical History:   Diagnosis Date    Arthritis     Asthma     Continuous opioid dependence ([address]) 4/27/2022    Diabetes mellitus ([address])     Diverticulitis of colon     Fibromyalgia 04/26/2022    Headache(784.0)     History of mammogram 2018    History of tobacco abuse 04/26/2022    Hypertension     Nausea 04/29/2022    Obesity     Sjogren's syndrome ([address]) 10/19/2012    Urinary tract infection      Etiologic Diagnosis: Acute Left Thalamic Intracerebral Hemorrhage with edema  Restrictions/Precautions  Precautions: Bed/chair alarms, Fall Risk, Supervision on toilet/commode (R-inattention)  Weight Bearing Restrictions: No  ROM Restrictions: No  Braces or Orthoses: Sling (RUE sling for xfers)  ADL Team Goal: Patient will require supervision with ADLs with least restrictive device upon completion of rehab program  Occupational Therapy LTG's  Eating Oral care Bathing LB dress UB dress   Eating Goal: 05. Setup or clean-up assistance - [place] SETS UP or CLEANS UP, patient completes activity.  [place] assists only prior to or following the activity. Oral Hygiene Goal: 05. Setup or clean-up assistance - [place] SETS UP or CLEANS UP, patient completes activity. [place] assists only prior to or following the activity. Shower/bathe self Goal: 04. Supervision or touching assistance- [place] provides VERBAL CUES or supervision throughout activity. Lower body dressing Goal: 04. Supervision or touching assistance- [place] provides VERBAL CUES or supervision throughout activity. Upper body dressing Goal: 05. Setup or clean-up assistance - [place] SETS UP or CLEANS UP, patient completes activity. [place] assists only prior to or following the activity. Toileting Toilet txf Func txf IADL Med    Toileting hygiene Goal: 04. Supervision or touching assistance- [place] provides VERBAL CUES or supervision throughout activity. Toilet transfer Goal: 04. Supervision or touching assistance- [place] provides VERBAL CUES or supervision throughout activity. Chair/bed-to-chair transfer Goal: 04. Supervision or touching assistance- [place] provides VERBAL CUES or supervision throughout activity. Assist Level: Minimum Assist (full meal prep seated in WC pending pt progress) Assist Level:  Independent (mod I with mediplanner which pt uses at baseline)   OT interventions: Treatment/Interventions: ADL retraining, LE strengthening/ROM, Functional transfer training, Therapeutic exercise, Endurance training, Cognitive reorientation, Patient/family training, Equipment eval/education, Bed mobility, Compensatory technique education  Discharge Plan:  OT Discharge Recommendation:  (Home vs SNF pending progress)   DME: Equipment Recommended:  (TBD),  ,       08/30/23 1200   Pain Assessment   Pain Assessment Tool 0-10   Pain Score No Pain   Toileting Hygiene   Type of Assistance Needed Physical assistance   Physical Assistance Level Total assistance   Comment Ax2 various attempts to complete clothing management over hips in sitting with weight shifting and attempts for partial stance. extensive assist for breif management. Toileting Hygiene CARE Score 1   Toilet Transfer   Type of Assistance Needed Physical assistance   Physical Assistance Level Total assistance   Comment May benefit from Post falls board for transfer to R side. AX2 limited assistance when completing to R. fatigue set in and unable to provide assist.   Toilet Transfer CARE Score 1   Neuromuscular Education   Weight Bearing Technique Yes   RUE Weight Bearing Forearm seated   Response to Weight Bearing Technique while on BSC. facilitaiton for UE weight bearing. req positioning   Assessment   Treatment Assessment Skilled OT session focusiong on toilet transfer and toielting on BSC. extensive assistance required at this time. will benefit from trial of BEASY board for transfers for management of fatigue and toielting.    OT Therapy Minutes   OT Time In 1200   OT Time Out 1225   OT Total Time (minutes) 25   OT Mode of treatment - Individual (minutes) 25   OT Mode of treatment - Concurrent (minutes) 0   OT Mode of treatment - Group (minutes) 0   OT Mode of treatment - Co-treat (minutes) 0   OT Mode of Treatment - Total time(minutes) 25 minutes   OT Cumulative Minutes 450

## 2023-08-30 NOTE — CASE MANAGEMENT
Met w/pt and reviewed team mtg update. Reviewed potential los expected with plans to return home. Pt confirmed her spouse works nights as a , she has  Sister that lives nearby but she works. And pt states her neighbors are all elderly as she lives in what was her mothers  Home. Pt reported therapy is hard and she feels like the therapists get mad at her when she cannot stand very long. Cm counseled and reminded pt it is their job to encourage her to push herself. That she needs to work on waking up the muscles to help her function so she can go home. Cm assured pt the staff is not upset with her.

## 2023-08-31 PROBLEM — E87.1 HYPONATREMIA: Status: ACTIVE | Noted: 2023-08-31

## 2023-08-31 LAB
ANION GAP SERPL CALCULATED.3IONS-SCNC: 10 MMOL/L
BASOPHILS # BLD AUTO: 0.09 THOUSANDS/ÂΜL (ref 0–0.1)
BASOPHILS NFR BLD AUTO: 1 % (ref 0–1)
BUN SERPL-MCNC: 21 MG/DL (ref 5–25)
CALCIUM SERPL-MCNC: 9.4 MG/DL (ref 8.4–10.2)
CHLORIDE SERPL-SCNC: 98 MMOL/L (ref 96–108)
CO2 SERPL-SCNC: 25 MMOL/L (ref 21–32)
CREAT SERPL-MCNC: 0.7 MG/DL (ref 0.6–1.3)
EOSINOPHIL # BLD AUTO: 1.03 THOUSAND/ÂΜL (ref 0–0.61)
EOSINOPHIL NFR BLD AUTO: 10 % (ref 0–6)
ERYTHROCYTE [DISTWIDTH] IN BLOOD BY AUTOMATED COUNT: 15.9 % (ref 11.6–15.1)
GFR SERPL CREATININE-BSD FRML MDRD: 95 ML/MIN/1.73SQ M
GLUCOSE P FAST SERPL-MCNC: 151 MG/DL (ref 65–99)
GLUCOSE SERPL-MCNC: 110 MG/DL (ref 65–140)
GLUCOSE SERPL-MCNC: 143 MG/DL (ref 65–140)
GLUCOSE SERPL-MCNC: 146 MG/DL (ref 65–140)
GLUCOSE SERPL-MCNC: 151 MG/DL (ref 65–140)
GLUCOSE SERPL-MCNC: 151 MG/DL (ref 65–140)
HCT VFR BLD AUTO: 39.1 % (ref 34.8–46.1)
HGB BLD-MCNC: 12.1 G/DL (ref 11.5–15.4)
IMM GRANULOCYTES # BLD AUTO: 0.04 THOUSAND/UL (ref 0–0.2)
IMM GRANULOCYTES NFR BLD AUTO: 0 % (ref 0–2)
LYMPHOCYTES # BLD AUTO: 2.7 THOUSANDS/ÂΜL (ref 0.6–4.47)
LYMPHOCYTES NFR BLD AUTO: 26 % (ref 14–44)
MCH RBC QN AUTO: 27.2 PG (ref 26.8–34.3)
MCHC RBC AUTO-ENTMCNC: 30.9 G/DL (ref 31.4–37.4)
MCV RBC AUTO: 88 FL (ref 82–98)
MONOCYTES # BLD AUTO: 0.76 THOUSAND/ÂΜL (ref 0.17–1.22)
MONOCYTES NFR BLD AUTO: 7 % (ref 4–12)
NEUTROPHILS # BLD AUTO: 5.74 THOUSANDS/ÂΜL (ref 1.85–7.62)
NEUTS SEG NFR BLD AUTO: 56 % (ref 43–75)
NRBC BLD AUTO-RTO: 0 /100 WBCS
PLATELET # BLD AUTO: 374 THOUSANDS/UL (ref 149–390)
PMV BLD AUTO: 9.7 FL (ref 8.9–12.7)
POTASSIUM SERPL-SCNC: 4.2 MMOL/L (ref 3.5–5.3)
RBC # BLD AUTO: 4.45 MILLION/UL (ref 3.81–5.12)
SODIUM SERPL-SCNC: 133 MMOL/L (ref 135–147)
WBC # BLD AUTO: 10.36 THOUSAND/UL (ref 4.31–10.16)

## 2023-08-31 PROCEDURE — 82948 REAGENT STRIP/BLOOD GLUCOSE: CPT

## 2023-08-31 PROCEDURE — 80048 BASIC METABOLIC PNL TOTAL CA: CPT | Performed by: PHYSICIAN ASSISTANT

## 2023-08-31 PROCEDURE — 99232 SBSQ HOSP IP/OBS MODERATE 35: CPT | Performed by: STUDENT IN AN ORGANIZED HEALTH CARE EDUCATION/TRAINING PROGRAM

## 2023-08-31 PROCEDURE — 97130 THER IVNTJ EA ADDL 15 MIN: CPT

## 2023-08-31 PROCEDURE — 97129 THER IVNTJ 1ST 15 MIN: CPT

## 2023-08-31 PROCEDURE — 97530 THERAPEUTIC ACTIVITIES: CPT

## 2023-08-31 PROCEDURE — 99232 SBSQ HOSP IP/OBS MODERATE 35: CPT | Performed by: INTERNAL MEDICINE

## 2023-08-31 PROCEDURE — 97112 NEUROMUSCULAR REEDUCATION: CPT

## 2023-08-31 PROCEDURE — 85025 COMPLETE CBC W/AUTO DIFF WBC: CPT | Performed by: PHYSICIAN ASSISTANT

## 2023-08-31 PROCEDURE — 97110 THERAPEUTIC EXERCISES: CPT

## 2023-08-31 RX ADMIN — ASPIRIN 81 MG CHEWABLE TABLET 81 MG: 81 TABLET CHEWABLE at 08:18

## 2023-08-31 RX ADMIN — GABAPENTIN 300 MG: 300 CAPSULE ORAL at 08:19

## 2023-08-31 RX ADMIN — HEPARIN SODIUM 5000 UNITS: 5000 INJECTION INTRAVENOUS; SUBCUTANEOUS at 13:25

## 2023-08-31 RX ADMIN — GABAPENTIN 300 MG: 300 CAPSULE ORAL at 20:42

## 2023-08-31 RX ADMIN — LOSARTAN POTASSIUM 100 MG: 50 TABLET, FILM COATED ORAL at 08:18

## 2023-08-31 RX ADMIN — PANTOPRAZOLE SODIUM 40 MG: 40 TABLET, DELAYED RELEASE ORAL at 05:51

## 2023-08-31 RX ADMIN — MICONAZOLE NITRATE 43 G: 20 POWDER TOPICAL at 17:06

## 2023-08-31 RX ADMIN — BUPROPION HYDROCHLORIDE 150 MG: 150 TABLET, FILM COATED, EXTENDED RELEASE ORAL at 08:20

## 2023-08-31 RX ADMIN — AMLODIPINE BESYLATE 10 MG: 10 TABLET ORAL at 08:18

## 2023-08-31 RX ADMIN — METHOCARBAMOL 500 MG: 500 TABLET ORAL at 16:47

## 2023-08-31 RX ADMIN — METHOCARBAMOL 500 MG: 500 TABLET ORAL at 20:42

## 2023-08-31 RX ADMIN — HEPARIN SODIUM 5000 UNITS: 5000 INJECTION INTRAVENOUS; SUBCUTANEOUS at 05:54

## 2023-08-31 RX ADMIN — OXYCODONE HYDROCHLORIDE 5 MG: 5 TABLET ORAL at 22:58

## 2023-08-31 RX ADMIN — METHOCARBAMOL 500 MG: 500 TABLET ORAL at 08:18

## 2023-08-31 RX ADMIN — FLUTICASONE FUROATE AND VILANTEROL TRIFENATATE 1 PUFF: 200; 25 POWDER RESPIRATORY (INHALATION) at 08:21

## 2023-08-31 RX ADMIN — GABAPENTIN 300 MG: 300 CAPSULE ORAL at 16:46

## 2023-08-31 RX ADMIN — HEPARIN SODIUM 5000 UNITS: 5000 INJECTION INTRAVENOUS; SUBCUTANEOUS at 22:58

## 2023-08-31 RX ADMIN — FERROUS SULFATE TAB 325 MG (65 MG ELEMENTAL FE) 325 MG: 325 (65 FE) TAB at 08:17

## 2023-08-31 RX ADMIN — Medication 2.5 MG: at 10:38

## 2023-08-31 RX ADMIN — OXYCODONE HYDROCHLORIDE 5 MG: 5 TABLET ORAL at 17:04

## 2023-08-31 RX ADMIN — CYANOCOBALAMIN TAB 500 MCG 1000 MCG: 500 TAB at 08:18

## 2023-08-31 RX ADMIN — ATORVASTATIN CALCIUM 20 MG: 20 TABLET, FILM COATED ORAL at 16:46

## 2023-08-31 RX ADMIN — MICONAZOLE NITRATE 1 APPLICATION: 20 POWDER TOPICAL at 08:21

## 2023-08-31 RX ADMIN — INSULIN LISPRO 1 UNITS: 100 INJECTION, SOLUTION INTRAVENOUS; SUBCUTANEOUS at 08:10

## 2023-08-31 RX ADMIN — METFORMIN HYDROCHLORIDE 500 MG: 500 TABLET ORAL at 16:46

## 2023-08-31 RX ADMIN — Medication 2.5 MG: at 05:54

## 2023-08-31 RX ADMIN — METFORMIN HYDROCHLORIDE 500 MG: 500 TABLET ORAL at 08:17

## 2023-08-31 NOTE — PROGRESS NOTES
08/31/23 0991   Pain Assessment   Pain Assessment Tool 0-10   Pain Score 7   Pain Location/Orientation Location: Groin   Effect of Pain on Daily Activities Patient with c/o pain in groin limiting her standing tolerance. Limited assessment 2* being performed in the recliner however R groin tightness/tone noted. Some relief found with stretch and distraction however will assess further on mat next session   Pain Rating: FLACC (Rest) - Face 0   Pain Rating: FLACC (Rest) - Legs 0   Pain Rating: FLACC (Rest) - Activity 0   Pain Rating: FLACC (Rest) - Cry 0   Pain Rating: FLACC (Rest) - Consolability 0   Score: FLACC (Rest) 0   Pain Rating: FLACC (Activity) - Face 1   Pain Rating: FLACC (Activity) - Legs 1   Pain Rating: FLACC (Activity) - Activity 1   Pain Rating: FLACC (Activity) - Cry 1   Pain Rating: FLACC (Activity) - Consolability 1   Score: FLACC (Activity) 5   Restrictions/Precautions   Precautions Bed/chair alarms;Cognitive; Fall Risk;Supervision on toilet/commode;Pain  [facility] for pressure relief)   Weight Bearing Restrictions No   ROM Restrictions No   Braces or Orthoses Splint  (RUE; multipodus boot RLE in bed)   Cognition   Overall Cognitive Status Impaired   Arousal/Participation Alert; Cooperative   Attention Attends with cues to redirect   Comments highly anxious- worked on PLB techniques, states she has valium here but was not anxious PTA   Subjective   Subjective Patient ready to participate in PT session.  No new complaints   Lying to Sitting on Side of Bed   Type of Assistance Needed Physical assistance   Physical Assistance Level 51%-75%   Comment assist R side and trunk management; once EOB, patient needing mod A to maintain balance on softer surface; LOB to R observed needing PT intervention to correct   Lying to Sitting on Side of Bed CARE Score 2   Sit to Stand   Type of Assistance Needed Physical assistance   Physical Assistance Level Total assistance   Comment modAx2 pull to stand with R sided support, R sided foot repositioning and R knee blocking. Performed 4x during session. Noted to have forward flexion but was able to maintain appropriate amount of time for safe toileting and swap out   Sit to Stand CARE Score 1   Bed-Chair Transfer   Type of Assistance Needed Physical assistance   Physical Assistance Level Total assistance   Comment Worked this session on identifiying the appropiate transfer technique for staff to perform. PT performed repeated beasy board transfers with PT and rehab aide to right and left side on drop arm recliner. Della Casey LPN and Darwin Kebede RN present for portions of session to review. Chair/Bed-to-Chair Transfer CARE Score 1   Transfer Bed/Chair/Wheelchair   Findings (S)  UPDATED STICKY NOTE AND BOARD TO REFLECT CURRENT RECOMMENDED TRANSFER TEHCHNIQUE AND TOILETING STATUS. UPDATE AS NEEDED. REVIEWED WITH REHAB AIDE AND PCA. SEE BELOW FOR DETAILS   Car Transfer   Reason if not Attempted Safety concerns   Car Transfer CARE Score 88   Walk 10 Feet   Reason if not Attempted Safety concerns   Walk 10 Feet CARE Score 88   Walk 50 Feet with Two Turns   Reason if not Attempted Safety concerns   Walk 50 Feet with Two Turns CARE Score 88   Walk 150 Feet   Reason if not Attempted Safety concerns   Walk 150 Feet CARE Score 88   Walking 10 Feet on Uneven Surfaces   Reason if not Attempted Safety concerns   Walking 10 Feet on Uneven Surfaces CARE Score 88   Ambulation   Does the patient walk? 1. No, and walking goal is clinically indicated.    Curb or Single Stair   Reason if not Attempted Safety concerns   1 Step (Curb) CARE Score 88   4 Steps   Reason if not Attempted Safety concerns   4 Steps CARE Score 88   12 Steps   Reason if not Attempted Safety concerns   12 Steps CARE Score 88   Picking Up Object   Reason if not Attempted Safety concerns   Picking Up Object CARE Score 88   Toilet Transfer   Type of Assistance Needed Physical assistance   Physical Assistance Level Total assistance Comment Ax2 pull to stand with 3rd person performing swap out method. Toilet Transfer CARE Score 1   Therapeutic Interventions   Strengthening repeated STS and beasy board transfers   Flexibility BLE heel cord, HS and groin stretch TERT 2 minutes;gentle distraction applied to R hip with some relief noted   Assessment   Treatment Assessment Patient engaged in PT treatment session with focus on safety with in room transfers and toileting with all staff. Members. PLEASE SEE BELOW FOR RECOMMENDED STATUS. STICKY NOTE AND WHITE BOARD REFLECTIVE OF THIS INFORMATION. Patient with the safest and least caregiver burden using the beasy board for transfers. She was pleased with this method and is in agreement to perform with all staff. Standing limited this session by fear/anxiety and pain in groin however she was still able to maintain stance safely for the necessary duration to complete the task. Continued therapy sessions to focus on facilitating WB through BLE with use of standing frame, trial of NuStep bike, and mat program to facilitate LE and core activation. Use ESTIM as appropriate. PT Barriers   Physical Impairment Decreased strength;Decreased range of motion;Decreased endurance; Impaired balance;Decreased mobility; Decreased coordination;Decreased cognition; Impaired judgement;Decreased safety awareness; Impaired sensation;Obesity;Orthopedic restrictions;Pain   PT Therapy Minutes   PT Time In 0930   PT Time Out 1100   PT Total Time (minutes) 90   PT Mode of treatment - Individual (minutes) 90   PT Mode of treatment - Concurrent (minutes) 0   PT Mode of treatment - Group (minutes) 0   PT Mode of treatment - Co-treat (minutes) 0   PT Mode of Treatment - Total time(minutes) 90 minutes   PT Cumulative Minutes 530     CURRENT RECOMMENDATIONS FOR PATIENT'S MOBILITY:      DEMOND HAS RIGHT SIDED WEAKNESS AND CANNOT REPOSITION THE RIGHT SIDE OF HER BODY ON HER OWN.     SHE USES A SLING FOR HER RIGHT ARM DURING TRANSFERS AND SHOULD HAVE PILLOWS TO SUPPORT HER ARM WHEN NOT MOVING.    R FOOT MULTIPODUS BOOT WHEN IN BED      MOBILITY: Ax2 (ONE IN FRONT AND ONE IN BACK) TO USE THE BEASY BOARD TO THE DROP ARM RECLINER. PLACE BOX UNDER FEET FOR THE TRANSFER AS DEMOND’S FEET DO NOT TOUCH THE GROUND WITHOUT. DEMOND’S RIGHT LEG WILL NEED TO BE REPOSITIONED DURING TRANSFER AS SHE CANNOT REPOSITION ON HER OWN    IF NEEDED: DROP THE ARM OF THE RECLINER, LAY CHAIR BACK/RECLINE AND PEFROM A SMOOTH MOVE TRANSFER OOB/BACK TO BED      ADL: BED LEVEL, SPONGE BATHING, ASSIST WITH RIGHT ARM/LEG, ASSIST WITH ROLLING    TOILETING: Ax2 (ONE IN FRONT AND ONE IN BACK) TO USE THE BEASY BOARD TO THE DROP ARM RECLINER. PLACE BOX UNDER FEET FOR THE TRANSFER AS DEMOND’S FEET DO NOT TOUCH THE GROUND. DEMOND’S RIGHT LEG WILL NEED TO BE REPOSITIONED DURING TRANSFER AS SHE CANNOT REPOSITION ON HER OWN.    WILL NEED 3 PEOPLE FOR TOILETING SWAP OUT    ONCE IN THE RECLINER, MOVE CHAIR TO FACE THE SIDE OF THE BED    HAVE DEMOND SCOOT FORWARDS IN THE RECLINER SO THAT BOTH FEET ARE TOUCHING THE GROUND. REPOSITION HER RIGHT FOOT AS NEEDED. ALWAYS USE GAIT BELT  USE THE BED RAIL WITH DEMOND’S LEFT HAND TO DO A PULL TO STAND. ONE PERSON IS ON HER RIGHT SIDE BLOCKING HER RIGHT KNEE.  SECOND PERSON IS ON HER LEFT SIDE TO HELP BOOST AND MAINTAIN PROPER POSITIONING. THIRD PERSON IS RESPONSIBLE FOR SWAPPING OUT THE RECLINER FOR THE BEDSIDE COMMODE AND HELPING WITH HYGIENE AND CLOTHING MANAGEMENT.       Sara Mladonado PT, DPT

## 2023-08-31 NOTE — PROGRESS NOTES
PM&R PROGRESS NOTE:  Keara Thurston 62 y.o. female MRN: [id number]  Unit/Bed#: -49 Encounter: [id number]        Rehabilitation Diagnosis: Impairment of mobility, safety, Activities of Daily Living (ADLs), and cognitive/communication skills due to Stroke:  01.2  Right Body Involvement (Left Brain)    HPI: Keara Thurston is a 63-year-old female with history of hypertension, hyperlipidemia, diabetes type 2, obesity, eosinophilic asthma, COPD, fibromyalgia, Sjogren syndrome, lumbar spondylosis with grade 1 anterior spondylolisthesis who presents to the hospital on 8/14/2023 due to acute onset right upper and right lower extremity weakness with sensory loss. Additionally with right-sided facial droop and dysarthria. The patient was noted to have an acute intraparenchymal hemorrhage on CT in the left thalamic region with surrounding edema. A CTA was negative. Patient was initially placed on a Cardene drip. Course complicated by significant left-sided leg pain and was recommended on treatment for radicular symptoms including Tylenol, gabapentin and potentially steroids. Neurology was consulted and recommended MRI of the brain with and without contrast while holding antithrombotics. The MRI showed a stable left thalamic capsular intraparenchymal hemorrhage with surrounding edema without any other mass effect. A repeat CT was completed on 8/17/2023 after worsening symptoms including worsening speech slurring with stable findings. Additionally there was a rapid response after a fall with head strike on 8/18 with a repeat CT of the head without acute findings. Additionally antihypertensive regimen was altered as she was previously on telmisartan and atenolol and is currently on amlodipine, losartan and hydralazine with better control.  The patient was evaluated by the Rehabilitation team and deemed an appropriate candidate for comprehensive inpatient rehabilitation and admitted to the [facility] on 8/25/2023  3:19 PM    SUBJECTIVE: Patient seen and evaluated while sitting in chair. Appetite is good she is sleeping slightly better. Still with some leg pain at times but generally controlled with medications. She is trying to evaluate I did help her sleep overnight and reduce some of her overall spasms. She denies any fever, chills, nausea, vomiting, cough, shortness of breath, diarrhea or constipation with her last bowel movement being 8/30. She is fully participating in therapy but wants to go home when able to. Provided supportive counseling and stroke statistics education with regards to recovery, recovery time, recovery patterns    ASSESSMENT: Stable, progressing      PLAN:    Rehabilitation  • Functional deficits:  Self care and mobility  • Continue current rehabilitation plan of care to maximize function. • Functional update:   Physical Therapy Occupational Therapy Speech Therapy   Weight Bearing Status: Full Weight Bearing  Transfers: Assist of 2, Total Assistance (Slide board transfer)  Bed Mobility: Assist of 2, Total Assistance  Amulation Distance (ft): 0 feet (Unsafe to currently assess)  Ambulation:  (Unsafe to currently assess)  Wheelchair Mobility Distance: 150 ft  Wheelchair Mobility: Moderate Assistance, Maximum Assistance (L UE/LE)  Number of Stairs:  (Unsafe to currently assess)  Assistive Device for Stairs:  (Unsafe to currently assess)  Stair Assistance:  (Unsafe to currently assess)  Ramp:  (Unsafe to currently assess)  Assistive Device for Ramp:  (Unsafe to currently assess)  Discharge Recommendations: Home with:  [facility] with[de-identified] 24 Hour Supervision, 24 Hour Assisteance, Family Support, First Floor Setup, Home Physical Therapy, Outpatient Physical Therapy (Pending incremental improvements in mobility)   Eating: Maximum Assistance  Grooming: Maximum Assistance  Bathing: Total Assistance, Assist of 2  Bathing: Total Assistance, Assist of 2  Upper Body Dressing:  Total Assistance, Assist of 2  Lower Body Dressing: Total Assistance, Assist of 2  Toileting: Total Assistance, Assist of 2  Toilet Transfer: Total Assistance, Assist of 2  Cognition: Within Defined Limits   Mode of Communication: Verbal  Speech/Language:  (mild word finding deficits)  Cognition: Exceptions to WNL  Cognition: Decreased Memory, Decreased Executive Functions, Decreased Attention, Decreased Comprehension  Orientation: Person, Place, Time, Situation  Discharge Recommendations:  (pending pt progress)       • Estimated Discharge: To be determined in team conference    DVT prophylaxis  • Heparin     Pain  • Gabapentin 300 mg 3 times daily  • Oxycodone 2.5-5 mg every 4 hours as needed moderate-severe pain  • Robaxin 500 mg TID standing for spasms  • Valium 2mg BID PRN for severe spasms     Bladder plan  • Incontinent     Bowel plan  • Last bowel movement 8/30     Code Status  • Level 1 full code      * ICH (intracerebral hemorrhage) ([address])  Assessment & Plan  59-year-old female presents with right hemiplegia, dysarthria and facial asymmetry found to have an acute intraparenchymal hemorrhage in the left thalamic region with surrounding edema felt to be secondary to uncontrolled hypertension  · Had repeat CT of the head on 8/18 after a fall with head strike resulting in significant bruising of the face  · Cleared by neurology to restart aspirin 81 mg daily and atorvastatin 20 mg daily  · Evaluated by neurosurgery with no surgical intervention at this time  · Had a repeat head CT on 8/20 which has been stable  · Goal systolic blood pressure of less than 140  · Follow-up with neurology as an outpatient  · Physical and Occupational Therapy with goals for community discharge.   Patient lives with her  in a mobile home with 5 steps to enter and he is able to assist 24/7  · Hemorrhagic stroke education, nutrition, neuropsychology  · Secondary stroke prophylaxis with hypertension control    Hyponatremia  Assessment & Plan  · Sodium down to 133  · Continue following biweekly labs with no intervention at this point unless continues to trend down. Constipation  Assessment & Plan  · No bowel movement since prior to admission which was 11 days prior on 8/14  · Obtaining x-ray to evaluate stool burden  · Adding to bowel regimen including increasing senna and Colace, lactulose as needed and will be more aggressive pending results of x-ray    GERD (gastroesophageal reflux disease)  Assessment & Plan  Continue Protonix    Hyperlipidemia associated with type 2 diabetes mellitus (HCC)  Assessment & Plan  Continue atorvastatin 20 mg with dinner    Class 2 obesity with body mass index (BMI) of 39.0 to 39.9 in adult  Assessment & Plan  · BMI of 41.40 on admission  · Continue promoting weight loss and increased activity, diet and exercise  · Continue a consistent carbohydrate diet  · Nutrition consultation    CAD (coronary artery disease)  Assessment & Plan  · History of CAD currently on statin and aspirin that was restarted on 8/21 after clearance by neurology  · Beta-blocker held due to bradycardia  · Patient has not formally seen a cardiologist however this was diagnosed based on a CT PE study that was conducted in the ER showing mild CAD    Chronic bilateral low back pain with bilateral sciatica  Assessment & Plan  · Patient has a history of chronic low back pain with radicular symptoms in addition to fibromyalgia and myofascial pain syndrome. · She follows with Dr. Carlos Eduardo Pavon from pain management has tried several medications as well as epidural steroid injections with little relief  · Imaging reveals lumbar degenerative disc disease at the L3-4, L4-5, L5-S1 level.   Additionally facet hypertrophic changes and ligamentous laxity at the L4-5 level resulting in grade 1 anterior spondylolisthesis and mild canal narrowing with slight distortion of the left anterior lateral aspect of the thecal sac at this level with mild right greater than left neuroforaminal narrowing  · Multi modal pain with current medication regimen including oxycodone to be weaned as well as the Robaxin and gabapentin. We will also consult neuropsychology  · Added Tylenol as well as Robaxin and as needed Valium    Sjogren's syndrome ([address])  Assessment & Plan  · Patient states that she is been worked up for Sjogren's syndrome in the past and has had conflicting diagnosis he is stating that some physicians have diagnosed her with it and others stated she did not have it. · She has not been on any medication treatment for this and does not actively follow-up with a rheumatologist    Depression with anxiety  Assessment & Plan  · Neuropsychology consultation poststroke with history of anxiety  · Continue Wellbutrin  mg in the morning. It has been 300 mg as an outpatient however it was decreased in acute care due to worry of decreasing seizure threshold in the setting of ICH    Diabetes mellitus type 2, controlled [facility])  Assessment & Plan  Lab Results   Component Value Date    HGBA1C 6.7 (H) 08/15/2023       Recent Labs     08/30/23  1547 08/30/23  2115 08/31/23  0650 08/31/23  1036   POCGLU 140 127 151* 146*     · Currently on carb controlled level 2 diet, metformin 500 mg twice daily  · Sliding scale insulin algorithm 3 with Accu-Cheks 4 times daily      Moderate persistent asthma without complication  Assessment & Plan  · Follows with Dr. Herrera Going from pulmonology  · Home regimen includes Breo and as needed albuterol  · On equivalent here and added albuterol inhaler on admission to [facility]    Primary hypertension  Assessment & Plan  · Home regimen: Telmisartan and atenolol  · Current regimen: Norvasc 10 mg daily Cozaar 100 mg daily, hydralazine discontinued  · Monitor blood pressures especially in therapy and make adjustments as needed        Appreciate IM consultants medical co-management. Labs, medications, and imaging personally reviewed.       ROS:  A ten point review of systems was completed on 08/31/23 and pertinent positives are listed in subjective section. All other systems reviewed were negative. OBJECTIVE:   /72   Pulse 77   Temp 98.3 °F (36.8 °C) (Oral)   Resp 18   Ht 5' (1.524 m)   Wt 97.2 kg (214 lb 4.6 oz)   SpO2 99%   BMI 41.85 kg/m²     Physical Exam  Vitals and nursing note reviewed. Constitutional:       General: She is not in acute distress. Appearance: She is obese. HENT:      Head: Normocephalic. Comments: Ecchymosis around the right orbital region in stages of healing     Right Ear: External ear normal.      Left Ear: External ear normal.      Nose: Nose normal. No rhinorrhea. Mouth/Throat:      Mouth: Mucous membranes are moist.      Pharynx: Oropharynx is clear. Eyes:      General: No scleral icterus. Cardiovascular:      Rate and Rhythm: Normal rate. Pulses: Normal pulses. Pulmonary:      Effort: No respiratory distress. Breath sounds: Normal breath sounds. Abdominal:      General: There is no distension. Palpations: Abdomen is soft. Musculoskeletal:      Cervical back: Normal range of motion and neck supple. Right lower leg: No edema. Left lower leg: No edema. Skin:     General: Skin is warm and dry. Neurological:      Mental Status: She is alert and oriented to person, place, and time.       Comments: Dense right hemiplegia without spasticity   Psychiatric:         Mood and Affect: Mood normal.         Behavior: Behavior normal.          Lab Results   Component Value Date    WBC 10.36 (H) 08/31/2023    HGB 12.1 08/31/2023    HCT 39.1 08/31/2023    MCV 88 08/31/2023     08/31/2023     Lab Results   Component Value Date    SODIUM 133 (L) 08/31/2023    K 4.2 08/31/2023    CL 98 08/31/2023    CO2 25 08/31/2023    BUN 21 08/31/2023    CREATININE 0.70 08/31/2023    GLUC 151 (H) 08/31/2023    CALCIUM 9.4 08/31/2023     Lab Results   Component Value Date    INR 1.06 08/15/2023    INR 0.94 08/14/2023    PROTIME 14.0 08/15/2023    PROTIME 13.2 08/14/2023           Current Facility-Administered Medications:   •  acetaminophen (TYLENOL) tablet 650 mg, 650 mg, Oral, Q6H PRN, Dychon Yeungimlich, DO  •  albuterol (PROVENTIL HFA,VENTOLIN HFA) inhaler 2 puff, 2 puff, Inhalation, Q4H PRN, Maicol Yeungimlich, DO  •  amLODIPine (NORVASC) tablet 10 mg, 10 mg, Oral, Daily, Dychon Heimlich, DO, 10 mg at 08/31/23 9419  •  aspirin chewable tablet 81 mg, 81 mg, Oral, Daily, Dychon Yeungimlich, DO, 81 mg at 08/31/23 0439  •  atorvastatin (LIPITOR) tablet 20 mg, 20 mg, Oral, Daily With Orpha Rick, DO, 20 mg at 08/30/23 1706  •  bisacodyl (DULCOLAX) EC tablet 5 mg, 5 mg, Oral, Daily PRN, Chapincito Chatterjee DO  •  buPROPion (WELLBUTRIN XL) 24 hr tablet 150 mg, 150 mg, Oral, QAM, Maicol Urenalich, DO, 150 mg at 08/31/23 0820  •  calcium carbonate (TUMS) chewable tablet 1,000 mg, 1,000 mg, Oral, TID PRN, Laureen Tracy PA-C, 1,000 mg at 08/26/23 1241  •  cyanocobalamin (VITAMIN B-12) tablet 1,000 mcg, 1,000 mcg, Oral, Daily, Maicol Urenalich, DO, 1,000 mcg at 08/31/23 6330  •  diazepam (VALIUM) tablet 2 mg, 2 mg, Oral, BID PRN, Maicol Urenalich, DO  •  ferrous sulfate tablet 325 mg, 325 mg, Oral, Daily With Breakfast, Maicol Urenalich, DO, 325 mg at 08/31/23 2663  •  fluticasone-vilanterol 200-25 mcg/actuation 1 puff, 1 puff, Inhalation, Daily, Maicol Urenalich, DO, 1 puff at 08/31/23 1005  •  gabapentin (NEURONTIN) capsule 300 mg, 300 mg, Oral, TID, Maicol Yeungimlich, DO, 300 mg at 08/31/23 0577  •  heparin (porcine) subcutaneous injection 5,000 Units, 5,000 Units, Subcutaneous, Q8H [address], Dyke Heimlich, , 5,000 Units at 08/31/23 0554  •  insulin lispro (HumaLOG) 100 units/mL subcutaneous injection 1-6 Units, 1-6 Units, Subcutaneous, TID AC, 1 Units at 08/31/23 0810 **AND** Fingerstick Glucose (POCT), , , 4x Daily AC and at bedtime, Dyke Heimlich, DO  •  insulin lispro (HumaLOG) 100 units/mL subcutaneous injection 1-6 Units, 1-6 Units, Subcutaneous, HS, Stevphen Schilder Yuniel, , 1 Units at 08/27/23 2133  •  lactulose oral solution 20 g, 20 g, Oral, Daily PRN, Nisreen Melendez DO  •  losartan (COZAAR) tablet 100 mg, 100 mg, Oral, Daily, Nisreen Melendez DO, 100 mg at 08/31/23 0818  •  metFORMIN (GLUCOPHAGE) tablet 500 mg, 500 mg, Oral, BID With Meals, ARSENIO John, 500 mg at 08/31/23 1903  •  methocarbamol (ROBAXIN) tablet 500 mg, 500 mg, Oral, TID, Nisreen Melendez DO, 500 mg at 08/31/23 0818  •  miconazole (MICOTIN) 2 % powder 1 Application, 1 Application, Topical, BID, Nisreen Melendez DO, 1 Application at 97/53/73 2983  •  ondansetron (ZOFRAN-ODT) dispersible tablet 4 mg, 4 mg, Oral, Q6H PRN, Nisreen Melendez DO  •  oxyCODONE (ROXICODONE) IR tablet 5 mg, 5 mg, Oral, Q4H PRN, Nisreen Melendez DO, 5 mg at 08/30/23 2200  •  oxyCODONE (ROXICODONE) split tablet 2.5 mg, 2.5 mg, Oral, Q4H PRN, Nisreen Melendez DO, 2.5 mg at 08/31/23 1038  •  pantoprazole (PROTONIX) EC tablet 40 mg, 40 mg, Oral, Early Morning, Nisreen Melendez DO, 40 mg at 08/31/23 7123  •  polyethylene glycol (MIRALAX) packet 17 g, 17 g, Oral, Daily, Nisreen Melendez DO, 17 g at 08/29/23 0813  •  prochlorperazine (COMPAZINE) tablet 5 mg, 5 mg, Oral, Q6H PRN, Nisreen Melendez DO  •  senna-docusate sodium (SENOKOT S) 8.6-50 mg per tablet 2 tablet, 2 tablet, Oral, HS, Nisreen Melendez DO, 2 tablet at 08/30/23 2128    Past Medical History:   Diagnosis Date   • Arthritis    • Asthma    • Continuous opioid dependence ([address]) 4/27/2022   • Diabetes mellitus ([address])    • Diverticulitis of colon    • Fibromyalgia 04/26/2022   • Headache(784.0)    • History of mammogram 2018   • History of tobacco abuse 04/26/2022   • Hypertension    • Nausea 04/29/2022   • Obesity    • Sjogren's syndrome ([address]) 10/19/2012   • Urinary tract infection        Patient Active Problem List    Diagnosis Date Noted   • ICH (intracerebral hemorrhage) ([address]) 08/15/2023   • Hyponatremia 08/31/2023   • GERD (gastroesophageal reflux disease) 08/25/2023   • Constipation 08/25/2023   • Anemia 08/16/2023   • Left leg pain 08/15/2023   • Chronic obstructive pulmonary disease with acute exacerbation ([address]) 07/26/2023   • Hyperlipidemia associated with type 2 diabetes mellitus ([address]) 07/26/2023   • Abnormal CT of the chest 06/28/2023   • Class 2 obesity with body mass index (BMI) of 39.0 to 39.9 in adult 06/28/2023   • CAD (coronary artery disease) 04/07/2023   • Right lumbosacral radiculopathy 10/12/2022   • Chronic bilateral low back pain with bilateral sciatica 08/31/2022   • Paresthesia of both feet 08/31/2022   • Postoperative visit 05/24/2022   • Incarcerated umbilical hernia 07/01/2517   • Diabetes mellitus type 2, controlled ([address]) 04/29/2022   • Continuous opioid dependence ([address]) 04/27/2022   • Moderate persistent asthma without complication 04/72/8854   • Cigarette nicotine dependence in remission 04/26/2022   • Primary hypertension 04/26/2022   • Fibromyalgia 04/26/2022   • Sjogren's syndrome ([address]) 10/19/2012   • Depression with anxiety 09/18/2012          Eugene Mackenzie DO  Physical Medicine and Jessika MARTINEZ have spent a total time of 30 minutes on 08/31/23 in caring for this patient including Counseling / Coordination of care, Documenting in the medical record, Reviewing / ordering tests, medicine, procedures   and Communicating with other healthcare professionals .

## 2023-08-31 NOTE — PROGRESS NOTES
[facility] Occupational Therapy Daily Note  Patient Active Problem List   Diagnosis    Cigarette nicotine dependence in remission    Primary hypertension    Fibromyalgia    Continuous opioid dependence (HCC)    Moderate persistent asthma without complication    Diabetes mellitus type 2, controlled ([address])    Depression with anxiety    Sjogren's syndrome ([address])    Incarcerated umbilical hernia    Postoperative visit    Chronic bilateral low back pain with bilateral sciatica    Paresthesia of both feet    Right lumbosacral radiculopathy    CAD (coronary artery disease)    Abnormal CT of the chest    Class 2 obesity with body mass index (BMI) of 39.0 to 39.9 in adult    Chronic obstructive pulmonary disease with acute exacerbation (HCC)    Hyperlipidemia associated with type 2 diabetes mellitus (HCC)    ICH (intracerebral hemorrhage) (HCC)    Left leg pain    Anemia    GERD (gastroesophageal reflux disease)    Constipation    Hyponatremia       Past Medical History:   Diagnosis Date    Arthritis     Asthma     Continuous opioid dependence ([address]) 4/27/2022    Diabetes mellitus ([address])     Diverticulitis of colon     Fibromyalgia 04/26/2022    Headache(784.0)     History of mammogram 2018    History of tobacco abuse 04/26/2022    Hypertension     Nausea 04/29/2022    Obesity     Sjogren's syndrome ([address]) 10/19/2012    Urinary tract infection      Etiologic Diagnosis: Acute Left Thalamic Intracerebral Hemorrhage with edema  Restrictions/Precautions  Precautions: Bed/chair alarms, Fall Risk, Supervision on toilet/commode (R-inattention)  Weight Bearing Restrictions: No  ROM Restrictions: No  Braces or Orthoses: Sling (RUE sling for xfers)  ADL Team Goal: Patient will require supervision with ADLs with least restrictive device upon completion of rehab program  Occupational Therapy LTG's  Eating Oral care Bathing LB dress UB dress   Eating Goal: 05. Setup or clean-up assistance - [place] SETS UP or CLEANS UP, patient completes activity. [place] assists only prior to or following the activity. Oral Hygiene Goal: 05. Setup or clean-up assistance - [place] SETS UP or CLEANS UP, patient completes activity. [place] assists only prior to or following the activity. Shower/bathe self Goal: 04. Supervision or touching assistance- [place] provides VERBAL CUES or supervision throughout activity. Lower body dressing Goal: 04. Supervision or touching assistance- [place] provides VERBAL CUES or supervision throughout activity. Upper body dressing Goal: 05. Setup or clean-up assistance - [place] SETS UP or CLEANS UP, patient completes activity. [place] assists only prior to or following the activity. Toileting Toilet txf Func txf IADL Med    Toileting hygiene Goal: 04. Supervision or touching assistance- [place] provides VERBAL CUES or supervision throughout activity. Toilet transfer Goal: 04. Supervision or touching assistance- [place] provides VERBAL CUES or supervision throughout activity. Chair/bed-to-chair transfer Goal: 04. Supervision or touching assistance- [place] provides VERBAL CUES or supervision throughout activity. Assist Level: Minimum Assist (full meal prep seated in WC pending pt progress) Assist Level: Independent (mod I with mediplanner which pt uses at baseline)   OT interventions: Treatment/Interventions: ADL retraining, LE strengthening/ROM, Functional transfer training, Therapeutic exercise, Endurance training, Cognitive reorientation, Patient/family training, Equipment eval/education, Bed mobility, Compensatory technique education  Discharge Plan:  OT Discharge Recommendation:  (Home vs SNF pending progress)   DME: Equipment Recommended:  (TBD),  ,  \     08/31/23 1230   Pain Assessment   Pain Assessment Tool 0-10   Pain Score 7   Pain Location/Orientation Orientation: Right;Location: Leg;Location: Knee; Location: Groin; Location: Foot   Effect of Pain on Daily Activities pt with frequent spasms encompasing multiple joints.  inc spasms with stimulus to foot, or R andi body. Hospital Pain Intervention(s) TENS  (R adductors)   Lifestyle   Autonomy "I cant stand my leg."   Lying to Sitting on Side of Bed   Comment repositioned in supine in recliner chair with back down fully. aassist for R LE knee bending. Ax2 but pt really provided assistance for L body control. Neuromuscular Education   Functional Movement Patterns Today focus on L UE movements with ample time for processing. able to complete thumb ab-adduction, less ability to complete more than on rep of D2 finger flexion. completed upper trap and deltoid NMES with focus on comopleting shoudler flexion with assist for movements. Comments set up mirror box training program. reviewed HEP and completed 20 min session. 1x20 reps for all extension based ther-ex. set up with NMES for dorsal forearm extensors during mirror therapy. noted R LE bhargav/spasaming when Stim is running. Additional Activities   Additional Activities Comments TENS applied to R LE adductors for pain management. pt reports hx uis TENS use and felt success with it for her back. at this time 15 min session does not report any change in R LE pain. time spent with patient exploring adaptive equipment to use for self repositioning. trialed use of leg  and velcro leg/thigh straps. Pt unable to move her RLE 2* impaired strength on L to lift weight of her limb. pt cont to have difficulty getting into a comfortable position. trialing various positions for the R LE with preservation for the heel. Assessment   Treatment Assessment Pt participated in skilled OT treatment session with treatment focus on R UE NMR with mirror box and NMES training, along with positioning education. Pt's right UE function currently Non Functional without neglect. No neglect of extremity, but no involvement in task performance.  Continue to focus neurological treatment plan:Functional Electrical Stimulation, Sensorimotor Training, Functional Orthoses/bracing, Mental Practice/Guided Imagery, Mirror Therapy, Body awareness, Midline awareness, Joint protection training and REO GO(when safe for transfer)   OT Therapy Minutes   OT Time In 1230   OT Time Out 1400   OT Total Time (minutes) 90   OT Mode of treatment - Individual (minutes) 90   OT Mode of treatment - Concurrent (minutes) 0   OT Mode of treatment - Group (minutes) 0   OT Mode of treatment - Co-treat (minutes) 0   OT Mode of Treatment - Total time(minutes) 90 minutes   OT Cumulative Minutes 540

## 2023-08-31 NOTE — PROGRESS NOTES
08/31/23 1400   Pain Assessment   Pain Assessment Tool 0-10   Pain Score No Pain   Restrictions/Precautions   Precautions Bed/chair alarms;Cognitive; Fall Risk;Supervision on toilet/commode;Pain   Weight Bearing Restrictions No   ROM Restrictions No   Comprehension   Comprehension (FIM) 5 - Understands basic directions and conversation   Expression   Expression (FIM) 5 - Needs help/cues only RARELY (< 10% of the time)   Social Interaction   Social Interaction (FIM) 5 - Interacts appropriately with others 90% of time   Problem Solving   Problem solving (FIM) 4 - Solves basic problems 75-89% of time   Memory   Memory (FIM) 4 - Recognizes/recalls/performs 75-89%   Speech/Language/Cognition Assessmetn   Treatment Assessment Pt seen for skilled speech therapy session to target cognitive linguistic communication skills. However upon arrival, pt stated "I just rang to use the bathroom". SLP able to provide assistance for toileting task as pt requires Ax3 for task. Pt was able to follow commands for sequencing of transfer with min verbal cues. Pt was able to assist with boosting herself onto the commode and but needed verbal cues and physical touch reminders to maintain balance during task and prevent leaning. Pt then able to follow 1-2 step commands for sequence of transfer to bed using beasy board. Pt boosted and set with all items in reach. Reviewed goals with therapy as pt stated "I'm not doing well". Pt provided encouragement and emotional support regarding progress and that she will require more time to cont to improve before going home versus subacute. Discussed levels of rehab and potential next steps. Pt receptive- plan to cont to target stroke education and functional cognitive tasks to maximize independence at this time. Pt will cont to benefit from skilled SLP services targeting cognitive linguistic communication skills for increased independence and decreased burden of care.    SLP Therapy Minutes   SLP Time In 1400   SLP Time Out 1430   SLP Total Time (minutes) 30   SLP Mode of treatment - Individual (minutes) 30   SLP Mode of treatment - Concurrent (minutes) 0   SLP Mode of treatment - Group (minutes) 0   SLP Mode of treatment - Co-treat (minutes) 0   SLP Mode of Treatment - Total time(minutes) 30 minutes   SLP Cumulative Minutes 165   Therapy Time missed   Time missed?  No

## 2023-08-31 NOTE — PROGRESS NOTES
Internal Medicine Progress Note  Patient: Chanetta Councilman  Age/sex: 62 y.o. female  Medical Record #: [id number]      ASSESSMENT/PLAN: (Interval History)  Chanetta Councilman is seen and examined and management for following issues:    ICH  • Seen by NS and no surgical intervention indicated. • Etio = HTN  • Was unable to tolerate MRI even with sedation  • Was cleared to resume ASA. • Continue atorvastatin. • Outpt follow-up with Neurology.     HTN  • Goal with ICH is SBP<140  • Home: Micardis 80mg qd/Atenolol 50mg qd  • Here: Norvasc 10 mg qd/Losartan 100mg qd  • (Micardis 80 mg qd = Losartan 100 mg qd)  • On 8/28, stopped the Hydralazine   • BPs stable and normotensive      DM type 2  • HA1C 6.7  • Home: Metformin 1000mg BID  • Here: Metformin 500 mg BID  • Continue QID Accuchecks/SSI and DM diet  • Added Metformin 500 mg BID starting at dinner on 8/28/23  • No changes today     Anemia  • Baseline Hgb 10 - 11. • Iron level 30/Ferritin 22/TIBC 371  • B12 was low normal at 221 on 8/15/23  • Continue iron and B12 supplementation. • stable     Anxiety  • Continue Wellbutrin XL. • Pt is taking 150mg instead of 300mg due to concerns for increased risk of seizure        Discharge date:  [facility]       The above assessment and plan was reviewed and updated as determined by my evaluation of the patient on 8/31/2023.     Labs:   Results from last 7 days   Lab Units 08/31/23  0629 08/28/23  0556   WBC Thousand/uL 10.36* 9.26   HEMOGLOBIN g/dL 12.1 11.5   HEMATOCRIT % 39.1 37.3   PLATELETS Thousands/uL 374 379     Results from last 7 days   Lab Units 08/31/23  0629 08/28/23  0556   SODIUM mmol/L 133* 136   POTASSIUM mmol/L 4.2 3.8   CHLORIDE mmol/L 98 102   CO2 mmol/L 25 25   BUN mg/dL 21 14   CREATININE mg/dL 0.70 0.60   CALCIUM mg/dL 9.4 9.3             Results from last 7 days   Lab Units 08/31/23  1036 08/31/23  0650 08/30/23  2115   POC GLUCOSE mg/dl 146* 151* 127       Review of Scheduled Meds:  Current Facility-Administered Medications   Medication Dose Route Frequency Provider Last Rate   • acetaminophen  650 mg Oral Q6H PRN Dyke Heimlich, DO     • albuterol  2 puff Inhalation Q4H PRN Dyke Heimlich, DO     • amLODIPine  10 mg Oral Daily Dyke Heimlich, DO     • aspirin  81 mg Oral Daily Dyke Heimlich, DO     • atorvastatin  20 mg Oral Daily With Orpha Rick, DO     • bisacodyl  5 mg Oral Daily PRN [place], DO     • buPROPion  150 mg Oral QAM Dyke Heimlich, DO     • calcium carbonate  1,000 mg Oral TID PRN Laureen Tracy PA-C     • vitamin B-12  1,000 mcg Oral Daily Dyke Heimlich, DO     • diazepam  2 mg Oral BID PRN Dyke Heimlich, DO     • ferrous sulfate  325 mg Oral Daily With Breakfast Dyke Heimlich, DO     • fluticasone-vilanterol  1 puff Inhalation Daily Dyke Heimlich, DO     • gabapentin  300 mg Oral TID Dyke Heimlich, DO     • heparin (porcine)  5,000 Units Subcutaneous Q8H [address] Dyke Heimlich, DO     • insulin lispro  1-6 Units Subcutaneous TID AC Dyke Heimlich, DO     • insulin lispro  1-6 Units Subcutaneous HS Dyke Heimlich, DO     • lactulose  20 g Oral Daily PRN Dyke Heimlich, DO     • losartan  100 mg Oral Daily Dyke Heimlich, DO     • metFORMIN  500 mg Oral BID With Meals ARSENIO [place]     • methocarbamol  500 mg Oral TID Dyke Heimlich, DO     • miconazole  1 Application Topical BID Dyke Heimlich, DO     • ondansetron  4 mg Oral Q6H PRN Dyke Heimlich, DO     • oxyCODONE  5 mg Oral Q4H PRN Dyke Heimlich, DO     • oxyCODONE  2.5 mg Oral Q4H PRN Dyke Heimlich, DO     • pantoprazole  40 mg Oral Early Morning Dyke Heimlich, DO     • polyethylene glycol  17 g Oral Daily Dyke Heimlich, DO     • prochlorperazine  5 mg Oral Q6H PRN Dyke Heimlich, DO     • senna-docusate sodium  2 tablet Oral HS Dyke Heimlich, DO         Subjective/ HPI: Patient seen and examined. Patients overnight issues or events were reviewed with nursing or staff during rounds or morning huddle session.  New or overnight issues include the following:     No new or overnight issues. Offers no complaints    ROS:   A 10 point ROS was performed; negative except as noted above. *Labs /Radiology studies reviewed  *Medications reviewed and reconciled as needed  *Please refer to order section for additional ordered labs studies  *Case discussed with primary attending during morning huddle case rounds    Physical Examination:  Vitals:   Vitals:    08/30/23 1401 08/30/23 2013 08/31/23 0510 08/31/23 0818   BP: 118/60 106/68 129/58 115/72   BP Location: Left arm Left arm Left arm    Pulse: 82 85 77    Resp: 18 18 18    Temp: 98.1 °F (36.7 °C) 98.1 °F (36.7 °C) 98.3 °F (36.8 °C)    TempSrc: Oral Oral Oral    SpO2: 96% 98% 99%    Weight:   97.2 kg (214 lb 4.6 oz)    Height:           General Appearance: no distress, conversive  HEENT:  External ear normal.  Nose normal w/o drainage. Mucous membranes are moist. Oropharynx is clear. Conjunctiva clear w/o icterus or redness. Neck:  Supple, normal ROM  Lungs: BBS without crackles/wheeze/rhonchi; respirations unlabored with normal inspiratory/expiratory effort. No retractions noted. On RA  CV: regular rate and rhythm; no rubs/murmurs/gallops, PMI normal   ABD: Abdomen is soft. Bowel sounds all quadrants. Nontender with no distention. EXT: no edema  Skin: normal turgor, normal texture, no rashes  Psych: affect normal, mood normal  Neuro: AAO     The above physical exam was reviewed and updated as determined by my evaluation of the patient on 8/31/2023. Invasive Devices     Drain  Duration           External Urinary Catheter 2 days                   VTE Pharmacologic Prophylaxis: Heparin  Code Status: Level 1 - Full Code  Current Length of Stay: 6 day(s)      Total time spent:  30 minutes with more than 50% spent counseling/coordinating care. Counseling includes discussion with patient re: progress  and discussion with patient of his/her current medical state/information.  Coordination of patient's care was performed in conjunction with primary service. Time invested included review of patient's labs, vitals, and management of their comorbidities with continued monitoring. In addition, this patient was discussed with medical team including physician and advanced extenders. The care of the patient was extensively discussed and appropriate treatment plan was formulated unique for this patient. Medical decision making for the day was made by supervising physician unless otherwise noted in their attestation statement. ** Please Note:  voice to text software may have been used in the creation of this document.  Although proof errors in transcription or interpretation are a potential of such software**

## 2023-08-31 NOTE — ASSESSMENT & PLAN NOTE
· Sodium 139 (previously 137, 134, 133)  · Continue following biweekly labs with no intervention at this point unless continues to trend down.

## 2023-08-31 NOTE — CASE MANAGEMENT
Clinical update faxed to sally at [facility] via [facility] to [phone]. Awaiting determination.

## 2023-08-31 NOTE — PLAN OF CARE
Problem: INFECTION - ADULT  Goal: Absence or prevention of progression during hospitalization  Description: INTERVENTIONS:  - Assess and monitor for signs and symptoms of infection  - Monitor lab/diagnostic results  - Monitor all insertion sites, i.e. indwelling lines, tubes, and drains  - Monitor endotracheal if appropriate and nasal secretions for changes in amount and color  - [place] appropriate cooling/warming therapies per order  - Administer medications as ordered  - Instruct and encourage patient and family to use good hand hygiene technique  - Identify and instruct in appropriate isolation precautions for identified infection/condition  Outcome: Progressing

## 2023-09-01 LAB
GLUCOSE SERPL-MCNC: 137 MG/DL (ref 65–140)
GLUCOSE SERPL-MCNC: 146 MG/DL (ref 65–140)
GLUCOSE SERPL-MCNC: 149 MG/DL (ref 65–140)
GLUCOSE SERPL-MCNC: 158 MG/DL (ref 65–140)
GLUCOSE SERPL-MCNC: 185 MG/DL (ref 65–140)

## 2023-09-01 PROCEDURE — 99232 SBSQ HOSP IP/OBS MODERATE 35: CPT | Performed by: STUDENT IN AN ORGANIZED HEALTH CARE EDUCATION/TRAINING PROGRAM

## 2023-09-01 PROCEDURE — 97032 APPL MODALITY 1+ESTIM EA 15: CPT

## 2023-09-01 PROCEDURE — 97129 THER IVNTJ 1ST 15 MIN: CPT

## 2023-09-01 PROCEDURE — 97130 THER IVNTJ EA ADDL 15 MIN: CPT

## 2023-09-01 PROCEDURE — 97110 THERAPEUTIC EXERCISES: CPT

## 2023-09-01 PROCEDURE — 97530 THERAPEUTIC ACTIVITIES: CPT

## 2023-09-01 PROCEDURE — 99232 SBSQ HOSP IP/OBS MODERATE 35: CPT | Performed by: INTERNAL MEDICINE

## 2023-09-01 PROCEDURE — 82948 REAGENT STRIP/BLOOD GLUCOSE: CPT

## 2023-09-01 PROCEDURE — 92507 TX SP LANG VOICE COMM INDIV: CPT

## 2023-09-01 PROCEDURE — 97112 NEUROMUSCULAR REEDUCATION: CPT

## 2023-09-01 RX ORDER — LANOLIN ALCOHOL/MO/W.PET/CERES
3 CREAM (GRAM) TOPICAL
Status: DISCONTINUED | OUTPATIENT
Start: 2023-09-01 | End: 2023-09-03

## 2023-09-01 RX ADMIN — ASPIRIN 81 MG CHEWABLE TABLET 81 MG: 81 TABLET CHEWABLE at 08:27

## 2023-09-01 RX ADMIN — INSULIN LISPRO 1 UNITS: 100 INJECTION, SOLUTION INTRAVENOUS; SUBCUTANEOUS at 12:24

## 2023-09-01 RX ADMIN — GABAPENTIN 300 MG: 300 CAPSULE ORAL at 17:13

## 2023-09-01 RX ADMIN — Medication 3 MG: at 21:18

## 2023-09-01 RX ADMIN — HEPARIN SODIUM 5000 UNITS: 5000 INJECTION INTRAVENOUS; SUBCUTANEOUS at 14:25

## 2023-09-01 RX ADMIN — ATORVASTATIN CALCIUM 20 MG: 20 TABLET, FILM COATED ORAL at 17:14

## 2023-09-01 RX ADMIN — HEPARIN SODIUM 5000 UNITS: 5000 INJECTION INTRAVENOUS; SUBCUTANEOUS at 21:17

## 2023-09-01 RX ADMIN — PANTOPRAZOLE SODIUM 40 MG: 40 TABLET, DELAYED RELEASE ORAL at 06:37

## 2023-09-01 RX ADMIN — OXYCODONE HYDROCHLORIDE 5 MG: 5 TABLET ORAL at 09:52

## 2023-09-01 RX ADMIN — OXYCODONE HYDROCHLORIDE 5 MG: 5 TABLET ORAL at 14:34

## 2023-09-01 RX ADMIN — METFORMIN HYDROCHLORIDE 500 MG: 500 TABLET ORAL at 08:28

## 2023-09-01 RX ADMIN — METHOCARBAMOL 500 MG: 500 TABLET ORAL at 17:13

## 2023-09-01 RX ADMIN — GABAPENTIN 300 MG: 300 CAPSULE ORAL at 21:18

## 2023-09-01 RX ADMIN — FLUTICASONE FUROATE AND VILANTEROL TRIFENATATE 1 PUFF: 200; 25 POWDER RESPIRATORY (INHALATION) at 09:54

## 2023-09-01 RX ADMIN — OXYCODONE HYDROCHLORIDE 5 MG: 5 TABLET ORAL at 22:11

## 2023-09-01 RX ADMIN — MICONAZOLE NITRATE 1 APPLICATION: 20 POWDER TOPICAL at 17:14

## 2023-09-01 RX ADMIN — MICONAZOLE NITRATE 1 APPLICATION: 20 POWDER TOPICAL at 08:30

## 2023-09-01 RX ADMIN — GABAPENTIN 300 MG: 300 CAPSULE ORAL at 08:27

## 2023-09-01 RX ADMIN — METFORMIN HYDROCHLORIDE 500 MG: 500 TABLET ORAL at 17:13

## 2023-09-01 RX ADMIN — METHOCARBAMOL 500 MG: 500 TABLET ORAL at 21:18

## 2023-09-01 RX ADMIN — CYANOCOBALAMIN TAB 500 MCG 1000 MCG: 500 TAB at 08:28

## 2023-09-01 RX ADMIN — INSULIN LISPRO 1 UNITS: 100 INJECTION, SOLUTION INTRAVENOUS; SUBCUTANEOUS at 21:18

## 2023-09-01 RX ADMIN — DIAZEPAM 2 MG: 2 TABLET ORAL at 23:05

## 2023-09-01 RX ADMIN — METHOCARBAMOL 500 MG: 500 TABLET ORAL at 08:27

## 2023-09-01 RX ADMIN — FERROUS SULFATE TAB 325 MG (65 MG ELEMENTAL FE) 325 MG: 325 (65 FE) TAB at 08:27

## 2023-09-01 RX ADMIN — BUPROPION HYDROCHLORIDE 150 MG: 150 TABLET, FILM COATED, EXTENDED RELEASE ORAL at 09:54

## 2023-09-01 RX ADMIN — ACETAMINOPHEN 650 MG: 325 TABLET, FILM COATED ORAL at 06:37

## 2023-09-01 RX ADMIN — LOSARTAN POTASSIUM 100 MG: 50 TABLET, FILM COATED ORAL at 09:53

## 2023-09-01 RX ADMIN — HEPARIN SODIUM 5000 UNITS: 5000 INJECTION INTRAVENOUS; SUBCUTANEOUS at 06:38

## 2023-09-01 RX ADMIN — AMLODIPINE BESYLATE 10 MG: 10 TABLET ORAL at 09:53

## 2023-09-01 RX ADMIN — SENNOSIDES AND DOCUSATE SODIUM 2 TABLET: 50; 8.6 TABLET ORAL at 21:17

## 2023-09-01 NOTE — PROGRESS NOTES
[facility] Occupational Therapy Daily Note  Patient Active Problem List   Diagnosis   • Cigarette nicotine dependence in remission   • Primary hypertension   • Fibromyalgia   • Continuous opioid dependence ([address])   • Moderate persistent asthma without complication   • Diabetes mellitus type 2, controlled ([address])   • Depression with anxiety   • Sjogren's syndrome ([address])   • Incarcerated umbilical hernia   • Postoperative visit   • Chronic bilateral low back pain with bilateral sciatica   • Paresthesia of both feet   • Right lumbosacral radiculopathy   • CAD (coronary artery disease)   • Abnormal CT of the chest   • Class 2 obesity with body mass index (BMI) of 39.0 to 39.9 in adult   • Chronic obstructive pulmonary disease with acute exacerbation (HCC)   • Hyperlipidemia associated with type 2 diabetes mellitus (HCC)   • ICH (intracerebral hemorrhage) (HCC)   • Left leg pain   • Anemia   • GERD (gastroesophageal reflux disease)   • Constipation   • Hyponatremia       Past Medical History:   Diagnosis Date   • Arthritis    • Asthma    • Continuous opioid dependence ([address]) 4/27/2022   • Diabetes mellitus ([address])    • Diverticulitis of colon    • Fibromyalgia 04/26/2022   • Headache(784.0)    • History of mammogram 2018   • History of tobacco abuse 04/26/2022   • Hypertension    • Nausea 04/29/2022   • Obesity    • Sjogren's syndrome ([address]) 10/19/2012   • Urinary tract infection      Etiologic Diagnosis: Acute Left Thalamic Intracerebral Hemorrhage with edema  Restrictions/Precautions  Precautions: Bed/chair alarms, Fall Risk, Supervision on toilet/commode (R-inattention)  Weight Bearing Restrictions: No  ROM Restrictions: No  Braces or Orthoses: Sling (RUE sling for xfers)  ADL Team Goal: Patient will require supervision with ADLs with least restrictive device upon completion of rehab program  Occupational Therapy LTG's  Eating Oral care Bathing LB dress UB dress   Eating Goal: 05.  Setup or clean-up assistance - [place] SETS UP or CLEANS UP, patient completes activity. Raven assists only prior to or following the activity. Oral Hygiene Goal: 05. Setup or clean-up assistance - Raven SETS UP or CLEANS UP, patient completes activity. Raven assists only prior to or following the activity. Shower/bathe self Goal: 04. Supervision or touching assistance- Raven provides VERBAL CUES or supervision throughout activity. Lower body dressing Goal: 04. Supervision or touching assistance- Raven provides VERBAL CUES or supervision throughout activity. Upper body dressing Goal: 05. Setup or clean-up assistance - Raven SETS UP or CLEANS UP, patient completes activity. Raven assists only prior to or following the activity. Toileting Toilet txf Func txf IADL Med    Toileting hygiene Goal: 04. Supervision or touching assistance- Raven provides VERBAL CUES or supervision throughout activity. Toilet transfer Goal: 04. Supervision or touching assistance- Raven provides VERBAL CUES or supervision throughout activity. Chair/bed-to-chair transfer Goal: 04. Supervision or touching assistance- Raven provides VERBAL CUES or supervision throughout activity. Assist Level: Minimum Assist (full meal prep seated in WC pending pt progress) Assist Level:  Independent (mod I with mediplanner which pt uses at baseline)   OT interventions: Treatment/Interventions: ADL retraining, LE strengthening/ROM, Functional transfer training, Therapeutic exercise, Endurance training, Cognitive reorientation, Patient/family training, Equipment eval/education, Bed mobility, Compensatory technique education  Discharge Plan:  OT Discharge Recommendation:  (Home vs SNF pending progress)   DME: Equipment Recommended:  (TBD),  ,      09/01/23 1300   Pain Assessment   Effect of Pain on Daily Activities with R side leaning   Hospital Pain Intervention(s) Repositioned   Pain Rating: FLACC (Activity) - Face 1   Pain Rating: FLACC (Activity) - Legs 1   Pain Rating: FLACC (Activity) - Activity 0   Pain Rating: FLACC (Activity) - Cry 0   Pain Rating: FLACC (Activity) - Consolability 0   Score: FLACC (Activity) 2   Lifestyle   Autonomy "I made a deal with my sister. If i walk with the walker she will quit smoking."   Bed-Chair Transfer   Type of Assistance Needed Physical assistance   Physical Assistance Level Total assistance   Comment AX2 BEASY board. to [place] from recliner. improved posterior scooting once in chair. Chair/Bed-to-Chair Transfer CARE Score 1   Neuromuscular Education   Functional Movement Patterns Today no updates in AROM in shoudler/trap. today able to complete thumb flexion, D2,3,4 slight flexion. noted with trials of extension noted D2 tendon flickering. NMR :Pt engages in [address] training session with use of arm trough for right UE neuromuscular re-education for increased muscular facilitation and to increase overall motor control, shoulder stability to improve UE function for ADLs. Username "[email or web address] [phone]."  Pt provided with extensive EDU for purpose of muscular facilitation and incorporation of visualization. Pt completed forward thrust, 20 reps in guided motion at 120 speed. Pt completed forward reach with 5 point target, 00 reps in guided motion at 120 speed. Pt completed forward thrust, 10 reps in initiated motion at 120 speed at Low force. Able to initiate forward thrust, but unable to complete retraction. Pt is able to safely complete entirety of training session w/ no C/O pain and self perceived exertion within personal tolerance. Activity Tolerance   Activity Tolerance Patient limited by pain   Assessment   Treatment Assessment Pt participated in skilled OT treatment session with treatment focus on UE NMR. Pt tolerated session fair, today with increasing complaints of R LE spasm pain as UE NMR sessions progress. Same complaints as yesterdays session in the afternoon. Pt limited with REO GO training for only ~15 min as pain was too much to tolerate.  Pt does demo improved muscle activation in R UE with slight flexions of D1-4. Still continues to be limited with generalized R body pain, R body andi paresis, poor trunk control, dec activity tolerance. Pt continues to require skilled acute rehab OT services to increase overall functional independence and safety w/ I/ADLs and functional transfers. Continue with plan for discharge as pending progress. Continued plan of care for OT sessions to focus on the following areas: sitting balance, R UE NMR, NMES, func transfers, static stance, start to stand.    Prognosis Fair   Plan   Progress Slow progress, decreased activity tolerance   OT Therapy Minutes   OT Time In 1300   OT Time Out 1440   OT Total Time (minutes) 100   OT Mode of treatment - Individual (minutes) 100   OT Mode of treatment - Concurrent (minutes) 0   OT Mode of treatment - Group (minutes) 0   OT Mode of treatment - Co-treat (minutes) 0   OT Mode of Treatment - Total time(minutes) 100 minutes   OT Cumulative Minutes 640

## 2023-09-01 NOTE — PROGRESS NOTES
09/01/23 0830   Pain Assessment   Pain Assessment Tool 0-10   Pain Score 6   Pain Location/Orientation Orientation: Right;Location: [facility] Pain Intervention(s) Repositioned  (distractions by engaging in session)   Restrictions/Precautions   Precautions Bed/chair alarms;Cognitive; Fall Risk;Pain;Supervision on toilet/commode   Comprehension   Comprehension (FIM) 5 - Understands basic directions and conversation   Expression   Expression (FIM) 4 - Expresses basic info/needs 75-90% of time   Social Interaction   Social Interaction (FIM) 6 - Interacts appropriately with others BUT requires extra  time   Problem Solving   Problem solving (FIM) 4 - Solves basic problems 75-89% of time   Memory   Memory (FIM) 4 - Recognizes/recalls/performs 75-89%   Speech/Language/Cognition Assessmetn   Treatment Assessment Pt was in bed upon arrival to room, where SLP asking pt about still being in bed. Pt reported to SLP, "they have no one to help me get up." Pt remained agreeable for session as pt was in bed. Current SLP did review daily events w/ pt since last session was on Wednesday. It was noted that pt had fairly good recall given PT session, recalling PT's name and the use of the board which pt stated was "much easier to move." Pt was referencing beazy board vs slideboard. Minimal prompting cues were needed to recall tasks completed in OT session yesterday (use on NMES to LUE). Overall, pt did exhibit more word finding in conversational speech and recall of daily events. Additionally asked pt about visitors yesterday, where pt reported that her sister, [place], came to visit and discussion was held about other 2 sisters. Pt did verbalize that [place] and her other local sister, Genet Rivera (who is in [place]) will be the ones to ensure pt's house is "ready" for pt at discharge. Even given d/w sisters, pt continued to demonstrate latency in verbalizations and mild word finding deficits throughout.  Due to this SLP did initiate session targeting drawing conculsion task, which also is a word retrieval task to where pt was to identify activities or places from a description. It was noted that pt was 12/14 accurate w/ this task, increased to 14/14 upon repetition given full information again, suspecting either decreased attention vs recall of the information presented. The next task which SLP engaged pt in was a verbal problem solving task where a situation was provided and a total of 4 solutions also given. Pt was to determine the FIRST step provided solutions. Pt was 13/16 accurate in ability to provide the first solution. The ones which pt did present some difficulty w/ was due to decreased attention/recall and/or comprehension of the 4 solution provided. Pt did appropriately request clarification for the solutions verbally provided by SLP, to where pt continue to be able to elicit the first steps accordingly. Last task which SLP engaged pt in was a verbal STM recall task providing pt w/ a short paragraph and pt was to recall at least 3 inclusionary items. Out of 5 different paragraphs to recall 3 items, pt was 11/15 accurate in recalling, needing placement cues to recall an additional 4 items which she did have difficulty in recalling independently, increasing to 15/15 accuracy. Of note, when completing the last 2 paragraphs, pt's R groin pain increased to where overall attention and focus was decreased 2* pain. However, pt did wish to work through the pain and finish the task at hand. Pt did appropriately ring for nsg for medications. At this time, pt will continue to benefit from ongoing skilled SLP services at this time to continue to maximize overall functional independence of cognitive linguistic skills to decrease caregiver burden over time.     SLP Therapy Minutes   SLP Time In 0830   SLP Time Out 0930   SLP Total Time (minutes) 60   SLP Mode of treatment - Individual (minutes) 60   SLP Mode of treatment - Concurrent (minutes) 0 SLP Mode of treatment - Group (minutes) 0   SLP Mode of treatment - Co-treat (minutes) 0   SLP Mode of Treatment - Total time(minutes) 60 minutes   SLP Cumulative Minutes 225   Therapy Time missed   Time missed?  No

## 2023-09-01 NOTE — PLAN OF CARE
Problem: Prexisting or High Potential for Compromised Skin Integrity  Goal: Skin integrity is maintained or improved  Description: INTERVENTIONS:  - Identify patients at risk for skin breakdown  - Assess and monitor skin integrity  - Assess and monitor nutrition and hydration status  - Monitor labs   - Assess for incontinence   - Turn and reposition patient  - Assist with mobility/ambulation  - Relieve pressure over bony prominences  - Avoid friction and shearing  - Provide appropriate hygiene as needed including keeping skin clean and dry  - Evaluate need for skin moisturizer/barrier cream  - Collaborate with interdisciplinary team   - Patient/family teaching  - Consider wound care consult   Outcome: Progressing     Problem: PAIN - ADULT  Goal: Verbalizes/displays adequate comfort level or baseline comfort level  Description: Interventions:  - Encourage patient to monitor pain and request assistance  - Assess pain using appropriate pain scale  - Administer analgesics based on type and severity of pain and evaluate response  - Implement non-pharmacological measures as appropriate and evaluate response  - Consider cultural and social influences on pain and pain management  - Notify physician/advanced practitioner if interventions unsuccessful or patient reports new pain  Outcome: Progressing     Problem: INFECTION - ADULT  Goal: Absence or prevention of progression during hospitalization  Description: INTERVENTIONS:  - Assess and monitor for signs and symptoms of infection  - Monitor lab/diagnostic results  - Monitor all insertion sites, i.e. indwelling lines, tubes, and drains  - Monitor endotracheal if appropriate and nasal secretions for changes in amount and color  - [place] appropriate cooling/warming therapies per order  - Administer medications as ordered  - Instruct and encourage patient and family to use good hand hygiene technique  - Identify and instruct in appropriate isolation precautions for identified infection/condition  Outcome: Progressing  Goal: Absence of fever/infection during neutropenic period  Description: INTERVENTIONS:  - Monitor WBC    Outcome: Progressing     Problem: SAFETY ADULT  Goal: Patient will remain free of falls  Description: INTERVENTIONS:  - Educate patient/family on patient safety including physical limitations  - Instruct patient to call for assistance with activity   - Consult OT/PT to assist with strengthening/mobility   - Keep Call bell within reach  - Keep bed low and locked with side rails adjusted as appropriate  - Keep care items and personal belongings within reach  - Initiate and maintain comfort rounds  - Make Fall Risk Sign visible to staff  - Offer Toileting every 4 Hours, in advance of need  - Apply yellow socks and bracelet for high fall risk patients  - Consider moving patient to room near nurses station  Outcome: Progressing  Goal: Maintain or return to baseline ADL function  Description: INTERVENTIONS:  -  Assess patient's ability to carry out ADLs; assess patient's baseline for ADL function and identify physical deficits which impact ability to perform ADLs (bathing, care of mouth/teeth, toileting, grooming, dressing, etc.)  - Assess/evaluate cause of self-care deficits   - Assess range of motion  - Assess patient's mobility; develop plan if impaired  - Assess patient's need for assistive devices and provide as appropriate  - Encourage maximum independence but intervene and supervise when necessary  - Involve family in performance of ADLs  - Assess for home care needs following discharge   - Consider OT consult to assist with ADL evaluation and planning for discharge  - Provide patient education as appropriate  Outcome: Progressing  Goal: Maintains/Returns to pre admission functional level  Description: INTERVENTIONS:  - Set and communicate daily mobility goal to care team and patient/family/caregiver.    - Collaborate with rehabilitation services on mobility goals if consulted  - Out of bed for toileting  - Record patient progress and toleration of activity level   Outcome: Progressing     Problem: DISCHARGE PLANNING  Goal: Discharge to home or other facility with appropriate resources  Description: INTERVENTIONS:  - Identify barriers to discharge w/patient and caregiver  - Arrange for needed discharge resources and transportation as appropriate  - Identify discharge learning needs (meds, wound care, etc.)  - Arrange for interpretive services to assist at discharge as needed  - Refer to Case Management Department for coordinating discharge planning if the patient needs post-hospital services based on physician/advanced practitioner order or complex needs related to functional status, cognitive ability, or social support system  Outcome: Progressing     Problem: Nutrition/Hydration-ADULT  Goal: Nutrient/Hydration intake appropriate for improving, restoring or maintaining nutritional needs  Description: Monitor and assess patient's nutrition/hydration status for malnutrition. Collaborate with interdisciplinary team and initiate plan and interventions as ordered. Monitor patient's weight and dietary intake as ordered or per policy. Utilize nutrition screening tool and intervene as necessary. Determine patient's food preferences and provide high-protein, high-caloric foods as appropriate.      INTERVENTIONS:  - Monitor oral intake, urinary output, labs, and treatment plans  - Assess nutrition and hydration status and recommend course of action  - Evaluate amount of meals eaten  - Assist patient with eating if necessary   - Allow adequate time for meals  - Recommend/ encourage appropriate diets, oral nutritional supplements, and vitamin/mineral supplements  - Order, calculate, and assess calorie counts as needed  - Recommend, monitor, and adjust tube feedings and TPN/PPN based on assessed needs  - Assess need for intravenous fluids  - Provide specific nutrition/hydration education as appropriate  - Include patient/family/caregiver in decisions related to nutrition  Outcome: Progressing

## 2023-09-01 NOTE — PLAN OF CARE
Problem: INFECTION - ADULT  Goal: Absence or prevention of progression during hospitalization  Description: INTERVENTIONS:  - Assess and monitor for signs and symptoms of infection  - Monitor lab/diagnostic results  - Monitor all insertion sites, i.e. indwelling lines, tubes, and drains  - Monitor endotracheal if appropriate and nasal secretions for changes in amount and color  - [place] appropriate cooling/warming therapies per order  - Administer medications as ordered  - Instruct and encourage patient and family to use good hand hygiene technique  - Identify and instruct in appropriate isolation precautions for identified infection/condition  9/1/2023 1407 by Ray Jensen RN  Outcome: Progressing  9/1/2023 1228 by Ray Jensen RN  Outcome: Progressing

## 2023-09-01 NOTE — PROGRESS NOTES
PM&R PROGRESS NOTE:  Chhaya Perkins 62 y.o. female MRN: [id number]  Unit/Bed#: -53 Encounter: [id number]        Rehabilitation Diagnosis: Impairment of mobility, safety, Activities of Daily Living (ADLs), and cognitive/communication skills due to Stroke:  01.2  Right Body Involvement (Left Brain)    HPI: Chhaya Perkins is a 51-year-old female with history of hypertension, hyperlipidemia, diabetes type 2, obesity, eosinophilic asthma, COPD, fibromyalgia, Sjogren syndrome, lumbar spondylosis with grade 1 anterior spondylolisthesis who presents to the hospital on 8/14/2023 due to acute onset right upper and right lower extremity weakness with sensory loss. Additionally with right-sided facial droop and dysarthria. The patient was noted to have an acute intraparenchymal hemorrhage on CT in the left thalamic region with surrounding edema. A CTA was negative. Patient was initially placed on a Cardene drip. Course complicated by significant left-sided leg pain and was recommended on treatment for radicular symptoms including Tylenol, gabapentin and potentially steroids. Neurology was consulted and recommended MRI of the brain with and without contrast while holding antithrombotics. The MRI showed a stable left thalamic capsular intraparenchymal hemorrhage with surrounding edema without any other mass effect. A repeat CT was completed on 8/17/2023 after worsening symptoms including worsening speech slurring with stable findings. Additionally there was a rapid response after a fall with head strike on 8/18 with a repeat CT of the head without acute findings. Additionally antihypertensive regimen was altered as she was previously on telmisartan and atenolol and is currently on amlodipine, losartan and hydralazine with better control.  The patient was evaluated by the Rehabilitation team and deemed an appropriate candidate for comprehensive inpatient rehabilitation and admitted to the [facility] on 8/25/2023  3:19 PM    SUBJECTIVE: Patient seen during therapy as well as in room. Overall she is doing better from a mood perspective than she was yesterday but continues with very dense right hemiparesis with trace movements in the fingers at times. Spoke with therapy team apparently has some flexion synergy activation of the right lower extremity with use of the right upper extremity with some spasms in the abductor muscles at times. Symptoms are varied and not consistent. There is no overt spasticity or activity and holding off on addition of baclofen over methocarbamol. Still has as needed Valium to use. We talked about scheduling her medications prior to therapy in order for her to participate more. Despite previous constipation she is not going regularly. She is not having any fever, chills, nausea, vomiting, cough, shortness of breath, diarrhea. Mood is stable and improved from yesterday, sleep has been limiting but has been even chronically prior to hospitalization. Added melatonin    ASSESSMENT: Stable, progressing, right-sided weakness and flexor synergy activation during therapies      PLAN:    Rehabilitation  • Functional deficits:  Self care and mobility  • Continue current rehabilitation plan of care to maximize function.     • Functional update:   Physical Therapy Occupational Therapy Speech Therapy   Weight Bearing Status: Full Weight Bearing  Transfers: Assist of 2, Total Assistance (Slide board transfer)  Bed Mobility: Assist of 2, Total Assistance  Amulation Distance (ft): 0 feet (Unsafe to currently assess)  Ambulation:  (Unsafe to currently assess)  Wheelchair Mobility Distance: 150 ft  Wheelchair Mobility: Moderate Assistance, Maximum Assistance (L UE/LE)  Number of Stairs:  (Unsafe to currently assess)  Assistive Device for Stairs:  (Unsafe to currently assess)  Stair Assistance:  (Unsafe to currently assess)  Ramp:  (Unsafe to currently assess)  Assistive Device for Ramp:  (Unsafe to currently assess)  Discharge Recommendations: Home with:  [facility] with[de-identified] 24 Hour Supervision, 24 Hour Assisteance, Family Support, First Floor Setup, Home Physical Therapy, Outpatient Physical Therapy (Pending incremental improvements in mobility)   Eating: Maximum Assistance  Grooming: Maximum Assistance  Bathing: Total Assistance, Assist of 2  Bathing: Total Assistance, Assist of 2  Upper Body Dressing: Total Assistance, Assist of 2  Lower Body Dressing: Total Assistance, Assist of 2  Toileting: Total Assistance, Assist of 2  Toilet Transfer: Total Assistance, Assist of 2  Cognition: Within Defined Limits   Mode of Communication: Verbal  Speech/Language:  (mild word finding deficits)  Cognition: Exceptions to WNL  Cognition: Decreased Memory, Decreased Executive Functions, Decreased Attention, Decreased Comprehension  Orientation: Person, Place, Time, Situation  Discharge Recommendations:  (pending pt progress)       • Estimated Discharge:  To be determined in team conference    DVT prophylaxis  • Heparin     Pain  • Gabapentin 300 mg 3 times daily  • Oxycodone 2.5-5 mg every 4 hours as needed moderate-severe pain  • Robaxin 500 mg TID standing for spasms  • Valium 2mg BID PRN for severe spasms     Bladder plan  • Incontinent     Bowel plan  • Last bowel movement 8/31     Code Status  • Level 1 full code      * ICH (intracerebral hemorrhage) ([address])  Assessment & Plan  59-year-old female presents with right hemiplegia, dysarthria and facial asymmetry found to have an acute intraparenchymal hemorrhage in the left thalamic region with surrounding edema felt to be secondary to uncontrolled hypertension  · Had repeat CT of the head on 8/18 after a fall with head strike resulting in significant bruising of the face  · Cleared by neurology to restart aspirin 81 mg daily and atorvastatin 20 mg daily  · Evaluated by neurosurgery with no surgical intervention at this time  · Had a repeat head CT on 8/20 which has been stable  · Goal systolic blood pressure of less than 140  · Follow-up with neurology as an outpatient  · Physical and Occupational Therapy with goals for community discharge. Patient lives with her  in a mobile home with 5 steps to enter and he is able to assist 24/7  · Hemorrhagic stroke education, nutrition, neuropsychology  · Secondary stroke prophylaxis with hypertension control    Hyponatremia  Assessment & Plan  · Sodium down to 133  · Continue following biweekly labs with no intervention at this point unless continues to trend down. Constipation  Assessment & Plan  · No bowel movement since prior to admission which was 11 days prior on 8/14  · Obtaining x-ray to evaluate stool burden  · Adding to bowel regimen including increasing senna and Colace, lactulose as needed and will be more aggressive pending results of x-ray    GERD (gastroesophageal reflux disease)  Assessment & Plan  Continue Protonix    Hyperlipidemia associated with type 2 diabetes mellitus (HCC)  Assessment & Plan  Continue atorvastatin 20 mg with dinner    Class 2 obesity with body mass index (BMI) of 39.0 to 39.9 in adult  Assessment & Plan  · BMI of 41.40 on admission  · Continue promoting weight loss and increased activity, diet and exercise  · Continue a consistent carbohydrate diet  · Nutrition consultation    CAD (coronary artery disease)  Assessment & Plan  · History of CAD currently on statin and aspirin that was restarted on 8/21 after clearance by neurology  · Beta-blocker held due to bradycardia  · Patient has not formally seen a cardiologist however this was diagnosed based on a CT PE study that was conducted in the ER showing mild CAD    Chronic bilateral low back pain with bilateral sciatica  Assessment & Plan  · Patient has a history of chronic low back pain with radicular symptoms in addition to fibromyalgia and myofascial pain syndrome.   · She follows with Dr. Omkar Nelson from pain management has tried several medications as well as epidural steroid injections with little relief  · Imaging reveals lumbar degenerative disc disease at the L3-4, L4-5, L5-S1 level. Additionally facet hypertrophic changes and ligamentous laxity at the L4-5 level resulting in grade 1 anterior spondylolisthesis and mild canal narrowing with slight distortion of the left anterior lateral aspect of the thecal sac at this level with mild right greater than left neuroforaminal narrowing  · Multi modal pain with current medication regimen including oxycodone to be weaned as well as the Robaxin and gabapentin. We will also consult neuropsychology  · Added Tylenol as well as Robaxin and as needed Valium    Sjogren's syndrome ([address])  Assessment & Plan  · Patient states that she is been worked up for Sjogren's syndrome in the past and has had conflicting diagnosis he is stating that some physicians have diagnosed her with it and others stated she did not have it. · She has not been on any medication treatment for this and does not actively follow-up with a rheumatologist    Depression with anxiety  Assessment & Plan  · Neuropsychology consultation poststroke with history of anxiety  · Continue Wellbutrin  mg in the morning.   It has been 300 mg as an outpatient however it was decreased in acute care due to worry of decreasing seizure threshold in the setting of ICH    Diabetes mellitus type 2, controlled [facility])  Assessment & Plan  Lab Results   Component Value Date    HGBA1C 6.7 (H) 08/15/2023       Recent Labs     08/30/23  1547 08/30/23  2115 08/31/23  0650 08/31/23  1036   POCGLU 140 127 151* 146*     · Currently on carb controlled level 2 diet, metformin 500 mg twice daily  · Sliding scale insulin algorithm 3 with Accu-Cheks 4 times daily      Moderate persistent asthma without complication  Assessment & Plan  · Follows with Dr. Stephanie Aleman from pulmonology  · Home regimen includes Breo and as needed albuterol  · On equivalent here and added albuterol inhaler on admission to [facility]    Primary hypertension  Assessment & Plan  · Home regimen: Telmisartan and atenolol  · Current regimen: Norvasc 10 mg daily Cozaar 100 mg daily, hydralazine discontinued  · Monitor blood pressures especially in therapy and make adjustments as needed        Appreciate IM consultants medical co-management. Labs, medications, and imaging personally reviewed. ROS:  A ten point review of systems was completed on 09/01/23 and pertinent positives are listed in subjective section. All other systems reviewed were negative. OBJECTIVE:   /70 (BP Location: Left arm)   Pulse 73   Temp 98.2 °F (36.8 °C) (Oral)   Resp 16   Ht 5' (1.524 m)   Wt 97.2 kg (214 lb 4.6 oz)   SpO2 98%   BMI 41.85 kg/m²     Physical Exam  Vitals and nursing note reviewed. Constitutional:       General: She is not in acute distress. Appearance: She is obese. HENT:      Head: Normocephalic. Comments: Ecchymosis around the right orbital region in various stages of healing     Right Ear: External ear normal.      Left Ear: External ear normal.      Nose: Nose normal. No rhinorrhea. Mouth/Throat:      Mouth: Mucous membranes are moist.      Pharynx: Oropharynx is clear. Eyes:      General: No scleral icterus. Cardiovascular:      Rate and Rhythm: Normal rate. Pulses: Normal pulses. Pulmonary:      Effort: No respiratory distress. Breath sounds: Normal breath sounds. Abdominal:      General: There is no distension. Palpations: Abdomen is soft. Musculoskeletal:      Cervical back: Normal range of motion and neck supple. Right lower leg: No edema. Left lower leg: No edema. Skin:     General: Skin is warm and dry. Neurological:      Mental Status: She is alert and oriented to person, place, and time.       Comments: dense right hemiparesis, no spasticity   Psychiatric:         Mood and Affect: Mood normal.         Behavior: Behavior normal.          Lab Results   Component Value Date    WBC 10.36 (H) 08/31/2023    HGB 12.1 08/31/2023    HCT 39.1 08/31/2023    MCV 88 08/31/2023     08/31/2023     Lab Results   Component Value Date    SODIUM 133 (L) 08/31/2023    K 4.2 08/31/2023    CL 98 08/31/2023    CO2 25 08/31/2023    BUN 21 08/31/2023    CREATININE 0.70 08/31/2023    GLUC 151 (H) 08/31/2023    CALCIUM 9.4 08/31/2023     Lab Results   Component Value Date    INR 1.06 08/15/2023    INR 0.94 08/14/2023    PROTIME 14.0 08/15/2023    PROTIME 13.2 08/14/2023           Current Facility-Administered Medications:   •  acetaminophen (TYLENOL) tablet 650 mg, 650 mg, Oral, Q6H PRN, Kaiser Claire DO, 650 mg at 09/01/23 3706  •  albuterol (PROVENTIL HFA,VENTOLIN HFA) inhaler 2 puff, 2 puff, Inhalation, Q4H PRN, Kaiser Claire DO  •  amLODIPine (NORVASC) tablet 10 mg, 10 mg, Oral, Daily, Kaiser Claire DO, 10 mg at 09/01/23 9603  •  aspirin chewable tablet 81 mg, 81 mg, Oral, Daily, Kaiser Claire DO, 81 mg at 09/01/23 0827  •  atorvastatin (LIPITOR) tablet 20 mg, 20 mg, Oral, Daily With Dinner, Kaiser Claire DO, 20 mg at 08/31/23 1646  •  bisacodyl (DULCOLAX) EC tablet 5 mg, 5 mg, Oral, Daily PRN, Dagoberto Fernando DO  •  buPROPion (WELLBUTRIN XL) 24 hr tablet 150 mg, 150 mg, Oral, QAM, Kaiser Claire DO, 150 mg at 09/01/23 3035  •  calcium carbonate (TUMS) chewable tablet 1,000 mg, 1,000 mg, Oral, TID PRN, Laureen Tracy PA-C, 1,000 mg at 08/26/23 1241  •  cyanocobalamin (VITAMIN B-12) tablet 1,000 mcg, 1,000 mcg, Oral, Daily, Kaiser Claire DO, 1,000 mcg at 09/01/23 7931  •  diazepam (VALIUM) tablet 2 mg, 2 mg, Oral, BID PRN, Kaiser Claire DO  •  ferrous sulfate tablet 325 mg, 325 mg, Oral, Daily With Breakfast, Kaiser Claire DO, 325 mg at 09/01/23 0827  •  fluticasone-vilanterol 200-25 mcg/actuation 1 puff, 1 puff, Inhalation, Daily, Kaiser Claire DO, 1 puff at 09/01/23 0954  •  gabapentin (NEURONTIN) capsule 300 mg, 300 mg, Oral, TID, Kaiser Claire DO, 300 mg at 09/01/23 0827  •  heparin (porcine) subcutaneous injection 5,000 Units, 5,000 Units, Subcutaneous, Q8H [address], [place], DO, 5,000 Units at 09/01/23 1425  •  insulin lispro (HumaLOG) 100 units/mL subcutaneous injection 1-6 Units, 1-6 Units, Subcutaneous, TID AC, 1 Units at 09/01/23 1224 **AND** Fingerstick Glucose (POCT), , , 4x Daily AC and at bedtime, Roly Main, DO  •  insulin lispro (HumaLOG) 100 units/mL subcutaneous injection 1-6 Units, 1-6 Units, Subcutaneous, HS, [place], DO, 1 Units at 08/27/23 2133  •  lactulose oral solution 20 g, 20 g, Oral, Daily PRN, [place], DO  •  losartan (COZAAR) tablet 100 mg, 100 mg, Oral, Daily, [place], DO, 100 mg at 09/01/23 7310  •  metFORMIN (GLUCOPHAGE) tablet 500 mg, 500 mg, Oral, BID With Meals, ARSENIO Alcantar, 500 mg at 09/01/23 7093  •  methocarbamol (ROBAXIN) tablet 500 mg, 500 mg, Oral, TID, [place], DO, 500 mg at 09/01/23 0827  •  miconazole (MICOTIN) 2 % powder 1 Application, 1 Application, Topical, BID, [place], DO, 1 Application at 15/84/66 0830  •  ondansetron (ZOFRAN-ODT) dispersible tablet 4 mg, 4 mg, Oral, Q6H PRN, [place], DO  •  oxyCODONE (ROXICODONE) IR tablet 5 mg, 5 mg, Oral, Q4H PRN, [place], DO, 5 mg at 09/01/23 1434  •  oxyCODONE (ROXICODONE) split tablet 2.5 mg, 2.5 mg, Oral, Q4H PRN, [place], DO, 2.5 mg at 08/31/23 1038  •  pantoprazole (PROTONIX) EC tablet 40 mg, 40 mg, Oral, Early Morning, [place], DO, 40 mg at 09/01/23 0640  •  polyethylene glycol (MIRALAX) packet 17 g, 17 g, Oral, Daily, [place], DO, 17 g at 08/29/23 0813  •  prochlorperazine (COMPAZINE) tablet 5 mg, 5 mg, Oral, Q6H PRN, [place], DO  •  senna-docusate sodium (SENOKOT S) 8.6-50 mg per tablet 2 tablet, 2 tablet, Oral, HS, [place], DO, 2 tablet at 08/30/23 2128    Past Medical History:   Diagnosis Date   • Arthritis    • Asthma    • Continuous opioid dependence ([address]) 4/27/2022   • Diabetes mellitus ([address])    • Diverticulitis of colon    • Fibromyalgia 04/26/2022   • Headache(784.0)    • History of mammogram 2018   • History of tobacco abuse 04/26/2022   • Hypertension    • Nausea 04/29/2022   • Obesity    • Sjogren's syndrome ([address]) 10/19/2012   • Urinary tract infection        Patient Active Problem List    Diagnosis Date Noted   • ICH (intracerebral hemorrhage) ([address]) 08/15/2023   • Hyponatremia 08/31/2023   • GERD (gastroesophageal reflux disease) 08/25/2023   • Constipation 08/25/2023   • Anemia 08/16/2023   • Left leg pain 08/15/2023   • Chronic obstructive pulmonary disease with acute exacerbation ([address]) 07/26/2023   • Hyperlipidemia associated with type 2 diabetes mellitus ([address]) 07/26/2023   • Abnormal CT of the chest 06/28/2023   • Class 2 obesity with body mass index (BMI) of 39.0 to 39.9 in adult 06/28/2023   • CAD (coronary artery disease) 04/07/2023   • Right lumbosacral radiculopathy 10/12/2022   • Chronic bilateral low back pain with bilateral sciatica 08/31/2022   • Paresthesia of both feet 08/31/2022   • Postoperative visit 05/24/2022   • Incarcerated umbilical hernia 94/84/6277   • Diabetes mellitus type 2, controlled ([address]) 04/29/2022   • Continuous opioid dependence ([address]) 04/27/2022   • Moderate persistent asthma without complication 79/39/3860   • Cigarette nicotine dependence in remission 04/26/2022   • Primary hypertension 04/26/2022   • Fibromyalgia 04/26/2022   • Sjogren's syndrome ([address]) 10/19/2012   • Depression with anxiety 09/18/2012          Derik Stevens DO  Physical Medicine and Jessika MARTINEZ have spent a total time of 25 minutes on 09/01/23 in caring for this patient including Counseling / Coordination of care, Documenting in the medical record and Communicating with other healthcare professionals .

## 2023-09-01 NOTE — PLAN OF CARE
Problem: INFECTION - ADULT  Goal: Absence or prevention of progression during hospitalization  Description: INTERVENTIONS:  - Assess and monitor for signs and symptoms of infection  - Monitor lab/diagnostic results  - Monitor all insertion sites, i.e. indwelling lines, tubes, and drains  - Monitor endotracheal if appropriate and nasal secretions for changes in amount and color  - Superior appropriate cooling/warming therapies per order  - Administer medications as ordered  - Instruct and encourage patient and family to use good hand hygiene technique  - Identify and instruct in appropriate isolation precautions for identified infection/condition  Outcome: Progressing

## 2023-09-01 NOTE — PROGRESS NOTES
Internal Medicine Progress Note  Patient: Stefania Santillan  Age/sex: 62 y.o. female  Medical Record #: [id number]      ASSESSMENT/PLAN: (Interval History)  Stefania Santillan is seen and examined and management for following issues:    ICH  • Seen by NS and no surgical intervention indicated. • Etio = HTN  • Was unable to tolerate MRI even with sedation  • Was cleared to resume ASA. • Continue atorvastatin. • Outpt follow-up with Neurology.     HTN  • Goal with ICH is SBP<140  • Home: Micardis 80mg qd/Atenolol 50mg qd  • Here: Norvasc 10 mg qd/Losartan 100mg qd  • (Micardis 80 mg qd = Losartan 100 mg qd)  • On 8/28, stopped the Hydralazine   • BPs stable and normotensive      DM type 2  • HA1C 6.7  • Home: Metformin 1000mg BID  • Here: Metformin 500 mg BID  • Continue QID Accuchecks/SSI and DM diet  • No changes today     Anemia  • Baseline Hgb 10 - 11. • Iron level 30/Ferritin 22/TIBC 371  • B12 was low normal at 221 on 8/15/23  • Continue iron and B12 supplementation. • stable     Anxiety  • Continue Wellbutrin XL. • Pt is taking 150mg instead of 300mg due to concerns for increased risk of seizure        Discharge date:  [facility]       The above assessment and plan was reviewed and updated as determined by my evaluation of the patient on 9/1/2023.     Labs:   Results from last 7 days   Lab Units 08/31/23  0629 08/28/23  0556   WBC Thousand/uL 10.36* 9.26   HEMOGLOBIN g/dL 12.1 11.5   HEMATOCRIT % 39.1 37.3   PLATELETS Thousands/uL 374 379     Results from last 7 days   Lab Units 08/31/23  0629 08/28/23  0556   SODIUM mmol/L 133* 136   POTASSIUM mmol/L 4.2 3.8   CHLORIDE mmol/L 98 102   CO2 mmol/L 25 25   BUN mg/dL 21 14   CREATININE mg/dL 0.70 0.60   CALCIUM mg/dL 9.4 9.3             Results from last 7 days   Lab Units 09/01/23  1046 09/01/23  0644 09/01/23  0633   POC GLUCOSE mg/dl 185* 149* 146*       Review of Scheduled Meds:  Current Facility-Administered Medications   Medication Dose Route Frequency Provider Last Rate   • acetaminophen  650 mg Oral Q6H PRN Arlander Sandy, DO     • albuterol  2 puff Inhalation Q4H PRN Arlander Sandy, DO     • amLODIPine  10 mg Oral Daily Arlander Sandy, DO     • aspirin  81 mg Oral Daily Arlander Sandy, DO     • atorvastatin  20 mg Oral Daily With Erving Garvin, DO     • bisacodyl  5 mg Oral Daily PRN Aladir [place], DO     • buPROPion  150 mg Oral QAM Arlander Sandy, DO     • calcium carbonate  1,000 mg Oral TID PRN Laureen Tracy PA-C     • vitamin B-12  1,000 mcg Oral Daily Arlander Sandy, DO     • diazepam  2 mg Oral BID PRN Arlander Sandy, DO     • ferrous sulfate  325 mg Oral Daily With Breakfast Arlander Sandy, DO     • fluticasone-vilanterol  1 puff Inhalation Daily Arlander Sandy, DO     • gabapentin  300 mg Oral TID Arlander Sandy, DO     • heparin (porcine)  5,000 Units Subcutaneous Q8H [address] Arlander Sandy, DO     • insulin lispro  1-6 Units Subcutaneous TID AC Arlander Sandy, DO     • insulin lispro  1-6 Units Subcutaneous HS Arlander Sandy, DO     • lactulose  20 g Oral Daily PRN Arlander Sandy, DO     • losartan  100 mg Oral Daily Arlander Sandy, DO     • metFORMIN  500 mg Oral BID With Meals ARSENIO Padilla     • methocarbamol  500 mg Oral TID Arlander Sandy, DO     • miconazole  1 Application Topical BID Arlander Sandy, DO     • ondansetron  4 mg Oral Q6H PRN Arlander Sandy, DO     • oxyCODONE  5 mg Oral Q4H PRN Arlander Sandy, DO     • oxyCODONE  2.5 mg Oral Q4H PRN Arlander Sandy, DO     • pantoprazole  40 mg Oral Early Morning Arlander Sandy, DO     • polyethylene glycol  17 g Oral Daily Arlander Sandy, DO     • prochlorperazine  5 mg Oral Q6H PRN Arlander Sandy, DO     • senna-docusate sodium  2 tablet Oral HS Arlander Sandy, DO         Subjective/ HPI: Patient seen and examined. Patients overnight issues or events were reviewed with nursing or staff during rounds or morning huddle session. New or overnight issues include the following:     No new or overnight issues.   Offers no complaints    ROS:   A 10 point ROS was performed; negative except as noted above. *Labs /Radiology studies reviewed  *Medications reviewed and reconciled as needed  *Please refer to order section for additional ordered labs studies  *Case discussed with primary attending during morning huddle case rounds    Physical Examination:  Vitals:   Vitals:    08/31/23 1323 08/31/23 2057 09/01/23 0500 09/01/23 0953   BP: 110/74 109/58 101/68 120/66   BP Location: Left arm Left arm Left arm    Pulse: 76 89 82    Resp: 16 19 18    Temp: 98.2 °F (36.8 °C) 98.5 °F (36.9 °C) 98.2 °F (36.8 °C)    TempSrc: Oral Oral Oral    SpO2: 96% 97% 98%    Weight:       Height:           General Appearance: no distress, conversive  HEENT: PERRLA, conjuctiva normal; oropharynx clear; mucous membranes moist   Neck:  Supple, normal ROM  Lungs: CTA, normal respiratory effort, no retractions, expiratory effort normal  CV: regular rate and rhythm; no rubs/murmurs/gallops, PMI normal   ABD: soft; ND/NT; +BS  EXT: no edema  Skin: normal turgor, normal texture, no rashes  Psych: affect normal, mood normal  Neuro: AAO      The above physical exam was reviewed and updated as determined by my evaluation of the patient on 9/1/2023. Invasive Devices     Drain  Duration           External Urinary Catheter 3 days                   VTE Pharmacologic Prophylaxis: Heparin  Code Status: Level 1 - Full Code  Current Length of Stay: 7 day(s)      Total time spent:  30 minutes with more than 50% spent counseling/coordinating care. Counseling includes discussion with patient re: progress  and discussion with patient of his/her current medical state/information. Coordination of patient's care was performed in conjunction with primary service. Time invested included review of patient's labs, vitals, and management of their comorbidities with continued monitoring.  In addition, this patient was discussed with medical team including physician and advanced extenders. The care of the patient was extensively discussed and appropriate treatment plan was formulated unique for this patient. Medical decision making for the day was made by supervising physician unless otherwise noted in their attestation statement. ** Please Note:  voice to text software may have been used in the creation of this document.  Although proof errors in transcription or interpretation are a potential of such software**

## 2023-09-01 NOTE — PROGRESS NOTES
09/01/23 1015   Pain Assessment   Pain Assessment Tool FLACC   Effect of Pain on Daily Activities groin pain reported with stretching however not a limiting factor of today's session; patient reports RN did administer medication prior to session   Pain Rating: FLACC (Rest) - Face 0   Pain Rating: FLACC (Rest) - Legs 0   Pain Rating: FLACC (Rest) - Activity 0   Pain Rating: FLACC (Rest) - Cry 0   Pain Rating: FLACC (Rest) - Consolability 0   Score: FLACC (Rest) 0   Pain Rating: FLACC (Activity) - Face 1   Pain Rating: FLACC (Activity) - Legs 1   Pain Rating: FLACC (Activity) - Activity 1   Pain Rating: FLACC (Activity) - Cry 0   Pain Rating: FLACC (Activity) - Consolability 0   Score: FLACC (Activity) 3   Restrictions/Precautions   Precautions Bed/chair alarms;Cognitive; Fall Risk;Pain;Supervision on toilet/commode   Weight Bearing Restrictions No   ROM Restrictions No   Braces or Orthoses Sling  (sling RUE, LLE multipodus boot while in bed)   Cognition   Overall Cognitive Status Impaired   Subjective   Subjective Patient ready to participate in PT session   Sit to Lying   Type of Assistance Needed Physical assistance   Physical Assistance Level 76% or more   Comment assist trunk and RLE management   Sit to Lying CARE Score 2   Lying to Sitting on Side of Bed   Type of Assistance Needed Physical assistance   Physical Assistance Level 51%-75%   Comment assist trunk management; able to manage RLE without assistance   Lying to Sitting on Side of Bed CARE Score 2   Bed-Chair Transfer   Type of Assistance Needed Physical assistance   Physical Assistance Level Total assistance   Comment max Ax1 beasy board transfer with SBA of 2nd person to stabilize equipment   Chair/Bed-to-Chair Transfer CARE Score 1   Transfer Bed/Chair/Wheelchair   Findings Beasy board decreases caregiver burden and helps with safety during transfers; will recommend therapy continue to trial EOB/EOM activities with work to faciltiate anterior weight shifting to progress back to slide board transfers   Car Transfer   Reason if not Attempted Safety concerns   Car Transfer CARE Score 88   Walk 10 Feet   Reason if not Attempted Safety concerns   Walk 10 Feet CARE Score 88   Walk 50 Feet with Two Turns   Reason if not Attempted Safety concerns   Walk 50 Feet with Two Turns CARE Score 88   Walk 150 Feet   Reason if not Attempted Safety concerns   Walk 150 Feet CARE Score 88   Walking 10 Feet on Uneven Surfaces   Reason if not Attempted Safety concerns   Walking 10 Feet on Uneven Surfaces CARE Score 88   Wheelchair mobility   Findings not a focus of today's session   Therapeutic Interventions   Strengthening Supine with green bolster under BLE- performed RLE co-contract e-stim to quads and anterior tib to stimulate knee extension and DF during SAQ. Performed for 10 minutes. No negative reaction observed. Can continue and may want to trial incorporating with use of NuStep bike; 2x8 BLE bridging, 2x8 RLE single leg bridge   Flexibility R heel cord, HS and groin strtching TERT 4 minutes; pain limited groin stretch but no hard stop felt, tone observed. LLE with some restlessness during all activities   Balance EOM balance activity, focused on R side bend with use of LUE reaching across body to support trunk on mat, will help to dec LOB during beasy board transfer when placing disc under L buttocks   Assessment   Treatment Assessment Patient engaged in PT treatment session with continued focus on transfers, balance and strengthening. Impaired balance and strength remain a large barrier to patient's (I) with transfers and mobility as this limits upright maintenance EOB and during repositioning, activation of necessary musculature to offload buttocks and reposition RLE on her own, and any standing or dynamic activities. We briefly discussed discharge planning and what this may look like. Sister currently has the measurement/picture d/c form which PT will follow up on. Patient states if she were to go home her spouse would be able to physically assist her but that he does work. If a ramp and w/c did not become accessible in her home she could stay at her sister's on a 1 level but she cannot physically help her. At this time, patient is well below her baseline and requires too high of a caregiver burden to return home at this time. Please focus NuStep bike, e-stim, USE OF STANDING FRAME, and mat program this weekend. Will further discuss with family barriers to d/c etc as patient progresses. Problem List Decreased strength;Decreased range of motion;Decreased endurance; Impaired balance;Decreased mobility; Decreased coordination; Impaired sensation;Obesity;Orthopedic restrictions;Pain   PT Therapy Minutes   PT Time In 1015   PT Time Out 1140   PT Total Time (minutes) 85   PT Mode of treatment - Individual (minutes) 85   PT Mode of treatment - Concurrent (minutes) 0   PT Mode of treatment - Group (minutes) 0   PT Mode of treatment - Co-treat (minutes) 0   PT Mode of Treatment - Total time(minutes) 85 minutes   PT Cumulative Minutes 615

## 2023-09-02 LAB
GLUCOSE SERPL-MCNC: 105 MG/DL (ref 65–140)
GLUCOSE SERPL-MCNC: 142 MG/DL (ref 65–140)
GLUCOSE SERPL-MCNC: 203 MG/DL (ref 65–140)
GLUCOSE SERPL-MCNC: 98 MG/DL (ref 65–140)

## 2023-09-02 PROCEDURE — 82948 REAGENT STRIP/BLOOD GLUCOSE: CPT

## 2023-09-02 PROCEDURE — 97129 THER IVNTJ 1ST 15 MIN: CPT

## 2023-09-02 PROCEDURE — 92507 TX SP LANG VOICE COMM INDIV: CPT

## 2023-09-02 PROCEDURE — 99232 SBSQ HOSP IP/OBS MODERATE 35: CPT | Performed by: INTERNAL MEDICINE

## 2023-09-02 RX ADMIN — BUPROPION HYDROCHLORIDE 150 MG: 150 TABLET, FILM COATED, EXTENDED RELEASE ORAL at 08:45

## 2023-09-02 RX ADMIN — HEPARIN SODIUM 5000 UNITS: 5000 INJECTION INTRAVENOUS; SUBCUTANEOUS at 21:01

## 2023-09-02 RX ADMIN — METHOCARBAMOL 500 MG: 500 TABLET ORAL at 08:42

## 2023-09-02 RX ADMIN — SENNOSIDES AND DOCUSATE SODIUM 2 TABLET: 50; 8.6 TABLET ORAL at 21:01

## 2023-09-02 RX ADMIN — FLUTICASONE FUROATE AND VILANTEROL TRIFENATATE 1 PUFF: 200; 25 POWDER RESPIRATORY (INHALATION) at 08:45

## 2023-09-02 RX ADMIN — METHOCARBAMOL 500 MG: 500 TABLET ORAL at 21:01

## 2023-09-02 RX ADMIN — METHOCARBAMOL 500 MG: 500 TABLET ORAL at 17:02

## 2023-09-02 RX ADMIN — HEPARIN SODIUM 5000 UNITS: 5000 INJECTION INTRAVENOUS; SUBCUTANEOUS at 13:07

## 2023-09-02 RX ADMIN — METFORMIN HYDROCHLORIDE 500 MG: 500 TABLET ORAL at 08:48

## 2023-09-02 RX ADMIN — MICONAZOLE NITRATE 1 APPLICATION: 20 POWDER TOPICAL at 08:45

## 2023-09-02 RX ADMIN — CYANOCOBALAMIN TAB 500 MCG 1000 MCG: 500 TAB at 08:43

## 2023-09-02 RX ADMIN — ATORVASTATIN CALCIUM 20 MG: 20 TABLET, FILM COATED ORAL at 17:02

## 2023-09-02 RX ADMIN — AMLODIPINE BESYLATE 10 MG: 10 TABLET ORAL at 08:42

## 2023-09-02 RX ADMIN — LOSARTAN POTASSIUM 100 MG: 50 TABLET, FILM COATED ORAL at 08:43

## 2023-09-02 RX ADMIN — HEPARIN SODIUM 5000 UNITS: 5000 INJECTION INTRAVENOUS; SUBCUTANEOUS at 05:45

## 2023-09-02 RX ADMIN — Medication 2.5 MG: at 08:44

## 2023-09-02 RX ADMIN — OXYCODONE HYDROCHLORIDE 5 MG: 5 TABLET ORAL at 17:02

## 2023-09-02 RX ADMIN — INSULIN LISPRO 2 UNITS: 100 INJECTION, SOLUTION INTRAVENOUS; SUBCUTANEOUS at 12:45

## 2023-09-02 RX ADMIN — GABAPENTIN 300 MG: 300 CAPSULE ORAL at 08:43

## 2023-09-02 RX ADMIN — ASPIRIN 81 MG CHEWABLE TABLET 81 MG: 81 TABLET CHEWABLE at 08:44

## 2023-09-02 RX ADMIN — FERROUS SULFATE TAB 325 MG (65 MG ELEMENTAL FE) 325 MG: 325 (65 FE) TAB at 08:48

## 2023-09-02 RX ADMIN — GABAPENTIN 300 MG: 300 CAPSULE ORAL at 17:01

## 2023-09-02 RX ADMIN — METFORMIN HYDROCHLORIDE 500 MG: 500 TABLET ORAL at 17:02

## 2023-09-02 RX ADMIN — MICONAZOLE NITRATE 1 APPLICATION: 20 POWDER TOPICAL at 17:02

## 2023-09-02 RX ADMIN — OXYCODONE HYDROCHLORIDE 5 MG: 5 TABLET ORAL at 12:50

## 2023-09-02 RX ADMIN — PANTOPRAZOLE SODIUM 40 MG: 40 TABLET, DELAYED RELEASE ORAL at 05:45

## 2023-09-02 RX ADMIN — GABAPENTIN 300 MG: 300 CAPSULE ORAL at 21:01

## 2023-09-02 RX ADMIN — OXYCODONE HYDROCHLORIDE 5 MG: 5 TABLET ORAL at 21:02

## 2023-09-02 RX ADMIN — Medication 3 MG: at 21:01

## 2023-09-02 NOTE — PROGRESS NOTES
09/02/23 1515   Pain Assessment   Pain Assessment Tool 0-10   Pain Score No Pain   Restrictions/Precautions   Precautions Bed/chair alarms;Cognitive; Fall Risk;Pain;Supervision on toilet/commode   Weight Bearing Restrictions No   ROM Restrictions No   Braces or Orthoses Sling   Comprehension   Comprehension (FIM) 5 - Understands basic directions and conversation   Expression   Expression (FIM) 4 - Needs to repeat single words   Social Interaction   Social Interaction (FIM) 6 - Interacts appropriately with others BUT requires extra  time   Problem Solving   Problem solving (FIM) 4 - Solves basic problems 75-89% of time   Memory   Memory (FIM) 4 - Recognizes/recalls/performs 75-89%   Speech/Language/Cognition Assessmetn   Treatment Assessment Pt seen for skilled speech therapy session targeting cognitive linguistic communication skills. Pt alert and interactive- multiple attempts made to see pt earlier in day however pt had visitors. Pt was able to recall and name the family who visited as well as the co-worker who came and brought her a card. Pt then completed the following- category matrix task with naming items in categories given targeted initial letters. Pt was able to complete with 12/16 on first trial and 14/20acc on second trial. Pt benefitting from verbal, semantic, and phrase completion cues. Pt next completed working memory task with recalling 3-4 elements from paragraphs (inclusion). Pt was read a short paragraph and was able to recall details to answer questions with 11/16acc increasing with repetition, slower pacing and cues for strategies (finger placement). Reviewed schedule for tomorrow and plan for weekend. Plan to cont to target functional cog tasks as well as word retrieval for expressive skills. Pt overall is improving with skilled SLP services and will cont to benefit to maximize overall cognitive linguistic communication abilities at this time.    SLP Therapy Minutes   SLP Time In 6412   SLP Time Out 1545   SLP Total Time (minutes) 30   SLP Mode of treatment - Individual (minutes) 30   SLP Mode of treatment - Concurrent (minutes) 0   SLP Mode of treatment - Group (minutes) 0   SLP Mode of treatment - Co-treat (minutes) 0   SLP Mode of Treatment - Total time(minutes) 30 minutes   SLP Cumulative Minutes 255   Therapy Time missed   Time missed?  No

## 2023-09-02 NOTE — PROGRESS NOTES
Internal Medicine Progress Note  Patient: Derik Fountain  Age/sex: 62 y.o. female  Medical Record #: [id number]      ASSESSMENT/PLAN: (Interval History)  Derik Fountain is seen and examined and management for following issues:    ICH  • Seen by NS and no surgical intervention indicated. • Etio = HTN  • Was unable to tolerate MRI even with sedation  • Was cleared to resume ASA. • Continue atorvastatin. • Outpt follow-up with Neurology.     HTN  • Goal with ICH is SBP<140  • Home: Micardis 80mg qd/Atenolol 50mg qd  • Here: Norvasc 10 mg qd/Losartan 100mg qd  • (Micardis 80 mg qd = Losartan 100 mg qd)   • stable     DM type 2  • HA1C 6.7  • Home: Metformin 1000mg BID  • Here: Metformin 500 mg BID  • Continue QID Accuchecks/SSI and DM diet  • stable     Anemia  • Baseline Hgb 10 - 11. • Iron level 30/Ferritin 22/TIBC 371  • B12 was low normal at 221 on 8/15/23  • Continue iron and B12 supplementation. • stable     Anxiety  • Continue Wellbutrin XL. • Pt is taking 150mg instead of 300mg due to concerns for increased risk of seizure        Discharge date:  [facility]       The above assessment and plan was reviewed and updated as determined by my evaluation of the patient on 9/2/2023.     Labs:   Results from last 7 days   Lab Units 08/31/23  0629 08/28/23  0556   WBC Thousand/uL 10.36* 9.26   HEMOGLOBIN g/dL 12.1 11.5   HEMATOCRIT % 39.1 37.3   PLATELETS Thousands/uL 374 379     Results from last 7 days   Lab Units 08/31/23  0629 08/28/23  0556   SODIUM mmol/L 133* 136   POTASSIUM mmol/L 4.2 3.8   CHLORIDE mmol/L 98 102   CO2 mmol/L 25 25   BUN mg/dL 21 14   CREATININE mg/dL 0.70 0.60   CALCIUM mg/dL 9.4 9.3             Results from last 7 days   Lab Units 09/02/23  0616 09/01/23  2114 09/01/23  1540   POC GLUCOSE mg/dl 105 158* 137       Review of Scheduled Meds:  Current Facility-Administered Medications   Medication Dose Route Frequency Provider Last Rate   • acetaminophen  650 mg Oral Q6H PRN Roly Flower DO     • albuterol 2 puff Inhalation Q4H PRN Kaiser Almanzara, DO     • amLODIPine  10 mg Oral Daily Kaiserсергей Almanzara, DO     • aspirin  81 mg Oral Daily Kaiser Almanzara, DO     • atorvastatin  20 mg Oral Daily With [facility], DO     • bisacodyl  5 mg Oral Daily PRN Janita Brittle, DO     • buPROPion  150 mg Oral QAM Kaiserсергей Almanzara, DO     • calcium carbonate  1,000 mg Oral TID PRN Laureen Tracy PA-C     • vitamin B-12  1,000 mcg Oral Daily Kaiserсергей Almanzara, DO     • diazepam  2 mg Oral BID PRN Kaiser Almanzara, DO     • ferrous sulfate  325 mg Oral Daily With Breakfast Kaiser Claire, DO     • fluticasone-vilanterol  1 puff Inhalation Daily Kaiser Almanzara, DO     • gabapentin  300 mg Oral TID Kaiser Almanzara, DO     • heparin (porcine)  5,000 Units Subcutaneous Q8H [address] Kaiser Claire, DO     • insulin lispro  1-6 Units Subcutaneous TID AC Kaiser Claire, DO     • insulin lispro  1-6 Units Subcutaneous HS Kaiser Almanzara, DO     • lactulose  20 g Oral Daily PRN Kaiser Almanzara, DO     • losartan  100 mg Oral Daily Kaiser Almanzara, DO     • melatonin  3 mg Oral HS Kaiser Almanzara, DO     • metFORMIN  500 mg Oral BID With Meals ARSENIO Aviles     • methocarbamol  500 mg Oral TID Kaiser Almanzara, DO     • miconazole  1 Application Topical BID Kaiser Almanzara, DO     • ondansetron  4 mg Oral Q6H PRN Kaiser Almanzara, DO     • oxyCODONE  5 mg Oral Q4H PRN Kaiser [place], DO     • oxyCODONE  2.5 mg Oral Q4H PRN Kaiser [place]ot[place], DO     • pantoprazole  40 mg Oral Early Morning Kaiser Almanzara, DO     • polyethylene glycol  17 g Oral Daily Kaiser Almanzara, DO     • prochlorperazine  5 mg Oral Q6H PRN Kaiser Dakotah, DO     • senna-docusate sodium  2 tablet Oral HS Kaiser Almanzara, DO         Subjective/ HPI: Patient seen and examined. Patients overnight issues or events were reviewed with nursing or staff during rounds or morning huddle session. New or overnight issues include the following:     No new or overnight issues.   Offers no complaints    ROS: A 10 point ROS was performed; negative except as noted above. *Labs /Radiology studies reviewed  *Medications reviewed and reconciled as needed  *Please refer to order section for additional ordered labs studies  *Case discussed with primary attending during morning huddle case rounds    Physical Examination:  Vitals:   Vitals:    09/01/23 0953 09/01/23 1344 09/01/23 2005 09/02/23 0544   BP: 120/66 120/70 100/60 105/64   BP Location:  Left arm Left arm Left arm   Pulse:  73 88 77   Resp:  16 16 16   Temp:  98.2 °F (36.8 °C) 98.2 °F (36.8 °C) 98.2 °F (36.8 °C)   TempSrc:  Oral Oral Oral   SpO2:  98% 97% 98%   Weight:    97.1 kg (214 lb)   Height:           General Appearance: no distress, conversive  HEENT: PERRLA, conjuctiva normal; oropharynx clear; mucous membranes moist   Neck:  Supple, normal ROM  Lungs: Lungs are clear, normal respiratory effort, no retractions, expiratory effort normal  CV: regular rate and rhythm; no rubs/murmurs/gallops, PMI normal   ABD: soft; ND/NT; +BS  EXT: no edema  Skin: normal turgor, normal texture, no rashes  Psych: affect normal, mood normal  Neuro: AAO; right hemiparesis    The above physical exam was reviewed and updated as determined by my evaluation of the patient on 9/2/2023. Invasive Devices     Drain  Duration           External Urinary Catheter 4 days                   VTE Pharmacologic Prophylaxis: Heparin  Code Status: Level 1 - Full Code  Current Length of Stay: 8 day(s)      Total time spent:  30 minutes with more than 50% spent counseling/coordinating care. Counseling includes discussion with patient re: progress  and discussion with patient of his/her current medical state/information. Coordination of patient's care was performed in conjunction with primary service. Time invested included review of patient's labs, vitals, and management of their comorbidities with continued monitoring.  In addition, this patient was discussed with medical team including physician and advanced extenders. The care of the patient was extensively discussed and appropriate treatment plan was formulated unique for this patient. Medical decision making for the day was made by supervising physician unless otherwise noted in their attestation statement. ** Please Note:  voice to text software may have been used in the creation of this document.  Although proof errors in transcription or interpretation are a potential of such software**

## 2023-09-03 LAB
GLUCOSE SERPL-MCNC: 101 MG/DL (ref 65–140)
GLUCOSE SERPL-MCNC: 106 MG/DL (ref 65–140)
GLUCOSE SERPL-MCNC: 125 MG/DL (ref 65–140)
GLUCOSE SERPL-MCNC: 192 MG/DL (ref 65–140)

## 2023-09-03 PROCEDURE — 97535 SELF CARE MNGMENT TRAINING: CPT

## 2023-09-03 PROCEDURE — 97112 NEUROMUSCULAR REEDUCATION: CPT

## 2023-09-03 PROCEDURE — 97530 THERAPEUTIC ACTIVITIES: CPT

## 2023-09-03 PROCEDURE — 97110 THERAPEUTIC EXERCISES: CPT

## 2023-09-03 PROCEDURE — 99232 SBSQ HOSP IP/OBS MODERATE 35: CPT | Performed by: INTERNAL MEDICINE

## 2023-09-03 PROCEDURE — 82948 REAGENT STRIP/BLOOD GLUCOSE: CPT

## 2023-09-03 RX ORDER — BISACODYL 10 MG
10 SUPPOSITORY, RECTAL RECTAL DAILY PRN
Status: DISCONTINUED | OUTPATIENT
Start: 2023-09-03 | End: 2023-09-20 | Stop reason: HOSPADM

## 2023-09-03 RX ORDER — LANOLIN ALCOHOL/MO/W.PET/CERES
3 CREAM (GRAM) TOPICAL
Status: DISCONTINUED | OUTPATIENT
Start: 2023-09-03 | End: 2023-09-07

## 2023-09-03 RX ADMIN — BUPROPION HYDROCHLORIDE 150 MG: 150 TABLET, FILM COATED, EXTENDED RELEASE ORAL at 09:14

## 2023-09-03 RX ADMIN — PANTOPRAZOLE SODIUM 40 MG: 40 TABLET, DELAYED RELEASE ORAL at 05:22

## 2023-09-03 RX ADMIN — MICONAZOLE NITRATE: 20 POWDER TOPICAL at 16:06

## 2023-09-03 RX ADMIN — OXYCODONE HYDROCHLORIDE 5 MG: 5 TABLET ORAL at 20:26

## 2023-09-03 RX ADMIN — POLYETHYLENE GLYCOL 3350 17 G: 17 POWDER, FOR SOLUTION ORAL at 09:11

## 2023-09-03 RX ADMIN — OXYCODONE HYDROCHLORIDE 5 MG: 5 TABLET ORAL at 13:40

## 2023-09-03 RX ADMIN — Medication 3 MG: at 20:25

## 2023-09-03 RX ADMIN — FLUTICASONE FUROATE AND VILANTEROL TRIFENATATE 1 PUFF: 200; 25 POWDER RESPIRATORY (INHALATION) at 09:15

## 2023-09-03 RX ADMIN — GABAPENTIN 300 MG: 300 CAPSULE ORAL at 16:03

## 2023-09-03 RX ADMIN — METFORMIN HYDROCHLORIDE 500 MG: 500 TABLET ORAL at 07:31

## 2023-09-03 RX ADMIN — OXYCODONE HYDROCHLORIDE 5 MG: 5 TABLET ORAL at 03:09

## 2023-09-03 RX ADMIN — FERROUS SULFATE TAB 325 MG (65 MG ELEMENTAL FE) 325 MG: 325 (65 FE) TAB at 07:31

## 2023-09-03 RX ADMIN — CYANOCOBALAMIN TAB 500 MCG 1000 MCG: 500 TAB at 09:12

## 2023-09-03 RX ADMIN — LOSARTAN POTASSIUM 100 MG: 50 TABLET, FILM COATED ORAL at 09:13

## 2023-09-03 RX ADMIN — MICONAZOLE NITRATE: 20 POWDER TOPICAL at 09:16

## 2023-09-03 RX ADMIN — ATORVASTATIN CALCIUM 20 MG: 20 TABLET, FILM COATED ORAL at 16:03

## 2023-09-03 RX ADMIN — ASPIRIN 81 MG CHEWABLE TABLET 81 MG: 81 TABLET CHEWABLE at 09:12

## 2023-09-03 RX ADMIN — GABAPENTIN 300 MG: 300 CAPSULE ORAL at 09:12

## 2023-09-03 RX ADMIN — OXYCODONE HYDROCHLORIDE 5 MG: 5 TABLET ORAL at 07:31

## 2023-09-03 RX ADMIN — METFORMIN HYDROCHLORIDE 500 MG: 500 TABLET ORAL at 16:03

## 2023-09-03 RX ADMIN — METHOCARBAMOL 500 MG: 500 TABLET ORAL at 20:26

## 2023-09-03 RX ADMIN — METHOCARBAMOL 500 MG: 500 TABLET ORAL at 16:03

## 2023-09-03 RX ADMIN — METHOCARBAMOL 500 MG: 500 TABLET ORAL at 09:13

## 2023-09-03 RX ADMIN — BISACODYL 10 MG: 10 SUPPOSITORY RECTAL at 18:26

## 2023-09-03 RX ADMIN — AMLODIPINE BESYLATE 10 MG: 10 TABLET ORAL at 09:13

## 2023-09-03 RX ADMIN — HEPARIN SODIUM 5000 UNITS: 5000 INJECTION INTRAVENOUS; SUBCUTANEOUS at 05:21

## 2023-09-03 RX ADMIN — GABAPENTIN 300 MG: 300 CAPSULE ORAL at 20:26

## 2023-09-03 RX ADMIN — HEPARIN SODIUM 5000 UNITS: 5000 INJECTION INTRAVENOUS; SUBCUTANEOUS at 13:40

## 2023-09-03 RX ADMIN — INSULIN LISPRO 2 UNITS: 100 INJECTION, SOLUTION INTRAVENOUS; SUBCUTANEOUS at 11:13

## 2023-09-03 RX ADMIN — HEPARIN SODIUM 5000 UNITS: 5000 INJECTION INTRAVENOUS; SUBCUTANEOUS at 21:23

## 2023-09-03 NOTE — PLAN OF CARE
Problem: Prexisting or High Potential for Compromised Skin Integrity  Goal: Skin integrity is maintained or improved  Description: INTERVENTIONS:  - Identify patients at risk for skin breakdown  - Assess and monitor skin integrity  - Assess and monitor nutrition and hydration status  - Monitor labs   - Assess for incontinence   - Turn and reposition patient  - Assist with mobility/ambulation  - Relieve pressure over bony prominences  - Avoid friction and shearing  - Provide appropriate hygiene as needed including keeping skin clean and dry  - Evaluate need for skin moisturizer/barrier cream  - Collaborate with interdisciplinary team   - Patient/family teaching  - Consider wound care consult   Outcome: Progressing     Problem: PAIN - ADULT  Goal: Verbalizes/displays adequate comfort level or baseline comfort level  Description: Interventions:  - Encourage patient to monitor pain and request assistance  - Assess pain using appropriate pain scale  - Administer analgesics based on type and severity of pain and evaluate response  - Implement non-pharmacological measures as appropriate and evaluate response  - Consider cultural and social influences on pain and pain management  - Notify physician/advanced practitioner if interventions unsuccessful or patient reports new pain  Outcome: Progressing     Problem: INFECTION - ADULT  Goal: Absence or prevention of progression during hospitalization  Description: INTERVENTIONS:  - Assess and monitor for signs and symptoms of infection  - Monitor lab/diagnostic results  - Monitor all insertion sites, i.e. indwelling lines, tubes, and drains  - Monitor endotracheal if appropriate and nasal secretions for changes in amount and color  - [place] appropriate cooling/warming therapies per order  - Administer medications as ordered  - Instruct and encourage patient and family to use good hand hygiene technique  - Identify and instruct in appropriate isolation precautions for identified infection/condition  Outcome: Progressing  Goal: Absence of fever/infection during neutropenic period  Description: INTERVENTIONS:  - Monitor WBC    Outcome: Progressing     Problem: SAFETY ADULT  Goal: Patient will remain free of falls  Description: INTERVENTIONS:  - Educate patient/family on patient safety including physical limitations  - Instruct patient to call for assistance with activity   - Consult OT/PT to assist with strengthening/mobility   - Keep Call bell within reach  - Keep bed low and locked with side rails adjusted as appropriate  - Keep care items and personal belongings within reach  - Initiate and maintain comfort rounds  - Make Fall Risk Sign visible to staff  - Offer Toileting every  Hours, in advance of need  - Initiate/Maintain alarm  - Obtain necessary fall risk management equipment:   - Apply yellow socks and bracelet for high fall risk patients  - Consider moving patient to room near nurses station  Outcome: Progressing  Goal: Maintain or return to baseline ADL function  Description: INTERVENTIONS:  -  Assess patient's ability to carry out ADLs; assess patient's baseline for ADL function and identify physical deficits which impact ability to perform ADLs (bathing, care of mouth/teeth, toileting, grooming, dressing, etc.)  - Assess/evaluate cause of self-care deficits   - Assess range of motion  - Assess patient's mobility; develop plan if impaired  - Assess patient's need for assistive devices and provide as appropriate  - Encourage maximum independence but intervene and supervise when necessary  - Involve family in performance of ADLs  - Assess for home care needs following discharge   - Consider OT consult to assist with ADL evaluation and planning for discharge  - Provide patient education as appropriate  Outcome: Progressing  Goal: Maintains/Returns to pre admission functional level  Description: INTERVENTIONS:  - Perform BMAT or MOVE assessment daily.   - Set and communicate daily mobility goal to care team and patient/family/caregiver. - Collaborate with rehabilitation services on mobility goals if consulted  - Perform Range of Motion times a day. - Reposition patient every  hours. - Dangle patient times a day  - Stand patient times a day  - Ambulate patient times a day  - Out of bed to chair times a day   - Out of bed for meals  times a day  - Out of bed for toileting  - Record patient progress and toleration of activity level   Outcome: Progressing     Problem: DISCHARGE PLANNING  Goal: Discharge to home or other facility with appropriate resources  Description: INTERVENTIONS:  - Identify barriers to discharge w/patient and caregiver  - Arrange for needed discharge resources and transportation as appropriate  - Identify discharge learning needs (meds, wound care, etc.)  - Arrange for interpretive services to assist at discharge as needed  - Refer to Case Management Department for coordinating discharge planning if the patient needs post-hospital services based on physician/advanced practitioner order or complex needs related to functional status, cognitive ability, or social support system  Outcome: Progressing     Problem: Nutrition/Hydration-ADULT  Goal: Nutrient/Hydration intake appropriate for improving, restoring or maintaining nutritional needs  Description: Monitor and assess patient's nutrition/hydration status for malnutrition. Collaborate with interdisciplinary team and initiate plan and interventions as ordered. Monitor patient's weight and dietary intake as ordered or per policy. Utilize nutrition screening tool and intervene as necessary. Determine patient's food preferences and provide high-protein, high-caloric foods as appropriate.      INTERVENTIONS:  - Monitor oral intake, urinary output, labs, and treatment plans  - Assess nutrition and hydration status and recommend course of action  - Evaluate amount of meals eaten  - Assist patient with eating if necessary   - Allow adequate time for meals  - Recommend/ encourage appropriate diets, oral nutritional supplements, and vitamin/mineral supplements  - Order, calculate, and assess calorie counts as needed  - Recommend, monitor, and adjust tube feedings and TPN/PPN based on assessed needs  - Assess need for intravenous fluids  - Provide specific nutrition/hydration education as appropriate  - Include patient/family/caregiver in decisions related to nutrition  Outcome: Progressing

## 2023-09-03 NOTE — PROGRESS NOTES
09/03/23 0700   Pain Assessment   Pain Assessment Tool 0-10   Pain Score 6   Pain Location/Orientation Orientation: Right;Location: Groin; Location: Leg   Hospital Pain Intervention(s) Repositioned  (nursing provided medications)   Restrictions/Precautions   Precautions Bed/chair alarms;Cognitive; Fall Risk;Pain;Supervision on toilet/commode   Weight Bearing Restrictions No   ROM Restrictions No   Eating   Type of Assistance Needed Physical assistance   Physical Assistance Level 25% or less   Comment Pt requires A for all set up of containers at this time due to R body weakness. Eating CARE Score 3   Oral Hygiene   Type of Assistance Needed Physical assistance   Physical Assistance Level 51%-75%   Comment Pt requires A for managing all items and [place] with RUE use. Oral Hygiene CARE Score 2   Shower/Bathe Self   Type of Assistance Needed Physical assistance   Physical Assistance Level 26%-50%   Comment Pt demos ability to bathe BLEs while in long sit in bed, requires A for rear hygiene via rolling in bed. Pt able to bathe RUE with points of control at wrist and elbow then requires [facility] with RUE to bathe LUE. Shower/Bathe Self CARE Score 3   Tub/Shower Transfer   Reason Not Assessed Sponge Bath;Safety   Findings Anticipate pt could progress to pull to stand at [facility] for shower in upcoming sessions. Upper Body Dressing   Type of Assistance Needed Physical assistance   Physical Assistance Level 26%-50%   Comment Pt requires A to fully thread RUE and pull down over trunk. Upper Body Dressing CARE Score 3   Lower Body Dressing   Type of Assistance Needed Physical assistance   Physical Assistance Level 51%-75%   Comment Pt continues to require A for RLE mgmt and for CM up/down over hips. Is beginning to improved on CM via rolling/partial bridge for L side, requires A on R.    Lower Body Dressing CARE Score 2   Putting On/Taking Off Footwear   Type of Assistance Needed Physical assistance   Physical Assistance Level Total assistance   Putting On/Taking Off Footwear CARE Score 1   Roll Left and Right   Type of Assistance Needed Physical assistance   Physical Assistance Level 76% or more   Comment Inc A to L side. Roll Left and Right CARE Score 2   Lying to Sitting on Side of Bed   Type of Assistance Needed Physical assistance   Physical Assistance Level 76% or more   Comment A with trunk. Lying to Sitting on Side of Bed CARE Score 2   Bed-Chair Transfer   Type of Assistance Needed Physical assistance; Adaptive equipment;Verbal cues   Physical Assistance Level Total assistance   Comment A x 2 beasy board txfers. Chair/Bed-to-Chair Transfer CARE Score 1   Neuromuscular Education   Comments Sa[place]o stim GO applied to pt's RUE forearm extensor with focus on wrist/digit extension. Completed in order to promote NMR to inc safety and independence with ADL tasks. Pt tolerates well. Cognition   Overall Cognitive Status Impaired   Arousal/Participation Alert; Cooperative   Attention Attends with cues to redirect   Orientation Level Oriented X4   Memory Decreased short term memory   Following Commands Follows one step commands with increased time or repetition   Activity Tolerance   Activity Tolerance Patient limited by pain   Assessment   Treatment Assessment Pt participated in skilled OT services with focus on ADL retraining. Pt continues to be limited by R body weakness, dec act massimo, dec endurance, dec sitting balance, dec standing balance/tolerance which all limits her safety and independence with I/ADL tasks. Pt continues to require A x 2 at this time for safety with functional txfers. Pt motivated to participate and insightful into her deficits. Pt will continue to benefit from skilled OT services with focus on above mentioned deficits to inc safety and independence with I/ADL tasks. Prognosis Fair   Problem List Decreased strength;Decreased range of motion;Decreased endurance; Impaired balance;Decreased mobility; Decreased coordination; Impaired sensation;Obesity;Orthopedic restrictions;Pain   Plan   Treatment/Interventions ADL retraining;Functional transfer training; Therapeutic exercise; Endurance training;Patient/family training;Equipment eval/education; Bed mobility; Compensatory technique education   Progress Slow progress, decreased activity tolerance   OT Therapy Minutes   OT Time In 0700   OT Time Out 0830   OT Total Time (minutes) 90   OT Mode of treatment - Individual (minutes) 90   OT Mode of treatment - Concurrent (minutes) 0   OT Mode of treatment - Group (minutes) 0   OT Mode of treatment - Co-treat (minutes) 0   OT Mode of Treatment - Total time(minutes) 90 minutes   OT Cumulative Minutes 730   Therapy Time missed   Time missed?  No

## 2023-09-03 NOTE — PROGRESS NOTES
09/03/23 1000   Restrictions/Precautions   Precautions Bed/chair alarms;Cognitive; Fall Risk;Supervision on toilet/commode   Braces or Orthoses Sling  (for right arm)   Sit to Stand   Type of Assistance Needed Physical assistance   Physical Assistance Level Total assistance   Comment Mod A with R leg blocked at handrail - heavy mod for lift and once up needs support at glutes for trunk and hip extension,  heavy lean on L arm on rail- 2nd person was on safety stand by   Sit to Stand CARE Score 1   Bed-Chair Transfer   Type of Assistance Needed Physical assistance   Physical Assistance Level Total assistance   Comment Ax2  beasy board ,  Mod x2   needs full set up,  needs 4inch box under feet to support,  mid transfer she needs R foot re positioned,  watch for LOB at trunk specially lateral   Chair/Bed-to-Chair Transfer CARE Score 1   Walk 10 Feet   Reason if not Attempted Safety concerns   Walk 10 Feet CARE Score 88   Walk 50 Feet with Two Turns   Reason if not Attempted Safety concerns   Walk 50 Feet with Two Turns CARE Score 88   Walk 150 Feet   Reason if not Attempted Safety concerns   Walk 150 Feet CARE Score 88   Walking 10 Feet on Uneven Surfaces   Reason if not Attempted Safety concerns   Walking 10 Feet on Uneven Surfaces CARE Score 88   Curb or Single Stair   Reason if not Attempted Safety concerns   1 Step (Curb) CARE Score 88   4 Steps   Reason if not Attempted Safety concerns   4 Steps CARE Score 88   12 Steps   Reason if not Attempted Safety concerns   12 Steps CARE Score 88   Picking Up Object   Reason if not Attempted Safety concerns   Picking Up Object CARE Score 88   Therapeutic Interventions   Strengthening Supine bridges over bolster with approximation at knee,  then performed again without bolster 10x3 of each,   SAQ aarom (noteable slight trace- would benefit from gravity eliminated and skate,  Hip add isometric,   Other Repeated sit-stands at handrail in hallway   Pt std tolerance about 1- 2mins and needs to sit due to fatigue,   Equipment Use   NuStep Trialed for first time,  beasy board on ,  used leg guide and R hand assist,  5mins x2  SPM upper 40s,   02 94  HR 96   Assessment   Treatment Assessment 90 min skilled PT session focused on first trial of Nustep which worked well and pt would benefit from cont performance for conditioning and NPP. Performed multple beazy board transfers t/o the session (pt had moderate Lat LOB to R when getting into chair and trying to wt shift over to get her L hip back (needs CG at all times when moving. Pt still currently needs 2 person assist for mobility due to weakness and very high fall risk. Cont skilled PT toward LTGs   Barriers to Discharge Inaccessible home environment;Decreased caregiver support   Plan   Progress Slow progress, decreased activity tolerance   PT Therapy Minutes   PT Time In 1000   PT Time Out 1130   PT Total Time (minutes) 90   PT Mode of treatment - Individual (minutes) 90   PT Mode of treatment - Concurrent (minutes) 0   PT Mode of treatment - Group (minutes) 0   PT Mode of treatment - Co-treat (minutes) 0   PT Mode of Treatment - Total time(minutes) 90 minutes   PT Cumulative Minutes 705   Therapy Time missed   Time missed?  No

## 2023-09-03 NOTE — PROGRESS NOTES
Internal Medicine Progress Note  Patient: Rama Witt  Age/sex: 62 y.o. female  Medical Record #: [id number]      ASSESSMENT/PLAN: (Interval History)  Rama Witt is seen and examined and management for following issues:    ICH  • Seen by NS and no surgical intervention indicated. • Etio = HTN  • Was unable to tolerate MRI even with sedation  • Was cleared to resume ASA. • Continue atorvastatin. • Outpt follow-up with Neurology.     HTN  • Goal with ICH is SBP<140  • Home: Micardis 80mg qd/Atenolol 50mg qd  • Here: Norvasc 10 mg qd/Losartan 100mg qd  • (Micardis 80 mg qd = Losartan 100 mg qd)   • stable     DM type 2  • HA1C 6.7  • Home: Metformin 1000mg BID  • Here: Metformin 500 mg BID  • Continue QID Accuchecks/SSI and DM diet  • stable     Anemia  • Baseline Hgb 10 - 11. • Iron level 30/Ferritin 22/TIBC 371  • B12 was low normal at 221 on 8/15/23  • Continue iron and B12 supplementation. • stable     Anxiety  • Continue Wellbutrin XL. • Pt is taking 150mg instead of 300mg due to concerns for increased risk of seizure        Discharge date:  [facility]       The above assessment and plan was reviewed and updated as determined by my evaluation of the patient on 9/3/2023.     Labs:   Results from last 7 days   Lab Units 08/31/23  0629 08/28/23  0556   WBC Thousand/uL 10.36* 9.26   HEMOGLOBIN g/dL 12.1 11.5   HEMATOCRIT % 39.1 37.3   PLATELETS Thousands/uL 374 379     Results from last 7 days   Lab Units 08/31/23  0629 08/28/23  0556   SODIUM mmol/L 133* 136   POTASSIUM mmol/L 4.2 3.8   CHLORIDE mmol/L 98 102   CO2 mmol/L 25 25   BUN mg/dL 21 14   CREATININE mg/dL 0.70 0.60   CALCIUM mg/dL 9.4 9.3             Results from last 7 days   Lab Units 09/03/23  0651 09/02/23  2042 09/02/23  1549   POC GLUCOSE mg/dl 106 142* 98       Review of Scheduled Meds:  Current Facility-Administered Medications   Medication Dose Route Frequency Provider Last Rate   • acetaminophen  650 mg Oral Q6H PRN Ulis Zari, DO     • albuterol 2 puff Inhalation Q4H PRN Duane Cleaves, DO     • amLODIPine  10 mg Oral Daily Duane Cleaves, DO     • aspirin  81 mg Oral Daily Duane Cleaves, DO     • atorvastatin  20 mg Oral Daily With Sy Pagan, DO     • bisacodyl  5 mg Oral Daily PRN Jhonny Ced, DO     • buPROPion  150 mg Oral QAM Duane Cleaves, DO     • calcium carbonate  1,000 mg Oral TID PRN Laureen Tracy PA-C     • vitamin B-12  1,000 mcg Oral Daily Duane Cleaves, DO     • diazepam  2 mg Oral BID PRN Duane Cleaves, DO     • ferrous sulfate  325 mg Oral Daily With Breakfast Duane Cleaves, DO     • fluticasone-vilanterol  1 puff Inhalation Daily Duane Cleaves, DO     • gabapentin  300 mg Oral TID Duane Cleaves, DO     • heparin (porcine)  5,000 Units Subcutaneous Q8H [address] Duane Cleaves, DO     • insulin lispro  1-6 Units Subcutaneous TID AC Duane Cleaves, DO     • insulin lispro  1-6 Units Subcutaneous HS Duane Cleaves, DO     • lactulose  20 g Oral Daily PRN Duane Cleaves, DO     • losartan  100 mg Oral Daily Duane Cleaves, DO     • melatonin  3 mg Oral HS Reena Mata, CRNP     • metFORMIN  500 mg Oral BID With Meals ARSENIO Aguila     • methocarbamol  500 mg Oral TID Duane Cleaves, DO     • miconazole  1 Application Topical BID Duane Cleaves, DO     • ondansetron  4 mg Oral Q6H PRN Duane Cleaves, DO     • oxyCODONE  5 mg Oral Q4H PRN Duane Cleaves, DO     • oxyCODONE  2.5 mg Oral Q4H PRN Duane Cleaves, DO     • pantoprazole  40 mg Oral Early Morning Duane Cleaves, DO     • polyethylene glycol  17 g Oral Daily Duane Cleaves, DO     • prochlorperazine  5 mg Oral Q6H PRN Duane Cleaves, DO     • senna-docusate sodium  2 tablet Oral HS Duane Cleaves, DO         Subjective/ HPI: Patient seen and examined. Patients overnight issues or events were reviewed with nursing or staff during rounds or morning huddle session. New or overnight issues include the following:     No new or overnight issues.   Offers no complaints other than requesting melatonin be given at 8 pm instead d/t it not "kicking in" til after midnight Spasms were better overnight    ROS:   A 10 point ROS was performed; negative except as noted above. *Labs /Radiology studies reviewed  *Medications reviewed and reconciled as needed  *Please refer to order section for additional ordered labs studies  *Case discussed with primary attending during morning huddle case rounds    Physical Examination:  Vitals:   Vitals:    09/02/23 2029 09/03/23 0530 09/03/23 0600 09/03/23 0913   BP: 108/64 111/77  130/84   BP Location: Left arm Left arm     Pulse: 83 73     Resp: 18 16     Temp: 98.1 °F (36.7 °C) 97.8 °F (36.6 °C)     TempSrc: Oral Oral     SpO2: 99% 96%     Weight:   91 kg (200 lb 9.9 oz)    Height:           General Appearance: no distress, pleasant  HEENT: PERRLA, conjuctiva normal; oropharynx clear; mucous membranes moist   Neck:  Supple, normal ROM  Lungs: Lungs are clear, normal respiratory effort, no retractions, expiratory effort normal  CV: regular rate and rhythm; no rubs/murmurs/gallops, PMI normal   ABD: soft; ND/NT; +BS  EXT: no edema  Skin: normal turgor, normal texture, no rashes  Psych: affect normal, mood normal  Neuro: AAO; right hemiparesis    The above physical exam was reviewed and updated as determined by my evaluation of the patient on 9/3/2023. Invasive Devices     Drain  Duration           External Urinary Catheter 5 days                   VTE Pharmacologic Prophylaxis: Heparin  Code Status: Level 1 - Full Code  Current Length of Stay: 9 day(s)      Total time spent:  30 minutes with more than 50% spent counseling/coordinating care. Counseling includes discussion with patient re: progress  and discussion with patient of his/her current medical state/information. Coordination of patient's care was performed in conjunction with primary service.  Time invested included review of patient's labs, vitals, and management of their comorbidities with continued monitoring. In addition, this patient was discussed with medical team including physician and advanced extenders. The care of the patient was extensively discussed and appropriate treatment plan was formulated unique for this patient. Medical decision making for the day was made by supervising physician unless otherwise noted in their attestation statement. ** Please Note:  voice to text software may have been used in the creation of this document.  Although proof errors in transcription or interpretation are a potential of such software**

## 2023-09-04 LAB
ANION GAP SERPL CALCULATED.3IONS-SCNC: 6 MMOL/L
BASOPHILS # BLD AUTO: 0.09 THOUSANDS/ÂΜL (ref 0–0.1)
BASOPHILS NFR BLD AUTO: 1 % (ref 0–1)
BUN SERPL-MCNC: 16 MG/DL (ref 5–25)
CALCIUM SERPL-MCNC: 8.9 MG/DL (ref 8.4–10.2)
CHLORIDE SERPL-SCNC: 100 MMOL/L (ref 96–108)
CO2 SERPL-SCNC: 28 MMOL/L (ref 21–32)
CREAT SERPL-MCNC: 0.59 MG/DL (ref 0.6–1.3)
EOSINOPHIL # BLD AUTO: 0.76 THOUSAND/ÂΜL (ref 0–0.61)
EOSINOPHIL NFR BLD AUTO: 9 % (ref 0–6)
ERYTHROCYTE [DISTWIDTH] IN BLOOD BY AUTOMATED COUNT: 15.9 % (ref 11.6–15.1)
GFR SERPL CREATININE-BSD FRML MDRD: 101 ML/MIN/1.73SQ M
GLUCOSE P FAST SERPL-MCNC: 114 MG/DL (ref 65–99)
GLUCOSE SERPL-MCNC: 114 MG/DL (ref 65–140)
GLUCOSE SERPL-MCNC: 119 MG/DL (ref 65–140)
GLUCOSE SERPL-MCNC: 130 MG/DL (ref 65–140)
GLUCOSE SERPL-MCNC: 151 MG/DL (ref 65–140)
GLUCOSE SERPL-MCNC: 188 MG/DL (ref 65–140)
HCT VFR BLD AUTO: 37 % (ref 34.8–46.1)
HGB BLD-MCNC: 11.9 G/DL (ref 11.5–15.4)
IMM GRANULOCYTES # BLD AUTO: 0.03 THOUSAND/UL (ref 0–0.2)
IMM GRANULOCYTES NFR BLD AUTO: 0 % (ref 0–2)
LYMPHOCYTES # BLD AUTO: 2.73 THOUSANDS/ÂΜL (ref 0.6–4.47)
LYMPHOCYTES NFR BLD AUTO: 32 % (ref 14–44)
MCH RBC QN AUTO: 27.6 PG (ref 26.8–34.3)
MCHC RBC AUTO-ENTMCNC: 32.2 G/DL (ref 31.4–37.4)
MCV RBC AUTO: 86 FL (ref 82–98)
MONOCYTES # BLD AUTO: 0.63 THOUSAND/ÂΜL (ref 0.17–1.22)
MONOCYTES NFR BLD AUTO: 8 % (ref 4–12)
NEUTROPHILS # BLD AUTO: 4.18 THOUSANDS/ÂΜL (ref 1.85–7.62)
NEUTS SEG NFR BLD AUTO: 50 % (ref 43–75)
NRBC BLD AUTO-RTO: 0 /100 WBCS
PLATELET # BLD AUTO: 347 THOUSANDS/UL (ref 149–390)
PMV BLD AUTO: 9.5 FL (ref 8.9–12.7)
POTASSIUM SERPL-SCNC: 4.1 MMOL/L (ref 3.5–5.3)
RBC # BLD AUTO: 4.31 MILLION/UL (ref 3.81–5.12)
SODIUM SERPL-SCNC: 134 MMOL/L (ref 135–147)
WBC # BLD AUTO: 8.42 THOUSAND/UL (ref 4.31–10.16)

## 2023-09-04 PROCEDURE — 99232 SBSQ HOSP IP/OBS MODERATE 35: CPT | Performed by: INTERNAL MEDICINE

## 2023-09-04 PROCEDURE — 82948 REAGENT STRIP/BLOOD GLUCOSE: CPT

## 2023-09-04 PROCEDURE — 85025 COMPLETE CBC W/AUTO DIFF WBC: CPT | Performed by: PHYSICIAN ASSISTANT

## 2023-09-04 PROCEDURE — 80048 BASIC METABOLIC PNL TOTAL CA: CPT | Performed by: PHYSICIAN ASSISTANT

## 2023-09-04 PROCEDURE — 97530 THERAPEUTIC ACTIVITIES: CPT

## 2023-09-04 PROCEDURE — 97112 NEUROMUSCULAR REEDUCATION: CPT

## 2023-09-04 PROCEDURE — 97129 THER IVNTJ 1ST 15 MIN: CPT

## 2023-09-04 PROCEDURE — 97130 THER IVNTJ EA ADDL 15 MIN: CPT

## 2023-09-04 RX ADMIN — INSULIN LISPRO 1 UNITS: 100 INJECTION, SOLUTION INTRAVENOUS; SUBCUTANEOUS at 12:08

## 2023-09-04 RX ADMIN — POLYETHYLENE GLYCOL 3350 17 G: 17 POWDER, FOR SOLUTION ORAL at 09:14

## 2023-09-04 RX ADMIN — Medication 2.5 MG: at 13:28

## 2023-09-04 RX ADMIN — METFORMIN HYDROCHLORIDE 500 MG: 500 TABLET ORAL at 09:14

## 2023-09-04 RX ADMIN — BUPROPION HYDROCHLORIDE 150 MG: 150 TABLET, FILM COATED, EXTENDED RELEASE ORAL at 09:18

## 2023-09-04 RX ADMIN — SENNOSIDES AND DOCUSATE SODIUM 2 TABLET: 50; 8.6 TABLET ORAL at 21:03

## 2023-09-04 RX ADMIN — OXYCODONE HYDROCHLORIDE 5 MG: 5 TABLET ORAL at 05:09

## 2023-09-04 RX ADMIN — HEPARIN SODIUM 5000 UNITS: 5000 INJECTION INTRAVENOUS; SUBCUTANEOUS at 13:28

## 2023-09-04 RX ADMIN — FLUTICASONE FUROATE AND VILANTEROL TRIFENATATE 1 PUFF: 200; 25 POWDER RESPIRATORY (INHALATION) at 09:19

## 2023-09-04 RX ADMIN — ASPIRIN 81 MG CHEWABLE TABLET 81 MG: 81 TABLET CHEWABLE at 09:15

## 2023-09-04 RX ADMIN — ALBUTEROL SULFATE 2 PUFF: 90 AEROSOL, METERED RESPIRATORY (INHALATION) at 09:19

## 2023-09-04 RX ADMIN — ATORVASTATIN CALCIUM 20 MG: 20 TABLET, FILM COATED ORAL at 16:59

## 2023-09-04 RX ADMIN — OXYCODONE HYDROCHLORIDE 5 MG: 5 TABLET ORAL at 09:20

## 2023-09-04 RX ADMIN — METHOCARBAMOL 500 MG: 500 TABLET ORAL at 21:02

## 2023-09-04 RX ADMIN — METHOCARBAMOL 500 MG: 500 TABLET ORAL at 09:16

## 2023-09-04 RX ADMIN — HEPARIN SODIUM 5000 UNITS: 5000 INJECTION INTRAVENOUS; SUBCUTANEOUS at 21:03

## 2023-09-04 RX ADMIN — METHOCARBAMOL 500 MG: 500 TABLET ORAL at 16:59

## 2023-09-04 RX ADMIN — OXYCODONE HYDROCHLORIDE 5 MG: 5 TABLET ORAL at 21:14

## 2023-09-04 RX ADMIN — MICONAZOLE NITRATE 1 APPLICATION: 20 POWDER TOPICAL at 17:00

## 2023-09-04 RX ADMIN — AMLODIPINE BESYLATE 10 MG: 10 TABLET ORAL at 09:17

## 2023-09-04 RX ADMIN — HEPARIN SODIUM 5000 UNITS: 5000 INJECTION INTRAVENOUS; SUBCUTANEOUS at 05:10

## 2023-09-04 RX ADMIN — CYANOCOBALAMIN TAB 500 MCG 1000 MCG: 500 TAB at 09:15

## 2023-09-04 RX ADMIN — OXYCODONE HYDROCHLORIDE 5 MG: 5 TABLET ORAL at 00:15

## 2023-09-04 RX ADMIN — INSULIN LISPRO 1 UNITS: 100 INJECTION, SOLUTION INTRAVENOUS; SUBCUTANEOUS at 21:10

## 2023-09-04 RX ADMIN — METFORMIN HYDROCHLORIDE 500 MG: 500 TABLET ORAL at 16:59

## 2023-09-04 RX ADMIN — Medication 3 MG: at 21:03

## 2023-09-04 RX ADMIN — GABAPENTIN 300 MG: 300 CAPSULE ORAL at 16:59

## 2023-09-04 RX ADMIN — LOSARTAN POTASSIUM 100 MG: 50 TABLET, FILM COATED ORAL at 09:17

## 2023-09-04 RX ADMIN — MICONAZOLE NITRATE 1 APPLICATION: 20 POWDER TOPICAL at 09:18

## 2023-09-04 RX ADMIN — PANTOPRAZOLE SODIUM 40 MG: 40 TABLET, DELAYED RELEASE ORAL at 05:10

## 2023-09-04 RX ADMIN — FERROUS SULFATE TAB 325 MG (65 MG ELEMENTAL FE) 325 MG: 325 (65 FE) TAB at 09:15

## 2023-09-04 RX ADMIN — Medication 2.5 MG: at 17:52

## 2023-09-04 RX ADMIN — GABAPENTIN 300 MG: 300 CAPSULE ORAL at 21:02

## 2023-09-04 RX ADMIN — GABAPENTIN 300 MG: 300 CAPSULE ORAL at 09:16

## 2023-09-04 NOTE — PROGRESS NOTES
09/04/23 1000   Pain Assessment   Pain Assessment Tool 0-10   Pain Score 5   Pain Location/Orientation Orientation: Right;Location: Groin   Hospital Pain Intervention(s) Repositioned; Emotional support   Restrictions/Precautions   Precautions Bed/chair alarms; Fall Risk   Weight Bearing Restrictions No   ROM Restrictions No   Braces or Orthoses Sling   Lying to Sitting on Side of Bed   Type of Assistance Needed Physical assistance   Physical Assistance Level 26%-50%   Comment Pt with improved activation of core and L body, continues to require A with R body. Lying to Sitting on Side of Bed CARE Score 3   Sit to Stand   Type of Assistance Needed Physical assistance   Physical Assistance Level 76% or more   Comment Pt engaged in sit to stand at bed rail with heavy blocking of R body to maintain balance and safety in stance. Sit to Stand CARE Score 2   Bed-Chair Transfer   Type of Assistance Needed Physical assistance   Physical Assistance Level Total assistance   Comment A x 2 beasy board, continue to engage in swapouts in stance as well to promote inc WBing through R body. Chair/Bed-to-Chair Transfer CARE Score 1   Neuromuscular Education   Comments Pt engaged in horizontal ab/adduction while seated at tabletop with use of air cast to block elbow/wrist/digits and powder on tabletop to reduce friction. Pt requires A to achieve full range of abduction demonstrating ongoing trap and rhomboid weakness but is able to achieve aDduction demonstrating improved pec and deltoid activation. NMES Saebo go applied to pt's RUE forearm extensors while focusing on sustained grasp and release of small ball to promote functional movement patterns. Pt fatigues easily and requires rest breaks to manage but overall does tolerate. Cognition   Overall Cognitive Status Impaired   Arousal/Participation Alert; Cooperative   Attention Attends with cues to redirect   Orientation Level Oriented X4   Memory Decreased short term memory Following Commands Follows one step commands with increased time or repetition   Activity Tolerance   Activity Tolerance Patient limited by pain; Patient limited by fatigue   Assessment   Treatment Assessment Pt participated in skilled OT services with focus on functional txfers, RUE NMR, and bed mobility. Pt's sister present to observe session. Pt highly motivated to participate. Pt continues to present with RUE weakness, demonstrating increased thumb flexion. Also improving in proximal activation at pecs. Ongoing basic eduation on stroke prevention and education and basics of NPP during session to reinforce carryover of new learning. Pt will continue to benefit from skilled OT services with focus on REOGO use, ADL retraining, functional txfers, and RUE NMR. Prognosis Fair   Problem List Decreased strength;Decreased range of motion;Decreased endurance; Impaired balance;Decreased mobility; Decreased coordination; Impaired sensation;Obesity;Orthopedic restrictions;Pain   Plan   Treatment/Interventions ADL retraining;Functional transfer training; Therapeutic exercise; Endurance training;Patient/family training;Equipment eval/education; Bed mobility; Compensatory technique education   Progress Slow progress, decreased activity tolerance   Recommendation   OT Discharge Recommendation   (pending progress)   OT Therapy Minutes   OT Time In 1000   OT Time Out 1115   OT Total Time (minutes) 75   OT Mode of treatment - Individual (minutes) 75   OT Mode of treatment - Concurrent (minutes) 0   OT Mode of treatment - Group (minutes) 0   OT Mode of treatment - Co-treat (minutes) 0   OT Mode of Treatment - Total time(minutes) 75 minutes   OT Cumulative Minutes 805   Therapy Time missed   Time missed?  No

## 2023-09-04 NOTE — PROGRESS NOTES
09/04/23 1430   Pain Assessment   Pain Assessment Tool 0-10   Pain Score No Pain   Restrictions/Precautions   Precautions Aphasia;Bed/chair alarms;Cognitive; Fall Risk;Supervision on toilet/commode   Weight Bearing Restrictions No   ROM Restrictions No   Braces or Orthoses Sling   Comprehension   Comprehension (FIM) 5 - Understands basic directions and conversation   Expression   Expression (FIM) 4 - Needs to repeat single words   Social Interaction   Social Interaction (FIM) 6 - Interacts appropriately with others BUT requires extra  time   Problem Solving   Problem solving (FIM) 4 - Solves basic problems 75-89% of time   Memory   Memory (FIM) 4 - Recognizes/recalls/performs 75-89%   Speech/Language/Cognition Assessmetn   Treatment Assessment Pt seen for skilled speech therapy session targeting cognitive linguistic communication skills. Pt sleeping upon arrival, easily awoken and agreeable to session. Pt recalled events from today- OT only and worked with Siria Marcial. Recalled her sister present and visited during session. Picked up with education on memory strategies from previous session ending with memory task. Reviewed Memory Tips packet in regards to different types of memory (LT, ST, working/immediate). Reviewed compensatory strategies in detail and how to implement them at home. Pt was already using some strategies including alarms/phone reminders, writing things down and organization. Pt then inquired about an jada on her phone which [facility] OT helped her download and she liked it- stated it "helped" with visualizing and memory. However pt stated "I accidentally deleted it". Reviewed pt's phone with her including her previously downloaded apps and eventually was able to find it on a different page of her screen. Pt opened it and explained it to this SLP, demonstrating the different activities and prompts it included. At end of session, pt required assistance given urinary incontinence.  Pt instructed to ring the bell to ask for assistance. Pt expressed frustration regarding bladder control and her limitations physically with not being able to get up and get to the bathroom herself faster. Provided emotional support and discussed toileting program to be brought up to team if she is already not on one as she relies on purewick while in bed. Pt in agreement. Plan to cont to target word finding and functional cognitive tasks in upcoming sessions. Pt overall is improving with skilled SLP services and will cont to benefit to maximize overall cognitive linguistic communication abilities at this time. SLP Therapy Minutes   SLP Time In 1430   SLP Time Out 1500   SLP Total Time (minutes) 30   SLP Mode of treatment - Individual (minutes) 30   SLP Mode of treatment - Concurrent (minutes) 0   SLP Mode of treatment - Group (minutes) 0   SLP Mode of treatment - Co-treat (minutes) 0   SLP Mode of Treatment - Total time(minutes) 30 minutes   SLP Cumulative Minutes 285   Therapy Time missed   Time missed?  No

## 2023-09-04 NOTE — PLAN OF CARE
Problem: INFECTION - ADULT  Goal: Absence or prevention of progression during hospitalization  Description: INTERVENTIONS:  - Assess and monitor for signs and symptoms of infection  - Monitor lab/diagnostic results  - Monitor all insertion sites, i.e. indwelling lines, tubes, and drains  - Monitor endotracheal if appropriate and nasal secretions for changes in amount and color  - Independence appropriate cooling/warming therapies per order  - Administer medications as ordered  - Instruct and encourage patient and family to use good hand hygiene technique  - Identify and instruct in appropriate isolation precautions for identified infection/condition  Outcome: Progressing

## 2023-09-05 LAB
GLUCOSE SERPL-MCNC: 117 MG/DL (ref 65–140)
GLUCOSE SERPL-MCNC: 128 MG/DL (ref 65–140)
GLUCOSE SERPL-MCNC: 128 MG/DL (ref 65–140)
GLUCOSE SERPL-MCNC: 204 MG/DL (ref 65–140)

## 2023-09-05 PROCEDURE — 82948 REAGENT STRIP/BLOOD GLUCOSE: CPT

## 2023-09-05 PROCEDURE — 97110 THERAPEUTIC EXERCISES: CPT

## 2023-09-05 PROCEDURE — 99232 SBSQ HOSP IP/OBS MODERATE 35: CPT | Performed by: INTERNAL MEDICINE

## 2023-09-05 PROCEDURE — 97530 THERAPEUTIC ACTIVITIES: CPT

## 2023-09-05 PROCEDURE — 97112 NEUROMUSCULAR REEDUCATION: CPT

## 2023-09-05 PROCEDURE — 97535 SELF CARE MNGMENT TRAINING: CPT

## 2023-09-05 PROCEDURE — 99232 SBSQ HOSP IP/OBS MODERATE 35: CPT | Performed by: STUDENT IN AN ORGANIZED HEALTH CARE EDUCATION/TRAINING PROGRAM

## 2023-09-05 RX ORDER — DOCUSATE SODIUM 100 MG/1
100 CAPSULE, LIQUID FILLED ORAL 2 TIMES DAILY
Status: DISCONTINUED | OUTPATIENT
Start: 2023-09-05 | End: 2023-09-11

## 2023-09-05 RX ORDER — SENNOSIDES 8.6 MG
2 TABLET ORAL
Status: DISCONTINUED | OUTPATIENT
Start: 2023-09-05 | End: 2023-09-11

## 2023-09-05 RX ADMIN — ASPIRIN 81 MG CHEWABLE TABLET 81 MG: 81 TABLET CHEWABLE at 08:51

## 2023-09-05 RX ADMIN — GABAPENTIN 300 MG: 300 CAPSULE ORAL at 16:44

## 2023-09-05 RX ADMIN — GABAPENTIN 300 MG: 300 CAPSULE ORAL at 08:51

## 2023-09-05 RX ADMIN — METHOCARBAMOL 500 MG: 500 TABLET ORAL at 16:44

## 2023-09-05 RX ADMIN — FLUTICASONE FUROATE AND VILANTEROL TRIFENATATE 1 PUFF: 200; 25 POWDER RESPIRATORY (INHALATION) at 08:49

## 2023-09-05 RX ADMIN — HEPARIN SODIUM 5000 UNITS: 5000 INJECTION INTRAVENOUS; SUBCUTANEOUS at 05:53

## 2023-09-05 RX ADMIN — INSULIN LISPRO 2 UNITS: 100 INJECTION, SOLUTION INTRAVENOUS; SUBCUTANEOUS at 11:08

## 2023-09-05 RX ADMIN — OXYCODONE HYDROCHLORIDE 5 MG: 5 TABLET ORAL at 19:13

## 2023-09-05 RX ADMIN — CYANOCOBALAMIN TAB 500 MCG 1000 MCG: 500 TAB at 08:51

## 2023-09-05 RX ADMIN — FERROUS SULFATE TAB 325 MG (65 MG ELEMENTAL FE) 325 MG: 325 (65 FE) TAB at 09:08

## 2023-09-05 RX ADMIN — ATORVASTATIN CALCIUM 20 MG: 20 TABLET, FILM COATED ORAL at 16:44

## 2023-09-05 RX ADMIN — SENNOSIDES 17.2 MG: 8.6 TABLET, FILM COATED ORAL at 21:06

## 2023-09-05 RX ADMIN — DOCUSATE SODIUM 100 MG: 100 CAPSULE ORAL at 13:37

## 2023-09-05 RX ADMIN — OXYCODONE HYDROCHLORIDE 5 MG: 5 TABLET ORAL at 04:07

## 2023-09-05 RX ADMIN — MICONAZOLE NITRATE 1 APPLICATION: 20 POWDER TOPICAL at 08:57

## 2023-09-05 RX ADMIN — PANTOPRAZOLE SODIUM 40 MG: 40 TABLET, DELAYED RELEASE ORAL at 05:53

## 2023-09-05 RX ADMIN — Medication 3 MG: at 21:06

## 2023-09-05 RX ADMIN — METHOCARBAMOL 500 MG: 500 TABLET ORAL at 08:51

## 2023-09-05 RX ADMIN — OXYCODONE HYDROCHLORIDE 5 MG: 5 TABLET ORAL at 13:40

## 2023-09-05 RX ADMIN — OXYCODONE HYDROCHLORIDE 5 MG: 5 TABLET ORAL at 08:51

## 2023-09-05 RX ADMIN — AMLODIPINE BESYLATE 10 MG: 10 TABLET ORAL at 08:51

## 2023-09-05 RX ADMIN — HEPARIN SODIUM 5000 UNITS: 5000 INJECTION INTRAVENOUS; SUBCUTANEOUS at 21:06

## 2023-09-05 RX ADMIN — MICONAZOLE NITRATE 1 APPLICATION: 20 POWDER TOPICAL at 17:50

## 2023-09-05 RX ADMIN — POLYETHYLENE GLYCOL 3350 17 G: 17 POWDER, FOR SOLUTION ORAL at 08:51

## 2023-09-05 RX ADMIN — METFORMIN HYDROCHLORIDE 500 MG: 500 TABLET ORAL at 09:08

## 2023-09-05 RX ADMIN — LOSARTAN POTASSIUM 100 MG: 50 TABLET, FILM COATED ORAL at 08:51

## 2023-09-05 RX ADMIN — GABAPENTIN 300 MG: 300 CAPSULE ORAL at 21:06

## 2023-09-05 RX ADMIN — METFORMIN HYDROCHLORIDE 500 MG: 500 TABLET ORAL at 16:44

## 2023-09-05 RX ADMIN — HEPARIN SODIUM 5000 UNITS: 5000 INJECTION INTRAVENOUS; SUBCUTANEOUS at 13:37

## 2023-09-05 RX ADMIN — METHOCARBAMOL 500 MG: 500 TABLET ORAL at 21:06

## 2023-09-05 RX ADMIN — DOCUSATE SODIUM 100 MG: 100 CAPSULE ORAL at 19:11

## 2023-09-05 RX ADMIN — BUPROPION HYDROCHLORIDE 150 MG: 150 TABLET, FILM COATED, EXTENDED RELEASE ORAL at 08:51

## 2023-09-05 NOTE — PROGRESS NOTES
[facility] Occupational Therapy Daily Note  Patient Active Problem List   Diagnosis    Cigarette nicotine dependence in remission    Primary hypertension    Fibromyalgia    Continuous opioid dependence (HCC)    Moderate persistent asthma without complication    Diabetes mellitus type 2, controlled ([address])    Depression with anxiety    Sjogren's syndrome ([address])    Incarcerated umbilical hernia    Postoperative visit    Chronic bilateral low back pain with bilateral sciatica    Paresthesia of both feet    Right lumbosacral radiculopathy    CAD (coronary artery disease)    Abnormal CT of the chest    Class 2 obesity with body mass index (BMI) of 39.0 to 39.9 in adult    Chronic obstructive pulmonary disease with acute exacerbation (HCC)    Hyperlipidemia associated with type 2 diabetes mellitus (HCC)    ICH (intracerebral hemorrhage) (HCC)    Left leg pain    Anemia    GERD (gastroesophageal reflux disease)    Constipation    Hyponatremia       Past Medical History:   Diagnosis Date    Arthritis     Asthma     Continuous opioid dependence ([address]) 4/27/2022    Diabetes mellitus ([address])     Diverticulitis of colon     Fibromyalgia 04/26/2022    Headache(784.0)     History of mammogram 2018    History of tobacco abuse 04/26/2022    Hypertension     Nausea 04/29/2022    Obesity     Sjogren's syndrome ([address]) 10/19/2012    Urinary tract infection      Etiologic Diagnosis: Acute Left Thalamic Intracerebral Hemorrhage with edema  Restrictions/Precautions  Precautions: Bed/chair alarms, Fall Risk  Weight Bearing Restrictions: No  ROM Restrictions: No  Braces or Orthoses: Sling  ADL Team Goal: Patient will require supervision with ADLs with least restrictive device upon completion of rehab program  Occupational Therapy LTG's  Eating Oral care Bathing LB dress UB dress   Eating Goal: 05. Setup or clean-up assistance - [place] SETS UP or CLEANS UP, patient completes activity. [place] assists only prior to or following the activity.  Oral Hygiene Goal: 05. Setup or clean-up assistance - [place] SETS UP or CLEANS UP, patient completes activity. [place] assists only prior to or following the activity. Shower/bathe self Goal: 04. Supervision or touching assistance- [place] provides VERBAL CUES or supervision throughout activity. Lower body dressing Goal: 04. Supervision or touching assistance- [place] provides VERBAL CUES or supervision throughout activity. Upper body dressing Goal: 05. Setup or clean-up assistance - [place] SETS UP or CLEANS UP, patient completes activity. [place] assists only prior to or following the activity. Toileting Toilet txf Func txf IADL Med    Toileting hygiene Goal: 04. Supervision or touching assistance- [place] provides VERBAL CUES or supervision throughout activity. Toilet transfer Goal: 04. Supervision or touching assistance- [place] provides VERBAL CUES or supervision throughout activity. Chair/bed-to-chair transfer Goal: 04. Supervision or touching assistance- [place] provides VERBAL CUES or supervision throughout activity. Assist Level: Minimum Assist (full meal prep seated in WC pending pt progress) Assist Level: Independent (mod I with mediplanner which pt uses at baseline)   OT interventions: Treatment/Interventions: ADL retraining, Functional transfer training, Therapeutic exercise, Endurance training, Patient/family training, Equipment eval/education, Bed mobility, Compensatory technique education  Discharge Plan:  OT Discharge Recommendation:  (pending progress)   DME: Equipment Recommended:  (TBD),  ,      09/05/23 0705   Pain Assessment   Pain Assessment Tool 0-10   Pain Score No Pain   Lifestyle   Autonomy "that felt so good."   Oral Hygiene   Type of Assistance Needed Set-up / clean-up   Comment with L hand   Oral Hygiene CARE Score 5   Shower/Bathe Self   Type of Assistance Needed Physical assistance   Physical Assistance Level 51%-75%   Comment increased assist in shower today.  attempts for perineal hygiene in sitting and weight shifting were unsuccessful. completed in semi stance with LB dressing   Shower/Bathe Self CARE Score 2   Tub/Shower Transfer   Findings MAX A x2 slideboard to TUb bench   Upper Body Dressing   Type of Assistance Needed Physical assistance   Physical Assistance Level 51%-75%   Comment increases assist after shower today, dmpness and slightly tighter fitting shirt today   Upper Body Dressing CARE Score 2   Lower Body Dressing   Type of Assistance Needed Physical assistance   Physical Assistance Level 76% or more   Comment able to thread L LE into pants. REq assist for all other parts. compelted partial stance at bed rail. pt demo dec abilty to maintain upright posture, leans on bed rail. Lower Body Dressing CARE Score 2   Putting On/Taking Off Footwear   Type of Assistance Needed Physical assistance   Physical Assistance Level Total assistance   Putting On/Taking Off Footwear CARE Score 1   Lying to Sitting on Side of Bed   Type of Assistance Needed Physical assistance   Physical Assistance Level 26%-50%   Comment improved activation for R LE with slight abd noted. able to compelte rolling to R side of bed, assist for R UE push up for trunk   Lying to Sitting on Side of Bed CARE Score 3   Sit to Stand   Type of Assistance Needed Physical assistance   Physical Assistance Level 76% or more   Comment block R LE   Sit to Stand CARE Score 2   Bed-Chair Transfer   Type of Assistance Needed Physical assistance   Physical Assistance Level 76% or more   Comment beasy board   Chair/Bed-to-Chair Transfer CARE Score 2   Neuromuscular Education   Functional Movement Patterns Today no updates in AROM in shoudler/trap. today able to complete wrist flexion, and improved D2 extension   Assessment   Treatment Assessment Pt participated in skilled OT session focusing on functional ADL retraining with full shower, activity tolerance, activity modification, use of R UE into ADL tasks, and functional transfers.  See above for details on ADL function. Pt continues to demonstrate fatigue with activity limiting ability to complete transfers. Still difficulty moving to R side for activities requiring. Pt's right UE function currently Non Functional without neglect. No neglect of extremity, but no involvement in task performance. . Continue to focus neurological treatment plan:Repetitive Task training, Functional Electrical Stimulation, Mental Practice/Guided Imagery, Weight bearing, Mirror Therapy, Midline awareness and Joint protection training.    Prognosis Fair   Plan   Progress Slow progress, decreased activity tolerance   OT Therapy Minutes   OT Time In 0705   OT Time Out 0830   OT Total Time (minutes) 85   OT Mode of treatment - Individual (minutes) 85   OT Mode of treatment - Concurrent (minutes) 0   OT Mode of treatment - Group (minutes) 0   OT Mode of treatment - Co-treat (minutes) 0   OT Mode of Treatment - Total time(minutes) 85 minutes   OT Cumulative Minutes 890

## 2023-09-05 NOTE — PROGRESS NOTES
PM&R PROGRESS NOTE:  Karen Yuan 62 y.o. female MRN: [id number]  Unit/Bed#: -96 Encounter: [id number]        Rehabilitation Diagnosis: Impairment of mobility, safety, Activities of Daily Living (ADLs), and cognitive/communication skills due to Stroke:  01.2  Right Body Involvement (Left Brain)    HPI:  Karen Yuan is a 63-year-old female with history of hypertension, hyperlipidemia, diabetes type 2, obesity, eosinophilic asthma, COPD, fibromyalgia, Sjogren syndrome, lumbar spondylosis with grade 1 anterior spondylolisthesis who presents to the hospital on 8/14/2023 due to acute onset right upper and right lower extremity weakness with sensory loss. Additionally with right-sided facial droop and dysarthria. The patient was noted to have an acute intraparenchymal hemorrhage on CT in the left thalamic region with surrounding edema. A CTA was negative. Patient was initially placed on a Cardene drip. Course complicated by significant left-sided leg pain and was recommended on treatment for radicular symptoms including Tylenol, gabapentin and potentially steroids. Neurology was consulted and recommended MRI of the brain with and without contrast while holding antithrombotics. The MRI showed a stable left thalamic capsular intraparenchymal hemorrhage with surrounding edema without any other mass effect. A repeat CT was completed on 8/17/2023 after worsening symptoms including worsening speech slurring with stable findings. Additionally there was a rapid response after a fall with head strike on 8/18 with a repeat CT of the head without acute findings. Additionally antihypertensive regimen was altered as she was previously on telmisartan and atenolol and is currently on amlodipine, losartan and hydralazine with better control.  The patient was evaluated by the Rehabilitation team and deemed an appropriate candidate for comprehensive inpatient rehabilitation and admitted to the [facility] on 8/25/2023  3:19 PM    SUBJECTIVE: Patient seen face to face. Spouse at bedside. No acute issues overnight. Slept well. Denies pain. Discussed disability forms completion. Good appetite, voiding, LBM 9/3. Denies chest pain, shortness of breath, fever, chills, N/V, abdominal pain. ASSESSMENT: Stable, progressing      PLAN:  - disability forms completed  - constipation with colitis: adjusted docusate sodium BID and senokot hs, patient in agreement    Rehabilitation  • Functional deficits:  Self-care, right hemiparesis, mobility, cognitive impairment  • Continue current rehabilitation plan of care to maximize function.     • Functional update:   o PT: mobility- mod-max A, transfers- total A, ambulation- unable to assess d/t safety concerns, wheelchair mobility- mod-max A 150'  o OT: ADL: bathing- mod-max A, dressing- mod-max A, toileting- total A  o SLP: comprehension- understands basic conversations/directions, expression- repeat single words, social interaction- interacts appropriately with others BUT requires extra time, memory- recognizes/recall-min A, problem solving- min A  • Estimated Discharge: anticipated 4 week goals      Pain  • Gabapentin 300 mg TID  • Oxycodone 2.5-5 mg every 4 hours prn  • Rifaximin 500 mg every 6 hours prn    DVT prophylaxis  • Heparin sc    Bladder plan  • Continent    Bowel plan  • Continent  • Colace 100 mg BID  • Senokot 17.2 mg qhs  • Miralax daily  • Bisacodyl 5 mg tab daily prn  • Bisacodyl supp daily prn      * ICH (intracerebral hemorrhage) ([address])  Assessment & Plan  55-year-old female presents with right hemiplegia, dysarthria and facial asymmetry found to have an acute intraparenchymal hemorrhage in the left thalamic region with surrounding edema felt to be secondary to uncontrolled hypertension  · Had repeat CT of the head on 8/18 after a fall with head strike resulting in significant bruising of the face  · Cleared by neurology to restart aspirin 81 mg daily and atorvastatin 20 mg daily  · Evaluated by neurosurgery with no surgical intervention at this time  · Had a repeat head CT on 8/20 which has been stable  · Goal systolic blood pressure of less than 140  · Follow-up with neurology as an outpatient in 6 weeks (10/26)  · Physical and Occupational Therapy with goals for community discharge. Patient lives with her  in a mobile home with 5 steps to enter and he is able to assist 24/7  · Hemorrhagic stroke education, nutrition, neuropsychology  · Secondary stroke prophylaxis with hypertension control    Primary hypertension  Assessment & Plan  · Home regimen: Telmisartan and atenolol  · Current regimen: Norvasc 10 mg daily Cozaar 100 mg daily, hydralazine discontinued  · Monitor blood pressures especially in therapy and make adjustments as needed    Chronic bilateral low back pain with bilateral sciatica  Assessment & Plan  · Patient has a history of chronic low back pain with radicular symptoms in addition to fibromyalgia and myofascial pain syndrome. · She follows with Dr. Jolly Briggs from pain management has tried several medications as well as epidural steroid injections with little relief  · Imaging reveals lumbar degenerative disc disease at the L3-4, L4-5, L5-S1 level. Additionally facet hypertrophic changes and ligamentous laxity at the L4-5 level resulting in grade 1 anterior spondylolisthesis and mild canal narrowing with slight distortion of the left anterior lateral aspect of the thecal sac at this level with mild right greater than left neuroforaminal narrowing  · Multi modal pain with current medication regimen including oxycodone to be weaned as well as the Robaxin and gabapentin.   We will also consult neuropsychology  · Added Tylenol as well as Robaxin and as needed Valium    Diabetes mellitus type 2, controlled [facility])  Assessment & Plan  Lab Results   Component Value Date    HGBA1C 6.7 (H) 08/15/2023       Recent Labs     09/04/23  1539 09/04/23 2036 09/05/23  5595 09/05/23  1038   POCGLU 130 151* 128 204*     · Currently on carb controlled level 2 diet, metformin 500 mg twice daily  · Sliding scale insulin algorithm 3 with Accu-Cheks 4 times daily      Constipation  Assessment & Plan  · On admission had not had a bowel movement in at least 11 days  · Obtaining x-ray to evaluate stool burden  · Adding to bowel regimen including increasing senna and Colace, lactulose as needed and will be more aggressive pending results of x-ray  · Continue relatively aggressive regimen    Hyponatremia  Assessment & Plan  · Sodium 134 (previously 133)  · Continue following biweekly labs with no intervention at this point unless continues to trend down. CAD (coronary artery disease)  Assessment & Plan  · History of CAD currently on statin and aspirin that was restarted on 8/21 after clearance by neurology  · Beta-blocker held due to bradycardia  · Patient has not formally seen a cardiologist however this was diagnosed based on a CT PE study that was conducted in the ER showing mild CAD    GERD (gastroesophageal reflux disease)  Assessment & Plan  Continue Protonix    Hyperlipidemia associated with type 2 diabetes mellitus ([address])  Assessment & Plan  Continue atorvastatin 20 mg with dinner    Class 2 obesity with body mass index (BMI) of 39.0 to 39.9 in adult  Assessment & Plan  · BMI of 41.40 on admission  · Continue promoting weight loss and increased activity, diet and exercise  · Continue a consistent carbohydrate diet  · Nutrition consultation    Sjogren's syndrome ([address])  Assessment & Plan  · Patient states that she is been worked up for Sjogren's syndrome in the past and has had conflicting diagnosis he is stating that some physicians have diagnosed her with it and others stated she did not have it.   · She has not been on any medication treatment for this and does not actively follow-up with a rheumatologist    Depression with anxiety  Assessment & Plan  · Neuropsychology consultation poststroke with history of anxiety  · Continue Wellbutrin  mg in the morning. It has been 300 mg as an outpatient however it was decreased in acute care due to worry of decreasing seizure threshold in the setting of ICH    Moderate persistent asthma without complication  Assessment & Plan  · Follows with Dr. Sharan Hyde from pulmonology  · Home regimen includes Breo and as needed albuterol  · On equivalent here and added albuterol inhaler on admission to [facility] consultants medical co-management. Labs, medications, and imaging personally reviewed. ROS:  A ten point review of systems was completed on 09/05/23 and pertinent positives are listed in subjective section. All other systems reviewed were negative. Review of Systems     OBJECTIVE:   /72 (BP Location: Left arm)   Pulse 65   Temp 98.1 °F (36.7 °C) (Oral)   Resp 18   Ht 5' (1.524 m)   Wt 91 kg (200 lb 9.9 oz)   SpO2 96%   BMI 39.18 kg/m²     Physical Exam  Constitutional:       Appearance: Normal appearance. She is obese. HENT:      Head: Normocephalic and atraumatic. Mouth/Throat:      Mouth: Mucous membranes are moist.   Eyes:      Extraocular Movements: Extraocular movements intact. Cardiovascular:      Rate and Rhythm: Normal rate and regular rhythm. Pulses: Normal pulses. Heart sounds: Normal heart sounds. Pulmonary:      Effort: Pulmonary effort is normal.      Breath sounds: Normal breath sounds. Abdominal:      General: Bowel sounds are normal.      Palpations: Abdomen is soft. Musculoskeletal:         General: Normal range of motion. Cervical back: Normal range of motion. Skin:     General: Skin is warm and dry. Capillary Refill: Capillary refill takes less than 2 seconds. Neurological:      Mental Status: She is alert and oriented to person, place, and time. Sensory: Sensory deficit present. Motor: Weakness (see MMT below) present.       Coordination: Coordination abnormal. Psychiatric:         Mood and Affect: Mood normal.         Judgment: Judgment normal.       MMT:   Strength:   Right  Left  Site  Right  Left  Site    0 5  S Ab: Shoulder Abductors  0  5  HF: Hip Flexors    0 5  EF: Elbow Flexors  0  5 KF: Knee Flexors    0  5  EE: Elbow Extensors  0  5  KE: Knee Extensors    0  5  WE: Wrist Extensors   0 5  DR: Dorsi Flexors    1  5  FF: Finger Flexors  0 5  PF: Plantar Flexors    1  5  HI: Hand Intrinsics  0  5  EHL: Extensor Hallucis Longus       Lab Results   Component Value Date    WBC 8.42 09/04/2023    HGB 11.9 09/04/2023    HCT 37.0 09/04/2023    MCV 86 09/04/2023     09/04/2023     Lab Results   Component Value Date    SODIUM 134 (L) 09/04/2023    K 4.1 09/04/2023     09/04/2023    CO2 28 09/04/2023    BUN 16 09/04/2023    CREATININE 0.59 (L) 09/04/2023    GLUC 114 09/04/2023    CALCIUM 8.9 09/04/2023     Lab Results   Component Value Date    INR 1.06 08/15/2023    INR 0.94 08/14/2023    PROTIME 14.0 08/15/2023    PROTIME 13.2 08/14/2023         Current Facility-Administered Medications:   •  acetaminophen (TYLENOL) tablet 650 mg, 650 mg, Oral, Q6H PRN, Duane Cleaves, DO, 650 mg at 09/01/23 5953  •  albuterol (PROVENTIL HFA,VENTOLIN HFA) inhaler 2 puff, 2 puff, Inhalation, Q4H PRN, Duane Cleaves, DO, 2 puff at 09/04/23 0919  •  amLODIPine (NORVASC) tablet 10 mg, 10 mg, Oral, Daily, Duane Cleaves, DO, 10 mg at 09/05/23 1935  •  aspirin chewable tablet 81 mg, 81 mg, Oral, Daily, Duane Cleaves, DO, 81 mg at 09/05/23 0580  •  atorvastatin (LIPITOR) tablet 20 mg, 20 mg, Oral, Daily With Dinner, Duane Apodaca, DO, 20 mg at 09/04/23 1659  •  bisacodyl (DULCOLAX) EC tablet 5 mg, 5 mg, Oral, Daily PRN, Jhonny Coughlin, DO  •  bisacodyl (DULCOLAX) rectal suppository 10 mg, 10 mg, Rectal, Daily PRN, Peri Spangler MD, 10 mg at 09/03/23 1826  •  buPROPion (WELLBUTRIN XL) 24 hr tablet 150 mg, 150 mg, Oral, Duane ALEXANDRA, , 150 mg at 09/05/23 0851  •  calcium carbonate (TUMS) chewable tablet 1,000 mg, 1,000 mg, Oral, TID PRN, Laureen Tracy PA-C, 1,000 mg at 08/26/23 1241  •  cyanocobalamin (VITAMIN B-12) tablet 1,000 mcg, 1,000 mcg, Oral, Daily, [facility], DO, 1,000 mcg at 09/05/23 8409  •  diazepam (VALIUM) tablet 2 mg, 2 mg, Oral, BID PRN, [facility], DO, 2 mg at 09/01/23 2305  •  docusate sodium (COLACE) capsule 100 mg, 100 mg, Oral, BID, Tiffanie Barrios, ARSENIO, 100 mg at 09/05/23 1337  •  ferrous sulfate tablet 325 mg, 325 mg, Oral, Daily With Breakfast, [facility], DO, 325 mg at 09/05/23 0908  •  fluticasone-vilanterol 200-25 mcg/actuation 1 puff, 1 puff, Inhalation, Daily, [facility], DO, 1 puff at 09/05/23 1120  •  gabapentin (NEURONTIN) capsule 300 mg, 300 mg, Oral, TID, [facility], DO, 300 mg at 09/05/23 8852  •  heparin (porcine) subcutaneous injection 5,000 Units, 5,000 Units, Subcutaneous, Q8H [facility] & half-way, [facility], DO, 5,000 Units at 09/05/23 1337  •  insulin lispro (HumaLOG) 100 units/mL subcutaneous injection 1-6 Units, 1-6 Units, Subcutaneous, TID AC, 2 Units at 09/05/23 1108 **AND** Fingerstick Glucose (POCT), , , 4x Daily AC and at bedtime, [place], DO  •  insulin lispro (HumaLOG) 100 units/mL subcutaneous injection 1-6 Units, 1-6 Units, Subcutaneous, HS, [facility], DO, 1 Units at 09/04/23 2110  •  lactulose oral solution 20 g, 20 g, Oral, Daily PRN, [place], DO  •  losartan (COZAAR) tablet 100 mg, 100 mg, Oral, Daily, [place], DO, 100 mg at 09/05/23 0197  •  melatonin tablet 3 mg, 3 mg, Oral, HS, ARSENIO Kimball, 3 mg at 09/04/23 2103  •  metFORMIN (GLUCOPHAGE) tablet 500 mg, 500 mg, Oral, BID With Meals, ARSENIO Alcantar, 500 mg at 09/05/23 0908  •  methocarbamol (ROBAXIN) tablet 500 mg, 500 mg, Oral, TID, Roly Flower DO, 500 mg at 09/05/23 0851  •  miconazole (MICOTIN) 2 % powder 1 Application, 1 Application, Topical, BID, Roly Flower DO, 1 Application at 39/78/59 0857  •  ondansetron (ZOFRAN-ODT) dispersible tablet 4 mg, 4 mg, Oral, Q6H PRN, Maricruz Leahy, DO  •  oxyCODONE (ROXICODONE) IR tablet 5 mg, 5 mg, Oral, Q4H PRN, Remieda Traying, DO, 5 mg at 09/05/23 1340  •  oxyCODONE (ROXICODONE) split tablet 2.5 mg, 2.5 mg, Oral, Q4H PRN, Maricruz Leahy, DO, 2.5 mg at 09/04/23 1752  •  pantoprazole (PROTONIX) EC tablet 40 mg, 40 mg, Oral, Early Morning, Maricruz Leahy, DO, 40 mg at 09/05/23 9041  •  polyethylene glycol (MIRALAX) packet 17 g, 17 g, Oral, Daily, Maricruz Leahy, DO, 17 g at 09/05/23 2383  •  prochlorperazine (COMPAZINE) tablet 5 mg, 5 mg, Oral, Q6H PRN, Maricruz Leahy, DO  •  senna (SENOKOT) tablet 17.2 mg, 2 tablet, Oral, HS, ARSENIO Pinto    Past Medical History:   Diagnosis Date   • Arthritis    • Asthma    • Continuous opioid dependence ([address]) 4/27/2022   • Diabetes mellitus ([address])    • Diverticulitis of colon    • Fibromyalgia 04/26/2022   • Headache(784.0)    • History of mammogram 2018   • History of tobacco abuse 04/26/2022   • Hypertension    • Nausea 04/29/2022   • Obesity    • Sjogren's syndrome ([address]) 10/19/2012   • Urinary tract infection        Patient Active Problem List    Diagnosis Date Noted   • ICH (intracerebral hemorrhage) ([address]) 08/15/2023   • Primary hypertension 04/26/2022   • Chronic bilateral low back pain with bilateral sciatica 08/31/2022   • Diabetes mellitus type 2, controlled ([address]) 04/29/2022   • Constipation 08/25/2023   • Hyponatremia 08/31/2023   • CAD (coronary artery disease) 04/07/2023   • GERD (gastroesophageal reflux disease) 08/25/2023   • Anemia 08/16/2023   • Left leg pain 08/15/2023   • Chronic obstructive pulmonary disease with acute exacerbation ([address]) 07/26/2023   • Hyperlipidemia associated with type 2 diabetes mellitus ([address]) 07/26/2023   • Abnormal CT of the chest 06/28/2023   • Class 2 obesity with body mass index (BMI) of 39.0 to 39.9 in adult 06/28/2023   • Right lumbosacral radiculopathy 10/12/2022   • Paresthesia of both feet 08/31/2022 • Postoperative visit 05/24/2022   • Incarcerated umbilical hernia 03/16/7441   • Continuous opioid dependence ([facility]) 04/27/2022   • Moderate persistent asthma without complication 09/16/9392   • Cigarette nicotine dependence in remission 04/26/2022   • Fibromyalgia 04/26/2022   • Sjogren's syndrome ([facility]) 10/19/2012   • Depression with anxiety 09/18/2012        ARSENIO Molina  Physical Medicine and [place]

## 2023-09-05 NOTE — PLAN OF CARE
Problem: Prexisting or High Potential for Compromised Skin Integrity  Goal: Skin integrity is maintained or improved  Description: INTERVENTIONS:  - Identify patients at risk for skin breakdown  - Assess and monitor skin integrity  - Assess and monitor nutrition and hydration status  - Monitor labs   - Assess for incontinence   - Turn and reposition patient  - Assist with mobility/ambulation  - Relieve pressure over bony prominences  - Avoid friction and shearing  - Provide appropriate hygiene as needed including keeping skin clean and dry  - Evaluate need for skin moisturizer/barrier cream  - Collaborate with interdisciplinary team   - Patient/family teaching  - Consider wound care consult   Outcome: Progressing     Problem: PAIN - ADULT  Goal: Verbalizes/displays adequate comfort level or baseline comfort level  Description: Interventions:  - Encourage patient to monitor pain and request assistance  - Assess pain using appropriate pain scale  - Administer analgesics based on type and severity of pain and evaluate response  - Implement non-pharmacological measures as appropriate and evaluate response  - Consider cultural and social influences on pain and pain management  - Notify physician/advanced practitioner if interventions unsuccessful or patient reports new pain  Outcome: Progressing     Problem: INFECTION - ADULT  Goal: Absence or prevention of progression during hospitalization  Description: INTERVENTIONS:  - Assess and monitor for signs and symptoms of infection  - Monitor lab/diagnostic results  - Monitor all insertion sites, i.e. indwelling lines, tubes, and drains  - Monitor endotracheal if appropriate and nasal secretions for changes in amount and color  - [place] appropriate cooling/warming therapies per order  - Administer medications as ordered  - Instruct and encourage patient and family to use good hand hygiene technique  - Identify and instruct in appropriate isolation precautions for identified infection/condition  Outcome: Progressing  Goal: Absence of fever/infection during neutropenic period  Description: INTERVENTIONS:  - Monitor WBC    Outcome: Progressing     Problem: SAFETY ADULT  Goal: Patient will remain free of falls  Description: INTERVENTIONS:  - Educate patient/family on patient safety including physical limitations  - Instruct patient to call for assistance with activity   - Consult OT/PT to assist with strengthening/mobility   - Keep Call bell within reach  - Keep bed low and locked with side rails adjusted as appropriate  - Keep care items and personal belongings within reach  - Initiate and maintain comfort rounds  - Make Fall Risk Sign visible to staff  - Offer Toileting every 2 Hours, in advance of need  - Apply yellow socks and bracelet for high fall risk patients  - Consider moving patient to room near nurses station  Outcome: Progressing  Goal: Maintain or return to baseline ADL function  Description: INTERVENTIONS:  -  Assess patient's ability to carry out ADLs; assess patient's baseline for ADL function and identify physical deficits which impact ability to perform ADLs (bathing, care of mouth/teeth, toileting, grooming, dressing, etc.)  - Assess/evaluate cause of self-care deficits   - Assess range of motion  - Assess patient's mobility; develop plan if impaired  - Assess patient's need for assistive devices and provide as appropriate  - Encourage maximum independence but intervene and supervise when necessary  - Involve family in performance of ADLs  - Assess for home care needs following discharge   - Consider OT consult to assist with ADL evaluation and planning for discharge  - Provide patient education as appropriate  Outcome: Progressing  Goal: Maintains/Returns to pre admission functional level  Description: INTERVENTIONS:  - Perform BMAT or MOVE assessment daily.   - Set and communicate daily mobility goal to care team and patient/family/caregiver.    - Collaborate with rehabilitation services on mobility goals if consulted  - Perform Range of Motion 3 times a day. - Reposition patient every 2 hours. - Dangle patient 3 times a day  - Stand patient 3 times a day  - Ambulate patient 3 times a day  - Out of bed to chair 3 times a day   - Out of bed for meals 3 times a day  - Out of bed for toileting  - Record patient progress and toleration of activity level   Outcome: Progressing     Problem: DISCHARGE PLANNING  Goal: Discharge to home or other facility with appropriate resources  Description: INTERVENTIONS:  - Identify barriers to discharge w/patient and caregiver  - Arrange for needed discharge resources and transportation as appropriate  - Identify discharge learning needs (meds, wound care, etc.)  - Arrange for interpretive services to assist at discharge as needed  - Refer to Case Management Department for coordinating discharge planning if the patient needs post-hospital services based on physician/advanced practitioner order or complex needs related to functional status, cognitive ability, or social support system  Outcome: Progressing     Problem: Nutrition/Hydration-ADULT  Goal: Nutrient/Hydration intake appropriate for improving, restoring or maintaining nutritional needs  Description: Monitor and assess patient's nutrition/hydration status for malnutrition. Collaborate with interdisciplinary team and initiate plan and interventions as ordered. Monitor patient's weight and dietary intake as ordered or per policy. Utilize nutrition screening tool and intervene as necessary. Determine patient's food preferences and provide high-protein, high-caloric foods as appropriate.      INTERVENTIONS:  - Monitor oral intake, urinary output, labs, and treatment plans  - Assess nutrition and hydration status and recommend course of action  - Evaluate amount of meals eaten  - Assist patient with eating if necessary   - Allow adequate time for meals  - Recommend/ encourage appropriate diets, oral nutritional supplements, and vitamin/mineral supplements  - Order, calculate, and assess calorie counts as needed  - Recommend, monitor, and adjust tube feedings and TPN/PPN based on assessed needs  - Assess need for intravenous fluids  - Provide specific nutrition/hydration education as appropriate  - Include patient/family/caregiver in decisions related to nutrition  Outcome: Progressing

## 2023-09-05 NOTE — PROGRESS NOTES
09/05/23 1030   Pain Assessment   Pain Assessment Tool 0-10   Pain Score No Pain   Restrictions/Precautions   Precautions Bed/chair alarms;Cognitive; Fall Risk;Supervision on toilet/commode   Weight Bearing Restrictions No   ROM Restrictions No   Braces or Orthoses Sling  (R sling with WC mobilities and when OOB/out of recliner)   Cognition   Arousal/Participation Alert; Cooperative   Attention Attends with cues to redirect   Following Commands Follows one step commands with increased time or repetition   Subjective   Subjective Sean Montes says she is doing well this morning and is ready for PT. Her  is here today with her spending time together in her room. Sit to Stand   Type of Assistance Needed Physical assistance; Adaptive equipment;Verbal cues   Physical Assistance Level 51%-75%   Comment Mod-maxAx1, VC's for serquencing, R sling, L UE from armrest < > hemiwalker   Sit to Stand CARE Score 2   Bed-Chair Transfer   Type of Assistance Needed Physical assistance; Adaptive equipment   Physical Assistance Level Total assistance   Comment TAx2 (mod-maxA for WS and blocking L knee, standby of second clinician for safety) Beasy board recliner < > WC   Chair/Bed-to-Chair Transfer CARE Score 1   Transfer Bed/Chair/Wheelchair   Limitations Noted In Balance;Confidence;Problem Solving;UE Strength;LE Strength; Sequencing   Walk 10 Feet   Reason if not Attempted Safety concerns   Walk 10 Feet CARE Score 88   Walk 50 Feet with Two Turns   Reason if not Attempted Safety concerns   Walk 50 Feet with Two Turns CARE Score 88   Walk 150 Feet   Reason if not Attempted Safety concerns   Walk 150 Feet CARE Score 88   Walking 10 Feet on Uneven Surfaces   Reason if not Attempted Safety concerns   Walking 10 Feet on Uneven Surfaces CARE Score 88   Ambulation   Does the patient walk? 1. No, and walking goal is clinically indicated. Wheel 50 Feet with Two Turns   Type of Assistance Needed Verbal cues; Physical assistance   Physical Assistance Level 51%-75%   Comment Mod-maxAx1 for obstacle negotiation   Wheel 50 Feet with Two Turns CARE Score 2   Wheel 150 Feet   Type of Assistance Needed Physical assistance;Verbal cues   Physical Assistance Level 51%-75%   Comment Mod-maxAx1 for obstacle negotiation   Wheel 150 Feet CARE Score 2   Wheelchair mobility   Does the patient use a wheelchair? 1. Yes   Type of Wheelchair Used 1. Manual   Method Left upper extremity; Left lower extremity   Assistance Provided For Locking Brakes;Obstacles   Distance Level Surface (feet) 150 ft   Therapeutic Interventions   Neuromuscular Re-Education STS from [facility] with standby of second clinican for safety. Blocking R knee guarding with GB - > mod-maxA for upright propulsion, cues to externally push through floor and facilitate WS to L side x5 reps, sequencing from L arm rest to L hemiwalker x4 reps, x5 reps, seated in tall perch on WC with extra cushion underneath, blocking R knee, cone reaching activity in multiple planes challenging abd recruitment (6 cones) 2x2 rounds   Assessment   Treatment Assessment Beatrice Angelucci participated in her skilled PT session in the presence of her  who assisted with WC blocking today with STS transfers. Overall she appears to be moving with lesser assistance with Beasy board transfers but still demonstrates significant R side flacidity which not only affects her extremity utility but also her core control/recruitment for sitting balance. Worked on STS transfers with emphasis on L WS to maximize upright posture and subsequently core recruitment in tall perch to improve control of fwd WS to carryover to [employer] transfers. Per  he and pt's sister are continuing to work on filling out [facility] home setup sheets to be reviewed with main PT Sarah at upcoming sessions which will help to establish planning of DC location.  At end of session pt asked to remain in [facility] with leg rests elevated and R sling on as she said this felt more comfortable to her than being in her recliner. She was positioned in her WC with alarm on in the presence of her . Family/Caregiver Present Yes;    Problem List Decreased strength;Decreased range of motion;Decreased endurance; Impaired balance;Decreased coordination; Impaired sensation; Impaired tone;Decreased mobility   Barriers to Discharge Decreased caregiver support; Inaccessible home environment   PT Barriers   Physical Impairment Decreased strength;Decreased range of motion;Decreased endurance; Impaired balance;Decreased coordination; Impaired sensation; Impaired tone;Decreased mobility   Functional Limitation Car transfers; Ramp negotiation;Stair negotiation;Standing;Transfers; Walking; Wheelchair management   Plan   Treatment/Interventions LE strengthening/ROM; Therapeutic exercise; Endurance training;Patient/family training;Family   Progress Slow progress, decreased activity tolerance   Recommendation   PT Discharge Recommendation Home with home health rehabilitation   PT Therapy Minutes   PT Time In 1030   PT Time Out 1130   PT Total Time (minutes) 60   PT Mode of treatment - Individual (minutes) 60   PT Mode of treatment - Concurrent (minutes) 0   PT Mode of treatment - Group (minutes) 0   PT Mode of treatment - Co-treat (minutes) 0   PT Mode of Treatment - Total time(minutes) 60 minutes   PT Cumulative Minutes 765   Therapy Time missed   Time missed?  No

## 2023-09-05 NOTE — PROGRESS NOTES
Internal Medicine Progress Note  Patient: Stefania Santillan  Age/sex: 62 y.o. female  Medical Record #: [id number]      ASSESSMENT/PLAN: (Interval History)  Stefania Santillan is seen and examined and management for following issues:    ICH  • Seen by NS and no surgical intervention indicated. • Etio = HTN  • Was unable to tolerate MRI even with sedation  • Was cleared to resume ASA. • Continue atorvastatin. • Outpt follow-up with Neurology.     HTN  • Goal with ICH is SBP<140  • Home: Micardis 80mg qd/Atenolol 50mg qd  • Here: Norvasc 10 mg qd/Losartan 100mg qd  • (Micardis 80 mg qd = Losartan 100 mg qd)   • Stable; no changes today     DM type 2  • HA1C 6.7  • Home: Metformin 1000mg BID  • Here: Metformin 500 mg BID  • Continue QID Accuchecks/SSI and DM diet  • stable     Anemia  • Baseline Hgb 10 - 11. • Iron level 30/Ferritin 22/TIBC 371  • B12 was low normal at 221 on 8/15/23  • Continue iron and B12 supplementation. • stable     Anxiety  • Continue Wellbutrin XL. • Pt is taking 150mg instead of 300mg due to concerns for increased risk of seizure        Discharge date:  [facility]       The above assessment and plan was reviewed and updated as determined by my evaluation of the patient on 9/5/2023.     Labs:   Results from last 7 days   Lab Units 09/04/23  0714 08/31/23  0629   WBC Thousand/uL 8.42 10.36*   HEMOGLOBIN g/dL 11.9 12.1   HEMATOCRIT % 37.0 39.1   PLATELETS Thousands/uL 347 374     Results from last 7 days   Lab Units 09/04/23  0714 08/31/23  0629   SODIUM mmol/L 134* 133*   POTASSIUM mmol/L 4.1 4.2   CHLORIDE mmol/L 100 98   CO2 mmol/L 28 25   BUN mg/dL 16 21   CREATININE mg/dL 0.59* 0.70   CALCIUM mg/dL 8.9 9.4             Results from last 7 days   Lab Units 09/05/23  0655 09/04/23 2036 09/04/23  1539   POC GLUCOSE mg/dl 128 151* 130       Review of Scheduled Meds:  Current Facility-Administered Medications   Medication Dose Route Frequency Provider Last Rate   • acetaminophen  650 mg Oral Q6H PRN Valerie Hussein, DO     • albuterol  2 puff Inhalation Q4H PRN Elizabeth Aures, DO     • amLODIPine  10 mg Oral Daily Elizabeth Aures, DO     • aspirin  81 mg Oral Daily Elizabeth Aures, DO     • atorvastatin  20 mg Oral Daily With Dontae Reis, DO     • bisacodyl  5 mg Oral Daily PRN Angel Phalen, DO     • bisacodyl  10 mg Rectal Daily PRN Shirin Warner MD     • buPROPion  150 mg Oral QAM Elizabeth Aures, DO     • calcium carbonate  1,000 mg Oral TID PRN Laureen Tracy PA-C     • vitamin B-12  1,000 mcg Oral Daily Elizabeth Aures, DO     • diazepam  2 mg Oral BID PRN Elizabeth Aures, DO     • ferrous sulfate  325 mg Oral Daily With Breakfast Elizabeth Aures, DO     • fluticasone-vilanterol  1 puff Inhalation Daily Elizabeth Aures, DO     • gabapentin  300 mg Oral TID Elizabeth Aures, DO     • heparin (porcine)  5,000 Units Subcutaneous Q8H [facility] Elizabeth Aures, DO     • insulin lispro  1-6 Units Subcutaneous TID AC Elizabeth Aures, DO     • insulin lispro  1-6 Units Subcutaneous HS Elizabeth Aures, DO     • lactulose  20 g Oral Daily PRN Elizabeth Aures, DO     • losartan  100 mg Oral Daily Elizabeth Aures, DO     • melatonin  3 mg Oral HS Anisa Raphael, CRNP     • metFORMIN  500 mg Oral BID With Meals Candiss Squibb, CRNP     • methocarbamol  500 mg Oral TID Elizabeth Aures, DO     • miconazole  1 Application Topical BID Elizabeth Aures, DO     • ondansetron  4 mg Oral Q6H PRN Elizabeth Aures, DO     • oxyCODONE  5 mg Oral Q4H PRN Elizabeth Aures, DO     • oxyCODONE  2.5 mg Oral Q4H PRN Elizabeth Aures, DO     • pantoprazole  40 mg Oral Early Morning Elizabeth Aures, DO     • polyethylene glycol  17 g Oral Daily Elizabeth Aures, DO     • prochlorperazine  5 mg Oral Q6H PRN Elizabeth Aures, DO     • senna-docusate sodium  2 tablet Oral HS Elizabeth Aures, DO         Subjective/ HPI: Patient seen and examined. Patients overnight issues or events were reviewed with nursing or staff during rounds or morning huddle session.  New or overnight issues include the following:     No new or overnight issues. Offers no complaints    ROS:   A 10 point ROS was performed; negative except as noted above. *Labs /Radiology studies reviewed  *Medications reviewed and reconciled as needed  *Please refer to order section for additional ordered labs studies  *Case discussed with primary attending during morning huddle case rounds    Physical Examination:  Vitals:   Vitals:    09/04/23 2021 09/05/23 0553 09/05/23 0559 09/05/23 0851   BP: 110/70 108/78  122/76   BP Location: Left arm Left arm     Pulse: 91 80     Resp: 18 18     Temp: 98.6 °F (37 °C) 97.9 °F (36.6 °C)     TempSrc: Oral Oral     SpO2: 96% 97%     Weight:   91 kg (200 lb 9.9 oz)    Height:           General Appearance: no distress, conversive  HEENT: PERRLA, conjuctiva normal; oropharynx clear; mucous membranes moist   Neck:  Supple, normal ROM  Lungs: CTA, normal respiratory effort, no retractions, expiratory effort normal  CV: regular rate and rhythm; no rubs/murmurs/gallops, PMI normal   ABD: soft; ND/NT; +BS  EXT: no edema  Skin: normal turgor, normal texture, no rashes  Psych: affect normal, mood normal  Neuro: AAO      The above physical exam was reviewed and updated as determined by my evaluation of the patient on 9/5/2023. Invasive Devices     Drain  Duration           External Urinary Catheter 7 days                   VTE Pharmacologic Prophylaxis: Heparin  Code Status: Level 1 - Full Code  Current Length of Stay: 11 day(s)      Total time spent:  30 minutes with more than 50% spent counseling/coordinating care. Counseling includes discussion with patient re: progress  and discussion with patient of his/her current medical state/information. Coordination of patient's care was performed in conjunction with primary service. Time invested included review of patient's labs, vitals, and management of their comorbidities with continued monitoring.  In addition, this patient was discussed with medical team including physician and advanced extenders. The care of the patient was extensively discussed and appropriate treatment plan was formulated unique for this patient. Medical decision making for the day was made by supervising physician unless otherwise noted in their attestation statement. ** Please Note:  voice to text software may have been used in the creation of this document.  Although proof errors in transcription or interpretation are a potential of such software**

## 2023-09-05 NOTE — PLAN OF CARE
Problem: Prexisting or High Potential for Compromised Skin Integrity  Goal: Skin integrity is maintained or improved  Description: INTERVENTIONS:  - Identify patients at risk for skin breakdown  - Assess and monitor skin integrity  - Assess and monitor nutrition and hydration status  - Monitor labs   - Assess for incontinence   - Turn and reposition patient  - Assist with mobility/ambulation  - Relieve pressure over bony prominences  - Avoid friction and shearing  - Provide appropriate hygiene as needed including keeping skin clean and dry  - Evaluate need for skin moisturizer/barrier cream  - Collaborate with interdisciplinary team   - Patient/family teaching  - Consider wound care consult   Outcome: Progressing     Problem: PAIN - ADULT  Goal: Verbalizes/displays adequate comfort level or baseline comfort level  Description: Interventions:  - Encourage patient to monitor pain and request assistance  - Assess pain using appropriate pain scale  - Administer analgesics based on type and severity of pain and evaluate response  - Implement non-pharmacological measures as appropriate and evaluate response  - Consider cultural and social influences on pain and pain management  - Notify physician/advanced practitioner if interventions unsuccessful or patient reports new pain  Outcome: Progressing     Problem: INFECTION - ADULT  Goal: Absence or prevention of progression during hospitalization  Description: INTERVENTIONS:  - Assess and monitor for signs and symptoms of infection  - Monitor lab/diagnostic results  - Monitor all insertion sites, i.e. indwelling lines, tubes, and drains  - Monitor endotracheal if appropriate and nasal secretions for changes in amount and color  - [place] appropriate cooling/warming therapies per order  - Administer medications as ordered  - Instruct and encourage patient and family to use good hand hygiene technique  - Identify and instruct in appropriate isolation precautions for identified infection/condition  Outcome: Progressing  Goal: Absence of fever/infection during neutropenic period  Description: INTERVENTIONS:  - Monitor WBC    Outcome: Progressing     Problem: SAFETY ADULT  Goal: Patient will remain free of falls  Description: INTERVENTIONS:  - Educate patient/family on patient safety including physical limitations  - Instruct patient to call for assistance with activity   - Consult OT/PT to assist with strengthening/mobility   - Keep Call bell within reach  - Keep bed low and locked with side rails adjusted as appropriate  - Keep care items and personal belongings within reach  - Initiate and maintain comfort rounds  - Make Fall Risk Sign visible to staff  - Offer Toileting every 2 Hours, in advance of need  - Apply yellow socks and bracelet for high fall risk patients  - Consider moving patient to room near nurses station  Outcome: Progressing  Goal: Maintain or return to baseline ADL function  Description: INTERVENTIONS:  -  Assess patient's ability to carry out ADLs; assess patient's baseline for ADL function and identify physical deficits which impact ability to perform ADLs (bathing, care of mouth/teeth, toileting, grooming, dressing, etc.)  - Assess/evaluate cause of self-care deficits   - Assess range of motion  - Assess patient's mobility; develop plan if impaired  - Assess patient's need for assistive devices and provide as appropriate  - Encourage maximum independence but intervene and supervise when necessary  - Involve family in performance of ADLs  - Assess for home care needs following discharge   - Consider OT consult to assist with ADL evaluation and planning for discharge  - Provide patient education as appropriate  Outcome: Progressing  Goal: Maintains/Returns to pre admission functional level  Description: INTERVENTIONS:  - Perform BMAT or MOVE assessment daily.   - Set and communicate daily mobility goal to care team and patient/family/caregiver.    - Collaborate with rehabilitation services on mobility goals if consulted  - Perform Range of Motion 3 times a day. - Reposition patient every 2 hours. - Dangle patient 3 times a day  - Stand patient 3 times a day  - Ambulate patient 3 times a day  - Out of bed to chair 3 times a day   - Out of bed for meals 3 times a day  - Out of bed for toileting  - Record patient progress and toleration of activity level   Outcome: Progressing     Problem: DISCHARGE PLANNING  Goal: Discharge to home or other facility with appropriate resources  Description: INTERVENTIONS:  - Identify barriers to discharge w/patient and caregiver  - Arrange for needed discharge resources and transportation as appropriate  - Identify discharge learning needs (meds, wound care, etc.)  - Arrange for interpretive services to assist at discharge as needed  - Refer to Case Management Department for coordinating discharge planning if the patient needs post-hospital services based on physician/advanced practitioner order or complex needs related to functional status, cognitive ability, or social support system  Outcome: Progressing     Problem: Nutrition/Hydration-ADULT  Goal: Nutrient/Hydration intake appropriate for improving, restoring or maintaining nutritional needs  Description: Monitor and assess patient's nutrition/hydration status for malnutrition. Collaborate with interdisciplinary team and initiate plan and interventions as ordered. Monitor patient's weight and dietary intake as ordered or per policy. Utilize nutrition screening tool and intervene as necessary. Determine patient's food preferences and provide high-protein, high-caloric foods as appropriate.      INTERVENTIONS:  - Monitor oral intake, urinary output, labs, and treatment plans  - Assess nutrition and hydration status and recommend course of action  - Evaluate amount of meals eaten  - Assist patient with eating if necessary   - Allow adequate time for meals  - Recommend/ encourage appropriate diets, oral nutritional supplements, and vitamin/mineral supplements  - Order, calculate, and assess calorie counts as needed  - Recommend, monitor, and adjust tube feedings and TPN/PPN based on assessed needs  - Assess need for intravenous fluids  - Provide specific nutrition/hydration education as appropriate  - Include patient/family/caregiver in decisions related to nutrition  Outcome: Progressing

## 2023-09-05 NOTE — PROGRESS NOTES
[facility] Occupational Therapy Daily Note  Patient Active Problem List   Diagnosis   • Cigarette nicotine dependence in remission   • Primary hypertension   • Fibromyalgia   • Continuous opioid dependence ([address])   • Moderate persistent asthma without complication   • Diabetes mellitus type 2, controlled ([address])   • Depression with anxiety   • Sjogren's syndrome ([address])   • Incarcerated umbilical hernia   • Postoperative visit   • Chronic bilateral low back pain with bilateral sciatica   • Paresthesia of both feet   • Right lumbosacral radiculopathy   • CAD (coronary artery disease)   • Abnormal CT of the chest   • Class 2 obesity with body mass index (BMI) of 39.0 to 39.9 in adult   • Chronic obstructive pulmonary disease with acute exacerbation (HCC)   • Hyperlipidemia associated with type 2 diabetes mellitus (HCC)   • ICH (intracerebral hemorrhage) (HCC)   • Left leg pain   • Anemia   • GERD (gastroesophageal reflux disease)   • Constipation   • Hyponatremia       Past Medical History:   Diagnosis Date   • Arthritis    • Asthma    • Continuous opioid dependence ([address]) 4/27/2022   • Diabetes mellitus ([address])    • Diverticulitis of colon    • Fibromyalgia 04/26/2022   • Headache(784.0)    • History of mammogram 2018   • History of tobacco abuse 04/26/2022   • Hypertension    • Nausea 04/29/2022   • Obesity    • Sjogren's syndrome ([address]) 10/19/2012   • Urinary tract infection      Etiologic Diagnosis: Acute Left Thalamic Intracerebral Hemorrhage with edema  Restrictions/Precautions  Precautions: Bed/chair alarms, Fall Risk  Weight Bearing Restrictions: No  ROM Restrictions: No  Braces or Orthoses: Sling  ADL Team Goal: Patient will require supervision with ADLs with least restrictive device upon completion of rehab program  Occupational Therapy LTG's  Eating Oral care Bathing LB dress UB dress   Eating Goal: 05. Setup or clean-up assistance - [place] SETS UP or CLEANS UP, patient completes activity.  [place] assists only prior to or following the activity. Oral Hygiene Goal: 05. Setup or clean-up assistance - [place] SETS UP or CLEANS UP, patient completes activity. [place] assists only prior to or following the activity. Shower/bathe self Goal: 04. Supervision or touching assistance- [place] provides VERBAL CUES or supervision throughout activity. Lower body dressing Goal: 04. Supervision or touching assistance- [place] provides VERBAL CUES or supervision throughout activity. Upper body dressing Goal: 05. Setup or clean-up assistance - [place] SETS UP or CLEANS UP, patient completes activity. [place] assists only prior to or following the activity. Toileting Toilet txf Func txf IADL Med    Toileting hygiene Goal: 04. Supervision or touching assistance- [place] provides VERBAL CUES or supervision throughout activity. Toilet transfer Goal: 04. Supervision or touching assistance- [place] provides VERBAL CUES or supervision throughout activity. Chair/bed-to-chair transfer Goal: 04. Supervision or touching assistance- [place] provides VERBAL CUES or supervision throughout activity. Assist Level: Minimum Assist (full meal prep seated in WC pending pt progress) Assist Level: Independent (mod I with mediplanner which pt uses at baseline)   OT interventions: Treatment/Interventions: ADL retraining, Functional transfer training, Therapeutic exercise, Endurance training, Patient/family training, Equipment eval/education, Bed mobility, Compensatory technique education  Discharge Plan:  OT Discharge Recommendation:  (pending progress)   DME: Equipment Recommended:  (TBD),  ,      09/05/23 1230   Pain Assessment   Pain Assessment Tool 0-10   Pain Score 4   Pain Location/Orientation Location: [facility]   Hospital Pain Intervention(s) Emotional support   Lifestyle   Autonomy "did you see that?"   Neuromuscular Education   Functional Movement Patterns Today no updates in AROM in shoudler/trap. today able to complete wrist flexion to 1/4 range with grav eliminated. Able to complete slight D3 extension. Able to contract biceps in gravity eliminated. Pain in shoulder when placed in shoulder ABD to 90. Improved thumb AROM and fluidity with movement. Able to complete slight lateral pincer grasp. 4 lead NMES for shoulder delts, and latera/middle trap completed in 15 HZ 10:10. Completed shoulder shrugs with activation 15 min. Noted to increase flexor tone in hand with cycling of NMES. Removed shoulder NMES for distal work with [place] go on forearm extensors. Pt engages in grasp and release with objects. Pt engages in isolated D2 extension for playing her Ipad games. [place] required as pt was to control distal finger extension. Assessment   Treatment Assessment Pt participated in skilled OT treatment session with treatment focus on UE NMR and stroke education. Pt tolerated session well, happy to see her R UE improving. Cont to provide education and encouragement for long term process for healing and recovery. Pts supportive  is present for session and is encouraging also. Pt continues to make gains in R UE functional return. Still need to focus on proximal control and strength to make UE the most functional it can be. Prognosis Fair   Plan   Progress Slow progress, decreased activity tolerance   OT Therapy Minutes   OT Time In 1230   OT Time Out 1330   OT Total Time (minutes) 60   OT Mode of treatment - Individual (minutes) 60   OT Mode of treatment - Concurrent (minutes) 0   OT Mode of treatment - Group (minutes) 0   OT Mode of treatment - Co-treat (minutes) 0   OT Mode of Treatment - Total time(minutes) 60 minutes   OT Cumulative Minutes 950   Stroke Education Series    Pt participated in skilled Stroke Education Series in an individual setting to address the topic of Purpose of Rehab post stroke in both verbal and written formats.  Education within this session included a review of the individual roles of the rehab team, functions of the acute rehab center, and neuro rehabilitation treatment strategies. The goal of this education session was to provide the patient with an understanding of the overall importance of the therapy process. This section reviews neuroplasticity principles as well that includes how patiens can incorporate specificity, intensity, repetition and salience into their home exercise program. This allows the patient to connect neuro rehabilitation treatment strategies to his or her individual therapy process. The patients are engaged in conversation to incorporate activities they like to do into therapy sessions. Following education, the patient's response to education is: verbalizes understanding. Additional topics to individualize this section of stroke education include: adapting to stroke . Start Time: 1230    End Time: 1300    Stroke Education Series    Pt participated in skilled Stroke Education Series in an individualized setting to address the topic of What Comes Next post stroke in both verbal and written formats. Education within this session included information on recommendations and resources after leaving the [facility]. This education session links together what has been covered in previous stroke education classes to improve the patient's understanding of how managing risk factors for stroke, participating in therapy, working with family and friends in the rehab process, and reviewing their role in the rehab process will maximize outcomes and their functional gains. This education also incorporates what the patient wants to achieve and helps them set realistic goals. To individualize this education the following topics were covered: continued therapy (home versus outpatient). Following education, pt's response to education is: verbalizes understanding.       Start Time: 1300    End Time: 1330

## 2023-09-06 LAB
GLUCOSE SERPL-MCNC: 117 MG/DL (ref 65–140)
GLUCOSE SERPL-MCNC: 137 MG/DL (ref 65–140)
GLUCOSE SERPL-MCNC: 139 MG/DL (ref 65–140)
GLUCOSE SERPL-MCNC: 147 MG/DL (ref 65–140)

## 2023-09-06 PROCEDURE — 82948 REAGENT STRIP/BLOOD GLUCOSE: CPT

## 2023-09-06 PROCEDURE — 97110 THERAPEUTIC EXERCISES: CPT

## 2023-09-06 PROCEDURE — 97130 THER IVNTJ EA ADDL 15 MIN: CPT

## 2023-09-06 PROCEDURE — 99232 SBSQ HOSP IP/OBS MODERATE 35: CPT | Performed by: STUDENT IN AN ORGANIZED HEALTH CARE EDUCATION/TRAINING PROGRAM

## 2023-09-06 PROCEDURE — 97129 THER IVNTJ 1ST 15 MIN: CPT

## 2023-09-06 PROCEDURE — 97112 NEUROMUSCULAR REEDUCATION: CPT

## 2023-09-06 PROCEDURE — 99232 SBSQ HOSP IP/OBS MODERATE 35: CPT | Performed by: INTERNAL MEDICINE

## 2023-09-06 RX ORDER — BISACODYL 10 MG
10 SUPPOSITORY, RECTAL RECTAL ONCE
Status: DISCONTINUED | OUTPATIENT
Start: 2023-09-06 | End: 2023-09-20 | Stop reason: HOSPADM

## 2023-09-06 RX ORDER — BISACODYL 5 MG/1
5 TABLET, DELAYED RELEASE ORAL DAILY
Status: DISCONTINUED | OUTPATIENT
Start: 2023-09-06 | End: 2023-09-07

## 2023-09-06 RX ADMIN — OXYCODONE HYDROCHLORIDE 5 MG: 5 TABLET ORAL at 22:21

## 2023-09-06 RX ADMIN — GABAPENTIN 300 MG: 300 CAPSULE ORAL at 17:15

## 2023-09-06 RX ADMIN — MICONAZOLE NITRATE 1 APPLICATION: 20 POWDER TOPICAL at 17:17

## 2023-09-06 RX ADMIN — FLUTICASONE FUROATE AND VILANTEROL TRIFENATATE 1 PUFF: 200; 25 POWDER RESPIRATORY (INHALATION) at 08:40

## 2023-09-06 RX ADMIN — GABAPENTIN 300 MG: 300 CAPSULE ORAL at 22:16

## 2023-09-06 RX ADMIN — HEPARIN SODIUM 5000 UNITS: 5000 INJECTION INTRAVENOUS; SUBCUTANEOUS at 14:42

## 2023-09-06 RX ADMIN — SENNOSIDES 17.2 MG: 8.6 TABLET, FILM COATED ORAL at 22:17

## 2023-09-06 RX ADMIN — MICONAZOLE NITRATE 1 APPLICATION: 20 POWDER TOPICAL at 08:41

## 2023-09-06 RX ADMIN — Medication 3 MG: at 22:17

## 2023-09-06 RX ADMIN — GABAPENTIN 300 MG: 300 CAPSULE ORAL at 08:38

## 2023-09-06 RX ADMIN — OXYCODONE HYDROCHLORIDE 5 MG: 5 TABLET ORAL at 10:51

## 2023-09-06 RX ADMIN — METHOCARBAMOL 500 MG: 500 TABLET ORAL at 17:15

## 2023-09-06 RX ADMIN — METFORMIN HYDROCHLORIDE 500 MG: 500 TABLET ORAL at 08:41

## 2023-09-06 RX ADMIN — BISACODYL 5 MG: 5 TABLET, COATED ORAL at 10:51

## 2023-09-06 RX ADMIN — OXYCODONE HYDROCHLORIDE 5 MG: 5 TABLET ORAL at 05:54

## 2023-09-06 RX ADMIN — METHOCARBAMOL 500 MG: 500 TABLET ORAL at 22:17

## 2023-09-06 RX ADMIN — BUPROPION HYDROCHLORIDE 150 MG: 150 TABLET, FILM COATED, EXTENDED RELEASE ORAL at 08:41

## 2023-09-06 RX ADMIN — HEPARIN SODIUM 5000 UNITS: 5000 INJECTION INTRAVENOUS; SUBCUTANEOUS at 05:57

## 2023-09-06 RX ADMIN — CYANOCOBALAMIN TAB 500 MCG 1000 MCG: 500 TAB at 08:38

## 2023-09-06 RX ADMIN — DOCUSATE SODIUM 100 MG: 100 CAPSULE ORAL at 08:38

## 2023-09-06 RX ADMIN — ASPIRIN 81 MG CHEWABLE TABLET 81 MG: 81 TABLET CHEWABLE at 08:38

## 2023-09-06 RX ADMIN — PANTOPRAZOLE SODIUM 40 MG: 40 TABLET, DELAYED RELEASE ORAL at 05:57

## 2023-09-06 RX ADMIN — ATORVASTATIN CALCIUM 20 MG: 20 TABLET, FILM COATED ORAL at 17:15

## 2023-09-06 RX ADMIN — OXYCODONE HYDROCHLORIDE 5 MG: 5 TABLET ORAL at 18:42

## 2023-09-06 RX ADMIN — HEPARIN SODIUM 5000 UNITS: 5000 INJECTION INTRAVENOUS; SUBCUTANEOUS at 22:17

## 2023-09-06 RX ADMIN — FERROUS SULFATE TAB 325 MG (65 MG ELEMENTAL FE) 325 MG: 325 (65 FE) TAB at 08:41

## 2023-09-06 RX ADMIN — METFORMIN HYDROCHLORIDE 500 MG: 500 TABLET ORAL at 17:16

## 2023-09-06 RX ADMIN — DOCUSATE SODIUM 100 MG: 100 CAPSULE ORAL at 17:15

## 2023-09-06 RX ADMIN — METHOCARBAMOL 500 MG: 500 TABLET ORAL at 08:38

## 2023-09-06 NOTE — PROGRESS NOTES
09/06/23 1040   Pain Assessment   Pain Assessment Tool 0-10   Pain Score 5   Pain Location/Orientation Orientation: Right;Location: Groin; Location: Arm   Pain Onset/Description Onset: Ongoing; Onset: Gradual   Hospital Pain Intervention(s) Repositioned; Rest  (RN administered medication)   Restrictions/Precautions   Precautions Bed/chair alarms;Cognitive; Fall Risk;Supervision on toilet/commode;Pain   Comprehension   Comprehension (FIM) 5 - Needs help/cues, repetition only RARELY ( < 10% of the time)   Expression   Expression (FIM) 5 - Needs help/cues only RARELY (< 10% of the time)   Social Interaction   Social Interaction (FIM) 6 - Interacts appropriately with others BUT requires extra  time   Problem Solving   Problem solving (FIM) 4 - Solves basic problems 75-89% of time   Memory   Memory (FIM) 4 - Recognizes/recalls/performs 75-89%   Speech/Language/Cognition Assessment   Treatment Assessment Pt participated in skilled SLP session focusing on cognitive linguistic skills. As this SLP is new to pt's care, engaged in brief rapport building to begin session. Pt able to effectively express info about herself, family support, work history and reason for hospitalization. Targeting reading comprehension, attention and sequencing, pt accurately sequenced written 4-step activities for 8/8 trials. When increased to 5 steps, pt correctly sequenced all 5 steps of common daily activities for 7/8 trials, noting ability to promptly correct her single error when assisted by SLP in identifying her error. Engaged in a drawing conclusions task, pt was verbally presented with descriptions of activities which utilized vague verbiage where she determined the described activity in 13/15 trials, independently. Provided with min verbal cues and gestural cues in one trial, pt improved to 15/15 accuracy.  Lastly, pt unable to clearly recall if she has reviewed medications in previous session, stating "I might have."  However, as noted in previous SLP notes, it was confirmed that medications have been reviewed in the past ~ 1 week prior to this session. Therefore, SLP engaged pt in a review of her current medication list. When SLP provided pt with the name of each medication, pt appeared confident in her recall of which medications are newly prescribed since this admission vs those that she had been taking at home. Including pills, injections, inhalers, etc, pt accurately recalled the reason/use for 15/20 medications and was provided with education on remaining meds. Pt with only intermittent recall of the dosages but did present with improved recall of additional details surrounding her previously taken medications. Throughout session, pt presented with improved word retrieval skills as no overt word finding difficulties were observed in conversation today. At this time, pt will continue to benefit from ongoing skilled SLP services to continue to maximize overall functional independence of cognitive linguistic skills to decrease caregiver burden over time. SLP Therapy Minutes   SLP Time In 3606   SLP Time Out 1140   SLP Total Time (minutes) 60   SLP Mode of treatment - Individual (minutes) 60   SLP Mode of treatment - Concurrent (minutes) 0   SLP Mode of treatment - Group (minutes) 0   SLP Mode of treatment - Co-treat (minutes) 0   SLP Mode of Treatment - Total time(minutes) 60 minutes   SLP Cumulative Minutes 345   Therapy Time missed   Time missed?  No

## 2023-09-06 NOTE — PROGRESS NOTES
Pt engages in [address] training session with use of arm trough for right UE neuromuscular re-education for increased muscular facilitation and to increase overall motor control, shoulder stability to improve UE function for ADLs. Pt provided with extensive EDU for purpose of muscular facilitation and incorporation of visualization. Pt completed forward thrust, 80 reps in guided motion at 120 speed. Pt is able to safely complete entirety of training session w/ no C/O pain and self perceived exertion within personal tolerance. Pt engages in [address] training session with use of arm trough for right UE neuromuscular re-education for increased muscular facilitation and to increase overall motor control, shoulder stability to improve UE function for ADLs. Username "[email or web address] [phone]."  Pt provided with extensive EDU for purpose of muscular facilitation and incorporation of visualization. Pt completed forward thrust, 20 reps in guided motion at 120 speed. Pt completed forward reach with 5 point target, 00 reps in guided motion at 120 speed. Pt completed forward thrust, 10 reps in initiated motion at 120 speed at Low force. Able to initiate forward thrust, but unable to complete retraction. Pt is able to safely complete entirety of training session w/ no C/O pain and self perceived exertion within personal tolerance.

## 2023-09-06 NOTE — CASE MANAGEMENT
Tom received mss[place] from [place]is [place] [facility] 7 [place] with next review due 9/8.

## 2023-09-06 NOTE — PROGRESS NOTES
[address]  CLINICAL PROGRESS NOTE   Jericho Noguera 62 y.o. :1965 female MRN: [id number]  DOS:23  Unit/Bed#: -01 Encounter: [id number]      Requested by (Physician/Service): Rashard Galvan DO      HISTORY: Jericho Noguera is a 59-year-old female with history of hypertension, hyperlipidemia, diabetes type 2, obesity, eosinophilic asthma, COPD, fibromyalgia, Sjogren syndrome, lumbar spondylosis with grade 1 anterior spondylolisthesis who presents to the hospital on 2023 due to acute onset right upper and right lower extremity weakness with sensory loss.  Additionally with right-sided facial droop and dysarthria.  The patient was noted to have an acute intraparenchymal hemorrhage on CT in the left thalamic region with surrounding edema.  A CTA was negative.  Patient was initially placed on a Cardene drip.  Course complicated by significant left-sided leg pain and was recommended on treatment for radicular symptoms including Tylenol, gabapentin and potentially steroids.  Neurology was consulted and recommended MRI of the brain with and without contrast while holding antithrombotics.  The MRI showed a stable left thalamic capsular intraparenchymal hemorrhage with surrounding edema without any other mass effect.  A repeat CT was completed on 2023 after worsening symptoms including worsening speech slurring with stable findings.  Additionally there was a rapid response after a fall with head strike on  with a repeat CT of the head without acute findings.  Additionally antihypertensive regimen was altered as she was previously on telmisartan and atenolol and is currently on amlodipine, losartan and hydralazine with better control. The patient was evaluated by the Rehabilitation team and deemed an appropriate candidate for comprehensive inpatient rehabilitation and admitted to the [facility] on 2023  3:19 PM.  Functional deficits: impaired mobility, self care.   PT/OT/SLP initiated. Ilene Luz goals are to achieve a min assist level with mobility and self care.  Prognosis is fair to good.  ELOS is 21 days.  Estimated discharge is home. Past Medical History:   Diagnosis Date   • Arthritis    • Asthma    • Continuous opioid dependence ([address]) 4/27/2022   • Diabetes mellitus ([address])    • Diverticulitis of colon    • Fibromyalgia 04/26/2022   • Headache(784.0)    • History of mammogram 2018   • History of tobacco abuse 04/26/2022   • Hypertension    • Nausea 04/29/2022   • Obesity    • Sjogren's syndrome ([address]) 10/19/2012   • Urinary tract infection        Patient Active Problem List    Diagnosis Date Noted   • Hyponatremia 08/31/2023   • GERD (gastroesophageal reflux disease) 08/25/2023   • Constipation 08/25/2023   • Anemia 08/16/2023   • ICH (intracerebral hemorrhage) ([address]) 08/15/2023   • Left leg pain 08/15/2023   • Chronic obstructive pulmonary disease with acute exacerbation ([address]) 07/26/2023   • Hyperlipidemia associated with type 2 diabetes mellitus ([address]) 07/26/2023   • Abnormal CT of the chest 06/28/2023   • Class 2 obesity with body mass index (BMI) of 39.0 to 39.9 in adult 06/28/2023   • CAD (coronary artery disease) 04/07/2023   • Right lumbosacral radiculopathy 10/12/2022   • Chronic bilateral low back pain with bilateral sciatica 08/31/2022   • Paresthesia of both feet 08/31/2022   • Postoperative visit 05/24/2022   • Incarcerated umbilical hernia 52/64/1354   • Diabetes mellitus type 2, controlled ([address]) 04/29/2022   • Continuous opioid dependence ([address]) 04/27/2022   • Moderate persistent asthma without complication 78/19/9357   • Cigarette nicotine dependence in remission 04/26/2022   • Primary hypertension 04/26/2022   • Fibromyalgia 04/26/2022   • Sjogren's syndrome ([address]) 10/19/2012   • Depression with anxiety 09/18/2012       Body mass index is 39.18 kg/m².     Past Medical History:     Past Medical History:   Diagnosis Date   • Arthritis    • Asthma    • Continuous opioid dependence ([address]) 2022   • Diabetes mellitus ([address])    • Diverticulitis of colon    • Fibromyalgia 2022   • Headache(784.0)    • History of mammogram 2018   • History of tobacco abuse 2022   • Hypertension    • Nausea 2022   • Obesity    • Sjogren's syndrome ([address]) 10/19/2012   • Urinary tract infection         Past Surgical History:     Past Surgical History:   Procedure Laterality Date   • APPENDECTOMY     • BREAST BIOPSY Left     unsure of year   • CARPAL TUNNEL RELEASE     • COLONOSCOPY  2017    repeat in    • EPIDURAL BLOCK INJECTION N/A 2023    Procedure: L5-S1 EPIDURALSTEROID INJECTION (53168);   Surgeon: Alondra Grewal DO;  Location:  MAIN OR;  Service: Pain Management    • FOOT SURGERY     • HERNIA REPAIR  2022   • NECK SURGERY      spine fusion    • AK RPR UMBILICAL HRNA 5 YRS/> REDUCIBLE N/A 2022    Procedure: REPAIR HERNIA UMBILICAL;  Surgeon: Stoney Sorto MD;  Location:  MAIN OR;  Service: General         Allergies:     No Known Allergies      Family and Social Support:   No data recorded    Social History:    Social History     Socioeconomic History   • Marital status: /Civil Union     Spouse name: None   • Number of children: None   • Years of education: None   • Highest education level: None   Occupational History   • Occupation:     Tobacco Use   • Smoking status: Former     Packs/day: 0.50     Years: 35.00     Total pack years: 17.50     Types: Cigarettes     Start date: 0     Quit date: 2023     Years since quittin.4     Passive exposure: Past   • Smokeless tobacco: Never   • Tobacco comments:     per pt, 5 a day - As per [employer] [facility]   • Vaping Use: Never used   Substance and Sexual Activity   • Alcohol use: Not Currently     Comment: occasional    • Drug use: Never   • Sexual activity: Not Currently     Partners: Male     Birth control/protection: None   Other Topics Concern   • None   Social History Narrative    · Most recent tobacco use screenin2019      · Caffeine intake:   Occasional      · Seat belts used routinely:   Yes      · Smoke alarm in home: Yes      · Has the Patient had a mammogram to screen for breast cancer within 24 months:   Yes      · Please enter the date of the Patient's previous mammogram.:  march of last year. As per [employer]      Social Determinants of Health     Financial Resource Strain: Not on file   Food Insecurity: No Food Insecurity (2023)    Hunger Vital Sign    • Worried About Running Out of Food in the Last Year: Never true    • Ran Out of Food in the Last Year: Never true   Transportation Needs: No Transportation Needs (2023)    PRAPARE - Transportation    • Lack of Transportation (Medical): No    • Lack of Transportation (Non-Medical):  No   Physical Activity: Not on file   Stress: Not on file   Social Connections: Not on file   Intimate Partner Violence: Not on file   Housing Stability: Low Risk  (2023)    Housing Stability Vital Sign    • Unable to Pay for Housing in the Last Year: No    • Number of Places Lived in the Last Year: 1    • Unstable Housing in the Last Year: No        Family History:    Family History   Problem Relation Age of Onset   • Hypertension Mother    • Heart disease Mother    • Hypertension Father    • Heart disease Father    • Asthma Sister    • No Known Problems Sister    • No Known Problems Sister    • Diabetes Maternal Grandmother    • No Known Problems Maternal Grandfather    • No Known Problems Paternal Grandmother    • No Known Problems Paternal Grandfather    • Breast cancer Paternal Aunt    • Pancreatic cancer Paternal Uncle    • Colon cancer Neg Hx    • Ovarian cancer Neg Hx    • Uterine cancer Neg Hx    • Cervical cancer Neg Hx        Medications:     Current Facility-Administered Medications:   •  acetaminophen (TYLENOL) tablet 650 mg, 650 mg, Oral, Q6H PRN, Pat Craig DO, 650 mg at 23 4406  •  albuterol (Rosalene Pal HFA) inhaler 2 puff, 2 puff, Inhalation, Q4H PRN, Trice Linda, DO, 2 puff at 09/04/23 0919  •  amLODIPine (NORVASC) tablet 10 mg, 10 mg, Oral, Daily, Trice Linda, DO, 10 mg at 09/05/23 8380  •  aspirin chewable tablet 81 mg, 81 mg, Oral, Daily, Trice Linda, DO, 81 mg at 09/05/23 2493  •  atorvastatin (LIPITOR) tablet 20 mg, 20 mg, Oral, Daily With Dinner, Trice Linda, DO, 20 mg at 09/05/23 1644  •  bisacodyl (DULCOLAX) EC tablet 5 mg, 5 mg, Oral, Daily PRN, Nimco Chelan, DO  •  bisacodyl (DULCOLAX) rectal suppository 10 mg, 10 mg, Rectal, Daily PRN, Kashanastasia Bruce MD, 10 mg at 09/03/23 1826  •  buPROPion (WELLBUTRIN XL) 24 hr tablet 150 mg, 150 mg, Oral, QAM, Trice Linda, DO, 150 mg at 09/05/23 6008  •  calcium carbonate (TUMS) chewable tablet 1,000 mg, 1,000 mg, Oral, TID PRN, Laureen Tracy PA-C, 1,000 mg at 08/26/23 1241  •  cyanocobalamin (VITAMIN B-12) tablet 1,000 mcg, 1,000 mcg, Oral, Daily, Trice Linda, DO, 1,000 mcg at 09/05/23 8054  •  diazepam (VALIUM) tablet 2 mg, 2 mg, Oral, BID PRN, Trice Linda, DO, 2 mg at 09/01/23 2305  •  docusate sodium (COLACE) capsule 100 mg, 100 mg, Oral, BID, ARSENIO Pinto, 100 mg at 09/05/23 1911  •  ferrous sulfate tablet 325 mg, 325 mg, Oral, Daily With Breakfast, Trice Linda, DO, 325 mg at 09/05/23 0908  •  fluticasone-vilanterol 200-25 mcg/actuation 1 puff, 1 puff, Inhalation, Daily, Trice Linda, DO, 1 puff at 09/05/23 3506  •  gabapentin (NEURONTIN) capsule 300 mg, 300 mg, Oral, TID, Trice Mckeon DO, 300 mg at 09/05/23 2106  •  heparin (porcine) subcutaneous injection 5,000 Units, 5,000 Units, Subcutaneous, Q8H [address], Trice Mckeon DO, 5,000 Units at 09/05/23 2106  •  insulin lispro (HumaLOG) 100 units/mL subcutaneous injection 1-6 Units, 1-6 Units, Subcutaneous, TID AC, 2 Units at 09/05/23 1108 **AND** Fingerstick Glucose (POCT), , , 4x Daily AC and at bedtime, Trice Mckeon DO  •  insulin lispro (HumaLOG) 100 units/mL subcutaneous injection 1-6 Units, 1-6 Units, Subcutaneous, HS, Deerfield Main, DO, 1 Units at 09/04/23 2110  •  lactulose oral solution 20 g, 20 g, Oral, Daily PRN, [place]field Main, DO  •  losartan (COZAAR) tablet 100 mg, 100 mg, Oral, Daily, Roly Main, DO, 100 mg at 09/05/23 7139  •  melatonin tablet 3 mg, 3 mg, Oral, HS, Tyler Axon, CRNP, 3 mg at 09/05/23 2106  •  metFORMIN (GLUCOPHAGE) tablet 500 mg, 500 mg, Oral, BID With Meals, MARCO AlcantarNP, 500 mg at 09/05/23 1644  •  methocarbamol (ROBAXIN) tablet 500 mg, 500 mg, Oral, TID, Roly Main, DO, 500 mg at 09/05/23 2106  •  miconazole (MICOTIN) 2 % powder 1 Application, 1 Application, Topical, BID, Deerfield Main, DO, 1 Application at 17/46/50 1750  •  ondansetron (ZOFRAN-ODT) dispersible tablet 4 mg, 4 mg, Oral, Q6H PRN, Deerfield Main, DO  •  oxyCODONE (ROXICODONE) IR tablet 5 mg, 5 mg, Oral, Q4H PRN, Roly Main, DO, 5 mg at 09/05/23 1913  •  oxyCODONE (ROXICODONE) split tablet 2.5 mg, 2.5 mg, Oral, Q4H PRN, Roly Main, DO, 2.5 mg at 09/04/23 1752  •  pantoprazole (PROTONIX) EC tablet 40 mg, 40 mg, Oral, Early Morning, Deerfield Main, DO, 40 mg at 09/05/23 9397  •  polyethylene glycol (MIRALAX) packet 17 g, 17 g, Oral, Daily, Roly Main, DO, 17 g at 09/05/23 4551  •  prochlorperazine (COMPAZINE) tablet 5 mg, 5 mg, Oral, Q6H PRN, Deerfield Main, DO  •  senna (SENOKOT) tablet 17.2 mg, 2 tablet, Oral, HS, ARSENIO Pinto, 17.2 mg at 09/05/23 2106        OBSERVATIONS:     Appearance  __x__Neat ____Disheveled ____Overweight ____Underweight ____Frail    Speech  __x_Fluid, Articulate __x__Spontaneous ____Circumlocutions ____Word Finding difficulties ____Dysarthria ____Circumstantial ____Paucity ____Perseverative ____Pressured ____ Tangential     Thought Process/Content  __x__Coherent  ____Preoccupations____Ruminations____Obsessions____Hallucinations ____Delusions ____Flight of Ideas ____Distortions ____Distracted ____Suicidal Ideation ____Homicidal Ideation ____ Memory Issues ____ Perseveration ____ Pily Jennifer    Interaction  _x___Engaged__x__Cooperative____Superficial____Detached____Fearful____Guarded____Suspicious ____Poor Rolanda Goldmann ____Aggressive    Affect  ____Neutral__x__Positive____Anxiety____Worried____Irritable____Angry____Depressed/Dysphoric ____Euthymic _____ Smith Sidle ____ Fatigued     Behavior  _x__Spontaneous x____Purposeful ____Slowed Initiation ____Apathetic ____Impulsive ____Dyscontrolled ____Hypoactive ____Hyperactive ____Repetitive/Perseverative      Overall Progress:    ____Very Declined ____Declined ____Stable __x__Improved ____Very Improved    Motivation and Participation:    ____Very Poor ____Poor ____Fair __x__Good ____Very Good                    ASSESSMENT & RECOMMENDATIONS:   Pt reports feeling more positive as she notices the progress she is making in rehab. Sleep has improved in that she can fall asleep better with the melatonin. However, she still awakens in the middle of the night and is unable to fall back to sleep. Continues to focus on the prior house break in stating she is hypervigilant at night and believes that is why she can't fall back to sleep. Addressed some of the irrational beliefs and pt was able to see the dichotomy. Set up competing thoughts to counter the automatic thoughts to remain hypervigilant. Pt was responsive and agreed to rehearse statements about the benefits of sleep to her overall health and mental health ("Vigilance will not help me the best.  Sleep will help me become stronger and healthier. ") Pt is making progress in psychotherapy and  reports feeling  motivated to continue working towards rehab goals. Provided CBT and mindfulness based interventions, as well as supportive psychotherapy. Addressed coping, adjustment, and motivation. Total time with pt - 30 min.     I will continue to evaluate pt's emotional functioning and status and provide supportive and mindfulness interventions during pt's stay. Effective coping strategies, distress tolerance, breathing exercises will be key interventions. Continued Psychological intervention is medically necessary to:  __x__ Maintain current progress  __X_   Achieve Therapy Goals   __X__Prevent relapse       DIAGNOSIS:  Adjustment Disorder with Anxiety      Thank you for the opportunity to participate in Ms. Martina Flores' care. Jacki Benitez, Ph.D.  Licensed Psychologist

## 2023-09-06 NOTE — PROGRESS NOTES
09/06/23 0910   Pain Assessment   Pain Assessment Tool FLACC   Pain Rating: FLACC (Rest) - Face 0   Pain Rating: FLACC (Rest) - Legs 0   Pain Rating: FLACC (Rest) - Activity 0   Pain Rating: FLACC (Rest) - Cry 0   Pain Rating: FLACC (Rest) - Consolability 0   Score: FLACC (Rest) 0   Pain Rating: FLACC (Activity) - Face 1   Pain Rating: FLACC (Activity) - Legs 0   Pain Rating: FLACC (Activity) - Activity 1   Pain Rating: FLACC (Activity) - Cry 1   Pain Rating: FLACC (Activity) - Consolability 0   Score: FLACC (Activity) 3   Restrictions/Precautions   Precautions Bed/chair alarms;Cognitive; Fall Risk;Supervision on toilet/commode;Pain   Braces or Orthoses Sling  (RUE)   Subjective   Subjective Patient ready to participate in PT session   Lying to Sitting on Side of Bed   Type of Assistance Needed Physical assistance   Physical Assistance Level 51%-75%   Comment assist lightly provided to RLE to fully get off bed; assist for trunk management with encouragement and education on how to better utilize LUE for assist   Lying to Sitting on Side of Bed CARE Score 2   Sit to Stand   Type of Assistance Needed Physical assistance   Physical Assistance Level 76% or more   Comment multiple STS performed in session with therapist anterior blocking LLE   Sit to Stand CARE Score 2   Bed-Chair Transfer   Type of Assistance Needed Physical assistance   Physical Assistance Level 76% or more   Comment On occasion, patient able to perform a Willian beasy board transfer if disc is set up perfectly and surfaces are mostly level. Patient needing max A at times with beasy board if these factors are not in place. We also performed 2x sit pivot transfer to R and L side where patient needed max A, R knee block, and verbal cues for anterior weight shifting   Chair/Bed-to-Chair Transfer CARE Score 2   Transfer Bed/Chair/Wheelchair   Limitations Noted In Balance;Confidence; Coordination; Endurance; Sequencing;LE Strength;UE Strength   Findings On occasion, patient able to perform a Willian beasy board transfer if disc is set up perfectly and surfaces are mostly level. Patient needing max A at times with beasy board if these factors are not in place. We also performed 2x sit pivot transfer to R and L side where patient needed max A, R knee block, and verbal cues for anterior weight shifting   Car Transfer   Reason if not Attempted Safety concerns   Car Transfer CARE Score 88   Walk 10 Feet   Reason if not Attempted Safety concerns   Walk 10 Feet CARE Score 88   Walk 50 Feet with Two Turns   Reason if not Attempted Safety concerns   Walk 50 Feet with Two Turns CARE Score 88   Walk 150 Feet   Reason if not Attempted Safety concerns   Walk 150 Feet CARE Score 88   Walking 10 Feet on Uneven Surfaces   Reason if not Attempted Safety concerns   Walking 10 Feet on Uneven Surfaces CARE Score 88   Ambulation   Does the patient walk? 1. No, and walking goal is clinically indicated. Wheel 50 Feet with Two Turns   Type of Assistance Needed Physical assistance;Verbal cues   Physical Assistance Level 25% or less   Wheel 50 Feet with Two Turns CARE Score 3   Wheel 150 Feet   Type of Assistance Needed Physical assistance;Verbal cues   Physical Assistance Level 25% or less   Wheel 150 Feet CARE Score 3   Wheelchair mobility   Does the patient use a wheelchair? 1. Yes   Type of Wheelchair Used 1. Manual   Method Left upper extremity; Left lower extremity   Assistance Provided For Locking Brakes;Obstacles;Remove Leg Rest;Replace Leg Rest;Replace armrests; Remove armrests   Distance Level Surface (feet) 150 ft   Findings Removed cushion temporarily from chair to dec seat height and allow for LLE to touch the ground. With this, patient able to perform 150' with Willian, mod/max verbal cues, increased time, and occasional rest breaks. Added brake extender to R side which increased patient's (I) with task.  Reviewed additional parts management with patient but will need further review Curb or Single Stair   Reason if not Attempted Safety concerns   1 Step (Curb) CARE Score 88   4 Steps   Reason if not Attempted Safety concerns   4 Steps CARE Score 88   12 Steps   Reason if not Attempted Safety concerns   12 Steps CARE Score 88   Picking Up Object   Reason if not Attempted Safety concerns   Picking Up Object CARE Score 88   Therapeutic Interventions   Neuromuscular Re-Education Sitting on mat: R side elbow push ups, L sided elbow push ups, forwards reach (limited 2* fear)   Other Standing Frame 8.5 minutes- limited by pain from sling on L side, limited by pain (generalized), limited by anxiety, noteed heavy R lean. Attempted therex during activity (limited). Performed reach R side playing Tic Tac Toe for distraction (tearful once game ended and needed to sleep)   Equipment Use   NuStep L1x10 minutes with R sided attachments, SPM around 55 but goal to increase next attempt   Assessment   Treatment Assessment Patient engaged in PT treatment session focused on increasing standing tolerance, facilitating WB through BLE, improving cardiovascular endurance,  Improving dynamic sitting balance, and promoting reciprocal pattern of movement. Patient limited by reports of pain however seems to be more impacted by self limiting factors and anxiety. We discussed at the start of session bringing sister and SO in for famly meeting to further discuss discharge planning options. She states SO will be in later (encouraged OT to discuss with him vs PT to call later). Problem List Decreased strength; Impaired balance;Decreased endurance;Decreased mobility;Orthopedic restrictions;Pain;Obesity   PT Barriers   Functional Limitation Car transfers; Ramp negotiation;Stair negotiation;Standing;Walking;Transfers   Plan   Treatment/Interventions Functional transfer training;LE strengthening/ROM; Elevations; Therapeutic exercise; Endurance training;Cognitive reorientation;Patient/family training;Equipment eval/education; Bed mobility;Gait training; Compensatory technique education   Progress Slow progress, decreased activity tolerance   PT Therapy Minutes   PT Time In 0910   PT Time Out 1030   PT Total Time (minutes) 80   PT Mode of treatment - Individual (minutes) 80   PT Mode of treatment - Concurrent (minutes) 0   PT Mode of treatment - Group (minutes) 0   PT Mode of treatment - Co-treat (minutes) 0   PT Mode of Treatment - Total time(minutes) 80 minutes   PT Cumulative Minutes 845

## 2023-09-06 NOTE — PROGRESS NOTES
Internal Medicine Progress Note  Patient: Luis A Vega  Age/sex: 62 y.o. female  Medical Record #: [id number]      ASSESSMENT/PLAN: (Interval History)  Luis A Vega is seen and examined and management for following issues:    ICH  • Seen by NS and no surgical intervention indicated. • Etio = HTN  • Was unable to tolerate MRI even with sedation  • Was cleared to resume ASA. • Continue atorvastatin. • Outpt follow-up with Neurology.     HTN  • Goal with ICH is SBP<140  • Home: Micardis 80mg qd/Atenolol 50mg qd  • Here: Norvasc 10 mg qd/Losartan 100mg qd  • (Micardis 80 mg qd = Losartan 100 mg qd)   • Stable; no changes today     DM type 2  • HA1C 6.7  • Home: Metformin 1000mg BID  • Here: Metformin 500 mg BID  • Continue QID Accuchecks/SSI and DM diet  • stable     Anemia  • Baseline Hgb 10 - 11. • Iron level 30/Ferritin 22/TIBC 371  • B12 was low normal at 221 on 8/15/23  • Continue iron and B12 supplementation. • stable     Anxiety  • Continue Wellbutrin XL. • Pt is taking 150mg instead of 300mg due to concerns for increased risk of seizure        Discharge date:  [facility]       The above assessment and plan was reviewed and updated as determined by my evaluation of the patient on 9/6/2023.     Labs:   Results from last 7 days   Lab Units 09/04/23  0714 08/31/23  0629   WBC Thousand/uL 8.42 10.36*   HEMOGLOBIN g/dL 11.9 12.1   HEMATOCRIT % 37.0 39.1   PLATELETS Thousands/uL 347 374     Results from last 7 days   Lab Units 09/04/23  0714 08/31/23  0629   SODIUM mmol/L 134* 133*   POTASSIUM mmol/L 4.1 4.2   CHLORIDE mmol/L 100 98   CO2 mmol/L 28 25   BUN mg/dL 16 21   CREATININE mg/dL 0.59* 0.70   CALCIUM mg/dL 8.9 9.4             Results from last 7 days   Lab Units 09/06/23  0622 09/05/23  2041 09/05/23  1538   POC GLUCOSE mg/dl 137 128 117       Review of Scheduled Meds:  Current Facility-Administered Medications   Medication Dose Route Frequency Provider Last Rate   • acetaminophen  650 mg Oral Q6H PRN Gearold Reasons, DO     • albuterol  2 puff Inhalation Q4H PRN Yoni Rend, DO     • amLODIPine  10 mg Oral Daily Yoni Rend, DO     • aspirin  81 mg Oral Daily Yoni Rend, DO     • atorvastatin  20 mg Oral Daily With Shermon Age, DO     • bisacodyl  5 mg Oral Daily ARSENIO Pinto     • bisacodyl  10 mg Rectal Daily PRN Alex Wetzel MD     • buPROPion  150 mg Oral QAM Yoni Rend, DO     • calcium carbonate  1,000 mg Oral TID PRN Laureen Tracy PA-C     • vitamin B-12  1,000 mcg Oral Daily Yoni Rend, DO     • diazepam  2 mg Oral BID PRN Yoni Rend, DO     • docusate sodium  100 mg Oral BID ARSENIO Pinto     • ferrous sulfate  325 mg Oral Daily With Breakfast Yoni Rend, DO     • fluticasone-vilanterol  1 puff Inhalation Daily Yoni Rend, DO     • gabapentin  300 mg Oral TID Leonides Rend, DO     • heparin (porcine)  5,000 Units Subcutaneous Q8H [address] Yoni Rend, DO     • insulin lispro  1-6 Units Subcutaneous TID AC Yoni Rend, DO     • insulin lispro  1-6 Units Subcutaneous HS Yoni Rend, DO     • lactulose  20 g Oral Daily PRN Yoni Rend, DO     • losartan  100 mg Oral Daily Yoni Rend, DO     • melatonin  3 mg Oral HS ARSENIO Chávez     • metFORMIN  500 mg Oral BID With Meals ARSENIO Andre     • methocarbamol  500 mg Oral TID Leonides Rend, DO     • miconazole  1 Application Topical BID Yoni Rend, DO     • ondansetron  4 mg Oral Q6H PRN Yoni Rend, DO     • oxyCODONE  5 mg Oral Q4H PRN Yoni Rend, DO     • oxyCODONE  2.5 mg Oral Q4H PRN Yoni Rend, DO     • pantoprazole  40 mg Oral Early Morning Yoni Rend, DO     • polyethylene glycol  17 g Oral Daily Yoni Rend, DO     • prochlorperazine  5 mg Oral Q6H PRN Yoni Rend, DO     • senna  2 tablet Oral HS ARSENIO Pinto         Subjective/ HPI: Patient seen and examined.  Patients overnight issues or events were reviewed with nursing or staff during rounds or morning huddle session. New or overnight issues include the following:     No new or overnight issues. Offers no complaints    ROS:   A 10 point ROS was performed; negative except as noted above. *Labs /Radiology studies reviewed  *Medications reviewed and reconciled as needed  *Please refer to order section for additional ordered labs studies  *Case discussed with primary attending during morning huddle case rounds    Physical Examination:  Vitals:   Vitals:    09/05/23 1335 09/05/23 2052 09/06/23 0550 09/06/23 0836   BP: 128/72 98/57 98/64 110/66   BP Location: Left arm Left arm Left arm Left arm   Pulse: 65 97 86    Resp: 18 18 19    Temp: 98.1 °F (36.7 °C) 98.4 °F (36.9 °C) 98.2 °F (36.8 °C)    TempSrc: Oral Oral Oral    SpO2: 96% 97% 92%    Weight:   91.2 kg (201 lb)    Height:           General Appearance: no distress, conversive  HEENT:  External ear normal.  Nose normal w/o drainage. Mucous membranes are moist. Oropharynx is clear. Conjunctiva clear w/o icterus or redness. Neck:  Supple, normal ROM  Lungs: BBS without crackles/wheeze/rhonchi; respirations unlabored with normal inspiratory/expiratory effort. No retractions noted. On RA  CV: regular rate and rhythm; no rubs/murmurs/gallops, PMI normal   ABD: Abdomen is soft. Bowel sounds all quadrants. Nontender with no distention. EXT: no edema  Skin: normal turgor, normal texture, no rashes  Psych: affect normal, mood normal  Neuro: AAO. Able to move first 3 fingers on right hand    The above physical exam was reviewed and updated as determined by my evaluation of the patient on 9/6/2023. Invasive Devices     Drain  Duration           External Urinary Catheter 8 days                   VTE Pharmacologic Prophylaxis: Heparin  Code Status: Level 1 - Full Code  Current Length of Stay: 12 day(s)      Total time spent:  30 minutes with more than 50% spent counseling/coordinating care.  Counseling includes discussion with patient re: progress  and discussion with patient of his/her current medical state/information. Coordination of patient's care was performed in conjunction with primary service. Time invested included review of patient's labs, vitals, and management of their comorbidities with continued monitoring. In addition, this patient was discussed with medical team including physician and advanced extenders. The care of the patient was extensively discussed and appropriate treatment plan was formulated unique for this patient. Medical decision making for the day was made by supervising physician unless otherwise noted in their attestation statement. ** Please Note:  voice to text software may have been used in the creation of this document.  Although proof errors in transcription or interpretation are a potential of such software**

## 2023-09-06 NOTE — PROGRESS NOTES
[facility] Occupational Therapy Daily Note  Patient Active Problem List   Diagnosis    Cigarette nicotine dependence in remission    Primary hypertension    Fibromyalgia    Continuous opioid dependence (HCC)    Moderate persistent asthma without complication    Diabetes mellitus type 2, controlled ([address])    Depression with anxiety    Sjogren's syndrome ([address])    Incarcerated umbilical hernia    Postoperative visit    Chronic bilateral low back pain with bilateral sciatica    Paresthesia of both feet    Right lumbosacral radiculopathy    CAD (coronary artery disease)    Abnormal CT of the chest    Class 2 obesity with body mass index (BMI) of 39.0 to 39.9 in adult    Chronic obstructive pulmonary disease with acute exacerbation (HCC)    Hyperlipidemia associated with type 2 diabetes mellitus (HCC)    ICH (intracerebral hemorrhage) (HCC)    Left leg pain    Anemia    GERD (gastroesophageal reflux disease)    Constipation    Hyponatremia       Past Medical History:   Diagnosis Date    Arthritis     Asthma     Continuous opioid dependence ([address]) 4/27/2022    Diabetes mellitus ([address])     Diverticulitis of colon     Fibromyalgia 04/26/2022    Headache(784.0)     History of mammogram 2018    History of tobacco abuse 04/26/2022    Hypertension     Nausea 04/29/2022    Obesity     Sjogren's syndrome ([address]) 10/19/2012    Urinary tract infection      Etiologic Diagnosis: Acute Left Thalamic Intracerebral Hemorrhage with edema  Restrictions/Precautions  Precautions: Bed/chair alarms, Fall Risk  Weight Bearing Restrictions: No  ROM Restrictions: No  Braces or Orthoses: Sling  ADL Team Goal: Patient will require supervision with ADLs with least restrictive device upon completion of rehab program  Occupational Therapy LTG's  Eating Oral care Bathing LB dress UB dress   Eating Goal: 05. Setup or clean-up assistance - [place] SETS UP or CLEANS UP, patient completes activity. [place] assists only prior to or following the activity.  Oral Hygiene Goal: 05. Setup or clean-up assistance - [place] SETS UP or CLEANS UP, patient completes activity. [place] assists only prior to or following the activity. Shower/bathe self Goal: 04. Supervision or touching assistance- [place] provides VERBAL CUES or supervision throughout activity. Lower body dressing Goal: 04. Supervision or touching assistance- [place] provides VERBAL CUES or supervision throughout activity. Upper body dressing Goal: 05. Setup or clean-up assistance - [place] SETS UP or CLEANS UP, patient completes activity. [place] assists only prior to or following the activity. Toileting Toilet txf Func txf IADL Med    Toileting hygiene Goal: 04. Supervision or touching assistance- [place] provides VERBAL CUES or supervision throughout activity. Toilet transfer Goal: 04. Supervision or touching assistance- [place] provides VERBAL CUES or supervision throughout activity. Chair/bed-to-chair transfer Goal: 04. Supervision or touching assistance- [place] provides VERBAL CUES or supervision throughout activity. Assist Level: Minimum Assist (full meal prep seated in WC pending pt progress) Assist Level:  Independent (mod I with mediplanner which pt uses at baseline)   OT interventions: Treatment/Interventions: ADL retraining, Functional transfer training, Therapeutic exercise, Endurance training, Patient/family training, Equipment eval/education, Bed mobility, Compensatory technique education  Discharge Plan:  OT Discharge Recommendation:  (pending progress)   DME: Equipment Recommended:  (TBD),  ,      09/06/23 1230   Pain Assessment   Pain Assessment Tool 0-10   Pain Score 5   Pain Location/Orientation Orientation: Right;Location: Leg   Hospital Pain Intervention(s) Repositioned   Bed-Chair Transfer   Type of Assistance Needed Physical assistance   Physical Assistance Level 76% or more   Chair/Bed-to-Chair Transfer CARE Score 2   Toileting Hygiene   Type of Assistance Needed Physical assistance   Physical Assistance Level Total assistance   Toileting Hygiene CARE Score 1   Toilet Transfer   Type of Assistance Needed Physical assistance   Physical Assistance Level Total assistance   Toilet Transfer CARE Score 1   Neuromuscular Education   Taping K tape applied to distal R UE. Application is intended to facilitate, reduce pain, and provide proprioceptive feedback during functional tasks. K tape applied with 75% to achieve support for wrist and digits. 1 Y strip applied over D2-3 to dorsal forearm, and 1 Y strip for D4-5 just past dorsal wrist. Paired with NMES/FES for active wrist extension activities. Functional Movement Patterns today noted with shoudler elevation when prompted. only able to complete slight activation, little range. NMES/FES for wrist extensors paired with functional activities. Pt engages in [address] training session with use of arm trough for right UE neuromuscular re-education for increased muscular facilitation and to increase overall motor control, shoulder stability to improve UE function for ADLs. Pt provided with extensive EDU for purpose of muscular facilitation and incorporation of visualization. Pt completed forward thrust, 40 reps in guided motion at 120 speed. Pt completed forward thrust, 20 reps in initiated motion at 120 speed at Low force. Pt completed forward reach with 5 point target, 20 reps in initiated motion at 120 speed. In initiated mode pt is able to initiate thrust, but unable to complete retraction. Pt is able to safely complete entirety of training session w/ no C/O pain and self perceived exertion within personal tolerance. NMES also paired with 4 point lead at 15hz 10:10 for shoulder stabilizers. Assessment   Treatment Assessment Pt participated in skilled OT treatment session with treatment focus on UE NMR. Pt tolerated session well, with improved tolerance for REO-GO training today.  Still limited with "uncomfortable chair," may benefit from cushion on chair, but will be difficult transfer with slide board. Today pt cont to show consistent ability with distal AROM control for fingers, however still not functional. Now able to complete slight pincer, encouraged to attempt to incorporate in functional task and pt declines, cont to educate about learned non use. Supportive  present for session and set up family meeting Monday 9/11/2023 at 80 am for all team members to discuss progress, goals, d/c planning. Still cont to focus OT sessions for R UE NMR and proximal activation for shoulder in assisted/gravity eliminated planes, forced distal use for incorporation of UE into tasks, cont with mirror box training, and sensory motor re-integration.    Prognosis Fair   Plan   Progress Slow progress, decreased activity tolerance   OT Therapy Minutes   OT Time In 1230   OT Time Out 1430   OT Total Time (minutes) 120   OT Mode of treatment - Individual (minutes) 120   OT Mode of treatment - Concurrent (minutes) 0   OT Mode of treatment - Group (minutes) 0   OT Mode of treatment - Co-treat (minutes) 0   OT Mode of Treatment - Total time(minutes) 120 minutes   OT Cumulative Minutes 9240

## 2023-09-06 NOTE — PLAN OF CARE
Problem: Prexisting or High Potential for Compromised Skin Integrity  Goal: Skin integrity is maintained or improved  Description: INTERVENTIONS:  - Identify patients at risk for skin breakdown  - Assess and monitor skin integrity  - Assess and monitor nutrition and hydration status  - Monitor labs   - Assess for incontinence   - Turn and reposition patient  - Assist with mobility/ambulation  - Relieve pressure over bony prominences  - Avoid friction and shearing  - Provide appropriate hygiene as needed including keeping skin clean and dry  - Evaluate need for skin moisturizer/barrier cream  - Collaborate with interdisciplinary team   - Patient/family teaching  - Consider wound care consult   Outcome: Progressing     Problem: PAIN - ADULT  Goal: Verbalizes/displays adequate comfort level or baseline comfort level  Description: Interventions:  - Encourage patient to monitor pain and request assistance  - Assess pain using appropriate pain scale  - Administer analgesics based on type and severity of pain and evaluate response  - Implement non-pharmacological measures as appropriate and evaluate response  - Consider cultural and social influences on pain and pain management  - Notify physician/advanced practitioner if interventions unsuccessful or patient reports new pain  Outcome: Progressing     Problem: INFECTION - ADULT  Goal: Absence or prevention of progression during hospitalization  Description: INTERVENTIONS:  - Assess and monitor for signs and symptoms of infection  - Monitor lab/diagnostic results  - Monitor all insertion sites, i.e. indwelling lines, tubes, and drains  - Monitor endotracheal if appropriate and nasal secretions for changes in amount and color  - [place] appropriate cooling/warming therapies per order  - Administer medications as ordered  - Instruct and encourage patient and family to use good hand hygiene technique  - Identify and instruct in appropriate isolation precautions for identified infection/condition  Outcome: Progressing  Goal: Absence of fever/infection during neutropenic period  Description: INTERVENTIONS:  - Monitor WBC    Outcome: Progressing     Problem: SAFETY ADULT  Goal: Patient will remain free of falls  Description: INTERVENTIONS:  - Educate patient/family on patient safety including physical limitations  - Instruct patient to call for assistance with activity   - Consult OT/PT to assist with strengthening/mobility   - Keep Call bell within reach  - Keep bed low and locked with side rails adjusted as appropriate  - Keep care items and personal belongings within reach  - Initiate and maintain comfort rounds  - Make Fall Risk Sign visible to staff  - Offer Toileting every 2 Hours, in advance of need  - Initiate/Maintain bed alarm  - Obtain necessary fall risk management equipment:   - Apply yellow socks and bracelet for high fall risk patients  - Consider moving patient to room near nurses station  Outcome: Progressing  Goal: Maintain or return to baseline ADL function  Description: INTERVENTIONS:  -  Assess patient's ability to carry out ADLs; assess patient's baseline for ADL function and identify physical deficits which impact ability to perform ADLs (bathing, care of mouth/teeth, toileting, grooming, dressing, etc.)  - Assess/evaluate cause of self-care deficits   - Assess range of motion  - Assess patient's mobility; develop plan if impaired  - Assess patient's need for assistive devices and provide as appropriate  - Encourage maximum independence but intervene and supervise when necessary  - Involve family in performance of ADLs  - Assess for home care needs following discharge   - Consider OT consult to assist with ADL evaluation and planning for discharge  - Provide patient education as appropriate  Outcome: Progressing  Goal: Maintains/Returns to pre admission functional level  Description: INTERVENTIONS:  - Perform BMAT or MOVE assessment daily.   - Set and communicate daily mobility goal to care team and patient/family/caregiver. - Collaborate with rehabilitation services on mobility goals if consulted  - Perform Range of Motion 3 times a day. - Reposition patient every 2 hours. - Dangle patient 3 times a day  - Stand patient 3 times a day  - Ambulate patient 3 times a day  - Out of bed to chair 3 times a day   - Out of bed for meals 3 times a day  - Out of bed for toileting  - Record patient progress and toleration of activity level   Outcome: Progressing     Problem: DISCHARGE PLANNING  Goal: Discharge to home or other facility with appropriate resources  Description: INTERVENTIONS:  - Identify barriers to discharge w/patient and caregiver  - Arrange for needed discharge resources and transportation as appropriate  - Identify discharge learning needs (meds, wound care, etc.)  - Arrange for interpretive services to assist at discharge as needed  - Refer to Case Management Department for coordinating discharge planning if the patient needs post-hospital services based on physician/advanced practitioner order or complex needs related to functional status, cognitive ability, or social support system  Outcome: Progressing     Problem: Nutrition/Hydration-ADULT  Goal: Nutrient/Hydration intake appropriate for improving, restoring or maintaining nutritional needs  Description: Monitor and assess patient's nutrition/hydration status for malnutrition. Collaborate with interdisciplinary team and initiate plan and interventions as ordered. Monitor patient's weight and dietary intake as ordered or per policy. Utilize nutrition screening tool and intervene as necessary. Determine patient's food preferences and provide high-protein, high-caloric foods as appropriate.      INTERVENTIONS:  - Monitor oral intake, urinary output, labs, and treatment plans  - Assess nutrition and hydration status and recommend course of action  - Evaluate amount of meals eaten  - Assist patient with eating if necessary   - Allow adequate time for meals  - Recommend/ encourage appropriate diets, oral nutritional supplements, and vitamin/mineral supplements  - Order, calculate, and assess calorie counts as needed  - Recommend, monitor, and adjust tube feedings and TPN/PPN based on assessed needs  - Assess need for intravenous fluids  - Provide specific nutrition/hydration education as appropriate  - Include patient/family/caregiver in decisions related to nutrition  Outcome: Progressing     Problem: MOBILITY - ADULT  Goal: Maintain or return to baseline ADL function  Description: INTERVENTIONS:  -  Assess patient's ability to carry out ADLs; assess patient's baseline for ADL function and identify physical deficits which impact ability to perform ADLs (bathing, care of mouth/teeth, toileting, grooming, dressing, etc.)  - Assess/evaluate cause of self-care deficits   - Assess range of motion  - Assess patient's mobility; develop plan if impaired  - Assess patient's need for assistive devices and provide as appropriate  - Encourage maximum independence but intervene and supervise when necessary  - Involve family in performance of ADLs  - Assess for home care needs following discharge   - Consider OT consult to assist with ADL evaluation and planning for discharge  - Provide patient education as appropriate  Outcome: Progressing  Goal: Maintains/Returns to pre admission functional level  Description: INTERVENTIONS:  - Perform BMAT or MOVE assessment daily.   - Set and communicate daily mobility goal to care team and patient/family/caregiver. - Collaborate with rehabilitation services on mobility goals if consulted  - Perform Range of Motion 3 times a day. - Reposition patient every 2 hours.   - Dangle patient 3 times a day  - Stand patient 3 times a day  - Ambulate patient 3 times a day  - Out of bed to chair 3 times a day   - Out of bed for meals 3 times a day  - Out of bed for toileting  - Record patient progress and toleration of activity level   Outcome: Progressing

## 2023-09-06 NOTE — PLAN OF CARE
Problem: Prexisting or High Potential for Compromised Skin Integrity  Goal: Skin integrity is maintained or improved  Description: INTERVENTIONS:  - Identify patients at risk for skin breakdown  - Assess and monitor skin integrity  - Assess and monitor nutrition and hydration status  - Monitor labs   - Assess for incontinence   - Turn and reposition patient  - Assist with mobility/ambulation  - Relieve pressure over bony prominences  - Avoid friction and shearing  - Provide appropriate hygiene as needed including keeping skin clean and dry  - Evaluate need for skin moisturizer/barrier cream  - Collaborate with interdisciplinary team   - Patient/family teaching  - Consider wound care consult   Outcome: Progressing     Problem: PAIN - ADULT  Goal: Verbalizes/displays adequate comfort level or baseline comfort level  Description: Interventions:  - Encourage patient to monitor pain and request assistance  - Assess pain using appropriate pain scale  - Administer analgesics based on type and severity of pain and evaluate response  - Implement non-pharmacological measures as appropriate and evaluate response  - Consider cultural and social influences on pain and pain management  - Notify physician/advanced practitioner if interventions unsuccessful or patient reports new pain  Outcome: Progressing     Problem: INFECTION - ADULT  Goal: Absence or prevention of progression during hospitalization  Description: INTERVENTIONS:  - Assess and monitor for signs and symptoms of infection  - Monitor lab/diagnostic results  - Monitor all insertion sites, i.e. indwelling lines, tubes, and drains  - Monitor endotracheal if appropriate and nasal secretions for changes in amount and color  - [place] appropriate cooling/warming therapies per order  - Administer medications as ordered  - Instruct and encourage patient and family to use good hand hygiene technique  - Identify and instruct in appropriate isolation precautions for identified infection/condition  Outcome: Progressing  Goal: Absence of fever/infection during neutropenic period  Description: INTERVENTIONS:  - Monitor WBC    Outcome: Progressing     Problem: SAFETY ADULT  Goal: Patient will remain free of falls  Description: INTERVENTIONS:  - Educate patient/family on patient safety including physical limitations  - Instruct patient to call for assistance with activity   - Consult OT/PT to assist with strengthening/mobility   - Keep Call bell within reach  - Keep bed low and locked with side rails adjusted as appropriate  - Keep care items and personal belongings within reach  - Initiate and maintain comfort rounds  - Make Fall Risk Sign visible to staff  - Offer Toileting every 2 Hours, in advance of need  - Initiate/Maintain bed/chair alarm  - Obtain necessary fall risk management equipment: non-skid footwear, pt instructed to call for assistance, call bell in reach and pt rings appropriately, pt does not ambulate independently, bed/chair alarm maintained  - Apply yellow socks and bracelet for high fall risk patients  - Consider moving patient to room near nurses station  Outcome: Progressing  Goal: Maintain or return to baseline ADL function  Description: INTERVENTIONS:  -  Assess patient's ability to carry out ADLs; assess patient's baseline for ADL function and identify physical deficits which impact ability to perform ADLs (bathing, care of mouth/teeth, toileting, grooming, dressing, etc.)  - Assess/evaluate cause of self-care deficits   - Assess range of motion  - Assess patient's mobility; develop plan if impaired  - Assess patient's need for assistive devices and provide as appropriate  - Encourage maximum independence but intervene and supervise when necessary  - Involve family in performance of ADLs  - Assess for home care needs following discharge   - Consider OT consult to assist with ADL evaluation and planning for discharge  - Provide patient education as appropriate  Outcome: Progressing  Goal: Maintains/Returns to pre admission functional level  Description: INTERVENTIONS:  - Perform BMAT or MOVE assessment daily.   - Set and communicate daily mobility goal to care team and patient/family/caregiver. - Collaborate with rehabilitation services on mobility goals if consulted  - Perform Range of Motion 3 times a day. - Reposition patient every 2 hours. - Dangle patient 3 times a day  - Stand patient 3 times a day  - Ambulate patient 3 times a day  - Out of bed to chair 3 times a day   - Out of bed for meals 3 times a day  - Out of bed for toileting  - Record patient progress and toleration of activity level   Outcome: Progressing     Problem: DISCHARGE PLANNING  Goal: Discharge to home or other facility with appropriate resources  Description: INTERVENTIONS:  - Identify barriers to discharge w/patient and caregiver  - Arrange for needed discharge resources and transportation as appropriate  - Identify discharge learning needs (meds, wound care, etc.)  - Arrange for interpretive services to assist at discharge as needed  - Refer to Case Management Department for coordinating discharge planning if the patient needs post-hospital services based on physician/advanced practitioner order or complex needs related to functional status, cognitive ability, or social support system  Outcome: Progressing     Problem: Nutrition/Hydration-ADULT  Goal: Nutrient/Hydration intake appropriate for improving, restoring or maintaining nutritional needs  Description: Monitor and assess patient's nutrition/hydration status for malnutrition. Collaborate with interdisciplinary team and initiate plan and interventions as ordered. Monitor patient's weight and dietary intake as ordered or per policy. Utilize nutrition screening tool and intervene as necessary. Determine patient's food preferences and provide high-protein, high-caloric foods as appropriate.      INTERVENTIONS:  - Monitor oral intake, urinary output, labs, and treatment plans  - Assess nutrition and hydration status and recommend course of action  - Evaluate amount of meals eaten  - Assist patient with eating if necessary   - Allow adequate time for meals  - Recommend/ encourage appropriate diets, oral nutritional supplements, and vitamin/mineral supplements  - Order, calculate, and assess calorie counts as needed  - Recommend, monitor, and adjust tube feedings and TPN/PPN based on assessed needs  - Assess need for intravenous fluids  - Provide specific nutrition/hydration education as appropriate  - Include patient/family/caregiver in decisions related to nutrition  Outcome: Progressing     Problem: MOBILITY - ADULT  Goal: Maintain or return to baseline ADL function  Description: INTERVENTIONS:  -  Assess patient's ability to carry out ADLs; assess patient's baseline for ADL function and identify physical deficits which impact ability to perform ADLs (bathing, care of mouth/teeth, toileting, grooming, dressing, etc.)  - Assess/evaluate cause of self-care deficits   - Assess range of motion  - Assess patient's mobility; develop plan if impaired  - Assess patient's need for assistive devices and provide as appropriate  - Encourage maximum independence but intervene and supervise when necessary  - Involve family in performance of ADLs  - Assess for home care needs following discharge   - Consider OT consult to assist with ADL evaluation and planning for discharge  - Provide patient education as appropriate  Outcome: Progressing  Goal: Maintains/Returns to pre admission functional level  Description: INTERVENTIONS:  - Perform BMAT or MOVE assessment daily.   - Set and communicate daily mobility goal to care team and patient/family/caregiver. - Collaborate with rehabilitation services on mobility goals if consulted  - Perform Range of Motion 3 times a day. - Reposition patient every 2 hours.   - Dangle patient 3 times a day  - Stand patient 3 times a day  - Ambulate patient 3 times a day  - Out of bed to chair 3 times a day   - Out of bed for meals 3 times a day  - Out of bed for toileting  - Record patient progress and toleration of activity level   Outcome: Progressing

## 2023-09-06 NOTE — TEAM CONFERENCE
Acute RehabilitationTeam Conference Note  Date: 9/6/2023   Time: 11:05 AM       Patient Name:  Adair Vickers       Medical Record Number: [id number]   YOB: 1965  Sex: Female          Room/Bed:  [facility] 967/[facility] 194-82  Payor Info:  Payor: DARA URIARTE / Plan: Marleni Able / Product Type: Blue Fee for Service /      Admitting Diagnosis: ICH (intracerebral hemorrhage) ([address]) [I61.9]   Admit Date/Time:  8/25/2023  3:19 PM  Admission Comments: No comment available     Primary Diagnosis:  ICH (intracerebral hemorrhage) ([address])  Principal Problem: ICH (intracerebral hemorrhage) ([address])    Patient Active Problem List    Diagnosis Date Noted   • Hyponatremia 08/31/2023   • GERD (gastroesophageal reflux disease) 08/25/2023   • Constipation 08/25/2023   • Anemia 08/16/2023   • ICH (intracerebral hemorrhage) ([address]) 08/15/2023   • Left leg pain 08/15/2023   • Chronic obstructive pulmonary disease with acute exacerbation ([address]) 07/26/2023   • Hyperlipidemia associated with type 2 diabetes mellitus ([address]) 07/26/2023   • Abnormal CT of the chest 06/28/2023   • Class 2 obesity with body mass index (BMI) of 39.0 to 39.9 in adult 06/28/2023   • CAD (coronary artery disease) 04/07/2023   • Right lumbosacral radiculopathy 10/12/2022   • Chronic bilateral low back pain with bilateral sciatica 08/31/2022   • Paresthesia of both feet 08/31/2022   • Postoperative visit 05/24/2022   • Incarcerated umbilical hernia 35/01/2834   • Diabetes mellitus type 2, controlled ([address]) 04/29/2022   • Continuous opioid dependence ([address]) 04/27/2022   • Moderate persistent asthma without complication 83/36/1718   • Cigarette nicotine dependence in remission 04/26/2022   • Primary hypertension 04/26/2022   • Fibromyalgia 04/26/2022   • Sjogren's syndrome ([address]) 10/19/2012   • Depression with anxiety 09/18/2012       Physical Therapy:    Weight Bearing Status: Full Weight Bearing  Transfers: Assist of 2, Total Assistance  Bed Mobility: Moderate Assistance, Maximum Assistance  Amulation Distance (ft): 0 feet (Unable to safely and effectively assess.)  Ambulation:  (Unsafe to currently assess)  Wheelchair Mobility Distance: 150 ft (L UE/LE)  Wheelchair Mobility: Moderate Assistance, Maximum Assistance (For guidance around obstacles)  Number of Stairs:  (Unable to safely and effectively assess.)  Assistive Device for Stairs:  (Unable to safely and effectively assess.)  Stair Assistance:  (Unable to safely and effectively assess.)  Ramp:  (Unable to safely and effectively assess.)  Assistive Device for Ramp:  (Unable to safely and effectively assess.)  Discharge Recommendations: Home with: (Versus assisted living/SNF based on family support capabilties.)  DC Home with[de-identified] Family Support, 24 Hour Assisteance, First Floor Setup, Home Physical Therapy    9/5/23  Korin's R-sided muscle strength and sensation deficits are still present with minimal improvements in function noted. She does now require lesser assistance from a skilled clinician for bed mobilities (Tax2 -> mod-maxAx1) however is still requiring Tax2 to maintain safety of Beasy board and slide board transfers. Per  and pt's sister are currently completing [facility] home setup forms to determine DC placement either with her  who reports he can physically help her while her sister can provide a first-floor setup but cannot physically help her. Maintain recommendation going forward to trial and assess BWSS ambulation during a 90 minute session block to facilitate neuromuscular recruitment of her R side considering her ambulatory goals and ELOS of 4 weeks per PT IE. Current barriers to home include DAMIAN,  availability to help with ADL's due to working night shift, and Korin's overall deficits in her mobility. Anticipate patient will need continued therapy at SNF upon d/c but will be pending progress.                  Occupational Therapy:  Eating: Supervision  Grooming: Maximum Assistance  Bathing: Maximum Assistance  Bathing: Maximum Assistance  Upper Body Dressing: Maximum Assistance  Lower Body Dressing: Total Assistance  Toileting: Total Assistance, Assist of 2  Toilet Transfer: Assist of 2, Total Assistance  Cognition: Within Defined Limits  Discharge Recommendations: Home with:  [address] [facility] with[de-identified] Family Support       Occupational Therapy Weekly Team Note    Pt continues to make good progress with skilled occupational therapy intervention and is progressing toward long term goals for ADL, IADL's, and functional transfers/mobility. Pts long term goals for ADLs are supervision with wheelchair. Pt continues to present with impairments in activity tolerance, endurance, standing balance/tolerance, sitting balance/tolerance, UE strength, UE ROM, FMC, and GMC. Occupational performance remains limited by (R) hemiplegia and risk for falls. Family training/education will be required prior to D/C. Pt will continue to benefit from skilled acute rehab OT services to address above mentioned barriers and maximize functional independence in baseline areas of occupation to meet established treatment goals with overall decreased burden of care. Plan of care to continue to focus on ADL Retraining , LB Dressing, UB dressing, López Dressing Techniques,  LHAE education/training, Functional Transfers, Standing tolerance, Standing balance , UE NMR right, midline awareness, Fine motor coordination, Gross motor coordination, Fine motor strengthening , Gross motor strengthening , R attention, DME training/education, Family training/education, Energy conservation training/education, healthy coping education, and Leisure and social pursuits. Goals for the upcoming week are: continue to monitor basic vitals during ADLs and functional transfers, basic ADL participation , Out of bed tolerance, and establish caregiver abilities  Anticipate Re-team at this time.                 Speech Therapy:  Mode of Communication: Verbal  Speech/Language: (mild word finding)  Cognition: Exceptions to WNL  Cognition: Decreased Memory, Decreased Executive Functions, Decreased Attention, Decreased Comprehension  Orientation: Person, Place, Time, Situation  Discharge Recommendations: Home with:  [address] [facility] with[de-identified] 24 Hour Assisteance, Family Support, Home Speech Therapy, Outpatient Speech Therapy  Pt completed the CLQT+ on initial evaluation with a Composite Severity Rating score of 2.6 out of 4.0, correlating to overall mildly impaired  cognitive linguistic impairments at time of evaluation and in comparison to age matched peers ranging from 19-69 y/o. Current barriers which present include, decreased attention, slower processing time, decreased ST/working memory, decreased executive function skills (problem solving, reasoning, sequencing, organization of thoughts), judgement, fatigue and safety which impacts functional mobility at this time. Pt does report higher level of independence prior to admission and will target both functional and higher level cognitive tasks throughout pt's stay on the acute rehab center. Pt is functioning at [address] for comprehension, supervision for expression and social interactions, min-mod A for executive functions and mod A for memory. At this time, pt will benefit from skilled SLP services targeting functional independence of cognitive linguistic skills in hopes to decrease caregiver burden over time. Update week of 9/5/2023: Pt continues to be followed for cognitive linguistic and language therapy where she is making progress towards current goals. Pt remains with deficits which present as barriers in the following areas: attention, slower processing time, decreased ST/working memory, decreased executive function skills (problem solving, reasoning, sequencing, organization of thoughts), judgement, fatigue and safety which impacts functional mobility at this time. Pt is also noted to present with mild word finding deficits. Both functional and higher level cognitive linguistic skills have been targeted with both types of tasks targeted as well. Plan to continue to focus on both areas. Currently, pt is functioning at supervision for comprehension and min assist for expression, problem solving and memory. At this time, pt is recommended for further skilled SLP services with focus on cognitive linguistic and language skills in order to maximize level of independence and to decrease caregiver burden on discharge. Nursing Notes:  Appetite: Good  Diet Type: Diabetic                                                                     Pain Location/Orientation: Orientation: Right, Location: Groin, Location: Arm  Pain Score: 5                       Hospital Pain Intervention(s): Emotional support             • Hypertension-blood pressure adequately controlled on current treatment per review of BP logs. Continue same treatment plan. • Diabetes-continue metformin 500 mg twice daily monitor with this adjustment  • Recent intracranial hemorrhage-optimize blood pressure has been cleared to resume and is receiving aspirin continue statin therapy  • Seen by NS and no surgical intervention indicated. • Etio = HTN  • Was unable to tolerate MRI even with sedation  • Goal with ICH is SBP<140    Pt is therapy transfers only. Incontinent of bowel and bladder. Right side flaccid . C/O foot pain, Oxy 5 mg Po PRN given at 2134      Case Management:     Discharge Planning  Living Arrangements: Lives w/ Spouse/significant other  Support Systems: Spouse/significant other  Assistance Needed: unknown  Type of Current Residence: Private residence  Current [id number]: No  Pt is participating well with therapy but needs continued therapy intervention to work towards attaining goals of recovery. Pt has supportive family but it is unclear how much support/assist they will be able to provide in order for pt to return home.  Pt may need subacute setting on dc. Next review date with pts insurance is 9/8. Is the patient actively participating in therapies? yes  List any modifications to the treatment plan:     Barriers Interventions   Task initiation Encourage participation/task initiation to attain goals   Urinary incontinence, constipation Time voids, medicattions   Depression/anxiety neuropsych services   Cognition, understanding functional expectations Family education, speech therapy         Is the patient making expected progress toward goals? yes  List any update or changes to goals:     Medical Goals: Patient will be medically stable for discharge to [facility] upon completion of rehab program and Patient will be able to manage medical conditions and comorbid conditions with medications and follow up upon completion of rehab program    Weekly Team Goals:   Rehab Team Goals  ADL Team Goal: Patient will require supervision with ADLs with least restrictive device upon completion of rehab program  Transfer Team Goal: Patient will require assist with transfers with least restrictive device upon completion of rehab program  Locomotion Team Goal: Patient will require assist with locomotion with least restrictive device upon completion of rehab program (ambulatory vs w/c level)  Cognitive Team Goal: Patient will be independent for basic  tasks and require supervision for complex tasks upon completion of rehab program    Discussion: pt presents with the above barriers and still requires an assist of two for beasy board tx. Pt continues with trunk control and decreased balance. Pt requires increased time and cueing for w/c mobility. Pt has poor activity tolerance. Overall ads max to total a depending on postioning. Overall cog is min to supervision, with prolonged word finding deficits. Team to discuss with pts sister and spouse pts current abilities and needs for return home. Anticipated Discharge Date:  reteam [facility] Team Members Present: The following team members are supervising care for this patient and were present during this Weekly Team Conference.     Physician: Dr. Chapincito Chatterjee DO  : Justina Bose MSW  Registered Nurse: Jesenia Webster RN  Physical Therapist: Reynaldo Siddiqi DPT  Occupational Therapist: Sindy Long MS, OTR/L  Speech Therapist: Celine April, [address] [address]

## 2023-09-07 LAB
ANION GAP SERPL CALCULATED.3IONS-SCNC: 7 MMOL/L
BASOPHILS # BLD AUTO: 0.07 THOUSANDS/ÂΜL (ref 0–0.1)
BASOPHILS NFR BLD AUTO: 1 % (ref 0–1)
BUN SERPL-MCNC: 18 MG/DL (ref 5–25)
CALCIUM SERPL-MCNC: 9.3 MG/DL (ref 8.4–10.2)
CHLORIDE SERPL-SCNC: 100 MMOL/L (ref 96–108)
CO2 SERPL-SCNC: 30 MMOL/L (ref 21–32)
CREAT SERPL-MCNC: 0.66 MG/DL (ref 0.6–1.3)
EOSINOPHIL # BLD AUTO: 0.76 THOUSAND/ÂΜL (ref 0–0.61)
EOSINOPHIL NFR BLD AUTO: 9 % (ref 0–6)
ERYTHROCYTE [DISTWIDTH] IN BLOOD BY AUTOMATED COUNT: 16.1 % (ref 11.6–15.1)
GFR SERPL CREATININE-BSD FRML MDRD: 97 ML/MIN/1.73SQ M
GLUCOSE P FAST SERPL-MCNC: 146 MG/DL (ref 65–99)
GLUCOSE SERPL-MCNC: 117 MG/DL (ref 65–140)
GLUCOSE SERPL-MCNC: 126 MG/DL (ref 65–140)
GLUCOSE SERPL-MCNC: 134 MG/DL (ref 65–140)
GLUCOSE SERPL-MCNC: 141 MG/DL (ref 65–140)
GLUCOSE SERPL-MCNC: 146 MG/DL (ref 65–140)
HCT VFR BLD AUTO: 35.6 % (ref 34.8–46.1)
HGB BLD-MCNC: 11 G/DL (ref 11.5–15.4)
IMM GRANULOCYTES # BLD AUTO: 0.02 THOUSAND/UL (ref 0–0.2)
IMM GRANULOCYTES NFR BLD AUTO: 0 % (ref 0–2)
LYMPHOCYTES # BLD AUTO: 2.83 THOUSANDS/ÂΜL (ref 0.6–4.47)
LYMPHOCYTES NFR BLD AUTO: 33 % (ref 14–44)
MCH RBC QN AUTO: 26.9 PG (ref 26.8–34.3)
MCHC RBC AUTO-ENTMCNC: 30.9 G/DL (ref 31.4–37.4)
MCV RBC AUTO: 87 FL (ref 82–98)
MONOCYTES # BLD AUTO: 0.74 THOUSAND/ÂΜL (ref 0.17–1.22)
MONOCYTES NFR BLD AUTO: 9 % (ref 4–12)
NEUTROPHILS # BLD AUTO: 4.17 THOUSANDS/ÂΜL (ref 1.85–7.62)
NEUTS SEG NFR BLD AUTO: 48 % (ref 43–75)
NRBC BLD AUTO-RTO: 0 /100 WBCS
PLATELET # BLD AUTO: 319 THOUSANDS/UL (ref 149–390)
PMV BLD AUTO: 9.5 FL (ref 8.9–12.7)
POTASSIUM SERPL-SCNC: 4.1 MMOL/L (ref 3.5–5.3)
RBC # BLD AUTO: 4.09 MILLION/UL (ref 3.81–5.12)
SODIUM SERPL-SCNC: 137 MMOL/L (ref 135–147)
WBC # BLD AUTO: 8.59 THOUSAND/UL (ref 4.31–10.16)

## 2023-09-07 PROCEDURE — 85025 COMPLETE CBC W/AUTO DIFF WBC: CPT | Performed by: PHYSICIAN ASSISTANT

## 2023-09-07 PROCEDURE — 97110 THERAPEUTIC EXERCISES: CPT

## 2023-09-07 PROCEDURE — 97112 NEUROMUSCULAR REEDUCATION: CPT

## 2023-09-07 PROCEDURE — 97130 THER IVNTJ EA ADDL 15 MIN: CPT

## 2023-09-07 PROCEDURE — 80048 BASIC METABOLIC PNL TOTAL CA: CPT | Performed by: PHYSICIAN ASSISTANT

## 2023-09-07 PROCEDURE — 99232 SBSQ HOSP IP/OBS MODERATE 35: CPT | Performed by: STUDENT IN AN ORGANIZED HEALTH CARE EDUCATION/TRAINING PROGRAM

## 2023-09-07 PROCEDURE — 92507 TX SP LANG VOICE COMM INDIV: CPT

## 2023-09-07 PROCEDURE — 97530 THERAPEUTIC ACTIVITIES: CPT

## 2023-09-07 PROCEDURE — 99232 SBSQ HOSP IP/OBS MODERATE 35: CPT | Performed by: INTERNAL MEDICINE

## 2023-09-07 PROCEDURE — 82948 REAGENT STRIP/BLOOD GLUCOSE: CPT

## 2023-09-07 PROCEDURE — 97129 THER IVNTJ 1ST 15 MIN: CPT

## 2023-09-07 RX ORDER — METHOCARBAMOL 750 MG/1
750 TABLET, FILM COATED ORAL
Status: DISCONTINUED | OUTPATIENT
Start: 2023-09-07 | End: 2023-09-08

## 2023-09-07 RX ORDER — HYDROXYZINE HYDROCHLORIDE 25 MG/1
25 TABLET, FILM COATED ORAL 3 TIMES DAILY PRN
Status: DISCONTINUED | OUTPATIENT
Start: 2023-09-07 | End: 2023-09-20 | Stop reason: HOSPADM

## 2023-09-07 RX ORDER — LANOLIN ALCOHOL/MO/W.PET/CERES
6 CREAM (GRAM) TOPICAL
Status: DISCONTINUED | OUTPATIENT
Start: 2023-09-07 | End: 2023-09-20 | Stop reason: HOSPADM

## 2023-09-07 RX ORDER — METHOCARBAMOL 500 MG/1
500 TABLET, FILM COATED ORAL 2 TIMES DAILY
Status: DISCONTINUED | OUTPATIENT
Start: 2023-09-07 | End: 2023-09-08

## 2023-09-07 RX ADMIN — OXYCODONE HYDROCHLORIDE 5 MG: 5 TABLET ORAL at 03:21

## 2023-09-07 RX ADMIN — ASPIRIN 81 MG CHEWABLE TABLET 81 MG: 81 TABLET CHEWABLE at 08:24

## 2023-09-07 RX ADMIN — FLUTICASONE FUROATE AND VILANTEROL TRIFENATATE 1 PUFF: 200; 25 POWDER RESPIRATORY (INHALATION) at 08:33

## 2023-09-07 RX ADMIN — OXYCODONE HYDROCHLORIDE 5 MG: 5 TABLET ORAL at 06:52

## 2023-09-07 RX ADMIN — HEPARIN SODIUM 5000 UNITS: 5000 INJECTION INTRAVENOUS; SUBCUTANEOUS at 21:54

## 2023-09-07 RX ADMIN — DIAZEPAM 2 MG: 2 TABLET ORAL at 23:03

## 2023-09-07 RX ADMIN — OXYCODONE HYDROCHLORIDE 5 MG: 5 TABLET ORAL at 20:12

## 2023-09-07 RX ADMIN — METHOCARBAMOL 500 MG: 500 TABLET ORAL at 08:25

## 2023-09-07 RX ADMIN — METHOCARBAMOL 500 MG: 500 TABLET ORAL at 16:55

## 2023-09-07 RX ADMIN — ATORVASTATIN CALCIUM 20 MG: 20 TABLET, FILM COATED ORAL at 16:55

## 2023-09-07 RX ADMIN — CYANOCOBALAMIN TAB 500 MCG 1000 MCG: 500 TAB at 08:24

## 2023-09-07 RX ADMIN — OXYCODONE HYDROCHLORIDE 5 MG: 5 TABLET ORAL at 11:37

## 2023-09-07 RX ADMIN — MICONAZOLE NITRATE 1 APPLICATION: 20 POWDER TOPICAL at 17:07

## 2023-09-07 RX ADMIN — FERROUS SULFATE TAB 325 MG (65 MG ELEMENTAL FE) 325 MG: 325 (65 FE) TAB at 08:24

## 2023-09-07 RX ADMIN — METHOCARBAMOL 750 MG: 750 TABLET ORAL at 21:53

## 2023-09-07 RX ADMIN — SENNOSIDES 17.2 MG: 8.6 TABLET, FILM COATED ORAL at 21:53

## 2023-09-07 RX ADMIN — BUPROPION HYDROCHLORIDE 150 MG: 150 TABLET, FILM COATED, EXTENDED RELEASE ORAL at 08:24

## 2023-09-07 RX ADMIN — HEPARIN SODIUM 5000 UNITS: 5000 INJECTION INTRAVENOUS; SUBCUTANEOUS at 06:37

## 2023-09-07 RX ADMIN — METFORMIN HYDROCHLORIDE 500 MG: 500 TABLET ORAL at 16:55

## 2023-09-07 RX ADMIN — LOSARTAN POTASSIUM 100 MG: 50 TABLET, FILM COATED ORAL at 08:24

## 2023-09-07 RX ADMIN — AMLODIPINE BESYLATE 10 MG: 10 TABLET ORAL at 08:24

## 2023-09-07 RX ADMIN — GABAPENTIN 300 MG: 300 CAPSULE ORAL at 16:55

## 2023-09-07 RX ADMIN — GABAPENTIN 300 MG: 300 CAPSULE ORAL at 21:53

## 2023-09-07 RX ADMIN — MICONAZOLE NITRATE 1 APPLICATION: 20 POWDER TOPICAL at 08:32

## 2023-09-07 RX ADMIN — MELATONIN 6 MG: at 21:53

## 2023-09-07 RX ADMIN — HEPARIN SODIUM 5000 UNITS: 5000 INJECTION INTRAVENOUS; SUBCUTANEOUS at 14:34

## 2023-09-07 RX ADMIN — GABAPENTIN 300 MG: 300 CAPSULE ORAL at 08:24

## 2023-09-07 RX ADMIN — PANTOPRAZOLE SODIUM 40 MG: 40 TABLET, DELAYED RELEASE ORAL at 06:37

## 2023-09-07 RX ADMIN — METFORMIN HYDROCHLORIDE 500 MG: 500 TABLET ORAL at 08:24

## 2023-09-07 NOTE — PROGRESS NOTES
09/07/23 0930   Pain Assessment   Pain Assessment Tool 0-10   Pain Score No Pain   Restrictions/Precautions   Precautions Bed/chair alarms;Cognitive; Fall Risk;Supervision on toilet/commode;Pain   Weight Bearing Restrictions No   ROM Restrictions No   Braces or Orthoses Other (Comment)  (Sling RUE)   Cognition   Overall Cognitive Status Impaired   Arousal/Participation Alert; Cooperative   Attention Attends with cues to redirect   Orientation Level Oriented X4   Memory Decreased short term memory   Following Commands Follows one step commands with increased time or repetition   Subjective   Subjective No new complaints. Ready to participate in PT. Sit to Stand   Type of Assistance Needed Physical assistance;Verbal cues; Adaptive equipment   Physical Assistance Level 76% or more   Comment Multiple sit/stand transfers during session at parallel bar (pt pulling with LUE on bar, w/c positioned in front of bar). Overall mod-max A to stand with blocking R knee, + gait belt. Sit to Stand CARE Score 2   Bed-Chair Transfer   Type of Assistance Needed Physical assistance;Verbal cues; Adaptive equipment   Physical Assistance Level Total assistance   Comment Fluctuates. Today, requiring assist of second person when transfering on Beasy board to pt's L due to pants getting stuck on disc and w/c moving. Utilized second person to steady chair. Transfer from w/c to recliner chair to pt's R mod-max A x1 with inc time taken to ensure proper hand/foot placement and encourage ant weight shift. Chair/Bed-to-Chair Transfer CARE Score 1   Transfer Bed/Chair/Wheelchair   Limitations Noted In Balance;Confidence; Coordination; Endurance;Problem Solving;Sensation; Sequencing;UE Strength;LE Strength   Findings +beasy board and 4" wooden block step at pt's feet for improved positioning.    Car Transfer   Reason if not Attempted Safety concerns   Car Transfer CARE Score 88   Walk 10 Feet   Reason if not Attempted Safety concerns   Walk 10 Feet CARE Score 88   Walk 50 Feet with Two Turns   Reason if not Attempted Safety concerns   Walk 50 Feet with Two Turns CARE Score 88   Walk 150 Feet   Reason if not Attempted Safety concerns   Walk 150 Feet CARE Score 88   Walking 10 Feet on Uneven Surfaces   Reason if not Attempted Safety concerns   Walking 10 Feet on Uneven Surfaces CARE Score 88   Ambulation   Does the patient walk? 1. No, and walking goal is clinically indicated. Wheel 50 Feet with Two Turns   Type of Assistance Needed Physical assistance   Physical Assistance Level 25% or less   Wheel 50 Feet with Two Turns CARE Score 3   Wheel 150 Feet   Type of Assistance Needed Physical assistance   Physical Assistance Level 25% or less   Wheel 150 Feet CARE Score 3   Wheelchair mobility   Does the patient use a wheelchair? 1. Yes   Type of Wheelchair Used 1. Manual   Method Left upper extremity; Left lower extremity   Assistance Provided For Locking Brakes;Obstacles;Remove Leg Rest;Replace Leg Rest;Remove armrests;Replace armrests   Curb or Single Stair   Reason if not Attempted Safety concerns   1 Step (Curb) CARE Score 88   4 Steps   Reason if not Attempted Safety concerns   4 Steps CARE Score 88   12 Steps   Reason if not Attempted Safety concerns   12 Steps CARE Score 88   Picking Up Object   Reason if not Attempted Safety concerns   Picking Up Object CARE Score 88   Therapeutic Interventions   Neuromuscular Re-Education Standing from w/c with w/c positioned in front of parallel bar. Sit to stand with mod-max A, +blocking R knee, Pt able to stand x2 trials: x1 minute, x1'22". Cues to achieve and maintain upright, midline posture. Manual/tactile cues provided to glut/truck to inc postural alignment. Manual weight shifting to R to inc WB through RLE. Assessment   Treatment Assessment Pt participated in 30 minute PT session with focus on transfers with beasy board and sit to stand transfers/standing at parallel bars.  Pt conts to c/o occasional R hip pain with transfers. Pt stating pain occurs randomly (not always when transfer to R or L). Today, she reported discomfort when transfering on beasy board from recliner to w/c going to pt's L. Pain was anterior R iliac crest region and into abdomen. Will cont to monitor and adjust transfer technique as appropriate. Standing noteably improved today. Max stand x1'22" and reports being limited by fatigue. Stats stable, SpO2 94%, HR 82. Performance with transfers continues to fluctuate but pt appears to do best when given ample time to confirm setup and positioning of equipment and extremities and responds to best for cues to achieve and maintain ant weight shift. She will cont to benefit from skilled PT to further improve functional strength, balance, endurance, mobility and indep prior to d/c. Family/Caregiver Present no   Problem List Decreased strength;Decreased range of motion;Decreased endurance; Impaired balance;Decreased mobility; Decreased coordination;Decreased cognition; Impaired sensation;Obesity;Pain   Barriers to Discharge Decreased caregiver support; Inaccessible home environment   PT Barriers   Physical Impairment Decreased strength;Decreased range of motion;Decreased endurance; Impaired balance;Decreased mobility; Decreased coordination;Decreased safety awareness; Impaired sensation;Obesity;Pain   Functional Limitation Car transfers; Ramp negotiation;Stair negotiation;Standing;Transfers; Walking; Wheelchair management   Plan   Treatment/Interventions Functional transfer training;LE strengthening/ROM; Elevations; Therapeutic exercise; Endurance training;Cognitive reorientation;Patient/family training;Bed mobility;Gait training; Compensatory technique education   Progress Slow progress, decreased activity tolerance   Recommendation   PT Discharge Recommendation Home with home health rehabilitation   PT Therapy Minutes   PT Time In 0930   PT Time Out 1000   PT Total Time (minutes) 30   PT Mode of treatment - Individual (minutes) 30   PT Mode of treatment - Concurrent (minutes) 0   PT Mode of treatment - Group (minutes) 0   PT Mode of treatment - Co-treat (minutes) 0   PT Mode of Treatment - Total time(minutes) 30 minutes   PT Cumulative Minutes 875   Therapy Time missed   Time missed?  No

## 2023-09-07 NOTE — PROGRESS NOTES
Internal Medicine Progress Note  Patient: Bri Fletcher  Age/sex: 62 y.o. female  Medical Record #: [id number]      ASSESSMENT/PLAN: (Interval History)  Bri Fletcher is seen and examined and management for following issues:    ICH  • Seen by NS and no surgical intervention indicated. • Etio = HTN  • Was unable to tolerate MRI even with sedation  • Was cleared to resume ASA. • Continue atorvastatin. • Outpt follow-up with Neurology.     HTN  • Goal with ICH is SBP<140  • Home: Micardis 80mg qd/Atenolol 50mg qd  • Here: Norvasc 10 mg qd/Losartan 100mg qd  • (Micardis 80 mg qd = Losartan 100 mg qd)   • Stable; no changes today     DM type 2  • HA1C 6.7  • Home: Metformin 1000mg BID  • Here: Metformin 500 mg BID  • Continue QID Accuchecks/SSI and DM diet  • stable     Anemia  • Baseline Hgb 10 - 11. • Iron level 30/Ferritin 22/TIBC 371  • B12 was low normal at 221 on 8/15/23  • Continue iron and B12 supplementation. • stable     Anxiety  • Continue Wellbutrin XL. • Pt is taking 150mg instead of 300mg due to concerns for increased risk of seizure        Discharge date:  [facility]       The above assessment and plan was reviewed and updated as determined by my evaluation of the patient on 9/7/2023.     Labs:   Results from last 7 days   Lab Units 09/07/23  0638 09/04/23  0714   WBC Thousand/uL 8.59 8.42   HEMOGLOBIN g/dL 11.0* 11.9   HEMATOCRIT % 35.6 37.0   PLATELETS Thousands/uL 319 347     Results from last 7 days   Lab Units 09/07/23  0638 09/04/23  0714   SODIUM mmol/L 137 134*   POTASSIUM mmol/L 4.1 4.1   CHLORIDE mmol/L 100 100   CO2 mmol/L 30 28   BUN mg/dL 18 16   CREATININE mg/dL 0.66 0.59*   CALCIUM mg/dL 9.3 8.9             Results from last 7 days   Lab Units 09/07/23  0637 09/06/23  2130 09/06/23  1541   POC GLUCOSE mg/dl 141* 117 139       Review of Scheduled Meds:  Current Facility-Administered Medications   Medication Dose Route Frequency Provider Last Rate   • acetaminophen  650 mg Oral Q6H PRN Kaiser Claire DO     • albuterol  2 puff Inhalation Q4H PRN Mar Prima, DO     • amLODIPine  10 mg Oral Daily Mar Prima, DO     • aspirin  81 mg Oral Daily Mar Prima, DO     • atorvastatin  20 mg Oral Daily With Dinner Mar Prima, DO     • bisacodyl  10 mg Rectal Daily PRN Nidhi Peterson MD     • bisacodyl  10 mg Rectal Once Ty Oh, CRNP     • buPROPion  150 mg Oral QAM Mar Prima, DO     • calcium carbonate  1,000 mg Oral TID PRN Laureen Tracy PA-C     • vitamin B-12  1,000 mcg Oral Daily Mar Prima, DO     • diazepam  2 mg Oral BID PRN Mar Prima, DO     • docusate sodium  100 mg Oral BID ARSENIO Pinto     • ferrous sulfate  325 mg Oral Daily With Breakfast Mar Prima, DO     • fluticasone-vilanterol  1 puff Inhalation Daily Mar Prima, DO     • gabapentin  300 mg Oral TID Mar Prima, DO     • heparin (porcine)  5,000 Units Subcutaneous Q8H [address] Mar Prima, DO     • insulin lispro  1-6 Units Subcutaneous TID AC Mar Prima, DO     • insulin lispro  1-6 Units Subcutaneous HS Mar Prima, DO     • lactulose  20 g Oral Daily PRN Mar Prima, DO     • losartan  100 mg Oral Daily Mar Prima, DO     • melatonin  3 mg Oral HS ARSENIO Vega     • metFORMIN  500 mg Oral BID With Meals ARSENIO Sales     • methocarbamol  500 mg Oral TID Mar Prima, DO     • miconazole  1 Application Topical BID Mar Prima, DO     • ondansetron  4 mg Oral Q6H PRN Mar Prima, DO     • oxyCODONE  5 mg Oral Q4H PRN Mar Prima, DO     • oxyCODONE  2.5 mg Oral Q4H PRN Mar Prima, DO     • pantoprazole  40 mg Oral Early Morning Mar Prima, DO     • polyethylene glycol  17 g Oral Daily Mar Prima, DO     • prochlorperazine  5 mg Oral Q6H PRN Mar Prima, DO     • senna  2 tablet Oral HS ARSENIO Pinto         Subjective/ HPI: Patient seen and examined.  Patients overnight issues or events were reviewed with nursing or staff during rounds or morning huddle session. New or overnight issues include the following:     No new or overnight issues. Offers no complaints    ROS:   A 10 point ROS was performed; negative except as noted above. *Labs /Radiology studies reviewed  *Medications reviewed and reconciled as needed  *Please refer to order section for additional ordered labs studies  *Case discussed with primary attending during morning huddle case rounds    Physical Examination:  Vitals:   Vitals:    09/06/23 2038 09/07/23 0600 09/07/23 0824 09/07/23 0827   BP: 119/60 120/72 128/72 128/72   BP Location: Left arm Left arm  Left arm   Pulse: 86 97  80   Resp: 18 18     Temp: (!) 97.4 °F (36.3 °C) 97.5 °F (36.4 °C)     TempSrc: Oral Oral     SpO2: 97% 98%     Weight:  91 kg (200 lb 9.9 oz)     Height:           General Appearance: no distress, conversive  HEENT: PERRLA, conjuctiva normal; oropharynx clear; mucous membranes moist   Neck:  Supple, normal ROM  Lungs: CTA, normal respiratory effort, no retractions, expiratory effort normal  CV: regular rate and rhythm; no rubs/murmurs/gallops, PMI normal   ABD: soft; ND/NT; +BS  EXT: no edema  Skin: normal turgor, normal texture, no rashes  Psych: affect normal, mood normal  Neuro: AAO      The above physical exam was reviewed and updated as determined by my evaluation of the patient on 9/7/2023. Invasive Devices     Drain  Duration           External Urinary Catheter 9 days                   VTE Pharmacologic Prophylaxis: Heparin  Code Status: Level 1 - Full Code  Current Length of Stay: 13 day(s)      Total time spent:  30 minutes with more than 50% spent counseling/coordinating care. Counseling includes discussion with patient re: progress  and discussion with patient of his/her current medical state/information. Coordination of patient's care was performed in conjunction with primary service. Time invested included review of patient's labs, vitals, and management of their comorbidities with continued monitoring. In addition, this patient was discussed with medical team including physician and advanced extenders. The care of the patient was extensively discussed and appropriate treatment plan was formulated unique for this patient. Medical decision making for the day was made by supervising physician unless otherwise noted in their attestation statement. ** Please Note:  voice to text software may have been used in the creation of this document.  Although proof errors in transcription or interpretation are a potential of such software**

## 2023-09-07 NOTE — PROGRESS NOTES
09/07/23 0830   Pain Assessment   Pain Assessment Tool 0-10   Pain Score 4   Pain Location/Orientation Orientation: Right;Location: Groin; Location: Leg   Hospital Pain Intervention(s) Repositioned  (distractions by participating in sesion)   Restrictions/Precautions   Precautions Bed/chair alarms;Cognitive; Fall Risk;Pain;Supervision on toilet/commode   Comprehension   Comprehension (FIM) 5 - Understands basic directions and conversation   Expression   Expression (FIM) 5 - Needs help/cues only RARELY (< 10% of the time)   Social Interaction   Social Interaction (FIM) 5 - Interacts appropriately with others 90% of time   Problem Solving   Problem solving (FIM) 4 - Solves basic problems 75-89% of time   Memory   Memory (FIM) 4 - Recognizes/recalls/performs 75-89%   Speech/Language/Cognition Assessmetn   Treatment Assessment Pt was awake, but in bed for session initially. Pt recalling current SLP and asking about past days since last working w/ pt. As for rapport building w/ pt for her events, pt did verbalize having a "panic" moments yesterday due to one event where "too many people" were on the elevator (as pt was on 4th floor gym for OT session) and then the fire alarm went off later in the day and pt still feel "isolated" w/ door closed. SLP did discuss techniques to work through these moments, such as deep breathing, reaching out to staff for support as able, which pt was receptive. Otherwise, pt continues to report about the ongoing groin pain and fluctuating foot pain (sometimes in L foot-which was not affected by stroke vs R foot-which was affected by stroke). SLP looking through tasks which had been completed in past sessions to where it appeared that pt was doing fairly well given more basic cognitive tasks. Continuing to focus on memory in today's session where SLP verbally providing pt w/ 4 words. Pt was to recall at least 2 words which belonged to the category asked after providing the list of words.  Pt was 15/16 accurate in ability to recall target words, benefiting from initial letter cue to recall the one target word missed. SLP did switch this task to attempting to have pt recall by exclusion. SLP continued to provide 4 words per list but now pt was to recall target words by exclusion. Pt did exhibit slightly more difficulty in spontaneously naming the words, where she as 12/20 accurate in recalling w/o cues. When providing initial letter cues, pt was able to increase recall of target words to 17/20 accuracy, and increased to 18/20 w/ semantic cue. Full repetition was needed for one set of words. However, it was noted during this task that pt did recall where the words which were to be excluded landed in the list, but mild difficulty in recalling words. When SLP asking pt about word finding difficulty, pt continued to report that it still occurs, to where SLP targeted word retrieval strategies w/ pt. SLP provided pt w/ 4 clue words which described an item. Given just the clue words, pt was 12/15 accurate in ability to ID the item being described, but provided semantically associated words for the remaining 3 items to where minimal probing clues provided to hit target items. SLP then had pt complete word retrieval strategies for a stated word. Pt was able to given good descriptive words for these items, but needed minimal probing cues to elicit a function or action of the word being described in 3/6 items. Overall, pt will continue to benefit from ongoing skilled SLP services targeting functional independence given cognitive linguistic skills in hopes for decreasing overall caregiver burden over time.     SLP Therapy Minutes   SLP Time In 0830   SLP Time Out 0930   SLP Total Time (minutes) 60   SLP Mode of treatment - Individual (minutes) 60   SLP Mode of treatment - Concurrent (minutes) 0   SLP Mode of treatment - Group (minutes) 0   SLP Mode of treatment - Co-treat (minutes) 0   SLP Mode of Treatment - Total time(minutes) 60 minutes   SLP Cumulative Minutes 405   Therapy Time missed   Time missed?  No

## 2023-09-07 NOTE — CASE MANAGEMENT
Met w/pt and reviewed team mtg update and informed of insurance update due tomorrow. Cm inquired with pt her dc plans. Pt wishes to return home but doesn't know when that may be. Pt is aware of the family meeting scheduled on Monday. Cm explained the teams concerns about pt returning home from this setting as she will still need assistance. Cm explained the meeting is to discuss her current functional abilities and have everyone discuss how they feel about her returning home and to discuss the potential for another level of rehab. Pt inquired about [facility] as its close to home and her one sister works there. Cm stated that could be an option if its in network with her insurance. Pt stated understanding of the family meeting and the potential for subacute rehab.

## 2023-09-07 NOTE — PROGRESS NOTES
PM&R PROGRESS NOTE:  Stefania Santillan 62 y.o. female MRN: [id number]  Unit/Bed#: -55 Encounter: [id number]    Rehabilitation Diagnosis: Impairment of mobility, safety, Activities of Daily Living (ADLs), and cognitive/communication skills due to Stroke:  01.2  Right Body Involvement (Left Brain)    HPI:  Stefania Santillan is a 80-year-old female with history of hypertension, hyperlipidemia, diabetes type 2, obesity, eosinophilic asthma, COPD, fibromyalgia, Sjogren syndrome, lumbar spondylosis with grade 1 anterior spondylolisthesis who presents to the hospital on 8/14/2023 due to acute onset right upper and right lower extremity weakness with sensory loss. Additionally with right-sided facial droop and dysarthria. The patient was noted to have an acute intraparenchymal hemorrhage on CT in the left thalamic region with surrounding edema. A CTA was negative. Patient was initially placed on a Cardene drip. Course complicated by significant left-sided leg pain and was recommended on treatment for radicular symptoms including Tylenol, gabapentin and potentially steroids. Neurology was consulted and recommended MRI of the brain with and without contrast while holding antithrombotics. The MRI showed a stable left thalamic capsular intraparenchymal hemorrhage with surrounding edema without any other mass effect. A repeat CT was completed on 8/17/2023 after worsening symptoms including worsening speech slurring with stable findings. Additionally there was a rapid response after a fall with head strike on 8/18 with a repeat CT of the head without acute findings. Additionally antihypertensive regimen was altered as she was previously on telmisartan and atenolol and is currently on amlodipine, losartan and hydralazine with better control.  The patient was evaluated by the Rehabilitation team and deemed an appropriate candidate for comprehensive inpatient rehabilitation and admitted to the [facility] on 8/25/2023  3:19 PM    SUBJECTIVE: Patient seen face to face. No acute issues overnight. Reports pain in right groin and hip (acute) and right lower extremity (chronic) well controlled with oxycodone, no relief from robaxin during the day. The pain is worse after therapies and at the end of the day. Discussed medication adjustment trial, patient in agreement. Patient had increased anxiety with crowded elevator and a later fire drill with room door closure. She reports claustrophobia. Patient had interrupted sleep d/t the anxiety. Good appetite, incontinent voiding, LBM 9/6. Denies chest pain, shortness of breath, fever, chills, N/V, abdominal pain. ASSESSMENT: Stable, progressing    PLAN:  - constipation: LBM 9/7, resolved, continue to monitor  - anxiety: added Atarax 25 mg q8 hours prn  - urinary incontinence: continue timed void with PVR as ordered  - pain:- increased muscle spasms of right lower extremity- adjusted Robaxin 500 mg BID and 750 mg qhs; continue oxycodone 2.5-5 mg q4h prn    Rehabilitation  • Functional deficits:  Self-care, right hemiparesis, mobility, cognitive impairment  • Continue current rehabilitation plan of care to maximize function.     • Functional update:   o PT: mobility- mod A, transfers- mod-max A, ambulation- not attempted d/t safety  o OT: ADL: bathing- max A, dressing UB max A LB total A, toileting- total A   o ST: cognition- impaired, decreased executive function, decreased attention, decreased comprehension  • Estimated Discharge: anticipated 4 week goals, reteam      Pain  • Gabapentin 300 mg TID  • Oxycodone 2.5-5 mg every 4 hours prn  • Methocarbamol 500 mg BID and 750 mg qhs  • Rifaximin 500 mg every 6 hours prn    DVT prophylaxis  • Heparin sc    Bladder plan  • Continent    Bowel plan  • Continent  • Colace 100 mg BID  • Senokot 17.2 mg qhs  • Miralax daily  • Bisacodyl 5 mg tab daily prn  • Bisacodyl supp daily prn      * ICH (intracerebral hemorrhage) ([address])  Assessment & Plan  51-year-old female presents with right hemiplegia, dysarthria and facial asymmetry found to have an acute intraparenchymal hemorrhage in the left thalamic region with surrounding edema felt to be secondary to uncontrolled hypertension  · Had repeat CT of the head on 8/18 after a fall with head strike resulting in significant bruising of the face  · Cleared by neurology to restart aspirin 81 mg daily and atorvastatin 20 mg daily  · Evaluated by neurosurgery with no surgical intervention at this time  · Had a repeat head CT on 8/20 which has been stable  · Goal systolic blood pressure of less than 140  · Follow-up with neurology as an outpatient in 6 weeks (10/26)  · Physical and Occupational Therapy with goals for community discharge. Patient lives with her  in a mobile home with 5 steps to enter and he is able to assist 24/7  · Hemorrhagic stroke education, nutrition, neuropsychology  · Secondary stroke prophylaxis with hypertension control    Primary hypertension  Assessment & Plan  · Home regimen: Telmisartan and atenolol  · Current regimen: Norvasc 10 mg daily Cozaar 100 mg daily, hydralazine discontinued  · Monitor blood pressures especially in therapy and make adjustments as needed    Chronic bilateral low back pain with bilateral sciatica  Assessment & Plan  · Patient has a history of chronic low back pain with radicular symptoms in addition to fibromyalgia and myofascial pain syndrome. · She follows with Dr. Serenity Matson from pain management has tried several medications as well as epidural steroid injections with little relief  · Imaging reveals lumbar degenerative disc disease at the L3-4, L4-5, L5-S1 level.   Additionally facet hypertrophic changes and ligamentous laxity at the L4-5 level resulting in grade 1 anterior spondylolisthesis and mild canal narrowing with slight distortion of the left anterior lateral aspect of the thecal sac at this level with mild right greater than left neuroforaminal narrowing  · Multi modal pain with current medication regimen including oxycodone to be weaned as well as the Robaxin and gabapentin. We will also consult neuropsychology  · Added Tylenol as well as Robaxin and as needed Valium    Diabetes mellitus type 2, controlled [facility])  Assessment & Plan  Lab Results   Component Value Date    HGBA1C 6.7 (H) 08/15/2023       Recent Labs     09/06/23  1541 09/06/23  2130 09/07/23  0637 09/07/23  1123   POCGLU 139 117 141* 126     · Currently on carb controlled level 2 diet, metformin 500 mg twice daily  · Sliding scale insulin algorithm 3 with Accu-Cheks 4 times daily      Constipation  Assessment & Plan  · On admission had not had a bowel movement in at least 11 days  · Obtaining x-ray to evaluate stool burden  · Adding to bowel regimen including increasing senna and Colace, lactulose as needed and will be more aggressive pending results of x-ray  · Continue relatively aggressive regimen    Hyponatremia  Assessment & Plan  · Sodium 137 (previously 134, 133)  · Continue following biweekly labs with no intervention at this point unless continues to trend down.     CAD (coronary artery disease)  Assessment & Plan  · History of CAD currently on statin and aspirin that was restarted on 8/21 after clearance by neurology  · Beta-blocker held due to bradycardia  · Patient has not formally seen a cardiologist however this was diagnosed based on a CT PE study that was conducted in the ER showing mild CAD    GERD (gastroesophageal reflux disease)  Assessment & Plan  Continue Protonix    Hyperlipidemia associated with type 2 diabetes mellitus ([address])  Assessment & Plan  Continue atorvastatin 20 mg with dinner    Class 2 obesity with body mass index (BMI) of 39.0 to 39.9 in adult  Assessment & Plan  · BMI of 41.40 on admission  · Continue promoting weight loss and increased activity, diet and exercise  · Continue a consistent carbohydrate diet  · Nutrition consultation    Sjogren's syndrome ([address])  Assessment & Plan  · Patient states that she is been worked up for Sjogren's syndrome in the past and has had conflicting diagnosis he is stating that some physicians have diagnosed her with it and others stated she did not have it. · She has not been on any medication treatment for this and does not actively follow-up with a rheumatologist    Depression with anxiety  Assessment & Plan  · Neuropsychology consultation poststroke with history of anxiety  · Continue Wellbutrin  mg in the morning. It has been 300 mg as an outpatient however it was decreased in acute care due to worry of decreasing seizure threshold in the setting of ICH    Moderate persistent asthma without complication  Assessment & Plan  · Follows with Dr. Garfield Oliva from pulmonology  · Home regimen includes Breo and as needed albuterol  · On equivalent here and added albuterol inhaler on admission to [address] [place], [place] [address] [facility] medical co-management. Labs, medications, and imaging personally reviewed. ROS:  .Review of Systems   A 10 point review of systems was negative except for what is noted in the HPI. OBJECTIVE:   /72 (BP Location: Left arm)   Pulse 80   Temp 97.5 °F (36.4 °C) (Oral)   Resp 18   Ht 5' (1.524 m)   Wt 91 kg (200 lb 9.9 oz) Comment: p500 bed  SpO2 98%   BMI 39.18 kg/m²     Physical Exam  Constitutional:       Appearance: Normal appearance. HENT:      Head: Normocephalic and atraumatic. Nose: Nose normal.      Mouth/Throat:      Mouth: Mucous membranes are moist.   Cardiovascular:      Rate and Rhythm: Normal rate and regular rhythm. Pulses: Normal pulses. Heart sounds: Normal heart sounds. Pulmonary:      Effort: Pulmonary effort is normal.      Breath sounds: Normal breath sounds. Abdominal:      General: Bowel sounds are normal.      Palpations: Abdomen is soft. Musculoskeletal:         General: Normal range of motion. Cervical back: Normal range of motion.       Right lower leg: Edema present. Left lower leg: Edema present. Skin:     General: Skin is warm and dry. Capillary Refill: Capillary refill takes less than 2 seconds. Findings: Bruising (facial) present. Neurological:      Mental Status: She is alert and oriented to person, place, and time. Sensory: Sensory deficit present. Motor: Weakness present.       Coordination: Coordination abnormal.      Gait: Gait abnormal.      Comments: - right hemiparesis, dysarthria   Psychiatric:         Mood and Affect: Mood normal.         Lab Results   Component Value Date    WBC 8.59 09/07/2023    HGB 11.0 (L) 09/07/2023    HCT 35.6 09/07/2023    MCV 87 09/07/2023     09/07/2023     Lab Results   Component Value Date    SODIUM 137 09/07/2023    K 4.1 09/07/2023     09/07/2023    CO2 30 09/07/2023    BUN 18 09/07/2023    CREATININE 0.66 09/07/2023    GLUC 146 (H) 09/07/2023    CALCIUM 9.3 09/07/2023     Lab Results   Component Value Date    INR 1.06 08/15/2023    INR 0.94 08/14/2023    PROTIME 14.0 08/15/2023    PROTIME 13.2 08/14/2023         Current Facility-Administered Medications:   •  acetaminophen (TYLENOL) tablet 650 mg, 650 mg, Oral, Q6H PRN, Rashard Galvan DO, 650 mg at 09/01/23 8576  •  albuterol (PROVENTIL HFA,VENTOLIN HFA) inhaler 2 puff, 2 puff, Inhalation, Q4H PRN, Rashard Galvan DO, 2 puff at 09/04/23 0919  •  amLODIPine (NORVASC) tablet 10 mg, 10 mg, Oral, Daily, Rashard Galvan DO, 10 mg at 09/07/23 8125  •  aspirin chewable tablet 81 mg, 81 mg, Oral, Daily, Rashard Galvan DO, 81 mg at 09/07/23 1900  •  atorvastatin (LIPITOR) tablet 20 mg, 20 mg, Oral, Daily With Dinner, Rashard Galvan DO, 20 mg at 09/06/23 1715  •  bisacodyl (DULCOLAX) rectal suppository 10 mg, 10 mg, Rectal, Daily PRN, Marion Shah MD, 10 mg at 09/03/23 1826  •  bisacodyl (DULCOLAX) rectal suppository 10 mg, 10 mg, Rectal, Once, ARSENIO Pinto  •  buPROPion (WELLBUTRIN XL) 24 hr tablet 150 mg, 150 mg, Oral, IBRAHIMA, Emir Folds Yuniel, DO, 150 mg at 09/07/23 3820  •  calcium carbonate (TUMS) chewable tablet 1,000 mg, 1,000 mg, Oral, TID PRN, Laureen Tracy PA-C, 1,000 mg at 08/26/23 1241  •  cyanocobalamin (VITAMIN B-12) tablet 1,000 mcg, 1,000 mcg, Oral, Daily, Radhames Delgado DO, 1,000 mcg at 09/07/23 3719  •  diazepam (VALIUM) tablet 2 mg, 2 mg, Oral, BID PRN, Radhames Delgado DO, 2 mg at 09/01/23 2305  •  docusate sodium (COLACE) capsule 100 mg, 100 mg, Oral, BID, ARSENIO Pinto, 100 mg at 09/06/23 1715  •  ferrous sulfate tablet 325 mg, 325 mg, Oral, Daily With Breakfast, Radhames Delgado DO, 325 mg at 09/07/23 4720  •  fluticasone-vilanterol 200-25 mcg/actuation 1 puff, 1 puff, Inhalation, Daily, Radhames Delgado DO, 1 puff at 09/07/23 3633  •  gabapentin (NEURONTIN) capsule 300 mg, 300 mg, Oral, TID, Radhames Delgado DO, 300 mg at 09/07/23 2567  •  heparin (porcine) subcutaneous injection 5,000 Units, 5,000 Units, Subcutaneous, Q8H [facility], Radhames Delgado DO, 5,000 Units at 09/07/23 7781  •  hydrOXYzine HCL (ATARAX) tablet 25 mg, 25 mg, Oral, TID PRN, ARSENIO Pinto  •  insulin lispro (HumaLOG) 100 units/mL subcutaneous injection 1-6 Units, 1-6 Units, Subcutaneous, TID AC, 2 Units at 09/05/23 1108 **AND** Fingerstick Glucose (POCT), , , 4x Daily AC and at bedtime, Radhames Delgado DO  •  insulin lispro (HumaLOG) 100 units/mL subcutaneous injection 1-6 Units, 1-6 Units, Subcutaneous, HS, Radhames Delgado DO, 1 Units at 09/04/23 2110  •  lactulose oral solution 20 g, 20 g, Oral, Daily PRN, Radhames Delgado DO  •  losartan (COZAAR) tablet 100 mg, 100 mg, Oral, Daily, Radhames Delgado DO, 100 mg at 09/07/23 4074  •  melatonin tablet 6 mg, 6 mg, Oral, HS, ARSENIO Pinto  •  metFORMIN (GLUCOPHAGE) tablet 500 mg, 500 mg, Oral, BID With Meals, ARSENIO Mosher, 500 mg at 09/07/23 6319  •  methocarbamol (ROBAXIN) tablet 500 mg, 500 mg, Oral, BID, ARSENIO Pinto  •  methocarbamol (ROBAXIN) tablet 750 mg, 750 mg, Oral, HS, ARSENIO Pinto  •  miconazole (MICOTIN) 2 % powder 1 Application, 1 Application, Topical, BID, Kaitlin Price, DO, 1 Application at 37/42/99 2930  •  ondansetron (ZOFRAN-ODT) dispersible tablet 4 mg, 4 mg, Oral, Q6H PRN, Kaitlin Price, DO  •  oxyCODONE (ROXICODONE) IR tablet 5 mg, 5 mg, Oral, Q4H PRN, Kaitlin Price, DO, 5 mg at 09/07/23 1137  •  oxyCODONE (ROXICODONE) split tablet 2.5 mg, 2.5 mg, Oral, Q4H PRN, Kaitlin Price, DO, 2.5 mg at 09/04/23 1752  •  pantoprazole (PROTONIX) EC tablet 40 mg, 40 mg, Oral, Early Morning, Kaitlin Price, DO, 40 mg at 09/07/23 9366  •  polyethylene glycol (MIRALAX) packet 17 g, 17 g, Oral, Daily, Kaitlin Price, DO, 17 g at 09/05/23 2314  •  prochlorperazine (COMPAZINE) tablet 5 mg, 5 mg, Oral, Q6H PRN, Kaitlin Price, DO  •  senna (SENOKOT) tablet 17.2 mg, 2 tablet, Oral, HS, ARSENIO Pinto, 17.2 mg at 09/06/23 2217    Past Medical History:   Diagnosis Date   • Arthritis    • Asthma    • Continuous opioid dependence ([address]) 4/27/2022   • Diabetes mellitus ([address])    • Diverticulitis of colon    • Fibromyalgia 04/26/2022   • Headache(784.0)    • History of mammogram 2018   • History of tobacco abuse 04/26/2022   • Hypertension    • Nausea 04/29/2022   • Obesity    • Sjogren's syndrome ([address]) 10/19/2012   • Urinary tract infection        Patient Active Problem List    Diagnosis Date Noted   • ICH (intracerebral hemorrhage) ([address]) 08/15/2023   • Primary hypertension 04/26/2022   • Chronic bilateral low back pain with bilateral sciatica 08/31/2022   • Diabetes mellitus type 2, controlled ([address]) 04/29/2022   • Constipation 08/25/2023   • Hyponatremia 08/31/2023   • CAD (coronary artery disease) 04/07/2023   • GERD (gastroesophageal reflux disease) 08/25/2023   • Anemia 08/16/2023   • Left leg pain 08/15/2023   • Chronic obstructive pulmonary disease with acute exacerbation ([address]) 07/26/2023   • Hyperlipidemia associated with type 2 diabetes mellitus ([address]) 07/26/2023   • Abnormal CT of the chest 06/28/2023   • Class 2 obesity with body mass index (BMI) of 39.0 to 39.9 in adult 06/28/2023   • Right lumbosacral radiculopathy 10/12/2022   • Paresthesia of both feet 08/31/2022   • Postoperative visit 05/24/2022   • Incarcerated umbilical hernia 76/94/0955   • Continuous opioid dependence ([address]) 04/27/2022   • Moderate persistent asthma without complication 12/50/5272   • Cigarette nicotine dependence in remission 04/26/2022   • Fibromyalgia 04/26/2022   • Sjogren's syndrome ([address]) 10/19/2012   • Depression with anxiety 09/18/2012        ARSENIO Morales  Physical Medicine and [place]

## 2023-09-07 NOTE — PROGRESS NOTES
09/07/23 1030   Pain Assessment   Pain Assessment Tool 0-10   Pain Score No Pain   Restrictions/Precautions   Precautions Bed/chair alarms;Cognitive; Fall Risk;Supervision on toilet/commode;Pain   Weight Bearing Restrictions No   ROM Restrictions No   Bed-Chair Transfer   Type of Assistance Needed Physical assistance   Physical Assistance Level 76% or more   Comment Fluctuates based on fatigue, pain, and surface height. At lowest level can require maxA x 1 for beasy board txfer. Chair/Bed-to-Chair Transfer CARE Score 2   Neuromuscular Education   Comments Pt participated in [address] assisted movement therapy in order to promote proximal shoulder strength. Pt positioned on chair with trunk and shoulder straps in place to reduce compensation. Pt utilizes arm trough for RUE support. Pt tolerates 1 x 20 reps on guided mode for priming of movement for fwd thrust and then progresses to 3 x 20 reps on initiated. Pt overall tolerates session well with rest breaks to manage fatigue. Trialed use of pillow to promote more upright positioning and improved comfort with pt reporting effective. Pt continues to require encouragement, distraction and motivation to particiapte. Cognition   Overall Cognitive Status Impaired   Arousal/Participation Alert; Cooperative   Attention Attends with cues to redirect   Orientation Level Oriented X4   Memory Decreased short term memory   Following Commands Follows one step commands with increased time or repetition   Activity Tolerance   Activity Tolerance Patient limited by fatigue   Assessment   Treatment Assessment Pt participated in skilled OT services with focus on functional txfers and RUE NMR on REOGO. Pt reports very fatigued s/p yesterday as it was a "bad day" per pt. Pt provided with emotional support. Of note, states she feels claustrophobic and she dealt with this PTA and yesterday was triggered by fire alarm and elevator ride.  Pt remains anxious today and requires emotional support, distraction, validation which she does respond well to. Ongoing education on stress mgmt techniques and relaxation techniques. Pt remains limited by dec act massimo, dec endurance, dec strength, R body weakness. Pt will continue to benefit from skilled OT services with focus on RUE NMR, functional txfers, core strength and postural control and act massimo. Prognosis Fair   Problem List Decreased strength; Impaired balance;Decreased endurance;Decreased mobility;Orthopedic restrictions;Pain;Obesity; Decreased range of motion;Decreased cognition   Plan   Treatment/Interventions ADL retraining;Functional transfer training; Therapeutic exercise; Endurance training;Cognitive reorientation;Patient/family training;Equipment eval/education; Bed mobility; Compensatory technique education   Progress Slow progress, decreased activity tolerance   OT Therapy Minutes   OT Time In 1030   OT Time Out 1130   OT Total Time (minutes) 60   OT Mode of treatment - Individual (minutes) 60   OT Mode of treatment - Concurrent (minutes) 0   OT Mode of treatment - Group (minutes) 0   OT Mode of treatment - Co-treat (minutes) 0   OT Mode of Treatment - Total time(minutes) 60 minutes   OT Cumulative Minutes 1130   Therapy Time missed   Time missed?  No

## 2023-09-07 NOTE — PLAN OF CARE
Problem: Prexisting or High Potential for Compromised Skin Integrity  Goal: Skin integrity is maintained or improved  Description: INTERVENTIONS:  - Identify patients at risk for skin breakdown  - Assess and monitor skin integrity  - Assess and monitor nutrition and hydration status  - Monitor labs   - Assess for incontinence   - Turn and reposition patient  - Assist with mobility/ambulation  - Relieve pressure over bony prominences  - Avoid friction and shearing  - Provide appropriate hygiene as needed including keeping skin clean and dry  - Evaluate need for skin moisturizer/barrier cream  - Collaborate with interdisciplinary team   - Patient/family teaching  - Consider wound care consult   Outcome: Progressing     Problem: PAIN - ADULT  Goal: Verbalizes/displays adequate comfort level or baseline comfort level  Description: Interventions:  - Encourage patient to monitor pain and request assistance  - Assess pain using appropriate pain scale  - Administer analgesics based on type and severity of pain and evaluate response  - Implement non-pharmacological measures as appropriate and evaluate response  - Consider cultural and social influences on pain and pain management  - Notify physician/advanced practitioner if interventions unsuccessful or patient reports new pain  Outcome: Progressing     Problem: INFECTION - ADULT  Goal: Absence or prevention of progression during hospitalization  Description: INTERVENTIONS:  - Assess and monitor for signs and symptoms of infection  - Monitor lab/diagnostic results  - Monitor all insertion sites, i.e. indwelling lines, tubes, and drains  - Monitor endotracheal if appropriate and nasal secretions for changes in amount and color  - [place] appropriate cooling/warming therapies per order  - Administer medications as ordered  - Instruct and encourage patient and family to use good hand hygiene technique  - Identify and instruct in appropriate isolation precautions for identified infection/condition  Outcome: Progressing  Goal: Absence of fever/infection during neutropenic period  Description: INTERVENTIONS:  - Monitor WBC    Outcome: Progressing     Problem: SAFETY ADULT  Goal: Patient will remain free of falls  Description: INTERVENTIONS:  - Educate patient/family on patient safety including physical limitations  - Instruct patient to call for assistance with activity   - Consult OT/PT to assist with strengthening/mobility   - Keep Call bell within reach  - Keep bed low and locked with side rails adjusted as appropriate  - Keep care items and personal belongings within reach  - Initiate and maintain comfort rounds  - Make Fall Risk Sign visible to staff  - Offer Toileting every 2 Hours, in advance of need  - Initiate/Maintain bed/chair alarm  - Obtain necessary fall risk management equipment: alarms  - Apply yellow socks and bracelet for high fall risk patients  - Consider moving patient to room near nurses station  Outcome: Progressing  Goal: Maintain or return to baseline ADL function  Description: INTERVENTIONS:  -  Assess patient's ability to carry out ADLs; assess patient's baseline for ADL function and identify physical deficits which impact ability to perform ADLs (bathing, care of mouth/teeth, toileting, grooming, dressing, etc.)  - Assess/evaluate cause of self-care deficits   - Assess range of motion  - Assess patient's mobility; develop plan if impaired  - Assess patient's need for assistive devices and provide as appropriate  - Encourage maximum independence but intervene and supervise when necessary  - Involve family in performance of ADLs  - Assess for home care needs following discharge   - Consider OT consult to assist with ADL evaluation and planning for discharge  - Provide patient education as appropriate  Outcome: Progressing  Goal: Maintains/Returns to pre admission functional level  Description: INTERVENTIONS:  - Perform BMAT or MOVE assessment daily.    - Set and communicate daily mobility goal to care team and patient/family/caregiver. - Collaborate with rehabilitation services on mobility goals if consulted  - Perform Range of Motion 3 times a day. - Reposition patient every 2 hours. - Dangle patient 3 times a day  - Stand patient 3 times a day  - Ambulate patient 3 times a day  - Out of bed to chair 3 times a day   - Out of bed for meals 3 times a day  - Out of bed for toileting  - Record patient progress and toleration of activity level   Outcome: Progressing     Problem: DISCHARGE PLANNING  Goal: Discharge to home or other facility with appropriate resources  Description: INTERVENTIONS:  - Identify barriers to discharge w/patient and caregiver  - Arrange for needed discharge resources and transportation as appropriate  - Identify discharge learning needs (meds, wound care, etc.)  - Arrange for interpretive services to assist at discharge as needed  - Refer to Case Management Department for coordinating discharge planning if the patient needs post-hospital services based on physician/advanced practitioner order or complex needs related to functional status, cognitive ability, or social support system  Outcome: Progressing     Problem: Nutrition/Hydration-ADULT  Goal: Nutrient/Hydration intake appropriate for improving, restoring or maintaining nutritional needs  Description: Monitor and assess patient's nutrition/hydration status for malnutrition. Collaborate with interdisciplinary team and initiate plan and interventions as ordered. Monitor patient's weight and dietary intake as ordered or per policy. Utilize nutrition screening tool and intervene as necessary. Determine patient's food preferences and provide high-protein, high-caloric foods as appropriate.      INTERVENTIONS:  - Monitor oral intake, urinary output, labs, and treatment plans  - Assess nutrition and hydration status and recommend course of action  - Evaluate amount of meals eaten  - Assist patient with eating if necessary   - Allow adequate time for meals  - Recommend/ encourage appropriate diets, oral nutritional supplements, and vitamin/mineral supplements  - Order, calculate, and assess calorie counts as needed  - Recommend, monitor, and adjust tube feedings and TPN/PPN based on assessed needs  - Assess need for intravenous fluids  - Provide specific nutrition/hydration education as appropriate  - Include patient/family/caregiver in decisions related to nutrition  Outcome: Progressing     Problem: MOBILITY - ADULT  Goal: Maintain or return to baseline ADL function  Description: INTERVENTIONS:  -  Assess patient's ability to carry out ADLs; assess patient's baseline for ADL function and identify physical deficits which impact ability to perform ADLs (bathing, care of mouth/teeth, toileting, grooming, dressing, etc.)  - Assess/evaluate cause of self-care deficits   - Assess range of motion  - Assess patient's mobility; develop plan if impaired  - Assess patient's need for assistive devices and provide as appropriate  - Encourage maximum independence but intervene and supervise when necessary  - Involve family in performance of ADLs  - Assess for home care needs following discharge   - Consider OT consult to assist with ADL evaluation and planning for discharge  - Provide patient education as appropriate  Outcome: Progressing

## 2023-09-07 NOTE — PROGRESS NOTES
09/07/23 1220   Pain Assessment   Pain Assessment Tool 0-10   Pain Score No Pain   Restrictions/Precautions   Precautions Bed/chair alarms; Fall Risk;Cognitive;Supervision on toilet/commode;Pain   Weight Bearing Restrictions No   ROM Restrictions No   Braces or Orthoses   (Sling RUE)   Cognition   Overall Cognitive Status Impaired   Subjective   Subjective "I need to get sleep. I only got 4 hours last night"   Sit to Stand   Type of Assistance Needed Physical assistance   Physical Assistance Level 76% or more   Comment mod/max Ax1 with therapist blocking R knee to perform STS to [facility]. Patient unable/fearful to transition LUE from pushing off chair to walker. Unable to fully achieve stance. Sit to Stand CARE Score 2   Bed-Chair Transfer   Type of Assistance Needed Physical assistance   Physical Assistance Level 76% or more   Comment Performed 2x beasy board transfer this session: 1x patient needing max A as disc was not sliding with patient and 1x with minAx2 with a perfect/proper set up of board and disc to even surface. Otherwise PT performed mod/max Ax1 sit pivot transfers with patient blocking R knee. Chair/Bed-to-Chair Transfer CARE Score 2   Transfer Bed/Chair/Wheelchair   Limitations Noted In Balance;Confidence;LE Strength;UE Strength; Coordination; Endurance   Adaptive Equipment Transfer Board   Car Transfer   Comment (S)  trial next session if appropriate   Walk 10 Feet   Reason if not Attempted Safety concerns   Walk 10 Feet CARE Score 88   Walk 50 Feet with Two Turns   Reason if not Attempted Safety concerns   Walk 50 Feet with Two Turns CARE Score 88   Walk 150 Feet   Reason if not Attempted Safety concerns   Walk 150 Feet CARE Score 88   Walking 10 Feet on Uneven Surfaces   Reason if not Attempted Safety concerns   Walking 10 Feet on Uneven Surfaces CARE Score 88   Ambulation   Does the patient walk? 0. No, and walking goal is not clinically indicated.    Wheelchair mobility   Does the patient use a wheelchair? 1. Yes   Type of Wheelchair Used 1. Manual   Findings did not perform this session; when trialing, take cushion off for increased (I)/ability to self propel   Toilet Transfer   Type of Assistance Needed Physical assistance   Physical Assistance Level Total assistance   Comment toileting performed at the start of session via a pull to stand at bed railing. Ax2 to stand with 3rd person present for swap out, clothing management and hygiene   Toilet Transfer CARE Score 1   Therapeutic Interventions   Strengthening Repeated STS transfers with HW; 3x8 AA RLE heel slides, AA RLE SLR, BLE bridging, 5x RLE single leg bridging   Equipment Use   NuStep L2x10 minutes with R sided attachments, kept SMP > 55   Assessment   Treatment Assessment Patient engaged in PT treatment session focused on increasing LE strength via NuStep and repeated STS transfers. Session limited by need for toileting at the beginning however once completed, patient able to participate without pain reported. She remain fearful and can be self limiting at times; does respond mostly to verbal encouragement. PT POC to continue to focus standing for WB thorugh BLE, mat program for strengthening, possible trial of BWSS ([place] patient will "sit" in sling with extreme forwards posture vs c/o pain from straps as she c/o pain using the standing frame sling), and continued work on core stability and reaching outside MARRY. Family meeting to be held Monday AM however CM to initiate SNF conversation as patient remain a high caregiver burden and will benefit from continued skilled PT intervention to maximize her functional mobility (I) and safety. Problem List Decreased strength;Decreased endurance; Impaired balance;Decreased mobility; Decreased coordination;Decreased cognition; Impaired sensation;Obesity;Orthopedic restrictions;Pain   Barriers to Discharge Inaccessible home environment;Decreased caregiver support   PT Barriers   Functional Limitation Car transfers; Ramp negotiation; Walking; Wheelchair management;Transfers;Standing;Stair negotiation   Plan   Treatment/Interventions Functional transfer training;LE strengthening/ROM; Elevations; Therapeutic exercise; Endurance training;Cognitive reorientation;Patient/family training;Equipment eval/education; Bed mobility; Compensatory technique education;Gait training   Progress Slow progress, decreased activity tolerance   PT Therapy Minutes   PT Time In 1220   PT Time Out 1330   PT Total Time (minutes) 70   PT Mode of treatment - Individual (minutes) 70   PT Mode of treatment - Concurrent (minutes) 0   PT Mode of treatment - Group (minutes) 0   PT Mode of treatment - Co-treat (minutes) 0   PT Mode of Treatment - Total time(minutes) 70 minutes   PT Cumulative Minutes 945

## 2023-09-08 LAB
GLUCOSE SERPL-MCNC: 113 MG/DL (ref 65–140)
GLUCOSE SERPL-MCNC: 113 MG/DL (ref 65–140)
GLUCOSE SERPL-MCNC: 158 MG/DL (ref 65–140)
GLUCOSE SERPL-MCNC: 189 MG/DL (ref 65–140)

## 2023-09-08 PROCEDURE — 97129 THER IVNTJ 1ST 15 MIN: CPT

## 2023-09-08 PROCEDURE — 97130 THER IVNTJ EA ADDL 15 MIN: CPT

## 2023-09-08 PROCEDURE — 99232 SBSQ HOSP IP/OBS MODERATE 35: CPT | Performed by: STUDENT IN AN ORGANIZED HEALTH CARE EDUCATION/TRAINING PROGRAM

## 2023-09-08 PROCEDURE — 97530 THERAPEUTIC ACTIVITIES: CPT

## 2023-09-08 PROCEDURE — 97112 NEUROMUSCULAR REEDUCATION: CPT

## 2023-09-08 PROCEDURE — 82948 REAGENT STRIP/BLOOD GLUCOSE: CPT

## 2023-09-08 PROCEDURE — 97535 SELF CARE MNGMENT TRAINING: CPT

## 2023-09-08 PROCEDURE — 99232 SBSQ HOSP IP/OBS MODERATE 35: CPT | Performed by: INTERNAL MEDICINE

## 2023-09-08 RX ORDER — BACLOFEN 10 MG/1
5 TABLET ORAL 2 TIMES DAILY
Status: DISCONTINUED | OUTPATIENT
Start: 2023-09-08 | End: 2023-09-20 | Stop reason: HOSPADM

## 2023-09-08 RX ADMIN — METFORMIN HYDROCHLORIDE 500 MG: 500 TABLET ORAL at 17:18

## 2023-09-08 RX ADMIN — FERROUS SULFATE TAB 325 MG (65 MG ELEMENTAL FE) 325 MG: 325 (65 FE) TAB at 08:05

## 2023-09-08 RX ADMIN — CYANOCOBALAMIN TAB 500 MCG 1000 MCG: 500 TAB at 08:06

## 2023-09-08 RX ADMIN — OXYCODONE HYDROCHLORIDE 5 MG: 5 TABLET ORAL at 08:11

## 2023-09-08 RX ADMIN — MELATONIN 6 MG: at 21:16

## 2023-09-08 RX ADMIN — BACLOFEN 5 MG: 10 TABLET ORAL at 11:16

## 2023-09-08 RX ADMIN — HEPARIN SODIUM 5000 UNITS: 5000 INJECTION INTRAVENOUS; SUBCUTANEOUS at 21:17

## 2023-09-08 RX ADMIN — PANTOPRAZOLE SODIUM 40 MG: 40 TABLET, DELAYED RELEASE ORAL at 05:25

## 2023-09-08 RX ADMIN — ATORVASTATIN CALCIUM 20 MG: 20 TABLET, FILM COATED ORAL at 17:18

## 2023-09-08 RX ADMIN — FLUTICASONE FUROATE AND VILANTEROL TRIFENATATE 1 PUFF: 200; 25 POWDER RESPIRATORY (INHALATION) at 08:13

## 2023-09-08 RX ADMIN — HEPARIN SODIUM 5000 UNITS: 5000 INJECTION INTRAVENOUS; SUBCUTANEOUS at 13:56

## 2023-09-08 RX ADMIN — GABAPENTIN 300 MG: 300 CAPSULE ORAL at 17:18

## 2023-09-08 RX ADMIN — BUPROPION HYDROCHLORIDE 150 MG: 150 TABLET, FILM COATED, EXTENDED RELEASE ORAL at 08:12

## 2023-09-08 RX ADMIN — METFORMIN HYDROCHLORIDE 500 MG: 500 TABLET ORAL at 08:04

## 2023-09-08 RX ADMIN — GABAPENTIN 300 MG: 300 CAPSULE ORAL at 08:06

## 2023-09-08 RX ADMIN — OXYCODONE HYDROCHLORIDE 5 MG: 5 TABLET ORAL at 13:59

## 2023-09-08 RX ADMIN — INSULIN LISPRO 1 UNITS: 100 INJECTION, SOLUTION INTRAVENOUS; SUBCUTANEOUS at 17:16

## 2023-09-08 RX ADMIN — BACLOFEN 5 MG: 10 TABLET ORAL at 21:16

## 2023-09-08 RX ADMIN — HEPARIN SODIUM 5000 UNITS: 5000 INJECTION INTRAVENOUS; SUBCUTANEOUS at 05:25

## 2023-09-08 RX ADMIN — ASPIRIN 81 MG CHEWABLE TABLET 81 MG: 81 TABLET CHEWABLE at 08:06

## 2023-09-08 RX ADMIN — OXYCODONE HYDROCHLORIDE 5 MG: 5 TABLET ORAL at 02:13

## 2023-09-08 RX ADMIN — INSULIN LISPRO 1 UNITS: 100 INJECTION, SOLUTION INTRAVENOUS; SUBCUTANEOUS at 21:17

## 2023-09-08 RX ADMIN — OXYCODONE HYDROCHLORIDE 5 MG: 5 TABLET ORAL at 20:19

## 2023-09-08 RX ADMIN — GABAPENTIN 300 MG: 300 CAPSULE ORAL at 21:16

## 2023-09-08 RX ADMIN — METHOCARBAMOL 500 MG: 500 TABLET ORAL at 08:05

## 2023-09-08 NOTE — PROGRESS NOTES
09/08/23 0830   Pain Assessment   Pain Assessment Tool 0-10   Pain Score 3   Pain Location/Orientation Orientation: Right;Location: Leg;Location: Hip   Hospital Pain Intervention(s) Repositioned   Restrictions/Precautions   Precautions Bed/chair alarms; Fall Risk;Cognitive;Supervision on toilet/commode;Pain   Weight Bearing Restrictions No   ROM Restrictions No   Oral Hygiene   Type of Assistance Needed Physical assistance   Physical Assistance Level 26%-50%   Comment Session focused on utilizing RUE as gross grasp A to hold toothpaste and mouthwash containers with [place] and point of control at elbow. Pt using LUE to open and manipulate items. Oral Hygiene CARE Score 3   Lying to Sitting on Side of Bed   Type of Assistance Needed Physical assistance   Physical Assistance Level 51%-75%   Comment A for trunk   Lying to Sitting on Side of Bed CARE Score 2   Bed-Chair Transfer   Type of Assistance Needed Physical assistance   Physical Assistance Level 76% or more   Comment Beasy board txfers. Chair/Bed-to-Chair Transfer CARE Score 2   Neuromuscular Education   RUE Weight Bearing Extended arm seated   Response to Weight Bearing Technique Pt engaged in seated activity with focus on functional reaching outside MARRY with use of LUE to promote inc WBing through extended RUE with point of control at elbow and wrist to promote inc proprioceptive input to RUE. Comments Pt seated in front of mirror to promote shoulder elevation. AAROM with use of non weighted dowel for chest press while supine on mat. Pt continues to present with proximal RUE shoulder weakness and compensates with trunk and requires max cueing. Inc flexor tone in RUE, worse in digits, extinguishable with inc ROM and stretching. Cognition   Overall Cognitive Status Impaired   Arousal/Participation Alert; Cooperative   Attention Attends with cues to redirect   Orientation Level Oriented X4   Memory Decreased short term memory   Following Commands Follows one step commands with increased time or repetition   Activity Tolerance   Activity Tolerance Patient limited by fatigue   Assessment   Treatment Assessment Pt participated in skilled OT services with focus on RUE NMR, functional txfers, oral hygiene. Pt presenting with inc flexor tone synergies this session. Pt continues to be limited by RUE weakness, dec act massimo, dec endurance, dec sitting balance/core strength, dec standing balance/tolerance. Pt will continue to benefit from skilled OT services with focus on RUE NMR, functional txfers, sitting balance/core strength and ADL retraining to inc safety and independence and reduce burden of care. Prognosis Fair   Problem List Decreased strength;Decreased endurance; Impaired balance;Decreased mobility; Decreased coordination;Decreased cognition; Impaired sensation;Obesity;Orthopedic restrictions;Pain; Impaired tone   Plan   Treatment/Interventions ADL retraining;Functional transfer training; Therapeutic exercise; Endurance training;Cognitive reorientation;Patient/family training;Equipment eval/education; Bed mobility; Compensatory technique education   Progress Slow progress, decreased activity tolerance   OT Therapy Minutes   OT Time In 0830   OT Time Out 1000   OT Total Time (minutes) 90   OT Mode of treatment - Individual (minutes) 90   OT Mode of treatment - Concurrent (minutes) 0   OT Mode of treatment - Group (minutes) 0   OT Mode of treatment - Co-treat (minutes) 0   OT Mode of Treatment - Total time(minutes) 90 minutes   OT Cumulative Minutes 1220   Therapy Time missed   Time missed?  No

## 2023-09-08 NOTE — CASE MANAGEMENT
Clinical update faxed via [employer] to sally at [facility], [phone]. Requested addiitonal 7 days, determination pending.

## 2023-09-08 NOTE — PROGRESS NOTES
09/08/23 1230   Pain Assessment   Pain Assessment Tool 0-10   Pain Score 6   Restrictions/Precautions   Precautions Bed/chair alarms; Fall Risk;Supervision on toilet/commode   Subjective   Subjective pt frustrated and very emotional start of session   Sit to Stand   Type of Assistance Needed Physical assistance   Physical Assistance Level 51%-75%   Sit to Stand CARE Score 2   Bed-Chair Transfer   Type of Assistance Needed Physical assistance   Physical Assistance Level 76% or more   Comment SB   Chair/Bed-to-Chair Transfer CARE Score 2   Transfer Bed/Chair/Wheelchair   Findings practiced SB txs after working on STS with HW and wt shifting in hallway   Car Transfer   Comment (S)  will attempt next session due to improved txs with slideboard   Assessment   Treatment Assessment pt upset beginning of session and went outside for change of scenery and for emotional support. while outside discussed stroke education topic "What Comes Next" in relation to a family meeting that is taking place on Monday. pt reports being frustrated and not being able to do what she used to do and how this happened so fast.  therapist educated pt on setting short term goals vs long term and realistic and practical to all functional and emotional progression. once inside pt practiced standing with HW and weak side to rail and wt shifting. pt cont to have decrease to absent sensation on RLE in standing and is fearful of falling. pt has been using BZ board but due to improvements with STS from [facility] to [facility], started to work more with SB.  pt was able to demonstrate improved bottom clearance  with cues for ant wt shift and use of LEs as she would with STS and able to perform better with SBs.  allow pt to ant wt shift before positioning in front to all the feel of comfort for the patient. cont to work on SB txs and strenghtening to improve functional mobility and safety at w/c level.    Problem List Decreased strength;Decreased endurance; Impaired balance;Decreased mobility; Decreased coordination;Decreased cognition; Impaired sensation;Obesity;Orthopedic restrictions;Pain; Impaired tone   Barriers to Discharge Inaccessible home environment;Decreased caregiver support   PT Barriers   Functional Limitation Car transfers; Ramp negotiation; Walking; Wheelchair management;Transfers;Standing;Stair negotiation   Plan   Progress Progressing toward goals   Recommendation   PT Discharge Recommendation Post acute rehabilitation services   Equipment Recommended Wheelchair   PT Therapy Minutes   PT Time In 1230   PT Time Out 1415   PT Total Time (minutes) 105   PT Mode of treatment - Individual (minutes) 105   PT Mode of treatment - Concurrent (minutes) 0   PT Mode of treatment - Group (minutes) 0   PT Mode of treatment - Co-treat (minutes) 0   PT Mode of Treatment - Total time(minutes) 105 minutes   PT Cumulative Minutes 1050   Therapy Time missed   Time missed?  No

## 2023-09-08 NOTE — PROGRESS NOTES
09/08/23 1000   Pain Assessment   Pain Assessment Tool 0-10   Pain Score No Pain   Restrictions/Precautions   Precautions Bed/chair alarms;Cognitive; Fall Risk;Pain;Supervision on toilet/commode   Comprehension   Comprehension (FIM) 5 - Understands basic directions and conversation   Expression   Expression (FIM) 4 - Expresses basic info/needs 75-90% of time   Social Interaction   Social Interaction (FIM) 6 - Interacts appropriately with others BUT requires extra  time   Problem Solving   Problem solving (FIM) 4 - Solves basic problems 75-89% of time   Memory   Memory (FIM) 4 - Recognizes/recalls/performs 75-89%   Speech/Language/Cognition Assessmetn   Treatment Assessment Pt just finishing OT session to where pt was agreeable to complete session down in therapy gym. When asking pt about overnight events, pt did report difficulty sleeping due to ongoing pain in legs. Of note, PMR ARSENIO was present at very beginning of session to where discussion was being held about changing a cholesterol medication due to possible non-tolerance by pt. SLP will plan in upcoming sessions to target review of medication (as this will be ongoing until discharge time) w/ possible completion of tangible medication management task as pt reports using pill box at home. Otherwise, SLP engaged pt in completing written 6 step sequences, to where pt was 38/48 accurate in completing tasks on own. It was noted that pt had difficulty given 2 different activities, which required looking again at steps. For the initial item which was errored, pt was able to determine correct sequence w/o increased cues by SLP increasing to 42/48 accuracy. The other task which pt did have more difficulty with in sequencing, pt was able to talk through it given increased time and then awareness of verbal responses which then translated to written steps, which then pt increased to 48/48 accuracy.  The next task which SLP engaged pt in was a drawing conclusions task where pt was provided a description phrase and then 4 possible words which fit the description. Pt was 123/132 accurate in completing to where pt was able to increase to 132/132 providing minimal probing questions to determine more words which could fit each description. The remaining task completed in session were verbal tasks. The first thing targeted was a compare/contrast activity in which 2 items were provided and pt was to determine how the words were similar and then different. It was noted that pt was 9/10 accurate to determine similarities and then 7/10 accurate for difference given concrete items. Pt was demonstrating mild word finding deficits in description words for the errors elicited to where pt did benefit from minimal probing clues to hit more descriptions given 2 words for either similarities and difference. As for increasing the complexity of 2 words to determine similarities vs differences, pt remained similar in results, to where she was 9/10 accurate for similarities and 7/10 accurate for differences. Again, noting mild word finding in responses, but still benefits from min semantic cues to provide more target answers in descriptions. The last verbal task was a reasoning/problem solving activities where pt was to weigh alternatives to a situation. Pt was noted to have difficulty on the initial item, needing probing clues to elicit information, but as task went on, pt was able to provide at least 2-3 appropriate considerations in the situations provided when allowed increased time to provide answers. Overall, pt will continue to benefit from ongoing skilled SLP services to continue to maximize overall functional independence given cognitive linguistic skills in attempts to decrease caregiver burden over time.     SLP Therapy Minutes   SLP Time In 1000   SLP Time Out 1100   SLP Total Time (minutes) 60   SLP Mode of treatment - Individual (minutes) 60   SLP Mode of treatment - Concurrent (minutes) 0   SLP Mode of treatment - Group (minutes) 0   SLP Mode of treatment - Co-treat (minutes) 0   SLP Mode of Treatment - Total time(minutes) 60 minutes   SLP Cumulative Minutes 465   Therapy Time missed   Time missed?  No

## 2023-09-08 NOTE — PLAN OF CARE
Problem: PAIN - ADULT  Goal: Verbalizes/displays adequate comfort level or baseline comfort level  Description: Interventions:  - Encourage patient to monitor pain and request assistance  - Assess pain using appropriate pain scale  - Administer analgesics based on type and severity of pain and evaluate response  - Implement non-pharmacological measures as appropriate and evaluate response  - Consider cultural and social influences on pain and pain management  - Notify physician/advanced practitioner if interventions unsuccessful or patient reports new pain  9/7/2023 2022 by Xiomara Zhang RN  Outcome: Progressing  9/7/2023 2022 by Xiomara Zhang RN  Outcome: Progressing

## 2023-09-08 NOTE — PROGRESS NOTES
Internal Medicine Progress Note  Patient: Derik Fountain  Age/sex: 62 y.o. female  Medical Record #: [id number]      ASSESSMENT/PLAN: (Interval History)  Derik Fountain is seen and examined and management for following issues:    ICH  • Seen by NS and no surgical intervention indicated. • Etio = HTN  • Was unable to tolerate MRI even with sedation  • Was cleared to resume ASA. • Continue atorvastatin. • Outpt follow-up with Neurology.     HTN  • Goal with ICH is SBP<140  • Home: Micardis 80mg qd/Atenolol 50mg qd  • Here: Norvasc 10 mg qd/Losartan 100mg qd  • (Micardis 80 mg qd = Losartan 100 mg qd)   • Both held on 9/6 and again this AM for  = rechecked manually at 1123 and was 118/80  • When did not get either med on 9/6, her BP remained normotensive for the rest of the day  • Will stop the Norvasc for now, reassess over the next few days     DM type 2  • HA1C 6.7  • Home: Metformin 1000mg BID  • Here: Metformin 500 mg BID  • Continue QID Accuchecks/SSI and DM diet  • stable     Anemia  • Baseline Hgb 10 - 11. • Iron level 30/Ferritin 22/TIBC 371  • B12 was low normal at 221 on 8/15/23  • Continue iron and B12 supplementation. • stable     Anxiety  • Continue Wellbutrin XL. • Pt is taking 150mg instead of 300mg due to concerns for increased risk of seizure        Discharge date:  [facility]       The above assessment and plan was reviewed and updated as determined by my evaluation of the patient on 9/8/2023.     Labs:   Results from last 7 days   Lab Units 09/07/23  0638 09/04/23 0714   WBC Thousand/uL 8.59 8.42   HEMOGLOBIN g/dL 11.0* 11.9   HEMATOCRIT % 35.6 37.0   PLATELETS Thousands/uL 319 347     Results from last 7 days   Lab Units 09/07/23  0638 09/04/23 0714   SODIUM mmol/L 137 134*   POTASSIUM mmol/L 4.1 4.1   CHLORIDE mmol/L 100 100   CO2 mmol/L 30 28   BUN mg/dL 18 16   CREATININE mg/dL 0.66 0.59*   CALCIUM mg/dL 9.3 8.9             Results from last 7 days   Lab Units 09/08/23 0614 09/07/23 2040 09/07/23  1522   POC GLUCOSE mg/dl 113 117 134       Review of Scheduled Meds:  Current Facility-Administered Medications   Medication Dose Route Frequency Provider Last Rate   • acetaminophen  650 mg Oral Q6H PRN Maricruz Leahy, DO     • albuterol  2 puff Inhalation Q4H PRN Maricruz Leahy, DO     • amLODIPine  10 mg Oral Daily Maricruz Leahy, DO     • aspirin  81 mg Oral Daily Maricruz Felizing, DO     • atorvastatin  20 mg Oral Daily With Dinner Maricruz Leahy, DO     • bisacodyl  10 mg Rectal Daily PRN Jorge L Velazco MD     • bisacodyl  10 mg Rectal Once ARSENIO Santos     • buPROPion  150 mg Oral QAM Maricruz Leahy, DO     • calcium carbonate  1,000 mg Oral TID PRN Laureen Tracy PA-C     • vitamin B-12  1,000 mcg Oral Daily Maricruz Felizing, DO     • diazepam  2 mg Oral BID PRN Maricruz Leahy, DO     • docusate sodium  100 mg Oral BID ARSENIO Santos     • ferrous sulfate  325 mg Oral Daily With Breakfast Maricruz Leahy, DO     • fluticasone-vilanterol  1 puff Inhalation Daily Maricruz Leahy, DO     • gabapentin  300 mg Oral TID Maricruz Leahy, DO     • heparin (porcine)  5,000 Units Subcutaneous Q8H [facility] Boning, DO     • hydrOXYzine HCL  25 mg Oral TID PRN ARSENIO Santso     • insulin lispro  1-6 Units Subcutaneous TID  Maricruz Leahy, DO     • insulin lispro  1-6 Units Subcutaneous HS Maricruz Leahy, DO     • lactulose  20 g Oral Daily PRN Maricruz Leahy, DO     • losartan  100 mg Oral Daily Maricruz Felizing, DO     • melatonin  6 mg Oral HS ARSENIO Pinto     • metFORMIN  500 mg Oral BID With Meals ARSENIO Waldrop     • methocarbamol  500 mg Oral BID ARSENIO Santos     • methocarbamol  750 mg Oral HS ARSENIO Pinto     • miconazole  1 Application Topical BID Maricruz Leahy, DO     • ondansetron  4 mg Oral Q6H PRN Maricruz Leahy, DO     • oxyCODONE  5 mg Oral Q4H PRN Maricruz Leahy, DO     • oxyCODONE  2.5 mg Oral Q4H PRN Maricruz Leahy DO     • pantoprazole  40 mg Oral Early Morning Melda Ready, DO     • polyethylene glycol  17 g Oral Daily Melda Ready, DO     • prochlorperazine  5 mg Oral Q6H PRN Melda Ready, DO     • senna  2 tablet Oral HS ARSENIO Pinto         Subjective/ HPI: Patient seen and examined. Patients overnight issues or events were reviewed with nursing or staff during rounds or morning huddle session. New or overnight issues include the following:     C/o bilateral leg pain overnight. Having muscle spasm RLE and achy type pain LLE. Offers no complaints otherwise. ROS:   A 10 point ROS was performed; negative except as noted above. *Labs /Radiology studies reviewed  *Medications reviewed and reconciled as needed  *Please refer to order section for additional ordered labs studies  *Case discussed with primary attending during morning huddle case rounds    Physical Examination:  Vitals:   Vitals:    09/07/23 2054 09/08/23 0520 09/08/23 0548 09/08/23 0810   BP: 109/78 134/65  106/64   BP Location: Left arm Left arm     Pulse: 79 75     Resp: 18 18     Temp: 98.4 °F (36.9 °C) 97.9 °F (36.6 °C)     TempSrc: Oral Oral     SpO2: 97% 98%     Weight:  92.3 kg (203 lb 7.8 oz) 92.3 kg (203 lb 7.8 oz)    Height:           General Appearance: no distress, conversive  HEENT:  External ear normal.  Nose normal w/o drainage. Mucous membranes are moist. Oropharynx is clear. Conjunctiva clear w/o icterus or redness. Neck:  Supple, normal ROM  Lungs: BBS without crackles/wheeze/rhonchi; respirations unlabored with normal inspiratory/expiratory effort. No retractions noted. On RA  CV: regular rate and rhythm; no rubs/murmurs/gallops, PMI normal   ABD: Abdomen is soft. Bowel sounds all quadrants. Nontender with no distention. EXT: no edema  Skin: normal turgor, normal texture, no rashes  Psych: affect normal, mood normal  Neuro: AAO     The above physical exam was reviewed and updated as determined by my evaluation of the patient on 9/8/2023.     Invasive Devices Drain  Duration           External Urinary Catheter 10 days                   VTE Pharmacologic Prophylaxis: Heparin  Code Status: Level 1 - Full Code  Current Length of Stay: 14 day(s)      Total time spent:  30 minutes with more than 50% spent counseling/coordinating care. Counseling includes discussion with patient re: progress  and discussion with patient of his/her current medical state/information. Coordination of patient's care was performed in conjunction with primary service. Time invested included review of patient's labs, vitals, and management of their comorbidities with continued monitoring. In addition, this patient was discussed with medical team including physician and advanced extenders. The care of the patient was extensively discussed and appropriate treatment plan was formulated unique for this patient. Medical decision making for the day was made by supervising physician unless otherwise noted in their attestation statement. ** Please Note:  voice to text software may have been used in the creation of this document.  Although proof errors in transcription or interpretation are a potential of such software**

## 2023-09-08 NOTE — PROGRESS NOTES
PM&R PROGRESS NOTE:  Lavonne Carranza 62 y.o. female MRN: [id number]  Unit/Bed#: -19 Encounter: [id number]    Rehabilitation Diagnosis: Impairment of mobility, safety, Activities of Daily Living (ADLs), and cognitive/communication skills due to Stroke:  01.2  Right Body Involvement (Left Brain)    HPI:  Lavonne Carranza is a 59-year-old female with history of hypertension, hyperlipidemia, diabetes type 2, obesity, eosinophilic asthma, COPD, fibromyalgia, Sjogren syndrome, lumbar spondylosis with grade 1 anterior spondylolisthesis who presents to the hospital on 8/14/2023 due to acute onset right upper and right lower extremity weakness with sensory loss. Additionally with right-sided facial droop and dysarthria. The patient was noted to have an acute intraparenchymal hemorrhage on CT in the left thalamic region with surrounding edema. A CTA was negative. Patient was initially placed on a Cardene drip. Course complicated by significant left-sided leg pain and was recommended on treatment for radicular symptoms including Tylenol, gabapentin and potentially steroids. Neurology was consulted and recommended MRI of the brain with and without contrast while holding antithrombotics. The MRI showed a stable left thalamic capsular intraparenchymal hemorrhage with surrounding edema without any other mass effect. A repeat CT was completed on 8/17/2023 after worsening symptoms including worsening speech slurring with stable findings. Additionally there was a rapid response after a fall with head strike on 8/18 with a repeat CT of the head without acute findings. Additionally antihypertensive regimen was altered as she was previously on telmisartan and atenolol and is currently on amlodipine, losartan and hydralazine with better control.  The patient was evaluated by the Rehabilitation team and deemed an appropriate candidate for comprehensive inpatient rehabilitation and admitted to the [facility] on 8/25/2023  3:19 PM    SUBJECTIVE: Patient seen face to face. No acute issues overnight, had a good night sleep. Reports increased muscle spasms in right leg and discomfort in left leg overnight. Denies anxiety today. Good appetite, LBM 9/7. Denies chest pain, shortness of breath, fever, chills, N/V, abdominal pain. ASSESSMENT: Stable, progressing    PLAN:  - pain: overnight: increased right lower extremity spasms, methocarbamol ineffective. Left leg pain described as discomfort  - muscle spasms: discontinue methocarbamol, trial baclofen 5 mg BID  - urinary incontinence: new since stroke; continued timed void. PVRs low, can discontinue  - anxiety: stable, no episodes since 9/6    Rehabilitation  • Functional deficits:  Self-care, right hemiparesis, mobility, cognitive impairment  • Continue current rehabilitation plan of care to maximize function.     • Functional update:   o PT: mobility- mod A, transfers- mod-max A, ambulation- not attempted d/t safety  o OT: ADL: bathing- max A, dressing UB max A LB total A, toileting- total A   o ST: cognition- impaired, decreased executive function, decreased attention, decreased comprehension  • Estimated Discharge: anticipated 4 week goals, reteam    Pain  • Gabapentin 300 mg TID  • Oxycodone 2.5-5 mg every 4 hours prn  • Methocarbamol 500 mg BID and 750 mg qhs  • Rifaximin 500 mg every 6 hours prn    DVT prophylaxis  • Heparin sc    Bladder plan  • Continent    Bowel plan  • Continent  • Colace 100 mg BID  • Senokot 17.2 mg qhs  • Miralax daily  • Bisacodyl 5 mg tab daily prn  • Bisacodyl supp daily prn      * ICH (intracerebral hemorrhage) ([address] [facility])  Assessment & Plan  60-year-old female presents with right hemiplegia, dysarthria and facial asymmetry found to have an acute intraparenchymal hemorrhage in the left thalamic region with surrounding edema felt to be secondary to uncontrolled hypertension  · Had repeat CT of the head on 8/18 after a fall with head strike resulting in significant bruising of the face  · Cleared by neurology to restart aspirin 81 mg daily and atorvastatin 20 mg daily  · Evaluated by neurosurgery with no surgical intervention at this time  · Had a repeat head CT on 8/20 which has been stable  · Goal systolic blood pressure of less than 140  · Follow-up with neurology as an outpatient in 6 weeks (10/26)  · Physical and Occupational Therapy with goals for community discharge. Patient lives with her  in a mobile home with 5 steps to enter and he is able to assist 24/7  · Hemorrhagic stroke education, nutrition, neuropsychology  · Secondary stroke prophylaxis with hypertension control    Primary hypertension  Assessment & Plan  · Home regimen: Telmisartan and atenolol  · Current regimen: Norvasc 10 mg daily Cozaar 100 mg daily, hydralazine discontinued  · Monitor blood pressures especially in therapy and make adjustments as needed    Chronic bilateral low back pain with bilateral sciatica  Assessment & Plan  · Patient has a history of chronic low back pain with radicular symptoms in addition to fibromyalgia and myofascial pain syndrome. · She follows with Dr. David [place] from pain management has tried several medications as well as epidural steroid injections with little relief  · Imaging reveals lumbar degenerative disc disease at the L3-4, L4-5, L5-S1 level. Additionally facet hypertrophic changes and ligamentous laxity at the L4-5 level resulting in grade 1 anterior spondylolisthesis and mild canal narrowing with slight distortion of the left anterior lateral aspect of the thecal sac at this level with mild right greater than left neuroforaminal narrowing  · Multi modal pain with current medication regimen including oxycodone to be weaned as well as the Robaxin and gabapentin.   We will also consult neuropsychology  · Added Tylenol as well as Robaxin and as needed Valium    Diabetes mellitus type 2, controlled [facility])  Assessment & Plan  Lab Results   Component Value Date    HGBA1C 6.7 (H) 08/15/2023       Recent Labs     09/07/23  1522 09/07/23  2040 09/08/23  0614 09/08/23  1115   POCGLU 134 117 113 113     · Currently on carb controlled level 2 diet, metformin 500 mg twice daily  · Sliding scale insulin algorithm 3 with Accu-Cheks 4 times daily      Constipation  Assessment & Plan  · On admission had not had a bowel movement in at least 11 days  · Obtaining x-ray to evaluate stool burden  · Adding to bowel regimen including increasing senna and Colace, lactulose as needed and will be more aggressive pending results of x-ray  · Continue relatively aggressive regimen  · stable    Hyponatremia  Assessment & Plan  · Sodium 137 (previously 134, 133)  · Continue following biweekly labs with no intervention at this point unless continues to trend down. CAD (coronary artery disease)  Assessment & Plan  · History of CAD currently on statin and aspirin that was restarted on 8/21 after clearance by neurology  · Beta-blocker held due to bradycardia  · Patient has not formally seen a cardiologist however this was diagnosed based on a CT PE study that was conducted in the ER showing mild CAD    GERD (gastroesophageal reflux disease)  Assessment & Plan  Continue Protonix    Hyperlipidemia associated with type 2 diabetes mellitus ([address])  Assessment & Plan  Continue atorvastatin 20 mg with dinner    Class 2 obesity with body mass index (BMI) of 39.0 to 39.9 in adult  Assessment & Plan  · BMI of 39.74 on admission  · Continue promoting weight loss and increased activity, diet and exercise  · Continue a consistent carbohydrate diet  · Nutrition consultation    Sjogren's syndrome ([address])  Assessment & Plan  · Patient states that she is been worked up for Sjogren's syndrome in the past and has had conflicting diagnosis he is stating that some physicians have diagnosed her with it and others stated she did not have it.   · She has not been on any medication treatment for this and does not actively follow-up with a rheumatologist    Depression with anxiety  Assessment & Plan  · Neuropsychology consultation poststroke with history of anxiety  · Continue Wellbutrin  mg in the morning. It has been 300 mg as an outpatient however it was decreased in acute care due to worry of decreasing seizure threshold in the setting of ICH    Moderate persistent asthma without complication  Assessment & Plan  · Follows with Dr. Dana Granger from pulmonology  · Home regimen includes Breo and as needed albuterol  · On equivalent here and added albuterol inhaler on admission to [address] [place], [place] [address] consultants medical co-management. Personally reviewed labs, medications, and imaging. ROS:  .Review of Systems   A 10 point review of systems was negative except for what is noted in the HPI. OBJECTIVE:   /73 (BP Location: Left arm)   Pulse 92   Temp 98.3 °F (36.8 °C) (Oral)   Resp 16   Ht 5' (1.524 m)   Wt 92.3 kg (203 lb 7.8 oz)   SpO2 97%   BMI 39.74 kg/m²     Physical Exam  Constitutional:       Appearance: Normal appearance. She is obese. HENT:      Head: Normocephalic and atraumatic. Nose: Nose normal.      Mouth/Throat:      Mouth: Mucous membranes are moist.   Cardiovascular:      Rate and Rhythm: Normal rate and regular rhythm. Pulses: Normal pulses. Heart sounds: Normal heart sounds. Pulmonary:      Effort: Pulmonary effort is normal.      Breath sounds: Normal breath sounds. Abdominal:      General: Bowel sounds are normal.      Palpations: Abdomen is soft. Musculoskeletal:         General: Normal range of motion. Cervical back: Normal range of motion. Right lower leg: Edema present. Left lower leg: Edema present. Skin:     General: Skin is warm and dry. Capillary Refill: Capillary refill takes less than 2 seconds. Findings: Bruising (right eye) present. Neurological:      Mental Status: She is alert and oriented to person, place, and time. Motor: Weakness present. Coordination: Coordination abnormal.      Gait: Gait abnormal.      Comments: -right hemiparesis   Psychiatric:         Mood and Affect: Mood normal.         Judgment: Judgment normal.         Lab Results   Component Value Date    WBC 8.59 09/07/2023    HGB 11.0 (L) 09/07/2023    HCT 35.6 09/07/2023    MCV 87 09/07/2023     09/07/2023     Lab Results   Component Value Date    SODIUM 137 09/07/2023    K 4.1 09/07/2023     09/07/2023    CO2 30 09/07/2023    BUN 18 09/07/2023    CREATININE 0.66 09/07/2023    GLUC 146 (H) 09/07/2023    CALCIUM 9.3 09/07/2023     Lab Results   Component Value Date    INR 1.06 08/15/2023    INR 0.94 08/14/2023    PROTIME 14.0 08/15/2023    PROTIME 13.2 08/14/2023         Current Facility-Administered Medications:   •  acetaminophen (TYLENOL) tablet 650 mg, 650 mg, Oral, Q6H PRN, Norman Urbina DO, 650 mg at 09/01/23 9428  •  albuterol (PROVENTIL HFA,VENTOLIN HFA) inhaler 2 puff, 2 puff, Inhalation, Q4H PRN, Norman Urbina DO, 2 puff at 09/04/23 0919  •  aspirin chewable tablet 81 mg, 81 mg, Oral, Daily, Norman Urbina DO, 81 mg at 09/08/23 7623  •  atorvastatin (LIPITOR) tablet 20 mg, 20 mg, Oral, Daily With Dinner, Norman Urbina DO, 20 mg at 09/07/23 1655  •  baclofen tablet 5 mg, 5 mg, Oral, BID, ARSENIO Pinto, 5 mg at 09/08/23 1116  •  bisacodyl (DULCOLAX) rectal suppository 10 mg, 10 mg, Rectal, Daily PRN, Balta Sprague MD, 10 mg at 09/03/23 1826  •  bisacodyl (DULCOLAX) rectal suppository 10 mg, 10 mg, Rectal, Once, ARSENIO Pinto  •  buPROPion (WELLBUTRIN XL) 24 hr tablet 150 mg, 150 mg, Oral, QAM, Norman Urbina DO, 150 mg at 09/08/23 0149  •  calcium carbonate (TUMS) chewable tablet 1,000 mg, 1,000 mg, Oral, TID PRN, Laureen Tracy PA-C, 1,000 mg at 08/26/23 1241  •  cyanocobalamin (VITAMIN B-12) tablet 1,000 mcg, 1,000 mcg, Oral, Daily, Norman Urbina DO, 1,000 mcg at 09/08/23 7500  •  diazepam (VALIUM) tablet 2 mg, 2 mg, Oral, BID PRN, Kahs Contreras JANUARY Brice, DO, 2 mg at 09/07/23 2303  •  docusate sodium (COLACE) capsule 100 mg, 100 mg, Oral, BID, ARSENIO Pinto, 100 mg at 09/06/23 1715  •  ferrous sulfate tablet 325 mg, 325 mg, Oral, Daily With Breakfast, Melda Ready, DO, 325 mg at 09/08/23 0805  •  fluticasone-vilanterol 200-25 mcg/actuation 1 puff, 1 puff, Inhalation, Daily, Melda Ready, DO, 1 puff at 09/08/23 0813  •  gabapentin (NEURONTIN) capsule 300 mg, 300 mg, Oral, TID, Melda Ready, DO, 300 mg at 09/08/23 3406  •  heparin (porcine) subcutaneous injection 5,000 Units, 5,000 Units, Subcutaneous, Q8H [address], Melda Ready, DO, 5,000 Units at 09/08/23 1356  •  hydrOXYzine HCL (ATARAX) tablet 25 mg, 25 mg, Oral, TID PRN, ARSENIO Pinto  •  insulin lispro (HumaLOG) 100 units/mL subcutaneous injection 1-6 Units, 1-6 Units, Subcutaneous, TID AC, 2 Units at 09/05/23 1108 **AND** Fingerstick Glucose (POCT), , , 4x Daily AC and at bedtime, Melda Ready, DO  •  insulin lispro (HumaLOG) 100 units/mL subcutaneous injection 1-6 Units, 1-6 Units, Subcutaneous, HS, Melda Ready, DO, 1 Units at 09/04/23 2110  •  lactulose oral solution 20 g, 20 g, Oral, Daily PRN, Melda Ready, DO  •  losartan (COZAAR) tablet 100 mg, 100 mg, Oral, Daily, Melda Ready, DO, 100 mg at 09/07/23 4088  •  melatonin tablet 6 mg, 6 mg, Oral, HS, ARSENIO Pinto, 6 mg at 09/07/23 2153  •  metFORMIN (GLUCOPHAGE) tablet 500 mg, 500 mg, Oral, BID With Meals, ARSENIO Rodrigues, 500 mg at 09/08/23 0804  •  miconazole (MICOTIN) 2 % powder 1 Application, 1 Application, Topical, BID, Melda Ready, DO, 1 Application at 18/92/39 1707  •  ondansetron (ZOFRAN-ODT) dispersible tablet 4 mg, 4 mg, Oral, Q6H PRN, Melda Ready, DO  •  oxyCODONE (ROXICODONE) IR tablet 5 mg, 5 mg, Oral, Q4H PRN, Melda Ready, DO, 5 mg at 09/08/23 1359  •  oxyCODONE (ROXICODONE) split tablet 2.5 mg, 2.5 mg, Oral, Q4H PRN, Melda Ready, DO, 2.5 mg at 09/04/23 1579  •  pantoprazole (PROTONIX) EC tablet 40 mg, 40 mg, Oral, Early Morning, Zachariah Greg, DO, 40 mg at 09/08/23 6142  •  polyethylene glycol (MIRALAX) packet 17 g, 17 g, Oral, Daily, Zachariah Greg, DO, 17 g at 09/05/23 7115  •  prochlorperazine (COMPAZINE) tablet 5 mg, 5 mg, Oral, Q6H PRN, Zachariah Greg, DO  •  senna (SENOKOT) tablet 17.2 mg, 2 tablet, Oral, HS, ARSENIO Pinto, 17.2 mg at 09/07/23 2153    Past Medical History:   Diagnosis Date   • Arthritis    • Asthma    • Continuous opioid dependence ([address]) 4/27/2022   • Diabetes mellitus ([address])    • Diverticulitis of colon    • Fibromyalgia 04/26/2022   • Headache(784.0)    • History of mammogram 2018   • History of tobacco abuse 04/26/2022   • Hypertension    • Nausea 04/29/2022   • Obesity    • Sjogren's syndrome ([address]) 10/19/2012   • Urinary tract infection        Patient Active Problem List    Diagnosis Date Noted   • ICH (intracerebral hemorrhage) ([address]) 08/15/2023   • Primary hypertension 04/26/2022   • Chronic bilateral low back pain with bilateral sciatica 08/31/2022   • Diabetes mellitus type 2, controlled ([address]) 04/29/2022   • Constipation 08/25/2023   • Hyponatremia 08/31/2023   • CAD (coronary artery disease) 04/07/2023   • GERD (gastroesophageal reflux disease) 08/25/2023   • Anemia 08/16/2023   • Left leg pain 08/15/2023   • Chronic obstructive pulmonary disease with acute exacerbation ([address]) 07/26/2023   • Hyperlipidemia associated with type 2 diabetes mellitus ([address]) 07/26/2023   • Abnormal CT of the chest 06/28/2023   • Class 2 obesity with body mass index (BMI) of 39.0 to 39.9 in adult 06/28/2023   • Right lumbosacral radiculopathy 10/12/2022   • Paresthesia of both feet 08/31/2022   • Postoperative visit 05/24/2022   • Incarcerated umbilical hernia 89/77/0611   • Continuous opioid dependence ([address]) 04/27/2022   • Moderate persistent asthma without complication 23/35/1545   • Cigarette nicotine dependence in remission 04/26/2022   • Fibromyalgia 04/26/2022   • Sjogren's syndrome ([address] [facility]) 10/19/2012   • Depression with anxiety 09/18/2012        ARSENIO Washington  Physical Medicine and [place]

## 2023-09-09 LAB
GLUCOSE SERPL-MCNC: 105 MG/DL (ref 65–140)
GLUCOSE SERPL-MCNC: 122 MG/DL (ref 65–140)
GLUCOSE SERPL-MCNC: 160 MG/DL (ref 65–140)
GLUCOSE SERPL-MCNC: 161 MG/DL (ref 65–140)

## 2023-09-09 PROCEDURE — 97530 THERAPEUTIC ACTIVITIES: CPT

## 2023-09-09 PROCEDURE — 97112 NEUROMUSCULAR REEDUCATION: CPT

## 2023-09-09 PROCEDURE — 99232 SBSQ HOSP IP/OBS MODERATE 35: CPT | Performed by: INTERNAL MEDICINE

## 2023-09-09 PROCEDURE — 82948 REAGENT STRIP/BLOOD GLUCOSE: CPT

## 2023-09-09 PROCEDURE — 99232 SBSQ HOSP IP/OBS MODERATE 35: CPT | Performed by: PHYSICAL MEDICINE & REHABILITATION

## 2023-09-09 RX ADMIN — FERROUS SULFATE TAB 325 MG (65 MG ELEMENTAL FE) 325 MG: 325 (65 FE) TAB at 10:01

## 2023-09-09 RX ADMIN — METFORMIN HYDROCHLORIDE 500 MG: 500 TABLET ORAL at 15:31

## 2023-09-09 RX ADMIN — LOSARTAN POTASSIUM 100 MG: 50 TABLET, FILM COATED ORAL at 10:01

## 2023-09-09 RX ADMIN — MELATONIN 6 MG: at 21:36

## 2023-09-09 RX ADMIN — INSULIN LISPRO 1 UNITS: 100 INJECTION, SOLUTION INTRAVENOUS; SUBCUTANEOUS at 17:29

## 2023-09-09 RX ADMIN — INSULIN LISPRO 1 UNITS: 100 INJECTION, SOLUTION INTRAVENOUS; SUBCUTANEOUS at 11:15

## 2023-09-09 RX ADMIN — HEPARIN SODIUM 5000 UNITS: 5000 INJECTION INTRAVENOUS; SUBCUTANEOUS at 13:46

## 2023-09-09 RX ADMIN — METFORMIN HYDROCHLORIDE 500 MG: 500 TABLET ORAL at 10:01

## 2023-09-09 RX ADMIN — HEPARIN SODIUM 5000 UNITS: 5000 INJECTION INTRAVENOUS; SUBCUTANEOUS at 21:36

## 2023-09-09 RX ADMIN — ASPIRIN 81 MG CHEWABLE TABLET 81 MG: 81 TABLET CHEWABLE at 10:01

## 2023-09-09 RX ADMIN — PANTOPRAZOLE SODIUM 40 MG: 40 TABLET, DELAYED RELEASE ORAL at 05:28

## 2023-09-09 RX ADMIN — FLUTICASONE FUROATE AND VILANTEROL TRIFENATATE 1 PUFF: 200; 25 POWDER RESPIRATORY (INHALATION) at 10:05

## 2023-09-09 RX ADMIN — BUPROPION HYDROCHLORIDE 150 MG: 150 TABLET, FILM COATED, EXTENDED RELEASE ORAL at 10:04

## 2023-09-09 RX ADMIN — OXYCODONE HYDROCHLORIDE 5 MG: 5 TABLET ORAL at 10:01

## 2023-09-09 RX ADMIN — HEPARIN SODIUM 5000 UNITS: 5000 INJECTION INTRAVENOUS; SUBCUTANEOUS at 05:28

## 2023-09-09 RX ADMIN — BACLOFEN 5 MG: 10 TABLET ORAL at 17:27

## 2023-09-09 RX ADMIN — BACLOFEN 5 MG: 10 TABLET ORAL at 10:01

## 2023-09-09 RX ADMIN — ATORVASTATIN CALCIUM 20 MG: 20 TABLET, FILM COATED ORAL at 15:31

## 2023-09-09 RX ADMIN — GABAPENTIN 300 MG: 300 CAPSULE ORAL at 21:36

## 2023-09-09 RX ADMIN — GABAPENTIN 300 MG: 300 CAPSULE ORAL at 15:31

## 2023-09-09 RX ADMIN — OXYCODONE HYDROCHLORIDE 5 MG: 5 TABLET ORAL at 15:30

## 2023-09-09 RX ADMIN — CYANOCOBALAMIN TAB 500 MCG 1000 MCG: 500 TAB at 10:01

## 2023-09-09 RX ADMIN — MICONAZOLE NITRATE 1 APPLICATION: 20 POWDER TOPICAL at 10:05

## 2023-09-09 RX ADMIN — GABAPENTIN 300 MG: 300 CAPSULE ORAL at 10:01

## 2023-09-09 RX ADMIN — OXYCODONE HYDROCHLORIDE 5 MG: 5 TABLET ORAL at 21:46

## 2023-09-09 NOTE — PROGRESS NOTES
09/09/23 0830   Pain Assessment   Pain Assessment Tool 0-10   Pain Score 8   Pain Location/Orientation Orientation: Right;Location: Leg  (posterior thigh)   Hospital Pain Intervention(s) Heat applied  (stretching)   Restrictions/Precautions   Precautions Bed/chair alarms; Fall Risk;Pain;Supervision on toilet/commode   Cognition   Overall Cognitive Status WFL   Subjective   Subjective pt feeling better today; cont to have pain t/o session   Lying to Sitting on Side of Bed   Type of Assistance Needed Physical assistance   Physical Assistance Level 51%-75%   Lying to Sitting on Side of Bed CARE Score 2   Sit to Stand   Type of Assistance Needed Physical assistance   Physical Assistance Level 51%-75%   Sit to Stand CARE Score 2   Bed-Chair Transfer   Type of Assistance Needed Physical assistance   Physical Assistance Level 51%-75%   Comment slideboard txs   Chair/Bed-to-Chair Transfer CARE Score 2   Transfer Bed/Chair/Wheelchair   Findings worked on slideboard txs, needing A with board placement and verbal cues for ant wt shift, pt improving with bottom clearance; needs R knee blocked still to allow use of foot during txs   Car Transfer   Comment (S)  attempt next session due to improvements with SB   Walk 10 Feet   Reason if not Attempted Safety concerns   Walk 10 Feet CARE Score 88   Walk 50 Feet with Two Turns   Reason if not Attempted Safety concerns   Walk 50 Feet with Two Turns CARE Score 88   Walk 150 Feet   Reason if not Attempted Safety concerns   Walk 150 Feet CARE Score 88   Walking 10 Feet on Uneven Surfaces   Reason if not Attempted Safety concerns   Walking 10 Feet on Uneven Surfaces CARE Score 88   Ambulation   Findings trying to initiate in // bars, pt resistant and self limiting due to weakness in LEs   Therapeutic Interventions   Flexibility R heel cord and HS   Neuromuscular Re-Education working on unsupported sitting on edge of mat reaching out of MARRY and improving wt bearing through LEs. during breaks working on PNF of RUE  and digit open/closing. STS to [facility] and in // bars. trialed knee cage due to increase pain in R knee with standing activities. Modalities MHP [id number] to posterior knee   Assessment   Treatment Assessment pt still self limiting during session due to weakness in LEs and pain. pt is demonstrating improvements with SB txs and please attempt car tx next session using SB.  pt still fearful incorporating activities involving R side. pt would benefit from cont trunk and core strenghtening sitting nad standing if able as well as wt bearing activities and strengthening of LEs to maximize functional ind nad decrease burden of care. Problem List Decreased strength;Decreased endurance; Impaired balance;Decreased mobility; Decreased coordination;Decreased cognition; Impaired sensation;Obesity;Orthopedic restrictions;Pain; Impaired tone   Barriers to Discharge Inaccessible home environment;Decreased caregiver support   PT Barriers   Functional Limitation Car transfers; Ramp negotiation; Walking; Wheelchair management;Transfers;Standing;Stair negotiation   Plan   Progress Progressing toward goals   Recommendation   PT Discharge Recommendation Post acute rehabilitation services   Equipment Recommended Wheelchair   PT Therapy Minutes   PT Time In 0830   PT Time Out 0955   PT Total Time (minutes) 85   PT Mode of treatment - Individual (minutes) 85   PT Mode of treatment - Concurrent (minutes) 0   PT Mode of treatment - Group (minutes) 0   PT Mode of treatment - Co-treat (minutes) 0   PT Mode of Treatment - Total time(minutes) 85 minutes   PT Cumulative Minutes 1135   Therapy Time missed   Time missed?  No

## 2023-09-09 NOTE — PROGRESS NOTES
PM&R Coverage Progress Note:    Rehab Diagnosis: Impairment of mobility, safety and Activities of Daily Living (ADLs) due to Stroke:  01.2  Right Body Involvement (Left Brain)    ASSESSMENT: Stable      PLAN:    Rehabilitation  • Continue current rehabilitation plan of care to maximize function. • No funcitonal barriers identified    Medical issues  • Right leg spasms: Baclofen 5 mg twice daily 9/8/2023. Tolerating well. Still having increased spasms at night and will consider increase in Baclofen at night to 10 mg tomorrow night. • Continue current medical plan of care. Appreciate IM consultants co-management. SUBJECTIVE: Patient seen face to face. No acute issues. Overall doing well. Had less spasms last night than night before, but states the spasms are also keeping her awake at times. ROS:  A ten point review of systems was completed on 09/09/23 and pertinent positives are listed in subjective section. All other systems reviewed were negative. OBJECTIVE:   /58 (BP Location: Left arm)   Pulse 88   Temp 98.2 °F (36.8 °C) (Oral)   Resp 18   Ht 5' (1.524 m)   Wt 91.2 kg (201 lb)   SpO2 96%   BMI 39.26 kg/m²       Physical Exam  Vitals and nursing note reviewed. Constitutional:       General: She is not in acute distress. HENT:      Head: Normocephalic and atraumatic. Nose: Nose normal.      Mouth/Throat:      Mouth: Mucous membranes are moist.   Eyes:      Conjunctiva/sclera: Conjunctivae normal.   Cardiovascular:      Rate and Rhythm: Normal rate and regular rhythm. Pulses: Normal pulses. Pulmonary:      Effort: Pulmonary effort is normal.      Breath sounds: Normal breath sounds. No wheezing or rales. Abdominal:      General: Bowel sounds are normal. There is no distension. Palpations: Abdomen is soft. Tenderness: There is no abdominal tenderness. Musculoskeletal:         General: No swelling. Cervical back: Neck supple.    Skin: General: Skin is warm. Neurological:      Mental Status: She is alert and oriented to person, place, and time. Motor: Weakness (right dense hemiparesis) present.    Psychiatric:         Mood and Affect: Mood normal.            Personally reviewed on 09/09/23:   Lab Results   Component Value Date    WBC 8.59 09/07/2023    HGB 11.0 (L) 09/07/2023    HCT 35.6 09/07/2023    MCV 87 09/07/2023     09/07/2023     Lab Results   Component Value Date    SODIUM 137 09/07/2023    K 4.1 09/07/2023     09/07/2023    CO2 30 09/07/2023    BUN 18 09/07/2023    CREATININE 0.66 09/07/2023    GLUC 146 (H) 09/07/2023    CALCIUM 9.3 09/07/2023     Lab Results   Component Value Date    INR 1.06 08/15/2023    INR 0.94 08/14/2023    PROTIME 14.0 08/15/2023    PROTIME 13.2 08/14/2023           Current Facility-Administered Medications:   •  acetaminophen (TYLENOL) tablet 650 mg, 650 mg, Oral, Q6H PRN, Gearold Reasons, DO, 650 mg at 09/01/23 0408  •  albuterol (PROVENTIL HFA,VENTOLIN HFA) inhaler 2 puff, 2 puff, Inhalation, Q4H PRN, Gearold Reasons, DO, 2 puff at 09/04/23 0919  •  aspirin chewable tablet 81 mg, 81 mg, Oral, Daily, Gearold Reasons, DO, 81 mg at 09/09/23 1001  •  atorvastatin (LIPITOR) tablet 20 mg, 20 mg, Oral, Daily With Dinner, Gearold Reasons, DO, 20 mg at 09/08/23 1718  •  baclofen tablet 5 mg, 5 mg, Oral, BID, ARSENIO Pinto, 5 mg at 09/09/23 1001  •  bisacodyl (DULCOLAX) rectal suppository 10 mg, 10 mg, Rectal, Daily PRN, Ebony Mueller MD, 10 mg at 09/03/23 1826  •  bisacodyl (DULCOLAX) rectal suppository 10 mg, 10 mg, Rectal, Once, ARSENIO Pinto  •  buPROPion (WELLBUTRIN XL) 24 hr tablet 150 mg, 150 mg, Oral, QAM, Macey Martínez, DO, 150 mg at 09/09/23 1004  •  calcium carbonate (TUMS) chewable tablet 1,000 mg, 1,000 mg, Oral, TID PRN, Laureen Tracy PA-C, 1,000 mg at 08/26/23 1241  •  cyanocobalamin (VITAMIN B-12) tablet 1,000 mcg, 1,000 mcg, Oral, Daily, Macey Martínez, , 1,000 mcg at 09/09/23 1001  •  diazepam (VALIUM) tablet 2 mg, 2 mg, Oral, BID PRN, Danielle Long DO, 2 mg at 09/07/23 2303  •  docusate sodium (COLACE) capsule 100 mg, 100 mg, Oral, BID, ARSENIO Pinto, 100 mg at 09/06/23 1715  •  ferrous sulfate tablet 325 mg, 325 mg, Oral, Daily With Breakfast, Danielle Long DO, 325 mg at 09/09/23 1001  •  fluticasone-vilanterol 200-25 mcg/actuation 1 puff, 1 puff, Inhalation, Daily, Danielle Long DO, 1 puff at 09/09/23 1005  •  gabapentin (NEURONTIN) capsule 300 mg, 300 mg, Oral, TID, Danielle Long DO, 300 mg at 09/09/23 1001  •  heparin (porcine) subcutaneous injection 5,000 Units, 5,000 Units, Subcutaneous, Q8H [address], Danielle Long DO, 5,000 Units at 09/09/23 7369  •  hydrOXYzine HCL (ATARAX) tablet 25 mg, 25 mg, Oral, TID PRN, ARSENIO Pinto  •  insulin lispro (HumaLOG) 100 units/mL subcutaneous injection 1-6 Units, 1-6 Units, Subcutaneous, TID AC, 1 Units at 09/09/23 1115 **AND** Fingerstick Glucose (POCT), , , 4x Daily AC and at bedtime, Danielle Long DO  •  insulin lispro (HumaLOG) 100 units/mL subcutaneous injection 1-6 Units, 1-6 Units, Subcutaneous, HS, Danielle Long DO, 1 Units at 09/08/23 2117  •  lactulose oral solution 20 g, 20 g, Oral, Daily PRN, Danielle Long DO  •  losartan (COZAAR) tablet 100 mg, 100 mg, Oral, Daily, Danielle Long DO, 100 mg at 09/09/23 1001  •  melatonin tablet 6 mg, 6 mg, Oral, HS, ARSENIO Pinto, 6 mg at 09/08/23 2116  •  metFORMIN (GLUCOPHAGE) tablet 500 mg, 500 mg, Oral, BID With Meals, ARSENIO Cantrell, 500 mg at 09/09/23 1001  •  miconazole (MICOTIN) 2 % powder 1 Application, 1 Application, Topical, BID, Danielle Long DO, 1 Application at 48/00/46 1005  •  ondansetron (ZOFRAN-ODT) dispersible tablet 4 mg, 4 mg, Oral, Q6H PRN, Danielle Long DO  •  oxyCODONE (ROXICODONE) IR tablet 5 mg, 5 mg, Oral, Q4H PRN, Danielle Long DO, 5 mg at 09/09/23 1001  •  oxyCODONE (ROXICODONE) split tablet 2.5 mg, 2.5 mg, Oral, Q4H PRN, Zachariah Garza DO, 2.5 mg at 09/04/23 1752  •  pantoprazole (PROTONIX) EC tablet 40 mg, 40 mg, Oral, Early Morning, Zachariah Garza DO, 40 mg at 09/09/23 0907  •  polyethylene glycol (MIRALAX) packet 17 g, 17 g, Oral, Daily, Zachariah Garza DO, 17 g at 09/05/23 1060  •  prochlorperazine (COMPAZINE) tablet 5 mg, 5 mg, Oral, Q6H PRN, Zachariah Garza DO  •  senna (SENOKOT) tablet 17.2 mg, 2 tablet, Oral, HS, ARSENIO Pinto, 17.2 mg at 09/07/23 2153    Past Medical History:   Diagnosis Date   • Arthritis    • Asthma    • Continuous opioid dependence ([address]) 4/27/2022   • Diabetes mellitus ([address])    • Diverticulitis of colon    • Fibromyalgia 04/26/2022   • Headache(784.0)    • History of mammogram 2018   • History of tobacco abuse 04/26/2022   • Hypertension    • Nausea 04/29/2022   • Obesity    • Sjogren's syndrome ([address]) 10/19/2012   • Urinary tract infection        Patient Active Problem List    Diagnosis Date Noted   • Hyponatremia 08/31/2023   • GERD (gastroesophageal reflux disease) 08/25/2023   • Constipation 08/25/2023   • Anemia 08/16/2023   • ICH (intracerebral hemorrhage) ([address]) 08/15/2023   • Left leg pain 08/15/2023   • Chronic obstructive pulmonary disease with acute exacerbation ([address]) 07/26/2023   • Hyperlipidemia associated with type 2 diabetes mellitus ([address]) 07/26/2023   • Abnormal CT of the chest 06/28/2023   • Class 2 obesity with body mass index (BMI) of 39.0 to 39.9 in adult 06/28/2023   • CAD (coronary artery disease) 04/07/2023   • Right lumbosacral radiculopathy 10/12/2022   • Chronic bilateral low back pain with bilateral sciatica 08/31/2022   • Paresthesia of both feet 08/31/2022   • Postoperative visit 05/24/2022   • Incarcerated umbilical hernia 02/97/8161   • Diabetes mellitus type 2, controlled ([address]) 04/29/2022   • Continuous opioid dependence ([address]) 04/27/2022   • Moderate persistent asthma without complication 07/27/1395   • Cigarette nicotine dependence in remission 04/26/2022   • Primary hypertension 04/26/2022   • Fibromyalgia 04/26/2022   • Sjogren's syndrome ([address]) 10/19/2012   • Depression with anxiety 09/18/2012          Ricky Bautista MD  PM&R      I have spent a total time of 25 minutes on 09/09/23 in caring for this patient including Patient and family education, Impressions, Counseling / Coordination of care and Communicating with other healthcare professionals . ** Please Note:  voice to text software may have been used in the creation of this document.  Although proof errors in transcription or interpretation are a potential of such software**

## 2023-09-09 NOTE — PROGRESS NOTES
Internal Medicine Progress Note  Patient: Chhaya Perkins  Age/sex: 62 y.o. female  Medical Record #: [id number]      ASSESSMENT/PLAN: (Interval History)  Chhaya Perkins is seen and examined and management for following issues:    ICH  • Seen by NS and no surgical intervention indicated. • Etio = HTN  • Was unable to tolerate MRI even with sedation  • Was cleared to resume ASA. • Continue atorvastatin. • Outpt follow-up with Neurology.     HTN  • Goal with ICH is SBP<140  • Home: Micardis 80mg qd/Atenolol 50mg qd  • Here: Losartan 100mg qd  • (Micardis 80 mg qd = Losartan 100 mg qd)   • Both Losartan and Norvasc held on 9/6 and again 9/8 AM for  = rechecked manually at 1123 and was 118/80  • When did not get either med on 9/6, her BP remained normotensive for the rest of the day  • Stopped the Norvasc on 9/8  • Reassess over the next few days     DM type 2  • HA1C 6.7  • Home: Metformin 1000mg BID  • Here: Metformin 500 mg BID  • Continue QID Accuchecks/SSI and DM diet  • stable     Anemia  • Baseline Hgb 10 - 11. • Iron level 30/Ferritin 22/TIBC 371  • B12 was low normal at 221 on 8/15/23  • Continue iron and B12 supplementation. • stable     Anxiety  • Continue Wellbutrin XL. • Pt is taking 150mg instead of 300mg due to concerns for increased risk of seizure        Discharge date:  [facility]       The above assessment and plan was reviewed and updated as determined by my evaluation of the patient on 9/9/2023.     Labs:   Results from last 7 days   Lab Units 09/07/23  0638 09/04/23  0714   WBC Thousand/uL 8.59 8.42   HEMOGLOBIN g/dL 11.0* 11.9   HEMATOCRIT % 35.6 37.0   PLATELETS Thousands/uL 319 347     Results from last 7 days   Lab Units 09/07/23  0638 09/04/23  0714   SODIUM mmol/L 137 134*   POTASSIUM mmol/L 4.1 4.1   CHLORIDE mmol/L 100 100   CO2 mmol/L 30 28   BUN mg/dL 18 16   CREATININE mg/dL 0.66 0.59*   CALCIUM mg/dL 9.3 8.9             Results from last 7 days   Lab Units 09/09/23  1047 09/09/23  0622 09/08/23  2106   POC GLUCOSE mg/dl 160* 122 158*       Review of Scheduled Meds:  Current Facility-Administered Medications   Medication Dose Route Frequency Provider Last Rate   • acetaminophen  650 mg Oral Q6H PRN Dyke Heimlich, DO     • albuterol  2 puff Inhalation Q4H PRN Dyke Heimlich, DO     • aspirin  81 mg Oral Daily Dyke Heimlich, DO     • atorvastatin  20 mg Oral Daily With Orheather Cabrera, DO     • baclofen  5 mg Oral BID ARSENIO Brumfield     • bisacodyl  10 mg Rectal Daily PRN Minal Sanders MD     • bisacodyl  10 mg Rectal Once ARSENIO Brumfield     • buPROPion  150 mg Oral QAM Dyke Heimlich, DO     • calcium carbonate  1,000 mg Oral TID PRN Laureen Tracy PA-C     • vitamin B-12  1,000 mcg Oral Daily Dyke Heimlich, DO     • diazepam  2 mg Oral BID PRN Dyke Heimlich, DO     • docusate sodium  100 mg Oral BID ARSENIO Brumfield     • ferrous sulfate  325 mg Oral Daily With Breakfast Dyke Heimlich, DO     • fluticasone-vilanterol  1 puff Inhalation Daily Dyke Heimlich, DO     • gabapentin  300 mg Oral TID Dyke Heimlich, DO     • heparin (porcine)  5,000 Units Subcutaneous Q8H [address] Dyke Heimlich, DO     • hydrOXYzine HCL  25 mg Oral TID PRN ARESNIO Brumfield     • insulin lispro  1-6 Units Subcutaneous TID AC Dyke Heimlich, DO     • insulin lispro  1-6 Units Subcutaneous HS Dyke Heimlich, DO     • lactulose  20 g Oral Daily PRN Dyke Heimlich, DO     • losartan  100 mg Oral Daily Dyke Heimlich, DO     • melatonin  6 mg Oral HS ARSENIO Pinto     • metFORMIN  500 mg Oral BID With Meals ARSENIO Ogden     • miconazole  1 Application Topical BID Dyke Heimlich, DO     • ondansetron  4 mg Oral Q6H PRN Dyke Heimlich, DO     • oxyCODONE  5 mg Oral Q4H PRN Dyke Heimlich, DO     • oxyCODONE  2.5 mg Oral Q4H PRN Dyke Heimlich, DO     • pantoprazole  40 mg Oral Early Morning Dyke Heimlich, DO     • polyethylene glycol  17 g Oral Daily Dyke Heimlich, DO     • prochlorperazine  5 mg Oral Q6H PRN Fei Dailey DO     • senna  2 tablet Oral HS ARSENIO Pinto         Subjective/ HPI: Patient seen and examined. Patients overnight issues or events were reviewed with nursing or staff during rounds or morning huddle session. New or overnight issues include the following:     No new or overnight issues. Offers no complaints    ROS:   A 10 point ROS was performed; negative except as noted above. *Labs /Radiology studies reviewed  *Medications reviewed and reconciled as needed  *Please refer to order section for additional ordered labs studies  *Case discussed with primary attending during morning huddle case rounds    Physical Examination:  Vitals:   Vitals:    09/08/23 1123 09/08/23 1446 09/08/23 2033 09/09/23 0446   BP: 118/80 118/73 152/69 127/58   BP Location: Left leg Left arm Left arm Left arm   Pulse:  92 95 88   Resp:  16 20 18   Temp:  98.3 °F (36.8 °C) 98.8 °F (37.1 °C) 98.2 °F (36.8 °C)   TempSrc:  Oral Oral Oral   SpO2:  97% 94% 96%   Weight:    91.2 kg (201 lb)   Height:           General Appearance: no distress, conversive  HEENT: PERRLA, conjuctiva normal; oropharynx clear; mucous membranes moist   Neck:  Supple, normal ROM  Lungs: CTA, normal respiratory effort, no retractions, expiratory effort normal  CV: regular rate and rhythm; no rubs/murmurs/gallops, PMI normal   ABD: soft; ND/NT; +BS  EXT: no edema  Skin: normal turgor, normal texture, no rashes  Psych: affect normal, mood normal  Neuro: AAO      The above physical exam was reviewed and updated as determined by my evaluation of the patient on 9/9/2023. Invasive Devices     Drain  Duration           External Urinary Catheter 11 days                   VTE Pharmacologic Prophylaxis: Heparin  Code Status: Level 1 - Full Code  Current Length of Stay: 15 day(s)      Total time spent:  30 minutes with more than 50% spent counseling/coordinating care.  Counseling includes discussion with patient re: progress  and discussion with patient of his/her current medical state/information. Coordination of patient's care was performed in conjunction with primary service. Time invested included review of patient's labs, vitals, and management of their comorbidities with continued monitoring. In addition, this patient was discussed with medical team including physician and advanced extenders. The care of the patient was extensively discussed and appropriate treatment plan was formulated unique for this patient. Medical decision making for the day was made by supervising physician unless otherwise noted in their attestation statement. ** Please Note:  voice to text software may have been used in the creation of this document.  Although proof errors in transcription or interpretation are a potential of such software**

## 2023-09-09 NOTE — PLAN OF CARE
Problem: Prexisting or High Potential for Compromised Skin Integrity  Goal: Skin integrity is maintained or improved  Description: INTERVENTIONS:  - Identify patients at risk for skin breakdown  - Assess and monitor skin integrity  - Assess and monitor nutrition and hydration status  - Monitor labs   - Assess for incontinence   - Turn and reposition patient  - Assist with mobility/ambulation  - Relieve pressure over bony prominences  - Avoid friction and shearing  - Provide appropriate hygiene as needed including keeping skin clean and dry  - Evaluate need for skin moisturizer/barrier cream  - Collaborate with interdisciplinary team   - Patient/family teaching  - Consider wound care consult   Outcome: Progressing     Problem: PAIN - ADULT  Goal: Verbalizes/displays adequate comfort level or baseline comfort level  Description: Interventions:  - Encourage patient to monitor pain and request assistance  - Assess pain using appropriate pain scale  - Administer analgesics based on type and severity of pain and evaluate response  - Implement non-pharmacological measures as appropriate and evaluate response  - Consider cultural and social influences on pain and pain management  - Notify physician/advanced practitioner if interventions unsuccessful or patient reports new pain  Outcome: Progressing     Problem: INFECTION - ADULT  Goal: Absence or prevention of progression during hospitalization  Description: INTERVENTIONS:  - Assess and monitor for signs and symptoms of infection  - Monitor lab/diagnostic results  - Monitor all insertion sites, i.e. indwelling lines, tubes, and drains  - Monitor endotracheal if appropriate and nasal secretions for changes in amount and color  - [place] appropriate cooling/warming therapies per order  - Administer medications as ordered  - Instruct and encourage patient and family to use good hand hygiene technique  - Identify and instruct in appropriate isolation precautions for identified infection/condition  Outcome: Progressing  Goal: Absence of fever/infection during neutropenic period  Description: INTERVENTIONS:  - Monitor WBC    Outcome: Progressing     Problem: SAFETY ADULT  Goal: Patient will remain free of falls  Description: INTERVENTIONS:  - Educate patient/family on patient safety including physical limitations  - Instruct patient to call for assistance with activity   - Consult OT/PT to assist with strengthening/mobility   - Keep Call bell within reach  - Keep bed low and locked with side rails adjusted as appropriate  - Keep care items and personal belongings within reach  - Initiate and maintain comfort rounds  - Make Fall Risk Sign visible to staff  - Offer Toileting every 4 Hours, in advance of need  - Apply yellow socks and bracelet for high fall risk patients  - Consider moving patient to room near nurses station  Outcome: Progressing  Goal: Maintain or return to baseline ADL function  Description: INTERVENTIONS:  -  Assess patient's ability to carry out ADLs; assess patient's baseline for ADL function and identify physical deficits which impact ability to perform ADLs (bathing, care of mouth/teeth, toileting, grooming, dressing, etc.)  - Assess/evaluate cause of self-care deficits   - Assess range of motion  - Assess patient's mobility; develop plan if impaired  - Assess patient's need for assistive devices and provide as appropriate  - Encourage maximum independence but intervene and supervise when necessary  - Involve family in performance of ADLs  - Assess for home care needs following discharge   - Consider OT consult to assist with ADL evaluation and planning for discharge  - Provide patient education as appropriate  Outcome: Progressing  Goal: Maintains/Returns to pre admission functional level  Description: INTERVENTIONS:  - Set and communicate daily mobility goal to care team and patient/family/caregiver.    - Collaborate with rehabilitation services on mobility goals if consulted  - Out of bed for toileting  - Record patient progress and toleration of activity level   Outcome: Progressing     Problem: DISCHARGE PLANNING  Goal: Discharge to home or other facility with appropriate resources  Description: INTERVENTIONS:  - Identify barriers to discharge w/patient and caregiver  - Arrange for needed discharge resources and transportation as appropriate  - Identify discharge learning needs (meds, wound care, etc.)  - Arrange for interpretive services to assist at discharge as needed  - Refer to Case Management Department for coordinating discharge planning if the patient needs post-hospital services based on physician/advanced practitioner order or complex needs related to functional status, cognitive ability, or social support system  Outcome: Progressing     Problem: Nutrition/Hydration-ADULT  Goal: Nutrient/Hydration intake appropriate for improving, restoring or maintaining nutritional needs  Description: Monitor and assess patient's nutrition/hydration status for malnutrition. Collaborate with interdisciplinary team and initiate plan and interventions as ordered. Monitor patient's weight and dietary intake as ordered or per policy. Utilize nutrition screening tool and intervene as necessary. Determine patient's food preferences and provide high-protein, high-caloric foods as appropriate.      INTERVENTIONS:  - Monitor oral intake, urinary output, labs, and treatment plans  - Assess nutrition and hydration status and recommend course of action  - Evaluate amount of meals eaten  - Assist patient with eating if necessary   - Allow adequate time for meals  - Recommend/ encourage appropriate diets, oral nutritional supplements, and vitamin/mineral supplements  - Order, calculate, and assess calorie counts as needed  - Recommend, monitor, and adjust tube feedings and TPN/PPN based on assessed needs  - Assess need for intravenous fluids  - Provide specific nutrition/hydration education as appropriate  - Include patient/family/caregiver in decisions related to nutrition  Outcome: Progressing     Problem: MOBILITY - ADULT  Goal: Maintain or return to baseline ADL function  Description: INTERVENTIONS:  -  Assess patient's ability to carry out ADLs; assess patient's baseline for ADL function and identify physical deficits which impact ability to perform ADLs (bathing, care of mouth/teeth, toileting, grooming, dressing, etc.)  - Assess/evaluate cause of self-care deficits   - Assess range of motion  - Assess patient's mobility; develop plan if impaired  - Assess patient's need for assistive devices and provide as appropriate  - Encourage maximum independence but intervene and supervise when necessary  - Involve family in performance of ADLs  - Assess for home care needs following discharge   - Consider OT consult to assist with ADL evaluation and planning for discharge  - Provide patient education as appropriate  Outcome: Progressing  Goal: Maintains/Returns to pre admission functional level  Description: INTERVENTIONS:  - Set and communicate daily mobility goal to care team and patient/family/caregiver.    - Collaborate with rehabilitation services on mobility goals if consulted  - Out of bed for toileting  - Record patient progress and toleration of activity level   Outcome: Progressing

## 2023-09-10 LAB
GLUCOSE SERPL-MCNC: 110 MG/DL (ref 65–140)
GLUCOSE SERPL-MCNC: 125 MG/DL (ref 65–140)
GLUCOSE SERPL-MCNC: 174 MG/DL (ref 65–140)
GLUCOSE SERPL-MCNC: 177 MG/DL (ref 65–140)

## 2023-09-10 PROCEDURE — 99232 SBSQ HOSP IP/OBS MODERATE 35: CPT | Performed by: INTERNAL MEDICINE

## 2023-09-10 PROCEDURE — 97112 NEUROMUSCULAR REEDUCATION: CPT

## 2023-09-10 PROCEDURE — 97130 THER IVNTJ EA ADDL 15 MIN: CPT

## 2023-09-10 PROCEDURE — 97530 THERAPEUTIC ACTIVITIES: CPT

## 2023-09-10 PROCEDURE — 97129 THER IVNTJ 1ST 15 MIN: CPT

## 2023-09-10 PROCEDURE — 82948 REAGENT STRIP/BLOOD GLUCOSE: CPT

## 2023-09-10 RX ADMIN — BACLOFEN 5 MG: 10 TABLET ORAL at 09:00

## 2023-09-10 RX ADMIN — ASPIRIN 81 MG CHEWABLE TABLET 81 MG: 81 TABLET CHEWABLE at 09:00

## 2023-09-10 RX ADMIN — BUPROPION HYDROCHLORIDE 150 MG: 150 TABLET, FILM COATED, EXTENDED RELEASE ORAL at 09:01

## 2023-09-10 RX ADMIN — METFORMIN HYDROCHLORIDE 500 MG: 500 TABLET ORAL at 09:01

## 2023-09-10 RX ADMIN — ATORVASTATIN CALCIUM 20 MG: 20 TABLET, FILM COATED ORAL at 16:17

## 2023-09-10 RX ADMIN — INSULIN LISPRO 1 UNITS: 100 INJECTION, SOLUTION INTRAVENOUS; SUBCUTANEOUS at 11:30

## 2023-09-10 RX ADMIN — GABAPENTIN 300 MG: 300 CAPSULE ORAL at 09:01

## 2023-09-10 RX ADMIN — OXYCODONE HYDROCHLORIDE 5 MG: 5 TABLET ORAL at 04:06

## 2023-09-10 RX ADMIN — CYANOCOBALAMIN TAB 500 MCG 1000 MCG: 500 TAB at 09:01

## 2023-09-10 RX ADMIN — MICONAZOLE NITRATE 1 APPLICATION: 20 POWDER TOPICAL at 18:37

## 2023-09-10 RX ADMIN — GABAPENTIN 300 MG: 300 CAPSULE ORAL at 21:41

## 2023-09-10 RX ADMIN — INSULIN LISPRO 1 UNITS: 100 INJECTION, SOLUTION INTRAVENOUS; SUBCUTANEOUS at 16:13

## 2023-09-10 RX ADMIN — HEPARIN SODIUM 5000 UNITS: 5000 INJECTION INTRAVENOUS; SUBCUTANEOUS at 14:22

## 2023-09-10 RX ADMIN — MELATONIN 6 MG: at 21:44

## 2023-09-10 RX ADMIN — METFORMIN HYDROCHLORIDE 500 MG: 500 TABLET ORAL at 16:17

## 2023-09-10 RX ADMIN — LOSARTAN POTASSIUM 100 MG: 50 TABLET, FILM COATED ORAL at 09:00

## 2023-09-10 RX ADMIN — MICONAZOLE NITRATE 1 APPLICATION: 20 POWDER TOPICAL at 09:05

## 2023-09-10 RX ADMIN — OXYCODONE HYDROCHLORIDE 5 MG: 5 TABLET ORAL at 18:35

## 2023-09-10 RX ADMIN — HEPARIN SODIUM 5000 UNITS: 5000 INJECTION INTRAVENOUS; SUBCUTANEOUS at 21:41

## 2023-09-10 RX ADMIN — PANTOPRAZOLE SODIUM 40 MG: 40 TABLET, DELAYED RELEASE ORAL at 05:23

## 2023-09-10 RX ADMIN — FERROUS SULFATE TAB 325 MG (65 MG ELEMENTAL FE) 325 MG: 325 (65 FE) TAB at 09:00

## 2023-09-10 RX ADMIN — FLUTICASONE FUROATE AND VILANTEROL TRIFENATATE 1 PUFF: 200; 25 POWDER RESPIRATORY (INHALATION) at 09:01

## 2023-09-10 RX ADMIN — BACLOFEN 5 MG: 10 TABLET ORAL at 18:35

## 2023-09-10 RX ADMIN — Medication 2.5 MG: at 14:22

## 2023-09-10 RX ADMIN — OXYCODONE HYDROCHLORIDE 5 MG: 5 TABLET ORAL at 09:00

## 2023-09-10 RX ADMIN — GABAPENTIN 300 MG: 300 CAPSULE ORAL at 16:16

## 2023-09-10 RX ADMIN — HEPARIN SODIUM 5000 UNITS: 5000 INJECTION INTRAVENOUS; SUBCUTANEOUS at 05:23

## 2023-09-10 NOTE — PROGRESS NOTES
Internal Medicine Progress Note  Patient: Paola Roblero  Age/sex: 62 y.o. female  Medical Record #: [id number]      ASSESSMENT/PLAN: (Interval History)  Paola Roblero is seen and examined and management for following issues:    ICH  • Seen by NS and no surgical intervention indicated. • Etio = HTN  • Was unable to tolerate MRI even with sedation  • Was cleared to resume ASA. • Continue atorvastatin. • Outpt follow-up with Neurology.     HTN  • Goal with ICH is SBP<140  • Home: Micardis 80mg qd/Atenolol 50mg qd  • Here: Losartan 100mg qd  • (Micardis 80 mg qd = Losartan 100 mg qd)   • Stopped the Norvasc on 9/8 since BPs too low  • stable     DM type 2  • HA1C 6.7  • Home: Metformin 1000mg BID  • Here: Metformin 500 mg BID  • Continue QID Accuchecks/SSI and DM diet  • stable     Anemia  • Baseline Hgb 10 - 11. • Iron level 30/Ferritin 22/TIBC 371  • B12 was low normal at 221 on 8/15/23  • Continue iron and B12 supplementation. • stable     Anxiety  • Continue Wellbutrin XL. • Pt is taking 150mg instead of 300mg due to concerns for increased risk of seizure        Discharge date:  [facility]    The above assessment and plan was reviewed and updated as determined by my evaluation of the patient on 9/10/2023.     Labs:   Results from last 7 days   Lab Units 09/07/23  0638 09/04/23  0714   WBC Thousand/uL 8.59 8.42   HEMOGLOBIN g/dL 11.0* 11.9   HEMATOCRIT % 35.6 37.0   PLATELETS Thousands/uL 319 347     Results from last 7 days   Lab Units 09/07/23  0638 09/04/23  0714   SODIUM mmol/L 137 134*   POTASSIUM mmol/L 4.1 4.1   CHLORIDE mmol/L 100 100   CO2 mmol/L 30 28   BUN mg/dL 18 16   CREATININE mg/dL 0.66 0.59*   CALCIUM mg/dL 9.3 8.9             Results from last 7 days   Lab Units 09/10/23  1036 09/10/23  0639 09/09/23  2122   POC GLUCOSE mg/dl 174* 110 105       Review of Scheduled Meds:  Current Facility-Administered Medications   Medication Dose Route Frequency Provider Last Rate   • acetaminophen  650 mg Oral Q6H PRN Josiephine Kins JANUARY Brice, DO     • albuterol  2 puff Inhalation Q4H PRN Zachariah Greg, DO     • aspirin  81 mg Oral Daily Zachariah Greg, DO     • atorvastatin  20 mg Oral Daily With Pily Avelar DO     • baclofen  5 mg Oral BID ARSENIO Fagan     • bisacodyl  10 mg Rectal Daily PRN Nik Solo MD     • bisacodyl  10 mg Rectal Once ARSENIO Fagan     • buPROPion  150 mg Oral QAM Zachariah Greg, DO     • calcium carbonate  1,000 mg Oral TID PRN Laureen Tracy PA-C     • vitamin B-12  1,000 mcg Oral Daily Zachariah Greg, DO     • diazepam  2 mg Oral BID PRN Zachariah Greg, DO     • docusate sodium  100 mg Oral BID ARSENIO Fagan     • ferrous sulfate  325 mg Oral Daily With Breakfast Zachariah Greg, DO     • fluticasone-vilanterol  1 puff Inhalation Daily Zachariah Greg, DO     • gabapentin  300 mg Oral TID Zachariah Greg, DO     • heparin (porcine)  5,000 Units Subcutaneous Q8H [facility] Zachariah Greg, DO     • hydrOXYzine HCL  25 mg Oral TID PRN ARSENIO Fagan     • insulin lispro  1-6 Units Subcutaneous TID AC Zachariah Greg, DO     • insulin lispro  1-6 Units Subcutaneous HS Zachariah Greg, DO     • lactulose  20 g Oral Daily PRN Zachariah Greg, DO     • losartan  100 mg Oral Daily Zachariah Greg, DO     • melatonin  6 mg Oral HS ARSENIO Pinto     • metFORMIN  500 mg Oral BID With Meals ARSENIO Ag     • miconazole  1 Application Topical BID Zachariah Greg, DO     • ondansetron  4 mg Oral Q6H PRN Zachariah Greg, DO     • oxyCODONE  5 mg Oral Q4H PRN Zachariah Greg, DO     • oxyCODONE  2.5 mg Oral Q4H PRN Zachariah Greg, DO     • pantoprazole  40 mg Oral Early Morning Zachariah Greg, DO     • polyethylene glycol  17 g Oral Daily Zachariah Greg, DO     • prochlorperazine  5 mg Oral Q6H PRN Zachariah Greg, DO     • senna  2 tablet Oral HS ARSENIO Pinto         Subjective/ HPI: Patient seen and examined.  Patients overnight issues or events were reviewed with nursing or staff during rounds or morning huddle session. New or overnight issues include the following:     No new or overnight issues. Offers no complaints    ROS:   A 10 point ROS was performed; negative except as noted above. *Labs /Radiology studies reviewed  *Medications reviewed and reconciled as needed  *Please refer to order section for additional ordered labs studies  *Case discussed with primary attending during morning huddle case rounds    Physical Examination:  Vitals:   Vitals:    09/09/23 1337 09/09/23 2044 09/10/23 0434 09/10/23 0854   BP: 128/60 122/73 122/78 122/80   BP Location: Left arm Left arm Left arm Left arm   Pulse: 82 88 85 88   Resp: 16 18 18 18   Temp: 98.1 °F (36.7 °C) 98.3 °F (36.8 °C) 97.9 °F (36.6 °C)    TempSrc: Oral Oral Oral    SpO2: 98% 97% 94%    Weight:   90.7 kg (200 lb)    Height:           General Appearance: no distress, conversive  HEENT:  External ear normal.  Nose normal w/o drainage. Mucous membranes are moist. Oropharynx is clear. Conjunctiva clear w/o icterus or redness. Neck:  Supple, normal ROM  Lungs: BBS without crackles/wheeze/rhonchi; respirations unlabored with normal inspiratory/expiratory effort. No retractions noted. On RA  CV: regular rate and rhythm; no rubs/murmurs/gallops, PMI normal   ABD: Abdomen is soft. Bowel sounds all quadrants. Nontender with no distention. EXT: no edema  Skin: normal turgor, normal texture, no rashes  Psych: affect normal, mood normal  Neuro: AAO     The above physical exam was reviewed and updated as determined by my evaluation of the patient on 9/10/2023. Invasive Devices     Drain  Duration           External Urinary Catheter 12 days                   VTE Pharmacologic Prophylaxis: Heparin  Code Status: Level 1 - Full Code  Current Length of Stay: 16 day(s)      Total time spent:  30 minutes with more than 50% spent counseling/coordinating care.  Counseling includes discussion with patient re: progress  and discussion with patient of his/her current medical state/information. Coordination of patient's care was performed in conjunction with primary service. Time invested included review of patient's labs, vitals, and management of their comorbidities with continued monitoring. In addition, this patient was discussed with medical team including physician and advanced extenders. The care of the patient was extensively discussed and appropriate treatment plan was formulated unique for this patient. Medical decision making for the day was made by supervising physician unless otherwise noted in their attestation statement. ** Please Note:  voice to text software may have been used in the creation of this document.  Although proof errors in transcription or interpretation are a potential of such software**

## 2023-09-10 NOTE — PROGRESS NOTES
09/10/23 1010   Pain Assessment   Pain Assessment Tool 0-10   Pain Score No Pain   Restrictions/Precautions   Precautions Bed/chair alarms;Cognitive; Fall Risk;Pain;Supervision on toilet/commode   Comprehension   Comprehension (FIM) 5 - Understands basic directions and conversation   Expression   Expression (FIM) 5 - Needs help/cues only RARELY (< 10% of the time)   Social Interaction   Social Interaction (FIM) 6 - Interacts appropriately with others BUT requires extra  time   Problem Solving   Problem solving (FIM) 5 - Solves complex problems But requires cues from helper   Memory   Memory (FIM) 5 - Needs cueing reminders <10%   Speech/Language/Cognition Assessmetn   Treatment Assessment Pt was awake, alert and engaged for session to where current SLP reviewed past days events, to where pt was able to recall visitors, activities completed in therapy sessions and items consumed at last meal (breakfast). Pt also noted to recall family mtg which is planned for 9:30 tomorrow (9/11) w/ spouse, Maria Luisa Jackson, who will be present in addition to pt's sister and niece over the phone. Pt was able to verbalized that her niece was going to look into the program which would pay her niece as her primary caregiver. SLP did state that this could be asked during the family mtg tomorrow. Of note, SLP and pt ended up discussing and reviewing stroke basic 101, as pt was reporting that her sister's friend has a loved one in the hospital who sustained a stroke as well, but it was the clot type vs the type which pt sustained (bleed type). Otherwise, SLP engaged pt in another verbal review given medications to where pt was provided the name of medication and she was to recall the reason for taking, but pt also able to provide frequency in taking. Pt was 16/18 accurate in recalling the reason for current mediations, and still 16/18 for recalling frequency in taking medications.  It was noted that the 2 items which pt did not recall were new since this admission (protonix for reflux and senna for bowels). Even during this task, pt was able to recognize that her cholesterol medication was to be changed and while pt could not recall the name of the medication, pt was able to use [facility] My Chart accordingly to look up the name of the medication. Pt also stated that her  will be bringing in this medication tomorrow since pt was having side effects from Lipitor. Plan for tomorrow's session will be towards tangible medication management to where pt already uses a pill box at home (2x/day). At this time, pt will continue to benefit from ongoing skilled SLP services targeting functional independence in cognitive linguistic skills in attempts to decrease overall caregiver burden over time. SLP Therapy Minutes   SLP Time In 1010   SLP Time Out 7956   SLP Total Time (minutes) 30   SLP Mode of treatment - Individual (minutes) 30   SLP Mode of treatment - Concurrent (minutes) 0   SLP Mode of treatment - Group (minutes) 0   SLP Mode of treatment - Co-treat (minutes) 0   SLP Mode of Treatment - Total time(minutes) 30 minutes   SLP Cumulative Minutes 495   Therapy Time missed   Time missed?  No

## 2023-09-10 NOTE — PROGRESS NOTES
09/10/23 1240   Pain Assessment   Pain Assessment Tool 0-10   Pain Score 7   Pain Location/Orientation Orientation: Right;Location: Groin   Effect of Pain on Daily Activities Patient with some knee pain near pes anserine which she reported occurred last night/this AM. Patient with groin tightness 2* tone. Some relief found following strtching TERT 4 minutes in supine prior to OOB   Restrictions/Precautions   Precautions Bed/chair alarms; Fall Risk;Pain;Supervision on toilet/commode   Cognition   Overall Cognitive Status WFL   Subjective   Subjective "I was just trying to sign up for this meeting (ATLAS)"   Lying to Sitting on Side of Bed   Type of Assistance Needed Physical assistance   Physical Assistance Level 25% or less   Comment utilizing bed rail, patient ableto exit on the right side of the bed at [place]; notable more control in her core stability without usual LOB when pushing from sidelying into seated   Lying to Sitting on Side of Bed CARE Score 3   Bed-Chair Transfer   Type of Assistance Needed Physical assistance   Physical Assistance Level 51%-75%   Comment (S)  Patient performed mod Ax1 sit pivot transfer with therapist anterior. Remainder of session performed with slide board from w/c<->high/low mat. RECOMMEND TRIALING SLIDE BOARD TRANSFERS IN ROOM TOMORROW WITH HOPEFUL PLAN TO UPDATE PATIENT'S WHITE BOARD AND REMOVE USE OF BEASY BOARD GOING FORWARDS. Chair/Bed-to-Chair Transfer CARE Score 2   Transfer Bed/Chair/Wheelchair   Findings During sit pivots, patient needing assist to place and remove board. She needs reminder for anterior weight shift but with assist can perform well (when trying to complete without she shifts weight mostly onto her L side making it harder to clear surface)   Car Transfer   Comment (S)  trial next session if appropriate   Ambulation   Findings (S)  PLAN TO TRIAL BWSS MONDAY VS TUESDAY IF SCHEDULING ALLOWS.  PATIENT FEARFUL BUT WANTS TO ATTEMPT   Wheel 50 Feet with Two Turns   Type of Assistance Needed Physical assistance   Physical Assistance Level 25% or less   Wheel 50 Feet with Two Turns CARE Score 3   Wheel 150 Feet   Type of Assistance Needed Physical assistance   Physical Assistance Level 25% or less   Wheel 150 Feet CARE Score 3   Wheelchair mobility   Does the patient use a wheelchair? 1. Yes   Type of Wheelchair Used 1. Manual   Method Left upper extremity; Left lower extremity   Assistance Provided For Locking Brakes; Remove Leg Rest;Replace Leg Rest;Obstacles   Distance Level Surface (feet) 190 ft   Findings light assist needed when "stuck" otherwise able to rchange directions and manuever well with increased time; patient needed removal of cushion prior to practice with w/c as her heel does not touch ground with in   Curb or Single Stair   Reason if not Attempted Safety concerns   1 Step (Curb) CARE Score 88   4 Steps   Reason if not Attempted Safety concerns   4 Steps CARE Score 88   12 Steps   Reason if not Attempted Safety concerns   12 Steps CARE Score 88   Therapeutic Interventions   Neuromuscular Re-Education Sitting EOM, reaching for functional objects outside MARRY working core stability, practice grasp and release with R hand on occasion with hand over hand assistance. Other Stroke Education Series: Pt participated in skilled Stroke Education Series in an individualized setting to address the topic of What Comes Next post stroke in both verbal and written formats. Education within this session included information on recommendations and resources after leaving the [facility]. This education session links together what has been covered in previous stroke education classes to improve the patient's understanding of how managing risk factors for stroke, participating in therapy, working with family and friends in the rehab process, and reviewing their role in the rehab process will maximize outcomes and their functional gains.  This education also incorporates what the patient wants to achieve and helps them set realistic goals. To individualize this education the following topics were covered: support groups, including AT[facility]. Following education, pt's response to education is: verbalizes understanding and demonstrates understanding. PT went into depth regarding ATLAS and what this consists of such as true support meetings vs educational sessions. Patient signed up for September's session focused on Nutrition. Assessment   Treatment Assessment Patient continues to make progress however slow with skilled PT intervention. Plan for this week will be to progress patient from Ax2 beasy board transfers to slide board transfers with all staff 2* improvement noted in core stability and anterior weight shifting. May need to recommend Ax2 with staffing however patient can be Ax1 in therapy. Additionally, will recommend trial in [facility] as patient setting goals to "take some steps" in the upcoming days. Will plan for this Monday vs Tuesday as scheduling allows. Family meeting to be held with patient and family tomorrow morning to review discharge planning. Despite progress, patient needs physical assist with all activities which it is unclear if family can allow for this in upcoming weeks. Anticipate patient to benefit from continued therapy to maximize her functional mobility (I) and safety. Problem List Decreased strength;Decreased endurance; Impaired balance;Decreased mobility; Decreased coordination;Obesity;Orthopedic restrictions;Pain   Barriers to Discharge Inaccessible home environment;Decreased caregiver support   PT Barriers   Functional Limitation Car transfers; Ramp negotiation;Stair negotiation;Standing;Transfers; Walking   Plan   Treatment/Interventions Functional transfer training;LE strengthening/ROM; Therapeutic exercise; Endurance training;Patient/family training;Equipment eval/education; Bed mobility; Compensatory technique education   Progress Progressing toward goals   PT Therapy Minutes   PT Time In 1240   PT Time Out 1340   PT Total Time (minutes) 60   PT Mode of treatment - Individual (minutes) 60   PT Mode of treatment - Concurrent (minutes) 0   PT Mode of treatment - Group (minutes) 0   PT Mode of treatment - Co-treat (minutes) 0   PT Mode of Treatment - Total time(minutes) 60 minutes   PT Cumulative Minutes 1195   Therapy Time missed   Time missed?  No

## 2023-09-11 LAB
ANION GAP SERPL CALCULATED.3IONS-SCNC: 10 MMOL/L
BASOPHILS # BLD AUTO: 0.09 THOUSANDS/ÂΜL (ref 0–0.1)
BASOPHILS NFR BLD AUTO: 1 % (ref 0–1)
BUN SERPL-MCNC: 13 MG/DL (ref 5–25)
CALCIUM SERPL-MCNC: 9.4 MG/DL (ref 8.4–10.2)
CHLORIDE SERPL-SCNC: 101 MMOL/L (ref 96–108)
CO2 SERPL-SCNC: 26 MMOL/L (ref 21–32)
CREAT SERPL-MCNC: 0.56 MG/DL (ref 0.6–1.3)
EOSINOPHIL # BLD AUTO: 0.74 THOUSAND/ÂΜL (ref 0–0.61)
EOSINOPHIL NFR BLD AUTO: 8 % (ref 0–6)
ERYTHROCYTE [DISTWIDTH] IN BLOOD BY AUTOMATED COUNT: 16.4 % (ref 11.6–15.1)
GFR SERPL CREATININE-BSD FRML MDRD: 103 ML/MIN/1.73SQ M
GLUCOSE SERPL-MCNC: 126 MG/DL (ref 65–140)
GLUCOSE SERPL-MCNC: 128 MG/DL (ref 65–140)
GLUCOSE SERPL-MCNC: 135 MG/DL (ref 65–140)
GLUCOSE SERPL-MCNC: 159 MG/DL (ref 65–140)
GLUCOSE SERPL-MCNC: 160 MG/DL (ref 65–140)
HCT VFR BLD AUTO: 36.3 % (ref 34.8–46.1)
HGB BLD-MCNC: 11.1 G/DL (ref 11.5–15.4)
IMM GRANULOCYTES # BLD AUTO: 0.03 THOUSAND/UL (ref 0–0.2)
IMM GRANULOCYTES NFR BLD AUTO: 0 % (ref 0–2)
LYMPHOCYTES # BLD AUTO: 2.79 THOUSANDS/ÂΜL (ref 0.6–4.47)
LYMPHOCYTES NFR BLD AUTO: 30 % (ref 14–44)
MCH RBC QN AUTO: 27.1 PG (ref 26.8–34.3)
MCHC RBC AUTO-ENTMCNC: 30.6 G/DL (ref 31.4–37.4)
MCV RBC AUTO: 89 FL (ref 82–98)
MONOCYTES # BLD AUTO: 0.92 THOUSAND/ÂΜL (ref 0.17–1.22)
MONOCYTES NFR BLD AUTO: 10 % (ref 4–12)
NEUTROPHILS # BLD AUTO: 4.6 THOUSANDS/ÂΜL (ref 1.85–7.62)
NEUTS SEG NFR BLD AUTO: 51 % (ref 43–75)
NRBC BLD AUTO-RTO: 0 /100 WBCS
PLATELET # BLD AUTO: 308 THOUSANDS/UL (ref 149–390)
PMV BLD AUTO: 9.4 FL (ref 8.9–12.7)
POTASSIUM SERPL-SCNC: 3.9 MMOL/L (ref 3.5–5.3)
RBC # BLD AUTO: 4.1 MILLION/UL (ref 3.81–5.12)
SODIUM SERPL-SCNC: 137 MMOL/L (ref 135–147)
WBC # BLD AUTO: 9.17 THOUSAND/UL (ref 4.31–10.16)

## 2023-09-11 PROCEDURE — 82948 REAGENT STRIP/BLOOD GLUCOSE: CPT

## 2023-09-11 PROCEDURE — 97530 THERAPEUTIC ACTIVITIES: CPT

## 2023-09-11 PROCEDURE — 99232 SBSQ HOSP IP/OBS MODERATE 35: CPT | Performed by: STUDENT IN AN ORGANIZED HEALTH CARE EDUCATION/TRAINING PROGRAM

## 2023-09-11 PROCEDURE — 97112 NEUROMUSCULAR REEDUCATION: CPT

## 2023-09-11 PROCEDURE — 99232 SBSQ HOSP IP/OBS MODERATE 35: CPT | Performed by: INTERNAL MEDICINE

## 2023-09-11 PROCEDURE — 85025 COMPLETE CBC W/AUTO DIFF WBC: CPT | Performed by: PHYSICIAN ASSISTANT

## 2023-09-11 PROCEDURE — 97110 THERAPEUTIC EXERCISES: CPT

## 2023-09-11 PROCEDURE — 97129 THER IVNTJ 1ST 15 MIN: CPT

## 2023-09-11 PROCEDURE — 97130 THER IVNTJ EA ADDL 15 MIN: CPT

## 2023-09-11 PROCEDURE — 80048 BASIC METABOLIC PNL TOTAL CA: CPT | Performed by: PHYSICIAN ASSISTANT

## 2023-09-11 RX ORDER — POLYETHYLENE GLYCOL 3350 17 G/17G
17 POWDER, FOR SOLUTION ORAL DAILY PRN
Status: DISCONTINUED | OUTPATIENT
Start: 2023-09-11 | End: 2023-09-20 | Stop reason: HOSPADM

## 2023-09-11 RX ADMIN — CYANOCOBALAMIN TAB 500 MCG 1000 MCG: 500 TAB at 08:23

## 2023-09-11 RX ADMIN — MICONAZOLE NITRATE 1 APPLICATION: 20 POWDER TOPICAL at 17:43

## 2023-09-11 RX ADMIN — FLUTICASONE FUROATE AND VILANTEROL TRIFENATATE 1 PUFF: 200; 25 POWDER RESPIRATORY (INHALATION) at 08:24

## 2023-09-11 RX ADMIN — FERROUS SULFATE TAB 325 MG (65 MG ELEMENTAL FE) 325 MG: 325 (65 FE) TAB at 07:36

## 2023-09-11 RX ADMIN — METFORMIN HYDROCHLORIDE 500 MG: 500 TABLET ORAL at 17:30

## 2023-09-11 RX ADMIN — PANTOPRAZOLE SODIUM 40 MG: 40 TABLET, DELAYED RELEASE ORAL at 05:42

## 2023-09-11 RX ADMIN — MELATONIN 6 MG: at 22:18

## 2023-09-11 RX ADMIN — HEPARIN SODIUM 5000 UNITS: 5000 INJECTION INTRAVENOUS; SUBCUTANEOUS at 05:42

## 2023-09-11 RX ADMIN — ATORVASTATIN CALCIUM 20 MG: 20 TABLET, FILM COATED ORAL at 17:30

## 2023-09-11 RX ADMIN — OXYCODONE HYDROCHLORIDE 5 MG: 5 TABLET ORAL at 19:44

## 2023-09-11 RX ADMIN — OXYCODONE HYDROCHLORIDE 5 MG: 5 TABLET ORAL at 12:12

## 2023-09-11 RX ADMIN — OXYCODONE HYDROCHLORIDE 5 MG: 5 TABLET ORAL at 07:36

## 2023-09-11 RX ADMIN — LOSARTAN POTASSIUM 100 MG: 50 TABLET, FILM COATED ORAL at 08:23

## 2023-09-11 RX ADMIN — BACLOFEN 5 MG: 10 TABLET ORAL at 08:23

## 2023-09-11 RX ADMIN — HEPARIN SODIUM 5000 UNITS: 5000 INJECTION INTRAVENOUS; SUBCUTANEOUS at 14:06

## 2023-09-11 RX ADMIN — INSULIN LISPRO 1 UNITS: 100 INJECTION, SOLUTION INTRAVENOUS; SUBCUTANEOUS at 17:44

## 2023-09-11 RX ADMIN — BACLOFEN 5 MG: 10 TABLET ORAL at 17:40

## 2023-09-11 RX ADMIN — MICONAZOLE NITRATE 1 APPLICATION: 20 POWDER TOPICAL at 08:24

## 2023-09-11 RX ADMIN — METFORMIN HYDROCHLORIDE 500 MG: 500 TABLET ORAL at 07:36

## 2023-09-11 RX ADMIN — HEPARIN SODIUM 5000 UNITS: 5000 INJECTION INTRAVENOUS; SUBCUTANEOUS at 22:18

## 2023-09-11 RX ADMIN — GABAPENTIN 300 MG: 300 CAPSULE ORAL at 17:40

## 2023-09-11 RX ADMIN — BUPROPION HYDROCHLORIDE 150 MG: 150 TABLET, FILM COATED, EXTENDED RELEASE ORAL at 08:25

## 2023-09-11 RX ADMIN — INSULIN LISPRO 1 UNITS: 100 INJECTION, SOLUTION INTRAVENOUS; SUBCUTANEOUS at 12:13

## 2023-09-11 RX ADMIN — ASPIRIN 81 MG CHEWABLE TABLET 81 MG: 81 TABLET CHEWABLE at 08:23

## 2023-09-11 RX ADMIN — GABAPENTIN 300 MG: 300 CAPSULE ORAL at 22:18

## 2023-09-11 RX ADMIN — GABAPENTIN 300 MG: 300 CAPSULE ORAL at 08:23

## 2023-09-11 NOTE — PROGRESS NOTES
09/11/23 1230   Pain Assessment   Pain Assessment Tool 0-10   Pain Score 6   Pain Location/Orientation Orientation: Right;Location: Leg;Location: Hip   Pain Onset/Description Onset: Ongoing   Hospital Pain Intervention(s) Repositioned; Emotional support; Rest   Restrictions/Precautions   Precautions Bed/chair alarms;Cognitive; Fall Risk;Pain;Supervision on toilet/commode   Weight Bearing Restrictions No   ROM Restrictions No   Braces or Orthoses   (RUE sling)   Bed-Chair Transfer   Type of Assistance Needed Physical assistance;Verbal cues; Adaptive equipment   Physical Assistance Level 26%-50%   Comment Min-ModA slide board xfers, pt engaged in transfer training to/from various surfaces including mat table, recliner, and w/c, for increased indep. Pt cont to need help with setup of board and cues for appropriate leaning when placing board. Pt cont to improve with transfers but assist needed varies with fatigue and surface   Chair/Bed-to-Chair Transfer CARE Score 3   Therapeutic Exercise - ROM   UE-ROM Yes   ROM- Right Upper Extremities   R Shoulder PROM; Prolonged stretch; Flexion;ABduction; Extension;Horizontal ABduction; External rotation; Internal rotation   R Elbow PROM;Elbow flexion;Elbow extension   R Wrist PROM; Wrist flexion;Wrist extension;Radial deviation;Ulnar deviation   R Hand PROM; Prolonged stretch; Thumb; Index finger; Long finger;Ring finger;Little finger   R Position Seated   R Weight/Reps/Sets 10x each plane   RUE ROM Comment Provided pt with RUE PROM in all planes for RUE neuro re-ed, contracture prevention, and maintaining joint ROM for increased fxl utilization of RUE. Pt seated in w/c outside of hospital entrance (pt requested to go outside during OT session for improved mental health). Pt tolerated well, c/o min pain in R shoulder during shoulder flexion and horiz abduction but able to tolerate with rest breaks to manage.    Coordination   Fine Motor Seated at tabletop, pt engaged in stringing beads onto pipecleaner, using R hand gross grasp to stabilize pipecleaner and LUE to retrieve/thread beads, with focus on increasing RUE gross grasp for role of fxl stabilizer during ADL tasks, and for LUE [facility] (as pt reports difficulty with LUE coordination when using LUE to compensate for RUE andi). Pt tolerated but required frequent vc's for sustained attention to R hand as pt became distractable to environment and then did not realize the pipecleaner had slipped out of her grasp. With cueing, pt able to be redirected back to task. Pt demo G hand to target and [facility] with LUE, min droppage of beads. Cognition   Overall Cognitive Status Impaired   Arousal/Participation Alert; Cooperative   Attention Attends with cues to redirect   Orientation Level Oriented X4   Memory Decreased short term memory   Following Commands Follows one step commands with increased time or repetition   Activity Tolerance   Activity Tolerance Patient tolerated treatment well   Assessment   Treatment Assessment Pt seen for 90min skilled OT session focused on RUE neuro re-ed (PROM), FMC, B/L coordination, and repetitive fxl slide board transfer training, for increased independence w/ADLs/IADLs and decreased caregiver burden. See detailed descriptions of fxl performance above. Pt tolerated session well, but cont to be limited by RUE weakness, decreased act massimo, endurance, sitting balance, core strength, standing balance, and standing tolerance. Pt would benefit from continued skilled OT focused on RUE neuro re-ed, fxl transfer training, sitting balance/core strength, ADL retraining, endurance training. Prognosis Fair   Problem List Decreased strength;Decreased range of motion;Decreased endurance; Impaired balance;Decreased mobility; Decreased coordination;Decreased cognition;Obesity;Orthopedic restrictions;Pain   Barriers to Discharge Inaccessible home environment;Decreased caregiver support   Plan   Treatment/Interventions ADL retraining;Functional transfer training; Therapeutic exercise; Endurance training;Cognitive reorientation;Patient/family training;Equipment eval/education; Bed mobility; Compensatory technique education   Progress Progressing toward goals   Recommendation   OT Discharge Recommendation   (subacute (SNF))   OT Therapy Minutes   OT Time In 1230   OT Time Out 1400   OT Total Time (minutes) 90   OT Mode of treatment - Individual (minutes) 90   OT Mode of treatment - Concurrent (minutes) 0   OT Mode of treatment - Group (minutes) 0   OT Mode of treatment - Co-treat (minutes) 0   OT Mode of Treatment - Total time(minutes) 90 minutes   OT Cumulative Minutes 1310   Therapy Time missed   Time missed?  No

## 2023-09-11 NOTE — PROGRESS NOTES
09/11/23 0830   Restrictions/Precautions   Precautions Bed/chair alarms; Fall Risk;Supervision on toilet/commode   Braces or Orthoses   (RUE sling)   Bed-Chair Transfer   Type of Assistance Needed Physical assistance   Physical Assistance Level 26%-50%   Comment Min / Mod A with slide board - needs help with set up, needs cues not to lean too far over with with R wt shift ,  showing improvement with actual lateral transfer across board   Chair/Bed-to-Chair Transfer CARE Score 3   Walk 10 Feet   Comment Pt refused to stand   Walk 50 Feet with Two Turns   Reason if not Attempted Safety concerns   Walk 50 Feet with Two Turns CARE Score 88   Walk 150 Feet   Reason if not Attempted Safety concerns   Walk 150 Feet CARE Score 88   Wheel 50 Feet with Two Turns   Type of Assistance Needed Physical assistance   Physical Assistance Level 25% or less   Comment poor turns and propelling   Wheel 50 Feet with Two Turns CARE Score 3   Curb or Single Stair   Reason if not Attempted Safety concerns   1 Step (Curb) CARE Score 88   4 Steps   Reason if not Attempted Safety concerns   4 Steps CARE Score 88   Therapeutic Interventions   Strengthening sitting unsupported lean back with forward cruch for partial ab crunch,  lateral lean with sittting back upright for obliques  10x3   Other majority of session performed repeated slide board transfers   Assessment   Treatment Assessment Pt showing progress with slide board xfers vs beasy board,   present so educated him on process and safety of how to perform slide board incase pt goes home. Will upgrade to slide board in room if OT agrees. Family meeting today on D/C plan. Barriers to Discharge Inaccessible home environment;Decreased caregiver support   PT Barriers   Physical Impairment Decreased strength;Decreased range of motion;Decreased endurance; Impaired balance;Decreased mobility; Decreased coordination;Decreased safety awareness; Impaired sensation;Obesity;Pain   Functional Limitation Car transfers; Ramp negotiation;Stair negotiation;Standing;Transfers; Walking   Plan   Progress Progressing toward goals   PT Therapy Minutes   PT Time In 0830   PT Time Out 0930   PT Total Time (minutes) 60   PT Mode of treatment - Individual (minutes) 60   PT Mode of treatment - Concurrent (minutes) 0   PT Mode of treatment - Group (minutes) 0   PT Mode of treatment - Co-treat (minutes) 0   PT Mode of Treatment - Total time(minutes) 60 minutes   PT Cumulative Minutes 1255   Therapy Time missed   Time missed?  No

## 2023-09-11 NOTE — PROGRESS NOTES
09/11/23 0950   Pain Assessment   Pain Assessment Tool 0-10   Restrictions/Precautions   Precautions Bed/chair alarms; Fall Risk;Supervision on toilet/commode;Pain   Comprehension   Comprehension (FIM) 5 - Understands basic directions and conversation   Expression   Expression (FIM) 5 - Needs help/cues only RARELY (< 10% of the time)   Social Interaction   Social Interaction (FIM) 6 - Interacts appropriately with others BUT requires extra  time   Problem Solving   Problem solving (FIM) 5 - Solves basic problems 90% of time   Memory   Memory (FIM) 5 - Recalls/performs request 90% of time   Speech/Language/Cognition Assessmetn   Treatment Assessment Family meeting was held w/ pt, pt's spouse, Kane Lu and sister, Arya Simmons (over the phone) along w/ pt's primary team, including MD- Dr. Nathan Telles, Tavares BUSBY and SLP. During meeting, functional review and updates were provided by all team members, which focus of SLP information was in regard to current cognitive and language skills, strategies being targeted to maximize these skills at this time. As a team, it was presented about pt's ongoing barriers for discharge home at this time, to where it was agreed upon that transitioning to subacute level of care is that next step for pt at this time. All parties were agreeable w/ plan and ongoing recommendations at this time. SLP Therapy Minutes   SLP Time In 5883   SLP Time Out 1000   SLP Total Time (minutes) 10   SLP Mode of treatment - Individual (minutes) 10   SLP Mode of treatment - Concurrent (minutes) 0   SLP Mode of treatment - Group (minutes) 0   SLP Mode of treatment - Co-treat (minutes) 0   SLP Mode of Treatment - Total time(minutes) 10 minutes   SLP Cumulative Minutes 505   Therapy Time missed   Time missed?  No

## 2023-09-11 NOTE — PROGRESS NOTES
PM&R PROGRESS NOTE:  Chhaya Perkins 62 y.o. female MRN: [id number]  Unit/Bed#: -45 Encounter: [id number]    Rehabilitation Diagnosis: Impairment of mobility, safety, Activities of Daily Living (ADLs), and cognitive/communication skills due to Stroke:  01.2  Right Body Involvement (Left Brain)    HPI:  Chhaya Perkins is a 59-year-old female with history of hypertension, hyperlipidemia, diabetes type 2, obesity, eosinophilic asthma, COPD, fibromyalgia, Sjogren syndrome, lumbar spondylosis with grade 1 anterior spondylolisthesis who presents to the hospital on 8/14/2023 due to acute onset right upper and right lower extremity weakness with sensory loss. Additionally with right-sided facial droop and dysarthria. The patient was noted to have an acute intraparenchymal hemorrhage on CT in the left thalamic region with surrounding edema. A CTA was negative. Patient was initially placed on a Cardene drip. Course complicated by significant left-sided leg pain and was recommended on treatment for radicular symptoms including Tylenol, gabapentin and potentially steroids. Neurology was consulted and recommended MRI of the brain with and without contrast while holding antithrombotics. The MRI showed a stable left thalamic capsular intraparenchymal hemorrhage with surrounding edema without any other mass effect. A repeat CT was completed on 8/17/2023 after worsening symptoms including worsening speech slurring with stable findings. Additionally there was a rapid response after a fall with head strike on 8/18 with a repeat CT of the head without acute findings. Additionally antihypertensive regimen was altered as she was previously on telmisartan and atenolol and is currently on amlodipine, losartan and hydralazine with better control.  The patient was evaluated by the Rehabilitation team and deemed an appropriate candidate for comprehensive inpatient rehabilitation and admitted to the [facility] on 8/25/2023  3:19 PM    SUBJECTIVE: Patient seen face to face. No acute issues overnight. Slept well, right lower extremity spasms improving with trial of baclofen. Patient is in good spirits, family meeting with spouse and sister to discuss treatment plan and continued rehabilitation post acute. Good appetite, voiding, LBM 9/11. Denies chest pain, shortness of breath, fever, chills, N/V, abdominal pain. ASSESSMENT: Stable, progressing    PLAN:  - right lower extremity spasms improved with baclofen 5 mg BID, continue this regimen, monitor, escalate if warranted. - urinary incontinence: continue timed void every 4 hours during the day, every 6 hours at night.   - family meeting: discussed treatment plan for continued rehabilitation on acute rehab with transition to subacute rehab     Rehabilitation  • Functional deficits:  Self-care, right hemiparesis, mobility, cognitive impairment  • Continue current rehabilitation plan of care to maximize function.     • Functional update:   o PT: mobility- mod A, transfers- mod-max A, ambulation- not attempted d/t safety  o OT: ADL: bathing- max A, dressing UB max A LB total A, toileting- total A   o ST: cognition- impaired, decreased executive function, decreased attention, decreased comprehension  • Estimated Discharge: anticipated 4 week goals, plan for transition to subacute rehab    Pain  • Gabapentin 300 mg TID  • Oxycodone 2.5-5 mg every 4 hours prn  • Methocarbamol 500 mg BID and 750 mg qhs  • Rifaximin 500 mg every 6 hours prn    DVT prophylaxis  • Heparin sc    Bladder plan  • Continent    Bowel plan  • Continent  • Colace 100 mg BID  • Senokot 17.2 mg qhs  • Miralax daily  • Bisacodyl 5 mg tab daily prn  • Bisacodyl supp daily prn      * ICH (intracerebral hemorrhage) ([facility])  Assessment & Plan  59-year-old female presents with right hemiplegia, dysarthria and facial asymmetry found to have an acute intraparenchymal hemorrhage in the left thalamic region with surrounding edema felt to be secondary to uncontrolled hypertension  · Had repeat CT of the head on 8/18 after a fall with head strike resulting in significant bruising of the face  · Cleared by neurology to restart aspirin 81 mg daily and atorvastatin 20 mg daily  · Evaluated by neurosurgery with no surgical intervention at this time  · Had a repeat head CT on 8/20 which has been stable  · Goal systolic blood pressure of less than 140  · Follow-up with neurology as an outpatient in 6 weeks (10/26)  · Physical and Occupational Therapy with goals for community discharge. Patient lives with her  in a mobile home with 5 steps to enter and he is able to assist 24/7  · Hemorrhagic stroke education, nutrition, neuropsychology  · Secondary stroke prophylaxis with hypertension control    Primary hypertension  Assessment & Plan  · Home regimen: Telmisartan and atenolol  · Current regimen: Cozaar 100 mg daily, Norvasc and hydralazine discontinued  · Monitor blood pressures especially in therapy and make adjustments as needed    Chronic bilateral low back pain with bilateral sciatica  Assessment & Plan  · Patient has a history of chronic low back pain with radicular symptoms in addition to fibromyalgia and myofascial pain syndrome. · She follows with Dr. Rj Decker from pain management has tried several medications as well as epidural steroid injections with little relief  · Imaging reveals lumbar degenerative disc disease at the L3-4, L4-5, L5-S1 level. Additionally facet hypertrophic changes and ligamentous laxity at the L4-5 level resulting in grade 1 anterior spondylolisthesis and mild canal narrowing with slight distortion of the left anterior lateral aspect of the thecal sac at this level with mild right greater than left neuroforaminal narrowing  · Multi modal pain with current medication regimen including oxycodone to be weaned as well as the Robaxin and gabapentin.   We will also consult neuropsychology  · Added Tylenol as well as Robaxin and as needed Valium    Diabetes mellitus type 2, controlled [facility])  Assessment & Plan  Lab Results   Component Value Date    HGBA1C 6.7 (H) 08/15/2023       Recent Labs     09/10/23  1036 09/10/23  1553 09/10/23  2121 09/11/23  0641   POCGLU 174* 177* 125 135     · Currently on carb controlled level 2 diet, metformin 500 mg twice daily  · Sliding scale insulin algorithm 3 with Accu-Cheks 4 times daily      Constipation  Assessment & Plan  · On admission had not had a bowel movement in at least 11 days  · Obtaining x-ray to evaluate stool burden  · Adding to bowel regimen including increasing senna and Colace, lactulose as needed and will be more aggressive pending results of x-ray  · Continue relatively aggressive regimen  · Resolved with bowel regimen    Hyponatremia  Assessment & Plan  · Sodium 137 (previously 134, 133)  · Continue following biweekly labs with no intervention at this point unless continues to trend down.     CAD (coronary artery disease)  Assessment & Plan  · History of CAD currently on statin and aspirin that was restarted on 8/21 after clearance by neurology  · Beta-blocker held due to bradycardia  · Patient has not formally seen a cardiologist however this was diagnosed based on a CT PE study that was conducted in the ER showing mild CAD    GERD (gastroesophageal reflux disease)  Assessment & Plan  Continue Protonix    Hyperlipidemia associated with type 2 diabetes mellitus ([address])  Assessment & Plan  Continue atorvastatin 20 mg with dinner    Class 2 obesity with body mass index (BMI) of 39.0 to 39.9 in adult  Assessment & Plan  · BMI of 39.74 on admission  · Continue promoting weight loss and increased activity, diet and exercise  · Continue a consistent carbohydrate diet  · Nutrition consultation    Sjogren's syndrome ([address])  Assessment & Plan  · Patient states that she is been worked up for Sjogren's syndrome in the past and has had conflicting diagnosis he is stating that some physicians have diagnosed her with it and others stated she did not have it. · She has not been on any medication treatment for this and does not actively follow-up with a rheumatologist    Depression with anxiety  Assessment & Plan  · Neuropsychology consultation poststroke with history of anxiety  · Continue Wellbutrin  mg in the morning. It has been 300 mg as an outpatient however it was decreased in acute care due to worry of decreasing seizure threshold in the setting of ICH    Moderate persistent asthma without complication  Assessment & Plan  · Follows with Dr. Dana Granger from pulmonology  · Home regimen includes Breo and as needed albuterol  · On equivalent here and added albuterol inhaler on admission to [facility] consultants medical co-management. Labs, medications, and imaging personally reviewed. ROS:  .Review of Systems   A 10 point review of systems was negative except for what is noted in the HPI. OBJECTIVE:   /75 (BP Location: Left arm)   Pulse 79   Temp 97.9 °F (36.6 °C) (Oral)   Resp 18   Ht 5' (1.524 m)   Wt 91.2 kg (201 lb)   SpO2 97%   BMI 39.26 kg/m²     Physical Exam  Constitutional:       Appearance: Normal appearance. HENT:      Head: Normocephalic and atraumatic. Mouth/Throat:      Mouth: Mucous membranes are moist.   Cardiovascular:      Rate and Rhythm: Normal rate and regular rhythm. Pulses: Normal pulses. Heart sounds: Normal heart sounds. Pulmonary:      Effort: Pulmonary effort is normal.      Breath sounds: Normal breath sounds. Abdominal:      General: Bowel sounds are normal.      Palpations: Abdomen is soft. Musculoskeletal:         General: Normal range of motion. Cervical back: Normal range of motion. Right lower leg: Edema present. Left lower leg: Edema present. Skin:     General: Skin is warm and dry. Capillary Refill: Capillary refill takes less than 2 seconds. Findings: Bruising present.    Neurological: Mental Status: She is alert and oriented to person, place, and time. Sensory: Sensory deficit present. Motor: Weakness (HI 2/5, remainder RUE 0/5, RLE 0/5) present.       Coordination: Coordination abnormal.      Gait: Gait abnormal.   Psychiatric:         Mood and Affect: Mood normal.         Judgment: Judgment normal.         Lab Results   Component Value Date    WBC 9.17 09/11/2023    HGB 11.1 (L) 09/11/2023    HCT 36.3 09/11/2023    MCV 89 09/11/2023     09/11/2023     Lab Results   Component Value Date    SODIUM 137 09/11/2023    K 3.9 09/11/2023     09/11/2023    CO2 26 09/11/2023    BUN 13 09/11/2023    CREATININE 0.56 (L) 09/11/2023    GLUC 128 09/11/2023    CALCIUM 9.4 09/11/2023     Lab Results   Component Value Date    INR 1.06 08/15/2023    INR 0.94 08/14/2023    PROTIME 14.0 08/15/2023    PROTIME 13.2 08/14/2023         Current Facility-Administered Medications:   •  acetaminophen (TYLENOL) tablet 650 mg, 650 mg, Oral, Q6H PRN, BeLocal, DO, 650 mg at 09/01/23 7115  •  albuterol (PROVENTIL HFA,VENTOLIN HFA) inhaler 2 puff, 2 puff, Inhalation, Q4H PRN, BeLocal, DO, 2 puff at 09/04/23 0919  •  aspirin chewable tablet 81 mg, 81 mg, Oral, Daily, BeLocal, DO, 81 mg at 09/11/23 3495  •  atorvastatin (LIPITOR) tablet 20 mg, 20 mg, Oral, Daily With Dinner, Lenetta Market, DO, 20 mg at 09/10/23 1617  •  baclofen tablet 5 mg, 5 mg, Oral, BID, ARSENIO Pinto, 5 mg at 09/11/23 7746  •  bisacodyl (DULCOLAX) rectal suppository 10 mg, 10 mg, Rectal, Daily PRN, Bruce Romero MD, 10 mg at 09/03/23 1826  •  bisacodyl (DULCOLAX) rectal suppository 10 mg, 10 mg, Rectal, Once, ARSENIO Pinto  •  buPROPion (WELLBUTRIN XL) 24 hr tablet 150 mg, 150 mg, Oral, Lisbet ALEXANDRA, , 150 mg at 09/11/23 0825  •  calcium carbonate (TUMS) chewable tablet 1,000 mg, 1,000 mg, Oral, TID PRN, Laureen Tracy PA-C, 1,000 mg at 08/26/23 1241  •  cyanocobalamin (VITAMIN B-12) tablet 1,000 mcg, 1,000 mcg, Oral, Daily, Kaiser Almanzara, DO, 1,000 mcg at 09/11/23 4774  •  diazepam (VALIUM) tablet 2 mg, 2 mg, Oral, BID PRN, Kaiser Claire DO, 2 mg at 09/07/23 2303  •  ferrous sulfate tablet 325 mg, 325 mg, Oral, Daily With Breakfast, Kaiser Claire DO, 325 mg at 09/11/23 0736  •  fluticasone-vilanterol 200-25 mcg/actuation 1 puff, 1 puff, Inhalation, Daily, Kaiser Claire DO, 1 puff at 09/11/23 6876  •  gabapentin (NEURONTIN) capsule 300 mg, 300 mg, Oral, TID, Kaiser Claire, DO, 300 mg at 09/11/23 3920  •  heparin (porcine) subcutaneous injection 5,000 Units, 5,000 Units, Subcutaneous, Q8H [facility], Kaiser Claire DO, 5,000 Units at 09/11/23 0542  •  hydrOXYzine HCL (ATARAX) tablet 25 mg, 25 mg, Oral, TID PRN, ARSENIO Molina  •  insulin lispro (HumaLOG) 100 units/mL subcutaneous injection 1-6 Units, 1-6 Units, Subcutaneous, TID AC, 1 Units at 09/10/23 1613 **AND** Fingerstick Glucose (POCT), , , 4x Daily AC and at bedtime, Kaiser Claire DO  •  insulin lispro (HumaLOG) 100 units/mL subcutaneous injection 1-6 Units, 1-6 Units, Subcutaneous, HS, Kaiser Claire DO, 1 Units at 09/08/23 2117  •  lactulose oral solution 20 g, 20 g, Oral, Daily PRN, Kaiser Claire DO  •  losartan (COZAAR) tablet 100 mg, 100 mg, Oral, Daily, Kaiser Claire DO, 100 mg at 09/11/23 6019  •  melatonin tablet 6 mg, 6 mg, Oral, HS, ARSENIO Pinto, 6 mg at 09/10/23 2144  •  metFORMIN (GLUCOPHAGE) tablet 500 mg, 500 mg, Oral, BID With Meals, ARSENIO Cazares, 500 mg at 09/11/23 0736  •  miconazole (MICOTIN) 2 % powder 1 Application, 1 Application, Topical, BID, Kaiser Dakotah, DO, 1 Application at 86/44/67 3918  •  ondansetron (ZOFRAN-ODT) dispersible tablet 4 mg, 4 mg, Oral, Q6H PRN, Kaiser Dakotah, DO  •  oxyCODONE (ROXICODONE) IR tablet 5 mg, 5 mg, Oral, Q4H PRN, Kaiser Dakotah, DO, 5 mg at 09/11/23 1212  •  oxyCODONE (ROXICODONE) split tablet 2.5 mg, 2.5 mg, Oral, Q4H PRN, Kaiser Dakotah, DO, 2.5 mg at 09/10/23 1422  •  pantoprazole (PROTONIX) EC tablet 40 mg, 40 mg, Oral, Early Morning, Lenetta Market, DO, 40 mg at 09/11/23 0542  •  polyethylene glycol (MIRALAX) packet 17 g, 17 g, Oral, Daily PRN, ARSENIO Pinto  •  prochlorperazine (COMPAZINE) tablet 5 mg, 5 mg, Oral, Q6H PRN, Lenetta Market, DO    Past Medical History:   Diagnosis Date   • Arthritis    • Asthma    • Continuous opioid dependence ([address]) 4/27/2022   • Diabetes mellitus ([address])    • Diverticulitis of colon    • Fibromyalgia 04/26/2022   • Headache(784.0)    • History of mammogram 2018   • History of tobacco abuse 04/26/2022   • Hypertension    • Nausea 04/29/2022   • Obesity    • Sjogren's syndrome ([address]) 10/19/2012   • Urinary tract infection        Patient Active Problem List    Diagnosis Date Noted   • ICH (intracerebral hemorrhage) ([address]) 08/15/2023   • Primary hypertension 04/26/2022   • Chronic bilateral low back pain with bilateral sciatica 08/31/2022   • Diabetes mellitus type 2, controlled ([address]) 04/29/2022   • Constipation 08/25/2023   • Hyponatremia 08/31/2023   • CAD (coronary artery disease) 04/07/2023   • GERD (gastroesophageal reflux disease) 08/25/2023   • Anemia 08/16/2023   • Left leg pain 08/15/2023   • Chronic obstructive pulmonary disease with acute exacerbation ([address]) 07/26/2023   • Hyperlipidemia associated with type 2 diabetes mellitus ([address]) 07/26/2023   • Abnormal CT of the chest 06/28/2023   • Class 2 obesity with body mass index (BMI) of 39.0 to 39.9 in adult 06/28/2023   • Right lumbosacral radiculopathy 10/12/2022   • Paresthesia of both feet 08/31/2022   • Postoperative visit 05/24/2022   • Incarcerated umbilical hernia 91/67/6603   • Continuous opioid dependence ([address]) 04/27/2022   • Moderate persistent asthma without complication 67/14/7831   • Cigarette nicotine dependence in remission 04/26/2022   • Fibromyalgia 04/26/2022   • Sjogren's syndrome ([address]) 10/19/2012   • Depression with anxiety 09/18/2012        Antonino Pond ARSENIO  Physical Medicine and [place]

## 2023-09-11 NOTE — PROGRESS NOTES
09/11/23 0940   Assessment   Treatment Assessment Patient participated in family meeting. Present for meeting was patient's SO in person and her sister on the phone. Present from staff was PT, Joey Rivera SLP, Kirill Crump, Dr. Bianca Velasco, and [facility] CRNP. At this meeting, PT offered patient's current level of functioning, barriers to discharge, and discussion about discharge planning. Plan at this time is to continue working at the [facility] with the goal to transition to SNF next week.    PT Therapy Minutes   PT Time In 0940   PT Time Out 0950   PT Total Time (minutes) 10   PT Mode of treatment - Individual (minutes) 10   PT Mode of treatment - Concurrent (minutes) 0   PT Mode of treatment - Group (minutes) 0   PT Mode of treatment - Co-treat (minutes) 0   PT Mode of Treatment - Total time(minutes) 10 minutes   PT Cumulative Minutes 1265

## 2023-09-11 NOTE — PROGRESS NOTES
09/11/23 1030   Pain Assessment   Pain Assessment Tool 0-10   Pain Score No Pain   Restrictions/Precautions   Precautions Bed/chair alarms; Fall Risk;Supervision on toilet/commode   Comprehension   Comprehension (FIM) 5 - Understands basic directions and conversation   Expression   Expression (FIM) 5 - Needs help/cues only RARELY (< 10% of the time)   Social Interaction   Social Interaction (FIM) 6 - Interacts appropriately with others BUT requires extra  time   Problem Solving   Problem solving (FIM) 5 - Solves complex problems But requires cues from helper   Memory   Memory (FIM) 5 - Needs cueing reminders <10%   Speech/Language/Cognition Assessmetn   Treatment Assessment Focus of session today targeted tangible medication management task. SLP using a pill box which is similar to pt's at home (2x/day- AM/PM). A total of 11 medications were provided in which pt was to read each of the labels and place pills into the box as directed for the week. It was noted that pt did have difficulty given fine motor coordination in taking off the pill bottle caps, but no overt difficulty in placing pills into pill box using L hand. However, focus of SLP for this task is pt's comprehension and ability to execute placement of medications accordingly. It was noted that pt was able to complete all 11 medications w/ 100% accuracy. It was furthermore noted that SLP did provide pt w/ PRN medication, which pt did appropriately NOT place into pill box, recognizing that this is only as needed. Additionally, pt did place the only medication which is for 3x/day (gabapentin) which is a home medication, to where 2 pills were in the pill box for AM/PM, but then takes bottle w/ her to work. Pt also appropriately did not place the only medication directed to be taken "at bedtime" into pill box. Upon open ended discussion given this, pt determined that she would keep this medication at bedside table at home.  Overall, pt demonstrating good insight/awarenes given current medications at this time. At end of this task, SLP did provide pt w/ a written list of all current oral medications, providing reason and then the time of day to take medications to use as a reference. Lastly, SLP engaged pt in a category exclusion task, starting a more concrete words to group into categories and to determine the word which does not belong, but increased to more abstract categories to determine target word which does not belong. For the initial task, level 1, pt was 20/20 accurate in completing. For level 2 words/categories, pt was 18/20 accurate, but when talking through the logic for the 2 lists, pt was able to problem solve through and elicit appropriate target words which would note belong increasing to 20/20. For the last one, level 3, pt was 15/20 accurate in this task, but again, when providing verbal review and minimal semantic cues by SLP, pt was able to determine the target words which did not belong, increasing to 20/20 accurate. At this time, pt will continue to benefit from ongoing skilled SLP services targeting functional independence of cognitive linguistic skills in hopes for decreasing caregiver burden over time. SLP Therapy Minutes   SLP Time In 1030   SLP Time Out 1130   SLP Total Time (minutes) 60   SLP Mode of treatment - Individual (minutes) 60   SLP Mode of treatment - Concurrent (minutes) 0   SLP Mode of treatment - Group (minutes) 0   SLP Mode of treatment - Co-treat (minutes) 0   SLP Mode of Treatment - Total time(minutes) 60 minutes   SLP Cumulative Minutes 555   Therapy Time missed   Time missed?  No

## 2023-09-11 NOTE — CASE MANAGEMENT
Family mtg occurred with pt, spouse, her sister via phone, dr Vera mejia, Dariana Veliz, pt; wilder mcdonald, slp and cm attended. Pts current medical status and functional status explained. Family and pt in agreement with the plan to continue at subacute setting for therapy while family makes arrangements at home. Cm reviewed insurance process and the potential to transition to subacute setting next week. Pt selected [facility] as her other sister works there. Will follow up with a referral later this week .

## 2023-09-11 NOTE — CASE MANAGEMENT
Received mssg from [place] at [facility] approving additional 7 days with lcd 9/14 and update due 9/15. Fax for clinical is [phone].

## 2023-09-11 NOTE — PROGRESS NOTES
Internal Medicine Progress Note  Patient: Ilda Koyanagi  Age/sex: 62 y.o. female  Medical Record #: [id number]      ASSESSMENT/PLAN: (Interval History)  Ilda Koyanagi is seen and examined and management for following issues:    ICH  • Seen by NS and no surgical intervention indicated. • Etio = HTN  • Was unable to tolerate MRI even with sedation  • Was cleared to resume ASA. • Continue atorvastatin. • Outpt follow-up with Neurology.     HTN  • Goal with ICH is SBP<140  • Home: Micardis 80mg qd/Atenolol 50mg qd  • Here: Losartan 100mg qd  • (Micardis 80 mg qd = Losartan 100 mg qd)   • Stopped the Norvasc on 9/8 since BPs too low  • Stable with systolics in the 201K     DM type 2  • HA1C 6.7  • Home: Metformin 1000mg BID  • Here: Metformin 500 mg BID  • Continue QID Accuchecks/SSI and DM diet  • stable     Anemia  • Baseline Hgb 10 - 11. • Iron level 30/Ferritin 22/TIBC 371  • B12 was low normal at 221 on 8/15/23  • Continue iron and B12 supplementation. • stable     Anxiety  • Continue Wellbutrin XL. • Pt is taking 150mg instead of 300mg due to concerns for increased risk of seizure        Discharge date:  [facility]       The above assessment and plan was reviewed and updated as determined by my evaluation of the patient on 9/11/2023.     Labs:   Results from last 7 days   Lab Units 09/11/23  0614 09/07/23  0638   WBC Thousand/uL 9.17 8.59   HEMOGLOBIN g/dL 11.1* 11.0*   HEMATOCRIT % 36.3 35.6   PLATELETS Thousands/uL 308 319     Results from last 7 days   Lab Units 09/11/23  0614 09/07/23  0638   SODIUM mmol/L 137 137   POTASSIUM mmol/L 3.9 4.1   CHLORIDE mmol/L 101 100   CO2 mmol/L 26 30   BUN mg/dL 13 18   CREATININE mg/dL 0.56* 0.66   CALCIUM mg/dL 9.4 9.3             Results from last 7 days   Lab Units 09/11/23  0641 09/10/23  2121 09/10/23  1553   POC GLUCOSE mg/dl 135 125 177*       Review of Scheduled Meds:  Current Facility-Administered Medications   Medication Dose Route Frequency Provider Last Rate   • acetaminophen  650 mg Oral Q6H PRN [facility] [place], DO     • albuterol  2 puff Inhalation Q4H PRN [facility], DO     • aspirin  81 mg Oral Daily [facility], DO     • atorvastatin  20 mg Oral Daily With Dinner [facility], DO     • baclofen  5 mg Oral BID Benedict Liner, CRNP     • bisacodyl  10 mg Rectal Daily PRN Victor Hugo Tariq MD     • bisacodyl  10 mg Rectal Once Benedict Liner, CRNP     • buPROPion  150 mg Oral QAM [facility], DO     • calcium carbonate  1,000 mg Oral TID PRN Laureen Tracy PA-C     • vitamin B-12  1,000 mcg Oral Daily [facility], DO     • diazepam  2 mg Oral BID PRN [facility], DO     • ferrous sulfate  325 mg Oral Daily With Breakfast [facility], DO     • fluticasone-vilanterol  1 puff Inhalation Daily [facility], DO     • gabapentin  300 mg Oral TID [facility], DO     • heparin (porcine)  5,000 Units Subcutaneous Q8H [facility], DO     • hydrOXYzine HCL  25 mg Oral TID PRN Benedict Liner, CRNP     • insulin lispro  1-6 Units Subcutaneous TID AC [facility], DO     • insulin lispro  1-6 Units Subcutaneous HS [facility], DO     • lactulose  20 g Oral Daily PRN [facility], DO     • losartan  100 mg Oral Daily [facility], DO     • melatonin  6 mg Oral HS ARSENIO Pinto     • metFORMIN  500 mg Oral BID With Meals ARSENIO Oconnor     • miconazole  1 Application Topical BID [facility], DO     • ondansetron  4 mg Oral Q6H PRN [facility] [place], DO     • oxyCODONE  5 mg Oral Q4H PRN [facility] [place], DO     • oxyCODONE  2.5 mg Oral Q4H PRN [facility] [place], DO     • pantoprazole  40 mg Oral Early Morning [facility], DO     • polyethylene glycol  17 g Oral Daily PRN Benedict Liner, CRNP     • prochlorperazine  5 mg Oral Q6H PRN [facility] [place], DO         Subjective/ HPI: Patient seen and examined. Patients overnight issues or events were reviewed with nursing or staff during rounds or morning huddle session.  New or overnight issues include the following:     No new or overnight issues. Offers no complaints    ROS:   A 10 point ROS was performed; negative except as noted above. *Labs /Radiology studies reviewed  *Medications reviewed and reconciled as needed  *Please refer to order section for additional ordered labs studies  *Case discussed with primary attending during morning huddle case rounds    Physical Examination:  Vitals:   Vitals:    09/10/23 1327 09/10/23 2009 09/11/23 0500 09/11/23 0600   BP: 128/82 135/64 109/75    BP Location: Left arm Left arm Left arm    Pulse: 78 90 79    Resp: 16 18 18    Temp: 98.1 °F (36.7 °C) 98.2 °F (36.8 °C) 97.9 °F (36.6 °C)    TempSrc: Oral Oral Oral    SpO2: 94% 94% 97%    Weight:    91.2 kg (201 lb)   Height:           General Appearance: no distress, conversive  HEENT: PERRLA, conjuctiva normal; oropharynx clear; mucous membranes moist   Neck:  Supple, normal ROM  Lungs: CTA, normal respiratory effort, no retractions, expiratory effort normal  CV: regular rate and rhythm; no rubs/murmurs/gallops, PMI normal   ABD: soft; ND/NT; +BS  EXT: no edema  Skin: normal turgor, normal texture, no rashes  Psych: affect normal, mood normal  Neuro: AAO      The above physical exam was reviewed and updated as determined by my evaluation of the patient on 9/11/2023. Invasive Devices     Drain  Duration           External Urinary Catheter 13 days                   VTE Pharmacologic Prophylaxis: Heparin  Code Status: Level 1 - Full Code  Current Length of Stay: 17 day(s)      Total time spent:  30 minutes with more than 50% spent counseling/coordinating care. Counseling includes discussion with patient re: progress  and discussion with patient of his/her current medical state/information. Coordination of patient's care was performed in conjunction with primary service. Time invested included review of patient's labs, vitals, and management of their comorbidities with continued monitoring.  In addition, this patient was discussed with medical team including physician and advanced extenders. The care of the patient was extensively discussed and appropriate treatment plan was formulated unique for this patient. Medical decision making for the day was made by supervising physician unless otherwise noted in their attestation statement. ** Please Note:  voice to text software may have been used in the creation of this document.  Although proof errors in transcription or interpretation are a potential of such software**

## 2023-09-12 LAB
GLUCOSE SERPL-MCNC: 117 MG/DL (ref 65–140)
GLUCOSE SERPL-MCNC: 131 MG/DL (ref 65–140)
GLUCOSE SERPL-MCNC: 179 MG/DL (ref 65–140)
GLUCOSE SERPL-MCNC: 194 MG/DL (ref 65–140)

## 2023-09-12 PROCEDURE — 97129 THER IVNTJ 1ST 15 MIN: CPT

## 2023-09-12 PROCEDURE — 99232 SBSQ HOSP IP/OBS MODERATE 35: CPT | Performed by: STUDENT IN AN ORGANIZED HEALTH CARE EDUCATION/TRAINING PROGRAM

## 2023-09-12 PROCEDURE — 82948 REAGENT STRIP/BLOOD GLUCOSE: CPT

## 2023-09-12 PROCEDURE — 97530 THERAPEUTIC ACTIVITIES: CPT

## 2023-09-12 PROCEDURE — 97110 THERAPEUTIC EXERCISES: CPT

## 2023-09-12 PROCEDURE — 99232 SBSQ HOSP IP/OBS MODERATE 35: CPT | Performed by: INTERNAL MEDICINE

## 2023-09-12 PROCEDURE — 97535 SELF CARE MNGMENT TRAINING: CPT

## 2023-09-12 PROCEDURE — 97130 THER IVNTJ EA ADDL 15 MIN: CPT

## 2023-09-12 RX ADMIN — GABAPENTIN 300 MG: 300 CAPSULE ORAL at 09:06

## 2023-09-12 RX ADMIN — OXYCODONE HYDROCHLORIDE 5 MG: 5 TABLET ORAL at 05:39

## 2023-09-12 RX ADMIN — MICONAZOLE NITRATE 1 APPLICATION: 20 POWDER TOPICAL at 17:31

## 2023-09-12 RX ADMIN — POLYETHYLENE GLYCOL 3350 17 G: 17 POWDER, FOR SOLUTION ORAL at 09:05

## 2023-09-12 RX ADMIN — HEPARIN SODIUM 5000 UNITS: 5000 INJECTION INTRAVENOUS; SUBCUTANEOUS at 14:44

## 2023-09-12 RX ADMIN — OXYCODONE HYDROCHLORIDE 5 MG: 5 TABLET ORAL at 01:52

## 2023-09-12 RX ADMIN — GABAPENTIN 300 MG: 300 CAPSULE ORAL at 16:04

## 2023-09-12 RX ADMIN — MICONAZOLE NITRATE 1 APPLICATION: 20 POWDER TOPICAL at 09:25

## 2023-09-12 RX ADMIN — BACLOFEN 5 MG: 10 TABLET ORAL at 09:05

## 2023-09-12 RX ADMIN — ASPIRIN 81 MG CHEWABLE TABLET 81 MG: 81 TABLET CHEWABLE at 09:05

## 2023-09-12 RX ADMIN — FLUTICASONE FUROATE AND VILANTEROL TRIFENATATE 1 PUFF: 200; 25 POWDER RESPIRATORY (INHALATION) at 09:06

## 2023-09-12 RX ADMIN — HEPARIN SODIUM 5000 UNITS: 5000 INJECTION INTRAVENOUS; SUBCUTANEOUS at 22:26

## 2023-09-12 RX ADMIN — PANTOPRAZOLE SODIUM 40 MG: 40 TABLET, DELAYED RELEASE ORAL at 05:38

## 2023-09-12 RX ADMIN — METFORMIN HYDROCHLORIDE 500 MG: 500 TABLET ORAL at 16:04

## 2023-09-12 RX ADMIN — MELATONIN 6 MG: at 22:25

## 2023-09-12 RX ADMIN — BUPROPION HYDROCHLORIDE 150 MG: 150 TABLET, FILM COATED, EXTENDED RELEASE ORAL at 09:06

## 2023-09-12 RX ADMIN — ATORVASTATIN CALCIUM 20 MG: 20 TABLET, FILM COATED ORAL at 16:04

## 2023-09-12 RX ADMIN — INSULIN LISPRO 2 UNITS: 100 INJECTION, SOLUTION INTRAVENOUS; SUBCUTANEOUS at 11:24

## 2023-09-12 RX ADMIN — HEPARIN SODIUM 5000 UNITS: 5000 INJECTION INTRAVENOUS; SUBCUTANEOUS at 05:38

## 2023-09-12 RX ADMIN — CYANOCOBALAMIN TAB 500 MCG 1000 MCG: 500 TAB at 09:05

## 2023-09-12 RX ADMIN — BACLOFEN 5 MG: 10 TABLET ORAL at 17:31

## 2023-09-12 RX ADMIN — LOSARTAN POTASSIUM 100 MG: 50 TABLET, FILM COATED ORAL at 09:05

## 2023-09-12 RX ADMIN — INSULIN LISPRO 1 UNITS: 100 INJECTION, SOLUTION INTRAVENOUS; SUBCUTANEOUS at 17:31

## 2023-09-12 RX ADMIN — METFORMIN HYDROCHLORIDE 500 MG: 500 TABLET ORAL at 09:05

## 2023-09-12 RX ADMIN — OXYCODONE HYDROCHLORIDE 5 MG: 5 TABLET ORAL at 10:43

## 2023-09-12 RX ADMIN — GABAPENTIN 300 MG: 300 CAPSULE ORAL at 22:25

## 2023-09-12 RX ADMIN — Medication 2.5 MG: at 16:08

## 2023-09-12 RX ADMIN — OXYCODONE HYDROCHLORIDE 5 MG: 5 TABLET ORAL at 20:41

## 2023-09-12 RX ADMIN — FERROUS SULFATE TAB 325 MG (65 MG ELEMENTAL FE) 325 MG: 325 (65 FE) TAB at 09:05

## 2023-09-12 NOTE — PROGRESS NOTES
09/12/23 1943   Pain Assessment   Pain Assessment Tool 0-10   Pain Score 6   Pain Location/Orientation Orientation: Right;Location: Shoulder; Location: Hip   Pain Onset/Description Onset: Gradual   Effect of Pain on Daily Activities needed to reposition more frequently toward end of session   Hospital Pain Intervention(s) Rest;Repositioned  (SLP notified RN of pt's pain)   Restrictions/Precautions   Precautions Bed/chair alarms;Cognitive; Fall Risk;Pain;Supervision on toilet/commode   Comprehension   Comprehension (FIM) 6 - Has only MILD difficulty with complex/abstract info   Expression   Expression (FIM) 5 - Needs help/cues only RARELY (< 10% of the time)   Social Interaction   Social Interaction (FIM) 6 - Interacts appropriately with others BUT requires extra  time   Problem Solving   Problem solving (FIM) 5 - Solves complex problems But requires cues from helper   Memory   Memory (FIM) 5 - Needs cueing reminders <10%   Speech/Language/Cognition Assessment   Treatment Assessment Pt participated in skilled SLP session focusing on cognitive linguistic skills. At beginning of session, pt recalled current discharge plan and family meeting in which pt has an insurance review in a week but plan is for a subacute facility. When reviewing recent sessions, pt recalled relaying her desire to work on writing with her L hand (nondominant for writing) as she wants to improve her accuracy and legibility since her RUE remains impaired and with minimal use. Therefore, SLP attempted to incorporate writing into tasks. Will f/u with OT regarding pt's concerns. Prior to beginning structured tasks, SLP discussed pt's interest in the network stroke support group, [facility]. Pt confirmed interest and reporting registering for group. However, it was found that pt was not correctly registered.  SLP then provided verbal instructions and guidance of how to register to which pt able to follow instructions and demo use of higher level problem solving to determine the appropriate method of also setting up an account with only occasional cuing, which was appropriate as this is a new task that even SLP was not fully familiar with. Completed registration, download of application and location of meeting link as pt plans to attend virtually/online to stroke support group post discharge from unit or if still on [facility] by time of next meeting (held next Tuesday). Pt is now set up fully to attend meeting and is familiar with Teams jada. Targeting structured tasks, discussed pt's interest in goal of ultimately returning to work, if able. Pt works as a  at [employer]. Presented with two written amounts of coins, pt accurately calculated the totals of coins in 14/15 trials and corrected single error without assist but with SLP only drawing attention to error. When increased to [facility] written amounts of different coins, pt was 15/15 accurate in calculating totals. Lastly, presented with written numerical amounts up to $10, pt accurately broke down amounts into types/amounts of dollars/coins needed to create change for 15/15 trials. Overall, pt completed tasks at mod I to supervision level. Regarding pt's writing, pt reported needing to take rest breaks throughout due to fatigue as she would notice decreased legibility in her writing with CINDY. However, writing did still remain legible. Of note, during rest breaks, pt engaged in conversation regarding her job, previous experiences, etc to which she demonstrated overall effective expression of ideas, but was noted to have more times of hesitations, times of slower speech and very occasional word finding difficulties. Pt able to still convey general messages. At this time, pt will continue to benefit from ongoing skilled SLP services to continue to maximize overall functional independence of cognitive linguistic skills to decrease caregiver burden over time.    SLP Therapy Minutes   SLP Time In 0930   SLP Time Out 1030   SLP Total Time (minutes) 60   SLP Mode of treatment - Individual (minutes) 60   SLP Mode of treatment - Concurrent (minutes) 0   SLP Mode of treatment - Group (minutes) 0   SLP Mode of treatment - Co-treat (minutes) 0   SLP Mode of Treatment - Total time(minutes) 60 minutes   SLP Cumulative Minutes 625   Therapy Time missed   Time missed?  No

## 2023-09-12 NOTE — PROGRESS NOTES
09/12/23 1230   Pain Assessment   Pain Assessment Tool FLACC   Pain Location/Orientation Orientation: Right;Location: Knee   Effect of Pain on Daily Activities Plan for today was to utilize the BWSS however upon arrival patient in tears stating that she is in too much pain to trial today. PT spoke with patient to encourage her however declined. Anticipate patient was fearful of this activity as she was not longer tearful or presenting with pain for the remainder of session. Patient can be self limiting at times using pain and fatigue as her main complaints. Hospital Pain Intervention(s) Heat applied  (MHP groin and anterior knee 12 minutes during session)   Pain Rating: FLACC (Rest) - Face 1   Pain Rating: FLACC (Rest) - Legs 0   Pain Rating: FLACC (Rest) - Activity 0   Pain Rating: FLACC (Rest) - Cry 0   Pain Rating: FLACC (Rest) - Consolability 0   Score: FLACC (Rest) 1   Pain Rating: FLACC (Activity) - Face 1   Pain Rating: FLACC (Activity) - Legs 0   Pain Rating: FLACC (Activity) - Activity 0   Pain Rating: FLACC (Activity) - Cry 1   Pain Rating: FLACC (Activity) - Consolability 1   Score: FLACC (Activity) 3   Restrictions/Precautions   Precautions Bed/chair alarms; Fall Risk;Pain;Supervision on toilet/commode   Weight Bearing Restrictions No   ROM Restrictions No   Braces or Orthoses   (RUE sling for xfers)   Cognition   Overall Cognitive Status Impaired   Arousal/Participation Alert; Cooperative   Attention Attends with cues to redirect   Subjective   Subjective "My leg just hurst so much"   Sit to Stand   Type of Assistance Needed Physical assistance   Physical Assistance Level Total assistance   Comment mod Ax1 with SBA of 2nd person to stabilize equipment   Sit to Stand CARE Score 1   Bed-Chair Transfer   Type of Assistance Needed Physical assistance   Physical Assistance Level 26%-50%   Comment min/mod Ax1 slide board transfers; verbal cues for weight shift forwards   Chair/Bed-to-Chair Transfer CARE Score 3   Transfer Bed/Chair/Wheelchair   Findings (S)  Please practice next session slide board transfer from chair to bed as aptient states this transfer was more difficult with nursing the night before   Car Transfer   Comment (S)  PLEASE TRIAL NEXT SESSION   Walk 10 Feet   Reason if not Attempted Safety concerns   Walk 10 Feet CARE Score 88   Walk 50 Feet with Two Turns   Reason if not Attempted Safety concerns   Walk 50 Feet with Two Turns CARE Score 88   Walk 150 Feet   Reason if not Attempted Safety concerns   Walk 150 Feet CARE Score 88   Walking 10 Feet on Uneven Surfaces   Reason if not Attempted Safety concerns   Walking 10 Feet on Uneven Surfaces CARE Score 88   Ambulation   Primary Mode of Locomotion Prior to Admission Walk   Distance Walked (feet) 5 ft   Assist Device Other  (L handrail)   Gait Pattern Antalgic; Inconsistant Ana;Decreased foot clearance;L foot drag; Forward Flexion;R hemiparesis; Narrow MARRY;Scissoring;Decreased R stance; Improper weight shift   Limitations Noted In Balance; Endurance; Heel Strike; Sequencing;Speed;Swing;Strength;Sensation;Midline Orientation   Provided Assistance with: Balance;Direction;Weight Shift;Limb Advancement;Limb Stabilization   Findings Assist with RLE advancement and stabilization. Dec weight shift to right side. Poor tolerance. Could have increased distance however patient giving up despite encouragement   Does the patient walk? 2.  Yes   Wheelchair mobility   Findings did not complete this session   Therapeutic Interventions   Flexibility RLE groin, heel cord and HS stretch TERT 4 minutes   Modalities MHP to grouin and anterior knee 12 minutes- skin assessed prior and post application with no issues   Other Updated patient's board in room to reflect change from beasy board to slide board transfer   Equipment Use   NuStep L2x10 minutes BUE/LE with R sided attachments   Assessment   Treatment Assessment Patient making progress with skilled PT intervention but remains limited by R hemiparesis accompanied with R knee pain, poor tolerance to activity, and self limiting behaviors. She also present with fear of new or hard activities and has declined standing, BWSS and ambulation trials despite education and encouragement. She has been able to make good progress in her transfers now needing only min/mod A with slide board transfers, Willian with w/c mobility, and mod A to stand. Patient ambulated at railing this date but requested to sit prior to reaching her maximum capacity. Despite progress, her family is unable to physically assist her. Her plan is to continue with acute rehab this week and transition to SNF next week for continued progress on her functional mobility (I) and safety. PT Barriers   Physical Impairment Decreased strength;Decreased endurance; Impaired balance;Decreased mobility; Decreased coordination;Obesity;Orthopedic restrictions;Pain   Functional Limitation Ramp negotiation;Car transfers;Stair negotiation;Transfers; Walking;Standing   Plan   Treatment/Interventions Functional transfer training;LE strengthening/ROM; Elevations; Therapeutic exercise; Endurance training;Bed mobility; Equipment eval/education;Patient/family training;Gait training   Progress Slow progress, decreased activity tolerance   PT Therapy Minutes   PT Time In 1230   PT Time Out 1400   PT Total Time (minutes) 90   PT Mode of treatment - Individual (minutes) 90   PT Mode of treatment - Concurrent (minutes) 0   PT Mode of treatment - Group (minutes) 0   PT Mode of treatment - Co-treat (minutes) 0   PT Mode of Treatment - Total time(minutes) 90 minutes   PT Cumulative Minutes 5130

## 2023-09-12 NOTE — PROGRESS NOTES
NEUROPSYCHOLOGY  CLINICAL PROGRESS NOTE   Adair Vickers 62 y.o. :1965 female MRN: [id number]  DOS:23  Unit/Bed#: -01 Encounter: [id number]      Requested by (Physician/Service): Rome Quick DO      HISTORY: Adair Vickers is a 80-year-old female with history of hypertension, hyperlipidemia, diabetes type 2, obesity, eosinophilic asthma, COPD, fibromyalgia, Sjogren syndrome, lumbar spondylosis with grade 1 anterior spondylolisthesis who presents to the hospital on 2023 due to acute onset right upper and right lower extremity weakness with sensory loss.  Additionally with right-sided facial droop and dysarthria.  The patient was noted to have an acute intraparenchymal hemorrhage on CT in the left thalamic region with surrounding edema.  A CTA was negative.  Patient was initially placed on a Cardene drip.  Course complicated by significant left-sided leg pain and was recommended on treatment for radicular symptoms including Tylenol, gabapentin and potentially steroids.  Neurology was consulted and recommended MRI of the brain with and without contrast while holding antithrombotics.  The MRI showed a stable left thalamic capsular intraparenchymal hemorrhage with surrounding edema without any other mass effect.  A repeat CT was completed on 2023 after worsening symptoms including worsening speech slurring with stable findings.  Additionally there was a rapid response after a fall with head strike on  with a repeat CT of the head without acute findings.  Additionally antihypertensive regimen was altered as she was previously on telmisartan and atenolol and is currently on amlodipine, losartan and hydralazine with better control. The patient was evaluated by the Rehabilitation team and deemed an appropriate candidate for comprehensive inpatient rehabilitation and admitted to the [facility] on 2023  3:19 PM.  Functional deficits: impaired mobility, self care.   PT/OT/SLP initiated. Blainene Lover goals are to achieve a min assist level with mobility and self care.  Prognosis is fair to good.  ELOS is 21 days.  Estimated discharge is home. Past Medical History:   Diagnosis Date   • Arthritis    • Asthma    • Continuous opioid dependence ([address]) 4/27/2022   • Diabetes mellitus ([address])    • Diverticulitis of colon    • Fibromyalgia 04/26/2022   • Headache(784.0)    • History of mammogram 2018   • History of tobacco abuse 04/26/2022   • Hypertension    • Nausea 04/29/2022   • Obesity    • Sjogren's syndrome ([address]) 10/19/2012   • Urinary tract infection        Patient Active Problem List    Diagnosis Date Noted   • Hyponatremia 08/31/2023   • GERD (gastroesophageal reflux disease) 08/25/2023   • Constipation 08/25/2023   • Anemia 08/16/2023   • ICH (intracerebral hemorrhage) ([address]) 08/15/2023   • Left leg pain 08/15/2023   • Chronic obstructive pulmonary disease with acute exacerbation ([address]) 07/26/2023   • Hyperlipidemia associated with type 2 diabetes mellitus ([address]) 07/26/2023   • Abnormal CT of the chest 06/28/2023   • Class 2 obesity with body mass index (BMI) of 39.0 to 39.9 in adult 06/28/2023   • CAD (coronary artery disease) 04/07/2023   • Right lumbosacral radiculopathy 10/12/2022   • Chronic bilateral low back pain with bilateral sciatica 08/31/2022   • Paresthesia of both feet 08/31/2022   • Postoperative visit 05/24/2022   • Incarcerated umbilical hernia 97/43/7602   • Diabetes mellitus type 2, controlled ([address]) 04/29/2022   • Continuous opioid dependence ([address]) 04/27/2022   • Moderate persistent asthma without complication 78/88/4096   • Cigarette nicotine dependence in remission 04/26/2022   • Primary hypertension 04/26/2022   • Fibromyalgia 04/26/2022   • Sjogren's syndrome ([address]) 10/19/2012   • Depression with anxiety 09/18/2012       Body mass index is 39.26 kg/m².     Past Medical History:     Past Medical History:   Diagnosis Date   • Arthritis    • Asthma    • Continuous opioid dependence ([address]) 2022   • Diabetes mellitus ([address])    • Diverticulitis of colon    • Fibromyalgia 2022   • Headache(784.0)    • History of mammogram 2018   • History of tobacco abuse 2022   • Hypertension    • Nausea 2022   • Obesity    • Sjogren's syndrome ([address]) 10/19/2012   • Urinary tract infection         Past Surgical History:     Past Surgical History:   Procedure Laterality Date   • APPENDECTOMY     • BREAST BIOPSY Left     unsure of year   • CARPAL TUNNEL RELEASE     • COLONOSCOPY  2017    repeat in    • EPIDURAL BLOCK INJECTION N/A 2023    Procedure: L5-S1 EPIDURALSTEROID INJECTION (03841);   Surgeon: Reynold Mcwilliams DO;  Location:  MAIN OR;  Service: Pain Management    • FOOT SURGERY     • HERNIA REPAIR  2022   • NECK SURGERY      spine fusion    • IN RPR UMBILICAL HRNA 5 YRS/> REDUCIBLE N/A 2022    Procedure: REPAIR HERNIA UMBILICAL;  Surgeon: Waverly Dakins, MD;  Location:  MAIN OR;  Service: General         Allergies:     No Known Allergies      Family and Social Support:   No data recorded    Social History:    Social History     Socioeconomic History   • Marital status: /Civil Union     Spouse name: None   • Number of children: None   • Years of education: None   • Highest education level: None   Occupational History   • Occupation:     Tobacco Use   • Smoking status: Former     Packs/day: 0.50     Years: 35.00     Total pack years: 17.50     Types: Cigarettes     Start date: 0     Quit date: 2023     Years since quittin.4     Passive exposure: Past   • Smokeless tobacco: Never   • Tobacco comments:     per pt, 5 a day - As per [employer] Use   • Vaping Use: Never used   Substance and Sexual Activity   • Alcohol use: Not Currently     Comment: occasional    • Drug use: Never   • Sexual activity: Not Currently     Partners: Male     Birth control/protection: None   Other Topics Concern   • None   Social History Narrative    · Most recent tobacco use screenin2019      · Caffeine intake:   Occasional      · Seat belts used routinely:   Yes      · Smoke alarm in home: Yes      · Has the Patient had a mammogram to screen for breast cancer within 24 months:   Yes      · Please enter the date of the Patient's previous mammogram.:  march of last year. As per Adi      Social Determinants of Health     Financial Resource Strain: Not on file   Food Insecurity: No Food Insecurity (2023)    Hunger Vital Sign    • Worried About Running Out of Food in the Last Year: Never true    • Ran Out of Food in the Last Year: Never true   Transportation Needs: No Transportation Needs (2023)    PRAPARE - Transportation    • Lack of Transportation (Medical): No    • Lack of Transportation (Non-Medical):  No   Physical Activity: Not on file   Stress: Not on file   Social Connections: Not on file   Intimate Partner Violence: Not on file   Housing Stability: Low Risk  (2023)    Housing Stability Vital Sign    • Unable to Pay for Housing in the Last Year: No    • Number of Places Lived in the Last Year: 1    • Unstable Housing in the Last Year: No        Family History:    Family History   Problem Relation Age of Onset   • Hypertension Mother    • Heart disease Mother    • Hypertension Father    • Heart disease Father    • Asthma Sister    • No Known Problems Sister    • No Known Problems Sister    • Diabetes Maternal Grandmother    • No Known Problems Maternal Grandfather    • No Known Problems Paternal Grandmother    • No Known Problems Paternal Grandfather    • Breast cancer Paternal Aunt    • Pancreatic cancer Paternal Uncle    • Colon cancer Neg Hx    • Ovarian cancer Neg Hx    • Uterine cancer Neg Hx    • Cervical cancer Neg Hx        Medications:     Current Facility-Administered Medications:   •  acetaminophen (TYLENOL) tablet 650 mg, 650 mg, Oral, Q6H PRN, Pippa Arora DO, 650 mg at 23 0855  •  albuterol ([place] HFA) inhaler 2 puff, 2 puff, Inhalation, Q4H PRN, Mar Prima, DO, 2 puff at 09/04/23 0919  •  aspirin chewable tablet 81 mg, 81 mg, Oral, Daily, Mar Prima, DO, 81 mg at 09/11/23 1011  •  atorvastatin (LIPITOR) tablet 20 mg, 20 mg, Oral, Daily With Dinner, Mar Prima, DO, 20 mg at 09/11/23 1730  •  baclofen tablet 5 mg, 5 mg, Oral, BID, ARSENIO Pinto, 5 mg at 09/11/23 1740  •  bisacodyl (DULCOLAX) rectal suppository 10 mg, 10 mg, Rectal, Daily PRN, Nidhi Peterson MD, 10 mg at 09/03/23 1826  •  bisacodyl (DULCOLAX) rectal suppository 10 mg, 10 mg, Rectal, Once, ARSENIO Pinto  •  buPROPion (WELLBUTRIN XL) 24 hr tablet 150 mg, 150 mg, Oral, QAM, Mar Prima, DO, 150 mg at 09/11/23 0825  •  calcium carbonate (TUMS) chewable tablet 1,000 mg, 1,000 mg, Oral, TID PRN, Laureen Tracy PA-C, 1,000 mg at 08/26/23 1241  •  cyanocobalamin (VITAMIN B-12) tablet 1,000 mcg, 1,000 mcg, Oral, Daily, Mar Prima, DO, 1,000 mcg at 09/11/23 6602  •  diazepam (VALIUM) tablet 2 mg, 2 mg, Oral, BID PRN, Mar Prima, DO, 2 mg at 09/07/23 2303  •  ferrous sulfate tablet 325 mg, 325 mg, Oral, Daily With Breakfast, Mar Prima, DO, 325 mg at 09/11/23 0736  •  fluticasone-vilanterol 200-25 mcg/actuation 1 puff, 1 puff, Inhalation, Daily, Mar Prima, DO, 1 puff at 09/11/23 0301  •  gabapentin (NEURONTIN) capsule 300 mg, 300 mg, Oral, TID, Mar Prima, DO, 300 mg at 09/11/23 1740  •  heparin (porcine) subcutaneous injection 5,000 Units, 5,000 Units, Subcutaneous, Q8H [address], Mar Prima, DO, 5,000 Units at 09/11/23 1406  •  hydrOXYzine HCL (ATARAX) tablet 25 mg, 25 mg, Oral, TID PRN, ARSENIO Pinto  •  insulin lispro (HumaLOG) 100 units/mL subcutaneous injection 1-6 Units, 1-6 Units, Subcutaneous, TID AC, 1 Units at 09/11/23 1744 **AND** Fingerstick Glucose (POCT), , , 4x Daily AC and at bedtime, Mar Prima, DO  •  insulin lispro (HumaLOG) 100 units/mL subcutaneous injection 1-6 Units, 1-6 Units, Subcutaneous, HS, iPppa Arora DO, 1 Units at 09/08/23 2117  •  lactulose oral solution 20 g, 20 g, Oral, Daily PRN, Pippa Arora DO  •  losartan (COZAAR) tablet 100 mg, 100 mg, Oral, Daily, Pippa Arora DO, 100 mg at 09/11/23 1476  •  melatonin tablet 6 mg, 6 mg, Oral, HS, ARSENIO Pinto, 6 mg at 09/10/23 2144  •  metFORMIN (GLUCOPHAGE) tablet 500 mg, 500 mg, Oral, BID With Meals, ARSENIO Askew, 500 mg at 09/11/23 1730  •  miconazole (MICOTIN) 2 % powder 1 Application, 1 Application, Topical, BID, Pippa Arora DO, 1 Application at 62/60/38 1743  •  ondansetron (ZOFRAN-ODT) dispersible tablet 4 mg, 4 mg, Oral, Q6H PRN, Pippa Arora DO  •  oxyCODONE (ROXICODONE) IR tablet 5 mg, 5 mg, Oral, Q4H PRN, Pippa Arora DO, 5 mg at 09/11/23 1944  •  oxyCODONE (ROXICODONE) split tablet 2.5 mg, 2.5 mg, Oral, Q4H PRN, Pippa Arora DO, 2.5 mg at 09/10/23 1422  •  pantoprazole (PROTONIX) EC tablet 40 mg, 40 mg, Oral, Early Morning, Pippa Arora DO, 40 mg at 09/11/23 0542  •  polyethylene glycol (MIRALAX) packet 17 g, 17 g, Oral, Daily PRN, ARSENIO Pinto  •  prochlorperazine (COMPAZINE) tablet 5 mg, 5 mg, Oral, Q6H PRN, Pippa Arora DO        OBSERVATIONS:     Appearance  __x__Neat ____Disheveled ____Overweight ____Underweight ____Frail    Speech  __x_Fluid, Articulate __x__Spontaneous ____Circumlocutions ____Word Finding difficulties ____Dysarthria ____Circumstantial ____Paucity ____Perseverative ____Pressured ____ Tangential     Thought Process/Content  __x__Coherent  ____Preoccupations____Ruminations____Obsessions____Hallucinations ____Delusions ____Flight of Ideas ____Distortions ____Distracted ____Suicidal Ideation ____Homicidal Ideation ____ Memory Issues ____ Perseveration ____ Tangential    Interaction  _x___Engaged__x__Cooperative____Superficial____Detached____Fearful____Guarded____Suspicious ____Poor Doylene Bran ____Aggressive    Affect  ____Neutral__x__Positive____Anxiety____Worried____Irritable____Angry____Depressed/Dysphoric ____Euthymic _____ Francesco Coats ____ Fatigued     Behavior  _x__Spontaneous x____Purposeful ____Slowed Initiation ____Apathetic ____Impulsive ____Dyscontrolled ____Hypoactive ____Hyperactive ____Repetitive/Perseverative      Overall Progress:    ____Very Declined ____Declined ____Stable __x__Improved ____Very Improved    Motivation and Participation:    ____Very Poor ____Poor ____Fair __x__Good ____Very Good                    ASSESSMENT & RECOMMENDATIONS:   Pt feels more hopeful and positive because she sees her progress and has a more clear understanding for her next steps. She reports improved sleep and less worries. There is less focus on prior anxiety about break in and pt is more forward thinking - which is clear progress. Competing thoughts to counter the automatic thoughts have been effective. Continues to benefit by rehab and the support of her  and family. Pt is making progress in psychotherapy and  reports feeling  motivated to continue working towards rehab goals. Provided CBT and mindfulness based interventions, as well as supportive psychotherapy. Addressed coping, adjustment, and motivation. Total time with pt - 25 min. I will continue to evaluate pt's emotional functioning and status and provide supportive and mindfulness interventions during pt's stay. Effective coping strategies, distress tolerance, breathing exercises will be key interventions. Continued Psychological intervention is medically necessary to:  __x__ Maintain current progress  __X_   Achieve Therapy Goals   __X__Prevent relapse       DIAGNOSIS:  Adjustment Disorder with Anxiety      Thank you for the opportunity to participate in Ms. Josiah Lamar' care. Bere Lazo.  Elda Farley, Ph.D.  Licensed Psychologist

## 2023-09-12 NOTE — PROGRESS NOTES
Internal Medicine Progress Note  Patient: Jesse Garcia  Age/sex: 62 y.o. female  Medical Record #: [id number]      ASSESSMENT/PLAN: (Interval History)  eJsse Garcia is seen and examined and management for following issues:    ICH  • Seen by NS and no surgical intervention indicated. • Etio = HTN  • Was unable to tolerate MRI even with sedation  • Was cleared to resume ASA. • Continue atorvastatin. • Outpt follow-up with Neurology.     HTN  • Goal with ICH is SBP<140  • Home: Micardis 80mg qd/Atenolol 50mg qd  • Here: Losartan 100mg qd  • (Micardis 80 mg qd = Losartan 100 mg qd)   • Stopped the Norvasc on 9/8 since BPs too low  • Stable      DM type 2  • HA1C 6.7 on 8/15/23  • Home: Metformin 1000mg BID  • Here: Metformin 500 mg BID  • Continue QID Accuchecks/SSI and DM diet  • Will not make changes today       Anemia  • Baseline Hgb 10 - 11. • Iron level 30/Ferritin 22/TIBC 371  • B12 was low normal at 221 on 8/15/23  • Continue iron and B12 supplementation. • stable     Anxiety  • Continue Wellbutrin XL. • Pt is taking 150mg instead of 300mg due to concerns for increased risk of seizure        Discharge date:  [facility]       The above assessment and plan was reviewed and updated as determined by my evaluation of the patient on 9/12/2023.     Labs:   Results from last 7 days   Lab Units 09/11/23  0614 09/07/23  0638   WBC Thousand/uL 9.17 8.59   HEMOGLOBIN g/dL 11.1* 11.0*   HEMATOCRIT % 36.3 35.6   PLATELETS Thousands/uL 308 319     Results from last 7 days   Lab Units 09/11/23  0614 09/07/23  0638   SODIUM mmol/L 137 137   POTASSIUM mmol/L 3.9 4.1   CHLORIDE mmol/L 101 100   CO2 mmol/L 26 30   BUN mg/dL 13 18   CREATININE mg/dL 0.56* 0.66   CALCIUM mg/dL 9.4 9.3             Results from last 7 days   Lab Units 09/12/23  1035 09/12/23  0651 09/11/23  2158   POC GLUCOSE mg/dl 194* 131 126       Review of Scheduled Meds:  Current Facility-Administered Medications   Medication Dose Route Frequency Provider Last Rate   • acetaminophen  650 mg Oral Q6H PRN Yoni Rend, DO     • albuterol  2 puff Inhalation Q4H PRN Yoni Sheldond, DO     • aspirin  81 mg Oral Daily Yoni Rend, DO     • atorvastatin  20 mg Oral Daily With Dinner Yoni Rend, DO     • baclofen  5 mg Oral BID Consuello Innocent, CRNP     • bisacodyl  10 mg Rectal Daily PRN Alex Wetzel MD     • bisacodyl  10 mg Rectal Once Consuel Innocent, CRNP     • buPROPion  150 mg Oral QAM Yoni Rend, DO     • calcium carbonate  1,000 mg Oral TID PRN Laureen Tracy PA-C     • vitamin B-12  1,000 mcg Oral Daily Yoni Rend, DO     • diazepam  2 mg Oral BID PRN Yoni Rend, DO     • ferrous sulfate  325 mg Oral Daily With Breakfast Yoni Rend, DO     • fluticasone-vilanterol  1 puff Inhalation Daily Yoni Sheldond, DO     • gabapentin  300 mg Oral TID [facility] Lyn, DO     • heparin (porcine)  5,000 Units Subcutaneous Q8H [facility] Rend, DO     • hydrOXYzine HCL  25 mg Oral TID [facility], CRNP     • insulin lispro  1-6 Units Subcutaneous TID AC Yoni Sheldond, DO     • insulin lispro  1-6 Units Subcutaneous HS Yoni Rend, DO     • lactulose  20 g Oral Daily PRN Yoni Sheldond, DO     • losartan  100 mg Oral Daily Yoni Sheldond, DO     • melatonin  6 mg Oral HS ARSENIO Pinto     • metFORMIN  500 mg Oral BID With Meals ARSENIO Andre     • miconazole  1 Application Topical BID Leonides Rend, DO     • ondansetron  4 mg Oral Q6H PRN Yoni Rend, DO     • oxyCODONE  5 mg Oral Q4H PRN Yoni Sheldond, DO     • oxyCODONE  2.5 mg Oral Q4H PRN Yoni Sheldond, DO     • pantoprazole  40 mg Oral Early Morning Yoni Sheldond, DO     • polyethylene glycol  17 g Oral Daily PRN Consuello Innocent, CRNP     • prochlorperazine  5 mg Oral Q6H PRN Yoni Rend, DO         Subjective/ HPI: Patient seen and examined. Patients overnight issues or events were reviewed with nursing or staff during rounds or morning huddle session.  New or overnight issues include the following:     No new or overnight issues. Offers no complaints    ROS:   A 10 point ROS was performed; negative except as noted above. *Labs /Radiology studies reviewed  *Medications reviewed and reconciled as needed  *Please refer to order section for additional ordered labs studies  *Case discussed with primary attending during morning huddle case rounds    Physical Examination:  Vitals:   Vitals:    09/11/23 0600 09/11/23 1602 09/11/23 2055 09/12/23 0531   BP:  137/82 123/62 100/68   BP Location:  Left arm Left arm Left arm   Pulse:  93 92 80   Resp:  18 20 18   Temp:  97.9 °F (36.6 °C) 98.2 °F (36.8 °C) 98 °F (36.7 °C)   TempSrc:  Oral Oral Oral   SpO2:  96% 97% 96%   Weight: 91.2 kg (201 lb)   91 kg (200 lb 9.9 oz)   Height:           General Appearance: no distress, conversive  HEENT:  External ear normal.  Nose normal w/o drainage. Mucous membranes are moist. Oropharynx is clear. Conjunctiva clear w/o icterus or redness. Neck:  Supple, normal ROM  Lungs: BBS without crackles/wheeze/rhonchi; respirations unlabored with normal inspiratory/expiratory effort. No retractions noted. On RA  CV: regular rate and rhythm; no rubs/murmurs/gallops, PMI normal   ABD: Abdomen is soft. Bowel sounds all quadrants. Nontender with no distention. EXT: no edema  Skin: normal turgor, normal texture, no rashes  Psych: affect normal, mood normal  Neuro: AAO     The above physical exam was reviewed and updated as determined by my evaluation of the patient on 9/12/2023. Invasive Devices     Drain  Duration           External Urinary Catheter 14 days                   VTE Pharmacologic Prophylaxis: Heparin  Code Status: Level 1 - Full Code  Current Length of Stay: 18 day(s)      Total time spent:  30 minutes with more than 50% spent counseling/coordinating care. Counseling includes discussion with patient re: progress  and discussion with patient of his/her current medical state/information.  Coordination of patient's care was performed in conjunction with primary service. Time invested included review of patient's labs, vitals, and management of their comorbidities with continued monitoring. In addition, this patient was discussed with medical team including physician and advanced extenders. The care of the patient was extensively discussed and appropriate treatment plan was formulated unique for this patient. Medical decision making for the day was made by supervising physician unless otherwise noted in their attestation statement. ** Please Note:  voice to text software may have been used in the creation of this document.  Although proof errors in transcription or interpretation are a potential of such software**

## 2023-09-12 NOTE — PROGRESS NOTES
09/12/23 0700   Pain Assessment   Pain Assessment Tool 0-10   Pain Score 4   Pain Location/Orientation Orientation: Right;Location: Shoulder   Restrictions/Precautions   Precautions Bed/chair alarms;Cognitive; Fall Risk;Pain;Supervision on toilet/commode   Weight Bearing Restrictions No   ROM Restrictions No   Braces or Orthoses   (RUE sling for xfers)   Lifestyle   Autonomy "That shower felt great!"   Eating   Type of Assistance Needed Set-up / clean-up   Physical Assistance Level No physical assistance   Eating CARE Score 5   Oral Hygiene   Type of Assistance Needed Physical assistance   Physical Assistance Level 25% or less   Comment while seated in w/c at sink. Cont to req encouragement to utilize RUE as a stabilizer during task. Oral Hygiene CARE Score 3   Shower/Bathe Self   Type of Assistance Needed Physical assistance;Verbal cues; Dexter Ferro / Za Olivera; Adaptive equipment   Physical Assistance Level Total assistance   Comment A req to stabilize RUE while pt bathed. Pt able to bathe cynthia, alternating lateral weightshifting to bathe buttocks, 2nd person to A w/ LE management. Pt unable to fully laterally weightshift, therefore thoroughly bathed buttocks during LB dressing w/ Ax2 in stance at bed rail. A to thoroughly bathe lower legs. Shower/Bathe Self CARE Score 1   Tub/Shower Transfer   Findings Mod Ax2 pull to stand at grab bar w/ gait belt, once in stance Max Ax1 w/ R knee block during swap out w/c<>tub xfer bench. Upper Body Dressing   Type of Assistance Needed Physical assistance;Verbal cues; Set-up / clean-up   Physical Assistance Level 26%-50%   Comment completed while seated in w/c. req cues for andi dressing. A to stabilize RUE while pt threaded. Pt able to pull OH and thread LUE w/ inc time. A to fully pull down R side.    Upper Body Dressing CARE Score 3   Lower Body Dressing   Type of Assistance Needed Physical assistance   Physical Assistance Level Total assistance   Comment A to thread RLE, pt able to thread LLE w/ inc time. Mod Ax2 to stand at bed rail, once in stance req Mod-Max A to steady 2* fatigue and 2nd person A w/ clothing management. Lower Body Dressing CARE Score 1   Putting On/Taking Off Footwear   Type of Assistance Needed Physical assistance   Physical Assistance Level 51%-75%   Comment while seated, crossed leg technique. able to doff socks, able to don L sock/shoe. Able to don R sock/shoe w/ OTR providing stabilization of RLE. Putting On/Taking Off Footwear CARE Score 2   Lying to Sitting on Side of Bed   Type of Assistance Needed Physical assistance; Adaptive equipment;Verbal cues   Physical Assistance Level 25% or less   Comment heavily relies on bed rail   Lying to Sitting on Side of Bed CARE Score 3   Sit to Stand   Type of Assistance Needed Physical assistance   Physical Assistance Level Total assistance   Comment pull to stand Mod-Max A w/ R knee block. Sit to Stand CARE Score 1   Bed-Chair Transfer   Type of Assistance Needed Physical assistance;Verbal cues; Clemon Lines / Lani Messenger; Adaptive equipment   Physical Assistance Level 26%-50%   Comment Mod A slideboard xfer EOB>w/c. Chair/Bed-to-Chair Transfer CARE Score 3   Cognition   Overall Cognitive Status Impaired   Assessment   Treatment Assessment Pt seen for skilled OT session focusing on self-care management. Details on ADL noted above, cont to req Ax2 when in fxnl stance and for LB ADLs. Pt req vc's to maximize use of andi-body during ADL. Add'lly limited by fatigue, req frequent rest breaks to manage. Cont OT POC: endurance work, core stability, alternating lateral weightshifting, fxnl slideboard xfers, ADL retraining, RUE NMR, fxnl standing tolerance, and repetitive safety training. Pt requested to rest in w/c, meal tray set-up, all needs within reach, and alarm activated. Prognosis Fair   Problem List Decreased strength;Decreased range of motion;Decreased endurance; Impaired balance;Decreased mobility; Decreased coordination;Decreased cognition;Obesity;Orthopedic restrictions;Pain   Plan   Treatment/Interventions ADL retraining;Functional transfer training; Endurance training;Patient/family training   Progress Slow progress, decreased activity tolerance   Recommendation   OT Discharge Recommendation Post acute rehabilitation services  ([facility])   OT Therapy Minutes   OT Time In 0700   OT Time Out 0830   OT Total Time (minutes) 90   OT Mode of treatment - Individual (minutes) 90   OT Mode of treatment - Concurrent (minutes) 0   OT Mode of treatment - Group (minutes) 0   OT Mode of treatment - Co-treat (minutes) 0   OT Mode of Treatment - Total time(minutes) 90 minutes   OT Cumulative Minutes 1400   Therapy Time missed   Time missed?  No

## 2023-09-12 NOTE — PROGRESS NOTES
PM&R PROGRESS NOTE:  Zane Pedersen 62 y.o. female MRN: [id number]  Unit/Bed#: -40 Encounter: [id number]    Rehabilitation Diagnosis: Impairment of mobility, safety, Activities of Daily Living (ADLs), and cognitive/communication skills due to Stroke:  01.2  Right Body Involvement (Left Brain)    HPI:  Zane Pedersen is a 24-year-old female with history of hypertension, hyperlipidemia, diabetes type 2, obesity, eosinophilic asthma, COPD, fibromyalgia, Sjogren syndrome, lumbar spondylosis with grade 1 anterior spondylolisthesis who presents to the hospital on 8/14/2023 due to acute onset right upper and right lower extremity weakness with sensory loss. Additionally with right-sided facial droop and dysarthria. The patient was noted to have an acute intraparenchymal hemorrhage on CT in the left thalamic region with surrounding edema. A CTA was negative. Patient was initially placed on a Cardene drip. Course complicated by significant left-sided leg pain and was recommended on treatment for radicular symptoms including Tylenol, gabapentin and potentially steroids. Neurology was consulted and recommended MRI of the brain with and without contrast while holding antithrombotics. The MRI showed a stable left thalamic capsular intraparenchymal hemorrhage with surrounding edema without any other mass effect. A repeat CT was completed on 8/17/2023 after worsening symptoms including worsening speech slurring with stable findings. Additionally there was a rapid response after a fall with head strike on 8/18 with a repeat CT of the head without acute findings. Additionally antihypertensive regimen was altered as she was previously on telmisartan and atenolol and is currently on amlodipine, losartan and hydralazine with better control.  The patient was evaluated by the Rehabilitation team and deemed an appropriate candidate for comprehensive inpatient rehabilitation and admitted to the [facility] on 8/25/2023  3:19 PM    SUBJECTIVE: Patient seen face to face. No acute issues overnight. Interrupted sleep with no identified cause. Pain is well controlled with current regimen, right lower extremity muscle spasms are improved. Good appetite, voiding, LBM 9/11 (large). Denies chest pain, shortness of breath, fever, chills, N/V, abdominal pain. ASSESSMENT: Stable, progressing    PLAN:  - insomnia: continue Melatonin hs, promote good sleep hygiene, adjust environmental factors if warranted  - urinary incontinence: continue timed void, good perineal hygiene    Rehabilitation  • Functional deficits:  Self-care, right hemiparesis, mobility, cognitive impairment  • Continue current rehabilitation plan of care to maximize function.     • Functional update:   o PT: mobility- mod A, transfers- mod-max A, ambulation- not attempted d/t safety  o OT: ADL: bathing- max A, dressing UB max A LB total A, toileting- total A   o ST: cognition- impaired, decreased executive function, decreased attention, decreased comprehension  • Estimated Discharge: anticipated 4 week goals, plan for transition to subacute rehab at [facility]    Pain  • Gabapentin 300 mg TID  • Oxycodone 2.5-5 mg every 4 hours prn  • Rifaximin 500 mg every 6 hours prn    DVT prophylaxis  • Heparin sc    Bladder plan  • Continent    Bowel plan  • Continent  • Colace 100 mg BID  • Senokot 17.2 mg qhs  • Miralax daily  • Bisacodyl 5 mg tab daily prn  • Bisacodyl supp daily prn      * ICH (intracerebral hemorrhage) ([address])  Assessment & Plan  63-year-old female presents with right hemiplegia, dysarthria and facial asymmetry found to have an acute intraparenchymal hemorrhage in the left thalamic region with surrounding edema felt to be secondary to uncontrolled hypertension  · Had repeat CT of the head on 8/18 after a fall with head strike resulting in significant bruising of the face  · Cleared by neurology to restart aspirin 81 mg daily and atorvastatin 20 mg daily  · Evaluated by neurosurgery with no surgical intervention at this time  · Had a repeat head CT on 8/20 which has been stable  · Goal systolic blood pressure of less than 140  · Follow-up with neurology as an outpatient in 6 weeks (10/26)  · Physical and Occupational Therapy with goals for community discharge. Patient lives with her  in a mobile home with 5 steps to enter and he is able to assist 24/7  · Hemorrhagic stroke education, nutrition, neuropsychology  · Secondary stroke prophylaxis with hypertension control    Primary hypertension  Assessment & Plan  · Home regimen: Telmisartan and atenolol  · Current regimen: Cozaar 100 mg daily, Norvasc and hydralazine discontinued  · Monitor blood pressures especially in therapy and make adjustments as needed    Chronic bilateral low back pain with bilateral sciatica  Assessment & Plan  · Patient has a history of chronic low back pain with radicular symptoms in addition to fibromyalgia and myofascial pain syndrome. · She follows with Dr. Joni Rubin from pain management has tried several medications as well as epidural steroid injections with little relief  · Imaging reveals lumbar degenerative disc disease at the L3-4, L4-5, L5-S1 level. Additionally facet hypertrophic changes and ligamentous laxity at the L4-5 level resulting in grade 1 anterior spondylolisthesis and mild canal narrowing with slight distortion of the left anterior lateral aspect of the thecal sac at this level with mild right greater than left neuroforaminal narrowing  · Multi modal pain with current medication regimen including oxycodone to be weaned as well as the Robaxin and gabapentin.   We will also consult neuropsychology  · Added Tylenol as well as Robaxin and as needed Valium    Diabetes mellitus type 2, controlled [facility])  Assessment & Plan  Lab Results   Component Value Date    HGBA1C 6.7 (H) 08/15/2023       Recent Labs     09/11/23  1630 09/11/23  2158 09/12/23  0651 09/12/23  1035   POCGLU 159* 126 131 194* · Currently on carb controlled level 2 diet, metformin 500 mg twice daily  · Sliding scale insulin algorithm 3 with Accu-Cheks 4 times daily      Constipation  Assessment & Plan  · On admission had not had a bowel movement in at least 11 days  · Obtaining x-ray to evaluate stool burden  · Adding to bowel regimen including increasing senna and Colace, lactulose as needed and will be more aggressive pending results of x-ray  · Continue relatively aggressive regimen  · Resolved with bowel regimen    Hyponatremia  Assessment & Plan  · Sodium 137 (previously 134, 133)  · Continue following biweekly labs with no intervention at this point unless continues to trend down. CAD (coronary artery disease)  Assessment & Plan  · History of CAD currently on statin and aspirin that was restarted on 8/21 after clearance by neurology  · Beta-blocker held due to bradycardia  · Patient has not formally seen a cardiologist however this was diagnosed based on a CT PE study that was conducted in the ER showing mild CAD    GERD (gastroesophageal reflux disease)  Assessment & Plan  Continue Protonix    Hyperlipidemia associated with type 2 diabetes mellitus ([address])  Assessment & Plan  Continue atorvastatin 20 mg with dinner    Class 2 obesity with body mass index (BMI) of 39.0 to 39.9 in adult  Assessment & Plan  · BMI of 39.74 on admission  · Continue promoting weight loss and increased activity, diet and exercise  · Continue a consistent carbohydrate diet  · Nutrition consultation    Sjogren's syndrome ([address])  Assessment & Plan  · Patient states that she is been worked up for Sjogren's syndrome in the past and has had conflicting diagnosis he is stating that some physicians have diagnosed her with it and others stated she did not have it.   · She has not been on any medication treatment for this and does not actively follow-up with a rheumatologist    Depression with anxiety  Assessment & Plan  · Neuropsychology consultation poststroke with history of anxiety  · Continue Wellbutrin  mg in the morning. It has been 300 mg as an outpatient however it was decreased in acute care due to worry of decreasing seizure threshold in the setting of ICH    Moderate persistent asthma without complication  Assessment & Plan  · Follows with Dr. Derrick Nunez from pulmonology  · Home regimen includes Breo and as needed albuterol  · On equivalent here and added albuterol inhaler on admission to [facility] consultants medical co-management. Personally reviewed labs, medications, and imaging. ROS:  .Review of Systems   A 10 point review of systems was negative except for what is noted in the HPI. OBJECTIVE:   /68 (BP Location: Left arm)   Pulse 80   Temp 98 °F (36.7 °C) (Oral)   Resp 18   Ht 5' (1.524 m)   Wt 91 kg (200 lb 9.9 oz)   SpO2 96%   BMI 39.18 kg/m²     Physical Exam  Constitutional:       Appearance: Normal appearance. She is obese. HENT:      Head: Normocephalic and atraumatic. Nose: Nose normal.      Mouth/Throat:      Mouth: Mucous membranes are moist.   Cardiovascular:      Rate and Rhythm: Normal rate and regular rhythm. Pulses: Normal pulses. Heart sounds: Normal heart sounds. Pulmonary:      Effort: Pulmonary effort is normal.      Breath sounds: Normal breath sounds. Abdominal:      General: Bowel sounds are normal.      Palpations: Abdomen is soft. Musculoskeletal:         General: Normal range of motion. Cervical back: Normal range of motion. Skin:     General: Skin is warm and dry. Capillary Refill: Capillary refill takes less than 2 seconds. Findings: Bruising present. Neurological:      Mental Status: She is alert and oriented to person, place, and time. Sensory: Sensory deficit present. Motor: Weakness (RLE: HF 1/5, KF 1/5, KE 1/5, DF/PF/EHL 0/5) present.       Coordination: Coordination abnormal.      Gait: Gait abnormal.   Psychiatric:         Mood and Affect: Mood normal.         Judgment: Judgment normal.         Lab Results   Component Value Date    WBC 9.17 09/11/2023    HGB 11.1 (L) 09/11/2023    HCT 36.3 09/11/2023    MCV 89 09/11/2023     09/11/2023     Lab Results   Component Value Date    SODIUM 137 09/11/2023    K 3.9 09/11/2023     09/11/2023    CO2 26 09/11/2023    BUN 13 09/11/2023    CREATININE 0.56 (L) 09/11/2023    GLUC 128 09/11/2023    CALCIUM 9.4 09/11/2023     Lab Results   Component Value Date    INR 1.06 08/15/2023    INR 0.94 08/14/2023    PROTIME 14.0 08/15/2023    PROTIME 13.2 08/14/2023         Current Facility-Administered Medications:   •  acetaminophen (TYLENOL) tablet 650 mg, 650 mg, Oral, Q6H PRN, Logan Shock, DO, 650 mg at 09/01/23 7050  •  albuterol (PROVENTIL HFA,VENTOLIN HFA) inhaler 2 puff, 2 puff, Inhalation, Q4H PRN, Logan Shock, DO, 2 puff at 09/04/23 0919  •  aspirin chewable tablet 81 mg, 81 mg, Oral, Daily, Logan Shock, DO, 81 mg at 09/12/23 2701  •  atorvastatin (LIPITOR) tablet 20 mg, 20 mg, Oral, Daily With Dinner, Logan Shock, DO, 20 mg at 09/11/23 1730  •  baclofen tablet 5 mg, 5 mg, Oral, BID, ARSENIO Pinto, 5 mg at 09/12/23 5703  •  bisacodyl (DULCOLAX) rectal suppository 10 mg, 10 mg, Rectal, Daily PRN, Jacqueline Lott MD, 10 mg at 09/03/23 1826  •  bisacodyl (DULCOLAX) rectal suppository 10 mg, 10 mg, Rectal, Once, ARSENIO Pinto  •  buPROPion (WELLBUTRIN XL) 24 hr tablet 150 mg, 150 mg, Oral, QAM, Logan Shock, DO, 150 mg at 09/12/23 2566  •  calcium carbonate (TUMS) chewable tablet 1,000 mg, 1,000 mg, Oral, TID PRN, Laureen Tracy PA-C, 1,000 mg at 08/26/23 1241  •  cyanocobalamin (VITAMIN B-12) tablet 1,000 mcg, 1,000 mcg, Oral, Daily, Logan Shock, DO, 1,000 mcg at 09/12/23 7102  •  diazepam (VALIUM) tablet 2 mg, 2 mg, Oral, BID PRN, Logan Shock, DO, 2 mg at 09/07/23 2303  •  ferrous sulfate tablet 325 mg, 325 mg, Oral, Daily With Breakfast, Logan Shock, DO, 325 mg at 09/12/23 0905  •  fluticasone-vilanterol 200-25 mcg/actuation 1 puff, 1 puff, Inhalation, Daily, Yanni Madison DO, 1 puff at 09/12/23 8783  •  gabapentin (NEURONTIN) capsule 300 mg, 300 mg, Oral, TID, Yanni Madison DO, 300 mg at 09/12/23 5727  •  heparin (porcine) subcutaneous injection 5,000 Units, 5,000 Units, Subcutaneous, Q8H [address], Yanni Madison DO, 5,000 Units at 09/12/23 2809  •  hydrOXYzine HCL (ATARAX) tablet 25 mg, 25 mg, Oral, TID PRN, ARSENIO Gonzalez  •  insulin lispro (HumaLOG) 100 units/mL subcutaneous injection 1-6 Units, 1-6 Units, Subcutaneous, TID AC, 2 Units at 09/12/23 1124 **AND** Fingerstick Glucose (POCT), , , 4x Daily AC and at bedtime, Yanni Madison DO  •  insulin lispro (HumaLOG) 100 units/mL subcutaneous injection 1-6 Units, 1-6 Units, Subcutaneous, HS, Yanni Madison DO, 1 Units at 09/08/23 2117  •  lactulose oral solution 20 g, 20 g, Oral, Daily PRN, Yanni Madison DO  •  losartan (COZAAR) tablet 100 mg, 100 mg, Oral, Daily, aYnni Madison DO, 100 mg at 09/12/23 2342  •  melatonin tablet 6 mg, 6 mg, Oral, HS, ARSENIO Pinto, 6 mg at 09/11/23 2218  •  metFORMIN (GLUCOPHAGE) tablet 500 mg, 500 mg, Oral, BID With Meals, ARSENIO Johnson, 500 mg at 09/12/23 9790  •  miconazole (MICOTIN) 2 % powder 1 Application, 1 Application, Topical, BID, Yanni Madison DO, 1 Application at 62/35/73 4048  •  ondansetron (ZOFRAN-ODT) dispersible tablet 4 mg, 4 mg, Oral, Q6H PRN, Yanni Madison DO  •  oxyCODONE (ROXICODONE) IR tablet 5 mg, 5 mg, Oral, Q4H PRN, Randolph Sees, DO, 5 mg at 09/12/23 1043  •  oxyCODONE (ROXICODONE) split tablet 2.5 mg, 2.5 mg, Oral, Q4H PRN, Randolph Sees, DO, 2.5 mg at 09/10/23 1422  •  pantoprazole (PROTONIX) EC tablet 40 mg, 40 mg, Oral, Early Morning, Randolph Sees, DO, 40 mg at 09/12/23 0538  •  polyethylene glycol (MIRALAX) packet 17 g, 17 g, Oral, Daily PRN, ARSENIO Pinto, 17 g at 09/12/23 0905  •  prochlorperazine (COMPAZINE) tablet 5 mg, 5 mg, Oral, Q6H PRN, Angel Luis Milton DO    Past Medical History:   Diagnosis Date   • Arthritis    • Asthma    • Continuous opioid dependence ([address]) 4/27/2022   • Diabetes mellitus ([address])    • Diverticulitis of colon    • Fibromyalgia 04/26/2022   • Headache(784.0)    • History of mammogram 2018   • History of tobacco abuse 04/26/2022   • Hypertension    • Nausea 04/29/2022   • Obesity    • Sjogren's syndrome ([address]) 10/19/2012   • Urinary tract infection        Patient Active Problem List    Diagnosis Date Noted   • ICH (intracerebral hemorrhage) ([address]) 08/15/2023   • Primary hypertension 04/26/2022   • Chronic bilateral low back pain with bilateral sciatica 08/31/2022   • Diabetes mellitus type 2, controlled ([address]) 04/29/2022   • Constipation 08/25/2023   • Hyponatremia 08/31/2023   • CAD (coronary artery disease) 04/07/2023   • GERD (gastroesophageal reflux disease) 08/25/2023   • Anemia 08/16/2023   • Left leg pain 08/15/2023   • Chronic obstructive pulmonary disease with acute exacerbation ([address]) 07/26/2023   • Hyperlipidemia associated with type 2 diabetes mellitus ([address]) 07/26/2023   • Abnormal CT of the chest 06/28/2023   • Class 2 obesity with body mass index (BMI) of 39.0 to 39.9 in adult 06/28/2023   • Right lumbosacral radiculopathy 10/12/2022   • Paresthesia of both feet 08/31/2022   • Postoperative visit 05/24/2022   • Incarcerated umbilical hernia 76/41/3446   • Continuous opioid dependence ([address]) 04/27/2022   • Moderate persistent asthma without complication 37/15/9980   • Cigarette nicotine dependence in remission 04/26/2022   • Fibromyalgia 04/26/2022   • Sjogren's syndrome ([address]) 10/19/2012   • Depression with anxiety 09/18/2012        ARSENIO Pack  Physical Medicine and [place]

## 2023-09-12 NOTE — PLAN OF CARE
Problem: Prexisting or High Potential for Compromised Skin Integrity  Goal: Skin integrity is maintained or improved  Description: INTERVENTIONS:  - Identify patients at risk for skin breakdown  - Assess and monitor skin integrity  - Assess and monitor nutrition and hydration status  - Monitor labs   - Assess for incontinence   - Turn and reposition patient  - Assist with mobility/ambulation  - Relieve pressure over bony prominences  - Avoid friction and shearing  - Provide appropriate hygiene as needed including keeping skin clean and dry  - Evaluate need for skin moisturizer/barrier cream  - Collaborate with interdisciplinary team   - Patient/family teaching  - Consider wound care consult   Outcome: Progressing     Problem: PAIN - ADULT  Goal: Verbalizes/displays adequate comfort level or baseline comfort level  Description: Interventions:  - Encourage patient to monitor pain and request assistance  - Assess pain using appropriate pain scale  - Administer analgesics based on type and severity of pain and evaluate response  - Implement non-pharmacological measures as appropriate and evaluate response  - Consider cultural and social influences on pain and pain management  - Notify physician/advanced practitioner if interventions unsuccessful or patient reports new pain  Outcome: Progressing     Problem: INFECTION - ADULT  Goal: Absence or prevention of progression during hospitalization  Description: INTERVENTIONS:  - Assess and monitor for signs and symptoms of infection  - Monitor lab/diagnostic results  - Monitor all insertion sites, i.e. indwelling lines, tubes, and drains  - Monitor endotracheal if appropriate and nasal secretions for changes in amount and color  - Brickeys appropriate cooling/warming therapies per order  - Administer medications as ordered  - Instruct and encourage patient and family to use good hand hygiene technique  - Identify and instruct in appropriate isolation precautions for identified infection/condition  Outcome: Progressing  Goal: Absence of fever/infection during neutropenic period  Description: INTERVENTIONS:  - Monitor WBC    Outcome: Progressing     Problem: SAFETY ADULT  Goal: Patient will remain free of falls  Description: INTERVENTIONS:  - Educate patient/family on patient safety including physical limitations  - Instruct patient to call for assistance with activity   - Consult OT/PT to assist with strengthening/mobility   - Keep Call bell within reach  - Keep bed low and locked with side rails adjusted as appropriate  - Keep care items and personal belongings within reach  - Initiate and maintain comfort rounds  - Make Fall Risk Sign visible to staff  - Offer Toileting every 2 Hours, in advance of need  - Initiate/Maintain bed/chair alarm  - Obtain necessary fall risk management equipment: alarms  - Apply yellow socks and bracelet for high fall risk patients  - Consider moving patient to room near nurses station  Outcome: Progressing  Goal: Maintain or return to baseline ADL function  Description: INTERVENTIONS:  -  Assess patient's ability to carry out ADLs; assess patient's baseline for ADL function and identify physical deficits which impact ability to perform ADLs (bathing, care of mouth/teeth, toileting, grooming, dressing, etc.)  - Assess/evaluate cause of self-care deficits   - Assess range of motion  - Assess patient's mobility; develop plan if impaired  - Assess patient's need for assistive devices and provide as appropriate  - Encourage maximum independence but intervene and supervise when necessary  - Involve family in performance of ADLs  - Assess for home care needs following discharge   - Consider OT consult to assist with ADL evaluation and planning for discharge  - Provide patient education as appropriate  Outcome: Progressing  Goal: Maintains/Returns to pre admission functional level  Description: INTERVENTIONS:  - Perform BMAT or MOVE assessment daily.    - Set and communicate daily mobility goal to care team and patient/family/caregiver. - Collaborate with rehabilitation services on mobility goals if consulted  - Perform Range of Motion 3 times a day. - Reposition patient every 2 hours. - Dangle patient 3 times a day  - Stand patient 3 times a day  - Ambulate patient 3 times a day  - Out of bed to chair 3 times a day   - Out of bed for meals 3 times a day  - Out of bed for toileting  - Record patient progress and toleration of activity level   Outcome: Progressing     Problem: DISCHARGE PLANNING  Goal: Discharge to home or other facility with appropriate resources  Description: INTERVENTIONS:  - Identify barriers to discharge w/patient and caregiver  - Arrange for needed discharge resources and transportation as appropriate  - Identify discharge learning needs (meds, wound care, etc.)  - Arrange for interpretive services to assist at discharge as needed  - Refer to Case Management Department for coordinating discharge planning if the patient needs post-hospital services based on physician/advanced practitioner order or complex needs related to functional status, cognitive ability, or social support system  Outcome: Progressing     Problem: Nutrition/Hydration-ADULT  Goal: Nutrient/Hydration intake appropriate for improving, restoring or maintaining nutritional needs  Description: Monitor and assess patient's nutrition/hydration status for malnutrition. Collaborate with interdisciplinary team and initiate plan and interventions as ordered. Monitor patient's weight and dietary intake as ordered or per policy. Utilize nutrition screening tool and intervene as necessary. Determine patient's food preferences and provide high-protein, high-caloric foods as appropriate.      INTERVENTIONS:  - Monitor oral intake, urinary output, labs, and treatment plans  - Assess nutrition and hydration status and recommend course of action  - Evaluate amount of meals eaten  - Assist patient with eating if necessary   - Allow adequate time for meals  - Recommend/ encourage appropriate diets, oral nutritional supplements, and vitamin/mineral supplements  - Order, calculate, and assess calorie counts as needed  - Recommend, monitor, and adjust tube feedings and TPN/PPN based on assessed needs  - Assess need for intravenous fluids  - Provide specific nutrition/hydration education as appropriate  - Include patient/family/caregiver in decisions related to nutrition  Outcome: Progressing     Problem: MOBILITY - ADULT  Goal: Maintain or return to baseline ADL function  Description: INTERVENTIONS:  -  Assess patient's ability to carry out ADLs; assess patient's baseline for ADL function and identify physical deficits which impact ability to perform ADLs (bathing, care of mouth/teeth, toileting, grooming, dressing, etc.)  - Assess/evaluate cause of self-care deficits   - Assess range of motion  - Assess patient's mobility; develop plan if impaired  - Assess patient's need for assistive devices and provide as appropriate  - Encourage maximum independence but intervene and supervise when necessary  - Involve family in performance of ADLs  - Assess for home care needs following discharge   - Consider OT consult to assist with ADL evaluation and planning for discharge  - Provide patient education as appropriate  Outcome: Progressing

## 2023-09-13 LAB
GLUCOSE SERPL-MCNC: 120 MG/DL (ref 65–140)
GLUCOSE SERPL-MCNC: 134 MG/DL (ref 65–140)
GLUCOSE SERPL-MCNC: 138 MG/DL (ref 65–140)
GLUCOSE SERPL-MCNC: 174 MG/DL (ref 65–140)

## 2023-09-13 PROCEDURE — 97130 THER IVNTJ EA ADDL 15 MIN: CPT

## 2023-09-13 PROCEDURE — 92507 TX SP LANG VOICE COMM INDIV: CPT

## 2023-09-13 PROCEDURE — 97530 THERAPEUTIC ACTIVITIES: CPT

## 2023-09-13 PROCEDURE — 97129 THER IVNTJ 1ST 15 MIN: CPT

## 2023-09-13 PROCEDURE — 99232 SBSQ HOSP IP/OBS MODERATE 35: CPT | Performed by: STUDENT IN AN ORGANIZED HEALTH CARE EDUCATION/TRAINING PROGRAM

## 2023-09-13 PROCEDURE — 97535 SELF CARE MNGMENT TRAINING: CPT

## 2023-09-13 PROCEDURE — 99232 SBSQ HOSP IP/OBS MODERATE 35: CPT | Performed by: INTERNAL MEDICINE

## 2023-09-13 PROCEDURE — 97112 NEUROMUSCULAR REEDUCATION: CPT

## 2023-09-13 PROCEDURE — 82948 REAGENT STRIP/BLOOD GLUCOSE: CPT

## 2023-09-13 RX ADMIN — GABAPENTIN 300 MG: 300 CAPSULE ORAL at 08:27

## 2023-09-13 RX ADMIN — BUPROPION HYDROCHLORIDE 150 MG: 150 TABLET, FILM COATED, EXTENDED RELEASE ORAL at 08:27

## 2023-09-13 RX ADMIN — HEPARIN SODIUM 5000 UNITS: 5000 INJECTION INTRAVENOUS; SUBCUTANEOUS at 14:33

## 2023-09-13 RX ADMIN — GABAPENTIN 300 MG: 300 CAPSULE ORAL at 20:38

## 2023-09-13 RX ADMIN — CYANOCOBALAMIN TAB 500 MCG 1000 MCG: 500 TAB at 08:27

## 2023-09-13 RX ADMIN — GABAPENTIN 300 MG: 300 CAPSULE ORAL at 16:53

## 2023-09-13 RX ADMIN — FERROUS SULFATE TAB 325 MG (65 MG ELEMENTAL FE) 325 MG: 325 (65 FE) TAB at 08:27

## 2023-09-13 RX ADMIN — ASPIRIN 81 MG CHEWABLE TABLET 81 MG: 81 TABLET CHEWABLE at 08:27

## 2023-09-13 RX ADMIN — ATORVASTATIN CALCIUM 20 MG: 20 TABLET, FILM COATED ORAL at 16:53

## 2023-09-13 RX ADMIN — MELATONIN 6 MG: at 20:38

## 2023-09-13 RX ADMIN — INSULIN LISPRO 1 UNITS: 100 INJECTION, SOLUTION INTRAVENOUS; SUBCUTANEOUS at 12:37

## 2023-09-13 RX ADMIN — MICONAZOLE NITRATE 1 APPLICATION: 20 POWDER TOPICAL at 17:07

## 2023-09-13 RX ADMIN — METFORMIN HYDROCHLORIDE 500 MG: 500 TABLET ORAL at 08:27

## 2023-09-13 RX ADMIN — OXYCODONE HYDROCHLORIDE 5 MG: 5 TABLET ORAL at 22:21

## 2023-09-13 RX ADMIN — FLUTICASONE FUROATE AND VILANTEROL TRIFENATATE 1 PUFF: 200; 25 POWDER RESPIRATORY (INHALATION) at 08:26

## 2023-09-13 RX ADMIN — HEPARIN SODIUM 5000 UNITS: 5000 INJECTION INTRAVENOUS; SUBCUTANEOUS at 22:21

## 2023-09-13 RX ADMIN — OXYCODONE HYDROCHLORIDE 5 MG: 5 TABLET ORAL at 10:21

## 2023-09-13 RX ADMIN — HEPARIN SODIUM 5000 UNITS: 5000 INJECTION INTRAVENOUS; SUBCUTANEOUS at 05:33

## 2023-09-13 RX ADMIN — METFORMIN HYDROCHLORIDE 500 MG: 500 TABLET ORAL at 16:53

## 2023-09-13 RX ADMIN — LOSARTAN POTASSIUM 100 MG: 50 TABLET, FILM COATED ORAL at 08:27

## 2023-09-13 RX ADMIN — OXYCODONE HYDROCHLORIDE 5 MG: 5 TABLET ORAL at 18:13

## 2023-09-13 RX ADMIN — PANTOPRAZOLE SODIUM 40 MG: 40 TABLET, DELAYED RELEASE ORAL at 05:32

## 2023-09-13 RX ADMIN — MICONAZOLE NITRATE 1 APPLICATION: 20 POWDER TOPICAL at 08:28

## 2023-09-13 RX ADMIN — BACLOFEN 5 MG: 10 TABLET ORAL at 08:27

## 2023-09-13 RX ADMIN — OXYCODONE HYDROCHLORIDE 5 MG: 5 TABLET ORAL at 05:33

## 2023-09-13 RX ADMIN — BACLOFEN 5 MG: 10 TABLET ORAL at 17:07

## 2023-09-13 NOTE — PROGRESS NOTES
PM&R PROGRESS NOTE:  Renetta Salas 62 y.o. female MRN: [id number]  Unit/Bed#: -43 Encounter: [id number]    Rehabilitation Diagnosis: Impairment of mobility, safety, Activities of Daily Living (ADLs), and cognitive/communication skills due to Stroke:  01.2  Right Body Involvement (Left Brain)    HPI:  Renetta Salas is a 51-year-old female with history of hypertension, hyperlipidemia, diabetes type 2, obesity, eosinophilic asthma, COPD, fibromyalgia, Sjogren syndrome, lumbar spondylosis with grade 1 anterior spondylolisthesis who presents to the hospital on 8/14/2023 due to acute onset right upper and right lower extremity weakness with sensory loss. Additionally with right-sided facial droop and dysarthria. The patient was noted to have an acute intraparenchymal hemorrhage on CT in the left thalamic region with surrounding edema. A CTA was negative. Patient was initially placed on a Cardene drip. Course complicated by significant left-sided leg pain and was recommended on treatment for radicular symptoms including Tylenol, gabapentin and potentially steroids. Neurology was consulted and recommended MRI of the brain with and without contrast while holding antithrombotics. The MRI showed a stable left thalamic capsular intraparenchymal hemorrhage with surrounding edema without any other mass effect. A repeat CT was completed on 8/17/2023 after worsening symptoms including worsening speech slurring with stable findings. Additionally there was a rapid response after a fall with head strike on 8/18 with a repeat CT of the head without acute findings. Additionally antihypertensive regimen was altered as she was previously on telmisartan and atenolol and is currently on amlodipine, losartan and hydralazine with better control.  The patient was evaluated by the Rehabilitation team and deemed an appropriate candidate for comprehensive inpatient rehabilitation and admitted to the [facility] on 8/25/2023  3:19 PM    SUBJECTIVE: Patient seen and evaluated in chair in room. Overall doing well sleeping well overnight. She denies any significant discomfort with some spasms in the lower right extremity however improved overall with baclofen versus the Robaxin. She denies any fever, chills, nausea, vomiting, cough, shortness of breath, diarrhea or constipation. She did receive notification that she needs further paperwork regarding her job including office note with the reason why she cannot go back to work tomorrow. We did fill out full paperwork and send it to her company previously and she will call them to clarify any details about what she needs in addition to the packet of paper that was filled out already. ASSESSMENT: Stable, progressing    PLAN:    Rehabilitation  • Functional deficits:  Self-care, right hemiparesis, mobility, cognitive impairment  • Continue current rehabilitation plan of care to maximize function.     • Functional update:   o PT: mobility- mod A, transfers- mod-max A, ambulation- not attempted d/t safety  o OT: ADL: bathing- max A, dressing UB max A LB total A, toileting- total A   o ST: cognition- impaired, decreased executive function, decreased attention, decreased comprehension    • Estimated Discharge: anticipated 4 week goals, plan for transition to subacute rehab at [facility]    Pain  • Gabapentin 300 mg TID  • Oxycodone 2.5-5 mg every 4 hours prn  • Rifaximin 500 mg every 6 hours prn    DVT prophylaxis  • Heparin sc    Bladder plan  • Continent    Bowel plan  • Continent, last bowel movement 9/12  • Colace 100 mg BID  • Senokot 17.2 mg qhs  • Miralax daily  • Bisacodyl 5 mg tab daily prn  • Bisacodyl supp daily prn      * ICH (intracerebral hemorrhage) ([address] )  Assessment & Plan  60-year-old female presents with right hemiplegia, dysarthria and facial asymmetry found to have an acute intraparenchymal hemorrhage in the left thalamic region with surrounding edema felt to be secondary to uncontrolled hypertension  · Had repeat CT of the head on 8/18 after a fall with head strike resulting in significant bruising of the face  · Cleared by neurology to restart aspirin 81 mg daily and atorvastatin 20 mg daily  · Evaluated by neurosurgery with no surgical intervention at this time  · Had a repeat head CT on 8/20 which has been stable  · Goal systolic blood pressure of less than 140  · Follow-up with neurology as an outpatient in 6 weeks (10/26)  · Physical and Occupational Therapy with goals for community discharge. Patient lives with her  in a mobile home with 5 steps to enter and he is able to assist 24/7  · Hemorrhagic stroke education, nutrition, neuropsychology  · Secondary stroke prophylaxis with hypertension control    Hyponatremia  Assessment & Plan  · Sodium 137 (previously 134, 133)  · Continue following biweekly labs with no intervention at this point unless continues to trend down.     Constipation  Assessment & Plan  · On admission had not had a bowel movement in at least 11 days  · Obtaining x-ray to evaluate stool burden  · Adding to bowel regimen including increasing senna and Colace, lactulose as needed and will be more aggressive pending results of x-ray  · Continue relatively aggressive regimen  · Resolved with bowel regimen    GERD (gastroesophageal reflux disease)  Assessment & Plan  Continue Protonix    Hyperlipidemia associated with type 2 diabetes mellitus (HCC)  Assessment & Plan  Continue atorvastatin 20 mg with dinner    Class 2 obesity with body mass index (BMI) of 39.0 to 39.9 in adult  Assessment & Plan  · BMI of 39.74 on admission  · Continue promoting weight loss and increased activity, diet and exercise  · Continue a consistent carbohydrate diet  · Nutrition consultation    CAD (coronary artery disease)  Assessment & Plan  · History of CAD currently on statin and aspirin that was restarted on 8/21 after clearance by neurology  · Beta-blocker held due to bradycardia  · Patient has not formally seen a cardiologist however this was diagnosed based on a CT PE study that was conducted in the ER showing mild CAD    Chronic bilateral low back pain with bilateral sciatica  Assessment & Plan  · Patient has a history of chronic low back pain with radicular symptoms in addition to fibromyalgia and myofascial pain syndrome. · She follows with Dr. Moni Guardado from pain management has tried several medications as well as epidural steroid injections with little relief  · Imaging reveals lumbar degenerative disc disease at the L3-4, L4-5, L5-S1 level. Additionally facet hypertrophic changes and ligamentous laxity at the L4-5 level resulting in grade 1 anterior spondylolisthesis and mild canal narrowing with slight distortion of the left anterior lateral aspect of the thecal sac at this level with mild right greater than left neuroforaminal narrowing  · Multi modal pain with current medication regimen including oxycodone to be weaned as well as the Robaxin and gabapentin. We will also consult neuropsychology  · Added Tylenol as well as Robaxin and as needed Valium    Sjogren's syndrome ([address])  Assessment & Plan  · Patient states that she is been worked up for Sjogren's syndrome in the past and has had conflicting diagnosis he is stating that some physicians have diagnosed her with it and others stated she did not have it. · She has not been on any medication treatment for this and does not actively follow-up with a rheumatologist    Depression with anxiety  Assessment & Plan  · Neuropsychology consultation poststroke with history of anxiety  · Continue Wellbutrin  mg in the morning.   It has been 300 mg as an outpatient however it was decreased in acute care due to worry of decreasing seizure threshold in the setting of ICH    Diabetes mellitus type 2, controlled [facility])  Assessment & Plan  Lab Results   Component Value Date    HGBA1C 6.7 (H) 08/15/2023       Recent Labs     09/11/23  1630 09/11/23  2158 09/12/23  0651 09/12/23  1035   POCGLU 159* 126 131 194*     · Currently on carb controlled level 2 diet, metformin 500 mg twice daily  · Sliding scale insulin algorithm 3 with Accu-Cheks 4 times daily      Moderate persistent asthma without complication  Assessment & Plan  · Follows with Dr. Dorene Hopkins from pulmonology  · Home regimen includes Breo and as needed albuterol  · On equivalent here and added albuterol inhaler on admission to [facility]    Primary hypertension  Assessment & Plan  · Home regimen: Telmisartan and atenolol  · Current regimen: Cozaar 100 mg daily, Norvasc and hydralazine discontinued  · Monitor blood pressures especially in therapy and make adjustments as needed    Appreciate IM consultants medical co-management. Personally reviewed labs, medications, and imaging. ROS:  A 10 point review of systems was negative except for what is noted in the HPI. OBJECTIVE:   /82 (BP Location: Left arm)   Pulse 80   Temp 98.3 °F (36.8 °C) (Oral)   Resp 18   Ht 5' (1.524 m)   Wt 91 kg (200 lb 9.9 oz)   SpO2 95%   BMI 39.18 kg/m²     Physical Exam  Vitals reviewed. Constitutional:       Appearance: Normal appearance. She is obese. HENT:      Head: Normocephalic and atraumatic. Right Ear: External ear normal.      Left Ear: External ear normal.      Nose: Nose normal. No rhinorrhea. Mouth/Throat:      Mouth: Mucous membranes are moist.      Pharynx: Oropharynx is clear. Eyes:      General: No scleral icterus. Cardiovascular:      Rate and Rhythm: Normal rate and regular rhythm. Pulses: Normal pulses. Pulmonary:      Effort: Pulmonary effort is normal. No respiratory distress. Abdominal:      General: There is no distension. Palpations: Abdomen is soft. Musculoskeletal:         General: Normal range of motion. Cervical back: Normal range of motion. Right lower leg: No edema. Left lower leg: No edema. Skin:     General: Skin is warm and dry. Findings: Bruising present. Neurological:      Mental Status: She is alert and oriented to person, place, and time. Sensory: Sensory deficit present. Motor: Weakness (RLE: HF 1/5, KF 1/5, KE 1/5, DF/PF/EHL 0/5) present.    Psychiatric:         Mood and Affect: Mood normal.         Behavior: Behavior normal.         Judgment: Judgment normal.         Lab Results   Component Value Date    WBC 9.17 09/11/2023    HGB 11.1 (L) 09/11/2023    HCT 36.3 09/11/2023    MCV 89 09/11/2023     09/11/2023     Lab Results   Component Value Date    SODIUM 137 09/11/2023    K 3.9 09/11/2023     09/11/2023    CO2 26 09/11/2023    BUN 13 09/11/2023    CREATININE 0.56 (L) 09/11/2023    GLUC 128 09/11/2023    CALCIUM 9.4 09/11/2023     Lab Results   Component Value Date    INR 1.06 08/15/2023    INR 0.94 08/14/2023    PROTIME 14.0 08/15/2023    PROTIME 13.2 08/14/2023         Current Facility-Administered Medications:   •  acetaminophen (TYLENOL) tablet 650 mg, 650 mg, Oral, Q6H PRN, Fei Cullens, DO, 650 mg at 09/01/23 6527  •  albuterol (PROVENTIL HFA,VENTOLIN HFA) inhaler 2 puff, 2 puff, Inhalation, Q4H PRN, Fei Cullens, DO, 2 puff at 09/04/23 0919  •  aspirin chewable tablet 81 mg, 81 mg, Oral, Daily, Fei Cullens, DO, 81 mg at 09/13/23 0827  •  atorvastatin (LIPITOR) tablet 20 mg, 20 mg, Oral, Daily With Anand Burton DO, 20 mg at 09/12/23 1604  •  baclofen tablet 5 mg, 5 mg, Oral, BID, ARSENIO Pinto, 5 mg at 09/13/23 0827  •  bisacodyl (DULCOLAX) rectal suppository 10 mg, 10 mg, Rectal, Daily PRN, Marcella Marquez MD, 10 mg at 09/03/23 1826  •  bisacodyl (DULCOLAX) rectal suppository 10 mg, 10 mg, Rectal, Once, ARSENIO Pinto  •  buPROPion (WELLBUTRIN XL) 24 hr tablet 150 mg, 150 mg, Oral, QAM, Fei Dailey DO, 150 mg at 09/13/23 0827  •  calcium carbonate (TUMS) chewable tablet 1,000 mg, 1,000 mg, Oral, TID PRN, Laureen Tracy PA-C, 1,000 mg at 08/26/23 1241  • cyanocobalamin (VITAMIN B-12) tablet 1,000 mcg, 1,000 mcg, Oral, Daily, Norman Urbina DO, 1,000 mcg at 09/13/23 0827  •  diazepam (VALIUM) tablet 2 mg, 2 mg, Oral, BID PRN, Norman Urbina DO, 2 mg at 09/07/23 2303  •  ferrous sulfate tablet 325 mg, 325 mg, Oral, Daily With Breakfast, Norman Urbina DO, 325 mg at 09/13/23 0827  •  fluticasone-vilanterol 200-25 mcg/actuation 1 puff, 1 puff, Inhalation, Daily, Norman Urbina DO, 1 puff at 09/13/23 5048  •  gabapentin (NEURONTIN) capsule 300 mg, 300 mg, Oral, TID, Norman Urbina DO, 300 mg at 09/13/23 0827  •  heparin (porcine) subcutaneous injection 5,000 Units, 5,000 Units, Subcutaneous, Q8H [facility], Norman Urbina DO, 5,000 Units at 09/13/23 0533  •  hydrOXYzine HCL (ATARAX) tablet 25 mg, 25 mg, Oral, TID PRN, ARSENIO Juan  •  insulin lispro (HumaLOG) 100 units/mL subcutaneous injection 1-6 Units, 1-6 Units, Subcutaneous, TID AC, 1 Units at 09/12/23 1731 **AND** Fingerstick Glucose (POCT), , , 4x Daily AC and at bedtime, Norman Urbina DO  •  insulin lispro (HumaLOG) 100 units/mL subcutaneous injection 1-6 Units, 1-6 Units, Subcutaneous, HS, Norman Urbina DO, 1 Units at 09/08/23 2117  •  lactulose oral solution 20 g, 20 g, Oral, Daily PRN, Norman Urbina DO  •  losartan (COZAAR) tablet 100 mg, 100 mg, Oral, Daily, Nroman Urbina DO, 100 mg at 09/13/23 0827  •  melatonin tablet 6 mg, 6 mg, Oral, HS, ARSENIO Pinto, 6 mg at 09/12/23 2225  •  metFORMIN (GLUCOPHAGE) tablet 500 mg, 500 mg, Oral, BID With Meals, ARSENIO Smith, 500 mg at 09/13/23 0827  •  miconazole (MICOTIN) 2 % powder 1 Application, 1 Application, Topical, BID, Norman Urbina DO, 1 Application at 05/96/40 6147  •  ondansetron (ZOFRAN-ODT) dispersible tablet 4 mg, 4 mg, Oral, Q6H PRN, Norman Urbina DO  •  oxyCODONE (ROXICODONE) IR tablet 5 mg, 5 mg, Oral, Q4H PRN, Norman Urbina DO, 5 mg at 09/13/23 1021  •  oxyCODONE (ROXICODONE) split tablet 2.5 mg, 2.5 mg, Oral, Q4H PRN, Carlos Ryan DO Yuniel, 2.5 mg at 09/12/23 1608  •  pantoprazole (PROTONIX) EC tablet 40 mg, 40 mg, Oral, Early Morning, Zachariah Garza DO, 40 mg at 09/13/23 0532  •  polyethylene glycol (MIRALAX) packet 17 g, 17 g, Oral, Daily PRN, ARSENIO Pinto, 17 g at 09/12/23 8418  •  prochlorperazine (COMPAZINE) tablet 5 mg, 5 mg, Oral, Q6H PRN, Zachariah Garza DO    Past Medical History:   Diagnosis Date   • Arthritis    • Asthma    • Continuous opioid dependence ([address]) 4/27/2022   • Diabetes mellitus ([address])    • Diverticulitis of colon    • Fibromyalgia 04/26/2022   • Headache(784.0)    • History of mammogram 2018   • History of tobacco abuse 04/26/2022   • Hypertension    • Nausea 04/29/2022   • Obesity    • Sjogren's syndrome ([address]) 10/19/2012   • Urinary tract infection        Patient Active Problem List    Diagnosis Date Noted   • ICH (intracerebral hemorrhage) ([address]) 08/15/2023   • Hyponatremia 08/31/2023   • GERD (gastroesophageal reflux disease) 08/25/2023   • Constipation 08/25/2023   • Anemia 08/16/2023   • Left leg pain 08/15/2023   • Chronic obstructive pulmonary disease with acute exacerbation ([address]) 07/26/2023   • Hyperlipidemia associated with type 2 diabetes mellitus ([address]) 07/26/2023   • Abnormal CT of the chest 06/28/2023   • Class 2 obesity with body mass index (BMI) of 39.0 to 39.9 in adult 06/28/2023   • CAD (coronary artery disease) 04/07/2023   • Right lumbosacral radiculopathy 10/12/2022   • Chronic bilateral low back pain with bilateral sciatica 08/31/2022   • Paresthesia of both feet 08/31/2022   • Postoperative visit 05/24/2022   • Incarcerated umbilical hernia 56/45/8561   • Diabetes mellitus type 2, controlled ([address]) 04/29/2022   • Continuous opioid dependence ([address]) 04/27/2022   • Moderate persistent asthma without complication 85/57/6300   • Cigarette nicotine dependence in remission 04/26/2022   • Primary hypertension 04/26/2022   • Fibromyalgia 04/26/2022   • Sjogren's syndrome ([address]) 10/19/2012   • Depression with anxiety 09/18/2012      Casa Hutchins,   Physical Medicine and [place]    I have spent a total time of 30 minutes on 09/13/23 in caring for this patient including Counseling / Coordination of care, Documenting in the medical record, Communicating with other healthcare professionals  and Discussion he is in weekly team conference with case management, nursing and therapy team discussing functional and medical plan of care and discharge planning.

## 2023-09-13 NOTE — PLAN OF CARE
Problem: Prexisting or High Potential for Compromised Skin Integrity  Goal: Skin integrity is maintained or improved  Description: INTERVENTIONS:  - Identify patients at risk for skin breakdown  - Assess and monitor skin integrity  - Assess and monitor nutrition and hydration status  - Monitor labs   - Assess for incontinence   - Turn and reposition patient  - Assist with mobility/ambulation  - Relieve pressure over bony prominences  - Avoid friction and shearing  - Provide appropriate hygiene as needed including keeping skin clean and dry  - Evaluate need for skin moisturizer/barrier cream  - Collaborate with interdisciplinary team   - Patient/family teaching  - Consider wound care consult   Outcome: Progressing     Problem: PAIN - ADULT  Goal: Verbalizes/displays adequate comfort level or baseline comfort level  Description: Interventions:  - Encourage patient to monitor pain and request assistance  - Assess pain using appropriate pain scale  - Administer analgesics based on type and severity of pain and evaluate response  - Implement non-pharmacological measures as appropriate and evaluate response  - Consider cultural and social influences on pain and pain management  - Notify physician/advanced practitioner if interventions unsuccessful or patient reports new pain  Outcome: Progressing     Problem: INFECTION - ADULT  Goal: Absence or prevention of progression during hospitalization  Description: INTERVENTIONS:  - Assess and monitor for signs and symptoms of infection  - Monitor lab/diagnostic results  - Monitor all insertion sites, i.e. indwelling lines, tubes, and drains  - Monitor endotracheal if appropriate and nasal secretions for changes in amount and color  - [place] appropriate cooling/warming therapies per order  - Administer medications as ordered  - Instruct and encourage patient and family to use good hand hygiene technique  - Identify and instruct in appropriate isolation precautions for identified infection/condition  Outcome: Progressing  Goal: Absence of fever/infection during neutropenic period  Description: INTERVENTIONS:  - Monitor WBC    Outcome: Progressing     Problem: SAFETY ADULT  Goal: Patient will remain free of falls  Description: INTERVENTIONS:  - Educate patient/family on patient safety including physical limitations  - Instruct patient to call for assistance with activity   - Consult OT/PT to assist with strengthening/mobility   - Keep Call bell within reach  - Keep bed low and locked with side rails adjusted as appropriate  - Keep care items and personal belongings within reach  - Initiate and maintain comfort rounds  - Make Fall Risk Sign visible to staff  - Offer Toileting every Hours, in advance of need  - Initiate/Maintain alarm  - Obtain necessary fall risk management equipment:   - Apply yellow socks and bracelet for high fall risk patients  - Consider moving patient to room near nurses station  Outcome: Progressing  Goal: Maintain or return to baseline ADL function  Description: INTERVENTIONS:  -  Assess patient's ability to carry out ADLs; assess patient's baseline for ADL function and identify physical deficits which impact ability to perform ADLs (bathing, care of mouth/teeth, toileting, grooming, dressing, etc.)  - Assess/evaluate cause of self-care deficits   - Assess range of motion  - Assess patient's mobility; develop plan if impaired  - Assess patient's need for assistive devices and provide as appropriate  - Encourage maximum independence but intervene and supervise when necessary  - Involve family in performance of ADLs  - Assess for home care needs following discharge   - Consider OT consult to assist with ADL evaluation and planning for discharge  - Provide patient education as appropriate  Outcome: Progressing  Goal: Maintains/Returns to pre admission functional level  Description: INTERVENTIONS:  - Perform BMAT or MOVE assessment daily.   - Set and communicate daily mobility goal to care team and patient/family/caregiver. - Collaborate with rehabilitation services on mobility goals if consulted  - Perform Range of Motion  times a day. - Reposition patient every  hours. - Dangle patient  times a day  - Stand patient  times a day  - Ambulate patient times a day  - Out of bed to chair times a day   - Out of bed for meals  times a day  - Out of bed for toileting  - Record patient progress and toleration of activity level   Outcome: Progressing     Problem: DISCHARGE PLANNING  Goal: Discharge to home or other facility with appropriate resources  Description: INTERVENTIONS:  - Identify barriers to discharge w/patient and caregiver  - Arrange for needed discharge resources and transportation as appropriate  - Identify discharge learning needs (meds, wound care, etc.)  - Arrange for interpretive services to assist at discharge as needed  - Refer to Case Management Department for coordinating discharge planning if the patient needs post-hospital services based on physician/advanced practitioner order or complex needs related to functional status, cognitive ability, or social support system  Outcome: Progressing     Problem: Nutrition/Hydration-ADULT  Goal: Nutrient/Hydration intake appropriate for improving, restoring or maintaining nutritional needs  Description: Monitor and assess patient's nutrition/hydration status for malnutrition. Collaborate with interdisciplinary team and initiate plan and interventions as ordered. Monitor patient's weight and dietary intake as ordered or per policy. Utilize nutrition screening tool and intervene as necessary. Determine patient's food preferences and provide high-protein, high-caloric foods as appropriate.      INTERVENTIONS:  - Monitor oral intake, urinary output, labs, and treatment plans  - Assess nutrition and hydration status and recommend course of action  - Evaluate amount of meals eaten  - Assist patient with eating if necessary   - Allow adequate time for meals  - Recommend/ encourage appropriate diets, oral nutritional supplements, and vitamin/mineral supplements  - Order, calculate, and assess calorie counts as needed  - Recommend, monitor, and adjust tube feedings and TPN/PPN based on assessed needs  - Assess need for intravenous fluids  - Provide specific nutrition/hydration education as appropriate  - Include patient/family/caregiver in decisions related to nutrition  Outcome: Progressing     Problem: MOBILITY - ADULT  Goal: Maintain or return to baseline ADL function  Description: INTERVENTIONS:  -  Assess patient's ability to carry out ADLs; assess patient's baseline for ADL function and identify physical deficits which impact ability to perform ADLs (bathing, care of mouth/teeth, toileting, grooming, dressing, etc.)  - Assess/evaluate cause of self-care deficits   - Assess range of motion  - Assess patient's mobility; develop plan if impaired  - Assess patient's need for assistive devices and provide as appropriate  - Encourage maximum independence but intervene and supervise when necessary  - Involve family in performance of ADLs  - Assess for home care needs following discharge   - Consider OT consult to assist with ADL evaluation and planning for discharge  - Provide patient education as appropriate  Outcome: Progressing  Goal: Maintains/Returns to pre admission functional level  Description: INTERVENTIONS:  - Perform BMAT or MOVE assessment daily.   - Set and communicate daily mobility goal to care team and patient/family/caregiver. - Collaborate with rehabilitation services on mobility goals if consulted  - Perform Range of Motion  times a day. - Reposition patient every  hours.   - Dangle patient  times a day  - Stand patient times a day  - Ambulate patient  times a day  - Out of bed to chair times a day   - Out of bed for meals  times a day  - Out of bed for toileting  - Record patient progress and toleration of activity level   Outcome: Progressing

## 2023-09-13 NOTE — PROGRESS NOTES
09/13/23 1230   Pain Assessment   Pain Assessment Tool 0-10   Pain Score 3   Pain Location/Orientation Orientation: Right;Location: Shoulder; Location: Knee   Pain Onset/Description Onset: Ongoing;Frequency: Intermittent   Hospital Pain Intervention(s) Repositioned;Rest;Emotional support   Restrictions/Precautions   Precautions Bed/chair alarms;Cognitive; Fall Risk;Pain;Supervision on toilet/commode   Weight Bearing Restrictions No   ROM Restrictions No   Braces or Orthoses Sling  (RUE sling w/transfers)   Sit to Lying   Type of Assistance Needed Physical assistance;Verbal cues   Physical Assistance Level 51%-75%   Comment A for trunk and RLE mgmt on mat table   Sit to Lying CARE Score 2   Lying to Sitting on Side of Bed   Type of Assistance Needed Physical assistance;Verbal cues   Physical Assistance Level 26%-50%   Comment Willian supine to sit EOM with vc's for 1-step direction following/sequecing through transitional movement, with vc's for body mechanics   Lying to Sitting on Side of Bed CARE Score 3   Sit to Stand   Type of Assistance Needed Physical assistance;Verbal cues; Adaptive equipment   Physical Assistance Level Total assistance   Comment Ax2 for safety, standing from w/c at bedrail, modA x1 with Willian of 2nd, vc's for hand/foot positioning   Sit to Stand CARE Score 1   Bed-Chair Transfer   Type of Assistance Needed Physical assistance;Verbal cues; Adaptive equipment   Physical Assistance Level 26%-50%   Comment Min-ModA slide board xfers, with pt engaging in repetitive fxl transfer training to various surfaces for increased independence.  Pt cont to require vc's for weightshifting forward, hand placement and trunk control/positioning   Chair/Bed-to-Chair Transfer CARE Score 3   Toileting Hygiene   Type of Assistance Needed Physical assistance   Physical Assistance Level Total assistance   Comment pt cont to require Ax2 in stance with BUE support on bedrail (ModAx1 for balance with A of 2nd for hygiene/CM) with vc's for body mechanics/posture   Toileting Hygiene CARE Score 1   Toileting   Able to [address] down no, up no. Able to Manage Clothing Closures No   Limitations Noted In Balance; Coordination; Safety; Sequencing;UE Strength;LE Strength;Problem Solving   Adaptive Equipment   (bedrail)   Toilet Transfer   Type of Assistance Needed Physical assistance;Verbal cues; Adaptive equipment   Physical Assistance Level Total assistance   Comment toileting completed at start of session with OT and nsg, utilized swap out method (w/c<>BSC) with pull to stand at bedrail. ModAx1 for standing balance with 2nd person swapping equipment and CM/hygiene   Toilet Transfer CARE Score 1   Toilet Transfer   Surface Assessed Drop Arm Commode   Transfer Technique   (swap out)   Limitations Noted In Balance; Endurance; Safety; Sensation; Sequencing;UE Strength;LE Strength;Problem Solving   Adaptive Equipment   (bedrail)   Positioning Concerns Cognition; Safety;Grab Bars   Therapeutic Exercise - ROM   UE-ROM Yes   ROM- Right Upper Extremities   R Shoulder PROM;AAROM;Prolonged stretch; Flexion;ABduction; Extension;Horizontal ABduction; External rotation; Internal rotation   R Elbow PROM;AAROM;Elbow flexion;Elbow extension   R Wrist PROM; Prolonged stretch; Wrist flexion;Wrist extension;Radial deviation;Ulnar deviation   R Hand PROM; Prolonged stretch; Thumb; Index finger; Long finger;Ring finger;Little finger   R Position Supine   R Weight/Reps/Sets 10x each plane   RUE ROM Comment Pt engaged in supine neuro re-ed mat program, with RUE PROM [id number] each plane for contracture prevention and maintaining joint ROM, followed by 2x5 reps AAROM shoulder flexion, shoulder abduction, and horiz adduction. 1x2ulth tricep AG and ceiling punches with 2 points of control at elbow and wrist, and MaxA.  Pt required frequent rest breaks to manage fatigue and vc's for encouragement   Cognition   Overall Cognitive Status Impaired   Arousal/Participation Alert; Cooperative   Attention Attends with cues to redirect   Orientation Level Oriented X4   Memory Decreased short term memory   Following Commands Follows one step commands with increased time or repetition   Activity Tolerance   Activity Tolerance Patient tolerated treatment well   Assessment   Treatment Assessment Pt seen for 90min skilled OT session focused on RUE neuro re-ed (PROM, AAROM), repetitive fxl slide board transfer training, toileting, sit<>stands, and lateral weightshifting seated for improved transfers and LB hyg/CM, for increased independence w/ADLs/IADLs and decreased caregiver burden. See detailed descriptions of fxl performance above. Pt tolerated session well, but cont to be limited by RUE weakness, decreased act massimo, endurance, sitting balance, core strength, standing balance, and standing tolerance. Pt would benefit from continued skilled OT focused on RUE neuro re-ed, fxl transfer training, sitting balance/core strength, ADL retraining, endurance training, and safety awareness. Prognosis Fair   Problem List Decreased strength;Decreased range of motion;Decreased endurance; Impaired balance;Decreased mobility; Decreased coordination;Decreased cognition;Decreased safety awareness;Orthopedic restrictions;Pain;Obesity   Barriers to Discharge Inaccessible home environment;Decreased caregiver support   Plan   Treatment/Interventions ADL retraining;Functional transfer training; Therapeutic exercise; Endurance training;Cognitive reorientation;Patient/family training;Equipment eval/education; Bed mobility; Compensatory technique education;Spoke to nursing   Progress Slow progress, decreased activity tolerance   Recommendation   OT Discharge Recommendation Post acute rehabilitation services  (subacute rehab (SNF))   OT Therapy Minutes   OT Time In 1230   OT Time Out 1400   OT Total Time (minutes) 90   OT Mode of treatment - Individual (minutes) 90   OT Mode of treatment - Concurrent (minutes) 0   OT Mode of treatment - Group (minutes) 0   OT Mode of treatment - Co-treat (minutes) 0   OT Mode of Treatment - Total time(minutes) 90 minutes   OT Cumulative Minutes 1490   Therapy Time missed   Time missed?  No

## 2023-09-13 NOTE — PROGRESS NOTES
Internal Medicine Progress Note  Patient: Derik Fountain  Age/sex: 62 y.o. female  Medical Record #: [id number]      ASSESSMENT/PLAN: (Interval History)  Derik Fountain is seen and examined and management for following issues:    ICH  • Seen by NS and no surgical intervention indicated. • Etio = HTN  • Was unable to tolerate MRI even with sedation  • Was cleared to resume ASA. • Continue atorvastatin. • Outpt follow-up with Neurology.     HTN  • Goal with ICH is SBP<140  • Home: Micardis 80mg qd/Atenolol 50mg qd  • Here: Losartan 100mg qd  • (Micardis 80 mg qd = Losartan 100 mg qd)   • Stopped the Norvasc on 9/8 since BPs too low  • Stable      DM type 2  • HA1C 6.7 on 8/15/23  • Home: Metformin 1000mg BID  • Here: Metformin 500 mg BID  • Continue QID Accuchecks/SSI and DM diet  • Will not make changes again today       Anemia  • Baseline Hgb 10 - 11. • Iron level 30/Ferritin 22/TIBC 371  • B12 was low normal at 221 on 8/15/23  • Continue iron and B12 supplementation. • stable     Anxiety  • Continue Wellbutrin XL. • Pt is taking 150mg instead of 300mg due to concerns for increased risk of seizure        Discharge date:  Tbd       The above assessment and plan was reviewed and updated as determined by my evaluation of the patient on 9/13/2023.     Labs:   Results from last 7 days   Lab Units 09/11/23  0614 09/07/23  0638   WBC Thousand/uL 9.17 8.59   HEMOGLOBIN g/dL 11.1* 11.0*   HEMATOCRIT % 36.3 35.6   PLATELETS Thousands/uL 308 319     Results from last 7 days   Lab Units 09/11/23  0614 09/07/23  0638   SODIUM mmol/L 137 137   POTASSIUM mmol/L 3.9 4.1   CHLORIDE mmol/L 101 100   CO2 mmol/L 26 30   BUN mg/dL 13 18   CREATININE mg/dL 0.56* 0.66   CALCIUM mg/dL 9.4 9.3             Results from last 7 days   Lab Units 09/13/23  0636 09/12/23  2048 09/12/23  1601   POC GLUCOSE mg/dl 120 117 179*       Review of Scheduled Meds:  Current Facility-Administered Medications   Medication Dose Route Frequency Provider Last Rate • acetaminophen  650 mg Oral Q6H PRN [facility], DO     • albuterol  2 puff Inhalation Q4H PRN [facility], DO     • aspirin  81 mg Oral Daily [facility], DO     • atorvastatin  20 mg Oral Daily With Dinner [facility], DO     • baclofen  5 mg Oral BID Braulio Marte, CRNP     • bisacodyl  10 mg Rectal Daily PRN Iqra Hill MD     • bisacodyl  10 mg Rectal Once [facility] Rosanna, CRNP     • buPROPion  150 mg Oral QAM [facility], DO     • calcium carbonate  1,000 mg Oral TID PRN Laureen Tracy PA-C     • vitamin B-12  1,000 mcg Oral Daily [facility], DO     • diazepam  2 mg Oral BID PRN [facility], DO     • ferrous sulfate  325 mg Oral Daily With Breakfast [facility], DO     • fluticasone-vilanterol  1 puff Inhalation Daily [facility], DO     • gabapentin  300 mg Oral TID [facility], DO     • heparin (porcine)  5,000 Units Subcutaneous Q8H [address] [facility], DO     • hydrOXYzine HCL  25 mg Oral TID PRN [facility] CR[facility]     • insulin lispro  1-6 Units Subcutaneous TID AC [facility], DO     • insulin lispro  1-6 Units Subcutaneous HS [facility], DO     • lactulose  20 g Oral Daily PRN [facility], DO     • losartan  100 mg Oral Daily [facility], DO     • melatonin  6 mg Oral HS Tiffanie Barrios, MARCONP     • metFORMIN  500 mg Oral BID With Meals Nestora Crigler, CRNP     • miconazole  1 Application Topical BID [facility], DO     • ondansetron  4 mg Oral Q6H PRN [facility], DO     • oxyCODONE  5 mg Oral Q4H PRN [facility], DO     • oxyCODONE  2.5 mg Oral Q4H PRN [facility], DO     • pantoprazole  40 mg Oral Early Morning [facility], DO     • polyethylene glycol  17 g Oral Daily PRN [facility] Rosanna, CRNP     • prochlorperazine  5 mg Oral Q6H PRN [facility], DO         Subjective/ HPI: Patient seen and examined. Patients overnight issues or events were reviewed with nursing or staff during rounds or morning huddle session.  New or overnight issues include the following:     No new or overnight issues. Offers no complaints except some right knee pain if tries to walk    ROS:   A 10 point ROS was performed; negative except as noted above. *Labs /Radiology studies reviewed  *Medications reviewed and reconciled as needed  *Please refer to order section for additional ordered labs studies  *Case discussed with primary attending during morning huddle case rounds    Physical Examination:  Vitals:   Vitals:    09/12/23 1415 09/12/23 2037 09/13/23 0534 09/13/23 0700   BP: 106/68 118/62 110/70 126/82   BP Location: Left arm Left arm Left arm Left arm   Pulse: 91 100 82 80   Resp: 17 18 19 18   Temp: 98.6 °F (37 °C) 98.3 °F (36.8 °C) 98.3 °F (36.8 °C)    TempSrc: Oral Oral Oral    SpO2: 96% 94% 95%    Weight:   91 kg (200 lb 9.9 oz)    Height:           General Appearance: no distress, conversive  HEENT: PERRLA, conjuctiva normal; oropharynx clear; mucous membranes moist   Neck:  Supple, normal ROM  Lungs: CTA, normal respiratory effort, no retractions, expiratory effort normal  CV: regular rate and rhythm; no rubs/murmurs/gallops, PMI normal   ABD: soft; ND/NT; +BS  EXT: no edema  Skin: normal turgor, normal texture, no rashes  Psych: affect normal, mood normal  Neuro: AAO. Able to flex/extend arm at elbow which is an improvement from last week     The above physical exam was reviewed and updated as determined by my evaluation of the patient on 9/13/2023. Invasive Devices     Drain  Duration           External Urinary Catheter 15 days                   VTE Pharmacologic Prophylaxis: Heparin  Code Status: Level 1 - Full Code  Current Length of Stay: 19 day(s)      Total time spent:  30 minutes with more than 50% spent counseling/coordinating care. Counseling includes discussion with patient re: progress  and discussion with patient of his/her current medical state/information. Coordination of patient's care was performed in conjunction with primary service.  Time invested included review of patient's labs, vitals, and management of their comorbidities with continued monitoring. In addition, this patient was discussed with medical team including physician and advanced extenders. The care of the patient was extensively discussed and appropriate treatment plan was formulated unique for this patient. Medical decision making for the day was made by supervising physician unless otherwise noted in their attestation statement. ** Please Note:  voice to text software may have been used in the creation of this document.  Although proof errors in transcription or interpretation are a potential of such software**

## 2023-09-13 NOTE — PROGRESS NOTES
09/13/23 9924   Pain Assessment   Pain Assessment Tool 0-10   Pain Score 5   Pain Location/Orientation Orientation: Right;Location: Knee; Location: Shoulder   Hospital Pain Intervention(s) Repositioned  (distractions, but nsg did check in during session for planned pain medications)   Restrictions/Precautions   Precautions Bed/chair alarms; Fall Risk;Pain;Supervision on toilet/commode   Comprehension   Comprehension (FIM) 5 - Understands basic directions and conversation   Expression   Expression (FIM) 5 - Needs help/cues only RARELY (< 10% of the time)   Social Interaction   Social Interaction (FIM) 6 - Interacts appropriately with others BUT requires extra  time   Problem Solving   Problem solving (FIM) 5 - Solves basic problems 90% of time   Memory   Memory (FIM) 5 - Loses track of time   Speech/Language/Cognition Assessmetn   Treatment Assessment Pt just finishing PT session, where pt wishing to continue to target use of written expression given L hand. SLP providing pt w/ word deduction tasks, starting w/ 4 clues words which describe an item. Pt was 13/15 accurate in determining the target items provided 4 clues, increasing to 15/15 given minimal probing clues or rephrasing words for pt to determine target word. In regard to written legibility, pt overall did have clearer letters in shorter words (containing 3-6 letters in length) vs longer words. But overall clarity was legible to read. Increasing difficulty given this task now by providing pt w/ 3 clue words to determine a target item. Pt was 22/25 accurate needing increased minimal probing clues to determine target words for the 3 items more challenging. Of note, pt was able to provide additional choices for one option. To provide pt a break from written expression, SLP and pt engaging in conversation about ongoing written practice to where SLP did show pt some resources which were on file.  Pt was agreeable for the written practice in letters, words of increasing length, short phrases and then sentences to copy for ongoing practice. Next task which SLP engaged pt in was a categorization task where 3 similar words were provided and pt was to determine the overarching category which these words belonged. During this task, pt did verbalize more difficulty in this vs word deduction task. It was noted that pt was able to determine the category in 14/20 items, needing minimal cues for 4 items increasing to 18/20 accuracy. The remaining 2 items required moderate cues to determine the categories. Last task which pt chose to complete was the word deduction task by providing 3 words to determine target words, where pt was 24/25 accurate, increasing to 25/25 given minimal probing clues. Again, overall written expression was legible throughout all tasks, but again, only eliciting word level given tasks today. At this time, pt will continue to benefit from skilled SLP services at this time to continue to maximize overall functional independence of cognitive and language skills in attempts to  caregiver burden over time. SLP Therapy Minutes   SLP Time In 0930   SLP Time Out 1030   SLP Total Time (minutes) 60   SLP Mode of treatment - Individual (minutes) 60   SLP Mode of treatment - Concurrent (minutes) 0   SLP Mode of treatment - Group (minutes) 0   SLP Mode of treatment - Co-treat (minutes) 0   SLP Mode of Treatment - Total time(minutes) 60 minutes   SLP Cumulative Minutes 685   Therapy Time missed   Time missed?  No

## 2023-09-13 NOTE — TEAM CONFERENCE
Acute RehabilitationTeam Conference Note  Date: 9/13/2023   Time: 11:10 AM       Patient Name:  Zane Pedersen       Medical Record Number: [id number]   YOB: 1965  Sex: Female          Room/Bed:  [facility] 967/[facility] 445-82  Payor Info:  Payor: DARA URIARTE / Plan: Tresa Ellington / Product Type: Blue Fee for Service /      Admitting Diagnosis: ICH (intracerebral hemorrhage) ([address]) [I61.9]   Admit Date/Time:  8/25/2023  3:19 PM  Admission Comments: No comment available     Primary Diagnosis:  ICH (intracerebral hemorrhage) ([address])  Principal Problem: ICH (intracerebral hemorrhage) ([address])    Patient Active Problem List    Diagnosis Date Noted   • Hyponatremia 08/31/2023   • GERD (gastroesophageal reflux disease) 08/25/2023   • Constipation 08/25/2023   • Anemia 08/16/2023   • ICH (intracerebral hemorrhage) ([address]) 08/15/2023   • Left leg pain 08/15/2023   • Chronic obstructive pulmonary disease with acute exacerbation ([address]) 07/26/2023   • Hyperlipidemia associated with type 2 diabetes mellitus ([address]) 07/26/2023   • Abnormal CT of the chest 06/28/2023   • Class 2 obesity with body mass index (BMI) of 39.0 to 39.9 in adult 06/28/2023   • CAD (coronary artery disease) 04/07/2023   • Right lumbosacral radiculopathy 10/12/2022   • Chronic bilateral low back pain with bilateral sciatica 08/31/2022   • Paresthesia of both feet 08/31/2022   • Postoperative visit 05/24/2022   • Incarcerated umbilical hernia 34/11/3611   • Diabetes mellitus type 2, controlled ([address]) 04/29/2022   • Continuous opioid dependence ([address]) 04/27/2022   • Moderate persistent asthma without complication 46/53/9952   • Cigarette nicotine dependence in remission 04/26/2022   • Primary hypertension 04/26/2022   • Fibromyalgia 04/26/2022   • Sjogren's syndrome ([address]) 10/19/2012   • Depression with anxiety 09/18/2012       Physical Therapy:    Weight Bearing Status: Full Weight Bearing  Transfers:  Moderate Assistance  Bed Mobility: Minimal Assistance  Amulation Distance (ft): 5 feet  Ambulation: Total Assistance  Assistive Device for Ambulation:  (L rail)  Wheelchair Mobility Distance: 150 ft  Wheelchair Mobility: Minimal Assistance  Number of Stairs:  (Unable to safely and effectively assess.)  Assistive Device for Stairs:  (Unable to safely and effectively assess.)  Stair Assistance:  (Unable to safely and effectively assess.)  Ramp:  (Unable to safely and effectively assess.)  Assistive Device for Ramp:  (Unable to safely and effectively assess.)  [facility] Support, 24 Hour Assisteance, First Floor Setup, Home Physical Therapy    Patient making progress with skilled PT intervention but remains limited by R hemiparesis accompanied with R knee pain, poor tolerance to activity, and self limiting behaviors. She also present with fear of new or hard activities and has declined standing, BWSS and ambulation trials despite education and encouragement. She has been able to make good progress in her transfers now needing only min/mod A with slide board transfers, Willian with w/c mobility, and mod A to stand. Patient ambulated at [facility] this date but requested to sit prior to reaching her maximum capacity. Despite progress, her family is unable to physically assist her. Her plan is to continue with acute rehab this week and transition to SNF next week for continued progress on her functional mobility (I) and safety. Occupational Therapy:  Eating: Independent  Grooming: Minimal Assistance  Bathing: Total Assistance, Assist of 2  Bathing: Total Assistance, Assist of 2  Upper Body Dressing: Moderate Assistance  Lower Body Dressing: Total Assistance, Assist of 2  Toileting: Total Assistance, Assist of 2  Tub/Shower Transfer: Total Assistance, Assist of 2  Toilet Transfer:  Total Assistance, Assist of 2  Cognition: Exceptions to WNL  Cognition: Decreased Executive Functions, Decreased Safety, Decreased Attention  Orientation: Person, Place, Time, Situation  Discharge Recommendations: Other ([facility])  DC Home with[de-identified] Family Support       Occupational Therapy Weekly Team Note    Pt continues to present with impairments in activity tolerance, endurance, standing balance/tolerance, sitting balance/tolerance, safety , attention , (R) attention, and coping skills . Additional functional barriers include fatigue, pain, (R) hemiparesis, decreased caregiver support, risk for falls, and home environment. Pt is functioning at overall Mod A for UB ADLs and Max-TA for LB ADLs. Family meeting w/ pt's  and sister, plan for [facility] prior to d/c home to allow for home modifications to be made and pt to maximize fxnl indep and dec caregiver burden. OT D/C recommendation is for [facility] as pt will benefit from ongoing OT services. Speech Therapy:  Mode of Communication: Verbal  Speech/Language: Expressive Aphasia (mild word finding)  Cognition: Exceptions to WNL  Cognition: Decreased Memory, Decreased Executive Functions, Decreased Attention, Decreased Comprehension, Decreased Safety  Orientation: Person, Place, Time, Situation  Discharge Recommendations: Other (subacute rehab)  DC Home with[de-identified]  (continued skilled SLP services)  Pt completed the CLQT+ on initial evaluation with a Composite Severity Rating score of 2.6 out of 4.0, correlating to overall mildly impaired  cognitive linguistic impairments at time of evaluation and in comparison to age matched peers ranging from 19-69 y/o. Current barriers which present include, decreased attention, slower processing time, decreased ST/working memory, decreased executive function skills (problem solving, reasoning, sequencing, organization of thoughts), judgement, fatigue and safety which impacts functional mobility at this time.  Pt does report higher level of independence prior to admission and will target both functional and higher level cognitive tasks throughout pt's stay on the acute rehab center. Pt is functioning at [address] for comprehension, supervision for expression and social interactions, min-mod A for executive functions and mod A for memory. At this time, pt will benefit from skilled SLP services targeting functional independence of cognitive linguistic skills in hopes to decrease caregiver burden over time. Update week of 9/5/2023: Pt continues to be followed for cognitive linguistic and language therapy where she is making progress towards current goals. Pt remains with deficits which present as barriers in the following areas: attention, slower processing time, decreased ST/working memory, decreased executive function skills (problem solving, reasoning, sequencing, organization of thoughts), judgement, fatigue and safety which impacts functional mobility at this time. Pt is also noted to present with mild word finding deficits. Both functional and higher level cognitive linguistic skills have been targeted with both types of tasks targeted as well. Plan to continue to focus on both areas. Currently, pt is functioning at supervision for comprehension and min assist for expression, problem solving and memory. At this time, pt is recommended for further skilled SLP services with focus on cognitive linguistic and language skills in order to maximize level of independence and to decrease caregiver burden on discharge. Update week of 9/12/2023: Pt continues to be followed for cognitive linguistic and language therapy as she is making steady progress towards goals at this time. Pt is improving in overall cognitive linguistic functioning and verbal expression, but does remain limited by deficits which present as barriers in the following areas: processing, ST/working memory, executive functions, higher level comprehension, higher level verbal expression/word finding and attention.  Recent sessions have been targeting higher level cognitive linguistic skills, including those required to complete IADL tasks. Currently, pt is functioning at supervision to mod I for comprehension, mod I for social interaction and supervision for expression, problem solving and memory. Pt's family (spouse and sister) have been provided with updates regarding pt's level of functioning and ongoing barriers. At this time, pt will continue to benefit from ongoing skilled SLP services to continue to maximize overall functional independence of cognitive linguistic skills to decrease caregiver burden over time. Nursing Notes:  Appetite: Good  Diet Type: Diabetic                                                                     Pain Location/Orientation: Orientation: Right, Location: Knee, Location: Shoulder  Pain Score: 8                       Hospital Pain Intervention(s): Repositioned (distractions, but nsg did check in during session for planned pain medications)             • Hypertension-blood pressure adequately controlled on current treatment per review of BP logs. Continue same treatment plan. • Diabetes-continue metformin 500 mg twice daily monitor with this adjustment  • Recent intracranial hemorrhage-optimize blood pressure has been cleared to resume and is receiving aspirin continue statin therapy  • Seen by NS and no surgical intervention indicated. • Etio = HTN  • Was unable to tolerate MRI even with sedation  • Goal with ICH is SBP<140    Pt is therapy transfers only. Incontinent of bowel and bladder. Right side flaccid . C/O foot pain, Oxy 5 mg Po PRN given at 2134      This week we will encourage independence with ADLs. We will monitor labs and vital signs. We  will educate pt and family about repositioning to prevent skin breakdown. We will assist with repositioning and performing routine skin checks. We will monitor for adequate pain control. We will as well monitor for constipation and medicate pt as ordered.   She  is incontinent of  bowel and bladder and we are maintain purewick at HS as per order. We are maintaining adequate fluid and oral intake. Pt sleeps very well. . Fall and safety precautions are maintained per protocol. We will increase safety awareness and keep pt free from falls. Case Management:     Discharge Planning  Living Arrangements: Lives w/ Spouse/significant other  Support Systems: Spouse/significant other  Assistance Needed: unknown  Type of Current Residence: Private residence  Current Home Care Services: No  Pt continues to participate with therapy and is making gains. Family meeting occurred earlier this week and everyone is in agreement for pt to continue with inpat therapies at a subacute level. [facility] selected [facility]. Continued stay due this Friday and cm anticipates transition early next week as long as insurance agrees. Is the patient actively participating in therapies? yes  List any modifications to the treatment plan:     Barriers Interventions   Right hemiparesis, tone,  Resting hand splint, therapy exercises   Self limiting behaviors, motivation, fear, anxiety Coping mechanisms, encouragement   Coordination  Therapy exercises   processing Word retrieval strategies, speech exercises         Is the patient making expected progress toward goals?  yes  List any update or changes to goals:     Medical Goals: Patient will be medically stable for discharge to [facility] upon completion of rehab program and Patient will be able to manage medical conditions and comorbid conditions with medications and follow up upon completion of rehab program    Weekly Team Goals:   Rehab Team Goals  ADL Team Goal: Patient will require supervision with ADLs with least restrictive device upon completion of rehab program  Transfer Team Goal: Patient will require assist with transfers with least restrictive device upon completion of rehab program  Locomotion Team Goal: Patient will require assist with locomotion with least restrictive device upon completion of rehab program (ambulatory vs w/c level)  Cognitive Team Goal: Patient will be independent for basic  tasks and require supervision for complex tasks upon completion of rehab program    Discussion: pt presents with the above barriers and is min to mod a slide board tx, min a w/c mobility. Pt stood at the railing and took a few steps. pts motivation effects abiity to increase mobility. Overall adls max to total a. Family mtg occurred and everyone is in agreement for subacute rehab with expected dc early next week. Anticipated Discharge Date:  tbd  [facility] Team Members Present: The following team members are supervising care for this patient and were present during this Weekly Team Conference.     Physician: Dr. Sally Salcido DO  : CONNOR Howard  Registered Nurse: Tessa Israel RN  Physical Therapist: Jose Roach DPT  Occupational Therapist: Eric Marshall MS, OTR/L  Speech Therapist: Eddie Bernard, [address] [facility]

## 2023-09-13 NOTE — PROGRESS NOTES
09/13/23 0830   Pain Assessment   Pain Assessment Tool 0-10   Pain Score 4   Pain Location/Orientation Orientation: Right;Location: Knee; Location: Shoulder   Pain Onset/Description Onset: Gradual;Frequency: Intermittent   Patient's Stated Pain Goal No pain   Hospital Pain Intervention(s) Repositioned; Rest   Restrictions/Precautions   Precautions Bed/chair alarms; Fall Risk;Pain;Supervision on toilet/commode   Weight Bearing Restrictions No   ROM Restrictions No   Braces or Orthoses   (RUE sling for xfers)   Cognition   Overall Cognitive Status Impaired   Arousal/Participation Alert; Cooperative   Attention Attends with cues to redirect   Orientation Level Oriented X4   Memory Decreased short term memory   Following Commands Follows one step commands with increased time or repetition   Lying to Sitting on Side of Bed   Type of Assistance Needed Supervision; Adaptive equipment   Comment heavy reliance on bed rail   Lying to Sitting on Side of Bed CARE Score 4   Sit to Stand   Type of Assistance Needed Physical assistance;Verbal cues   Physical Assistance Level Total assistance   Comment MODA x1 with SBA of second person   Sit to Stand CARE Score 1   Bed-Chair Transfer   Type of Assistance Needed Physical assistance;Verbal cues   Physical Assistance Level 26%-50%   Comment min/mod Ax1 slide board transfers; verbal cues for weight shift forwards. Stool used under both feet.    Chair/Bed-to-Chair Transfer CARE Score 3   Transfer Bed/Chair/Wheelchair   Adaptive Equipment Transfer Board   Car Transfer   Comment (S)  PLEASE TRIAL NEXT SESSION   Walk 10 Feet   Reason if not Attempted Safety concerns   Walk 10 Feet CARE Score 88   Walk 50 Feet with Two Turns   Reason if not Attempted Safety concerns   Walk 50 Feet with Two Turns CARE Score 88   Walk 150 Feet   Reason if not Attempted Safety concerns   Walk 150 Feet CARE Score 88   Walking 10 Feet on Uneven Surfaces   Reason if not Attempted Safety concerns   Walking 10 Feet on Uneven Surfaces CARE Score 88   Ambulation   Primary Mode of Locomotion Prior to Admission Walk   Does the patient walk? 2. Yes   Wheel 50 Feet with Two Turns   Type of Assistance Needed Physical assistance;Verbal cues   Physical Assistance Level 26%-50%   Comment A on R side for turns, needs cushion on to use L foot. Wheel 50 Feet with Two Turns CARE Score 3   Wheel 150 Feet   Type of Assistance Needed Physical assistance;Verbal cues   Physical Assistance Level 26%-50%   Comment A on R side for turns, needs cushion on to use L foot. Wheel 150 Feet CARE Score 3   Wheelchair mobility   Does the patient use a wheelchair? 1. Yes   Type of Wheelchair Used 1. Manual   Method Left upper extremity; Left lower extremity   Assistance Provided For Locking Brakes; Remove Leg Rest;Replace Leg Rest;Remove armrests;Replace armrests   Distance Level Surface (feet) 150 ft   Curb or Single Stair   Reason if not Attempted Safety concerns   1 Step (Curb) CARE Score 88   4 Steps   Reason if not Attempted Safety concerns   4 Steps CARE Score 88   12 Steps   Reason if not Attempted Safety concerns   12 Steps CARE Score 88   Picking Up Object   Reason if not Attempted Safety concerns   Picking Up Object CARE Score 88   Therapeutic Interventions   Neuromuscular Re-Education Standing at rail focused on weight shifting and standing tolerance x3 reps. Attempted to take steps but pt c/o increased pain to R knee and sat prematurely. Other SB transfers WC<>bed. bed put on max inflate to offer increased support during transfers. MIN/MODA when going into WC more MODA going up hill into bed. Step used under feet for leverage. Equipment Use   NuStep L2 x10 min pt declined use of RUE as she has had increased pain at shoulder   Assessment   Treatment Assessment Pt participated in skilled PT session with increased focus on standing tolerance, functional transfers, increased act tolerance and WC mobility.  Pt cont to c/o knee pain which limits her ability to tolerate standing and gait. Pt tends to sit quickly once she has pain making her unsafe and requiring MOD VC's. Pt declined further SB transfers WC<>mat table once in PT gym. Pt will cont POC as tolerated with cont focus on functional transfers, increased standing tolerance increased LE strengthening and NPP gait as able. Problem List Decreased strength;Decreased range of motion;Decreased endurance; Impaired balance;Decreased mobility; Decreased coordination;Decreased cognition;Obesity;Orthopedic restrictions;Pain   Barriers to Discharge Inaccessible home environment;Decreased caregiver support   PT Barriers   Functional Limitation Ramp negotiation;Car transfers;Stair negotiation;Transfers; Walking;Standing   Plan   Treatment/Interventions Functional transfer training;LE strengthening/ROM; Therapeutic exercise; Endurance training;Patient/family training;Gait training   Progress Slow progress, decreased activity tolerance   Recommendation   PT Discharge Recommendation Post acute rehabilitation services   Equipment Recommended Wheelchair   PT Therapy Minutes   PT Time In 0830   PT Time Out 0930   PT Total Time (minutes) 60   PT Mode of treatment - Individual (minutes) 60   PT Mode of treatment - Concurrent (minutes) 0   PT Mode of treatment - Group (minutes) 0   PT Mode of treatment - Co-treat (minutes) 0   PT Mode of Treatment - Total time(minutes) 60 minutes   PT Cumulative Minutes 1415   Therapy Time missed   Time missed?  No

## 2023-09-14 LAB
ANION GAP SERPL CALCULATED.3IONS-SCNC: 9 MMOL/L
BASOPHILS # BLD AUTO: 0.09 THOUSANDS/ÂΜL (ref 0–0.1)
BASOPHILS NFR BLD AUTO: 1 % (ref 0–1)
BUN SERPL-MCNC: 14 MG/DL (ref 5–25)
CALCIUM SERPL-MCNC: 9.4 MG/DL (ref 8.4–10.2)
CHLORIDE SERPL-SCNC: 100 MMOL/L (ref 96–108)
CO2 SERPL-SCNC: 29 MMOL/L (ref 21–32)
CREAT SERPL-MCNC: 0.58 MG/DL (ref 0.6–1.3)
EOSINOPHIL # BLD AUTO: 0.62 THOUSAND/ÂΜL (ref 0–0.61)
EOSINOPHIL NFR BLD AUTO: 8 % (ref 0–6)
ERYTHROCYTE [DISTWIDTH] IN BLOOD BY AUTOMATED COUNT: 16.2 % (ref 11.6–15.1)
GFR SERPL CREATININE-BSD FRML MDRD: 101 ML/MIN/1.73SQ M
GLUCOSE P FAST SERPL-MCNC: 111 MG/DL (ref 65–99)
GLUCOSE SERPL-MCNC: 111 MG/DL (ref 65–140)
GLUCOSE SERPL-MCNC: 114 MG/DL (ref 65–140)
GLUCOSE SERPL-MCNC: 137 MG/DL (ref 65–140)
GLUCOSE SERPL-MCNC: 138 MG/DL (ref 65–140)
GLUCOSE SERPL-MCNC: 139 MG/DL (ref 65–140)
HCT VFR BLD AUTO: 36.6 % (ref 34.8–46.1)
HGB BLD-MCNC: 11.5 G/DL (ref 11.5–15.4)
IMM GRANULOCYTES # BLD AUTO: 0.01 THOUSAND/UL (ref 0–0.2)
IMM GRANULOCYTES NFR BLD AUTO: 0 % (ref 0–2)
LYMPHOCYTES # BLD AUTO: 2.85 THOUSANDS/ÂΜL (ref 0.6–4.47)
LYMPHOCYTES NFR BLD AUTO: 36 % (ref 14–44)
MCH RBC QN AUTO: 28 PG (ref 26.8–34.3)
MCHC RBC AUTO-ENTMCNC: 31.4 G/DL (ref 31.4–37.4)
MCV RBC AUTO: 89 FL (ref 82–98)
MONOCYTES # BLD AUTO: 0.74 THOUSAND/ÂΜL (ref 0.17–1.22)
MONOCYTES NFR BLD AUTO: 9 % (ref 4–12)
NEUTROPHILS # BLD AUTO: 3.54 THOUSANDS/ÂΜL (ref 1.85–7.62)
NEUTS SEG NFR BLD AUTO: 46 % (ref 43–75)
NRBC BLD AUTO-RTO: 0 /100 WBCS
PLATELET # BLD AUTO: 383 THOUSANDS/UL (ref 149–390)
PMV BLD AUTO: 9.6 FL (ref 8.9–12.7)
POTASSIUM SERPL-SCNC: 4.1 MMOL/L (ref 3.5–5.3)
RBC # BLD AUTO: 4.11 MILLION/UL (ref 3.81–5.12)
SODIUM SERPL-SCNC: 138 MMOL/L (ref 135–147)
WBC # BLD AUTO: 7.85 THOUSAND/UL (ref 4.31–10.16)

## 2023-09-14 PROCEDURE — 97112 NEUROMUSCULAR REEDUCATION: CPT

## 2023-09-14 PROCEDURE — 97129 THER IVNTJ 1ST 15 MIN: CPT

## 2023-09-14 PROCEDURE — 85025 COMPLETE CBC W/AUTO DIFF WBC: CPT | Performed by: PHYSICIAN ASSISTANT

## 2023-09-14 PROCEDURE — 97530 THERAPEUTIC ACTIVITIES: CPT

## 2023-09-14 PROCEDURE — 82948 REAGENT STRIP/BLOOD GLUCOSE: CPT

## 2023-09-14 PROCEDURE — 97110 THERAPEUTIC EXERCISES: CPT

## 2023-09-14 PROCEDURE — 80048 BASIC METABOLIC PNL TOTAL CA: CPT | Performed by: PHYSICIAN ASSISTANT

## 2023-09-14 PROCEDURE — 92507 TX SP LANG VOICE COMM INDIV: CPT

## 2023-09-14 PROCEDURE — 97535 SELF CARE MNGMENT TRAINING: CPT

## 2023-09-14 PROCEDURE — 99232 SBSQ HOSP IP/OBS MODERATE 35: CPT | Performed by: NURSE PRACTITIONER

## 2023-09-14 RX ADMIN — BUPROPION HYDROCHLORIDE 150 MG: 150 TABLET, FILM COATED, EXTENDED RELEASE ORAL at 08:35

## 2023-09-14 RX ADMIN — GABAPENTIN 300 MG: 300 CAPSULE ORAL at 21:26

## 2023-09-14 RX ADMIN — MICONAZOLE NITRATE: 20 POWDER TOPICAL at 08:32

## 2023-09-14 RX ADMIN — BACLOFEN 5 MG: 10 TABLET ORAL at 08:34

## 2023-09-14 RX ADMIN — ASPIRIN 81 MG CHEWABLE TABLET 81 MG: 81 TABLET CHEWABLE at 08:34

## 2023-09-14 RX ADMIN — ATORVASTATIN CALCIUM 20 MG: 20 TABLET, FILM COATED ORAL at 17:27

## 2023-09-14 RX ADMIN — HEPARIN SODIUM 5000 UNITS: 5000 INJECTION INTRAVENOUS; SUBCUTANEOUS at 05:33

## 2023-09-14 RX ADMIN — METFORMIN HYDROCHLORIDE 500 MG: 500 TABLET ORAL at 17:27

## 2023-09-14 RX ADMIN — CYANOCOBALAMIN TAB 500 MCG 1000 MCG: 500 TAB at 08:34

## 2023-09-14 RX ADMIN — FERROUS SULFATE TAB 325 MG (65 MG ELEMENTAL FE) 325 MG: 325 (65 FE) TAB at 08:35

## 2023-09-14 RX ADMIN — ACETAMINOPHEN 650 MG: 325 TABLET, FILM COATED ORAL at 21:26

## 2023-09-14 RX ADMIN — LOSARTAN POTASSIUM 100 MG: 50 TABLET, FILM COATED ORAL at 08:33

## 2023-09-14 RX ADMIN — PANTOPRAZOLE SODIUM 40 MG: 40 TABLET, DELAYED RELEASE ORAL at 05:33

## 2023-09-14 RX ADMIN — OXYCODONE HYDROCHLORIDE 5 MG: 5 TABLET ORAL at 11:31

## 2023-09-14 RX ADMIN — METFORMIN HYDROCHLORIDE 500 MG: 500 TABLET ORAL at 08:35

## 2023-09-14 RX ADMIN — MELATONIN 6 MG: at 21:26

## 2023-09-14 RX ADMIN — OXYCODONE HYDROCHLORIDE 5 MG: 5 TABLET ORAL at 05:40

## 2023-09-14 RX ADMIN — HEPARIN SODIUM 5000 UNITS: 5000 INJECTION INTRAVENOUS; SUBCUTANEOUS at 13:53

## 2023-09-14 RX ADMIN — GABAPENTIN 300 MG: 300 CAPSULE ORAL at 08:34

## 2023-09-14 RX ADMIN — HEPARIN SODIUM 5000 UNITS: 5000 INJECTION INTRAVENOUS; SUBCUTANEOUS at 21:28

## 2023-09-14 RX ADMIN — FLUTICASONE FUROATE AND VILANTEROL TRIFENATATE 1 PUFF: 200; 25 POWDER RESPIRATORY (INHALATION) at 08:35

## 2023-09-14 RX ADMIN — MICONAZOLE NITRATE: 20 POWDER TOPICAL at 17:28

## 2023-09-14 RX ADMIN — BACLOFEN 5 MG: 10 TABLET ORAL at 17:27

## 2023-09-14 RX ADMIN — GABAPENTIN 300 MG: 300 CAPSULE ORAL at 17:27

## 2023-09-14 NOTE — PROGRESS NOTES
09/14/23 1130   Pain Assessment   Pain Assessment Tool 0-10   Pain Score 5   Pain Location/Orientation Orientation: Right;Location: Arm   Comprehension   Comprehension (FIM) 5 - Understands basic directions and conversation   Expression   Expression (FIM) 5 - Needs help/cues only RARELY (< 10% of the time)   Social Interaction   Social Interaction (FIM) 5 - Needs monitoring/encouragement  to participate/interact   Problem Solving   Problem solving (FIM) 5 - Solves basic problems 90% of time   Memory   Memory (FIM) 5 - Needs cueing reminders <10%   Speech/Language/Cognition Assessmetn   Treatment Assessment Pt sitting in wheelchair upon SLP entering, agreeable to skilled ST. Brief rapport was completed w/ pt as SLP is new to pt's care. SLP asking pt to recall previous activities completed in ST to assess STM. Pt recalled working on her writing skills. SLP reminded pt she was also working on her language skills. SLP discussed language retrieval strategies with pt prior to initiations activity that targeted both writing and language. Pt was given six categories and asked to write legibly 5 items in each category in under 10 minutes. Pt was able to complete 28/30 accurately in under 10 minutes and all were legible in context. One item was very vague and not acceptable and she did not identify one item under one of the given categories. While reviewing, SLP encouraged pt to use her language retrieval strategies to identify the missing item and she was able to identify a word. SLP discussed additional situations when she can use the strategies to assist herself with expressive language. SLP reviewed some items in the pt's folder that she would benefit from practicing. Pt nodding in agreement. SLP brought pt's lunch tray in and assisted with set up. Pt able to request assistance and items appropriately identifying strengths in simple problem solving and word finding.  Pt is recommended to continue to receive skilled ST while at the [facility] [place] to optimize her independence with using language retrieval strategies and to increase her overall cognitive-linguistic skills to reduce burden of care upon DC. SLP Therapy Minutes   SLP Time In 1130   SLP Time Out 1200   SLP Total Time (minutes) 30   SLP Mode of treatment - Individual (minutes) 30   SLP Mode of treatment - Concurrent (minutes) 0   SLP Mode of treatment - Group (minutes) 0   SLP Mode of treatment - Co-treat (minutes) 0   SLP Mode of Treatment - Total time(minutes) 30 minutes   SLP Cumulative Minutes 715   Therapy Time missed   Time missed?  No

## 2023-09-14 NOTE — PROGRESS NOTES
09/14/23 0830   Pain Assessment   Pain Assessment Tool 0-10   Pain Score 6   Pain Location/Orientation Orientation: Right;Location: Knee   Restrictions/Precautions   Precautions Bed/chair alarms;Cognitive; Fall Risk;Supervision on toilet/commode;Pain   Braces or Orthoses Sling  (with txs)   Cognition   Overall Cognitive Status Impaired   Subjective   Subjective pt reports pain in R shoulder and knee   Lying to Sitting on Side of Bed   Type of Assistance Needed Physical assistance   Physical Assistance Level 26%-50%   Lying to Sitting on Side of Bed CARE Score 3   Sit to Stand   Type of Assistance Needed Physical assistance   Physical Assistance Level 51%-75%   Sit to Stand CARE Score 2   Bed-Chair Transfer   Type of Assistance Needed Physical assistance   Physical Assistance Level 51%-75%   Comment min/modA slideboard; cues to " push like your going to stand"   Chair/Bed-to-Chair Transfer CARE Score 2   Transfer Bed/Chair/Wheelchair   Adaptive Equipment Transfer Board   Car Transfer   Type of Assistance Needed Physical assistance   Physical Assistance Level 76% or more   Comment slideboard; increase pain with getting legs into car   Car Transfer CARE Score 2   Walk 10 Feet   Reason if not Attempted Refused to perform   Walk 10 Feet CARE Score 7   Walk 50 Feet with Two Turns   Reason if not Attempted Safety concerns   Walk 50 Feet with Two Turns CARE Score 88   Walk 150 Feet   Reason if not Attempted Safety concerns   Walk 150 Feet CARE Score 88   Walking 10 Feet on Uneven Surfaces   Reason if not Attempted Safety concerns   Walking 10 Feet on Uneven Surfaces CARE Score 88   Curb or Single Stair   Reason if not Attempted Safety concerns   1 Step (Curb) CARE Score 88   4 Steps   Reason if not Attempted Safety concerns   4 Steps CARE Score 88   12 Steps   Reason if not Attempted Safety concerns   12 Steps CARE Score 88   Therapeutic Interventions   Strengthening LAQ with e-stim to R quads and tibA, PROM/AAROM from therapist for RLE. Flexibility B heel cord and HSx 5mins   Modalities 15mins MHP to R knee, estim- 15mins co-contraction of quads and tibA during ther exs, amp 9.4 ramp 5sec, 35pps, on/off 10:10   Assessment   Treatment Assessment pt cont to be self limiting due to pain and becomes tearful during therapy. therapist continues to educate pt on participating regardless of pain to work on progress nad neuro re-ed. pt able to stand 3x in // bars this session but cont to refuse to wt shift appropriately and declines wanting to take steps, despite good posture and support. pt still below baseline and requiring Ax1-2 at times for functional mobility. cont to work on tolerance, strenghtening and safety with mobility at w/c level. Barriers to Discharge Inaccessible home environment;Decreased caregiver support   PT Barriers   Functional Limitation Ramp negotiation;Car transfers;Stair negotiation;Transfers; Walking;Standing   Plan   Progress Slow progress, decreased activity tolerance   Recommendation   PT Discharge Recommendation Post acute rehabilitation services   Equipment Recommended Wheelchair   PT Therapy Minutes   PT Time In 0830   PT Time Out 1000   PT Total Time (minutes) 90   PT Mode of treatment - Individual (minutes) 90   PT Mode of treatment - Concurrent (minutes) 0   PT Mode of treatment - Group (minutes) 0   PT Mode of treatment - Co-treat (minutes) 0   PT Mode of Treatment - Total time(minutes) 90 minutes   PT Cumulative Minutes 1505   Therapy Time missed   Time missed?  No

## 2023-09-14 NOTE — PLAN OF CARE
Problem: Prexisting or High Potential for Compromised Skin Integrity  Goal: Skin integrity is maintained or improved  Description: INTERVENTIONS:  - Identify patients at risk for skin breakdown  - Assess and monitor skin integrity  - Assess and monitor nutrition and hydration status  - Monitor labs   - Assess for incontinence   - Turn and reposition patient  - Assist with mobility/ambulation  - Relieve pressure over bony prominences  - Avoid friction and shearing  - Provide appropriate hygiene as needed including keeping skin clean and dry  - Evaluate need for skin moisturizer/barrier cream  - Collaborate with interdisciplinary team   - Patient/family teaching  - Consider wound care consult   Outcome: Progressing     Problem: PAIN - ADULT  Goal: Verbalizes/displays adequate comfort level or baseline comfort level  Description: Interventions:  - Encourage patient to monitor pain and request assistance  - Assess pain using appropriate pain scale  - Administer analgesics based on type and severity of pain and evaluate response  - Implement non-pharmacological measures as appropriate and evaluate response  - Consider cultural and social influences on pain and pain management  - Notify physician/advanced practitioner if interventions unsuccessful or patient reports new pain  Outcome: Progressing     Problem: INFECTION - ADULT  Goal: Absence or prevention of progression during hospitalization  Description: INTERVENTIONS:  - Assess and monitor for signs and symptoms of infection  - Monitor lab/diagnostic results  - Monitor all insertion sites, i.e. indwelling lines, tubes, and drains  - Monitor endotracheal if appropriate and nasal secretions for changes in amount and color  - [place] appropriate cooling/warming therapies per order  - Administer medications as ordered  - Instruct and encourage patient and family to use good hand hygiene technique  - Identify and instruct in appropriate isolation precautions for identified infection/condition  Outcome: Progressing  Goal: Absence of fever/infection during neutropenic period  Description: INTERVENTIONS:  - Monitor WBC    Outcome: Progressing     Problem: SAFETY ADULT  Goal: Patient will remain free of falls  Description: INTERVENTIONS:  - Educate patient/family on patient safety including physical limitations  - Instruct patient to call for assistance with activity   - Consult OT/PT to assist with strengthening/mobility   - Keep Call bell within reach  - Keep bed low and locked with side rails adjusted as appropriate  - Keep care items and personal belongings within reach  - Initiate and maintain comfort rounds  - Make Fall Risk Sign visible to staff  - Offer Toileting every 2 Hours, in advance of need  - Initiate/Maintain bed/chair alarm  - Obtain necessary fall risk management equipment: yellow socks/bracelet  - Apply yellow socks and bracelet for high fall risk patients  - Consider moving patient to room near nurses station  Outcome: Progressing     Problem: DISCHARGE PLANNING  Goal: Discharge to home or other facility with appropriate resources  Description: INTERVENTIONS:  - Identify barriers to discharge w/patient and caregiver  - Arrange for needed discharge resources and transportation as appropriate  - Identify discharge learning needs (meds, wound care, etc.)  - Arrange for interpretive services to assist at discharge as needed  - Refer to Case Management Department for coordinating discharge planning if the patient needs post-hospital services based on physician/advanced practitioner order or complex needs related to functional status, cognitive ability, or social support system  Outcome: Progressing

## 2023-09-14 NOTE — PROGRESS NOTES
NEUROPSYCHOLOGY  CLINICAL PROGRESS NOTE   Karen Yuan 62 y.o. :1965 female MRN: [id number]  DOS:23  Unit/Bed#: -01 Encounter: [id number]      Requested by (Physician/Service): Mandie Adler DO      HISTORY: Karen Yuan is a 59-year-old female with history of hypertension, hyperlipidemia, diabetes type 2, obesity, eosinophilic asthma, COPD, fibromyalgia, Sjogren syndrome, lumbar spondylosis with grade 1 anterior spondylolisthesis who presents to the hospital on 2023 due to acute onset right upper and right lower extremity weakness with sensory loss.  Additionally with right-sided facial droop and dysarthria.  The patient was noted to have an acute intraparenchymal hemorrhage on CT in the left thalamic region with surrounding edema.  A CTA was negative.  Patient was initially placed on a Cardene drip.  Course complicated by significant left-sided leg pain and was recommended on treatment for radicular symptoms including Tylenol, gabapentin and potentially steroids.  Neurology was consulted and recommended MRI of the brain with and without contrast while holding antithrombotics.  The MRI showed a stable left thalamic capsular intraparenchymal hemorrhage with surrounding edema without any other mass effect.  A repeat CT was completed on 2023 after worsening symptoms including worsening speech slurring with stable findings.  Additionally there was a rapid response after a fall with head strike on  with a repeat CT of the head without acute findings.  Additionally antihypertensive regimen was altered as she was previously on telmisartan and atenolol and is currently on amlodipine, losartan and hydralazine with better control. The patient was evaluated by the Rehabilitation team and deemed an appropriate candidate for comprehensive inpatient rehabilitation and admitted to the [address] [place] [address] on 2023  3:19 PM.  Functional deficits: impaired mobility, self care.   PT/OT/SLP initiated. Sheila Burton goals are to achieve a min assist level with mobility and self care.  Prognosis is fair to good.  ELOS is 21 days.  Estimated discharge is home. Past Medical History:   Diagnosis Date   • Arthritis    • Asthma    • Continuous opioid dependence ([address]) 4/27/2022   • Diabetes mellitus ([address])    • Diverticulitis of colon    • Fibromyalgia 04/26/2022   • Headache(784.0)    • History of mammogram 2018   • History of tobacco abuse 04/26/2022   • Hypertension    • Nausea 04/29/2022   • Obesity    • Sjogren's syndrome ([address]) 10/19/2012   • Urinary tract infection        Patient Active Problem List    Diagnosis Date Noted   • Hyponatremia 08/31/2023   • GERD (gastroesophageal reflux disease) 08/25/2023   • Constipation 08/25/2023   • Anemia 08/16/2023   • ICH (intracerebral hemorrhage) ([address]) 08/15/2023   • Left leg pain 08/15/2023   • Chronic obstructive pulmonary disease with acute exacerbation ([address]) 07/26/2023   • Hyperlipidemia associated with type 2 diabetes mellitus ([address]) 07/26/2023   • Abnormal CT of the chest 06/28/2023   • Class 2 obesity with body mass index (BMI) of 39.0 to 39.9 in adult 06/28/2023   • CAD (coronary artery disease) 04/07/2023   • Right lumbosacral radiculopathy 10/12/2022   • Chronic bilateral low back pain with bilateral sciatica 08/31/2022   • Paresthesia of both feet 08/31/2022   • Postoperative visit 05/24/2022   • Incarcerated umbilical hernia 77/24/6722   • Diabetes mellitus type 2, controlled ([address]) 04/29/2022   • Continuous opioid dependence ([address]) 04/27/2022   • Moderate persistent asthma without complication 47/85/6132   • Cigarette nicotine dependence in remission 04/26/2022   • Primary hypertension 04/26/2022   • Fibromyalgia 04/26/2022   • Sjogren's syndrome ([address]) 10/19/2012   • Depression with anxiety 09/18/2012       Body mass index is 39.18 kg/m².     Past Medical History:     Past Medical History:   Diagnosis Date   • Arthritis    • Asthma    • Continuous opioid dependence ([address]) 2022   • Diabetes mellitus ([address])    • Diverticulitis of colon    • Fibromyalgia 2022   • Headache(784.0)    • History of mammogram 2018   • History of tobacco abuse 2022   • Hypertension    • Nausea 2022   • Obesity    • Sjogren's syndrome ([address]) 10/19/2012   • Urinary tract infection         Past Surgical History:     Past Surgical History:   Procedure Laterality Date   • APPENDECTOMY     • BREAST BIOPSY Left     unsure of year   • CARPAL TUNNEL RELEASE     • COLONOSCOPY  2017    repeat in    • EPIDURAL BLOCK INJECTION N/A 2023    Procedure: L5-S1 EPIDURALSTEROID INJECTION (33833);   Surgeon: Alondra Grewal DO;  Location:  MAIN OR;  Service: Pain Management    • FOOT SURGERY     • HERNIA REPAIR  2022   • NECK SURGERY      spine fusion    • [place] RPR UMBILICAL HRNA 5 YRS/> REDUCIBLE N/A 2022    Procedure: REPAIR HERNIA UMBILICAL;  Surgeon: Stoney Sorto MD;  Location:  MAIN OR;  Service: General         Allergies:     No Known Allergies      Family and Social Support:   No data recorded    Social History:    Social History     Socioeconomic History   • Marital status: /Civil Union     Spouse name: None   • Number of children: None   • Years of education: None   • Highest education level: None   Occupational History   • Occupation:     Tobacco Use   • Smoking status: Former     Packs/day: 0.50     Years: 35.00     Total pack years: 17.50     Types: Cigarettes     Start date: 0     Quit date: 2023     Years since quittin.4     Passive exposure: Past   • Smokeless tobacco: Never   • Tobacco comments:     per pt, 5 a day - As per [facility] [facility]   • Vaping Use: Never used   Substance and Sexual Activity   • Alcohol use: Not Currently     Comment: occasional    • Drug use: Never   • Sexual activity: Not Currently     Partners: Male     Birth control/protection: None   Other Topics Concern   • None   Social History Narrative    · Most recent tobacco use screenin2019      · Caffeine intake:   Occasional      · Seat belts used routinely:   Yes      · Smoke alarm in home: Yes      · Has the Patient had a mammogram to screen for breast cancer within 24 months:   Yes      · Please enter the date of the Patient's previous mammogram.:  march of last year. As per Adi      Social Determinants of Health     Financial Resource Strain: Not on file   Food Insecurity: No Food Insecurity (2023)    Hunger Vital Sign    • Worried About Running Out of Food in the Last Year: Never true    • Ran Out of Food in the Last Year: Never true   Transportation Needs: No Transportation Needs (2023)    PRAPARE - Transportation    • Lack of Transportation (Medical): No    • Lack of Transportation (Non-Medical):  No   Physical Activity: Not on file   Stress: Not on file   Social Connections: Not on file   Intimate Partner Violence: Not on file   Housing Stability: Low Risk  (2023)    Housing Stability Vital Sign    • Unable to Pay for Housing in the Last Year: No    • Number of Places Lived in the Last Year: 1    • Unstable Housing in the Last Year: No        Family History:    Family History   Problem Relation Age of Onset   • Hypertension Mother    • Heart disease Mother    • Hypertension Father    • Heart disease Father    • Asthma Sister    • No Known Problems Sister    • No Known Problems Sister    • Diabetes Maternal Grandmother    • No Known Problems Maternal Grandfather    • No Known Problems Paternal Grandmother    • No Known Problems Paternal Grandfather    • Breast cancer Paternal Aunt    • Pancreatic cancer Paternal Uncle    • Colon cancer Neg Hx    • Ovarian cancer Neg Hx    • Uterine cancer Neg Hx    • Cervical cancer Neg Hx        Medications:     Current Facility-Administered Medications:   •  acetaminophen (TYLENOL) tablet 650 mg, 650 mg, Oral, Q6H PRN, Reyna Cedeno DO, 650 mg at 23 1905  •  albuterol (Chiki Nasuti HFA) inhaler 2 puff, 2 puff, Inhalation, Q4H PRN, Angel Luis Milton DO, 2 puff at 09/04/23 0919  •  aspirin chewable tablet 81 mg, 81 mg, Oral, Daily, Angel Luis Milton DO, 81 mg at 09/13/23 0827  •  atorvastatin (LIPITOR) tablet 20 mg, 20 mg, Oral, Daily With Dinner, Angel Luis Milton DO, 20 mg at 09/13/23 1653  •  baclofen tablet 5 mg, 5 mg, Oral, BID, ARSENIO Pinto, 5 mg at 09/13/23 1707  •  bisacodyl (DULCOLAX) rectal suppository 10 mg, 10 mg, Rectal, Daily PRN, Joselo Grace MD, 10 mg at 09/03/23 1826  •  bisacodyl (DULCOLAX) rectal suppository 10 mg, 10 mg, Rectal, Once, ARSENIO Pinto  •  buPROPion (WELLBUTRIN XL) 24 hr tablet 150 mg, 150 mg, Oral, QAM, Angel Luis Milton DO, 150 mg at 09/13/23 0827  •  calcium carbonate (TUMS) chewable tablet 1,000 mg, 1,000 mg, Oral, TID PRN, Laureen Tracy PA-C, 1,000 mg at 08/26/23 1241  •  cyanocobalamin (VITAMIN B-12) tablet 1,000 mcg, 1,000 mcg, Oral, Daily, Angel Luis Milton DO, 1,000 mcg at 09/13/23 0827  •  diazepam (VALIUM) tablet 2 mg, 2 mg, Oral, BID PRN, Angel Luis Milton DO, 2 mg at 09/07/23 2303  •  ferrous sulfate tablet 325 mg, 325 mg, Oral, Daily With Breakfast, Angel Luis Milton DO, 325 mg at 09/13/23 0827  •  fluticasone-vilanterol 200-25 mcg/actuation 1 puff, 1 puff, Inhalation, Daily, Angel Luis Milton DO, 1 puff at 09/13/23 9691  •  gabapentin (NEURONTIN) capsule 300 mg, 300 mg, Oral, TID, Angel Luis Milton DO, 300 mg at 09/13/23 1653  •  heparin (porcine) subcutaneous injection 5,000 Units, 5,000 Units, Subcutaneous, Q8H [address], Angel Luis Milton DO, 5,000 Units at 09/13/23 1433  •  hydrOXYzine HCL (ATARAX) tablet 25 mg, 25 mg, Oral, TID PRN, ARSENIO Pinto  •  insulin lispro (HumaLOG) 100 units/mL subcutaneous injection 1-6 Units, 1-6 Units, Subcutaneous, TID AC, 1 Units at 09/13/23 1237 **AND** Fingerstick Glucose (POCT), , , 4x Daily AC and at bedtime, Angel Luis Milton DO  •  insulin lispro (HumaLOG) 100 units/mL subcutaneous injection 1-6 Units, 1-6 Units, Subcutaneous, HS, Elgin Zari, DO, 1 Units at 09/08/23 2117  •  lactulose oral solution 20 g, 20 g, Oral, Daily PRN, Luis Zari, DO  •  losartan (COZAAR) tablet 100 mg, 100 mg, Oral, Daily, Elgin Zari, DO, 100 mg at 09/13/23 0827  •  melatonin tablet 6 mg, 6 mg, Oral, HS, MARCO PintoNP, 6 mg at 09/12/23 2225  •  metFORMIN (GLUCOPHAGE) tablet 500 mg, 500 mg, Oral, BID With Meals, ARSENIO East, 500 mg at 09/13/23 1653  •  miconazole (MICOTIN) 2 % powder 1 Application, 1 Application, Topical, BID, Elgin Zari, DO, 1 Application at 71/13/26 1707  •  ondansetron (ZOFRAN-ODT) dispersible tablet 4 mg, 4 mg, Oral, Q6H PRN, Elgin Zari, DO  •  oxyCODONE (ROXICODONE) IR tablet 5 mg, 5 mg, Oral, Q4H PRN, Luis Zari, DO, 5 mg at 09/13/23 1813  •  oxyCODONE (ROXICODONE) split tablet 2.5 mg, 2.5 mg, Oral, Q4H PRN, Elgin Zari, DO, 2.5 mg at 09/12/23 1608  •  pantoprazole (PROTONIX) EC tablet 40 mg, 40 mg, Oral, Early Morning, Luis Zari, DO, 40 mg at 09/13/23 0532  •  polyethylene glycol (MIRALAX) packet 17 g, 17 g, Oral, Daily PRN, MARCO PintoNP, 17 g at 09/12/23 1122  •  prochlorperazine (COMPAZINE) tablet 5 mg, 5 mg, Oral, Q6H PRN, Elgin Zari, DO        OBSERVATIONS:     Appearance  __x__Neat ____Disheveled ____Overweight ____Underweight ____Frail    Speech  __x_Fluid, Articulate __x__Spontaneous ____Circumlocutions ____Word Finding difficulties ____Dysarthria ____Circumstantial ____Paucity ____Perseverative ____Pressured ____ Tangential     Thought Process/Content  __x__Coherent  ____Preoccupations____Ruminations____Obsessions____Hallucinations ____Delusions ____Flight of Ideas ____Distortions ____Distracted ____Suicidal Ideation ____Homicidal Ideation ____ Memory Issues ____ Perseveration ____ Tangential    Interaction  _x___Engaged__x__Cooperative____Superficial____Detached____Fearful____Guarded____Suspicious ____Poor [facility] ____Aggressive    Affect  ____Neutral__x__Positive____Anxiety____Worried____Irritable____Angry____Depressed/Dysphoric ____Euthymic _____ [place] ____ Fatigued     Behavior  _x__Spontaneous x____Purposeful ____Slowed Initiation ____Apathetic ____Impulsive ____Dyscontrolled ____Hypoactive ____Hyperactive ____Repetitive/Perseverative      Overall Progress:    ____Very Declined ____Declined ____Stable __x__Improved ____Very Improved    Motivation and Participation:    ____Very Poor ____Poor ____Fair __x__Good ___xVery Good                    ASSESSMENT & RECOMMENDATIONS:   Mood continues to improve. Pt very excited she was able to move her R arm today and feels encouraged. No recent worries about prior break ins. Pt smiled frequently and made numerous positive self statements. Although she learned of a co-worker who passed away today, her coping is improved. Sleep continues to improve. Pt continues to feel encouraged by her progress in rehab. Pt is making progress in psychotherapy and  reports feeling  motivated to continue working towards rehab goals. Provided CBT and mindfulness based interventions, as well as supportive psychotherapy. Addressed coping, adjustment, and motivation. Total time with pt - 20 min. I will continue to evaluate pt's emotional functioning and status and provide supportive and mindfulness interventions during pt's stay. Effective coping strategies, distress tolerance, breathing exercises will be key interventions. Continued Psychological intervention is medically necessary to:  __x__ Maintain current progress  __X_   Achieve Therapy Goals   __X__Prevent relapse       DIAGNOSIS:  Adjustment Disorder with Anxiety      Thank you for the opportunity to participate in Ms. Kumar Alexander' care. Mitra Cononrs.  Nelson Urrutia, Ph.D.  Licensed Psychologist

## 2023-09-14 NOTE — PROGRESS NOTES
09/14/23 1230   Pain Assessment   Pain Assessment Tool 0-10   Pain Score 6   Pain Location/Orientation Orientation: Right;Location: Shoulder   Pain Onset/Description Onset: Ongoing   Effect of Pain on Daily Activities Tolerates   Patient's Stated Pain Goal No pain   Hospital Pain Intervention(s) Rest  (gentle ROM to R shoulder)   Multiple Pain Sites No   Restrictions/Precautions   Precautions Bed/chair alarms;Cognitive; Fall Risk;Pain;Supervision on toilet/commode   Weight Bearing Restrictions No   ROM Restrictions No   Braces or Orthoses Sling  (RUE sling for transfer's)   Lifestyle   Autonomy "The arm is starting to move."   Sit to Lying   Type of Assistance Needed Physical assistance   Physical Assistance Level 51%-75%   Comment A for trunk control and to manage RLE onto OT mat   Sit to Lying CARE Score 2   Lying to Sitting on Side of Bed   Type of Assistance Needed Physical assistance   Physical Assistance Level 26%-50%   Comment A for trunk control   Lying to Sitting on Side of Bed CARE Score 3   Sit to Stand   Type of Assistance Needed Physical assistance   Physical Assistance Level Total assistance   Comment A x2 for safety STS to BR x2   Sit to Stand CARE Score 1   Bed-Chair Transfer   Type of Assistance Needed Physical assistance; Adaptive equipment;Verbal cues   Physical Assistance Level 51%-75%   Comment modA SBT W/C <> mat with cues for proper body mechanics   Chair/Bed-to-Chair Transfer CARE Score 2   Toileting Hygiene   Type of Assistance Needed Physical assistance   Physical Assistance Level Total assistance   Comment A x2 in stance at BR; A x1 for CM and hygiene, A x1 for steadying and to block R knee to prevent buckling   Toileting Hygiene CARE Score 1   Toilet Transfer   Type of Assistance Needed Physical assistance   Physical Assistance Level Total assistance   Comment Completed SBT W/C <> drop arm PF BSC with modA x2   Toilet Transfer CARE Score 1   ROM- Right Upper Extremities   R Shoulder PROM;AAROM; Flexion;ABduction; Extension;Horizontal ABduction   R Elbow PROM;AAROM;Prolonged stretch;Elbow flexion;Elbow extension   R Wrist PROM;AAROM;Wrist flexion;Wrist extension   R Hand PROM;AAROM;Prolonged stretch; Thumb; Index finger; Long finger;Ring finger;Little finger   R Position Supine   R Weight/Reps/Sets 10 reps of each plane   RUE ROM Comment Engaged in supine PROM/AAROM to RUE with pt tolerating well despite initial report of 6/10 pain in R shoulder. Focus of activity to prevent joint contracture. Pt observed with slight improvement with shoulder ABD when in gravity eliminated position. Neuromuscular Education   Comments Seated at table top, pt engaged in RUE fxnl grasp/release patterns to manipulate cones. Difficulty with achieving digit extension requiring assist, however, pt able to achieve fxnl grasp to manipulate cones. Performed 20 reps with focus on maximizing fxnl use of RUE. Cognition   Overall Cognitive Status Impaired   Arousal/Participation Alert; Cooperative   Attention Attends with cues to redirect   Orientation Level Oriented X4   Memory Decreased short term memory   Following Commands Follows one step commands with increased time or repetition   Activity Tolerance   Activity Tolerance Patient tolerated treatment well   Assessment   Treatment Assessment Pt participated in 75 min skilled OT Tx session with focus on fxnl transfer's, ADL of toileting, LUE PROM/AAROM, and RUE fxnl grasp/release patterns. See above for further Tx details. Pt contineus to be limited by dec ROM/strength in RUE, dec fxnl reach, dec standing balance, and pain, impacting pt's fxnl perofrmance with ADL's. Slight improvement noted with RUE shoulder ABD (when in gravity eliminated position) and digit flexion to perform gross grasp. Contineus to req A x2 for ADL of toileting for increased safety at this time.  Pt would continue to benefit from skilled OT services to continue addressing above noted barriers in order to progress towards OT goals and dec matias panchal upon D/C. Pt requesting to remain OOB in W/C upon completing OT session. Chair alarm activated and call bell with in reach. Prognosis Fair   Problem List Decreased strength;Decreased range of motion;Decreased endurance; Impaired balance;Decreased mobility; Decreased coordination;Decreased cognition; Impaired judgement;Decreased safety awareness;Pain   Barriers to Discharge Inaccessible home environment;Decreased caregiver support   Plan   Treatment/Interventions ADL retraining;Functional transfer training; Therapeutic exercise; Endurance training;Patient/family training   Progress Slow progress, decreased activity tolerance   Recommendation   OT Discharge Recommendation Post acute rehabilitation services  (subacute rehab)   OT Therapy Minutes   OT Time In 1230   OT Time Out 1345   OT Total Time (minutes) 75   OT Mode of treatment - Individual (minutes) 75   OT Mode of treatment - Concurrent (minutes) 0   OT Mode of treatment - Group (minutes) 0   OT Mode of treatment - Co-treat (minutes) 0   OT Mode of Treatment - Total time(minutes) 75 minutes   OT Cumulative Minutes 1565

## 2023-09-14 NOTE — PLAN OF CARE
Problem: Prexisting or High Potential for Compromised Skin Integrity  Goal: Skin integrity is maintained or improved  Description: INTERVENTIONS:  - Identify patients at risk for skin breakdown  - Assess and monitor skin integrity  - Assess and monitor nutrition and hydration status  - Monitor labs   - Assess for incontinence   - Turn and reposition patient  - Assist with mobility/ambulation  - Relieve pressure over bony prominences  - Avoid friction and shearing  - Provide appropriate hygiene as needed including keeping skin clean and dry  - Evaluate need for skin moisturizer/barrier cream  - Collaborate with interdisciplinary team   - Patient/family teaching  - Consider wound care consult   Outcome: Progressing     Problem: PAIN - ADULT  Goal: Verbalizes/displays adequate comfort level or baseline comfort level  Description: Interventions:  - Encourage patient to monitor pain and request assistance  - Assess pain using appropriate pain scale  - Administer analgesics based on type and severity of pain and evaluate response  - Implement non-pharmacological measures as appropriate and evaluate response  - Consider cultural and social influences on pain and pain management  - Notify physician/advanced practitioner if interventions unsuccessful or patient reports new pain  Outcome: Progressing     Problem: INFECTION - ADULT  Goal: Absence or prevention of progression during hospitalization  Description: INTERVENTIONS:  - Assess and monitor for signs and symptoms of infection  - Monitor lab/diagnostic results  - Monitor all insertion sites, i.e. indwelling lines, tubes, and drains  - Monitor endotracheal if appropriate and nasal secretions for changes in amount and color  - [place] appropriate cooling/warming therapies per order  - Administer medications as ordered  - Instruct and encourage patient and family to use good hand hygiene technique  - Identify and instruct in appropriate isolation precautions for identified infection/condition  Outcome: Progressing  Goal: Absence of fever/infection during neutropenic period  Description: INTERVENTIONS:  - Monitor WBC    Outcome: Progressing     Problem: SAFETY ADULT  Goal: Patient will remain free of falls  Description: INTERVENTIONS:  - Educate patient/family on patient safety including physical limitations  - Instruct patient to call for assistance with activity   - Consult OT/PT to assist with strengthening/mobility   - Keep Call bell within reach  - Keep bed low and locked with side rails adjusted as appropriate  - Keep care items and personal belongings within reach  - Initiate and maintain comfort rounds  - Make Fall Risk Sign visible to staff  - Offer Tors, in advance of n  - Obtain necessary fall risk management equipme  - Apply yellow socks and bracelet for high fall risk patients  - Consider moving patient to room near nurses station  Outcome: Progressing  Goal: Maintain or return to baseline ADL function  Description: INTERVENTIONS:  -  Assess patient's ability to carry out ADLs; assess patient's baseline for ADL function and identify physical deficits which impact ability to perform ADLs (bathing, care of mouth/teeth, toileting, grooming, dressing, etc.)  - Assess/evaluate cause of self-care deficits   - Assess range of motion  - Assess patient's mobility; develop plan if impaired  - Assess patient's need for assistive devices and provide as appropriate  - Encourage maximum independence but intervene and supervise when necessary  - Involve family in performance of ADLs  - Assess for home care needs following discharge   - Consider OT consult to assist with ADL evaluation and planning for discharge  - Provide patient education as appropriate  Outcome: Progressing  Goal: Maintains/Returns to pre admission functional level  Description: INTERVENTIONS:  - Perform BMAT or MOVE assessment daily.   - Set and communicate daily mobility goal to care team and patient/family/caregiver. - Collaborate with rehabilitation services on mobility goals if consulted  - Perform Rans a day. - Reposition pats. - Dangle pa  - Stand patient  - Ambulat  - Out of bed to ch  - Out of bed for meal  - Out of bed for toileting  - Record patient progress and toleration of activity level   Outcome: Progressing     Problem: DISCHARGE PLANNING  Goal: Discharge to home or other facility with appropriate resources  Description: INTERVENTIONS:  - Identify barriers to discharge w/patient and caregiver  - Arrange for needed discharge resources and transportation as appropriate  - Identify discharge learning needs (meds, wound care, etc.)  - Arrange for interpretive services to assist at discharge as needed  - Refer to Case Management Department for coordinating discharge planning if the patient needs post-hospital services based on physician/advanced practitioner order or complex needs related to functional status, cognitive ability, or social support system  Outcome: Progressing     Problem: Nutrition/Hydration-ADULT  Goal: Nutrient/Hydration intake appropriate for improving, restoring or maintaining nutritional needs  Description: Monitor and assess patient's nutrition/hydration status for malnutrition. Collaborate with interdisciplinary team and initiate plan and interventions as ordered. Monitor patient's weight and dietary intake as ordered or per policy. Utilize nutrition screening tool and intervene as necessary. Determine patient's food preferences and provide high-protein, high-caloric foods as appropriate.      INTERVENTIONS:  - Monitor oral intake, urinary output, labs, and treatment plans  - Assess nutrition and hydration status and recommend course of action  - Evaluate amount of meals eaten  - Assist patient with eating if necessary   - Allow adequate time for meals  - Recommend/ encourage appropriate diets, oral nutritional supplements, and vitamin/mineral supplements  - Order, calculate, and assess calorie counts as needed  - Recommend, monitor, and adjust tube feedings and TPN/PPN based on assessed needs  - Assess need for intravenous fluids  - Provide specific nutrition/hydration education as appropriate  - Include patient/family/caregiver in decisions related to nutrition  Outcome: Progressing     Problem: MOBILITY - ADULT  Goal: Maintain or return to baseline ADL function  Description: INTERVENTIONS:  -  Assess patient's ability to carry out ADLs; assess patient's baseline for ADL function and identify physical deficits which impact ability to perform ADLs (bathing, care of mouth/teeth, toileting, grooming, dressing, etc.)  - Assess/evaluate cause of self-care deficits   - Assess range of motion  - Assess patient's mobility; develop plan if impaired  - Assess patient's need for assistive devices and provide as appropriate  - Encourage maximum independence but intervene and supervise when necessary  - Involve family in performance of ADLs  - Assess for home care needs following discharge   - Consider OT consult to assist with ADL evaluation and planning for discharge  - Provide patient education as appropriate  Outcome: Progressing  Goal: Maintains/Returns to pre admission functional level  Description: INTERVENTIONS:  - Perform BMAT or MOVE assessment daily.   - Set and communicate daily mobility goal to care team and patient/family/caregiver. - Collaborate with rehabilitation services on mobility goals if consulted  - Perform Range a day. - Reposition patients.   - Dangle patien  - Stand patient  - Ambulate pat  - Out of bed to  - Out of bed for  - Out of bed for toileting  - Record patient progress and toleration of activity level   Outcome: Progressing

## 2023-09-15 LAB
GLUCOSE SERPL-MCNC: 104 MG/DL (ref 65–140)
GLUCOSE SERPL-MCNC: 112 MG/DL (ref 65–140)
GLUCOSE SERPL-MCNC: 115 MG/DL (ref 65–140)
GLUCOSE SERPL-MCNC: 127 MG/DL (ref 65–140)

## 2023-09-15 PROCEDURE — 97110 THERAPEUTIC EXERCISES: CPT

## 2023-09-15 PROCEDURE — 97535 SELF CARE MNGMENT TRAINING: CPT

## 2023-09-15 PROCEDURE — 99232 SBSQ HOSP IP/OBS MODERATE 35: CPT | Performed by: STUDENT IN AN ORGANIZED HEALTH CARE EDUCATION/TRAINING PROGRAM

## 2023-09-15 PROCEDURE — 97530 THERAPEUTIC ACTIVITIES: CPT

## 2023-09-15 PROCEDURE — 99232 SBSQ HOSP IP/OBS MODERATE 35: CPT | Performed by: NURSE PRACTITIONER

## 2023-09-15 PROCEDURE — 82948 REAGENT STRIP/BLOOD GLUCOSE: CPT

## 2023-09-15 PROCEDURE — 97129 THER IVNTJ 1ST 15 MIN: CPT

## 2023-09-15 PROCEDURE — 97130 THER IVNTJ EA ADDL 15 MIN: CPT

## 2023-09-15 RX ORDER — DOCUSATE SODIUM 100 MG/1
100 CAPSULE, LIQUID FILLED ORAL 2 TIMES DAILY
Status: DISCONTINUED | OUTPATIENT
Start: 2023-09-15 | End: 2023-09-20 | Stop reason: HOSPADM

## 2023-09-15 RX ORDER — LIDOCAINE 50 MG/G
1 PATCH TOPICAL DAILY
Status: DISCONTINUED | OUTPATIENT
Start: 2023-09-15 | End: 2023-09-20 | Stop reason: HOSPADM

## 2023-09-15 RX ADMIN — BACLOFEN 5 MG: 10 TABLET ORAL at 08:55

## 2023-09-15 RX ADMIN — DOCUSATE SODIUM 100 MG: 100 CAPSULE, LIQUID FILLED ORAL at 18:02

## 2023-09-15 RX ADMIN — BUPROPION HYDROCHLORIDE 150 MG: 150 TABLET, FILM COATED, EXTENDED RELEASE ORAL at 08:58

## 2023-09-15 RX ADMIN — MELATONIN 6 MG: at 20:31

## 2023-09-15 RX ADMIN — LIDOCAINE 5% 1 PATCH: 700 PATCH TOPICAL at 13:55

## 2023-09-15 RX ADMIN — PANTOPRAZOLE SODIUM 40 MG: 40 TABLET, DELAYED RELEASE ORAL at 05:44

## 2023-09-15 RX ADMIN — DOCUSATE SODIUM 100 MG: 100 CAPSULE, LIQUID FILLED ORAL at 13:55

## 2023-09-15 RX ADMIN — METFORMIN HYDROCHLORIDE 500 MG: 500 TABLET ORAL at 08:55

## 2023-09-15 RX ADMIN — DIAZEPAM 2 MG: 2 TABLET ORAL at 20:31

## 2023-09-15 RX ADMIN — HEPARIN SODIUM 5000 UNITS: 5000 INJECTION INTRAVENOUS; SUBCUTANEOUS at 05:44

## 2023-09-15 RX ADMIN — MICONAZOLE NITRATE 1 APPLICATION: 20 POWDER TOPICAL at 18:05

## 2023-09-15 RX ADMIN — GABAPENTIN 300 MG: 300 CAPSULE ORAL at 08:55

## 2023-09-15 RX ADMIN — LOSARTAN POTASSIUM 100 MG: 50 TABLET, FILM COATED ORAL at 08:57

## 2023-09-15 RX ADMIN — GABAPENTIN 300 MG: 300 CAPSULE ORAL at 16:03

## 2023-09-15 RX ADMIN — GABAPENTIN 300 MG: 300 CAPSULE ORAL at 20:31

## 2023-09-15 RX ADMIN — HEPARIN SODIUM 5000 UNITS: 5000 INJECTION INTRAVENOUS; SUBCUTANEOUS at 21:01

## 2023-09-15 RX ADMIN — ATORVASTATIN CALCIUM 20 MG: 20 TABLET, FILM COATED ORAL at 16:03

## 2023-09-15 RX ADMIN — FLUTICASONE FUROATE AND VILANTEROL TRIFENATATE 1 PUFF: 200; 25 POWDER RESPIRATORY (INHALATION) at 08:59

## 2023-09-15 RX ADMIN — CYANOCOBALAMIN TAB 500 MCG 1000 MCG: 500 TAB at 08:55

## 2023-09-15 RX ADMIN — LIDOCAINE 5% 1 PATCH: 700 PATCH TOPICAL at 13:56

## 2023-09-15 RX ADMIN — METFORMIN HYDROCHLORIDE 500 MG: 500 TABLET ORAL at 16:03

## 2023-09-15 RX ADMIN — HEPARIN SODIUM 5000 UNITS: 5000 INJECTION INTRAVENOUS; SUBCUTANEOUS at 13:57

## 2023-09-15 RX ADMIN — ASPIRIN 81 MG CHEWABLE TABLET 81 MG: 81 TABLET CHEWABLE at 08:55

## 2023-09-15 RX ADMIN — MICONAZOLE NITRATE: 20 POWDER TOPICAL at 09:01

## 2023-09-15 RX ADMIN — OXYCODONE HYDROCHLORIDE 5 MG: 5 TABLET ORAL at 08:56

## 2023-09-15 RX ADMIN — OXYCODONE HYDROCHLORIDE 5 MG: 5 TABLET ORAL at 20:30

## 2023-09-15 RX ADMIN — FERROUS SULFATE TAB 325 MG (65 MG ELEMENTAL FE) 325 MG: 325 (65 FE) TAB at 08:54

## 2023-09-15 RX ADMIN — BACLOFEN 5 MG: 10 TABLET ORAL at 18:04

## 2023-09-15 NOTE — CASE MANAGEMENT
CM requested by Dr. Leonie Viera to assist with getting the previous disability paperwork, to see what needs to be completed, as he has received information that the paperwork was incomplete. Cm spoke with patient , who stated the compl;eted paperwork was given to her daughter, who then faxed it to the disability company for [facility]. CM met with Dr. Leonie Viera, who received a request to complete the entire form again . CM made him aware, we no longer have the original forms. CM called [facility] disability and leave service at [phone], obtained from fax, spoke with  Chris, who reported question 1 and 2 were incomplete. THey need to have actual dates in the answer. Chris stated the physician could just complete question 1 and 2, initial it, and sign on the bottom of the form. CM relayed this information to Dr. Leonie Viera, who completed and signed the form as requested. The form was then faxed to [address] [place] [address] and Leave service at [facility] to [phone]. Patient made aware updated information was faxed to the company.

## 2023-09-15 NOTE — PROGRESS NOTES
09/15/23 8919   Pain Assessment   Pain Assessment Tool 0-10   Pain Score No Pain   Restrictions/Precautions   Precautions Bed/chair alarms;Cognitive; Fall Risk;Pain;Supervision on toilet/commode   Comprehension   Comprehension (FIM) 5 - Understands basic directions and conversation   Expression   Expression (FIM) 5 - Needs help/cues only RARELY (< 10% of the time)   Social Interaction   Social Interaction (FIM) 6 - Interacts appropriately with others BUT requires extra  time   Problem Solving   Problem solving (FIM) 5 - Solves basic problems 90% of time   Memory   Memory (FIM) 5 - Needs cueing reminders <10%   Speech/Language/Cognition Assessmetn   Treatment Assessment Pt was awake, alert and cooperative for session. Reviewed past days events as current SLP had not worked w/ pt, where pt was fairly accurate in recalling activities completed in OT/PT and ST sessions, as well as overnight events, recent attempts at a BM today, etc. Pt did report not working much on "homework" provided, but SLP did educate that all the activities given are to be completed at her leisure. SLP offering additional tasks which pt remains agreeable and will place in HEP folder. Otherwise, pt continuing to want to practice written skills given L hand (non-dominant) to where SLP continues to target cognitive linguistic skills. For today's session, SLP providing pt w/ both 3 and 4 word lists to place in order of occurrence. For 3 words, pt's ability to sequence was 10/10 accurate and for 4 words to sequence, pt was 5/5 accurate. Of note, these words to sequence were more concrete in order to sequence. It was noted that pt was able to demonstrate mental flexibility for a few groups of words, which were appropriate when pt provided the alternate ways the words could be sequenced. When increasing task to still 3 and 4 words to sequence in order of occurrence, these words were more abstract to place in order.  Pt's ablity to complete this group was 13/15 accurate increasing to 15/15 accuracy upon verbal review. It was noted that upon verbal review, pt did have the appropriate sequences to complete, but suspected lacked some confidence in wanting to "write it down." Pt's legibility was noted to be functional at this time, but still is not comparable to pt's report of prior written skills. Pt remains motivated to target written skills as per homework provided at this time. Currently pt will continue to benefit from ongoing skilled SLP services targeting functional independence of cognitive linguistic skills as well as language skills in attempts to decrease caregiver burden over time. SLP Therapy Minutes   SLP Time In 0930   SLP Time Out 1030   SLP Total Time (minutes) 60   SLP Mode of treatment - Individual (minutes) 60   SLP Mode of treatment - Concurrent (minutes) 0   SLP Mode of treatment - Group (minutes) 0   SLP Mode of treatment - Co-treat (minutes) 0   SLP Mode of Treatment - Total time(minutes) 60 minutes   SLP Cumulative Minutes 614   Therapy Time missed   Time missed?  No

## 2023-09-15 NOTE — PROGRESS NOTES
09/15/23 1300   Pain Assessment   Pain Assessment Tool 0-10   Pain Score 4   Pain Location/Orientation Orientation: Right;Location: Knee   Restrictions/Precautions   Precautions Bed/chair alarms;Cognitive; Fall Risk;Pain;Supervision on toilet/commode   Braces or Orthoses Sling  (RUE)   Subjective   Subjective pt agreeable to perform skilled PT   Sit to Stand   Type of Assistance Needed Physical assistance; Adaptive equipment   Physical Assistance Level 76% or more   Sit to Stand CARE Score 2   Bed-Chair Transfer   Type of Assistance Needed Physical assistance; Adaptive equipment   Physical Assistance Level 51%-75%   Comment SBT with VC for hand and foot placement   Chair/Bed-to-Chair Transfer CARE Score 2   Transfer Bed/Chair/Wheelchair   Adaptive Equipment Transfer Board   Walk 10 Feet   Reason if not Attempted Refused to perform   Walk 10 Feet CARE Score 7   Walk 50 Feet with Two Turns   Reason if not Attempted Safety concerns   Walk 50 Feet with Two Turns CARE Score 88   Walk 150 Feet   Reason if not Attempted Safety concerns   Walk 150 Feet CARE Score 88   Walking 10 Feet on Uneven Surfaces   Reason if not Attempted Safety concerns   Walking 10 Feet on Uneven Surfaces CARE Score 88   Ambulation   Primary Mode of Locomotion Prior to Admission Walk   Does the patient walk? 2. Yes   Wheel 50 Feet with Two Turns   Type of Assistance Needed Physical assistance   Physical Assistance Level 26%-50%   Wheel 50 Feet with Two Turns CARE Score 3   Wheel 150 Feet   Type of Assistance Needed Physical assistance   Physical Assistance Level 26%-50%   Wheel 150 Feet CARE Score 3   Wheelchair mobility   Does the patient use a wheelchair? 1. Yes   Type of Wheelchair Used 1. Manual   Method Left upper extremity; Left lower extremity   Distance Level Surface (feet) 150 ft   Curb or Single Stair   Reason if not Attempted Safety concerns   1 Step (Curb) CARE Score 88   4 Steps   Reason if not Attempted Safety concerns   4 Steps CARE Score 88   12 Steps   Reason if not Attempted Safety concerns   12 Steps CARE Score 88   Picking Up Object   Reason if not Attempted Safety concerns   Picking Up Object CARE Score 88   Therapeutic Interventions   Flexibility HS and calfs stretch   Balance seated balance   Assessment   Treatment Assessment pt focus on SBT and STS to inc strengthening and inc WB on RLE knee , pt c/o pain right knee at times during standing ,MHP in right knee , pt perform WC propl L side only . Pt will greatly benefit from skilled PT intervention with inc focus on NMR/NPP training to promote motor and measurable functional gains. Back in recliner with all needs in reach  and alarm on. Barriers to Discharge Inaccessible home environment;Decreased caregiver support   Plan   Progress Slow progress, decreased activity tolerance   Recommendation   PT Discharge Recommendation Post acute rehabilitation services   PT Therapy Minutes   PT Time In 1300   PT Time Out 1400   PT Total Time (minutes) 60   PT Mode of treatment - Individual (minutes) 60   PT Mode of treatment - Concurrent (minutes) 0   PT Mode of treatment - Group (minutes) 0   PT Mode of treatment - Co-treat (minutes) 0   PT Mode of Treatment - Total time(minutes) 60 minutes   PT Cumulative Minutes 1565   Therapy Time missed   Time missed?  No

## 2023-09-15 NOTE — PROGRESS NOTES
Internal Medicine Progress Note  Patient: Gurdeep Wright  Age/sex: 62 y.o. female  Medical Record #: [id number]      ASSESSMENT/PLAN: (Interval History)  Gurdeep Wright is seen and examined and management for following issues:    ICH  • Seen by NS and no surgical intervention indicated. • Etio = HTN  • Was unable to tolerate MRI even with sedation  • Was cleared to resume ASA. • Continue atorvastatin. • Outpt follow-up with Neurology.     HTN  • Goal with ICH is SBP<140  • Home: Micardis 80mg qd/Atenolol 50mg qd  • Here: Losartan 100mg qd  • (Micardis 80 mg qd = Losartan 100 mg qd)   • Stopped the Norvasc on 9/8 since BPs too low  • Stable and no changes today      DM type 2  • HA1C 6.7 on 8/15/23  • Home: Metformin 1000mg BID  • Here: Metformin 500 mg BID  • Continue QID Accuchecks/SSI and DM diet  • Will not make changes again today       Anemia  • Baseline Hgb 10 - 11. • Iron level 30/Ferritin 22/TIBC 371  • B12 was low normal at 221 on 8/15/23  • Continue iron and B12 supplementation. • stable     Anxiety  • Continue Wellbutrin XL. • Pt is taking 150mg instead of 300mg due to concerns for increased risk of seizure     Right shoulder/knee pain  · Lidopatch added to both areas       Discharge date:  Tbd    The above assessment and plan was reviewed and updated as determined by my evaluation of the patient on 9/15/2023.     Labs:   Results from last 7 days   Lab Units 09/14/23  0612 09/11/23  0614   WBC Thousand/uL 7.85 9.17   HEMOGLOBIN g/dL 11.5 11.1*   HEMATOCRIT % 36.6 36.3   PLATELETS Thousands/uL 383 308     Results from last 7 days   Lab Units 09/14/23  0612 09/11/23  0614   SODIUM mmol/L 138 137   POTASSIUM mmol/L 4.1 3.9   CHLORIDE mmol/L 100 101   CO2 mmol/L 29 26   BUN mg/dL 14 13   CREATININE mg/dL 0.58* 0.56*   CALCIUM mg/dL 9.4 9.4             Results from last 7 days   Lab Units 09/15/23  0613 09/14/23  2046 09/14/23  1714   POC GLUCOSE mg/dl 115 138 139       Review of Scheduled Meds:  Current Facility-Administered Medications   Medication Dose Route Frequency Provider Last Rate   • acetaminophen  650 mg Oral Q6H PRN Rome Coboss, DO     • albuterol  2 puff Inhalation Q4H PRN Rome Coboss, DO     • aspirin  81 mg Oral Daily Rome Ynes, DO     • atorvastatin  20 mg Oral Daily With Ottmaciej Palhafsa, DO     • baclofen  5 mg Oral BID MARCO HernandezNP     • bisacodyl  10 mg Rectal Daily PRN Paula Villareal MD     • bisacodyl  10 mg Rectal Once MARCO HernandezNP     • buPROPion  150 mg Oral QAM Rome Coboss, DO     • calcium carbonate  1,000 mg Oral TID PRN Laureen Tracy PA-C     • vitamin B-12  1,000 mcg Oral Daily Rome Coboss, DO     • diazepam  2 mg Oral BID PRN Rome Coboss, DO     • docusate sodium  100 mg Oral BID Breann Jackson, MARCONP     • ferrous sulfate  325 mg Oral Daily With Breakfast Rome Quick, DO     • fluticasone-vilanterol  1 puff Inhalation Daily Rome Coboss, DO     • gabapentin  300 mg Oral TID Rome Coboss, DO     • heparin (porcine)  5,000 Units Subcutaneous Q8H [address] Rome Coboss, DO     • hydrOXYzine HCL  25 mg Oral TID PRN Cem Kam CRNP     • insulin lispro  1-6 Units Subcutaneous TID AC Rome Coboss, DO     • insulin lispro  1-6 Units Subcutaneous HS Rome Ynes, DO     • lactulose  20 g Oral Daily PRN Rome Coboss, DO     • losartan  100 mg Oral Daily Rome Coboss, DO     • melatonin  6 mg Oral HS ARSENIO Pinto     • metFORMIN  500 mg Oral BID With Meals Breann Jackson, MARCONP     • miconazole  1 Application Topical BID Rome Ynes, DO     • ondansetron  4 mg Oral Q6H PRN Rome Coboss, DO     • oxyCODONE  5 mg Oral Q4H PRN Rome Coboss, DO     • oxyCODONE  2.5 mg Oral Q4H PRN Rome Coboss, DO     • pantoprazole  40 mg Oral Early Morning Rome Coboss, DO     • polyethylene glycol  17 g Oral Daily PRN MARCO HernandezNP     • prochlorperazine  5 mg Oral Q6H PRN Rome Coboss, DO         Subjective/ HPI: Patient seen and examined. Patients overnight issues or events were reviewed with nursing or staff during rounds or morning huddle session. New or overnight issues include the following:     No new or overnight issues. Offers no complaints except right shoulder and knee pain    ROS:   A 10 point ROS was performed; negative except as noted above. *Labs /Radiology studies reviewed  *Medications reviewed and reconciled as needed  *Please refer to order section for additional ordered labs studies  *Case discussed with primary attending during morning huddle case rounds    Physical Examination:  Vitals:   Vitals:    09/14/23 0600 09/14/23 1403 09/14/23 2107 09/15/23 0535   BP:  132/73 124/70 132/74   BP Location:  Left arm Left arm Left arm   Pulse:  73 95 96   Resp:  16 19 18   Temp:  97.8 °F (36.6 °C) (!) 97.4 °F (36.3 °C) 97.8 °F (36.6 °C)   TempSrc:  Oral Oral Oral   SpO2:  98% 95% 96%   Weight: 91.2 kg (201 lb)   91.2 kg (201 lb)   Height:           General Appearance: no distress, conversive  HEENT: PERRLA, conjuctiva normal; oropharynx clear; mucous membranes moist   Neck:  Supple, normal ROM  Lungs: CTA, normal respiratory effort, no retractions, expiratory effort normal  CV: regular rate and rhythm; no rubs/murmurs/gallops, PMI normal   ABD: soft; ND/NT; +BS  EXT: no edema  Skin: normal turgor, normal texture, no rashes  Psych: affect normal, mood normal  Neuro: AAO      The above physical exam was reviewed and updated as determined by my evaluation of the patient on 9/15/2023. Invasive Devices     Drain  Duration           External Urinary Catheter 17 days                   VTE Pharmacologic Prophylaxis: Heparin  Code Status: Level 1 - Full Code  Current Length of Stay: 21 day(s)      Total time spent:  30 minutes with more than 50% spent counseling/coordinating care. Counseling includes discussion with patient re: progress  and discussion with patient of his/her current medical state/information.  Coordination of patient's care was performed in conjunction with primary service. Time invested included review of patient's labs, vitals, and management of their comorbidities with continued monitoring. In addition, this patient was discussed with medical team including physician and advanced extenders. The care of the patient was extensively discussed and appropriate treatment plan was formulated unique for this patient. Medical decision making for the day was made by supervising physician unless otherwise noted in their attestation statement. ** Please Note:  voice to text software may have been used in the creation of this document.  Although proof errors in transcription or interpretation are a potential of such software**

## 2023-09-15 NOTE — CASE MANAGEMENT
Clinical update faxed to [employer] via epic to [phone]. Awaiting determination,     Referral made to [facility] via aidin for contd therapy services. Awaiting determination.

## 2023-09-15 NOTE — PLAN OF CARE
Problem: Prexisting or High Potential for Compromised Skin Integrity  Goal: Skin integrity is maintained or improved  Description: INTERVENTIONS:  - Identify patients at risk for skin breakdown  - Assess and monitor skin integrity  - Assess and monitor nutrition and hydration status  - Monitor labs   - Assess for incontinence   - Turn and reposition patient  - Assist with mobility/ambulation  - Relieve pressure over bony prominences  - Avoid friction and shearing  - Provide appropriate hygiene as needed including keeping skin clean and dry  - Evaluate need for skin moisturizer/barrier cream  - Collaborate with interdisciplinary team   - Patient/family teaching  - Consider wound care consult   9/15/2023 0236 by Anna Chow RN  Outcome: Progressing  9/15/2023 0235 by Anna Chow RN  Outcome: Progressing     Problem: PAIN - ADULT  Goal: Verbalizes/displays adequate comfort level or baseline comfort level  Description: Interventions:  - Encourage patient to monitor pain and request assistance  - Assess pain using appropriate pain scale  - Administer analgesics based on type and severity of pain and evaluate response  - Implement non-pharmacological measures as appropriate and evaluate response  - Consider cultural and social influences on pain and pain management  - Notify physician/advanced practitioner if interventions unsuccessful or patient reports new pain  9/15/2023 0236 by Anna Chow RN  Outcome: Progressing  9/15/2023 0235 by Anna Chow RN  Outcome: Progressing     Problem: INFECTION - ADULT  Goal: Absence or prevention of progression during hospitalization  Description: INTERVENTIONS:  - Assess and monitor for signs and symptoms of infection  - Monitor lab/diagnostic results  - Monitor all insertion sites, i.e. indwelling lines, tubes, and drains  - Monitor endotracheal if appropriate and nasal secretions for changes in amount and color  - [place] appropriate cooling/warming therapies per order  - Administer medications as ordered  - Instruct and encourage patient and family to use good hand hygiene technique  - Identify and instruct in appropriate isolation precautions for identified infection/condition  9/15/2023 0236 by Velma Roberson RN  Outcome: Progressing  9/15/2023 0235 by Velma Roberson RN  Outcome: Progressing  Goal: Absence of fever/infection during neutropenic period  Description: INTERVENTIONS:  - Monitor WBC    Outcome: Progressing     Problem: SAFETY ADULT  Goal: Patient will remain free of falls  Description: INTERVENTIONS:  - Educate patient/family on patient safety including physical limitations  - Instruct patient to call for assistance with activity   - Consult OT/PT to assist with strengthening/mobility   - Keep Call bell within reach  - Keep bed low and locked with side rails adjusted as appropriate  - Keep care items and personal belongings within reach  - Initiate and maintain comfort rounds  - Make Fall Risk Sign visible to staff  - Offer Toileting every 2 Hours, in advance of need  - Initiate/Maintain bed/ chair alarm  - Obtain necessary fall risk management equipment:   - Apply yellow socks and bracelet for high fall risk patients  - Consider moving patient to room near nurses station  Outcome: Progressing  Goal: Maintain or return to baseline ADL function  Description: INTERVENTIONS:  -  Assess patient's ability to carry out ADLs; assess patient's baseline for ADL function and identify physical deficits which impact ability to perform ADLs (bathing, care of mouth/teeth, toileting, grooming, dressing, etc.)  - Assess/evaluate cause of self-care deficits   - Assess range of motion  - Assess patient's mobility; develop plan if impaired  - Assess patient's need for assistive devices and provide as appropriate  - Encourage maximum independence but intervene and supervise when necessary  - Involve family in performance of ADLs  - Assess for home care needs following discharge   - Consider OT consult to assist with ADL evaluation and planning for discharge  - Provide patient education as appropriate  Outcome: Progressing  Goal: Maintains/Returns to pre admission functional level  Description: INTERVENTIONS:  - Perform BMAT or MOVE assessment daily.   - Set and communicate daily mobility goal to care team and patient/family/caregiver. - Collaborate with rehabilitation services on mobility goals if consulted  - Perform Range of Motion 4 times a day. - Reposition patient every 2 hours.   - Dangle patient  times a day  - Stand patient  times a day  - Ambulate patient  times a day  - Out of bed to chair 3 times a day   - Out of bed for meals 3 times a day  - Out of bed for toileting  - Record patient progress and toleration of activity level   Outcome: Progressing

## 2023-09-15 NOTE — PLAN OF CARE
Problem: INFECTION - ADULT  Goal: Absence or prevention of progression during hospitalization  Description: INTERVENTIONS:  - Assess and monitor for signs and symptoms of infection  - Monitor lab/diagnostic results  - Monitor all insertion sites, i.e. indwelling lines, tubes, and drains  - Monitor endotracheal if appropriate and nasal secretions for changes in amount and color  - [place] appropriate cooling/warming therapies per order  - Administer medications as ordered  - Instruct and encourage patient and family to use good hand hygiene technique  - Identify and instruct in appropriate isolation precautions for identified infection/condition  Outcome: Progressing

## 2023-09-15 NOTE — PROGRESS NOTES
PM&R PROGRESS NOTE:  Maria Esther Boyle 62 y.o. female MRN: [id number]  Unit/Bed#: -51 Encounter: [id number]    Rehabilitation Diagnosis: Impairment of mobility, safety, Activities of Daily Living (ADLs), and cognitive/communication skills due to Stroke:  01.2  Right Body Involvement (Left Brain)    HPI:  Maria Esther Boyle is a 55-year-old female with history of hypertension, hyperlipidemia, diabetes type 2, obesity, eosinophilic asthma, COPD, fibromyalgia, Sjogren syndrome, lumbar spondylosis with grade 1 anterior spondylolisthesis who presents to the hospital on 8/14/2023 due to acute onset right upper and right lower extremity weakness with sensory loss. Additionally with right-sided facial droop and dysarthria. The patient was noted to have an acute intraparenchymal hemorrhage on CT in the left thalamic region with surrounding edema. A CTA was negative. Patient was initially placed on a Cardene drip. Course complicated by significant left-sided leg pain and was recommended on treatment for radicular symptoms including Tylenol, gabapentin and potentially steroids. Neurology was consulted and recommended MRI of the brain with and without contrast while holding antithrombotics. The MRI showed a stable left thalamic capsular intraparenchymal hemorrhage with surrounding edema without any other mass effect. A repeat CT was completed on 8/17/2023 after worsening symptoms including worsening speech slurring with stable findings. Additionally there was a rapid response after a fall with head strike on 8/18 with a repeat CT of the head without acute findings. Additionally antihypertensive regimen was altered as she was previously on telmisartan and atenolol and is currently on amlodipine, losartan and hydralazine with better control.  The patient was evaluated by the Rehabilitation team and deemed an appropriate candidate for comprehensive inpatient rehabilitation and admitted to the [facility] on 8/25/2023  3:19 PM    SUBJECTIVE: Patient seen and evaluated in room. Completed extra paperwork per company request.  See note from case management. Overall making some improvements with the right arm although functional gains are little bit slower. Her mood is good. She denies any fever, chills, nausea, vomiting, cough, shortness of breath, diarrhea constipation. ASSESSMENT: Stable, progressing    PLAN:    Rehabilitation  • Functional deficits:  Self-care, right hemiparesis, mobility, cognitive impairment  • Continue current rehabilitation plan of care to maximize function.     • Functional update:   o PT: mobility- mod A, transfers- mod-max A, ambulation- not attempted d/t safety  o OT: ADL: bathing- max A, dressing UB max A LB total A, toileting- total A   o ST: cognition- impaired, decreased executive function, decreased attention, decreased comprehension    • Estimated Discharge: anticipated 4 week goals, plan for transition to subacute rehab at [facility]  • Gabapentin 300 mg TID  • Oxycodone 2.5-5 mg every 4 hours prn  • Rifaximin 500 mg every 6 hours prn    DVT prophylaxis  • Heparin sc    Bladder plan  • Continent    Bowel plan  • Continent, last bowel movement 9/12  • Colace 100 mg BID  • Senokot 17.2 mg qhs  • Miralax daily  • Bisacodyl 5 mg tab daily prn  • Bisacodyl supp daily prn      * ICH (intracerebral hemorrhage) ([address])  Assessment & Plan  51-year-old female presents with right hemiplegia, dysarthria and facial asymmetry found to have an acute intraparenchymal hemorrhage in the left thalamic region with surrounding edema felt to be secondary to uncontrolled hypertension  · Had repeat CT of the head on 8/18 after a fall with head strike resulting in significant bruising of the face  · Cleared by neurology to restart aspirin 81 mg daily and atorvastatin 20 mg daily  · Evaluated by neurosurgery with no surgical intervention at this time  · Had a repeat head CT on 8/20 which has been stable  · Goal systolic blood pressure of less than 140  · Follow-up with neurology as an outpatient in 6 weeks (10/26)  · Physical and Occupational Therapy with goals for community discharge. Patient lives with her  in a mobile home with 5 steps to enter and he is able to assist 24/7  · Hemorrhagic stroke education, nutrition, neuropsychology  · Secondary stroke prophylaxis with hypertension control    Hyponatremia  Assessment & Plan  · Sodium 137 (previously 134, 133)  · Continue following biweekly labs with no intervention at this point unless continues to trend down. Constipation  Assessment & Plan  · On admission had not had a bowel movement in at least 11 days  · Obtaining x-ray to evaluate stool burden  · Adding to bowel regimen including increasing senna and Colace, lactulose as needed and will be more aggressive pending results of x-ray  · Continue relatively aggressive regimen  · Resolved with bowel regimen    GERD (gastroesophageal reflux disease)  Assessment & Plan  Continue Protonix    Hyperlipidemia associated with type 2 diabetes mellitus (HCC)  Assessment & Plan  Continue atorvastatin 20 mg with dinner    Class 2 obesity with body mass index (BMI) of 39.0 to 39.9 in adult  Assessment & Plan  · BMI of 39.74 on admission  · Continue promoting weight loss and increased activity, diet and exercise  · Continue a consistent carbohydrate diet  · Nutrition consultation    CAD (coronary artery disease)  Assessment & Plan  · History of CAD currently on statin and aspirin that was restarted on 8/21 after clearance by neurology  · Beta-blocker held due to bradycardia  · Patient has not formally seen a cardiologist however this was diagnosed based on a CT PE study that was conducted in the ER showing mild CAD    Chronic bilateral low back pain with bilateral sciatica  Assessment & Plan  · Patient has a history of chronic low back pain with radicular symptoms in addition to fibromyalgia and myofascial pain syndrome.   · She follows with  Melo Warner from pain management has tried several medications as well as epidural steroid injections with little relief  · Imaging reveals lumbar degenerative disc disease at the L3-4, L4-5, L5-S1 level. Additionally facet hypertrophic changes and ligamentous laxity at the L4-5 level resulting in grade 1 anterior spondylolisthesis and mild canal narrowing with slight distortion of the left anterior lateral aspect of the thecal sac at this level with mild right greater than left neuroforaminal narrowing  · Multi modal pain with current medication regimen including oxycodone to be weaned as well as the Robaxin and gabapentin. We will also consult neuropsychology  · Added Tylenol as well as Robaxin and as needed Valium    Sjogren's syndrome ([address])  Assessment & Plan  · Patient states that she is been worked up for Sjogren's syndrome in the past and has had conflicting diagnosis he is stating that some physicians have diagnosed her with it and others stated she did not have it. · She has not been on any medication treatment for this and does not actively follow-up with a rheumatologist    Depression with anxiety  Assessment & Plan  · Neuropsychology consultation poststroke with history of anxiety  · Continue Wellbutrin  mg in the morning.   It has been 300 mg as an outpatient however it was decreased in acute care due to worry of decreasing seizure threshold in the setting of ICH    Diabetes mellitus type 2, controlled [facility])  Assessment & Plan  Lab Results   Component Value Date    HGBA1C 6.7 (H) 08/15/2023       Recent Labs     09/11/23  1630 09/11/23  2158 09/12/23  0651 09/12/23  1035   POCGLU 159* 126 131 194*     · Currently on carb controlled level 2 diet, metformin 500 mg twice daily  · Sliding scale insulin algorithm 3 with Accu-Cheks 4 times daily      Moderate persistent asthma without complication  Assessment & Plan  · Follows with Dr. Elen Khan from pulmonology  · Home regimen includes Breo and as needed albuterol  · On equivalent here and added albuterol inhaler on admission to [facility]    Primary hypertension  Assessment & Plan  · Home regimen: Telmisartan and atenolol  · Current regimen: Cozaar 100 mg daily, Norvasc and hydralazine discontinued  · Monitor blood pressures especially in therapy and make adjustments as needed    Appreciate IM consultants medical co-management. Personally reviewed labs, medications, and imaging. ROS:  A 10 point review of systems was negative except for what is noted in the HPI. OBJECTIVE:   /74 (BP Location: Left arm)   Pulse 96   Temp 97.8 °F (36.6 °C) (Oral)   Resp 18   Ht 5' (1.524 m)   Wt 91.2 kg (201 lb)   SpO2 96%   BMI 39.26 kg/m²     Physical Exam  Vitals reviewed. Constitutional:       Appearance: Normal appearance. She is obese. HENT:      Head: Normocephalic and atraumatic. Right Ear: External ear normal.      Left Ear: External ear normal.      Nose: Nose normal. No rhinorrhea. Mouth/Throat:      Mouth: Mucous membranes are moist.      Pharynx: Oropharynx is clear. Eyes:      General: No scleral icterus. Cardiovascular:      Rate and Rhythm: Normal rate and regular rhythm. Pulses: Normal pulses. Pulmonary:      Effort: Pulmonary effort is normal. No respiratory distress. Abdominal:      General: There is no distension. Palpations: Abdomen is soft. Musculoskeletal:         General: Normal range of motion. Cervical back: Normal range of motion. Right lower leg: No edema. Left lower leg: No edema. Skin:     General: Skin is warm and dry. Findings: Bruising present. Neurological:      Mental Status: She is alert and oriented to person, place, and time. Sensory: Sensory deficit present. Motor: Weakness (RLE: HF 1/5, KF 1/5, KE 1/5, DF/PF/EHL 0/5) present.    Psychiatric:         Mood and Affect: Mood normal.         Behavior: Behavior normal.         Judgment: Judgment normal.         Lab Results   Component Value Date    WBC 7.85 09/14/2023    HGB 11.5 09/14/2023    HCT 36.6 09/14/2023    MCV 89 09/14/2023     09/14/2023     Lab Results   Component Value Date    SODIUM 138 09/14/2023    K 4.1 09/14/2023     09/14/2023    CO2 29 09/14/2023    BUN 14 09/14/2023    CREATININE 0.58 (L) 09/14/2023    GLUC 111 09/14/2023    CALCIUM 9.4 09/14/2023     Lab Results   Component Value Date    INR 1.06 08/15/2023    INR 0.94 08/14/2023    PROTIME 14.0 08/15/2023    PROTIME 13.2 08/14/2023         Current Facility-Administered Medications:   •  acetaminophen (TYLENOL) tablet 650 mg, 650 mg, Oral, Q6H PRN, Rome Ynes, DO, 650 mg at 09/14/23 2126  •  albuterol (PROVENTIL HFA,VENTOLIN HFA) inhaler 2 puff, 2 puff, Inhalation, Q4H PRN, Romeaviva Coboss, DO, 2 puff at 09/04/23 0919  •  aspirin chewable tablet 81 mg, 81 mg, Oral, Daily, Rome Ynes, DO, 81 mg at 09/15/23 8986  •  atorvastatin (LIPITOR) tablet 20 mg, 20 mg, Oral, Daily With Dinner, Romeaviva Coboss, DO, 20 mg at 09/14/23 1727  •  baclofen tablet 5 mg, 5 mg, Oral, BID, ARSENIO Pinto, 5 mg at 09/15/23 3869  •  bisacodyl (DULCOLAX) rectal suppository 10 mg, 10 mg, Rectal, Daily PRN, Paula Villareal MD, 10 mg at 09/03/23 1826  •  bisacodyl (DULCOLAX) rectal suppository 10 mg, 10 mg, Rectal, Once, ARSENIO Pinto  •  buPROPion (WELLBUTRIN XL) 24 hr tablet 150 mg, 150 mg, Oral, QAM, Romeaviva Coboss, DO, 150 mg at 09/15/23 1371  •  calcium carbonate (TUMS) chewable tablet 1,000 mg, 1,000 mg, Oral, TID PRN, Laureen Tracy PA-C, 1,000 mg at 08/26/23 1241  •  cyanocobalamin (VITAMIN B-12) tablet 1,000 mcg, 1,000 mcg, Oral, Daily, Rome Quick DO, 1,000 mcg at 09/15/23 6540  •  diazepam (VALIUM) tablet 2 mg, 2 mg, Oral, BID PRN, Rome Quick DO, 2 mg at 09/07/23 2303  •  docusate sodium (COLACE) capsule 100 mg, 100 mg, Oral, BID, ARSENIO Vance  •  ferrous sulfate tablet 325 mg, 325 mg, Oral, Daily With Breakfast, Kaylee Hill DO Yuniel, 325 mg at 09/15/23 0854  •  fluticasone-vilanterol 200-25 mcg/actuation 1 puff, 1 puff, Inhalation, Daily, Neeta Viera DO, 1 puff at 09/15/23 5585  •  gabapentin (NEURONTIN) capsule 300 mg, 300 mg, Oral, TID, Neeta Viera DO, 300 mg at 09/15/23 3635  •  heparin (porcine) subcutaneous injection 5,000 Units, 5,000 Units, Subcutaneous, Q8H [facility], Neeta Viera DO, 5,000 Units at 09/15/23 0544  •  hydrOXYzine HCL (ATARAX) tablet 25 mg, 25 mg, Oral, TID PRN, ARSENIO Pinto  •  insulin lispro (HumaLOG) 100 units/mL subcutaneous injection 1-6 Units, 1-6 Units, Subcutaneous, TID AC, 1 Units at 09/13/23 1237 **AND** Fingerstick Glucose (POCT), , , 4x Daily AC and at bedtime, Neeta Viera DO  •  insulin lispro (HumaLOG) 100 units/mL subcutaneous injection 1-6 Units, 1-6 Units, Subcutaneous, HS, Neeta Viera DO, 1 Units at 09/08/23 2117  •  lactulose oral solution 20 g, 20 g, Oral, Daily PRN, Neeta Viera DO  •  losartan (COZAAR) tablet 100 mg, 100 mg, Oral, Daily, Neeta Viera DO, 100 mg at 09/15/23 0857  •  melatonin tablet 6 mg, 6 mg, Oral, HS, ARSENIO Pinto, 6 mg at 09/14/23 2126  •  metFORMIN (GLUCOPHAGE) tablet 500 mg, 500 mg, Oral, BID With Meals, ARSENIO Luna, 500 mg at 09/15/23 0855  •  miconazole (MICOTIN) 2 % powder 1 Application, 1 Application, Topical, BID, Neeta Viera DO, Given at 09/15/23 0901  •  ondansetron (ZOFRAN-ODT) dispersible tablet 4 mg, 4 mg, Oral, Q6H PRN, Neeta Viera DO  •  oxyCODONE (ROXICODONE) IR tablet 5 mg, 5 mg, Oral, Q4H PRN, Neeta Viera, DO, 5 mg at 09/15/23 8393  •  oxyCODONE (ROXICODONE) split tablet 2.5 mg, 2.5 mg, Oral, Q4H PRN, Neeta Viera, DO, 2.5 mg at 09/12/23 1608  •  pantoprazole (PROTONIX) EC tablet 40 mg, 40 mg, Oral, Early Morning, Neeta Viera DO, 40 mg at 09/15/23 0544  •  polyethylene glycol (MIRALAX) packet 17 g, 17 g, Oral, Daily PRN, ARSENIO Pinto, 17 g at 09/12/23 0905  •  prochlorperazine (COMPAZINE) tablet 5 mg, 5 mg, Oral, Q6H MICHELLEN, Marylen Climes, DO    Past Medical History:   Diagnosis Date   • Arthritis    • Asthma    • Continuous opioid dependence ([address]) 4/27/2022   • Diabetes mellitus ([address])    • Diverticulitis of colon    • Fibromyalgia 04/26/2022   • Headache(784.0)    • History of mammogram 2018   • History of tobacco abuse 04/26/2022   • Hypertension    • Nausea 04/29/2022   • Obesity    • Sjogren's syndrome ([address]) 10/19/2012   • Urinary tract infection        Patient Active Problem List    Diagnosis Date Noted   • ICH (intracerebral hemorrhage) ([address]) 08/15/2023   • Hyponatremia 08/31/2023   • GERD (gastroesophageal reflux disease) 08/25/2023   • Constipation 08/25/2023   • Anemia 08/16/2023   • Left leg pain 08/15/2023   • Chronic obstructive pulmonary disease with acute exacerbation ([address]) 07/26/2023   • Hyperlipidemia associated with type 2 diabetes mellitus ([address]) 07/26/2023   • Abnormal CT of the chest 06/28/2023   • Class 2 obesity with body mass index (BMI) of 39.0 to 39.9 in adult 06/28/2023   • CAD (coronary artery disease) 04/07/2023   • Right lumbosacral radiculopathy 10/12/2022   • Chronic bilateral low back pain with bilateral sciatica 08/31/2022   • Paresthesia of both feet 08/31/2022   • Postoperative visit 05/24/2022   • Incarcerated umbilical hernia 63/51/3476   • Diabetes mellitus type 2, controlled ([address]) 04/29/2022   • Continuous opioid dependence ([address]) 04/27/2022   • Moderate persistent asthma without complication 78/47/3496   • Cigarette nicotine dependence in remission 04/26/2022   • Primary hypertension 04/26/2022   • Fibromyalgia 04/26/2022   • Sjogren's syndrome ([address]) 10/19/2012   • Depression with anxiety 09/18/2012      Mustapha Landa DO  [facility]    JUAN have spent a total time of 25 minutes on 09/15/23 in caring for this patient including Counseling / Coordination of care, Documenting in the medical record and Communicating with other healthcare professionals .

## 2023-09-15 NOTE — PROGRESS NOTES
09/15/23 0700   Pain Assessment   Pain Assessment Tool 0-10   Pain Score No Pain   Restrictions/Precautions   Precautions Bed/chair alarms;Cognitive; Fall Risk;Pain;Supervision on toilet/commode   Weight Bearing Restrictions No   ROM Restrictions No   Braces or Orthoses Sling  (RUE sling for transfer's)   Lifestyle   Autonomy "I had a rough night, I didn't really sleep"   Eating   Type of Assistance Needed Set-up / clean-up   Physical Assistance Level No physical assistance   Eating CARE Score 5   Oral Hygiene   Type of Assistance Needed Supervision;Set-up / clean-up   Physical Assistance Level No physical assistance   Comment seated in w/c at sink with setup/supervision to complete oral care   Oral Hygiene CARE Score 4   Shower/Bathe Self   Type of Assistance Needed Physical assistance   Physical Assistance Level Total assistance   Comment while seated on TTB, pt completes full shower routine bathing UB with min A; pt able to bathe upper legs, LLE/foot, under panis. requires assist for RLE/foot and Ax2 in stance for cynthia/rear. Shower/Bathe Self CARE Score 1   Upper Body Dressing   Type of Assistance Needed Physical assistance   Physical Assistance Level 26%-50%   Comment assist to fully thread RUE, able to thread LUE and head/trunk; declined to don bra   Upper Body Dressing CARE Score 3   Lower Body Dressing   Type of Assistance Needed Physical assistance   Physical Assistance Level Total assistance   Comment while seated, pt able to thread LEs in to pants, Ax2 in stance with R knee block for CM; TA for donning tab brief.    Lower Body Dressing CARE Score 1   Putting On/Taking Off Footwear   Type of Assistance Needed Physical assistance   Physical Assistance Level 51%-75%   Comment while seated, pt able to don/doff socks via cross leg tech; requires assist for R footwear   Putting On/Taking Off Footwear CARE Score 2   Sit to Stand   Type of Assistance Needed Physical assistance   Physical Assistance Level 76% or more   Comment pull to stand at GB, bed rail and R knee block   Sit to Stand CARE Score 2   Bed-Chair Transfer   Type of Assistance Needed Physical assistance   Physical Assistance Level 51%-75%   Comment SBT bed to w/c; assist to place/remove board; assist to advance/position RLE   Chair/Bed-to-Chair Transfer CARE Score 2   Toileting Hygiene   Type of Assistance Needed Physical assistance   Physical Assistance Level Total assistance   Comment Ax2 in stance at bed rail; Ax1 for CM/hygiene, Ax1 for standing balance with R knee block   Toileting Hygiene CARE Score 1   Toilet Transfer   Type of Assistance Needed Physical assistance   Physical Assistance Level Total assistance   Comment Ax2 for SBT to DA PF BSC or swap out tech at bedrail   Toilet Transfer CARE Score 1   Cognition   Overall Cognitive Status Impaired   Arousal/Participation Alert; Cooperative   Attention Attends with cues to redirect   Orientation Level Oriented X4   Memory Decreased short term memory   Following Commands Follows one step commands with increased time or repetition   Activity Tolerance   Activity Tolerance Patient tolerated treatment well   Assessment   Treatment Assessment pt engages in 90 minute skilled OT Session focusing on ADL retraining, SBT, standing with GB support. see above for full func details. pt continues to demo slow and steady progress toward OT Goals. continues to require at times, Ax2 for SBT to PF DA BSC and Ax2 for toileting (Ax1 for CM/hygiene/Ax1 for standing balance with knee block). pt remains limited by ongoing R sided weakness, impaired standing balance, pain in R shoulder, warranting continued skilled care to focus on ADL retraining, func transfers, standing massimo/bal, RUE NMR, in order to decrease burden of care at d/c. Prognosis Fair   Problem List Decreased strength;Decreased range of motion;Decreased endurance; Impaired balance;Decreased mobility; Decreased coordination;Decreased cognition; Impaired judgement;Decreased safety awareness;Pain   Barriers to Discharge Inaccessible home environment;Decreased caregiver support   Plan   Treatment/Interventions ADL retraining;Functional transfer training; Therapeutic exercise; Endurance training;Cognitive reorientation;Patient/family training;Equipment eval/education; Compensatory technique education   OT Therapy Minutes   OT Time In 0700   OT Time Out 0830   OT Total Time (minutes) 90   OT Mode of treatment - Individual (minutes) 90   OT Mode of treatment - Concurrent (minutes) 0   OT Mode of treatment - Group (minutes) 0   OT Mode of treatment - Co-treat (minutes) 0   OT Mode of Treatment - Total time(minutes) 90 minutes   OT Cumulative Minutes 1655   Therapy Time missed   Time missed?  No

## 2023-09-16 LAB
BACTERIA UR QL AUTO: ABNORMAL /HPF
BILIRUB UR QL STRIP: NEGATIVE
CLARITY UR: CLEAR
COLOR UR: ABNORMAL
GLUCOSE SERPL-MCNC: 127 MG/DL (ref 65–140)
GLUCOSE SERPL-MCNC: 144 MG/DL (ref 65–140)
GLUCOSE SERPL-MCNC: 146 MG/DL (ref 65–140)
GLUCOSE SERPL-MCNC: 151 MG/DL (ref 65–140)
GLUCOSE UR STRIP-MCNC: NEGATIVE MG/DL
HGB UR QL STRIP.AUTO: NEGATIVE
KETONES UR STRIP-MCNC: NEGATIVE MG/DL
LEUKOCYTE ESTERASE UR QL STRIP: ABNORMAL
NITRITE UR QL STRIP: NEGATIVE
NON-SQ EPI CELLS URNS QL MICRO: ABNORMAL /HPF
PH UR STRIP.AUTO: 6.5 [PH]
PROT UR STRIP-MCNC: NEGATIVE MG/DL
RBC #/AREA URNS AUTO: ABNORMAL /HPF
SP GR UR STRIP.AUTO: 1.01 (ref 1–1.03)
UROBILINOGEN UR STRIP-ACNC: <2 MG/DL
WBC #/AREA URNS AUTO: ABNORMAL /HPF

## 2023-09-16 PROCEDURE — 82948 REAGENT STRIP/BLOOD GLUCOSE: CPT

## 2023-09-16 PROCEDURE — 87086 URINE CULTURE/COLONY COUNT: CPT

## 2023-09-16 PROCEDURE — 87077 CULTURE AEROBIC IDENTIFY: CPT

## 2023-09-16 PROCEDURE — 99232 SBSQ HOSP IP/OBS MODERATE 35: CPT | Performed by: PHYSICIAN ASSISTANT

## 2023-09-16 PROCEDURE — 81001 URINALYSIS AUTO W/SCOPE: CPT

## 2023-09-16 PROCEDURE — 87186 SC STD MICRODIL/AGAR DIL: CPT

## 2023-09-16 RX ADMIN — GABAPENTIN 300 MG: 300 CAPSULE ORAL at 07:49

## 2023-09-16 RX ADMIN — HEPARIN SODIUM 5000 UNITS: 5000 INJECTION INTRAVENOUS; SUBCUTANEOUS at 21:41

## 2023-09-16 RX ADMIN — GABAPENTIN 300 MG: 300 CAPSULE ORAL at 20:32

## 2023-09-16 RX ADMIN — ATORVASTATIN CALCIUM 20 MG: 20 TABLET, FILM COATED ORAL at 16:29

## 2023-09-16 RX ADMIN — BACLOFEN 5 MG: 10 TABLET ORAL at 07:53

## 2023-09-16 RX ADMIN — DOCUSATE SODIUM 100 MG: 100 CAPSULE, LIQUID FILLED ORAL at 07:51

## 2023-09-16 RX ADMIN — METFORMIN HYDROCHLORIDE 500 MG: 500 TABLET ORAL at 16:29

## 2023-09-16 RX ADMIN — PANTOPRAZOLE SODIUM 40 MG: 40 TABLET, DELAYED RELEASE ORAL at 05:28

## 2023-09-16 RX ADMIN — BACLOFEN 5 MG: 10 TABLET ORAL at 17:38

## 2023-09-16 RX ADMIN — OXYCODONE HYDROCHLORIDE 5 MG: 5 TABLET ORAL at 13:04

## 2023-09-16 RX ADMIN — HEPARIN SODIUM 5000 UNITS: 5000 INJECTION INTRAVENOUS; SUBCUTANEOUS at 13:04

## 2023-09-16 RX ADMIN — METFORMIN HYDROCHLORIDE 500 MG: 500 TABLET ORAL at 07:49

## 2023-09-16 RX ADMIN — CYANOCOBALAMIN TAB 500 MCG 1000 MCG: 500 TAB at 07:50

## 2023-09-16 RX ADMIN — GABAPENTIN 300 MG: 300 CAPSULE ORAL at 16:29

## 2023-09-16 RX ADMIN — OXYCODONE HYDROCHLORIDE 5 MG: 5 TABLET ORAL at 05:28

## 2023-09-16 RX ADMIN — FLUTICASONE FUROATE AND VILANTEROL TRIFENATATE 1 PUFF: 200; 25 POWDER RESPIRATORY (INHALATION) at 07:57

## 2023-09-16 RX ADMIN — LIDOCAINE 5% 1 PATCH: 700 PATCH TOPICAL at 09:34

## 2023-09-16 RX ADMIN — HEPARIN SODIUM 5000 UNITS: 5000 INJECTION INTRAVENOUS; SUBCUTANEOUS at 05:27

## 2023-09-16 RX ADMIN — LIDOCAINE 5% 1 PATCH: 700 PATCH TOPICAL at 09:33

## 2023-09-16 RX ADMIN — FERROUS SULFATE TAB 325 MG (65 MG ELEMENTAL FE) 325 MG: 325 (65 FE) TAB at 08:01

## 2023-09-16 RX ADMIN — DOCUSATE SODIUM 100 MG: 100 CAPSULE, LIQUID FILLED ORAL at 17:38

## 2023-09-16 RX ADMIN — ASPIRIN 81 MG CHEWABLE TABLET 81 MG: 81 TABLET CHEWABLE at 07:52

## 2023-09-16 RX ADMIN — MICONAZOLE NITRATE 1 APPLICATION: 20 POWDER TOPICAL at 07:59

## 2023-09-16 RX ADMIN — BUPROPION HYDROCHLORIDE 150 MG: 150 TABLET, FILM COATED, EXTENDED RELEASE ORAL at 08:01

## 2023-09-16 RX ADMIN — MELATONIN 6 MG: at 20:32

## 2023-09-16 RX ADMIN — OXYCODONE HYDROCHLORIDE 5 MG: 5 TABLET ORAL at 20:32

## 2023-09-16 NOTE — PLAN OF CARE
Problem: INFECTION - ADULT  Goal: Absence or prevention of progression during hospitalization  Description: INTERVENTIONS:  - Assess and monitor for signs and symptoms of infection  - Monitor lab/diagnostic results  - Monitor all insertion sites, i.e. indwelling lines, tubes, and drains  - Monitor endotracheal if appropriate and nasal secretions for changes in amount and color  - [place] appropriate cooling/warming therapies per order  - Administer medications as ordered  - Instruct and encourage patient and family to use good hand hygiene technique  - Identify and instruct in appropriate isolation precautions for identified infection/condition  Outcome: Progressing

## 2023-09-16 NOTE — PROGRESS NOTES
Internal Medicine Progress Note  Patient: Karen Yuan  Age/sex: 62 y.o. female  Medical Record #: [id number]      ASSESSMENT/PLAN: (Interval History)  Karen Yuan is seen and examined and management for following issues:    ICH  • Seen by NS and no surgical intervention indicated. • Etio = HTN  • Was unable to tolerate MRI even with sedation  • Was cleared to resume ASA. • Continue atorvastatin. • Outpt follow-up with Neurology.     HTN  • Goal with ICH is SBP<140  • Home: Micardis 80mg qd/Atenolol 50mg qd  • Here: Losartan 100mg qd  • (Micardis 80 mg qd = Losartan 100 mg qd)   • Stopped the Norvasc on 9/8 since BPs too low  • Stable and no changes today      DM type 2  • HA1C 6.7 on 8/15/23  • Home: Metformin 1000mg BID  • Here: Metformin 500 mg BID  • Continue QID Accuchecks/SSI and DM diet  • Will not make changes again today       Anemia  • Baseline Hgb 10 - 11. • Iron level 30/Ferritin 22/TIBC 371  • B12 was low normal at 221 on 8/15/23  • Continue iron and B12 supplementation. • stable     Anxiety  • Continue Wellbutrin XL. • Pt is taking 150mg instead of 300mg due to concerns for increased risk of seizure     Right shoulder/knee pain  · Lidopatch added to both areas       Discharge date:  Tbd    The above assessment and plan was reviewed and updated as determined by my evaluation of the patient on 9/16/2023.     Labs:   Results from last 7 days   Lab Units 09/14/23  0612 09/11/23  0614   WBC Thousand/uL 7.85 9.17   HEMOGLOBIN g/dL 11.5 11.1*   HEMATOCRIT % 36.6 36.3   PLATELETS Thousands/uL 383 308     Results from last 7 days   Lab Units 09/14/23  0612 09/11/23  0614   SODIUM mmol/L 138 137   POTASSIUM mmol/L 4.1 3.9   CHLORIDE mmol/L 100 101   CO2 mmol/L 29 26   BUN mg/dL 14 13   CREATININE mg/dL 0.58* 0.56*   CALCIUM mg/dL 9.4 9.4             Results from last 7 days   Lab Units 09/16/23  0610 09/15/23  2144 09/15/23  1548   POC GLUCOSE mg/dl 127 112 104       Review of Scheduled Meds:  Current Facility-Administered Medications   Medication Dose Route Frequency Provider Last Rate   • acetaminophen  650 mg Oral Q6H PRN Migdalia Mario, DO     • albuterol  2 puff Inhalation Q4H PRN Migdalia Mario, DO     • aspirin  81 mg Oral Daily Migdalia Mario, DO     • atorvastatin  20 mg Oral Daily With Marisa Brown, DO     • baclofen  5 mg Oral BID ARSENIO Mahmood     • bisacodyl  10 mg Rectal Daily PRN Edwar Burleson MD     • bisacodyl  10 mg Rectal Once ARSENIO Mahmood     • buPROPion  150 mg Oral QAM Migdalia Mario, DO     • calcium carbonate  1,000 mg Oral TID PRN Laureen Tracy PA-C     • vitamin B-12  1,000 mcg Oral Daily Migdalia Mario, DO     • diazepam  2 mg Oral BID PRN Migdalia Mario DO     • docusate sodium  100 mg Oral BID ARSENIO Jain     • ferrous sulfate  325 mg Oral Daily With Breakfast Migdalia Mario, DO     • fluticasone-vilanterol  1 puff Inhalation Daily Migdalia Mario, DO     • gabapentin  300 mg Oral TID Migdalia Mario, DO     • heparin (porcine)  5,000 Units Subcutaneous Q8H [facility] Migdalia Mario, DO     • hydrOXYzine HCL  25 mg Oral TID PRN ARSENIO Mahmood     • insulin lispro  1-6 Units Subcutaneous TID AC Migdalia Mario, DO     • insulin lispro  1-6 Units Subcutaneous HS Migdalia Mario, DO     • lactulose  20 g Oral Daily PRN Migdalia Mario DO     • lidocaine  1 patch Topical Daily ARSENIO Jain     • lidocaine  1 patch Topical Daily ARSENIO Jain     • losartan  100 mg Oral Daily Migdalia Mario, DO     • melatonin  6 mg Oral HS ARSENIO Pinto     • metFORMIN  500 mg Oral BID With Meals ARSENIO Jain     • miconazole  1 Application Topical BID Migdalia Mario, DO     • ondansetron  4 mg Oral Q6H PRN Migdalia Mario DO     • oxyCODONE  5 mg Oral Q4H PRN Migdalia Mario, DO     • oxyCODONE  2.5 mg Oral Q4H PRN Migdalia Mario DO     • pantoprazole  40 mg Oral Early Morning Migdalia Mario DO     • polyethylene glycol  17 g Oral Daily PRN Geoffery Cheadle, CRNP     • prochlorperazine  5 mg Oral Q6H PRN Elizabeth Gamez DO         Subjective/ HPI: Patient seen and examined. Patients overnight issues or events were reviewed with nursing or staff during rounds or morning huddle session. New or overnight issues include the following:     Pt seen in her room. She states that she is doing well. She denies any current complaints. ROS:   A 10 point ROS was performed; negative except as noted above. *Labs /Radiology studies reviewed  *Medications reviewed and reconciled as needed  *Please refer to order section for additional ordered labs studies  *Case discussed with primary attending during morning huddle case rounds    Physical Examination:  Vitals:   Vitals:    09/15/23 0535 09/15/23 1342 09/15/23 2124 09/16/23 0536   BP: 132/74 136/78 122/71 119/64   BP Location: Left arm Left arm Left arm Left arm   Pulse: 96 73 91 88   Resp: 18 18 18 18   Temp: 97.8 °F (36.6 °C) 97.9 °F (36.6 °C) 98.6 °F (37 °C) 98.2 °F (36.8 °C)   TempSrc: Oral Oral Oral Oral   SpO2: 96% 96% 93% 96%   Weight: 91.2 kg (201 lb)   91.2 kg (201 lb)   Height:           General Appearance: no distress, conversive  HEENT: PERRLA, conjuctiva normal; oropharynx clear; mucous membranes moist   Neck:  Supple, normal ROM  Lungs: CTA, normal respiratory effort, no retractions, expiratory effort normal  CV: regular rate and rhythm; no rubs/murmurs/gallops, PMI normal   ABD: soft; ND/NT; +BS  EXT: no edema  Skin: normal turgor, normal texture, no rashes  Psych: affect normal, mood normal  Neuro: Alert and oriented    The above physical exam was reviewed and updated as determined by my evaluation of the patient on 9/16/2023.     Invasive Devices     Drain  Duration           External Urinary Catheter 18 days                   VTE Pharmacologic Prophylaxis: Heparin  Code Status: Level 1 - Full Code  Current Length of Stay: 22 day(s)      Total time spent:  30 minutes with more than 50% spent counseling/coordinating care. Counseling includes discussion with patient re: progress  and discussion with patient of his/her current medical state/information. Coordination of patient's care was performed in conjunction with primary service. Time invested included review of patient's labs, vitals, and management of their comorbidities with continued monitoring. In addition, this patient was discussed with medical team including physician and advanced extenders. The care of the patient was extensively discussed and appropriate treatment plan was formulated unique for this patient. Medical decision making for the day was made by supervising physician unless otherwise noted in their attestation statement. ** Please Note:  voice to text software may have been used in the creation of this document.  Although proof errors in transcription or interpretation are a potential of such software**

## 2023-09-17 LAB
GLUCOSE SERPL-MCNC: 130 MG/DL (ref 65–140)
GLUCOSE SERPL-MCNC: 131 MG/DL (ref 65–140)
GLUCOSE SERPL-MCNC: 149 MG/DL (ref 65–140)
GLUCOSE SERPL-MCNC: 162 MG/DL (ref 65–140)

## 2023-09-17 PROCEDURE — 99232 SBSQ HOSP IP/OBS MODERATE 35: CPT | Performed by: PHYSICIAN ASSISTANT

## 2023-09-17 PROCEDURE — 82948 REAGENT STRIP/BLOOD GLUCOSE: CPT

## 2023-09-17 PROCEDURE — 97110 THERAPEUTIC EXERCISES: CPT

## 2023-09-17 PROCEDURE — 97530 THERAPEUTIC ACTIVITIES: CPT

## 2023-09-17 PROCEDURE — 99232 SBSQ HOSP IP/OBS MODERATE 35: CPT

## 2023-09-17 RX ORDER — CEPHALEXIN 500 MG/1
500 CAPSULE ORAL EVERY 6 HOURS SCHEDULED
Status: DISCONTINUED | OUTPATIENT
Start: 2023-09-17 | End: 2023-09-20 | Stop reason: HOSPADM

## 2023-09-17 RX ADMIN — ASPIRIN 81 MG CHEWABLE TABLET 81 MG: 81 TABLET CHEWABLE at 08:40

## 2023-09-17 RX ADMIN — MELATONIN 6 MG: at 22:43

## 2023-09-17 RX ADMIN — DOCUSATE SODIUM 100 MG: 100 CAPSULE, LIQUID FILLED ORAL at 17:36

## 2023-09-17 RX ADMIN — METFORMIN HYDROCHLORIDE 500 MG: 500 TABLET ORAL at 16:01

## 2023-09-17 RX ADMIN — CYANOCOBALAMIN TAB 500 MCG 1000 MCG: 500 TAB at 08:40

## 2023-09-17 RX ADMIN — LIDOCAINE 5% 1 PATCH: 700 PATCH TOPICAL at 08:44

## 2023-09-17 RX ADMIN — DOCUSATE SODIUM 100 MG: 100 CAPSULE, LIQUID FILLED ORAL at 08:41

## 2023-09-17 RX ADMIN — OXYCODONE HYDROCHLORIDE 5 MG: 5 TABLET ORAL at 05:33

## 2023-09-17 RX ADMIN — LIDOCAINE 5% 1 PATCH: 700 PATCH TOPICAL at 08:43

## 2023-09-17 RX ADMIN — BACLOFEN 5 MG: 10 TABLET ORAL at 08:38

## 2023-09-17 RX ADMIN — GABAPENTIN 300 MG: 300 CAPSULE ORAL at 16:01

## 2023-09-17 RX ADMIN — OXYCODONE HYDROCHLORIDE 5 MG: 5 TABLET ORAL at 16:01

## 2023-09-17 RX ADMIN — CEPHALEXIN 500 MG: 500 CAPSULE ORAL at 17:36

## 2023-09-17 RX ADMIN — LOSARTAN POTASSIUM 100 MG: 50 TABLET, FILM COATED ORAL at 08:40

## 2023-09-17 RX ADMIN — FERROUS SULFATE TAB 325 MG (65 MG ELEMENTAL FE) 325 MG: 325 (65 FE) TAB at 08:39

## 2023-09-17 RX ADMIN — GABAPENTIN 300 MG: 300 CAPSULE ORAL at 08:40

## 2023-09-17 RX ADMIN — BUPROPION HYDROCHLORIDE 150 MG: 150 TABLET, FILM COATED, EXTENDED RELEASE ORAL at 08:42

## 2023-09-17 RX ADMIN — OXYCODONE HYDROCHLORIDE 5 MG: 5 TABLET ORAL at 10:48

## 2023-09-17 RX ADMIN — HEPARIN SODIUM 5000 UNITS: 5000 INJECTION INTRAVENOUS; SUBCUTANEOUS at 22:43

## 2023-09-17 RX ADMIN — OXYCODONE HYDROCHLORIDE 5 MG: 5 TABLET ORAL at 22:44

## 2023-09-17 RX ADMIN — METFORMIN HYDROCHLORIDE 500 MG: 500 TABLET ORAL at 08:40

## 2023-09-17 RX ADMIN — FLUTICASONE FUROATE AND VILANTEROL TRIFENATATE 1 PUFF: 200; 25 POWDER RESPIRATORY (INHALATION) at 08:41

## 2023-09-17 RX ADMIN — BACLOFEN 5 MG: 10 TABLET ORAL at 17:36

## 2023-09-17 RX ADMIN — INSULIN LISPRO 1 UNITS: 100 INJECTION, SOLUTION INTRAVENOUS; SUBCUTANEOUS at 16:02

## 2023-09-17 RX ADMIN — ATORVASTATIN CALCIUM 20 MG: 20 TABLET, FILM COATED ORAL at 16:01

## 2023-09-17 RX ADMIN — PANTOPRAZOLE SODIUM 40 MG: 40 TABLET, DELAYED RELEASE ORAL at 05:33

## 2023-09-17 RX ADMIN — HEPARIN SODIUM 5000 UNITS: 5000 INJECTION INTRAVENOUS; SUBCUTANEOUS at 05:33

## 2023-09-17 RX ADMIN — HEPARIN SODIUM 5000 UNITS: 5000 INJECTION INTRAVENOUS; SUBCUTANEOUS at 13:26

## 2023-09-17 RX ADMIN — GABAPENTIN 300 MG: 300 CAPSULE ORAL at 22:43

## 2023-09-17 NOTE — PROGRESS NOTES
Internal Medicine Progress Note  Patient: Bri Fletcher  Age/sex: 62 y.o. female  Medical Record #: [id number]      ASSESSMENT/PLAN: (Interval History)  Bri Fletcher is seen and examined and management for following issues:    ICH  • Seen by NS and no surgical intervention indicated. • Etio = HTN  • Was unable to tolerate MRI even with sedation  • Was cleared to resume ASA. • Continue atorvastatin. • Outpt follow-up with Neurology.     HTN  • Goal with ICH is SBP<140  • Home: Micardis 80mg qd/Atenolol 50mg qd  • Here: Losartan 100mg qd  • (Micardis 80 mg qd = Losartan 100 mg qd)   • Stopped the Norvasc on 9/8 since BPs too low  • Stable and no changes today      DM type 2  • HA1C 6.7 on 8/15/23  • Home: Metformin 1000mg BID  • Here: Metformin 500 mg BID  • Continue QID Accuchecks/SSI and DM diet  • Will not make changes again today       Anemia  • Baseline Hgb 10 - 11. • Iron level 30/Ferritin 22/TIBC 371  • B12 was low normal at 221 on 8/15/23  • Continue iron and B12 supplementation. • stable     Anxiety  • Continue Wellbutrin XL. • Pt is taking 150mg instead of 300mg due to concerns for increased risk of seizure     Right shoulder/knee pain  · Lidopatch added to both areas       Discharge date:  Tbd    The above assessment and plan was reviewed and updated as determined by my evaluation of the patient on 9/17/2023.     Labs:   Results from last 7 days   Lab Units 09/14/23  0612 09/11/23  0614   WBC Thousand/uL 7.85 9.17   HEMOGLOBIN g/dL 11.5 11.1*   HEMATOCRIT % 36.6 36.3   PLATELETS Thousands/uL 383 308     Results from last 7 days   Lab Units 09/14/23  0612 09/11/23  0614   SODIUM mmol/L 138 137   POTASSIUM mmol/L 4.1 3.9   CHLORIDE mmol/L 100 101   CO2 mmol/L 29 26   BUN mg/dL 14 13   CREATININE mg/dL 0.58* 0.56*   CALCIUM mg/dL 9.4 9.4             Results from last 7 days   Lab Units 09/17/23  0607 09/16/23  2101 09/16/23  1556   POC GLUCOSE mg/dl 131 146* 151*       Review of Scheduled Meds:  Current Facility-Administered Medications   Medication Dose Route Frequency Provider Last Rate   • acetaminophen  650 mg Oral Q6H PRN Eliot Hamman, DO     • albuterol  2 puff Inhalation Q4H PRN Eliot Hamman, DO     • aspirin  81 mg Oral Daily Eliot Hamman, DO     • atorvastatin  20 mg Oral Daily With Doris Rodriguez, DO     • baclofen  5 mg Oral BID MARCO WashingtonNP     • bisacodyl  10 mg Rectal Daily PRN Shelli Burton MD     • bisacodyl  10 mg Rectal Once MARCO WashingtonNP     • buPROPion  150 mg Oral QAM Eliot Hamman, DO     • calcium carbonate  1,000 mg Oral TID PRN Laureen Tracy PA-C     • vitamin B-12  1,000 mcg Oral Daily Eliot Hamman, DO     • diazepam  2 mg Oral BID PRN Eliot Hamman, DO     • docusate sodium  100 mg Oral BID Kris Champagne, CRNP     • ferrous sulfate  325 mg Oral Daily With Breakfast Eliot Hamman, DO     • fluticasone-vilanterol  1 puff Inhalation Daily Eliot Hamman, DO     • gabapentin  300 mg Oral TID Eliot Hamman, DO     • heparin (porcine)  5,000 Units Subcutaneous Q8H [address] Eliot Hamman, DO     • hydrOXYzine HCL  25 mg Oral TID PRN MARCO WashingtonNP     • insulin lispro  1-6 Units Subcutaneous TID AC Eliot Hamman, DO     • insulin lispro  1-6 Units Subcutaneous HS Eliot Hamman, DO     • lactulose  20 g Oral Daily PRN Eliot Hamman, DO     • lidocaine  1 patch Topical Daily MARCO RodriguesNP     • lidocaine  1 patch Topical Daily MAROC RodriguesNP     • losartan  100 mg Oral Daily Eliot Hamman, DO     • melatonin  6 mg Oral HS ARSENIO Pinto     • metFORMIN  500 mg Oral BID With Meals ARSENIO Rodrigues     • miconazole  1 Application Topical BID Eliot Hamman, DO     • ondansetron  4 mg Oral Q6H PRN Eliot Hamman, DO     • oxyCODONE  5 mg Oral Q4H PRN Eliot Hamman, DO     • oxyCODONE  2.5 mg Oral Q4H PRN Eliot Hamman, DO     • pantoprazole  40 mg Oral Early Morning Eliot Hamman, DO     • polyethylene glycol  17 g Oral Daily PRN Geoffery Cheadle, CRNP     • prochlorperazine  5 mg Oral Q6H PRN Elizabeth Gamez DO         Subjective/ HPI: Patient seen and examined. Patients overnight issues or events were reviewed with nursing or staff during rounds or morning huddle session. New or overnight issues include the following:     Pt seen in her room. She states that she continues to have some dysuria at the end of voiding. She denies any other complaints. ROS:   A 10 point ROS was performed; negative except as noted above. *Labs /Radiology studies reviewed  *Medications reviewed and reconciled as needed  *Please refer to order section for additional ordered labs studies  *Case discussed with primary attending during morning huddle case rounds    Physical Examination:  Vitals:   Vitals:    09/16/23 1633 09/16/23 2053 09/17/23 0600 09/17/23 0617   BP: 128/86 106/63  135/94   BP Location: Left arm Left arm  Left arm   Pulse:  91  91   Resp:  18  18   Temp:  98.5 °F (36.9 °C)  97.8 °F (36.6 °C)   TempSrc:  Oral  Oral   SpO2:  95%  95%   Weight:   90.3 kg (199 lb)    Height:           General Appearance: no distress, conversive, interactive  HEENT: PERRLA, conjuctiva normal; oropharynx clear; mucous membranes moist   Neck:  Supple, normal ROM  Lungs: CTA, normal respiratory effort, no retractions, expiratory effort normal  CV: regular rate and rhythm; no rubs/murmurs/gallops, PMI normal   ABD: soft; ND/NT; +BS  EXT: no edema  Skin: normal turgor, normal texture, no rashes  Psych: affect normal, mood normal  Neuro: Alert and oriented    The above physical exam was reviewed and updated as determined by my evaluation of the patient on 9/17/2023. Invasive Devices     Drain  Duration           External Urinary Catheter 19 days                   VTE Pharmacologic Prophylaxis: Heparin  Code Status: Level 1 - Full Code  Current Length of Stay: 23 day(s)      Total time spent:  30 minutes with more than 50% spent counseling/coordinating care. Counseling includes discussion with patient re: progress  and discussion with patient of his/her current medical state/information. Coordination of patient's care was performed in conjunction with primary service. Time invested included review of patient's labs, vitals, and management of their comorbidities with continued monitoring. In addition, this patient was discussed with medical team including physician and advanced extenders. The care of the patient was extensively discussed and appropriate treatment plan was formulated unique for this patient. Medical decision making for the day was made by supervising physician unless otherwise noted in their attestation statement. ** Please Note:  voice to text software may have been used in the creation of this document.  Although proof errors in transcription or interpretation are a potential of such software**

## 2023-09-17 NOTE — PROGRESS NOTES
09/17/23 1230   Pain Assessment   Pain Assessment Tool 0-10   Pain Score 4   Pain Location/Orientation Orientation: Right;Location: Shoulder   Pain Onset/Description Onset: Ongoing; Descriptor: Aching   Patient's Stated Pain Goal No pain   Hospital Pain Intervention(s) Rest   Restrictions/Precautions   Precautions Bed/chair alarms;Cognitive; Fall Risk;Pain;Supervision on toilet/commode   Weight Bearing Restrictions No   ROM Restrictions No   Braces or Orthoses Sling   Cognition   Overall Cognitive Status Impaired   Arousal/Participation Alert; Cooperative   Attention Attends with cues to redirect   Orientation Level Oriented X4   Memory Decreased short term memory   Following Commands Follows one step commands with increased time or repetition   Sit to Stand   Reason if not Attempted Refused to perform   Sit to Stand CARE Score 7   Bed-Chair Transfer   Type of Assistance Needed Physical assistance;Verbal cues; Adaptive equipment   Physical Assistance Level 51%-75%   Comment SB, MAX VC's for ant lean and adjusting LE's   Chair/Bed-to-Chair Transfer CARE Score 2   Transfer Bed/Chair/Wheelchair   Adaptive Equipment Transfer Board   Walk 10 Feet   Reason if not Attempted Refused to perform   Walk 10 Feet CARE Score 7   Walk 50 Feet with Two Turns   Reason if not Attempted Safety concerns   Walk 50 Feet with Two Turns CARE Score 88   Walk 150 Feet   Reason if not Attempted Safety concerns   Walk 150 Feet CARE Score 88   Walking 10 Feet on Uneven Surfaces   Reason if not Attempted Safety concerns   Walking 10 Feet on Uneven Surfaces CARE Score 88   Ambulation   Primary Mode of Locomotion Prior to Admission Walk   Does the patient walk? 2.  Yes   Wheel 50 Feet with Two Turns   Type of Assistance Needed Physical assistance;Verbal cues   Physical Assistance Level 25% or less   Wheel 50 Feet with Two Turns CARE Score 3   Wheel 150 Feet   Type of Assistance Needed Physical assistance;Verbal cues   Physical Assistance Level 25% or less   Wheel 150 Feet CARE Score 3   Wheelchair mobility   Does the patient use a wheelchair? 1. Yes   Type of Wheelchair Used 1. Manual   Method Left upper extremity; Left lower extremity   Assistance Provided For Locking Brakes; Remove Leg Rest;Replace Leg Rest;Remove armrests;Replace armrests   Distance Level Surface (feet) 150 ft   Curb or Single Stair   Reason if not Attempted Safety concerns   1 Step (Curb) CARE Score 88   4 Steps   Reason if not Attempted Safety concerns   4 Steps CARE Score 88   12 Steps   Reason if not Attempted Safety concerns   12 Steps CARE Score 88   Picking Up Object   Reason if not Attempted Safety concerns   Picking Up Object CARE Score 88   Therapeutic Interventions   Strengthening seated LAQ, AAROM to RLE, hip flex AAROM to R hip ADD vs ball and R ankle DF 3 x10 reps BLE, 2# to LLE. Flexibility R HS stretch seated   Equipment Use   NuStep pt refused   Assessment   Treatment Assessment Pt participated in skilled PT session with increased focus on LE strengthening and WC mobility. Pt required encouragement this session as she "just wanted to sit and talk". Pt educated on importance of cont to strengthen and work on gait. Pt refused to perform any standing and the NuStep this session. Pt will cont to benefit from cont focus on WC mobility, functional transfers and act tolerance. Problem List Decreased strength;Decreased range of motion;Decreased endurance; Impaired balance;Decreased mobility; Decreased coordination;Decreased cognition; Impaired judgement;Decreased safety awareness;Pain   Barriers to Discharge Inaccessible home environment;Decreased caregiver support   PT Barriers   Functional Limitation Ramp negotiation;Car transfers;Stair negotiation;Transfers; Walking;Standing   Plan   Treatment/Interventions Functional transfer training;LE strengthening/ROM; Therapeutic exercise; Endurance training;Patient/family training;Bed mobility;Gait training   Progress Slow progress, decreased activity tolerance   Recommendation   PT Discharge Recommendation Post acute rehabilitation services   Equipment Recommended Wheelchair   PT Therapy Minutes   PT Time In 1230   PT Time Out 1330   PT Total Time (minutes) 60   PT Mode of treatment - Individual (minutes) 60   PT Mode of treatment - Concurrent (minutes) 0   PT Mode of treatment - Group (minutes) 0   PT Mode of treatment - Co-treat (minutes) 0   PT Mode of Treatment - Total time(minutes) 60 minutes   PT Cumulative Minutes 1625   Therapy Time missed   Time missed?  No

## 2023-09-18 LAB
ANION GAP SERPL CALCULATED.3IONS-SCNC: 10 MMOL/L
BASOPHILS # BLD AUTO: 0.07 THOUSANDS/ÂΜL (ref 0–0.1)
BASOPHILS NFR BLD AUTO: 1 % (ref 0–1)
BUN SERPL-MCNC: 13 MG/DL (ref 5–25)
CALCIUM SERPL-MCNC: 9.4 MG/DL (ref 8.4–10.2)
CHLORIDE SERPL-SCNC: 99 MMOL/L (ref 96–108)
CO2 SERPL-SCNC: 30 MMOL/L (ref 21–32)
CREAT SERPL-MCNC: 0.5 MG/DL (ref 0.6–1.3)
EOSINOPHIL # BLD AUTO: 0.6 THOUSAND/ÂΜL (ref 0–0.61)
EOSINOPHIL NFR BLD AUTO: 8 % (ref 0–6)
ERYTHROCYTE [DISTWIDTH] IN BLOOD BY AUTOMATED COUNT: 16.1 % (ref 11.6–15.1)
GFR SERPL CREATININE-BSD FRML MDRD: 107 ML/MIN/1.73SQ M
GLUCOSE P FAST SERPL-MCNC: 114 MG/DL (ref 65–99)
GLUCOSE SERPL-MCNC: 109 MG/DL (ref 65–140)
GLUCOSE SERPL-MCNC: 114 MG/DL (ref 65–140)
GLUCOSE SERPL-MCNC: 140 MG/DL (ref 65–140)
GLUCOSE SERPL-MCNC: 161 MG/DL (ref 65–140)
GLUCOSE SERPL-MCNC: 171 MG/DL (ref 65–140)
HCT VFR BLD AUTO: 36.1 % (ref 34.8–46.1)
HGB BLD-MCNC: 11.1 G/DL (ref 11.5–15.4)
IMM GRANULOCYTES # BLD AUTO: 0.02 THOUSAND/UL (ref 0–0.2)
IMM GRANULOCYTES NFR BLD AUTO: 0 % (ref 0–2)
LYMPHOCYTES # BLD AUTO: 2.45 THOUSANDS/ÂΜL (ref 0.6–4.47)
LYMPHOCYTES NFR BLD AUTO: 32 % (ref 14–44)
MCH RBC QN AUTO: 26.7 PG (ref 26.8–34.3)
MCHC RBC AUTO-ENTMCNC: 30.7 G/DL (ref 31.4–37.4)
MCV RBC AUTO: 87 FL (ref 82–98)
MONOCYTES # BLD AUTO: 0.8 THOUSAND/ÂΜL (ref 0.17–1.22)
MONOCYTES NFR BLD AUTO: 10 % (ref 4–12)
NEUTROPHILS # BLD AUTO: 3.85 THOUSANDS/ÂΜL (ref 1.85–7.62)
NEUTS SEG NFR BLD AUTO: 49 % (ref 43–75)
NRBC BLD AUTO-RTO: 0 /100 WBCS
PLATELET # BLD AUTO: 341 THOUSANDS/UL (ref 149–390)
PMV BLD AUTO: 9.5 FL (ref 8.9–12.7)
POTASSIUM SERPL-SCNC: 4 MMOL/L (ref 3.5–5.3)
RBC # BLD AUTO: 4.15 MILLION/UL (ref 3.81–5.12)
SARS-COV-2 RNA RESP QL NAA+PROBE: NEGATIVE
SODIUM SERPL-SCNC: 139 MMOL/L (ref 135–147)
WBC # BLD AUTO: 7.79 THOUSAND/UL (ref 4.31–10.16)

## 2023-09-18 PROCEDURE — 85025 COMPLETE CBC W/AUTO DIFF WBC: CPT | Performed by: PHYSICIAN ASSISTANT

## 2023-09-18 PROCEDURE — 97130 THER IVNTJ EA ADDL 15 MIN: CPT

## 2023-09-18 PROCEDURE — 97116 GAIT TRAINING THERAPY: CPT

## 2023-09-18 PROCEDURE — 87635 SARS-COV-2 COVID-19 AMP PRB: CPT

## 2023-09-18 PROCEDURE — 3E0U3BZ INTRODUCTION OF ANESTHETIC AGENT INTO JOINTS, PERCUTANEOUS APPROACH: ICD-10-PCS | Performed by: STUDENT IN AN ORGANIZED HEALTH CARE EDUCATION/TRAINING PROGRAM

## 2023-09-18 PROCEDURE — 82948 REAGENT STRIP/BLOOD GLUCOSE: CPT

## 2023-09-18 PROCEDURE — 3E0U33Z INTRODUCTION OF ANTI-INFLAMMATORY INTO JOINTS, PERCUTANEOUS APPROACH: ICD-10-PCS | Performed by: STUDENT IN AN ORGANIZED HEALTH CARE EDUCATION/TRAINING PROGRAM

## 2023-09-18 PROCEDURE — 97535 SELF CARE MNGMENT TRAINING: CPT

## 2023-09-18 PROCEDURE — 97530 THERAPEUTIC ACTIVITIES: CPT

## 2023-09-18 PROCEDURE — 97112 NEUROMUSCULAR REEDUCATION: CPT

## 2023-09-18 PROCEDURE — 97110 THERAPEUTIC EXERCISES: CPT

## 2023-09-18 PROCEDURE — 99232 SBSQ HOSP IP/OBS MODERATE 35: CPT | Performed by: NURSE PRACTITIONER

## 2023-09-18 PROCEDURE — 80048 BASIC METABOLIC PNL TOTAL CA: CPT | Performed by: PHYSICIAN ASSISTANT

## 2023-09-18 PROCEDURE — 20610 DRAIN/INJ JOINT/BURSA W/O US: CPT | Performed by: STUDENT IN AN ORGANIZED HEALTH CARE EDUCATION/TRAINING PROGRAM

## 2023-09-18 PROCEDURE — 97129 THER IVNTJ 1ST 15 MIN: CPT

## 2023-09-18 PROCEDURE — 99232 SBSQ HOSP IP/OBS MODERATE 35: CPT | Performed by: STUDENT IN AN ORGANIZED HEALTH CARE EDUCATION/TRAINING PROGRAM

## 2023-09-18 RX ORDER — METHYLPREDNISOLONE ACETATE 40 MG/ML
40 INJECTION, SUSPENSION INTRA-ARTICULAR; INTRALESIONAL; INTRAMUSCULAR; SOFT TISSUE ONCE
Status: COMPLETED | OUTPATIENT
Start: 2023-09-18 | End: 2023-09-18

## 2023-09-18 RX ORDER — BUPIVACAINE HYDROCHLORIDE 2.5 MG/ML
4 INJECTION, SOLUTION EPIDURAL; INFILTRATION; INTRACAUDAL ONCE
Status: COMPLETED | OUTPATIENT
Start: 2023-09-18 | End: 2023-09-18

## 2023-09-18 RX ADMIN — LIDOCAINE 5% 1 PATCH: 700 PATCH TOPICAL at 08:34

## 2023-09-18 RX ADMIN — BACLOFEN 5 MG: 10 TABLET ORAL at 17:04

## 2023-09-18 RX ADMIN — OXYCODONE HYDROCHLORIDE 5 MG: 5 TABLET ORAL at 08:34

## 2023-09-18 RX ADMIN — INSULIN LISPRO 1 UNITS: 100 INJECTION, SOLUTION INTRAVENOUS; SUBCUTANEOUS at 11:59

## 2023-09-18 RX ADMIN — BUPROPION HYDROCHLORIDE 150 MG: 150 TABLET, FILM COATED, EXTENDED RELEASE ORAL at 08:30

## 2023-09-18 RX ADMIN — METFORMIN HYDROCHLORIDE 500 MG: 500 TABLET ORAL at 08:28

## 2023-09-18 RX ADMIN — HEPARIN SODIUM 5000 UNITS: 5000 INJECTION INTRAVENOUS; SUBCUTANEOUS at 21:01

## 2023-09-18 RX ADMIN — HEPARIN SODIUM 5000 UNITS: 5000 INJECTION INTRAVENOUS; SUBCUTANEOUS at 05:32

## 2023-09-18 RX ADMIN — HEPARIN SODIUM 5000 UNITS: 5000 INJECTION INTRAVENOUS; SUBCUTANEOUS at 13:32

## 2023-09-18 RX ADMIN — DOCUSATE SODIUM 100 MG: 100 CAPSULE, LIQUID FILLED ORAL at 17:05

## 2023-09-18 RX ADMIN — BUPIVACAINE HYDROCHLORIDE 4 ML: 2.5 INJECTION, SOLUTION EPIDURAL; INFILTRATION; INTRACAUDAL at 15:41

## 2023-09-18 RX ADMIN — FERROUS SULFATE TAB 325 MG (65 MG ELEMENTAL FE) 325 MG: 325 (65 FE) TAB at 08:28

## 2023-09-18 RX ADMIN — MICONAZOLE NITRATE 1 APPLICATION: 20 POWDER TOPICAL at 08:31

## 2023-09-18 RX ADMIN — FLUTICASONE FUROATE AND VILANTEROL TRIFENATATE 1 PUFF: 200; 25 POWDER RESPIRATORY (INHALATION) at 08:30

## 2023-09-18 RX ADMIN — OXYCODONE HYDROCHLORIDE 5 MG: 5 TABLET ORAL at 15:28

## 2023-09-18 RX ADMIN — BACLOFEN 5 MG: 10 TABLET ORAL at 08:28

## 2023-09-18 RX ADMIN — ACETAMINOPHEN 650 MG: 325 TABLET, FILM COATED ORAL at 00:48

## 2023-09-18 RX ADMIN — PANTOPRAZOLE SODIUM 40 MG: 40 TABLET, DELAYED RELEASE ORAL at 05:32

## 2023-09-18 RX ADMIN — MELATONIN 6 MG: at 20:42

## 2023-09-18 RX ADMIN — DOCUSATE SODIUM 100 MG: 100 CAPSULE, LIQUID FILLED ORAL at 08:28

## 2023-09-18 RX ADMIN — METFORMIN HYDROCHLORIDE 500 MG: 500 TABLET ORAL at 16:00

## 2023-09-18 RX ADMIN — CYANOCOBALAMIN TAB 500 MCG 1000 MCG: 500 TAB at 08:28

## 2023-09-18 RX ADMIN — GABAPENTIN 300 MG: 300 CAPSULE ORAL at 15:43

## 2023-09-18 RX ADMIN — CEPHALEXIN 500 MG: 500 CAPSULE ORAL at 23:58

## 2023-09-18 RX ADMIN — CEPHALEXIN 500 MG: 500 CAPSULE ORAL at 00:48

## 2023-09-18 RX ADMIN — GABAPENTIN 300 MG: 300 CAPSULE ORAL at 08:28

## 2023-09-18 RX ADMIN — ASPIRIN 81 MG CHEWABLE TABLET 81 MG: 81 TABLET CHEWABLE at 08:28

## 2023-09-18 RX ADMIN — OXYCODONE HYDROCHLORIDE 5 MG: 5 TABLET ORAL at 21:00

## 2023-09-18 RX ADMIN — GABAPENTIN 300 MG: 300 CAPSULE ORAL at 20:42

## 2023-09-18 RX ADMIN — CEPHALEXIN 500 MG: 500 CAPSULE ORAL at 17:05

## 2023-09-18 RX ADMIN — LOSARTAN POTASSIUM 100 MG: 50 TABLET, FILM COATED ORAL at 08:34

## 2023-09-18 RX ADMIN — ATORVASTATIN CALCIUM 20 MG: 20 TABLET, FILM COATED ORAL at 15:59

## 2023-09-18 RX ADMIN — METHYLPREDNISOLONE ACETATE 40 MG: 40 INJECTION, SUSPENSION INTRA-ARTICULAR; INTRALESIONAL; INTRAMUSCULAR; SOFT TISSUE at 15:41

## 2023-09-18 RX ADMIN — INSULIN LISPRO 1 UNITS: 100 INJECTION, SOLUTION INTRAVENOUS; SUBCUTANEOUS at 21:51

## 2023-09-18 RX ADMIN — CEPHALEXIN 500 MG: 500 CAPSULE ORAL at 05:32

## 2023-09-18 RX ADMIN — CEPHALEXIN 500 MG: 500 CAPSULE ORAL at 11:59

## 2023-09-18 NOTE — DISCHARGE SUMMARY
Discharge Summary - PMR   Janis Vigil 62 y.o. female MRN: [id number]  Unit/Bed#: -81 Encounter: [id number]    Admission Date: 8/25/2023     Discharge Date:  9/20/2023    Etiologic/Rehabilitation Diagnosis: Impairment of mobility, safety and Activities of Daily Living (ADLs) due to Stroke:  01.2  Right Body Involvement (Left Brain)    HPI: Janis Vigil is a 77-year-old female with history of hypertension, hyperlipidemia, diabetes type 2, obesity, eosinophilic asthma, COPD, fibromyalgia, Sjogren syndrome, lumbar spondylosis with grade 1 anterior spondylolisthesis who presents to the hospital on 8/14/2023 due to acute onset right upper and right lower extremity weakness with sensory loss. Additionally with right-sided facial droop and dysarthria. The patient was noted to have an acute intraparenchymal hemorrhage on CT in the left thalamic region with surrounding edema. A CTA was negative. Patient was initially placed on a Cardene drip. Course complicated by significant left-sided leg pain and was recommended on treatment for radicular symptoms including Tylenol, gabapentin and potentially steroids. Neurology was consulted and recommended MRI of the brain with and without contrast while holding antithrombotics. The MRI showed a stable left thalamic capsular intraparenchymal hemorrhage with surrounding edema without any other mass effect. A repeat CT was completed on 8/17/2023 after worsening symptoms including worsening speech slurring with stable findings. Additionally there was a rapid response after a fall with head strike on 8/18 with a repeat CT of the head without acute findings. Additionally antihypertensive regimen was altered as she was previously on telmisartan and atenolol and is currently on amlodipine, losartan and hydralazine with better control.  The patient was evaluated by the Rehabilitation team and deemed an appropriate candidate for comprehensive inpatient rehabilitation and admitted to the [facility] on 8/25/2023  3:19 PM    Procedures Performed During [facility] Admission: None    Acute Rehabilitation Center Course: Patient participated in a comprehensive interdisciplinary inpatient rehabilitation program which included involvment of MD, therapies (PT, OT, and/or SLP), RN, CM, SW, dietary, and psychology services. She will continue to require further rehabilitation and considered safe to be discharged to SNF to continue her recovery process. Please see below for patient's day to day management of rehabilitation needs. Please refer to Internal Medicine notes during [facility] stay for day to day management of patient's medical co-morbidities. * ICH (intracerebral hemorrhage) ([address])  Assessment & Plan  55-year-old female presents with right hemiplegia, dysarthria and facial asymmetry found to have an acute intraparenchymal hemorrhage in the left thalamic region with surrounding edema felt to be secondary to uncontrolled hypertension  · Had repeat CT of the head on 8/18 after a fall with head strike resulting in significant bruising of the face  · Cleared by neurology to restart aspirin 81 mg daily and atorvastatin 20 mg daily  · Evaluated by neurosurgery with no surgical intervention at this time  · Had a repeat head CT on 8/20 which has been stable  · Goal systolic blood pressure of less than 140  · Follow-up with neurology as an outpatient in 6 weeks (10/26)  · Physical and Occupational Therapy with goals for community discharge.   Patient lives with her  in a mobile home with 5 steps to enter and he is able to assist 24/7  · Hemorrhagic stroke education, nutrition, neuropsychology  · Secondary stroke prophylaxis with hypertension control    Primary hypertension  Assessment & Plan  · Home regimen: Telmisartan and atenolol  · Current regimen: Cozaar 100 mg daily, Norvasc and hydralazine discontinued  · Monitor blood pressures especially in therapy and make adjustments as needed  · Follow-up with PCP as outpatient    Chronic bilateral low back pain with bilateral sciatica  Assessment & Plan  · Patient has a history of chronic low back pain with radicular symptoms in addition to fibromyalgia and myofascial pain syndrome. · She follows with Dr. Valdo Treviño from pain management has tried several medications as well as epidural steroid injections with little relief  · Imaging reveals lumbar degenerative disc disease at the L3-4, L4-5, L5-S1 level. Additionally facet hypertrophic changes and ligamentous laxity at the L4-5 level resulting in grade 1 anterior spondylolisthesis and mild canal narrowing with slight distortion of the left anterior lateral aspect of the thecal sac at this level with mild right greater than left neuroforaminal narrowing  · Multi modal pain with current medication regimen including oxycodone to be weaned as well as the Robaxin and gabapentin.   We will also consult neuropsychology  · Added Tylenol as well as Robaxin and as needed Valium    Diabetes mellitus type 2, controlled [facility])  Assessment & Plan  Lab Results   Component Value Date    HGBA1C 6.7 (H) 08/15/2023       Recent Labs     09/18/23  1047 09/18/23  1552 09/18/23  2135 09/19/23  0710   POCGLU 161* 140 171* 147*     · Currently on carb controlled level 2 diet, metformin 500 mg twice daily  · Sliding scale insulin algorithm 3 with Accu-Cheks 4 times daily      Constipation-resolved as of 9/19/2023  Assessment & Plan  · On admission had not had a bowel movement in at least 11 days  · Obtaining x-ray to evaluate stool burden  · Adding to bowel regimen including increasing senna and Colace, lactulose as needed and will be more aggressive pending results of x-ray  · Continue relatively aggressive regimen  · Resolved with bowel regimen    Hyponatremia-resolved as of 9/19/2023  Assessment & Plan  · Sodium 139 (previously 137, 134, 133)  · Continue following biweekly labs with no intervention at this point unless continues to trend down.    CAD (coronary artery disease)  Assessment & Plan  · History of CAD currently on statin and aspirin that was restarted on 8/21 after clearance by neurology  · Beta-blocker held due to bradycardia  · Patient has not formally seen a cardiologist however this was diagnosed based on a CT PE study that was conducted in the ER showing mild CAD    Chronic pain of right knee  Assessment & Plan  - right knee pain  - per patient needs bilateral knee replacements  - last right knee intra-articular steroid injection ~1 year ago  - last left knee intra-articular steroid injection ~ 2 years ago    - 9/18/23- right knee intra-articular steroid injection for chronic pain d/t arthritis      GERD (gastroesophageal reflux disease)  Assessment & Plan  Continue Protonix    Hyperlipidemia associated with type 2 diabetes mellitus ([address])  Assessment & Plan  Continue atorvastatin 20 mg with dinner    Class 2 obesity with body mass index (BMI) of 39.0 to 39.9 in adult  Assessment & Plan  · BMI of 39.74 on admission  · Continue promoting weight loss and increased activity, diet and exercise  · Continue a consistent carbohydrate diet  · Nutrition consultation    Sjogren's syndrome ([address])  Assessment & Plan  · Patient states that she is been worked up for Sjogren's syndrome in the past and has had conflicting diagnosis he is stating that some physicians have diagnosed her with it and others stated she did not have it. · She has not been on any medication treatment for this and does not actively follow-up with a rheumatologist    Depression with anxiety  Assessment & Plan  · Neuropsychology consultation poststroke with history of anxiety  · Continue Wellbutrin  mg in the morning.   It has been 300 mg as an outpatient however it was decreased in acute care due to worry of decreasing seizure threshold in the setting of ICH    Moderate persistent asthma without complication  Assessment & Plan  · Follows with Dr. Garfield Oliva from pulmonology  · Home regimen includes Breo and as needed albuterol  · On equivalent here and added albuterol inhaler on admission to [facility]      Discharge Physical Examination:  /55 (BP Location: Left arm)   Pulse 81   Temp (!) 97.4 °F (36.3 °C) (Oral)   Resp 18   Ht 5' (1.524 m)   Wt 91 kg (200 lb 9.9 oz)   SpO2 96%   BMI 39.18 kg/m²     Gen: No acute distress, Well-nourished, well-appearing. HEENT: Moist mucus membranes, Normocephalic/Atraumatic  Cardiovascular: Regular rate, rhythm, S1/S2. Distal pulses palpable  Heme/Extr: No edema/clubbing/cyanosis  Pulmonary: Non-labored breathing. Lungs CTAB  : No resendiz, incontinent   GI: Soft, non-tender, non-distended. BS+ LBM 9/18  MSK: ROM is WFL in all extremities. No effusions or deformities. Bulk is symmetric. See below for MMT scores. Integumentary: Skin is warm, dry. No rashes or ulcers. Neuro: AAOx3, Sensation impaired to lower extremity and right hand. Speech is intact. Appropriate to questioning. MMT: Strength:   Right  Left  Site  Right  Left  Site    3 5  S Ab: Shoulder Abductors  3  5  HF: Hip Flexors    2 5  EF: Elbow Flexors  3  5 KF: Knee Flexors    2  5  EE: Elbow Extensors  3  5  KE: Knee Extensors    2  5  WE: Wrist Extensors  3  5  DR: Dorsi Flexors    2  5  FF: Finger Flexors  3  5  PF: Plantar Flexors    2  5  HI: Hand Intrinsics  2  5  EHL: Extensor Hallucis Longus   Psych: Normal mood and affect. Significant Findings, Care, Treatment and Services Provided: Acute comprehensive interdisciplinary inpatient rehabilitation including PT, OT, SLP, RN, CM, SW, dietary, psychology, etc.    Functional Status Upon Admission to [facility]:  Physical Therapy: Transfers max assist x2, no ambulation  Occupational Therapy:  Mod assist for upper body ADLs, max assist for lower body ADLs and total assist for toileting  Speech Therapy: Regular and thin    Functional Status Upon Discharge from [facility]:  Physical therapy: mobility- mod A, transfers- mod-max A, ambulation-refused, wheelchair mobility- min A 150'  Occupational therapy: ADL: bathing- UB min A LB max A, dressing- UB min-mod A LB total A, footwear- mod-max A, toileting- total A  Speech therapy: cognition- impaired, decreased executive function, decreased attention, decreased comprehension    Discharge Diagnosis: Impairment of mobility, safety and Activities of Daily Living (ADLs) due to Stroke:  01.2  Right Body Involvement (Left Brain)    Discharge Medications:   See after visit summary for reconciled discharge medications provided to patient and family. Condition at Discharge: fair     Discharge instructions/Information to patient and family:   See after visit summary for information provided to patient and family. Provisions for Follow-Up Care:  See after visit summary for information related to follow-up care and any pertinent home health orders. Future Appointments   Date Time Provider [address]   10/18/2023 10:00 AM Sulema Crump MD [facility]   4/10/2024 10:00 AM Penelope Duverney, CRNP OBGYN [facility]       Disposition: Short-term rehab at [facility] Readmission: No    Discharge Medications:  See after visit summary for reconciled discharge medications provided to patient and family.       Facility Administered Medications Prior to Discharge:    Current Facility-Administered Medications   Medication Dose Route Frequency Provider Last Rate   • acetaminophen  650 mg Oral Q6H PRN Danielle Long DO     • albuterol  2 puff Inhalation Q4H PRN Danielle Long DO     • aspirin  81 mg Oral Daily Danielle Long DO     • atorvastatin  20 mg Oral Daily With Dinner Danielle Long DO     • baclofen  5 mg Oral BID ARSENIO Pinto     • bisacodyl  10 mg Rectal Daily PRN Glendy Franklin MD     • bisacodyl  10 mg Rectal Once ARSENIO Steiner     • buPROPion  150 mg Oral QAM Danielle Long DO     • calcium carbonate  1,000 mg Oral TID PRN Karla Perry PA-C • cephalexin  500 mg Oral Q6H [facility] Shilo Rubio MD     • vitamin B-12  1,000 mcg Oral Daily Mandie Adler, DO     • diazepam  2 mg Oral BID PRN Mandie Adler, DO     • docusate sodium  100 mg Oral BID ARSENIO Carlos     • ferrous sulfate  325 mg Oral Daily With Breakfast Mandie Adler, DO     • fluticasone-vilanterol  1 puff Inhalation Daily Mandie Adler, DO     • gabapentin  300 mg Oral TID Mandie Adler, DO     • heparin (porcine)  5,000 Units Subcutaneous Q8H [facility] Mandie Adler, DO     • hydrOXYzine HCL  25 mg Oral TID PRN ARSENIO Hou     • insulin lispro  1-6 Units Subcutaneous TID AC Mandie Adler, DO     • insulin lispro  1-6 Units Subcutaneous HS Mandie Adler, DO     • lactulose  20 g Oral Daily PRN Mandie Adler, DO     • lidocaine  1 patch Topical Daily ARSENIO Carlos     • lidocaine  1 patch Topical Daily ARSENIO Carlos     • losartan  100 mg Oral Daily Mandie Adler, DO     • melatonin  6 mg Oral HS ARSENIO Pinto     • metFORMIN  500 mg Oral BID With Meals ARSENIO Carlos     • miconazole  1 Application Topical BID Mandie Adler, DO     • ondansetron  4 mg Oral Q6H PRN Mandie Adler, DO     • oxyCODONE  5 mg Oral Q4H PRN Mandie Adler, DO     • oxyCODONE  2.5 mg Oral Q4H PRN Mandei Adler, DO     • pantoprazole  40 mg Oral Early Morning Mandie Adler, DO     • polyethylene glycol  17 g Oral Daily PRN ARSENIO Hou     • prochlorperazine  5 mg Oral Q6H PRN Mandie Adler, DO       Anupama Reynoso, [address] [place] [address] Rd

## 2023-09-18 NOTE — PROGRESS NOTES
Internal Medicine Progress Note  Patient: Jericho Noguera  Age/sex: 62 y.o. female  Medical Record #: [id number]      ASSESSMENT/PLAN: (Interval History)  Jericho Noguera is seen and examined and management for following issues:    ICH  • Seen by NS and no surgical intervention indicated. • Etio = HTN  • Was unable to tolerate MRI even with sedation  • Was cleared to resume ASA. • Continue atorvastatin. • Outpt follow-up with Neurology.     HTN  • Goal with ICH is SBP<140  • Home: Micardis 80mg qd/Atenolol 50mg qd  • Here: Losartan 100mg qd  • (Micardis 80 mg qd = Losartan 100 mg qd)   • Stopped the Norvasc on 9/8 since BPs too low  • Stable and no changes today      DM type 2  • HA1C 6.7 on 8/15/23  • Home: Metformin 1000mg BID  • Here: Metformin 500 mg BID  • Continue QID Accuchecks/SSI and DM diet  • No changes today      Anemia  • Baseline Hgb 10 - 11. • Iron level 30/Ferritin 22/TIBC 371  • B12 was low normal at 221 on 8/15/23  • Continue iron and B12 supplementation. • stable     Anxiety  • Continue Wellbutrin XL. • Pt is taking 150mg instead of 300mg due to concerns for increased risk of seizure     Right shoulder/knee pain  • Lidopatch added to both areas  • For steroid injection in knee        Discharge date:  Tbd       The above assessment and plan was reviewed and updated as determined by my evaluation of the patient on 9/18/2023.     Labs:   Results from last 7 days   Lab Units 09/18/23  0548 09/14/23  0612   WBC Thousand/uL 7.79 7.85   HEMOGLOBIN g/dL 11.1* 11.5   HEMATOCRIT % 36.1 36.6   PLATELETS Thousands/uL 341 383     Results from last 7 days   Lab Units 09/18/23  0548 09/14/23  0612   SODIUM mmol/L 139 138   POTASSIUM mmol/L 4.0 4.1   CHLORIDE mmol/L 99 100   CO2 mmol/L 30 29   BUN mg/dL 13 14   CREATININE mg/dL 0.50* 0.58*   CALCIUM mg/dL 9.4 9.4             Results from last 7 days   Lab Units 09/18/23  1047 09/18/23  0615 09/17/23 2027   POC GLUCOSE mg/dl 161* 109 130       Review of Scheduled Meds:  Current Facility-Administered Medications   Medication Dose Route Frequency Provider Last Rate   • acetaminophen  650 mg Oral Q6H PRN [facility]     • albuterol  2 puff Inhalation Q4H PRN [facility], DO     • aspirin  81 mg Oral Daily [facility], DO     • atorvastatin  20 mg Oral Daily With Ana Hammr, DO     • baclofen  5 mg Oral BID Braulio Marte, CRNP     • bisacodyl  10 mg Rectal Daily PRN Iqra Hill MD     • bisacodyl  10 mg Rectal Once Braulio Marte, CRNP     • buPROPion  150 mg Oral QAM [facility], DO     • calcium carbonate  1,000 mg Oral TID PRN Laureen Tracy PA-C     • cephalexin  500 mg Oral Q6H [address]  Almaz Amato MD     • vitamin B-12  1,000 mcg Oral Daily [facility], DO     • diazepam  2 mg Oral BID PRN [facility], DO     • docusate sodium  100 mg Oral BID Nestora Crigler, CRNP     • ferrous sulfate  325 mg Oral Daily With Breakfast [facility], DO     • fluticasone-vilanterol  1 puff Inhalation Daily [facility], DO     • gabapentin  300 mg Oral TID [facility], DO     • heparin (porcine)  5,000 Units Subcutaneous Q8H [address] [facility], DO     • hydrOXYzine HCL  25 mg Oral TID PRN Braulio Marte, CRNP     • insulin lispro  1-6 Units Subcutaneous TID AC [facility], DO     • insulin lispro  1-6 Units Subcutaneous HS [facility], DO     • lactulose  20 g Oral Daily PRN [facility], DO     • lidocaine  1 patch Topical Daily Nestora Crigler, CRNP     • lidocaine  1 patch Topical Daily Nestora Crigler, CRNP     • losartan  100 mg Oral Daily [facility], DO     • melatonin  6 mg Oral HS Tiffanie Barrios, CRNP     • metFORMIN  500 mg Oral BID With Meals Nestora Crigler, CRNP     • miconazole  1 Application Topical BID [facility], DO     • ondansetron  4 mg Oral Q6H PRN [facility], DO     • oxyCODONE  5 mg Oral Q4H PRN [facility], DO     • oxyCODONE  2.5 mg Oral Q4H PRN [facility], DO     • pantoprazole  40 mg Oral Early Morning Sarahy Primrose, DO     • polyethylene glycol  17 g Oral Daily PRN ARSENIO Davenport     • prochlorperazine  5 mg Oral Q6H PRN Sarahy Primrose, DO         Subjective/ HPI: Patient seen and examined. Patients overnight issues or events were reviewed with nursing or staff during rounds or morning huddle session. New or overnight issues include the following:     No new or overnight issues. Offers no complaints    ROS:   A 10 point ROS was performed; negative except as noted above. *Labs /Radiology studies reviewed  *Medications reviewed and reconciled as needed  *Please refer to order section for additional ordered labs studies  *Case discussed with primary attending during morning huddle case rounds    Physical Examination:  Vitals:   Vitals:    09/17/23 2100 09/18/23 0547 09/18/23 0700 09/18/23 0840   BP: 107/57  130/90 121/86   BP Location: Left arm  Left arm Left arm   Pulse: 95  74 81   Resp: 18  18    Temp: 98.6 °F (37 °C)  97.8 °F (36.6 °C)    TempSrc: Oral  Oral    SpO2: 95%  97%    Weight:  90.3 kg (199 lb)     Height:           General Appearance: no distress, conversive  HEENT: PERRLA, conjuctiva normal; oropharynx clear; mucous membranes moist   Neck:  Supple, normal ROM  Lungs: CTA, normal respiratory effort, no retractions, expiratory effort normal  CV: regular rate and rhythm; no rubs/murmurs/gallops, PMI normal   ABD: soft; ND/NT; +BS  EXT: no edema  Skin: normal turgor, normal texture, no rashes  Psych: affect normal, mood normal  Neuro: AAO      The above physical exam was reviewed and updated as determined by my evaluation of the patient on 9/18/2023. Invasive Devices     Drain  Duration           External Urinary Catheter 20 days                   VTE Pharmacologic Prophylaxis: Heparin  Code Status: Level 1 - Full Code  Current Length of Stay: 24 day(s)      Total time spent:  30 minutes with more than 50% spent counseling/coordinating care.  Counseling includes discussion with patient re: progress  and discussion with patient of his/her current medical state/information. Coordination of patient's care was performed in conjunction with primary service. Time invested included review of patient's labs, vitals, and management of their comorbidities with continued monitoring. In addition, this patient was discussed with medical team including physician and advanced extenders. The care of the patient was extensively discussed and appropriate treatment plan was formulated unique for this patient. Medical decision making for the day was made by supervising physician unless otherwise noted in their attestation statement. ** Please Note:  voice to text software may have been used in the creation of this document.  Although proof errors in transcription or interpretation are a potential of such software**

## 2023-09-18 NOTE — PROGRESS NOTES
09/18/23 0830   Pain Assessment   Pain Assessment Tool 0-10   Pain Score 7   Pain Location/Orientation Orientation: Right;Location: Knee  (with walking)   Restrictions/Precautions   Precautions Bed/chair alarms;Cognitive; Fall Risk;Pain   Braces or Orthoses Sling  (RUE)   Lying to Sitting on Side of Bed   Type of Assistance Needed Physical assistance   Physical Assistance Level 26%-50%   Comment for trunk-  pt used arm rail   Lying to Sitting on Side of Bed CARE Score 3   Sit to Stand   Type of Assistance Needed Physical assistance   Physical Assistance Level 25% or less   Comment Min A to rise with R knee blocked- at handrail   Sit to Stand CARE Score 3   Bed-Chair Transfer   Type of Assistance Needed Physical assistance   Physical Assistance Level 51%-75%   Comment slide board-  needs help with set up and lateral transfer -  also needs help with repositioning to get safely midline in chair   Chair/Bed-to-Chair Transfer CARE Score 2   Walk 10 Feet   Comment needed to sit at 5 feet   Reason if not Attempted Refused to perform   Walk 10 Feet CARE Score 7   Walk 50 Feet with Two Turns   Reason if not Attempted Safety concerns   Walk 50 Feet with Two Turns CARE Score 88   Walk 150 Feet   Reason if not Attempted Safety concerns   Walk 150 Feet CARE Score 88   Walking 10 Feet on Uneven Surfaces   Reason if not Attempted Safety concerns   Walking 10 Feet on Uneven Surfaces CARE Score 88   Ambulation   Primary Mode of Locomotion Prior to Admission Walk   Distance Walked (feet) 5 ft   Limitations Noted In Balance; Coordination; Endurance; Heel Strike;Midline Orientation;Posture; Safety;Speed;Strength;Swing   Provided Assistance with: Balance;Limb Advancement;Trunk Support   Walk Assist Level Chair Follow;Maximum Assist   Findings Performed at handrail  1 person chair follow,  1 person providing R glute max hip ext , 1 person blocking and assisting with RLE foot placement-  Pt requested to sit due to knee pain   Does the patient walk? 2. Yes   Wheel 50 Feet with Two Turns   Comment did not challenge   Curb or Single Stair   Reason if not Attempted Safety concerns   1 Step (Curb) CARE Score 88   4 Steps   Reason if not Attempted Safety concerns   4 Steps CARE Score 88   12 Steps   Reason if not Attempted Safety concerns   12 Steps CARE Score 88   Therapeutic Interventions   Strengthening Seated RLE LAQ with thigh supported with ball for target to kick the ball,   RLE leg press MRE min resisted  10x3   Neuromuscular Re-Education Standing at handrail focused on straighting LUE on rail and keeping head at midline with therapist,  Also focused on hip and knee extension on RLE,  During sit -stands focused on midline on up and down with slow control   Other Multiple sit-stands and standing 30 sec bouts   Assessment   Treatment Assessment 60 min skilled PT session started session with discussing with pt about decrease progress and importance of continuing trying to walk and standing which pt did agree. Now walking was very short and limited due to hx of R knee OA so d/c walking trial but pt tolerated many sit-stands and standing with focus on midline posture. Cont skilled PT toward LTGs   Problem List Decreased strength;Decreased range of motion;Decreased endurance; Impaired balance;Decreased mobility; Decreased coordination;Decreased cognition; Impaired judgement;Decreased safety awareness;Pain   Barriers to Discharge Inaccessible home environment;Decreased caregiver support   Plan   Progress Slow progress, decreased activity tolerance   PT Therapy Minutes   PT Time In 0830   PT Time Out 0930   PT Total Time (minutes) 60   PT Mode of treatment - Individual (minutes) 60   PT Mode of treatment - Concurrent (minutes) 0   PT Mode of treatment - Group (minutes) 0   PT Mode of treatment - Co-treat (minutes) 0   PT Mode of Treatment - Total time(minutes) 60 minutes   PT Cumulative Minutes 1685   Therapy Time missed   Time missed?  No

## 2023-09-18 NOTE — PROCEDURES
Procedure: right intra-articular knee injection   Prior to procedure: procedure explained to patient, risks and benefits discussed. Patient in agreement and consent was signed. Medications:    Bupivacaine HCl 0.25% 4 mL (lot [id number] exp 06/2026)   Methylprednisolone 40 mg/ml 1 mL (lot [id number] exp 07/2024)         Patient was positioned seated in wheelchair with foot on floor. Lidoderm patch removed and cleaned with alcohol. Anatomical landmark identified and marked prior to procedure. Site cleaned with betadine. A 25-gauge 1-inch needle was inserted and aspiration was attempted. No fluid aspirated. The syringe with above listed medications was applied to needle and medication injected. The needle/syringe was removed, area cleaned and a band-aid was applied. Patient tolerated procedure without incident.     Cem Kam, [address]

## 2023-09-18 NOTE — PROGRESS NOTES
09/18/23 1030   Pain Assessment   Pain Assessment Tool 0-10   Pain Score 5   Pain Location/Orientation Orientation: Right;Location: Leg;Location: Shoulder   Pain Onset/Description Onset: Ongoing; Descriptor: Aching  (increasing in R shoulder during PROM/stretch to RUE)   Hospital Pain Intervention(s) Repositioned; Rest  (pt reports she already received her pain med prior to start of OT session)   Restrictions/Precautions   Precautions Bed/chair alarms;Cognitive; Fall Risk;Pain;Supervision on toilet/commode   Weight Bearing Restrictions No   ROM Restrictions No   Braces or Orthoses Sling  (RUE sling)   Eating   Type of Assistance Needed Set-up / clean-up   Physical Assistance Level No physical assistance   Comment S/U A for lunch meal while seated in w/c at end of OT session; A required to manage containers 2* RUE weakness   Eating CARE Score 5   Sit to Stand   Type of Assistance Needed Physical assistance;Verbal cues   Physical Assistance Level 25% or less   Comment Ramo to rise w/R knee block, at bedrail, with BUE support on bedrail, vc's for posture   Sit to Stand CARE Score 3   Bed-Chair Transfer   Type of Assistance Needed Physical assistance;Verbal cues; Adaptive equipment   Physical Assistance Level 51%-75%   Comment slidboard xfers w/c<>EOM, with max vc's for anterior lean and LE positioning   Chair/Bed-to-Chair Transfer CARE Score 2   Toileting Hygiene   Type of Assistance Needed Physical assistance;Verbal cues; Adaptive equipment   Physical Assistance Level Total assistance   Comment Pt cont to require Ax2 for safety, Ax1 for balance and R knee block in stance with BUE support on bedrail while 2nd person completed hygiene/CM   Toileting Hygiene CARE Score 1   Toileting   Able to [address] down no, up no. Able to Manage Clothing Closures No   Manage Hygiene Bladder   Limitations Noted In Balance; Coordination;UE Strength;LE Strength; Safety;ROM   Adaptive Equipment   (bedrail)   Toilet Transfer   Type of Assistance Needed Physical assistance;Verbal cues; Adaptive equipment   Physical Assistance Level Total assistance   Comment Ax2 swap out technique w/c<> DA PFBSC, with pt BUE support on bedrail in stance, with R knee block   Toilet Transfer CARE Score 1   Toilet Transfer   Surface Assessed Drop Arm Commode   Transfer Technique   (swap out)   Limitations Noted In Balance;Confidence; Endurance;UE Strength;LE Strength;Sensation;Problem Solving; Safety   Adaptive Equipment   (bed rail)   Positioning Concerns Cognition; Safety;Grab Bars   Therapeutic Exercise - ROM   UE-ROM Yes   ROM- Right Upper Extremities   R Shoulder PROM;AAROM;Prolonged stretch; Flexion;ABduction; Extension;Horizontal ABduction; External rotation; Internal rotation   R Elbow PROM;AAROM;Elbow flexion;Elbow extension   R Wrist PROM;AAROM;Prolonged stretch; Wrist flexion;Wrist extension;Radial deviation;Ulnar deviation   R Hand PROM;AAROM;Prolonged stretch; Thumb; Index finger; Long finger;Ring finger;Little finger   R Position Supine   R Weight/Reps/Sets 10x each plane   RUE ROM Comment Pt engaged in supine neuro re-ed mat program, with RUE PROM 0x05dxma each plane for contracture prevention and maintaining joint ROM, followed by 2x5 reps AAROM shoulder flexion, shoulder abduction, and horiz adduction. [id number] tricep AG and ceiling punches with 2 points of control at elbow and wrist, and MaxA. Pt required frequent rest breaks to manage fatigue and vc's for encouragement. Neuromuscular Education   Weight Bearing Technique Yes   RUE Weight Bearing Forearm seated; Extended arm seated   Response to Weight Bearing Technique Pt engaged in several activities EOM to promote WBing through RUE for increased proprioception for motore re-ed. Seated EOM w/CGA, pt engaged in [address] through B/L palms (arms extended) onto large red physioball on floor ahead, rolling ball forward while pressing down, 3x10.  Then, seated EOM with CGA, lateral leaning onto R forearm on wedge, then pushing trunk up to midline sitting position while pushign through R forearm/elbow. 3x5 reps tolerated with rest breaks between and max vc's not to compensate with core muscles. Cognition   Overall Cognitive Status Impaired   Arousal/Participation Alert; Cooperative   Attention Attends with cues to redirect   Orientation Level Oriented X4   Memory Decreased short term memory   Following Commands Follows one step commands with increased time or repetition   Activity Tolerance   Activity Tolerance Patient tolerated treatment well   Assessment   Treatment Assessment Pt seen for 90min skilled OT session focused on RUE neuro re-ed (PROM, AAROM), repetitive fxl slide board transfer training, toileting, sit<>stands, and weightbearing through RUE, for increased independence w/ADLs/IADLs and decreased caregiver burden. See detailed descriptions of fxl performance above. Pt tolerated session well, but cont to be limited by RUE weakness, decreased act massimo, endurance, sitting balance, core strength, standing balance, and standing tolerance. Pt would benefit from continued skilled OT focused on RUE neuro re-ed, fxl transfer training, sitting balance/core strength, ADL retraining, endurance training, and safety awareness. Prognosis Fair   Problem List Decreased strength;Decreased range of motion;Decreased endurance; Impaired balance;Decreased mobility; Decreased coordination;Decreased cognition; Impaired judgement;Decreased safety awareness; Obesity;Pain; Impaired sensation   Barriers to Discharge Inaccessible home environment;Decreased caregiver support   Plan   Treatment/Interventions ADL retraining;Functional transfer training; Therapeutic exercise; Endurance training;Cognitive reorientation;Patient/family training;Equipment eval/education; Bed mobility; Compensatory technique education   Progress Slow progress, decreased activity tolerance   Recommendation   OT Discharge Recommendation Post acute rehabilitation services  (subacute rehab)   OT Therapy Minutes   OT Time In 1030   OT Time Out 1200   OT Total Time (minutes) 90   OT Mode of treatment - Individual (minutes) 90   OT Mode of treatment - Concurrent (minutes) 0   OT Mode of treatment - Group (minutes) 0   OT Mode of treatment - Co-treat (minutes) 0   OT Mode of Treatment - Total time(minutes) 90 minutes   OT Cumulative Minutes 1745   Therapy Time missed   Time missed?  No

## 2023-09-18 NOTE — PROGRESS NOTES
09/18/23 0945   Pain Assessment   Pain Assessment Tool 0-10   Pain Score No Pain   Restrictions/Precautions   Precautions Bed/chair alarms;Cognitive; Fall Risk;Pain   Braces or Orthoses Sling  (RUE)   Comprehension   Comprehension (FIM) 5 - Understands basic directions and conversation   Expression   Expression (FIM) 5 - Needs help/cues only RARELY (< 10% of the time)   Social Interaction   Social Interaction (FIM) 6 - Interacts appropriately with others BUT requires extra  time   Problem Solving   Problem solving (FIM) 5 - Solves basic problems 90% of time   Memory   Memory (FIM) 5 - Needs cueing reminders <10%   Speech/Language/Cognition Assessmetn   Treatment Assessment Pt was awake, alert and cooperative for session, to where engaged in review of weekend events which it was noted that pt did have good recall of how the weekend was  "lighter" therapy wise in comparison to previous weekends, but pt was appreciative for the downtime as well. Pt able to recall visitors/phone calls as well throughout the weekend. Otherwise, SLP did engage pt in a higher level cognitive linguistic task where pt was to complete word finding through letter exclusion. Pt was provided a grid, where the L column had a definition to think of a word w/ 4-5 letters in length. The R column also has a definition which contained 1 less letter from the L column. The letter eliminated was the letter to be placed in the middle column, which was the mystery word which pt was to determine. This task was completed 3 different times, where it was noted that as the tasks progress, pt did have more difficulty in determining words in both L and R columns. For the initial task, pt was able to determine target words where pt was 16/18 accurate, increasing to 18/18 w/ minimal visual cues. Pt was able to determine all 8/8 accuracy given the mystery word.  For the next task, pt was 12/18 accurate in determining target words, where pt was provide min-mod clues to determine target words, but remained 8/8 accurate in determining letters of mystery word. For the last page, pt was 13/18 accurate, increasing again to 18/18 given min-mod cues, but still continued to be 8/8 accurate in determining letters for mystery words. Overall, pt did verbalize some difficulty given this task but overall, continues to make progress towards goals at this time. Pt will continue to benefit from ongoing skilled SLP services at this time in attempts to maximize cognitive linguistic skills in hopes for decreasing caregiver burden over time. SLP Therapy Minutes   SLP Time In 46   SLP Time Out 1030   SLP Total Time (minutes) 45   SLP Mode of treatment - Individual (minutes) 45   SLP Mode of treatment - Concurrent (minutes) 0   SLP Mode of treatment - Group (minutes) 0   SLP Mode of treatment - Co-treat (minutes) 0   SLP Mode of Treatment - Total time(minutes) 45 minutes   SLP Cumulative Minutes 820   Therapy Time missed   Time missed?  No

## 2023-09-18 NOTE — PROGRESS NOTES
PM&R PROGRESS NOTE:  Jericho Noguera 62 y.o. female MRN: [id number]  Unit/Bed#: -99 Encounter: [id number]    Rehabilitation Diagnosis: Impairment of mobility, safety, Activities of Daily Living (ADLs), and cognitive/communication skills due to Stroke:  01.2  Right Body Involvement (Left Brain)    HPI:  Jericho Noguera is a 54-year-old female with history of hypertension, hyperlipidemia, diabetes type 2, obesity, eosinophilic asthma, COPD, fibromyalgia, Sjogren syndrome, lumbar spondylosis with grade 1 anterior spondylolisthesis who presents to the hospital on 8/14/2023 due to acute onset right upper and right lower extremity weakness with sensory loss. Additionally with right-sided facial droop and dysarthria. The patient was noted to have an acute intraparenchymal hemorrhage on CT in the left thalamic region with surrounding edema. A CTA was negative. Patient was initially placed on a Cardene drip. Course complicated by significant left-sided leg pain and was recommended on treatment for radicular symptoms including Tylenol, gabapentin and potentially steroids. Neurology was consulted and recommended MRI of the brain with and without contrast while holding antithrombotics. The MRI showed a stable left thalamic capsular intraparenchymal hemorrhage with surrounding edema without any other mass effect. A repeat CT was completed on 8/17/2023 after worsening symptoms including worsening speech slurring with stable findings. Additionally there was a rapid response after a fall with head strike on 8/18 with a repeat CT of the head without acute findings. Additionally antihypertensive regimen was altered as she was previously on telmisartan and atenolol and is currently on amlodipine, losartan and hydralazine with better control.  The patient was evaluated by the Rehabilitation team and deemed an appropriate candidate for comprehensive inpatient rehabilitation and admitted to the [facility] on 8/25/2023  3:19 PM    SUBJECTIVE: Patient seen face to face. No acute issues overnight. Slept well, Patient feels she is making good progress in therapy, however, is frustrated with inability to ambulated. The chronic pain of her bilateral knees is impeding ability to make progress. She had steroid injections in the past; right knee several years ago and the left knee a year ago. Right leg spasms improved. Mood is improved. Voiding, LBM 9/17. Denies chest pain, shortness of breath, fever, chills, N/V, abdominal pain. ASSESSMENT: Stable, progressing    PLAN:  - continue current rehabilitation and medical plan of care pending discharge to [address] [place] [address] on 9/19, Covid test ordered  - pain: Dr. Song Silva plans to inject left knee, awaiting medications    Rehabilitation  • Functional deficits:  Self-care, right hemiparesis, mobility, cognitive impairment  • Continue current rehabilitation plan of care to maximize function.     • Functional update:   o PT: mobility- mod A, transfers- mod-max A, ambulation- refused, wheelchair mobility- min A 150'  o OT: ADL: bathing- UB min A LB max A, dressing UB min-mod A LB total A, footwear- mod-max A, toileting- total A   o ST: cognition- impaired, decreased executive function, decreased attention, decreased comprehension  • Estimated Discharge: anticipated 9/19 to subacute rehab at [facility]    Pain  • Gabapentin 300 mg TID  • Lidocaine patch, 2  • Oxycodone 2.5-5 mg every 4 hours prn  • Diazepam 2 mg BID prn    DVT prophylaxis  • Heparin sc    Bladder plan  • Continent    Bowel plan  • Continent  • Colace 100 mg BID  • Miralax daily prn  • Bisacodyl supp daily prn      * ICH (intracerebral hemorrhage) ([facility])  Assessment & Plan  59-year-old female presents with right hemiplegia, dysarthria and facial asymmetry found to have an acute intraparenchymal hemorrhage in the left thalamic region with surrounding edema felt to be secondary to uncontrolled hypertension  · Had repeat CT of the head on 8/18 after a fall with head strike resulting in significant bruising of the face  · Cleared by neurology to restart aspirin 81 mg daily and atorvastatin 20 mg daily  · Evaluated by neurosurgery with no surgical intervention at this time  · Had a repeat head CT on 8/20 which has been stable  · Goal systolic blood pressure of less than 140  · Follow-up with neurology as an outpatient in 6 weeks (10/26)  · Physical and Occupational Therapy with goals for community discharge. Patient lives with her  in a mobile home with 5 steps to enter and he is able to assist 24/7  · Hemorrhagic stroke education, nutrition, neuropsychology  · Secondary stroke prophylaxis with hypertension control    Primary hypertension  Assessment & Plan  · Home regimen: Telmisartan and atenolol  · Current regimen: Cozaar 100 mg daily, Norvasc and hydralazine discontinued  · Monitor blood pressures especially in therapy and make adjustments as needed    Chronic bilateral low back pain with bilateral sciatica  Assessment & Plan  · Patient has a history of chronic low back pain with radicular symptoms in addition to fibromyalgia and myofascial pain syndrome. · She follows with Dr. Danelle Dick from pain management has tried several medications as well as epidural steroid injections with little relief  · Imaging reveals lumbar degenerative disc disease at the L3-4, L4-5, L5-S1 level. Additionally facet hypertrophic changes and ligamentous laxity at the L4-5 level resulting in grade 1 anterior spondylolisthesis and mild canal narrowing with slight distortion of the left anterior lateral aspect of the thecal sac at this level with mild right greater than left neuroforaminal narrowing  · Multi modal pain with current medication regimen including oxycodone to be weaned as well as the Robaxin and gabapentin.   We will also consult neuropsychology  · Added Tylenol as well as Robaxin and as needed Valium    Diabetes mellitus type 2, controlled ([address])  Assessment & Plan  Lab Results   Component Value Date    HGBA1C 6.7 (H) 08/15/2023       Recent Labs     09/17/23  1116 09/17/23  1600 09/17/23 2027 09/18/23  0615   POCGLU 149* 162* 130 109     · Currently on carb controlled level 2 diet, metformin 500 mg twice daily  · Sliding scale insulin algorithm 3 with Accu-Cheks 4 times daily      Constipation  Assessment & Plan  · On admission had not had a bowel movement in at least 11 days  · Obtaining x-ray to evaluate stool burden  · Adding to bowel regimen including increasing senna and Colace, lactulose as needed and will be more aggressive pending results of x-ray  · Continue relatively aggressive regimen  · Resolved with bowel regimen    Hyponatremia  Assessment & Plan  · Sodium 139 (previously 137, 134, 133)  · Continue following biweekly labs with no intervention at this point unless continues to trend down. CAD (coronary artery disease)  Assessment & Plan  · History of CAD currently on statin and aspirin that was restarted on 8/21 after clearance by neurology  · Beta-blocker held due to bradycardia  · Patient has not formally seen a cardiologist however this was diagnosed based on a CT PE study that was conducted in the ER showing mild CAD    GERD (gastroesophageal reflux disease)  Assessment & Plan  Continue Protonix    Hyperlipidemia associated with type 2 diabetes mellitus ([address])  Assessment & Plan  Continue atorvastatin 20 mg with dinner    Class 2 obesity with body mass index (BMI) of 39.0 to 39.9 in adult  Assessment & Plan  · BMI of 39.74 on admission  · Continue promoting weight loss and increased activity, diet and exercise  · Continue a consistent carbohydrate diet  · Nutrition consultation    Sjogren's syndrome ([address])  Assessment & Plan  · Patient states that she is been worked up for Sjogren's syndrome in the past and has had conflicting diagnosis he is stating that some physicians have diagnosed her with it and others stated she did not have it.   · She has not been on any medication treatment for this and does not actively follow-up with a rheumatologist    Depression with anxiety  Assessment & Plan  · Neuropsychology consultation poststroke with history of anxiety  · Continue Wellbutrin  mg in the morning. It has been 300 mg as an outpatient however it was decreased in acute care due to worry of decreasing seizure threshold in the setting of ICH    Moderate persistent asthma without complication  Assessment & Plan  · Follows with Dr. Herrera Going from pulmonology  · Home regimen includes Breo and as needed albuterol  · On equivalent here and added albuterol inhaler on admission to [facility] consultants medical co-management. Labs, medications, and imaging personally reviewed. ROS:  .Review of Systems   A 10 point review of systems was negative except for what is noted in the HPI. OBJECTIVE:   /90 (BP Location: Left arm)   Pulse 74   Temp 97.8 °F (36.6 °C) (Oral)   Resp 18   Ht 5' (1.524 m)   Wt 90.3 kg (199 lb) Comment: p500  SpO2 97%   BMI 38.86 kg/m²     Physical Exam  Constitutional:       Appearance: Normal appearance. She is obese. HENT:      Head: Normocephalic and atraumatic. Nose: Nose normal.      Mouth/Throat:      Mouth: Mucous membranes are moist.   Cardiovascular:      Rate and Rhythm: Normal rate and regular rhythm. Pulses: Normal pulses. Heart sounds: Normal heart sounds. Pulmonary:      Effort: Pulmonary effort is normal.      Breath sounds: Normal breath sounds. Abdominal:      General: Bowel sounds are normal.      Palpations: Abdomen is soft. Musculoskeletal:         General: Normal range of motion. Cervical back: Normal range of motion. Right lower leg: Edema present. Left lower leg: Edema present. Skin:     General: Skin is warm and dry. Capillary Refill: Capillary refill takes less than 2 seconds.    Neurological:      Mental Status: She is alert and oriented to person, place, and time.      Motor: Weakness (right: FF 2/5, HI 1/5, HF 2/5, KE 2/5, KF 2/5) present.       Coordination: Coordination abnormal.      Gait: Gait abnormal.   Psychiatric:         Mood and Affect: Mood normal.         Judgment: Judgment normal.         Lab Results   Component Value Date    WBC 7.79 09/18/2023    HGB 11.1 (L) 09/18/2023    HCT 36.1 09/18/2023    MCV 87 09/18/2023     09/18/2023     Lab Results   Component Value Date    SODIUM 139 09/18/2023    K 4.0 09/18/2023    CL 99 09/18/2023    CO2 30 09/18/2023    BUN 13 09/18/2023    CREATININE 0.50 (L) 09/18/2023    GLUC 114 09/18/2023    CALCIUM 9.4 09/18/2023     Lab Results   Component Value Date    INR 1.06 08/15/2023    INR 0.94 08/14/2023    PROTIME 14.0 08/15/2023    PROTIME 13.2 08/14/2023         Current Facility-Administered Medications:   •  acetaminophen (TYLENOL) tablet 650 mg, 650 mg, Oral, Q6H PRN, Hieu Edouard, DO, 650 mg at 09/18/23 0048  •  albuterol (PROVENTIL HFA,VENTOLIN HFA) inhaler 2 puff, 2 puff, Inhalation, Q4H PRN, Hieu Edouard DO, 2 puff at 09/04/23 0919  •  aspirin chewable tablet 81 mg, 81 mg, Oral, Daily, Hieu Edouard DO, 81 mg at 09/17/23 0840  •  atorvastatin (LIPITOR) tablet 20 mg, 20 mg, Oral, Daily With Ana Hammr, DO, 20 mg at 09/17/23 1601  •  baclofen tablet 5 mg, 5 mg, Oral, BID, ARSENIO Pinto, 5 mg at 09/17/23 1736  •  bisacodyl (DULCOLAX) rectal suppository 10 mg, 10 mg, Rectal, Daily PRN, Iqra Hill MD, 10 mg at 09/03/23 1826  •  bisacodyl (DULCOLAX) rectal suppository 10 mg, 10 mg, Rectal, Once, ARSENIO Pinto  •  buPROPion (WELLBUTRIN XL) 24 hr tablet 150 mg, 150 mg, Oral, QAM, Hieu Edouard DO, 150 mg at 09/17/23 1861  •  calcium carbonate (TUMS) chewable tablet 1,000 mg, 1,000 mg, Oral, TID PRN, Laureen Tracy PA-C, 1,000 mg at 08/26/23 1241  •  cephalexin (KEFLEX) capsule 500 mg, 500 mg, Oral, Q6H [address], Almaz Amato MD, 500 mg at 09/18/23 0532  •  cyanocobalamin (VITAMIN B-12) tablet 1,000 mcg, 1,000 mcg, Oral, Daily, [facility], DO, 1,000 mcg at 09/17/23 0840  •  diazepam (VALIUM) tablet 2 mg, 2 mg, Oral, BID PRN, [facility], DO, 2 mg at 09/15/23 2031  •  docusate sodium (COLACE) capsule 100 mg, 100 mg, Oral, BID, ARSENIO Alcantar, 100 mg at 09/17/23 1736  •  ferrous sulfate tablet 325 mg, 325 mg, Oral, Daily With Breakfast, [facility], DO, 325 mg at 09/17/23 6109  •  fluticasone-vilanterol 200-25 mcg/actuation 1 puff, 1 puff, Inhalation, Daily, [facility], DO, 1 puff at 09/17/23 0841  •  gabapentin (NEURONTIN) capsule 300 mg, 300 mg, Oral, TID, [facility], DO, 300 mg at 09/17/23 2243  •  heparin (porcine) subcutaneous injection 5,000 Units, 5,000 Units, Subcutaneous, Q8H [facility], [facility], DO, 5,000 Units at 09/18/23 0532  •  hydrOXYzine HCL (ATARAX) tablet 25 mg, 25 mg, Oral, TID PRN, ARSENIO Pinto  •  insulin lispro (HumaLOG) 100 units/mL subcutaneous injection 1-6 Units, 1-6 Units, Subcutaneous, TID AC, 1 Units at 09/17/23 1602 **AND** Fingerstick Glucose (POCT), , , 4x Daily AC and at bedtime, [facility], DO  •  insulin lispro (HumaLOG) 100 units/mL subcutaneous injection 1-6 Units, 1-6 Units, Subcutaneous, HS, [facility], DO, 1 Units at 09/08/23 2117  •  lactulose oral solution 20 g, 20 g, Oral, Daily PRN, [facility], DO  •  lidocaine (LIDODERM) 5 % patch 1 patch, 1 patch, Topical, Daily, ARSENIO Alcantar, 1 patch at 09/17/23 0844  •  lidocaine (LIDODERM) 5 % patch 1 patch, 1 patch, Topical, Daily, ARSENIO Alcantar, 1 patch at 09/17/23 6061  •  losartan (COZAAR) tablet 100 mg, 100 mg, Oral, Daily, Roly Flower DO, 100 mg at 09/17/23 0840  •  melatonin tablet 6 mg, 6 mg, Oral, HS, ARSENIO Pinto, 6 mg at 09/17/23 2243  •  metFORMIN (GLUCOPHAGE) tablet 500 mg, 500 mg, Oral, BID With Meals, ARSENIO Alcantar, 500 mg at 09/17/23 1601  •  miconazole (MICOTIN) 2 % powder 1 Application, 1 Application, Topical, BID, Eliot Hamman, DO, 1 Application at 60/08/58 0759  •  ondansetron (ZOFRAN-ODT) dispersible tablet 4 mg, 4 mg, Oral, Q6H PRN, Samot Hamman, DO  •  oxyCODONE (ROXICODONE) IR tablet 5 mg, 5 mg, Oral, Q4H PRN, Sam Hamman, DO, 5 mg at 09/17/23 2244  •  oxyCODONE (ROXICODONE) split tablet 2.5 mg, 2.5 mg, Oral, Q4H PRN, Sam Hamman, DO, 2.5 mg at 09/12/23 1608  •  pantoprazole (PROTONIX) EC tablet 40 mg, 40 mg, Oral, Early Morning, Sam Hamman, DO, 40 mg at 09/18/23 0532  •  polyethylene glycol (MIRALAX) packet 17 g, 17 g, Oral, Daily PRN, ARSENIO Pinto, 17 g at 09/12/23 2493  •  prochlorperazine (COMPAZINE) tablet 5 mg, 5 mg, Oral, Q6H PRN, Samot Hamman, DO    Past Medical History:   Diagnosis Date   • Arthritis    • Asthma    • Continuous opioid dependence ([address]) 4/27/2022   • Diabetes mellitus ([address])    • Diverticulitis of colon    • Fibromyalgia 04/26/2022   • Headache(784.0)    • History of mammogram 2018   • History of tobacco abuse 04/26/2022   • Hypertension    • Nausea 04/29/2022   • Obesity    • Sjogren's syndrome ([address]) 10/19/2012   • Urinary tract infection        Patient Active Problem List    Diagnosis Date Noted   • ICH (intracerebral hemorrhage) ([address]) 08/15/2023   • Primary hypertension 04/26/2022   • Chronic bilateral low back pain with bilateral sciatica 08/31/2022   • Diabetes mellitus type 2, controlled ([address]) 04/29/2022   • Constipation 08/25/2023   • Hyponatremia 08/31/2023   • CAD (coronary artery disease) 04/07/2023   • GERD (gastroesophageal reflux disease) 08/25/2023   • Anemia 08/16/2023   • Left leg pain 08/15/2023   • Chronic obstructive pulmonary disease with acute exacerbation ([address]) 07/26/2023   • Hyperlipidemia associated with type 2 diabetes mellitus ([address]) 07/26/2023   • Abnormal CT of the chest 06/28/2023   • Class 2 obesity with body mass index (BMI) of 39.0 to 39.9 in adult 06/28/2023   • Right lumbosacral radiculopathy 10/12/2022   • Paresthesia of both feet 08/31/2022   • Postoperative visit 05/24/2022   • Incarcerated umbilical hernia 60/64/6192   • Continuous opioid dependence ([address]) 04/27/2022   • Moderate persistent asthma without complication 93/11/2825   • Cigarette nicotine dependence in remission 04/26/2022   • Fibromyalgia 04/26/2022   • Sjogren's syndrome ([address]) 10/19/2012   • Depression with anxiety 09/18/2012        ARSENIO Wilcox  Physical Medicine and [place]

## 2023-09-18 NOTE — PROGRESS NOTES
Physical Medicine and Rehabilitation Progress Note   Call Coverage  Maria Esther Boyle 62 y.o. female MRN: [id number]  Unit/Bed#: -97 Encounter: [id number]      Assessment & Plan:     Decline in ADLs and mobility: Functional assessment  Sign assist bathing  Total assist LBD  Mod assist UBD  Gen transfers significant assist    PMR On-call focused problem list:  (Additional problems may be listed in primary providers weekly notes)     Hyponatremia  Assessment & Plan  · Improved   · Continue following biweekly labs with no intervention at this point unless continues to trend down. Constipation  Assessment & Plan  · Improving   · Continue relatively aggressive regimen    GERD (gastroesophageal reflux disease)  Assessment & Plan  Continue Protonix    Hyperlipidemia associated with type 2 diabetes mellitus (HCC)  Assessment & Plan  Continue atorvastatin 20 mg with dinner    Class 2 obesity with body mass index (BMI) of 39.0 to 39.9 in adult  Assessment & Plan  · BMI of 39.74 on admission  · Continue promoting weight loss and increased activity, diet and exercise  · Continue a consistent carbohydrate diet  · Nutrition consultation    CAD (coronary artery disease)  Assessment & Plan  · History of CAD currently on statin and aspirin that was restarted on 8/21 after clearance by neurology  · Beta-blocker held due to bradycardia  · Patient has not formally seen a cardiologist however this was diagnosed based on a CT PE study that was conducted in the ER showing mild CAD    Chronic bilateral low back pain with bilateral sciatica  Assessment & Plan  · Patient has a history of chronic low back pain with radicular symptoms in addition to fibromyalgia and myofascial pain syndrome. · She follows with Dr. Chino Bernardo from pain management has tried several medications as well as epidural steroid injections with little relief  · Imaging reveals lumbar degenerative disc disease at the L3-4, L4-5, L5-S1 level.   Additionally facet hypertrophic changes and ligamentous laxity at the L4-5 level resulting in grade 1 anterior spondylolisthesis and mild canal narrowing with slight distortion of the left anterior lateral aspect of the thecal sac at this level with mild right greater than left neuroforaminal narrowing  · Multi modal pain with current medication regimen Gabapentin, PRN Oxy; PRN Robaxin    Sjogren's syndrome ([address])  Assessment & Plan  · Patient states that she is been worked up for Sjogren's syndrome in the past and has had conflicting diagnosis he is stating that some physicians have diagnosed her with it and others stated she did not have it. · She has not been on any medication treatment for this and does not actively follow-up with a rheumatologist    Depression with anxiety  Assessment & Plan  · Supportive counseling provided   · Neuropsychology consultation poststroke with history of anxiety  · Continue Wellbutrin  mg in the morning.   It has been 300 mg as an outpatient however it was decreased in acute care due to worry of decreasing seizure threshold in the setting of ICH    Diabetes mellitus type 2, controlled [facility])  Assessment & Plan  Lab Results   Component Value Date    HGBA1C 6.7 (H) 08/15/2023       Recent Labs     09/14/23  1714 09/14/23  2046 09/15/23  0613 09/15/23  1045   POCGLU 139 138 115 127     · Currently on carb controlled level 2 diet, metformin 500 mg twice daily  · Sliding scale insulin algorithm 3 with Accu-Cheks 4 times daily      Moderate persistent asthma without complication  Assessment & Plan  · Follows with Dr. Wilmer Galeas from pulmonology  · Home regimen includes Breo and as needed albuterol  · On equivalent here and added albuterol inhaler on admission to [facility]    Primary hypertension  Assessment & Plan  · Home regimen: Telmisartan and atenolol  · Current regimen: Cozaar 100 mg daily, Norvasc and hydralazine discontinued  · Monitor blood pressures especially in therapy and make adjustments as needed    * ICH (intracerebral hemorrhage) ([address] )  Assessment & Plan  57-year-old female presented with right hemiplegia, dysarthria and facial asymmetry found to have an acute intraparenchymal hemorrhage in the left thalamic region with surrounding edema felt to be secondary to uncontrolled hypertension  · Had repeat CT of the head on 8/18 after a fall with head strike resulting in significant bruising of the face  · Cleared by neurology to restart aspirin 81 mg daily and atorvastatin 20 mg daily  · Evaluated by neurosurgery with no surgical intervention at this time  · Had a repeat head CT on 8/20 which has been stable  · Goal systolic blood pressure of less than 140  · Follow-up with neurology as an outpatient in 6 weeks (10/26)  · Continue Physical and Occupational Therapy with goals for community discharge. Patient lives with her  in a mobile home with 5 steps to enter and he is able to assist 24/7  · Hemorrhagic stroke education, nutrition, neuropsychology  · Secondary stroke prophylaxis with hypertension control        Other Medical Issues:  • Dysuria - c/w her prior UTIs last 2 days and bothersome to her  o UA 30-50 WBC, +LE - Empiric Keflex 500mg Q6H x5 day - follow-up UC  • VTE prophylaxis - SCDs, mobilize, and heparin      Objective: Allergies per EMR  Diagnostic Studies: Reviewed, no new imaging  XR abdomen 1 view kub   Final Result by Laurel Wells MD (08/26 1133)   Some formed stool in the rectosigmoid colon, but overall fecal retention is mild.       Workstation performed: [id number]           See above as well    Laboratory: Labs reviewed  Results from last 7 days   Lab Units 09/14/23  0612 09/11/23  0614   HEMOGLOBIN g/dL 11.5 11.1*   HEMATOCRIT % 36.6 36.3   WBC Thousand/uL 7.85 9.17     Results from last 7 days   Lab Units 09/14/23  0612 09/11/23  0614   BUN mg/dL 14 13   SODIUM mmol/L 138 137   POTASSIUM mmol/L 4.1 3.9   CHLORIDE mmol/L 100 101   CREATININE mg/dL 0.58* 0.56*            Drug regimen reviewed, all potential adverse effects identified and addressed:    Scheduled Meds:  Current Facility-Administered Medications   Medication Dose Route Frequency Provider Last Rate   • acetaminophen  650 mg Oral Q6H PRN Yoni Rend, DO     • albuterol  2 puff Inhalation Q4H PRN Yoni Rend, DO     • aspirin  81 mg Oral Daily Yoni Rend, DO     • atorvastatin  20 mg Oral Daily With Shermon Age, DO     • baclofen  5 mg Oral BID Consuello Innocent, CRNP     • bisacodyl  10 mg Rectal Daily PRN Alex Wetzel MD     • bisacodyl  10 mg Rectal Once Consuello Innocent, CRNP     • buPROPion  150 mg Oral QAM Yoni Rend, DO     • calcium carbonate  1,000 mg Oral TID PRN Laureen Tracy PA-C     • cephalexin  500 mg Oral Q6H [address]  Ronie Boeck, MD     • vitamin B-12  1,000 mcg Oral Daily Yoni Rend, DO     • diazepam  2 mg Oral BID PRN Yoni Chungd, DO     • docusate sodium  100 mg Oral BID ARSENIO Andre     • ferrous sulfate  325 mg Oral Daily With Breakfast Yoni Rend, DO     • fluticasone-vilanterol  1 puff Inhalation Daily Yoni Rend, DO     • gabapentin  300 mg Oral TID Yoni Rend, DO     • heparin (porcine)  5,000 Units Subcutaneous Q8H [address] Yoni Sheldond, DO     • hydrOXYzine HCL  25 mg Oral TID PRN Consuello Innocent, CRNP     • insulin lispro  1-6 Units Subcutaneous TID AC Yoni Chungd, DO     • insulin lispro  1-6 Units Subcutaneous HS Yoni Rend, DO     • lactulose  20 g Oral Daily PRN Yoni Rend, DO     • lidocaine  1 patch Topical Daily ARSENIO Andre     • lidocaine  1 patch Topical Daily ARSENIO Andre     • losartan  100 mg Oral Daily Yoni Rend, DO     • melatonin  6 mg Oral HS ARSENIO Pinto     • metFORMIN  500 mg Oral BID With Meals ARSENIO Andre     • miconazole  1 Application Topical BID Yoni Rend, DO     • ondansetron  4 mg Oral Q6H PRN Yoni Chungd, DO     • oxyCODONE  5 mg Oral Q4H PRN Yoni Chungd, DO • oxyCODONE  2.5 mg Oral Q4H PRN Mandie Adler, DO     • pantoprazole  40 mg Oral Early Morning Mandie Adler, DO     • polyethylene glycol  17 g Oral Daily PRN ARSENIO Hou     • prochlorperazine  5 mg Oral Q6H PRN Mandie Adler, DO         Chief Complaints:  Burning when urinating      Subjective: On eval, patient reports second day of burning when urinating that is c/w prior UTIs and she thinks she has it. She feels like she is emptying adequately and denies abdominal pain, nausea, fever, chills. She reports stable considerable hemiparesis. ROS: A 10 point ROS was performed; negative except as noted above. Physical Exam:  Temp:  [97.8 °F (36.6 °C)-98.4 °F (36.9 °C)] 98.4 °F (36.9 °C)  HR:  [] 101  Resp:  [18-19] 19  BP: (102-135)/(68-94) 102/68  SpO2:  [95 %] 95 %    GEN:  Sitting in NAD   HEENT/NECK: Normocephalic, atraumatic, moist mucous membranes   CARDIAC: Regular rate rhythm, no murmers, no rubs, no gallops  LUNGS:  clear to auscultation, no wheezes, rales, or rhonchi  ABDOMEN: Soft, non-tender, non-distended, normal active bowel sounds  EXTREMITIES/SKIN:  no calf edema, no calf tenderness to palpation  NEURO:   MENTAL STATUS:  Appropriate wakefulness and interaction  and Strength/MMT:  Dense R sided weakness with trace R shoulder shrug  PSYCH:  Affect:  Slightly down       ** Please Note: Fluency Direct voice to text software may have been used in the creation of this document. **    I personally performed the required components and examined the patient myself in person on 9/17/23.

## 2023-09-19 PROBLEM — M25.561 CHRONIC PAIN OF RIGHT KNEE: Status: ACTIVE | Noted: 2023-09-19

## 2023-09-19 PROBLEM — G89.29 CHRONIC PAIN OF RIGHT KNEE: Status: ACTIVE | Noted: 2023-09-19

## 2023-09-19 PROBLEM — K59.00 CONSTIPATION: Status: RESOLVED | Noted: 2023-08-25 | Resolved: 2023-09-19

## 2023-09-19 PROBLEM — E87.1 HYPONATREMIA: Status: RESOLVED | Noted: 2023-08-31 | Resolved: 2023-09-19

## 2023-09-19 LAB
BACTERIA UR CULT: ABNORMAL
BACTERIA UR CULT: ABNORMAL
GLUCOSE SERPL-MCNC: 117 MG/DL (ref 65–140)
GLUCOSE SERPL-MCNC: 128 MG/DL (ref 65–140)
GLUCOSE SERPL-MCNC: 130 MG/DL (ref 65–140)
GLUCOSE SERPL-MCNC: 147 MG/DL (ref 65–140)

## 2023-09-19 PROCEDURE — 97110 THERAPEUTIC EXERCISES: CPT

## 2023-09-19 PROCEDURE — 97112 NEUROMUSCULAR REEDUCATION: CPT

## 2023-09-19 PROCEDURE — 99232 SBSQ HOSP IP/OBS MODERATE 35: CPT | Performed by: STUDENT IN AN ORGANIZED HEALTH CARE EDUCATION/TRAINING PROGRAM

## 2023-09-19 PROCEDURE — 82948 REAGENT STRIP/BLOOD GLUCOSE: CPT

## 2023-09-19 PROCEDURE — 97530 THERAPEUTIC ACTIVITIES: CPT

## 2023-09-19 PROCEDURE — 99232 SBSQ HOSP IP/OBS MODERATE 35: CPT | Performed by: NURSE PRACTITIONER

## 2023-09-19 PROCEDURE — 92507 TX SP LANG VOICE COMM INDIV: CPT

## 2023-09-19 PROCEDURE — 97130 THER IVNTJ EA ADDL 15 MIN: CPT

## 2023-09-19 PROCEDURE — 97535 SELF CARE MNGMENT TRAINING: CPT

## 2023-09-19 PROCEDURE — 97129 THER IVNTJ 1ST 15 MIN: CPT

## 2023-09-19 RX ORDER — LOSARTAN POTASSIUM 100 MG/1
100 TABLET ORAL DAILY
Refills: 0
Start: 2023-09-20

## 2023-09-19 RX ORDER — BUPROPION HYDROCHLORIDE 150 MG/1
150 TABLET ORAL EVERY MORNING
Refills: 0
Start: 2023-09-20

## 2023-09-19 RX ORDER — OXYCODONE HYDROCHLORIDE 5 MG/1
2.5 TABLET ORAL EVERY 4 HOURS PRN
Qty: 10 TABLET | Refills: 0 | Status: SHIPPED | OUTPATIENT
Start: 2023-09-19 | End: 2023-09-29

## 2023-09-19 RX ORDER — HEPARIN SODIUM 5000 [USP'U]/ML
5000 INJECTION, SOLUTION INTRAVENOUS; SUBCUTANEOUS EVERY 8 HOURS SCHEDULED
Qty: 1 ML | Refills: 0
Start: 2023-09-19

## 2023-09-19 RX ORDER — DIAZEPAM 2 MG/1
2 TABLET ORAL 2 TIMES DAILY PRN
Qty: 10 TABLET | Refills: 0 | Status: SHIPPED | OUTPATIENT
Start: 2023-09-19 | End: 2023-09-29

## 2023-09-19 RX ORDER — BISACODYL 10 MG
10 SUPPOSITORY, RECTAL RECTAL DAILY PRN
Qty: 12 SUPPOSITORY | Refills: 0 | Status: SHIPPED | OUTPATIENT
Start: 2023-09-19

## 2023-09-19 RX ORDER — CALCIUM CARBONATE 500 MG/1
1000 TABLET, CHEWABLE ORAL 3 TIMES DAILY PRN
Refills: 0
Start: 2023-09-19

## 2023-09-19 RX ORDER — PANTOPRAZOLE SODIUM 40 MG/1
40 TABLET, DELAYED RELEASE ORAL
Refills: 0
Start: 2023-09-20

## 2023-09-19 RX ORDER — ACETAMINOPHEN 325 MG/1
650 TABLET ORAL EVERY 6 HOURS PRN
Refills: 0
Start: 2023-09-19

## 2023-09-19 RX ORDER — ATORVASTATIN CALCIUM 20 MG/1
20 TABLET, FILM COATED ORAL
Refills: 0
Start: 2023-09-19

## 2023-09-19 RX ORDER — ONDANSETRON 4 MG/1
4 TABLET, ORALLY DISINTEGRATING ORAL EVERY 6 HOURS PRN
Qty: 20 TABLET | Refills: 0
Start: 2023-09-19

## 2023-09-19 RX ORDER — LIDOCAINE 50 MG/G
1 PATCH TOPICAL DAILY
Refills: 0
Start: 2023-09-20

## 2023-09-19 RX ORDER — LANOLIN ALCOHOL/MO/W.PET/CERES
6 CREAM (GRAM) TOPICAL
Refills: 0
Start: 2023-09-19

## 2023-09-19 RX ORDER — OXYCODONE HYDROCHLORIDE 5 MG/1
5 TABLET ORAL EVERY 4 HOURS PRN
Qty: 10 TABLET | Refills: 0 | Status: SHIPPED | OUTPATIENT
Start: 2023-09-19 | End: 2023-09-29

## 2023-09-19 RX ORDER — BACLOFEN 5 MG/1
5 TABLET ORAL 2 TIMES DAILY
Qty: 60 TABLET | Refills: 0 | Status: SHIPPED | OUTPATIENT
Start: 2023-09-19

## 2023-09-19 RX ORDER — FERROUS SULFATE 325(65) MG
325 TABLET ORAL
Refills: 0
Start: 2023-09-20

## 2023-09-19 RX ORDER — POLYETHYLENE GLYCOL 3350 17 G/17G
17 POWDER, FOR SOLUTION ORAL DAILY PRN
Refills: 0
Start: 2023-09-19

## 2023-09-19 RX ORDER — HYDROXYZINE HYDROCHLORIDE 25 MG/1
25 TABLET, FILM COATED ORAL 3 TIMES DAILY PRN
Qty: 30 TABLET | Refills: 0 | Status: SHIPPED | OUTPATIENT
Start: 2023-09-19

## 2023-09-19 RX ORDER — INSULIN LISPRO 100 [IU]/ML
1-6 INJECTION, SOLUTION INTRAVENOUS; SUBCUTANEOUS
Refills: 0
Start: 2023-09-19

## 2023-09-19 RX ORDER — DOCUSATE SODIUM 100 MG/1
100 CAPSULE, LIQUID FILLED ORAL 2 TIMES DAILY
Refills: 0
Start: 2023-09-19

## 2023-09-19 RX ADMIN — CYANOCOBALAMIN TAB 500 MCG 1000 MCG: 500 TAB at 08:24

## 2023-09-19 RX ADMIN — METFORMIN HYDROCHLORIDE 500 MG: 500 TABLET ORAL at 17:17

## 2023-09-19 RX ADMIN — BUPROPION HYDROCHLORIDE 150 MG: 150 TABLET, FILM COATED, EXTENDED RELEASE ORAL at 08:26

## 2023-09-19 RX ADMIN — CEPHALEXIN 500 MG: 500 CAPSULE ORAL at 17:17

## 2023-09-19 RX ADMIN — FLUTICASONE FUROATE AND VILANTEROL TRIFENATATE 1 PUFF: 200; 25 POWDER RESPIRATORY (INHALATION) at 08:26

## 2023-09-19 RX ADMIN — CEPHALEXIN 500 MG: 500 CAPSULE ORAL at 06:04

## 2023-09-19 RX ADMIN — GABAPENTIN 300 MG: 300 CAPSULE ORAL at 20:47

## 2023-09-19 RX ADMIN — ATORVASTATIN CALCIUM 20 MG: 20 TABLET, FILM COATED ORAL at 17:17

## 2023-09-19 RX ADMIN — METFORMIN HYDROCHLORIDE 500 MG: 500 TABLET ORAL at 08:24

## 2023-09-19 RX ADMIN — OXYCODONE HYDROCHLORIDE 5 MG: 5 TABLET ORAL at 20:55

## 2023-09-19 RX ADMIN — HEPARIN SODIUM 5000 UNITS: 5000 INJECTION INTRAVENOUS; SUBCUTANEOUS at 06:04

## 2023-09-19 RX ADMIN — OXYCODONE HYDROCHLORIDE 5 MG: 5 TABLET ORAL at 08:31

## 2023-09-19 RX ADMIN — BACLOFEN 5 MG: 10 TABLET ORAL at 08:24

## 2023-09-19 RX ADMIN — DOCUSATE SODIUM 100 MG: 100 CAPSULE, LIQUID FILLED ORAL at 17:17

## 2023-09-19 RX ADMIN — OXYCODONE HYDROCHLORIDE 5 MG: 5 TABLET ORAL at 14:34

## 2023-09-19 RX ADMIN — GABAPENTIN 300 MG: 300 CAPSULE ORAL at 08:24

## 2023-09-19 RX ADMIN — PANTOPRAZOLE SODIUM 40 MG: 40 TABLET, DELAYED RELEASE ORAL at 06:04

## 2023-09-19 RX ADMIN — HEPARIN SODIUM 5000 UNITS: 5000 INJECTION INTRAVENOUS; SUBCUTANEOUS at 14:20

## 2023-09-19 RX ADMIN — CEPHALEXIN 500 MG: 500 CAPSULE ORAL at 23:36

## 2023-09-19 RX ADMIN — MELATONIN 6 MG: at 20:47

## 2023-09-19 RX ADMIN — DOCUSATE SODIUM 100 MG: 100 CAPSULE, LIQUID FILLED ORAL at 08:24

## 2023-09-19 RX ADMIN — OXYCODONE HYDROCHLORIDE 5 MG: 5 TABLET ORAL at 04:25

## 2023-09-19 RX ADMIN — LOSARTAN POTASSIUM 100 MG: 50 TABLET, FILM COATED ORAL at 08:24

## 2023-09-19 RX ADMIN — FERROUS SULFATE TAB 325 MG (65 MG ELEMENTAL FE) 325 MG: 325 (65 FE) TAB at 08:24

## 2023-09-19 RX ADMIN — HEPARIN SODIUM 5000 UNITS: 5000 INJECTION INTRAVENOUS; SUBCUTANEOUS at 21:30

## 2023-09-19 RX ADMIN — ASPIRIN 81 MG CHEWABLE TABLET 81 MG: 81 TABLET CHEWABLE at 08:24

## 2023-09-19 RX ADMIN — BACLOFEN 5 MG: 10 TABLET ORAL at 17:17

## 2023-09-19 RX ADMIN — CEPHALEXIN 500 MG: 500 CAPSULE ORAL at 11:35

## 2023-09-19 RX ADMIN — MICONAZOLE NITRATE 1 APPLICATION: 20 POWDER TOPICAL at 08:25

## 2023-09-19 RX ADMIN — GABAPENTIN 300 MG: 300 CAPSULE ORAL at 17:17

## 2023-09-19 RX ADMIN — MICONAZOLE NITRATE 1 APPLICATION: 20 POWDER TOPICAL at 17:18

## 2023-09-19 NOTE — ASSESSMENT & PLAN NOTE
- right knee pain  - per patient needs bilateral knee replacements  - last right knee intra-articular steroid injection ~1 year ago  - last left knee intra-articular steroid injection ~ 2 years ago    - 9/18/23- right knee intra-articular steroid injection for chronic pain d/t arthritis

## 2023-09-19 NOTE — PROGRESS NOTES
09/19/23 1010   Pain Assessment   Pain Assessment Tool 0-10   Pain Score 7   Pain Location/Orientation Orientation: Right;Location: Shoulder   Hospital Pain Intervention(s) Repositioned; Rest   Restrictions/Precautions   Precautions Bed/chair alarms;Cognitive; Fall Risk;Supervision on toilet/commode   Comprehension   Comprehension (FIM) 5 - Understands basic directions and conversation   Expression   Expression (FIM) 5 - Needs help/cues only RARELY (< 10% of the time)   Social Interaction   Social Interaction (FIM) 6 - Interacts appropriately with others BUT requires extra  time   Problem Solving   Problem solving (FIM) 5 - Solves basic problems 90% of time   Memory   Memory (FIM) 5 - Needs cueing reminders <10%   Speech/Language/Cognition Assessment   Treatment Assessment Pt participated in skilled SLP session focusing on higher level word finding and higher level cognitive linguistic skills. When SLP initially arrived to pt's room, pt was toileting, therefore, session began later. Pt demonstrated accurate STM recall of recent events regarding her discharge, recent therapy tasks completed with SLP and plan for stroke support group meeting ([facility]) which is scheduled for this afternoon. Targeting reading comprehension of complex directions and word finding, pt completed a letter logic task in which she was to determine a target word based on letter placements. Pt completed task with 4/4 accuracy in determining described words and with 24/24 accuracy in individual descriptions. As per pt's preference, given a word retrieval task in which pt was provided with scrambled letters, paired with descriptions of objects, pt determined target words for 21/24 trials. Required moderate verbal and semantic cues to improve word retrieval, though noting these trials were also the most difficult trials from the task.  Lastly, targeting higher level deduction and planning task, pt was presented with both direct and indirect written clues which would allow pt to plan items into a specific grid. Based on clues, pt accurately plotted 3/9 targets. Ultimately, pt benefited from SLP reviewing each clue/written description one by one in order to identify an initial error and to then later plot remaining targets, benefiting from mod to max level of cuing for remainder of trials. After completion of task, pt self reflected and was able to identify that task was challenging and that she required assist. At end of session, pt was provided with additional therapy tasks/worksheets that she can take with her to the next facility to continue to work on these skills. Pt is currently planned for discharge to subacute setting this date but with recommendations for further skilled SLP services to continue to target higher cognitive linguistic and word finding skills. SLP Therapy Minutes   SLP Time In 1010   SLP Time Out 1100   SLP Total Time (minutes) 50   SLP Mode of treatment - Individual (minutes) 50   SLP Mode of treatment - Concurrent (minutes) 0   SLP Mode of treatment - Group (minutes) 0   SLP Mode of treatment - Co-treat (minutes) 0   SLP Mode of Treatment - Total time(minutes) 50 minutes   SLP Cumulative Minutes 259   Therapy Time missed   Time missed?  No

## 2023-09-19 NOTE — CASE MANAGEMENT
Cm has not yet received fax cover page from insurance to submit clinical for snf request. Cm phoned UR nurse Star Jackson at [phone] who stated she was resending the cover page. 1158 cm received copy of cover page from [facility] at [facility] and faxed clinical. Cm learned fax machine on arc is not sending or receiving documents. Fax machine on arc 4 utilized. Cm suggested to [facility] that they may receive determination if the fax number there is what insurance has. 1512 received call from [facility] who provided approval and auth for pt to transition to [facility] tomorrow. Heath Underwood is [id number], soc 9/20 for 7 days. lcd 9/26 with update to be faxed to [phone] within 24 hrs of 9/27. Info provided to [facility] via [employer]. Met w/pt and made her aware of dc for tomorrow. Request for transport via w/c van placed with roundtrip, awaiting confirmation of services. Pt aware of transportation and oop expenses.

## 2023-09-19 NOTE — PLAN OF CARE
Problem: Prexisting or High Potential for Compromised Skin Integrity  Goal: Skin integrity is maintained or improved  Description: INTERVENTIONS:  - Identify patients at risk for skin breakdown  - Assess and monitor skin integrity  - Assess and monitor nutrition and hydration status  - Monitor labs   - Assess for incontinence   - Turn and reposition patient  - Assist with mobility/ambulation  - Relieve pressure over bony prominences  - Avoid friction and shearing  - Provide appropriate hygiene as needed including keeping skin clean and dry  - Evaluate need for skin moisturizer/barrier cream  - Collaborate with interdisciplinary team   - Patient/family teaching  - Consider wound care consult   Outcome: Progressing     Problem: PAIN - ADULT  Goal: Verbalizes/displays adequate comfort level or baseline comfort level  Description: Interventions:  - Encourage patient to monitor pain and request assistance  - Assess pain using appropriate pain scale  - Administer analgesics based on type and severity of pain and evaluate response  - Implement non-pharmacological measures as appropriate and evaluate response  - Consider cultural and social influences on pain and pain management  - Notify physician/advanced practitioner if interventions unsuccessful or patient reports new pain  Outcome: Progressing     Problem: INFECTION - ADULT  Goal: Absence or prevention of progression during hospitalization  Description: INTERVENTIONS:  - Assess and monitor for signs and symptoms of infection  - Monitor lab/diagnostic results  - Monitor all insertion sites, i.e. indwelling lines, tubes, and drains  - Monitor endotracheal if appropriate and nasal secretions for changes in amount and color  - [place] appropriate cooling/warming therapies per order  - Administer medications as ordered  - Instruct and encourage patient and family to use good hand hygiene technique  - Identify and instruct in appropriate isolation precautions for identified infection/condition  Outcome: Progressing     Problem: SAFETY ADULT  Goal: Patient will remain free of falls  Description: INTERVENTIONS:  - Educate patient/family on patient safety including physical limitations  - Instruct patient to call for assistance with activity   - Consult OT/PT to assist with strengthening/mobility   - Keep Call bell within reach  - Keep bed low and locked with side rails adjusted as appropriate  - Keep care items and personal belongings within reach  - Initiate and maintain comfort rounds  - Make Fall Risk Sign visible to staff  - Offer Toileting every 2 Hours, in advance of need  - Initiate/Maintain 24/7 alarm  - Obtain necessary fall risk management equipment: RW  - Apply yellow socks and bracelet for high fall risk patients  - Consider moving patient to room near nurses station  Outcome: Progressing

## 2023-09-19 NOTE — PROGRESS NOTES
Internal Medicine Progress Note  Patient: Biju Llanos  Age/sex: 62 y.o. female  Medical Record #: [id number]      ASSESSMENT/PLAN: (Interval History)  Biju Llanos is seen and examined and management for following issues:    ICH  • Seen by NS and no surgical intervention indicated. • Etio = HTN  • Was unable to tolerate MRI even with sedation  • Was cleared to resume ASA. • Continue atorvastatin. • Outpt follow-up with Neurology.     HTN  • Goal with ICH is SBP<140  • Home: Micardis 80mg qd/Atenolol 50mg qd  • Here: Losartan 100mg qd  • (Micardis 80 mg qd = Losartan 100 mg qd)   • Stopped the Norvasc on 9/8 since BPs too low  • Stable and no changes today      DM type 2  • HA1C 6.7 on 8/15/23  • Home: Metformin 1000mg BID  • Here: Metformin 500 mg BID  • Continue QID Accuchecks/SSI and DM diet  • No changes today      Anemia  • Baseline Hgb 10 - 11. • Iron level 30/Ferritin 22/TIBC 371  • B12 was low normal at 221 on 8/15/23  • Continue iron and B12 supplementation. • stable     Anxiety  • Continue Wellbutrin XL. • Pt is taking 150mg instead of 300mg due to concerns for increased risk of seizure     Right shoulder/knee pain  • Lidopatch added to both areas  • s/p steroid injection in right knee 9/18/23        Discharge date:  Tbd       The above assessment and plan was reviewed and updated as determined by my evaluation of the patient on 9/19/2023.     Labs:   Results from last 7 days   Lab Units 09/18/23  0548 09/14/23  0612   WBC Thousand/uL 7.79 7.85   HEMOGLOBIN g/dL 11.1* 11.5   HEMATOCRIT % 36.1 36.6   PLATELETS Thousands/uL 341 383     Results from last 7 days   Lab Units 09/18/23  0548 09/14/23  0612   SODIUM mmol/L 139 138   POTASSIUM mmol/L 4.0 4.1   CHLORIDE mmol/L 99 100   CO2 mmol/L 30 29   BUN mg/dL 13 14   CREATININE mg/dL 0.50* 0.58*   CALCIUM mg/dL 9.4 9.4             Results from last 7 days   Lab Units 09/19/23  1045 09/19/23  0710 09/18/23  2135   POC GLUCOSE mg/dl 117 147* 171*       Review of Scheduled Meds:  Current Facility-Administered Medications   Medication Dose Route Frequency Provider Last Rate   • acetaminophen  650 mg Oral Q6H PRN Tesha Harvest, DO     • albuterol  2 puff Inhalation Q4H PRN Tesha Harvest, DO     • aspirin  81 mg Oral Daily Tesha Harvest, DO     • atorvastatin  20 mg Oral Daily With Linell Stair, DO     • baclofen  5 mg Oral BID Benedict Liner, CRNP     • bisacodyl  10 mg Rectal Daily PRN Victor Hugo Tariq MD     • bisacodyl  10 mg Rectal Once Benedict Liner, CRNP     • buPROPion  150 mg Oral QAM Tesha Harvest, DO     • calcium carbonate  1,000 mg Oral TID PRN Laureen Tracy PA-C     • cephalexin  500 mg Oral Q6H [address]  Odin Castro MD     • vitamin B-12  1,000 mcg Oral Daily Tesha Harvest, DO     • diazepam  2 mg Oral BID PRN Tesha Harvest, DO     • docusate sodium  100 mg Oral BID Sherie Orozco, MARCONP     • ferrous sulfate  325 mg Oral Daily With Breakfast Tesha Harvest, DO     • fluticasone-vilanterol  1 puff Inhalation Daily Tesha Harvest, DO     • gabapentin  300 mg Oral TID Tesha Harvest, DO     • heparin (porcine)  5,000 Units Subcutaneous Q8H [address] [facility] Beyer, DO     • hydrOXYzine HCL  25 mg Oral TID PRN Benedict Liner, CRNP     • insulin lispro  1-6 Units Subcutaneous TID AC Tesha Harvest, DO     • insulin lispro  1-6 Units Subcutaneous HS Tesha Harvest, DO     • lactulose  20 g Oral Daily PRN Tesha Harvest, DO     • lidocaine  1 patch Topical Daily Sherie Orozco, MARCONP     • lidocaine  1 patch Topical Daily Sherie Orozco, CRNP     • losartan  100 mg Oral Daily Tesha Harvest, DO     • melatonin  6 mg Oral HS ARSENOI Pinto     • metFORMIN  500 mg Oral BID With Meals ARSENIO Oconnor     • miconazole  1 Application Topical BID Tesha Harvest, DO     • ondansetron  4 mg Oral Q6H PRN Tesha Harvest, DO     • oxyCODONE  5 mg Oral Q4H PRN Tesha Harvest, DO     • oxyCODONE  2.5 mg Oral Q4H PRN Tesha Harvest, DO     • pantoprazole  40 mg Oral Early Morning Rome Quick DO     • polyethylene glycol  17 g Oral Daily PRN ARSENIO Hernandez     • prochlorperazine  5 mg Oral Q6H PRN Rome Quick DO         Subjective/ HPI: Patient seen and examined. Patients overnight issues or events were reviewed with nursing or staff during rounds or morning huddle session. New or overnight issues include the following:     No new or overnight issues. Offers no complaints    ROS:   A 10 point ROS was performed; negative except as noted above. *Labs /Radiology studies reviewed  *Medications reviewed and reconciled as needed  *Please refer to order section for additional ordered labs studies  *Case discussed with primary attending during morning huddle case rounds    Physical Examination:  Vitals:   Vitals:    09/18/23 1427 09/18/23 2030 09/19/23 0523 09/19/23 0600   BP: 121/79 120/72 113/68    BP Location: Left arm Right arm Right arm    Pulse: 95 102 76    Resp: 17  16    Temp: 97.7 °F (36.5 °C) 98.1 °F (36.7 °C) 97.5 °F (36.4 °C)    TempSrc: Oral Oral Oral    SpO2: 98% 99% 98%    Weight:   90.7 kg (200 lb) 90.7 kg (200 lb)   Height:           General Appearance: no distress, conversive  HEENT:  External ear normal.  Nose normal w/o drainage. Mucous membranes are moist. Oropharynx is clear. Conjunctiva clear w/o icterus or redness. Neck:  Supple, normal ROM  Lungs: BBS without crackles/wheeze/rhonchi; respirations unlabored with normal inspiratory/expiratory effort. No retractions noted. On RA  CV: regular rate and rhythm; no rubs/murmurs/gallops, PMI normal   ABD: Abdomen is soft. Bowel sounds all quadrants. Nontender with no distention. EXT: no edema  Skin: normal turgor, normal texture, no rashes  Psych: affect normal, mood normal  Neuro: AAO     The above physical exam was reviewed and updated as determined by my evaluation of the patient on 9/19/2023.     Invasive Devices     Drain  Duration           External Urinary Catheter 21 days                   VTE Pharmacologic Prophylaxis: Heparin  Code Status: Level 1 - Full Code  Current Length of Stay: 25 day(s)      Total time spent:  30 minutes with more than 50% spent counseling/coordinating care. Counseling includes discussion with patient re: progress  and discussion with patient of his/her current medical state/information. Coordination of patient's care was performed in conjunction with primary service. Time invested included review of patient's labs, vitals, and management of their comorbidities with continued monitoring. In addition, this patient was discussed with medical team including physician and advanced extenders. The care of the patient was extensively discussed and appropriate treatment plan was formulated unique for this patient. Medical decision making for the day was made by supervising physician unless otherwise noted in their attestation statement. ** Please Note:  voice to text software may have been used in the creation of this document.  Although proof errors in transcription or interpretation are a potential of such software**

## 2023-09-19 NOTE — PROGRESS NOTES
09/19/23 1300   Pain Assessment   Pain Assessment Tool 0-10   Pain Score 5   Pain Location/Orientation Orientation: Right;Location: Shoulder   Hospital Pain Intervention(s) Repositioned; Emotional support   Restrictions/Precautions   Precautions Bed/chair alarms;Cognitive; Fall Risk;Supervision on toilet/commode   Weight Bearing Restrictions No   ROM Restrictions No   Braces or Orthoses Sling   Bed-Chair Transfer   Type of Assistance Needed Physical assistance   Physical Assistance Level 51%-75%   Comment SB txfers, step by step cues for positioning and sequencing. Dec carryover by end of task. Chair/Bed-to-Chair Transfer CARE Score 2   Toileting Hygiene   Type of Assistance Needed Physical assistance   Physical Assistance Level Total assistance   Comment A x 2 while in stance at bed rail for CM. Toileting Hygiene CARE Score 1   Toilet Transfer   Type of Assistance Needed Physical assistance   Physical Assistance Level 51%-75%   Comment Can complete SB txfer with modA x 1. Toilet Transfer CARE Score 2   Cognition   Overall Cognitive Status Impaired   Arousal/Participation Alert; Cooperative   Attention Attends with cues to redirect   Orientation Level Oriented X4   Memory Decreased short term memory   Following Commands Follows one step commands with increased time or repetition   Activity Tolerance   Activity Tolerance Patient tolerated treatment well   Assessment   Treatment Assessment Pt participated in skilled OT services with focus on toileting, functional txfers, RUE NMR. Pt presenting with inc flexor tone and tightness at pec which she reports is causing discomfort. Session grossly focused no AAROM of RUE while supine on mat. Pt engaged in shoulder ab/adduction, shoulder flexion with prolonged hold. Pt continues to present with proximal weakness of shoulder. While seated at [facility] in front of mirror pt engaged in scapular elevation/depression, and protraction/retraction.  Pt continues to present with dec carryover generally of NPPs, RUE positioning and NMR. Plan for pt to d/c to subacute rehab to continue to address above mentioned deficits to inc safety and independence with I/ADL tasks. Prognosis Fair   Problem List Decreased strength;Decreased range of motion;Decreased endurance; Impaired balance;Decreased mobility; Decreased coordination;Decreased cognition; Impaired judgement;Decreased safety awareness; Obesity;Pain; Impaired sensation; Impaired tone   Plan   Treatment/Interventions ADL retraining;Functional transfer training; Therapeutic exercise; Endurance training;Cognitive reorientation;Patient/family training;Equipment eval/education; Bed mobility; Compensatory technique education   Progress Progressing toward goals   Recommendation   OT Discharge Recommendation   (subacute rehab)   OT Therapy Minutes   OT Time In 1300   OT Time Out 1430   OT Total Time (minutes) 90   OT Mode of treatment - Individual (minutes) 90   OT Mode of treatment - Concurrent (minutes) 0   OT Mode of treatment - Group (minutes) 0   OT Mode of treatment - Co-treat (minutes) 0   OT Mode of Treatment - Total time(minutes) 90 minutes   OT Cumulative Minutes 1835   Therapy Time missed   Time missed?  No

## 2023-09-19 NOTE — PROGRESS NOTES
PM&R PROGRESS NOTE:  Candis Flores 62 y.o. female MRN: [id number]  Unit/Bed#: -09 Encounter: [id number]    Rehabilitation Diagnosis: Impairment of mobility, safety, Activities of Daily Living (ADLs), and cognitive/communication skills due to Stroke:  01.2  Right Body Involvement (Left Brain)    HPI:  Candis Flores is a 60-year-old female with history of hypertension, hyperlipidemia, diabetes type 2, obesity, eosinophilic asthma, COPD, fibromyalgia, Sjogren syndrome, lumbar spondylosis with grade 1 anterior spondylolisthesis who presents to the hospital on 8/14/2023 due to acute onset right upper and right lower extremity weakness with sensory loss. Additionally with right-sided facial droop and dysarthria. The patient was noted to have an acute intraparenchymal hemorrhage on CT in the left thalamic region with surrounding edema. A CTA was negative. Patient was initially placed on a Cardene drip. Course complicated by significant left-sided leg pain and was recommended on treatment for radicular symptoms including Tylenol, gabapentin and potentially steroids. Neurology was consulted and recommended MRI of the brain with and without contrast while holding antithrombotics. The MRI showed a stable left thalamic capsular intraparenchymal hemorrhage with surrounding edema without any other mass effect. A repeat CT was completed on 8/17/2023 after worsening symptoms including worsening speech slurring with stable findings. Additionally there was a rapid response after a fall with head strike on 8/18 with a repeat CT of the head without acute findings. Additionally antihypertensive regimen was altered as she was previously on telmisartan and atenolol and is currently on amlodipine, losartan and hydralazine with better control.  The patient was evaluated by the Rehabilitation team and deemed an appropriate candidate for comprehensive inpatient rehabilitation and admitted to the [facility] on 8/25/2023  3:19 PM    SUBJECTIVE: Patient seen face to face. No acute issues overnight. Slept well. Right knee pain improved with intra-articular steroid injection. No concerns at this time. Good appetite, voiding, LBM 9/18. Denies chest pain, shortness of breath, fever, chills, N/V, abdominal pain. ASSESSMENT: Stable, progressing    PLAN:  - right knee pain improved, no signs of infection s/p intra-articular injection  - Covid negative, awaiting insurance authorization for subacute rehab at [address] [place] [address]  • Functional deficits:  Self-care, right hemiparesis, mobility, cognitive impairment  • Continue current rehabilitation plan of care to maximize function.     • Functional update:   o PT: mobility- mod A, transfers- mod-max A, ambulation- refused, wheelchair mobility- min A 150'  o OT: ADL: bathing- UB min A LB max A, dressing UB min-mod A LB total A, footwear- mod-max A, toileting- total A   o ST: cognition- impaired, decreased executive function, decreased attention, decreased comprehension  • Estimated Discharge: anticipated 9/19 to subacute rehab at [place], pending insurance authorization    Pain  • Gabapentin 300 mg TID  • Lidocaine patch, 2  • Oxycodone 2.5-5 mg every 4 hours prn  • Diazepam 2 mg BID prn    DVT prophylaxis  • Heparin sc    Bladder plan  • Continent    Bowel plan  • Continent  • Colace 100 mg BID  • Miralax daily prn  • Bisacodyl supp daily prn      * ICH (intracerebral hemorrhage) ([address])  Assessment & Plan  60-year-old female presents with right hemiplegia, dysarthria and facial asymmetry found to have an acute intraparenchymal hemorrhage in the left thalamic region with surrounding edema felt to be secondary to uncontrolled hypertension  · Had repeat CT of the head on 8/18 after a fall with head strike resulting in significant bruising of the face  · Cleared by neurology to restart aspirin 81 mg daily and atorvastatin 20 mg daily  · Evaluated by neurosurgery with no surgical intervention at this time  · Had a repeat head CT on 8/20 which has been stable  · Goal systolic blood pressure of less than 140  · Follow-up with neurology as an outpatient in 6 weeks (10/26)  · Physical and Occupational Therapy with goals for community discharge. Patient lives with her  in a mobile home with 5 steps to enter and he is able to assist 24/7  · Hemorrhagic stroke education, nutrition, neuropsychology  · Secondary stroke prophylaxis with hypertension control    Primary hypertension  Assessment & Plan  · Home regimen: Telmisartan and atenolol  · Current regimen: Cozaar 100 mg daily, Norvasc and hydralazine discontinued  · Monitor blood pressures especially in therapy and make adjustments as needed  · Follow-up with PCP as outpatient    Chronic bilateral low back pain with bilateral sciatica  Assessment & Plan  · Patient has a history of chronic low back pain with radicular symptoms in addition to fibromyalgia and myofascial pain syndrome. · She follows with Dr. Emily Engle from pain management has tried several medications as well as epidural steroid injections with little relief  · Imaging reveals lumbar degenerative disc disease at the L3-4, L4-5, L5-S1 level. Additionally facet hypertrophic changes and ligamentous laxity at the L4-5 level resulting in grade 1 anterior spondylolisthesis and mild canal narrowing with slight distortion of the left anterior lateral aspect of the thecal sac at this level with mild right greater than left neuroforaminal narrowing  · Multi modal pain with current medication regimen including oxycodone to be weaned as well as the Robaxin and gabapentin.   We will also consult neuropsychology  · Added Tylenol as well as Robaxin and as needed Valium    Diabetes mellitus type 2, controlled [facility])  Assessment & Plan  Lab Results   Component Value Date    HGBA1C 6.7 (H) 08/15/2023       Recent Labs     09/18/23  1047 09/18/23  1552 09/18/23  2135 09/19/23  0710   POCGLU 161* 140 171* 147* · Currently on carb controlled level 2 diet, metformin 500 mg twice daily  · Sliding scale insulin algorithm 3 with Accu-Cheks 4 times daily      Constipation  Assessment & Plan  · On admission had not had a bowel movement in at least 11 days  · Obtaining x-ray to evaluate stool burden  · Adding to bowel regimen including increasing senna and Colace, lactulose as needed and will be more aggressive pending results of x-ray  · Continue relatively aggressive regimen  · Resolved with bowel regimen    Hyponatremia  Assessment & Plan  · Sodium 139 (previously 137, 134, 133)  · Continue following biweekly labs with no intervention at this point unless continues to trend down.     CAD (coronary artery disease)  Assessment & Plan  · History of CAD currently on statin and aspirin that was restarted on 8/21 after clearance by neurology  · Beta-blocker held due to bradycardia  · Patient has not formally seen a cardiologist however this was diagnosed based on a CT PE study that was conducted in the ER showing mild CAD    Chronic pain of right knee  Assessment & Plan  - right knee pain  - per patient needs bilateral knee replacements  - last right knee intra-articular steroid injection ~1 year ago  - last left knee intra-articular steroid injection ~ 2 years ago    - 9/18/23- right knee intra-articular steroid injection for chronic pain d/t arthritis      GERD (gastroesophageal reflux disease)  Assessment & Plan  Continue Protonix    Hyperlipidemia associated with type 2 diabetes mellitus ([address])  Assessment & Plan  Continue atorvastatin 20 mg with dinner    Class 2 obesity with body mass index (BMI) of 39.0 to 39.9 in adult  Assessment & Plan  · BMI of 39.74 on admission  · Continue promoting weight loss and increased activity, diet and exercise  · Continue a consistent carbohydrate diet  · Nutrition consultation    Sjogren's syndrome ([address])  Assessment & Plan  · Patient states that she is been worked up for [employer] syndrome in the past and has had conflicting diagnosis he is stating that some physicians have diagnosed her with it and others stated she did not have it. · She has not been on any medication treatment for this and does not actively follow-up with a rheumatologist    Depression with anxiety  Assessment & Plan  · Neuropsychology consultation poststroke with history of anxiety  · Continue Wellbutrin  mg in the morning. It has been 300 mg as an outpatient however it was decreased in acute care due to worry of decreasing seizure threshold in the setting of ICH    Moderate persistent asthma without complication  Assessment & Plan  · Follows with Dr. Toño Pual from pulmonology  · Home regimen includes Breo and as needed albuterol  · On equivalent here and added albuterol inhaler on admission to [facility] consultants medical co-management. Personally reviewed labs, medications, and imaging. ROS:  .Review of Systems   A 10 point review of systems was negative except for what is noted in the HPI. OBJECTIVE:   /68 (BP Location: Right arm)   Pulse 76   Temp 97.5 °F (36.4 °C) (Oral)   Resp 16   Ht 5' (1.524 m)   Wt 90.7 kg (200 lb)   SpO2 98%   BMI 39.06 kg/m²     Physical Exam  Constitutional:       Appearance: Normal appearance. She is obese. HENT:      Head: Normocephalic and atraumatic. Nose: Nose normal.      Mouth/Throat:      Mouth: Mucous membranes are moist.   Cardiovascular:      Rate and Rhythm: Normal rate and regular rhythm. Pulses: Normal pulses. Heart sounds: Normal heart sounds. Pulmonary:      Effort: Pulmonary effort is normal.      Breath sounds: Normal breath sounds. Abdominal:      General: Bowel sounds are normal.      Palpations: Abdomen is soft. Musculoskeletal:         General: Normal range of motion. Cervical back: Normal range of motion. Right lower leg: Edema (trace) present. Left lower leg: Edema (trace) present.    Skin:     General: Skin is warm and dry. Capillary Refill: Capillary refill takes less than 2 seconds. Neurological:      Mental Status: She is alert and oriented to person, place, and time. Motor: Weakness present.       Coordination: Coordination abnormal.      Gait: Gait abnormal.   Psychiatric:         Mood and Affect: Mood normal.         Lab Results   Component Value Date    WBC 7.79 09/18/2023    HGB 11.1 (L) 09/18/2023    HCT 36.1 09/18/2023    MCV 87 09/18/2023     09/18/2023     Lab Results   Component Value Date    SODIUM 139 09/18/2023    K 4.0 09/18/2023    CL 99 09/18/2023    CO2 30 09/18/2023    BUN 13 09/18/2023    CREATININE 0.50 (L) 09/18/2023    GLUC 114 09/18/2023    CALCIUM 9.4 09/18/2023     Lab Results   Component Value Date    INR 1.06 08/15/2023    INR 0.94 08/14/2023    PROTIME 14.0 08/15/2023    PROTIME 13.2 08/14/2023         Current Facility-Administered Medications:   •  acetaminophen (TYLENOL) tablet 650 mg, 650 mg, Oral, Q6H PRN, Pat Craig, DO, 650 mg at 09/18/23 0048  •  albuterol (PROVENTIL HFA,VENTOLIN HFA) inhaler 2 puff, 2 puff, Inhalation, Q4H PRN, Pat Craig DO, 2 puff at 09/04/23 0919  •  aspirin chewable tablet 81 mg, 81 mg, Oral, Daily, Pat Craig DO, 81 mg at 09/19/23 6569  •  atorvastatin (LIPITOR) tablet 20 mg, 20 mg, Oral, Daily With Dinner, Pat Craig DO, 20 mg at 09/18/23 1559  •  baclofen tablet 5 mg, 5 mg, Oral, BID, ARSENIO Pinto, 5 mg at 09/19/23 8196  •  bisacodyl (DULCOLAX) rectal suppository 10 mg, 10 mg, Rectal, Daily PRN, Vanessa Beatty MD, 10 mg at 09/03/23 1826  •  bisacodyl (DULCOLAX) rectal suppository 10 mg, 10 mg, Rectal, Once, ARSENIO Pinto  •  buPROPion (WELLBUTRIN XL) 24 hr tablet 150 mg, 150 mg, Oral, QAM, Pat Craig DO, 150 mg at 09/19/23 7715  •  calcium carbonate (TUMS) chewable tablet 1,000 mg, 1,000 mg, Oral, TID PRN, Laureen Tracy PA-C, 1,000 mg at 08/26/23 1241  •  cephalexin (KEFLEX) capsule 500 mg, 500 mg, Oral, Q6H [address], Carl Burns MD, 500 mg at 09/19/23 1135  •  cyanocobalamin (VITAMIN B-12) tablet 1,000 mcg, 1,000 mcg, Oral, Daily, Yanni Madison DO, 1,000 mcg at 09/19/23 3237  •  diazepam (VALIUM) tablet 2 mg, 2 mg, Oral, BID PRN, Yanni Madison DO, 2 mg at 09/15/23 2031  •  docusate sodium (COLACE) capsule 100 mg, 100 mg, Oral, BID, ARSENIO Johnson, 100 mg at 09/19/23 7630  •  ferrous sulfate tablet 325 mg, 325 mg, Oral, Daily With Breakfast, Yanni Madison DO, 325 mg at 09/19/23 3274  •  fluticasone-vilanterol 200-25 mcg/actuation 1 puff, 1 puff, Inhalation, Daily, Yanni Madison DO, 1 puff at 09/19/23 0998  •  gabapentin (NEURONTIN) capsule 300 mg, 300 mg, Oral, TID, Yanni Madison DO, 300 mg at 09/19/23 0087  •  heparin (porcine) subcutaneous injection 5,000 Units, 5,000 Units, Subcutaneous, Q8H [address], Yanni Madison DO, 5,000 Units at 09/19/23 0604  •  hydrOXYzine HCL (ATARAX) tablet 25 mg, 25 mg, Oral, TID PRN, ARSENIO Gonzalez  •  insulin lispro (HumaLOG) 100 units/mL subcutaneous injection 1-6 Units, 1-6 Units, Subcutaneous, TID AC, 1 Units at 09/18/23 1159 **AND** Fingerstick Glucose (POCT), , , 4x Daily AC and at bedtime, Yanni Madison DO  •  insulin lispro (HumaLOG) 100 units/mL subcutaneous injection 1-6 Units, 1-6 Units, Subcutaneous, HS, Yanni Madison DO, 1 Units at 09/18/23 2151  •  lactulose oral solution 20 g, 20 g, Oral, Daily PRN, Yanni Madison DO  •  lidocaine (LIDODERM) 5 % patch 1 patch, 1 patch, Topical, Daily, ARSENIO Johnson, 1 patch at 09/18/23 0834  •  lidocaine (LIDODERM) 5 % patch 1 patch, 1 patch, Topical, Daily, ARSENIO Johnson, 1 patch at 09/18/23 3049  •  losartan (COZAAR) tablet 100 mg, 100 mg, Oral, Daily, Yanni Madison DO, 100 mg at 09/19/23 9947  •  melatonin tablet 6 mg, 6 mg, Oral, HS, ARSENIO Pinto, 6 mg at 09/18/23 2042  •  metFORMIN (GLUCOPHAGE) tablet 500 mg, 500 mg, Oral, BID With Meals, Ting Zelaya, CRNP, 500 mg at 09/19/23 0824  •  miconazole (MICOTIN) 2 % powder 1 Application, 1 Application, Topical, BID, Hedda Schleswig, DO, 1 Application at 50/36/16 0825  •  ondansetron (ZOFRAN-ODT) dispersible tablet 4 mg, 4 mg, Oral, Q6H PRN, Hedda Schleswig, DO  •  oxyCODONE (ROXICODONE) IR tablet 5 mg, 5 mg, Oral, Q4H PRN, Hedda Schleswig, DO, 5 mg at 09/19/23 0831  •  oxyCODONE (ROXICODONE) split tablet 2.5 mg, 2.5 mg, Oral, Q4H PRN, Hedda Schleswig, DO, 2.5 mg at 09/12/23 1608  •  pantoprazole (PROTONIX) EC tablet 40 mg, 40 mg, Oral, Early Morning, Hedda Schleswig, DO, 40 mg at 09/19/23 3425  •  polyethylene glycol (MIRALAX) packet 17 g, 17 g, Oral, Daily PRN, ARSENIO Pinto, 17 g at 09/12/23 3925  •  prochlorperazine (COMPAZINE) tablet 5 mg, 5 mg, Oral, Q6H PRN, Hedda Schleswig, DO    Past Medical History:   Diagnosis Date   • Arthritis    • Asthma    • Continuous opioid dependence ([address]) 4/27/2022   • Diabetes mellitus ([address])    • Diverticulitis of colon    • Fibromyalgia 04/26/2022   • Headache(784.0)    • History of mammogram 2018   • History of tobacco abuse 04/26/2022   • Hypertension    • Nausea 04/29/2022   • Obesity    • Sjogren's syndrome ([address]) 10/19/2012   • Urinary tract infection        Patient Active Problem List    Diagnosis Date Noted   • ICH (intracerebral hemorrhage) ([address]) 08/15/2023   • Primary hypertension 04/26/2022   • Chronic bilateral low back pain with bilateral sciatica 08/31/2022   • Diabetes mellitus type 2, controlled ([address]) 04/29/2022   • Constipation 08/25/2023   • Hyponatremia 08/31/2023   • CAD (coronary artery disease) 04/07/2023   • Chronic pain of right knee 09/19/2023   • GERD (gastroesophageal reflux disease) 08/25/2023   • Anemia 08/16/2023   • Left leg pain 08/15/2023   • Chronic obstructive pulmonary disease with acute exacerbation ([address]) 07/26/2023   • Hyperlipidemia associated with type 2 diabetes mellitus ([address]) 07/26/2023   • Abnormal CT of the chest 06/28/2023   • Class 2 obesity with body mass index (BMI) of 39.0 to 39.9 in adult 06/28/2023   • Right lumbosacral radiculopathy 10/12/2022   • Paresthesia of both feet 08/31/2022   • Postoperative visit 05/24/2022   • Incarcerated umbilical hernia 21/31/8532   • Continuous opioid dependence ([address]) 04/27/2022   • Moderate persistent asthma without complication 52/29/4778   • Cigarette nicotine dependence in remission 04/26/2022   • Fibromyalgia 04/26/2022   • Sjogren's syndrome ([address]) 10/19/2012   • Depression with anxiety 09/18/2012        ARSENIO Bermeo  Physical Medicine and [place]

## 2023-09-20 VITALS
HEIGHT: 60 IN | RESPIRATION RATE: 18 BRPM | SYSTOLIC BLOOD PRESSURE: 114 MMHG | BODY MASS INDEX: 39.39 KG/M2 | OXYGEN SATURATION: 96 % | HEART RATE: 81 BPM | TEMPERATURE: 97.4 F | WEIGHT: 200.62 LBS | DIASTOLIC BLOOD PRESSURE: 55 MMHG

## 2023-09-20 LAB
GLUCOSE SERPL-MCNC: 119 MG/DL (ref 65–140)
GLUCOSE SERPL-MCNC: 129 MG/DL (ref 65–140)

## 2023-09-20 PROCEDURE — 99239 HOSP IP/OBS DSCHRG MGMT >30: CPT | Performed by: STUDENT IN AN ORGANIZED HEALTH CARE EDUCATION/TRAINING PROGRAM

## 2023-09-20 PROCEDURE — 82948 REAGENT STRIP/BLOOD GLUCOSE: CPT

## 2023-09-20 RX ADMIN — HEPARIN SODIUM 5000 UNITS: 5000 INJECTION INTRAVENOUS; SUBCUTANEOUS at 06:05

## 2023-09-20 RX ADMIN — CYANOCOBALAMIN TAB 500 MCG 1000 MCG: 500 TAB at 08:14

## 2023-09-20 RX ADMIN — GABAPENTIN 300 MG: 300 CAPSULE ORAL at 08:14

## 2023-09-20 RX ADMIN — CEPHALEXIN 500 MG: 500 CAPSULE ORAL at 11:30

## 2023-09-20 RX ADMIN — FLUTICASONE FUROATE AND VILANTEROL TRIFENATATE 1 PUFF: 200; 25 POWDER RESPIRATORY (INHALATION) at 08:15

## 2023-09-20 RX ADMIN — BACLOFEN 5 MG: 10 TABLET ORAL at 08:14

## 2023-09-20 RX ADMIN — BUPROPION HYDROCHLORIDE 150 MG: 150 TABLET, FILM COATED, EXTENDED RELEASE ORAL at 08:15

## 2023-09-20 RX ADMIN — OXYCODONE HYDROCHLORIDE 5 MG: 5 TABLET ORAL at 06:05

## 2023-09-20 RX ADMIN — PANTOPRAZOLE SODIUM 40 MG: 40 TABLET, DELAYED RELEASE ORAL at 06:05

## 2023-09-20 RX ADMIN — ASPIRIN 81 MG CHEWABLE TABLET 81 MG: 81 TABLET CHEWABLE at 08:14

## 2023-09-20 RX ADMIN — LOSARTAN POTASSIUM 100 MG: 50 TABLET, FILM COATED ORAL at 08:14

## 2023-09-20 RX ADMIN — METFORMIN HYDROCHLORIDE 500 MG: 500 TABLET ORAL at 08:14

## 2023-09-20 RX ADMIN — DOCUSATE SODIUM 100 MG: 100 CAPSULE, LIQUID FILLED ORAL at 08:14

## 2023-09-20 RX ADMIN — FERROUS SULFATE TAB 325 MG (65 MG ELEMENTAL FE) 325 MG: 325 (65 FE) TAB at 08:14

## 2023-09-20 RX ADMIN — CEPHALEXIN 500 MG: 500 CAPSULE ORAL at 06:05

## 2023-09-20 NOTE — NURSING NOTE
Clinically Significant Medication Issue  Time of Stay: DISCHARGE    Did a complete drug regimen review identify potential clinically significant medication issues?     no
Patient discharged to [facility] in [place], [place]. Report called into receiving facility's nurse. Patient's vital signs stable, is Mod Ax2 SB/BZ board or sit pivot transfer. Patient escorted out via wheelchair accompanied by transported and taken via wheelchair van.
19-Nov-2021

## 2023-09-20 NOTE — PHYSICAL THERAPY NOTE
PHYSICAL THERAPY DISCHARGE SUMMARY    Patient made good progress during her stay at the acute rehab center where she presented with an Impairment of mobility, safety and Activities of Daily Living (ADLs) due to Stroke. She was able to progress to a mod Ax1 (compared to Ax2-3 on eval) with use of slide board and for standing. Ultimately however, progress was slow and not enough for her to safely return home where there were DAMIAN and limited support as spouse works. She required a transition to SNF for continued rehab in order to maximize her functional mobility (I) and safety.       Sara Maldonado PT, DPT

## 2023-09-20 NOTE — PLAN OF CARE
Problem: Prexisting or High Potential for Compromised Skin Integrity  Goal: Skin integrity is maintained or improved  Description: INTERVENTIONS:  - Identify patients at risk for skin breakdown  - Assess and monitor skin integrity  - Assess and monitor nutrition and hydration status  - Monitor labs   - Assess for incontinence   - Turn and reposition patient  - Assist with mobility/ambulation  - Relieve pressure over bony prominences  - Avoid friction and shearing  - Provide appropriate hygiene as needed including keeping skin clean and dry  - Evaluate need for skin moisturizer/barrier cream  - Collaborate with interdisciplinary team   - Patient/family teaching  - Consider wound care consult   Outcome: Progressing     Problem: PAIN - ADULT  Goal: Verbalizes/displays adequate comfort level or baseline comfort level  Description: Interventions:  - Encourage patient to monitor pain and request assistance  - Assess pain using appropriate pain scale  - Administer analgesics based on type and severity of pain and evaluate response  - Implement non-pharmacological measures as appropriate and evaluate response  - Consider cultural and social influences on pain and pain management  - Notify physician/advanced practitioner if interventions unsuccessful or patient reports new pain  Outcome: Progressing     Problem: INFECTION - ADULT  Goal: Absence or prevention of progression during hospitalization  Description: INTERVENTIONS:  - Assess and monitor for signs and symptoms of infection  - Monitor lab/diagnostic results  - Monitor all insertion sites, i.e. indwelling lines, tubes, and drains  - Monitor endotracheal if appropriate and nasal secretions for changes in amount and color  - [place] appropriate cooling/warming therapies per order  - Administer medications as ordered  - Instruct and encourage patient and family to use good hand hygiene technique  - Identify and instruct in appropriate isolation precautions for identified infection/condition  Outcome: Progressing     Problem: SAFETY ADULT  Goal: Patient will remain free of falls  Description: INTERVENTIONS:  - Educate patient/family on patient safety including physical limitations  - Instruct patient to call for assistance with activity   - Consult OT/PT to assist with strengthening/mobility   - Keep Call bell within reach  - Keep bed low and locked with side rails adjusted as appropriate  - Keep care items and personal belongings within reach  - Initiate and maintain comfort rounds  - Make Fall Risk Sign visible to staff  - Offer Toileting every 2 Hours, in advance of need  - Initiate/Maintain 24/7 alarm  - Obtain necessary fall risk management equipment: RW  - Apply yellow socks and bracelet for high fall risk patients  - Consider moving patient to room near nurses station  Outcome: Progressing     Problem: Nutrition/Hydration-ADULT  Goal: Nutrient/Hydration intake appropriate for improving, restoring or maintaining nutritional needs  Description: Monitor and assess patient's nutrition/hydration status for malnutrition. Collaborate with interdisciplinary team and initiate plan and interventions as ordered. Monitor patient's weight and dietary intake as ordered or per policy. Utilize nutrition screening tool and intervene as necessary. Determine patient's food preferences and provide high-protein, high-caloric foods as appropriate.      INTERVENTIONS:  - Monitor oral intake, urinary output, labs, and treatment plans  - Assess nutrition and hydration status and recommend course of action  - Evaluate amount of meals eaten  - Assist patient with eating if necessary   - Allow adequate time for meals  - Recommend/ encourage appropriate diets, oral nutritional supplements, and vitamin/mineral supplements  - Order, calculate, and assess calorie counts as needed  - Recommend, monitor, and adjust tube feedings and TPN/PPN based on assessed needs  - Assess need for intravenous fluids  - Provide specific nutrition/hydration education as appropriate  - Include patient/family/caregiver in decisions related to nutrition  Outcome: Progressing     Problem: MOBILITY - ADULT  Goal: Maintain or return to baseline ADL function  Description: INTERVENTIONS:  -  Assess patient's ability to carry out ADLs; assess patient's baseline for ADL function and identify physical deficits which impact ability to perform ADLs (bathing, care of mouth/teeth, toileting, grooming, dressing, etc.)  - Assess/evaluate cause of self-care deficits   - Assess range of motion  - Assess patient's mobility; develop plan if impaired  - Assess patient's need for assistive devices and provide as appropriate  - Encourage maximum independence but intervene and supervise when necessary  - Involve family in performance of ADLs  - Assess for home care needs following discharge   - Consider OT consult to assist with ADL evaluation and planning for discharge  - Provide patient education as appropriate  Outcome: Progressing

## 2023-09-22 NOTE — SPEECH THERAPY NOTE
SLP Discharge Summary    Pt was discharged to subacute level of care on 9/20/2023 for continued therapies. While on the acute rehab center, pt was making slower and steady progress towards overall cognitive linguistic skills to where by time of discharge, pt was demonstrating supervision level for comprehension, expression, social interaction, executive functions and memory. Upon admission to the unit, pt completed the CLQT+ on initial evaluation with a Composite Severity Rating score of 2.6 out of 4.0, correlating to overall mildly impaired  cognitive linguistic impairments at time of evaluation and in comparison to age matched peers ranging from 19-71 y/o. Current barriers which present include, decreased attention, slower processing time, decreased ST/working memory, decreased executive function skills (problem solving, reasoning, sequencing, organization of thoughts), judgement, fatigue and safety which impacts functional mobility at this time. Pt does report higher level of independence prior to admission and will target both functional and higher level cognitive tasks throughout pt's stay on the acute rehab center. Pt remained with deficits which present as barriers in the following areas: attention, slower processing time, decreased ST/working memory, decreased executive function skills (problem solving, reasoning, sequencing, organization of thoughts), judgement, fatigue and safety which impacts functional mobility at this time. Pt is also noted to present with mild word finding deficits. Both functional and higher level cognitive linguistic skills have been targeted with both types of tasks targeted as well. Plan to continue to focus on both areas. Recent sessions had targeted higher level cognitive linguistic skills, including those required to complete IADL tasks (finances, medication management).  Pt's family (spouse and sister) had been provided with updates regarding pt's level of functioning and ongoing barriers. Pt continued to be followed for higher level cognitive linguistic and language skills where she is making steady progress towards goals. Pt remained with deficits in word finding most notably in conversation, higher level comprehension both auditory and reading, executive functions and short term memory recall but has also progressed in these areas. Pt is recommendationed for continued skilled SLP services upon transition to subacute level of care, focusing on the above areas as pt was highly independent prior to admission.

## 2023-09-22 NOTE — PROGRESS NOTES
09/22/23 1430   Hello, [Guardian’s Name / Patient’s Madeleine Fisher, this is [Caller Madeleine Fisher from [facility], and our clinical care team wanted to check on you / your child after your recent visit to the hospital. It will only take 3-5 minutes. Is this a good time? Discharge Call Type/ Specific Diagnosis: General Call   Call Complete   Attempted Number of Calls 1   Discharge phone call complete?  Does not meet criteria  (D/C to SNF) I have personally provided the amount of critical care time documented below concurrently with the resident/fellow.  This time excludes time spent on separate procedures and time spent teaching. I have reviewed the resident’s/fellow’s documentation and I agree with the assessment and plan of care

## 2023-09-27 ENCOUNTER — TELEPHONE (OUTPATIENT)
Dept: NEUROLOGY | Facility: CLINIC | Age: 58
End: 2023-09-27

## 2023-09-27 NOTE — TELEPHONE ENCOUNTER
Post CVA Discharge Follow Up  Hospitalization: 8/14/23-8/25/23, 8/25/23-9/20/23    According to chart, patient discharged to [facility]. Called facility. Was transferred to the nurse, Susan Corado. She reports the patient is doing well. Since discharge, patient has not experienced any new or worsening stroke-like symptoms. Ambulation / ADLs:  Patient mobilizing via platform walker with assistance. She continues to require assistance with ADLs. Patient maintained on a regular consistency diet. She reports episodes of stress incontinence for urine. Medication Review:  Reviewed medications with them. There have not been any medication changes since discharge from the hospital. No reported missed doses, medication side effects, or signs of bleeding. Last reported /80  Last reported blood glucose 124    Appointments:  Requested to schedule stroke HFU. She provided the phone number for [facility] to schedule. There is no estimated discharge date at this time.

## 2023-09-27 NOTE — TELEPHONE ENCOUNTER
[facility]. No answer. Left a voice message requesting for a return call to schedule HFU. Provided the office's phone number.

## 2023-09-28 NOTE — TELEPHONE ENCOUNTER
Naomi Parker from [facility] called back returning Hannah's call. Please call her back at [phone]. I was unable to find [address] [place] [address] Dr diaz

## 2023-09-29 NOTE — TELEPHONE ENCOUNTER
Per last neurology note on 8/22/23 by Dr. Albina Pope:  "Can follow with neurology as outpatient in 6 weeks with stroke."    Dr. Jeremiah Duke- can you please provide specific follow up instructions?   Thank you,  Demi Zuniga

## 2023-10-04 NOTE — TELEPHONE ENCOUNTER
Maciej Tang- can you help by providing specific follow up instructions?   Thank you,  Isabell Roldan

## 2023-10-06 NOTE — TELEPHONE ENCOUNTER
[facility] to schedule stroke HFU. No answer. Left a voice message requesting for a return call. Provided the office's phone number.

## 2023-10-20 ENCOUNTER — TELEPHONE (OUTPATIENT)
Dept: NEUROLOGY | Facility: CLINIC | Age: 58
End: 2023-10-20

## 2023-10-20 NOTE — TELEPHONE ENCOUNTER
Post CVA Discharge Follow Up  Hospitalization: 8/14/23-8/25/23, 8/25/23-9/20/23    According to chart, patient discharged to [facility]. Called facility, reached the . Requested to speak with the patient's nurse. Line was transferred to Filipe Loyd, patient's nurse. She denies any new or worsening stroke-like symptoms. Ambulation / ADLs:  Patient mobilizing via platform walker with assistance. She continues to require assistance with ADLs. Patient is on a bite size foods diet with thin liquids. She reports she is continent of urine and stool. Medication Review:  Reviewed medications with them. There have not been any medication changes since discharge from the hospital. No reported missed doses, medication side effects, or signs of bleeding. Last reported /93  Reports they are checking her BP three times daily and how the systolic ranges from 132H-084L. Last reported blood glucose 145    Appointments:  Patient is scheduled to see neurology on 11/13/23. There is no estimated discharge date at this time.

## 2023-11-01 ENCOUNTER — TELEPHONE (OUTPATIENT)
Dept: NEUROLOGY | Facility: CLINIC | Age: 58
End: 2023-11-01

## 2023-11-01 NOTE — TELEPHONE ENCOUNTER
Spoke with ARSENIO Hall over the phone today at approximately 12:45 PM.  Jahaira had sent me a message and stated that Ritu Roger had been doing well with her progress at [address] [place] [address]. It was noted the other day by the patient's  that the patient's residual facial droop was more pronounced than previously. She also started having more pronounced right-sided facial tingling. Jahaira had contacted myself to find out if there was any indication for a CT head without contrast before the patient comes to see me in the office about 2 weeks time. Stated to Jahaira, that it is hard for me to be able to evaluate the patient's deficits especially due to the fact that I have not seen her yet in person. I told her that if she felt as though she was significantly concerned of a stroke or the worsening of some of her already related stroke deficits that a CT head without contrast may be in order. However, stated to Jahaira that it might be best to make her best clinical judgment based on the fact that she is able to see her every day. It was expressed to me by the patient that her symptoms had started the day prior. From my opinion, do not believe that the patient had to go to the emergency department as it would not be significantly helpful for her due to the fact that her symptoms would be out of the appropriate timeframe for any type of stroke related intervention like TNK or thrombectomy    However, I did also state the me that it may not be a bad idea to get the CT head without contrast before the patient's appointment with myself because if there was a new stroke there I would be able to address it when the patient did come into the office for hospital follow-up. But still, provided reassurance that it was up to Jahaira in terms of how she felt in terms of what progress she has made so far and what deficit she normally deals with on a day-to-day basis.     Rl Bajwa PA-C  11/01/2023

## 2023-11-03 ENCOUNTER — HOSPITAL ENCOUNTER (OUTPATIENT)
Dept: RADIOLOGY | Age: 58
Discharge: HOME | End: 2023-11-03
Payer: COMMERCIAL

## 2023-11-03 DIAGNOSIS — R29.810 FACIAL WEAKNESS: ICD-10-CM

## 2023-11-03 PROCEDURE — G1004 CDSM NDSC: HCPCS

## 2023-11-03 PROCEDURE — 70450 CT HEAD/BRAIN W/O DYE: CPT

## 2023-11-06 ENCOUNTER — TELEPHONE (OUTPATIENT)
Dept: NEUROLOGY | Facility: CLINIC | Age: 58
End: 2023-11-06

## 2023-11-13 ENCOUNTER — OFFICE VISIT (OUTPATIENT)
Dept: NEUROLOGY | Facility: CLINIC | Age: 58
End: 2023-11-13
Payer: COMMERCIAL

## 2023-11-13 ENCOUNTER — TELEPHONE (OUTPATIENT)
Dept: NEUROLOGY | Facility: CLINIC | Age: 58
End: 2023-11-13

## 2023-11-13 VITALS
TEMPERATURE: 96.5 F | SYSTOLIC BLOOD PRESSURE: 118 MMHG | OXYGEN SATURATION: 97 % | HEART RATE: 80 BPM | DIASTOLIC BLOOD PRESSURE: 64 MMHG

## 2023-11-13 DIAGNOSIS — E11.69 HYPERLIPIDEMIA ASSOCIATED WITH TYPE 2 DIABETES MELLITUS: ICD-10-CM

## 2023-11-13 DIAGNOSIS — I10 PRIMARY HYPERTENSION: ICD-10-CM

## 2023-11-13 DIAGNOSIS — F17.211 CIGARETTE NICOTINE DEPENDENCE IN REMISSION: ICD-10-CM

## 2023-11-13 DIAGNOSIS — I61.2 NONTRAUMATIC HEMORRHAGE OF LEFT CEREBRAL HEMISPHERE (HCC): Primary | ICD-10-CM

## 2023-11-13 DIAGNOSIS — E78.5 HYPERLIPIDEMIA ASSOCIATED WITH TYPE 2 DIABETES MELLITUS: ICD-10-CM

## 2023-11-13 PROCEDURE — 99215 OFFICE O/P EST HI 40 MIN: CPT

## 2023-11-13 RX ORDER — AMLODIPINE BESYLATE 5 MG/1
5 TABLET ORAL DAILY
COMMUNITY
End: 2023-11-20 | Stop reason: SDUPTHER

## 2023-11-13 NOTE — PROGRESS NOTES
Patient ID: Stephen Braun is a 62 y.o. female who presents to the [address] [place] [address]. Assessment/Plan:    History of left thalamocapsular intracerebral hemorrhage:    I had the pleasure of seeing Marino Miguel today in the office at [facility] [place] [facility] [place] presenting for a hospital follow-up appointment in regard to her left-sided thalamocapsular cerebral hemorrhage at this time. She states that she is doing relatively well since she had the hemorrhage. She has currently been staying at [address] [place] [address] for rehabilitation and recovery. She states that since her hemorrhage that occurred, she has made significant improvement in regard to her right-sided weakness, dysarthria, and also the right facial droop as well. She notes no new strokelike symptoms at this time. However, approximately a week or so ago I had heard from the patient's nurse practitioner at her nursing facility who is concerned with the slight worsening of the right facial droop and also slight worsening of weakness as well in the right side. Patient also had new onset right facial tingling. A CT head was performed to evaluate for a new ischemic stroke/hemorrhagic stroke, and this most recent imaging which showed improvement of the patient's previous hemorrhage and did not show any acute infarct or hemorrhage at this time. That being said, patient is doing relatively well at controlling her blood pressure to prevent from having a hemorrhagic stroke in the future. Her blood pressure today was 118/64, and she states that she will be leaving tomorrow from the nursing facility to go back home. She was encouraged today to continue to check her blood pressure on a consistent basis when she does return home. Advised her to also make an appointment with her primary care to further discuss her blood pressure and make sure that her medication is appropriate.   She is going to continue on aspirin 81 mg once daily and atorvastatin 20 mg once daily. Normally, we would not necessarily recommend that the patient needs to continue with aspirin after having a hemorrhagic stroke. Although she does have secondary stroke risk factors and a previous history of CAD so therefore I do believe that it is appropriate that she continue on aspirin 81 mg daily. Also with the atorvastatin 20 mg once daily as well. She states that she is going to continue with physical therapy at home when she is discharged. She is not working at the time and is going to continue to take time off. Patient understands that she is not able to drive as well and will wait until the next appointment to further discuss the ability of driving in the future. On neuro examination today, power/strength 4/5 of the right upper and lower extremity, decreased  strength of the right hand compared to the left. Slight right facial droop noted on examination as well. Besides this no other focal neurologic deficits noted. - Continue with physical therapy at home for the time being. If the patient does need a physical therapy referral in the future she can always reach out to us and be happy to provide her one for [facility] physical therapy on the outpatient basis if needed. - At this moment in time, would just recommend that the patient continue to check her blood pressure on an every other day or every few days basis. Would like to make sure that she keeps her blood pressure below 130/80 for the most part. She should make an appointment to follow-up with her primary care, Dr. Angeles Go in the near future to talk about her blood pressure medication and talk about her most recent blood pressure readings.  - Noted, but it was also reported that the patient understands that at this moment in time she should not be driving. Stated to her that with her extensive right-sided hemiparesis/weakness, she should not drive until this seems to be significantly improved. Patient will come back in about 4 to 5 months and we can reevaluate her status on driving in the future. She will most likely have to do a fitness to drive evaluation and further physical exam to see if she has improvement in regard to her residual deficits. - Patient is currently not working at this moment in time. Advised her that if she needs more extensive time off from work moving forward she could reach out to myself for an extended work note/notice. She could also reach out to her primary care provider who will also be able to provide her with this as well. Do not want the patient to go back to work until she feels comfortable and readily able to. - For ongoing stroke prevention continue: aspirin 81 mg once daily, atorvastatin 20 mg once daily   - Discussed the importance of antiplatelet management with the patient to prevent future strokes. - Will defer to primary care team for monitoring of cholesterol panel and blood sugar numbers with target LDL cholesterol of less than 70 and hemoglobin A1c less than 7%  - Recommend following a low salt, mediterranean diet   - Recommend routine physical exercise as tolerated     We will plan for her to return to the office in 4-5 months time to see on of the APPs or Dr. Cristine Mata but would be happy to see her sooner if the need should arise. If she has any symptoms concerning for TIA or stroke including sudden painless loss of vision or double vision, difficulty speaking or swallowing, vertigo/room spinning that does not quickly resolve, or weakness/numbness/loss of coordination affecting 1 side of the face or body she should proceed by ambulance to the nearest emergency room immediately.      Subjective:    HPI      For Review/Hospital Course:    "62 YOF with history of HTN, DM2, Sjogren's, fibromyalgia initially presented to [facility] as stroke alert on 8/14/23 with initial presenting symptoms acute onset of right sided weakness, facial asymmetry, and dysarthria while at work. Initial imaging with CTH/CTA HN revealed left thalamocapsular bleed with no significant shift or mass effect. . NIHSS 14. Not TNK candidate given acute ICH. Pt given IV labetalol, started on Cardene drip, administered DDAVP for ASA reversal and transferred to [facility] for NSg care. - Initial /97  - AC/AP prior to admission: ASA 81 QD, s/p reversal with DDAVP   - Neuro History: No prior stroke history  - Vascular risk factors: former smoker, elevated cholesterol, diabetes and weight     NIHSS: At time of stroke alert at [facility] NIHSS 14,  at arrival to [facility] NIHSS 10  Modified Kamari Score: 0 No baseline symptoms/disability  ICH Score: 0   FUNC Score: 10     WORK UP:  - CTH: Acute parenchymal hematoma (aprox 5.4 mL) in L posterior striatocapsular region extending into L thalamus with mild surrounding vasogenic edema  - CTA H/N: No LVO, dissection, AVM, aneurysm, or high grade stenosis  - CTH 6HR: Stable hemorrhage and surrounding vasogenic edema in L corona radiata, posterior limb of internal capsule, and thalamus  - CTH 8/17:  Evolving acute L corona radiata intraparenchymal hemorrhage, stable in size. Minimally increased regional vasogenic edema. No shift.  - TTE: EF 65%, LA/RA normal size  - Labs: HbgA1c 6.7, LDL 14,B12 221 KIRSTY-     IMPRESSION: Left thalamocapsular ICH likely 2/2 HTN. Of note, patient fell and hit her head on 8/18 and rapid response was called. STAT was stable and neurological exam is also stable." - per Parisa Cantrell DO, 08/20/2023    It was recommended that as long as the patient had improvement in regards to her area of hemorrhage that she could continue aspirin 81 mg once daily on the outpatient basis. Also recommended that the patient check her blood pressure on a consistent basis. I had talked to [address] last week about the patient having some concerning findings for noticed increased right facial droop along with some right facial tingling.   I had advised Jahaira that if she felt it was appropriate to repeat a CT head without contrast we certainly could to make sure there is no new stroke or new hemorrhage existing. I had stated that from my perspective I have not seen the patient in the office it was difficult for me to tell what would be considered a significant deficit for her, although if Jahaira had felt as though it was essential that the patient have imaging it would be a good idea. Especially due to the fact that I was going to see the patient in the office relatively soon and it may be best to have a repeat CT head without contrast if there is concern for any stroke as well. Patient did have this recent CT head on 11/03/2023, did not indicate any new acute hemorrhage/infarct. Only showed further resolution of the previously existing's intracerebral hemorrhage. Interval History:      New stroke symptoms/residual symptoms:    Any new, sudden onset weakness, numbness, facial droop, slurred speech, difficulty speaking, trouble swallowing, persistent vertigo, or sudden double vision or vision loss? No new stroke like symptoms     Residual symptoms include: facial assymetry, weakness of the right UE/LE: upper extremity and lower extremity, and dysarthria    Stroke Etiology and Risk Factor modification: This was a(n) hemorrhagic stroke, most likely related to ACKHEMORRHAGICSTROKETIOLOGY: Hypertension    Stroke risk factors were evaluated including: Hypertension, type 2 diabetes mellitus, hyperlipidemia, former history of smoking    AP/AC therapy: aspirin 81 mg once daily. No bleeding or bruising issues at this time. Statin therapy: atorvastatin 20 mg once daily     Blood pressure today and as of late: 118/64 BP at today's visit, patient notes that they do check her blood pressure consistently at the nursing facility.   She is going to continue to consistently check her blood pressure at home and she is going to make an appointment with her primary care provider as well to go over her blood pressure medication/regimen. Most recent LDL: 14 mg/dL    Most recent hemoglobin A1C: 6.7%     Cardiology evaluation? Any cardiac monitoring required?: None    Endocrinology evaluation? Following proper glycemic treatment/diet? None    Lifestyle history/modifications:    Diet/Exercise regimen: Looking into having a better diet and healthier diet when she does get home. Walking more now with her walker she states. Not as sedentary as she was previously. Any physical therapy, occupational therapy or speech therapy performed/required at this time?: [facility] starting soon. Patient discharged from nursing facility tomorrow. She no longer requires OT/ST, but going to continue with PT. Any difficulty with sleep? No issues with sleep     Any history of sleep apnea? CPAP compliance?: No sleep apnea or snoring noted     Post-stroke depression/anxiety? No depression/anxiety after the recent stroke. Does take amitriptyline as well. Any history of smoking? Quit smoking in April, 40 years of smoking previously she states. Does take Wellbutrin for smoking cessation.        Lab Results   Component Value Date/Time    CHOLESTEROL 91 08/15/2023 04:34 AM    CHOLESTEROL 203 (H) 01/11/2023 09:04 AM     Lab Results   Component Value Date/Time    TRIG 72 08/15/2023 04:34 AM    TRIG 115 01/11/2023 09:04 AM     Lab Results   Component Value Date/Time    HDL 63 08/15/2023 04:34 AM    HDL 67 01/11/2023 09:04 AM     Lab Results   Component Value Date/Time    LDLCALC 14 08/15/2023 04:34 AM    LDLCALC 113 (H) 01/11/2023 09:04 AM       Lab Results   Component Value Date/Time    HGBA1C 6.7 (H) 08/15/2023 04:34 AM    HGBA1C 6.9 (H) 01/11/2023 09:04 AM    HGBA1C 9.7 (H) 04/28/2018 09:29 AM     Lab Results   Component Value Date/Time     08/15/2023 04:34 AM     (H) 04/28/2018 09:29 AM           Past Medical History:   Diagnosis Date    Arthritis Asthma     Continuous opioid dependence ([facility]) 4/27/2022    Diabetes mellitus ([facility])     Diverticulitis of colon     Fibromyalgia 04/26/2022    Headache(784.0)     History of mammogram 2018    History of tobacco abuse 04/26/2022    Hypertension     Nausea 04/29/2022    Obesity     Sjogren's syndrome ([facility]) 10/19/2012    Urinary tract infection        Current Outpatient Medications:     acetaminophen (TYLENOL) 325 mg tablet, Take 2 tablets (650 mg total) by mouth every 6 (six) hours as needed for mild pain, Disp: , Rfl: 0    albuterol (Ventolin HFA) 90 mcg/act inhaler, Inhale 2 puffs every 6 (six) hours as needed for wheezing, Disp: 18 g, Rfl: 0    Aspirin Low Dose 81 MG EC tablet, Take 1 tablet by mouth once daily, Disp: 30 tablet, Rfl: 0    atorvastatin (LIPITOR) 20 mg tablet, Take 1 tablet (20 mg total) by mouth daily with dinner, Disp: , Rfl: 0    baclofen 5 MG TABS, Take 5 mg by mouth 2 (two) times a day, Disp: 60 tablet, Rfl: 0    bisacodyl (DULCOLAX) 10 mg suppository, Insert 1 suppository (10 mg total) into the rectum daily as needed (constipation, if unable to tolerate oral), Disp: 12 suppository, Rfl: 0    buPROPion (WELLBUTRIN XL) 150 mg 24 hr tablet, Take 1 tablet (150 mg total) by mouth every morning Do not start before September 20, 2023., Disp: , Rfl: 0    calcium carbonate (TUMS) 500 mg chewable tablet, Chew 2 tablets (1,000 mg total) 3 (three) times a day as needed for indigestion or heartburn, Disp: , Rfl: 0    cyanocobalamin (VITAMIN B-12) 1000 MCG tablet, Take 1 tablet (1,000 mcg total) by mouth daily Do not start before September 20, 2023., Disp: , Rfl: 0    diazepam (VALIUM) 2 mg tablet, Take 1 tablet (2 mg total) by mouth 2 (two) times a day as needed for muscle spasms for up to 10 days, Disp: 10 tablet, Rfl: 0    docusate sodium (COLACE) 100 mg capsule, Take 1 capsule (100 mg total) by mouth 2 (two) times a day, Disp: , Rfl: 0    ferrous sulfate 325 (65 Fe) mg tablet, Take 1 tablet (325 mg total) by mouth daily with breakfast Do not start before September 20, 2023., Disp: , Rfl: 0    fluticasone-vilanterol (Breo Ellipta) 200-25 mcg/actuation inhaler, Inhale 1 puff daily Rinse mouth after use., Disp: 180 blister, Rfl: 0    gabapentin (Neurontin) 300 mg capsule, Take 1 capsule (300 mg total) by mouth 3 (three) times a day, Disp: 190 capsule, Rfl: 5    Heparin Sodium, Porcine, (heparin, porcine,) 5,000 units/mL, Inject 1 mL (5,000 Units total) under the skin every 8 (eight) hours At the discretion of accepting physician., Disp: 1 mL, Rfl: 0    hydrOXYzine HCL (ATARAX) 25 mg tablet, Take 1 tablet (25 mg total) by mouth 3 (three) times a day as needed for anxiety, Disp: 30 tablet, Rfl: 0    insulin lispro (HumaLOG) 100 units/mL injection, Inject 1-6 Units under the skin 3 (three) times a day before meals, Disp: , Rfl: 0    insulin lispro (HumaLOG) 100 units/mL injection, Inject 1-6 Units under the skin daily at bedtime, Disp: , Rfl: 0    lidocaine (LIDODERM) 5 %, Apply 1 patch topically over 12 hours daily Remove & Discard patch within 12 hours or as directed by MD Do not start before September 20, 2023., Disp: , Rfl: 0    lidocaine (LIDODERM) 5 %, Apply 1 patch topically over 12 hours daily Remove & Discard patch within 12 hours or as directed by MD Do not start before September 20, 2023., Disp: , Rfl: 0    losartan (COZAAR) 100 MG tablet, Take 1 tablet (100 mg total) by mouth daily Do not start before September 20, 2023., Disp: , Rfl: 0    melatonin 3 mg, Take 2 tablets (6 mg total) by mouth daily at bedtime, Disp: , Rfl: 0    metFORMIN (GLUCOPHAGE) 500 mg tablet, Take 1 tablet (500 mg total) by mouth 2 (two) times a day with meals, Disp: , Rfl: 0    miconazole (MICOTIN) 2 % powder, Apply 1 g topically 2 (two) times a day, Disp: , Rfl: 0    ondansetron (ZOFRAN-ODT) 4 mg disintegrating tablet, Take 1 tablet (4 mg total) by mouth every 6 (six) hours as needed for nausea or vomiting, Disp: 20 tablet, Rfl: 0 pantoprazole (PROTONIX) 40 mg tablet, Take 1 tablet (40 mg total) by mouth daily in the early morning Do not start before September 20, 2023., Disp: , Rfl: 0    polyethylene glycol (MIRALAX) 17 g packet, Take 17 g by mouth daily as needed (constipation first line), Disp: , Rfl: 0     Objective:    Physical Exam:                                                                 Vitals:            Constitutional:    There were no vitals taken for this visit. BP Readings from Last 3 Encounters:   09/20/23 114/55   08/25/23 126/76   08/14/23 123/64     Pulse Readings from Last 3 Encounters:   09/20/23 81   08/25/23 78   08/14/23 72         Well developed, well nourished, well groomed. No dysmorphic features. Psychiatric:  Normal behavior and appropriate affect        Neurological Examination:     Mental status/cognitive function:   Orientated to time, place and person. Recent and remote memory intact. Attention span and concentration as well as fund of knowledge are appropriate for age. Normal language and spontaneous speech. Cranial Nerves:  II-visual fields full. III, IV, VI-Pupils were equal, round, and reactive to light and accomodation. Extraocular movements were full and conjugate without nystagmus. Conjugate gaze, normal smooth pursuits, normal saccades   V-facial sensation symmetric. VII-facial expression symmetric, intact forehead wrinkle, strong eye closure, symmetric smile, slight right facial droop noted at rest  VIII-hearing grossly intact bilaterally   IX, X-palate elevation symmetric, no dysarthria. XI-shoulder shrug strength intact    XII-tongue protrusion midline. Motor Exam: symmetric bulk and tone throughout, no pronator drift. Power/strength 5/5 left upper and lower extremities, power/strength 4/5 of the right upper and lower extremities, decreased  strength of the right hand compared to the left hand, no atrophy, fasciculations or abnormal movements noted.    Sensory: grossly intact light touch in all extremities. Reflexes:   Left: brachioradialis 2+, biceps 2+, knee 2+  Right: brachioradialis 3+, biceps 3+, knee 3+  Coordination: Finger nose finger intact bilaterally, no apparent dysmetria, ataxia or tremor noted  Gait: Currently in a wheelchair      ROS:    Review of Systems   Constitutional:  Negative for appetite change, fatigue and fever. HENT: Negative. Negative for hearing loss, tinnitus, trouble swallowing and voice change. Eyes: Negative. Negative for photophobia, pain and visual disturbance. Respiratory: Negative. Negative for shortness of breath. Cardiovascular: Negative. Negative for palpitations. Gastrointestinal: Negative. Negative for nausea and vomiting. Endocrine: Negative. Negative for cold intolerance. Genitourinary: Negative. Negative for dysuria, frequency and urgency. Musculoskeletal:  Negative for back pain, gait problem, myalgias and neck pain. Skin: Negative. Negative for rash. Allergic/Immunologic: Negative. Neurological:  Positive for weakness and numbness. Negative for dizziness, tremors, seizures, syncope, facial asymmetry, speech difficulty, light-headedness and headaches. Hematological: Negative. Does not bruise/bleed easily. Psychiatric/Behavioral: Negative. Negative for confusion, hallucinations and sleep disturbance. All other systems reviewed and are negative.       I have spent 45 minutes today on this case including chart review, performing history and exam, patient counseling, and documentation/communication      Eliceo Jackson PA-C  11/13/2023 9:51 AM

## 2023-11-13 NOTE — PROGRESS NOTES
Review of Systems   Constitutional:  Negative for appetite change, fatigue and fever. HENT: Negative. Negative for hearing loss, tinnitus, trouble swallowing and voice change. Eyes: Negative. Negative for photophobia, pain and visual disturbance. Respiratory: Negative. Negative for shortness of breath. Cardiovascular: Negative. Negative for palpitations. Gastrointestinal: Negative. Negative for nausea and vomiting. Endocrine: Negative. Negative for cold intolerance. Genitourinary: Negative. Negative for dysuria, frequency and urgency. Musculoskeletal:  Negative for back pain, gait problem, myalgias and neck pain. Skin: Negative. Negative for rash. Allergic/Immunologic: Negative. Neurological:  Positive for weakness and numbness. Negative for dizziness, tremors, seizures, syncope, facial asymmetry, speech difficulty, light-headedness and headaches. Hematological: Negative. Does not bruise/bleed easily. Psychiatric/Behavioral: Negative. Negative for confusion, hallucinations and sleep disturbance. All other systems reviewed and are negative.

## 2023-11-13 NOTE — PATIENT INSTRUCTIONS
- Continue with physical therapy at home for the time being. If the patient does need a physical therapy referral in the future she can always reach out to us and be happy to provide her one for [facility] physical therapy on the outpatient basis if needed. - At this moment in time, would just recommend that the patient continue to check her blood pressure on an every other day or every few days basis. Would like to make sure that she keeps her blood pressure below 130/80 for the most part. She should make an appointment to follow-up with her primary care, Dr. Leann Lin in the near future to talk about her blood pressure medication and talk about her most recent blood pressure readings.  - Noted, but it was also reported that the patient understands that at this moment in time she should not be driving. Stated to her that with her extensive right-sided hemiparesis/weakness, she should not drive until this seems to be significantly improved. Patient will come back in about 4 to 5 months and we can reevaluate her status on driving in the future. She will most likely have to do a fitness to drive evaluation and further physical exam to see if she has improvement in regard to her residual deficits. - Patient is currently not working at this moment in time. Advised her that if she needs more extensive time off from work moving forward she could reach out to myself for an extended work note/notice. She could also reach out to her primary care provider who will also be able to provide her with this as well. Do not want the patient to go back to work until she feels comfortable and readily able to. - For ongoing stroke prevention continue: aspirin 81 mg once daily, atorvastatin 20 mg once daily   - Discussed the importance of antiplatelet management with the patient to prevent future strokes.    - Will defer to primary care team for monitoring of cholesterol panel and blood sugar numbers with target LDL cholesterol of less than 70 and hemoglobin A1c less than 7%  - Recommend following a low salt, mediterranean diet   - Recommend routine physical exercise as tolerated     We will plan for her to return to the office in 4-5 months time to see on of the APPs or Dr. Olga Russell but would be happy to see her sooner if the need should arise. If she has any symptoms concerning for TIA or stroke including sudden painless loss of vision or double vision, difficulty speaking or swallowing, vertigo/room spinning that does not quickly resolve, or weakness/numbness/loss of coordination affecting 1 side of the face or body she should proceed by ambulance to the nearest emergency room immediately.

## 2023-11-20 ENCOUNTER — OFFICE VISIT (OUTPATIENT)
Dept: FAMILY MEDICINE CLINIC | Facility: CLINIC | Age: 58
End: 2023-11-20
Payer: COMMERCIAL

## 2023-11-20 VITALS
HEART RATE: 89 BPM | OXYGEN SATURATION: 98 % | DIASTOLIC BLOOD PRESSURE: 92 MMHG | HEIGHT: 60 IN | BODY MASS INDEX: 43.59 KG/M2 | SYSTOLIC BLOOD PRESSURE: 156 MMHG | TEMPERATURE: 98.4 F | WEIGHT: 222 LBS

## 2023-11-20 DIAGNOSIS — F11.20 CONTINUOUS OPIOID DEPENDENCE (HCC): ICD-10-CM

## 2023-11-20 DIAGNOSIS — I10 PRIMARY HYPERTENSION: Primary | ICD-10-CM

## 2023-11-20 DIAGNOSIS — M79.7 FIBROMYALGIA: ICD-10-CM

## 2023-11-20 DIAGNOSIS — E11.9 CONTROLLED TYPE 2 DIABETES MELLITUS WITHOUT COMPLICATION, WITHOUT LONG-TERM CURRENT USE OF INSULIN (HCC): ICD-10-CM

## 2023-11-20 DIAGNOSIS — I61.0 NONTRAUMATIC SUBCORTICAL HEMORRHAGE OF LEFT CEREBRAL HEMISPHERE (HCC): ICD-10-CM

## 2023-11-20 PROCEDURE — 99214 OFFICE O/P EST MOD 30 MIN: CPT | Performed by: NURSE PRACTITIONER

## 2023-11-20 RX ORDER — AMLODIPINE BESYLATE 10 MG/1
10 TABLET ORAL DAILY
Qty: 90 TABLET | Refills: 1 | Status: SHIPPED | OUTPATIENT
Start: 2023-11-20

## 2023-11-20 RX ORDER — TRAMADOL HYDROCHLORIDE 50 MG/1
50 TABLET ORAL EVERY 6 HOURS PRN
Qty: 90 TABLET | Refills: 1 | Status: SHIPPED | OUTPATIENT
Start: 2023-11-20

## 2023-11-20 RX ORDER — GABAPENTIN 300 MG/1
600 CAPSULE ORAL 3 TIMES DAILY PRN
Qty: 90 CAPSULE | Refills: 3 | Status: SHIPPED | OUTPATIENT
Start: 2023-11-20

## 2023-11-20 NOTE — PROGRESS NOTES
Name: Zane Pedersen      : 1965      MRN: [id number]  Encounter Provider: ARSENIO Downs  Encounter Date: 2023   Encounter department: [address] [place] [address]    Assessment & Plan     1. Primary hypertension  -     amLODIPine (NORVASC) 10 mg tablet; Take 1 tablet (10 mg total) by mouth daily    2. Controlled type 2 diabetes mellitus without complication, without long-term current use of insulin (HCC)  -     metFORMIN (GLUCOPHAGE) 500 mg tablet; Take 1 tablet (500 mg total) by mouth 2 (two) times a day with meals    3. Fibromyalgia  -     traMADol (Ultram) 50 mg tablet; Take 1 tablet (50 mg total) by mouth every 6 (six) hours as needed for moderate pain  -     gabapentin (Neurontin) 300 mg capsule; Take 2 capsules (600 mg total) by mouth 3 (three) times a day as needed (pain and paristhesia)    4. Nontraumatic subcortical hemorrhage of left cerebral hemisphere (HCC)  -     traMADol (Ultram) 50 mg tablet; Take 1 tablet (50 mg total) by mouth every 6 (six) hours as needed for moderate pain  -     gabapentin (Neurontin) 300 mg capsule; Take 2 capsules (600 mg total) by mouth 3 (three) times a day as needed (pain and paristhesia)    5. Continuous opioid dependence (HCC)           Subjective      HPI  Review of Systems    Current Outpatient Medications on File Prior to Visit   Medication Sig    acetaminophen (TYLENOL) 325 mg tablet Take 2 tablets (650 mg total) by mouth every 6 (six) hours as needed for mild pain    albuterol (Ventolin HFA) 90 mcg/act inhaler Inhale 2 puffs every 6 (six) hours as needed for wheezing    Aspirin Low Dose 81 MG EC tablet Take 1 tablet by mouth once daily    atorvastatin (LIPITOR) 20 mg tablet Take 1 tablet (20 mg total) by mouth daily with dinner    baclofen 5 MG TABS Take 5 mg by mouth 2 (two) times a day    buPROPion (WELLBUTRIN XL) 150 mg 24 hr tablet Take 1 tablet (150 mg total) by mouth every morning Do not start before 2023.     cyanocobalamin (VITAMIN B-12) 1000 MCG tablet Take 1 tablet (1,000 mcg total) by mouth daily Do not start before September 20, 2023. docusate sodium (COLACE) 100 mg capsule Take 1 capsule (100 mg total) by mouth 2 (two) times a day    ferrous sulfate 325 (65 Fe) mg tablet Take 1 tablet (325 mg total) by mouth daily with breakfast Do not start before September 20, 2023. losartan (COZAAR) 100 MG tablet Take 1 tablet (100 mg total) by mouth daily Do not start before September 20, 2023. melatonin 3 mg Take 2 tablets (6 mg total) by mouth daily at bedtime    pantoprazole (PROTONIX) 40 mg tablet Take 1 tablet (40 mg total) by mouth daily in the early morning Do not start before September 20, 2023. [DISCONTINUED] amLODIPine (NORVASC) 5 mg tablet Take 5 mg by mouth daily    [DISCONTINUED] gabapentin (Neurontin) 300 mg capsule Take 1 capsule (300 mg total) by mouth 3 (three) times a day (Patient taking differently: Take 600 mg by mouth 3 (three) times a day)    [DISCONTINUED] metFORMIN (GLUCOPHAGE) 500 mg tablet Take 1 tablet (500 mg total) by mouth 2 (two) times a day with meals    bisacodyl (DULCOLAX) 10 mg suppository Insert 1 suppository (10 mg total) into the rectum daily as needed (constipation, if unable to tolerate oral) (Patient not taking: Reported on 11/20/2023)    calcium carbonate (TUMS) 500 mg chewable tablet Chew 2 tablets (1,000 mg total) 3 (three) times a day as needed for indigestion or heartburn (Patient not taking: Reported on 11/20/2023)    diazepam (VALIUM) 2 mg tablet Take 1 tablet (2 mg total) by mouth 2 (two) times a day as needed for muscle spasms for up to 10 days (Patient not taking: Reported on 11/13/2023)    fluticasone-vilanterol (Breo Ellipta) 200-25 mcg/actuation inhaler Inhale 1 puff daily Rinse mouth after use. Heparin Sodium, Porcine, (heparin, porcine,) 5,000 units/mL Inject 1 mL (5,000 Units total) under the skin every 8 (eight) hours At the discretion of accepting physician.  (Patient not taking: Reported on 11/13/2023)    hydrOXYzine HCL (ATARAX) 25 mg tablet Take 1 tablet (25 mg total) by mouth 3 (three) times a day as needed for anxiety (Patient not taking: Reported on 11/13/2023)    insulin lispro (HumaLOG) 100 units/mL injection Inject 1-6 Units under the skin 3 (three) times a day before meals (Patient not taking: Reported on 11/13/2023)    insulin lispro (HumaLOG) 100 units/mL injection Inject 1-6 Units under the skin daily at bedtime (Patient not taking: Reported on 11/13/2023)    lidocaine (LIDODERM) 5 % Apply 1 patch topically over 12 hours daily Remove & Discard patch within 12 hours or as directed by MD Do not start before September 20, 2023. (Patient not taking: Reported on 11/20/2023)    lidocaine (LIDODERM) 5 % Apply 1 patch topically over 12 hours daily Remove & Discard patch within 12 hours or as directed by MD Do not start before September 20, 2023.  (Patient not taking: Reported on 11/20/2023)    miconazole (MICOTIN) 2 % powder Apply 1 g topically 2 (two) times a day (Patient not taking: Reported on 11/20/2023)    ondansetron (ZOFRAN-ODT) 4 mg disintegrating tablet Take 1 tablet (4 mg total) by mouth every 6 (six) hours as needed for nausea or vomiting (Patient not taking: Reported on 11/20/2023)    polyethylene glycol (MIRALAX) 17 g packet Take 17 g by mouth daily as needed (constipation first line) (Patient not taking: Reported on 11/20/2023)       Objective     /92 (BP Location: Left arm, Patient Position: Sitting, Cuff Size: Large)   Pulse 89   Temp 98.4 °F (36.9 °C) (Temporal)   Ht 5' (1.524 m)   Wt 101 kg (222 lb)   SpO2 98%   BMI 43.36 kg/m²     Physical Exam  Fredy Cons, CRNP [phone]

## 2023-12-11 ENCOUNTER — OFFICE VISIT (OUTPATIENT)
Dept: FAMILY MEDICINE CLINIC | Facility: CLINIC | Age: 58
End: 2023-12-11
Payer: COMMERCIAL

## 2023-12-11 VITALS
BODY MASS INDEX: 43.19 KG/M2 | SYSTOLIC BLOOD PRESSURE: 140 MMHG | WEIGHT: 220 LBS | RESPIRATION RATE: 16 BRPM | DIASTOLIC BLOOD PRESSURE: 74 MMHG | TEMPERATURE: 98.3 F | HEART RATE: 76 BPM | HEIGHT: 60 IN | OXYGEN SATURATION: 98 %

## 2023-12-11 DIAGNOSIS — I61.0 NONTRAUMATIC SUBCORTICAL HEMORRHAGE OF LEFT CEREBRAL HEMISPHERE (HCC): ICD-10-CM

## 2023-12-11 DIAGNOSIS — I10 PRIMARY HYPERTENSION: ICD-10-CM

## 2023-12-11 DIAGNOSIS — E11.9 TYPE II DIABETES MELLITUS, WELL CONTROLLED (HCC): ICD-10-CM

## 2023-12-11 DIAGNOSIS — J44.1 CHRONIC OBSTRUCTIVE PULMONARY DISEASE WITH ACUTE EXACERBATION (HCC): Primary | ICD-10-CM

## 2023-12-11 PROBLEM — E11.69 HYPERLIPIDEMIA ASSOCIATED WITH TYPE 2 DIABETES MELLITUS: Status: RESOLVED | Noted: 2022-04-29 | Resolved: 2023-12-11

## 2023-12-11 PROBLEM — E11.69 HYPERLIPIDEMIA ASSOCIATED WITH TYPE 2 DIABETES MELLITUS: Status: ACTIVE | Noted: 2022-04-29

## 2023-12-11 PROBLEM — F11.20 CONTINUOUS OPIOID DEPENDENCE (HCC): Status: RESOLVED | Noted: 2022-04-27 | Resolved: 2023-12-11

## 2023-12-11 PROBLEM — E78.5 HYPERLIPIDEMIA ASSOCIATED WITH TYPE 2 DIABETES MELLITUS: Status: ACTIVE | Noted: 2022-04-29

## 2023-12-11 PROBLEM — E78.5 HYPERLIPIDEMIA ASSOCIATED WITH TYPE 2 DIABETES MELLITUS: Status: RESOLVED | Noted: 2022-04-29 | Resolved: 2023-12-11

## 2023-12-11 PROCEDURE — 99214 OFFICE O/P EST MOD 30 MIN: CPT | Performed by: NURSE PRACTITIONER

## 2023-12-11 RX ORDER — LOSARTAN POTASSIUM AND HYDROCHLOROTHIAZIDE 12.5; 1 MG/1; MG/1
1 TABLET ORAL DAILY
Qty: 90 TABLET | Refills: 3 | Status: SHIPPED | OUTPATIENT
Start: 2023-12-11

## 2023-12-11 NOTE — PROGRESS NOTES
Name: Star Hylton      : 1965      MRN: [id number]  Encounter Provider: ARSENIO Slaughter  Encounter Date: 2023   Encounter department: [address] [place] [address]    Assessment & Plan     1. Chronic obstructive pulmonary disease with acute exacerbation (HCC)  -     CBC and differential; Future  -     Comprehensive metabolic panel; Future  -     Lipid panel; Future  -     TSH, 3rd generation; Future  -     losartan-hydrochlorothiazide (HYZAAR) 100-12.5 MG per tablet; Take 1 tablet by mouth daily  -     Hemoglobin A1C; Future; Expected date: 2023  -     Albumin / creatinine urine ratio  Stable. Anemia complaints does take medication as prescribed well-tolerated. 2. Primary hypertension  -     CBC and differential; Future  -     Comprehensive metabolic panel; Future  -     Lipid panel; Future  -     TSH, 3rd generation; Future  -     losartan-hydrochlorothiazide (HYZAAR) 100-12.5 MG per tablet; Take 1 tablet by mouth daily  -     Hemoglobin A1C; Future; Expected date: 2023  -     Albumin / creatinine urine ratio  Slightly better blood pressure so it is well-controlled today we will make the necessary adjustments at next office visit. 3. Nontraumatic subcortical hemorrhage of left cerebral hemisphere (HCC)  Stable. I did follow with neurology for management. 4. Type II diabetes mellitus, well controlled ([address])  -     CBC and differential; Future  -     Comprehensive metabolic panel; Future  -     Lipid panel; Future  -     TSH, 3rd generation; Future  -     losartan-hydrochlorothiazide (HYZAAR) 100-12.5 MG per tablet; Take 1 tablet by mouth daily  -     Hemoglobin A1C; Future; Expected date: 2023  -     Albumin / creatinine urine ratio  Stable. However a new A1c is due. I did advise complete that prior to her next office visit.   All other questions were answered she is very happy with the outcome of today's visit doing very well with physical therapy lab work completed before visit will fill out paperwork for her employer as needed. Dosing all possible side effects of the prescribed medications or medications that had been prescribed in the past were reviewed and all questions were answered. Patient verbalized agreement and understanding of the plan of care as outlined during the office visit today return to office as indicated or sooner if a problem arises. Subjective      Patient is here for a routine follow-up to have her blood pressure rechecked. I was elevated at last office visit this is a recheck after medication increase. .  Denies any related symptoms. Review of Systems   Constitutional:  Negative for appetite change and fever. HENT:  Negative for sinus pressure and sore throat. Eyes:  Negative for pain. Respiratory:  Negative for shortness of breath. Cardiovascular:  Negative for chest pain. Gastrointestinal:  Negative for abdominal pain. Genitourinary:  Negative for dysuria. Musculoskeletal:  Positive for back pain and myalgias. Negative for arthralgias. Skin:  Negative for color change. Neurological:  Positive for weakness and numbness. Negative for light-headedness. Psychiatric/Behavioral:  Negative for behavioral problems.         Current Outpatient Medications on File Prior to Visit   Medication Sig    acetaminophen (TYLENOL) 325 mg tablet Take 2 tablets (650 mg total) by mouth every 6 (six) hours as needed for mild pain    albuterol (Ventolin HFA) 90 mcg/act inhaler Inhale 2 puffs every 6 (six) hours as needed for wheezing    amLODIPine (NORVASC) 10 mg tablet Take 1 tablet (10 mg total) by mouth daily    Aspirin Low Dose 81 MG EC tablet Take 1 tablet by mouth once daily    atorvastatin (LIPITOR) 20 mg tablet Take 1 tablet (20 mg total) by mouth daily with dinner    baclofen 5 MG TABS Take 5 mg by mouth 2 (two) times a day    buPROPion (WELLBUTRIN XL) 150 mg 24 hr tablet Take 1 tablet (150 mg total) by mouth every morning Do not start before September 20, 2023. cyanocobalamin (VITAMIN B-12) 1000 MCG tablet Take 1 tablet (1,000 mcg total) by mouth daily Do not start before September 20, 2023. docusate sodium (COLACE) 100 mg capsule Take 1 capsule (100 mg total) by mouth 2 (two) times a day    ferrous sulfate 325 (65 Fe) mg tablet Take 1 tablet (325 mg total) by mouth daily with breakfast Do not start before September 20, 2023. gabapentin (Neurontin) 300 mg capsule Take 2 capsules (600 mg total) by mouth 3 (three) times a day as needed (pain and paristhesia)    melatonin 3 mg Take 2 tablets (6 mg total) by mouth daily at bedtime    metFORMIN (GLUCOPHAGE) 500 mg tablet Take 1 tablet (500 mg total) by mouth 2 (two) times a day with meals    pantoprazole (PROTONIX) 40 mg tablet Take 1 tablet (40 mg total) by mouth daily in the early morning Do not start before September 20, 2023. bisacodyl (DULCOLAX) 10 mg suppository Insert 1 suppository (10 mg total) into the rectum daily as needed (constipation, if unable to tolerate oral) (Patient not taking: Reported on 11/20/2023)    calcium carbonate (TUMS) 500 mg chewable tablet Chew 2 tablets (1,000 mg total) 3 (three) times a day as needed for indigestion or heartburn (Patient not taking: Reported on 11/20/2023)    diazepam (VALIUM) 2 mg tablet Take 1 tablet (2 mg total) by mouth 2 (two) times a day as needed for muscle spasms for up to 10 days (Patient not taking: Reported on 11/13/2023)    fluticasone-vilanterol (Breo Ellipta) 200-25 mcg/actuation inhaler Inhale 1 puff daily Rinse mouth after use. Heparin Sodium, Porcine, (heparin, porcine,) 5,000 units/mL Inject 1 mL (5,000 Units total) under the skin every 8 (eight) hours At the discretion of accepting physician.  (Patient not taking: Reported on 11/13/2023)    hydrOXYzine HCL (ATARAX) 25 mg tablet Take 1 tablet (25 mg total) by mouth 3 (three) times a day as needed for anxiety (Patient not taking: Reported on 11/13/2023)    insulin lispro (HumaLOG) 100 units/mL injection Inject 1-6 Units under the skin 3 (three) times a day before meals (Patient not taking: Reported on 11/13/2023)    insulin lispro (HumaLOG) 100 units/mL injection Inject 1-6 Units under the skin daily at bedtime (Patient not taking: Reported on 11/13/2023)    lidocaine (LIDODERM) 5 % Apply 1 patch topically over 12 hours daily Remove & Discard patch within 12 hours or as directed by MD Do not start before September 20, 2023. (Patient not taking: Reported on 11/20/2023)    lidocaine (LIDODERM) 5 % Apply 1 patch topically over 12 hours daily Remove & Discard patch within 12 hours or as directed by MD Do not start before September 20, 2023. (Patient not taking: Reported on 11/20/2023)    miconazole (MICOTIN) 2 % powder Apply 1 g topically 2 (two) times a day (Patient not taking: Reported on 11/20/2023)    ondansetron (ZOFRAN-ODT) 4 mg disintegrating tablet Take 1 tablet (4 mg total) by mouth every 6 (six) hours as needed for nausea or vomiting (Patient not taking: Reported on 11/20/2023)    polyethylene glycol (MIRALAX) 17 g packet Take 17 g by mouth daily as needed (constipation first line) (Patient not taking: Reported on 11/20/2023)    traMADol (Ultram) 50 mg tablet Take 1 tablet (50 mg total) by mouth every 6 (six) hours as needed for moderate pain (Patient not taking: Reported on 12/11/2023)       Objective     /74 (BP Location: Left arm, Patient Position: Sitting, Cuff Size: Large)   Pulse 76   Temp 98.3 °F (36.8 °C) (Temporal)   Resp 16   Ht 5' (1.524 m)   Wt 99.8 kg (220 lb)   SpO2 98%   BMI 42.97 kg/m²     Physical Exam  Vitals and nursing note reviewed. Constitutional:       General: She is not in acute distress. Appearance: She is well-developed. She is not diaphoretic. HENT:      Head: Normocephalic and atraumatic. Right Ear: External ear normal.      Left Ear: External ear normal.      Mouth/Throat:      Pharynx: Oropharynx is clear.    Eyes: Pupils: Pupils are equal, round, and reactive to light. Cardiovascular:      Rate and Rhythm: Normal rate and regular rhythm. Heart sounds: Normal heart sounds. Pulmonary:      Effort: Pulmonary effort is normal.      Breath sounds: Normal breath sounds. Abdominal:      Palpations: Abdomen is soft. Musculoskeletal:         General: Normal range of motion. Cervical back: Normal range of motion and neck supple. Skin:     General: Skin is dry. Neurological:      Mental Status: She is alert and oriented to person, place, and time. Mental status is at baseline. Motor: Weakness present. Coordination: Coordination abnormal.      Gait: Gait abnormal.      Deep Tendon Reflexes: Reflexes abnormal.   Psychiatric:         Behavior: Behavior normal.         Thought Content:  Thought content normal.     ARSENIO Aj

## 2023-12-18 ENCOUNTER — NURSE TRIAGE (OUTPATIENT)
Age: 58
End: 2023-12-18

## 2023-12-18 NOTE — TELEPHONE ENCOUNTER
Patient and home care nurse called and stated patient fell on 12/16 while getting out of her recliner.Patient stated she fell on her buttocks and left side of body.Patient denies no pain,bruising  or swelling. Patient had a previous stroke and has right sided weakness.Patient stated she does use a walker.Care advise given to patient.Advised patient if symptoms change to notify the office.
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
## 2023-12-28 ENCOUNTER — TELEPHONE (OUTPATIENT)
Age: 58
End: 2023-12-28

## 2023-12-28 NOTE — TELEPHONE ENCOUNTER
Spoke with patient, patient had stated that the insurance company will be faxing over the paperwork for disability.

## 2023-12-28 NOTE — TELEPHONE ENCOUNTER
Patient asking if disability extension has been submitted to [employer] as they told patient they have not yet received it.  Please advise.

## 2023-12-28 NOTE — TELEPHONE ENCOUNTER
Called and l/m for patient to call back to clarify if she needs a letter or new paperwork filled out for [employer].

## 2024-01-04 NOTE — TELEPHONE ENCOUNTER
Pt called in stating there's no return to work date on her paperwork. She needs this done and faxed.

## 2024-01-04 NOTE — TELEPHONE ENCOUNTER
Unable to correct due to paper work not being available in my chart yet copy was made for patients chart and original was mailed to larisa she can bring it to her visit next week.

## 2024-01-12 ENCOUNTER — OFFICE VISIT (OUTPATIENT)
Dept: FAMILY MEDICINE CLINIC | Facility: CLINIC | Age: 59
End: 2024-01-12
Payer: COMMERCIAL

## 2024-01-12 VITALS
SYSTOLIC BLOOD PRESSURE: 132 MMHG | OXYGEN SATURATION: 99 % | WEIGHT: 225 LBS | HEART RATE: 68 BPM | TEMPERATURE: 97.3 F | RESPIRATION RATE: 18 BRPM | DIASTOLIC BLOOD PRESSURE: 80 MMHG | HEIGHT: 60 IN | BODY MASS INDEX: 44.17 KG/M2

## 2024-01-12 DIAGNOSIS — Z23 ENCOUNTER FOR IMMUNIZATION: ICD-10-CM

## 2024-01-12 DIAGNOSIS — J45.20 MILD INTERMITTENT ASTHMA WITHOUT COMPLICATION: ICD-10-CM

## 2024-01-12 DIAGNOSIS — I10 PRIMARY HYPERTENSION: ICD-10-CM

## 2024-01-12 DIAGNOSIS — I69.393 ATAXIA DUE TO RECENT CEREBROVASCULAR ACCIDENT (CVA): ICD-10-CM

## 2024-01-12 DIAGNOSIS — I61.2 NONTRAUMATIC HEMORRHAGE OF LEFT CEREBRAL HEMISPHERE (HCC): Primary | ICD-10-CM

## 2024-01-12 DIAGNOSIS — M35.0C SJOGREN'S SYNDROME WITH DENTAL INVOLVEMENT (HCC): ICD-10-CM

## 2024-01-12 DIAGNOSIS — E11.9 TYPE II DIABETES MELLITUS, WELL CONTROLLED (HCC): ICD-10-CM

## 2024-01-12 DIAGNOSIS — J44.1 CHRONIC OBSTRUCTIVE PULMONARY DISEASE WITH ACUTE EXACERBATION (HCC): ICD-10-CM

## 2024-01-12 PROCEDURE — 90677 PCV20 VACCINE IM: CPT | Performed by: FAMILY MEDICINE

## 2024-01-12 PROCEDURE — 99213 OFFICE O/P EST LOW 20 MIN: CPT | Performed by: FAMILY MEDICINE

## 2024-01-12 PROCEDURE — 90471 IMMUNIZATION ADMIN: CPT | Performed by: FAMILY MEDICINE

## 2024-01-12 RX ORDER — AMLODIPINE BESYLATE 10 MG/1
10 TABLET ORAL DAILY
Qty: 90 TABLET | Refills: 1 | Status: SHIPPED | OUTPATIENT
Start: 2024-01-12

## 2024-01-12 NOTE — PROGRESS NOTES
Name: Korin Garcia      : 1965      MRN: [id number]  Encounter Provider: ARSENIO Saucedo  Encounter Date: 2024   Encounter department: [facility]    Assessment & Plan     1. 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  Has regained minimal amount of her dexterity due to the CVA with a walker also requires assistance for ADLs.  Patient is unable to drive requires transportation by  or sister.  Patient is unable to return to work at this time.  2. 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  Physical and Occupational Therapy have been helping to some degree.  Patient does not ambulate autonomously with a walker with assist from spouse or sister does not drive.  3. Primary hypertension  -     amLODIPine (NORVASC) 10 mg tablet; Take 1 tablet (10 mg total) by mouth daily  Stable we will continue to monitor.  4. Sjogren's syndrome with dental involvement (HCC)  Will continue to monitor.  5. Mild intermittent asthma without complication  Stable.  Will continue to monitor.  6. Encounter for immunization  -     Pneumococcal Conjugate Vaccine 20-valent (Pcv20)    7. Type II diabetes mellitus, well controlled (HCC)  Stable.  Will continue to monitor.  8. Chronic obstructive pulmonary disease with acute exacerbation (HCC)  Dosing all possible side effects of the prescribed medications or medications that had been prescribed in the past were reviewed and all questions were answered.  Patient verbalized agreement and understanding of the plan of care as outlined during the office visit today return to office as indicated or sooner if a problem arises.           Subjective      Pt here for fu visit for assessment for her progress with PT from her recent L side vascular stroke. She remains dependent on her  lizbeth or sister for transportation and assistance with ADL.      Review of Systems   Constitutional:  Negative for appetite change and fever.   HENT:  Negative for sinus pressure and sore throat.    Eyes:  Negative for pain.   Respiratory:  Negative for shortness of breath.    Cardiovascular:  Positive for leg swelling. Negative for chest pain.   Gastrointestinal:  Negative for abdominal pain.   Genitourinary:  Negative for dysuria.   Musculoskeletal:  Positive for gait problem and myalgias. Negative for arthralgias.   Skin:  Negative for color change.   Neurological:  Positive for speech difficulty, weakness and numbness. Negative for light-headedness.   Psychiatric/Behavioral:  Negative for behavioral problems.        Current Outpatient Medications on File Prior to Visit   Medication Sig    acetaminophen (TYLENOL) 325 mg tablet Take 2 tablets (650 mg total) by mouth every 6 (six) hours as needed for mild pain    albuterol (Ventolin HFA) 90 mcg/act inhaler Inhale 2 puffs every 6 (six) hours as needed for wheezing    Aspirin Low Dose 81 MG EC tablet Take 1 tablet by mouth once daily    atorvastatin (LIPITOR) 20 mg tablet Take 1 tablet (20 mg total) by mouth daily with dinner    baclofen 5 MG TABS Take 5 mg by mouth 2 (two) times a day    buPROPion (WELLBUTRIN XL) 150 mg 24 hr tablet Take 1 tablet (150 mg total) by mouth every morning Do not start before September 20, 2023.    cyanocobalamin (VITAMIN B-12) 1000 MCG tablet Take 1 tablet (1,000 mcg total) by mouth daily Do not start before September 20, 2023.    docusate sodium (COLACE) 100 mg capsule Take 1 capsule (100 mg total) by mouth 2 (two) times a day    ferrous sulfate 325 (65 Fe) mg tablet Take 1 tablet (325 mg total) by mouth daily with breakfast Do not start before September 20, 2023.    gabapentin (Neurontin) 300 mg capsule Take 2 capsules (600 mg total) by mouth 3 (three) times a day as needed (pain and paristhesia)    losartan-hydrochlorothiazide  (HYZAAR) 100-12.5 MG per tablet Take 1 tablet by mouth daily    melatonin 3 mg Take 2 tablets (6 mg total) by mouth daily at bedtime    metFORMIN (GLUCOPHAGE) 500 mg tablet Take 1 tablet (500 mg total) by mouth 2 (two) times a day with meals (Patient taking differently: Take 1,000 mg by mouth 2 (two) times a day with meals)    pantoprazole (PROTONIX) 40 mg tablet Take 1 tablet (40 mg total) by mouth daily in the early morning Do not start before September 20, 2023.    bisacodyl (DULCOLAX) 10 mg suppository Insert 1 suppository (10 mg total) into the rectum daily as needed (constipation, if unable to tolerate oral) (Patient not taking: Reported on 11/20/2023)    calcium carbonate (TUMS) 500 mg chewable tablet Chew 2 tablets (1,000 mg total) 3 (three) times a day as needed for indigestion or heartburn (Patient not taking: Reported on 11/20/2023)    diazepam (VALIUM) 2 mg tablet Take 1 tablet (2 mg total) by mouth 2 (two) times a day as needed for muscle spasms for up to 10 days (Patient not taking: Reported on 11/13/2023)    fluticasone-vilanterol (Breo Ellipta) 200-25 mcg/actuation inhaler Inhale 1 puff daily Rinse mouth after use.    lidocaine (LIDODERM) 5 % Apply 1 patch topically over 12 hours daily Remove & Discard patch within 12 hours or as directed by MD Do not start before September 20, 2023. (Patient not taking: Reported on 11/20/2023)    lidocaine (LIDODERM) 5 % Apply 1 patch topically over 12 hours daily Remove & Discard patch within 12 hours or as directed by MD Do not start before September 20, 2023. (Patient not taking: Reported on 11/20/2023)    miconazole (MICOTIN) 2 % powder Apply 1 g topically 2 (two) times a day (Patient not taking: Reported on 11/20/2023)    ondansetron (ZOFRAN-ODT) 4 mg disintegrating tablet Take 1 tablet (4 mg total) by mouth every 6 (six) hours as needed for nausea or vomiting (Patient not taking: Reported on 11/20/2023)    polyethylene glycol (MIRALAX) 17 g packet Take 17 g by  mouth daily as needed (constipation first line) (Patient not taking: Reported on 11/20/2023)       Objective     /80 (BP Location: Left arm, Patient Position: Sitting, Cuff Size: Large)   Pulse 68   Temp (!) 97.3 °F (36.3 °C) (Temporal)   Resp 18   Ht 5' (1.524 m)   Wt 102 kg (225 lb)   SpO2 99%   BMI 43.94 kg/m²     Physical Exam  Vitals and nursing note reviewed.   Constitutional:       General: She is not in acute distress.     Appearance: She is well-developed. She is not diaphoretic.   HENT:      Head: Normocephalic and atraumatic.      Right Ear: External ear normal.      Left Ear: External ear normal.      Mouth/Throat:      Pharynx: Oropharynx is clear.   Eyes:      Pupils: Pupils are equal, round, and reactive to light.   Cardiovascular:      Rate and Rhythm: Normal rate and regular rhythm.      Heart sounds: Normal heart sounds.   Pulmonary:      Effort: Pulmonary effort is normal.      Breath sounds: Normal breath sounds.   Abdominal:      Palpations: Abdomen is soft.   Musculoskeletal:      Cervical back: Normal range of motion and neck supple.      Left lower leg: Edema present.   Skin:     General: Skin is dry.   Neurological:      Mental Status: She is alert and oriented to person, place, and time.      Motor: Weakness present.      Coordination: Coordination abnormal.      Gait: Gait abnormal.      Deep Tendon Reflexes: Reflexes abnormal.   Psychiatric:         Behavior: Behavior normal.         Thought Content: Thought content normal.       ARSENIO Saucedo

## 2024-01-23 ENCOUNTER — APPOINTMENT (EMERGENCY)
Dept: RADIOLOGY | Facility: HOSPITAL | Age: 59
End: 2024-01-23
Payer: COMMERCIAL

## 2024-01-23 ENCOUNTER — NURSE TRIAGE (OUTPATIENT)
Age: 59
End: 2024-01-23

## 2024-01-23 ENCOUNTER — APPOINTMENT (EMERGENCY)
Dept: CT IMAGING | Facility: HOSPITAL | Age: 59
End: 2024-01-23
Payer: COMMERCIAL

## 2024-01-23 ENCOUNTER — HOSPITAL ENCOUNTER (OUTPATIENT)
Facility: HOSPITAL | Age: 59
Setting detail: OBSERVATION
Discharge: HOME | End: 2024-01-24
Attending: EMERGENCY MEDICINE | Admitting: INTERNAL MEDICINE
Payer: COMMERCIAL

## 2024-01-23 DIAGNOSIS — Z86.79 HISTORY OF INTRACRANIAL HEMORRHAGE: ICD-10-CM

## 2024-01-23 DIAGNOSIS — M79.89 SWELLING OF BOTH LOWER EXTREMITIES: ICD-10-CM

## 2024-01-23 DIAGNOSIS — R60.0 LOWER EXTREMITY EDEMA: ICD-10-CM

## 2024-01-23 DIAGNOSIS — R09.89 SUSPECTED DVT (DEEP VEIN THROMBOSIS): Primary | ICD-10-CM

## 2024-01-23 LAB
ALBUMIN SERPL BCP-MCNC: 4.3 G/DL (ref 3.5–5)
ALP SERPL-CCNC: 80 U/L (ref 34–104)
ALT SERPL W P-5'-P-CCNC: 16 U/L (ref 7–52)
ANION GAP SERPL CALCULATED.3IONS-SCNC: 13 MMOL/L
APTT PPP: 30 SECONDS (ref 23–37)
AST SERPL W P-5'-P-CCNC: 18 U/L (ref 13–39)
BASOPHILS # BLD AUTO: 0.11 THOUSANDS/ÂΜL (ref 0–0.1)
BASOPHILS NFR BLD AUTO: 1 % (ref 0–1)
BILIRUB SERPL-MCNC: 0.28 MG/DL (ref 0.2–1)
BNP SERPL-MCNC: 24 PG/ML (ref 0–100)
BUN SERPL-MCNC: 13 MG/DL (ref 5–25)
CALCIUM SERPL-MCNC: 9.9 MG/DL (ref 8.4–10.2)
CARDIAC TROPONIN I PNL SERPL HS: 3 NG/L
CHLORIDE SERPL-SCNC: 97 MMOL/L (ref 96–108)
CO2 SERPL-SCNC: 25 MMOL/L (ref 21–32)
CREAT SERPL-MCNC: 0.65 MG/DL (ref 0.6–1.3)
D DIMER PPP FEU-MCNC: 1.03 UG/ML FEU
EOSINOPHIL # BLD AUTO: 0.95 THOUSAND/ÂΜL (ref 0–0.61)
EOSINOPHIL NFR BLD AUTO: 7 % (ref 0–6)
ERYTHROCYTE [DISTWIDTH] IN BLOOD BY AUTOMATED COUNT: 15.5 % (ref 11.6–15.1)
FLUAV RNA RESP QL NAA+PROBE: NEGATIVE
FLUBV RNA RESP QL NAA+PROBE: NEGATIVE
GFR SERPL CREATININE-BSD FRML MDRD: 97 ML/MIN/1.73SQ M
GLUCOSE SERPL-MCNC: 159 MG/DL (ref 65–140)
HCT VFR BLD AUTO: 35.8 % (ref 34.8–46.1)
HGB BLD-MCNC: 11.5 G/DL (ref 11.5–15.4)
IMM GRANULOCYTES # BLD AUTO: 0.03 THOUSAND/UL (ref 0–0.2)
IMM GRANULOCYTES NFR BLD AUTO: 0 % (ref 0–2)
INR PPP: 1.06 (ref 0.84–1.19)
LYMPHOCYTES # BLD AUTO: 4.84 THOUSANDS/ÂΜL (ref 0.6–4.47)
LYMPHOCYTES NFR BLD AUTO: 38 % (ref 14–44)
MCH RBC QN AUTO: 26.6 PG (ref 26.8–34.3)
MCHC RBC AUTO-ENTMCNC: 32.1 G/DL (ref 31.4–37.4)
MCV RBC AUTO: 83 FL (ref 82–98)
MONOCYTES # BLD AUTO: 1.08 THOUSAND/ÂΜL (ref 0.17–1.22)
MONOCYTES NFR BLD AUTO: 8 % (ref 4–12)
NEUTROPHILS # BLD AUTO: 5.83 THOUSANDS/ÂΜL (ref 1.85–7.62)
NEUTS SEG NFR BLD AUTO: 46 % (ref 43–75)
NRBC BLD AUTO-RTO: 0 /100 WBCS
PLATELET # BLD AUTO: 455 THOUSANDS/UL (ref 149–390)
PMV BLD AUTO: 9.6 FL (ref 8.9–12.7)
POTASSIUM SERPL-SCNC: 3.8 MMOL/L (ref 3.5–5.3)
PROT SERPL-MCNC: 8.4 G/DL (ref 6.4–8.4)
PROTHROMBIN TIME: 13.7 SECONDS (ref 11.6–14.5)
RBC # BLD AUTO: 4.32 MILLION/UL (ref 3.81–5.12)
RSV RNA RESP QL NAA+PROBE: NEGATIVE
SARS-COV-2 RNA RESP QL NAA+PROBE: NEGATIVE
SODIUM SERPL-SCNC: 135 MMOL/L (ref 135–147)
WBC # BLD AUTO: 12.84 THOUSAND/UL (ref 4.31–10.16)

## 2024-01-23 PROCEDURE — 85610 PROTHROMBIN TIME: CPT | Performed by: EMERGENCY MEDICINE

## 2024-01-23 PROCEDURE — 85025 COMPLETE CBC W/AUTO DIFF WBC: CPT | Performed by: EMERGENCY MEDICINE

## 2024-01-23 PROCEDURE — 85730 THROMBOPLASTIN TIME PARTIAL: CPT | Performed by: EMERGENCY MEDICINE

## 2024-01-23 PROCEDURE — G1004 CDSM NDSC: HCPCS

## 2024-01-23 PROCEDURE — 84484 ASSAY OF TROPONIN QUANT: CPT | Performed by: EMERGENCY MEDICINE

## 2024-01-23 PROCEDURE — 71045 X-RAY EXAM CHEST 1 VIEW: CPT

## 2024-01-23 PROCEDURE — 83880 ASSAY OF NATRIURETIC PEPTIDE: CPT | Performed by: EMERGENCY MEDICINE

## 2024-01-23 PROCEDURE — 85379 FIBRIN DEGRADATION QUANT: CPT | Performed by: EMERGENCY MEDICINE

## 2024-01-23 PROCEDURE — 36415 COLL VENOUS BLD VENIPUNCTURE: CPT | Performed by: EMERGENCY MEDICINE

## 2024-01-23 PROCEDURE — 70450 CT HEAD/BRAIN W/O DYE: CPT

## 2024-01-23 PROCEDURE — 99285 EMERGENCY DEPT VISIT HI MDM: CPT | Performed by: EMERGENCY MEDICINE

## 2024-01-23 PROCEDURE — 93005 ELECTROCARDIOGRAM TRACING: CPT

## 2024-01-23 PROCEDURE — 99285 EMERGENCY DEPT VISIT HI MDM: CPT

## 2024-01-23 PROCEDURE — 0241U HB NFCT DS VIR RESP RNA 4 TRGT: CPT | Performed by: EMERGENCY MEDICINE

## 2024-01-23 PROCEDURE — 80053 COMPREHEN METABOLIC PANEL: CPT | Performed by: EMERGENCY MEDICINE

## 2024-01-23 NOTE — TELEPHONE ENCOUNTER
Regarding: let foot swellling  ----- Message from Cecilia Hinkle MA sent at 1/23/2024  1:08 PM EST -----  Left foot swelling . Patient was seen for right foot swelling and now her left foot is swelling. Please triage

## 2024-01-23 NOTE — TELEPHONE ENCOUNTER
"Pt calling.  She states that [facility] is following her for RLE edema and pain. Edema is up to her knee and she is c/o 7/10 pain. Pt states that she talked to [facility]ia about this at her visit on 12/11/2023.  Per pt, Analia started her on losartan with HCTZ to treat edema.  Pt states that edema and pain is the same in her right leg, however, now she is starting with edema in her left leg as well. Pt has been compliant with her losartan. Advised pt that with her history it would be a good idea to go to the ED to be evaluated for a DVT.  Pt wants to talk to [facility]ia.  Can office have [facility] review this please.  Pt has appt scheduled for Thursday, 1/25/2024.       Reason for Disposition   MILD swelling of both ankles (i.e., pedal edema) AND new-onset or worsening    Answer Assessment - Initial Assessment Questions  1. ONSET: \"When did the swelling start?\" (e.g., minutes, hours, days)      Left foot swelling   2. LOCATION: \"What part of the leg is swollen?\"  \"Are both legs swollen or just one leg?\"      Right leg edema to knee and now left foot and ankle edema  3. SEVERITY: \"How bad is the swelling?\" (e.g., localized; mild, moderate, severe)   - Localized - small area of swelling localized to one leg   - MILD pedal edema - swelling limited to foot and ankle, pitting edema < 1/4 inch (6 mm) deep, rest and elevation eliminate most or all swelling   - MODERATE edema - swelling of lower leg to knee, pitting edema > 1/4 inch (6 mm) deep, rest and elevation only partially reduce swelling   - SEVERE edema - swelling extends above knee, facial or hand swelling present      Mild - Moderate   4. REDNESS: \"Does the swelling look red or infected?\"      no  5. PAIN: \"Is the swelling painful to touch?\" If Yes, ask: \"How painful is it?\"   (Scale 1-10; mild, moderate or severe)      Back of leg, 7/10  6. FEVER: \"Do you have a fever?\" If Yes, ask: \"What is it, how was it measured, and when did it start?\"       no  7. CAUSE: \"What do you think is " "causing the leg swelling?\"      Analia gave me a water pill for the edema  8. MEDICAL HISTORY: \"Do you have a history of heart failure, kidney disease, liver failure, or cancer?\"      yes  9. RECURRENT SYMPTOM: \"Have you had leg swelling before?\" If Yes, ask: \"When was the last time?\" \"What happened that time?\"      Yes   10. OTHER SYMPTOMS: \"Do you have any other symptoms?\" (e.g., chest pain, difficulty breathing)        no  11. PREGNANCY: \"Is there any chance you are pregnant?\" \"When was your last menstrual period?\"        no    Protocols used: Leg Swelling and Edema-ADULT-[place]    "

## 2024-01-24 ENCOUNTER — APPOINTMENT (OUTPATIENT)
Dept: VASCULAR ULTRASOUND | Facility: HOSPITAL | Age: 59
End: 2024-01-24
Payer: COMMERCIAL

## 2024-01-24 VITALS
TEMPERATURE: 98.3 F | HEIGHT: 60 IN | BODY MASS INDEX: 43.63 KG/M2 | RESPIRATION RATE: 16 BRPM | SYSTOLIC BLOOD PRESSURE: 113 MMHG | WEIGHT: 222.22 LBS | DIASTOLIC BLOOD PRESSURE: 63 MMHG | HEART RATE: 72 BPM | OXYGEN SATURATION: 95 %

## 2024-01-24 PROBLEM — M79.89 SWELLING OF BOTH LOWER EXTREMITIES: Status: ACTIVE | Noted: 2024-01-24

## 2024-01-24 LAB
ANION GAP SERPL CALCULATED.3IONS-SCNC: 12 MMOL/L
APTT PPP: 38 SECONDS (ref 23–37)
ATRIAL RATE: 57 BPM
BACTERIA UR QL AUTO: ABNORMAL /HPF
BILIRUB UR QL STRIP: NEGATIVE
BUN SERPL-MCNC: 12 MG/DL (ref 5–25)
CALCIUM SERPL-MCNC: 9.3 MG/DL (ref 8.4–10.2)
CHLORIDE SERPL-SCNC: 101 MMOL/L (ref 96–108)
CLARITY UR: ABNORMAL
CO2 SERPL-SCNC: 25 MMOL/L (ref 21–32)
COLOR UR: YELLOW
CREAT SERPL-MCNC: 0.53 MG/DL (ref 0.6–1.3)
ERYTHROCYTE [DISTWIDTH] IN BLOOD BY AUTOMATED COUNT: 15.5 % (ref 11.6–15.1)
GFR SERPL CREATININE-BSD FRML MDRD: 104 ML/MIN/1.73SQ M
GLUCOSE P FAST SERPL-MCNC: 131 MG/DL (ref 65–99)
GLUCOSE SERPL-MCNC: 130 MG/DL (ref 65–140)
GLUCOSE SERPL-MCNC: 131 MG/DL (ref 65–140)
GLUCOSE SERPL-MCNC: 135 MG/DL (ref 65–140)
GLUCOSE SERPL-MCNC: 139 MG/DL (ref 65–140)
GLUCOSE SERPL-MCNC: 159 MG/DL (ref 65–140)
GLUCOSE UR STRIP-MCNC: NEGATIVE MG/DL
HCT VFR BLD AUTO: 34.5 % (ref 34.8–46.1)
HGB BLD-MCNC: 11 G/DL (ref 11.5–15.4)
HGB UR QL STRIP.AUTO: NEGATIVE
KETONES UR STRIP-MCNC: NEGATIVE MG/DL
LEUKOCYTE ESTERASE UR QL STRIP: NEGATIVE
MCH RBC QN AUTO: 27 PG (ref 26.8–34.3)
MCHC RBC AUTO-ENTMCNC: 31.9 G/DL (ref 31.4–37.4)
MCV RBC AUTO: 85 FL (ref 82–98)
NITRITE UR QL STRIP: POSITIVE
NON-SQ EPI CELLS URNS QL MICRO: ABNORMAL /HPF
P AXIS: 38 DEGREES
PH UR STRIP.AUTO: 6 [PH]
PLATELET # BLD AUTO: 373 THOUSANDS/UL (ref 149–390)
PMV BLD AUTO: 9.5 FL (ref 8.9–12.7)
POTASSIUM SERPL-SCNC: 3.4 MMOL/L (ref 3.5–5.3)
PR INTERVAL: 204 MS
PROT UR STRIP-MCNC: NEGATIVE MG/DL
QRS AXIS: 22 DEGREES
QRSD INTERVAL: 86 MS
QT INTERVAL: 464 MS
QTC INTERVAL: 451 MS
RBC # BLD AUTO: 4.08 MILLION/UL (ref 3.81–5.12)
RBC #/AREA URNS AUTO: ABNORMAL /HPF
SODIUM SERPL-SCNC: 138 MMOL/L (ref 135–147)
SP GR UR STRIP.AUTO: 1.01 (ref 1–1.03)
T WAVE AXIS: 11 DEGREES
UROBILINOGEN UR QL STRIP.AUTO: 0.2 E.U./DL
VENTRICULAR RATE: 57 BPM
WBC # BLD AUTO: 10.03 THOUSAND/UL (ref 4.31–10.16)
WBC #/AREA URNS AUTO: ABNORMAL /HPF

## 2024-01-24 PROCEDURE — 93970 EXTREMITY STUDY: CPT

## 2024-01-24 PROCEDURE — 82948 REAGENT STRIP/BLOOD GLUCOSE: CPT

## 2024-01-24 PROCEDURE — 99239 HOSP IP/OBS DSCHRG MGMT >30: CPT | Performed by: STUDENT IN AN ORGANIZED HEALTH CARE EDUCATION/TRAINING PROGRAM

## 2024-01-24 PROCEDURE — 94760 N-INVAS EAR/PLS OXIMETRY 1: CPT

## 2024-01-24 PROCEDURE — 94640 AIRWAY INHALATION TREATMENT: CPT

## 2024-01-24 PROCEDURE — 81001 URINALYSIS AUTO W/SCOPE: CPT | Performed by: INTERNAL MEDICINE

## 2024-01-24 PROCEDURE — 93970 EXTREMITY STUDY: CPT | Performed by: SURGERY

## 2024-01-24 PROCEDURE — 85027 COMPLETE CBC AUTOMATED: CPT | Performed by: INTERNAL MEDICINE

## 2024-01-24 PROCEDURE — 85730 THROMBOPLASTIN TIME PARTIAL: CPT | Performed by: INTERNAL MEDICINE

## 2024-01-24 PROCEDURE — 94664 DEMO&/EVAL PT USE INHALER: CPT

## 2024-01-24 PROCEDURE — 80048 BASIC METABOLIC PNL TOTAL CA: CPT | Performed by: INTERNAL MEDICINE

## 2024-01-24 RX ORDER — BUPROPION HYDROCHLORIDE 150 MG/1
150 TABLET ORAL EVERY MORNING
Status: DISCONTINUED | OUTPATIENT
Start: 2024-01-24 | End: 2024-01-24 | Stop reason: HOSPADM

## 2024-01-24 RX ORDER — INSULIN LISPRO 100 [IU]/ML
1-6 INJECTION, SOLUTION INTRAVENOUS; SUBCUTANEOUS
Status: DISCONTINUED | OUTPATIENT
Start: 2024-01-24 | End: 2024-01-24 | Stop reason: HOSPADM

## 2024-01-24 RX ORDER — FERROUS SULFATE 325(65) MG
325 TABLET ORAL
Status: DISCONTINUED | OUTPATIENT
Start: 2024-01-24 | End: 2024-01-24 | Stop reason: HOSPADM

## 2024-01-24 RX ORDER — ONDANSETRON 2 MG/ML
4 INJECTION INTRAMUSCULAR; INTRAVENOUS EVERY 6 HOURS PRN
Status: DISCONTINUED | OUTPATIENT
Start: 2024-01-24 | End: 2024-01-24 | Stop reason: HOSPADM

## 2024-01-24 RX ORDER — ATORVASTATIN CALCIUM 20 MG/1
20 TABLET, FILM COATED ORAL
Status: DISCONTINUED | OUTPATIENT
Start: 2024-01-24 | End: 2024-01-24 | Stop reason: HOSPADM

## 2024-01-24 RX ORDER — FLUTICASONE FUROATE AND VILANTEROL 200; 25 UG/1; UG/1
1 POWDER RESPIRATORY (INHALATION) DAILY
Status: DISCONTINUED | OUTPATIENT
Start: 2024-01-24 | End: 2024-01-24 | Stop reason: HOSPADM

## 2024-01-24 RX ORDER — PANTOPRAZOLE SODIUM 40 MG/1
40 TABLET, DELAYED RELEASE ORAL
Status: DISCONTINUED | OUTPATIENT
Start: 2024-01-24 | End: 2024-01-24 | Stop reason: HOSPADM

## 2024-01-24 RX ORDER — HYDROCHLOROTHIAZIDE 12.5 MG/1
12.5 TABLET ORAL DAILY
Status: DISCONTINUED | OUTPATIENT
Start: 2024-01-24 | End: 2024-01-24 | Stop reason: HOSPADM

## 2024-01-24 RX ORDER — ACETAMINOPHEN 325 MG/1
650 TABLET ORAL EVERY 4 HOURS PRN
Status: DISCONTINUED | OUTPATIENT
Start: 2024-01-24 | End: 2024-01-24 | Stop reason: HOSPADM

## 2024-01-24 RX ORDER — DOCUSATE SODIUM 100 MG/1
100 CAPSULE, LIQUID FILLED ORAL 2 TIMES DAILY
Status: DISCONTINUED | OUTPATIENT
Start: 2024-01-24 | End: 2024-01-24 | Stop reason: HOSPADM

## 2024-01-24 RX ORDER — AMLODIPINE BESYLATE 10 MG/1
10 TABLET ORAL DAILY
Status: DISCONTINUED | OUTPATIENT
Start: 2024-01-24 | End: 2024-01-24 | Stop reason: HOSPADM

## 2024-01-24 RX ORDER — BACLOFEN 10 MG/1
5 TABLET ORAL 2 TIMES DAILY
Status: DISCONTINUED | OUTPATIENT
Start: 2024-01-24 | End: 2024-01-24 | Stop reason: HOSPADM

## 2024-01-24 RX ORDER — LOSARTAN POTASSIUM 50 MG/1
100 TABLET ORAL DAILY
Status: DISCONTINUED | OUTPATIENT
Start: 2024-01-24 | End: 2024-01-24 | Stop reason: HOSPADM

## 2024-01-24 RX ORDER — OXYCODONE HYDROCHLORIDE 5 MG/1
2.5 TABLET ORAL EVERY 4 HOURS PRN
Status: DISCONTINUED | OUTPATIENT
Start: 2024-01-24 | End: 2024-01-24 | Stop reason: HOSPADM

## 2024-01-24 RX ORDER — CEPHALEXIN 500 MG/1
500 CAPSULE ORAL EVERY 6 HOURS SCHEDULED
Qty: 24 CAPSULE | Refills: 0 | Status: SHIPPED | OUTPATIENT
Start: 2024-01-24 | End: 2024-01-30

## 2024-01-24 RX ORDER — ALBUTEROL SULFATE 90 UG/1
2 AEROSOL, METERED RESPIRATORY (INHALATION) EVERY 6 HOURS PRN
Status: DISCONTINUED | OUTPATIENT
Start: 2024-01-24 | End: 2024-01-24 | Stop reason: HOSPADM

## 2024-01-24 RX ORDER — HEPARIN SODIUM 10000 [USP'U]/100ML
3-30 INJECTION, SOLUTION INTRAVENOUS
Status: DISCONTINUED | OUTPATIENT
Start: 2024-01-24 | End: 2024-01-24 | Stop reason: HOSPADM

## 2024-01-24 RX ORDER — HYDROMORPHONE HCL IN WATER/PF 6 MG/30 ML
0.2 PATIENT CONTROLLED ANALGESIA SYRINGE INTRAVENOUS EVERY 4 HOURS PRN
Status: DISCONTINUED | OUTPATIENT
Start: 2024-01-24 | End: 2024-01-24 | Stop reason: HOSPADM

## 2024-01-24 RX ORDER — GABAPENTIN 300 MG/1
600 CAPSULE ORAL 3 TIMES DAILY PRN
Status: DISCONTINUED | OUTPATIENT
Start: 2024-01-24 | End: 2024-01-24 | Stop reason: HOSPADM

## 2024-01-24 RX ORDER — LANOLIN ALCOHOL/MO/W.PET/CERES
6 CREAM (GRAM) TOPICAL
Status: DISCONTINUED | OUTPATIENT
Start: 2024-01-24 | End: 2024-01-24 | Stop reason: HOSPADM

## 2024-01-24 RX ORDER — OXYCODONE HYDROCHLORIDE 5 MG/1
5 TABLET ORAL EVERY 4 HOURS PRN
Status: DISCONTINUED | OUTPATIENT
Start: 2024-01-24 | End: 2024-01-24 | Stop reason: HOSPADM

## 2024-01-24 RX ADMIN — AMLODIPINE BESYLATE 10 MG: 10 TABLET ORAL at 08:56

## 2024-01-24 RX ADMIN — PANTOPRAZOLE SODIUM 40 MG: 40 TABLET, DELAYED RELEASE ORAL at 05:32

## 2024-01-24 RX ADMIN — INSULIN LISPRO 1 UNITS: 100 INJECTION, SOLUTION INTRAVENOUS; SUBCUTANEOUS at 11:35

## 2024-01-24 RX ADMIN — BACLOFEN 5 MG: 10 TABLET ORAL at 08:56

## 2024-01-24 RX ADMIN — ACETAMINOPHEN 650 MG: 325 TABLET, FILM COATED ORAL at 03:41

## 2024-01-24 RX ADMIN — DOCUSATE SODIUM 100 MG: 100 CAPSULE, LIQUID FILLED ORAL at 08:56

## 2024-01-24 RX ADMIN — ASPIRIN 81 MG: 81 TABLET, COATED ORAL at 08:56

## 2024-01-24 RX ADMIN — BUPROPION HYDROCHLORIDE 150 MG: 150 TABLET, EXTENDED RELEASE ORAL at 08:56

## 2024-01-24 RX ADMIN — OXYCODONE HYDROCHLORIDE 5 MG: 5 TABLET ORAL at 05:32

## 2024-01-24 RX ADMIN — LOSARTAN POTASSIUM 100 MG: 50 TABLET, FILM COATED ORAL at 08:56

## 2024-01-24 RX ADMIN — CYANOCOBALAMIN TAB 500 MCG 1000 MCG: 500 TAB at 08:56

## 2024-01-24 RX ADMIN — FERROUS SULFATE TAB 325 MG (65 MG ELEMENTAL FE) 325 MG: 325 (65 FE) TAB at 08:56

## 2024-01-24 RX ADMIN — HYDROCHLOROTHIAZIDE 12.5 MG: 12.5 TABLET ORAL at 08:56

## 2024-01-24 RX ADMIN — HEPARIN SODIUM 18 UNITS/KG/HR: 10000 INJECTION, SOLUTION INTRAVENOUS at 03:09

## 2024-01-24 RX ADMIN — FLUTICASONE FUROATE AND VILANTEROL TRIFENATATE 1 PUFF: 200; 25 POWDER RESPIRATORY (INHALATION) at 08:45

## 2024-01-24 NOTE — DISCHARGE SUMMARY
Critical access hospital  Discharge- Korin Garcia 1965, 59 y.o. female MRN: [id number]  Unit/Bed#: -01 Encounter: [id number]  Primary Care Provider: Benitez Marquez MD   Date and time admitted to hospital: 1/23/2024  8:47 PM    Diabetes mellitus (HCC)  Assessment & Plan  Lab Results   Component Value Date    HGBA1C 6.7 (H) 08/15/2023   Hold home metformin.  Continue sliding scale coverage.  Hypoglycemic protocol.      GERD (gastroesophageal reflux disease)  Assessment & Plan  Continue Protonix    ICH (intracerebral hemorrhage) (HCC)  Assessment & Plan  Recent intraparenchymal hemorrhage of left thalamic region with residual right-sided weakness.  Currently on aspirin only.  Following up with neurology.    Continue aspirin, statin.    Moderate persistent asthma without complication  Assessment & Plan  History of COPD and asthma.  Continue Trelegy.  Not in exacerbation.    Primary hypertension  Assessment & Plan  Continue amlodipine, Hyzaar.    * Swelling of both lower extremities  Assessment & Plan  Presented with worsening right lower extremity swelling since last 2 months now presenting with increasing pain and tenderness to touch.  Patient states her usual shoes have been becoming very much tight to extent that she cannot wear shoes on right leg and has been significantly tightening on left leg as well.  No prior history of DVT/PE.  No DVT studies in past.  Now noted left lower extremity swelling as well since last 2 to 3 days.  Does have history of recent hemorrhagic stroke with residual right-sided weakness and spending most of the time in couch.  Noted elevated D-dimer.  High suspicion for DVT.  Denies any chest pain, shortness of breath.  Vital stable and saturating well on room air.  Prior echo from 8/23 was showing preserved EF.  Well score of 5.    ED reached out to heme-onc given her history of recent hemorrhagic stroke who is okay with heparin with lower APTT goal  Continue heparin  drip  Get lower extremity Doppler ultrasound completed bilaterally tomorrow  Leg elevation  Avoid SCD or foot pump      Medical Problems       Resolved Problems  Date Reviewed: 1/24/2024   None       Discharging Physician / Practitioner: Sumeet Mahoney  PCP: Benitez Marquez MD  Admission Date:   Admission Orders (From admission, onward)       Ordered        01/24/24 0146  Place in Observation  Once                          Discharge Date: 01/24/24    Consultations During Hospital Stay:  N/A    Procedures Performed:   None    Significant Findings / Test Results:   CT head 1/24/24- No acute intracranial abnormality. Stable chronic microangiopathic changes and sequela of remote hemorrhage at the left capsular thalamic junction.  Lower Limb Venous duplex US- No acute DVT (Result paraphrased by myself, see chart for full details)  D dimer- 1.03  WBC 12.84    Incidental Findings:   None     Test Results Pending at Discharge (will require follow up):   None     Outpatient Tests Requested:  None    Complications:  None    Reason for Admission: Right lower extremity swelling and pain    Hospital Course:   Korin Garcia is a 59 y.o. female patient who originally presented to the hospital on 1/23/2024 due to right lower extremity swelling and pain.  She has had a recent CVA resulting in relative immobility, and has noted right lower extremity greater than left swelling, pain, and erythema that gotten worse prior to admission.  She came to the ED where she had mild leukocytosis.  D-dimer was elevated so she was empirically anticoagulated.  Heme-onc was contacted for PTT goal in the setting of recent ICH.  Patient had lower extremity ultrasound on the same day which did not demonstrate any DVT of the lower extremity, given leukocytosis on admission, medical team suspects that there could possibly be some mild cellulitis of the right lower extremity.  Differential may also include venous insufficiency. She was discharged on  Keflex and advised to return if symptoms change or worsen or she developed any signs or symptoms of PE including chest pain, dyspnea, or any concerning symptoms    Please see above list of diagnoses and related plan for additional information.     Condition at Discharge: good    Discharge Day Visit / Exam:   Subjective:  Laying in bed, no acute distress  Vitals: Blood Pressure: 113/63 (01/24/24 1411)  Pulse: 72 (01/24/24 1411)  Temperature: 98.3 °F (36.8 °C) (01/24/24 1411)  Temp Source: Tympanic (01/24/24 1411)  Respirations: 16 (01/24/24 1411)  Height: 5' (152.4 cm) (01/24/24 0215)  Weight - Scale: 101 kg (222 lb 3.6 oz) (01/24/24 0600)  SpO2: 95 % (01/24/24 1411)  Exam:   Physical Exam  Vitals reviewed.   Constitutional:       General: She is not in acute distress.     Appearance: She is not ill-appearing.   HENT:      Head: Normocephalic.      Mouth/Throat:      Mouth: Mucous membranes are moist.   Eyes:      General: No scleral icterus.     Extraocular Movements: Extraocular movements intact.   Cardiovascular:      Rate and Rhythm: Normal rate and regular rhythm.      Pulses: Normal pulses.      Heart sounds: No murmur heard.     No friction rub. No gallop.   Pulmonary:      Effort: Pulmonary effort is normal. No respiratory distress.      Breath sounds: No wheezing, rhonchi or rales.   Abdominal:      General: Abdomen is flat. Bowel sounds are normal. There is no distension.      Palpations: Abdomen is soft.      Tenderness: There is no abdominal tenderness. There is no guarding or rebound.   Musculoskeletal:      Right lower leg: No edema.      Left lower leg: No edema.      Comments: Mild R>L swelling of LE with erythema   Skin:     General: Skin is warm.      Capillary Refill: Capillary refill takes less than 2 seconds.      Coloration: Skin is not jaundiced.      Findings: No rash.   Neurological:      General: No focal deficit present.      Mental Status: She is alert and oriented to person, place, and  time. Mental status is at baseline.      Sensory: No sensory deficit.      Motor: Weakness present.   Psychiatric:         Mood and Affect: Mood normal.         Behavior: Behavior normal.          Discussion with Family: Patient declined call to .     Discharge instructions/Information to patient and family:   See after visit summary for information provided to patient and family.      Provisions for Follow-Up Care:  See after visit summary for information related to follow-up care and any pertinent home health orders.      Mobility at time of Discharge:   Basic Mobility Inpatient Raw Score: 10  -HLM Goal: 4: Move to chair/commode  JH-HLM Achieved: 2: Bed activities/Dependent transfer  HLM Goal NOT achieved. Continue to encourage mobility in post discharge setting.     Disposition:   Home    Planned Readmission: No     Discharge Statement:  I spent 45 minutes discharging the patient. This time was spent on the day of discharge. I had direct contact with the patient on the day of discharge. Greater than 50% of the total time was spent examining patient, answering all patient questions, arranging and discussing plan of care with patient as well as directly providing post-discharge instructions.  Additional time then spent on discharge activities.    Discharge Medications:  See after visit summary for reconciled discharge medications provided to patient and/or family.      **Please Note: This note may have been constructed using a voice recognition system**

## 2024-01-24 NOTE — PLAN OF CARE
Problem: Prexisting or High Potential for Compromised Skin Integrity  Goal: Skin integrity is maintained or improved  Description: INTERVENTIONS:  - Identify patients at risk for skin breakdown  - Assess and monitor skin integrity  - Assess and monitor nutrition and hydration status  - Monitor labs   - Assess for incontinence   - Turn and reposition patient  - Assist with mobility/ambulation  - Relieve pressure over bony prominences  - Avoid friction and shearing  - Provide appropriate hygiene as needed including keeping skin clean and dry  - Evaluate need for skin moisturizer/barrier cream  - Collaborate with interdisciplinary team   - Patient/family teaching  - Consider wound care consult   Outcome: Progressing     Problem: PAIN - ADULT  Goal: Verbalizes/displays adequate comfort level or baseline comfort level  Description: Interventions:  - Encourage patient to monitor pain and request assistance  - Assess pain using appropriate pain scale  - Administer analgesics based on type and severity of pain and evaluate response  - Implement non-pharmacological measures as appropriate and evaluate response  - Consider cultural and social influences on pain and pain management  - Notify physician/advanced practitioner if interventions unsuccessful or patient reports new pain  Outcome: Progressing     Problem: INFECTION - ADULT  Goal: Absence or prevention of progression during hospitalization  Description: INTERVENTIONS:  - Assess and monitor for signs and symptoms of infection  - Monitor lab/diagnostic results  - Monitor all insertion sites, i.e. indwelling lines, tubes, and drains  - Monitor endotracheal if appropriate and nasal secretions for changes in amount and color  - [place] appropriate cooling/warming therapies per order  - Administer medications as ordered  - Instruct and encourage patient and family to use good hand hygiene technique  - Identify and instruct in appropriate isolation precautions for  identified infection/condition  Outcome: Progressing  Goal: Absence of fever/infection during neutropenic period  Description: INTERVENTIONS:  - Monitor WBC    Outcome: Progressing     Problem: SAFETY ADULT  Goal: Patient will remain free of falls  Description: INTERVENTIONS:  - Educate patient/family on patient safety including physical limitations  - Instruct patient to call for assistance with activity   - Consult OT/PT to assist with strengthening/mobility   - Keep Call bell within reach  - Keep bed low and locked with side rails adjusted as appropriate  - Keep care items and personal belongings within reach  - Initiate and maintain comfort rounds  - Make Fall Risk Sign visible to staff  - Offer Toileting every  Hours, in advance of need  - Initiate/Maintain alarm  - Obtain necessary fall risk management equipment:   - Apply yellow socks and bracelet for high fall risk patients  - Consider moving patient to room near nurses station  Outcome: Progressing  Goal: Maintain or return to baseline ADL function  Description: INTERVENTIONS:  -  Assess patient's ability to carry out ADLs; assess patient's baseline for ADL function and identify physical deficits which impact ability to perform ADLs (bathing, care of mouth/teeth, toileting, grooming, dressing, etc.)  - Assess/evaluate cause of self-care deficits   - Assess range of motion  - Assess patient's mobility; develop plan if impaired  - Assess patient's need for assistive devices and provide as appropriate  - Encourage maximum independence but intervene and supervise when necessary  - Involve family in performance of ADLs  - Assess for home care needs following discharge   - Consider OT consult to assist with ADL evaluation and planning for discharge  - Provide patient education as appropriate  Outcome: Progressing  Goal: Maintains/Returns to pre admission functional level  Description: INTERVENTIONS:  - Perform AM-PAC 6 Click Basic Mobility/ Daily Activity  assessment daily.  - Set and communicate daily mobility goal to care team and patient/family/caregiver.   - Collaborate with rehabilitation services on mobility goals if consulted  - Perform Range of Motion  times a day.  - Reposition patient every hours.  - Dangle patient  times a day  - Stand patient  times a day  - Ambulate patient  times a day  - Out of bed to chair  times a day   - Out of bed for meals  times a day  - Out of bed for toileting  - Record patient progress and toleration of activity level   Outcome: Progressing     Problem: Knowledge Deficit  Goal: Patient/family/caregiver demonstrates understanding of disease process, treatment plan, medications, and discharge instructions  Description: Complete learning assessment and assess knowledge base.  Interventions:  - Provide teaching at level of understanding  - Provide teaching via preferred learning methods  Outcome: Progressing     Problem: CARDIOVASCULAR - ADULT  Goal: Maintains optimal cardiac output and hemodynamic stability  Description: INTERVENTIONS:  - Monitor I/O, vital signs and rhythm  - Monitor for S/S and trends of decreased cardiac output  - Administer and titrate ordered vasoactive medications to optimize hemodynamic stability  - Assess quality of pulses, skin color and temperature  - Assess for signs of decreased coronary artery perfusion  - Instruct patient to report change in severity of symptoms  Outcome: Progressing  Goal: Absence of cardiac dysrhythmias or at baseline rhythm  Description: INTERVENTIONS:  - Continuous cardiac monitoring, vital signs, obtain 12 lead EKG if ordered  - Administer antiarrhythmic and heart rate control medications as ordered  - Monitor electrolytes and administer replacement therapy as ordered  Outcome: Progressing

## 2024-01-24 NOTE — ED PROVIDER NOTES
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  The patient states that she has had swelling of her right lower extremity since November 2023 (2 months ago) that is gradually worsened and she is now experiencing increased pain and swelling, with the pain located in her right calf as well as behind her right knee and on the medial side of her distal right thigh.  She notes that she has been following in the outpatient setting but has not had any studies to evaluate for possible DVT.  No known history of DVT/PE.  She noticed mild swelling of the distal left lower extremity as well and called her primary care provider's office, who referred her to the ER for further evaluation.  She also reports recent viral URI symptoms of a cough and congestion over the last few days. She denies any fever, chills, dyspnea, chest pain, wheezing, hemoptysis, abdominal pain, nausea, vomiting, diarrhea, urinary symptoms, back pain, neck pain, headache, numbness, or new weakness.  She has residual right-sided weakness from her prior intraparenchymal hemorrhage that is currently at baseline, per patient.         Prior to Admission Medications   Prescriptions Last Dose Informant Patient Reported? Taking?   Aspirin Low Dose 81 MG EC tablet  Outside Facility (Specify) No No   Sig: Take 1 tablet by mouth once daily   acetaminophen (TYLENOL) 325 mg tablet  Outside Facility (Specify) No No   Sig: Take 2 tablets (650 mg total) by mouth every 6 (six) hours as needed for mild pain   albuterol (Ventolin HFA) 90 mcg/act inhaler  Outside Facility (Specify) No No   Sig: Inhale 2 puffs every 6 (six)  hours as needed for wheezing   amLODIPine (NORVASC) 10 mg tablet   No No   Sig: Take 1 tablet (10 mg total) by mouth daily   atorvastatin (LIPITOR) 20 mg tablet  Outside Facility (Specify) No No   Sig: Take 1 tablet (20 mg total) by mouth daily with dinner   baclofen 5 MG TABS  Outside Facility (Specify) No No   Sig: Take 5 mg by mouth 2 (two) times a day   bisacodyl (DULCOLAX) 10 mg suppository  Outside Facility (Specify) No No   Sig: Insert 1 suppository (10 mg total) into the rectum daily as needed (constipation, if unable to tolerate oral)   Patient not taking: Reported on 11/20/2023   buPROPion (WELLBUTRIN XL) 150 mg 24 hr tablet  Outside Facility (Specify) No No   Sig: Take 1 tablet (150 mg total) by mouth every morning Do not start before September 20, 2023.   calcium carbonate (TUMS) 500 mg chewable tablet  Outside Facility (Specify) No No   Sig: Chew 2 tablets (1,000 mg total) 3 (three) times a day as needed for indigestion or heartburn   Patient not taking: Reported on 11/20/2023   cyanocobalamin (VITAMIN B-12) 1000 MCG tablet  Outside Facility (Specify) No No   Sig: Take 1 tablet (1,000 mcg total) by mouth daily Do not start before September 20, 2023.   diazepam (VALIUM) 2 mg tablet   No No   Sig: Take 1 tablet (2 mg total) by mouth 2 (two) times a day as needed for muscle spasms for up to 10 days   Patient not taking: Reported on 11/13/2023   docusate sodium (COLACE) 100 mg capsule  Outside Facility (Specify) No No   Sig: Take 1 capsule (100 mg total) by mouth 2 (two) times a day   ferrous sulfate 325 (65 Fe) mg tablet  Outside Facility (Specify) No No   Sig: Take 1 tablet (325 mg total) by mouth daily with breakfast Do not start before September 20, 2023.   fluticasone-vilanterol (Breo Ellipta) 200-25 mcg/actuation inhaler  Outside Facility (Specify) No No   Sig: Inhale 1 puff daily Rinse mouth after use.   gabapentin (Neurontin) 300 mg capsule   No No   Sig: Take 2 capsules (600 mg total) by mouth 3  (three) times a day as needed (pain and paristhesia)   lidocaine (LIDODERM) 5 %  Outside Facility (Specify) No No   Sig: Apply 1 patch topically over 12 hours daily Remove & Discard patch within 12 hours or as directed by MD Do not start before September 20, 2023.   Patient not taking: Reported on 11/20/2023   lidocaine (LIDODERM) 5 %  Outside Facility (Specify) No No   Sig: Apply 1 patch topically over 12 hours daily Remove & Discard patch within 12 hours or as directed by MD Do not start before September 20, 2023.   Patient not taking: Reported on 11/20/2023   losartan-hydrochlorothiazide (HYZAAR) 100-12.5 MG per tablet   No No   Sig: Take 1 tablet by mouth daily   melatonin 3 mg  Outside Facility (Specify) No No   Sig: Take 2 tablets (6 mg total) by mouth daily at bedtime   metFORMIN (GLUCOPHAGE) 500 mg tablet   No No   Sig: Take 1 tablet (500 mg total) by mouth 2 (two) times a day with meals   Patient taking differently: Take 1,000 mg by mouth 2 (two) times a day with meals   miconazole (MICOTIN) 2 % powder  Outside Facility (Specify) No No   Sig: Apply 1 g topically 2 (two) times a day   Patient not taking: Reported on 11/20/2023   ondansetron (ZOFRAN-ODT) 4 mg disintegrating tablet  Outside Facility (Specify) No No   Sig: Take 1 tablet (4 mg total) by mouth every 6 (six) hours as needed for nausea or vomiting   Patient not taking: Reported on 11/20/2023   pantoprazole (PROTONIX) 40 mg tablet  Outside Facility (Specify) No No   Sig: Take 1 tablet (40 mg total) by mouth daily in the early morning Do not start before September 20, 2023.   polyethylene glycol (MIRALAX) 17 g packet  Outside Facility (Specify) No No   Sig: Take 17 g by mouth daily as needed (constipation first line)   Patient not taking: Reported on 11/20/2023      Facility-Administered Medications: None       Past Medical History:   Diagnosis Date    Arthritis     Asthma     Continuous opioid dependence (HCC) 04/27/2022    Continuous opioid  dependence (HCC) 04/27/2022    Diabetes mellitus (HCC)     Diverticulitis of colon     Fibromyalgia 04/26/2022    Headache(784.0)     History of mammogram 2018    History of tobacco abuse 04/26/2022    Hypertension     Nausea 04/29/2022    Obesity     Sjogren's syndrome (HCC) 10/19/2012    Urinary tract infection        Past Surgical History:   Procedure Laterality Date    APPENDECTOMY      BREAST BIOPSY Left     unsure of year    CARPAL TUNNEL RELEASE      COLONOSCOPY  2017    repeat in 2022    EPIDURAL BLOCK INJECTION N/A 2/14/2023    Procedure: L5-S1 EPIDURALSTEROID INJECTION (58698);  Surgeon: Melo Mancia DO;  Location:  MAIN OR;  Service: Pain Management     FOOT SURGERY      HERNIA REPAIR  05/2022    NECK SURGERY      spine fusion     WI ARTHROCENTESIS ASPIR&/INJ MAJOR JT/BURSA W/O US  9/18/2023         WI RPR UMBILICAL HRNA 5 YRS/> REDUCIBLE N/A 05/11/2022    Procedure: REPAIR HERNIA UMBILICAL;  Surgeon: Dread Mckeon MD;  Location:  MAIN OR;  Service: General       Family History   Problem Relation Age of Onset    Hypertension Mother     Heart disease Mother     Hypertension Father     Heart disease Father     Asthma Sister     No Known Problems Sister     No Known Problems Sister     Diabetes Maternal Grandmother     No Known Problems Maternal Grandfather     No Known Problems Paternal Grandmother     No Known Problems Paternal Grandfather     Breast cancer Paternal Aunt     Pancreatic cancer Paternal Uncle     Colon cancer Neg Hx     Ovarian cancer Neg Hx     Uterine cancer Neg Hx     Cervical cancer Neg Hx      I have reviewed and agree with the history as documented.    E-Cigarette/Vaping    E-Cigarette Use Never User      E-Cigarette/Vaping Substances    Nicotine No     THC No     CBD No     Flavoring No     Other No     Unknown No      Social History     Tobacco Use    Smoking status: Former     Current packs/day: 0.00     Average packs/day: 0.5 packs/day for 42.3 years (21.1 ttl pk-yrs)      Types: Cigarettes     Start date:      Quit date: 2023     Years since quittin.8     Passive exposure: Past    Smokeless tobacco: Never    Tobacco comments:     per pt, 5 a day - As per [place]    Vaping Use    Vaping status: Never Used   Substance Use Topics    Alcohol use: Not Currently     Comment: occasional     Drug use: Never       Review of Systems   Constitutional:  Negative for chills and fever.   HENT:  Negative for congestion, rhinorrhea and sore throat.    Respiratory:  Negative for cough and shortness of breath.    Cardiovascular:  Positive for leg swelling. Negative for chest pain and palpitations.   Gastrointestinal:  Negative for abdominal pain, diarrhea, nausea and vomiting.   Genitourinary:  Negative for dysuria and hematuria.   Musculoskeletal:  Negative for back pain and neck pain.   Neurological:  Negative for dizziness, weakness, light-headedness, numbness and headaches.   All other systems reviewed and are negative.      Physical Exam  Physical Exam  Vitals and nursing note reviewed.   Constitutional:       General: She is not in acute distress.     Appearance: Normal appearance. She is normal weight. She is not ill-appearing.   HENT:      Head: Normocephalic and atraumatic.      Right Ear: External ear normal.      Left Ear: External ear normal.      Nose: Nose normal. No congestion or rhinorrhea.      Mouth/Throat:      Mouth: Mucous membranes are moist.      Pharynx: Oropharynx is clear. No oropharyngeal exudate or posterior oropharyngeal erythema.   Eyes:      Extraocular Movements: Extraocular movements intact.      Conjunctiva/sclera: Conjunctivae normal.      Pupils: Pupils are equal, round, and reactive to light.   Cardiovascular:      Rate and Rhythm: Normal rate and regular rhythm.      Pulses: Normal pulses.      Heart sounds: Normal heart sounds. No murmur heard.  Pulmonary:      Effort: Pulmonary effort is normal. No respiratory distress.      Breath sounds: Normal  breath sounds. No wheezing or rales.   Abdominal:      General: Abdomen is flat. Bowel sounds are normal. There is no distension.      Palpations: Abdomen is soft.      Tenderness: There is no abdominal tenderness. There is no right CVA tenderness, left CVA tenderness or guarding.   Musculoskeletal:         General: No swelling or tenderness. Normal range of motion.      Cervical back: Normal range of motion and neck supple. No tenderness.      Right lower leg: Edema present.      Left lower leg: Edema present.      Comments: 2+ right lower extremity edema, slightly pitting.  Trace left lower extremity edema, nonpitting.  There is tenderness to palpation over the right calf posteriorly as well as in the posterior popliteal fossa and over the distal right medial thigh.  No overlying skin changes, no erythema or drainage.  See image attached.   Skin:     General: Skin is warm and dry.      Capillary Refill: Capillary refill takes less than 2 seconds.   Neurological:      Mental Status: She is alert and oriented to person, place, and time. Mental status is at baseline.      Comments: Residual right-sided weakness from prior intracranial hemorrhage from 5 months ago, at baseline per patient.  No new deficits.         Vital Signs  ED Triage Vitals [01/23/24 2056]   Temperature Pulse Respirations Blood Pressure SpO2   97.8 °F (36.6 °C) 66 18 138/72 98 %      Temp Source Heart Rate Source Patient Position - Orthostatic VS BP Location FiO2 (%)   Oral Monitor Sitting Right arm --      Pain Score       --           Vitals:    01/23/24 2056 01/23/24 2145 01/23/24 2315 01/24/24 0100   BP: 138/72  122/66 117/67   Pulse: 66 55 (!) 54 (!) 54   Patient Position - Orthostatic VS: Sitting  Lying Lying         Visual Acuity      ED Medications  Medications   heparin (porcine) 25,000 units in 0.45% NaCl 250 mL infusion (premix) (has no administration in time range)       Diagnostic Studies  Results Reviewed       Procedure Component  Value Units Date/Time    APTT [[id number]]     Lab Status: No result Specimen: Blood     FLU/RSV/COVID - if FLU/RSV clinically relevant [[id number]]  (Normal) Collected: 01/23/24 2135    Lab Status: Final result Specimen: Nares from Nose Updated: 01/23/24 2220     SARS-CoV-2 Negative     INFLUENZA A PCR Negative     INFLUENZA B PCR Negative     RSV PCR Negative    Narrative:      FOR PEDIATRIC PATIENTS - copy/paste COVID Guidelines URL to browser: [email or web address]    SARS-CoV-2 assay is a Nucleic Acid Amplification assay intended for the  qualitative detection of nucleic acid from SARS-CoV-2 in nasopharyngeal  swabs. Results are for the presumptive identification of SARS-CoV-2 RNA.    Positive results are indicative of infection with SARS-CoV-2, the virus  causing COVID-19, but do not rule out bacterial infection or co-infection  with other viruses. Laboratories within the [place] and its  territories are required to report all positive results to the appropriate  public health authorities. Negative results do not preclude SARS-CoV-2  infection and should not be used as the sole basis for treatment or other  patient management decisions. Negative results must be combined with  clinical observations, patient history, and epidemiological information.  This test has not been FDA cleared or approved.    This test has been authorized by FDA under an Emergency Use Authorization  (EUA). This test is only authorized for the duration of time the  declaration that circumstances exist justifying the authorization of the  emergency use of an in vitro diagnostic tests for detection of SARS-CoV-2  virus and/or diagnosis of COVID-19 infection under section 564(b)(1) of  the Act, 21 U.S.C. 360bbb-3(b)(1), unless the authorization is terminated  or revoked sooner. The test has been validated but independent review by FDA  and CLIA is pending.    Test performed using  [employer] GeneXpert: This RT-PCR assay targets N2,  a region unique to SARS-CoV-2. A conserved region in the E-gene was chosen  for pan-Sarbecovirus detection which includes SARS-CoV-2.    According to CMS-2020-01-R, this platform meets the definition of high-throughput technology.    B-Type Natriuretic Peptide(BNP) [[id number]]  (Normal) Collected: 01/23/24 2135    Lab Status: Final result Specimen: Blood from Arm, Right Updated: 01/23/24 2207     BNP 24 pg/mL     D-Dimer [[id number]]  (Abnormal) Collected: 01/23/24 2135    Lab Status: Final result Specimen: Blood from Arm, Right Updated: 01/23/24 2206     D-Dimer, Quant 1.03 ug/ml FEU     Narrative:      In the evaluation for possible pulmonary embolism, in the appropriate (Well's Score of 4 or less) patient, the age adjusted d-dimer cutoff for this patient can be calculated as:    Age x 0.01 (in ug/mL) for Age-adjusted D-dimer exclusion threshold for a patient over 50 years.    HS Troponin 0hr (reflex protocol) [[id number]]  (Normal) Collected: 01/23/24 2135    Lab Status: Final result Specimen: Blood from Arm, Right Updated: 01/23/24 2205     hs TnI 0hr 3 ng/L     Comprehensive metabolic panel [[id number]]  (Abnormal) Collected: 01/23/24 2135    Lab Status: Final result Specimen: Blood from Arm, Right Updated: 01/23/24 2158     Sodium 135 mmol/L      Potassium 3.8 mmol/L      Chloride 97 mmol/L      CO2 25 mmol/L      ANION GAP 13 mmol/L      BUN 13 mg/dL      Creatinine 0.65 mg/dL      Glucose 159 mg/dL      Calcium 9.9 mg/dL      AST 18 U/L      ALT 16 U/L      Alkaline Phosphatase 80 U/L      Total Protein 8.4 g/dL      Albumin 4.3 g/dL      Total Bilirubin 0.28 mg/dL      eGFR 97 ml/min/1.73sq m     Narrative:      National Kidney Disease Foundation guidelines for Chronic Kidney Disease (CKD):     Stage 1 with normal or high GFR (GFR > 90 mL/min/1.73 square meters)    Stage 2 Mild CKD (GFR = 60-89 mL/min/1.73 square meters)    Stage 3A Moderate CKD (GFR = 45-59  mL/min/1.73 square meters)    Stage 3B Moderate CKD (GFR = 30-44 mL/min/1.73 square meters)    Stage 4 Severe CKD (GFR = 15-29 mL/min/1.73 square meters)    Stage 5 End Stage CKD (GFR <15 mL/min/1.73 square meters)  Note: GFR calculation is accurate only with a steady state creatinine    Protime-INR [[id number]]  (Normal) Collected: 01/23/24 2135    Lab Status: Final result Specimen: Blood from Arm, Right Updated: 01/23/24 2151     Protime 13.7 seconds      INR 1.06    APTT [[id number]]  (Normal) Collected: 01/23/24 2135    Lab Status: Final result Specimen: Blood from Arm, Right Updated: 01/23/24 2151     PTT 30 seconds     CBC and differential [[id number]]  (Abnormal) Collected: 01/23/24 2135    Lab Status: Final result Specimen: Blood from Arm, Right Updated: 01/23/24 2142     WBC 12.84 Thousand/uL      RBC 4.32 Million/uL      Hemoglobin 11.5 g/dL      Hematocrit 35.8 %      MCV 83 fL      MCH 26.6 pg      MCHC 32.1 g/dL      RDW 15.5 %      MPV 9.6 fL      Platelets 455 Thousands/uL      nRBC 0 /100 WBCs      Neutrophils Relative 46 %      Immat GRANS % 0 %      Lymphocytes Relative 38 %      Monocytes Relative 8 %      Eosinophils Relative 7 %      Basophils Relative 1 %      Neutrophils Absolute 5.83 Thousands/µL      Immature Grans Absolute 0.03 Thousand/uL      Lymphocytes Absolute 4.84 Thousands/µL      Monocytes Absolute 1.08 Thousand/µL      Eosinophils Absolute 0.95 Thousand/µL      Basophils Absolute 0.11 Thousands/µL                    CT head without contrast   Final Result by Eleazar Jaime MD (01/24 0034)      No acute intracranial abnormality.      Stable chronic microangiopathic changes and sequela of remote hemorrhage at the left capsular thalamic junction.            Workstation performed: [id number]         XR chest 1 view portable   ED Interpretation by Primitivo Allan MD (01/23 4754)   No acute cardiopulmonary disease process on my interpretation.       VAS VENOUS DUPLEX - LOWER LIMB BILATERAL     (Results Pending)              Procedures  Procedures         ED Course  ED Course as of 01/24/24 0218   Tue Jan 23, 2024 2113 Procedure Note: EKG  Date/Time: 01/23/24 9:13 PM   Interpreted by: Primitivo Allan MD  Indications / Diagnosis: LE swelling  ECG reviewed by me, the ED Physician: yes   The EKG demonstrates:  Rhythm: sinus bradycardia 57 bpm  Intervals: normal intervals  Axis: normal axis  QRS/Blocks: normal QRS  ST Changes: No acute ST-T wave changes. No STEMI.  No significant change compared to prior ECG from 8/23/2023.   2354 XR chest 1 view portable  No acute cardiopulmonary disease process on my interpretation.   Wed Jan 24, 2024   0121 CT head without contrast  No acute intracranial abnormality.     Stable chronic microangiopathic changes and sequela of remote hemorrhage at the left capsular thalamic junction.                               SBIRT 22yo+      Flowsheet Row Most Recent Value   Initial Alcohol Screen: US AUDIT-C     1. How often do you have a drink containing alcohol? 0 Filed at: 01/23/2024 2056   2. How many drinks containing alcohol do you have on a typical day you are drinking?  0 Filed at: 01/23/2024 2056   3a. Male UNDER 65: How often do you have five or more drinks on one occasion? 0 Filed at: 01/23/2024 2056   3b. FEMALE Any Age, or MALE 65+: How often do you have 4 or more drinks on one occassion? 0 Filed at: 01/23/2024 2056   Audit-C Score 0 Filed at: 01/23/2024 2056   JOVON: How many times in the past year have you...    Used an illegal drug or used a prescription medication for non-medical reasons? 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  The patient states that she has had swelling of her right lower extremity since  November 2023 (2 months ago) that is gradually worsened and she is now experiencing increased pain and swelling, with the pain located in her right calf as well as behind her right knee and on the medial side of her distal right thigh.  She notes that she has been following in the outpatient setting but has not had any studies to evaluate for possible DVT.  No known history of DVT/PE.  She noticed mild swelling of the distal left lower extremity as well and called her primary care provider's office, who referred her to the ER for further evaluation.  She also reports recent viral URI symptoms of a cough and congestion over the last few days. She denies any fever, chills, dyspnea, chest pain, wheezing, hemoptysis, abdominal pain, nausea, vomiting, diarrhea, urinary symptoms, back pain, neck pain, headache, numbness, or new weakness.  She has residual right-sided weakness from her prior intraparenchymal hemorrhage that is currently at baseline, per patient.     Vital signs reviewed. 2+ right lower extremity edema, slightly pitting.  Trace left lower extremity edema, nonpitting.  There is tenderness to palpation over the right calf posteriorly as well as in the posterior popliteal fossa and over the distal right medial thigh.  No overlying skin changes, no erythema or drainage.  See image attached.  See physical exam documentation for full exam findings. Differential diagnosis includes but is not limited to DVT, new onset CHF with peripheral edema, lymphedema, renal impairment, and/or musculoskeletal pain and swelling.  The patient will need a vascular duplex study to evaluate for DVT, however unable to obtain this study at this hour.  Will proceed with D-dimer testing and if elevated the patient will need anticoagulation due to high suspicion of DVT until she is able to obtain duplex study either inpatient or outpatient.  Labs, ECG, and chest x-ray ordered. D-dimer is elevated.  Negative BNP and troponin.  See ED  course for independent interpretation of results. As she has this history of recent hemorrhagic stroke, concern for initiating anticoagulation due to increased risk of bleed. She also has residual right sided weakness and uses a walker, so she is a fall risk. I discussed with [facility] fellow on call, Dr. Wayne Glass. Recommended discussing with the patient regarding treatment options and risk and benefits of anticoagulation, options include starting on heparin infusion with a lower 50-60 PTT goal, DVT duplex study in the morning when available, and possible consult with IR in the AM for IVC filter placement if DVT is present and long term anticoagulation is a concern.  I discussed with the patient and she is agreeable to observation admission and initiating heparin infusion with plan for duplex study in the morning. Obtained a CT head tonight given the prior history and no evidence of new bleed, so started the heparin infusion.  Case discussed with hospitalist for admission.    Amount and/or Complexity of Data Reviewed  Labs: ordered.  Radiology: ordered and independent interpretation performed. Decision-making details documented in ED Course.    Risk  Prescription drug management.  Decision regarding hospitalization.             Disposition  Final diagnoses:   Lower extremity edema   Suspected DVT (deep vein thrombosis)   History of intracranial hemorrhage     Time reflects when diagnosis was documented in both MDM as applicable and the Disposition within this note       Time User Action Codes Description Comment    1/24/2024  1:44 AM Primitivo Allan [R60.0] Lower extremity edema     1/24/2024  1:44 AM Primitivo Allan [R09.89] Suspected DVT (deep vein thrombosis)     1/24/2024  1:44 AM Primitivo Allan [R60.0] Lower extremity edema     1/24/2024  1:44 AM Primitivo Allan [R09.89] Suspected DVT (deep vein thrombosis)     1/24/2024  1:44 AM Primitivo Allan [Z87.768] History of congenital ichthyosis      1/24/2024  1:44 AM Primitivo Allan [Z87.768] History of congenital ichthyosis     1/24/2024  1:45 AM Primitivo Allan [Z86.79] History of intracranial hemorrhage           ED Disposition       ED Disposition   Admit    Condition   Stable    Date/Time   Wed Jan 24, 2024 0145    Comment   Case was discussed with WHITLEY Bee, and the patient's admission status was agreed to be Admission Status: observation status to the service of WHITLEY Bee.               Follow-up Information    None         Current Discharge Medication List        CONTINUE these medications which have NOT CHANGED    Details   acetaminophen (TYLENOL) 325 mg tablet Take 2 tablets (650 mg total) by mouth every 6 (six) hours as needed for mild pain  Refills: 0    Associated Diagnoses: Chronic pain of right knee      albuterol (Ventolin HFA) 90 mcg/act inhaler Inhale 2 puffs every 6 (six) hours as needed for wheezing  Qty: 18 g, Refills: 0    Comments: Substitution to a formulary equivalent within the same pharmaceutical class is authorized.  Associated Diagnoses: Moderate persistent asthma with acute exacerbation      amLODIPine (NORVASC) 10 mg tablet Take 1 tablet (10 mg total) by mouth daily  Qty: 90 tablet, Refills: 1    Associated Diagnoses: Primary hypertension      Aspirin Low Dose 81 MG EC tablet Take 1 tablet by mouth once daily  Qty: 30 tablet, Refills: 0    Associated Diagnoses: Coronary artery disease involving native heart without angina pectoris, unspecified vessel or lesion type      atorvastatin (LIPITOR) 20 mg tablet Take 1 tablet (20 mg total) by mouth daily with dinner  Refills: 0    Associated Diagnoses: Hyperlipidemia associated with type 2 diabetes mellitus       baclofen 5 MG TABS Take 5 mg by mouth 2 (two) times a day  Qty: 60 tablet, Refills: 0    Associated Diagnoses: Muscle spasm      bisacodyl (DULCOLAX) 10 mg suppository Insert 1 suppository (10 mg total) into the rectum daily as needed (constipation, if  unable to tolerate oral)  Qty: 12 suppository, Refills: 0    Associated Diagnoses: Drug-induced constipation      buPROPion (WELLBUTRIN XL) 150 mg 24 hr tablet Take 1 tablet (150 mg total) by mouth every morning Do not start before September 20, 2023.  Refills: 0    Associated Diagnoses: Depression with anxiety      calcium carbonate (TUMS) 500 mg chewable tablet Chew 2 tablets (1,000 mg total) 3 (three) times a day as needed for indigestion or heartburn  Refills: 0    Associated Diagnoses: Gastroesophageal reflux disease, unspecified whether esophagitis present      cyanocobalamin (VITAMIN B-12) 1000 MCG tablet Take 1 tablet (1,000 mcg total) by mouth daily Do not start before September 20, 2023.  Refills: 0    Associated Diagnoses: Anemia, unspecified type      diazepam (VALIUM) 2 mg tablet Take 1 tablet (2 mg total) by mouth 2 (two) times a day as needed for muscle spasms for up to 10 days  Qty: 10 tablet, Refills: 0    Associated Diagnoses: Muscle spasm      docusate sodium (COLACE) 100 mg capsule Take 1 capsule (100 mg total) by mouth 2 (two) times a day  Refills: 0    Associated Diagnoses: Drug-induced constipation      ferrous sulfate 325 (65 Fe) mg tablet Take 1 tablet (325 mg total) by mouth daily with breakfast Do not start before September 20, 2023.  Refills: 0    Associated Diagnoses: Anemia, unspecified type      fluticasone-vilanterol (Breo Ellipta) 200-25 mcg/actuation inhaler Inhale 1 puff daily Rinse mouth after use.  Qty: 180 blister, Refills: 0    Associated Diagnoses: Eosinophilic asthma      gabapentin (Neurontin) 300 mg capsule Take 2 capsules (600 mg total) by mouth 3 (three) times a day as needed (pain and paristhesia)  Qty: 90 capsule, Refills: 3    Associated Diagnoses: Fibromyalgia; Nontraumatic subcortical hemorrhage of left cerebral hemisphere (HCC)      !! lidocaine (LIDODERM) 5 % Apply 1 patch topically over 12 hours daily Remove & Discard patch within 12 hours or as directed by MD Do  not start before September 20, 2023.  Refills: 0    Associated Diagnoses: Chronic bilateral low back pain with bilateral sciatica      !! lidocaine (LIDODERM) 5 % Apply 1 patch topically over 12 hours daily Remove & Discard patch within 12 hours or as directed by MD Do not start before September 20, 2023.  Refills: 0    Associated Diagnoses: Chronic bilateral low back pain with bilateral sciatica      losartan-hydrochlorothiazide (HYZAAR) 100-12.5 MG per tablet Take 1 tablet by mouth daily  Qty: 90 tablet, Refills: 3    Associated Diagnoses: Chronic obstructive pulmonary disease with acute exacerbation ([facility]); Primary hypertension; Type II diabetes mellitus, well controlled ([facility])      melatonin 3 mg Take 2 tablets (6 mg total) by mouth daily at bedtime  Refills: 0    Associated Diagnoses: Insomnia, unspecified type      metFORMIN (GLUCOPHAGE) 500 mg tablet Take 1 tablet (500 mg total) by mouth 2 (two) times a day with meals  Qty: 180 tablet, Refills: 1    Associated Diagnoses: Controlled type 2 diabetes mellitus without complication, without long-term current use of insulin ([facility])      miconazole (MICOTIN) 2 % powder Apply 1 g topically 2 (two) times a day  Refills: 0    Associated Diagnoses: Encounter for skin care      ondansetron (ZOFRAN-ODT) 4 mg disintegrating tablet Take 1 tablet (4 mg total) by mouth every 6 (six) hours as needed for nausea or vomiting  Qty: 20 tablet, Refills: 0    Associated Diagnoses: Nausea and vomiting, unspecified vomiting type      pantoprazole (PROTONIX) 40 mg tablet Take 1 tablet (40 mg total) by mouth daily in the early morning Do not start before September 20, 2023.  Refills: 0    Associated Diagnoses: Gastroesophageal reflux disease, unspecified whether esophagitis present      polyethylene glycol (MIRALAX) 17 g packet Take 17 g by mouth daily as needed (constipation first line)  Refills: 0    Associated Diagnoses: Drug-induced constipation       !! - Potential duplicate medications  found. Please discuss with provider.          No discharge procedures on file.    PDMP Review         Value Time User    PDMP Reviewed  Yes 9/19/2023  2:28 PM ARSENIO Pinto            ED Provider  Electronically Signed by             Primitivo Allan MD  01/24/24 6594

## 2024-01-24 NOTE — ASSESSMENT & PLAN NOTE
Lab Results   Component Value Date    HGBA1C 6.7 (H) 08/15/2023   Hold home metformin.  Continue sliding scale coverage.  Hypoglycemic protocol.

## 2024-01-24 NOTE — PLAN OF CARE
Problem: Prexisting or High Potential for Compromised Skin Integrity  Goal: Skin integrity is maintained or improved  Description: INTERVENTIONS:  - Identify patients at risk for skin breakdown  - Assess and monitor skin integrity  - Assess and monitor nutrition and hydration status  - Monitor labs   - Assess for incontinence   - Turn and reposition patient  - Assist with mobility/ambulation  - Relieve pressure over bony prominences  - Avoid friction and shearing  - Provide appropriate hygiene as needed including keeping skin clean and dry  - Evaluate need for skin moisturizer/barrier cream  - Collaborate with interdisciplinary team   - Patient/family teaching  - Consider wound care consult   Outcome: Progressing     Problem: PAIN - ADULT  Goal: Verbalizes/displays adequate comfort level or baseline comfort level  Description: Interventions:  - Encourage patient to monitor pain and request assistance  - Assess pain using appropriate pain scale  - Administer analgesics based on type and severity of pain and evaluate response  - Implement non-pharmacological measures as appropriate and evaluate response  - Consider cultural and social influences on pain and pain management  - Notify physician/advanced practitioner if interventions unsuccessful or patient reports new pain  Outcome: Progressing     Problem: Prexisting or High Potential for Compromised Skin Integrity  Goal: Skin integrity is maintained or improved  Description: INTERVENTIONS:  - Identify patients at risk for skin breakdown  - Assess and monitor skin integrity  - Assess and monitor nutrition and hydration status  - Monitor labs   - Assess for incontinence   - Turn and reposition patient  - Assist with mobility/ambulation  - Relieve pressure over bony prominences  - Avoid friction and shearing  - Provide appropriate hygiene as needed including keeping skin clean and dry  - Evaluate need for skin moisturizer/barrier cream  - Collaborate with  interdisciplinary team   - Patient/family teaching  - Consider wound care consult   Outcome: Progressing     Problem: PAIN - ADULT  Goal: Verbalizes/displays adequate comfort level or baseline comfort level  Description: Interventions:  - Encourage patient to monitor pain and request assistance  - Assess pain using appropriate pain scale  - Administer analgesics based on type and severity of pain and evaluate response  - Implement non-pharmacological measures as appropriate and evaluate response  - Consider cultural and social influences on pain and pain management  - Notify physician/advanced practitioner if interventions unsuccessful or patient reports new pain  Outcome: Progressing     Problem: INFECTION - ADULT  Goal: Absence or prevention of progression during hospitalization  Description: INTERVENTIONS:  - Assess and monitor for signs and symptoms of infection  - Monitor lab/diagnostic results  - Monitor all insertion sites, i.e. indwelling lines, tubes, and drains  - Monitor endotracheal if appropriate and nasal secretions for changes in amount and color  - Bessie appropriate cooling/warming therapies per order  - Administer medications as ordered  - Instruct and encourage patient and family to use good hand hygiene technique  - Identify and instruct in appropriate isolation precautions for identified infection/condition  Outcome: Progressing  Goal: Absence of fever/infection during neutropenic period  Description: INTERVENTIONS:  - Monitor WBC    Outcome: Progressing     Problem: SAFETY ADULT  Goal: Patient will remain free of falls  Description: INTERVENTIONS:  - Educate patient/family on patient safety including physical limitations  - Instruct patient to call for assistance with activity   - Consult OT/PT to assist with strengthening/mobility   - Keep Call bell within reach  - Keep bed low and locked with side rails adjusted as appropriate  - Keep care items and personal belongings within reach  -  Initiate and maintain comfort rounds  - Make Fall Risk Sign visible to staff  - Offer Toileting every 2 Hours, in advance of need  - Initiate/Maintain bed alarm  - Apply yellow socks and bracelet for high fall risk patients  - Consider moving patient to room near nurses station  Outcome: Progressing  Goal: Maintain or return to baseline ADL function  Description: INTERVENTIONS:  -  Assess patient's ability to carry out ADLs; assess patient's baseline for ADL function and identify physical deficits which impact ability to perform ADLs (bathing, care of mouth/teeth, toileting, grooming, dressing, etc.)  - Assess/evaluate cause of self-care deficits   - Assess range of motion  - Assess patient's mobility; develop plan if impaired  - Assess patient's need for assistive devices and provide as appropriate  - Encourage maximum independence but intervene and supervise when necessary  - Involve family in performance of ADLs  - Assess for home care needs following discharge   - Consider OT consult to assist with ADL evaluation and planning for discharge  - Provide patient education as appropriate  Outcome: Progressing     Problem: DISCHARGE PLANNING  Goal: Discharge to home or other facility with appropriate resources  Description: INTERVENTIONS:  - Identify barriers to discharge w/patient and caregiver  - Arrange for needed discharge resources and transportation as appropriate  - Identify discharge learning needs (meds, wound care, etc.)  - Arrange for interpretive services to assist at discharge as needed  - Refer to Case Management Department for coordinating discharge planning if the patient needs post-hospital services based on physician/advanced practitioner order or complex needs related to functional status, cognitive ability, or social support system  Outcome: Progressing     Problem: Knowledge Deficit  Goal: Patient/family/caregiver demonstrates understanding of disease process, treatment plan, medications, and  discharge instructions  Description: Complete learning assessment and assess knowledge base.  Interventions:  - Provide teaching at level of understanding  - Provide teaching via preferred learning methods  Outcome: Progressing

## 2024-01-24 NOTE — ASSESSMENT & PLAN NOTE
Recent intraparenchymal hemorrhage of left thalamic region with residual right-sided weakness.  Currently on aspirin only.  Following up with neurology.    Continue aspirin, statin.

## 2024-01-24 NOTE — ED NOTES
Pt ambulatory to room 9 from restroom. Steady gait with walker. Denies dizziness.      Micaela Ochoa RN  01/23/24 [id number]

## 2024-01-24 NOTE — DISCHARGE INSTR - AVS FIRST PAGE
There was no evidence of a blood clot on your ultrasound, however if you note worsening swelling or pain accompanied with shortness of breath, chest pain, or dizziness/lightheadedness, please call your PCP and seek care at an ED or urgent care  Prescription for Keflex sent to your pharmacy  Elevate leg and ambulate as tolerated, careful not to overexert yourself

## 2024-01-24 NOTE — ASSESSMENT & PLAN NOTE
Presented with worsening right lower extremity swelling since last 2 months now presenting with increasing pain and tenderness to touch.  Patient states her usual shoes have been becoming very much tight to extent that she cannot wear shoes on right leg and has been significantly tightening on left leg as well.  No prior history of DVT/PE.  No DVT studies in past.  Now noted left lower extremity swelling as well since last 2 to 3 days.  Does have history of recent hemorrhagic stroke with residual right-sided weakness and spending most of the time in couch.  Noted elevated D-dimer.  High suspicion for DVT.  Denies any chest pain, shortness of breath.  Vital stable and saturating well on room air.  Prior echo from 8/23 was showing preserved EF.  Well score of 5.    ED reached out to heme-onc given her history of recent hemorrhagic stroke who is okay with heparin with lower APTT goal  Continue heparin drip  Get lower extremity Doppler ultrasound completed bilaterally tomorrow  Leg elevation  Avoid SCD or foot pump

## 2024-01-24 NOTE — H&P
ADMISSION NOTE   [facility]       Name: Korin Garcia   Age & Sex: 59 y.o. female   MRN: [id number]  Unit/Bed#: -01   Encounter: [id number]  Primary Care Provider: Benitez Marquez MD      * Swelling of both lower extremities  Assessment & Plan  Presented with worsening right lower extremity swelling since last 2 months now presenting with increasing pain and tenderness to touch.  Patient states her usual shoes have been becoming very much tight to extent that she cannot wear shoes on right leg and has been significantly tightening on left leg as well.  No prior history of DVT/PE.  No DVT studies in past.  Now noted left lower extremity swelling as well since last 2 to 3 days.  Does have history of recent hemorrhagic stroke with residual right-sided weakness and spending most of the time in couch.  Noted elevated D-dimer.  High suspicion for DVT.  Denies any chest pain, shortness of breath.  Vital stable and saturating well on room air.  Prior echo from 8/23 was showing preserved EF.  Well score of 5.    ED reached out to heme-onc given her history of recent hemorrhagic stroke who is okay with heparin with lower APTT goal  Continue heparin drip  Get lower extremity Doppler ultrasound completed bilaterally tomorrow  Leg elevation  Avoid SCD or foot pump    Diabetes mellitus (HCC)  Assessment & Plan  Lab Results   Component Value Date    HGBA1C 6.7 (H) 08/15/2023   Hold home metformin.  Continue sliding scale coverage.  Hypoglycemic protocol.      GERD (gastroesophageal reflux disease)  Assessment & Plan  Continue Protonix    ICH (intracerebral hemorrhage) (HCC)  Assessment & Plan  Recent intraparenchymal hemorrhage of left thalamic region with residual right-sided weakness.  Currently on aspirin only.  Following up with neurology.    Continue aspirin, statin.    Moderate persistent asthma without complication  Assessment & Plan  History of COPD and asthma.  Continue Trelegy.  Not in  exacerbation.    Primary hypertension  Assessment & Plan  Continue amlodipine, Hyzaar.        VTE Prophylaxis: Heparin Drip  / reason for no mechanical VTE prophylaxis suspected DVT    Code Status: Level 1  POLST: Unknown  Discussion with family: none    Anticipated Length of Stay:  Patient will be admitted on an Observation basis with an anticipated length of stay of  < 2 midnights.   Justification for Hospital Stay: Suspected DVT with LLE swelling    Total Time for Visit, including Counseling / Coordination of Care: 45 minutes.  Greater than 50% of this total time spent on direct patient counseling and coordination of care.    Chief Complaint:   Gradually worsening right lower extremity swelling since 2 months now presented with new left lower extremity swelling noticed recently referred by PCP for ER evaluation.    History of Present Illness:    Korin Garcia is a 59 y.o. female with past medical history of hypertension, diabetes, fibromyalgia, Sjogren's syndrome and recently intraparenchymal hemorrhage of brain and residual right-sided weakness who presents with progressing right lower extremity swelling since November 2023 and has been gradually worsening now increased amount of pain and swelling noted in her right calf as well as right knee.  No prior history of DVT/PE.  No DVT studies done outpatient.  Now noted swelling of left lower extremity as well and was seen by PCP who told her to go to ER for further evaluation.  Also complaining of some URI symptoms since last few days.  COVID test was negative.  Denies any fever, chills, nausea, vomiting, abdominal pain, diarrhea, chest pain, palpitations, dizziness, urgency, frequency, hematuria, back pain, neck pain, headache, weakness.  Has some right-sided residual weakness not progressing.  Taking her medication well.    Review of Systems:    Review of Systems   Constitutional:  Negative for activity change, appetite change, chills, diaphoresis, fatigue, fever and  unexpected weight change.   HENT:  Negative for rhinorrhea and sore throat.    Eyes:  Negative for visual disturbance.   Respiratory:  Negative for cough, chest tightness and shortness of breath.    Cardiovascular:  Positive for leg swelling. Negative for chest pain and palpitations.   Gastrointestinal:  Negative for abdominal distention, abdominal pain, blood in stool, constipation, diarrhea, nausea and vomiting.   Genitourinary:  Negative for difficulty urinating.   Musculoskeletal:  Negative for arthralgias.   Neurological:  Negative for headaches.   Psychiatric/Behavioral:  Negative for behavioral problems and sleep disturbance.        Past Medical and Surgical History:     Past Medical History:   Diagnosis Date    Arthritis     Asthma     Continuous opioid dependence (HCC) 04/27/2022    Continuous opioid dependence (HCC) 04/27/2022    Diabetes mellitus ([facility])     Diverticulitis of colon     Fibromyalgia 04/26/2022    Headache(784.0)     History of mammogram 2018    History of tobacco abuse 04/26/2022    Hypertension     Nausea 04/29/2022    Obesity     Sjogren's syndrome ([facility]) 10/19/2012    Urinary tract infection        Past Surgical History:   Procedure Laterality Date    APPENDECTOMY      BREAST BIOPSY Left     unsure of year    CARPAL TUNNEL RELEASE      COLONOSCOPY  2017    repeat in 2022    EPIDURAL BLOCK INJECTION N/A 2/14/2023    Procedure: L5-S1 EPIDURALSTEROID INJECTION (38206);  Surgeon: Melo Mancia DO;  Location:  MAIN OR;  Service: Pain Management     FOOT SURGERY      HERNIA REPAIR  05/2022    NECK SURGERY      spine fusion     SC ARTHROCENTESIS ASPIR&/INJ MAJOR JT/BURSA W/O US  9/18/2023         SC RPR UMBILICAL HRNA 5 YRS/> REDUCIBLE N/A 05/11/2022    Procedure: REPAIR HERNIA UMBILICAL;  Surgeon: Dread Mckeon MD;  Location: [facility];  Service: General       Meds/Allergies:    Prior to Admission medications    Medication Sig Start Date End Date Taking? Authorizing Provider    acetaminophen (TYLENOL) 325 mg tablet Take 2 tablets (650 mg total) by mouth every 6 (six) hours as needed for mild pain 9/19/23   ARSENIO Pinto   albuterol (Ventolin HFA) 90 mcg/act inhaler Inhale 2 puffs every 6 (six) hours as needed for wheezing 8/4/23   ARSENIO Charles   amLODIPine (NORVASC) 10 mg tablet Take 1 tablet (10 mg total) by mouth daily 1/12/24   ARSENIO May   Aspirin Low Dose 81 MG EC tablet Take 1 tablet by mouth once daily 8/4/23   Benitez Marquez MD   atorvastatin (LIPITOR) 20 mg tablet Take 1 tablet (20 mg total) by mouth daily with dinner 9/19/23   ARSENIO Pinto   baclofen 5 MG TABS Take 5 mg by mouth 2 (two) times a day 9/19/23   ARSENIO Pinto   bisacodyl (DULCOLAX) 10 mg suppository Insert 1 suppository (10 mg total) into the rectum daily as needed (constipation, if unable to tolerate oral)  Patient not taking: Reported on 11/20/2023 9/19/23   ARSENIO Pinto   buPROPion (WELLBUTRIN XL) 150 mg 24 hr tablet Take 1 tablet (150 mg total) by mouth every morning Do not start before September 20, 2023. 9/20/23   ARSENIO Pinto   calcium carbonate (TUMS) 500 mg chewable tablet Chew 2 tablets (1,000 mg total) 3 (three) times a day as needed for indigestion or heartburn  Patient not taking: Reported on 11/20/2023 9/19/23   ARSENIO Pinto   cyanocobalamin (VITAMIN B-12) 1000 MCG tablet Take 1 tablet (1,000 mcg total) by mouth daily Do not start before September 20, 2023. 9/20/23   ARSENIO Pinto   diazepam (VALIUM) 2 mg tablet Take 1 tablet (2 mg total) by mouth 2 (two) times a day as needed for muscle spasms for up to 10 days  Patient not taking: Reported on 11/13/2023 9/19/23 9/29/23  ARSENIO Pinto   docusate sodium (COLACE) 100 mg capsule Take 1 capsule (100 mg total) by mouth 2 (two) times a day 9/19/23   ARSENIO Pinto   ferrous sulfate 325 (65 Fe) mg tablet Take 1 tablet (325 mg total) by mouth daily with breakfast Do not start  before September 20, 2023. 9/20/23   ARSENIO Pinto   fluticasone-vilanterol (Breo Ellipta) 200-25 mcg/actuation inhaler Inhale 1 puff daily Rinse mouth after use. 8/4/23 11/13/23  ARSENIO May   gabapentin (Neurontin) 300 mg capsule Take 2 capsules (600 mg total) by mouth 3 (three) times a day as needed (pain and paristhesia) 11/20/23   ARSENIO May   lidocaine (LIDODERM) 5 % Apply 1 patch topically over 12 hours daily Remove & Discard patch within 12 hours or as directed by MD Do not start before September 20, 2023.  Patient not taking: Reported on 11/20/2023 9/20/23   ARSENIO Pinto   lidocaine (LIDODERM) 5 % Apply 1 patch topically over 12 hours daily Remove & Discard patch within 12 hours or as directed by MD Do not start before September 20, 2023.  Patient not taking: Reported on 11/20/2023 9/20/23   ARSENIO Pinto   losartan-hydrochlorothiazide (HYZAAR) 100-12.5 MG per tablet Take 1 tablet by mouth daily 12/11/23   ARSENIO May   melatonin 3 mg Take 2 tablets (6 mg total) by mouth daily at bedtime 9/19/23   ARSENIO Pinto   metFORMIN (GLUCOPHAGE) 500 mg tablet Take 1 tablet (500 mg total) by mouth 2 (two) times a day with meals  Patient taking differently: Take 1,000 mg by mouth 2 (two) times a day with meals 11/20/23   ARSENIO May   miconazole (MICOTIN) 2 % powder Apply 1 g topically 2 (two) times a day  Patient not taking: Reported on 11/20/2023 9/19/23   ARSENIO Pinto   ondansetron (ZOFRAN-ODT) 4 mg disintegrating tablet Take 1 tablet (4 mg total) by mouth every 6 (six) hours as needed for nausea or vomiting  Patient not taking: Reported on 11/20/2023 9/19/23   ARSENIO Pinto   pantoprazole (PROTONIX) 40 mg tablet Take 1 tablet (40 mg total) by mouth daily in the early morning Do not start before September 20, 2023. 9/20/23   ARSENIO Pinto   polyethylene glycol (MIRALAX) 17 g packet Take 17 g by mouth daily as needed (constipation first  line)  Patient not taking: Reported on 2023   ARSENIO Pinto     I have reviewed home medications with patient personally.    Allergies: No Known Allergies    Social History:     Marital Status: /Civil Union   Substance Use History:   Social History     Substance and Sexual Activity   Alcohol Use Not Currently    Comment: occasional      Social History     Tobacco Use   Smoking Status Former    Current packs/day: 0.00    Average packs/day: 0.5 packs/day for 42.3 years (21.1 ttl pk-yrs)    Types: Cigarettes    Start date:     Quit date: 2023    Years since quittin.8    Passive exposure: Past   Smokeless Tobacco Never   Tobacco Comments    per pt, 5 a day - As per Hilary      Social History     Substance and Sexual Activity   Drug Use Never       Family History:    Family History   Problem Relation Age of Onset    Hypertension Mother     Heart disease Mother     Hypertension Father     Heart disease Father     Asthma Sister     No Known Problems Sister     No Known Problems Sister     Diabetes Maternal Grandmother     No Known Problems Maternal Grandfather     No Known Problems Paternal Grandmother     No Known Problems Paternal Grandfather     Breast cancer Paternal Aunt     Pancreatic cancer Paternal Uncle     Colon cancer Neg Hx     Ovarian cancer Neg Hx     Uterine cancer Neg Hx     Cervical cancer Neg Hx         Physical Exam:     Vitals:   Blood Pressure: 121/71 (24)  Pulse: 55 (24)  Temperature: 97.6 °F (36.4 °C) (24)  Temp Source: Oral (24)  Respirations: 12 (24)  Height: 5' (152.4 cm) (24)  Weight - Scale: 101 kg (221 lb 9 oz) (24)  SpO2: 98 % (24)    Physical Exam  Vitals reviewed.   Constitutional:       General: She is not in acute distress.     Appearance: Normal appearance. She is obese.   HENT:      Head: Normocephalic and atraumatic.   Eyes:      General: No scleral icterus.         Right eye: No discharge.         Left eye: No discharge.   Cardiovascular:      Rate and Rhythm: Normal rate and regular rhythm.      Pulses: Normal pulses.      Heart sounds: Normal heart sounds.   Pulmonary:      Effort: Pulmonary effort is normal. No respiratory distress.      Breath sounds: Normal breath sounds. No wheezing or rales.   Chest:      Chest wall: No tenderness.   Abdominal:      General: Abdomen is flat. Bowel sounds are normal. There is no distension.      Palpations: Abdomen is soft.      Tenderness: There is no abdominal tenderness.   Musculoskeletal:         General: No deformity. Normal range of motion.      Cervical back: Normal range of motion and neck supple.      Right lower leg: Edema present.      Left lower leg: Edema present.   Skin:     General: Skin is warm.      Coloration: Skin is not jaundiced or pale.      Findings: No rash.   Neurological:      Mental Status: She is alert and oriented to person, place, and time. Mental status is at baseline.      Cranial Nerves: No cranial nerve deficit.      Sensory: Sensory deficit present.      Motor: Weakness (Right-sided) present.   Psychiatric:         Mood and Affect: Mood normal.         Behavior: Behavior normal.         Additional Data:     Lab Results: I have personally reviewed pertinent reports.      Results from last 7 days   Lab Units 01/23/24 2135   WBC Thousand/uL 12.84*   HEMOGLOBIN g/dL 11.5   HEMATOCRIT % 35.8   PLATELETS Thousands/uL 455*   NEUTROS PCT % 46   LYMPHS PCT % 38   MONOS PCT % 8   EOS PCT % 7*     Results from last 7 days   Lab Units 01/23/24 2135   SODIUM mmol/L 135   POTASSIUM mmol/L 3.8   CHLORIDE mmol/L 97   CO2 mmol/L 25   BUN mg/dL 13   CREATININE mg/dL 0.65   ANION GAP mmol/L 13   CALCIUM mg/dL 9.9   ALBUMIN g/dL 4.3   TOTAL BILIRUBIN mg/dL 0.28   ALK PHOS U/L 80   ALT U/L 16   AST U/L 18   GLUCOSE RANDOM mg/dL 159*     Results from last 7 days   Lab Units 01/23/24  2135   INR  1.06     Results from  last 7 days   Lab Units 01/24/24  0257   POC GLUCOSE mg/dl 135               Imaging: I have personally reviewed pertinent reports.      CT head without contrast   Final Result by Eleazar Jaime MD (01/24 0034)      No acute intracranial abnormality.      Stable chronic microangiopathic changes and sequela of remote hemorrhage at the left capsular thalamic junction.            Workstation performed: [id number]         XR chest 1 view portable   ED Interpretation by Primitivo Allan MD (01/23 3288)   No acute cardiopulmonary disease process on my interpretation.       VAS VENOUS DUPLEX - LOWER LIMB BILATERAL    (Results Pending)       EKG, Pathology, and Other Studies Reviewed on Admission:   EKG: NSR    Allscripts / Epic Records Reviewed: Yes     ** Please Note: This note has been constructed using a voice recognition system. **

## 2024-01-24 NOTE — ED NOTES
Pt ambulated to rest room and back. Assist X1 with walker.      Kaleigh Regalado RN  01/24/24 0143

## 2024-02-05 ENCOUNTER — OFFICE VISIT (OUTPATIENT)
Dept: FAMILY MEDICINE CLINIC | Facility: CLINIC | Age: 59
End: 2024-02-05
Payer: COMMERCIAL

## 2024-02-05 VITALS
RESPIRATION RATE: 18 BRPM | WEIGHT: 232 LBS | SYSTOLIC BLOOD PRESSURE: 118 MMHG | HEIGHT: 60 IN | BODY MASS INDEX: 45.55 KG/M2 | OXYGEN SATURATION: 97 % | HEART RATE: 68 BPM | TEMPERATURE: 97.9 F | DIASTOLIC BLOOD PRESSURE: 64 MMHG

## 2024-02-05 DIAGNOSIS — F41.8 DEPRESSION WITH ANXIETY: Primary | ICD-10-CM

## 2024-02-05 DIAGNOSIS — I69.051: ICD-10-CM

## 2024-02-05 DIAGNOSIS — I10 PRIMARY HYPERTENSION: ICD-10-CM

## 2024-02-05 DIAGNOSIS — I61.4 LEFT-SIDED NONTRAUMATIC INTRACEREBRAL HEMORRHAGE OF CEREBELLUM (HCC): ICD-10-CM

## 2024-02-05 PROCEDURE — 99213 OFFICE O/P EST LOW 20 MIN: CPT | Performed by: NURSE PRACTITIONER

## 2024-02-05 NOTE — PROGRESS NOTES
Assessment/Plan:      Diagnoses and all orders for this visit:    Depression with anxiety  Stable we will continue to monitor takes medication as prescribed well-tolerated.  Left-sided nontraumatic intracerebral hemorrhage of cerebellum (HCC)  Stable no new events does follow with neurology for management.  Patient continues with therapy unable to work.  Primary hypertension  We will decrease her amlodipine to 5 mg to be taken once daily due to lower extremity swelling blood pressure very well-controlled  Hemiplegia of right dominant side as late effect of nontraumatic subarachnoid hemorrhage, unspecified hemiplegia type (HCC)  Continues to be ambulatory only with assistance.  Unable to drive unable to walk unassisted at needs With activities of daily living spouse transports to and from doctors appointments.  Continues to be unable to work.  He continues with physical therapy.        Subjective:     Patient ID: Korin Garcia is a 59 y.o. female.    HPI    Review of Systems      Objective:     Physical Exam  HENT:      Mouth/Throat:      Pharynx: Oropharynx is clear.   Cardiovascular:      Rate and Rhythm: Normal rate and regular rhythm.      Heart sounds: Normal heart sounds.   Pulmonary:      Effort: Pulmonary effort is normal.      Breath sounds: Normal breath sounds.   Musculoskeletal:         General: Deformity (r side paralysis rt stroke) present.      Right lower leg: Edema present.      Left lower leg: Edema present.   Neurological:      General: No focal deficit present.      Mental Status: She is alert and oriented to person, place, and time.      Motor: Weakness present.      Coordination: Coordination abnormal.      Gait: Gait abnormal.      Deep Tendon Reflexes: Reflexes abnormal.

## 2024-02-06 PROBLEM — I69.051: Status: ACTIVE | Noted: 2024-02-06

## 2024-02-21 ENCOUNTER — OFFICE VISIT (OUTPATIENT)
Dept: FAMILY MEDICINE CLINIC | Facility: CLINIC | Age: 59
End: 2024-02-21
Payer: COMMERCIAL

## 2024-02-21 VITALS
RESPIRATION RATE: 16 BRPM | HEIGHT: 60 IN | WEIGHT: 228 LBS | TEMPERATURE: 98.3 F | SYSTOLIC BLOOD PRESSURE: 126 MMHG | BODY MASS INDEX: 44.76 KG/M2 | DIASTOLIC BLOOD PRESSURE: 80 MMHG | OXYGEN SATURATION: 99 % | HEART RATE: 63 BPM

## 2024-02-21 DIAGNOSIS — I10 PRIMARY HYPERTENSION: ICD-10-CM

## 2024-02-21 DIAGNOSIS — I69.051 SPASTIC HEMIPLEGIA OF RIGHT DOMINANT SIDE AS LATE EFFECT OF NONTRAUMATIC SUBARACHNOID HEMORRHAGE (HCC): ICD-10-CM

## 2024-02-21 DIAGNOSIS — I61.0 NONTRAUMATIC SUBCORTICAL HEMORRHAGE OF LEFT CEREBRAL HEMISPHERE (HCC): ICD-10-CM

## 2024-02-21 DIAGNOSIS — M79.89 RIGHT LEG SWELLING: ICD-10-CM

## 2024-02-21 DIAGNOSIS — I25.10 CORONARY ARTERY DISEASE INVOLVING NATIVE CORONARY ARTERY OF NATIVE HEART WITHOUT ANGINA PECTORIS: ICD-10-CM

## 2024-02-21 DIAGNOSIS — F41.8 DEPRESSION WITH ANXIETY: Primary | ICD-10-CM

## 2024-02-21 PROBLEM — F11.20 CONTINUOUS OPIOID DEPENDENCE (HCC): Status: RESOLVED | Noted: 2022-04-27 | Resolved: 2024-02-21

## 2024-02-21 PROCEDURE — 99213 OFFICE O/P EST LOW 20 MIN: CPT | Performed by: NURSE PRACTITIONER

## 2024-02-21 RX ORDER — AMLODIPINE BESYLATE 5 MG/1
5 TABLET ORAL DAILY
Qty: 90 TABLET | Refills: 3 | Status: SHIPPED | OUTPATIENT
Start: 2024-02-21

## 2024-02-21 RX ORDER — ASPIRIN 81 MG/1
81 TABLET, CHEWABLE ORAL DAILY
Qty: 90 TABLET | Refills: 3 | Status: SHIPPED | OUTPATIENT
Start: 2024-02-21

## 2024-02-21 RX ORDER — AMLODIPINE BESYLATE 10 MG/1
5 TABLET ORAL DAILY
COMMUNITY
End: 2024-02-21 | Stop reason: SDUPTHER

## 2024-02-21 RX ORDER — BUSPIRONE HYDROCHLORIDE 7.5 MG/1
7.5 TABLET ORAL 2 TIMES DAILY
Qty: 60 TABLET | Refills: 5 | Status: SHIPPED | OUTPATIENT
Start: 2024-02-21

## 2024-02-21 RX ORDER — METFORMIN HYDROCHLORIDE EXTENDED-RELEASE TABLETS 1000 MG/1
1000 TABLET, FILM COATED, EXTENDED RELEASE ORAL 2 TIMES DAILY WITH MEALS
COMMUNITY

## 2024-02-21 RX ORDER — HYDROCHLOROTHIAZIDE 12.5 MG/1
12.5 TABLET ORAL DAILY PRN
Qty: 30 TABLET | Refills: 5 | Status: SHIPPED | OUTPATIENT
Start: 2024-02-21 | End: 2024-08-19

## 2024-02-21 NOTE — PROGRESS NOTES
Assessment/Plan:      Diagnoses and all orders for this visit:    Depression with anxiety  -     busPIRone (BUSPAR) 7.5 mg tablet; Take 1 tablet (7.5 mg total) by mouth 2 (two) times a day    Spastic hemiplegia of right dominant side as late effect of nontraumatic subarachnoid hemorrhage (HCC)    Coronary artery disease involving native coronary artery of native heart without angina pectoris    Nontraumatic subcortical hemorrhage of left cerebral hemisphere (HCC)  -     aspirin 81 mg chewable tablet; Chew 1 tablet (81 mg total) daily  Patient continues with physical therapy and Occupational Therapy. Has regained minimal amount of her dexterity due to the CVA with a walker also requires assistance for ADLs. Patient is unable to drive requires transportation by  or sister. Patient is unable to return to work at this time.   Primary hypertension  -     amLODIPine (NORVASC) 5 mg tablet; Take 1 tablet (5 mg total) by mouth daily  -     hydroCHLOROthiazide 12.5 mg tablet; Take 1 tablet (12.5 mg total) by mouth daily as needed (increased leg swelling or weight gain over 5 lbs)    Right leg swelling  -     hydroCHLOROthiazide 12.5 mg tablet; Take 1 tablet (12.5 mg total) by mouth daily as needed (increased leg swelling or weight gain over 5 lbs)    Other orders  -     metFORMIN (FORTAMET) 1000 MG (OSM) 24 hr tablet; Take 1,000 mg by mouth 2 (two) times a day with meals  -     Discontinue: amLODIPine (NORVASC) 10 mg tablet; Take 5 mg by mouth daily          Physical assessment remains essentially static from last office visit.  The primary focus of today's discussion was her ongoing increasing blueness or depression concerning her current condition.  Did advise we will start BuSpar 7.5 mg to be taken 2 times daily.  Patient is to follow back in the office in 3 to 4 weeks to discuss how she tolerates this medication otherwise all of the medications are taken as directed well-tolerated no change.  Patient has lost a  couple of pounds but states she is trying to.  Patient also has a complaint of lower extremity swelling on the stroke affected side the right side.  Did advise exercises pumping the feet pressure compression stockings available if warranted.  My assessment is the patient is improving from last office visit she was informed of this which made her feel better.  Otherwise return to office as needed or as indicated for next assessment.  Dosing all possible side effects of the prescribed medications or medications that had been prescribed in the past were reviewed and all questions were answered.  Patient verbalized agreement and understanding of the plan of care as outlined during the office visit today return to office as indicated or sooner if a problem arises.    Subjective:     Patient ID: Korin Garcia is a 59 y.o. female.    For her routine follow-up.  Since having stroke patient has been seen very frequently to assess her increase in abilities to function independently.  Patient continues with PT OT coming to her home.  Patient continues to ambulate with a walker with the assistance of her spouse here today.  Primarily today she would like to talk about possibly increasing and depression she is getting very down about her current situation and would like to review possible treatment options if possible.  She denies any thoughts of harming herself or harming anyone else.        Review of Systems   Constitutional:  Negative for appetite change and fever.   HENT:  Negative for sinus pressure and sore throat.    Eyes:  Negative for pain.   Respiratory:  Negative for shortness of breath.    Cardiovascular:  Positive for leg swelling. Negative for chest pain.   Gastrointestinal:  Negative for abdominal pain.   Genitourinary:  Negative for dysuria.   Musculoskeletal:  Positive for arthralgias, back pain and myalgias.   Skin:  Negative for color change.   Neurological:  Positive for facial asymmetry, speech difficulty and  weakness. Negative for light-headedness.   Psychiatric/Behavioral:  Negative for behavioral problems. The patient is nervous/anxious.          Objective:     Physical Exam  Vitals and nursing note reviewed.   Constitutional:       General: She is not in acute distress.     Appearance: She is well-developed. She is obese. She is not diaphoretic.   HENT:      Head: Normocephalic and atraumatic.   Eyes:      Pupils: Pupils are equal, round, and reactive to light.   Cardiovascular:      Rate and Rhythm: Normal rate and regular rhythm.      Heart sounds: Normal heart sounds.   Pulmonary:      Effort: Pulmonary effort is normal.      Breath sounds: Normal breath sounds.   Abdominal:      Palpations: Abdomen is soft.   Musculoskeletal:         General: Swelling present.      Cervical back: Neck supple.      Right lower leg: Edema present.   Skin:     General: Skin is dry.   Neurological:      Mental Status: She is alert and oriented to person, place, and time. Mental status is at baseline.      Motor: Weakness present.      Coordination: Coordination abnormal.      Gait: Gait abnormal.      Deep Tendon Reflexes: Reflexes abnormal.   Psychiatric:         Behavior: Behavior normal.         Thought Content: Thought content normal.

## 2024-02-27 ENCOUNTER — TELEPHONE (OUTPATIENT)
Age: 59
End: 2024-02-27

## 2024-02-27 NOTE — TELEPHONE ENCOUNTER
Korin is requesting a call back in regards to her New medication. She just started taking Buspar 7.5 mg and she thinks that it is making her feel tired and at night she feels like she wants to vomit. She thinks it could be a side effect of the new medication.     She also has a question about paperwork that was supposed to be emailed.     She requested a call back from Smiley, if possible.     Thank you!

## 2024-02-29 ENCOUNTER — TELEPHONE (OUTPATIENT)
Dept: NEUROLOGY | Facility: CLINIC | Age: 59
End: 2024-02-29

## 2024-02-29 NOTE — TELEPHONE ENCOUNTER
Spoke with Korin paper work has all been taking care of to date. Discussed with Analia Goff about the Buspar patient is aware of what she should do and contact us if she wants a sooner apt then 3/13.

## 2024-03-01 NOTE — TELEPHONE ENCOUNTER
2/29/24, 10:05    Hi, Gricelda Garcia returning your call about paperwork. Birth date is [birth year]. And this is Korin Garcia. If you could give me a call back then, sorry, I missed your call. Thank you.  [phone]     Call returned to pt. Acknowledge . Pt states that she already spoke to Doreen yesterday.

## 2024-03-12 ENCOUNTER — TELEPHONE (OUTPATIENT)
Age: 59
End: 2024-03-12

## 2024-03-12 NOTE — TELEPHONE ENCOUNTER
[facility] is discharging patient today and requests script to send patient to outpatient OT/PT at [facility].

## 2024-03-14 ENCOUNTER — OFFICE VISIT (OUTPATIENT)
Dept: FAMILY MEDICINE CLINIC | Facility: CLINIC | Age: 59
End: 2024-03-14
Payer: COMMERCIAL

## 2024-03-14 VITALS
BODY MASS INDEX: 46.13 KG/M2 | HEIGHT: 60 IN | OXYGEN SATURATION: 97 % | TEMPERATURE: 98.9 F | DIASTOLIC BLOOD PRESSURE: 74 MMHG | WEIGHT: 235 LBS | RESPIRATION RATE: 16 BRPM | SYSTOLIC BLOOD PRESSURE: 128 MMHG | HEART RATE: 74 BPM

## 2024-03-14 DIAGNOSIS — I10 PRIMARY HYPERTENSION: ICD-10-CM

## 2024-03-14 DIAGNOSIS — E08.21 DIABETES MELLITUS DUE TO UNDERLYING CONDITION WITH DIABETIC NEPHROPATHY, UNSPECIFIED WHETHER LONG TERM INSULIN USE (HCC): Primary | ICD-10-CM

## 2024-03-14 DIAGNOSIS — I69.051 SPASTIC HEMIPLEGIA OF RIGHT DOMINANT SIDE AS LATE EFFECT OF NONTRAUMATIC SUBARACHNOID HEMORRHAGE (HCC): ICD-10-CM

## 2024-03-14 PROCEDURE — 99213 OFFICE O/P EST LOW 20 MIN: CPT | Performed by: NURSE PRACTITIONER

## 2024-03-14 RX ORDER — METFORMIN HYDROCHLORIDE EXTENDED-RELEASE TABLETS 1000 MG/1
1000 TABLET, FILM COATED, EXTENDED RELEASE ORAL 2 TIMES DAILY WITH MEALS
Qty: 180 TABLET | Refills: 1 | Status: SHIPPED | OUTPATIENT
Start: 2024-03-14 | End: 2024-03-21 | Stop reason: CLARIF

## 2024-03-14 NOTE — PROGRESS NOTES
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 nephropathy, unspecified whether long term insulin use (HCC)  -     semaglutide, 0.25 or 0.5 mg/dose, (Ozempic, 0.25 or 0.5 MG/DOSE,) 2 mg/3 mL injection pen; 0.25 mg under the skin every 7 days for 4 doses (28 days), THEN 0.5 mg under the skin every 7 days    BMI 45.0-49.9, adult (HCC)  -     semaglutide, 0.25 or 0.5 mg/dose, (Ozempic, 0.25 or 0.5 MG/DOSE,) 2 mg/3 mL injection pen; 0.25 mg under the skin every 7 days for 4 doses (28 days), THEN 0.5 mg under the skin every 7 days    Spastic hemiplegia of right dominant side as late effect of nontraumatic subarachnoid hemorrhage (HCC)        Nontraumatic subcortical hemorrhage of left cerebral hemisphere (HCC)  -     aspirin 81 mg chewable tablet; Chew 1 tablet (81 mg total) daily  Patient continues with physical therapy and Occupational Therapy. Has regained minimal amount of her dexterity due to the CVA with a walker also requires assistance for ADLs. Patient is unable to drive requires transportation by  or sister. Patient is unable to return to work at this time.   Primary hypertension  -     amLODIPine (NORVASC) 5 mg tablet; Take 1 tablet (5 mg total) by mouth daily  -     hydroCHLOROthiazide 12.5 mg tablet; Take 1 tablet (12.5 mg total) by mouth daily as needed (increased leg swelling or weight gain over 5 lbs)   Dosing all possible side effects of the prescribed medications or medications that had been prescribed in the past were reviewed and all questions were answered.  Patient verbalized agreement and understanding of the plan of care as outlined during the office visit today return to office as indicated or sooner if a problem arises.    Subjective:     Patient ID: Korin Garcia is a 59 y.o. female.    Patient is here for a follow-up will be checking A1c today as patient does have diabetes.  She is also in recovery from a stroke  with hemiplegia and spasticity on the right side.  She denies any new symptoms she has gained a little bit of weight since last visit and therapy is on board for her.  She denies any other new symptom        Review of Systems   Constitutional:  Negative for appetite change and fever.   HENT:  Negative for sinus pressure and sore throat.    Eyes:  Negative for pain.   Respiratory:  Negative for shortness of breath.    Cardiovascular:  Negative for chest pain.   Gastrointestinal:  Negative for abdominal pain.   Genitourinary:  Negative for dysuria.   Musculoskeletal:  Negative for arthralgias and myalgias.   Skin:  Negative for color change.   Neurological:  Negative for light-headedness.   Psychiatric/Behavioral:  Negative for behavioral problems.          Objective:     Physical Exam  Cardiovascular:      Rate and Rhythm: Normal rate and regular rhythm.      Heart sounds: Normal heart sounds.   Pulmonary:      Effort: Pulmonary effort is normal.      Breath sounds: Normal breath sounds.   Neurological:      Mental Status: She is alert and oriented to person, place, and time. Mental status is at baseline.      Motor: Weakness present.      Coordination: Coordination abnormal.      Gait: Gait abnormal.      Deep Tendon Reflexes: Reflexes abnormal.

## 2024-03-18 ENCOUNTER — TELEPHONE (OUTPATIENT)
Dept: NEUROLOGY | Facility: CLINIC | Age: 59
End: 2024-03-18

## 2024-03-18 NOTE — TELEPHONE ENCOUNTER
Telephone call from patient that she would like for regular Metformin 1000 mg to be sent in and not the extended release. She states the extended release is more expensive so she would like just regular Metformin sent in. Please advise and re send in if possible.

## 2024-03-18 NOTE — TELEPHONE ENCOUNTER
Received via [employer] from [place] [employer] Attending Physician's Stated to be completed for patient.  Please advise if Ilir Loyola PA-C able to complete so charges can be dropped and patient contacted for payment.  Forms were scanned into [employer].

## 2024-03-19 ENCOUNTER — TELEPHONE (OUTPATIENT)
Dept: FAMILY MEDICINE CLINIC | Facility: CLINIC | Age: 59
End: 2024-03-19

## 2024-03-19 NOTE — TELEPHONE ENCOUNTER
Patient called Rx refill line and stated she cannot afford Ozempic, her insurance will only cover part of it, but the remainder is still to expensive for her. She would like to be prescribed another medication. Advised patient to call her insurance company to find out what medications are covered and to call the office with the names of those medications. Please contact patient.

## 2024-03-21 DIAGNOSIS — E08.21 DIABETES MELLITUS DUE TO UNDERLYING CONDITION WITH DIABETIC NEPHROPATHY, UNSPECIFIED WHETHER LONG TERM INSULIN USE (HCC): ICD-10-CM

## 2024-03-21 NOTE — TELEPHONE ENCOUNTER
Jess from [facility] called and wanted to check on the status of forms that were faxed to office.  If someone could please reach out   [phone] Ref# [id number]  Thank you!

## 2024-03-21 NOTE — TELEPHONE ENCOUNTER
Pt called back to say that after finding out some more info, she said her ozempic will need a prior auth. I will forward this information to our prior auth team.    Also, pt said that her Metformin 1000 was sent in as extended release and she needs the regular one. Extended release is too expensive. This is being forwarded to the office.

## 2024-03-22 NOTE — TELEPHONE ENCOUNTER
PA for semaglutide, 0.25 or 0.5 mg/dose, (Ozempic, 0.25 or 0.5 MG/DOSE,) 2 mg/3 mL injection pen    Submitted via    [][employer]   [x][employer]-Case ID # [id number]  []Faxed to plan   []Other website   []Phone call Case ID #     Office notes sent, clinical questions answered. Awaiting determination    Turnaround time for your insurance to make a decision on your Prior Authorization can take 7-21 business days.

## 2024-03-26 ENCOUNTER — TELEPHONE (OUTPATIENT)
Dept: NEUROLOGY | Facility: CLINIC | Age: 59
End: 2024-03-26

## 2024-03-26 NOTE — TELEPHONE ENCOUNTER
Caller: Patient    Doctor: Ilir Loyola    Reason for call:     Patient is calling back on the forms for [employer] paperwork.  Please advise if Ilir Loyola PA-C able to complete so charges can be dropped and patient contacted for payment. Forms were scanned into [employer].    Ref# [id number]       Call back#: [phone]

## 2024-03-26 NOTE — TELEPHONE ENCOUNTER
Reached out to [facility] in regards to Yossi paperwork and due to patient only being seen once here by Sabino we cannot provide anymore information to help them. They stated that they will make note of this and move forward with her paperwork with the records we sent back in November. Please reach out to patient about the charges for her forms .Thank You.

## 2024-03-27 NOTE — TELEPHONE ENCOUNTER
PA for semaglutide, 0.25 or 0.5 mg/dose, (Ozempic, 0.25 or 0.5 MG/DOSE,) 2 mg/3 mL injection pen  Approved     Date(s) approved 3- - 3-        Patient advised by [x] [employer] Message                      [] Phone call       Pharmacy advised by [x]Fax                                     []Phone call    Approval letter scanned into [employer]

## 2024-03-27 NOTE — PROGRESS NOTES
[facility]  Progress Note  Name: Balbina Amato  MRN: [id number]  Unit/Bed#: PPHP 564-67 I Date of Admission: 8/14/2023   Date of Service: 8/17/2023 I Hospital Day: 3    Assessment/Plan   * ICH (intracerebral hemorrhage) ([address] )  Assessment & Plan  62 YOF with history of HTN, DM2, Sjogren's, fibromyalgia initially [facility] as stroke alert on 8/14/23 with initial presenting symptoms acute onset of right sided weakness, facial asymmetry, and dysarthria while at work. Initial imaging with CTH/CTA HN revealed left thalamocapsular bleed with no significant shift or mass effect. . NIHSS 14. Not TNK candidate given acute ICH. Pt given IV labetalol, started on Cardene drip, administered DDAVP for ASA reversal and transferred to [facility] for NSg care. -Was seen by NSGY, no surgical intervention at this time  -Neurology following  -Frequent neuro checks  -SBP goal < 160 mmHg, preferably < 140 mmHg  -CT head was done yesterday for change in exam and worsening and waxing slurred speech which was reviewed by neuro, the images which does show stable left sided hemorrhage without any midline shift.   -Recommend repeat CT head in 48hrs   -PT/OT following, recommended post acute rehab with PMR following  -TTE performed  -Holding DVt prophylaxis and ASA for now  -Diet restarted- cleared for regular with thin liquids   -Stat CTH for drop in GCS score of 2 points or more in > 1 hour    Anemia  Assessment & Plan  Anemia with baseline hemoglobin of 10-11. MCVs around 80s. Iron panel showing ferritin at 22 and TIBC of 370s which maybe indicative of iron deficiency anemia.  Also her B12 levels are low-normal.   -Ordered ferrous sulfate supplementation  -On vitamin B12 supplementation 1000 mg daily    Hyperlipidemia associated with type 2 diabetes mellitus ([address] )  Assessment & Plan  · C/w statin     CAD (coronary artery disease)  Assessment & Plan  History of CAD based on prior CTA imaging.  -On statin  -Holding ASA for now due to [address]  -Holding beta blockade due to episodes of bradycardia    Sjogren's syndrome ([address])  Assessment & Plan  History of sjogren syndrome.  -May benefit from outpatient rheumatology follow up from this regard    Anxiety  Assessment & Plan  History of anxiety  -Was on wellbutrin 300 mg daily, was reduced to 150 mg due to concern for medicaitons potential for lowering seizure threshold in this patient with ICH    Diabetes mellitus type 2, controlled [facility])  Assessment & Plan  Lab Results   Component Value Date    HGBA1C 6.7 (H) 08/15/2023       Recent Labs     08/16/23  1719 08/17/23  0608 08/17/23  1117 08/17/23  1556   POCGLU 111 133 141* 160*       Blood Sugar Average: Last 72 hrs:  (P) 158.1     · On SSI  · Holding home metformin  · Hypoglycemic protocol     Primary hypertension  Assessment & Plan  History of hypertension.  -Was previously on telmisartan and atenolol but hypertension was suboptimally controlled  -Started on amlodipine 10mg daily; Losartan 100mg daily; hydralazine 25mg q8h              VTE Pharmacologic Prophylaxis:   Moderate Risk (Score 3-4) - Pharmacological DVT Prophylaxis Contraindicated. Sequential Compression Devices Ordered. Patient Centered Rounds: I performed bedside rounds with nursing staff today. Discussions with Specialists or Other Care Team Provider: d/w RN d/w neuro     Education and Discussions with Family / Patient: Attempted to update  () via phone. Unable to contact. Total Time Spent on Date of Encounter in care of patient: 35 minutes This time was spent on one or more of the following: performing physical exam; counseling and coordination of care; obtaining or reviewing history; documenting in the medical record; reviewing/ordering tests, medications or procedures; communicating with other healthcare professionals and discussing with patient's family/caregivers.     Current Length of Stay: 3 day(s)  Current Patient Status: Inpatient   Certification Statement: The patient will continue to require additional inpatient hospital stay due to [facility]   Discharge Plan: Anticipate discharge in >72 hrs to rehab facility. Code Status: Level 1 - Full Code    Subjective:   Pt returning from CT brain. Denies any current complaints. Per RN event overnight with aphasia, now resolved. Per patient this waxes and wanes in the am since incident. Objective:     Vitals:   Temp (24hrs), Av.8 °F (37.1 °C), Min:98.2 °F (36.8 °C), Max:99.3 °F (37.4 °C)    Temp:  [98.2 °F (36.8 °C)-99.3 °F (37.4 °C)] 98.2 °F (36.8 °C)  HR:  [60-74] 60  Resp:  [13-44] 18  BP: (143-177)/(65-81) 150/73  SpO2:  [97 %-100 %] 98 %  Body mass index is 33.98 kg/m². Input and Output Summary (last 24 hours): Intake/Output Summary (Last 24 hours) at 2023 1623  Last data filed at 2023 1500  Gross per 24 hour   Intake --   Output 2050 ml   Net -2050 ml       Physical Exam:   Physical Exam  Vitals and nursing note reviewed. Constitutional:       General: She is not in acute distress. Appearance: She is well-developed. HENT:      Head: Normocephalic and atraumatic. Mouth/Throat:      Mouth: Mucous membranes are moist.   Eyes:      General: No scleral icterus. Conjunctiva/sclera: Conjunctivae normal.      Pupils: Pupils are equal, round, and reactive to light. Cardiovascular:      Rate and Rhythm: Normal rate and regular rhythm. Heart sounds: Normal heart sounds. No murmur heard. Pulmonary:      Effort: Pulmonary effort is normal. No respiratory distress. Breath sounds: Normal breath sounds. No wheezing or rales. Abdominal:      General: Bowel sounds are normal. There is no distension. Palpations: Abdomen is soft. Tenderness: There is no abdominal tenderness. Musculoskeletal:         General: No swelling or tenderness. Skin:     General: Skin is warm and dry.    Neurological:      Mental Status: She is alert and oriented to person, place, and time. Cranial Nerves: Cranial nerve deficit present. Sensory: Sensory deficit present. Motor: Weakness present. Comments: Right arm paralysis, right leg drift. Facial droop right side, tongue deviates to right. 0/5 strength on right upper and lower    Psychiatric:         Mood and Affect: Mood normal.          Additional Data:     Labs:  Results from last 7 days   Lab Units 08/16/23  0523 08/15/23  0434   WBC Thousand/uL 8.94 10.98*   HEMOGLOBIN g/dL 10.4* 9.7*   HEMATOCRIT % 32.5* 30.9*   PLATELETS Thousands/uL 326 302   NEUTROS PCT %  --  68   LYMPHS PCT %  --  22   MONOS PCT %  --  8   EOS PCT %  --  1     Results from last 7 days   Lab Units 08/16/23  0523 08/15/23  0434 08/14/23  1714   SODIUM mmol/L 132*   < > 136   POTASSIUM mmol/L 3.5   < > 3.8   CHLORIDE mmol/L 102   < > 100   CO2 mmol/L 25   < > 28   BUN mg/dL 9   < > 17   CREATININE mg/dL 0.53*   < > 0.71   ANION GAP mmol/L 5   < > 8   CALCIUM mg/dL 8.7   < > 9.2   ALBUMIN g/dL  --   --  4.0   TOTAL BILIRUBIN mg/dL  --   --  0.32   ALK PHOS U/L  --   --  70   ALT U/L  --   --  15   AST U/L  --   --  17   GLUCOSE RANDOM mg/dL 157*   < > 143*    < > = values in this interval not displayed.      Results from last 7 days   Lab Units 08/15/23  0434   INR  1.06     Results from last 7 days   Lab Units 08/17/23  1556 08/17/23  1117 08/17/23  0608 08/16/23  1719 08/16/23  1211 08/15/23  1752 08/15/23  1225 08/15/23  0545 08/15/23  0009 08/14/23  2037 08/14/23  1638   POC GLUCOSE mg/dl 160* 141* 133 111 134 165* 175* 141* 237* 184* 133     Results from last 7 days   Lab Units 08/15/23  0434   HEMOGLOBIN A1C % 6.7*           Lines/Drains:  Invasive Devices     Peripheral Intravenous Line  Duration           Peripheral IV 08/14/23 Left;Proximal;Ventral (anterior) Forearm 3 days    Peripheral IV 08/14/23 Right Antecubital 2 days          Drain  Duration           External Urinary Catheter 2 days Imaging: Reviewed radiology reports from this admission including: CT head and MRI brain    Recent Cultures (last 7 days):         Last 24 Hours Medication List:   Current Facility-Administered Medications   Medication Dose Route Frequency Provider Last Rate   • amLODIPine  10 mg Oral Daily [facility], DO     • atorvastatin  20 mg Oral Daily With [address] [facility], DO     • bisacodyl  5 mg Oral Daily PRN [facility], DO     • buPROPion  150 mg Oral QAM [facility], DO     • chlorhexidine  15 mL Mouth/Throat Q12H [address], DO     • vitamin B-12  1,000 mcg Oral Daily [facility], DO     • docusate sodium  100 mg Oral BID [facility], DO     • ferrous sulfate  325 mg Oral Daily With Breakfast [facility], DO     • fluticasone-vilanterol  1 puff Inhalation Daily [facility], DO     • gabapentin  300 mg Oral TID [facility], DO     • hydrALAZINE  5 mg Intravenous Q6H PRN [facility], DO     • hydrALAZINE  25 mg Oral [facility], DO     • HYDROmorphone  0.2 mg Intravenous Q4H PRN [facility], DO     • insulin lispro  1-6 Units Subcutaneous TID With Meals [facility], DO     • losartan  100 mg Oral Daily [facility], DO     • methocarbamol  500 mg Oral Q6H PRN [facility], DO     • ondansetron  4 mg Intravenous Q4H PRN [facility], DO     • pantoprazole  40 mg Intravenous Q24H [facility], DO     • prochlorperazine  5 mg Oral Q6H PRN [facility], DO          Today, Patient Was Seen By: ARSENIO Loera    **Please Note: This note may have been constructed using a voice recognition system. ** no

## 2024-04-04 ENCOUNTER — TELEPHONE (OUTPATIENT)
Age: 59
End: 2024-04-04

## 2024-04-04 NOTE — TELEPHONE ENCOUNTER
[employer] called in regards to disability paperwork they have faxed to our office and scanned in on 3/18/24. They are requesting for  to complete this and fax back. Please advise    [employer]  Attn: Reference #[id number]  Phone #[phone]  Fax #[phone]

## 2024-04-09 ENCOUNTER — TELEPHONE (OUTPATIENT)
Dept: NEUROLOGY | Facility: CLINIC | Age: 59
End: 2024-04-09

## 2024-04-11 ENCOUNTER — OFFICE VISIT (OUTPATIENT)
Dept: FAMILY MEDICINE CLINIC | Facility: CLINIC | Age: 59
End: 2024-04-11
Payer: COMMERCIAL

## 2024-04-11 ENCOUNTER — TELEPHONE (OUTPATIENT)
Age: 59
End: 2024-04-11

## 2024-04-11 VITALS
HEART RATE: 87 BPM | SYSTOLIC BLOOD PRESSURE: 122 MMHG | TEMPERATURE: 98.1 F | OXYGEN SATURATION: 97 % | DIASTOLIC BLOOD PRESSURE: 70 MMHG | HEIGHT: 60 IN | WEIGHT: 231 LBS | BODY MASS INDEX: 45.35 KG/M2

## 2024-04-11 DIAGNOSIS — J44.1 CHRONIC OBSTRUCTIVE PULMONARY DISEASE WITH ACUTE EXACERBATION (HCC): ICD-10-CM

## 2024-04-11 DIAGNOSIS — I69.051 SPASTIC HEMIPLEGIA OF RIGHT DOMINANT SIDE AS LATE EFFECT OF NONTRAUMATIC SUBARACHNOID HEMORRHAGE (HCC): Primary | ICD-10-CM

## 2024-04-11 DIAGNOSIS — E08.22 DIABETES MELLITUS DUE TO UNDERLYING CONDITION WITH STAGE 3B CHRONIC KIDNEY DISEASE, WITHOUT LONG-TERM CURRENT USE OF INSULIN (HCC): ICD-10-CM

## 2024-04-11 DIAGNOSIS — N18.32 DIABETES MELLITUS DUE TO UNDERLYING CONDITION WITH STAGE 3B CHRONIC KIDNEY DISEASE, WITHOUT LONG-TERM CURRENT USE OF INSULIN (HCC): ICD-10-CM

## 2024-04-11 DIAGNOSIS — E78.2 MIXED HYPERLIPIDEMIA: ICD-10-CM

## 2024-04-11 DIAGNOSIS — J82.83 EOSINOPHILIC ASTHMA: ICD-10-CM

## 2024-04-11 PROCEDURE — 99213 OFFICE O/P EST LOW 20 MIN: CPT | Performed by: NURSE PRACTITIONER

## 2024-04-11 RX ORDER — ATORVASTATIN CALCIUM 20 MG/1
20 TABLET, FILM COATED ORAL DAILY
Qty: 90 TABLET | Refills: 3 | Status: SHIPPED | OUTPATIENT
Start: 2024-04-11

## 2024-04-11 RX ORDER — FLUTICASONE FUROATE AND VILANTEROL 200; 25 UG/1; UG/1
1 POWDER RESPIRATORY (INHALATION) DAILY
Qty: 180 BLISTER | Refills: 1 | Status: SHIPPED | OUTPATIENT
Start: 2024-04-11 | End: 2024-07-10

## 2024-04-11 NOTE — TELEPHONE ENCOUNTER
Caller: Mark- [employer]     Doctor: Edgar     Reason for call: Asked Blank [employer] [facility] Attending Physician's Statement, it is scanned into chart

## 2024-04-11 NOTE — PROGRESS NOTES
Assessment/Plan:      Diagnoses and all orders for this visit:    Spastic hemiplegia of right dominant side as late effect of nontraumatic subarachnoid hemorrhage (HCC)    Mixed hyperlipidemia  -     atorvastatin (LIPITOR) 20 mg tablet; Take 1 tablet (20 mg total) by mouth daily    Chronic obstructive pulmonary disease with acute exacerbation (HCC)    Diabetes mellitus due to underlying condition with stage 3b chronic kidney disease, without long-term current use of insulin (HCC)  -     Empagliflozin (JARDIANCE) 10 MG TABS tablet; Take 1 tablet (10 mg total) by mouth daily    Eosinophilic asthma  -     fluticasone-vilanterol (Breo Ellipta) 200-25 mcg/actuation inhaler; Inhale 1 puff daily Rinse mouth after use.          Physical assessment unremarkable except where noted. Labs reviewed VS were well controlled. Pt continues to progress with therapy. She remain unable to drive or ambulate independently. All medication is taken as directed and well tolerated. Labs given is to complete for possible fu discussion. RTO as needed or for next scheduled appt. All questions answered.  Dosing all possible side effects of the prescribed medications or medications that had been prescribed in the past were reviewed and all questions were answered.  Patient verbalized agreement and understanding of the plan of care as outlined during the office visit today return to office as indicated or sooner if a problem arises.    Subjective:     Patient ID: Korin Garcia is a 59 y.o. female.    HPI    Review of Systems      Objective:     Physical Exam

## 2024-04-12 DIAGNOSIS — J45.41 MODERATE PERSISTENT ASTHMA WITH ACUTE EXACERBATION: ICD-10-CM

## 2024-04-12 DIAGNOSIS — I61.0 NONTRAUMATIC SUBCORTICAL HEMORRHAGE OF LEFT CEREBRAL HEMISPHERE (HCC): ICD-10-CM

## 2024-04-12 DIAGNOSIS — M79.7 FIBROMYALGIA: ICD-10-CM

## 2024-04-13 RX ORDER — GABAPENTIN 300 MG/1
600 CAPSULE ORAL 3 TIMES DAILY PRN
Qty: 90 CAPSULE | Refills: 0 | Status: SHIPPED | OUTPATIENT
Start: 2024-04-13

## 2024-04-15 RX ORDER — ALBUTEROL SULFATE 90 UG/1
2 AEROSOL, METERED RESPIRATORY (INHALATION) EVERY 6 HOURS PRN
Qty: 18 G | Refills: 5 | Status: SHIPPED | OUTPATIENT
Start: 2024-04-15

## 2024-04-18 ENCOUNTER — OFFICE VISIT (OUTPATIENT)
Dept: NEUROLOGY | Facility: CLINIC | Age: 59
End: 2024-04-18
Payer: COMMERCIAL

## 2024-04-18 VITALS
BODY MASS INDEX: 45.86 KG/M2 | WEIGHT: 234.8 LBS | SYSTOLIC BLOOD PRESSURE: 132 MMHG | TEMPERATURE: 96.2 F | DIASTOLIC BLOOD PRESSURE: 78 MMHG

## 2024-04-18 DIAGNOSIS — E11.69 HYPERLIPIDEMIA ASSOCIATED WITH TYPE 2 DIABETES MELLITUS  (HCC): ICD-10-CM

## 2024-04-18 DIAGNOSIS — E78.5 HYPERLIPIDEMIA ASSOCIATED WITH TYPE 2 DIABETES MELLITUS  (HCC): ICD-10-CM

## 2024-04-18 DIAGNOSIS — E11.9 TYPE 2 DIABETES MELLITUS WITHOUT COMPLICATION, WITHOUT LONG-TERM CURRENT USE OF INSULIN (HCC): ICD-10-CM

## 2024-04-18 DIAGNOSIS — I10 PRIMARY HYPERTENSION: ICD-10-CM

## 2024-04-18 DIAGNOSIS — I61.9 ICH (INTRACEREBRAL HEMORRHAGE) (HCC): Primary | ICD-10-CM

## 2024-04-18 DIAGNOSIS — R53.1 RIGHT SIDED WEAKNESS: ICD-10-CM

## 2024-04-18 DIAGNOSIS — F32.A DEPRESSION: ICD-10-CM

## 2024-04-18 PROCEDURE — 99214 OFFICE O/P EST MOD 30 MIN: CPT

## 2024-04-18 RX ORDER — DULOXETIN HYDROCHLORIDE 20 MG/1
20 CAPSULE, DELAYED RELEASE ORAL DAILY
Qty: 30 CAPSULE | Refills: 3 | Status: SHIPPED | OUTPATIENT
Start: 2024-04-18

## 2024-04-18 NOTE — PROGRESS NOTES
Patient ID: Korin Garcia is a 59 y.o. female who presents to the [facility].    Assessment/Plan:    History of left thalamocapsular intracerebral hemorrhage:     - Placed additional orders for physical therapy and Occupational Therapy at this time which we will try to get an outside service to perform at the patient's home.  Will reach out to social work after this appointment to facilitate trying to get the patient physical and occupational therapy services extended to at home so the patient can continue to work on her deficits.  - Prescribed Cymbalta 20 mg capsule at this visit for the patient's concerns in regards to depression/anxiety and also to potentially help with some of the chronic pain that she is experiencing of the right side since having her stroke.  Patient is also taking gabapentin 600 mg 3 times daily as needed.  I would advise the patient that if she is skipping out on the afternoon dosage of the medication that may be she tries to take a single 300 mg capsule in the afternoon to start to see if this helps with some of the chronic pain as well.  - Continue with driving restrictions at this time, still does not feel ready to return to driving at this time.     - For ongoing stroke prevention continue: aspirin 81 mg once daily, Lipitor 20 mg once daily    - Discussed the importance of antiplatelet management with the patient to prevent future strokes.   - Recommend to check blood pressure occasionally away from the doctor's office to make sure that those numbers are typically less than 130/80.  If they are frequently higher than that, we recommend checking a little more often and to follow up with primary care team   - Will defer to primary care team for monitoring of cholesterol panel and blood sugar numbers with target LDL cholesterol of less than 70 and hemoglobin A1c less than 7%  - Recommend following a low salt, mediterranean diet   - Recommend routine physical exercise as tolerated      We will plan for her to return to the office in 6 months time to see on of the APPs or Dr. Young but would be happy to see her sooner if the need should arise.  If she has any symptoms concerning for TIA or stroke including sudden painless loss of vision or double vision, difficulty speaking or swallowing, vertigo/room spinning that does not quickly resolve, or weakness/numbness/loss of coordination affecting 1 side of the face or body she should proceed by ambulance to the nearest emergency room immediately.     Subjective:    HPI      For Review:    The patient was last seen by myself on 11/13/2023.  At that time patient was presenting as a hospital follow-up appointment in regard to left-sided thalamocapsular cerebral hemorrhage.  It was noted that the patient was doing relatively well since her last visit.  She is currently been staying [facility] nursing facility for rehabilitation recovery.  Is noted that the patient made significant improvement in regard to her right-sided weakness, dysarthria, and facial droop.  No new strokelike symptoms reported at the last visit.  However, it was noted that approximately a week or so prior to the patient's most recent appointment that she was noted to have concern for slight worsening of right facial droop and also slight worsening of weakness on the right side.  She also had new onset right facial tingling.  A CT of the head was performed which did not show any new ischemic stroke or any new intracranial hemorrhage.  It was noted at the last appointment that the patient was doing relatively well at controlling her blood pressure.  Advised the patient that this was most important part of her stroke preventative care since it was likely that her hemorrhage did occur in the setting of hypertension.  The patient was still on aspirin 81 mg daily as prescribed and still on atorvastatin 20 mg daily.  Although the patient did have an intracranial hemorrhage, was  noted to have secondary stroke risk factors and previous history of CAD so she was to still remain on aspirin at this time.  Recommended that she continue with her physical therapy and Occupational Therapy at home.  Also recommended patient should not return back to work or return to driving at this point in time until she has further resolution of her deficits.    Interval History:      New stroke symptoms/residual symptoms:    Any new, sudden onset weakness, numbness, facial droop, slurred speech, difficulty speaking, trouble swallowing, persistent vertigo, or sudden double vision or vision loss? No new stroke-like symptoms    Residual symptoms include: facial assymetry, weakness of the right UE/LE: upper extremity and lower extremity, and dysarthria    Stroke Etiology and Risk Factor modification:    This was a(n) hemorrhagic stroke, most likely related to ACKHEMORRHAGICSTROKETIOLOGY: Hypertension    Stroke risk factors were evaluated including: Hypertension, type 2 diabetes mellitus, hyperlipidemia, former history of smoking     AP/AC therapy: aspirin 81 mg once daily at this time. No bleeding or bruising issues currently.     Statin therapy: Lipitor 20 mg daily     Blood pressure today and as of late: Will check her blood pressure once and awhile at home. Therapy was checking it when they came to the house.     Most recent LDL:14 mg/dL    Most recent hemoglobin A1C: 6.7%. She was recently prescribed Ozempic, she is having difficulty affording it. She is going to try Jardiance as well.     Cardiology evaluation? Any cardiac monitoring required?: None    Endocrinology evaluation? Following proper glycemic treatment/diet? None    Lifestyle history/modifications:    Diet/Exercise regimen: Has been trying to walk around more and use the right side a bit more at this time. Has been trying to eat healthy but has been hard for her.     Any physical therapy, occupational therapy or speech therapy performed/required at  this time?: She had PT and OT since the last visit. She is no longer getting any therapy at this time.     Any difficulty with sleep? Will take a few naps throughout the day. Not quite sure why she is now having difficulty sleeping.     Any history of sleep apnea? CPAP compliance?: No sleep apnea or snoring     Post-stroke depression/anxiety? Does have some depression/anxiety. Not taking Buspar or Wellbutrin currently.     Any history of smoking? Quit smoking a year. Not smoking since then.     Additional History:    Noting to still be struggling with right-sided weakness at this point in time.  She states that her right upper extremity and right lower extremity do feel significantly uncomfortable at times.  Does describe some slight pain and numbness of the right upper extremity and sometimes the face at this moment in time.  The patient had been taking gabapentin 600 mg in the morning and night prescribed by her PCP.  She is also able to take an afternoon dosage but usually does not take the afternoon dosage of the medication.  Had not been taking any other medications for pain, had not been taking BuSpar and Wellbutrin for anxiety/depression.      Lab Results   Component Value Date/Time    CHOLESTEROL 91 08/15/2023 04:34 AM    CHOLESTEROL 203 (H) 01/11/2023 09:04 AM     Lab Results   Component Value Date/Time    TRIG 72 08/15/2023 04:34 AM    TRIG 115 01/11/2023 09:04 AM     Lab Results   Component Value Date/Time    HDL 63 08/15/2023 04:34 AM    HDL 67 01/11/2023 09:04 AM     Lab Results   Component Value Date/Time    LDLCALC 14 08/15/2023 04:34 AM    LDLCALC 113 (H) 01/11/2023 09:04 AM       Lab Results   Component Value Date/Time    HGBA1C 6.7 (H) 08/15/2023 04:34 AM    HGBA1C 6.9 (H) 01/11/2023 09:04 AM    HGBA1C 9.7 (H) 04/28/2018 09:29 AM     Lab Results   Component Value Date/Time     08/15/2023 04:34 AM     (H) 04/28/2018 09:29 AM           Past Medical History:   Diagnosis Date    Arthritis      Asthma     Continuous opioid dependence (HCC) 04/27/2022    Continuous opioid dependence (HCC) 04/27/2022    Diabetes mellitus ([facility])     Diverticulitis of colon     Fibromyalgia 04/26/2022    Headache(784.0)     History of mammogram 2018    History of tobacco abuse 04/26/2022    Hypertension     Nausea 04/29/2022    Obesity     Sjogren's syndrome ([facility]) 10/19/2012    Stroke ([facility]) 08 14 2023    Urinary tract infection        Current Outpatient Medications:     acetaminophen (TYLENOL) 325 mg tablet, Take 2 tablets (650 mg total) by mouth every 6 (six) hours as needed for mild pain, Disp: , Rfl: 0    albuterol (Ventolin HFA) 90 mcg/act inhaler, Inhale 2 puffs every 6 (six) hours as needed for wheezing, Disp: 18 g, Rfl: 5    amLODIPine (NORVASC) 5 mg tablet, Take 1 tablet (5 mg total) by mouth daily, Disp: 90 tablet, Rfl: 3    aspirin 81 mg chewable tablet, Chew 1 tablet (81 mg total) daily, Disp: 90 tablet, Rfl: 3    Aspirin Low Dose 81 MG EC tablet, Take 1 tablet by mouth once daily, Disp: 30 tablet, Rfl: 0    atorvastatin (LIPITOR) 20 mg tablet, Take 1 tablet (20 mg total) by mouth daily, Disp: 90 tablet, Rfl: 3    busPIRone (BUSPAR) 7.5 mg tablet, Take 1 tablet (7.5 mg total) by mouth 2 (two) times a day, Disp: 60 tablet, Rfl: 5    docusate sodium (COLACE) 100 mg capsule, Take 1 capsule (100 mg total) by mouth 2 (two) times a day, Disp: , Rfl: 0    Empagliflozin (JARDIANCE) 10 MG TABS tablet, Take 1 tablet (10 mg total) by mouth daily, Disp: 90 tablet, Rfl: 3    fluticasone-vilanterol (Breo Ellipta) 200-25 mcg/actuation inhaler, Inhale 1 puff daily Rinse mouth after use., Disp: 180 blister, Rfl: 1    gabapentin (Neurontin) 300 mg capsule, Take 2 capsules (600 mg total) by mouth 3 (three) times a day as needed (pain and paristhesia), Disp: 90 capsule, Rfl: 0    hydroCHLOROthiazide 12.5 mg tablet, Take 1 tablet (12.5 mg total) by mouth daily as needed (increased leg swelling or weight gain over 5 lbs), Disp: 30  tablet, Rfl: 5    losartan-hydrochlorothiazide (HYZAAR) 100-12.5 MG per tablet, Take 1 tablet by mouth daily, Disp: 90 tablet, Rfl: 3    metFORMIN (GLUCOPHAGE) 1000 MG tablet, Take 1 tablet (1,000 mg total) by mouth 2 (two) times a day with meals, Disp: 180 tablet, Rfl: 3    baclofen 5 MG TABS, Take 5 mg by mouth 2 (two) times a day (Patient not taking: Reported on 2/5/2024), Disp: 60 tablet, Rfl: 0    buPROPion (WELLBUTRIN XL) 150 mg 24 hr tablet, Take 1 tablet (150 mg total) by mouth every morning Do not start before September 20, 2023. (Patient not taking: Reported on 2/5/2024), Disp: , Rfl: 0    cyanocobalamin (VITAMIN B-12) 1000 MCG tablet, Take 1 tablet (1,000 mcg total) by mouth daily Do not start before September 20, 2023. (Patient not taking: Reported on 2/5/2024), Disp: , Rfl: 0    ferrous sulfate 325 (65 Fe) mg tablet, Take 1 tablet (325 mg total) by mouth daily with breakfast Do not start before September 20, 2023. (Patient not taking: Reported on 2/5/2024), Disp: , Rfl: 0    melatonin 3 mg, Take 2 tablets (6 mg total) by mouth daily at bedtime (Patient not taking: Reported on 2/5/2024), Disp: , Rfl: 0    pantoprazole (PROTONIX) 40 mg tablet, Take 1 tablet (40 mg total) by mouth daily in the early morning Do not start before September 20, 2023. (Patient not taking: Reported on 2/5/2024), Disp: , Rfl: 0    semaglutide, 0.25 or 0.5 mg/dose, (Ozempic, 0.25 or 0.5 MG/DOSE,) 2 mg/3 mL injection pen, 0.25 mg under the skin every 7 days for 4 doses (28 days), THEN 0.5 mg under the skin every 7 days (Patient not taking: Reported on 4/11/2024), Disp: 9 mL, Rfl: 0     Objective:    Physical Exam:                                                                 Vitals:            Constitutional:    There were no vitals taken for this visit.  BP Readings from Last 3 Encounters:   04/11/24 122/70   03/14/24 128/74   02/21/24 126/80     Pulse Readings from Last 3 Encounters:   04/11/24 87   03/14/24 74   02/21/24 63          Well developed, well nourished, well groomed. No dysmorphic features.       Psychiatric:  Normal behavior and appropriate affect        Neurological Examination:     Mental status/cognitive function:   Orientated to time, place and person. Recent and remote memory intact. Attention span and concentration as well as fund of knowledge are appropriate for age. Normal language and spontaneous speech.     Cranial Nerves:  II-visual fields full.   III, IV, VI-Pupils were equal, round, and reactive to light and accomodation. Extraocular movements were full and conjugate without nystagmus. Conjugate gaze, normal smooth pursuits, normal saccades   V-facial sensation symmetric.    VII-facial expression symmetric, intact forehead wrinkle, strong eye closure, symmetric smile (right facial droop improved)  VIII-hearing grossly intact bilaterally   IX, X-palate elevation symmetric, no dysarthria.   XI-shoulder shrug strength intact    XII-tongue protrusion midline.    Motor Exam: symmetric bulk and tone throughout, no pronator drift. Power/strength 5/5 bilateral left upper and lower extremities, power/strength 4/5 of the right upper and lower extremities.  Decreased  strength of the right hand compared to left hand.  Sensory: grossly intact light touch in all extremities.   Reflexes:   Left: brachioradialis 2+, biceps 2+, knee 2+  Right: brachioradialis 3+, biceps 3+, knee 3+  Coordination: Finger nose finger intact bilaterally, no apparent dysmetria, ataxia or tremor noted  Gait: Currently in wheelchair      ROS:    Review of Systems   Constitutional:  Negative for appetite change, fatigue and fever.   HENT: Negative.  Negative for hearing loss, tinnitus, trouble swallowing and voice change.    Eyes: Negative.  Negative for photophobia, pain and visual disturbance.   Respiratory: Negative.  Negative for shortness of breath.    Cardiovascular: Negative.  Negative for palpitations.   Gastrointestinal: Negative.   Negative for nausea and vomiting.   Endocrine: Negative.  Negative for cold intolerance.   Genitourinary: Negative.  Negative for dysuria, frequency and urgency.   Musculoskeletal:  Negative for back pain, gait problem, myalgias, neck pain and neck stiffness.   Skin: Negative.  Negative for rash.   Allergic/Immunologic: Negative.    Neurological:  Positive for numbness. Negative for dizziness, tremors, seizures, syncope, facial asymmetry, speech difficulty, weakness, light-headedness and headaches.   Hematological: Negative.  Does not bruise/bleed easily.   Psychiatric/Behavioral: Negative.  Negative for confusion, hallucinations and sleep disturbance.    All other systems reviewed and are negative.      I have spent 30 minutes today on this case including chart review, performing history and exam, patient counseling, and documentation/communication      Ilir Loyola PA-C  4/18/2024 7:47 AM

## 2024-04-18 NOTE — PATIENT INSTRUCTIONS
History of left thalamocapsular intracerebral hemorrhage:     - Placed additional orders for physical therapy and Occupational Therapy at this time which we will try to get an outside service to perform at the patient's home.  Will reach out to social work after this appointment to facilitate trying to get the patient physical and occupational therapy services extended to at home so the patient can continue to work on her deficits.  - Prescribed Cymbalta 20 mg capsule at this visit for the patient's concerns in regards to depression/anxiety and also to potentially help with some of the chronic pain that she is experiencing of the right side since having her stroke.  Patient is also taking gabapentin 600 mg 3 times daily as needed.  I would advise the patient that if she is skipping out on the afternoon dosage of the medication that may be she tries to take a single 300 mg capsule in the afternoon to start to see if this helps with some of the chronic pain as well.  - Continue with driving restrictions at this time, still does not feel ready to return to driving at this time.     - For ongoing stroke prevention continue: aspirin 81 mg once daily, Lipitor 20 mg once daily    - Discussed the importance of antiplatelet management with the patient to prevent future strokes.   - Recommend to check blood pressure occasionally away from the doctor's office to make sure that those numbers are typically less than 130/80.  If they are frequently higher than that, we recommend checking a little more often and to follow up with primary care team   - Will defer to primary care team for monitoring of cholesterol panel and blood sugar numbers with target LDL cholesterol of less than 70 and hemoglobin A1c less than 7%  - Recommend following a low salt, mediterranean diet   - Recommend routine physical exercise as tolerated     We will plan for her to return to the office in 6 months time to see on of the APPs or Dr. Young but  would be happy to see her sooner if the need should arise.  If she has any symptoms concerning for TIA or stroke including sudden painless loss of vision or double vision, difficulty speaking or swallowing, vertigo/room spinning that does not quickly resolve, or weakness/numbness/loss of coordination affecting 1 side of the face or body she should proceed by ambulance to the nearest emergency room immediately.

## 2024-04-23 NOTE — TELEPHONE ENCOUNTER
Caller: [facility]    Doctor: Edgar    Reason for call: Verified fax# for [facility].     Call back#: [phone] ref#[id number]

## 2024-05-05 DIAGNOSIS — M79.7 FIBROMYALGIA: ICD-10-CM

## 2024-05-05 DIAGNOSIS — I61.0 NONTRAUMATIC SUBCORTICAL HEMORRHAGE OF LEFT CEREBRAL HEMISPHERE (HCC): ICD-10-CM

## 2024-05-05 RX ORDER — GABAPENTIN 300 MG/1
600 CAPSULE ORAL 3 TIMES DAILY PRN
Qty: 90 CAPSULE | Refills: 0 | Status: SHIPPED | OUTPATIENT
Start: 2024-05-05

## 2024-05-09 DIAGNOSIS — N76.1 CHRONIC VAGINITIS: Primary | ICD-10-CM

## 2024-05-09 RX ORDER — FLUCONAZOLE 150 MG/1
150 TABLET ORAL DAILY
Qty: 2 TABLET | Refills: 0 | Status: SHIPPED | OUTPATIENT
Start: 2024-05-09 | End: 2024-05-11

## 2024-05-20 ENCOUNTER — OFFICE VISIT (OUTPATIENT)
Dept: FAMILY MEDICINE CLINIC | Facility: CLINIC | Age: 59
End: 2024-05-20
Payer: COMMERCIAL

## 2024-05-20 VITALS
TEMPERATURE: 98.9 F | OXYGEN SATURATION: 98 % | DIASTOLIC BLOOD PRESSURE: 80 MMHG | BODY MASS INDEX: 45.35 KG/M2 | HEART RATE: 92 BPM | HEIGHT: 60 IN | RESPIRATION RATE: 18 BRPM | WEIGHT: 231 LBS | SYSTOLIC BLOOD PRESSURE: 134 MMHG

## 2024-05-20 DIAGNOSIS — E08.22 DIABETES MELLITUS DUE TO UNDERLYING CONDITION WITH STAGE 3B CHRONIC KIDNEY DISEASE, WITHOUT LONG-TERM CURRENT USE OF INSULIN (HCC): ICD-10-CM

## 2024-05-20 DIAGNOSIS — N18.32 DIABETES MELLITUS DUE TO UNDERLYING CONDITION WITH STAGE 3B CHRONIC KIDNEY DISEASE, WITHOUT LONG-TERM CURRENT USE OF INSULIN (HCC): ICD-10-CM

## 2024-05-20 DIAGNOSIS — E11.9 TYPE II DIABETES MELLITUS, WELL CONTROLLED (HCC): Primary | ICD-10-CM

## 2024-05-20 LAB — SL AMB POCT HEMOGLOBIN AIC: 8 (ref ?–6.5)

## 2024-05-20 PROCEDURE — 83036 HEMOGLOBIN GLYCOSYLATED A1C: CPT | Performed by: NURSE PRACTITIONER

## 2024-05-20 PROCEDURE — 99213 OFFICE O/P EST LOW 20 MIN: CPT | Performed by: NURSE PRACTITIONER

## 2024-05-20 NOTE — PROGRESS NOTES
Assessment/Plan:      Diagnoses and all orders for this visit:    Type II diabetes mellitus, well controlled (HCC)  -     POCT hemoglobin A1c    Diabetes mellitus due to underlying condition with stage 3b chronic kidney disease, without long-term current use of insulin (HCC)  -     Empagliflozin 25 MG TABS; Take 1 tablet (25 mg total) by mouth daily        Spastic hemiplegia of right dominant side as late effect of nontraumatic subarachnoid hemorrhage (HCC)     Mixed hyperlipidemia  Stable will continue to monitor through lab work and physical assessment.     Chronic obstructive pulmonary disease with acute exacerbation (HCC)   Stable will continue to monitor through lab work and physical assessment.        Depression Screening and Follow-up Plan: Patient's depression screening was positive with a PHQ-9 score of 8. Patient assessed for underlying major depression. Brief counseling provided and recommend additional follow-up/re-evaluation next office visit.     Physical assessment stable with some improvment. Labs reviewed were WNL also VS were well controlled. Labs given is to complete in 6 mos for possible fu discussion. RTO as needed or for next scheduled appt. All questions answered.    Dosing all possible side effects of the prescribed medications or medications that had been prescribed in the past were reviewed and all questions were answered.  Patient verbalized agreement and understanding of the plan of care as outlined during the office visit today return to office as indicated or sooner if a problem arises.    Subjective:     Patient ID: Korin Garcia is a 59 y.o. female.    Pt here for fu, to discuss how she is doing since her stroke and fill out any paperwork that she may need.        Review of Systems   Musculoskeletal:  Positive for arthralgias, gait problem and myalgias.   Neurological:  Positive for weakness.         Objective:     Physical Exam  Constitutional:       Appearance: She is obese.   HENT:       Mouth/Throat:      Pharynx: Oropharynx is clear.   Cardiovascular:      Rate and Rhythm: Normal rate and regular rhythm.      Heart sounds: Normal heart sounds.   Pulmonary:      Effort: Pulmonary effort is normal.      Breath sounds: Normal breath sounds.   Musculoskeletal:         General: Swelling present.      Right lower leg: No edema (having upper limb edema).   Neurological:      General: No focal deficit present.      Mental Status: She is alert and oriented to person, place, and time.      Motor: Weakness present.      Coordination: Coordination abnormal.      Gait: Gait abnormal.      Deep Tendon Reflexes: Reflexes abnormal.

## 2024-06-03 DIAGNOSIS — M79.7 FIBROMYALGIA: ICD-10-CM

## 2024-06-03 DIAGNOSIS — I61.0 NONTRAUMATIC SUBCORTICAL HEMORRHAGE OF LEFT CEREBRAL HEMISPHERE (HCC): ICD-10-CM

## 2024-06-03 RX ORDER — GABAPENTIN 300 MG/1
600 CAPSULE ORAL 3 TIMES DAILY PRN
Qty: 90 CAPSULE | Refills: 0 | Status: SHIPPED | OUTPATIENT
Start: 2024-06-03

## 2024-06-11 ENCOUNTER — OFFICE VISIT (OUTPATIENT)
Dept: FAMILY MEDICINE CLINIC | Facility: CLINIC | Age: 59
End: 2024-06-11
Payer: COMMERCIAL

## 2024-06-11 VITALS
BODY MASS INDEX: 45.75 KG/M2 | TEMPERATURE: 98.8 F | HEART RATE: 91 BPM | HEIGHT: 60 IN | OXYGEN SATURATION: 97 % | DIASTOLIC BLOOD PRESSURE: 84 MMHG | SYSTOLIC BLOOD PRESSURE: 130 MMHG | RESPIRATION RATE: 18 BRPM | WEIGHT: 233 LBS

## 2024-06-11 DIAGNOSIS — E11.65 POORLY CONTROLLED TYPE 2 DIABETES MELLITUS (HCC): Primary | ICD-10-CM

## 2024-06-11 DIAGNOSIS — I69.051 SPASTIC HEMIPLEGIA OF RIGHT DOMINANT SIDE AS LATE EFFECT OF NONTRAUMATIC SUBARACHNOID HEMORRHAGE (HCC): ICD-10-CM

## 2024-06-11 PROCEDURE — 99396 PREV VISIT EST AGE 40-64: CPT | Performed by: NURSE PRACTITIONER

## 2024-06-11 NOTE — PROGRESS NOTES
Assessment/Plan:      Diagnoses and all orders for this visit:    Poorly controlled type 2 diabetes mellitus (HCC)  -     Ambulatory Referral to Podiatry; Future    Spastic hemiplegia of right dominant side as late effect of nontraumatic subarachnoid hemorrhage (HCC)        Physical assessment unremarkable. Pt is doing well with her home rehab. Would like referral to podiatry otherwise she has no new complaints. Currently working with an  for her SS disability due to her stroke.Labs reviewed were WNL also VS were well controlled. Labs given is to complete for possible fu discussion. RTO as needed or for next scheduled appt. All questions answered.    Subjective:     Patient ID: Korin Garcia is a 59 y.o. female.    Pt here for fu no new complaints.        Review of Systems   Musculoskeletal:  Positive for gait problem and myalgias.         Objective:     Physical Exam  Cardiovascular:      Rate and Rhythm: Normal rate and regular rhythm.      Pulses: no weak pulses.           Dorsalis pedis pulses are 2+ on the right side and 2+ on the left side.      Heart sounds: Normal heart sounds.   Pulmonary:      Effort: Pulmonary effort is normal.      Breath sounds: Normal breath sounds.   Feet:      Right foot:      Skin integrity: No ulcer, skin breakdown, erythema, warmth, callus or dry skin.      Left foot:      Skin integrity: No ulcer, skin breakdown, erythema, warmth, callus or dry skin.   Neurological:      General: No focal deficit present.      Mental Status: She is alert and oriented to person, place, and time.      Motor: Weakness present.      Coordination: Coordination abnormal.      Gait: Gait abnormal.      Deep Tendon Reflexes: Reflexes abnormal.           Patient's shoes and socks removed.    Right Foot/Ankle   Right Foot Inspection  Skin Exam: skin normal. Skin not intact, no dry skin, no warmth, no callus, no erythema, no maceration, no abnormal color, no pre-ulcer, no ulcer and no callus.     Toe  Exam: ROM and strength within normal limits.     Sensory   Monofilament testing: intact    Vascular  The right DP pulse is 2+.     Left Foot/Ankle  Left Foot Inspection  Skin Exam: skin normal. Skin not intact, no dry skin, no warmth, no erythema, no maceration, normal color, no pre-ulcer, no ulcer and no callus.     Toe Exam: ROM and strength within normal limits.     Sensory   Monofilament testing: intact    Vascular  The left DP pulse is 2+.     Assign Risk Category  No deformity present  No loss of protective sensation  No weak pulses  Risk: 0

## 2024-06-20 NOTE — PROGRESS NOTES
PM&R PROGRESS NOTE:  Karen Yuan 62 y.o. female MRN: [id number]  Unit/Bed#: -54 Encounter: [id number]    Rehabilitation Diagnosis: Impairment of mobility, safety, Activities of Daily Living (ADLs), and cognitive/communication skills due to Stroke:  01.2  Right Body Involvement (Left Brain)    HPI:  Karen Yuan is a 60-year-old female with history of hypertension, hyperlipidemia, diabetes type 2, obesity, eosinophilic asthma, COPD, fibromyalgia, Sjogren syndrome, lumbar spondylosis with grade 1 anterior spondylolisthesis who presents to the hospital on 8/14/2023 due to acute onset right upper and right lower extremity weakness with sensory loss. Additionally with right-sided facial droop and dysarthria. The patient was noted to have an acute intraparenchymal hemorrhage on CT in the left thalamic region with surrounding edema. A CTA was negative. Patient was initially placed on a Cardene drip. Course complicated by significant left-sided leg pain and was recommended on treatment for radicular symptoms including Tylenol, gabapentin and potentially steroids. Neurology was consulted and recommended MRI of the brain with and without contrast while holding antithrombotics. The MRI showed a stable left thalamic capsular intraparenchymal hemorrhage with surrounding edema without any other mass effect. A repeat CT was completed on 8/17/2023 after worsening symptoms including worsening speech slurring with stable findings. Additionally there was a rapid response after a fall with head strike on 8/18 with a repeat CT of the head without acute findings. Additionally antihypertensive regimen was altered as she was previously on telmisartan and atenolol and is currently on amlodipine, losartan and hydralazine with better control.  The patient was evaluated by the Rehabilitation team and deemed an appropriate candidate for comprehensive inpatient rehabilitation and admitted to the [facility] on 8/25/2023  3:19 PM    SUBJECTIVE: Patient seen face to face. No acute issues overnight. Slept well, denies acute pain, chronic pain controlled with current regimen. Good appetite, voiding, LBM 9/3 s/p suppository. Per patient had a bowel movement on 9/5. Agreeable to bisacodyl tabs daily. Denies chest pain, shortness of breath, fever, chills, N/V, abdominal pain. ASSESSMENT: Stable, progressing    PLAN:  - constipation: in addition to current regimen, added bisacodyl tabs daily. If no bowel movement by 1800, suppository, encouraged fluids  - depression w/anxiety: neuropsychology following,  - urinary incontinence: timed void every 4 hours with PVR during the day, every 6-8 hours at night. Straight cath for >400 mL. Rehabilitation  Team conference: The team discussed patient's functional status, goals, and barriers. • Functional deficits:  Self-care, right hemiparesis, mobility, cognitive impairment  • Continue current rehabilitation plan of care to maximize function.     • Functional update:   Physical Therapy Occupational Therapy Speech Therapy   Weight Bearing Status: Full Weight Bearing  Transfers: Assist of 2, Total Assistance  Bed Mobility: Moderate Assistance, Maximum Assistance  Amulation Distance (ft): 0 feet (Unable to safely and effectively assess.)  Ambulation:  (Unsafe to currently assess)  Wheelchair Mobility Distance: 150 ft (L UE/LE)  Wheelchair Mobility: Moderate Assistance, Maximum Assistance (For guidance around obstacles)  Number of Stairs:  (Unable to safely and effectively assess.)  Assistive Device for Stairs:  (Unable to safely and effectively assess.)  Stair Assistance:  (Unable to safely and effectively assess.)  Ramp:  (Unable to safely and effectively assess.)  Assistive Device for Ramp:  (Unable to safely and effectively assess.)  Discharge Recommendations: Home with: (Versus assisted living/SNF based on family support capabilties.)  DC Home with[de-identified] Family Support, [employer], [facility] Physical Therapy   Eating: Supervision  Grooming: Maximum Assistance  Bathing: Maximum Assistance  Bathing: Maximum Assistance  Upper Body Dressing: Maximum Assistance  Lower Body Dressing: Total Assistance  Toileting: Total Assistance, Assist of 2  Toilet Transfer: Assist of 2, Total Assistance  Cognition: Within Defined Limits   Mode of Communication: Verbal  Speech/Language:  (mild word finding)  Cognition: Exceptions to WNL  Cognition: Decreased Memory, Decreased Executive Functions, Decreased Attention, Decreased Comprehension  Orientation: Person, Place, Time, Situation  Discharge Recommendations: Home with:  [address] [place] [address] with[de-identified] [employer], Family Support, Home Speech Therapy, Outpatient Speech Therapy     • Estimated Discharge: anticipated 4 week goals, reteam      Pain  • Gabapentin 300 mg TID  • Oxycodone 2.5-5 mg every 4 hours prn  • Rifaximin 500 mg every 6 hours prn    DVT prophylaxis  • Heparin sc    Bladder plan  • Continent    Bowel plan  • Continent  • Colace 100 mg BID  • Senokot 17.2 mg qhs  • Miralax daily  • Bisacodyl 5 mg tab daily prn  • Bisacodyl supp daily prn      * ICH (intracerebral hemorrhage) ([address])  Assessment & Plan  55-year-old female presents with right hemiplegia, dysarthria and facial asymmetry found to have an acute intraparenchymal hemorrhage in the left thalamic region with surrounding edema felt to be secondary to uncontrolled hypertension  · Had repeat CT of the head on 8/18 after a fall with head strike resulting in significant bruising of the face  · Cleared by neurology to restart aspirin 81 mg daily and atorvastatin 20 mg daily  · Evaluated by neurosurgery with no surgical intervention at this time  · Had a repeat head CT on 8/20 which has been stable  · Goal systolic blood pressure of less than 140  · Follow-up with neurology as an outpatient in 6 weeks (10/26)  · Physical and Occupational Therapy with goals for community discharge.   Patient lives with her  in a mobile home with 5 steps to enter and he is able to assist 24/7  · Hemorrhagic stroke education, nutrition, neuropsychology  · Secondary stroke prophylaxis with hypertension control    Primary hypertension  Assessment & Plan  · Home regimen: Telmisartan and atenolol  · Current regimen: Norvasc 10 mg daily Cozaar 100 mg daily, hydralazine discontinued  · Monitor blood pressures especially in therapy and make adjustments as needed    Chronic bilateral low back pain with bilateral sciatica  Assessment & Plan  · Patient has a history of chronic low back pain with radicular symptoms in addition to fibromyalgia and myofascial pain syndrome. · She follows with Dr. Castro Dress from pain management has tried several medications as well as epidural steroid injections with little relief  · Imaging reveals lumbar degenerative disc disease at the L3-4, L4-5, L5-S1 level. Additionally facet hypertrophic changes and ligamentous laxity at the L4-5 level resulting in grade 1 anterior spondylolisthesis and mild canal narrowing with slight distortion of the left anterior lateral aspect of the thecal sac at this level with mild right greater than left neuroforaminal narrowing  · Multi modal pain with current medication regimen including oxycodone to be weaned as well as the Robaxin and gabapentin.   We will also consult neuropsychology  · Added Tylenol as well as Robaxin and as needed Valium    Diabetes mellitus type 2, controlled [facility])  Assessment & Plan  Lab Results   Component Value Date    HGBA1C 6.7 (H) 08/15/2023       Recent Labs     09/05/23  1038 09/05/23  1538 09/05/23  2041 09/06/23  0622   POCGLU 204* 117 128 137     · Currently on carb controlled level 2 diet, metformin 500 mg twice daily  · Sliding scale insulin algorithm 3 with Accu-Cheks 4 times daily      Constipation  Assessment & Plan  · On admission had not had a bowel movement in at least 11 days  · Obtaining x-ray to evaluate stool burden  · Adding to bowel regimen including increasing senna and Colace, lactulose as needed and will be more aggressive pending results of x-ray  · Continue relatively aggressive regimen    Hyponatremia  Assessment & Plan  · Sodium 134 (previously 133)  · Continue following biweekly labs with no intervention at this point unless continues to trend down. CAD (coronary artery disease)  Assessment & Plan  · History of CAD currently on statin and aspirin that was restarted on 8/21 after clearance by neurology  · Beta-blocker held due to bradycardia  · Patient has not formally seen a cardiologist however this was diagnosed based on a CT PE study that was conducted in the ER showing mild CAD    GERD (gastroesophageal reflux disease)  Assessment & Plan  Continue Protonix    Hyperlipidemia associated with type 2 diabetes mellitus ([address])  Assessment & Plan  Continue atorvastatin 20 mg with dinner    Class 2 obesity with body mass index (BMI) of 39.0 to 39.9 in adult  Assessment & Plan  · BMI of 41.40 on admission  · Continue promoting weight loss and increased activity, diet and exercise  · Continue a consistent carbohydrate diet  · Nutrition consultation    Sjogren's syndrome ([address])  Assessment & Plan  · Patient states that she is been worked up for Sjogren's syndrome in the past and has had conflicting diagnosis he is stating that some physicians have diagnosed her with it and others stated she did not have it. · She has not been on any medication treatment for this and does not actively follow-up with a rheumatologist    Depression with anxiety  Assessment & Plan  · Neuropsychology consultation poststroke with history of anxiety  · Continue Wellbutrin  mg in the morning.   It has been 300 mg as an outpatient however it was decreased in acute care due to worry of decreasing seizure threshold in the setting of ICH    Moderate persistent asthma without complication  Assessment & Plan  · Follows with Dr. Mitul Block from pulmonology  · Home regimen includes Breo and as needed albuterol  · On equivalent here and added albuterol inhaler on admission to [facility] consultants medical co-management. Personally reviewed labs, medications, and imaging. ROS:  .Review of Systems   A 10 point review of systems was negative except for what is noted in the HPI. OBJECTIVE:   /66 (BP Location: Left arm)   Pulse 86   Temp 98.2 °F (36.8 °C) (Oral)   Resp 19   Ht 5' (1.524 m)   Wt 91.2 kg (201 lb) Comment: P500 bed  SpO2 92%   BMI 39.26 kg/m²     Physical Exam  Constitutional:       Appearance: Normal appearance. She is obese. HENT:      Head: Normocephalic and atraumatic. Nose: Nose normal.      Mouth/Throat:      Mouth: Mucous membranes are moist.   Cardiovascular:      Rate and Rhythm: Normal rate and regular rhythm. Pulses: Normal pulses. Heart sounds: Normal heart sounds. Pulmonary:      Effort: Pulmonary effort is normal.      Breath sounds: Normal breath sounds. Abdominal:      General: Bowel sounds are normal.      Palpations: Abdomen is soft. Musculoskeletal:         General: Normal range of motion. Cervical back: Normal range of motion. Skin:     General: Skin is warm and dry. Capillary Refill: Capillary refill takes less than 2 seconds. Findings: Bruising (right facial ) present. Neurological:      Mental Status: She is alert and oriented to person, place, and time. Sensory: Sensory deficit present. Motor: Weakness present.       Gait: Gait abnormal.   Psychiatric:         Mood and Affect: Mood normal.         Judgment: Judgment normal.         Lab Results   Component Value Date    WBC 8.42 09/04/2023    HGB 11.9 09/04/2023    HCT 37.0 09/04/2023    MCV 86 09/04/2023     09/04/2023     Lab Results   Component Value Date    SODIUM 134 (L) 09/04/2023    K 4.1 09/04/2023     09/04/2023    CO2 28 09/04/2023    BUN 16 09/04/2023    CREATININE 0.59 (L) 09/04/2023    GLUC 114 09/04/2023    CALCIUM 8.9 09/04/2023     Lab Results   Component Value Date    INR 1.06 08/15/2023    INR 0.94 08/14/2023    PROTIME 14.0 08/15/2023    PROTIME 13.2 08/14/2023         Current Facility-Administered Medications:   •  acetaminophen (TYLENOL) tablet 650 mg, 650 mg, Oral, Q6H PRN, Marylen Climes, DO, 650 mg at 09/01/23 0023  •  albuterol (PROVENTIL HFA,VENTOLIN HFA) inhaler 2 puff, 2 puff, Inhalation, Q4H PRN, Marylen Climes, DO, 2 puff at 09/04/23 0919  •  amLODIPine (NORVASC) tablet 10 mg, 10 mg, Oral, Daily, Marylen Climes, DO, 10 mg at 09/05/23 0900  •  aspirin chewable tablet 81 mg, 81 mg, Oral, Daily, Marylen Climes, DO, 81 mg at 09/06/23 6501  •  atorvastatin (LIPITOR) tablet 20 mg, 20 mg, Oral, Daily With Dinner, Marylen Climes, DO, 20 mg at 09/05/23 1644  •  bisacodyl (DULCOLAX) EC tablet 5 mg, 5 mg, Oral, Daily, ARSENIO Pinto  •  bisacodyl (DULCOLAX) rectal suppository 10 mg, 10 mg, Rectal, Daily PRN, Adwoa Rios MD, 10 mg at 09/03/23 1826  •  buPROPion (WELLBUTRIN XL) 24 hr tablet 150 mg, 150 mg, Oral, QAM, Marylen Climes, DO, 150 mg at 09/06/23 3067  •  calcium carbonate (TUMS) chewable tablet 1,000 mg, 1,000 mg, Oral, TID PRN, Laureen Tracy PA-C, 1,000 mg at 08/26/23 1241  •  cyanocobalamin (VITAMIN B-12) tablet 1,000 mcg, 1,000 mcg, Oral, Daily, Marylen Climes, DO, 1,000 mcg at 09/06/23 3613  •  diazepam (VALIUM) tablet 2 mg, 2 mg, Oral, BID PRN, Marylen Climes, DO, 2 mg at 09/01/23 2305  •  docusate sodium (COLACE) capsule 100 mg, 100 mg, Oral, BID, ARSENIO Pinto, 100 mg at 09/06/23 2274  •  ferrous sulfate tablet 325 mg, 325 mg, Oral, Daily With Breakfast, Marylen Climes, DO, 325 mg at 09/06/23 0841  •  fluticasone-vilanterol 200-25 mcg/actuation 1 puff, 1 puff, Inhalation, Daily, Marylen Climes, DO, 1 puff at 09/06/23 0840  •  gabapentin (NEURONTIN) capsule 300 mg, 300 mg, Oral, TID, Marylen Climes, DO, 300 mg at 09/06/23 1479  •  heparin (porcine) subcutaneous injection 5,000 Units, 5,000 Units, Subcutaneous, Q8H [facility], Elizabeth Roulas, DO, 5,000 Units at 09/06/23 0557  •  insulin lispro (HumaLOG) 100 units/mL subcutaneous injection 1-6 Units, 1-6 Units, Subcutaneous, TID AC, 2 Units at 09/05/23 1108 **AND** Fingerstick Glucose (POCT), , , 4x Daily AC and at bedtime, Eliazbeth Aures, DO  •  insulin lispro (HumaLOG) 100 units/mL subcutaneous injection 1-6 Units, 1-6 Units, Subcutaneous, HS, Elizabeth Aures, DO, 1 Units at 09/04/23 2110  •  lactulose oral solution 20 g, 20 g, Oral, Daily PRN, Elizabeth Aures, DO  •  losartan (COZAAR) tablet 100 mg, 100 mg, Oral, Daily, Elizabeth Aures, DO, 100 mg at 09/05/23 7304  •  melatonin tablet 3 mg, 3 mg, Oral, HS, MARCO KimballNP, 3 mg at 09/05/23 2106  •  metFORMIN (GLUCOPHAGE) tablet 500 mg, 500 mg, Oral, BID With Meals, ARSENIO Damian, 500 mg at 09/06/23 0841  •  methocarbamol (ROBAXIN) tablet 500 mg, 500 mg, Oral, TID, Elizabeth Aures, DO, 500 mg at 09/06/23 8274  •  miconazole (MICOTIN) 2 % powder 1 Application, 1 Application, Topical, BID, Elizabeth Celestes, DO, 1 Application at 34/46/57 0841  •  ondansetron (ZOFRAN-ODT) dispersible tablet 4 mg, 4 mg, Oral, Q6H PRN, Elizabeth Aures, DO  •  oxyCODONE (ROXICODONE) IR tablet 5 mg, 5 mg, Oral, Q4H PRN, Elizabeth Aures, DO, 5 mg at 09/06/23 3868  •  oxyCODONE (ROXICODONE) split tablet 2.5 mg, 2.5 mg, Oral, Q4H PRN, Elizabeth Aures, DO, 2.5 mg at 09/04/23 1752  •  pantoprazole (PROTONIX) EC tablet 40 mg, 40 mg, Oral, Early Morning, Elizabeth Aures, DO, 40 mg at 09/06/23 0557  •  polyethylene glycol (MIRALAX) packet 17 g, 17 g, Oral, Daily, Elizabeth Aures, DO, 17 g at 09/05/23 3771  •  prochlorperazine (COMPAZINE) tablet 5 mg, 5 mg, Oral, Q6H PRN, Elizabeth Aures, DO  •  senna (SENOKOT) tablet 17.2 mg, 2 tablet, Oral, HS, ARSENIO Pinto, 17.2 mg at 09/05/23 2106    Past Medical History:   Diagnosis Date   • Arthritis    • Asthma    • Continuous opioid dependence ([address]) 4/27/2022   • Diabetes mellitus [facility])    • Diverticulitis of colon    • Fibromyalgia 04/26/2022   • Headache(784.0)    • History of mammogram 2018   • History of tobacco abuse 04/26/2022   • Hypertension    • Nausea 04/29/2022   • Obesity    • Sjogren's syndrome ([address]) 10/19/2012   • Urinary tract infection        Patient Active Problem List    Diagnosis Date Noted   • ICH (intracerebral hemorrhage) ([address]) 08/15/2023   • Primary hypertension 04/26/2022   • Chronic bilateral low back pain with bilateral sciatica 08/31/2022   • Diabetes mellitus type 2, controlled ([address]) 04/29/2022   • Constipation 08/25/2023   • Hyponatremia 08/31/2023   • CAD (coronary artery disease) 04/07/2023   • GERD (gastroesophageal reflux disease) 08/25/2023   • Anemia 08/16/2023   • Left leg pain 08/15/2023   • Chronic obstructive pulmonary disease with acute exacerbation ([address]) 07/26/2023   • Hyperlipidemia associated with type 2 diabetes mellitus ([address]) 07/26/2023   • Abnormal CT of the chest 06/28/2023   • Class 2 obesity with body mass index (BMI) of 39.0 to 39.9 in adult 06/28/2023   • Right lumbosacral radiculopathy 10/12/2022   • Paresthesia of both feet 08/31/2022   • Postoperative visit 05/24/2022   • Incarcerated umbilical hernia 16/54/0462   • Continuous opioid dependence ([address]) 04/27/2022   • Moderate persistent asthma without complication 48/48/4424   • Cigarette nicotine dependence in remission 04/26/2022   • Fibromyalgia 04/26/2022   • Sjogren's syndrome ([address]) 10/19/2012   • Depression with anxiety 09/18/2012        ARSENIO Hou  Physical Medicine and [place]  used

## 2024-06-26 ENCOUNTER — APPOINTMENT (EMERGENCY)
Dept: CT IMAGING | Facility: HOSPITAL | Age: 59
End: 2024-06-26
Payer: COMMERCIAL

## 2024-06-26 ENCOUNTER — APPOINTMENT (EMERGENCY)
Dept: RADIOLOGY | Facility: HOSPITAL | Age: 59
End: 2024-06-26
Payer: COMMERCIAL

## 2024-06-26 ENCOUNTER — APPOINTMENT (EMERGENCY)
Dept: VASCULAR ULTRASOUND | Facility: HOSPITAL | Age: 59
End: 2024-06-26
Payer: COMMERCIAL

## 2024-06-26 ENCOUNTER — HOSPITAL ENCOUNTER (EMERGENCY)
Facility: HOSPITAL | Age: 59
Discharge: HOME | End: 2024-06-26
Attending: EMERGENCY MEDICINE
Payer: COMMERCIAL

## 2024-06-26 VITALS
OXYGEN SATURATION: 97 % | RESPIRATION RATE: 18 BRPM | TEMPERATURE: 98 F | SYSTOLIC BLOOD PRESSURE: 141 MMHG | HEART RATE: 67 BPM | DIASTOLIC BLOOD PRESSURE: 47 MMHG

## 2024-06-26 DIAGNOSIS — R60.0 EDEMA OF RIGHT LOWER EXTREMITY: ICD-10-CM

## 2024-06-26 DIAGNOSIS — G57.01 PIRIFORMIS SYNDROME OF RIGHT SIDE: Primary | ICD-10-CM

## 2024-06-26 DIAGNOSIS — R93.5 ABNORMAL CT OF THE ABDOMEN: ICD-10-CM

## 2024-06-26 LAB
ALBUMIN SERPL BCG-MCNC: 3.9 G/DL (ref 3.5–5)
ALP SERPL-CCNC: 65 U/L (ref 34–104)
ALT SERPL W P-5'-P-CCNC: 14 U/L (ref 7–52)
ANION GAP SERPL CALCULATED.3IONS-SCNC: 10 MMOL/L (ref 4–13)
AST SERPL W P-5'-P-CCNC: 20 U/L (ref 13–39)
BASOPHILS # BLD AUTO: 0.11 THOUSANDS/ÂΜL (ref 0–0.1)
BASOPHILS NFR BLD AUTO: 1 % (ref 0–1)
BILIRUB DIRECT SERPL-MCNC: 0.05 MG/DL (ref 0–0.2)
BILIRUB SERPL-MCNC: 0.3 MG/DL (ref 0.2–1)
BILIRUB UR QL STRIP: NEGATIVE
BUN SERPL-MCNC: 7 MG/DL (ref 5–25)
CALCIUM SERPL-MCNC: 9.5 MG/DL (ref 8.4–10.2)
CHLORIDE SERPL-SCNC: 102 MMOL/L (ref 96–108)
CLARITY UR: ABNORMAL
CO2 SERPL-SCNC: 26 MMOL/L (ref 21–32)
COLOR UR: YELLOW
CREAT SERPL-MCNC: 0.61 MG/DL (ref 0.6–1.3)
EOSINOPHIL # BLD AUTO: 0.57 THOUSAND/ÂΜL (ref 0–0.61)
EOSINOPHIL NFR BLD AUTO: 7 % (ref 0–6)
ERYTHROCYTE [DISTWIDTH] IN BLOOD BY AUTOMATED COUNT: 15.9 % (ref 11.6–15.1)
GFR SERPL CREATININE-BSD FRML MDRD: 99 ML/MIN/1.73SQ M
GLUCOSE SERPL-MCNC: 163 MG/DL (ref 65–140)
GLUCOSE UR STRIP-MCNC: NEGATIVE MG/DL
HCT VFR BLD AUTO: 35.7 % (ref 34.8–46.1)
HGB BLD-MCNC: 11.3 G/DL (ref 11.5–15.4)
HGB UR QL STRIP.AUTO: NEGATIVE
IMM GRANULOCYTES # BLD AUTO: 0.02 THOUSAND/UL (ref 0–0.2)
IMM GRANULOCYTES NFR BLD AUTO: 0 % (ref 0–2)
KETONES UR STRIP-MCNC: NEGATIVE MG/DL
LEUKOCYTE ESTERASE UR QL STRIP: NEGATIVE
LIPASE SERPL-CCNC: 18 U/L (ref 11–82)
LYMPHOCYTES # BLD AUTO: 2.97 THOUSANDS/ÂΜL (ref 0.6–4.47)
LYMPHOCYTES NFR BLD AUTO: 37 % (ref 14–44)
MCH RBC QN AUTO: 26 PG (ref 26.8–34.3)
MCHC RBC AUTO-ENTMCNC: 31.7 G/DL (ref 31.4–37.4)
MCV RBC AUTO: 82 FL (ref 82–98)
MONOCYTES # BLD AUTO: 0.53 THOUSAND/ÂΜL (ref 0.17–1.22)
MONOCYTES NFR BLD AUTO: 7 % (ref 4–12)
NEUTROPHILS # BLD AUTO: 3.9 THOUSANDS/ÂΜL (ref 1.85–7.62)
NEUTS SEG NFR BLD AUTO: 48 % (ref 43–75)
NITRITE UR QL STRIP: NEGATIVE
NRBC BLD AUTO-RTO: 0 /100 WBCS
PH UR STRIP.AUTO: 6 [PH]
PLATELET # BLD AUTO: 428 THOUSANDS/UL (ref 149–390)
PMV BLD AUTO: 9.1 FL (ref 8.9–12.7)
POTASSIUM SERPL-SCNC: 3.9 MMOL/L (ref 3.5–5.3)
PROT SERPL-MCNC: 7.9 G/DL (ref 6.4–8.4)
PROT UR STRIP-MCNC: NEGATIVE MG/DL
RBC # BLD AUTO: 4.35 MILLION/UL (ref 3.81–5.12)
SODIUM SERPL-SCNC: 138 MMOL/L (ref 135–147)
SP GR UR STRIP.AUTO: 1.01 (ref 1–1.03)
UROBILINOGEN UR QL STRIP.AUTO: 0.2 E.U./DL
WBC # BLD AUTO: 8.1 THOUSAND/UL (ref 4.31–10.16)

## 2024-06-26 PROCEDURE — 80048 BASIC METABOLIC PNL TOTAL CA: CPT | Performed by: EMERGENCY MEDICINE

## 2024-06-26 PROCEDURE — 83690 ASSAY OF LIPASE: CPT | Performed by: EMERGENCY MEDICINE

## 2024-06-26 PROCEDURE — 71045 X-RAY EXAM CHEST 1 VIEW: CPT

## 2024-06-26 PROCEDURE — 80076 HEPATIC FUNCTION PANEL: CPT | Performed by: EMERGENCY MEDICINE

## 2024-06-26 PROCEDURE — 74177 CT ABD & PELVIS W/CONTRAST: CPT

## 2024-06-26 PROCEDURE — 85025 COMPLETE CBC W/AUTO DIFF WBC: CPT | Performed by: EMERGENCY MEDICINE

## 2024-06-26 PROCEDURE — 36415 COLL VENOUS BLD VENIPUNCTURE: CPT | Performed by: EMERGENCY MEDICINE

## 2024-06-26 PROCEDURE — 93971 EXTREMITY STUDY: CPT

## 2024-06-26 PROCEDURE — 81003 URINALYSIS AUTO W/O SCOPE: CPT | Performed by: EMERGENCY MEDICINE

## 2024-06-26 PROCEDURE — 93971 EXTREMITY STUDY: CPT | Performed by: INTERNAL MEDICINE

## 2024-06-26 PROCEDURE — 99285 EMERGENCY DEPT VISIT HI MDM: CPT | Performed by: EMERGENCY MEDICINE

## 2024-06-26 RX ORDER — NAPROXEN 500 MG/1
500 TABLET ORAL 2 TIMES DAILY WITH MEALS
Qty: 30 TABLET | Refills: 0 | Status: SHIPPED | OUTPATIENT
Start: 2024-06-26

## 2024-06-26 RX ORDER — METHOCARBAMOL 500 MG/1
500 TABLET, FILM COATED ORAL 3 TIMES DAILY PRN
Qty: 20 TABLET | Refills: 0 | Status: SHIPPED | OUTPATIENT
Start: 2024-06-26

## 2024-06-26 RX ORDER — ACETAMINOPHEN 500 MG
1000 TABLET ORAL EVERY 6 HOURS PRN
Qty: 40 TABLET | Refills: 0 | Status: SHIPPED | OUTPATIENT
Start: 2024-06-26

## 2024-06-26 RX ORDER — KETOROLAC TROMETHAMINE 30 MG/ML
15 INJECTION, SOLUTION INTRAMUSCULAR; INTRAVENOUS ONCE
Status: COMPLETED | OUTPATIENT
Start: 2024-06-26 | End: 2024-06-26

## 2024-06-26 RX ORDER — LIDOCAINE 50 MG/G
1 PATCH TOPICAL DAILY
Qty: 30 PATCH | Refills: 0 | Status: SHIPPED | OUTPATIENT
Start: 2024-06-26

## 2024-06-26 RX ADMIN — SODIUM CHLORIDE 500 ML: 0.9 INJECTION, SOLUTION INTRAVENOUS at 13:12

## 2024-06-26 RX ADMIN — IOHEXOL 100 ML: 350 INJECTION, SOLUTION INTRAVENOUS at 11:23

## 2024-06-26 RX ADMIN — KETOROLAC TROMETHAMINE 15 MG: 30 INJECTION, SOLUTION INTRAMUSCULAR at 10:49

## 2024-06-26 NOTE — DISCHARGE INSTRUCTIONS
Take Tylenol every 6 hours, naproxen every 12 hours and use Robaxin muscle relaxers as prescribed.  The Robaxin will make you tired.  Do not take it and drive.    Follow-up with your primary care provider.  Come back to the emergency department for new or worsening symptoms.    CT scan today showed enlargement of the common bile duct for which it is recommended that you follow-up with your primary care provider and obtain an MRCP of your abdomen.  CT scan also showed some enlargement of some lymph nodes.  This is nonspecific and should be followed with your primary care provider.

## 2024-06-26 NOTE — INCIDENTAL FINDINGS
The following findings require follow up:  Radiographic finding   Finding: Enlarged CBD   Follow up required: MRCP   Follow up should be done within 3 week(s)    Please notify the following clinician to assist with the follow up:   Dr. Analia Goff    Incidental finding results were discussed with the Patient by Florencio Blackwood DO on 06/26/24.   They expressed understanding and all questions answered.

## 2024-06-26 NOTE — ED PROVIDER NOTES
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    Reports approximately 5 days of right thigh pain radiating down the leg.  Also radiates up to the right hip into R buttock.  Worse with ambulation.  Worse with movement.    Denies back pain, urinary continence, urinary retention, neurological deficits beyond her baseline.  She ambulates with a walker.    She has taken nothing for the symptoms.    She also notes abdominal pain that is chronic in nature.  1 month of abdominal pain.  bandlike distribution around the bellybutton radiating both directions.  No nausea, vomiting, abnormal bowel movements.    Notes occasional dyspnea.      Leg Pain  Location:  Buttock  Injury: no    Buttock location:  R buttock  Pain details:     Quality:  Shooting    Radiates to:  R leg    Severity:  Severe    Onset quality:  Sudden    Timing:  Constant  Chronicity:  New      Prior to Admission Medications   Prescriptions Last Dose Informant Patient Reported? Taking?   Aspirin Low Dose 81 MG EC tablet  Outside Facility (Specify), Self No No   Sig: Take 1 tablet by mouth once daily   DULoxetine (CYMBALTA) 20 mg capsule   No No   Sig: Take 1 capsule (20 mg total) by mouth daily   Empagliflozin 25 MG TABS   No No   Sig: Take 1 tablet (25 mg total) by mouth daily   acetaminophen (TYLENOL) 325 mg tablet  Outside Facility (Specify), Self No No   Sig: Take 2 tablets (650 mg total) by mouth every 6 (six) hours as needed for mild pain   albuterol (Ventolin HFA) 90 mcg/act inhaler  Self No No   Sig: Inhale 2 puffs every 6 (six) hours as needed for wheezing    amLODIPine (NORVASC) 5 mg tablet  Self No No   Sig: Take 1 tablet (5 mg total) by mouth daily   aspirin 81 mg chewable tablet  Self No No   Sig: Chew 1 tablet (81 mg total) daily   atorvastatin (LIPITOR) 20 mg tablet  Self No No   Sig: Take 1 tablet (20 mg total) by mouth daily   baclofen 5 MG TABS  Outside Facility (Specify), Self No No   Sig: Take 5 mg by mouth 2 (two) times a day   Patient not taking: Reported on 2/5/2024   buPROPion (WELLBUTRIN XL) 150 mg 24 hr tablet  Outside Facility (Specify), Self No No   Sig: Take 1 tablet (150 mg total) by mouth every morning Do not start before September 20, 2023.   Patient not taking: Reported on 2/5/2024   busPIRone (BUSPAR) 7.5 mg tablet  Self No No   Sig: Take 1 tablet (7.5 mg total) by mouth 2 (two) times a day   cyanocobalamin (VITAMIN B-12) 1000 MCG tablet  Outside Facility (Specify), Self No No   Sig: Take 1 tablet (1,000 mcg total) by mouth daily Do not start before September 20, 2023.   Patient not taking: Reported on 2/5/2024   docusate sodium (COLACE) 100 mg capsule  Outside Facility (Specify), Self No No   Sig: Take 1 capsule (100 mg total) by mouth 2 (two) times a day   ferrous sulfate 325 (65 Fe) mg tablet  Outside Facility (Specify), Self No No   Sig: Take 1 tablet (325 mg total) by mouth daily with breakfast Do not start before September 20, 2023.   Patient not taking: Reported on 2/5/2024   fluticasone-vilanterol (Breo Ellipta) 200-25 mcg/actuation inhaler  Self No No   Sig: Inhale 1 puff daily Rinse mouth after use.   gabapentin (NEURONTIN) 300 mg capsule   No No   Sig: TAKE 2 CAPSULES BY MOUTH THREE TIMES DAILY AS NEEDED   hydroCHLOROthiazide 12.5 mg tablet  Self No No   Sig: Take 1 tablet (12.5 mg total) by mouth daily as needed (increased leg swelling or weight gain over 5 lbs)   losartan-hydrochlorothiazide (HYZAAR) 100-12.5 MG per tablet  Self No No   Sig: Take 1 tablet by mouth daily   melatonin 3 mg  Outside Facility (Specify), Self No No    Sig: Take 2 tablets (6 mg total) by mouth daily at bedtime   Patient not taking: Reported on 2024   metFORMIN (GLUCOPHAGE) 1000 MG tablet  Self No No   Sig: Take 1 tablet (1,000 mg total) by mouth 2 (two) times a day with meals   pantoprazole (PROTONIX) 40 mg tablet  Outside Facility (Specify), Self No No   Sig: Take 1 tablet (40 mg total) by mouth daily in the early morning Do not start before 2023.   Patient not taking: Reported on 2024   semaglutide, 0.25 or 0.5 mg/dose, (Ozempic, 0.25 or 0.5 MG/DOSE,) 2 mg/3 mL injection pen  Self No No   Si.25 mg under the skin every 7 days for 4 doses (28 days), THEN 0.5 mg under the skin every 7 days   Patient not taking: Reported on 2024      Facility-Administered Medications: None       Past Medical History:   Diagnosis Date    Arthritis     Asthma     Continuous opioid dependence (HCC) 2022    Continuous opioid dependence (HCC) 2022    Diabetes mellitus (HCC)     Diverticulitis of colon     Fibromyalgia 2022    Headache(784.0)     History of mammogram 2018    History of tobacco abuse 2022    Hypertension     Nausea 2022    Obesity     Sjogren's syndrome (HCC) 10/19/2012    Stroke (HCC) 08 2023    Urinary tract infection        Past Surgical History:   Procedure Laterality Date    APPENDECTOMY      BREAST BIOPSY Left     unsure of year    CARPAL TUNNEL RELEASE      COLONOSCOPY  2017    repeat in     EPIDURAL BLOCK INJECTION N/A 2023    Procedure: L5-S1 EPIDURALSTEROID INJECTION (57735);  Surgeon: Melo Mancia DO;  Location:  MAIN OR;  Service: Pain Management     FOOT SURGERY      HERNIA REPAIR  2022    NECK SURGERY      spine fusion     MS ARTHROCENTESIS ASPIR&/INJ MAJOR JT/BURSA W/O US  2023         MS RPR UMBILICAL HRNA 5 YRS/> REDUCIBLE N/A 2022    Procedure: REPAIR HERNIA UMBILICAL;  Surgeon: Dread Mckeon MD;  Location: [facility];  Service: General       Family History    Problem Relation Age of Onset    Hypertension Mother     Heart disease Mother     Hypertension Father     Heart disease Father     Asthma Sister     No Known Problems Sister     No Known Problems Sister     Diabetes Maternal Grandmother     No Known Problems Maternal Grandfather     No Known Problems Paternal Grandmother     No Known Problems Paternal Grandfather     Breast cancer Paternal Aunt     Pancreatic cancer Paternal Uncle     Colon cancer Neg Hx     Ovarian cancer Neg Hx     Uterine cancer Neg Hx     Cervical cancer Neg Hx      I have reviewed and agree with the history as documented.    E-Cigarette/Vaping    E-Cigarette Use Never User      E-Cigarette/Vaping Substances    Nicotine No     THC No     CBD No     Flavoring No     Other No     Unknown No      Social History     Tobacco Use    Smoking status: Former     Current packs/day: 0.00     Average packs/day: 0.5 packs/day for 43.3 years (21.6 ttl pk-yrs)     Types: Cigarettes     Start date: 1980     Quit date: 2023     Years since quittin.2     Passive exposure: Past    Smokeless tobacco: Never    Tobacco comments:     per pt, 5 a day - As per [place]    Vaping Use    Vaping status: Never Used   Substance Use Topics    Alcohol use: Not Currently     Comment: occasional     Drug use: Never       Review of Systems   Musculoskeletal:  Positive for arthralgias.   Skin:         Right lower extremity edema.   All other systems reviewed and are negative.      Physical Exam  Physical Exam  Vitals and nursing note reviewed.   Constitutional:       General: She is not in acute distress.     Appearance: Normal appearance. She is not ill-appearing.   HENT:      Head: Normocephalic and atraumatic.      Right Ear: External ear normal.      Left Ear: External ear normal.      Nose: Nose normal.      Mouth/Throat:      Mouth: Mucous membranes are moist.   Eyes:      General:         Right eye: No discharge.         Left eye: No discharge.       Conjunctiva/sclera: Conjunctivae normal.   Cardiovascular:      Rate and Rhythm: Normal rate and regular rhythm.      Pulses: Normal pulses.      Heart sounds: No murmur heard.  Pulmonary:      Effort: Pulmonary effort is normal.      Breath sounds: Normal breath sounds.   Abdominal:      General: Abdomen is flat. There is no distension.      Tenderness: There is abdominal tenderness.      Comments: Bandlike distribution of abdominal pain around the umbilicus/ lateral to the umbilicus.  Negative Pagan.   Musculoskeletal:         General: Tenderness present. Normal range of motion.      Cervical back: Normal range of motion.      Comments: Tenderness to palpation of the right gluteal region.  No C, T, L-spine tenderness.    Straight leg raise on the right.    Patient has bilateral nonpitting edema which appears generally equal to me though the patient states that is greater on the right side.    No pain on palpation of the right calf.  No swelling of the right calf as compared to the left.    No overlying erythema on the right hip or right gluteal region.   Skin:     General: Skin is warm.      Capillary Refill: Capillary refill takes less than 2 seconds.      Findings: No rash.   Neurological:      General: No focal deficit present.      Mental Status: She is alert. Mental status is at baseline.   Psychiatric:         Mood and Affect: Mood normal.         Behavior: Behavior normal.         Vital Signs  ED Triage Vitals   Temperature Pulse Respirations Blood Pressure SpO2   06/26/24 0941 06/26/24 0941 06/26/24 0941 06/26/24 0941 06/26/24 0941   98 °F (36.7 °C) 72 20 (!) 184/86 96 %      Temp Source Heart Rate Source Patient Position - Orthostatic VS BP Location FiO2 (%)   06/26/24 0941 06/26/24 0941 06/26/24 0941 06/26/24 0941 --   Oral Monitor Lying Left arm       Pain Score       06/26/24 1049       9           Vitals:    06/26/24 0941 06/26/24 1319   BP: (!) 184/86 (!) 141/47   Pulse: 72 67   Patient Position -  Orthostatic VS: Lying Sitting         Visual Acuity      ED Medications  Medications   ketorolac (TORADOL) injection 15 mg (15 mg Intravenous Given 6/26/24 1049)   iohexol (OMNIPAQUE) 350 MG/ML injection (SINGLE-DOSE) 100 mL (100 mL Intravenous Given 6/26/24 1123)   sodium chloride 0.9 % bolus 500 mL (0 mL Intravenous Stopped 6/26/24 1405)       Diagnostic Studies  Results Reviewed       Procedure Component Value Units Date/Time    UA w Reflex to Microscopic w Reflex to Culture [[id number]]  (Abnormal) Collected: 06/26/24 1404    Lab Status: Final result Specimen: Urine, Clean Catch Updated: 06/26/24 1419     Color, UA Yellow     Clarity, UA Slightly Cloudy     Specific Gravity, UA 1.010     pH, UA 6.0     Leukocytes, UA Negative     Nitrite, UA Negative     Protein, UA Negative mg/dl      Glucose, UA Negative mg/dl      Ketones, UA Negative mg/dl      Urobilinogen, UA 0.2 E.U./dl      Bilirubin, UA Negative     Occult Blood, UA Negative    Basic metabolic panel [[id number]]  (Abnormal) Collected: 06/26/24 1046    Lab Status: Final result Specimen: Blood from Hand, Left Updated: 06/26/24 1111     Sodium 138 mmol/L      Potassium 3.9 mmol/L      Chloride 102 mmol/L      CO2 26 mmol/L      ANION GAP 10 mmol/L      BUN 7 mg/dL      Creatinine 0.61 mg/dL      Glucose 163 mg/dL      Calcium 9.5 mg/dL      eGFR 99 ml/min/1.73sq m     Narrative:      National Kidney Disease Foundation guidelines for Chronic Kidney Disease (CKD):     Stage 1 with normal or high GFR (GFR > 90 mL/min/1.73 square meters)    Stage 2 Mild CKD (GFR = 60-89 mL/min/1.73 square meters)    Stage 3A Moderate CKD (GFR = 45-59 mL/min/1.73 square meters)    Stage 3B Moderate CKD (GFR = 30-44 mL/min/1.73 square meters)    Stage 4 Severe CKD (GFR = 15-29 mL/min/1.73 square meters)    Stage 5 End Stage CKD (GFR <15 mL/min/1.73 square meters)  Note: GFR calculation is accurate only with a steady state creatinine    Hepatic function panel [[id number]]  (Normal)  Collected: 06/26/24 1046    Lab Status: Final result Specimen: Blood from Hand, Left Updated: 06/26/24 1111     Total Bilirubin 0.30 mg/dL      Bilirubin, Direct 0.05 mg/dL      Alkaline Phosphatase 65 U/L      AST 20 U/L      ALT 14 U/L      Total Protein 7.9 g/dL      Albumin 3.9 g/dL     Lipase [[id number]]  (Normal) Collected: 06/26/24 1046    Lab Status: Final result Specimen: Blood from Hand, Left Updated: 06/26/24 1111     Lipase 18 u/L     CBC and differential [[id number]]  (Abnormal) Collected: 06/26/24 1046    Lab Status: Final result Specimen: Blood from Hand, Left Updated: 06/26/24 1052     WBC 8.10 Thousand/uL      RBC 4.35 Million/uL      Hemoglobin 11.3 g/dL      Hematocrit 35.7 %      MCV 82 fL      MCH 26.0 pg      MCHC 31.7 g/dL      RDW 15.9 %      MPV 9.1 fL      Platelets 428 Thousands/uL      nRBC 0 /100 WBCs      Segmented % 48 %      Immature Grans % 0 %      Lymphocytes % 37 %      Monocytes % 7 %      Eosinophils Relative 7 %      Basophils Relative 1 %      Absolute Neutrophils 3.90 Thousands/µL      Absolute Immature Grans 0.02 Thousand/uL      Absolute Lymphocytes 2.97 Thousands/µL      Absolute Monocytes 0.53 Thousand/µL      Eosinophils Absolute 0.57 Thousand/µL      Basophils Absolute 0.11 Thousands/µL                    CT abdomen pelvis with contrast   Final Result by Pablo Lee MD (06/26 1230)      No evidence of bowel obstruction or inflammatory change.      Mild periportal adenopathy is similar to studies dating back to 2019. Inguinal nodes are slightly more pronounced. Findings may be reactive to any acute inflammatory illness and should be correlated clinically.      Common bile duct dilatation of indeterminate etiology. Correlation with any change in LFTs is advised.      Follow-up nonemergent MRI/MRCP is suggested given the interval change.      Droplets of gas in the gallbladder lumen should be correlated with urinalysis and any history of bladder instrumentation.       This was discussed with Dr. Blackwood at 12:29 p.m.         Workstation performed: [id number]         VAS VENOUS DUPLEX -LOWER LIMB UNILATERAL   Final Result by Mary Law MD (06/26 1155)      XR chest 1 view portable   ED Interpretation by Florencio Blackwood DO (06/26 1041)   No acute cardiopulmonary findings.                 Procedures  Procedures         ED Course  ED Course as of 06/26/24 1523   Wed Jun 26, 2024   1035 Per [facility] DVT study negative.   1057 Hemoglobin(!): 11.3  At baseline   1259 Informed of need for MRCP secondary to enlarged common bile duct.  She expressed understanding of this.                               SBIRT 22yo+      Flowsheet Row Most Recent Value   Initial Alcohol Screen: US AUDIT-C     1. How often do you have a drink containing alcohol? 0 Filed at: 06/26/2024 0943   2. How many drinks containing alcohol do you have on a typical day you are drinking?  0 Filed at: 06/26/2024 0943   3a. Male UNDER 65: How often do you have five or more drinks on one occasion? 0 Filed at: 06/26/2024 0943   3b. FEMALE Any Age, or MALE 65+: How often do you have 4 or more drinks on one occassion? 0 Filed at: 06/26/2024 0943   Audit-C Score 0 Filed at: 06/26/2024 0943   JOVON: How many times in the past year have you...    Used an illegal drug or used a prescription medication for non-medical reasons? Never Filed at: 06/26/2024 0943                      Medical Decision Making  Differential including but not limited to gravity dependent edema, DVT, piriformis syndrome, acute intra-abdominal process including but not limited to diverticulitis, colitis, pancreatitis.    Patient has CBD dilation.  Discussed with patient that she will need an outpatient MRCP and to arrange this with her primary care provider.  There is no elevation of LFTs.  No suspicion for ascending cholangitis.    Has lymphadenopathy within the abdominal compartment.  Recommend follow-up with PCP regarding this.  Has gas within  the urinary bladder.  Will evaluate for UTI.  No UTI on urinalysis.  Unsure exact etiology of gas within the urinary bladder at this point.      Follow-up with PCP.    Duplex of the right lower extremity was without DVT.    Will discharge home.  Follow-up with PCP.  Return precautions given.    Problems Addressed:  Abnormal CT of the abdomen: acute illness or injury  Edema of right lower extremity: acute illness or injury  Piriformis syndrome of right side: acute illness or injury    Amount and/or Complexity of Data Reviewed  Labs: ordered. Decision-making details documented in ED Course.  Radiology: ordered and independent interpretation performed.    Risk  OTC drugs.  Prescription drug management.             Disposition  Final diagnoses:   Piriformis syndrome of right side   Edema of right lower extremity   Abnormal CT of the abdomen - Enlarged CBD and adenopathy     Time reflects when diagnosis was documented in both MDM as applicable and the Disposition within this note       Time User Action Codes Description Comment    6/26/2024  2:35 PM Florencio Blackwood Add [G57.01] Piriformis syndrome of right side     6/26/2024  2:36 PM Florencio Blackwood Add [R60.0] Edema of right lower extremity     6/26/2024  2:38 PM Florencio Blackwood Add [R93.5] Abnormal CT of the abdomen     6/26/2024  2:38 PM Florencio Blackwood Modify [R93.5] Abnormal CT of the abdomen Enlarged CBD and adenopathy          ED Disposition       ED Disposition   Discharge    Condition   Stable    Date/Time   Wed Jun 26, 2024 1435    Comment   Korin Garcia discharge to home/self care.                   Follow-up Information       Follow up With Specialties Details Why Contact Info Additional Information    ARSENIO May Nurse Practitioner Schedule an appointment as soon as possible for a visit  For re-evaluation as soon as possible [address] [address]  [phone]       [facility] Emergency Department Emergency Medicine  If  symptoms worsen [address] [address]  [phone] [facility] Emergency Department, [address] [place] [address]    [facility] Physical Therapy Schedule an appointment as soon as possible for a visit  For re-evaluation as soon as possible [phone]            Discharge Medication List as of 6/26/2024  2:39 PM        START taking these medications    Details   lidocaine (Lidoderm) 5 % Apply 1 patch topically over 12 hours daily Remove & Discard patch within 12 hours or as directed by MD, Starting Wed 6/26/2024, Normal      methocarbamol (ROBAXIN) 500 mg tablet Take 1 tablet (500 mg total) by mouth 3 (three) times a day as needed for muscle spasms, Starting Wed 6/26/2024, Normal      naproxen (Naprosyn) 500 mg tablet Take 1 tablet (500 mg total) by mouth 2 (two) times a day with meals, Starting Wed 6/26/2024, Normal           CONTINUE these medications which have CHANGED    Details   acetaminophen (TYLENOL) 500 mg tablet Take 2 tablets (1,000 mg total) by mouth every 6 (six) hours as needed for moderate pain, Starting Wed 6/26/2024, Normal           CONTINUE these medications which have NOT CHANGED    Details   albuterol (Ventolin HFA) 90 mcg/act inhaler Inhale 2 puffs every 6 (six) hours as needed for wheezing, Starting Mon 4/15/2024, Normal      amLODIPine (NORVASC) 5 mg tablet Take 1 tablet (5 mg total) by mouth daily, Starting Wed 2/21/2024, Normal      aspirin 81 mg chewable tablet Chew 1 tablet (81 mg total) daily, Starting Wed 2/21/2024, Normal      Aspirin Low Dose 81 MG EC tablet Take 1 tablet by mouth once daily, Starting Fri 8/4/2023, Normal      atorvastatin (LIPITOR) 20 mg tablet Take 1 tablet (20 mg total) by mouth daily, Starting Thu 4/11/2024, Normal      baclofen 5 MG TABS Take 5 mg by mouth 2 (two) times a day, Starting Tue 9/19/2023, Print      buPROPion (WELLBUTRIN XL) 150 mg 24 hr tablet Take 1  tablet (150 mg total) by mouth every morning Do not start before September 20, 2023., Starting Wed 9/20/2023, No Print      busPIRone (BUSPAR) 7.5 mg tablet Take 1 tablet (7.5 mg total) by mouth 2 (two) times a day, Starting Wed 2/21/2024, Normal      cyanocobalamin (VITAMIN B-12) 1000 MCG tablet Take 1 tablet (1,000 mcg total) by mouth daily Do not start before September 20, 2023., Starting Wed 9/20/2023, No Print      docusate sodium (COLACE) 100 mg capsule Take 1 capsule (100 mg total) by mouth 2 (two) times a day, Starting Tue 9/19/2023, No Print      DULoxetine (CYMBALTA) 20 mg capsule Take 1 capsule (20 mg total) by mouth daily, Starting Thu 4/18/2024, Normal      Empagliflozin 25 MG TABS Take 1 tablet (25 mg total) by mouth daily, Starting Mon 5/20/2024, Until Thu 5/15/2025, Normal      ferrous sulfate 325 (65 Fe) mg tablet Take 1 tablet (325 mg total) by mouth daily with breakfast Do not start before September 20, 2023., Starting Wed 9/20/2023, No Print      fluticasone-vilanterol (Breo Ellipta) 200-25 mcg/actuation inhaler Inhale 1 puff daily Rinse mouth after use., Starting Thu 4/11/2024, Until Wed 7/10/2024, Normal      gabapentin (NEURONTIN) 300 mg capsule TAKE 2 CAPSULES BY MOUTH THREE TIMES DAILY AS NEEDED, Starting Mon 6/3/2024, Normal      hydroCHLOROthiazide 12.5 mg tablet Take 1 tablet (12.5 mg total) by mouth daily as needed (increased leg swelling or weight gain over 5 lbs), Starting Wed 2/21/2024, Until Mon 8/19/2024 at 2359, Normal      losartan-hydrochlorothiazide (HYZAAR) 100-12.5 MG per tablet Take 1 tablet by mouth daily, Starting Mon 12/11/2023, Normal      melatonin 3 mg Take 2 tablets (6 mg total) by mouth daily at bedtime, Starting Tue 9/19/2023, No Print      metFORMIN (GLUCOPHAGE) 1000 MG tablet Take 1 tablet (1,000 mg total) by mouth 2 (two) times a day with meals, Starting Thu 3/21/2024, Normal      pantoprazole (PROTONIX) 40 mg tablet Take 1 tablet (40 mg total) by mouth daily in  the early morning Do not start before September 20, 2023., Starting Wed 9/20/2023, No Print      semaglutide, 0.25 or 0.5 mg/dose, (Ozempic, 0.25 or 0.5 MG/DOSE,) 2 mg/3 mL injection pen 0.25 mg under the skin every 7 days for 4 doses (28 days), THEN 0.5 mg under the skin every 7 days, Normal                 PDMP Review         Value Time User    PDMP Reviewed  Yes 9/19/2023  2:28 PM Tiffanie Barrios RN            ED Provider  Electronically Signed by             Florencio Blackwood DO  06/26/24 2441

## 2024-06-27 ENCOUNTER — TELEPHONE (OUTPATIENT)
Dept: PHYSICAL THERAPY | Facility: OTHER | Age: 59
End: 2024-06-27

## 2024-06-27 NOTE — TELEPHONE ENCOUNTER
Nurse reached out to discuss recent referral entered for  Comprehensive Spine program.  Nurse provided a brief overview of the program with her.  Patient declined to participate in the program at this time d/t Health insurance and transportation issues. Nurse understood and respected the patients decision. RN recommended she check the  FA website and reminded of ED F/U w/ her primary..   Nurse confirmed patient has [facility] contact information and encouraged to call program back if needed/able to in the future. Patient agreed and very appreciative of call and discussion.    Nurse wished her well and referral closed per protocol.

## 2024-07-20 DIAGNOSIS — I61.0 NONTRAUMATIC SUBCORTICAL HEMORRHAGE OF LEFT CEREBRAL HEMISPHERE (HCC): ICD-10-CM

## 2024-07-20 DIAGNOSIS — M79.7 FIBROMYALGIA: ICD-10-CM

## 2024-07-20 RX ORDER — GABAPENTIN 300 MG/1
600 CAPSULE ORAL 3 TIMES DAILY PRN
Qty: 100 CAPSULE | Refills: 1 | Status: SHIPPED | OUTPATIENT
Start: 2024-07-20

## 2024-08-01 ENCOUNTER — OFFICE VISIT (OUTPATIENT)
Dept: FAMILY MEDICINE CLINIC | Facility: CLINIC | Age: 59
End: 2024-08-01
Payer: COMMERCIAL

## 2024-08-01 VITALS
DIASTOLIC BLOOD PRESSURE: 80 MMHG | BODY MASS INDEX: 48.29 KG/M2 | RESPIRATION RATE: 16 BRPM | SYSTOLIC BLOOD PRESSURE: 132 MMHG | HEART RATE: 85 BPM | TEMPERATURE: 98.9 F | OXYGEN SATURATION: 97 % | WEIGHT: 246 LBS | HEIGHT: 60 IN

## 2024-08-01 DIAGNOSIS — F32.A DEPRESSION: ICD-10-CM

## 2024-08-01 DIAGNOSIS — F41.8 DEPRESSION WITH ANXIETY: ICD-10-CM

## 2024-08-01 DIAGNOSIS — E11.9 TYPE II DIABETES MELLITUS, WELL CONTROLLED (HCC): ICD-10-CM

## 2024-08-01 DIAGNOSIS — E08.21 DIABETES MELLITUS DUE TO UNDERLYING CONDITION WITH DIABETIC NEPHROPATHY, UNSPECIFIED WHETHER LONG TERM INSULIN USE (HCC): ICD-10-CM

## 2024-08-01 DIAGNOSIS — J44.1 CHRONIC OBSTRUCTIVE PULMONARY DISEASE WITH ACUTE EXACERBATION (HCC): ICD-10-CM

## 2024-08-01 DIAGNOSIS — F17.211 NICOTINE DEPENDENCE, CIGARETTES, IN REMISSION: ICD-10-CM

## 2024-08-01 DIAGNOSIS — E78.2 MIXED HYPERLIPIDEMIA: ICD-10-CM

## 2024-08-01 DIAGNOSIS — Z12.31 ENCOUNTER FOR SCREENING MAMMOGRAM FOR BREAST CANCER: ICD-10-CM

## 2024-08-01 DIAGNOSIS — E11.65 POORLY CONTROLLED TYPE 2 DIABETES MELLITUS (HCC): Primary | ICD-10-CM

## 2024-08-01 DIAGNOSIS — K82.8 BILIARY DYSKINESIA: ICD-10-CM

## 2024-08-01 DIAGNOSIS — M79.7 FIBROMYALGIA: ICD-10-CM

## 2024-08-01 DIAGNOSIS — I10 PRIMARY HYPERTENSION: ICD-10-CM

## 2024-08-01 DIAGNOSIS — M79.89 RIGHT LEG SWELLING: ICD-10-CM

## 2024-08-01 DIAGNOSIS — I61.0 NONTRAUMATIC SUBCORTICAL HEMORRHAGE OF LEFT CEREBRAL HEMISPHERE (HCC): ICD-10-CM

## 2024-08-01 PROCEDURE — 99214 OFFICE O/P EST MOD 30 MIN: CPT | Performed by: NURSE PRACTITIONER

## 2024-08-01 PROCEDURE — 3079F DIAST BP 80-89 MM HG: CPT | Performed by: NURSE PRACTITIONER

## 2024-08-01 PROCEDURE — 3075F SYST BP GE 130 - 139MM HG: CPT | Performed by: NURSE PRACTITIONER

## 2024-08-01 PROCEDURE — 3066F NEPHROPATHY DOC TX: CPT | Performed by: NURSE PRACTITIONER

## 2024-08-01 RX ORDER — HYDROCHLOROTHIAZIDE 12.5 MG/1
12.5 TABLET ORAL DAILY PRN
Qty: 30 TABLET | Refills: 5 | Status: SHIPPED | OUTPATIENT
Start: 2024-08-01 | End: 2025-01-28

## 2024-08-01 RX ORDER — LOSARTAN POTASSIUM AND HYDROCHLOROTHIAZIDE 12.5; 1 MG/1; MG/1
1 TABLET ORAL DAILY
Qty: 90 TABLET | Refills: 3 | Status: SHIPPED | OUTPATIENT
Start: 2024-08-01

## 2024-08-01 RX ORDER — BUSPIRONE HYDROCHLORIDE 7.5 MG/1
7.5 TABLET ORAL 2 TIMES DAILY
Qty: 60 TABLET | Refills: 5 | Status: SHIPPED | OUTPATIENT
Start: 2024-08-01

## 2024-08-01 RX ORDER — AMLODIPINE BESYLATE 5 MG/1
5 TABLET ORAL DAILY
Qty: 90 TABLET | Refills: 3 | Status: SHIPPED | OUTPATIENT
Start: 2024-08-01

## 2024-08-01 RX ORDER — ATORVASTATIN CALCIUM 20 MG/1
20 TABLET, FILM COATED ORAL DAILY
Qty: 90 TABLET | Refills: 3 | Status: SHIPPED | OUTPATIENT
Start: 2024-08-01

## 2024-08-01 RX ORDER — GABAPENTIN 300 MG/1
600 CAPSULE ORAL 3 TIMES DAILY PRN
Qty: 180 CAPSULE | Refills: 1 | Status: SHIPPED | OUTPATIENT
Start: 2024-08-01

## 2024-08-01 RX ORDER — DULOXETIN HYDROCHLORIDE 20 MG/1
20 CAPSULE, DELAYED RELEASE ORAL DAILY
Qty: 90 CAPSULE | Refills: 3 | Status: SHIPPED | OUTPATIENT
Start: 2024-08-01

## 2024-08-01 NOTE — PROGRESS NOTES
Assessment/Plan:      Diagnoses and all orders for this visit:    Poorly controlled type 2 diabetes mellitus (HCC)  -     Albumin / creatinine urine ratio; Future    Encounter for screening mammogram for breast cancer  -     Mammo screening bilateral w 3d & cad; Future    Nicotine dependence, cigarettes, in remission    Biliary dyskinesia  -     US abdomen complete; Future    Diabetes mellitus due to underlying condition with diabetic nephropathy, unspecified whether long term insulin use ([facility])  -     metFORMIN (GLUCOPHAGE) 1000 MG tablet; Take 1 tablet (1,000 mg total) by mouth 2 (two) times a day with meals    Chronic obstructive pulmonary disease with acute exacerbation ([facility])  -     losartan-hydrochlorothiazide (HYZAAR) 100-12.5 MG per tablet; Take 1 tablet by mouth daily    Primary hypertension  -     losartan-hydrochlorothiazide (HYZAAR) 100-12.5 MG per tablet; Take 1 tablet by mouth daily  -     hydroCHLOROthiazide 12.5 mg tablet; Take 1 tablet (12.5 mg total) by mouth daily as needed (increased leg swelling or weight gain over 5 lbs)  -     amLODIPine (NORVASC) 5 mg tablet; Take 1 tablet (5 mg total) by mouth daily    Type II diabetes mellitus, well controlled ([facility])  -     losartan-hydrochlorothiazide (HYZAAR) 100-12.5 MG per tablet; Take 1 tablet by mouth daily    Right leg swelling  -     hydroCHLOROthiazide 12.5 mg tablet; Take 1 tablet (12.5 mg total) by mouth daily as needed (increased leg swelling or weight gain over 5 lbs)    Fibromyalgia  -     gabapentin (NEURONTIN) 300 mg capsule; Take 2 capsules (600 mg total) by mouth 3 (three) times a day as needed (back pain)    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 female.      Patient is here for routine follow-up.  To review most recent labs.  To also discuss current state of health and any new problems that they may be experiencing.  Patient states that medications taken as prescribed and very well tolerated no new complaints at this time.            Review of Systems   Constitutional:  Negative for appetite change and fever.   HENT:  Negative for sinus pressure and sore throat.    Eyes:  Negative for pain.   Respiratory:  Negative for shortness of breath.    Cardiovascular:  Negative for chest pain.   Gastrointestinal:  Negative for abdominal pain.   Genitourinary:  Negative for dysuria.   Musculoskeletal:  Negative for arthralgias and myalgias.   Skin:  Negative for color change.   Neurological:  Negative for light-headedness.   Psychiatric/Behavioral:  Negative for behavioral problems.          Objective:     Physical Exam  Vitals and nursing note reviewed.   Constitutional:       General: She is not in acute distress.     Appearance: She is well-developed. She is not diaphoretic.   HENT:      Head: Normocephalic and atraumatic.      Right Ear: External ear normal.      Left Ear: External ear normal.      Mouth/Throat:      Mouth: Oropharynx is clear and moist.   Eyes:      Extraocular Movements: EOM normal.      Pupils: Pupils are equal, round, and reactive to light.   Cardiovascular:      Rate and Rhythm: Normal rate and regular rhythm.      Heart sounds: Normal heart sounds.   Pulmonary:      Effort: Pulmonary effort is normal.      Breath sounds: Normal breath sounds.   Abdominal:      Palpations: Abdomen is soft.   Musculoskeletal:         General: Deformity present.      Cervical back: Neck supple.       Right lower leg: Edema present.      Left lower leg: Edema present.   Skin:     General: Skin is dry.   Neurological:      Mental Status: She is alert and oriented to person, place, and time.      Cranial Nerves: Cranial nerve deficit present.      Motor: Weakness present.      Gait: Gait abnormal.   Psychiatric:         Mood and Affect: Mood and affect normal.         Behavior: Behavior normal.         Thought Content: Thought content normal.

## 2024-08-05 ENCOUNTER — HOSPITAL ENCOUNTER (OUTPATIENT)
Dept: RADIOLOGY | Age: 59
Discharge: HOME | End: 2024-08-05
Payer: COMMERCIAL

## 2024-08-05 DIAGNOSIS — K82.8 BILIARY DYSKINESIA: ICD-10-CM

## 2024-08-05 PROCEDURE — 76700 US EXAM ABDOM COMPLETE: CPT

## 2024-08-08 DIAGNOSIS — E11.65 POORLY CONTROLLED TYPE 2 DIABETES MELLITUS (HCC): Primary | ICD-10-CM

## 2024-08-15 DIAGNOSIS — K82.8 BILIARY DYSKINESIA: Primary | ICD-10-CM

## 2024-09-30 ENCOUNTER — TELEPHONE (OUTPATIENT)
Age: 59
End: 2024-09-30

## 2024-09-30 NOTE — TELEPHONE ENCOUNTER
Wednesday the [employer] faxed over a form.   Can we please see if this was received and call patient back.    Thank you

## 2024-10-02 ENCOUNTER — TELEPHONE (OUTPATIENT)
Age: 59
End: 2024-10-02

## 2024-10-02 NOTE — TELEPHONE ENCOUNTER
Caller: [employer]    Doctor: Edgar    Reason for call: Patient has filed a disability claim and the insurance company [employer] is requesting a Per to Per to determine if she is eligible.  This a time sensitive matter. The case # is [id number] this is very time sensitive matter. They would like to have the time and a date for the PER to PER  Please call [phone]  Thank you    Call back#: [phone]

## 2024-10-14 ENCOUNTER — OFFICE VISIT (OUTPATIENT)
Dept: FAMILY MEDICINE CLINIC | Facility: CLINIC | Age: 59
End: 2024-10-14
Payer: COMMERCIAL

## 2024-10-14 VITALS
SYSTOLIC BLOOD PRESSURE: 126 MMHG | BODY MASS INDEX: 47.51 KG/M2 | DIASTOLIC BLOOD PRESSURE: 100 MMHG | TEMPERATURE: 97.5 F | HEIGHT: 60 IN | WEIGHT: 242 LBS | OXYGEN SATURATION: 97 % | HEART RATE: 81 BPM

## 2024-10-14 DIAGNOSIS — M79.7 FIBROMYALGIA: ICD-10-CM

## 2024-10-14 DIAGNOSIS — I61.0 NONTRAUMATIC SUBCORTICAL HEMORRHAGE OF LEFT CEREBRAL HEMISPHERE (HCC): ICD-10-CM

## 2024-10-14 DIAGNOSIS — E08.21 DIABETES MELLITUS DUE TO UNDERLYING CONDITION WITH DIABETIC NEPHROPATHY, UNSPECIFIED WHETHER LONG TERM INSULIN USE (HCC): ICD-10-CM

## 2024-10-14 DIAGNOSIS — J44.1 CHRONIC OBSTRUCTIVE PULMONARY DISEASE WITH ACUTE EXACERBATION (HCC): ICD-10-CM

## 2024-10-14 DIAGNOSIS — I10 PRIMARY HYPERTENSION: Primary | ICD-10-CM

## 2024-10-14 DIAGNOSIS — I69.051: ICD-10-CM

## 2024-10-14 DIAGNOSIS — E11.9 TYPE II DIABETES MELLITUS, WELL CONTROLLED (HCC): ICD-10-CM

## 2024-10-14 PROCEDURE — 99213 OFFICE O/P EST LOW 20 MIN: CPT | Performed by: NURSE PRACTITIONER

## 2024-10-14 RX ORDER — GABAPENTIN 300 MG/1
600 CAPSULE ORAL 3 TIMES DAILY PRN
Qty: 180 CAPSULE | Refills: 1 | Status: SHIPPED | OUTPATIENT
Start: 2024-10-14 | End: 2024-10-17 | Stop reason: SDUPTHER

## 2024-10-14 RX ORDER — LOSARTAN POTASSIUM AND HYDROCHLOROTHIAZIDE 12.5; 1 MG/1; MG/1
1 TABLET ORAL DAILY
Qty: 90 TABLET | Refills: 3 | Status: SHIPPED | OUTPATIENT
Start: 2024-10-14 | End: 2024-10-17 | Stop reason: SDUPTHER

## 2024-10-15 NOTE — PROGRESS NOTES
Assessment/Plan:      Diagnoses and all orders for this visit:    Primary hypertension  -     losartan-hydrochlorothiazide (HYZAAR) 100-12.5 MG per tablet; Take 1 tablet by mouth daily    Flaccid hemiplegia of right dominant side as late effect of nontraumatic subarachnoid hemorrhage (HCC)  -     Ambulatory Referral to Physical Therapy; Future    Diabetes mellitus due to underlying condition with diabetic nephropathy, unspecified whether long term insulin use ([facility])  -     metFORMIN (GLUCOPHAGE) 1000 MG tablet; Take 1 tablet (1,000 mg total) by mouth 2 (two) times a day with meals    Chronic obstructive pulmonary disease with acute exacerbation ([facility])  -     losartan-hydrochlorothiazide (HYZAAR) 100-12.5 MG per tablet; Take 1 tablet by mouth daily    Type II diabetes mellitus, well controlled ([facility])  -     losartan-hydrochlorothiazide (HYZAAR) 100-12.5 MG per tablet; Take 1 tablet by mouth daily    Fibromyalgia  -     gabapentin (NEURONTIN) 300 mg capsule; Take 2 capsules (600 mg total) by mouth 3 (three) times a day as needed (back pain)    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  Patient does seem to be experiencing some shoulder drop.  Multiple times of physical therapy was offered in the past but she states she cannot afford it.  I did advise that we can no longer wait physical therapy order was placed in her chart they will come to her home for an assessment and treatment.  It should also be noted patient now is officially on Social Security disability I will be of receiving Medicare soon when she does we will have a welcome to Medicare and fully set her up with everything she  needs for improvement from her stroke.  Dosing all possible side effects of the prescribed medications or medications that had been prescribed in the past were reviewed and all questions were answered.  Patient verbalized agreement and understanding of the plan of care as outlined during the office visit today return to office as indicated or sooner if a problem arises.    Subjective:     Patient ID: Korin Garcia is a 59 y.o. female.    Patient is here for follow-up.    Arm Pain         Review of Systems   Musculoskeletal:  Positive for back pain, gait problem and myalgias.   Neurological:  Positive for weakness.         Objective:     Physical Exam  Cardiovascular:      Rate and Rhythm: Normal rate and regular rhythm.      Heart sounds: Normal heart sounds.   Pulmonary:      Effort: Pulmonary effort is normal.      Breath sounds: Normal breath sounds.   Neurological:      Mental Status: She is alert.      Motor: Weakness present.      Coordination: Coordination abnormal.      Gait: Gait abnormal.      Deep Tendon Reflexes: Reflexes abnormal.

## 2024-10-17 DIAGNOSIS — M79.7 FIBROMYALGIA: ICD-10-CM

## 2024-10-17 DIAGNOSIS — F32.A DEPRESSION: ICD-10-CM

## 2024-10-17 DIAGNOSIS — J44.1 CHRONIC OBSTRUCTIVE PULMONARY DISEASE WITH ACUTE EXACERBATION (HCC): ICD-10-CM

## 2024-10-17 DIAGNOSIS — I10 PRIMARY HYPERTENSION: ICD-10-CM

## 2024-10-17 DIAGNOSIS — E08.21 DIABETES MELLITUS DUE TO UNDERLYING CONDITION WITH DIABETIC NEPHROPATHY, UNSPECIFIED WHETHER LONG TERM INSULIN USE (HCC): ICD-10-CM

## 2024-10-17 DIAGNOSIS — I61.0 NONTRAUMATIC SUBCORTICAL HEMORRHAGE OF LEFT CEREBRAL HEMISPHERE (HCC): ICD-10-CM

## 2024-10-17 DIAGNOSIS — E11.9 TYPE II DIABETES MELLITUS, WELL CONTROLLED (HCC): ICD-10-CM

## 2024-10-18 DIAGNOSIS — J44.1 CHRONIC OBSTRUCTIVE PULMONARY DISEASE WITH ACUTE EXACERBATION (HCC): ICD-10-CM

## 2024-10-18 DIAGNOSIS — M62.838 MUSCLE SPASM: ICD-10-CM

## 2024-10-18 DIAGNOSIS — F32.A DEPRESSION: ICD-10-CM

## 2024-10-18 DIAGNOSIS — M79.7 FIBROMYALGIA: ICD-10-CM

## 2024-10-18 DIAGNOSIS — I10 PRIMARY HYPERTENSION: ICD-10-CM

## 2024-10-18 DIAGNOSIS — E11.9 TYPE II DIABETES MELLITUS, WELL CONTROLLED (HCC): ICD-10-CM

## 2024-10-18 DIAGNOSIS — I61.0 NONTRAUMATIC SUBCORTICAL HEMORRHAGE OF LEFT CEREBRAL HEMISPHERE (HCC): ICD-10-CM

## 2024-10-18 DIAGNOSIS — F41.8 DEPRESSION WITH ANXIETY: ICD-10-CM

## 2024-10-18 RX ORDER — LOSARTAN POTASSIUM AND HYDROCHLOROTHIAZIDE 12.5; 1 MG/1; MG/1
1 TABLET ORAL DAILY
Qty: 90 TABLET | Refills: 0 | Status: SHIPPED | OUTPATIENT
Start: 2024-10-18 | End: 2024-10-18 | Stop reason: SDUPTHER

## 2024-10-18 RX ORDER — DULOXETIN HYDROCHLORIDE 20 MG/1
20 CAPSULE, DELAYED RELEASE ORAL DAILY
Qty: 90 CAPSULE | Refills: 0 | Status: SHIPPED | OUTPATIENT
Start: 2024-10-18 | End: 2024-10-18 | Stop reason: SDUPTHER

## 2024-10-18 RX ORDER — DULOXETIN HYDROCHLORIDE 20 MG/1
20 CAPSULE, DELAYED RELEASE ORAL DAILY
Qty: 90 CAPSULE | Refills: 1 | Status: SHIPPED | OUTPATIENT
Start: 2024-10-18

## 2024-10-18 RX ORDER — GABAPENTIN 300 MG/1
600 CAPSULE ORAL 3 TIMES DAILY PRN
Qty: 180 CAPSULE | Refills: 1 | Status: SHIPPED | OUTPATIENT
Start: 2024-10-18

## 2024-10-18 RX ORDER — ASPIRIN 81 MG/1
81 TABLET, CHEWABLE ORAL DAILY
Qty: 90 TABLET | Refills: 3 | Status: SHIPPED | OUTPATIENT
Start: 2024-10-18

## 2024-10-18 RX ORDER — GABAPENTIN 300 MG/1
600 CAPSULE ORAL 3 TIMES DAILY PRN
Qty: 180 CAPSULE | Refills: 0 | Status: SHIPPED | OUTPATIENT
Start: 2024-10-18 | End: 2024-10-18 | Stop reason: SDUPTHER

## 2024-10-18 RX ORDER — LOSARTAN POTASSIUM AND HYDROCHLOROTHIAZIDE 12.5; 1 MG/1; MG/1
1 TABLET ORAL DAILY
Qty: 90 TABLET | Refills: 1 | Status: SHIPPED | OUTPATIENT
Start: 2024-10-18

## 2024-10-18 RX ORDER — AMLODIPINE BESYLATE 5 MG/1
5 TABLET ORAL DAILY
Qty: 90 TABLET | Refills: 0 | Status: SHIPPED | OUTPATIENT
Start: 2024-10-18

## 2024-10-18 RX ORDER — BUSPIRONE HYDROCHLORIDE 7.5 MG/1
7.5 TABLET ORAL 2 TIMES DAILY
Qty: 60 TABLET | Refills: 5 | Status: SHIPPED | OUTPATIENT
Start: 2024-10-18

## 2024-10-22 ENCOUNTER — TELEPHONE (OUTPATIENT)
Age: 59
End: 2024-10-22

## 2024-10-22 NOTE — TELEPHONE ENCOUNTER
Dania from [employer] called in stating they received the form that was faxed over, however there was not any medical records from March 2024- to present included. She added she is going to be faxing over a request for the patients immunization records.    Please advise    Reference number [id number]

## 2024-11-06 ENCOUNTER — TELEPHONE (OUTPATIENT)
Age: 59
End: 2024-11-06

## 2024-11-06 NOTE — TELEPHONE ENCOUNTER
I received a Care Request from patient to schedule for:      First Name: Korin  Last Name: Garcia  Email: [email or web address]  Phone: [phone]  Subject: Request an appointment  Where does it hurt? My feet hurt all over and my toes hurt very badly      I lvm to [facility] at [phone].

## 2024-12-31 ENCOUNTER — OFFICE VISIT (OUTPATIENT)
Dept: URGENT CARE | Facility: MEDICAL CENTER | Age: 59
End: 2024-12-31
Payer: COMMERCIAL

## 2024-12-31 VITALS
DIASTOLIC BLOOD PRESSURE: 82 MMHG | HEIGHT: 60 IN | TEMPERATURE: 98.1 F | OXYGEN SATURATION: 93 % | WEIGHT: 241 LBS | HEART RATE: 94 BPM | RESPIRATION RATE: 18 BRPM | BODY MASS INDEX: 47.32 KG/M2 | SYSTOLIC BLOOD PRESSURE: 173 MMHG

## 2024-12-31 DIAGNOSIS — R30.0 BURNING WITH URINATION: Primary | ICD-10-CM

## 2024-12-31 LAB
SL AMB  POCT GLUCOSE, UA: 2000
SL AMB LEUKOCYTE ESTERASE,UA: ABNORMAL
SL AMB POCT BILIRUBIN,UA: ABNORMAL
SL AMB POCT BLOOD,UA: ABNORMAL
SL AMB POCT CLARITY,UA: ABNORMAL
SL AMB POCT COLOR,UA: ABNORMAL
SL AMB POCT KETONES,UA: ABNORMAL
SL AMB POCT NITRITE,UA: ABNORMAL
SL AMB POCT PH,UA: 6
SL AMB POCT SPECIFIC GRAVITY,UA: 1020
SL AMB POCT URINE PROTEIN: ABNORMAL
SL AMB POCT UROBILINOGEN: 0.2

## 2024-12-31 PROCEDURE — 99213 OFFICE O/P EST LOW 20 MIN: CPT | Performed by: FAMILY MEDICINE

## 2024-12-31 PROCEDURE — 81002 URINALYSIS NONAUTO W/O SCOPE: CPT | Performed by: FAMILY MEDICINE

## 2024-12-31 RX ORDER — NYSTATIN 100000 U/G
CREAM TOPICAL 2 TIMES DAILY
Qty: 30 G | Refills: 0 | Status: SHIPPED | OUTPATIENT
Start: 2024-12-31

## 2024-12-31 RX ORDER — SULFAMETHOXAZOLE AND TRIMETHOPRIM 800; 160 MG/1; MG/1
1 TABLET ORAL EVERY 12 HOURS SCHEDULED
Qty: 10 TABLET | Refills: 0 | Status: SHIPPED | OUTPATIENT
Start: 2024-12-31 | End: 2025-01-05

## 2024-12-31 NOTE — PROGRESS NOTES
[facility]        NAME: Korin Garcia is a 59 y.o. female  : 1965    MRN: [id number]  DATE: 2024  TIME: 11:12 AM    Assessment and Plan   Burning with urination [R30.0]  1. Burning with urination  POCT urine dip    Urine culture    nystatin (MYCOSTATIN) cream    sulfamethoxazole-trimethoprim (BACTRIM DS) 800-160 mg per tablet        UTI based on UA  Treated with antibiotics as above.  Will await C/S and change medication if needed.  Supportive care measures discussed  ED precautions discussed.  Since itching is a major part of symptoms - also given nystatin cream for external use  Follow up with PCP  or GYN if not improving.  Patient Instructions       Follow up with PCP in 3-5 days.  Proceed to  ER if symptoms worsen.    If tests have been performed at [facility], our office will contact you with results if changes need to be made to the care plan discussed with you at the visit.  You can review your full results on [facility]    Chief Complaint     Chief Complaint   Patient presents with   • Possible UTI     Started 2 weeks ago with stomach pain, low back pain, lightheaded, itchy and burning when urinating.         History of Present Illness       Has had external vaginal itching as well as burning with urination and some suprapubic discomfort  Has been using external OTC creams    Urinary Tract Infection   This is a new problem. The current episode started 1 to 4 weeks ago. The problem occurs intermittently. The problem has been unchanged. The quality of the pain is described as burning. The pain is moderate. There has been no fever. Pertinent negatives include no chills, discharge, flank pain, frequency, hematuria, hesitancy, nausea, possible pregnancy, sweats, urgency or vomiting. She has tried nothing for the symptoms. The treatment provided no relief.       Review of Systems   Review of Systems   Constitutional:  Negative for activity change, chills, fatigue and fever.    Respiratory:  Negative for shortness of breath.    Cardiovascular:  Negative for chest pain.   Gastrointestinal:  Negative for nausea and vomiting.   Genitourinary:  Positive for dysuria. Negative for flank pain, frequency, hematuria, hesitancy and urgency.         Current Medications       Current Outpatient Medications:   •  acetaminophen (TYLENOL) 500 mg tablet, Take 2 tablets (1,000 mg total) by mouth every 6 (six) hours as needed for moderate pain, Disp: 40 tablet, Rfl: 0  •  albuterol (Ventolin HFA) 90 mcg/act inhaler, Inhale 2 puffs every 6 (six) hours as needed for wheezing, Disp: 18 g, Rfl: 5  •  amLODIPine (NORVASC) 5 mg tablet, Take 1 tablet (5 mg total) by mouth daily, Disp: 90 tablet, Rfl: 0  •  aspirin 81 mg chewable tablet, Chew 1 tablet (81 mg total) daily, Disp: 90 tablet, Rfl: 3  •  busPIRone (BUSPAR) 7.5 mg tablet, Take 1 tablet (7.5 mg total) by mouth 2 (two) times a day, Disp: 60 tablet, Rfl: 5  •  DULoxetine (CYMBALTA) 20 mg capsule, Take 1 capsule (20 mg total) by mouth daily, Disp: 90 capsule, Rfl: 1  •  ferrous sulfate 325 (65 Fe) mg tablet, Take 1 tablet (325 mg total) by mouth daily with breakfast Do not start before September 20, 2023., Disp: , Rfl: 0  •  gabapentin (NEURONTIN) 300 mg capsule, Take 2 capsules (600 mg total) by mouth 3 (three) times a day as needed (back pain), Disp: 180 capsule, Rfl: 1  •  hydroCHLOROthiazide 12.5 mg tablet, Take 1 tablet (12.5 mg total) by mouth daily as needed (increased leg swelling or weight gain over 5 lbs), Disp: 30 tablet, Rfl: 5  •  lidocaine (Lidoderm) 5 %, Apply 1 patch topically over 12 hours daily Remove & Discard patch within 12 hours or as directed by MD, Disp: 30 patch, Rfl: 0  •  losartan-hydrochlorothiazide (HYZAAR) 100-12.5 MG per tablet, Take 1 tablet by mouth daily, Disp: 90 tablet, Rfl: 1  •  metFORMIN (GLUCOPHAGE) 1000 MG tablet, Take 1 tablet (1,000 mg total) by mouth 2 (two) times a day with meals, Disp: 180 tablet, Rfl: 0  •   nystatin (MYCOSTATIN) cream, Apply topically 2 (two) times a day, Disp: 30 g, Rfl: 0  •  sulfamethoxazole-trimethoprim (BACTRIM DS) 800-160 mg per tablet, Take 1 tablet by mouth every 12 (twelve) hours for 5 days, Disp: 10 tablet, Rfl: 0  •  Aspirin Low Dose 81 MG EC tablet, Take 1 tablet by mouth once daily (Patient not taking: Reported on 10/14/2024), Disp: 30 tablet, Rfl: 0  •  atorvastatin (LIPITOR) 20 mg tablet, Take 1 tablet (20 mg total) by mouth daily (Patient not taking: Reported on 12/31/2024), Disp: 90 tablet, Rfl: 3  •  baclofen 5 MG TABS, Take 5 mg by mouth 2 (two) times a day (Patient not taking: Reported on 2/5/2024), Disp: 60 tablet, Rfl: 0  •  buPROPion (WELLBUTRIN XL) 150 mg 24 hr tablet, Take 1 tablet (150 mg total) by mouth every morning Do not start before September 20, 2023. (Patient not taking: Reported on 2/5/2024), Disp: , Rfl: 0  •  cyanocobalamin (VITAMIN B-12) 1000 MCG tablet, Take 1 tablet (1,000 mcg total) by mouth daily Do not start before September 20, 2023. (Patient not taking: Reported on 2/5/2024), Disp: , Rfl: 0  •  docusate sodium (COLACE) 100 mg capsule, Take 1 capsule (100 mg total) by mouth 2 (two) times a day (Patient not taking: Reported on 10/14/2024), Disp: , Rfl: 0  •  Empagliflozin 25 MG TABS, Take 1 tablet (25 mg total) by mouth daily (Patient not taking: Reported on 12/31/2024), Disp: 90 tablet, Rfl: 3  •  fluticasone-vilanterol (Breo Ellipta) 200-25 mcg/actuation inhaler, Inhale 1 puff daily Rinse mouth after use., Disp: 180 blister, Rfl: 1  •  melatonin 3 mg, Take 2 tablets (6 mg total) by mouth daily at bedtime (Patient not taking: Reported on 2/5/2024), Disp: , Rfl: 0  •  methocarbamol (ROBAXIN) 500 mg tablet, Take 1 tablet (500 mg total) by mouth 3 (three) times a day as needed for muscle spasms (Patient not taking: Reported on 12/31/2024), Disp: 20 tablet, Rfl: 0  •  naproxen (Naprosyn) 500 mg tablet, Take 1 tablet (500 mg total) by mouth 2 (two) times a day with  meals (Patient not taking: Reported on 12/31/2024), Disp: 30 tablet, Rfl: 0  •  pantoprazole (PROTONIX) 40 mg tablet, Take 1 tablet (40 mg total) by mouth daily in the early morning Do not start before September 20, 2023. (Patient not taking: Reported on 2/5/2024), Disp: , Rfl: 0  •  semaglutide (Rybelsus) 3 MG tablet, Take 1 tablet (3 mg total) by mouth daily before breakfast (Patient not taking: Reported on 12/31/2024), Disp: 30 tablet, Rfl: 1    Current Allergies     Allergies as of 12/31/2024   • (No Known Allergies)            The following portions of the patient's history were reviewed and updated as appropriate: allergies, current medications, past family history, past medical history, past social history, past surgical history and problem list.     Past Medical History:   Diagnosis Date   • Arthritis    • Asthma    • Continuous opioid dependence (HCC) 04/27/2022   • Continuous opioid dependence (HCC) 04/27/2022   • Diabetes mellitus (HCC)    • Diverticulitis of colon    • Fibromyalgia 04/26/2022   • Headache(784.0)    • History of mammogram 2018   • History of tobacco abuse 04/26/2022   • Hypertension    • Nausea 04/29/2022   • Obesity    • Sjogren's syndrome (HCC) 10/19/2012   • Stroke (HCC) 08 14 2023   • Urinary tract infection        Past Surgical History:   Procedure Laterality Date   • APPENDECTOMY     • BREAST BIOPSY Left     unsure of year   • CARPAL TUNNEL RELEASE     • COLONOSCOPY  2017    repeat in 2022   • EPIDURAL BLOCK INJECTION N/A 2/14/2023    Procedure: L5-S1 EPIDURALSTEROID INJECTION (90272);  Surgeon: Melo Mancia DO;  Location:  MAIN OR;  Service: Pain Management    • FOOT SURGERY     • HERNIA REPAIR  05/2022   • NECK SURGERY      spine fusion    • AR ARTHROCENTESIS ASPIR&/INJ MAJOR JT/BURSA W/O US  9/18/2023        • AR RPR UMBILICAL HRNA 5 YRS/> REDUCIBLE N/A 05/11/2022    Procedure: REPAIR HERNIA UMBILICAL;  Surgeon: Dread Mckeon MD;  Location:  MAIN OR;  Service: General        Family History   Problem Relation Age of Onset   • Hypertension Mother    • Heart disease Mother    • Hypertension Father    • Heart disease Father    • Asthma Sister    • No Known Problems Sister    • No Known Problems Sister    • Diabetes Maternal Grandmother    • No Known Problems Maternal Grandfather    • No Known Problems Paternal Grandmother    • No Known Problems Paternal Grandfather    • Breast cancer Paternal Aunt    • Pancreatic cancer Paternal Uncle    • Colon cancer Neg Hx    • Ovarian cancer Neg Hx    • Uterine cancer Neg Hx    • Cervical cancer Neg Hx          Medications have been verified.        Objective   BP (!) 173/82   Pulse 94   Temp 98.1 °F (36.7 °C)   Resp 18   Ht 5' (1.524 m)   Wt 109 kg (241 lb)   SpO2 93%   BMI 47.07 kg/m²   No LMP recorded. Patient is postmenopausal.       Physical Exam     Physical Exam  Vitals reviewed.   Constitutional:       General: She is not in acute distress.     Appearance: Normal appearance. She is obese. She is not ill-appearing, toxic-appearing or diaphoretic.   HENT:      Head: Normocephalic and atraumatic.      Right Ear: Tympanic membrane normal.      Left Ear: Tympanic membrane normal.      Nose: Nose normal.      Mouth/Throat:      Mouth: Mucous membranes are moist.      Pharynx: No oropharyngeal exudate or posterior oropharyngeal erythema.   Eyes:      Pupils: Pupils are equal, round, and reactive to light.   Cardiovascular:      Rate and Rhythm: Normal rate and regular rhythm.      Heart sounds: No murmur heard.  Pulmonary:      Effort: Pulmonary effort is normal. No respiratory distress.      Breath sounds: Normal breath sounds.   Abdominal:      General: Abdomen is flat.      Palpations: Abdomen is soft.      Tenderness: There is no abdominal tenderness. There is no right CVA tenderness or left CVA tenderness.   Musculoskeletal:         General: Normal range of motion.      Cervical back: Normal range of motion. No rigidity or tenderness.    Lymphadenopathy:      Cervical: No cervical adenopathy.   Skin:     General: Skin is warm and dry.      Capillary Refill: Capillary refill takes less than 2 seconds.   Neurological:      General: No focal deficit present.      Mental Status: She is alert and oriented to person, place, and time. Mental status is at baseline.   Psychiatric:         Mood and Affect: Mood normal.         Behavior: Behavior normal.         Thought Content: Thought content normal.

## 2025-01-04 ENCOUNTER — RESULTS FOLLOW-UP (OUTPATIENT)
Dept: URGENT CARE | Facility: MEDICAL CENTER | Age: 60
End: 2025-01-04

## 2025-01-08 ENCOUNTER — OFFICE VISIT (OUTPATIENT)
Dept: FAMILY MEDICINE CLINIC | Facility: CLINIC | Age: 60
End: 2025-01-08
Payer: COMMERCIAL

## 2025-01-08 VITALS
BODY MASS INDEX: 47.49 KG/M2 | RESPIRATION RATE: 20 BRPM | HEIGHT: 60 IN | SYSTOLIC BLOOD PRESSURE: 130 MMHG | HEART RATE: 86 BPM | TEMPERATURE: 98.6 F | OXYGEN SATURATION: 97 % | WEIGHT: 241.9 LBS | DIASTOLIC BLOOD PRESSURE: 74 MMHG

## 2025-01-08 DIAGNOSIS — R53.1 GENERALIZED WEAKNESS: ICD-10-CM

## 2025-01-08 DIAGNOSIS — I10 PRIMARY HYPERTENSION: ICD-10-CM

## 2025-01-08 DIAGNOSIS — N76.1 SUBACUTE VAGINITIS: ICD-10-CM

## 2025-01-08 DIAGNOSIS — E78.2 MIXED HYPERLIPIDEMIA: ICD-10-CM

## 2025-01-08 DIAGNOSIS — Z00.00 ANNUAL PHYSICAL EXAM: Primary | ICD-10-CM

## 2025-01-08 PROCEDURE — 99396 PREV VISIT EST AGE 40-64: CPT | Performed by: NURSE PRACTITIONER

## 2025-01-08 RX ORDER — AMLODIPINE BESYLATE 5 MG/1
5 TABLET ORAL DAILY
Qty: 90 TABLET | Refills: 0 | Status: SHIPPED | OUTPATIENT
Start: 2025-01-08

## 2025-01-08 RX ORDER — FLUCONAZOLE 150 MG/1
150 TABLET ORAL ONCE
Qty: 1 TABLET | Refills: 0 | Status: SHIPPED | OUTPATIENT
Start: 2025-01-08 | End: 2025-01-08

## 2025-01-08 NOTE — ASSESSMENT & PLAN NOTE
Orders:    CBC and differential; Future    Comprehensive metabolic panel; Future    Lipid panel; Future    UA w Reflex to Microscopic w Reflex to Culture; Future    TSH + Free T4; Future    amLODIPine (NORVASC) 5 mg tablet; Take 1 tablet (5 mg total) by mouth daily  Stable.  Will continue on the Norvasc as directed.  Additionally does take the hydrochlorothiazide 12-1/2 and the Hyzaar as directed well-tolerated.

## 2025-01-08 NOTE — PROGRESS NOTES
Name: Korin Garcia      : 1965      MRN: [id number]  Encounter Provider: ARSENIO Saucedo  Encounter Date: 2025   Encounter department: [facility]    Assessment & Plan  Generalized weakness    Orders:    Ambulatory Referral to Physical Therapy; Future  Will put the referral in for physical therapy but patient would definitely benefit from this.  Does have the assistance of a caregiver while at home.  Also due to generalized weakness I will give her some orders for a wheelchair if possible.  Patient is contending with to ambulate with the wall walker.  Annual physical exam         Primary hypertension    Orders:    CBC and differential; Future    Comprehensive metabolic panel; Future    Lipid panel; Future    UA w Reflex to Microscopic w Reflex to Culture; Future    TSH + Free T4; Future    amLODIPine (NORVASC) 5 mg tablet; Take 1 tablet (5 mg total) by mouth daily  Stable.  Will continue on the Norvasc as directed.  Additionally does take the hydrochlorothiazide  and the Hyzaar as directed well-tolerated.  Subacute vaginitis    Orders:    UA w Reflex to Microscopic w Reflex to Culture; Future    fluconazole (DIFLUCAN) 150 mg tablet; Take 1 tablet (150 mg total) by mouth once for 1 dose    Mixed hyperlipidemia    Orders:    CBC and differential; Future    Comprehensive metabolic panel; Future    Lipid panel; Future    UA w Reflex to Microscopic w Reflex to Culture; Future    TSH + Free T4; Future  At last check was stable.  Will continue with the atorvastatin as directed.  Education was given as patient states she stopped this medication did advise against it due to her her recent stroke history.     Physical assessment unremarkable. VS were well controlled. Labs given is to complete in 3 mos for possible fu discussion. Dosing all possible side effects of the prescribed medications or medications that had been prescribed in the past were reviewed and all questions were  answered.  Patient verbalized agreement and understanding of the plan of care as outlined during the office visit today return to office as indicated or sooner if a problem arises.  RTO as needed or for next scheduled appt. All questions answered.    History of Present Illness     HPI  Review of Systems  Past Medical History:   Diagnosis Date    Arthritis     Asthma     Continuous opioid dependence (HCC) 04/27/2022    Continuous opioid dependence (HCC) 04/27/2022    Diabetes mellitus (HCC)     Diverticulitis of colon     Fibromyalgia 04/26/2022    Headache(784.0)     History of mammogram 2018    History of tobacco abuse 04/26/2022    Hypertension     Nausea 04/29/2022    Obesity     Sjogren's syndrome (HCC) 10/19/2012    Stroke ([facility]) 08 14 2023    Urinary tract infection      Past Surgical History:   Procedure Laterality Date    APPENDECTOMY      BREAST BIOPSY Left     unsure of year    CARPAL TUNNEL RELEASE      COLONOSCOPY  2017    repeat in 2022    EPIDURAL BLOCK INJECTION N/A 2/14/2023    Procedure: L5-S1 EPIDURALSTEROID INJECTION (63864);  Surgeon: Melo Mancia DO;  Location:  MAIN OR;  Service: Pain Management     FOOT SURGERY      HERNIA REPAIR  05/2022    NECK SURGERY      spine fusion     MI ARTHROCENTESIS ASPIR&/INJ MAJOR JT/BURSA W/O US  9/18/2023         MI RPR UMBILICAL HRNA 5 YRS/> REDUCIBLE N/A 05/11/2022    Procedure: REPAIR HERNIA UMBILICAL;  Surgeon: Dread Mckeon MD;  Location:  MAIN OR;  Service: General     Family History   Problem Relation Age of Onset    Hypertension Mother     Heart disease Mother     Hypertension Father     Heart disease Father     Asthma Sister     No Known Problems Sister     No Known Problems Sister     Diabetes Maternal Grandmother     No Known Problems Maternal Grandfather     No Known Problems Paternal Grandmother     No Known Problems Paternal Grandfather     Breast cancer Paternal Aunt     Pancreatic cancer Paternal Uncle     Colon cancer Neg Hx      Ovarian cancer Neg Hx     Uterine cancer Neg Hx     Cervical cancer Neg Hx      Social History     Tobacco Use    Smoking status: Former     Current packs/day: 0.00     Average packs/day: 0.5 packs/day for 43.3 years (21.6 ttl pk-yrs)     Types: Cigarettes     Start date: 1980     Quit date: 2023     Years since quittin.7     Passive exposure: Past    Smokeless tobacco: Never    Tobacco comments:     per pt, 5 a day - As per Hilary    Vaping Use    Vaping status: Never Used   Substance and Sexual Activity    Alcohol use: Not Currently     Comment: occasional     Drug use: Never    Sexual activity: Not Currently     Partners: Male     Birth control/protection: None     Current Outpatient Medications on File Prior to Visit   Medication Sig    acetaminophen (TYLENOL) 500 mg tablet Take 2 tablets (1,000 mg total) by mouth every 6 (six) hours as needed for moderate pain    albuterol (Ventolin HFA) 90 mcg/act inhaler Inhale 2 puffs every 6 (six) hours as needed for wheezing    aspirin 81 mg chewable tablet Chew 1 tablet (81 mg total) daily    busPIRone (BUSPAR) 7.5 mg tablet Take 1 tablet (7.5 mg total) by mouth 2 (two) times a day    DULoxetine (CYMBALTA) 20 mg capsule Take 1 capsule (20 mg total) by mouth daily    ferrous sulfate 325 (65 Fe) mg tablet Take 1 tablet (325 mg total) by mouth daily with breakfast Do not start before 2023.    gabapentin (NEURONTIN) 300 mg capsule Take 2 capsules (600 mg total) by mouth 3 (three) times a day as needed (back pain)    hydroCHLOROthiazide 12.5 mg tablet Take 1 tablet (12.5 mg total) by mouth daily as needed (increased leg swelling or weight gain over 5 lbs)    lidocaine (Lidoderm) 5 % Apply 1 patch topically over 12 hours daily Remove & Discard patch within 12 hours or as directed by MD    losartan-hydrochlorothiazide (HYZAAR) 100-12.5 MG per tablet Take 1 tablet by mouth daily    metFORMIN (GLUCOPHAGE) 1000 MG tablet Take 1 tablet (1,000 mg total) by  mouth 2 (two) times a day with meals    nystatin (MYCOSTATIN) cream Apply topically 2 (two) times a day    Aspirin Low Dose 81 MG EC tablet Take 1 tablet by mouth once daily (Patient not taking: Reported on 10/14/2024)    atorvastatin (LIPITOR) 20 mg tablet Take 1 tablet (20 mg total) by mouth daily (Patient not taking: Reported on 12/31/2024)    baclofen 5 MG TABS Take 5 mg by mouth 2 (two) times a day (Patient not taking: Reported on 2/5/2024)    buPROPion (WELLBUTRIN XL) 150 mg 24 hr tablet Take 1 tablet (150 mg total) by mouth every morning Do not start before September 20, 2023. (Patient not taking: Reported on 2/5/2024)    cyanocobalamin (VITAMIN B-12) 1000 MCG tablet Take 1 tablet (1,000 mcg total) by mouth daily Do not start before September 20, 2023. (Patient not taking: Reported on 2/5/2024)    docusate sodium (COLACE) 100 mg capsule Take 1 capsule (100 mg total) by mouth 2 (two) times a day (Patient not taking: Reported on 10/14/2024)    Empagliflozin 25 MG TABS Take 1 tablet (25 mg total) by mouth daily (Patient not taking: Reported on 12/31/2024)    fluticasone-vilanterol (Breo Ellipta) 200-25 mcg/actuation inhaler Inhale 1 puff daily Rinse mouth after use.    melatonin 3 mg Take 2 tablets (6 mg total) by mouth daily at bedtime (Patient not taking: Reported on 2/5/2024)    methocarbamol (ROBAXIN) 500 mg tablet Take 1 tablet (500 mg total) by mouth 3 (three) times a day as needed for muscle spasms (Patient not taking: Reported on 12/31/2024)    naproxen (Naprosyn) 500 mg tablet Take 1 tablet (500 mg total) by mouth 2 (two) times a day with meals (Patient not taking: Reported on 12/31/2024)    pantoprazole (PROTONIX) 40 mg tablet Take 1 tablet (40 mg total) by mouth daily in the early morning Do not start before September 20, 2023. (Patient not taking: Reported on 2/5/2024)     No Known Allergies  Immunization History   Administered Date(s) Administered    COVID-19 MODERNA VACC 0.5 ML IM 02/05/2021,  02/06/2021, 03/05/2021    INFLUENZA 09/08/2016, 01/25/2020, 09/15/2020, 11/10/2020, 02/27/2023    Influenza, injectable, quadrivalent, preservative free 0.5 mL 01/25/2020    Pneumococcal Conjugate 13-Valent 05/06/2019    Pneumococcal Conjugate Vaccine 20-valent (Pcv20), Polysace 02/27/2023, 01/12/2024    Pneumococcal Polysaccharide PPV23 01/01/2017, 01/06/2017, 01/25/2020    Tdap 04/30/2014, 01/01/2017, 01/23/2020, 02/23/2020    Zoster Vaccine Recombinant 01/16/2020, 01/25/2020     Objective   /74 (BP Location: Left arm, Patient Position: Sitting, Cuff Size: Large)   Pulse 86   Temp 98.6 °F (37 °C)   Resp 20   Ht 5' (1.524 m)   Wt 110 kg (241 lb 14.4 oz)   SpO2 97%   BMI 47.24 kg/m²     Physical Exam

## 2025-01-09 ENCOUNTER — TELEPHONE (OUTPATIENT)
Age: 60
End: 2025-01-09

## 2025-01-09 DIAGNOSIS — I10 PRIMARY HYPERTENSION: ICD-10-CM

## 2025-01-09 DIAGNOSIS — E11.65 POORLY CONTROLLED TYPE 2 DIABETES MELLITUS (HCC): Primary | ICD-10-CM

## 2025-01-09 NOTE — TELEPHONE ENCOUNTER
Patient has an apt tomorrow at   [facility] [phone].  She needs a note with her diabetic diagnosis for insurance to pay for it.  Thank YOu       Please fax to [phone]

## 2025-01-09 NOTE — TELEPHONE ENCOUNTER
Ozempic will not be covered with or without a prior authorization, Ozempic is not FDA approved for obesity, prediabetes, etc. Ozempic is only FDA Approved for Type 2 Diabetes and will only be Approved via a Prior Authorization if patient has a diagnosis of Type 2 Diabetes.  Please have provider change the diagnosis code on the prescription for Ozempic.  Thank you!

## 2025-01-10 NOTE — TELEPHONE ENCOUNTER
[place] Ozempic 0.25 or 0.5 MG/DOSE SUBMITTED     to [facility]    via    []CMM-KEY:    [x]Surescripts-Case ID # [id number]   []Availity-Auth ID #  NDC #    []Faxed to plan   []Other website    []Phone call Case ID #      []PA sent as URGENT    All office notes, labs and other pertaining documents and studies sent. Clinical questions answered. Awaiting determination from insurance company.     Turnaround time for your insurance to make a decision on your Prior Authorization can take 7-21 business days.

## 2025-01-13 ENCOUNTER — TELEPHONE (OUTPATIENT)
Age: 60
End: 2025-01-13

## 2025-01-13 NOTE — TELEPHONE ENCOUNTER
Spoke with Korin recommended [facility] for cataract she will call us and let us know if she needs anything. She did mention that she is still having itching and burning and she took the one pill on Friday.

## 2025-01-13 NOTE — TELEPHONE ENCOUNTER
Patient called in stated went to eye appointment on Friday and has cataracts and will need surgery. Patient would like a referral and recommendations for Dr to have done. Please advise. Thank you.

## 2025-01-14 DIAGNOSIS — E08.21 DIABETES MELLITUS DUE TO UNDERLYING CONDITION WITH DIABETIC NEPHROPATHY, UNSPECIFIED WHETHER LONG TERM INSULIN USE (HCC): ICD-10-CM

## 2025-01-15 DIAGNOSIS — I10 PRIMARY HYPERTENSION: ICD-10-CM

## 2025-01-15 RX ORDER — AMLODIPINE BESYLATE 5 MG/1
5 TABLET ORAL DAILY
Qty: 90 TABLET | Refills: 0 | OUTPATIENT
Start: 2025-01-15

## 2025-01-15 NOTE — TELEPHONE ENCOUNTER
Reason for call:   [x] Refill   [] Prior Auth  [] Other:     Office:   [x] PCP/Provider - ARSENIO May   [] Specialty/Provider -     Medication: amLODIPine (NORVASC) 5 mg tablet / Take 1 tablet (5 mg total) by mouth daily     Pharmacy: [place], [place] - [address]     Does the patient have enough for 3 days?   [x] Yes   [] No - Send as HP to POD

## 2025-01-16 LAB
ALBUMIN SERPL-MCNC: 4.2 G/DL (ref 3.6–5.1)
ALBUMIN/GLOB SERPL: 1 (CALC) (ref 1–2.5)
ALP SERPL-CCNC: 82 U/L (ref 37–153)
ALT SERPL-CCNC: 23 U/L (ref 6–29)
APPEARANCE UR: ABNORMAL
AST SERPL-CCNC: 29 U/L (ref 10–35)
BACTERIA UR QL AUTO: ABNORMAL /HPF
BASOPHILS # BLD AUTO: 113 CELLS/UL (ref 0–200)
BASOPHILS NFR BLD AUTO: 1.2 %
BILIRUB SERPL-MCNC: 0.4 MG/DL (ref 0.2–1.2)
BILIRUB UR QL STRIP: NEGATIVE
BUN SERPL-MCNC: 8 MG/DL (ref 7–25)
BUN/CREAT SERPL: ABNORMAL (CALC) (ref 6–22)
CALCIUM SERPL-MCNC: 9.7 MG/DL (ref 8.6–10.4)
CAOX CRY #/AREA URNS HPF: ABNORMAL /HPF
CHLORIDE SERPL-SCNC: 92 MMOL/L (ref 98–110)
CHOLEST SERPL-MCNC: 183 MG/DL
CHOLEST/HDLC SERPL: 3.5 (CALC)
CO2 SERPL-SCNC: 29 MMOL/L (ref 20–32)
COLOR UR: YELLOW
CREAT SERPL-MCNC: 0.66 MG/DL (ref 0.5–1.05)
EOSINOPHIL # BLD AUTO: 761 CELLS/UL (ref 15–500)
EOSINOPHIL NFR BLD AUTO: 8.1 %
ERYTHROCYTE [DISTWIDTH] IN BLOOD BY AUTOMATED COUNT: 14.7 % (ref 11–15)
GFR/BSA.PRED SERPLBLD CYS-BASED-ARV: 100 ML/MIN/1.73M2
GLOBULIN SER CALC-MCNC: 4.2 G/DL (CALC) (ref 1.9–3.7)
GLUCOSE SERPL-MCNC: 312 MG/DL (ref 65–99)
GLUCOSE UR QL STRIP: ABNORMAL
HCT VFR BLD AUTO: 39.6 % (ref 35–45)
HDLC SERPL-MCNC: 53 MG/DL
HGB BLD-MCNC: 12.4 G/DL (ref 11.7–15.5)
HGB UR QL STRIP: NEGATIVE
HYALINE CASTS #/AREA URNS LPF: ABNORMAL /LPF
KETONES UR QL STRIP: NEGATIVE
LDLC SERPL CALC-MCNC: 103 MG/DL (CALC)
LEUKOCYTE ESTERASE UR QL STRIP: ABNORMAL
LYMPHOCYTES # BLD AUTO: 3534 CELLS/UL (ref 850–3900)
LYMPHOCYTES NFR BLD AUTO: 37.6 %
MCH RBC QN AUTO: 25.9 PG (ref 27–33)
MCHC RBC AUTO-ENTMCNC: 31.3 G/DL (ref 32–36)
MCV RBC AUTO: 82.7 FL (ref 80–100)
MONOCYTES # BLD AUTO: 583 CELLS/UL (ref 200–950)
MONOCYTES NFR BLD AUTO: 6.2 %
NEUTROPHILS # BLD AUTO: 4409 CELLS/UL (ref 1500–7800)
NEUTROPHILS NFR BLD AUTO: 46.9 %
NITRITE UR QL STRIP: NEGATIVE
NONHDLC SERPL-MCNC: 130 MG/DL (CALC)
PH UR STRIP: 6 [PH] (ref 5–8)
PLATELET # BLD AUTO: 413 THOUSAND/UL (ref 140–400)
PMV BLD REES-ECKER: 11.1 FL (ref 7.5–12.5)
POTASSIUM SERPL-SCNC: 4.4 MMOL/L (ref 3.5–5.3)
PROT SERPL-MCNC: 8.4 G/DL (ref 6.1–8.1)
PROT UR QL STRIP: ABNORMAL
RBC # BLD AUTO: 4.79 MILLION/UL (ref 3.8–5.1)
RBC #/AREA URNS HPF: ABNORMAL /HPF
SODIUM SERPL-SCNC: 133 MMOL/L (ref 135–146)
SP GR UR STRIP: 1.02 (ref 1–1.03)
SQUAMOUS #/AREA URNS HPF: ABNORMAL /HPF
TRIGL SERPL-MCNC: 158 MG/DL
WBC # BLD AUTO: 9.4 THOUSAND/UL (ref 3.8–10.8)
WBC #/AREA URNS HPF: ABNORMAL /HPF

## 2025-01-17 ENCOUNTER — RESULTS FOLLOW-UP (OUTPATIENT)
Dept: FAMILY MEDICINE CLINIC | Facility: CLINIC | Age: 60
End: 2025-01-17

## 2025-01-17 DIAGNOSIS — N39.0 URINARY TRACT INFECTION WITHOUT HEMATURIA, SITE UNSPECIFIED: Primary | ICD-10-CM

## 2025-01-17 RX ORDER — NITROFURANTOIN 25; 75 MG/1; MG/1
100 CAPSULE ORAL 2 TIMES DAILY
Qty: 10 CAPSULE | Refills: 0 | Status: SHIPPED | OUTPATIENT
Start: 2025-01-17 | End: 2025-01-22

## 2025-02-11 ENCOUNTER — OFFICE VISIT (OUTPATIENT)
Dept: FAMILY MEDICINE CLINIC | Facility: CLINIC | Age: 60
End: 2025-02-11
Payer: COMMERCIAL

## 2025-02-11 VITALS
WEIGHT: 237 LBS | SYSTOLIC BLOOD PRESSURE: 122 MMHG | HEART RATE: 86 BPM | OXYGEN SATURATION: 97 % | DIASTOLIC BLOOD PRESSURE: 80 MMHG | TEMPERATURE: 98.2 F | RESPIRATION RATE: 16 BRPM | HEIGHT: 60 IN | BODY MASS INDEX: 46.53 KG/M2

## 2025-02-11 DIAGNOSIS — M79.604 PAIN IN BOTH LOWER EXTREMITIES: ICD-10-CM

## 2025-02-11 DIAGNOSIS — M79.605 PAIN IN BOTH LOWER EXTREMITIES: ICD-10-CM

## 2025-02-11 DIAGNOSIS — E11.65 POORLY CONTROLLED TYPE 2 DIABETES MELLITUS (HCC): Primary | ICD-10-CM

## 2025-02-11 DIAGNOSIS — Z99.89 USES WALKER: ICD-10-CM

## 2025-02-11 PROBLEM — R93.89 ABNORMAL CT OF THE CHEST: Status: RESOLVED | Noted: 2023-06-28 | Resolved: 2025-02-11

## 2025-02-11 PROBLEM — F17.211 CIGARETTE NICOTINE DEPENDENCE IN REMISSION: Status: RESOLVED | Noted: 2022-04-26 | Resolved: 2025-02-11

## 2025-02-11 PROBLEM — Z48.89 POSTOPERATIVE VISIT: Status: RESOLVED | Noted: 2022-05-24 | Resolved: 2025-02-11

## 2025-02-11 LAB

## 2025-02-11 PROCEDURE — 99213 OFFICE O/P EST LOW 20 MIN: CPT | Performed by: NURSE PRACTITIONER

## 2025-02-11 NOTE — PROGRESS NOTES
Assessment/Plan:      Diagnoses and all orders for this visit:    Poorly controlled type 2 diabetes mellitus (HCC)  -     semaglutide, 1 mg/dose, (Ozempic) 4 mg/3 mL injection pen; Inject 0.75 mL (1 mg total) under the skin once a week  -     Ambulatory Referral to Podiatry; Future    Pain in both lower extremities  -     Ambulatory Referral to Podiatry; Future        Physical assessment unremarkable. VS were well controlled.  Abnormal labs were discussed and any modifications needed were made.  Dosing all possible side effects of the prescribed medications or medications that had been prescribed in the past were reviewed and all questions were answered.  Patient verbalized agreement and understanding of the plan of care as outlined during the office visit today return to office as indicated or sooner if a problem arises.  Labs given is to complete for possible fu discussion. RTO as needed or for next scheduled appt. All questions answered.    Subjective:     Patient ID: Korin Garcia is a 60 y.o. female.      Patient is here for routine follow-up.  To review most recent labs.  To also discuss current state of health and any new problems that they may be experiencing.  Patient states that medications taken as prescribed and very well tolerated no new complaints at this time.            Review of Systems   Constitutional:  Negative for appetite change, chills and fever.   HENT:  Negative for rhinorrhea, sinus pressure and sore throat.    Eyes:  Negative for pain.   Respiratory:  Negative for cough and shortness of breath.    Cardiovascular:  Negative for chest pain.   Gastrointestinal:  Negative for abdominal pain.   Genitourinary:  Negative for dysuria.   Musculoskeletal:  Positive for gait problem and myalgias. Negative for arthralgias.   Skin:  Negative for color change.   Neurological:  Negative for light-headedness.   Psychiatric/Behavioral:  Negative for behavioral problems.          Objective:     Physical Exam

## 2025-03-04 ENCOUNTER — EVALUATION (OUTPATIENT)
Dept: PHYSICAL THERAPY | Facility: MEDICAL CENTER | Age: 60
End: 2025-03-04
Payer: COMMERCIAL

## 2025-03-04 DIAGNOSIS — R53.1 GENERALIZED WEAKNESS: ICD-10-CM

## 2025-03-04 PROCEDURE — 97110 THERAPEUTIC EXERCISES: CPT | Performed by: PHYSICAL THERAPIST

## 2025-03-04 PROCEDURE — 97162 PT EVAL MOD COMPLEX 30 MIN: CPT | Performed by: PHYSICAL THERAPIST

## 2025-03-04 NOTE — PROGRESS NOTES
PT Evaluation   POC: 3/4-   Re-eval:     Today's date: 3/4/2025  Patient name: Korin Garcia  : 1965  MRN: [id number]  Referring provider: Analia Goff CRNP  Dx:   Encounter Diagnosis     ICD-10-CM    1. Generalized weakness  R53.1 Ambulatory Referral to Physical Therapy        Past Medical History:   Diagnosis Date    Abnormal CT of the chest 2023    Arthritis     Asthma     Continuous opioid dependence (HCC) 2022    Continuous opioid dependence (HCC) 2022    Diabetes mellitus (HCC)     Diverticulitis of colon     Fibromyalgia 2022    Headache(784.0)     History of mammogram 2018    History of tobacco abuse 2022    Hypertension     Nausea 2022    Obesity     Sjogren's syndrome ([facility]) 10/19/2012    Stroke ([facility])  14     Urinary tract infection      1 2 3 4 5 6   IE 3/4        7 8 9 10 11 12           13 14 15 16 17 18           19 20 21 22 23 24           25 26 27 28 29 30             Start Time: 1100  Stop Time: 1145  Total time in clinic (min): 45 minutes    Assessment  Impairments: abnormal coordination, abnormal gait, abnormal muscle firing, abnormal muscle tone, abnormal or restricted ROM, abnormal movement, activity intolerance, impaired balance, impaired physical strength, lacks appropriate home exercise program, pain with function, safety issue, weight-bearing intolerance, poor body mechanics, fine motor delay, unable to perform ADL, participation limitations, activity limitations and endurance  Symptom irritability: moderate    Assessment details: Korin is a 61 y/o female who presents to the clinic with a  referral diagnosis of right sided weakness secondary to a L sided CVA she had in .  Their rehab potential is good. Key impairments include impaired ROM, decreased UE and LE strength on her R side, decreased coordination and sensation on her R side. Test and measures revealed that she is a high fall risk, has sensation deficits, has adequate  proprioception/kinesthesia, and cannot yet bear pull weight on the R extremity. The patients symptoms and test results are consistent with movement and strength impairments secondary to L CVA. The primary goal of the patient is to be able to do her functional tasks with her R UE and to increase her walking endurance.6MWT will be performed next visit to get a baseline for endurance and gait speed. This patient would benefit from skilled PT to address their impairments and reach their goals. The patient was educated on their condition and on their HEP, expressing understanding of the topics. Thank you for the evaluation and please do not hesitate to reach out with any questions, comments, or concerns.   Understanding of Dx/Px/POC: good     Prognosis: good    Goals  STG: Performed by weeks 2-4  1. Pt will demonstrate a 1/2 point increase in all shoulder and hip MMT's by week 4  2. Pt will improve  active shoulder abduction to above 90 degrees by week 3  3. Pt will be able to perform a 5xSTS by week 4 with bolsters if needed    LTG: performed by weeks 8-discharge  1. Pt will demonstrate a 10 sec difference in TUG test by week 8  2.Pt will show clinically significant change in 6MWT by discharge  3. Pt will have 4/5 strength or higher in every shoulder and hip motion tested on the IE by week 8  4.Pt will be able to walk for over 20 minutes with the walker by discharge  5.Patient will be independent in ADL's, HEP, and be able to self manage symptoms by discharge  6.Patient will reach predicted FOTO score by discharge     Plan  Patient would benefit from: skilled physical therapy    Planned therapy interventions: abdominal trunk stabilization, activity modification, ADL retraining, balance, balance/weight bearing training, body mechanics training, coordination, flexibility, functional ROM exercises, fine motor coordination training, gait training, graded activity, graded exercise, home exercise program, IADL retraining,  therapeutic exercise, therapeutic activities, stretching, strengthening, postural training, patient/caregiver education, neuromuscular re-education, nerve gliding, muscle pump exercises, massage, manual therapy, joint mobilization and IASTM    Frequency: 2-3x week  Duration in weeks: 12  Plan of Care beginning date: 3/4/2025  Plan of Care expiration date: 5/27/2025  Treatment plan discussed with: patient        Subjective Evaluation    History of Present Illness  Mechanism of injury: Korin comes into the clinic today reporting that she had a stroke August of 2023. She was in the hospital for a month and rehab for two months, then in home therapy for about 12 weeks following. The right side was most effected by the stroke and still presents with significant weakness. She constantly has pain but is most focused on keeping her arm moving and functional as well as walking longer distances. Pt has a PMH of fibromyalgia, diabetes and HBP which is managed by medication. Is taking gabapentin as well for pain.  Uses L extremity for most of her daily tasks.  Quality of life: good    Patient Goals  Patient goal: Keep arm functional and walking for longer distances  Pain  Location: Has pain in shoulder  Quality: discomfort, sharp and dull ache  Exacerbated by: Worse at night.  Progression: improved    Social Support  Lives in: one-story house  Lives with: alone (Has caregiver)    Hand dominance: right    Treatments  Previous treatment: physical therapy, medication, home therapy and occupational therapy        Objective     Tenderness     Additional Tenderness Details  Tenderness over R trapezius and supraspinatus    Neurological Testing     Additional Neurological Details  Cannot feel C6 on right and all others were lighter on right then left in UE    LE was normal    Active Range of Motion     Right Shoulder   Flexion: 90 degrees   Abduction: 70 degrees     Strength/Myotome Testing     Right Shoulder     Planes of Motion    Flexion: 3+   Abduction: 3+     Left Hip   Planes of Motion   Flexion: 5    Right Hip   Planes of Motion   Flexion: 4-    Additional Strength Details  R  Quad 4-/5  Hamstring 4/5    L all 5/5    Ambulation   Weight-Bearing Status   Weight-Bearing Status (Left): full weight bearing   Weight-Bearing Status (Right): full weight-bearing    Assistive device used: two-wheeled walker    Ambulation: Level Surfaces   Ambulation with assistive device: independent  Ambulation without assistive device: unable    Ambulation: Stairs   Ascend stairs: independent  Pattern: non-reciprocal  Railings: one rail  Descend stairs: independent  Pattern: non-reciprocal  Railings: one rail  Curbs: requires assist (Uses walker and assistance to hold walker steady)    Additional Stairs Ambulation Details  Major trendelenburg and hip weakness noted ascending and descending stairs    Observational Gait   Decreased walking speed and stride length.     Quality of Movement During Gait     Pelvis    Pelvis (Right): Positive Trendelenburg.   Neuro Exam:     Sensation   Light touch LE: right impaired  Light touch LE: left WNL    Coordination   Heel to shin: left WNL  Heel to shin: right dysmetric  Finger to nose: right dysmetria  Finger to nose: left WNL  Rapid alternating movements: LE impaired and UE WNL    Functional outcomes   TU.97 (seconds)  Functional outcome comment: Can walk about 10 min max with walker  Unable to perform 5x STS  6MWT will be performed next visit                     Precautions: Standard, L sided CVA 3 years ago    Access Code: [email or web address]  URL: [email or web address]  Date: 2025  Prepared by: Mir Barlow    Exercises  - Side to Side Weight Shift with Counter Support  - 3 x daily - 7 x weekly - 3 sets - 10 reps  - Seated March  - 3 x daily - 7 x weekly - 3 sets - 10 reps  - Seated Long Arc Quad  - 3 x daily - 7 x weekly - 3 sets - 10 reps  - Seated Hip Abduction  - 3 x daily - 7 x weekly - 3 sets -  10 reps  - Shoulder Abduction - Thumbs Up  - 3 x daily - 7 x weekly - 3 sets - 10 reps  - Standing Shoulder Abduction AAROM with Dowel  - 3 x daily - 7 x weekly - 3 sets - 15 reps  - Shoulder Flexion Overhead with Dowel  - 3 x daily - 7 x weekly - 15 reps  - Standing Shoulder Flexion to 90 Degrees  - 3 x daily - 7 x weekly - 3 sets - 10 reps    Manuals 3/4                                                                Neuro Re-Ed                                                                                                        Ther Ex             HEP EDU 10 min                                                                                                       Ther Activity                                       Gait Training                                       Modalities

## 2025-03-11 ENCOUNTER — OFFICE VISIT (OUTPATIENT)
Dept: PHYSICAL THERAPY | Facility: MEDICAL CENTER | Age: 60
End: 2025-03-11
Payer: COMMERCIAL

## 2025-03-11 DIAGNOSIS — R53.1 GENERALIZED WEAKNESS: Primary | ICD-10-CM

## 2025-03-11 PROCEDURE — 97110 THERAPEUTIC EXERCISES: CPT | Performed by: PHYSICAL THERAPIST

## 2025-03-11 PROCEDURE — 97112 NEUROMUSCULAR REEDUCATION: CPT | Performed by: PHYSICAL THERAPIST

## 2025-03-11 NOTE — PROGRESS NOTES
Daily Note  POC: 3/4-   Re-eval:        Today's date: 3/11/2025  Patient name: Korin Garcia  : 1965  MRN: [id number]  Referring provider: Analia Goff CRNP  Dx:   Encounter Diagnosis     ICD-10-CM    1. Generalized weakness  R53.1         Past Medical History:   Diagnosis Date    Abnormal CT of the chest 2023    Arthritis     Asthma     Continuous opioid dependence (HCC) 2022    Continuous opioid dependence (HCC) 2022    Diabetes mellitus (HCC)     Diverticulitis of colon     Fibromyalgia 2022    Headache(784.0)     History of mammogram     History of tobacco abuse 2022    Hypertension     Nausea 2022    Obesity     Sjogren's syndrome (HCC) 10/19/2012    Stroke ([facility]) 08 14     Urinary tract infection      1 2 3 4 5 6   IE 3/4        7 8 9 10 11 12           13 14 15 16 17 18           19 20 21 22 23 24           25 26 27 28 29 30               Start Time: 1030  Stop Time: 1115  Total time in clinic (min): 45 minutes    Subjective: Pt reports that she was not able do her exercises but will try them this week.      Objective: See treatment diary below      Assessment: Pt tolerated the session today well. They were able to complete all of the exercises today. The focus of the session today was generalized strengthening to help the patient reach their goals.  Pt demonstrated challenges with shoulder motion today, not able to overcome 90 degrees in either direction. She also experienced a sharp pain at end range when actively moving it. She relied on her left side to push up when standing but was able to progressively shift more weight onto the right. Higher intensity exercises will be performed next session to address neuromuscular deficits.  They will continue to benefit from skilled PT to address their impairments and reach their goals.         Plan: Continue per plan of care.  Progress treatment as tolerated.    Goals  STG: Performed by weeks 2-4  1. Pt will  demonstrate a 1/2 point increase in all shoulder and hip MMT's by week 4  2. Pt will improve  active shoulder abduction to above 90 degrees by week 3  3. Pt will be able to perform a 5xSTS by week 4 with bolsters if needed    LTG: performed by weeks 8-discharge  1. Pt will demonstrate a 10 sec difference in TUG test by week 8  2.Pt will show clinically significant change in 6MWT by discharge  3. Pt will have 4/5 strength or higher in every shoulder and hip motion tested on the IE by week 8  4.Pt will be able to walk for over 20 minutes with the walker by discharge  5.Patient will be independent in ADL's, HEP, and be able to self manage symptoms by discharge  6.Patient will reach predicted FOTO score by discharge      Precautions: Standard, L sided CVA 3 years ago    Access Code: [email or web address]  URL: [email or web address]  Date: 03/04/2025  Prepared by: Mir Barlow    Exercises  - Side to Side Weight Shift with Counter Support  - 3 x daily - 7 x weekly - 3 sets - 10 reps  - Seated March  - 3 x daily - 7 x weekly - 3 sets - 10 reps  - Seated Long Arc Quad  - 3 x daily - 7 x weekly - 3 sets - 10 reps  - Seated Hip Abduction  - 3 x daily - 7 x weekly - 3 sets - 10 reps  - Shoulder Abduction - Thumbs Up  - 3 x daily - 7 x weekly - 3 sets - 10 reps  - Standing Shoulder Abduction AAROM with Dowel  - 3 x daily - 7 x weekly - 3 sets - 15 reps  - Shoulder Flexion Overhead with Dowel  - 3 x daily - 7 x weekly - 15 reps  - Standing Shoulder Flexion to 90 Degrees  - 3 x daily - 7 x weekly - 3 sets - 10 reps    Manuals 3/4 3/11           PROM  2 min to end range                                                  Neuro Re-Ed             LAQ  30x 2#           Sitting clamshells  30x RTB           Sit to stands with foam  2x 5           Marching in place sitting  30x 2#            Standing weight shifts  20x           Shoulder abduction  5x           Shoulder flexion  5x           Shoulder abduction hold  2x 5            Ther Ex             HEP EDU 10 min            Hamstring curls  30x RTB           Bike  3 min           UBE  5 min           Bicep curls  30x 1#                                     Ther Activity                                       Gait Training                                       Modalities

## 2025-03-12 DIAGNOSIS — E08.21 DIABETES MELLITUS DUE TO UNDERLYING CONDITION WITH DIABETIC NEPHROPATHY, UNSPECIFIED WHETHER LONG TERM INSULIN USE (HCC): ICD-10-CM

## 2025-03-13 ENCOUNTER — OFFICE VISIT (OUTPATIENT)
Dept: PHYSICAL THERAPY | Facility: MEDICAL CENTER | Age: 60
End: 2025-03-13
Payer: COMMERCIAL

## 2025-03-13 DIAGNOSIS — E08.21 DIABETES MELLITUS DUE TO UNDERLYING CONDITION WITH DIABETIC NEPHROPATHY, UNSPECIFIED WHETHER LONG TERM INSULIN USE (HCC): ICD-10-CM

## 2025-03-13 DIAGNOSIS — R53.1 GENERALIZED WEAKNESS: Primary | ICD-10-CM

## 2025-03-13 PROCEDURE — 97112 NEUROMUSCULAR REEDUCATION: CPT | Performed by: PHYSICAL THERAPIST

## 2025-03-13 PROCEDURE — 97110 THERAPEUTIC EXERCISES: CPT | Performed by: PHYSICAL THERAPIST

## 2025-03-13 NOTE — PROGRESS NOTES
Daily Note  POC: 3/4-   Re-eval:        Today's date: 3/13/2025  Patient name: Korin Garcia  : 1965  MRN: [id number]  Referring provider: Analia Goff CRNP  Dx:   Encounter Diagnosis     ICD-10-CM    1. Generalized weakness  R53.1           Past Medical History:   Diagnosis Date    Abnormal CT of the chest 2023    Arthritis     Asthma     Continuous opioid dependence (HCC) 2022    Continuous opioid dependence (HCC) 2022    Diabetes mellitus (HCC)     Diverticulitis of colon     Fibromyalgia 2022    Headache(784.0)     History of mammogram     History of tobacco abuse 2022    Hypertension     Nausea 2022    Obesity     Sjogren's syndrome (HCC) 10/19/2012    Stroke ([facility]) 08 14     Urinary tract infection      1 2 3 4 5 6   IE 3/4 3/11 3/13      7 8 9 10 11 12           13 14 15 16 17 18           19 20 21 22 23 24           25 26 27 28 29 30               Start Time: 1100  Stop Time: 1145  Total time in clinic (min): 45 minutes    Subjective: Pt reports that she was walking in her house and had a sharp pain up her right arm when putting pressure through it.      Objective: See treatment diary below      Assessment: Pt tolerated the session today well. Her hands were bothering her after the ball squeezes but she was able to complete them. Blaze pod tapping was added to introduce weight shifting while using her right arm in a more dynamic way. She tolerated it well but asked to stop the second set early to rest. She demonstrated fatigue and soreness post session today. She will continue to benefit from skilled PT to address her impairments and reach her goals.        Plan: Continue per plan of care.  Progress treatment as tolerated.    Frequency: 2-3x week  Duration in weeks: 12  Plan of Care beginning date: 3/4/2025  Plan of Care expiration date: 2025  Treatment plan discussed with: patient    Goals  STG: Performed by weeks 2-4  1. Pt will demonstrate a 1/2  point increase in all shoulder and hip MMT's by week 4  2. Pt will improve  active shoulder abduction to above 90 degrees by week 3  3. Pt will be able to perform a 5xSTS by week 4 with bolsters if needed    LTG: performed by weeks 8-discharge  1. Pt will demonstrate a 10 sec difference in TUG test by week 8  2.Pt will show clinically significant change in 6MWT by discharge  3. Pt will have 4/5 strength or higher in every shoulder and hip motion tested on the IE by week 8  4.Pt will be able to walk for over 20 minutes with the walker by discharge  5.Patient will be independent in ADL's, HEP, and be able to self manage symptoms by discharge  6.Patient will reach predicted FOTO score by discharge      Precautions: Standard, L sided CVA 3 years ago    Access Code: [email or web address]  URL: [email or web address]  Date: 03/04/2025  Prepared by: Mir Barlow    Exercises  - Side to Side Weight Shift with Counter Support  - 3 x daily - 7 x weekly - 3 sets - 10 reps  - Seated March  - 3 x daily - 7 x weekly - 3 sets - 10 reps  - Seated Long Arc Quad  - 3 x daily - 7 x weekly - 3 sets - 10 reps  - Seated Hip Abduction  - 3 x daily - 7 x weekly - 3 sets - 10 reps  - Shoulder Abduction - Thumbs Up  - 3 x daily - 7 x weekly - 3 sets - 10 reps  - Standing Shoulder Abduction AAROM with Dowel  - 3 x daily - 7 x weekly - 3 sets - 15 reps  - Shoulder Flexion Overhead with Dowel  - 3 x daily - 7 x weekly - 15 reps  - Standing Shoulder Flexion to 90 Degrees  - 3 x daily - 7 x weekly - 3 sets - 10 reps    Manuals 3/4 3/11 3/13          PROM  2 min to end range                                                  Neuro Re-Ed             LAQ  30x 2# 50x 2 #          Sitting clamshells  30x RTB 30x YTB          Sit to stands with foam  2x 5 10x          Marching in place sitting  30x 2#  With opp arm raise 30x          Standing weight shifts  20x           Shoulder abduction  5x           Shoulder flexion  5x           Shoulder  abduction hold  2x 5           Med ball squeeze   2x 20 3kg          Hip add ball squeeze   2x20          Blaze pod mirror tapping   2x 2 min  4 blaze pods at railing          Ankle wobble board   20x fwd-bwd     20x side to side          Ther Ex             HEP EDU 10 min            Hamstring curls  Seated 30x RTB Standing 30x          Bike  3 min           UBE  5 min           Bicep curls  30x 1#                                     Ther Activity                                       Gait Training                                       Modalities

## 2025-03-19 ENCOUNTER — HOSPITAL ENCOUNTER (EMERGENCY)
Facility: HOSPITAL | Age: 60
Discharge: HOME | End: 2025-03-19
Attending: EMERGENCY MEDICINE | Admitting: EMERGENCY MEDICINE
Payer: COMMERCIAL

## 2025-03-19 ENCOUNTER — APPOINTMENT (EMERGENCY)
Dept: RADIOLOGY | Facility: HOSPITAL | Age: 60
End: 2025-03-19
Payer: COMMERCIAL

## 2025-03-19 ENCOUNTER — APPOINTMENT (EMERGENCY)
Dept: VASCULAR ULTRASOUND | Facility: HOSPITAL | Age: 60
End: 2025-03-19
Payer: COMMERCIAL

## 2025-03-19 ENCOUNTER — OFFICE VISIT (OUTPATIENT)
Dept: URGENT CARE | Facility: MEDICAL CENTER | Age: 60
End: 2025-03-19
Payer: COMMERCIAL

## 2025-03-19 ENCOUNTER — APPOINTMENT (EMERGENCY)
Dept: CT IMAGING | Facility: HOSPITAL | Age: 60
End: 2025-03-19
Payer: COMMERCIAL

## 2025-03-19 VITALS
OXYGEN SATURATION: 96 % | HEART RATE: 98 BPM | RESPIRATION RATE: 18 BRPM | BODY MASS INDEX: 45.11 KG/M2 | SYSTOLIC BLOOD PRESSURE: 138 MMHG | WEIGHT: 231 LBS | DIASTOLIC BLOOD PRESSURE: 82 MMHG | TEMPERATURE: 98.5 F

## 2025-03-19 VITALS
OXYGEN SATURATION: 94 % | DIASTOLIC BLOOD PRESSURE: 75 MMHG | SYSTOLIC BLOOD PRESSURE: 144 MMHG | RESPIRATION RATE: 18 BRPM | HEART RATE: 94 BPM | TEMPERATURE: 98 F

## 2025-03-19 DIAGNOSIS — M79.89 PAIN AND SWELLING OF RIGHT LOWER LEG: Primary | ICD-10-CM

## 2025-03-19 DIAGNOSIS — M79.604 RIGHT LEG PAIN: ICD-10-CM

## 2025-03-19 DIAGNOSIS — M25.511 RIGHT SHOULDER PAIN: Primary | ICD-10-CM

## 2025-03-19 DIAGNOSIS — R06.02 SOB (SHORTNESS OF BREATH): ICD-10-CM

## 2025-03-19 DIAGNOSIS — R16.0 HEPATOMEGALY: ICD-10-CM

## 2025-03-19 DIAGNOSIS — K44.9 HIATAL HERNIA: ICD-10-CM

## 2025-03-19 DIAGNOSIS — M79.661 PAIN AND SWELLING OF RIGHT LOWER LEG: Primary | ICD-10-CM

## 2025-03-19 DIAGNOSIS — R10.11 RUQ ABDOMINAL PAIN: ICD-10-CM

## 2025-03-19 DIAGNOSIS — R51.9 HEADACHE: ICD-10-CM

## 2025-03-19 LAB
2HR DELTA HS TROPONIN: >1 NG/L
ALBUMIN SERPL BCG-MCNC: 3.8 G/DL (ref 3.5–5)
ALP SERPL-CCNC: 56 U/L (ref 34–104)
ALT SERPL W P-5'-P-CCNC: 19 U/L (ref 7–52)
ANION GAP SERPL CALCULATED.3IONS-SCNC: 11 MMOL/L (ref 4–13)
APTT PPP: 25 SECONDS (ref 23–34)
AST SERPL W P-5'-P-CCNC: 37 U/L (ref 13–39)
BASOPHILS # BLD AUTO: 0.12 THOUSANDS/ÂΜL (ref 0–0.1)
BASOPHILS NFR BLD AUTO: 1 % (ref 0–1)
BILIRUB SERPL-MCNC: 0.55 MG/DL (ref 0.2–1)
BNP SERPL-MCNC: 12 PG/ML (ref 0–100)
BUN SERPL-MCNC: 8 MG/DL (ref 5–25)
CALCIUM SERPL-MCNC: 9.1 MG/DL (ref 8.4–10.2)
CARDIAC TROPONIN I PNL SERPL HS: 3 NG/L (ref ?–50)
CARDIAC TROPONIN I PNL SERPL HS: <2 NG/L (ref ?–50)
CHLORIDE SERPL-SCNC: 100 MMOL/L (ref 96–108)
CO2 SERPL-SCNC: 26 MMOL/L (ref 21–32)
CREAT SERPL-MCNC: 0.7 MG/DL (ref 0.6–1.3)
D DIMER PPP FEU-MCNC: 0.62 UG/ML FEU
EOSINOPHIL # BLD AUTO: 1.63 THOUSAND/ÂΜL (ref 0–0.61)
EOSINOPHIL NFR BLD AUTO: 15 % (ref 0–6)
ERYTHROCYTE [DISTWIDTH] IN BLOOD BY AUTOMATED COUNT: 16.8 % (ref 11.6–15.1)
GFR SERPL CREATININE-BSD FRML MDRD: 94 ML/MIN/1.73SQ M
GLUCOSE SERPL-MCNC: 129 MG/DL (ref 65–140)
HCT VFR BLD AUTO: 37.9 % (ref 34.8–46.1)
HGB BLD-MCNC: 12 G/DL (ref 11.5–15.4)
IMM GRANULOCYTES # BLD AUTO: 0.03 THOUSAND/UL (ref 0–0.2)
IMM GRANULOCYTES NFR BLD AUTO: 0 % (ref 0–2)
INR PPP: 0.94 (ref 0.85–1.19)
LIPASE SERPL-CCNC: 73 U/L (ref 11–82)
LYMPHOCYTES # BLD AUTO: 3.35 THOUSANDS/ÂΜL (ref 0.6–4.47)
LYMPHOCYTES NFR BLD AUTO: 30 % (ref 14–44)
MCH RBC QN AUTO: 26.7 PG (ref 26.8–34.3)
MCHC RBC AUTO-ENTMCNC: 31.7 G/DL (ref 31.4–37.4)
MCV RBC AUTO: 84 FL (ref 82–98)
MONOCYTES # BLD AUTO: 0.77 THOUSAND/ÂΜL (ref 0.17–1.22)
MONOCYTES NFR BLD AUTO: 7 % (ref 4–12)
NEUTROPHILS # BLD AUTO: 5.15 THOUSANDS/ÂΜL (ref 1.85–7.62)
NEUTS SEG NFR BLD AUTO: 47 % (ref 43–75)
NRBC BLD AUTO-RTO: 0 /100 WBCS
PLATELET # BLD AUTO: 389 THOUSANDS/UL (ref 149–390)
PMV BLD AUTO: 9.3 FL (ref 8.9–12.7)
POTASSIUM SERPL-SCNC: 5.5 MMOL/L (ref 3.5–5.3)
PROT SERPL-MCNC: 7.5 G/DL (ref 6.4–8.4)
PROTHROMBIN TIME: 13.3 SECONDS (ref 12.3–15)
RBC # BLD AUTO: 4.5 MILLION/UL (ref 3.81–5.12)
SODIUM SERPL-SCNC: 137 MMOL/L (ref 135–147)
WBC # BLD AUTO: 11.05 THOUSAND/UL (ref 4.31–10.16)

## 2025-03-19 PROCEDURE — 93971 EXTREMITY STUDY: CPT

## 2025-03-19 PROCEDURE — 99214 OFFICE O/P EST MOD 30 MIN: CPT | Performed by: PHYSICIAN ASSISTANT

## 2025-03-19 PROCEDURE — 85730 THROMBOPLASTIN TIME PARTIAL: CPT | Performed by: PHYSICIAN ASSISTANT

## 2025-03-19 PROCEDURE — 85379 FIBRIN DEGRADATION QUANT: CPT | Performed by: PHYSICIAN ASSISTANT

## 2025-03-19 PROCEDURE — 83880 ASSAY OF NATRIURETIC PEPTIDE: CPT | Performed by: PHYSICIAN ASSISTANT

## 2025-03-19 PROCEDURE — 99284 EMERGENCY DEPT VISIT MOD MDM: CPT

## 2025-03-19 PROCEDURE — 93005 ELECTROCARDIOGRAM TRACING: CPT

## 2025-03-19 PROCEDURE — 83690 ASSAY OF LIPASE: CPT | Performed by: PHYSICIAN ASSISTANT

## 2025-03-19 PROCEDURE — 93971 EXTREMITY STUDY: CPT | Performed by: SURGERY

## 2025-03-19 PROCEDURE — 85610 PROTHROMBIN TIME: CPT | Performed by: PHYSICIAN ASSISTANT

## 2025-03-19 PROCEDURE — 85025 COMPLETE CBC W/AUTO DIFF WBC: CPT | Performed by: PHYSICIAN ASSISTANT

## 2025-03-19 PROCEDURE — 80053 COMPREHEN METABOLIC PANEL: CPT | Performed by: PHYSICIAN ASSISTANT

## 2025-03-19 PROCEDURE — 71275 CT ANGIOGRAPHY CHEST: CPT

## 2025-03-19 PROCEDURE — 70450 CT HEAD/BRAIN W/O DYE: CPT

## 2025-03-19 PROCEDURE — 74177 CT ABD & PELVIS W/CONTRAST: CPT

## 2025-03-19 PROCEDURE — 36415 COLL VENOUS BLD VENIPUNCTURE: CPT | Performed by: PHYSICIAN ASSISTANT

## 2025-03-19 PROCEDURE — 84484 ASSAY OF TROPONIN QUANT: CPT | Performed by: PHYSICIAN ASSISTANT

## 2025-03-19 PROCEDURE — 99285 EMERGENCY DEPT VISIT HI MDM: CPT | Performed by: PHYSICIAN ASSISTANT

## 2025-03-19 PROCEDURE — 73030 X-RAY EXAM OF SHOULDER: CPT

## 2025-03-19 RX ORDER — ACETAMINOPHEN 325 MG/1
975 TABLET ORAL ONCE
Status: COMPLETED | OUTPATIENT
Start: 2025-03-19 | End: 2025-03-19

## 2025-03-19 RX ORDER — LIDOCAINE 50 MG/G
1 PATCH TOPICAL ONCE
Status: DISCONTINUED | OUTPATIENT
Start: 2025-03-19 | End: 2025-03-19 | Stop reason: HOSPADM

## 2025-03-19 RX ADMIN — ACETAMINOPHEN 975 MG: 325 TABLET, FILM COATED ORAL at 16:43

## 2025-03-19 RX ADMIN — LIDOCAINE 1 PATCH: 50 PATCH CUTANEOUS at 19:27

## 2025-03-19 RX ADMIN — IOHEXOL 85 ML: 350 INJECTION, SOLUTION INTRAVENOUS at 19:09

## 2025-03-19 NOTE — PROGRESS NOTES
[facility]        NAME: Korin Garcia is a 60 y.o. female  : 1965    MRN: [id number]  DATE: 2025  TIME: 12:42 PM    Assessment and Plan   Pain and swelling of right lower leg [M79.661, M79.89]  1. Pain and swelling of right lower leg              Patient Instructions     Leg pain and swelling  Referred to the emergency room  Right arm pain  Follow up with PCP in 3-5 days.  Proceed to  ER if symptoms worsen.    Chief Complaint     Chief Complaint   Patient presents with    Arm Pain     Right arm pain increase in pain. Right shoulder pain. Some redness in antecubital area. No fever or chills. Denies trauma, injury or fall. H/o right sided hemiplegia from stroke.     Wheezing     Increased wheezing and SOB last night. No recent cold sx.          History of Present Illness       60-year-old female who presents complaining of right arm pain with movement.  Patient also complains of right lower extremity pain and swelling over the last 2 weeks.  States that she usually has pain on the right leg ever since she had her stroke but the pain worsened over the last 2 weeks.    Arm Pain     Wheezing  Associated symptoms include wheezing.       Review of Systems   Review of Systems   Respiratory:  Positive for wheezing.          Current Medications       Current Outpatient Medications:     acetaminophen (TYLENOL) 500 mg tablet, Take 2 tablets (1,000 mg total) by mouth every 6 (six) hours as needed for moderate pain, Disp: 40 tablet, Rfl: 0    albuterol (Ventolin HFA) 90 mcg/act inhaler, Inhale 2 puffs every 6 (six) hours as needed for wheezing, Disp: 18 g, Rfl: 5    amLODIPine (NORVASC) 5 mg tablet, Take 1 tablet (5 mg total) by mouth daily, Disp: 90 tablet, Rfl: 0    aspirin 81 mg chewable tablet, Chew 1 tablet (81 mg total) daily, Disp: 90 tablet, Rfl: 3    buPROPion (WELLBUTRIN XL) 150 mg 24 hr tablet, Take 1 tablet (150 mg total) by mouth every morning Do not start before 2023., Disp: ,  Rfl: 0    busPIRone (BUSPAR) 7.5 mg tablet, Take 1 tablet (7.5 mg total) by mouth 2 (two) times a day, Disp: 60 tablet, Rfl: 5    DULoxetine (CYMBALTA) 20 mg capsule, Take 1 capsule (20 mg total) by mouth daily, Disp: 90 capsule, Rfl: 1    ferrous sulfate 325 (65 Fe) mg tablet, Take 1 tablet (325 mg total) by mouth daily with breakfast Do not start before September 20, 2023., Disp: , Rfl: 0    gabapentin (NEURONTIN) 300 mg capsule, Take 2 capsules (600 mg total) by mouth 3 (three) times a day as needed (back pain), Disp: 180 capsule, Rfl: 1    hydroCHLOROthiazide 12.5 mg tablet, Take 1 tablet (12.5 mg total) by mouth daily as needed (increased leg swelling or weight gain over 5 lbs), Disp: 30 tablet, Rfl: 5    lidocaine (Lidoderm) 5 %, Apply 1 patch topically over 12 hours daily Remove & Discard patch within 12 hours or as directed by MD, Disp: 30 patch, Rfl: 0    losartan-hydrochlorothiazide (HYZAAR) 100-12.5 MG per tablet, Take 1 tablet by mouth daily, Disp: 90 tablet, Rfl: 1    metFORMIN (GLUCOPHAGE) 1000 MG tablet, Take 1 tablet (1,000 mg total) by mouth 2 (two) times a day with meals, Disp: 180 tablet, Rfl: 3    semaglutide, 1 mg/dose, (Ozempic) 4 mg/3 mL injection pen, Inject 0.75 mL (1 mg total) under the skin once a week, Disp: 9 mL, Rfl: 1    Aspirin Low Dose 81 MG EC tablet, Take 1 tablet by mouth once daily (Patient not taking: Reported on 10/14/2024), Disp: 30 tablet, Rfl: 0    atorvastatin (LIPITOR) 20 mg tablet, Take 1 tablet (20 mg total) by mouth daily (Patient not taking: Reported on 10/14/2024), Disp: 90 tablet, Rfl: 3    baclofen 5 MG TABS, Take 5 mg by mouth 2 (two) times a day (Patient not taking: Reported on 2/5/2024), Disp: 60 tablet, Rfl: 0    cyanocobalamin (VITAMIN B-12) 1000 MCG tablet, Take 1 tablet (1,000 mcg total) by mouth daily Do not start before September 20, 2023. (Patient not taking: Reported on 2/5/2024), Disp: , Rfl: 0    docusate sodium (COLACE) 100 mg capsule, Take 1 capsule  (100 mg total) by mouth 2 (two) times a day (Patient not taking: Reported on 10/14/2024), Disp: , Rfl: 0    Empagliflozin 25 MG TABS, Take 1 tablet (25 mg total) by mouth daily (Patient not taking: Reported on 8/1/2024), Disp: 90 tablet, Rfl: 3    fluticasone-vilanterol (Breo Ellipta) 200-25 mcg/actuation inhaler, Inhale 1 puff daily Rinse mouth after use., Disp: 180 blister, Rfl: 1    melatonin 3 mg, Take 2 tablets (6 mg total) by mouth daily at bedtime (Patient not taking: Reported on 2/5/2024), Disp: , Rfl: 0    methocarbamol (ROBAXIN) 500 mg tablet, Take 1 tablet (500 mg total) by mouth 3 (three) times a day as needed for muscle spasms (Patient not taking: Reported on 10/14/2024), Disp: 20 tablet, Rfl: 0    naproxen (Naprosyn) 500 mg tablet, Take 1 tablet (500 mg total) by mouth 2 (two) times a day with meals (Patient not taking: Reported on 10/14/2024), Disp: 30 tablet, Rfl: 0    nystatin (MYCOSTATIN) cream, Apply topically 2 (two) times a day (Patient not taking: Reported on 3/19/2025), Disp: 30 g, Rfl: 0    pantoprazole (PROTONIX) 40 mg tablet, Take 1 tablet (40 mg total) by mouth daily in the early morning Do not start before September 20, 2023. (Patient not taking: Reported on 2/5/2024), Disp: , Rfl: 0    semaglutide, 0.25 or 0.5 mg/dose, (Ozempic, 0.25 or 0.5 MG/DOSE,) 2 mg/3 mL injection pen, 0.25 mg under the skin every 7 days for 4 doses (28 days), THEN 0.5 mg under the skin every 7 days, Disp: 2 mL, Rfl: 1    Current Allergies     Allergies as of 03/19/2025    (No Known Allergies)            The following portions of the patient's history were reviewed and updated as appropriate: allergies, current medications, past family history, past medical history, past social history, past surgical history and problem list.     Past Medical History:   Diagnosis Date    Abnormal CT of the chest 06/28/2023    Arthritis     Asthma     Continuous opioid dependence (HCC) 04/27/2022    Continuous opioid dependence  (HCC) 04/27/2022    Diabetes mellitus (HCC)     Diverticulitis of colon     Fibromyalgia 04/26/2022    Headache(784.0)     History of mammogram 2018    History of tobacco abuse 04/26/2022    Hypertension     Nausea 04/29/2022    Obesity     Sjogren's syndrome (HCC) 10/19/2012    Stroke ([facility]) 08 14 2023    Urinary tract infection        Past Surgical History:   Procedure Laterality Date    APPENDECTOMY      BREAST BIOPSY Left     unsure of year    CARPAL TUNNEL RELEASE      COLONOSCOPY  2017    repeat in 2022    EPIDURAL BLOCK INJECTION N/A 2/14/2023    Procedure: L5-S1 EPIDURALSTEROID INJECTION (66134);  Surgeon: Melo Mancia DO;  Location:  MAIN OR;  Service: Pain Management     FOOT SURGERY      HERNIA REPAIR  05/2022    NECK SURGERY      spine fusion     GA ARTHROCENTESIS ASPIR&/INJ MAJOR JT/BURSA W/O US  9/18/2023         GA RPR UMBILICAL HRNA 5 YRS/> REDUCIBLE N/A 05/11/2022    Procedure: REPAIR HERNIA UMBILICAL;  Surgeon: Dread Mckeon MD;  Location: [facility];  Service: General       Family History   Problem Relation Age of Onset    Hypertension Mother     Heart disease Mother     Hypertension Father     Heart disease Father     Asthma Sister     No Known Problems Sister     No Known Problems Sister     Diabetes Maternal Grandmother     No Known Problems Maternal Grandfather     No Known Problems Paternal Grandmother     No Known Problems Paternal Grandfather     Breast cancer Paternal Aunt     Pancreatic cancer Paternal Uncle     Colon cancer Neg Hx     Ovarian cancer Neg Hx     Uterine cancer Neg Hx     Cervical cancer Neg Hx          Medications have been verified.        Objective   /82   Pulse 98   Temp 98.5 °F (36.9 °C)   Resp 18   Wt 105 kg (231 lb)   SpO2 96%   BMI 45.11 kg/m²        Physical Exam     Physical Exam  Constitutional:       Appearance: Normal appearance. She is well-developed.   HENT:      Head: Normocephalic and atraumatic.      Right Ear: External ear normal.       Left Ear: External ear normal.      Nose: Nose normal.      Mouth/Throat:      Pharynx: No oropharyngeal exudate.   Cardiovascular:      Rate and Rhythm: Normal rate and regular rhythm.      Heart sounds: Normal heart sounds.   Pulmonary:      Effort: Pulmonary effort is normal. No respiratory distress.      Breath sounds: Normal breath sounds. No wheezing or rales.   Chest:      Chest wall: No tenderness.   Musculoskeletal:      Cervical back: Normal range of motion and neck supple.   Lymphadenopathy:      Cervical: No cervical adenopathy.   Neurological:      Mental Status: She is alert.

## 2025-03-19 NOTE — PATIENT INSTRUCTIONS
Leg pain and swelling  Referred to the emergency room  Right arm pain  Follow up with PCP in 3-5 days.  Proceed to  ER if symptoms worsen.

## 2025-03-19 NOTE — ED PROVIDER NOTES
Time reflects when diagnosis was documented in both MDM as applicable and the Disposition within this note       Time User Action Codes Description Comment    3/19/2025  7:42 PM Sarah Trevino Add [M25.511] Right shoulder pain     3/19/2025  7:42 PM Sarah Trevino Add [M79.604] Right leg pain     3/19/2025  7:42 PM Sarah Trevino Add [R10.11] RUQ abdominal pain     3/19/2025  7:42 PM Sarah Trevino Add [R06.02] SOB (shortness of breath)     3/19/2025  7:42 PM Sarah Trevino Add [R51.9] Headache     3/19/2025  7:42 PM Sarah Trevino Add [R16.0] Hepatomegaly     3/19/2025  7:42 PM Sarah Trevino Add [K44.9] Hiatal hernia           ED Disposition       ED Disposition   Discharge    Condition   Stable    Date/Time   Wed Mar 19, 2025  7:42 PM    Comment   Korin Garcia discharge to home/self care.                   Assessment & Plan       Medical Decision Making  Patient is a 59 y/o female with a PMHx of asthma, DM2, fibromyalgia, HTN, Sjogren's syndrome and stroke (w/ residual right-sided weakness), presenting to the ED for evaluation of multiple complaints.     Patient's labs are unremarkable aside from a mildly elevated d-dimer. Venous duplex negative for DVT in the right upper or lower extremity. X-ray of the shoulder shows no acute osseous abnormality.  Patient has a normal neurological exam and headache description is not concerning for SAH. CT head unremarkable and headache fully resolved after Tylenol. CT c/a/p shows no acute abnormalities. Patient reports improvement of arm/leg pain with Tylenol/lidocaine patch and would like to go home. She was encouraged to rest, ice and elevate the extremities and take Tylenol as well as her prescribed gabapentin for pain.  She was advised to follow-up with her PCP or return to the ED for any new/worsening symptoms.    The management plan was discussed in detail with the patient at bedside and all questions were answered. Strict ED return instructions were  discussed at bedside. Prior to discharge, both verbal and written instructions were provided. We discussed the signs and symptoms that should prompt the patient to return to the ED. All questions were answered and the patient was comfortable with the plan of care and discharged home. The patient agrees to return to the Emergency Department for concerns and/or progression of illness.     Amount and/or Complexity of Data Reviewed  Labs: ordered. Decision-making details documented in ED Course.  Radiology: ordered.    Risk  OTC drugs.  Prescription drug management.        ED Course as of 03/19/25 2005   Wed Mar 19, 2025   1827 Venous duplex negative for DVT in both the right lower extremity and right upper extremity.   2001 Potassium(!): 5.5  Hemolyzed.        Medications   lidocaine (LIDODERM) 5 % patch 1 patch (1 patch Topical Medication Applied 3/19/25 1927)   acetaminophen (TYLENOL) tablet 975 mg (975 mg Oral Given 3/19/25 1643)   iohexol (OMNIPAQUE) 350 MG/ML injection (MULTI-DOSE) 85 mL (85 mL Intravenous Given 3/19/25 1909)       ED Risk Strat Scores                                                History of Present Illness       Chief Complaint   Patient presents with    Arm Pain     Pt has chronic right arm pain and went to urgent care for increased pain but mentioned her right leg has been painful and swollen and was told to come to ER for DVT evaluation of leg.     Leg Pain       Past Medical History:   Diagnosis Date    Abnormal CT of the chest 06/28/2023    Arthritis     Asthma     Continuous opioid dependence (HCC) 04/27/2022    Continuous opioid dependence (HCC) 04/27/2022    Diabetes mellitus (HCC)     Diverticulitis of colon     Fibromyalgia 04/26/2022    Headache(784.0)     History of mammogram 2018    History of tobacco abuse 04/26/2022    Hypertension     Nausea 04/29/2022    Obesity     Sjogren's syndrome (HCC) 10/19/2012    Stroke (HCC) 08 14 2023    Urinary tract infection       Past Surgical  History:   Procedure Laterality Date    APPENDECTOMY      BREAST BIOPSY Left     unsure of year    CARPAL TUNNEL RELEASE      COLONOSCOPY  2017    repeat in     EPIDURAL BLOCK INJECTION N/A 2023    Procedure: L5-S1 EPIDURALSTEROID INJECTION (89676);  Surgeon: Melo Mancia DO;  Location:  MAIN OR;  Service: Pain Management     FOOT SURGERY      HERNIA REPAIR  2022    NECK SURGERY      spine fusion     IA ARTHROCENTESIS ASPIR&/INJ MAJOR JT/BURSA W/O US  2023         IA RPR UMBILICAL HRNA 5 YRS/> REDUCIBLE N/A 2022    Procedure: REPAIR HERNIA UMBILICAL;  Surgeon: Dread Mckeon MD;  Location:  MAIN OR;  Service: General      Family History   Problem Relation Age of Onset    Hypertension Mother     Heart disease Mother     Hypertension Father     Heart disease Father     Asthma Sister     No Known Problems Sister     No Known Problems Sister     Diabetes Maternal Grandmother     No Known Problems Maternal Grandfather     No Known Problems Paternal Grandmother     No Known Problems Paternal Grandfather     Breast cancer Paternal Aunt     Pancreatic cancer Paternal Uncle     Colon cancer Neg Hx     Ovarian cancer Neg Hx     Uterine cancer Neg Hx     Cervical cancer Neg Hx       Social History     Tobacco Use    Smoking status: Former     Current packs/day: 0.00     Average packs/day: 0.5 packs/day for 43.3 years (21.6 ttl pk-yrs)     Types: Cigarettes     Start date: 1980     Quit date: 2023     Years since quittin.9     Passive exposure: Past    Smokeless tobacco: Never    Tobacco comments:     per pt, 5 a day - As per [place]    Vaping Use    Vaping status: Never Used   Substance Use Topics    Alcohol use: Not Currently     Comment: occasional     Drug use: Never      E-Cigarette/Vaping    E-Cigarette Use Never User       E-Cigarette/Vaping Substances    Nicotine No     THC No     CBD No     Flavoring No     Other No     Unknown No       I have reviewed and agree with the  history as documented.     Patient is a 61 y/o female with a PMHx of asthma, DM2, fibromyalgia, HTN, Sjogren's syndrome and stroke (w/ residual right-sided weakness), presenting to the ED for evaluation of multiple complaints.  Patient states that she has had pain and swelling in the right calf for the past week.  She denies any numbness, increased weakness or skin color changes in the leg. She also states that she has been experiencing pain in her right shoulder and upper arm since yesterday. She denies any falls, trauma or injuries to the arm. She denies any swelling, numbness or increased weakness in the arm. She states that she was having difficulty moving the shoulder this morning due to pain but says this has improved throughout the day. She denies any swelling, redness or warmth in the shoulder joint. She denies any neck pain/radiculopathy. Patient also states that she has been experiencing shortness of breath, wheezing, chest tightness and a mild cough over the past 2 days. She denies any chest pain, pleuritic pain, hemoptysis or palpitations. She also reports persistent headaches for the past week. She denies any sudden onset headaches or worst headache of her life.  She states that she has had headaches before but says they do not typically last this long.  She denies any associated dizziness, vision changes, diplopia, facial asymmetry, dysarthria, nausea, vomiting or new numbness/weakness. She also states that she has been experiencing right upper quadrant abdominal pain over the past few days.  She denies any nausea, vomiting, diarrhea, constipation, flank pain or urinary symptoms.        Review of Systems   Constitutional:  Negative for chills and fever.   HENT:  Negative for congestion, rhinorrhea and sore throat.    Eyes:  Negative for visual disturbance.   Respiratory:  Positive for cough, chest tightness, shortness of breath and wheezing.    Cardiovascular:  Positive for leg swelling. Negative for  chest pain and palpitations.   Gastrointestinal:  Positive for abdominal pain. Negative for constipation, diarrhea, nausea and vomiting.   Genitourinary:  Negative for dysuria, flank pain and hematuria.   Musculoskeletal:  Positive for arthralgias (right arm pain, right leg pain). Negative for back pain and neck pain.   Skin:  Negative for rash.   Neurological:  Positive for headaches. Negative for dizziness and syncope.   All other systems reviewed and are negative.          Objective       ED Triage Vitals [03/19/25 1453]   Temperature Pulse Blood Pressure Respirations SpO2 Patient Position - Orthostatic VS   98 °F (36.7 °C) 101 (!) 184/99 20 97 % Sitting      Temp Source Heart Rate Source BP Location FiO2 (%) Pain Score    Oral Monitor Left arm -- --      Vitals      Date and Time Temp Pulse SpO2 Resp BP Pain Score FACES Pain Rating User   03/19/25 1926 -- 94 94 % 18 144/75 -- -- LH   03/19/25 1453 98 °F (36.7 °C) 101 97 % 20 184/99 -- -- KK            Physical Exam  Vitals and nursing note reviewed.   Constitutional:       General: She is awake.      Appearance: Normal appearance. She is well-developed. She is not toxic-appearing or diaphoretic.   HENT:      Head: Normocephalic and atraumatic.      Right Ear: External ear normal.      Left Ear: External ear normal.      Nose: Nose normal.      Mouth/Throat:      Lips: Pink.      Mouth: Mucous membranes are moist.   Eyes:      General: Lids are normal. No scleral icterus.     Conjunctiva/sclera: Conjunctivae normal.      Pupils: Pupils are equal, round, and reactive to light.   Cardiovascular:      Rate and Rhythm: Normal rate and regular rhythm.      Pulses: Normal pulses.           Radial pulses are 2+ on the right side and 2+ on the left side.      Heart sounds: Normal heart sounds, S1 normal and S2 normal.   Pulmonary:      Effort: Pulmonary effort is normal. No accessory muscle usage.      Breath sounds: Normal breath sounds. No stridor. No decreased breath  sounds, wheezing, rhonchi or rales.      Comments: Lungs clear and equal to auscultation bilaterally.  No wheezing, rhonchi or rales.  No tachypnea, accessory muscle usage or retractions.  Abdominal:      General: Abdomen is flat. Bowel sounds are normal. There is no distension.      Palpations: Abdomen is soft.      Tenderness: There is abdominal tenderness in the right upper quadrant and epigastric area. There is no right CVA tenderness, left CVA tenderness, guarding or rebound.   Musculoskeletal:      Cervical back: Full passive range of motion without pain and neck supple. No signs of trauma. No pain with movement.      Right lower leg: No edema.      Left lower leg: No edema.      Comments: Right upper extremity: Tenderness to palpation over the anterolateral aspect of the shoulder and over the medial aspect of the upper arm. Compartments are soft/compressible. No overlying erythema/warmth. No appreciable swelling. Radial pulse 2+. Decreased strength at baseline (unchanged per patient). Sensation intact. Cap refill <2 seconds.     Right lower extremity: Tenderness to palpation over the right posterior calf. Mild swelling when compared to left; however, no pitting edema.  Compartments are soft and compressible.  No overlying erythema/warmth.  PT/DP pulses 2+.  Motor/sensory intact.  Cap refill <2 seconds.    Lymphadenopathy:      Cervical: No cervical adenopathy.   Skin:     General: Skin is warm and dry.      Capillary Refill: Capillary refill takes less than 2 seconds.      Coloration: Skin is not cyanotic, jaundiced or pale.   Neurological:      Mental Status: She is alert and oriented to person, place, and time.      GCS: GCS eye subscore is 4. GCS verbal subscore is 5. GCS motor subscore is 6.      Gait: Gait normal.   Psychiatric:         Mood and Affect: Mood normal.         Speech: Speech normal.         Behavior: Behavior is cooperative.         Results Reviewed       Procedure Component Value Units  Date/Time    Comprehensive metabolic panel [[id number]]  (Abnormal) Collected: 03/19/25 1818    Lab Status: Final result Specimen: Blood from Arm, Left Updated: 03/19/25 1853     Sodium 137 mmol/L      Potassium 5.5 mmol/L      Chloride 100 mmol/L      CO2 26 mmol/L      ANION GAP 11 mmol/L      BUN 8 mg/dL      Creatinine 0.70 mg/dL      Glucose 129 mg/dL      Calcium 9.1 mg/dL      AST 37 U/L      ALT 19 U/L      Alkaline Phosphatase 56 U/L      Total Protein 7.5 g/dL      Albumin 3.8 g/dL      Total Bilirubin 0.55 mg/dL      eGFR 94 ml/min/1.73sq m     Narrative:      [employer] guidelines for Chronic Kidney Disease (CKD):     Stage 1 with normal or high GFR (GFR > 90 mL/min/1.73 square meters)    Stage 2 Mild CKD (GFR = 60-89 mL/min/1.73 square meters)    Stage 3A Moderate CKD (GFR = 45-59 mL/min/1.73 square meters)    Stage 3B Moderate CKD (GFR = 30-44 mL/min/1.73 square meters)    Stage 4 Severe CKD (GFR = 15-29 mL/min/1.73 square meters)    Stage 5 End Stage CKD (GFR <15 mL/min/1.73 square meters)  Note: GFR calculation is accurate only with a steady state creatinine    Lipase [[id number]]  (Normal) Collected: 03/19/25 1818    Lab Status: Final result Specimen: Blood from Arm, Left Updated: 03/19/25 1853     Lipase 73 u/L     HS Troponin I 2hr [[id number]]  (Normal) Collected: 03/19/25 1818    Lab Status: Final result Specimen: Blood from Arm, Left Updated: 03/19/25 1853     hs TnI 2hr 3 ng/L      Delta 2hr hsTnI >1 ng/L     HS Troponin 0hr (reflex protocol) [[id number]]  (Normal) Collected: 03/19/25 1639    Lab Status: Final result Specimen: Blood from Arm, Left Updated: 03/19/25 1715     hs TnI 0hr <2 ng/L     B-Type Natriuretic Peptide(BNP) [[id number]]  (Normal) Collected: 03/19/25 1639    Lab Status: Final result Specimen: Blood from Arm, Left Updated: 03/19/25 1714     BNP 12 pg/mL     D-Dimer [[id number]]  (Abnormal) Collected: 03/19/25 1639    Lab Status: Final result Specimen: Blood  from Arm, Left Updated: 03/19/25 1707     D-Dimer, Quant 0.62 ug/ml FEU     Narrative:      In the evaluation for possible pulmonary embolism, in the appropriate (Well's Score of 4 or less) patient, the age adjusted d-dimer cutoff for this patient can be calculated as:    Age x 0.01 (in ug/mL) for Age-adjusted D-dimer exclusion threshold for a patient over 50 years.    Protime-INR [[id number]]  (Normal) Collected: 03/19/25 1639    Lab Status: Final result Specimen: Blood from Arm, Left Updated: 03/19/25 1658     Protime 13.3 seconds      INR 0.94    Narrative:      INR Therapeutic Range    Indication                                             INR Range      Atrial Fibrillation                                               2.0-3.0  Hypercoagulable State                                    2.0.2.3  Left Ventricular Asist Device                            2.0-3.0  Mechanical Heart Valve                                  -    Aortic(with afib, MI, embolism, HF, LA enlargement,    and/or coagulopathy)                                     2.0-3.0 (2.5-3.5)     Mitral                                                             2.5-3.5  Prosthetic/Bioprosthetic Heart Valve               2.0-3.0  Venous thromboembolism (VTE: VT, PE        2.0-3.0    APTT [[id number]]  (Normal) Collected: 03/19/25 1639    Lab Status: Final result Specimen: Blood from Arm, Left Updated: 03/19/25 1658     PTT 25 seconds     CBC and differential [[id number]]  (Abnormal) Collected: 03/19/25 1639    Lab Status: Final result Specimen: Blood from Arm, Left Updated: 03/19/25 1644     WBC 11.05 Thousand/uL      RBC 4.50 Million/uL      Hemoglobin 12.0 g/dL      Hematocrit 37.9 %      MCV 84 fL      MCH 26.7 pg      MCHC 31.7 g/dL      RDW 16.8 %      MPV 9.3 fL      Platelets 389 Thousands/uL      nRBC 0 /100 WBCs      Segmented % 47 %      Immature Grans % 0 %      Lymphocytes % 30 %      Monocytes % 7 %      Eosinophils Relative 15 %      Basophils  Relative 1 %      Absolute Neutrophils 5.15 Thousands/µL      Absolute Immature Grans 0.03 Thousand/uL      Absolute Lymphocytes 3.35 Thousands/µL      Absolute Monocytes 0.77 Thousand/µL      Eosinophils Absolute 1.63 Thousand/µL      Basophils Absolute 0.12 Thousands/µL             CT head without contrast   Final Interpretation by Afshin Gambino MD (03/19 1921)      No acute intracranial abnormality.                  Workstation performed: [id number]         CT pe study w abdomen pelvis w contrast   Final Interpretation by Afshin Gambino MD (03/19 1929)      No pulmonary embolism. Clear lungs.      No acute inflammatory process identified in the abdomen or pelvis.      Stable hepatomegaly with steatosis.      Stable small hiatal hernia.                     Workstation performed: [id number]         VAS VENOUS DUPLEX -LOWER LIMB UNILATERAL   Final Interpretation by Mir Giordano MD (03/19 1855)      VAS upper limb venous duplex scan, unilateral/limited   Final Interpretation by Mir Giordano MD (03/19 1857)      XR shoulder 2+ views RIGHT    (Results Pending)       ECG 12 Lead Documentation Only    Date/Time: 3/19/2025 6:27 PM    Performed by: Sarah Trevino PA-C  Authorized by: Sarah Trevino PA-C    Indications / Diagnosis:  Sob  ECG reviewed by me, the ED Provider: yes    Patient location:  ED  Previous ECG:     Previous ECG:  Compared to current    Comparison ECG info:  1/23/24    Comparison to cardiac monitor: Yes    Rate:     ECG rate:  89    ECG rate assessment: normal    Rhythm:     Rhythm: sinus rhythm    Ectopy:     Ectopy: none    QRS:     QRS axis:  Normal    QRS intervals:  Normal  Conduction:     Conduction: normal    ST segments:     ST segments:  Normal  T waves:     T waves: normal    Comments:      No STEMI.  QT//442.      ED Medication and Procedure Management   Prior to Admission Medications   Prescriptions Last Dose Informant Patient Reported?  Taking?   Aspirin Low Dose 81 MG EC tablet  Outside Facility (Specify), Self No No   Sig: Take 1 tablet by mouth once daily   Patient not taking: Reported on 10/14/2024   DULoxetine (CYMBALTA) 20 mg capsule   No No   Sig: Take 1 capsule (20 mg total) by mouth daily   Empagliflozin 25 MG TABS   No No   Sig: Take 1 tablet (25 mg total) by mouth daily   Patient not taking: Reported on 8/1/2024   acetaminophen (TYLENOL) 500 mg tablet   No No   Sig: Take 2 tablets (1,000 mg total) by mouth every 6 (six) hours as needed for moderate pain   albuterol (Ventolin HFA) 90 mcg/act inhaler  Self No No   Sig: Inhale 2 puffs every 6 (six) hours as needed for wheezing   amLODIPine (NORVASC) 5 mg tablet   No No   Sig: Take 1 tablet (5 mg total) by mouth daily   aspirin 81 mg chewable tablet   No No   Sig: Chew 1 tablet (81 mg total) daily   atorvastatin (LIPITOR) 20 mg tablet   No No   Sig: Take 1 tablet (20 mg total) by mouth daily   Patient not taking: Reported on 10/14/2024   baclofen 5 MG TABS  Outside Facility (Specify), Self No No   Sig: Take 5 mg by mouth 2 (two) times a day   Patient not taking: Reported on 2/5/2024   buPROPion (WELLBUTRIN XL) 150 mg 24 hr tablet  Outside Facility (Specify), Self No No   Sig: Take 1 tablet (150 mg total) by mouth every morning Do not start before September 20, 2023.   busPIRone (BUSPAR) 7.5 mg tablet   No No   Sig: Take 1 tablet (7.5 mg total) by mouth 2 (two) times a day   cyanocobalamin (VITAMIN B-12) 1000 MCG tablet  Outside Facility (Specify), Self No No   Sig: Take 1 tablet (1,000 mcg total) by mouth daily Do not start before September 20, 2023.   Patient not taking: Reported on 2/5/2024   docusate sodium (COLACE) 100 mg capsule  Outside Facility (Specify), Self No No   Sig: Take 1 capsule (100 mg total) by mouth 2 (two) times a day   Patient not taking: Reported on 10/14/2024   ferrous sulfate 325 (65 Fe) mg tablet  Outside Facility (Specify), Self No No   Sig: Take 1 tablet (325 mg  total) by mouth daily with breakfast Do not start before 2023.   fluticasone-vilanterol (Breo Ellipta) 200-25 mcg/actuation inhaler  Self No No   Sig: Inhale 1 puff daily Rinse mouth after use.   gabapentin (NEURONTIN) 300 mg capsule   No No   Sig: Take 2 capsules (600 mg total) by mouth 3 (three) times a day as needed (back pain)   hydroCHLOROthiazide 12.5 mg tablet   No No   Sig: Take 1 tablet (12.5 mg total) by mouth daily as needed (increased leg swelling or weight gain over 5 lbs)   lidocaine (Lidoderm) 5 %   No No   Sig: Apply 1 patch topically over 12 hours daily Remove & Discard patch within 12 hours or as directed by MD   losartan-hydrochlorothiazide (HYZAAR) 100-12.5 MG per tablet   No No   Sig: Take 1 tablet by mouth daily   melatonin 3 mg  Outside Facility (Specify), Self No No   Sig: Take 2 tablets (6 mg total) by mouth daily at bedtime   Patient not taking: Reported on 2024   metFORMIN (GLUCOPHAGE) 1000 MG tablet   No No   Sig: Take 1 tablet (1,000 mg total) by mouth 2 (two) times a day with meals   methocarbamol (ROBAXIN) 500 mg tablet   No No   Sig: Take 1 tablet (500 mg total) by mouth 3 (three) times a day as needed for muscle spasms   Patient not taking: Reported on 10/14/2024   naproxen (Naprosyn) 500 mg tablet   No No   Sig: Take 1 tablet (500 mg total) by mouth 2 (two) times a day with meals   Patient not taking: Reported on 10/14/2024   nystatin (MYCOSTATIN) cream   No No   Sig: Apply topically 2 (two) times a day   Patient not taking: Reported on 3/19/2025   pantoprazole (PROTONIX) 40 mg tablet  Outside Facility (Specify), Self No No   Sig: Take 1 tablet (40 mg total) by mouth daily in the early morning Do not start before 2023.   Patient not taking: Reported on 2024   semaglutide, 0.25 or 0.5 mg/dose, (Ozempic, 0.25 or 0.5 MG/DOSE,) 2 mg/3 mL injection pen   No No   Si.25 mg under the skin every 7 days for 4 doses (28 days), THEN 0.5 mg under the skin  every 7 days   semaglutide, 1 mg/dose, (Ozempic) 4 mg/3 mL injection pen   No No   Sig: Inject 0.75 mL (1 mg total) under the skin once a week      Facility-Administered Medications: None     Discharge Medication List as of 3/19/2025  7:44 PM        CONTINUE these medications which have NOT CHANGED    Details   acetaminophen (TYLENOL) 500 mg tablet Take 2 tablets (1,000 mg total) by mouth every 6 (six) hours as needed for moderate pain, Starting Wed 6/26/2024, Normal      albuterol (Ventolin HFA) 90 mcg/act inhaler Inhale 2 puffs every 6 (six) hours as needed for wheezing, Starting Mon 4/15/2024, Normal      amLODIPine (NORVASC) 5 mg tablet Take 1 tablet (5 mg total) by mouth daily, Starting Wed 1/8/2025, Normal      aspirin 81 mg chewable tablet Chew 1 tablet (81 mg total) daily, Starting Fri 10/18/2024, Normal      Aspirin Low Dose 81 MG EC tablet Take 1 tablet by mouth once daily, Starting Fri 8/4/2023, Normal      atorvastatin (LIPITOR) 20 mg tablet Take 1 tablet (20 mg total) by mouth daily, Starting Thu 8/1/2024, Normal      baclofen 5 MG TABS Take 5 mg by mouth 2 (two) times a day, Starting Tue 9/19/2023, Print      buPROPion (WELLBUTRIN XL) 150 mg 24 hr tablet Take 1 tablet (150 mg total) by mouth every morning Do not start before September 20, 2023., Starting Wed 9/20/2023, No Print      busPIRone (BUSPAR) 7.5 mg tablet Take 1 tablet (7.5 mg total) by mouth 2 (two) times a day, Starting Fri 10/18/2024, Normal      cyanocobalamin (VITAMIN B-12) 1000 MCG tablet Take 1 tablet (1,000 mcg total) by mouth daily Do not start before September 20, 2023., Starting Wed 9/20/2023, No Print      docusate sodium (COLACE) 100 mg capsule Take 1 capsule (100 mg total) by mouth 2 (two) times a day, Starting Tue 9/19/2023, No Print      DULoxetine (CYMBALTA) 20 mg capsule Take 1 capsule (20 mg total) by mouth daily, Starting Fri 10/18/2024, Normal      Empagliflozin 25 MG TABS Take 1 tablet (25 mg total) by mouth daily,  Starting Mon 5/20/2024, Until Thu 5/15/2025, Normal      ferrous sulfate 325 (65 Fe) mg tablet Take 1 tablet (325 mg total) by mouth daily with breakfast Do not start before September 20, 2023., Starting Wed 9/20/2023, No Print      fluticasone-vilanterol (Breo Ellipta) 200-25 mcg/actuation inhaler Inhale 1 puff daily Rinse mouth after use., Starting Thu 4/11/2024, Until Mon 10/14/2024, Normal      gabapentin (NEURONTIN) 300 mg capsule Take 2 capsules (600 mg total) by mouth 3 (three) times a day as needed (back pain), Starting Fri 10/18/2024, Normal      hydroCHLOROthiazide 12.5 mg tablet Take 1 tablet (12.5 mg total) by mouth daily as needed (increased leg swelling or weight gain over 5 lbs), Starting Thu 8/1/2024, Until Wed 3/19/2025 at 2359, Normal      lidocaine (Lidoderm) 5 % Apply 1 patch topically over 12 hours daily Remove & Discard patch within 12 hours or as directed by MD, Starting Tue 10/29/2024, Normal      losartan-hydrochlorothiazide (HYZAAR) 100-12.5 MG per tablet Take 1 tablet by mouth daily, Starting Fri 10/18/2024, Normal      melatonin 3 mg Take 2 tablets (6 mg total) by mouth daily at bedtime, Starting Tue 9/19/2023, No Print      metFORMIN (GLUCOPHAGE) 1000 MG tablet Take 1 tablet (1,000 mg total) by mouth 2 (two) times a day with meals, Starting Thu 3/13/2025, Normal      methocarbamol (ROBAXIN) 500 mg tablet Take 1 tablet (500 mg total) by mouth 3 (three) times a day as needed for muscle spasms, Starting Wed 6/26/2024, Normal      naproxen (Naprosyn) 500 mg tablet Take 1 tablet (500 mg total) by mouth 2 (two) times a day with meals, Starting Wed 6/26/2024, Normal      nystatin (MYCOSTATIN) cream Apply topically 2 (two) times a day, Starting Tue 12/31/2024, Normal      pantoprazole (PROTONIX) 40 mg tablet Take 1 tablet (40 mg total) by mouth daily in the early morning Do not start before September 20, 2023., Starting Wed 9/20/2023, No Print      semaglutide, 0.25 or 0.5 mg/dose, (Ozempic,  0.25 or 0.5 MG/DOSE,) 2 mg/3 mL injection pen 0.25 mg under the skin every 7 days for 4 doses (28 days), THEN 0.5 mg under the skin every 7 days, Normal      semaglutide, 1 mg/dose, (Ozempic) 4 mg/3 mL injection pen Inject 0.75 mL (1 mg total) under the skin once a week, Starting Tue 2/11/2025, Normal           No discharge procedures on file.  ED SEPSIS DOCUMENTATION   Time reflects when diagnosis was documented in both MDM as applicable and the Disposition within this note       Time User Action Codes Description Comment    3/19/2025  7:42 PM Sarah Trevino [M25.511] Right shoulder pain     3/19/2025  7:42 PM Sarah Trevino [M79.604] Right leg pain     3/19/2025  7:42 PM Sarah Trevino [R10.11] RUQ abdominal pain     3/19/2025  7:42 PM Sarah Trevino [R06.02] SOB (shortness of breath)     3/19/2025  7:42 PM Sarah Trevino [R51.9] Headache     3/19/2025  7:42 PM Sarah Trevino [R16.0] Hepatomegaly     3/19/2025  7:42 PM Sarah Trevino [K44.9] Hiatal hernia                  Sarah Trevino PA-C  03/19/25 2005

## 2025-03-21 ENCOUNTER — OFFICE VISIT (OUTPATIENT)
Dept: PHYSICAL THERAPY | Facility: MEDICAL CENTER | Age: 60
End: 2025-03-21
Payer: COMMERCIAL

## 2025-03-21 DIAGNOSIS — R53.1 GENERALIZED WEAKNESS: Primary | ICD-10-CM

## 2025-03-21 PROCEDURE — 97112 NEUROMUSCULAR REEDUCATION: CPT | Performed by: PHYSICAL THERAPIST

## 2025-03-21 NOTE — PROGRESS NOTES
"Daily Note  POC: 3/4-   Re-eval:        Today's date: 3/21/2025  Patient name: Korin Garcia  : 1965  MRN: [id number]  Referring provider: Analia Goff CRNP  Dx:   Encounter Diagnosis     ICD-10-CM    1. Generalized weakness  R53.1           Past Medical History:   Diagnosis Date    Abnormal CT of the chest 2023    Arthritis     Asthma     Continuous opioid dependence (HCC) 2022    Continuous opioid dependence (HCC) 2022    Diabetes mellitus (HCC)     Diverticulitis of colon     Fibromyalgia 2022    Headache(784.0)     History of mammogram     History of tobacco abuse 2022    Hypertension     Nausea 2022    Obesity     Sjogren's syndrome (HCC) 10/19/2012    Stroke ([facility]) 08 14     Urinary tract infection      1 2 3 4 5 6   IE 3/4 3/11 3/13      7 8 9 10 11 12           13 14 15 16 17 18           19 20 21 22 23 24           25 26 27 28 29 30               Start Time: 1100  Stop Time: 1145  Total time in clinic (min): 45 minutes    Subjective: \" I missed the last appointment because my arm and leg were really weak.\"      Objective: See treatment diary below      Assessment: Pt tolerated the session today well. The intensity of the exercises was improved with verbal cues today. She demonstrated improved upper extremity endurance during the blaze pod activity and was challenged by the reaching and tapping. The patient was challenged by the hand exercises and was instructed to continue these at home with her foam stress ball. She demonstrated fatigue and soreness post session today. High intensity gait training will be trialed in the next session depending on how patient feels that day. She will continue to benefit from skilled PT to address her impairments and reach her goals.        Plan: Continue per plan of care.  Progress treatment as tolerated.    Frequency: 2-3x week  Duration in weeks: 12  Plan of Care beginning date: 3/4/2025  Plan of Care expiration date: " 5/27/2025  Treatment plan discussed with: patient    Goals  STG: Performed by weeks 2-4  1. Pt will demonstrate a 1/2 point increase in all shoulder and hip MMT's by week 4  2. Pt will improve  active shoulder abduction to above 90 degrees by week 3  3. Pt will be able to perform a 5xSTS by week 4 with bolsters if needed    LTG: performed by weeks 8-discharge  1. Pt will demonstrate a 10 sec difference in TUG test by week 8  2.Pt will show clinically significant change in 6MWT by discharge  3. Pt will have 4/5 strength or higher in every shoulder and hip motion tested on the IE by week 8  4.Pt will be able to walk for over 20 minutes with the walker by discharge  5.Patient will be independent in ADL's, HEP, and be able to self manage symptoms by discharge  6.Patient will reach predicted FOTO score by discharge      Precautions: Standard, L sided CVA 3 years ago    Access Code: [email or web address]  URL: [email or web address]  Date: 03/04/2025  Prepared by: Mir Barlow    Exercises  - Side to Side Weight Shift with Counter Support  - 3 x daily - 7 x weekly - 3 sets - 10 reps  - Seated March  - 3 x daily - 7 x weekly - 3 sets - 10 reps  - Seated Long Arc Quad  - 3 x daily - 7 x weekly - 3 sets - 10 reps  - Seated Hip Abduction  - 3 x daily - 7 x weekly - 3 sets - 10 reps  - Shoulder Abduction - Thumbs Up  - 3 x daily - 7 x weekly - 3 sets - 10 reps  - Standing Shoulder Abduction AAROM with Dowel  - 3 x daily - 7 x weekly - 3 sets - 15 reps  - Shoulder Flexion Overhead with Dowel  - 3 x daily - 7 x weekly - 15 reps  - Standing Shoulder Flexion to 90 Degrees  - 3 x daily - 7 x weekly - 3 sets - 10 reps    Manuals 3/4 3/11 3/13 3/21         PROM  2 min to end range           AAROM    CB   5 min                                    Neuro Re-Ed             LAQ  30x 2# 50x 2 #          Sitting clamshells  30x RTB 30x YTB          Sit to stands with foam  2x 5 10x          Marching in place sitting  30x 2#  With opp arm  raise 30x With opp arm raise 2x 15    Alternating feel with 1# weight         Standing weight shifts  20x  With reach at mirror 2 min         Shoulder abduction  5x           Shoulder flexion  5x           Shoulder abduction hold  2x 5           Med ball squeeze   2x 20 3kg 3x 20 with yellow theraball         Hip add ball squeeze   2x20 3x20         Blaze pod  tapping   2x 2 min  4 blaze pods at railing on mirror 3x 1 min 6 pods on table in semicircle         Ankle wobble board   20x fwd-bwd     20x side to side          Finger flexion squeezes with rubbber band dynamometer    30x         Finger extension    30x         Ther Ex             HEP EDU 10 min            Hamstring curls  Seated 30x RTB Standing 30x          Bike  3 min           UBE  5 min           Bicep curls  30x 1#           Quad extensions   1# 25x                       Ther Activity                                       Gait Training                                       Modalities

## 2025-03-22 LAB
ATRIAL RATE: 89 BPM
P AXIS: 53 DEGREES
PR INTERVAL: 178 MS
QRS AXIS: 28 DEGREES
QRSD INTERVAL: 78 MS
QT INTERVAL: 364 MS
QTC INTERVAL: 442 MS
T WAVE AXIS: 14 DEGREES
VENTRICULAR RATE: 89 BPM

## 2025-03-22 PROCEDURE — 93010 ELECTROCARDIOGRAM REPORT: CPT | Performed by: INTERNAL MEDICINE

## 2025-03-27 ENCOUNTER — APPOINTMENT (OUTPATIENT)
Dept: PHYSICAL THERAPY | Facility: MEDICAL CENTER | Age: 60
End: 2025-03-27
Payer: COMMERCIAL

## 2025-03-28 ENCOUNTER — OFFICE VISIT (OUTPATIENT)
Dept: PHYSICAL THERAPY | Facility: MEDICAL CENTER | Age: 60
End: 2025-03-28
Payer: COMMERCIAL

## 2025-03-28 DIAGNOSIS — R53.1 GENERALIZED WEAKNESS: Primary | ICD-10-CM

## 2025-03-28 PROCEDURE — 97116 GAIT TRAINING THERAPY: CPT | Performed by: PHYSICAL THERAPIST

## 2025-03-28 PROCEDURE — 97112 NEUROMUSCULAR REEDUCATION: CPT | Performed by: PHYSICAL THERAPIST

## 2025-03-28 NOTE — PROGRESS NOTES
"Daily Note  POC: 3/4-   Re-eval:        Today's date: 3/28/2025  Patient name: Korin Garcia  : 1965  MRN: [id number]  Referring provider: Analia Goff CRNP  Dx:   Encounter Diagnosis     ICD-10-CM    1. Generalized weakness  R53.1             Past Medical History:   Diagnosis Date    Abnormal CT of the chest 2023    Arthritis     Asthma     Continuous opioid dependence (HCC) 2022    Continuous opioid dependence (HCC) 2022    Diabetes mellitus (HCC)     Diverticulitis of colon     Fibromyalgia 2022    Headache(784.0)     History of mammogram     History of tobacco abuse 2022    Hypertension     Nausea 2022    Obesity     Sjogren's syndrome (HCC) 10/19/2012    Stroke ([facility]) 08 14     Urinary tract infection      1 2 3 4 5 6   IE 3/4 3/11 3/13 3/21 3/28    7 8 9 10 11 12           13 14 15 16 17 18           19 20 21 22 23 24           25 26 27 28 29 30               Start Time: 1230  Stop Time: 1315  Total time in clinic (min): 45 minutes    Subjective: \" I have more pain in my shoulder and my hand has been tingling, and my hand is painful.\"      Objective: See treatment diary below      Assessment: Pt tolerated the session today well. High intensity gait training was added today and the patient tolerated it well. She had difficulty when trying to use a metronome to increase step rate but this will be introduced again in the future. Her arm and hands were especially sore today thus gentle ROM was given to loosen up her arm and reduce the pain. She will continue to benefit from skilled PT to address her impairments and reach her goals.        Plan: Continue per plan of care.  Progress treatment as tolerated.    Frequency: 2-3x week  Duration in weeks: 12  Plan of Care beginning date: 3/4/2025  Plan of Care expiration date: 2025  Treatment plan discussed with: patient    Goals  STG: Performed by weeks 2-4  1. Pt will demonstrate a 1/2 point increase in all " shoulder and hip MMT's by week 4  2. Pt will improve  active shoulder abduction to above 90 degrees by week 3  3. Pt will be able to perform a 5xSTS by week 4 with bolsters if needed    LTG: performed by weeks 8-discharge  1. Pt will demonstrate a 10 sec difference in TUG test by week 8  2.Pt will show clinically significant change in 6MWT by discharge  3. Pt will have 4/5 strength or higher in every shoulder and hip motion tested on the IE by week 8  4.Pt will be able to walk for over 20 minutes with the walker by discharge  5.Patient will be independent in ADL's, HEP, and be able to self manage symptoms by discharge  6.Patient will reach predicted FOTO score by discharge      Precautions: Standard, L sided CVA 3 years ago    Access Code: [email or web address]  URL: [email or web address]  Date: 03/04/2025  Prepared by: Mir Barlow    Exercises  - Side to Side Weight Shift with Counter Support  - 3 x daily - 7 x weekly - 3 sets - 10 reps  - Seated March  - 3 x daily - 7 x weekly - 3 sets - 10 reps  - Seated Long Arc Quad  - 3 x daily - 7 x weekly - 3 sets - 10 reps  - Seated Hip Abduction  - 3 x daily - 7 x weekly - 3 sets - 10 reps  - Shoulder Abduction - Thumbs Up  - 3 x daily - 7 x weekly - 3 sets - 10 reps  - Standing Shoulder Abduction AAROM with Dowel  - 3 x daily - 7 x weekly - 3 sets - 15 reps  - Shoulder Flexion Overhead with Dowel  - 3 x daily - 7 x weekly - 15 reps  - Standing Shoulder Flexion to 90 Degrees  - 3 x daily - 7 x weekly - 3 sets - 10 reps    Manuals 3/4 3/11 3/13 3/21 3/28        PROM  2 min to end range   5 min to stretch shoulder and elbow        AAROM    CB   5 min                                    Neuro Re-Ed             LAQ  30x 2# 50x 2 #          Sitting clamshells  30x RTB 30x YTB          Sit to stands with foam  2x 5 10x          Marching in place sitting  30x 2#  With opp arm raise 30x With opp arm raise 2x 15    Alternating feel with 1# weight         Standing weight shifts   20x  With reach at mirror 2 min         Shoulder abduction  5x   10x         Shoulder flexion  5x   10x        Shoulder abduction hold  2x 5           Med ball squeeze   2x 20 3kg 3x 20 with yellow theraball         Hip add ball squeeze   2x20 3x20         Blaze pod  tapping   2x 2 min  4 blaze pods at railing on mirror 3x 1 min 6 pods on table in semicircle 2x2 min 4 pods on table in semicircle        Ankle wobble board   20x fwd-bwd     20x side to side          Finger flexion squeezes with rubbber band dynamometer    30x         Finger extension    30x         Ther Ex             HEP EDU 10 min            Hamstring curls  Seated 30x RTB Standing 30x          Bike  3 min           UBE  5 min           Bicep curls  30x 1#   10x 1 # 20x 2#        Tricep extensions     30x        Wrist flex and ext     30x        Quad extensions   1# 25x          Trap raies     30x        Ther Activity                                       Gait Training             High intensity GT     20 foot laps:  3 laps   2 laps fast  2 laps  fast  3# AW on R                     Modalities

## 2025-04-01 ENCOUNTER — OFFICE VISIT (OUTPATIENT)
Dept: PHYSICAL THERAPY | Facility: MEDICAL CENTER | Age: 60
End: 2025-04-01
Payer: COMMERCIAL

## 2025-04-01 DIAGNOSIS — R53.1 GENERALIZED WEAKNESS: Primary | ICD-10-CM

## 2025-04-01 DIAGNOSIS — J45.41 MODERATE PERSISTENT ASTHMA WITH ACUTE EXACERBATION: ICD-10-CM

## 2025-04-01 PROCEDURE — 97116 GAIT TRAINING THERAPY: CPT | Performed by: PHYSICAL THERAPIST

## 2025-04-01 PROCEDURE — 97140 MANUAL THERAPY 1/> REGIONS: CPT | Performed by: PHYSICAL THERAPIST

## 2025-04-01 PROCEDURE — 97112 NEUROMUSCULAR REEDUCATION: CPT | Performed by: PHYSICAL THERAPIST

## 2025-04-01 NOTE — PROGRESS NOTES
"Daily Note  POC: 3/4-   Re-eval:        Today's date: 2025  Patient name: Korin Garcia  : 1965  MRN: [id number]  Referring provider: Analia Goff CRNP  Dx:   Encounter Diagnosis     ICD-10-CM    1. Generalized weakness  R53.1               Past Medical History:   Diagnosis Date    Abnormal CT of the chest 2023    Arthritis     Asthma     Continuous opioid dependence (HCC) 2022    Continuous opioid dependence (HCC) 2022    Diabetes mellitus (HCC)     Diverticulitis of colon     Fibromyalgia 2022    Headache(784.0)     History of mammogram     History of tobacco abuse 2022    Hypertension     Nausea 2022    Obesity     Sjogren's syndrome (HCC) 10/19/2012    Stroke ([facility]) 08 14     Urinary tract infection      1 2 3 4 5 6   IE 3/4 3/11 3/13 3/21 3/28 4/1   7 8 9 10 11 12           13 14 15 16 17 18           19 20 21 22 23 24           25 26 27 28 29 30               Start Time: 1100  Stop Time: 1145  Total time in clinic (min): 45 minutes    Subjective: \" I have more pain in my shoulder and my hand has been tingling, and my hand is painful.\"      Objective: See treatment diary below      Assessment: Pt tolerated the session today well. High intensity gait training was continued but due to an increase in drop foot patient did not tolerate it as well and was unable to beat her first walking time over the 10 feet.  Patient did well with the AAROM using her other arm and the bar to help out, this was added for her HEP. Due to the shoulder weakness an increase in scapular based exercises were added today and she demonstrated good form. The blaze pod exercise was progressed to have her reach further then previously and two pods were elevated. A new HEP was given to add to the old one that includes the exercises that were beneficial today. She will continue to benefit from skilled PT to address her impairments and reach her goals.        Plan: Continue per plan of " care.  Progress treatment as tolerated.    Frequency: 2-3x week  Duration in weeks: 12  Plan of Care beginning date: 3/4/2025  Plan of Care expiration date: 5/27/2025  Treatment plan discussed with: patient    Goals  STG: Performed by weeks 2-4  1. Pt will demonstrate a 1/2 point increase in all shoulder and hip MMT's by week 4  2. Pt will improve  active shoulder abduction to above 90 degrees by week 3  3. Pt will be able to perform a 5xSTS by week 4 with bolsters if needed    LTG: performed by weeks 8-discharge  1. Pt will demonstrate a 10 sec difference in TUG test by week 8  2.Pt will show clinically significant change in 6MWT by discharge  3. Pt will have 4/5 strength or higher in every shoulder and hip motion tested on the IE by week 8  4.Pt will be able to walk for over 20 minutes with the walker by discharge  5.Patient will be independent in ADL's, HEP, and be able to self manage symptoms by discharge  6.Patient will reach predicted FOTO score by discharge      Precautions: Standard, L sided CVA 3 years ago    Access Code: [email or web address]  URL: [email or web address]  Date: 03/04/2025  Prepared by: Mir Barlow    Exercises  - Side to Side Weight Shift with Counter Support  - 3 x daily - 7 x weekly - 3 sets - 10 reps  - Seated March  - 3 x daily - 7 x weekly - 3 sets - 10 reps  - Seated Long Arc Quad  - 3 x daily - 7 x weekly - 3 sets - 10 reps  - Seated Hip Abduction  - 3 x daily - 7 x weekly - 3 sets - 10 reps  - Shoulder Abduction - Thumbs Up  - 3 x daily - 7 x weekly - 3 sets - 10 reps  - Standing Shoulder Abduction AAROM with Dowel  - 3 x daily - 7 x weekly - 3 sets - 15 reps  - Shoulder Flexion Overhead with Dowel  - 3 x daily - 7 x weekly - 15 reps  - Standing Shoulder Flexion to 90 Degrees  - 3 x daily - 7 x weekly - 3 sets - 10 reps    Date: 04/01/2025  Prepared by: Mir Barlow    Exercises  - Seated Scapular Retraction  - 3 x daily - 7 x weekly - 3 sets - 10 reps  - Seated Cervical  Retraction  - 3 x daily - 7 x weekly - 3 sets - 10 reps  - Standing Shoulder Abduction AAROM with Dowel  - 3 x daily - 7 x weekly - 2 sets - 10 reps  - Seated Shoulder Flexion AAROM with Dowel  - 3 x daily - 7 x weekly - 3 sets - 10 reps    Manuals 3/4 3/11 3/13 3/21 3/28 4/1       PROM  2 min to end range   5 min to stretch shoulder and elbow CB 5 min       AAROM    CB   5 min   CB 5 min                                 Neuro Re-Ed             LAQ  30x 2# 50x 2 #          Sitting clamshells  30x RTB 30x YTB          Sit to stands with foam  2x 5 10x          Marching in place sitting  30x 2#  With opp arm raise 30x With opp arm raise 2x 15    Alternating feel with 1# weight         Standing weight shifts  20x  With reach at mirror 2 min         Shoulder abduction  5x   10x         Shoulder flexion  5x   10x        Shoulder abduction hold  2x 5           Med ball squeeze   2x 20 3kg 3x 20 with yellow theraball         Hip add ball squeeze   2x20 3x20         Blaze pod  tapping   2x 2 min  4 blaze pods at railing on mirror 3x 1 min 6 pods on table in semicircle 2x2 min 4 pods on table in semicircle 3x 1 min 6 pods in semi [place] 2 elevated       Ankle wobble board   20x fwd-bwd     20x side to side          Finger flexion squeezes with rubbber band dynamometer    30x         Finger extension    30x         TB Rows      2x10 RTB       Scap squeezes      20x       Ther Ex             HEP EDU 10 min            Hamstring curls  Seated 30x RTB Standing 30x          Bike  3 min           UBE  5 min           Bicep curls  30x 1#   10x 1 # 20x 2#        Tricep extensions     30x        Wrist flex and ext     30x        Quad extensions   1# 25x          Trap raies     30x        AAROM with bar      Flexion 2x10    Abd 2x10       Ther Activity                                       Gait Training             High intensity GT     20 foot laps:  3 laps   2 laps fast  2 laps  fast  3# AW on R 10 foot laps trying to increase speed  each one  5x                    Modalities

## 2025-04-02 RX ORDER — ALBUTEROL SULFATE 90 UG/1
INHALANT RESPIRATORY (INHALATION)
Qty: 72 G | OUTPATIENT
Start: 2025-04-02

## 2025-04-03 ENCOUNTER — OFFICE VISIT (OUTPATIENT)
Dept: PHYSICAL THERAPY | Facility: MEDICAL CENTER | Age: 60
End: 2025-04-03
Payer: COMMERCIAL

## 2025-04-03 DIAGNOSIS — R53.1 GENERALIZED WEAKNESS: Primary | ICD-10-CM

## 2025-04-03 PROCEDURE — 97530 THERAPEUTIC ACTIVITIES: CPT | Performed by: PHYSICAL THERAPIST

## 2025-04-03 PROCEDURE — 97112 NEUROMUSCULAR REEDUCATION: CPT | Performed by: PHYSICAL THERAPIST

## 2025-04-03 NOTE — PROGRESS NOTES
"Daily Note/Re-evaluation  POC: 3/4-   Re-eval: 4/3 (Next )      Today's date: 4/3/2025  Patient name: Korin Garcia  : 1965  MRN: [id number]  Referring provider: Analia Goff CRNP  Dx:   Encounter Diagnosis     ICD-10-CM    1. Generalized weakness  R53.1                 Past Medical History:   Diagnosis Date    Abnormal CT of the chest 2023    Arthritis     Asthma     Continuous opioid dependence (HCC) 2022    Continuous opioid dependence (HCC) 2022    Diabetes mellitus (HCC)     Diverticulitis of colon     Fibromyalgia 2022    Headache(784.0)     History of mammogram     History of tobacco abuse 2022    Hypertension     Nausea 2022    Obesity     Sjogren's syndrome (HCC) 10/19/2012    Stroke ([facility]) 08 14     Urinary tract infection      1 2 3 4 5 6   IE 3/4 3/11 3/13 3/21 3/28 4/1   7 8 9 10 11 12   4/3  RE        13 14 15 16 17 18           19 20 21 22 23 24           25 26 27 28 29 30               Start Time: 1100  Stop Time: 1145  Total time in clinic (min): 45 minutes    Subjective: \" I think its helping a little bit, shoulders hurt more, legs are feeling so-so have good days and bad days.\"      Objective: See treatment diary below  Tenderness     Additional Tenderness Details  Tenderness over R trapezius and supraspinatus    Neurological Testing     Additional Neurological Details  Cannot feel C6 on right and all others were lighter on right then left in UE    LE was normal    Active Range of Motion     Right Shoulder   Flexion: 90 degrees   Abduction: 98 degrees     Strength/Myotome Testing     Right Shoulder     Planes of Motion   Flexion: 3+   Abduction: 3+     Left Hip   Planes of Motion   Flexion: 5    Right Hip   Planes of Motion   Flexion: 4-    Additional Strength Details  R  Quad 4/5  Hamstring 4/5  PF: 5/5  DF: 4-/5    L all 5/5    Ambulation   Weight-Bearing Status   Weight-Bearing Status (Left): full weight bearing   Weight-Bearing Status " (Right): full weight-bearing    Assistive device used: two-wheeled walker    Ambulation: Level Surfaces   Ambulation with assistive device: independent  Ambulation without assistive device: unable    Ambulation: Stairs   Ascend stairs: independent  Pattern: non-reciprocal  Railings: one rail  Descend stairs: independent  Pattern: non-reciprocal  Railings: one rail  Curbs: requires assist (Uses walker and assistance to hold walker steady)    Additional Stairs Ambulation Details  Major trendelenburg and hip weakness noted ascending and descending stairs    Observational Gait   Decreased walking speed and stride length.     Quality of Movement During Gait     Pelvis    Pelvis (Right): Positive Trendelenburg.   Neuro Exam:     Sensation   Light touch LE: right impaired  Light touch LE: left WNL    Coordination   Heel to shin: left WNL  Heel to shin: right dysmetric  Finger to nose: right dysmetria  Finger to nose: left WNL  Rapid alternating movements: LE impaired and UE WNL    Functional outcomes   TU.54  sec (last visit: 58.97 seconds)  Functional outcome comment: Can walk about 10 min max with walker  5x STS: 18.91  6MWT: 84 feet (1 stop)      Assessment: Korin Garcia is a 60 y.o. female being seen at [facility] for PT re-evaluation. This is the patient's 7th session for their generalized right sided weakness . Pt states they have made a little progress t/o the current PT plan of care thus far. Pt states their current limitations include impaired ROM and strength of her right side and decreased balance as a result. Pt continues to note increased pain/difficulty with holding things, shoulder abduction, showering, getting dressed, drop foot while walking . Pt states they are 50% to their desired level with PT services thus far. ROM assessment showed an increase in shoulder abduction but not in flexion. Strength assessment showed little to no changes at this time.   Pt has reached their goals of active ROM of abduction and  are progressing in strength and is now able to complete a 5x STS  but continues to work on global strength and motion on the right upper and lower extremity in addition to functional translations of this strength and motion. 6MWT was performed today and the patient considered this moderately- to highly difficult. She was able to perform a 5xSTS test without a bolster but required one arm, this will continue to be addressed. She showed good weight shift and functional hip motion during the blaze pod activity today. They have been educated on their current condition and their prognosis. They will continue to benefit from skilled PT services to address these impairments, improve ADL performance, improve activity tolerance, and improve quality of life.         Plan: Continue per plan of care.  Progress treatment as tolerated.    Frequency: 2-3x week  Duration in weeks: 12  Plan of Care beginning date: 3/4/2025  Plan of Care expiration date: 5/27/2025  Treatment plan discussed with: patient    Goals  STG: Performed by weeks 2-4  1. Pt will demonstrate a 1/2 point increase in all shoulder and hip MMT's by week 4 (Progressing)  2. Pt will improve  active shoulder abduction to above 90 degrees by week 3 (MET)  3. Pt will be able to perform a 5xSTS by week 4 with bolsters if needed (Progressing, use one hand, no bolsters)    LTG: performed by weeks 8-discharge  1. Pt will demonstrate a 10 sec difference in TUG test by week 8 (Progressing)  2.Pt will show clinically significant change in 6MWT by discharge  3. Pt will have 4/5 strength or higher in every shoulder and hip motion tested on the IE by week 8  4.Pt will be able to walk for over 20 minutes with the walker by discharge  5.Patient will be independent in ADL's, HEP, and be able to self manage symptoms by discharge  6.Patient will reach predicted FOTO score by discharge      Precautions: Standard, L sided CVA 3 years ago    Access Code: [email or web address]  URL:  [email or web address]  Date: 03/04/2025  Prepared by: Mir Barlow    Exercises  - Side to Side Weight Shift with Counter Support  - 3 x daily - 7 x weekly - 3 sets - 10 reps  - Seated March  - 3 x daily - 7 x weekly - 3 sets - 10 reps  - Seated Long Arc Quad  - 3 x daily - 7 x weekly - 3 sets - 10 reps  - Seated Hip Abduction  - 3 x daily - 7 x weekly - 3 sets - 10 reps  - Shoulder Abduction - Thumbs Up  - 3 x daily - 7 x weekly - 3 sets - 10 reps  - Standing Shoulder Abduction AAROM with Dowel  - 3 x daily - 7 x weekly - 3 sets - 15 reps  - Shoulder Flexion Overhead with Dowel  - 3 x daily - 7 x weekly - 15 reps  - Standing Shoulder Flexion to 90 Degrees  - 3 x daily - 7 x weekly - 3 sets - 10 reps    Date: 04/01/2025  Prepared by: Mir Barlow    Exercises  - Seated Scapular Retraction  - 3 x daily - 7 x weekly - 3 sets - 10 reps  - Seated Cervical Retraction  - 3 x daily - 7 x weekly - 3 sets - 10 reps  - Standing Shoulder Abduction AAROM with Dowel  - 3 x daily - 7 x weekly - 2 sets - 10 reps  - Seated Shoulder Flexion AAROM with Dowel  - 3 x daily - 7 x weekly - 3 sets - 10 reps    Manuals 3/4 3/11 3/13 3/21 3/28 4/1 4/3      PROM  2 min to end range   5 min to stretch shoulder and elbow CB 5 min       AAROM    CB   5 min   CB 5 min                                 Neuro Re-Ed             LAQ  30x 2# 50x 2 #    40x      Sitting clamshells  30x RTB 30x YTB          Sit to stands with foam  2x 5 10x          Marching in place sitting  30x 2#  With opp arm raise 30x With opp arm raise 2x 15    Alternating feel with 1# weight   50x (warm up)      Standing weight shifts  20x  With reach at mirror 2 min         Shoulder abduction  5x   10x         Shoulder flexion  5x   10x        Shoulder abduction hold  2x 5           Med ball squeeze   2x 20 3kg 3x 20 with yellow theraball         Hip add ball squeeze   2x20 3x20         Blaze pod  tapping   2x 2 min  4 blaze pods at railing on mirror 3x 1 min  6 pods on table in semicircle 2x2 min 4 pods on table in semicircle 3x 1 min 6 pods in semi [place] 2 elevated 3x 1 min 4 blaze pods on the ground to tap      Ankle wobble board   20x fwd-bwd     20x side to side          Finger flexion squeezes with rubbber band dynamometer    30x         Finger extension    30x         TB Rows      2x10 RTB       Scap squeezes      20x       Ther Ex             HEP EDU 10 min            Hamstring curls  Seated 30x RTB Standing 30x          Bike  3 min           UBE  5 min           Bicep curls  30x 1#   10x 1 # 20x 2#        Tricep extensions     30x        Wrist flex and ext     30x        Quad extensions   1# 25x          Trap raies     30x        AAROM with bar      Flexion 2x10    Abd 2x10       Ther Activity       30 min      Re-eval                          Gait Training             High intensity GT     20 foot laps:  3 laps   2 laps fast  2 laps  fast  3# AW on R 10 foot laps trying to increase speed each one  5x                    Modalities

## 2025-04-07 ENCOUNTER — APPOINTMENT (OUTPATIENT)
Dept: PHYSICAL THERAPY | Facility: MEDICAL CENTER | Age: 60
End: 2025-04-07
Payer: COMMERCIAL

## 2025-04-08 ENCOUNTER — OFFICE VISIT (OUTPATIENT)
Dept: FAMILY MEDICINE CLINIC | Facility: CLINIC | Age: 60
End: 2025-04-08
Payer: COMMERCIAL

## 2025-04-08 VITALS
HEART RATE: 81 BPM | SYSTOLIC BLOOD PRESSURE: 124 MMHG | HEIGHT: 60 IN | RESPIRATION RATE: 16 BRPM | WEIGHT: 233 LBS | OXYGEN SATURATION: 97 % | DIASTOLIC BLOOD PRESSURE: 90 MMHG | TEMPERATURE: 97.9 F | BODY MASS INDEX: 45.75 KG/M2

## 2025-04-08 DIAGNOSIS — J44.1 CHRONIC OBSTRUCTIVE PULMONARY DISEASE WITH ACUTE EXACERBATION (HCC): ICD-10-CM

## 2025-04-08 DIAGNOSIS — M79.601 PAIN IN RIGHT ARM: Primary | ICD-10-CM

## 2025-04-08 DIAGNOSIS — M54.2 CERVICALGIA: ICD-10-CM

## 2025-04-08 DIAGNOSIS — E11.9 TYPE II DIABETES MELLITUS, WELL CONTROLLED (HCC): ICD-10-CM

## 2025-04-08 DIAGNOSIS — I10 PRIMARY HYPERTENSION: ICD-10-CM

## 2025-04-08 LAB — SL AMB POCT HEMOGLOBIN AIC: 8.1 (ref ?–6.5)

## 2025-04-08 PROCEDURE — 83036 HEMOGLOBIN GLYCOSYLATED A1C: CPT | Performed by: NURSE PRACTITIONER

## 2025-04-08 PROCEDURE — 99213 OFFICE O/P EST LOW 20 MIN: CPT | Performed by: NURSE PRACTITIONER

## 2025-04-08 RX ORDER — LOSARTAN POTASSIUM AND HYDROCHLOROTHIAZIDE 12.5; 1 MG/1; MG/1
1 TABLET ORAL DAILY
Qty: 90 TABLET | Refills: 1 | Status: SHIPPED | OUTPATIENT
Start: 2025-04-08

## 2025-04-08 NOTE — PROGRESS NOTES
:  Assessment & Plan  Pain in right arm    Orders:  •  XR spine cervical complete 4 or 5 vw non injury; Future    Primary hypertension    Orders:  •  losartan-hydrochlorothiazide (HYZAAR) 100-12.5 MG per tablet; Take 1 tablet by mouth daily    Chronic obstructive pulmonary disease with acute exacerbation (HCC)    Orders:  •  losartan-hydrochlorothiazide (HYZAAR) 100-12.5 MG per tablet; Take 1 tablet by mouth daily    Type II diabetes mellitus, well controlled (HCC)    Lab Results   Component Value Date    HGBA1C 8.1 (A) 04/08/2025       Orders:  •  losartan-hydrochlorothiazide (HYZAAR) 100-12.5 MG per tablet; Take 1 tablet by mouth daily  •  POCT hemoglobin A1c    Cervicalgia         Physical assessment inconclusive.  Patient does have some cervicalgia with radiation.  Could possibly be related to poor body habitus due to the stroke.  No cardiac symptoms at this time.  Will assess the x-ray and contact her with results when available.  Also A1c was checked 8.1 dietary measures were definitely advised will not increase the Ozempic will wait and see at next office visit for discussion.  Otherwise return to office as indicated for next routine follow-up.  It is also noted please activity as tolerated.  She does see physical therapy. Labs reviewed were WNL also VS were well controlled. RTO as needed or for next scheduled appt. All questions answered.      History of Present Illness     Korin Garcia is a 60 y.o. female   Right shoulder pain but it does go all the way down her arm into her wrist, she mentions when grabbing things it is hard to . )    Shoulder Pain     Review of Systems   Musculoskeletal:  Positive for arthralgias, gait problem, myalgias and neck pain.   Objective   /90 (BP Location: Left arm, Patient Position: Sitting, Cuff Size: Large)   Pulse 81   Temp 97.9 °F (36.6 °C) (Temporal)   Resp 16   Ht 5' (1.524 m)   Wt 106 kg (233 lb)   SpO2 97%   BMI 45.50 kg/m²      Physical  Exam  Cardiovascular:      Rate and Rhythm: Normal rate and regular rhythm.      Heart sounds: Normal heart sounds.   Pulmonary:      Effort: Pulmonary effort is normal.      Breath sounds: Normal breath sounds.   Musculoskeletal:      Cervical back: Neck supple.   Neurological:      General: No focal deficit present.      Mental Status: She is alert and oriented to person, place, and time. Mental status is at baseline.      Motor: Weakness present.      Coordination: Coordination abnormal.      Gait: Gait abnormal.      Deep Tendon Reflexes: Reflexes abnormal.

## 2025-04-11 ENCOUNTER — OFFICE VISIT (OUTPATIENT)
Dept: PHYSICAL THERAPY | Facility: MEDICAL CENTER | Age: 60
End: 2025-04-11
Payer: COMMERCIAL

## 2025-04-11 DIAGNOSIS — R53.1 GENERALIZED WEAKNESS: Primary | ICD-10-CM

## 2025-04-11 PROCEDURE — 97112 NEUROMUSCULAR REEDUCATION: CPT | Performed by: PHYSICAL THERAPIST

## 2025-04-11 PROCEDURE — 97110 THERAPEUTIC EXERCISES: CPT | Performed by: PHYSICAL THERAPIST

## 2025-04-11 NOTE — PROGRESS NOTES
"Daily Note  POC: 3/4-   Re-eval: 4/3 (Next )      Today's date: 2025  Patient name: Korin Garcia  : 1965  MRN: [id number]  Referring provider: Analia Goff CRNP  Dx:   Encounter Diagnosis     ICD-10-CM    1. Generalized weakness  R53.1                   Past Medical History:   Diagnosis Date    Abnormal CT of the chest 2023    Arthritis     Asthma     Continuous opioid dependence (HCC) 2022    Continuous opioid dependence (HCC) 2022    Diabetes mellitus (HCC)     Diverticulitis of colon     Fibromyalgia 2022    Headache(784.0)     History of mammogram     History of tobacco abuse 2022    Hypertension     Nausea 2022    Obesity     Sjogren's syndrome (HCC) 10/19/2012    Stroke ([facility]) 08 14     Urinary tract infection      1 2 3 4 5 6   IE 3/4 3/11 3/13 3/21 3/28 4/1   7 8 9 10 11 12   4/3  RE        13 14 15 16 17 18           19 20 21 22 23 24           25 26 27 28 29 30               Start Time: 0915  Stop Time: 1000  Total time in clinic (min): 45 minutes    Subjective: \" My shoulder is still sore. I went to the doctor to look at my shoulder pain and they think its neck related\"      Objective: See treatment diary below      Functional outcomes   TU.54  sec (last visit: 58.97 seconds)  Functional outcome comment: Can walk about 10 min max with walker  5x STS: 18.91  6MWT: 84 feet (1 stop)      Assessment: The patient tolerated the session with moderate weakness. The patient demonstrated improved ability to weight shift and to bear weight on the right leg as seen in the blaze pod activity. She continues to benefit from the AAROM moving her arm further after the exercise. The patient found the treatment today very challenging.  She will continue to benefit from generalized UE and LE strengthening, motion exercises, and high intensity gait training to address her impairments. She will continue to benefit from skilled PT to address their remaining " impairments, reach their goals, and maximize their rehabilitation potential.           Plan: Continue per plan of care.  Progress treatment as tolerated.    Frequency: 2-3x week  Duration in weeks: 12  Plan of Care beginning date: 3/4/2025  Plan of Care expiration date: 5/27/2025  Treatment plan discussed with: patient    Goals  STG: Performed by weeks 2-4  1. Pt will demonstrate a 1/2 point increase in all shoulder and hip MMT's by week 4 (Progressing)  2. Pt will improve  active shoulder abduction to above 90 degrees by week 3 (MET)  3. Pt will be able to perform a 5xSTS by week 4 with bolsters if needed (Progressing, use one hand, no bolsters)    LTG: performed by weeks 8-discharge  1. Pt will demonstrate a 10 sec difference in TUG test by week 8 (Progressing)  2.Pt will show clinically significant change in 6MWT by discharge  3. Pt will have 4/5 strength or higher in every shoulder and hip motion tested on the IE by week 8  4.Pt will be able to walk for over 20 minutes with the walker by discharge  5.Patient will be independent in ADL's, HEP, and be able to self manage symptoms by discharge  6.Patient will reach predicted FOTO score by discharge      Precautions: Standard, L sided CVA 3 years ago    Access Code: [email or web address]  URL: [email or web address]  Date: 03/04/2025  Prepared by: Mir Barlow    Exercises  - Side to Side Weight Shift with Counter Support  - 3 x daily - 7 x weekly - 3 sets - 10 reps  - Seated March  - 3 x daily - 7 x weekly - 3 sets - 10 reps  - Seated Long Arc Quad  - 3 x daily - 7 x weekly - 3 sets - 10 reps  - Seated Hip Abduction  - 3 x daily - 7 x weekly - 3 sets - 10 reps  - Shoulder Abduction - Thumbs Up  - 3 x daily - 7 x weekly - 3 sets - 10 reps  - Standing Shoulder Abduction AAROM with Dowel  - 3 x daily - 7 x weekly - 3 sets - 15 reps  - Shoulder Flexion Overhead with Dowel  - 3 x daily - 7 x weekly - 15 reps  - Standing Shoulder Flexion to 90 Degrees  - 3 x daily - 7  x weekly - 3 sets - 10 reps    Date: 04/01/2025  Prepared by: Mir Barlow    Exercises  - Seated Scapular Retraction  - 3 x daily - 7 x weekly - 3 sets - 10 reps  - Seated Cervical Retraction  - 3 x daily - 7 x weekly - 3 sets - 10 reps  - Standing Shoulder Abduction AAROM with Dowel  - 3 x daily - 7 x weekly - 2 sets - 10 reps  - Seated Shoulder Flexion AAROM with Dowel  - 3 x daily - 7 x weekly - 3 sets - 10 reps    Manuals 3/4 3/11 3/13 3/21 3/28 4/1 4/3 4/11     PROM  2 min to end range   5 min to stretch shoulder and elbow CB 5 min       AAROM    CB   5 min   CB 5 min                                 Neuro Re-Ed             LAQ  30x 2# 50x 2 #    40x      Sitting clamshells  30x RTB 30x YTB          Sit to stands with foam  2x 5 10x     30x     Marching in place sitting  30x 2#  With opp arm raise 30x With opp arm raise 2x 15    Alternating feel with 1# weight   50x (warm up)      Standing weight shifts  20x  With reach at mirror 2 min         Shoulder abduction  5x   10x    10x     Shoulder flexion  5x   10x   10x     Shoulder abduction hold  2x 5           Med ball squeeze   2x 20 3kg 3x 20 with yellow theraball         Hip add ball squeeze   2x20 3x20         Blaze pod  tapping   2x 2 min  4 blaze pods at railing on mirror 3x 1 min 6 pods on table in semicircle 2x2 min 4 pods on table in semicircle 3x 1 min 6 pods in semi [place] 2 elevated 3x 1 min 4 blaze pods on the ground to tap 3x 1 min 4 blaze pods on the ground to tap and 2 on the mirror     Ankle wobble board   20x fwd-bwd     20x side to side          Finger flexion squeezes with rubbber band dynamometer    30x         Finger extension    30x         TB Rows      2x10 RTB       Scap squeezes      20x       Ther Ex             HEP EDU 10 min            Hamstring curls  Seated 30x RTB Standing 30x          Bike  3 min           UBE  5 min           Bicep curls  30x 1#   10x 1 # 20x 2#   20x 3#     Tricep extensions     30x   20x 3#     Wrist flex  and ext     30x        Quad extensions   1# 25x     20x     Trap raies     30x        AAROM with bar      Flexion 2x10    Abd 2x10  Flexion 20x    ABD 20x     Ther Activity       30 min      Re-eval                          Gait Training             High intensity GT     20 foot laps:  3 laps   2 laps fast  2 laps  fast  3# AW on R 10 foot laps trying to increase speed each one  5x                    Modalities

## 2025-04-14 ENCOUNTER — OFFICE VISIT (OUTPATIENT)
Dept: PHYSICAL THERAPY | Facility: MEDICAL CENTER | Age: 60
End: 2025-04-14
Payer: COMMERCIAL

## 2025-04-14 ENCOUNTER — APPOINTMENT (OUTPATIENT)
Dept: RADIOLOGY | Facility: MEDICAL CENTER | Age: 60
End: 2025-04-14
Payer: COMMERCIAL

## 2025-04-14 DIAGNOSIS — M79.601 PAIN IN RIGHT ARM: ICD-10-CM

## 2025-04-14 DIAGNOSIS — R53.1 GENERALIZED WEAKNESS: Primary | ICD-10-CM

## 2025-04-14 PROCEDURE — 97116 GAIT TRAINING THERAPY: CPT | Performed by: PHYSICAL THERAPIST

## 2025-04-14 PROCEDURE — 97112 NEUROMUSCULAR REEDUCATION: CPT | Performed by: PHYSICAL THERAPIST

## 2025-04-14 PROCEDURE — 72050 X-RAY EXAM NECK SPINE 4/5VWS: CPT

## 2025-04-14 PROCEDURE — 97110 THERAPEUTIC EXERCISES: CPT | Performed by: PHYSICAL THERAPIST

## 2025-04-14 NOTE — PROGRESS NOTES
Daily Note  POC: 3/4-   Re-eval: 4/3 (Next )      Today's date: 2025  Patient name: Korin Garcia  : 1965  MRN: [id number]  Referring provider: Analia Goff CRNP  Dx:   Encounter Diagnosis     ICD-10-CM    1. Generalized weakness  R53.1                   Past Medical History:   Diagnosis Date    Abnormal CT of the chest 2023    Arthritis     Asthma     Continuous opioid dependence (HCC) 2022    Continuous opioid dependence (HCC) 2022    Diabetes mellitus (HCC)     Diverticulitis of colon     Fibromyalgia 2022    Headache(784.0)     History of mammogram     History of tobacco abuse 2022    Hypertension     Nausea 2022    Obesity     Sjogren's syndrome (HCC) 10/19/2012    Stroke ([facility]) 2023    Urinary tract infection      1 2 3 4 5 6   IE 3/4 3/11 3/13 3/21 3/28 4/1   7 8 9 10 11 12   4/3  RE       13 14 15 16 17 18           19 20 21 22 23 24           25 26 27 28 29 30               Start Time: 1047  Stop Time: 1130  Total time in clinic (min): 43 minutes    Subjective: Pt has nothing new to report      Objective: See treatment diary below      Functional outcomes   TU.54  sec (last visit: 58.97 seconds)  Functional outcome comment: Can walk about 10 min max with walker  5x STS: 18.91  6MWT: 84 feet (1 stop)      Assessment: The patient tolerated the session with moderate weakness. The focus today was more on the lower extremity. She did well walking at an increased speed when cues to take larger steps. Walking continues to cause pain to her shoulder and hand which bear the weight on the walker. Pinpoint shoulder pain was noted under her posterior deltoid, manual therapy for this will be added next session. The patient found the treatment today moderately challenging.  She will continue to benefit from generalized UE and LE strengthening, motion exercises, and high intensity gait training to address her impairments. She will continue to  benefit from skilled PT to address their remaining impairments, reach their goals, and maximize their rehabilitation potential.           Plan: Continue per plan of care.  Progress treatment as tolerated.    Frequency: 2-3x week  Duration in weeks: 12  Plan of Care beginning date: 3/4/2025  Plan of Care expiration date: 5/27/2025  Treatment plan discussed with: patient    Goals  STG: Performed by weeks 2-4  1. Pt will demonstrate a 1/2 point increase in all shoulder and hip MMT's by week 4 (Progressing)  2. Pt will improve  active shoulder abduction to above 90 degrees by week 3 (MET)  3. Pt will be able to perform a 5xSTS by week 4 with bolsters if needed (Progressing, use one hand, no bolsters)    LTG: performed by weeks 8-discharge  1. Pt will demonstrate a 10 sec difference in TUG test by week 8 (Progressing)  2.Pt will show clinically significant change in 6MWT by discharge  3. Pt will have 4/5 strength or higher in every shoulder and hip motion tested on the IE by week 8  4.Pt will be able to walk for over 20 minutes with the walker by discharge  5.Patient will be independent in ADL's, HEP, and be able to self manage symptoms by discharge  6.Patient will reach predicted FOTO score by discharge      Precautions: Standard, L sided CVA 3 years ago    Access Code: [email or web address]  URL: [email or web address]  Date: 03/04/2025  Prepared by: Mir Barlow    Exercises  - Side to Side Weight Shift with Counter Support  - 3 x daily - 7 x weekly - 3 sets - 10 reps  - Seated March  - 3 x daily - 7 x weekly - 3 sets - 10 reps  - Seated Long Arc Quad  - 3 x daily - 7 x weekly - 3 sets - 10 reps  - Seated Hip Abduction  - 3 x daily - 7 x weekly - 3 sets - 10 reps  - Shoulder Abduction - Thumbs Up  - 3 x daily - 7 x weekly - 3 sets - 10 reps  - Standing Shoulder Abduction AAROM with Dowel  - 3 x daily - 7 x weekly - 3 sets - 15 reps  - Shoulder Flexion Overhead with Dowel  - 3 x daily - 7 x weekly - 15 reps  -  Standing Shoulder Flexion to 90 Degrees  - 3 x daily - 7 x weekly - 3 sets - 10 reps    Date: 04/01/2025  Prepared by: Mir Barlow    Exercises  - Seated Scapular Retraction  - 3 x daily - 7 x weekly - 3 sets - 10 reps  - Seated Cervical Retraction  - 3 x daily - 7 x weekly - 3 sets - 10 reps  - Standing Shoulder Abduction AAROM with Dowel  - 3 x daily - 7 x weekly - 2 sets - 10 reps  - Seated Shoulder Flexion AAROM with Dowel  - 3 x daily - 7 x weekly - 3 sets - 10 reps    Manuals 3/4 3/11 3/13 3/21 3/28 4/1 4/3 4/11 4/14    PROM  2 min to end range   5 min to stretch shoulder and elbow CB 5 min       AAROM    CB   5 min   CB 5 min                                 Neuro Re-Ed             LAQ  30x 2# 50x 2 #    40x  30x 3# no breaks    Sitting clamshells  30x RTB 30x YTB          Sit to stands with foam  2x 5 10x     30x 20x no breaks    Marching in place sitting  30x 2#  With opp arm raise 30x With opp arm raise 2x 15    Alternating feel with 1# weight   50x (warm up)  30x     Standing weight shifts  20x  With reach at mirror 2 min         Shoulder abduction  5x   10x    10x 10x    Shoulder flexion  5x   10x   10x 10x    Shoulder abduction hold  2x 5           Med ball squeeze   2x 20 3kg 3x 20 with yellow theraball         Hip add ball squeeze   2x20 3x20         Blaze pod  tapping   2x 2 min  4 blaze pods at railing on mirror 3x 1 min 6 pods on table in semicircle 2x2 min 4 pods on table in semicircle 3x 1 min 6 pods in semi [place] 2 elevated 3x 1 min 4 blaze pods on the ground to tap 3x 1 min 4 blaze pods on the ground to tap and 2 on the mirror     Ankle wobble board   20x fwd-bwd     20x side to side          Finger flexion squeezes with rubbber band dynamometer    30x         Finger extension    30x         TB Rows      2x10 RTB   2x10 RTB    Scap squeezes      20x   20x    Ther Ex             HEP EDU 10 min            Hamstring curls  Seated 30x RTB Standing 30x          Bike  3 min           UBE  5 min            Bicep curls  30x 1#   10x 1 # 20x 2#   20x 3#     Tricep extensions     30x   20x 3#     Wrist flex and ext     30x        Quad extensions   1# 25x     20x     Trap raies     30x        AAROM with bar      Flexion 2x10    Abd 2x10  Flexion 20x    ABD 20x Flexion 20x    ABD 20x    Dorsiflexion with ankle weights         15x w/o AW    15x with 1# AW    Standing hip abduction         3x8 3# ea side    Ther Activity             Re-eval       30 min                   Gait Training             High intensity GT     20 foot laps:  3 laps   2 laps fast  2 laps  fast  3# AW on R 10 foot laps trying to increase speed each one  5x   2x10 foot laps trying to increase speed each one  3x                 Modalities

## 2025-04-17 ENCOUNTER — OFFICE VISIT (OUTPATIENT)
Age: 60
End: 2025-04-17
Payer: COMMERCIAL

## 2025-04-17 ENCOUNTER — APPOINTMENT (OUTPATIENT)
Dept: PHYSICAL THERAPY | Facility: MEDICAL CENTER | Age: 60
End: 2025-04-17
Payer: COMMERCIAL

## 2025-04-17 DIAGNOSIS — H26.9 INCIPIENT CATARACT OF BOTH EYES: ICD-10-CM

## 2025-04-17 DIAGNOSIS — H04.123 DRY EYE SYNDROME OF BOTH EYES: Primary | ICD-10-CM

## 2025-04-17 PROCEDURE — 99202 OFFICE O/P NEW SF 15 MIN: CPT | Performed by: OPHTHALMOLOGY

## 2025-04-17 NOTE — PROGRESS NOTES
Name: Korin Garcia      : 1965      MRN: [id number]  Encounter Provider: Sumeet Carballo DO  Encounter Date: 2025   Encounter department: [facility] OPHTHALMOLOGY  :  Assessment & Plan  Dry eye syndrome of both eyes  -will proceed with:  pf tear use regularly  hot compresses-aim for bid  eyelid cleanser-at least once a day following hot compress application  humidifiers in frequently used rooms       Incipient cataract of both eyes  -nsc ou is farily mild on exam  Return in about 4 weeks (around 5/15/2025) for Refraction.               Korin Garcia is a 60 y.o. female who presents for cataract eval.     Reports [facility] mentioned she having a cataract but was not told if they were ready for extraction. Would like another opinion.    C/o blurry vision for near, have difficulties with reading and watching TV.     Denies flashes or floaters, glare or halos.    History obtained from: patient    Review of Systems  Medical History Reviewed by provider this encounter:     .     Past Ocular History:  none  Ocular Meds/Drops:   AT's prn    HPI    Va has been getting blurry ou for about 2 months, night worse than day  Glasses are 2 yo  Referred for cat eval from [facility]    POH:  neg  POSH:  neg  FOH:  neg  OC MEDS:  otc tears    Last edited by Sumeet Carballo DO on 2025 12:45 PM.            Base Eye Exam     Visual Acuity (Snellen - Linear)       Right Left    Dist cc 20/20 -1 20/50 -2    Dist ph cc  20/25    Correction: Glasses          Tonometry (Pneumo-tonometer, 12:09 PM)       Right Left    Pressure 18 17          Pupils       Pupils    Right PERRL    Left PERRL          Visual Fields       Left Right     Full Full          Extraocular Movement       Right Left     Full Full          Neuro/Psych     Oriented x3: Yes          Dilation     Both eyes: 2.5% Phenylephrine @ 12:05 PM          Dilation #2     Both eyes: 1% Tropicamide @ 12:05 PM            Additional Tests     Keratometry       K1  Axis K2 Axis    Right 43.25 010 44.25 100    Left 43.25 175 43.75 085            Slit Lamp and Fundus Exam     External Exam       Right Left    External Normal Normal          Slit Lamp Exam       Right Left    Lids/Lashes Normal Normal    Conjunctiva/Sclera White and quiet White and quiet    Cornea oily TF oily TF    Anterior Chamber Deep and quiet Deep and quiet    Iris ph dil ph dil    Lens 1+ Nuclear sclerosis 1+ Nuclear sclerosis    Anterior Vitreous No PVD, clear, no cell No PVD, clear, no cell    Pharm dilated ou prior to doctor exam             Fundus Exam       Right Left    Disc sharp, no pallor sharp, no pallor    C/D Ratio 0.4 0.3    Macula flat/no heme flat/no heme    Vessels normal in caliber normal in caliber    Periphery flat, no retinal tear or detachment flat, no retinal tear or detachment            Refraction     Wearing Rx       Sphere Cylinder Axis Add    Right +1.50 -0.50 040 +2.50    Left +1.50 -2.50 137 +2.50          Manifest Refraction (Auto)       Sphere Cylinder Axis    Right +2.00 -0.50 023    Left +1.25 -0.50 163

## 2025-04-18 ENCOUNTER — OFFICE VISIT (OUTPATIENT)
Dept: PHYSICAL THERAPY | Facility: MEDICAL CENTER | Age: 60
End: 2025-04-18
Attending: NURSE PRACTITIONER
Payer: COMMERCIAL

## 2025-04-18 DIAGNOSIS — R53.1 GENERALIZED WEAKNESS: Primary | ICD-10-CM

## 2025-04-18 PROCEDURE — 97110 THERAPEUTIC EXERCISES: CPT | Performed by: PHYSICAL THERAPIST

## 2025-04-18 PROCEDURE — 97112 NEUROMUSCULAR REEDUCATION: CPT | Performed by: PHYSICAL THERAPIST

## 2025-04-18 NOTE — PROGRESS NOTES
Daily Note  POC: 3/4-   Re-eval: 4/3 (Next )      Today's date: 2025  Patient name: Korin Garcia  : 1965  MRN: [id number]  Referring provider: Analia Goff CRNP  Dx:   Encounter Diagnosis     ICD-10-CM    1. Generalized weakness  R53.1                     Past Medical History:   Diagnosis Date    Abnormal CT of the chest 2023    Arthritis     Asthma     Continuous opioid dependence (HCC) 2022    Continuous opioid dependence (HCC) 2022    Diabetes mellitus (HCC)     Diverticulitis of colon     Fibromyalgia 2022    Headache(784.0)     History of mammogram     History of tobacco abuse 2022    Hypertension     Nausea 2022    Obesity     Sjogren's syndrome (HCC) 10/19/2012    Stroke ([facility])  14     Urinary tract infection      1 2 3 4 5 6   IE 3/4 3/11 3/13 3/21 3/28 4/1   7 8 9 10 11 12   4/3  RE      13 14 15 16 17 18           19 20 21 22 23 24           25 26 27 28 29 30               Start Time: 0915  Stop Time: 1000  Total time in clinic (min): 45 minutes    Subjective: Pt has nothing new to report      Objective: See treatment diary below      Functional outcomes   TU.54  sec (last visit: 58.97 seconds)  Functional outcome comment: Can walk about 10 min max with walker  5x STS: 18.91  6MWT: 84 feet (1 stop)      Assessment: The patient tolerated the session with moderate weakness. The focus today was the shoulder as the patient reported increased hip and knee pain. After AAROM and PROM the patient was able to further lift her arm by a few degrees. An increase in thera-band exercises were added to address strength and activation but also were better tolerated then dumbbells. The patient found the treatment today moderately challenging.  She will continue to benefit from generalized UE and LE strengthening, motion exercises, and high intensity gait training to address her impairments. She will continue to benefit from skilled PT to  address their remaining impairments, reach their goals, and maximize their rehabilitation potential.           Plan: Continue per plan of care.  Progress treatment as tolerated.    Frequency: 2-3x week  Duration in weeks: 12  Plan of Care beginning date: 3/4/2025  Plan of Care expiration date: 5/27/2025  Treatment plan discussed with: patient    Goals  STG: Performed by weeks 2-4  1. Pt will demonstrate a 1/2 point increase in all shoulder and hip MMT's by week 4 (Progressing)  2. Pt will improve  active shoulder abduction to above 90 degrees by week 3 (MET)  3. Pt will be able to perform a 5xSTS by week 4 with bolsters if needed (Progressing, use one hand, no bolsters)    LTG: performed by weeks 8-discharge  1. Pt will demonstrate a 10 sec difference in TUG test by week 8 (Progressing)  2.Pt will show clinically significant change in 6MWT by discharge  3. Pt will have 4/5 strength or higher in every shoulder and hip motion tested on the IE by week 8  4.Pt will be able to walk for over 20 minutes with the walker by discharge  5.Patient will be independent in ADL's, HEP, and be able to self manage symptoms by discharge  6.Patient will reach predicted FOTO score by discharge      Precautions: Standard, L sided CVA 3 years ago    Access Code: [email or web address]  URL: [email or web address]  Date: 03/04/2025  Prepared by: Mir Barlow    Exercises  - Side to Side Weight Shift with Counter Support  - 3 x daily - 7 x weekly - 3 sets - 10 reps  - Seated March  - 3 x daily - 7 x weekly - 3 sets - 10 reps  - Seated Long Arc Quad  - 3 x daily - 7 x weekly - 3 sets - 10 reps  - Seated Hip Abduction  - 3 x daily - 7 x weekly - 3 sets - 10 reps  - Shoulder Abduction - Thumbs Up  - 3 x daily - 7 x weekly - 3 sets - 10 reps  - Standing Shoulder Abduction AAROM with Dowel  - 3 x daily - 7 x weekly - 3 sets - 15 reps  - Shoulder Flexion Overhead with Dowel  - 3 x daily - 7 x weekly - 15 reps  - Standing Shoulder Flexion to 90  Degrees  - 3 x daily - 7 x weekly - 3 sets - 10 reps    Date: 04/01/2025  Prepared by: Mir Barlow    Exercises  - Seated Scapular Retraction  - 3 x daily - 7 x weekly - 3 sets - 10 reps  - Seated Cervical Retraction  - 3 x daily - 7 x weekly - 3 sets - 10 reps  - Standing Shoulder Abduction AAROM with Dowel  - 3 x daily - 7 x weekly - 2 sets - 10 reps  - Seated Shoulder Flexion AAROM with Dowel  - 3 x daily - 7 x weekly - 3 sets - 10 reps    Patient was 1:1 with PT during the following time frame: 0915 - 0945    Manuals 3/4 3/11 3/13 3/21 3/28 4/1 4/3 4/11 4/14 4/18   PROM  2 min to end range   5 min to stretch shoulder and elbow CB 5 min       AAROM    CB   5 min   CB 5 min                                 Neuro Re-Ed             LAQ  30x 2# 50x 2 #    40x  30x 3# no breaks    Sitting clamshells  30x RTB 30x YTB          Sit to stands with foam  2x 5 10x     30x 20x no breaks 3x5 no foam   Marching in place sitting  30x 2#  With opp arm raise 30x With opp arm raise 2x 15    Alternating feel with 1# weight   50x (warm up)  30x     Standing weight shifts  20x  With reach at mirror 2 min         Shoulder abduction  5x   10x    10x 10x 15x   Shoulder flexion  5x   10x   10x 10x 15x   Shoulder abduction with green TB          4x5   TB shoulder extension          2x10   Shoulder abduction hold  2x 5           Med ball squeeze   2x 20 3kg 3x 20 with yellow theraball         Hip add ball squeeze   2x20 3x20         Blaze pod  tapping   2x 2 min  4 blaze pods at railing on mirror 3x 1 min 6 pods on table in semicircle 2x2 min 4 pods on table in semicircle 3x 1 min 6 pods in semi [place] 2 elevated 3x 1 min 4 blaze pods on the ground to tap 3x 1 min 4 blaze pods on the ground to tap and 2 on the mirror     Ankle wobble board   20x fwd-bwd     20x side to side          Finger flexion squeezes with rubbber band dynamometer    30x         Finger extension    30x      2x20 gray device   TB Rows      2x10 RTB   2x10 RTB 30x  RTB   TB hor abduction          30x RTB   Scap squeezes      20x   20x 20x   Ther Ex             HEP EDU 10 min            Hamstring curls  Seated 30x RTB Standing 30x          Bike  3 min           UBE  5 min           Bicep curls  30x 1#   10x 1 # 20x 2#   20x 3#  30x 2# full ROM   Tricep extensions     30x   20x 3#     Wrist flex and ext     30x        Quad extensions   1# 25x     20x     Trap raies     30x        AAROM with bar      Flexion 2x10    Abd 2x10  Flexion 20x    ABD 20x Flexion 20x    ABD 20x Flexion 20x    ABD 20x   Dorsiflexion with ankle weights         15x w/o AW    15x with 1# AW    Standing hip abduction         3x8 3# ea side    Ther Activity             Re-eval       30 min                   Gait Training             High intensity GT     20 foot laps:  3 laps   2 laps fast  2 laps  fast  3# AW on R 10 foot laps trying to increase speed each one  5x   2x10 foot laps trying to increase speed each one  3x                 Modalities

## 2025-04-23 DIAGNOSIS — M79.7 FIBROMYALGIA: ICD-10-CM

## 2025-04-23 DIAGNOSIS — I61.0 NONTRAUMATIC SUBCORTICAL HEMORRHAGE OF LEFT CEREBRAL HEMISPHERE (HCC): ICD-10-CM

## 2025-04-23 DIAGNOSIS — I10 PRIMARY HYPERTENSION: ICD-10-CM

## 2025-04-23 RX ORDER — GABAPENTIN 300 MG/1
CAPSULE ORAL
Qty: 180 CAPSULE | Refills: 1 | Status: SHIPPED | OUTPATIENT
Start: 2025-04-23

## 2025-04-23 RX ORDER — AMLODIPINE BESYLATE 5 MG/1
5 TABLET ORAL DAILY
Qty: 90 TABLET | Refills: 0 | Status: SHIPPED | OUTPATIENT
Start: 2025-04-23

## 2025-04-28 ENCOUNTER — APPOINTMENT (OUTPATIENT)
Dept: PHYSICAL THERAPY | Facility: MEDICAL CENTER | Age: 60
End: 2025-04-28
Attending: NURSE PRACTITIONER
Payer: COMMERCIAL

## 2025-04-28 DIAGNOSIS — I10 PRIMARY HYPERTENSION: ICD-10-CM

## 2025-04-29 RX ORDER — AMLODIPINE BESYLATE 5 MG/1
5 TABLET ORAL DAILY
Qty: 90 TABLET | Refills: 0 | OUTPATIENT
Start: 2025-04-29

## 2025-04-30 ENCOUNTER — OFFICE VISIT (OUTPATIENT)
Dept: PHYSICAL THERAPY | Facility: MEDICAL CENTER | Age: 60
End: 2025-04-30
Attending: NURSE PRACTITIONER
Payer: COMMERCIAL

## 2025-04-30 DIAGNOSIS — R53.1 GENERALIZED WEAKNESS: Primary | ICD-10-CM

## 2025-04-30 PROCEDURE — 97530 THERAPEUTIC ACTIVITIES: CPT | Performed by: PHYSICAL THERAPIST

## 2025-04-30 PROCEDURE — 97110 THERAPEUTIC EXERCISES: CPT | Performed by: PHYSICAL THERAPIST

## 2025-04-30 PROCEDURE — 97112 NEUROMUSCULAR REEDUCATION: CPT | Performed by: PHYSICAL THERAPIST

## 2025-04-30 NOTE — PROGRESS NOTES
"Daily Note/Re-evaluation  POC: 3/4-   Last Re-eval:   Next:       Today's date: 2025  Patient name: Korin Garcia  : 1965  MRN: [id number]  Referring provider: Analia Goff CRNP  Dx:   Encounter Diagnosis     ICD-10-CM    1. Generalized weakness  R53.1                       Past Medical History:   Diagnosis Date    Abnormal CT of the chest 2023    Arthritis     Asthma     Continuous opioid dependence (HCC) 2022    Continuous opioid dependence (HCC) 2022    Diabetes mellitus (HCC)     Diverticulitis of colon     Fibromyalgia 2022    Headache(784.0)     History of mammogram 2018    History of tobacco abuse 2022    Hypertension     Nausea 2022    Obesity     Sjogren's syndrome (HCC) 10/19/2012    Stroke ([facility]) 2023    Urinary tract infection      1 2 3 4 5 6   IE 3/4 3/11 3/13 3/21 3/28 4/1   7 8 9 10 11 12   4/3  RE     13 14 15 16 17 18           19 20 21 22 23 24           25 26 27 28 29 30               Start Time: 1230  Stop Time: 1315  Total time in clinic (min): 45 minutes    Subjective: \"Everything hurts\"      Objective: See treatment diary below  Tenderness     Shoulder pain: 8-9  Hip pain: 5-6    Additional Tenderness Details  Tenderness over R trapezius and supraspinatus    Neurological Testing     Additional Neurological Details  Cannot feel C6 on right and all others were lighter on right then left in UE    LE was normal    Active Range of Motion     Right Shoulder   Flexion: 110 degrees   Abduction: 98 degrees (trap compensation)    Strength/Myotome Testing     Right Shoulder     Planes of Motion   Flexion: 4  Abduction: 3+    Left Hip   Planes of Motion   Flexion: 5    Right Hip   Planes of Motion   Flexion: 4-    Additional Strength Details  R  Quad 4+/5  Hamstring 4+/5  PF: 5/5  DF: 4/5    L all 5/5    Ambulation   Weight-Bearing Status   Weight-Bearing Status (Left): full weight bearing   Weight-Bearing Status (Right): full " weight-bearing    Assistive device used: two-wheeled walker    Ambulation: Level Surfaces   Ambulation with assistive device: independent  Ambulation without assistive device: unable    Ambulation: Stairs   Ascend stairs: independent  Pattern: non-reciprocal  Railings: one rail  Descend stairs: independent  Pattern: non-reciprocal  Railings: one rail  Curbs: requires assist (Uses walker and assistance to hold walker steady)    Additional Stairs Ambulation Details  Major trendelenburg and hip weakness noted ascending and descending stairs    Observational Gait   Decreased walking speed and stride length.     Quality of Movement During Gait     Pelvis    Pelvis (Right): Positive Trendelenburg.   Neuro Exam:     Sensation   Light touch LE: right impaired  Light touch LE: left WNL    Coordination   Heel to shin: left WNL  Heel to shin: right dysmetric  Finger to nose: right dysmetria  Finger to nose: left WNL  Rapid alternating movements: LE impaired and UE WNL    Functional outcomes   TU.15 (LV:52.54 sec )  Functional outcome comment: Can walk about 10 min max with walker  5x STS: 16.04 (LV: 18.91)  6MWT: 84 feet (1 stop) (NV)        Assessment: Korin Garcia is a 60 y.o. female being seen at [facility] for PT re-evaluation. This is the patient's 11th session for their right sided weakness . Pt states they have made minimal progress t/o the current PT plan of care thus far. Pt states their current limitations include generalized decreased strength on her right side, pain with shoulder and hip movement (shoulder>hip), and pain in her hands when gripping. Pt continues to note increased pain/difficulty with walking, reaching overhead, leaning onto her walker. Pt states they are not to their desired level with PT services thus far. ROM assessment showed increases in shoulder AROM into flexion but little changes in abduction. Strength assessment showed slight increases in hip flexion and ankle dorsiflexion. These strength tests  have translated functionally as she has decreased both her 5xSTS and TUG time.  Pt has reached their goals of increasing her functional outcome measures and strength but continue to work on functional tasks like walking for longer distances and reaching overhead .The focus would be on continued motion and aggressive strength training into new ranges of motion as well as an increase in high intensity gait training. They have been educated on their current condition and their prognosis. They will continue to benefit from increased frequency for skilled PT services and home exercise to address these impairments, improve ADL performance, improve activity tolerance, and improve quality of life.        Plan: Continue per plan of care.  Progress treatment as tolerated.    Frequency: 2-3x week  Duration in weeks: 12  Plan of Care beginning date: 3/4/2025  Plan of Care expiration date: 5/27/2025  Treatment plan discussed with: patient    Goals  STG: Performed by weeks 2-4  1. Pt will demonstrate a 1/2 point increase in all shoulder and hip MMT's by week 4 (Progressing)  2. Pt will improve  active shoulder abduction to above 90 degrees by week 3 (MET)  3. Pt will be able to perform a 5xSTS by week 4 with bolsters if needed (Progressing, use one hand in front, less weight through it, mostly for balance)    LTG: performed by weeks 8-discharge  1. Pt will demonstrate a 5 sec difference in TUG test by week 8 (Progressing but not met) (goal adjusted to 5 sec  2.Pt will show clinically significant change in 6MWT by discharge (NV)  3. Pt will have 4/5 strength or higher in every shoulder and hip motion tested on the IE by week 8 (Met in all areas other than shoulder abduction)  4.Pt will be able to walk for over 20 minutes with the walker by discharge (progressing)  5.Patient will be independent in ADL's, HEP, and be able to self manage symptoms by discharge (Progressing)  6.Patient will reach predicted FOTO score by discharge  (progressing)     Precautions: Standard, L sided CVA 3 years ago    Access Code: [email or web address]  URL: [email or web address]  Date: 03/04/2025  Prepared by: Mir Barlow    Exercises  - Side to Side Weight Shift with Counter Support  - 3 x daily - 7 x weekly - 3 sets - 10 reps  - Seated March  - 3 x daily - 7 x weekly - 3 sets - 10 reps  - Seated Long Arc Quad  - 3 x daily - 7 x weekly - 3 sets - 10 reps  - Seated Hip Abduction  - 3 x daily - 7 x weekly - 3 sets - 10 reps  - Shoulder Abduction - Thumbs Up  - 3 x daily - 7 x weekly - 3 sets - 10 reps  - Standing Shoulder Abduction AAROM with Dowel  - 3 x daily - 7 x weekly - 3 sets - 15 reps  - Shoulder Flexion Overhead with Dowel  - 3 x daily - 7 x weekly - 15 reps  - Standing Shoulder Flexion to 90 Degrees  - 3 x daily - 7 x weekly - 3 sets - 10 reps    Date: 04/01/2025  Prepared by: Mir Barlow    Exercises  - Seated Scapular Retraction  - 3 x daily - 7 x weekly - 3 sets - 10 reps  - Seated Cervical Retraction  - 3 x daily - 7 x weekly - 3 sets - 10 reps  - Standing Shoulder Abduction AAROM with Dowel  - 3 x daily - 7 x weekly - 2 sets - 10 reps  - Seated Shoulder Flexion AAROM with Dowel  - 3 x daily - 7 x weekly - 3 sets - 10 reps    Patient was 1:1 with PT during the following time frame: Whole session    Manuals 3/4 3/11 3/13 3/21 3/28 4/1 4/3 4/11 4/14 4/18 4/30   PROM  2 min to end range   5 min to stretch shoulder and elbow CB 5 min        AAROM    CB   5 min   CB 5 min                                    Neuro Re-Ed              LAQ  30x 2# 50x 2 #    40x  30x 3# no breaks     Sitting clamshells  30x RTB 30x YTB           Sit to stands with foam  2x 5 10x     30x 20x no breaks 3x5 no foam For re-eval   Marching in place sitting  30x 2#  With opp arm raise 30x With opp arm raise 2x 15    Alternating feel with 1# weight   50x (warm up)  30x   Standing 2x 1 min   Standing weight shifts  20x  With reach at mirror 2 min          Shoulder abduction   5x   10x    10x 10x 15x 15x   Shoulder flexion  5x   10x   10x 10x 15x 15x   Shoulder abduction with green TB          4x5 3x5 YTB   TB shoulder extension          2x10    Shoulder abduction hold  2x 5            Med ball squeeze   2x 20 3kg 3x 20 with yellow theraball          Hip add ball squeeze   2x20 3x20          Blaze pod  tapping   2x 2 min  4 blaze pods at railing on mirror 3x 1 min 6 pods on table in semicircle 2x2 min 4 pods on table in semicircle 3x 1 min 6 pods in semi [place] 2 elevated 3x 1 min 4 blaze pods on the ground to tap 3x 1 min 4 blaze pods on the ground to tap and 2 on the mirror      Ankle wobble board   20x fwd-bwd     20x side to side           Finger flexion squeezes with rubbber band dynamometer    30x          Finger extension    30x      2x20 gray device 30x Green rubber band   TB Rows      2x10 RTB   2x10 RTB 30x RTB    TB hor abduction          30x RTB    Scap squeezes      20x   20x 20x    Ther Ex              HEP EDU 10 min             Hamstring curls  Seated 30x RTB Standing 30x           Bike  3 min            UBE  5 min            Bicep curls  30x 1#   10x 1 # 20x 2#   20x 3#  30x 2# full ROM    Tricep extensions     30x   20x 3#      Wrist flex and ext     30x         Quad extensions   1# 25x     20x      Trap raies     30x         AAROM with bar      Flexion 2x10    Abd 2x10  Flexion 20x    ABD 20x Flexion 20x    ABD 20x Flexion 20x    ABD 20x Flexion 20x    ABD 20x   Dorsiflexion with ankle weights         15x w/o AW    15x with 1# AW     Standing hip extension with knee flexion to return           2x 10   Standing hip abduction         3x8 3# ea side   30x   Ther Activity              Re-eval       30 min    20 min                 Gait Training              High intensity GT     20 foot laps:  3 laps   2 laps fast  2 laps  fast  3# AW on R 10 foot laps trying to increase speed each one  5x   2x10 foot laps trying to increase speed each one  3x                   Modalities

## 2025-05-05 DIAGNOSIS — M79.89 RIGHT LEG SWELLING: ICD-10-CM

## 2025-05-05 DIAGNOSIS — I10 PRIMARY HYPERTENSION: ICD-10-CM

## 2025-05-06 RX ORDER — HYDROCHLOROTHIAZIDE 12.5 MG/1
TABLET ORAL
Qty: 90 TABLET | Refills: 1 | Status: SHIPPED | OUTPATIENT
Start: 2025-05-06

## 2025-05-07 ENCOUNTER — OFFICE VISIT (OUTPATIENT)
Dept: PHYSICAL THERAPY | Facility: MEDICAL CENTER | Age: 60
End: 2025-05-07
Attending: NURSE PRACTITIONER
Payer: COMMERCIAL

## 2025-05-07 DIAGNOSIS — R53.1 GENERALIZED WEAKNESS: Primary | ICD-10-CM

## 2025-05-07 PROCEDURE — 97112 NEUROMUSCULAR REEDUCATION: CPT | Performed by: PHYSICAL THERAPIST

## 2025-05-07 PROCEDURE — 97110 THERAPEUTIC EXERCISES: CPT | Performed by: PHYSICAL THERAPIST

## 2025-05-07 NOTE — PROGRESS NOTES
"Daily Note  POC: 3/4-   Last Re-eval:   Next:       Today's date: 2025  Patient name: Korin Garcia  : 1965  MRN: [id number]  Referring provider: Analia Goff CRNP  Dx:   Encounter Diagnosis     ICD-10-CM    1. Generalized weakness  R53.1                       Past Medical History:   Diagnosis Date    Abnormal CT of the chest 2023    Arthritis     Asthma     Continuous opioid dependence (HCC) 2022    Continuous opioid dependence (HCC) 2022    Diabetes mellitus (HCC)     Diverticulitis of colon     Fibromyalgia 2022    Headache(784.0)     History of mammogram     History of tobacco abuse 2022    Hypertension     Nausea 2022    Obesity     Sjogren's syndrome (HCC) 10/19/2012    Stroke ([facility]) 2023    Urinary tract infection      1 2 3 4 5 6   IE 3/4 3/11 3/13 3/21 3/28 4/1   7 8 9 10 11 12   4/3  RE    13 14 15 16 17 18           19 20 21 22 23 24           25 26 27 28 29 30               Start Time: 1100  Stop Time: 1145  Total time in clinic (min): 45 minutes    Subjective: \"I think I may have pulled something in my hip this morning, and my hands are really bothering me.\"      Objective: See treatment diary below      Functional outcomes   TU.15 (LV:52.54 sec )  Functional outcome comment: Can walk about 10 min max with walker  5x STS: 16.04 (LV: 18.91)  6MWT: 84 feet (1 stop) (NV) Went to try this visit but due to hand and hip pain she asked to not test this today        Assessment: The patient tolerated the session with moderate weakness. The patient demonstrated improved ability to perform sit to stands without foam and without using her hands today when given verbal cues to lean forward more before pushing up. Hand stretching and strengthening was tolerated well. Lack of motion to 90 degrees actively with shoulder flexion. Fatigue was present in hips after the marching exercise . The patient found the treatment today " moderately challenging.  She will continue to benefit from global strengthening, HIGT, and ROM exercises to address her weakness and pain  She will continue to benefit from skilled PT to address their remaining impairments, reach their goals, and maximize their rehabilitation potential.             Plan: Continue per plan of care.  Progress treatment as tolerated.    Frequency: 2-3x week  Duration in weeks: 12  Plan of Care beginning date: 3/4/2025  Plan of Care expiration date: 5/27/2025  Treatment plan discussed with: patient    Goals  STG: Performed by weeks 2-4  1. Pt will demonstrate a 1/2 point increase in all shoulder and hip MMT's by week 4 (Progressing)  2. Pt will improve  active shoulder abduction to above 90 degrees by week 3 (MET)  3. Pt will be able to perform a 5xSTS by week 4 with bolsters if needed (Progressing, use one hand in front, less weight through it, mostly for balance)    LTG: performed by weeks 8-discharge  1. Pt will demonstrate a 5 sec difference in TUG test by week 8 (Progressing but not met) (goal adjusted to 5 sec  2.Pt will show clinically significant change in 6MWT by discharge (NV)  3. Pt will have 4/5 strength or higher in every shoulder and hip motion tested on the IE by week 8 (Met in all areas other than shoulder abduction)  4.Pt will be able to walk for over 20 minutes with the walker by discharge (progressing)  5.Patient will be independent in ADL's, HEP, and be able to self manage symptoms by discharge (Progressing)  6.Patient will reach predicted FOTO score by discharge (progressing)     Precautions: Standard, L sided CVA 3 years ago    Access Code: [email or web address]  URL: [email or web address]  Date: 03/04/2025  Prepared by: Mir Barlow    Exercises  - Side to Side Weight Shift with Counter Support  - 3 x daily - 7 x weekly - 3 sets - 10 reps  - Seated March  - 3 x daily - 7 x weekly - 3 sets - 10 reps  - Seated Long Arc Quad  - 3 x daily - 7 x weekly - 3 sets -  10 reps  - Seated Hip Abduction  - 3 x daily - 7 x weekly - 3 sets - 10 reps  - Shoulder Abduction - Thumbs Up  - 3 x daily - 7 x weekly - 3 sets - 10 reps  - Standing Shoulder Abduction AAROM with Dowel  - 3 x daily - 7 x weekly - 3 sets - 15 reps  - Shoulder Flexion Overhead with Dowel  - 3 x daily - 7 x weekly - 15 reps  - Standing Shoulder Flexion to 90 Degrees  - 3 x daily - 7 x weekly - 3 sets - 10 reps    Date: 04/01/2025  Prepared by: Mir Barlow    Exercises  - Seated Scapular Retraction  - 3 x daily - 7 x weekly - 3 sets - 10 reps  - Seated Cervical Retraction  - 3 x daily - 7 x weekly - 3 sets - 10 reps  - Standing Shoulder Abduction AAROM with Dowel  - 3 x daily - 7 x weekly - 2 sets - 10 reps  - Seated Shoulder Flexion AAROM with Dowel  - 3 x daily - 7 x weekly - 3 sets - 10 reps    Patient was 1:1 with PT during the following time frame: Whole session    Manuals 3/4 3/11 3/13 3/21 3/28 4/1 4/3 4/11 4/14 4/18 4/30 5/7   PROM  2 min to end range   5 min to stretch shoulder and elbow CB 5 min         AAROM    CB   5 min   CB 5 min         Hand stretching            CB                  Neuro Re-Ed               LAQ  30x 2# 50x 2 #    40x  30x 3# no breaks   30x 2# no breaks to end range   Sitting clamshells  30x RTB 30x YTB            Sit to stands with foam  2x 5 10x     30x 20x no breaks with foam 3x5 no foam For re-eval 3x10 no foam, focused on form   Marching in place sitting  30x 2#  With opp arm raise 30x With opp arm raise 2x 15    Alternating feel with 1# weight   50x (warm up)  30x   Standing 2x 1 min Standing 2x 1 min with 2# AW   Standing weight shifts  20x  With reach at mirror 2 min           Shoulder abduction  5x   10x    10x 10x 15x 15x    Shoulder flexion  5x   10x   10x 10x 15x 15x 2x10   Shoulder abduction with green TB          4x5 3x5 YTB    TB shoulder extension          2x10     Shoulder abduction hold  2x 5             Med ball squeeze   2x 20 3kg 3x 20 with yellow theraball            Hip add ball squeeze   2x20 3x20           Blaze pod  tapping   2x 2 min  4 blaze pods at railing on mirror 3x 1 min 6 pods on table in semicircle 2x2 min 4 pods on table in semicircle 3x 1 min 6 pods in semi [place] 2 elevated 3x 1 min 4 blaze pods on the ground to tap 3x 1 min 4 blaze pods on the ground to tap and 2 on the mirror       Ankle wobble board   20x fwd-bwd     20x side to side         20x fwd-bwd / side-side    Finger flexion squeezes with rubbber band dynamometer    30x        Green digiflex 30x    Yellow digiflex  30x   Finger extension    30x      2x20 gray device 30x Green rubber band 30x gray    TB Rows      2x10 RTB   2x10 RTB 30x RTB  30x GTB   TB hor abduction          30x RTB     Scap squeezes      20x   20x 20x     Ther Ex               HEP EDU 10 min              Hamstring curls  Seated 30x RTB Standing 30x         Seated gtb  30x   Bike  3 min             UBE  5 min             Bicep curls  30x 1#   10x 1 # 20x 2#   20x 3#  30x 2# full ROM     Tricep extensions     30x   20x 3#       Wrist flex and ext     30x          Quad extensions   1# 25x     20x       Trap raies     30x          AAROM with bar      Flexion 2x10    Abd 2x10  Flexion 20x    ABD 20x Flexion 20x    ABD 20x Flexion 20x    ABD 20x Flexion 20x    ABD 20x    Dorsiflexion with ankle weights         15x w/o AW    15x with 1# AW   40x without ankle weights   Standing hip extension with knee flexion to return           2x 10    Standing hip abduction         3x8 3# ea side   30x                   Ther Activity               Re-eval       30 min    20 min                   Gait Training               High intensity GT     20 foot laps:  3 laps   2 laps fast  2 laps  fast  3# AW on R 10 foot laps trying to increase speed each one  5x   2x10 foot laps trying to increase speed each one  3x                     Modalities

## 2025-05-14 DIAGNOSIS — F41.8 DEPRESSION WITH ANXIETY: ICD-10-CM

## 2025-05-15 RX ORDER — BUSPIRONE HYDROCHLORIDE 7.5 MG/1
7.5 TABLET ORAL 2 TIMES DAILY
Qty: 180 TABLET | Refills: 1 | Status: SHIPPED | OUTPATIENT
Start: 2025-05-15

## 2025-05-17 DIAGNOSIS — I10 PRIMARY HYPERTENSION: ICD-10-CM

## 2025-05-18 RX ORDER — AMLODIPINE BESYLATE 5 MG/1
5 TABLET ORAL DAILY
Qty: 90 TABLET | Refills: 0 | OUTPATIENT
Start: 2025-05-18

## 2025-05-27 DIAGNOSIS — E11.65 POORLY CONTROLLED TYPE 2 DIABETES MELLITUS (HCC): ICD-10-CM

## 2025-05-28 RX ORDER — SEMAGLUTIDE 1.34 MG/ML
INJECTION, SOLUTION SUBCUTANEOUS
Qty: 9 ML | Refills: 1 | Status: SHIPPED | OUTPATIENT
Start: 2025-05-28

## 2025-06-02 DIAGNOSIS — F32.A DEPRESSION: ICD-10-CM

## 2025-06-03 RX ORDER — DULOXETIN HYDROCHLORIDE 20 MG/1
20 CAPSULE, DELAYED RELEASE ORAL DAILY
Qty: 180 CAPSULE | Refills: 1 | Status: SHIPPED | OUTPATIENT
Start: 2025-06-03

## 2025-06-13 DIAGNOSIS — M79.89 RIGHT LEG SWELLING: ICD-10-CM

## 2025-06-13 DIAGNOSIS — I10 PRIMARY HYPERTENSION: ICD-10-CM

## 2025-06-13 RX ORDER — HYDROCHLOROTHIAZIDE 12.5 MG/1
TABLET ORAL
Qty: 90 TABLET | Refills: 1 | Status: SHIPPED | OUTPATIENT
Start: 2025-06-13 | End: 2025-06-17 | Stop reason: CLARIF

## 2025-06-17 ENCOUNTER — OFFICE VISIT (OUTPATIENT)
Dept: FAMILY MEDICINE CLINIC | Facility: CLINIC | Age: 60
End: 2025-06-17
Payer: COMMERCIAL

## 2025-06-17 VITALS
TEMPERATURE: 98.3 F | HEART RATE: 80 BPM | OXYGEN SATURATION: 97 % | BODY MASS INDEX: 43.98 KG/M2 | HEIGHT: 60 IN | DIASTOLIC BLOOD PRESSURE: 80 MMHG | SYSTOLIC BLOOD PRESSURE: 122 MMHG | RESPIRATION RATE: 16 BRPM | WEIGHT: 224 LBS

## 2025-06-17 DIAGNOSIS — R39.9 UTI SYMPTOMS: ICD-10-CM

## 2025-06-17 DIAGNOSIS — E11.65 POORLY CONTROLLED TYPE 2 DIABETES MELLITUS (HCC): ICD-10-CM

## 2025-06-17 DIAGNOSIS — I10 PRIMARY HYPERTENSION: Primary | ICD-10-CM

## 2025-06-17 LAB — SL AMB POCT HEMOGLOBIN AIC: 6.3 (ref ?–6.5)

## 2025-06-17 PROCEDURE — 99214 OFFICE O/P EST MOD 30 MIN: CPT | Performed by: NURSE PRACTITIONER

## 2025-06-17 PROCEDURE — 83036 HEMOGLOBIN GLYCOSYLATED A1C: CPT | Performed by: NURSE PRACTITIONER

## 2025-06-17 RX ORDER — CIPROFLOXACIN 500 MG/1
500 TABLET, FILM COATED ORAL EVERY 12 HOURS SCHEDULED
Qty: 14 TABLET | Refills: 0 | Status: SHIPPED | OUTPATIENT
Start: 2025-06-17 | End: 2025-06-24

## 2025-06-17 RX ORDER — AMLODIPINE BESYLATE 5 MG/1
5 TABLET ORAL DAILY
Qty: 90 TABLET | Refills: 3 | Status: SHIPPED | OUTPATIENT
Start: 2025-06-17

## 2025-06-17 NOTE — ASSESSMENT & PLAN NOTE
Lab Results   Component Value Date    HGBA1C 8.1 (A) 04/08/2025       Orders:    semaglutide, 2 mg/dose, (Ozempic) 8 mg/ mL injection pen; Inject 0.75 mL (2 mg total) under the skin every 7 days    Albumin / creatinine urine ratio; Future    POCT hemoglobin A1c  Stable.  I will increase Ozempic to 2 mg weekly.  Patient is doing quite well most recent A1c done in the office was 6.3 she is doing outstanding she is also had a 10 pound weight loss since last visit we will continue current medication administration timing and labs to be completed prior to her next office visit.

## 2025-06-17 NOTE — PROGRESS NOTES
Name: Korin Garcia      : 1965      MRN: [id number]  Encounter Provider: ARSENIO Saucedo  Encounter Date: 2025   Encounter department: [facility]    Assessment & Plan  Poorly controlled type 2 diabetes mellitus (HCC)    Lab Results   Component Value Date    HGBA1C 8.1 (A) 2025       Orders:    semaglutide, 2 mg/dose, (Ozempic) 8 mg/ mL injection pen; Inject 0.75 mL (2 mg total) under the skin every 7 days    Albumin / creatinine urine ratio; Future    POCT hemoglobin A1c  Stable.  I will increase Ozempic to 2 mg weekly.  Patient is doing quite well most recent A1c done in the office was 6.3 she is doing outstanding she is also had a 10 pound weight loss since last visit we will continue current medication administration timing and labs to be completed prior to her next office visit.  Primary hypertension  Doing an excellent job with her blood pressure stable all labs reviewed and questions answered she is due for a refill on her Norvasc at the pharmacy she is to take that as directed.  Next labs will be completed prior to next office visit  Orders:    amLODIPine (NORVASC) 5 mg tablet; Take 1 tablet (5 mg total) by mouth in the morning    CBC and differential; Future    Comprehensive metabolic panel; Future    Lipid panel; Future    TSH, 3rd generation; Future    UA w Reflex to Microscopic w Reflex to Culture; Future    UTI symptoms  She appears to be be developing some pyelonephritis on right side CVA tenderness indicative unable to give a urine sample will treat empirically.  Orders:    ciprofloxacin (CIPRO) 500 mg tablet; Take 1 tablet (500 mg total) by mouth every 12 (twelve) hours for 7 days      Depression Screening and Follow-up Plan: Patient's depression screening was positive with a PHQ-9 score of 18.   Patient assessed for underlying major depression. Brief counseling provided and recommend additional follow-up/re-evaluation next office visit. Patient advised to  follow-up with PCP for further management.     Physical assessment unremarkable.  VS were well controlled. Dosing all possible side effects of the prescribed medications or medications that had been prescribed in the past were reviewed and all questions were answered.  Patient verbalized agreement and understanding of the plan of care as outlined during the office visit today return to office as indicated or sooner if a problem arises.  Labs given is to complete for possible fu discussion. RTO as needed or for next scheduled appt. All questions answered.      History of Present Illness       Patient is here for routine follow-up.  To review most recent labs.  To also discuss current state of health and any new problems that they may be experiencing.  Patient states that medications taken as prescribed and very well tolerated no new complaints at this time.        Physical assessment unremarkable except as noted she continues to struggle with some paralysis weakness on the right side but physical therapy she will contact them for follow-up well will sign off on the order for her.Dosing all possible side effects of the prescribed medications or medications that had been prescribed in the past were reviewed and all questions were answered.  Patient verbalized agreement and understanding of the plan of care as outlined during the office visit today return to office as indicated or sooner if a problem arises.  . Labs reviewed were WNL also VS were well controlled. Labs given is to complete for possible fu discussion. RTO as needed or for next scheduled appt. All questions answered.    Review of Systems   Constitutional:  Negative for appetite change and fever.   HENT:  Negative for sinus pressure and sore throat.    Eyes:  Negative for pain.   Respiratory:  Negative for shortness of breath.    Cardiovascular:  Negative for chest pain.   Gastrointestinal:  Negative for abdominal pain.   Genitourinary:  Negative for  dysuria.   Musculoskeletal:  Positive for arthralgias, back pain, gait problem and myalgias.   Skin:  Negative for color change.   Neurological:  Positive for weakness. Negative for light-headedness.   Psychiatric/Behavioral:  Negative for behavioral problems.      Past Medical History[1]  Past Surgical History[2]  Family History[3]  Social History[4]  Medications[5]  No Known Allergies  Immunization History   Administered Date(s) Administered    COVID-19 MODERNA VACC 0.5 ML IM 02/05/2021, 02/06/2021, 03/05/2021    INFLUENZA 09/08/2016, 01/25/2020, 09/15/2020, 11/10/2020, 02/27/2023    Influenza, injectable, quadrivalent, preservative free 0.5 mL 01/25/2020, 02/27/2023    Pneumococcal Conjugate 13-Valent 05/06/2019    Pneumococcal Conjugate Vaccine 20-valent (Pcv20), Polysace 02/27/2023, 01/12/2024    Pneumococcal Polysaccharide PPV23 01/01/2017, 01/06/2017, 01/25/2020    Tdap 04/30/2014, 01/01/2017, 01/23/2020, 02/23/2020    Zoster Vaccine Recombinant 01/16/2020, 01/25/2020     Objective   /80 (BP Location: Left arm, Patient Position: Sitting, Cuff Size: Large)   Pulse 80   Temp 98.3 °F (36.8 °C) (Temporal)   Resp 16   Ht 5' (1.524 m)   Wt 102 kg (224 lb)   SpO2 97%   BMI 43.75 kg/m²     Physical Exam  Vitals and nursing note reviewed.   Constitutional:       Appearance: Normal appearance. She is normal weight.   HENT:      Head: Normocephalic and atraumatic.      Right Ear: Tympanic membrane, ear canal and external ear normal.      Left Ear: Tympanic membrane, ear canal and external ear normal.      Nose: Nose normal.      Mouth/Throat:      Mouth: Mucous membranes are moist.     Cardiovascular:      Rate and Rhythm: Normal rate and regular rhythm.      Pulses: Normal pulses.      Heart sounds: Normal heart sounds.   Pulmonary:      Effort: Pulmonary effort is normal.      Breath sounds: Normal breath sounds.   Abdominal:      General: Abdomen is flat. Bowel sounds are normal.      Palpations: Abdomen  is soft.     Musculoskeletal:         General: Normal range of motion.      Cervical back: Normal range of motion.     Neurological:      General: No focal deficit present.      Mental Status: She is oriented to person, place, and time.      Motor: Weakness present.      Coordination: Coordination abnormal.      Gait: Gait abnormal.      Deep Tendon Reflexes: Reflexes abnormal.     Psychiatric:         Mood and Affect: Mood normal.         Behavior: Behavior normal.         Thought Content: Thought content normal.         Judgment: Judgment normal.                [1]   Past Medical History:  Diagnosis Date    Abnormal CT of the chest 06/28/2023    Arthritis     Asthma     Continuous opioid dependence (HCC) 04/27/2022    Continuous opioid dependence (HCC) 04/27/2022    Diabetes mellitus ([facility])     Diverticulitis of colon     Fibromyalgia 04/26/2022    Headache(784.0)     History of mammogram 2018    History of tobacco abuse 04/26/2022    Hypertension     Nausea 04/29/2022    Obesity     Sjogren's syndrome ([facility]) 10/19/2012    Stroke ([facility]) 08 14 2023    Urinary tract infection    [2]   Past Surgical History:  Procedure Laterality Date    APPENDECTOMY      BREAST BIOPSY Left     unsure of year    CARPAL TUNNEL RELEASE      COLONOSCOPY  2017    repeat in 2022    EPIDURAL BLOCK INJECTION N/A 2/14/2023    Procedure: L5-S1 EPIDURALSTEROID INJECTION (41707);  Surgeon: Melo Mancia DO;  Location: [facility];  Service: Pain Management     FOOT SURGERY      HERNIA REPAIR  05/2022    NECK SURGERY      spine fusion     UT ARTHROCENTESIS ASPIR&/INJ MAJOR JT/BURSA W/O US  9/18/2023         UT RPR UMBILICAL HRNA 5 YRS/> REDUCIBLE N/A 05/11/2022    Procedure: REPAIR HERNIA UMBILICAL;  Surgeon: Dread Mckeon MD;  Location: [facility];  Service: General   [3]   Family History  Problem Relation Name Age of Onset    Hypertension Mother Mary Alice     Heart disease Mother Mary Alice     Hypertension Father Nicolas     Heart disease Father  Nicolas     Asthma Sister Olga     No Known Problems Sister      No Known Problems Sister      Diabetes Maternal Grandmother Sarah     No Known Problems Maternal Grandfather      No Known Problems Paternal Grandmother      No Known Problems Paternal Grandfather      Breast cancer Paternal Aunt      Pancreatic cancer Paternal Uncle      Colon cancer Neg Hx      Ovarian cancer Neg Hx      Uterine cancer Neg Hx      Cervical cancer Neg Hx     [4]   Social History  Tobacco Use    Smoking status: Former     Current packs/day: 0.00     Average packs/day: 0.5 packs/day for 43.3 years (21.6 ttl pk-yrs)     Types: Cigarettes     Start date: 1980     Quit date: 2023     Years since quittin.1     Passive exposure: Past    Smokeless tobacco: Never    Tobacco comments:     per pt, 5 a day - As per [place]    Vaping Use    Vaping status: Never Used   Substance and Sexual Activity    Alcohol use: Not Currently     Comment: occasional     Drug use: Never    Sexual activity: Not Currently     Partners: Male     Birth control/protection: None   [5]   Current Outpatient Medications on File Prior to Visit   Medication Sig    acetaminophen (TYLENOL) 500 mg tablet Take 2 tablets (1,000 mg total) by mouth every 6 (six) hours as needed for moderate pain    albuterol (Ventolin HFA) 90 mcg/act inhaler Inhale 2 puffs every 6 (six) hours as needed for wheezing    aspirin 81 mg chewable tablet Chew 1 tablet (81 mg total) daily    buPROPion (WELLBUTRIN XL) 150 mg 24 hr tablet Take 1 tablet (150 mg total) by mouth every morning Do not start before 2023.    busPIRone (BUSPAR) 7.5 mg tablet TAKE ONE TABLET BY MOUTH TWICE DAILY    DULoxetine (CYMBALTA) 20 mg capsule TAKE ONE CAPSULE BY MOUTH DAILY    gabapentin (NEURONTIN) 300 mg capsule TAKE 2 CAPSULES BY MOUTH THREE TIMES A DAY AS NEEDED FOR BACK PAIN    lidocaine (Lidoderm) 5 % Apply 1 patch topically over 12 hours daily Remove & Discard patch within 12 hours or as  directed by MD    losartan-hydrochlorothiazide (HYZAAR) 100-12.5 MG per tablet Take 1 tablet by mouth daily    metFORMIN (GLUCOPHAGE) 1000 MG tablet Take 1 tablet (1,000 mg total) by mouth 2 (two) times a day with meals    [DISCONTINUED] amLODIPine (NORVASC) 5 mg tablet TAKE ONE TABLET BY MOUTH DAILY    [DISCONTINUED] Ozempic, 1 MG/DOSE, 4 MG/3ML injection pen INJECT 0.75 ML (1 MG TOTAL) UNDER THE SKIN ONCE A WEEK    Aspirin Low Dose 81 MG EC tablet Take 1 tablet by mouth once daily (Patient not taking: Reported on 10/14/2024)    docusate sodium (COLACE) 100 mg capsule Take 1 capsule (100 mg total) by mouth 2 (two) times a day (Patient not taking: Reported on 10/14/2024)    fluticasone-vilanterol (Breo Ellipta) 200-25 mcg/actuation inhaler Inhale 1 puff daily Rinse mouth after use.    [DISCONTINUED] atorvastatin (LIPITOR) 20 mg tablet Take 1 tablet (20 mg total) by mouth daily (Patient not taking: Reported on 4/8/2025)    [DISCONTINUED] baclofen 5 MG TABS Take 5 mg by mouth 2 (two) times a day (Patient not taking: Reported on 2/5/2024)    [DISCONTINUED] cyanocobalamin (VITAMIN B-12) 1000 MCG tablet Take 1 tablet (1,000 mcg total) by mouth daily Do not start before September 20, 2023. (Patient not taking: Reported on 2/5/2024)    [DISCONTINUED] Empagliflozin 25 MG TABS Take 1 tablet (25 mg total) by mouth daily (Patient not taking: Reported on 4/8/2025)    [DISCONTINUED] ferrous sulfate 325 (65 Fe) mg tablet Take 1 tablet (325 mg total) by mouth daily with breakfast Do not start before September 20, 2023.    [DISCONTINUED] hydroCHLOROthiazide 12.5 mg tablet TAKE ONE TABLET BY MOUTH DAILY AS NEEDED (INCREASED LEG SWELLING OR WEIGHT GAIN OVER 5LBS    [DISCONTINUED] melatonin 3 mg Take 2 tablets (6 mg total) by mouth daily at bedtime (Patient not taking: Reported on 2/5/2024)    [DISCONTINUED] methocarbamol (ROBAXIN) 500 mg tablet Take 1 tablet (500 mg total) by mouth 3 (three) times a day as needed for muscle spasms  (Patient not taking: Reported on 4/8/2025)    [DISCONTINUED] naproxen (Naprosyn) 500 mg tablet Take 1 tablet (500 mg total) by mouth 2 (two) times a day with meals (Patient not taking: Reported on 4/8/2025)    [DISCONTINUED] nystatin (MYCOSTATIN) cream Apply topically 2 (two) times a day (Patient not taking: Reported on 4/8/2025)    [DISCONTINUED] pantoprazole (PROTONIX) 40 mg tablet Take 1 tablet (40 mg total) by mouth daily in the early morning Do not start before September 20, 2023. (Patient not taking: Reported on 2/5/2024)

## 2025-06-17 NOTE — ASSESSMENT & PLAN NOTE
Doing an excellent job with her blood pressure stable all labs reviewed and questions answered she is due for a refill on her Norvasc at the pharmacy she is to take that as directed.  Next labs will be completed prior to next office visit  Orders:    amLODIPine (NORVASC) 5 mg tablet; Take 1 tablet (5 mg total) by mouth in the morning    CBC and differential; Future    Comprehensive metabolic panel; Future    Lipid panel; Future    TSH, 3rd generation; Future    UA w Reflex to Microscopic w Reflex to Culture; Future

## 2025-06-17 NOTE — PATIENT INSTRUCTIONS
"Patient Education     Type 2 diabetes   The Basics   Written by the doctors and editors at [facility]   What is type 2 diabetes? -- This is a disorder that disrupts the way the body uses sugar. It is sometimes called type 2 diabetes mellitus.  All of the cells in the body need sugar to work normally. Sugar gets into the cells with the help of a hormone called insulin. Insulin is made by the pancreas, an organ in the belly. If there is not enough insulin, or if cells in the body don't respond normally to insulin, sugar builds up in the blood. That is what happens to people with diabetes.  There are 2 different types of diabetes:   In type 1 diabetes, the pancreas makes little or no insulin.   In type 2 diabetes, the pancreas still makes some insulin, but the cells in the body stop responding normally. Eventually, the pancreas cannot make enough insulin to keep up.  Having excess body weight or obesity increases a person's risk of developing type 2 diabetes. But people without excess body weight can get diabetes, too.  What are the symptoms of type 2 diabetes? -- Type 2 diabetes usually causes no symptoms. When symptoms do happen, they include:   Needing to urinate often   Intense thirst   Blurry vision  Can diabetes lead to other health problems? -- Yes. Type 2 diabetes might not make you feel sick. But if it is not managed, it can lead to serious problems over time, such as:   Heart attacks   Strokes   Kidney disease   Vision problems (or even blindness)   Pain or loss of feeling in the hands and feet   Needing to have fingers, toes, or other body parts removed (amputated)  How do I know if I have type 2 diabetes? -- Your doctor or nurse can do a blood test. There are 2 tests that can be used for this. Both involve measuring the amount of sugar in your blood, called your \"blood sugar\" or \"blood glucose\":   One of the tests measures your blood sugar at the time the blood sample is taken. This test is done in the " "morning. You can't eat or drink anything except water for at least 8 hours before the test.   The other test shows what your average blood sugar has been for the past 2 to 3 months. This blood test is called \"hemoglobin A1C\" or just \"A1C.\" It can be checked at any time of the day, even if you have recently eaten.  How is type 2 diabetes treated? -- The goals of treatment are to manage your blood sugar and lower the risk of future problems that can happen in people with diabetes.  Treatment might include:   Lifestyle changes - This is an important part of managing diabetes. It includes eating healthy foods and getting plenty of physical activity.   Medicines - There are a few medicines that help lower blood sugar. Some people need to take pills that help the body make more insulin or that help insulin do its job. Others need insulin shots.  Depending on what medicines you take, you might need to check your blood sugar regularly at home. But not everyone with type 2 diabetes needs to do this. Your doctor or nurse will tell you if you should be checking your blood sugar, and when and how to do this.  Sometimes, people with type 2 diabetes also need medicines to help prevent problems caused by the disease. For instance, medicines used to lower blood pressure can reduce the chances of a heart attack or stroke.   General medical care - It's also important to take care of other areas of your health. This includes watching your blood pressure and cholesterol levels. You should also get certain vaccines, such as vaccines to protect against the flu and coronavirus disease 2019 (\"COVID-19\"). Some people also need a vaccine to prevent pneumonia.  Can type 2 diabetes be prevented? -- Yes. To lower your chances of getting type 2 diabetes, the most important thing you can do is eat a healthy diet and get plenty of physical activity. This can help you lose weight if you are overweight. But eating well and being active are also good " for your overall health. Even gentle activity, like walking, has benefits.  If you smoke, quitting can also lower your risk of type 2 diabetes. Quitting smoking can be difficult, but your doctor or nurse can help.  All topics are updated as new evidence becomes available and our peer review process is complete.  This topic retrieved from [employer] on: Apr 24, 2024.  Topic 13963 Version 23.0  Release: 32.3.2 - C32.113  © 2024 UpToDate, Inc. and/or its affiliates. All rights reserved.  Consumer Information Use and Disclaimer   Disclaimer: This generalized information is a limited summary of diagnosis, treatment, and/or medication information. It is not meant to be comprehensive and should be used as a tool to help the user understand and/or assess potential diagnostic and treatment options. It does NOT include all information about conditions, treatments, medications, side effects, or risks that may apply to a specific patient. It is not intended to be medical advice or a substitute for the medical advice, diagnosis, or treatment of a health care provider based on the health care provider's examination and assessment of a patient's specific and unique circumstances. Patients must speak with a health care provider for complete information about their health, medical questions, and treatment options, including any risks or benefits regarding use of medications. This information does not endorse any treatments or medications as safe, effective, or approved for treating a specific patient. UpToDate, Inc. and its affiliates disclaim any warranty or liability relating to this information or the use thereof.The use of this information is governed by the Terms of Use, available at https://www.wolterskluwer.com/en/know/clinical-effectiveness-terms. 2024© UpToDate, Inc. and its affiliates and/or licensors. All rights reserved.  Copyright   © 2024 UpToDate, Inc. and/or its affiliates. All rights reserved.    Patient Education    "  Treatment for type 2 diabetes   The Basics   Written by the doctors and editors at [facility]   What are the goals of type 2 diabetes treatment? -- The goals of treatment for type 2 diabetes are to:   Manage your blood sugar   Prevent future health problems that can happen in people with diabetes  How is type 2 diabetes treated? -- Type 2 diabetes can be treated with:   Diet changes   Lifestyle changes   Medicines  Your doctor or nurse will work with you to make a treatment plan that is right for you.  What diet and lifestyle changes might be part of my treatment? -- As part of your treatment, your doctor or nurse might recommend that you:   Eat healthy foods   Lose weight if you have excess body weight   Get plenty of physical activity   Avoid smoking  Making these lifestyle changes is as important as taking your medicines. They will also help improve your overall health.  What medicines are used to treat type 2 diabetes? -- Different medicines can be used to treat type 2 diabetes. The first medicine that most people with type 2 diabetes take is a pill called metformin (brand name: Glucophage).  How do I know if my treatment is working? -- Your doctor can do a blood test called an \"A1C.\" This test shows what your blood sugar level has been over the past 2 to 3 months.  Another way to know if your treatment is working is to check your blood sugar level yourself. Many people with type 2 diabetes do not need to do this, but some do. It usually involves using a device called a \"blood glucose monitor.\" If your doctor recommends doing this, they will explain how and when to use the device.  What if my blood sugar level is still higher than normal? -- If your blood sugar level is still higher than normal after taking metformin for 2 to 3 months, your doctor might increase your dose. If you are already taking the highest possible dose, your doctor might suggest adding a second medicine.  Which second medicine will I " take? -- There are different medicines that your doctor can prescribe. The second medicine could be another pill that you take every day, or a shot that you give yourself once a day or once a week. The choice depends on different things, including how high your blood sugar is, your weight, your other health problems, and whether you are comfortable giving yourself a shot.  A few of these medicines can cause low blood sugar as a side effect. Symptoms of low blood sugar can include:   Sweating and shaking   Feeling hungry   Feeling worried  Low blood sugar should be treated quickly because it can cause you to pass out. Your doctor or nurse will tell you if your medicine can cause low blood sugar and how to treat it.  What is insulin? -- Insulin is a hormone normally made by the pancreas, an organ in the belly. It helps sugar get into your body's cells. In most people with type 2 diabetes, the body does not respond to insulin normally. Then, over time, the pancreas stops making enough insulin.  Most people with type 2 diabetes can manage their blood sugar with healthy eating, physical activity, and medicines. Some people need to take insulin as part of their treatment plan.  Insulin might be prescribed as a second medicine, or as the only medicine. It usually comes as a shot that people give themselves (figure 1).  If your doctor prescribes insulin, they will tell you:   Which type to use - There are different types of insulin. Some types work faster or last longer than others.   How much to use   When to use it   How to give yourself the shot   When to check your blood sugar level   How to avoid low blood sugar  If you take insulin, your dose will need to change if you get sick, have surgery, travel, or eat out. Your doctor or nurse will talk to you about when and how to change your dose.  What other treatments might I need? -- Sometimes, people with type 2 diabetes need medicines to treat or prevent other health  problems. For example, if you have high blood pressure, you might take medicine to lower your blood pressure. This can reduce your chances of having a heart attack or stroke.  When should I see my doctor or nurse? -- Most people with diabetes see their doctor or nurse every 3 or 4 months. During these visits, they will talk with you about your medicines and blood sugar levels. If your blood sugar levels are not where they should be, your doctor or nurse might make changes to your treatment plan.  Taking care of diabetes can be hard, and some people feel sad, stressed, or anxious. Let your doctor or nurse know if you are struggling, so they can help.  All topics are updated as new evidence becomes available and our peer review process is complete.  This topic retrieved from [employer] on: Feb 26, 2024.  Topic 11677 Version 11.0  Release: 32.2.4 - C32.56  © 2024 UpToDate, Inc. and/or its affiliates. All rights reserved.  figure 1: Giving insulin with a needle and syringe     To give yourself insulin using a needle and syringe:  Pinch up some skin, and quickly insert the needle. Keep the skin pinched to avoid having the insulin go into the muscle.  Push the plunger down all of the way.  Let go of the skin, and remove the needle. If you can see blood or clear fluid (insulin) where the shot went in, press on the area for 5 to 8 seconds, but do not rub.  Graphic 10078 Version 14.0  Consumer Information Use and Disclaimer   Disclaimer: This generalized information is a limited summary of diagnosis, treatment, and/or medication information. It is not meant to be comprehensive and should be used as a tool to help the user understand and/or assess potential diagnostic and treatment options. It does NOT include all information about conditions, treatments, medications, side effects, or risks that may apply to a specific patient. It is not intended to be medical advice or a substitute for the medical advice, diagnosis, or  "treatment of a health care provider based on the health care provider's examination and assessment of a patient's specific and unique circumstances. Patients must speak with a health care provider for complete information about their health, medical questions, and treatment options, including any risks or benefits regarding use of medications. This information does not endorse any treatments or medications as safe, effective, or approved for treating a specific patient. UpToDate, Inc. and its affiliates disclaim any warranty or liability relating to this information or the use thereof.The use of this information is governed by the Terms of Use, available at https://www.Flared[employer]D.com/en/know/clinical-effectiveness-terms. 2024© UpToDate, Inc. and its affiliates and/or licensors. All rights reserved.  Copyright   © 2024 UpToDate, Inc. and/or its affiliates. All rights reserved.    Patient Education     Foot care for people with diabetes   The Basics   Written by the doctors and editors at [employer]   Why is foot care important if I have diabetes? -- Diabetes can cause nerve damage if your blood sugar is high for a long time. The medical term for this is \"diabetic neuropathy.\"  If you have problems with the nerves in your feet, you might not be able to feel pain in your foot. Normally, people feel pain when they get a cut or a blister on their foot. The pain tells them that they need to treat their cut so it can heal. But people with nerve damage might not feel any pain when their feet get hurt. They might not even know that they have a cut, so they might not treat it. Problems that aren't treated right away can get much worse. For example, an untreated cut can get infected and turn into an open sore.  High blood sugar can also damage blood vessels and decrease blood flow to your feet. This can weaken your skin and make wounds take longer to heal. You are also more likely to get an infection if you have high blood " sugar.  How do I take care of my feet? -- Taking good care of your feet can help prevent foot problems. You should:   Wash your feet every day with soap and warm water. Pat your feet dry, and be sure to dry the skin between your toes.   Keep your feet moisturized. Put lotion on the tops and bottoms of your feet, but not between your toes.   Check your feet every day (figure 1). Look for cuts, blisters, redness, or swelling. Use a mirror, or ask someone to help you check the bottoms of your feet. Check all parts of the foot, especially between the toes. Look for broken skin, ulcers, blisters, or redness.   Trim your toenails straight across when needed (figure 2). Do not cut the corners of your toenails. File rough edges. Do not cut your cuticles. Ask for help if you cannot see well or have problems reaching your feet.   Ask your doctor or nurse to check your feet at each visit. Take your shoes and socks off for these checks.   See a foot care provider (such as a podiatrist) if you have an ingrown toenail, corn, or callus. Do not try to remove corns and calluses yourself.  How do I protect my feet from injury? -- There are several ways to protect your feet. You can:   Wear shoes and socks at all times, even at home. Do not walk barefoot. Wear swim shoes if you go to the beach or a swimming pool.   Choose shoes that fit well. They should not be not too tight or too loose. Your shoes should have plenty of room for your toes (figure 3). Your doctor might give you a prescription for special shoes. Check to see if they are covered by your insurance.   Check your shoes each time before you put them on to make sure that the lining is smooth. Also check to make sure that there is nothing inside the shoes before putting them on.   Do not wear shoes that expose any part of the foot, like sandals, thongs, or clogs.   Wear cotton socks that fit loosely. Do not wear shoes without socks.   Protect your feet from heat and cold.  Test bath water before putting your feet in it to make sure that it is not too hot. Do not walk barefoot on hot ground. Take extra care when going outside in the cold and wear warm socks.  What else should I know? -- You can lower your risk for foot problems by keeping your blood sugar levels as close to your goal as possible. Other things you can do include:   Move your ankles and toes often to help with blood flow. You can wear a support stocking to help with swelling.   Walk often. Regular walking helps blood flow.   If you smoke, try to quit. Your doctor or nurse can help. Smoking causes poor blood flow to your feet and can damage your nerves.  When should I call the doctor? -- Call your doctor or nurse for advice if you have:   A fever of 100.4°F (38°C) or higher, chills, or a wound that will not heal   Swelling, redness, warmth around a wound, a foul smell coming from a wound, or yellowish, greenish, or bloody discharge   Sores or blisters on your feet that hurt more or less than you would expect   Numbness or tingling in your foot or leg   Corns, calluses, blisters, or new sores on your foot   Very dry, scaly, or cracked skin on your feet   Changes in the way your foot joints or arch look  All topics are updated as new evidence becomes available and our peer review process is complete.  This topic retrieved from [employer] on: Mar 13, 2024.  Topic 372194 Version 2.0  Release: 32.2.4 - C32.71  © 2024 UpToDate, Inc. and/or its affiliates. All rights reserved.  figure 1: Foot check for people with diabetes     People with diabetes should check both of their feet every day. It is important to check your feet all over, including in between your toes. If you can't see the bottom of your foot, use a mirror or ask another person to check for you. Let your doctor or nurse know if you find any:  Redness   Cuts or cracks in the skin   Blisters   Swelling   Graphic 02676 Version 3.0  figure 2: Trim your toenails     Trim  your toenails straight across and smooth them with a nail file.  Graphic 19679 Version 2.0  figure 3: Correct shoe shape     Choose shoes that fit the right way and are not too tight or too loose. Your shoes should have plenty of room for your toes.  Graphic 78164 Version 2.0  Consumer Information Use and Disclaimer   Disclaimer: This generalized information is a limited summary of diagnosis, treatment, and/or medication information. It is not meant to be comprehensive and should be used as a tool to help the user understand and/or assess potential diagnostic and treatment options. It does NOT include all information about conditions, treatments, medications, side effects, or risks that may apply to a specific patient. It is not intended to be medical advice or a substitute for the medical advice, diagnosis, or treatment of a health care provider based on the health care provider's examination and assessment of a patient's specific and unique circumstances. Patients must speak with a health care provider for complete information about their health, medical questions, and treatment options, including any risks or benefits regarding use of medications. This information does not endorse any treatments or medications as safe, effective, or approved for treating a specific patient. UpToDate, Inc. and its affiliates disclaim any warranty or liability relating to this information or the use thereof.The use of this information is governed by the Terms of Use, available at https://www.woltersMainkeys Incuwer.com/en/know/clinical-effectiveness-terms. 2024© UpToDate, Inc. and its affiliates and/or licensors. All rights reserved.  Copyright   © 2024 UpToDate, Inc. and/or its affiliates. All rights reserved.

## 2025-07-02 DIAGNOSIS — M79.7 FIBROMYALGIA: ICD-10-CM

## 2025-07-02 DIAGNOSIS — J44.1 CHRONIC OBSTRUCTIVE PULMONARY DISEASE WITH ACUTE EXACERBATION (HCC): ICD-10-CM

## 2025-07-02 DIAGNOSIS — E11.9 TYPE II DIABETES MELLITUS, WELL CONTROLLED (HCC): ICD-10-CM

## 2025-07-02 DIAGNOSIS — I10 PRIMARY HYPERTENSION: ICD-10-CM

## 2025-07-02 DIAGNOSIS — I61.0 NONTRAUMATIC SUBCORTICAL HEMORRHAGE OF LEFT CEREBRAL HEMISPHERE (HCC): ICD-10-CM

## 2025-07-02 LAB
ALBUMIN SERPL-MCNC: 4 G/DL (ref 3.6–5.1)
ALBUMIN/GLOB SERPL: 1.1 (CALC) (ref 1–2.5)
ALP SERPL-CCNC: 62 U/L (ref 37–153)
ALT SERPL-CCNC: 14 U/L (ref 6–29)
AST SERPL-CCNC: 23 U/L (ref 10–35)
BASOPHILS # BLD AUTO: 112 CELLS/UL (ref 0–200)
BASOPHILS NFR BLD AUTO: 1.1 %
BILIRUB SERPL-MCNC: 0.4 MG/DL (ref 0.2–1.2)
BUN SERPL-MCNC: 8 MG/DL (ref 7–25)
BUN/CREAT SERPL: ABNORMAL (CALC) (ref 6–22)
CALCIUM SERPL-MCNC: 9.5 MG/DL (ref 8.6–10.4)
CHLORIDE SERPL-SCNC: 96 MMOL/L (ref 98–110)
CHOLEST SERPL-MCNC: 178 MG/DL
CHOLEST/HDLC SERPL: 4.1 (CALC)
CO2 SERPL-SCNC: 30 MMOL/L (ref 20–32)
CREAT SERPL-MCNC: 0.72 MG/DL (ref 0.5–1.05)
EOSINOPHIL # BLD AUTO: 1316 CELLS/UL (ref 15–500)
EOSINOPHIL NFR BLD AUTO: 12.9 %
ERYTHROCYTE [DISTWIDTH] IN BLOOD BY AUTOMATED COUNT: 14.2 % (ref 11–15)
GFR/BSA.PRED SERPLBLD CYS-BASED-ARV: 96 ML/MIN/1.73M2
GLOBULIN SER CALC-MCNC: 3.7 G/DL (CALC) (ref 1.9–3.7)
GLUCOSE SERPL-MCNC: 129 MG/DL (ref 65–99)
HCT VFR BLD AUTO: 40.3 % (ref 35–45)
HDLC SERPL-MCNC: 43 MG/DL
HGB BLD-MCNC: 13.1 G/DL (ref 11.7–15.5)
LDLC SERPL CALC-MCNC: 108 MG/DL (CALC)
LYMPHOCYTES # BLD AUTO: 3611 CELLS/UL (ref 850–3900)
LYMPHOCYTES NFR BLD AUTO: 35.4 %
MCH RBC QN AUTO: 27.5 PG (ref 27–33)
MCHC RBC AUTO-ENTMCNC: 32.5 G/DL (ref 32–36)
MCV RBC AUTO: 84.7 FL (ref 80–100)
MONOCYTES # BLD AUTO: 704 CELLS/UL (ref 200–950)
MONOCYTES NFR BLD AUTO: 6.9 %
NEUTROPHILS # BLD AUTO: 4457 CELLS/UL (ref 1500–7800)
NEUTROPHILS NFR BLD AUTO: 43.7 %
NONHDLC SERPL-MCNC: 135 MG/DL (CALC)
PLATELET # BLD AUTO: 481 THOUSAND/UL (ref 140–400)
PMV BLD REES-ECKER: 9.5 FL (ref 7.5–12.5)
POTASSIUM SERPL-SCNC: 3.8 MMOL/L (ref 3.5–5.3)
PROT SERPL-MCNC: 7.7 G/DL (ref 6.1–8.1)
RBC # BLD AUTO: 4.76 MILLION/UL (ref 3.8–5.1)
SODIUM SERPL-SCNC: 137 MMOL/L (ref 135–146)
TRIGL SERPL-MCNC: 159 MG/DL
TSH SERPL-ACNC: 1.68 MIU/L (ref 0.4–4.5)
WBC # BLD AUTO: 10.2 THOUSAND/UL (ref 3.8–10.8)

## 2025-07-03 LAB
ALBUMIN/CREAT UR: 5 MG/G CREAT
APPEARANCE UR: CLEAR
BACTERIA UR QL AUTO: NORMAL /HPF
BILIRUB UR QL STRIP: NEGATIVE
COLOR UR: YELLOW
CREAT UR-MCNC: 121 MG/DL (ref 20–275)
GLUCOSE UR QL STRIP: NEGATIVE
HGB UR QL STRIP: NEGATIVE
HYALINE CASTS #/AREA URNS LPF: NORMAL /LPF
KETONES UR QL STRIP: NEGATIVE
LEUKOCYTE ESTERASE UR QL STRIP: NEGATIVE
MICROALBUMIN UR-MCNC: 0.6 MG/DL
NITRITE UR QL STRIP: NEGATIVE
PH UR STRIP: 6 [PH] (ref 5–8)
PROT UR QL STRIP: NEGATIVE
RBC #/AREA URNS HPF: NORMAL /HPF
SP GR UR STRIP: 1.01 (ref 1–1.03)
SQUAMOUS #/AREA URNS HPF: NORMAL /HPF
WBC #/AREA URNS HPF: NORMAL /HPF

## 2025-07-03 RX ORDER — LOSARTAN POTASSIUM AND HYDROCHLOROTHIAZIDE 12.5; 1 MG/1; MG/1
1 TABLET ORAL DAILY
Qty: 90 TABLET | Refills: 1 | Status: SHIPPED | OUTPATIENT
Start: 2025-07-03

## 2025-07-03 RX ORDER — GABAPENTIN 300 MG/1
CAPSULE ORAL
Qty: 180 CAPSULE | Refills: 1 | Status: SHIPPED | OUTPATIENT
Start: 2025-07-03

## 2025-07-07 DIAGNOSIS — E78.2 MIXED HYPERLIPIDEMIA: Primary | ICD-10-CM

## 2025-07-07 RX ORDER — ATORVASTATIN CALCIUM 40 MG/1
40 TABLET, FILM COATED ORAL DAILY
Qty: 90 TABLET | Refills: 3 | Status: SHIPPED | OUTPATIENT
Start: 2025-07-07

## 2025-07-07 RX ORDER — ATORVASTATIN CALCIUM 20 MG/1
20 TABLET, FILM COATED ORAL DAILY
Qty: 270 TABLET | OUTPATIENT
Start: 2025-07-07

## 2025-07-15 ENCOUNTER — OFFICE VISIT (OUTPATIENT)
Dept: PODIATRY | Facility: CLINIC | Age: 60
End: 2025-07-15
Payer: COMMERCIAL

## 2025-07-15 VITALS — OXYGEN SATURATION: 96 % | HEIGHT: 60 IN | HEART RATE: 82 BPM | BODY MASS INDEX: 43.75 KG/M2

## 2025-07-15 DIAGNOSIS — M79.7 FIBROMYALGIA: ICD-10-CM

## 2025-07-15 DIAGNOSIS — E11.42 DIABETIC POLYNEUROPATHY ASSOCIATED WITH TYPE 2 DIABETES MELLITUS (HCC): Primary | ICD-10-CM

## 2025-07-15 DIAGNOSIS — E11.9 ENCOUNTER FOR DIABETIC FOOT EXAM (HCC): ICD-10-CM

## 2025-07-15 DIAGNOSIS — I61.0 NONTRAUMATIC SUBCORTICAL HEMORRHAGE OF LEFT CEREBRAL HEMISPHERE (HCC): ICD-10-CM

## 2025-07-15 PROCEDURE — 99203 OFFICE O/P NEW LOW 30 MIN: CPT | Performed by: PODIATRIST

## 2025-07-15 RX ORDER — GABAPENTIN 600 MG/1
600 TABLET ORAL 3 TIMES DAILY
Qty: 90 TABLET | Refills: 2 | Status: SHIPPED | OUTPATIENT
Start: 2025-07-15

## 2025-07-16 ENCOUNTER — HOSPITAL ENCOUNTER (OUTPATIENT)
Dept: RADIOLOGY | Facility: MEDICAL CENTER | Age: 60
Discharge: HOME | End: 2025-07-16
Payer: COMMERCIAL

## 2025-07-16 VITALS — HEIGHT: 60 IN | BODY MASS INDEX: 43.98 KG/M2 | WEIGHT: 224 LBS

## 2025-07-16 DIAGNOSIS — Z12.31 ENCOUNTER FOR SCREENING MAMMOGRAM FOR BREAST CANCER: ICD-10-CM

## 2025-07-16 PROCEDURE — 77063 BREAST TOMOSYNTHESIS BI: CPT

## 2025-07-16 PROCEDURE — 77067 SCR MAMMO BI INCL CAD: CPT

## 2025-07-22 ENCOUNTER — TELEPHONE (OUTPATIENT)
Age: 60
End: 2025-07-22

## 2025-07-22 NOTE — TELEPHONE ENCOUNTER
Patient calling stated that she needs an order for PT for the wind gap location.  Cecilia Hinkle    Patient was asking  for Smiley

## 2025-07-28 DIAGNOSIS — I69.051 SPASTIC HEMIPLEGIA OF RIGHT DOMINANT SIDE AS LATE EFFECT OF NONTRAUMATIC SUBARACHNOID HEMORRHAGE (HCC): Primary | ICD-10-CM

## 2025-07-28 DIAGNOSIS — R53.81: ICD-10-CM

## 2025-07-30 RX ORDER — ATORVASTATIN CALCIUM 20 MG/1
20 TABLET, FILM COATED ORAL DAILY
Qty: 90 TABLET | Refills: 1 | OUTPATIENT
Start: 2025-07-30

## 2025-08-01 ENCOUNTER — OFFICE VISIT (OUTPATIENT)
Dept: NEUROLOGY | Facility: CLINIC | Age: 60
End: 2025-08-01
Payer: COMMERCIAL

## 2025-08-01 VITALS
HEART RATE: 88 BPM | DIASTOLIC BLOOD PRESSURE: 88 MMHG | OXYGEN SATURATION: 96 % | TEMPERATURE: 98.6 F | SYSTOLIC BLOOD PRESSURE: 128 MMHG

## 2025-08-01 DIAGNOSIS — I10 PRIMARY HYPERTENSION: ICD-10-CM

## 2025-08-01 DIAGNOSIS — E78.5 HYPERLIPIDEMIA ASSOCIATED WITH TYPE 2 DIABETES MELLITUS  (HCC): ICD-10-CM

## 2025-08-01 DIAGNOSIS — R53.1 RIGHT SIDED WEAKNESS: ICD-10-CM

## 2025-08-01 DIAGNOSIS — I61.9 ICH (INTRACEREBRAL HEMORRHAGE) (HCC): Primary | ICD-10-CM

## 2025-08-01 DIAGNOSIS — E11.69 HYPERLIPIDEMIA ASSOCIATED WITH TYPE 2 DIABETES MELLITUS  (HCC): ICD-10-CM

## 2025-08-01 DIAGNOSIS — E11.9 TYPE 2 DIABETES MELLITUS WITHOUT COMPLICATION, WITHOUT LONG-TERM CURRENT USE OF INSULIN (HCC): ICD-10-CM

## 2025-08-01 DIAGNOSIS — F32.A DEPRESSION: ICD-10-CM

## 2025-08-01 PROCEDURE — 99214 OFFICE O/P EST MOD 30 MIN: CPT

## 2025-08-01 RX ORDER — DULOXETIN HYDROCHLORIDE 20 MG/1
40 CAPSULE, DELAYED RELEASE ORAL DAILY
Qty: 180 CAPSULE | Refills: 3 | Status: SHIPPED | OUTPATIENT
Start: 2025-08-01

## 2025-08-01 RX ORDER — PRAVASTATIN SODIUM 20 MG
20 TABLET ORAL DAILY
Qty: 30 TABLET | Refills: 3 | Status: SHIPPED | OUTPATIENT
Start: 2025-08-01

## 2025-08-04 DIAGNOSIS — G57.01 PIRIFORMIS SYNDROME OF RIGHT SIDE: ICD-10-CM

## 2025-08-04 DIAGNOSIS — J82.83 EOSINOPHILIC ASTHMA: ICD-10-CM

## 2025-08-04 DIAGNOSIS — F41.8 DEPRESSION WITH ANXIETY: ICD-10-CM

## 2025-08-06 RX ORDER — LIDOCAINE 50 MG/G
1 PATCH TOPICAL DAILY
Qty: 30 PATCH | Refills: 5 | Status: SHIPPED | OUTPATIENT
Start: 2025-08-06

## 2025-08-06 RX ORDER — FLUTICASONE FUROATE AND VILANTEROL 200; 25 UG/1; UG/1
1 POWDER RESPIRATORY (INHALATION) DAILY
Qty: 180 BLISTER | Refills: 1 | Status: SHIPPED | OUTPATIENT
Start: 2025-08-06 | End: 2026-02-02

## 2025-08-06 RX ORDER — BUSPIRONE HYDROCHLORIDE 7.5 MG/1
7.5 TABLET ORAL 2 TIMES DAILY
Qty: 180 TABLET | Refills: 1 | Status: SHIPPED | OUTPATIENT
Start: 2025-08-06

## 2025-08-13 ENCOUNTER — EVALUATION (OUTPATIENT)
Dept: PHYSICAL THERAPY | Facility: MEDICAL CENTER | Age: 60
End: 2025-08-13
Attending: NURSE PRACTITIONER
Payer: COMMERCIAL

## 2025-08-18 DIAGNOSIS — I61.0 NONTRAUMATIC SUBCORTICAL HEMORRHAGE OF LEFT CEREBRAL HEMISPHERE (HCC): ICD-10-CM

## 2025-08-20 RX ORDER — ASPIRIN 81 MG
81 TABLET,CHEWABLE ORAL DAILY
Qty: 90 TABLET | Refills: 1 | Status: SHIPPED | OUTPATIENT
Start: 2025-08-20

## (undated) DEVICE — SUT VICRYL 2-0 REEL 54 IN J286G

## (undated) DEVICE — SYRINGE 3ML LL

## (undated) DEVICE — BETHLEHEM UNIVERSAL MINOR GEN: Brand: CARDINAL HEALTH

## (undated) DEVICE — SUT MONOCRYL 4-0 PS-2 18 IN Y496G

## (undated) DEVICE — SYRINGE 5ML LL

## (undated) DEVICE — IV EXTENSION TUBING SMALL BORE

## (undated) DEVICE — ADHESIVE SKIN HIGH VISCOSITY EXOFIN 1ML

## (undated) DEVICE — GLOVE SRG BIOGEL ECLIPSE 7

## (undated) DEVICE — PLUMEPEN PRO 10FT

## (undated) DEVICE — PLASTIC ADHESIVE BANDAGE: Brand: CURITY

## (undated) DEVICE — ELECTRODE BLADE MOD E-Z CLEAN 2.5IN 6.4CM -0012M

## (undated) DEVICE — NEEDLE 22 G X 1 1/2 SAFETY

## (undated) DEVICE — CHLORASCRUB PREP 1ML

## (undated) DEVICE — 3000CC GUARDIAN II: Brand: GUARDIAN

## (undated) DEVICE — INTENDED FOR TISSUE SEPARATION, AND OTHER PROCEDURES THAT REQUIRE A SHARP SURGICAL BLADE TO PUNCTURE OR CUT.: Brand: BARD-PARKER SAFETY BLADES SIZE 15, STERILE

## (undated) DEVICE — CHLORAPREP HI-LITE 26ML ORANGE

## (undated) DEVICE — GLOVE SRG BIOGEL ECLIPSE 6.5

## (undated) DEVICE — TRAY EPIDURAL PERIFIX 20GA X 3.5IN TUOHY 8ML